# Patient Record
Sex: FEMALE | Race: WHITE | NOT HISPANIC OR LATINO | Employment: OTHER | URBAN - METROPOLITAN AREA
[De-identification: names, ages, dates, MRNs, and addresses within clinical notes are randomized per-mention and may not be internally consistent; named-entity substitution may affect disease eponyms.]

---

## 2017-01-05 ENCOUNTER — TELEPHONE (OUTPATIENT)
Dept: FAMILY MEDICINE | Facility: CLINIC | Age: 57
End: 2017-01-05
Payer: COMMERCIAL

## 2017-01-05 DIAGNOSIS — E11.42 TYPE 2 DIABETES MELLITUS WITH DIABETIC POLYNEUROPATHY (H): ICD-10-CM

## 2017-01-05 DIAGNOSIS — H40.1130 PRIMARY OPEN ANGLE GLAUCOMA OF BOTH EYES, UNSPECIFIED GLAUCOMA STAGE: ICD-10-CM

## 2017-01-05 RX ORDER — LATANOPROST 50 UG/ML
1 SOLUTION/ DROPS OPHTHALMIC AT BEDTIME
COMMUNITY
Start: 2017-01-05 | End: 2018-02-21

## 2017-01-05 NOTE — TELEPHONE ENCOUNTER
Forms received from:  [facility]   Phone number listed: [phone]   Fax listed: [phone]  Date received: 1-5-17  Form description: [facility]  Once forms are completed, please return to [facility] via fax.  Is patient requesting to be contacted when forms are completed: n/a  Form placed: RN folder  Arabella Mayo    Form given to RN to review med list.  Order pended for provider to sign.

## 2017-01-06 PROCEDURE — G0180 MD CERTIFICATION HHA PATIENT: HCPCS | Performed by: FAMILY MEDICINE

## 2017-01-06 NOTE — TELEPHONE ENCOUNTER
Signed forms/ orders.    We should continue home care services, especially with all the hospitalizations in the last few years.    Fredi Fuentes MD

## 2017-01-13 ENCOUNTER — OFFICE VISIT (OUTPATIENT)
Dept: PODIATRY | Facility: CLINIC | Age: 57
End: 2017-01-13
Payer: COMMERCIAL

## 2017-01-13 VITALS — HEART RATE: 123 BPM | DIASTOLIC BLOOD PRESSURE: 75 MMHG | OXYGEN SATURATION: 97 % | SYSTOLIC BLOOD PRESSURE: 153 MMHG

## 2017-01-13 DIAGNOSIS — B35.1 DERMATOPHYTOSIS OF NAIL: Primary | ICD-10-CM

## 2017-01-13 DIAGNOSIS — E11.49 DIABETIC NEUROPATHY WITH NEUROLOGIC COMPLICATION (H): ICD-10-CM

## 2017-01-13 DIAGNOSIS — L60.2 ONYCHAUXIS: ICD-10-CM

## 2017-01-13 DIAGNOSIS — L84 TYLOMA: ICD-10-CM

## 2017-01-13 DIAGNOSIS — E11.40 DIABETIC NEUROPATHY WITH NEUROLOGIC COMPLICATION (H): ICD-10-CM

## 2017-01-13 PROCEDURE — 99213 OFFICE O/P EST LOW 20 MIN: CPT | Mod: 25 | Performed by: PODIATRIST

## 2017-01-13 PROCEDURE — 11055 PARING/CUTG B9 HYPRKER LES 1: CPT | Performed by: PODIATRIST

## 2017-01-13 PROCEDURE — G0127 TRIM NAIL(S): HCPCS | Mod: 51 | Performed by: PODIATRIST

## 2017-01-13 NOTE — NURSING NOTE
"Lexy Rockwell's goals for this visit include: Toenail trim and callus trim  She requests these members of her care team be copied on today's visit information: yes    PCP: Fredi Fuentes    Referring Provider:  No referring provider defined for this encounter.    Chief Complaint   Patient presents with     RECHECK     townail trim and callus check       Initial /75 mmHg  Pulse 123  SpO2 97%  LMP 03/28/2014 Estimated body mass index is 33.80 kg/(m^2) as calculated from the following:    Height as of 11/15/16: 1.626 m (5' 4\").    Weight as of 12/19/16: 89.359 kg (197 lb).  BP completed using cuff size: large    "

## 2017-01-13 NOTE — PROGRESS NOTES
Past Medical History   Diagnosis Date     Cerumen impacted      Obesity      Development delay      Hypothyroid      DM (diabetes mellitus) (H)      Hyperlipaemia      Hypertension      Glaucoma (increased eye pressure)      Nonsenile cataract      Patient Active Problem List   Diagnosis     Development delay     Overactive bladder     GERD (gastroesophageal reflux disease)     Type 2 diabetes, HbA1C goal < 8% (H)     Hypertension goal BP (blood pressure) < 140/90     Hyperlipidemia LDL goal <100     Pain in joint, lower leg     Proteinuria     Vitamin D deficiency     Diabetic gastroparesis (H)     Health Care Home     History of strabismus surgery     Type 2 diabetes mellitus with other specified complication (H)     Advanced directives, counseling/discussion     Type 2 diabetes mellitus with diabetic polyneuropathy (H)     Primary open angle glaucoma of both eyes, moderate stage     Past Surgical History   Procedure Laterality Date     C eye surg ant sgmt proc unlisted  remote     strabismus surgery     Strabismus surgery       Social History     Social History     Marital Status: Single     Spouse Name: N/A     Number of Children: N/A     Years of Education: N/A     Occupational History     Not on file.     Social History Main Topics     Smoking status: Never Smoker      Smokeless tobacco: Never Used      Comment: lives in smoke free household.     Alcohol Use: Yes      Comment: occass social     Drug Use: No     Sexual Activity: No     Other Topics Concern     Parent/Sibling W/ Cabg, Mi Or Angioplasty Before 65f 55m? No     Social History Narrative     Family History   Problem Relation Age of Onset     Hypertension Father      DIABETES Sister      CANCER No family hx of      CEREBROVASCULAR DISEASE No family hx of      Thyroid Disease No family hx of      Macular Degeneration No family hx of      Glaucoma Maternal Grandfather        A1C     11.0   12/19/2016   SUBJECTIVE FINDINGS:  This 56-year-old female  returns to clinic for diabetic foot check, onychomycosis, onychauxis and calluses.  She relates that she needs her calluses cut down.  She relates she had a blister on her left arch a few weeks ago.  Relates no injuries.  She has numbness, tingling, and neuropathy in her feet.  She relates she opted to not get diabetic shoes.        OBJECTIVE FINDINGS:  DP and PT are 1/4 bilaterally. She has decreased hair growth bilaterally.  She has mild peripheral edema bilaterally.  She has incurvated nails with some dystrophy and discoloration 1-5 bilaterally.  There is hyperkeratotic tissue buildup plantar medial hallux and first MPJ left.  There is dry skin bilaterally.  There is decreased ankle joint dorsiflexion bilaterally.  No gross tendon voids bilaterally.  Sharp/dull is decreased with 5.07 Violet-Latha monofilament bilaterally.  Deep tendon reflexes are intact bilaterally.  There are no gross tendon voids bilaterally.  There is no erythema, no drainage, no odor, no calor bilaterally.  She has dorsally contracted digits 2-5 bilaterally.        ASSESSMENT AND PLAN:  Onychauxis and onychomycosis bilaterally.  Tylomas left foot.  She is diabetic with peripheral neuropathy.  Diagnosis and treatment options discussed with her.  She is advised on stretching.  She has decreased ankle joint dorsiflexion bilaterally.  Advised her on a runner's stretch.  Tylomas left foot were sharp debrided with a 15 blade upon consent.  All the nails bilaterally were either debrided or reduced upon consent.  Advised her on moisturizing lotion use.  She relates she has not been doing that but she will start.  She will return to clinic to see me in 2-3 months.  Diagnosis and treatment options discussed with her.

## 2017-01-13 NOTE — PATIENT INSTRUCTIONS
Thanks for coming today.  Ortho[facility] Medicine Clinic  [address] [place] [address]    To schedule future appointments in Ortho Clinic, you may call [phone].    To schedule ordered imaging by your Provider: Call [facility] at [phone]    [facility] available online at:   [email or web address]    Please call if any further questions or concerns [phone] and ask for the Orthopedic Department. Clinic hours 8 am to 5 pm.    Return to clinic if symptoms worsen.

## 2017-01-25 ENCOUNTER — TRANSFERRED RECORDS (OUTPATIENT)
Dept: HEALTH INFORMATION MANAGEMENT | Facility: CLINIC | Age: 57
End: 2017-01-25

## 2017-02-09 DIAGNOSIS — E11.69 TYPE 2 DIABETES MELLITUS WITH OTHER SPECIFIED COMPLICATION (H): Primary | ICD-10-CM

## 2017-02-09 NOTE — TELEPHONE ENCOUNTER
insulin aspart (NOVOLOG FLEXPEN) 100 UNIT/ML injection      Last Written Prescription Date:  1/5/17  Last Fill Quantity: na,   # refills: na  Last Office Visit with G, P or [facility] prescribing provider: 12/19/16  Future Office visit:    Next 5 appointments (look out 90 days)     Mar 06, 2017 11:15 AM   Return Visit with Shira Wilson MD, MG ENDO NURSE   Formerly named [facility])    [address] [place] [address]   [phone]            Mar 17, 2017 12:30 PM   Return Visit with Ravin Back DPM   Formerly named [facility])    [address] [place] [address]   [phone]            May 04, 2017  1:45 PM   Return Visit with Dayron Rios MD   [facility] ([address] [address]   [phone]                   Routing refill request to provider for review/approval because:  Medication is reported/historical

## 2017-02-09 NOTE — TELEPHONE ENCOUNTER
Routing refill request to provider for review/approval because:  Medication is reported/historical  insulin aspart (NOVOLOG FLEXPEN) 100 UNIT/ML injection   1/5/2017  No      Sig: Inject 20 units before each meal. Dose to be titrated     Class: Historical       Route to PCP    Kathie Queen RN

## 2017-02-13 DIAGNOSIS — E11.42 TYPE 2 DIABETES MELLITUS WITH DIABETIC POLYNEUROPATHY, UNSPECIFIED LONG TERM INSULIN USE STATUS: ICD-10-CM

## 2017-03-06 ENCOUNTER — CARE COORDINATION (OUTPATIENT)
Dept: ENDOCRINOLOGY | Facility: CLINIC | Age: 57
End: 2017-03-06

## 2017-03-06 ENCOUNTER — OFFICE VISIT (OUTPATIENT)
Dept: ENDOCRINOLOGY | Facility: CLINIC | Age: 57
End: 2017-03-06
Payer: COMMERCIAL

## 2017-03-06 VITALS
HEART RATE: 108 BPM | BODY MASS INDEX: 34.5 KG/M2 | SYSTOLIC BLOOD PRESSURE: 129 MMHG | DIASTOLIC BLOOD PRESSURE: 84 MMHG | HEIGHT: 63 IN | WEIGHT: 194.7 LBS

## 2017-03-06 DIAGNOSIS — Z79.4 TYPE 2 DIABETES MELLITUS WITH DIABETIC POLYNEUROPATHY, WITH LONG-TERM CURRENT USE OF INSULIN (H): Primary | ICD-10-CM

## 2017-03-06 DIAGNOSIS — E11.42 TYPE 2 DIABETES MELLITUS WITH DIABETIC POLYNEUROPATHY, WITH LONG-TERM CURRENT USE OF INSULIN (H): Primary | ICD-10-CM

## 2017-03-06 DIAGNOSIS — E11.42 TYPE 2 DIABETES MELLITUS WITH DIABETIC POLYNEUROPATHY (H): ICD-10-CM

## 2017-03-06 LAB
ANION GAP SERPL CALCULATED.3IONS-SCNC: 8 MMOL/L (ref 3–14)
BUN SERPL-MCNC: 14 MG/DL (ref 7–30)
CHLORIDE SERPL-SCNC: 105 MMOL/L (ref 94–109)
CO2 SERPL-SCNC: 24 MMOL/L (ref 20–32)
CREAT SERPL-MCNC: 0.56 MG/DL (ref 0.52–1.04)
GFR SERPL CREATININE-BSD FRML MDRD: NORMAL ML/MIN/1.7M2
HBA1C MFR BLD: 10.5 % (ref 0–5.7)
POTASSIUM SERPL-SCNC: 4.7 MMOL/L (ref 3.4–5.3)
SODIUM SERPL-SCNC: 137 MMOL/L (ref 133–144)

## 2017-03-06 PROCEDURE — 82565 ASSAY OF CREATININE: CPT | Performed by: INTERNAL MEDICINE

## 2017-03-06 PROCEDURE — 80051 ELECTROLYTE PANEL: CPT | Performed by: INTERNAL MEDICINE

## 2017-03-06 PROCEDURE — 84520 ASSAY OF UREA NITROGEN: CPT | Performed by: INTERNAL MEDICINE

## 2017-03-06 PROCEDURE — 99215 OFFICE O/P EST HI 40 MIN: CPT | Performed by: INTERNAL MEDICINE

## 2017-03-06 PROCEDURE — 83036 HEMOGLOBIN GLYCOSYLATED A1C: CPT | Performed by: INTERNAL MEDICINE

## 2017-03-06 PROCEDURE — 36415 COLL VENOUS BLD VENIPUNCTURE: CPT | Performed by: INTERNAL MEDICINE

## 2017-03-06 RX ORDER — ONDANSETRON 8 MG/1
TABLET, ORALLY DISINTEGRATING ORAL
Refills: 0 | COMMUNITY
Start: 2017-01-30 | End: 2017-06-23

## 2017-03-06 NOTE — LETTER
3/6/2017       RE: Lexy Rockwell  [address]  [place] [address]     Dear Colleague,    Thank you for referring your patient, Lexy Rockwell, to the [facility]. Please see a copy of my visit note below.    HISTORY OF PRESENT ILLNESS:  Lexy Rockwell is a 56-year-old female who returns today along with her mother for followup of type 2 diabetes.      The patient was diagnosed with type 2 diabetes around 2009.  Since her diagnosis, her blood sugars have been mostly uncontrolled.  She has had repeated hospitalizations due to severe hypoglycemia and diabetic  ketoacidosis.        Currently, she is taking Lantus 56 units at bedtime and NovoLog 20 units 3 times daily with meals and metformin XR 1000 mg b.i.d. and Trulicity 1.5 mg per week.      Since her last visit with me in 11/2016 she has had 2 hospitalizations due to uncontrolled blood sugars and diabetic ketoacidosis.      The patient checks her blood sugar about 2-3 times daily before each meal.  Her blood glucose data was downloaded and reviewed.  She has highly variable blood glucose.  She has had days where her all 3 checked blood sugars are below 150 and on other days of the same week she has had all readings above 400 throughout the day.      The patient reports that she tries to take all insulin doses but cannot confirm how often or when she missed insulin injections.      Her A1c is 10.5% today it has been above 11 over the last couple of years.  She has history of gastroparesis and was recently prescribed Zofran during her hospitalization.  She follows with Podiatry.      At the recent hospitalization metformin was stopped due to increased creatinine however was subsequently restarted as outpatient.      REVIEW OF SYSTEMS:  A 10-point review of systems was done, pertinent positives and negatives noted in HPI.  Review of systems otherwise negative.       PAST MEDICAL HISTORY:   1.  History of learning disability.   2.  Type 2 diabetes.   3.  Dyslipidemia.   4.  Diabetic neuropathy.   5.  Diabetic gastroparesis.      SOCIAL HISTORY:  She recently moved to a senior apartment complex.  She reports that she has assistance from home care 3 times a week, her  from [place] shows Cris Ray, and her cell number is [phone].  She gives her insulin independently.      FAMILY HISTORY:  Father has history of hypertension.        Current Outpatient Prescriptions on File Prior to Visit:  dulaglutide (TRULICITY) 1.5 MG/0.5ML pen Inject 1.5 mg Subcutaneous every 7 days   insulin aspart (NOVOLOG FLEXPEN) 100 UNIT/ML injection Inject 20 units before each meal. Dose to be titrated   latanoprost (XALATAN) 0.005 % ophthalmic solution Place 1 drop into both eyes At Bedtime   insulin glargine (LANTUS SOLOSTAR) 100 UNIT/ML PEN Inject 56 units at bedtime daily   insulin pen needle (B-D U/F) 31G X 8 MM Use 4 daily or as directed.   metoclopramide (REGLAN) 5 MG tablet Take 1 tablet (5 mg) by mouth 3 times daily (before meals)   simvastatin (ZOCOR) 20 MG tablet Take 1 tablet (20 mg) by mouth At Bedtime   oxybutynin (DITROPAN) 5 MG tablet Take 1 tablet (5 mg) by mouth 2 times daily   omeprazole (PRILOSEC) 20 MG capsule Take 1 capsule (20 mg) by mouth 2 times daily   magnesium oxide (MAG-OX) 400 MG tablet Take 1 tablet (400 mg) by mouth daily   metFORMIN (GLUCOPHAGE-XR) 500 MG 24 hr tablet Take 2 tablets (1,000 mg) by mouth 2 times daily   lisinopril (PRINIVIL,ZESTRIL) 20 MG tablet Take 1 tablet (20 mg) by mouth daily   blood glucose monitoring (NO BRAND SPECIFIED) meter device kit Use to test blood sugar 5 times daily or as directed.Patient needs new monitor.  Accu Test Compact or whatever is covered.Patient has very labile sugars so needs to check 5 x per day.Please also include 3 month supply of strips, lancets, and whatever other supplies are needed.  Ongoing  "refills for a year.   blood glucose monitoring (ACCU-CHEK COMPACT DRUM) test strip Patient has very labile sugars so has frequent hypoglycemia and some very sugars.  Thus needs to test frequently, 5 times daily;  Please dispense appropriate amount of supplies strips and lancets.   SOFTCLIX LANCETS MISC 1 Units 3 times daily.   ASPIRIN 81 MG OR TABS 1 TABLET DAILY   MULTIVITAMIN TABS   OR 1 TABLET DAILY     No current facility-administered medications on file prior to visit.      PHYSICAL EXAMINATION:   /84  Pulse 108  Ht 1.607 m (5' 3.25\")  Wt 88.3 kg (194 lb 11.2 oz)  LMP 03/28/2014  BMI 34.22 kg/m2  GENERAL:  She appears older than stated age, no acute distress.     HEENT:  No lid lag, retraction or proptosis.  Extraocular muscles intact.   NECK:  No goiter.  No cervical lymphadenopathy.   LUNGS:  Clear to auscultation bilaterally.   CARDIAC:  Regular rate and rhythm, no murmurs.   ABDOMEN:  Obese, soft, nontender.   EXTREMITIES:  Feet reduced monofilament sensation over toes.   NEUROLOGIC:  She is alert and oriented and answers appropriately to questions.      RESULTS  Lab Results   Component Value Date    A1C 10.5 03/06/2017    A1C 11.0 12/19/2016    A1C 11.1 09/28/2016    A1C 11.3 06/28/2016    A1C 11.4 01/25/2016       TSH   Date Value Ref Range Status   09/28/2016 4.16 (H) 0.40 - 4.00 mU/L Final   01/25/2016 10.93 (H) 0.40 - 4.00 mU/L Final   05/07/2015 3.23 0.40 - 4.00 mU/L Final   12/02/2014 4.35 (H) 0.40 - 4.00 mU/L Final     Comment:     Effective 7/30/2014, the reference range for this assay has changed to reflect   new instrumentation/methodology.     06/03/2014 6.44 (H) 0.4 - 5.0 mU/L Final     T4 Free   Date Value Ref Range Status   09/28/2016 1.01 0.76 - 1.46 ng/dL Final   01/25/2016 1.29 0.76 - 1.46 ng/dL Final   12/02/2014 1.23 0.76 - 1.46 ng/dL Final     Comment:     Effective 7/30/2014, the reference range for this assay has changed to reflect   new instrumentation/methodology.   "   09/04/2012 1.41 0.70 - 1.85 ng/dL Final   04/05/2012 1.17 0.70 - 1.85 ng/dL Final       Creatinine   Date Value Ref Range Status   03/06/2017 0.56 0.52 - 1.04 mg/dL Final   12/19/2016 0.63 0.52 - 1.04 mg/dL Final   ]    No results found for: MICROALBUMIN]    ALT   Date Value Ref Range Status   12/19/2016 19 0 - 50 U/L Final   09/28/2016 20 0 - 50 U/L Final   ]    Recent Labs   Lab Test  09/28/16   0955  01/25/16   1023  05/07/15   1105  06/03/14   1833   CHOL  190  182  175  210*   HDL  56  67  66  60   LDL  116*  99  93  105   TRIG  90  79  82  227*   CHOLHDLRATIO   --    --   2.7  3.5          IMPRESSION AND RECOMMENDATIONS:  Type 2 diabetes which has been chronically uncontrolled.  I am concerned the patient has had poorly controlled diabetes over the last 2 years or so and we have not been able to make much progress.  She has had repeated hospitalizations related to diabetes.  Looking at her blood glucose log it appears that she does not take her insulin consistently.  I am concerned about her ability to independently administer insulin correctly.  She has been counseled repeatedly over the last couple of years during Endocrine Clinic visit as well as she had visit with diabetes educators and again have not had much improvement.      We will have our clinic nurse contact her  to review if there is a possibility that she can receive more care and can receive insulin under supervision.  Given the chronically uncontrolled diabetes along with repeated hospitalizations I think it is important that if she can be supported with more home care.       I will decrease the Lantus dose to 50 units each day at bedtime as she has had some morning readings in 70s.  Otherwise, will continue on other insulin dosing.  She also would like to stop Trulicity due to the high copay.  The patient will return to see diabetic educator in about 1 month and with me in around 4 months.      Total time 40 minutes today, more  than 50%, reviewing records from recent hospitalizations and counseling patient and coordinating care with the patient's mother as well as our clinic nurse.         SHIRA WILSON MD             D: 2017 12:34   T: 2017 14:16   MT: [id number]      Name:     TITO LARA   MRN:      [id number]        Account:      [id number]   :      1960           Visit Date:   2017      Document: [id number]       cc: Fredi Fuentes MD       Again, thank you for allowing me to participate in the care of your patient.      Sincerely,    Shira Wilson MD

## 2017-03-06 NOTE — NURSING NOTE
"Lexy Rockwell's goals for this visit include:   Chief Complaint   Patient presents with     Diabetes       She requests these members of her care team be copied on today's visit information: Fredi Fuentes      PCP: Fredi Fuentes    Referring Provider:  No referring provider defined for this encounter.    Chief Complaint   Patient presents with     Diabetes       Initial /84  Pulse 108  Ht 1.607 m (5' 3.25\")  Wt 88.3 kg (194 lb 11.2 oz)  LMP 03/28/2014  BMI 34.22 kg/m2 Estimated body mass index is 34.22 kg/(m^2) as calculated from the following:    Height as of this encounter: 1.607 m (5' 3.25\").    Weight as of this encounter: 88.3 kg (194 lb 11.2 oz).  Medication Reconciliation: complete    Do you need any medication refills at today's visit? No    Libia Fatima LPN      "

## 2017-03-06 NOTE — PROGRESS NOTES
HISTORY OF PRESENT ILLNESS:  Lexy Rockwell is a 56-year-old female who returns today along with her mother for followup of type 2 diabetes.      The patient was diagnosed with type 2 diabetes around 2009.  Since her diagnosis, her blood sugars have been mostly uncontrolled.  She has had repeated hospitalizations due to severe hypoglycemia and diabetic  ketoacidosis.        Currently, she is taking Lantus 56 units at bedtime and NovoLog 20 units 3 times daily with meals and metformin XR 1000 mg b.i.d. and Trulicity 1.5 mg per week.      Since her last visit with me in 11/2016 she has had 2 hospitalizations due to uncontrolled blood sugars and diabetic ketoacidosis.      The patient checks her blood sugar about 2-3 times daily before each meal.  Her blood glucose data was downloaded and reviewed.  She has highly variable blood glucose.  She has had days where her all 3 checked blood sugars are below 150 and on other days of the same week she has had all readings above 400 throughout the day.      The patient reports that she tries to take all insulin doses but cannot confirm how often or when she missed insulin injections.      Her A1c is 10.5% today it has been above 11 over the last couple of years.  She has history of gastroparesis and was recently prescribed Zofran during her hospitalization.  She follows with Podiatry.      At the recent hospitalization metformin was stopped due to increased creatinine however was subsequently restarted as outpatient.      REVIEW OF SYSTEMS:  A 10-point review of systems was done, pertinent positives and negatives noted in HPI.  Review of systems otherwise negative.      PAST MEDICAL HISTORY:   1.  History of learning disability.   2.  Type 2 diabetes.   3.  Dyslipidemia.   4.  Diabetic neuropathy.   5.  Diabetic gastroparesis.      SOCIAL HISTORY:  She recently moved to a senior apartment complex.  She reports that she has assistance from home care 3 times a week, her case  manager from [place] shows Cris Ray, and her cell number is [phone].  She gives her insulin independently.      FAMILY HISTORY:  Father has history of hypertension.        Current Outpatient Prescriptions on File Prior to Visit:  dulaglutide (TRULICITY) 1.5 MG/0.5ML pen Inject 1.5 mg Subcutaneous every 7 days   insulin aspart (NOVOLOG FLEXPEN) 100 UNIT/ML injection Inject 20 units before each meal. Dose to be titrated   latanoprost (XALATAN) 0.005 % ophthalmic solution Place 1 drop into both eyes At Bedtime   insulin glargine (LANTUS SOLOSTAR) 100 UNIT/ML PEN Inject 56 units at bedtime daily   insulin pen needle (B-D U/F) 31G X 8 MM Use 4 daily or as directed.   metoclopramide (REGLAN) 5 MG tablet Take 1 tablet (5 mg) by mouth 3 times daily (before meals)   simvastatin (ZOCOR) 20 MG tablet Take 1 tablet (20 mg) by mouth At Bedtime   oxybutynin (DITROPAN) 5 MG tablet Take 1 tablet (5 mg) by mouth 2 times daily   omeprazole (PRILOSEC) 20 MG capsule Take 1 capsule (20 mg) by mouth 2 times daily   magnesium oxide (MAG-OX) 400 MG tablet Take 1 tablet (400 mg) by mouth daily   metFORMIN (GLUCOPHAGE-XR) 500 MG 24 hr tablet Take 2 tablets (1,000 mg) by mouth 2 times daily   lisinopril (PRINIVIL,ZESTRIL) 20 MG tablet Take 1 tablet (20 mg) by mouth daily   blood glucose monitoring (NO BRAND SPECIFIED) meter device kit Use to test blood sugar 5 times daily or as directed.Patient needs new monitor.  Accu Test Compact or whatever is covered.Patient has very labile sugars so needs to check 5 x per day.Please also include 3 month supply of strips, lancets, and whatever other supplies are needed.  Ongoing refills for a year.   blood glucose monitoring (ACCU-CHEK COMPACT DRUM) test strip Patient has very labile sugars so has frequent hypoglycemia and some very sugars.  Thus needs to test frequently, 5 times daily;  Please dispense appropriate amount of supplies strips and lancets.   SOFTCLIX LANCETS MISC 1 Units 3 times  "daily.   ASPIRIN 81 MG OR TABS 1 TABLET DAILY   MULTIVITAMIN TABS   OR 1 TABLET DAILY     No current facility-administered medications on file prior to visit.      PHYSICAL EXAMINATION:   /84  Pulse 108  Ht 1.607 m (5' 3.25\")  Wt 88.3 kg (194 lb 11.2 oz)  LMP 03/28/2014  BMI 34.22 kg/m2  GENERAL:  She appears older than stated age, no acute distress.     HEENT:  No lid lag, retraction or proptosis.  Extraocular muscles intact.   NECK:  No goiter.  No cervical lymphadenopathy.   LUNGS:  Clear to auscultation bilaterally.   CARDIAC:  Regular rate and rhythm, no murmurs.   ABDOMEN:  Obese, soft, nontender.   EXTREMITIES:  Feet reduced monofilament sensation over toes.   NEUROLOGIC:  She is alert and oriented and answers appropriately to questions.      RESULTS  Lab Results   Component Value Date    A1C 10.5 03/06/2017    A1C 11.0 12/19/2016    A1C 11.1 09/28/2016    A1C 11.3 06/28/2016    A1C 11.4 01/25/2016       TSH   Date Value Ref Range Status   09/28/2016 4.16 (H) 0.40 - 4.00 mU/L Final   01/25/2016 10.93 (H) 0.40 - 4.00 mU/L Final   05/07/2015 3.23 0.40 - 4.00 mU/L Final   12/02/2014 4.35 (H) 0.40 - 4.00 mU/L Final     Comment:     Effective 7/30/2014, the reference range for this assay has changed to reflect   new instrumentation/methodology.     06/03/2014 6.44 (H) 0.4 - 5.0 mU/L Final     T4 Free   Date Value Ref Range Status   09/28/2016 1.01 0.76 - 1.46 ng/dL Final   01/25/2016 1.29 0.76 - 1.46 ng/dL Final   12/02/2014 1.23 0.76 - 1.46 ng/dL Final     Comment:     Effective 7/30/2014, the reference range for this assay has changed to reflect   new instrumentation/methodology.     09/04/2012 1.41 0.70 - 1.85 ng/dL Final   04/05/2012 1.17 0.70 - 1.85 ng/dL Final       Creatinine   Date Value Ref Range Status   03/06/2017 0.56 0.52 - 1.04 mg/dL Final   12/19/2016 0.63 0.52 - 1.04 mg/dL Final   ]    No results found for: MICROALBUMIN]    ALT   Date Value Ref Range Status   12/19/2016 19 0 - 50 U/L Final "   09/28/2016 20 0 - 50 U/L Final   ]    Recent Labs   Lab Test  09/28/16   0955  01/25/16   1023  05/07/15   1105  06/03/14   1833   CHOL  190  182  175  210*   HDL  56  67  66  60   LDL  116*  99  93  105   TRIG  90  79  82  227*   CHOLHDLRATIO   --    --   2.7  3.5          IMPRESSION AND RECOMMENDATIONS:  Type 2 diabetes which has been chronically uncontrolled.  I am concerned the patient has had poorly controlled diabetes over the last 2 years or so and we have not been able to make much progress.  She has had repeated hospitalizations related to diabetes.  Looking at her blood glucose log it appears that she does not take her insulin consistently.  I am concerned about her ability to independently administer insulin correctly.  She has been counseled repeatedly over the last couple of years during Endocrine Clinic visit as well as she had visit with diabetes educators and again have not had much improvement.      We will have our clinic nurse contact her  to review if there is a possibility that she can receive more care and can receive insulin under supervision.  Given the chronically uncontrolled diabetes along with repeated hospitalizations I think it is important that if she can be supported with more home care.       I will decrease the Lantus dose to 50 units each day at bedtime as she has had some morning readings in 70s.  Otherwise, will continue on other insulin dosing.  She also would like to stop Trulicity due to the high copay.  The patient will return to see diabetic educator in about 1 month and with me in around 4 months.      Total time 40 minutes today, more than 50%, reviewing records from recent hospitalizations and counseling patient and coordinating care with the patient's mother as well as our clinic nurse.         BENJI QUINTERO MD             D: 03/06/2017 12:34   T: 03/06/2017 14:16   MT: [id number]      Name:     TITO LARA   MRN:      [id number]        Account:       [id number]   :      1960           Visit Date:   2017      Document: [id number]       cc: Fredi Fuentes MD

## 2017-03-06 NOTE — PROGRESS NOTES
Per Dr. Wilson's request, attempted to contact patient's , Cris ([phone]), to review Dr. Wilson's concerns. Message was left.       Kirstin Lopez, RN  Endocrine Care Coordinator  [facility]

## 2017-03-06 NOTE — MR AVS SNAPSHOT
After Visit Summary   3/6/2017    Lexy Rockwell    MRN: [id number]           Patient Information     Date Of Birth          1960        Visit Information        Provider Department      3/6/2017 11:15 AM Shira Wilson MD; MG ENDO NURSE [facility]        Today's Diagnoses     Type 2 diabetes mellitus with diabetic polyneuropathy, with long-term current use of insulin (H)    -  1      Care Instructions    Please schedule follow-up with diabetic educator in 1-2 months.         Follow-ups after your visit        Your next 10 appointments already scheduled     Mar 24, 2017  2:00 PM CDT   Return Visit with Ravin Back DPM   [facility] ([facility])    [address] [place] [id number] [phone]            May 04, 2017  1:45 PM CDT   Return Visit with Dayron Rios MD   [facility] ([address] [address] [phone]            Jul 31, 2017  9:45 AM CDT   Return Visit with Shira Wilson MD, MG ENDO NURSE   [facility] ([facility])    [address] [place] [id number] [phone]              Who to contact     If you have questions or need follow up information about today's clinic visit or your schedule please contact [facility] directly at [phone].  Normal or non-critical lab and imaging results will be communicated to you by MyChart, letter or phone within 4 business days after the clinic has received the results. If you do not hear from us within 7 days, please contact the clinic through MyChart or phone. If you have a critical or abnormal lab result, we will notify you by phone as soon as possible.  Submit refill requests through Scancell or call your pharmacy and they will forward the refill request to us. Please allow 3 business days for your refill to be completed.  "         Additional Information About Your Visit        MyChart Information     Zoombu is an electronic gateway that provides easy, online access to your medical records. With Zoombu, you can request a clinic appointment, read your test results, renew a prescription or communicate with your care team.     To sign up for Zoombu visit the website at [email or web address]   You will be asked to enter the access code listed below, as well as some personal information. Please follow the directions to create your username and password.     Your access code is: [email or web address]  Expires: 2017 12:25 PM     Your access code will  in 90 days. If you need help or a new code, please contact your [facility] or call [phone] for assistance.        Care EveryWhere ID     This is your Care EveryWhere ID. This could be used by other organizations to access your [place] medical records  [id number]        Your Vitals Were     Pulse Height Last Period BMI (Body Mass Index)          108 1.607 m (5' 3.25\") 2014 34.22 kg/m2         Blood Pressure from Last 3 Encounters:   17 129/84   17 153/75   16 135/78    Weight from Last 3 Encounters:   17 88.3 kg (194 lb 11.2 oz)   16 89.4 kg (197 lb)   11/15/16 88 kg (194 lb 0.1 oz)              We Performed the Following     Creatinine     Electrolyte panel     Urea nitrogen        Primary Care Provider Office Phone # Fax #    Fredi Fuentes [phone]       [address] [address]        Thank you!     Thank you for choosing [facility]  for your care. Our goal is always to provide you with excellent care. Hearing back from our patients is one way we can continue to improve our services. Please take a few minutes to complete the written survey that you may receive in the mail after your visit with us. Thank you!           "   Your Updated Medication List - Protect others around you: Learn how to safely use, store and throw away your medicines at www.disposemymeds.org.          This list is accurate as of: 3/6/17 12:28 PM.  Always use your most recent med list.                   Brand Name Dispense Instructions for use    aspirin 81 MG tablet      1 TABLET DAILY       blood glucose monitoring lancets     100 each    1 Units 3 times daily.       blood glucose monitoring meter device kit    no brand specified    1 kit    Use to test blood sugar 5 times daily or as directed.  Patient needs new monitor.  Accu Test Compact or whatever is covered.  Patient has very labile sugars so needs to check 5 x per day.  Please also include 3 month supply of strips, lancets, and whatever other supplies are needed.  Ongoing refills for a year.       blood glucose monitoring test strip    ACCU-CHEK COMPACT DRUM    3 Box    Patient has very labile sugars so has frequent hypoglycemia and some very sugars.  Thus needs to test frequently, 5 times daily;  Please dispense appropriate amount of supplies strips and lancets.       dulaglutide 1.5 MG/0.5ML pen    TRULICITY    0.5 mL    Inject 1.5 mg Subcutaneous every 7 days       insulin aspart 100 UNIT/ML injection    NovoLOG FLEXPEN    20 mL    Inject 20 units before each meal. Dose to be titrated       insulin glargine 100 UNIT/ML injection    LANTUS SOLOSTAR    3 Month    Inject 56 units at bedtime daily       insulin pen needle 31G X 8 MM    B-D U/F    120 each    Use 4 daily or as directed.       lisinopril 20 MG tablet    PRINIVIL/ZESTRIL    90 tablet    Take 1 tablet (20 mg) by mouth daily       magnesium oxide 400 MG tablet    MAG-OX    90 tablet    Take 1 tablet (400 mg) by mouth daily       metFORMIN 500 MG 24 hr tablet    GLUCOPHAGE-XR    120 tablet    Take 2 tablets (1,000 mg) by mouth 2 times daily       metoclopramide 5 MG tablet    REGLAN    180 tablet    Take 1 tablet (5 mg) by mouth 3 times daily  (before meals)       MULTIVITAMIN TABS   OR      1 TABLET DAILY       omeprazole 20 MG CR capsule    priLOSEC    180 capsule    Take 1 capsule (20 mg) by mouth 2 times daily       ondansetron 8 MG ODT tab    ZOFRAN-ODT     Reported on 3/6/2017       oxybutynin 5 MG tablet    DITROPAN    180 tablet    Take 1 tablet (5 mg) by mouth 2 times daily       simvastatin 20 MG tablet    ZOCOR    90 tablet    Take 1 tablet (20 mg) by mouth At Bedtime       XALATAN 0.005 % ophthalmic solution   Generic drug:  latanoprost      Place 1 drop into both eyes At Bedtime

## 2017-03-20 PROBLEM — E11.42 TYPE 2 DIABETES MELLITUS WITH DIABETIC POLYNEUROPATHY, WITH LONG-TERM CURRENT USE OF INSULIN (H): Status: ACTIVE | Noted: 2017-03-20

## 2017-03-20 PROBLEM — Z79.4 TYPE 2 DIABETES MELLITUS WITH DIABETIC POLYNEUROPATHY, WITH LONG-TERM CURRENT USE OF INSULIN (H): Status: ACTIVE | Noted: 2017-03-20

## 2017-03-21 ENCOUNTER — TELEPHONE (OUTPATIENT)
Dept: FAMILY MEDICINE | Facility: CLINIC | Age: 57
End: 2017-03-21

## 2017-03-21 NOTE — TELEPHONE ENCOUNTER
Forms received from:[facility] Phone number listed: [phone]   Fax listed: [phone]  Date received: 3-21-17  Form description: Medicare DM testing supplies  Once forms are completed, please return to [facility] via fax.  Is patient requesting to be contacted when forms are completed: n/a  Form placed: provider desk  Arabella Mayo

## 2017-03-24 ENCOUNTER — TELEPHONE (OUTPATIENT)
Dept: ENDOCRINOLOGY | Facility: CLINIC | Age: 57
End: 2017-03-24

## 2017-03-24 ENCOUNTER — OFFICE VISIT (OUTPATIENT)
Dept: PODIATRY | Facility: CLINIC | Age: 57
End: 2017-03-24
Payer: COMMERCIAL

## 2017-03-24 VITALS — DIASTOLIC BLOOD PRESSURE: 82 MMHG | HEART RATE: 112 BPM | SYSTOLIC BLOOD PRESSURE: 134 MMHG | OXYGEN SATURATION: 98 %

## 2017-03-24 DIAGNOSIS — E11.40 DIABETIC NEUROPATHY WITH NEUROLOGIC COMPLICATION (H): ICD-10-CM

## 2017-03-24 DIAGNOSIS — E11.49 DIABETIC NEUROPATHY WITH NEUROLOGIC COMPLICATION (H): ICD-10-CM

## 2017-03-24 DIAGNOSIS — Z79.4 TYPE 2 DIABETES MELLITUS WITH DIABETIC POLYNEUROPATHY, WITH LONG-TERM CURRENT USE OF INSULIN (H): ICD-10-CM

## 2017-03-24 DIAGNOSIS — B35.1 DERMATOPHYTOSIS OF NAIL: ICD-10-CM

## 2017-03-24 DIAGNOSIS — B35.3 TINEA PEDIS OF BOTH FEET: ICD-10-CM

## 2017-03-24 DIAGNOSIS — E11.42 TYPE 2 DIABETES MELLITUS WITH DIABETIC POLYNEUROPATHY, WITH LONG-TERM CURRENT USE OF INSULIN (H): ICD-10-CM

## 2017-03-24 DIAGNOSIS — L84 TYLOMA: ICD-10-CM

## 2017-03-24 DIAGNOSIS — L60.2 ONYCHAUXIS: Primary | ICD-10-CM

## 2017-03-24 PROCEDURE — 99213 OFFICE O/P EST LOW 20 MIN: CPT | Mod: 25 | Performed by: PODIATRIST

## 2017-03-24 PROCEDURE — 11719 TRIM NAIL(S) ANY NUMBER: CPT | Mod: 51 | Performed by: PODIATRIST

## 2017-03-24 PROCEDURE — 11056 PARNG/CUTG B9 HYPRKR LES 2-4: CPT | Performed by: PODIATRIST

## 2017-03-24 RX ORDER — CICLOPIROX OLAMINE 7.7 MG/G
CREAM TOPICAL 2 TIMES DAILY
Qty: 90 G | Refills: 6 | Status: SHIPPED | OUTPATIENT
Start: 2017-03-24 | End: 2021-10-13

## 2017-03-24 RX ORDER — METFORMIN HCL 500 MG
1000 TABLET, EXTENDED RELEASE 24 HR ORAL 2 TIMES DAILY
Qty: 120 TABLET | Refills: 3 | Status: SHIPPED | OUTPATIENT
Start: 2017-03-24 | End: 2018-05-09

## 2017-03-24 NOTE — PATIENT INSTRUCTIONS
Thanks for coming today.  Ortho/Sports Medicine Clinic  [address] [place] [address]    To schedule future appointments in Ortho Clinic, you may call [phone].    To schedule ordered imaging by your Provider: Call [facility] at [phone]    [email or web address] available online at:   [email or web address]    Please call if any further questions or concerns [phone] and ask for the Orthopedic Department. Clinic hours 8 am to 5 pm.    Return to clinic if symptoms worsen.

## 2017-03-24 NOTE — MR AVS SNAPSHOT
After Visit Summary   3/24/2017    Lexy Rockwell    MRN: [id number]           Patient Information     Date Of Birth          1960        Visit Information        Provider Department      3/24/2017 2:00 PM Ravin Back DPM [facility]        Today's Diagnoses     Onychauxis    -  1    Dermatophytosis of nail        Tyloma        Tinea pedis of both feet        Diabetic neuropathy with neurologic complication (H)          Care Instructions    Thanks for coming today.  Ortho[facility] Medicine Clinic  [address] [place] [address]    To schedule future appointments in Ortho Clinic, you may call [phone].    To schedule ordered imaging by your Provider: Call [facility] at [phone]    Doujiao available online at:   [email or web address]    Please call if any further questions or concerns [phone] and ask for the Orthopedic Department. Clinic hours 8 am to 5 pm.    Return to clinic if symptoms worsen.          Follow-ups after your visit        Your next 10 appointments already scheduled     May 04, 2017  1:45 PM CDT   Return Visit with Dayron Rios MD   [facility] ([address] [address] [phone]            May 24, 2017  1:00 PM CDT   Return Visit with Ravin Back DPM   [facility] ([facility])    [address] [place] [address] [phone]            Jul 31, 2017  9:45 AM CDT   Return Visit with Shira Wilson MD, MG ENDO NURSE   [facility] ([facility])    [address] [place] [address] [phone]              Who to contact     If you have questions or need follow up information about today's clinic visit or your schedule please contact [facility] directly at [phone].  Normal or non-critical lab and imaging results will be  communicated to you by Metapshart, letter or phone within 4 business days after the clinic has received the results. If you do not hear from us within 7 days, please contact the clinic through Argyle Social or phone. If you have a critical or abnormal lab result, we will notify you by phone as soon as possible.  Submit refill requests through Argyle Social or call your pharmacy and they will forward the refill request to us. Please allow 3 business days for your refill to be completed.          Additional Information About Your Visit        Argyle Social Information     Argyle Social is an electronic gateway that provides easy, online access to your medical records. With Argyle Social, you can request a clinic appointment, read your test results, renew a prescription or communicate with your care team.     To sign up for Argyle Social visit the website at [email or web address]   You will be asked to enter the access code listed below, as well as some personal information. Please follow the directions to create your username and password.     Your access code is: [email or web address]  Expires: 2017  1:25 PM     Your access code will  in 90 days. If you need help or a new code, please contact your [facility] or call [phone] for assistance.        Care EveryWhere ID     This is your Care EveryWhere ID. This could be used by other organizations to access your [place] medical records  [id number]        Your Vitals Were     Pulse Last Period Pulse Oximetry             112 2014 98%          Blood Pressure from Last 3 Encounters:   17 134/82   17 129/84   17 153/75    Weight from Last 3 Encounters:   17 88.3 kg (194 lb 11.2 oz)   16 89.4 kg (197 lb)   11/15/16 88 kg (194 lb 0.1 oz)              We Performed the Following     TRIM HYPERKERATOTIC SKIN LESION,2-4     TRIM NONDYSTRPHIC NAIL(S)          Today's Medication Changes          These changes are accurate as of: 3/24/17 11:59  PM.  If you have any questions, ask your nurse or doctor.               Start taking these medicines.        Dose/Directions    ciclopirox 0.77 % cream   Commonly known as:  LOPROX   Used for:  Dermatophytosis of nail, Tinea pedis of both feet   Started by:  Ravin Back DPM        Apply topically 2 times daily To feet and toenails.   Quantity:  90 g   Refills:  6            Where to get your medicines      These medications were sent to [id number] - [place], [place] - [address] AT [facility]  [address], [place] [address]     Phone:  [phone]     ciclopirox 0.77 % cream    metFORMIN 500 MG 24 hr tablet                Primary Care Provider Office Phone # Fax #    Fredi Fuentes [phone]       [facility] [address] [facility] [address]        Thank you!     Thank you for choosing [facility]  for your care. Our goal is always to provide you with excellent care. Hearing back from our patients is one way we can continue to improve our services. Please take a few minutes to complete the written survey that you may receive in the mail after your visit with us. Thank you!             Your Updated Medication List - Protect others around you: Learn how to safely use, store and throw away your medicines at www.disposemymeds.org.          This list is accurate as of: 3/24/17 11:59 PM.  Always use your most recent med list.                   Brand Name Dispense Instructions for use    ACCU-CHEK COMPACT PLUS test strip   Generic drug:  blood glucose monitoring     153 each    TEST 5 TIMES DAILY OR AS DIRECTED       aspirin 81 MG tablet      1 TABLET DAILY       blood glucose monitoring lancets     100 each    1 Units 3 times daily.       blood glucose monitoring meter device kit    no brand specified    1 kit    Use to test blood sugar 5 times daily or as directed.  Patient  needs new monitor.  Accu Test Compact or whatever is covered.  Patient has very labile sugars so needs to check 5 x per day.  Please also include 3 month supply of strips, lancets, and whatever other supplies are needed.  Ongoing refills for a year.       ciclopirox 0.77 % cream    LOPROX    90 g    Apply topically 2 times daily To feet and toenails.       dulaglutide 1.5 MG/0.5ML pen    TRULICITY    0.5 mL    Inject 1.5 mg Subcutaneous every 7 days       insulin aspart 100 UNIT/ML injection    NovoLOG FLEXPEN    20 mL    Inject 20 units before each meal. Dose to be titrated       insulin glargine 100 UNIT/ML injection    LANTUS SOLOSTAR    3 Month    Inject 56 units at bedtime daily       insulin pen needle 31G X 8 MM    B-D U/F    120 each    Use 4 daily or as directed.       lisinopril 20 MG tablet    PRINIVIL/ZESTRIL    90 tablet    Take 1 tablet (20 mg) by mouth daily       magnesium oxide 400 MG tablet    MAG-OX    90 tablet    Take 1 tablet (400 mg) by mouth daily       metFORMIN 500 MG 24 hr tablet    GLUCOPHAGE-XR    120 tablet    Take 2 tablets (1,000 mg) by mouth 2 times daily       metoclopramide 5 MG tablet    REGLAN    180 tablet    Take 1 tablet (5 mg) by mouth 3 times daily (before meals)       MULTIVITAMIN TABS   OR      1 TABLET DAILY       omeprazole 20 MG CR capsule    priLOSEC    180 capsule    Take 1 capsule (20 mg) by mouth 2 times daily       ondansetron 8 MG ODT tab    ZOFRAN-ODT     Reported on 3/6/2017       oxybutynin 5 MG tablet    DITROPAN    180 tablet    Take 1 tablet (5 mg) by mouth 2 times daily       simvastatin 20 MG tablet    ZOCOR    90 tablet    Take 1 tablet (20 mg) by mouth At Bedtime       XALATAN 0.005 % ophthalmic solution   Generic drug:  latanoprost      Place 1 drop into both eyes At Bedtime

## 2017-03-24 NOTE — TELEPHONE ENCOUNTER
Per Katie Message:    Labs looked fine except A1C was high. Ok to continue metformin 1000 mg twice daily.     Patient agreeable plan.    Libia Fatima LPN

## 2017-03-24 NOTE — PROGRESS NOTES
Past Medical History:   Diagnosis Date     Cerumen impacted      Development delay      DM (diabetes mellitus) (H)      Glaucoma (increased eye pressure)      Hyperlipaemia      Hypertension      Hypothyroid      Nonsenile cataract      Obesity      Patient Active Problem List   Diagnosis     Development delay     Overactive bladder     GERD (gastroesophageal reflux disease)     Type 2 diabetes, HbA1C goal < 8% (H)     Hypertension goal BP (blood pressure) < 140/90     Hyperlipidemia LDL goal <100     Pain in joint, lower leg     Proteinuria     Vitamin D deficiency     Diabetic gastroparesis (H)     Health Care Home     History of strabismus surgery     Type 2 diabetes mellitus with other specified complication (H)     Advanced directives, counseling/discussion     Type 2 diabetes mellitus with diabetic polyneuropathy (H)     Primary open angle glaucoma of both eyes, moderate stage     Type 2 diabetes mellitus with diabetic polyneuropathy, with long-term current use of insulin (H)     Past Surgical History:   Procedure Laterality Date     C EYE SURG ANT SGMT PROC UNLISTED  remote    strabismus surgery     STRABISMUS SURGERY       Social History     Social History     Marital status: Single     Spouse name: N/A     Number of children: N/A     Years of education: N/A     Occupational History     Not on file.     Social History Main Topics     Smoking status: Never Smoker     Smokeless tobacco: Never Used      Comment: lives in smoke free household.     Alcohol use Yes      Comment: occass social     Drug use: No     Sexual activity: No     Other Topics Concern     Parent/Sibling W/ Cabg, Mi Or Angioplasty Before 65f 55m? No     Social History Narrative     Family History   Problem Relation Age of Onset     Hypertension Father      DIABETES Sister      Glaucoma Maternal Grandfather      CANCER No family hx of      CEREBROVASCULAR DISEASE No family hx of      Thyroid Disease No family hx of      Macular Degeneration No  family hx of      Lab Results   Component Value Date    A1C 10.5 03/06/2017      SUBJECTIVE:  A 56-year-old female presents with her mother for diabetic foot check, calluses and nail care.  She has been using tea tree oil on her feet.  She started to get some callus and buildup she had questions on.  Tuesday, her right hallux nail started digging in.  She does have peripheral neuropathy.  She is diabetic.  She relates no ulcers or sores since we have seen her last.  They are wondering about some insoles for her feet.  She does not have diabetic shoes.      OBJECTIVE:  DP and PT are 1/4 bilaterally.  She has decreased hair growth bilaterally.  She has some peripheral edema bilaterally.  She has incurvated nails bilaterally with some dried hyperkeratotic tissue buildup with ecchymosis of the medial left hallux nail border.  She has dry scaly skin in a moccasin pattern bilaterally.  She has hyperkeratotic tissue buildup, plantar medial hallux and first MPJ on the left.  She has callused, hyperkeratotic, dry, scaly skin on the heels bilaterally.  There is no erythema, no drainage, no odor, no calor bilaterally.      ASSESSMENT AND PLAN:  Onychauxis and onychomycosis bilaterally.  Tinea pedis bilaterally.  Tylomas, left foot.  She is diabetic with peripheral neuropathy.  Diagnosis and treatment options discussed with the patient.  Tylomas were sharp debrided with a #15 blade upon consent.  Prescription for Loprox cream given and use discussed with her.  She opted for no diabetic shoes today.  Spenco over-the-counter insoles advised and use discussed with her.  She does have a flat foot type and hallux valgus present as well on physical exam.  She does have some dried scaly callused skin on the heels that should resolve itself with the Loprox cream.  Prescription given and use discussed with her.  She will return to clinic and see me in 2 months.     All the nails were debrided or reduced bilaterally upon consent.  The  tylomas were sharp debrided with a #15 blade upon consent.

## 2017-03-24 NOTE — NURSING NOTE
"Lexy Rockwell's goals for this visit include: callus check and toenails  She requests these members of her care team be copied on today's visit information: yes    PCP: Fredi Fuentes    Referring Provider:  No referring provider defined for this encounter.    Chief Complaint   Patient presents with     RECHECK     Bilateral toenail and foot check        Initial Pulse 112  LMP 03/28/2014  SpO2 98% Estimated body mass index is 34.22 kg/(m^2) as calculated from the following:    Height as of 3/6/17: 1.607 m (5' 3.25\").    Weight as of 3/6/17: 88.3 kg (194 lb 11.2 oz).  Medication Reconciliation: complete    "

## 2017-03-24 NOTE — TELEPHONE ENCOUNTER
Patient and mother stopped by office today requesting lab results and dose change for metformin. Patient states that she did restart Metformin 500 mg 2 tabs 2 times daily. Writer will route to Dr Wilson for review.  Libia Fatima LPN

## 2017-03-27 NOTE — PROGRESS NOTES
Received call back from Cris, she is going to contact patient first to obtain verbal release to talk with Dr. Wilson's clinic regarding her care. She will call back after discussing with patient. She will also send patient a form for the future.       Kirstin Lopez RN  Endocrine Care Coordinator  [facility]

## 2017-03-27 NOTE — PROGRESS NOTES
Cris contacted clinic to review. Discussed Dr. Wilson's concerns with patient's insulin administration and safety (see 3/6/17 progress note). Cris will review with patient and her mother and contact clinic with update to determine next step for more services for patient.       Kirstin Lopez RN  Endocrine Care Coordinator  [facility]

## 2017-03-27 NOTE — PROGRESS NOTES
Made second attempt to contact patient's , Cris. Left voicemail for Cris to contact clinic.     Also left voicemail for Peace (Cris's supervisor) at [phone].      Kirstin Lopez, RN  Endocrine Care Coordinator  [facility]

## 2017-04-13 ENCOUNTER — TRANSFERRED RECORDS (OUTPATIENT)
Dept: HEALTH INFORMATION MANAGEMENT | Facility: CLINIC | Age: 57
End: 2017-04-13

## 2017-04-17 ENCOUNTER — OFFICE VISIT (OUTPATIENT)
Dept: FAMILY MEDICINE | Facility: CLINIC | Age: 57
End: 2017-04-17
Payer: COMMERCIAL

## 2017-04-17 ENCOUNTER — TELEPHONE (OUTPATIENT)
Dept: ENDOCRINOLOGY | Facility: CLINIC | Age: 57
End: 2017-04-17

## 2017-04-17 VITALS
SYSTOLIC BLOOD PRESSURE: 136 MMHG | HEART RATE: 82 BPM | TEMPERATURE: 98.1 F | BODY MASS INDEX: 35.15 KG/M2 | WEIGHT: 200 LBS | DIASTOLIC BLOOD PRESSURE: 88 MMHG | OXYGEN SATURATION: 100 %

## 2017-04-17 DIAGNOSIS — Z79.4 TYPE 2 DIABETES MELLITUS WITH DIABETIC POLYNEUROPATHY, WITH LONG-TERM CURRENT USE OF INSULIN (H): Primary | ICD-10-CM

## 2017-04-17 DIAGNOSIS — E11.42 TYPE 2 DIABETES MELLITUS WITH DIABETIC POLYNEUROPATHY, WITH LONG-TERM CURRENT USE OF INSULIN (H): Primary | ICD-10-CM

## 2017-04-17 DIAGNOSIS — K21.9 GASTROESOPHAGEAL REFLUX DISEASE, ESOPHAGITIS PRESENCE NOT SPECIFIED: ICD-10-CM

## 2017-04-17 DIAGNOSIS — K21.9 GASTROESOPHAGEAL REFLUX DISEASE WITHOUT ESOPHAGITIS: ICD-10-CM

## 2017-04-17 DIAGNOSIS — E78.5 HYPERLIPIDEMIA LDL GOAL <100: ICD-10-CM

## 2017-04-17 DIAGNOSIS — E66.9 OBESITY, UNSPECIFIED OBESITY SEVERITY, UNSPECIFIED OBESITY TYPE: ICD-10-CM

## 2017-04-17 DIAGNOSIS — R62.50 DEVELOPMENT DELAY: ICD-10-CM

## 2017-04-17 DIAGNOSIS — I10 HYPERTENSION GOAL BP (BLOOD PRESSURE) < 140/90: ICD-10-CM

## 2017-04-17 PROCEDURE — 99214 OFFICE O/P EST MOD 30 MIN: CPT | Performed by: FAMILY MEDICINE

## 2017-04-17 ASSESSMENT — PAIN SCALES - GENERAL: PAINLEVEL: NO PAIN (0)

## 2017-04-17 NOTE — PROGRESS NOTES
SUBJECTIVE:                                                    Lexy Rockwell is a 56 year old female who presents to clinic today for the following health issues:       Hospital follow up for uncontrolled diabetes  Forms for metromobility  Discuss medications  Bladder problems    none    Problem list and histories reviewed & adjusted, as indicated.  Additional history: as documented         Reviewed and updated as needed this visit by clinical staff  Allergies  Meds       Reviewed and updated as needed this visit by Provider          hospitalized for 2 nights    Home on Saturday, 2 days ago    Feeling good now    Saw endocrinology in hospital    [facility]     Reviewed the dc summary in detail    Patient states she has appointment scheduled to follow up with endocrinology     novolog is sliding scale    lantus is 50 units at bedtime    Patient thinks she may have missed a couple insulin doses prior to hospitalization     Mostly 200s since been home     Physical Exam   Constitutional: She is oriented to person, place, and time and well-developed, well-nourished, and in no distress. No distress.   HENT:   Head: Normocephalic and atraumatic.   Neck: Carotid bruit is not present.   Cardiovascular: Normal rate, regular rhythm, normal heart sounds and intact distal pulses.  Exam reveals no gallop and no friction rub.    No murmur heard.  Pulmonary/Chest: Effort normal and breath sounds normal.   Abdominal: Soft. There is no tenderness.   Musculoskeletal: She exhibits no edema.   Neurological: She is alert and oriented to person, place, and time.   Skin: She is not diaphoretic.   Psychiatric: Mood and affect normal.       ASSESSMENT / PLAN:  (E11.42,  Z79.4) Type 2 diabetes mellitus with diabetic polyneuropathy, with long-term current use of insulin (H)  (primary encounter diagnosis)  Comment: patient with chronically high sugars.  Frequent hospitalizations for dka.  Importance of not  missing any doses stressed.   Plan: also patient to follow up with endocrinology.      (I10) Hypertension goal BP (blood pressure) < 140/90  Comment: hi initially, came down on recheck   Plan: monitor     (E78.5) Hyperlipidemia LDL goal <100  Comment: on statin   Plan: continue     (K21.9) Gastroesophageal reflux disease without esophagitis  Comment: on med ppi, stable  Plan: continue    (R62.50) Development delay  Comment: chronic   Plan: of note we did complete the [facility] mobility form for her; she will mail in original     (E66.9) Obesity, unspecified obesity severity, unspecified obesity type  Comment: chronic   Plan: keep working on healthy diet/exercise and wt loss     See us in 2-3 months    I reviewed the patient's medications, allergies, medical history, family history, and social history.    Fredi Fuentes MD

## 2017-04-17 NOTE — PROGRESS NOTES
Please refer to 4/17/17 telephone encounter for update.      Kirstin Lopez, RN  Endocrine Care Coordinator  [facility]

## 2017-04-17 NOTE — PATIENT INSTRUCTIONS
Make sure you keep follow up appointment with endocrinology     Bring in your record of blood sugars to that appointment    Continue same medications for now    Make sure you don't miss any insulin doses    Increase walking as you are able    See us in 2 to 2 1/2 months, sooner if needed

## 2017-04-17 NOTE — TELEPHONE ENCOUNTER
Patient's mother, Francisca, returned call. Reviewed Dr. Wilson's concerns with Francisca regarding patients safety. She verbalizes understanding. She reports that there is no way to get patient to a weekly appointment. She does confirm that she spoke with patient's care manager, Cris, today and they are going to get patient re-evaluated for other services.     Will update Dr. Wilson.    Kirstin Lopez, RN  Endocrine Care Coordinator  [facility]

## 2017-04-17 NOTE — NURSING NOTE
"Chief Complaint   Patient presents with     Forms       Initial BP (!) 156/92 (BP Location: Left arm, Patient Position: Chair, Cuff Size: Adult Large)  Pulse 82  Temp 98.1  F (36.7  C) (Oral)  Wt 200 lb (90.7 kg)  LMP 03/28/2014  SpO2 100%  Breastfeeding? No  BMI 35.15 kg/m2 Estimated body mass index is 35.15 kg/(m^2) as calculated from the following:    Height as of 3/6/17: 5' 3.25\" (1.607 m).    Weight as of this encounter: 200 lb (90.7 kg).  Medication Reconciliation: complete   Salma Meier CMA      "

## 2017-04-17 NOTE — TELEPHONE ENCOUNTER
omeprazole (PRILOSEC) 20 MG capsule      Last Written Prescription Date: 9-28-16  Last Fill Quantity: 180,  # refills: 3   Last Office Visit with [facility], P or [facility] prescribing provider: 4-17-17                                         Next 5 appointments (look out 90 days)     May 04, 2017  1:45 PM CDT   Return Visit with Dayron Rios MD   [facility] ([address] [id number] [phone]            May 24, 2017  1:00 PM CDT   [facility] Visit with Ravin Back DPM   [facility]    [address] [place] [id number] [phone]

## 2017-04-17 NOTE — MR AVS SNAPSHOT
After Visit Summary   4/17/2017    Lexy Rockwell    MRN: [id number]           Patient Information     Date Of Birth          1960        Visit Information        Provider Department      4/17/2017 1:00 PM Fredi Fuentes MD [facility]        Today's Diagnoses     Type 2 diabetes mellitus with diabetic polyneuropathy, with long-term current use of insulin (H)    -  1    Hypertension goal BP (blood pressure) < 140/90        Hyperlipidemia LDL goal <100        Gastroesophageal reflux disease without esophagitis          Care Instructions    Make sure you keep follow up appointment with endocrinology     Bring in your record of blood sugars to that appointment    Continue same medications for now    Make sure you don't miss any insulin doses    Increase walking as you are able    See us in 2 to 2 1/2 months, sooner if needed         Follow-ups after your visit        Your next 10 appointments already scheduled     May 04, 2017  1:45 PM CDT   Return Visit with Dayron Rios MD   [facility] ([address] [address]   [phone]            May 24, 2017  1:00 PM CDT   [facility]    [address] [place] [address] [phone]            Jul 31, 2017  9:45 AM CDT   Return Visit with Shira Wilson MD, MG ENDO NURSE   [facility])    [address] [place] [address] [phone]              Who to contact     If you have questions or need follow up information about today's clinic visit or your schedule please contact [facility] directly at [phone].  Normal or non-critical lab and imaging results will be communicated to you by MyChart, letter or phone within 4 business days after the clinic has received the  "results. If you do not hear from us within 7 days, please contact the clinic through [employer] or phone. If you have a critical or abnormal lab result, we will notify you by phone as soon as possible.  Submit refill requests through [employer] or call your pharmacy and they will forward the refill request to us. Please allow 3 business days for your refill to be completed.          Additional Information About Your Visit        Elast[employer] Information     [employer] lets you send messages to your doctor, view your test results, renew your prescriptions, schedule appointments and more. To sign up, go to [email or web address] . Click on \"Log in\" on the left side of the screen, which will take you to the Welcome page. Then click on \"Sign up Now\" on the right side of the page.     You will be asked to enter the access code listed below, as well as some personal information. Please follow the directions to create your username and password.     Your access code is: [email or web address]  Expires: 2017  1:25 PM     Your access code will  in 90 days. If you need help or a new code, please call your [facility] clinic or [phone].        Care EveryWhere ID     This is your Care EveryWhere ID. This could be used by other organizations to access your [facility] medical records  [id number]        Your Vitals Were     Pulse Temperature Last Period Pulse Oximetry Breastfeeding? BMI (Body Mass Index)    82 98.1  F (36.7  C) (Oral) 2014 100% No 35.15 kg/m2       Blood Pressure from Last 3 Encounters:   17 136/88   17 134/82   17 129/84    Weight from Last 3 Encounters:   17 200 lb (90.7 kg)   17 194 lb 11.2 oz (88.3 kg)   16 197 lb (89.4 kg)              Today, you had the following     No orders found for display       Primary Care Provider Office Phone # Fax #    Fredi Fuentes [phone]       [place] [address] [facility] [address]      "   Thank you!     Thank you for choosing [facility]  for your care. Our goal is always to provide you with excellent care. Hearing back from our patients is one way we can continue to improve our services. Please take a few minutes to complete the written survey that you may receive in the mail after your visit with us. Thank you!             Your Updated Medication List - Protect others around you: Learn how to safely use, store and throw away your medicines at www.disposemymeds.org.          This list is accurate as of: 4/17/17  1:41 PM.  Always use your most recent med list.                   Brand Name Dispense Instructions for use    ACCU-CHEK COMPACT PLUS test strip   Generic drug:  blood glucose monitoring     153 each    TEST 5 TIMES DAILY OR AS DIRECTED       aspirin 81 MG tablet      1 TABLET DAILY       blood glucose monitoring lancets     100 each    1 Units 3 times daily.       blood glucose monitoring meter device kit    no brand specified    1 kit    Use to test blood sugar 5 times daily or as directed.  Patient needs new monitor.  Accu Test Compact or whatever is covered.  Patient has very labile sugars so needs to check 5 x per day.  Please also include 3 month supply of strips, lancets, and whatever other supplies are needed.  Ongoing refills for a year.       ciclopirox 0.77 % cream    LOPROX    90 g    Apply topically 2 times daily To feet and toenails.       insulin aspart 100 UNIT/ML injection    NovoLOG FLEXPEN    20 mL    Inject 20 units before each meal. Dose to be titrated       insulin glargine 100 UNIT/ML injection    LANTUS SOLOSTAR    3 Month    Inject 56 units at bedtime daily       insulin pen needle 31G X 8 MM    B-D U/F    120 each    Use 4 daily or as directed.       lisinopril 20 MG tablet    PRINIVIL/ZESTRIL    90 tablet    Take 1 tablet (20 mg) by mouth daily       magnesium oxide 400 MG tablet    MAG-OX    90 tablet    Take 1 tablet (400 mg) by mouth daily        metFORMIN 500 MG 24 hr tablet    GLUCOPHAGE-XR    120 tablet    Take 2 tablets (1,000 mg) by mouth 2 times daily       metoclopramide 5 MG tablet    REGLAN    180 tablet    Take 1 tablet (5 mg) by mouth 3 times daily (before meals)       MULTIVITAMIN TABS   OR      1 TABLET DAILY       omeprazole 20 MG CR capsule    priLOSEC    180 capsule    Take 1 capsule (20 mg) by mouth 2 times daily       ondansetron 8 MG ODT tab    ZOFRAN-ODT     Reported on 3/6/2017       oxybutynin 5 MG tablet    DITROPAN    180 tablet    Take 1 tablet (5 mg) by mouth 2 times daily       simvastatin 20 MG tablet    ZOCOR    90 tablet    Take 1 tablet (20 mg) by mouth At Bedtime       XALATAN 0.005 % ophthalmic solution   Generic drug:  latanoprost      Place 1 drop into both eyes At Bedtime

## 2017-04-17 NOTE — TELEPHONE ENCOUNTER
Received discharge summary from [facility] that patient was admitted again for DKA on 4/13/17 (discharged 4/15/17).     (please refer back to 3/6/17 Care Coordination encounter for past details)      Reviewed by Dr. Wilson.    Per Dr. Wilson:Please check in again with patient's Care Manager, Cris, to determine status of change in care for patient (see 3/6/17 note for details). Very concerned about patient's well being, if patient is not willing to go to skilled nursing facility, will need to at minimum be coming in to the [facility] for diabetes management.       Again contacted Cris, patient's care manager, to review. Cris reports that when she talked to patient and her mother (prior to this most recent DKA admission), they did not want to move patient to group home that had 24 hour nursing care. Cris is going to reach out again to family to review safety and concern. Also advised Cris that nurse will reach out to patient and family to review.     Cris will check back in with nursing staff.       Attempted to contact both patient and her mother, Francisca. No answer. Left voicemail for patient's mother, Francisca. No voicemail available for patient.     Kirstin Lopez, RN  Endocrine Care Coordinator  [facility]

## 2017-05-01 ENCOUNTER — TELEPHONE (OUTPATIENT)
Dept: FAMILY MEDICINE | Facility: CLINIC | Age: 57
End: 2017-05-01

## 2017-05-01 NOTE — TELEPHONE ENCOUNTER
Forms received from: [employer]   Phone number listed: [phone]   Fax listed: n/a  Date received: 5-1-17  Form description: disability forms  Once forms are completed, please return to Melisa via mail.  Is patient requesting to be contacted when forms are completed: n/a  Form placed: provider desk  Arabella Mayo

## 2017-05-04 ENCOUNTER — OFFICE VISIT (OUTPATIENT)
Dept: OPHTHALMOLOGY | Facility: CLINIC | Age: 57
End: 2017-05-04
Payer: COMMERCIAL

## 2017-05-04 DIAGNOSIS — H40.1132 PRIMARY OPEN ANGLE GLAUCOMA OF BOTH EYES, MODERATE STAGE: Primary | ICD-10-CM

## 2017-05-04 PROCEDURE — 92133 CPTRZD OPH DX IMG PST SGM ON: CPT | Performed by: OPHTHALMOLOGY

## 2017-05-04 PROCEDURE — 92083 EXTENDED VISUAL FIELD XM: CPT | Performed by: OPHTHALMOLOGY

## 2017-05-04 PROCEDURE — 92012 INTRM OPH EXAM EST PATIENT: CPT | Performed by: OPHTHALMOLOGY

## 2017-05-04 ASSESSMENT — VISUAL ACUITY
CORRECTION_TYPE: GLASSES
OS_CC: 20/30+2
OD_CC: 20/30-1
METHOD: SNELLEN - LINEAR

## 2017-05-04 ASSESSMENT — TONOMETRY
OS_IOP_MMHG: 16
OD_IOP_MMHG: 14
IOP_METHOD: APPLANATION

## 2017-05-04 ASSESSMENT — EXTERNAL EXAM - RIGHT EYE: OD_EXAM: PROLAPSED FAT PADS: UPPER, LOWER

## 2017-05-04 ASSESSMENT — EXTERNAL EXAM - LEFT EYE: OS_EXAM: PROLAPSED FAT PADS: UPPER, LOWER

## 2017-05-04 ASSESSMENT — SLIT LAMP EXAM - LIDS
COMMENTS: NORMAL
COMMENTS: NORMAL

## 2017-05-04 NOTE — PROGRESS NOTES
Current Eye Medications:  Latanoprost qhs OU     Subjective:  Patient presents for a 6 month Glaucoma follow up. HVF, OCT and iop check. No vision concerns.      Objective:  See Ophthalmology Exam.       Assessment:  Stable intraocular pressure, glaucoma OCT, and Beard Visual Field both eyes in patient with open angle glaucoma.      Plan: Continue Latanoprost at bedtime both eyes   Return visit 6 months for a complete exam      Dayron Rios M.D.

## 2017-05-04 NOTE — PATIENT INSTRUCTIONS
Continue Latanoprost at bedtime both eyes   Return visit 6 months for a complete exam      Dayron Rios M.D.

## 2017-05-06 ASSESSMENT — PACHYMETRY
OS_CT(UM): 566
OD_CT(UM): 562

## 2017-05-17 NOTE — TELEPHONE ENCOUNTER
Cris returned call and states that family had denied moving to a facility that could provide more care. Cris notes that she did file an adult protection report but they are not further investigating the report due to patient currently not in life threatening danger.     Attempted to contact patient's mother, Francisca, to review. Left voicemail for Francisca.    Kirstin Lopez RN  Endocrine Care Coordinator  [facility]

## 2017-05-17 NOTE — TELEPHONE ENCOUNTER
Patient's mother, Francisca, to review. Francisca notes that things have been going really well and Lexy really likes her new apartment and does not want to move. Francisca notes that they do have her yearly care conference on June 12th. Francisca reports that sometimes patient will have high blood sugars but sometimes she will sporadically have a low blood sugar. She notes that she does not believe there are any patterns. Questioned if patient has made follow-up with diabetes educator as planned. Francisca notes that her last office visit with Jaqui (diabetes educator) was not covered and she had a $200 bill. Reviewed with Francisca the importance of blood sugars reviewed. Francisca verbalizes understanding and will have patient schedule follow-up with diabetes education.     Will update Dr. Wilson to determine if any other recommendations at this time. Patient's next office visit with Dr. Wilson is on 7/31/17.      Kirstin Lopez RN  Endocrine Care Coordinator  [facility]

## 2017-05-17 NOTE — TELEPHONE ENCOUNTER
Again attempted to contact patient's , Cris ([phone]), to review status. Left voicemail for Cris to contact our office.       Kirstin Lopez RN  Endocrine Care Coordinator  [facility]

## 2017-05-24 ENCOUNTER — OFFICE VISIT (OUTPATIENT)
Dept: PODIATRY | Facility: CLINIC | Age: 57
End: 2017-05-24
Payer: COMMERCIAL

## 2017-05-24 VITALS — OXYGEN SATURATION: 98 % | SYSTOLIC BLOOD PRESSURE: 122 MMHG | DIASTOLIC BLOOD PRESSURE: 80 MMHG | HEART RATE: 117 BPM

## 2017-05-24 DIAGNOSIS — B35.3 TINEA PEDIS OF BOTH FEET: ICD-10-CM

## 2017-05-24 DIAGNOSIS — E11.40 DIABETIC NEUROPATHY WITH NEUROLOGIC COMPLICATION (H): ICD-10-CM

## 2017-05-24 DIAGNOSIS — L60.2 ONYCHAUXIS: ICD-10-CM

## 2017-05-24 DIAGNOSIS — L84 TYLOMA: ICD-10-CM

## 2017-05-24 DIAGNOSIS — B35.1 DERMATOPHYTOSIS OF NAIL: Primary | ICD-10-CM

## 2017-05-24 DIAGNOSIS — E11.49 DIABETIC NEUROPATHY WITH NEUROLOGIC COMPLICATION (H): ICD-10-CM

## 2017-05-24 PROCEDURE — 99213 OFFICE O/P EST LOW 20 MIN: CPT | Performed by: PODIATRIST

## 2017-05-24 NOTE — PROGRESS NOTES
Past Medical History:   Diagnosis Date     Cerumen impacted      Development delay      DM (diabetes mellitus) (H)      Glaucoma (increased eye pressure)      Hyperlipaemia      Hypertension      Hypothyroid      Nonsenile cataract      Obesity      Patient Active Problem List   Diagnosis     Development delay     Overactive bladder     GERD (gastroesophageal reflux disease)     Type 2 diabetes, HbA1C goal < 8% (H)     Hypertension goal BP (blood pressure) < 140/90     Hyperlipidemia LDL goal <100     Pain in joint, lower leg     Proteinuria     Vitamin D deficiency     Diabetic gastroparesis (H)     Health Care Home     History of strabismus surgery     Type 2 diabetes mellitus with other specified complication (H)     Advanced directives, counseling/discussion     Type 2 diabetes mellitus with diabetic polyneuropathy (H)     Primary open angle glaucoma of both eyes, moderate stage     Type 2 diabetes mellitus with diabetic polyneuropathy, with long-term current use of insulin (H)     Gastroesophageal reflux disease without esophagitis     Obesity, unspecified obesity severity, unspecified obesity type     Past Surgical History:   Procedure Laterality Date     C EYE SURG ANT SGMT PROC UNLISTED  remote    strabismus surgery     STRABISMUS SURGERY       Social History     Social History     Marital status: Single     Spouse name: N/A     Number of children: N/A     Years of education: N/A     Occupational History     Not on file.     Social History Main Topics     Smoking status: Never Smoker     Smokeless tobacco: Never Used      Comment: lives in smoke free household.     Alcohol use Yes      Comment: occass social     Drug use: No     Sexual activity: No     Other Topics Concern     Parent/Sibling W/ Cabg, Mi Or Angioplasty Before 65f 55m? No     Social History Narrative     Family History   Problem Relation Age of Onset     Hypertension Father      DIABETES Sister      Glaucoma Maternal Grandfather      CANCER No  family hx of      CEREBROVASCULAR DISEASE No family hx of      Thyroid Disease No family hx of      Macular Degeneration No family hx of      SUBJECTIVE FINDINGS:  A 56-year-old female returns to clinic for a diabetic foot check and onychauxis, onychomycosis and tylomas.  She relates she is doing well.  She is using the Loprox cream.  It is working very well.  Her feet feel much better and they are getting some color back in them.  She relates that she needs her calluses trimmed and her toenails cut.  She does not have diabetic shoes and she does not want any.  She relates no ulcers or sores since I had seen her last.  She does have peripheral neuropathy.      OBJECTIVE FINDINGS:  DP and PT are 1/4 bilaterally.  She has decreased hair growth bilaterally.  She has some peripheral edema bilaterally.  There is no erythema, no drainage, no odor, no calor bilaterally.  She has mild dry, scaly skin in a moccasin pattern bilaterally.  She has hyperkeratotic tissue buildup, plantar first MPJ and plantar medial hallux on the left.  She has incurvated nails with thickening, dystrophy, discoloration and brittleness, 1-5 bilaterally to differing degrees.      ASSESSMENT AND PLAN:  Onychauxis bilaterally.  Onychomycosis bilaterally.  Tinea pedis bilaterally.  This has improved.  Tylomas, left foot.  She is diabetic with peripheral neuropathy.  Diagnosis and treatment discussed with her.  The tylomas on the left foot were sharp debrided with a 15 blade upon consent.  She will continue the Loprox cream.  All the nails were debrided or reduced bilaterally upon consent.  She will return to clinic and see me in about 2-3 months.

## 2017-05-24 NOTE — MR AVS SNAPSHOT
After Visit Summary   5/24/2017    Lexy Rockwell    MRN: [id number]           Patient Information     Date Of Birth          1960        Visit Information        Provider Department      5/24/2017 1:00 PM Ravin Back DPM [facility]        Today's Diagnoses     Dermatophytosis of nail    -  1    Onychauxis        Diabetic neuropathy with neurologic complication (H)        Tyloma        Tinea pedis of both feet          Care Instructions    Thanks for coming today.  Ortho[facility] Medicine Clinic  [address] [address] [address]    To schedule future appointments in Ortho Clinic, you may call [phone].    To schedule ordered imaging by your Provider: Call [facility] at [phone]    [employer]de Software available online at:   [email or web address]    Please call if any further questions or concerns [phone] and ask for the Orthopedic Department. Clinic hours 8 am to 5 pm.    Return to clinic if symptoms worsen.            Follow-ups after your visit        Your next 10 appointments already scheduled     Jun 09, 2017 12:45 PM CDT   Diabetic Education with CP DIABETIC ED RESOURCE   [facility] ([facility])    [address] [facility] [id number] [phone]            Jul 31, 2017  9:45 AM CDT   Return Visit with Shira Wilson MD, MG ENDO NURSE   [facility])    [address] [place] [address]   [phone]            Aug 16, 2017  2:30 PM CDT   [facility]   [facility]    [address] [place] [phone]            Nov 10, 2017  1:30 PM CST   New Visit with Dayron Rios MD   [facility] ([facility]    [address] [place] [phone]              Who  to contact     If you have questions or need follow up information about today's clinic visit or your schedule please contact [facility] directly at [phone].  Normal or non-critical lab and imaging results will be communicated to you by MyChart, letter or phone within 4 business days after the clinic has received the results. If you do not hear from us within 7 days, please contact the clinic through MyChart or phone. If you have a critical or abnormal lab result, we will notify you by phone as soon as possible.  Submit refill requests through [employer] or call your pharmacy and they will forward the refill request to us. Please allow 3 business days for your refill to be completed.          Additional Information About Your Visit        [employer] Information     [employer] is an electronic gateway that provides easy, online access to your medical records. With [employer], you can request a clinic appointment, read your test results, renew a prescription or communicate with your care team.     To sign up for [employer] visit the website at [email or web address]   You will be asked to enter the access code listed below, as well as some personal information. Please follow the directions to create your username and password.     Your access code is: [email or web address]  Expires: 2017  1:25 PM     Your access code will  in 90 days. If you need help or a new code, please contact your [facility] or call [phone] for assistance.        Care EveryWhere ID     This is your Care EveryWhere ID. This could be used by other organizations to access your [facility] medical records  [id number]        Your Vitals Were     Pulse Last Period Pulse Oximetry             117 2014 98%          Blood Pressure from Last 3 Encounters:   17 122/80   17 136/88   17 134/82    Weight from Last 3 Encounters:   17 90.7 kg (200 lb)   17 88.3 kg (194 lb 11.2  oz)   12/19/16 89.4 kg (197 lb)              We Performed the Following     DEBRIDEMENT OF NAIL(S), 1-5     TRIM HYPERKERATOTIC SKIN LESION,2-4        Primary Care Provider Office Phone # Fax #    Fredi Fuentes [phone]       [facility] [address] [facility] [place] [facility] [address]        Thank you!     Thank you for choosing [facility]  for your care. Our goal is always to provide you with excellent care. Hearing back from our patients is one way we can continue to improve our services. Please take a few minutes to complete the written survey that you may receive in the mail after your visit with us. Thank you!             Your Updated Medication List - Protect others around you: Learn how to safely use, store and throw away your medicines at www.disposemymeds.org.          This list is accurate as of: 5/24/17 11:59 PM.  Always use your most recent med list.                   Brand Name Dispense Instructions for use    ACCU-CHEK COMPACT PLUS test strip   Generic drug:  blood glucose monitoring     153 each    TEST 5 TIMES DAILY OR AS DIRECTED       aspirin 81 MG tablet      1 TABLET DAILY       blood glucose monitoring lancets     100 each    1 Units 3 times daily.       blood glucose monitoring meter device kit    no brand specified    1 kit    Use to test blood sugar 5 times daily or as directed.  Patient needs new monitor.  Accu Test Compact or whatever is covered.  Patient has very labile sugars so needs to check 5 x per day.  Please also include 3 month supply of strips, lancets, and whatever other supplies are needed.  Ongoing refills for a year.       ciclopirox 0.77 % cream    LOPROX    90 g    Apply topically 2 times daily To feet and toenails.       insulin aspart 100 UNIT/ML injection    NovoLOG FLEXPEN    20 mL    Inject 20 units before each meal. Dose to be titrated       insulin glargine 100 UNIT/ML injection    LANTUS SOLOSTAR    3 Month     Inject 56 units at bedtime daily       insulin pen needle 31G X 8 MM    B-D U/F    120 each    Use 4 daily or as directed.       lisinopril 20 MG tablet    PRINIVIL/ZESTRIL    90 tablet    Take 1 tablet (20 mg) by mouth daily       magnesium oxide 400 MG tablet    MAG-OX    90 tablet    Take 1 tablet (400 mg) by mouth daily       metFORMIN 500 MG 24 hr tablet    GLUCOPHAGE-XR    120 tablet    Take 2 tablets (1,000 mg) by mouth 2 times daily       metoclopramide 5 MG tablet    REGLAN    180 tablet    Take 1 tablet (5 mg) by mouth 3 times daily (before meals)       MULTIVITAMIN TABS   OR      1 TABLET DAILY       omeprazole 20 MG CR capsule    priLOSEC    180 capsule    Take 1 capsule (20 mg) by mouth 2 times daily       ondansetron 8 MG ODT tab    ZOFRAN-ODT     Reported on 3/6/2017       oxybutynin 5 MG tablet    DITROPAN    180 tablet    Take 1 tablet (5 mg) by mouth 2 times daily       simvastatin 20 MG tablet    ZOCOR    90 tablet    Take 1 tablet (20 mg) by mouth At Bedtime       XALATAN 0.005 % ophthalmic solution   Generic drug:  latanoprost      Place 1 drop into both eyes At Bedtime

## 2017-05-24 NOTE — PATIENT INSTRUCTIONS
Thanks for coming today.  Ortho[facility]Sports Medicine Clinic  [address] [place] [address]    To schedule future appointments in Ortho Clinic, you may call [phone].    To schedule ordered imaging by your Provider: Call [facility] at [phone]    [employer] available online at:   [email or web address]    Please call if any further questions or concerns [phone] and ask for the Orthopedic Department. Clinic hours 8 am to 5 pm.    Return to clinic if symptoms worsen.

## 2017-05-24 NOTE — NURSING NOTE
"Lexy Rockwell's goals for this visit include: Toenail and callus check   She requests these members of her care team be copied on today's visit information: yes    PCP: Fredi Fuentes    Referring Provider:  No referring provider defined for this encounter.    Chief Complaint   Patient presents with     RECHECK     toenail and callus check        Initial Pulse 117  LMP 03/28/2014  SpO2 98% Estimated body mass index is 35.15 kg/(m^2) as calculated from the following:    Height as of 3/6/17: 1.607 m (5' 3.25\").    Weight as of 4/17/17: 90.7 kg (200 lb).  Medication Reconciliation: complete    "

## 2017-06-09 ENCOUNTER — ALLIED HEALTH/NURSE VISIT (OUTPATIENT)
Dept: EDUCATION SERVICES | Facility: CLINIC | Age: 57
End: 2017-06-09
Payer: COMMERCIAL

## 2017-06-09 VITALS — WEIGHT: 186 LBS | BODY MASS INDEX: 32.69 KG/M2

## 2017-06-09 DIAGNOSIS — E11.9 TYPE 2 DIABETES, HBA1C GOAL < 8% (H): Primary | ICD-10-CM

## 2017-06-09 PROCEDURE — G0108 DIAB MANAGE TRN  PER INDIV: HCPCS

## 2017-06-09 NOTE — NURSING NOTE
Diabetes Self Management Training: Individual Review Visit    Lexy Rockwell presents today for education and evaluation of glucose control related to Type 2 diabetes.    She is accompanied by self    Patient's diabetes management related comments/concerns: After I eat my meals, I get really tired.  Does this have anything to do with diabetes?    Patient's emotional response to diabetes: expresses readiness to learn    Patient would like this visit to be focused around the following diabetes-related behaviors and goals: none stated    ASSESSMENT:  Patient Problem List and Family Medical History reviewed for relevant medical history, current medical status, and diabetes risk factors.  Patient reports that she has a correction scale chart at home that she uses before meals depending on what her blood glucose levels are.  She is not able to recall what this is.  Patient reports that she is having blood at her injection sites less often.  States that she is trying to get her insulin doses in regularly.  Reports that she misses her Lantus dose about 2 times per week and takes her Novolog each time. States that if it gets too late, like 11 pm, that she thinks it is too late to take her Lantus dose.    Current Diabetes Management per Patient:  Taking diabetes medications?   yes:     Diabetes Medication(s)     Biguanides Sig    metFORMIN (GLUCOPHAGE-XR) 500 MG 24 hr tablet Take 2 tablets (1,000 mg) by mouth 2 times daily    Insulin Sig    insulin aspart (NOVOLOG FLEXPEN) 100 UNIT/ML injection Inject 20 units before each meal. Dose to be titrated    insulin glargine (LANTUS SOLOSTAR) 100 UNIT/ML PEN Inject 56 units at bedtime daily      Patient has been taking Lantus 50 units at HS since her last visit with Dr Wilson in March 2017.    *Abbreviated insulin dose documentation key: Insulin Trade Name (lemcjkahx-kfnrv-ncdmao-bedtime) - i.e. Humalog 5-5-5-0 (Humalog 5 units at breakfast, 5 units at lunch, and 5 units at  "dinner).    Past Diabetes Education: Yes    Patient glucose self monitoring as follows: three times daily.   BG meter: Accu-Chek Compact meter  BG results: fasting glucose- 186-429, pre-lunch glucose- 172-401 and pre-supper glucose- 218-high     BG values are: Not in goal  Patient's most recent   Lab Results   Component Value Date    A1C 10.5 03/06/2017    is not meeting goal of <8.0    Nutrition:  Patient eats 3 meals per day    Breakfast - 9 am- oatmeal 2 packets of low sugar and drinks water with meds  Lunch - 11-12- goes out so timing is close together- breakfast food or pasta salad or sandwich, drinks water or ice tea. If eating at home may eat later like  1 pm.  Dinner - 6 pm-  salad or frozen meal, water   Snacks - not very often, may eat some fruit or pudding sf before she goes to bed.    Beverages:rarely drinks milk.  Water, tea,    Physical Activity:    Type: walks to [employer] for lunch or walks while shopping or goes to the [employer] and walks on the track 1 time per week for 5 laps around.    Diabetes Complications:  No recent low blood glucose values.    Vitals:  LMP 03/28/2014        Estimated body mass index is 35.15 kg/(m^2) as calculated from the following:    Height as of 3/6/17: 1.607 m (5' 3.25\").    Weight as of 4/17/17: 90.7 kg (200 lb).     Weight today was 186 lbs.    Last 3 BP:   BP Readings from Last 3 Encounters:   05/24/17 122/80   04/17/17 136/88   03/24/17 134/82       History   Smoking Status     Never Smoker   Smokeless Tobacco     Never Used     Comment: lives in smoke free household.       Labs:  Lab Results   Component Value Date    A1C 10.5 03/06/2017     Lab Results   Component Value Date     12/19/2016     Lab Results   Component Value Date     09/28/2016     HDL Cholesterol   Date Value Ref Range Status   09/28/2016 56 >49 mg/dL Final   ]  GFR Estimate   Date Value Ref Range Status   03/06/2017 >90  Non  GFR Calc   >60 mL/min/1.7m2 Final     GFR " Estimate If Black   Date Value Ref Range Status   03/06/2017 >90   GFR Calc   >60 mL/min/1.7m2 Final     Lab Results   Component Value Date    CR 0.56 03/06/2017     No results found for: MICROALBUMIN    Socio/Economic Considerations:    Support system: family    Health Beliefs and Attitudes:   Patient Activation Measure Survey Score:  KHUSHI Score (Last Two) 3/7/2012   KHUSHI Raw Score 39   Activation Score 56.4   KHUSHI Level 3       Stage of Change: ACTION (Actively working towards change)      Diabetes knowledge and skills assessment:     Patient is knowledgeable in diabetes management concepts related to: Monitoring and Taking Medication    Patient needs further education on the following diabetes management concepts: Taking Medication and Reducing Risks    Barriers to Learning Assessment: delayed development    Based on learning assessment above, most appropriate setting for further diabetes education would be: Individual setting.    INTERVENTION:   Education provided today on:  AADE Self-Care Behaviors:  Taking Medication: when to take medications and dose adjustment. Importance of taking her insulin regularly to prevent hospitalizations for DKA.  Problem solving:  Side effects of high blood glucose  Nutrition- spreading meals out 4-6 hours.    Opportunities for ongoing education and support in diabetes-self management were discussed.    Pt verbalized understanding of concepts discussed and recommendations provided today.       Education Materials Provided:  No new materials provided today    PLAN:  See Patient Instructions for co-developed, patient-stated behavior change goals.  AVS printed and provided to patient today.  Recommend patient increase her Lantus dose up to 56 units at bedtime.  Next visit evaluate blood glucose control and if she has been taking insulin doses consistently.  Check her meter with control solution if able.    FOLLOW-UP:  Follow-up appointment scheduled on  6/30/17.    Ongoing plan for education and support: Follow-up visit with diabetes educator in 3 weeks.    Donna Tamayo RN  BSN CDE    Time Spent: 60 minutes  Encounter Type: Individual    Any diabetes medication dose changes were made via the CDE Protocol and Collaborative Practice Agreement with the patient's referring provider.

## 2017-06-09 NOTE — MR AVS SNAPSHOT
After Visit Summary   6/9/2017    Lexy Rockwell    MRN: [id number]           Patient Information     Date Of Birth          1960        Visit Information        Provider Department      6/9/2017 12:45 PM CP DIABETIC ED RESOURCE [facility]        Care Instructions    Check your supply of glucose tablets and be sure to put some next to your bed in case you have a low BG during the night.    Also put some into your purse.    Take your Lantus shot every night.  It is OK to take it at 11 pm if you forget it at 9 pm.    If you know that you forgot your Lantus dose the night before, you can take 25 units of Lantus at your breakfast time the next morning.  Then take your full dose as usual that night.    Increase your Lantus dose up to 56 units at bedtime.          Follow-ups after your visit        Your next 10 appointments already scheduled     Jun 30, 2017 10:45 AM CDT   Diabetic Education with CP DIABETIC ED RESOURCE   [facility] ([facility])    [address] [facility] [address]   [phone]            Jul 31, 2017  9:45 AM CDT   Return Visit with Shira Wilson MD, MG ENDO NURSE   [facility]    [address] [place] [address]   [phone]            Aug 16, 2017  2:30 PM CDT   Return Visit with Ravin Back DPM   [facility] ([facility])    [address] [place] [phone]   [phone]            Nov 10, 2017  1:30 PM CST   New Visit with Dayron Rios MD   [facility] ([facility])    [address] [facility]  [place] [address]   [phone]              Who to contact     If you have questions or need follow up information about today's clinic visit or your schedule please contact [facility] directly at  "[phone].  Normal or non-critical lab and imaging results will be communicated to you by MyChart, letter or phone within 4 business days after the clinic has received the results. If you do not hear from us within 7 days, please contact the clinic through [email or web address] or phone. If you have a critical or abnormal lab result, we will notify you by phone as soon as possible.  Submit refill requests through Tink or call your pharmacy and they will forward the refill request to us. Please allow 3 business days for your refill to be completed.          Additional Information About Your Visit        [email or web address] Information     Tink lets you send messages to your doctor, view your test results, renew your prescriptions, schedule appointments and more. To sign up, go to [email or web address] . Click on \"Log in\" on the left side of the screen, which will take you to the Welcome page. Then click on \"Sign up Now\" on the right side of the page.     You will be asked to enter the access code listed below, as well as some personal information. Please follow the directions to create your username and password.     Your access code is: [email or web address]  Expires: 2017  1:47 PM     Your access code will  in 90 days. If you need help or a new code, please call your [place] clinic or [phone].        Care EveryWhere ID     This is your Care EveryWhere ID. This could be used by other organizations to access your [place] medical records  [id number]        Your Vitals Were     Last Period BMI (Body Mass Index)                2014 32.69 kg/m2           Blood Pressure from Last 3 Encounters:   17 122/80   17 136/88   17 134/82    Weight from Last 3 Encounters:   17 84.4 kg (186 lb)   17 90.7 kg (200 lb)   17 88.3 kg (194 lb 11.2 oz)              Today, you had the following     No orders found for display       Primary Care Provider Office Phone # Fax #    Fredi Fuentes MD " [phone]       [facility] [address]  [facility] [address]        Thank you!     Thank you for choosing [facility]  for your care. Our goal is always to provide you with excellent care. Hearing back from our patients is one way we can continue to improve our services. Please take a few minutes to complete the written survey that you may receive in the mail after your visit with us. Thank you!             Your Updated Medication List - Protect others around you: Learn how to safely use, store and throw away your medicines at www.disposemymeds.org.          This list is accurate as of: 6/9/17  1:51 PM.  Always use your most recent med list.                   Brand Name Dispense Instructions for use    ACCU-CHEK COMPACT PLUS test strip   Generic drug:  blood glucose monitoring     153 each    TEST 5 TIMES DAILY OR AS DIRECTED       aspirin 81 MG tablet      1 TABLET DAILY       blood glucose monitoring lancets     100 each    1 Units 3 times daily.       blood glucose monitoring meter device kit    no brand specified    1 kit    Use to test blood sugar 5 times daily or as directed.  Patient needs new monitor.  Accu Test Compact or whatever is covered.  Patient has very labile sugars so needs to check 5 x per day.  Please also include 3 month supply of strips, lancets, and whatever other supplies are needed.  Ongoing refills for a year.       ciclopirox 0.77 % cream    LOPROX    90 g    Apply topically 2 times daily To feet and toenails.       insulin aspart 100 UNIT/ML injection    NovoLOG FLEXPEN    20 mL    Inject 20 units before each meal. Dose to be titrated       insulin glargine 100 UNIT/ML injection    LANTUS SOLOSTAR    3 Month    Inject 56 units at bedtime daily       insulin pen needle 31G X 8 MM    B-D U/F    120 each    Use 4 daily or as directed.       lisinopril 20 MG tablet    PRINIVIL/ZESTRIL    90 tablet    Take 1 tablet (20 mg) by  mouth daily       magnesium oxide 400 MG tablet    MAG-OX    90 tablet    Take 1 tablet (400 mg) by mouth daily       metFORMIN 500 MG 24 hr tablet    GLUCOPHAGE-XR    120 tablet    Take 2 tablets (1,000 mg) by mouth 2 times daily       metoclopramide 5 MG tablet    REGLAN    180 tablet    Take 1 tablet (5 mg) by mouth 3 times daily (before meals)       MULTIVITAMIN TABS   OR      1 TABLET DAILY       omeprazole 20 MG CR capsule    priLOSEC    180 capsule    Take 1 capsule (20 mg) by mouth 2 times daily       ondansetron 8 MG ODT tab    ZOFRAN-ODT     Reported on 3/6/2017       oxybutynin 5 MG tablet    DITROPAN    180 tablet    Take 1 tablet (5 mg) by mouth 2 times daily       simvastatin 20 MG tablet    ZOCOR    90 tablet    Take 1 tablet (20 mg) by mouth At Bedtime       XALATAN 0.005 % ophthalmic solution   Generic drug:  latanoprost      Place 1 drop into both eyes At Bedtime

## 2017-06-09 NOTE — PATIENT INSTRUCTIONS
Check your supply of glucose tablets and be sure to put some next to your bed in case you have a low BG during the night.    Also put some into your purse.    Take your Lantus shot every night.  It is OK to take it at 11 pm if you forget it at 9 pm.    If you know that you forgot your Lantus dose the night before, you can take 25 units of Lantus at your breakfast time the next morning.  Then take your full dose as usual that night.    Increase your Lantus dose up to 56 units at bedtime.

## 2017-06-18 DIAGNOSIS — E11.42 TYPE 2 DIABETES MELLITUS WITH DIABETIC POLYNEUROPATHY (H): ICD-10-CM

## 2017-06-23 ENCOUNTER — OFFICE VISIT (OUTPATIENT)
Dept: FAMILY MEDICINE | Facility: CLINIC | Age: 57
End: 2017-06-23
Payer: COMMERCIAL

## 2017-06-23 VITALS
SYSTOLIC BLOOD PRESSURE: 130 MMHG | WEIGHT: 184 LBS | DIASTOLIC BLOOD PRESSURE: 85 MMHG | BODY MASS INDEX: 32.34 KG/M2 | TEMPERATURE: 97.5 F | HEART RATE: 121 BPM | OXYGEN SATURATION: 99 %

## 2017-06-23 DIAGNOSIS — R11.0 NAUSEA: ICD-10-CM

## 2017-06-23 DIAGNOSIS — N32.81 OVERACTIVE BLADDER: ICD-10-CM

## 2017-06-23 DIAGNOSIS — Z79.4 TYPE 2 DIABETES MELLITUS WITH DIABETIC POLYNEUROPATHY, WITH LONG-TERM CURRENT USE OF INSULIN (H): ICD-10-CM

## 2017-06-23 DIAGNOSIS — E78.5 HYPERLIPIDEMIA LDL GOAL <100: ICD-10-CM

## 2017-06-23 DIAGNOSIS — H61.23 BILATERAL IMPACTED CERUMEN: ICD-10-CM

## 2017-06-23 DIAGNOSIS — Z12.31 VISIT FOR SCREENING MAMMOGRAM: ICD-10-CM

## 2017-06-23 DIAGNOSIS — R53.83 FATIGUE, UNSPECIFIED TYPE: ICD-10-CM

## 2017-06-23 DIAGNOSIS — B02.9 HERPES ZOSTER WITHOUT COMPLICATION: Primary | ICD-10-CM

## 2017-06-23 DIAGNOSIS — E11.42 TYPE 2 DIABETES MELLITUS WITH DIABETIC POLYNEUROPATHY, WITH LONG-TERM CURRENT USE OF INSULIN (H): ICD-10-CM

## 2017-06-23 LAB
CREAT UR-MCNC: 94 MG/DL
MICROALBUMIN UR-MCNC: 9 MG/L
MICROALBUMIN/CREAT UR: 9.8 MG/G CR (ref 0–25)

## 2017-06-23 PROCEDURE — 99214 OFFICE O/P EST MOD 30 MIN: CPT | Mod: 25 | Performed by: FAMILY MEDICINE

## 2017-06-23 PROCEDURE — 69210 REMOVE IMPACTED EAR WAX UNI: CPT | Mod: 50 | Performed by: FAMILY MEDICINE

## 2017-06-23 PROCEDURE — 82043 UR ALBUMIN QUANTITATIVE: CPT | Performed by: FAMILY MEDICINE

## 2017-06-23 PROCEDURE — 99207 C FOOT EXAM  NO CHARGE: CPT | Mod: 25 | Performed by: FAMILY MEDICINE

## 2017-06-23 RX ORDER — SIMVASTATIN 20 MG
20 TABLET ORAL AT BEDTIME
Qty: 90 TABLET | Refills: 1 | Status: SHIPPED | OUTPATIENT
Start: 2017-06-23 | End: 2018-01-08

## 2017-06-23 RX ORDER — ONDANSETRON 8 MG/1
8 TABLET, ORALLY DISINTEGRATING ORAL EVERY 12 HOURS PRN
Qty: 30 TABLET | Refills: 1 | Status: SHIPPED | OUTPATIENT
Start: 2017-06-23 | End: 2018-09-08

## 2017-06-23 RX ORDER — ACYCLOVIR 800 MG/1
800 TABLET ORAL
Qty: 50 TABLET | Refills: 0 | Status: SHIPPED | OUTPATIENT
Start: 2017-06-23 | End: 2017-11-15

## 2017-06-23 RX ORDER — OXYBUTYNIN CHLORIDE 5 MG/1
5 TABLET ORAL 2 TIMES DAILY
Qty: 180 TABLET | Refills: 1 | Status: SHIPPED | OUTPATIENT
Start: 2017-06-23 | End: 2018-01-08

## 2017-06-23 ASSESSMENT — PAIN SCALES - GENERAL: PAINLEVEL: NO PAIN (0)

## 2017-06-23 NOTE — LETTER
[facility]  [address] [place]  [address]  [phone]        June 26, 2017    Lexy Rockwell  [address]   [facility] [address]        Dear Lexy,    The urine protein test is normal.     Results for orders placed or performed in visit on 06/23/17   Albumin Random Urine Quantitative   Result Value Ref Range    Creatinine Urine 94 mg/dL    Albumin Urine mg/L 9 mg/L    Albumin Urine mg/g Cr 9.80 0 - 25 mg/g Cr       If you have any questions please call the clinic at [phone].    Sincerely,    Fredi Fuentes MD  SKL

## 2017-06-23 NOTE — PROGRESS NOTES
SUBJECTIVE:                                                    Lexy Rockwell is a 56 year old female who presents to clinic today for the following health issues:       Follow up on diabetes  Back pain  Vomiting   rash    none    Problem list and histories reviewed & adjusted, as indicated.  Additional history: as documented         Reviewed and updated as needed this visit by clinical staff  Tobacco  Allergies  Meds  Problems  Med Hx  Surg Hx  Fam Hx  Soc Hx        Reviewed and updated as needed this visit by Provider          rash for a week; maybe getting a little worse?      Sugars variable, 75 to 400s    avg 200s    Last hospital visit March of this year    Not missing any insulin doses    Physical Exam   Constitutional: She is oriented to person, place, and time and well-developed, well-nourished, and in no distress. No distress.   HENT:   Head: Normocephalic and atraumatic.   Right Ear: External ear normal.   Left Ear: External ear normal.   Nose: Nose normal.   Mouth/Throat: Oropharynx is clear and moist.   Tympanic membranes not seen due to cerumen.  Patient wanted this out.  With consent used gentle irrigation and curette to remove it.  No complications.  Afterward tympanic membranes and canals okay.  No sinus/ submandib tenderness.    Eyes: Conjunctivae are normal.   Neck: Neck supple. Carotid bruit is not present.   Cardiovascular: Normal rate, regular rhythm, normal heart sounds and intact distal pulses.  Exam reveals no gallop and no friction rub.    No murmur heard.  Pulmonary/Chest: Effort normal and breath sounds normal.   Musculoskeletal: She exhibits no edema.   Lymphadenopathy:     She has no cervical adenopathy.   Neurological: She is alert and oriented to person, place, and time.   Skin: She is not diaphoretic.   Patient has classic shingles rash in dermatomal distribution on left torso     Psychiatric: Mood and affect normal.       ASSESSMENT / PLAN:  (B02.9) Herpes zoster  without complication  (primary encounter diagnosis)  Comment: treat acyclovir  Plan: acyclovir (ZOVIRAX) 800 MG tablet        Follow up if symptoms not resolving; discussed in detail.     (E11.42,  Z79.4) Type 2 diabetes mellitus with diabetic polyneuropathy, with long-term current use of insulin (H)  Comment: high sugars; continue working with diabetes educator  Plan: DIABETES EDUCATOR REFERRAL, FOOT EXAM, Albumin         Random Urine Quantitative, CANCELED: Hemoglobin        A1c             (H61.23) Bilateral impacted cerumen  Comment: removed here   Plan: REMOVE IMPACTED CERUMEN        Follow up prn     (Z12.31) Visit for screening mammogram  Comment: patient to schedule   Plan: *MA Screening Digital Bilateral             (R11.0) Nausea  Comment: needs refill   Plan: ondansetron (ZOFRAN-ODT) 8 MG ODT tab        Uses prn     (E78.5) Hyperlipidemia LDL goal <100  Comment: we were going to check this but apparently labs cancelled  Plan: simvastatin (ZOCOR) 20 MG tablet, CANCELED:         Lipid panel reflex to direct LDL, CANCELED:         Comprehensive metabolic panel        Refill med     (N32.81) Overactive bladder  Comment: refill   Plan: oxybutynin (DITROPAN) 5 MG tablet             (R53.83) Fatigue, unspecified type  Comment: labs?  Plan: CANCELED: CBC with platelets differential,         CANCELED: TSH with free T4 reflex               I reviewed the patient's medications, allergies, medical history, family history, and social history.    Fredi Fuentes MD

## 2017-06-23 NOTE — NURSING NOTE
"Chief Complaint   Patient presents with     Diabetes     Back Pain     Vomiting     Health Maintenance       Initial Pulse 121  Temp 97.5  F (36.4  C) (Oral)  Wt 184 lb (83.5 kg)  LMP 03/28/2014  SpO2 99%  Breastfeeding? No  BMI 32.34 kg/m2 Estimated body mass index is 32.34 kg/(m^2) as calculated from the following:    Height as of 3/6/17: 5' 3.25\" (1.607 m).    Weight as of this encounter: 184 lb (83.5 kg).  Medication Reconciliation: complete   Salma Meier CMA      "

## 2017-06-23 NOTE — PATIENT INSTRUCTIONS
Take the acyclovir for the shingles    Follow up if pain/ rash not resolving    Continue other meds as is    We will send you lab results

## 2017-06-23 NOTE — MR AVS SNAPSHOT
After Visit Summary   6/23/2017    Lexy Rockwell    MRN: [id number]           Patient Information     Date Of Birth          1960        Visit Information        Provider Department      6/23/2017 8:40 AM Fredi Fuentes MD [facility]        Today's Diagnoses     Visit for screening mammogram    -  1    Type 2 diabetes mellitus with diabetic polyneuropathy, with long-term current use of insulin (H)        Bilateral impacted cerumen        Herpes zoster without complication        Nausea        Hyperlipidemia LDL goal <100        Overactive bladder        Fatigue, unspecified type          Care Instructions    Take the acyclovir for the shingles    Follow up if pain/ rash not resolving    Continue other meds as is    We will send you lab results              Follow-ups after your visit        Additional Services     DIABETES EDUCATOR REFERRAL       DIABETES SELF MANAGEMENT TRAINING (DSMT)      Your provider has referred you to Diabetes Education: [facility]: Diabetes Education - All [facility] [phone]   [email or web address]    Type of training and number of hours: Previous Diagnosis: Follow-up DSMT - 2 hours.    Medicare covers: 10 hours of initial DSMT in 12 month period from the time of first visit, plus 2 hours of follow-up DSMT annually, and additional hours as requested for insulin training.    Diabetes Type: Type 2 - On Insulin             Diabetes Co-Morbidities: neuropathy               A1C Goal:  <8.0       A1C is: Lab Results       Component                Value               Date                       A1C                      10.5                03/06/2017              If an urgent visit is needed or A1C is above 12, Care Team to call the Diabetes Education Team at [phone] or send an In Basket message to the Diabetes Education Pool (P DIAB ED-PATIENT CARE).    Diabetes Education Topics:  Comprehensive Knowledge Assessment and Instruction    Special Educational Needs Requiring Individual DSMT: None       MEDICAL NUTRITION THERAPY (MNT) for Diabetes    Medical Nutrition Therapy with a Registered Dietitian can be provided in coordination with Diabetes Self-Management Training to assist in achieving optimal diabetes management.    MNT Type and Hours: Previous diagnosis: Annual follow-up MNT - 2 hours                       Medicare will cover: 3 hours initial MNT in 12 month period after first visit, plus 2 hours of follow-up MNT annually    Please be aware that coverage of these services is subject to the terms and limitations of your health insurance plan.  Call member services at your health plan to determine Diabetes Self-Management Training benefits and ask which blood glucose monitor brands are covered by your plan.      Please bring the following with you to your appointment:    (1)  List of current medications   (2)  List of Blood Glucose Monitor brands that are covered by your insurance plan  (3)  Blood Glucose Monitor and log book  (4)   Food records for the 3 days prior to your visit    The Certified Diabetes Educator may make diabetes medication adjustments per the CDE Protocol and Collaborative Practice Agreement.                  Your next 10 appointments already scheduled     Jun 30, 2017 10:45 AM CDT   Diabetic Education with CP DIABETIC ED RESOURCE   [facility]    [address] [facility] [address]   [phone]            Jul 31, 2017  9:45 AM CDT   Return Visit with Shira Wilson MD, MG ENDO NURSE   [facility])    [address] [place] [address]   [phone]            Aug 16, 2017  2:30 PM CDT   Return Visit with Ravin Back DPM   [facility]    [address] [place] [address]  "[place] [address]   [phone]            Nov 10, 2017  1:30 PM CST   New Visit with Dayron Rios MD   [facility] ([facility])    [address] [facility]  [place] [address] [phone]              Future tests that were ordered for you today     Open Future Orders        Priority Expected Expires Ordered    *MA Screening Digital Bilateral Routine  2018            Who to contact     If you have questions or need follow up information about today's clinic visit or your schedule please contact [facility] directly at [phone].  Normal or non-critical lab and imaging results will be communicated to you by MyChart, letter or phone within 4 business days after the clinic has received the results. If you do not hear from us within 7 days, please contact the clinic through MyChart or phone. If you have a critical or abnormal lab result, we will notify you by phone as soon as possible.  Submit refill requests through [employer] or call your pharmacy and they will forward the refill request to us. Please allow 3 business days for your refill to be completed.          Additional Information About Your Visit        MyChart Information     [employer] lets you send messages to your doctor, view your test results, renew your prescriptions, schedule appointments and more. To sign up, go to [email or web address] . Click on \"Log in\" on the left side of the screen, which will take you to the Welcome page. Then click on \"Sign up Now\" on the right side of the page.     You will be asked to enter the access code listed below, as well as some personal information. Please follow the directions to create your username and password.     Your access code is: [email or web address]  Expires: 2017  1:47 PM     Your access code will  in 90 days. If you need help or a new code, please call your [facility] or [phone].        Care EveryWhere ID     This is your Care " EveryWhere ID. This could be used by other organizations to access your [facility] medical records  [id number]        Your Vitals Were     Pulse Temperature Last Period Pulse Oximetry Breastfeeding? BMI (Body Mass Index)    121 97.5  F (36.4  C) (Oral) 03/28/2014 99% No 32.34 kg/m2       Blood Pressure from Last 3 Encounters:   06/23/17 130/85   05/24/17 122/80   04/17/17 136/88    Weight from Last 3 Encounters:   06/23/17 184 lb (83.5 kg)   06/09/17 186 lb (84.4 kg)   04/17/17 200 lb (90.7 kg)              We Performed the Following     Albumin Random Urine Quantitative     CBC with platelets differential     Comprehensive metabolic panel     DIABETES EDUCATOR REFERRAL     FOOT EXAM     Hemoglobin A1c     Lipid panel reflex to direct LDL     REMOVE IMPACTED CERUMEN     TSH with free T4 reflex          Today's Medication Changes          These changes are accurate as of: 6/23/17  9:37 AM.  If you have any questions, ask your nurse or doctor.               Start taking these medicines.        Dose/Directions    acyclovir 800 MG tablet   Commonly known as:  ZOVIRAX   Used for:  Herpes zoster without complication   Started by:  Fredi Fuentes MD        Dose:  800 mg   Take 1 tablet (800 mg) by mouth 5 times daily for 10 days   Quantity:  50 tablet   Refills:  0         These medicines have changed or have updated prescriptions.        Dose/Directions    ondansetron 8 MG ODT tab   Commonly known as:  ZOFRAN-ODT   This may have changed:    - how much to take  - how to take this  - when to take this  - reasons to take this   Used for:  Nausea   Changed by:  Fredi Funetes MD        Dose:  8 mg   Take 1 tablet (8 mg) by mouth every 12 hours as needed for nausea Reported on 3/6/2017   Quantity:  30 tablet   Refills:  1            Where to get your medicines      These medications were sent to [id number] - [place], [place] - [address] [facility] [address] [facility] [address]  [address] [place] [address]     Phone:  [phone]     acyclovir 800 MG tablet    oxybutynin 5 MG tablet    simvastatin 20 MG tablet         Call your pharmacy to confirm that your medication is ready for pickup. It may take up to 24 hours for them to receive the prescription. If the prescription is not ready within 3 business days, please contact your clinic or your provider.     We will let you know when these medications are ready. If you don't hear back within 3 business days, please contact us.     ondansetron 8 MG ODT tab                Primary Care Provider Office Phone # Fax #    Fredi Fuentes [phone]       [facility] [address]  [facility] [address]        Equal Access to Services     BRITTNI TOWNSEND : Hadii ashok ku hadasho Soaleksandrali, waaxda luqadaha, qaybta kaalmada adeegyada, waxay idiin anastasia campuzano . So [facility] [phone].    ATENCIÓN: Si habla español, tiene a oneill disposición servicios gratuitos de asistencia lingüística. Ll[facility] [phone].    We comply with applicable federal civil rights laws and [place] laws. We do not discriminate on the basis of race, color, national origin, age, disability sex, sexual orientation or gender identity.            Thank you!     Thank you for choosing [facility]  for your care. Our goal is always to provide you with excellent care. Hearing back from our patients is one way we can continue to improve our services. Please take a few minutes to complete the written survey that you may receive in the mail after your visit with us. Thank you!             Your Updated Medication List - Protect others around you: Learn how to safely use, store and throw away your medicines at www.disposemymeds.org.          This list is accurate as of: 6/23/17  9:37 AM.  Always use your most recent med list.                   Brand Name Dispense Instructions for use Diagnosis    ACCU-CHEK  COMPACT PLUS test strip   Generic drug:  blood glucose monitoring     153 each    TEST 5 TIMES DAILY OR AS DIRECTED    Type 2 diabetes mellitus with diabetic polyneuropathy, with long-term current use of insulin (H)       acyclovir 800 MG tablet    ZOVIRAX    50 tablet    Take 1 tablet (800 mg) by mouth 5 times daily for 10 days    Herpes zoster without complication       aspirin 81 MG tablet      1 TABLET DAILY        blood glucose monitoring lancets     100 each    1 Units 3 times daily.    Type 2 diabetes, HbA1C goal < 8% (H)       blood glucose monitoring meter device kit    no brand specified    1 kit    Use to test blood sugar 5 times daily or as directed.  Patient needs new monitor.  Accu Test Compact or whatever is covered.  Patient has very labile sugars so needs to check 5 x per day.  Please also include 3 month supply of strips, lancets, and whatever other supplies are needed.  Ongoing refills for a year.    Type 2 diabetes mellitus with other specified complication (H)       ciclopirox 0.77 % cream    LOPROX    90 g    Apply topically 2 times daily To feet and toenails.    Dermatophytosis of nail, Tinea pedis of both feet       insulin aspart 100 UNIT/ML injection    NovoLOG FLEXPEN    20 mL    Inject 20 units before each meal. Dose to be titrated    Type 2 diabetes mellitus with other specified complication (H)       insulin glargine 100 UNIT/ML injection    LANTUS SOLOSTAR    15 mL    Inject 50 units at bedtime    Type 2 diabetes mellitus with diabetic polyneuropathy (H)       insulin pen needle 31G X 8 MM    B-D U/F    120 each    Use 4 daily or as directed.    Type 2 diabetes, HbA1C goal < 8% (H)       lisinopril 20 MG tablet    PRINIVIL/ZESTRIL    90 tablet    Take 1 tablet (20 mg) by mouth daily    Essential hypertension with goal blood pressure less than 140/90, Type 2 diabetes mellitus with diabetic polyneuropathy (H)       magnesium oxide 400 MG tablet    MAG-OX    90 tablet    Take 1 tablet  (400 mg) by mouth daily    Hypomagnesemia       metFORMIN 500 MG 24 hr tablet    GLUCOPHAGE-XR    120 tablet    Take 2 tablets (1,000 mg) by mouth 2 times daily    Type 2 diabetes mellitus with diabetic polyneuropathy, with long-term current use of insulin (H)       metoclopramide 5 MG tablet    REGLAN    180 tablet    Take 1 tablet (5 mg) by mouth 3 times daily (before meals)    Dyspepsia       MULTIVITAMIN TABS   OR      1 TABLET DAILY        omeprazole 20 MG CR capsule    priLOSEC    180 capsule    Take 1 capsule (20 mg) by mouth 2 times daily    Gastroesophageal reflux disease, esophagitis presence not specified       ondansetron 8 MG ODT tab    ZOFRAN-ODT    30 tablet    Take 1 tablet (8 mg) by mouth every 12 hours as needed for nausea Reported on 3/6/2017    Nausea       oxybutynin 5 MG tablet    DITROPAN    180 tablet    Take 1 tablet (5 mg) by mouth 2 times daily    Overactive bladder       simvastatin 20 MG tablet    ZOCOR    90 tablet    Take 1 tablet (20 mg) by mouth At Bedtime    Hyperlipidemia LDL goal <100       XALATAN 0.005 % ophthalmic solution   Generic drug:  latanoprost      Place 1 drop into both eyes At Bedtime

## 2017-06-28 ENCOUNTER — TELEPHONE (OUTPATIENT)
Dept: FAMILY MEDICINE | Facility: CLINIC | Age: 57
End: 2017-06-28

## 2017-06-28 DIAGNOSIS — E11.42 TYPE 2 DIABETES MELLITUS WITH DIABETIC POLYNEUROPATHY, WITH LONG-TERM CURRENT USE OF INSULIN (H): ICD-10-CM

## 2017-06-28 DIAGNOSIS — R53.83 FATIGUE, UNSPECIFIED TYPE: ICD-10-CM

## 2017-06-28 DIAGNOSIS — Z79.4 TYPE 2 DIABETES MELLITUS WITH DIABETIC POLYNEUROPATHY, WITH LONG-TERM CURRENT USE OF INSULIN (H): ICD-10-CM

## 2017-06-28 DIAGNOSIS — E78.5 HYPERLIPIDEMIA LDL GOAL <100: ICD-10-CM

## 2017-06-28 LAB
ALBUMIN SERPL-MCNC: 3.6 G/DL (ref 3.4–5)
ALP SERPL-CCNC: 105 U/L (ref 40–150)
ALT SERPL W P-5'-P-CCNC: 16 U/L (ref 0–50)
ANION GAP SERPL CALCULATED.3IONS-SCNC: 22 MMOL/L (ref 3–14)
AST SERPL W P-5'-P-CCNC: 7 U/L (ref 0–45)
BASOPHILS # BLD AUTO: 0.1 10E9/L (ref 0–0.2)
BASOPHILS NFR BLD AUTO: 1.1 %
BILIRUB SERPL-MCNC: 0.7 MG/DL (ref 0.2–1.3)
BUN SERPL-MCNC: 14 MG/DL (ref 7–30)
CALCIUM SERPL-MCNC: 8.8 MG/DL (ref 8.5–10.1)
CHLORIDE SERPL-SCNC: 103 MMOL/L (ref 94–109)
CHOLEST SERPL-MCNC: 200 MG/DL
CO2 SERPL-SCNC: 11 MMOL/L (ref 20–32)
CREAT SERPL-MCNC: 0.63 MG/DL (ref 0.52–1.04)
DIFFERENTIAL METHOD BLD: NORMAL
EOSINOPHIL # BLD AUTO: 0 10E9/L (ref 0–0.7)
EOSINOPHIL NFR BLD AUTO: 0.9 %
ERYTHROCYTE [DISTWIDTH] IN BLOOD BY AUTOMATED COUNT: 14.1 % (ref 10–15)
GFR SERPL CREATININE-BSD FRML MDRD: ABNORMAL ML/MIN/1.7M2
GLUCOSE SERPL-MCNC: 401 MG/DL (ref 70–99)
HBA1C MFR BLD: 11.2 % (ref 4.3–6)
HCT VFR BLD AUTO: 41.2 % (ref 35–47)
HDLC SERPL-MCNC: 44 MG/DL
HGB BLD-MCNC: 13.4 G/DL (ref 11.7–15.7)
LDLC SERPL CALC-MCNC: 117 MG/DL
LYMPHOCYTES # BLD AUTO: 1.4 10E9/L (ref 0.8–5.3)
LYMPHOCYTES NFR BLD AUTO: 30.7 %
MCH RBC QN AUTO: 28.6 PG (ref 26.5–33)
MCHC RBC AUTO-ENTMCNC: 32.5 G/DL (ref 31.5–36.5)
MCV RBC AUTO: 88 FL (ref 78–100)
MONOCYTES # BLD AUTO: 0.3 10E9/L (ref 0–1.3)
MONOCYTES NFR BLD AUTO: 6.7 %
NEUTROPHILS # BLD AUTO: 2.7 10E9/L (ref 1.6–8.3)
NEUTROPHILS NFR BLD AUTO: 60.6 %
NONHDLC SERPL-MCNC: 156 MG/DL
PLATELET # BLD AUTO: 199 10E9/L (ref 150–450)
POTASSIUM SERPL-SCNC: 4.5 MMOL/L (ref 3.4–5.3)
PROT SERPL-MCNC: 7.2 G/DL (ref 6.8–8.8)
RBC # BLD AUTO: 4.68 10E12/L (ref 3.8–5.2)
SODIUM SERPL-SCNC: 136 MMOL/L (ref 133–144)
TRIGL SERPL-MCNC: 195 MG/DL
TSH SERPL DL<=0.005 MIU/L-ACNC: 2.82 MU/L (ref 0.4–4)
WBC # BLD AUTO: 4.5 10E9/L (ref 4–11)

## 2017-06-28 PROCEDURE — 80050 GENERAL HEALTH PANEL: CPT | Performed by: FAMILY MEDICINE

## 2017-06-28 PROCEDURE — 36415 COLL VENOUS BLD VENIPUNCTURE: CPT | Performed by: FAMILY MEDICINE

## 2017-06-28 PROCEDURE — 83036 HEMOGLOBIN GLYCOSYLATED A1C: CPT | Performed by: FAMILY MEDICINE

## 2017-06-28 PROCEDURE — 80061 LIPID PANEL: CPT | Performed by: FAMILY MEDICINE

## 2017-06-28 NOTE — TELEPHONE ENCOUNTER
Called to review.  No answer.  Blood sugars.  Shingles diagnosis.  Novolog.  Previous history dka and shingles completed.

## 2017-06-28 NOTE — LETTER
[facility]  [address] [place]  [address]  [phone]        June 29, 2017    Lexy Fannykayla Rockwell  [address]   [place] [address]        Dear Lexy,    The overall diabetes test ( hemoglobin a1c ) is very high as expected.  Continue working with the diabetes educator.     Cholesterol is mildly elevated.     Other labs are okay.       See me in a couple months.     Results for orders placed or performed in visit on 06/28/17   Lipid Profile with reflex to direct LDL   Result Value Ref Range    Cholesterol 200 (H) <200 mg/dL    Triglycerides 195 (H) <150 mg/dL    HDL Cholesterol 44 (L) >49 mg/dL    LDL Cholesterol Calculated 117 (H) <100 mg/dL    Non HDL Cholesterol 156 (H) <130 mg/dL   Hemoglobin A1c   Result Value Ref Range    Hemoglobin A1C 11.2 (H) 4.3 - 6.0 %   Comprehensive metabolic panel (BMP + Alb, Alk Phos, ALT, AST, Total. Bili, TP)   Result Value Ref Range    Sodium 136 133 - 144 mmol/L    Potassium 4.5 3.4 - 5.3 mmol/L    Chloride 103 94 - 109 mmol/L    Carbon Dioxide 11 (L) 20 - 32 mmol/L    Anion Gap 22 (H) 3 - 14 mmol/L    Glucose 401 (H) 70 - 99 mg/dL    Urea Nitrogen 14 7 - 30 mg/dL    Creatinine 0.63 0.52 - 1.04 mg/dL    GFR Estimate >90  Non  GFR Calc   >60 mL/min/1.7m2    GFR Estimate If Black >90   GFR Calc   >60 mL/min/1.7m2    Calcium 8.8 8.5 - 10.1 mg/dL    Bilirubin Total 0.7 0.2 - 1.3 mg/dL    Albumin 3.6 3.4 - 5.0 g/dL    Protein Total 7.2 6.8 - 8.8 g/dL    Alkaline Phosphatase 105 40 - 150 U/L    ALT 16 0 - 50 U/L    AST 7 0 - 45 U/L   CBC with platelets and differential   Result Value Ref Range    WBC 4.5 4.0 - 11.0 10e9/L    RBC Count 4.68 3.8 - 5.2 10e12/L    Hemoglobin 13.4 11.7 - 15.7 g/dL    Hematocrit 41.2 35.0 - 47.0 %    MCV 88 78 - 100 fl    MCH 28.6 26.5 - 33.0 pg    MCHC 32.5 31.5 - 36.5 g/dL    RDW 14.1 10.0 - 15.0 %    Platelet Count 199 150 - 450 10e9/L    Diff Method  Automated Method     % Neutrophils 60.6 %    % Lymphocytes 30.7 %    % Monocytes 6.7 %    % Eosinophils 0.9 %    % Basophils 1.1 %    Absolute Neutrophil 2.7 1.6 - 8.3 10e9/L    Absolute Lymphocytes 1.4 0.8 - 5.3 10e9/L    Absolute Monocytes 0.3 0.0 - 1.3 10e9/L    Absolute Eosinophils 0.0 0.0 - 0.7 10e9/L    Absolute Basophils 0.1 0.0 - 0.2 10e9/L   TSH with free T4 reflex   Result Value Ref Range    TSH 2.82 0.40 - 4.00 mU/L       If you have any questions please call the clinic at [phone].    Sincerely,    MD RODRÍGUEZ BanguraL

## 2017-06-29 NOTE — PROGRESS NOTES
The overall diabetes test ( hemoglobin a1c ) is very high as expected.  Continue working with the diabetes educator.    Cholesterol is mildly elevated.    Other labs are okay.      See me in a couple months.    Fredi Fuentes MD

## 2017-06-30 ENCOUNTER — ALLIED HEALTH/NURSE VISIT (OUTPATIENT)
Dept: EDUCATION SERVICES | Facility: CLINIC | Age: 57
End: 2017-06-30
Payer: COMMERCIAL

## 2017-06-30 ENCOUNTER — TRANSFERRED RECORDS (OUTPATIENT)
Dept: HEALTH INFORMATION MANAGEMENT | Facility: CLINIC | Age: 57
End: 2017-06-30

## 2017-06-30 DIAGNOSIS — Z79.4 TYPE 2 DIABETES MELLITUS WITH DIABETIC POLYNEUROPATHY, WITH LONG-TERM CURRENT USE OF INSULIN (H): Primary | ICD-10-CM

## 2017-06-30 DIAGNOSIS — E11.42 TYPE 2 DIABETES MELLITUS WITH DIABETIC POLYNEUROPATHY, WITH LONG-TERM CURRENT USE OF INSULIN (H): Primary | ICD-10-CM

## 2017-06-30 PROCEDURE — G0108 DIAB MANAGE TRN  PER INDIV: HCPCS

## 2017-06-30 NOTE — MR AVS SNAPSHOT
After Visit Summary   6/30/2017    Lexy Rockwell    MRN: [id number]           Patient Information     Date Of Birth          1960        Visit Information        Provider Department      6/30/2017 10:45 AM CP DIABETIC ED RESOURCE [facility]        Care Instructions    Recommend that you take your Lantus dose between 9 pm and 11 pm.   Go to the emergency room now. Per Dr Leon.          Follow-ups after your visit        Your next 10 appointments already scheduled     Jul 31, 2017  9:45 AM CDT   Return Visit with Shira Wilson MD, MG ENDO NURSE   [facility])    [address] [place] [address]   [phone]            Aug 16, 2017  2:30 PM CDT   Return Visit with Ravin Back DPM   [facility])    [address] [place] [id number]   [phone]            Nov 10, 2017  1:30 PM CST   New Visit with Dayron Rios MD   [facility] ([address] [id number] [phone]              Who to contact     If you have questions or need follow up information about today's clinic visit or your schedule please contact [facility] directly at [phone].  Normal or non-critical lab and imaging results will be communicated to you by MyChart, letter or phone within 4 business days after the clinic has received the results. If you do not hear from us within 7 days, please contact the clinic through MyChart or phone. If you have a critical or abnormal lab result, we will notify you by phone as soon as possible.  Submit refill requests through [employer] or call your pharmacy and they will forward the refill request to us. Please allow 3 business days for your refill to be completed.          Additional Information About Your Visit        MyChart Information      "JoinTV lets you send messages to your doctor, view your test results, renew your prescriptions, schedule appointments and more. To sign up, go to [email or web address] . Click on \"Log in\" on the left side of the screen, which will take you to the Welcome page. Then click on \"Sign up Now\" on the right side of the page.     You will be asked to enter the access code listed below, as well as some personal information. Please follow the directions to create your username and password.     Your access code is: [email or web address]  Expires: 2017  1:47 PM     Your access code will  in 90 days. If you need help or a new code, please call your [facility] clinic or [phone].        Care EveryWhere ID     This is your Care EveryWhere ID. This could be used by other organizations to access your [facility] medical records  [id number]        Your Vitals Were     Last Period                   2014            Blood Pressure from Last 3 Encounters:   17 130/85   17 122/80   17 136/88    Weight from Last 3 Encounters:   17 83.5 kg (184 lb)   17 84.4 kg (186 lb)   17 90.7 kg (200 lb)              Today, you had the following     No orders found for display       Primary Care Provider Office Phone # Fax #    Fredi Fuentes [phone]       [facility] [address] [facility] [address]        Equal Access to Services     [facility] AH: Hadii aad ku hadasho Soomaali, waaxda luqadaha, qaybta kaalmada adeegyada, waxay david chong. So [facility] [phone].    ATENCIÓN: Si habla español, tiene a oneill disposición servicios gratuitos de asistencia lingüística. Llame al [phone].    We comply with applicable federal civil rights laws and [place] laws. We do not discriminate on the basis of race, color, national origin, age, disability sex, sexual orientation or gender identity.            Thank you!     Thank you for " choosing [facility]  for your care. Our goal is always to provide you with excellent care. Hearing back from our patients is one way we can continue to improve our services. Please take a few minutes to complete the written survey that you may receive in the mail after your visit with us. Thank you!             Your Updated Medication List - Protect others around you: Learn how to safely use, store and throw away your medicines at www.disposemymeds.org.          This list is accurate as of: 6/30/17 11:29 AM.  Always use your most recent med list.                   Brand Name Dispense Instructions for use Diagnosis    ACCU-CHEK COMPACT PLUS test strip   Generic drug:  blood glucose monitoring     153 each    TEST 5 TIMES DAILY OR AS DIRECTED    Type 2 diabetes mellitus with diabetic polyneuropathy, with long-term current use of insulin (H)       acyclovir 800 MG tablet    ZOVIRAX    50 tablet    Take 1 tablet (800 mg) by mouth 5 times daily for 10 days    Herpes zoster without complication       aspirin 81 MG tablet      1 TABLET DAILY        blood glucose monitoring lancets     100 each    1 Units 3 times daily.    Type 2 diabetes, HbA1C goal < 8% (H)       blood glucose monitoring meter device kit    no brand specified    1 kit    Use to test blood sugar 5 times daily or as directed.  Patient needs new monitor.  Accu Test Compact or whatever is covered.  Patient has very labile sugars so needs to check 5 x per day.  Please also include 3 month supply of strips, lancets, and whatever other supplies are needed.  Ongoing refills for a year.    Type 2 diabetes mellitus with other specified complication (H)       ciclopirox 0.77 % cream    LOPROX    90 g    Apply topically 2 times daily To feet and toenails.    Dermatophytosis of nail, Tinea pedis of both feet       insulin aspart 100 UNIT/ML injection    NovoLOG FLEXPEN    20 mL    Inject 20 units before each meal. Dose to be titrated    Type 2  diabetes mellitus with other specified complication (H)       insulin glargine 100 UNIT/ML injection    LANTUS SOLOSTAR    15 mL    Inject 50 units at bedtime    Type 2 diabetes mellitus with diabetic polyneuropathy (H)       insulin pen needle 31G X 8 MM    B-D U/F    120 each    Use 4 daily or as directed.    Type 2 diabetes, HbA1C goal < 8% (H)       lisinopril 20 MG tablet    PRINIVIL/ZESTRIL    90 tablet    Take 1 tablet (20 mg) by mouth daily    Essential hypertension with goal blood pressure less than 140/90, Type 2 diabetes mellitus with diabetic polyneuropathy (H)       magnesium oxide 400 MG tablet    MAG-OX    90 tablet    Take 1 tablet (400 mg) by mouth daily    Hypomagnesemia       metFORMIN 500 MG 24 hr tablet    GLUCOPHAGE-XR    120 tablet    Take 2 tablets (1,000 mg) by mouth 2 times daily    Type 2 diabetes mellitus with diabetic polyneuropathy, with long-term current use of insulin (H)       metoclopramide 5 MG tablet    REGLAN    180 tablet    Take 1 tablet (5 mg) by mouth 3 times daily (before meals)    Dyspepsia       MULTIVITAMIN TABS   OR      1 TABLET DAILY        omeprazole 20 MG CR capsule    priLOSEC    180 capsule    Take 1 capsule (20 mg) by mouth 2 times daily    Gastroesophageal reflux disease, esophagitis presence not specified       ondansetron 8 MG ODT tab    ZOFRAN-ODT    30 tablet    Take 1 tablet (8 mg) by mouth every 12 hours as needed for nausea Reported on 3/6/2017    Nausea       oxybutynin 5 MG tablet    DITROPAN    180 tablet    Take 1 tablet (5 mg) by mouth 2 times daily    Overactive bladder       simvastatin 20 MG tablet    ZOCOR    90 tablet    Take 1 tablet (20 mg) by mouth At Bedtime    Hyperlipidemia LDL goal <100       XALATAN 0.005 % ophthalmic solution   Generic drug:  latanoprost      Place 1 drop into both eyes At Bedtime

## 2017-06-30 NOTE — PATIENT INSTRUCTIONS
Recommend that you take your Lantus dose between 9 pm and 11 pm.   Go to the emergency room now Per Dr Leon.

## 2017-06-30 NOTE — NURSING NOTE
Ddiabetes Self Management Training: Follow-up Visit    Lexy Rockwell presents today for education and evaluation of glucose control related to Type 2 diabetes.    She is accompanied by self and mother    Patient's diabetes management related comments/concerns: States that she is sick today.   Dx with shingles last Friday and taking Acyclovir and Zofran but still not able to eat and drink normally.  Has vomited twice today, once just before she came in my office.   Patient would like this visit to be focused around the following diabetes-related behaviors and goals: Problem Solving    ASSESSMENT:  Patient Problem List reviewed for relevant medical history and current medical status.  Patient reports that she has been so tired with the shingles and medication that she has been forgetting to take her Lantus sometimes.  She cannot say exactly when but does know that she did not take it last night. States that she has been taking her Novolog regularly.  Patient tested her blood glucose this am at 484 mg/dl.  I checked her BG while she was here today at 588 mg/dl with my office glucose meter.  Patient appears very pale.    Current Diabetes Management per Patient:  Taking diabetes medications? Injectable Medications: Lantus 0-0-0-56,  Novolog 20 units before each meal. Problems taking diabetes medications regularly? Yes , Side effects? Yes - hyperglycemia    *Abbreviated insulin dose documentation key: Insulin Trade Name (strinoatv-pjqck-gexfhd-bedtime) - i.e. Humalog 5-5-5-0 (Humalog 5 units at breakfast, 5 units at lunch, and 5 units at dinner).    Patient glucose self monitoring as follows: three times daily.   BG results: 300's-500's. +    BG values are: Not in goal  Patient's most recent   Lab Results   Component Value Date    A1C 11.2 06/28/2017    is not meeting goal of <8.0    Nutrition:  Patient skipping meals and not able to drink as much water as she knows she should be due to nausea.    Physical  "Activity:    Not assessed    Diabetes Complications:  Acute Complications: Symptoms of hyperglycemia? increased thirst and vomiting.    Vitals:  LMP 03/28/2014  Estimated body mass index is 32.34 kg/(m^2) as calculated from the following:    Height as of 3/6/17: 1.607 m (5' 3.25\").    Weight as of 6/23/17: 83.5 kg (184 lb).   Last 3 BP:   BP Readings from Last 3 Encounters:   06/23/17 130/85   05/24/17 122/80   04/17/17 136/88       History   Smoking Status     Never Smoker   Smokeless Tobacco     Never Used     Comment: lives in smoke free household.       Labs:  Lab Results   Component Value Date    A1C 11.2 06/28/2017     Lab Results   Component Value Date     06/28/2017     Lab Results   Component Value Date     06/28/2017     HDL Cholesterol   Date Value Ref Range Status   06/28/2017 44 (L) >49 mg/dL Final   ]  GFR Estimate   Date Value Ref Range Status   06/28/2017 >90  Non  GFR Calc   >60 mL/min/1.7m2 Final     GFR Estimate If Black   Date Value Ref Range Status   06/28/2017 >90   GFR Calc   >60 mL/min/1.7m2 Final     Lab Results   Component Value Date    CR 0.63 06/28/2017     No results found for: MICROALBUMIN    Health Beliefs and Attitudes:   Patient Activation Measure Survey Score:  KHUSHI Score (Last Two) 3/7/2012   KHUSHI Raw Score 39   Activation Score 56.4   KHUSHI Level 3       Stage of Change: unable to assess    Diabetes knowledge and skills assessment:     Patient is knowledgeable in diabetes management concepts related to: Monitoring, Taking Medication and Problem Solving    Patient needs further education on the following diabetes management concepts: Taking Medication, problem solving    Barriers to Learning Assessment: developmental delay    Based on learning assessment above, most appropriate setting for further diabetes education would be: Individual setting.    INTERVENTION:    Education provided today on:  AADE Self-Care Behaviors:  Taking Medication: " when to take medications, charting in her log book when she takes her Lantus. Patient to take Lantus between 9 and 11 pm every night. Patient states this time will work for her.  Problem Solving: high blood glucose - causes, signs/symptoms, treatment and prevention and sick day arrangements for today. Importance of being able to drink enough water.    Educator huddled with Dr Leon in Dr Jett absence to discuss patient and organize a plan. His recommendation is for patient to be taken by her mother, to the emergency room.  This recommendation was discussed with the patient and her mother and they plan to go to the ER at [facility].  Educator contacted the [facility] ER nurse to alert her that this patient was coming and give some information on why.    Pt verbalized understanding of concepts discussed and recommendations provided today.       Education Materials Provided:  No new materials provided today    PLAN:  See Patient Instructions for co-developed, patient-stated behavior change goals.  AVS printed and provided to patient today.    FOLLOW-UP:  Chart routed to referring provider.    Donna Tamayo RN  BSN CDE    Time Spent: 30 minutes  Encounter Type: Individual    Any diabetes medication dose changes were made via the CDE Protocol and Collaborative Practice Agreement with the patient's referring provider. A copy of this encounter was shared with the provider.

## 2017-07-25 ENCOUNTER — TELEPHONE (OUTPATIENT)
Dept: EDUCATION SERVICES | Facility: CLINIC | Age: 57
End: 2017-07-25

## 2017-07-25 DIAGNOSIS — E11.42 TYPE 2 DIABETES MELLITUS WITH DIABETIC POLYNEUROPATHY (H): ICD-10-CM

## 2017-07-25 NOTE — TELEPHONE ENCOUNTER
Pt called requesting  To speak to you.  Declined to speak to Triage Nurse.  I did  Let her know that  You would not be in clinic  until Wednesday.

## 2017-07-27 ENCOUNTER — VIRTUAL VISIT (OUTPATIENT)
Dept: EDUCATION SERVICES | Facility: CLINIC | Age: 57
End: 2017-07-27
Payer: COMMERCIAL

## 2017-07-27 DIAGNOSIS — E11.42 TYPE 2 DIABETES MELLITUS WITH DIABETIC POLYNEUROPATHY, WITH LONG-TERM CURRENT USE OF INSULIN (H): Primary | ICD-10-CM

## 2017-07-27 DIAGNOSIS — Z79.4 TYPE 2 DIABETES MELLITUS WITH DIABETIC POLYNEUROPATHY, WITH LONG-TERM CURRENT USE OF INSULIN (H): Primary | ICD-10-CM

## 2017-07-27 PROCEDURE — 99207 ZZC NO BILLABLE SERVICE THIS VISIT: CPT

## 2017-07-27 NOTE — MR AVS SNAPSHOT
After Visit Summary   7/27/2017    Lexy Rockwell    MRN: [id number]           Patient Information     Date Of Birth          1960        Visit Information        Provider Department      7/27/2017 8:30 AM FK DIABETIC ED RESOURCE [facility]        Today's Diagnoses     Type 2 diabetes mellitus with diabetic polyneuropathy, with long-term current use of insulin (H)    -  1       Follow-ups after your visit        Your next 10 appointments already scheduled     Jul 31, 2017  9:45 AM CDT   Return Visit with Shira Wilson MD, MG ENDO NURSE   [facility]    [address] [place] [address]   [phone]            Aug 16, 2017  2:30 PM CDT   Return Visit with Ravin Back DPM   [facility]    [address] [place] [id number]   [phone]            Nov 10, 2017  1:30 PM CST   New Visit with Dayron Rios MD   [facility] ([address] [id number] [phone]              Who to contact     If you have questions or need follow up information about today's clinic visit or your schedule please contact [facility] directly at [phone].  Normal or non-critical lab and imaging results will be communicated to you by [employer], letter or phone within 4 business days after the clinic has received the results. If you do not hear from us within 7 days, please contact the clinic through [employer] or phone. If you have a critical or abnormal lab result, we will notify you by phone as soon as possible.  Submit refill requests through [employer] or call your pharmacy and they will forward the refill request to us. Please allow 3 business days for your refill to be completed.          Additional Information About Your Visit        [employer] Information     [employer] lets you send messages to  "your doctor, view your test results, renew your prescriptions, schedule appointments and more. To sign up, go to [email or web address] . Click on \"Log in\" on the left side of the screen, which will take you to the Welcome page. Then click on \"Sign up Now\" on the right side of the page.     You will be asked to enter the access code listed below, as well as some personal information. Please follow the directions to create your username and password.     Your access code is: [email or web address]  Expires: 2017  1:47 PM     Your access code will  in 90 days. If you need help or a new code, please call your [facility] clinic or [phone].        Care EveryWhere ID     This is your Care EveryWhere ID. This could be used by other organizations to access your [facility] medical records  [id number]        Your Vitals Were     Last Period                   2014            Blood Pressure from Last 3 Encounters:   17 130/85   17 122/80   17 136/88    Weight from Last 3 Encounters:   17 184 lb (83.5 kg)   17 186 lb (84.4 kg)   17 200 lb (90.7 kg)              Today, you had the following     No orders found for display       Primary Care Provider Office Phone # Fax #    Fredi Fuentes [phone]       [facility] [address] [facility] [address]        Equal Access to Services     [facility] AH: Hadii aad ku hadasho Soomaali, waaxda luqadaha, qaybta kaalmada adeegyada, rickey campuzano . So [facility] [phone].    ATENCIÓN: Si habla español, tiene a oneill disposición servicios gratuitos de asistencia lingüística. Llame al [phone].    We comply with applicable federal civil rights laws and [place] laws. We do not discriminate on the basis of race, color, national origin, age, disability sex, sexual orientation or gender identity.            Thank you!     Thank you for choosing [facility]  for " your care. Our goal is always to provide you with excellent care. Hearing back from our patients is one way we can continue to improve our services. Please take a few minutes to complete the written survey that you may receive in the mail after your visit with us. Thank you!             Your Updated Medication List - Protect others around you: Learn how to safely use, store and throw away your medicines at www.disposemymeds.org.          This list is accurate as of: 7/27/17  3:48 PM.  Always use your most recent med list.                   Brand Name Dispense Instructions for use Diagnosis    ACCU-CHEK COMPACT PLUS test strip   Generic drug:  blood glucose monitoring     153 each    TEST 5 TIMES DAILY OR AS DIRECTED    Type 2 diabetes mellitus with diabetic polyneuropathy, with long-term current use of insulin (H)       acyclovir 800 MG tablet    ZOVIRAX    50 tablet    Take 1 tablet (800 mg) by mouth 5 times daily for 10 days    Herpes zoster without complication       aspirin 81 MG tablet      1 TABLET DAILY        blood glucose monitoring lancets     100 each    1 Units 3 times daily.    Type 2 diabetes, HbA1C goal < 8% (H)       blood glucose monitoring meter device kit    no brand specified    1 kit    Use to test blood sugar 5 times daily or as directed.  Patient needs new monitor.  Accu Test Compact or whatever is covered.  Patient has very labile sugars so needs to check 5 x per day.  Please also include 3 month supply of strips, lancets, and whatever other supplies are needed.  Ongoing refills for a year.    Type 2 diabetes mellitus with other specified complication (H)       ciclopirox 0.77 % cream    LOPROX    90 g    Apply topically 2 times daily To feet and toenails.    Dermatophytosis of nail, Tinea pedis of both feet       insulin aspart 100 UNIT/ML injection    NovoLOG FLEXPEN    20 mL    Inject 20 units before each meal. Dose to be titrated    Type 2 diabetes mellitus with other specified  complication (H)       insulin glargine 100 UNIT/ML injection    LANTUS SOLOSTAR    15 mL    Inject 53 units at bedtime    Type 2 diabetes mellitus with diabetic polyneuropathy (H)       insulin pen needle 31G X 8 MM    B-D U/F    120 each    Use 4 daily or as directed.    Type 2 diabetes, HbA1C goal < 8% (H)       lisinopril 20 MG tablet    PRINIVIL/ZESTRIL    90 tablet    Take 1 tablet (20 mg) by mouth daily    Essential hypertension with goal blood pressure less than 140/90, Type 2 diabetes mellitus with diabetic polyneuropathy (H)       magnesium oxide 400 MG tablet    MAG-OX    90 tablet    Take 1 tablet (400 mg) by mouth daily    Hypomagnesemia       metFORMIN 500 MG 24 hr tablet    GLUCOPHAGE-XR    120 tablet    Take 2 tablets (1,000 mg) by mouth 2 times daily    Type 2 diabetes mellitus with diabetic polyneuropathy, with long-term current use of insulin (H)       metoclopramide 5 MG tablet    REGLAN    180 tablet    Take 1 tablet (5 mg) by mouth 3 times daily (before meals)    Dyspepsia       MULTIVITAMIN TABS   OR      1 TABLET DAILY        omeprazole 20 MG CR capsule    priLOSEC    180 capsule    Take 1 capsule (20 mg) by mouth 2 times daily    Gastroesophageal reflux disease, esophagitis presence not specified       ondansetron 8 MG ODT tab    ZOFRAN-ODT    30 tablet    Take 1 tablet (8 mg) by mouth every 12 hours as needed for nausea Reported on 3/6/2017    Nausea       oxybutynin 5 MG tablet    DITROPAN    180 tablet    Take 1 tablet (5 mg) by mouth 2 times daily    Overactive bladder       simvastatin 20 MG tablet    ZOCOR    90 tablet    Take 1 tablet (20 mg) by mouth At Bedtime    Hyperlipidemia LDL goal <100       XALATAN 0.005 % ophthalmic solution   Generic drug:  latanoprost      Place 1 drop into both eyes At Bedtime

## 2017-07-27 NOTE — TELEPHONE ENCOUNTER
Call out to patient.  She states that  She was admitted into the hospital on 6/30 from her diabetes education appointment, had a diagnosis of DKA and was discharged from the hospital on 7/3/17.  She states that she feels her DKA was related to taking the medication for her shingles that made her so tired that she was not taking her insulin doses regularly. States that she is now feeling better.  Today she states that she has been having some fasting low blood glucose levels.  Currently taking 56 units of Lantus and due to the morning lows she occasionally has been lowering her Lantus dose down to 50 units.   She states that she cannot remember if she took her Lantus dose last night.  She has been varying the time during the evening that she is taking her Lantus dose, but making a point to take it before she gets too tired.  States that she has missed a couple doses during the week but does not know when.  We discussed today to aim for a 2 hour time frame of from 9-11 pm that she said would be good for her.  She states again today that this is a good time frame for her to take her Lantus dose.  She is not recording if she takes her doses at this time.  She was encouraged to do so.    Patient is unable to explain her hypoglycemia.    She reports her blood glucose levels: before meals results:  7/22  58, 228, ---  7/23  195, 260, 294  7/24 311, 320, 209,  7/25  268, 258, 111  7/26  359, 61, 383  7/27 424, ---,---    Patient taking her insulin doses inconsistently and does not understand well cause and effect of blood glucose variations.    Patient tries to take care of herself but is not able to handle the complexity of this disease.  It would be good if there was additional assistance for this patient to assist her with her care.  Patient has Endocrinology appointment next week.  In light of fasting low BG values which have occurred more than once, recommend that patient decrease her Lantus dose down to 53 units at HS  daily.  Patient wrote this information down and verbalized understanding.    Donna Tamayo RN  BSN CDE

## 2017-07-31 ENCOUNTER — OFFICE VISIT (OUTPATIENT)
Dept: ENDOCRINOLOGY | Facility: CLINIC | Age: 57
End: 2017-07-31
Payer: COMMERCIAL

## 2017-07-31 ENCOUNTER — CARE COORDINATION (OUTPATIENT)
Dept: ENDOCRINOLOGY | Facility: CLINIC | Age: 57
End: 2017-07-31

## 2017-07-31 VITALS
BODY MASS INDEX: 32.76 KG/M2 | WEIGHT: 184.9 LBS | DIASTOLIC BLOOD PRESSURE: 87 MMHG | SYSTOLIC BLOOD PRESSURE: 132 MMHG | HEIGHT: 63 IN | HEART RATE: 106 BPM

## 2017-07-31 DIAGNOSIS — E11.69 TYPE 2 DIABETES MELLITUS WITH OTHER SPECIFIED COMPLICATION (H): Primary | ICD-10-CM

## 2017-07-31 PROCEDURE — 99214 OFFICE O/P EST MOD 30 MIN: CPT | Performed by: INTERNAL MEDICINE

## 2017-07-31 NOTE — MR AVS SNAPSHOT
After Visit Summary   7/31/2017    Lexy Rockwell    MRN: [id number]           Patient Information     Date Of Birth          1960        Visit Information        Provider Department      7/31/2017 9:45 AM Shira Wilson MD;  ENDO NURSE [facility]        Care Instructions    1. Decrease Lantus to 50 units daily at bedtime.   2. Change Novolog to 15 units three times daily before meals.           Sending blood sugars to your provider at [facility]:  We want to help you with your diabetes management, which often requires frequent adjustments to your therapy. For your convenience, we have several ways to send your blood sugars to your doctor for review.    - Send message directly to your doctor through My Chart.  Please ask the rooming staff if you would like to sign up for My Chart.  This is a fast and confidential way to send your information and communicate directly with your provider.   - Record readings and fax to [phone].  We have a template for you to use for your convenience.  - If you have a Medtronic pump, upload to [employer] and notify your provider of your username and password.   - Stop by the clinic with your meter for download.   - My Chart or call Dai Roman, Diabetes Educator at [phone]  - Call the clinic and speak to one of the endocrine nurses to relay information on the telephone.  Anna Fulton, or Libia at [phone].   -    Please call the on-call Endocrinologist at the [facility] for after       hours/weekend needs at [phone] Option #4.    Please note that you do not need to FAST if you are just having an A1C drawn. Please remember to ALWAYS bring your glucose meter with to your appointment. This data is very important for the management of your care.    Thank you!  Your [facility] Diabetes Care Team                Follow-ups after your visit        Your next 10 appointments already scheduled     Aug 16, 2017  2:30 PM CDT    [facility] Visit with Ravin Back DPM   [facility]    [address] [place] [id number] [phone]            Nov 10, 2017  1:30 PM CST   [facility] Visit with [facility]ron Rios MD   [facility]    [address] [place] [place] [address] [address]   [phone]            Nov 20, 2017 10:45 AM CST   Return Visit with Shira Wilson MD, MG ENDO NURSE   [facility]    [address] [address] [place] [address] [id number] [phone]              Who to contact     If you have questions or need follow up information about today's clinic visit or your schedule please contact [facility] directly at [phone].  Normal or non-critical lab and imaging results will be communicated to you by MyChart, letter or phone within 4 business days after the clinic has received the results. If you do not hear from us within 7 days, please contact the clinic through [employer] or phone. If you have a critical or abnormal lab result, we will notify you by phone as soon as possible.  Submit refill requests through [employer] or call your pharmacy and they will forward the refill request to us. Please allow 3 business days for your refill to be completed.          Additional Information About Your Visit        [employer] Information     [employer] is an electronic gateway that provides easy, online access to your medical records. With [employer], you can request a clinic appointment, read your test results, renew a prescription or communicate with your care team.     To sign up for [employer] visit the website at [email or web address]   You will be asked to enter the access code listed below, as well as some personal information. Please follow the directions to create your username and password.     Your access code is: [email or web address]  Expires: 9/7/2017  1:47 PM     Your access code will  " in 90 days. If you need help or a new code, please contact your [facility] or call [phone] for assistance.        Care EveryWhere ID     This is your Care EveryWhere ID. This could be used by other organizations to access your [facility] medical records  [id number]        Your Vitals Were     Pulse Height Last Period BMI (Body Mass Index)          106 1.607 m (5' 3.25\") 2014 32.5 kg/m2         Blood Pressure from Last 3 Encounters:   17 132/87   17 130/85   17 122/80    Weight from Last 3 Encounters:   17 83.9 kg (184 lb 14.4 oz)   17 83.5 kg (184 lb)   17 84.4 kg (186 lb)              Today, you had the following     No orders found for display       Primary Care Provider Office Phone # Fax #    Fredi Fuentes [phone]       [facility] [address] [facility] [address]        Equal Access to Services     [facility]: Hadii aad ku hadasho Soomaali, waaxda luqadaha, qaybta kaalmada adeegyada, rickey campuzano . So [facility] [phone].    ATENCIÓN: Si habla español, tiene a oneill disposición servicios gratuitos de asistencia lingüística. Llame al [phone].    We comply with applicable federal civil rights laws and [place] laws. We do not discriminate on the basis of race, color, national origin, age, disability sex, sexual orientation or gender identity.            Thank you!     Thank you for choosing [facility]  for your care. Our goal is always to provide you with excellent care. Hearing back from our patients is one way we can continue to improve our services. Please take a few minutes to complete the written survey that you may receive in the mail after your visit with us. Thank you!             Your Updated Medication List - Protect others around you: Learn how to safely use, store and throw away your medicines at " www.disposemymeds.org.          This list is accurate as of: 7/31/17 10:59 AM.  Always use your most recent med list.                   Brand Name Dispense Instructions for use Diagnosis    ACCU-CHEK COMPACT PLUS test strip   Generic drug:  blood glucose monitoring     153 each    TEST 5 TIMES DAILY OR AS DIRECTED    Type 2 diabetes mellitus with diabetic polyneuropathy, with long-term current use of insulin (H)       acyclovir 800 MG tablet    ZOVIRAX    50 tablet    Take 1 tablet (800 mg) by mouth 5 times daily for 10 days    Herpes zoster without complication       aspirin 81 MG tablet      1 TABLET DAILY        blood glucose monitoring lancets     100 each    1 Units 3 times daily.    Type 2 diabetes, HbA1C goal < 8% (H)       blood glucose monitoring meter device kit    no brand specified    1 kit    Use to test blood sugar 5 times daily or as directed.  Patient needs new monitor.  Accu Test Compact or whatever is covered.  Patient has very labile sugars so needs to check 5 x per day.  Please also include 3 month supply of strips, lancets, and whatever other supplies are needed.  Ongoing refills for a year.    Type 2 diabetes mellitus with other specified complication (H)       ciclopirox 0.77 % cream    LOPROX    90 g    Apply topically 2 times daily To feet and toenails.    Dermatophytosis of nail, Tinea pedis of both feet       insulin aspart 100 UNIT/ML injection    NovoLOG FLEXPEN    20 mL    Inject 20 units before each meal. Dose to be titrated    Type 2 diabetes mellitus with other specified complication (H)       insulin glargine 100 UNIT/ML injection    LANTUS SOLOSTAR    15 mL    Inject 53 units at bedtime    Type 2 diabetes mellitus with diabetic polyneuropathy (H)       insulin pen needle 31G X 8 MM    B-D U/F    120 each    Use 4 daily or as directed.    Type 2 diabetes, HbA1C goal < 8% (H)       lisinopril 20 MG tablet    PRINIVIL/ZESTRIL    90 tablet    Take 1 tablet (20 mg) by mouth daily     Essential hypertension with goal blood pressure less than 140/90, Type 2 diabetes mellitus with diabetic polyneuropathy (H)       magnesium oxide 400 MG tablet    MAG-OX    90 tablet    Take 1 tablet (400 mg) by mouth daily    Hypomagnesemia       metFORMIN 500 MG 24 hr tablet    GLUCOPHAGE-XR    120 tablet    Take 2 tablets (1,000 mg) by mouth 2 times daily    Type 2 diabetes mellitus with diabetic polyneuropathy, with long-term current use of insulin (H)       metoclopramide 5 MG tablet    REGLAN    180 tablet    Take 1 tablet (5 mg) by mouth 3 times daily (before meals)    Dyspepsia       MULTIVITAMIN TABS   OR      1 TABLET DAILY        omeprazole 20 MG CR capsule    priLOSEC    180 capsule    Take 1 capsule (20 mg) by mouth 2 times daily    Gastroesophageal reflux disease, esophagitis presence not specified       ondansetron 8 MG ODT tab    ZOFRAN-ODT    30 tablet    Take 1 tablet (8 mg) by mouth every 12 hours as needed for nausea Reported on 3/6/2017    Nausea       oxybutynin 5 MG tablet    DITROPAN    180 tablet    Take 1 tablet (5 mg) by mouth 2 times daily    Overactive bladder       simvastatin 20 MG tablet    ZOCOR    90 tablet    Take 1 tablet (20 mg) by mouth At Bedtime    Hyperlipidemia LDL goal <100       XALATAN 0.005 % ophthalmic solution   Generic drug:  latanoprost      Place 1 drop into both eyes At Bedtime

## 2017-07-31 NOTE — PROGRESS NOTES
Please refer back to 4/17/17 telephone encounter.     Patient was hospitalized again for DKA on 6/20/17 (see Epic scan reports). Please alsop refer to 7/25/17 telephone encounter from diabetes educator, Jaqui Tamayo.      Attempted to contact patient's case manage, Cris ([phone]), to review Dr. Wilson's concerns for patient's safety. Left voicemail for Cris to contact clinic.      Kirstin Lopez RN  Endocrine Care Coordinator  [facility]

## 2017-07-31 NOTE — NURSING NOTE
"Lexy Rockwell's goals for this visit include:   Chief Complaint   Patient presents with     Diabetes       She requests these members of her care team be copied on today's visit information: Fredi Fuentes      PCP: Fredi Fuentes    Referring Provider:  No referring provider defined for this encounter.    Chief Complaint   Patient presents with     Diabetes       Initial /87  Pulse 106  Ht 1.607 m (5' 3.25\")  Wt 83.9 kg (184 lb 14.4 oz)  LMP 03/28/2014  BMI 32.5 kg/m2 Estimated body mass index is 32.5 kg/(m^2) as calculated from the following:    Height as of this encounter: 1.607 m (5' 3.25\").    Weight as of this encounter: 83.9 kg (184 lb 14.4 oz).  Medication Reconciliation: complete    Do you need any medication refills at today's visit? No    Libia Fatima LPN        "

## 2017-07-31 NOTE — LETTER
7/31/2017       RE: Lexy Rockwell  [address]  [place] [address]     Dear Colleague,    Thank you for referring your patient, Lexy Rockwell, to the [facility]. Please see a copy of my visit note below.    HISTORY OF PRESENT ILLNESS:  Lexy Rockwell is a 57-year-old female who returns today along with her mother for followup of type 2 diabetes.      The patient was diagnosed with type 2 diabetes around 2009.  Since her diagnosis, her blood sugars have been mostly uncontrolled.  She has had repeated hospitalizations due to severe hypoglycemia and diabetic  ketoacidosis.       Since the last time I saw her, she has been hospitalized twice. Most recently due to DKA in June 2017, she reports this happened in setting of treatment for shingles.      Currently, she is taking Lantus 53 units at bedtime and NovoLog 20 units 3 times daily with meals and metformin XR 1000 mg b.i.d.    I think since fall 2016 she has had 4 hospitalizations related to uncontrolled blood sugars and diabetic ketoacidosis.      The patient currently checks her blood sugar about 2-4 times daily before each meal.  Her blood glucose data was downloaded and reviewed.  She has highly variable blood glucose.   BG range b/w , over all [address]      She reports she does not miss lantus and doing better with novolog     recent A1c was 11.2%     She has history of gastroparesis        REVIEW OF SYSTEMS:      A 8-point review of systems was done, pertinent positives and negatives noted in HPI.  Review of systems otherwise negative.      PAST MEDICAL HISTORY:   1.  History of learning disability.   2.  Type 2 diabetes.   3.  Dyslipidemia.   4.  Diabetic neuropathy.   5.  Diabetic gastroparesis.    Patient Active Problem List   Diagnosis     Development delay     Overactive bladder     GERD (gastroesophageal reflux disease)     Type 2  diabetes, HbA1C goal < 8% (H)     Hypertension goal BP (blood pressure) < 140/90     Hyperlipidemia LDL goal <100     Pain in joint, lower leg     Proteinuria     Vitamin D deficiency     Diabetic gastroparesis (H)     Health Care Home     History of strabismus surgery     Type 2 diabetes mellitus with other specified complication (H)     Advanced directives, counseling/discussion     Type 2 diabetes mellitus with diabetic polyneuropathy (H)     Primary open angle glaucoma of both eyes, moderate stage     Type 2 diabetes mellitus with diabetic polyneuropathy, with long-term current use of insulin (H)     Gastroesophageal reflux disease without esophagitis     Obesity, unspecified obesity severity, unspecified obesity type         SOCIAL HISTORY:  She lives in [facility].  She reports that she has assistance from home care 3 times a week, her  from [place] shows Cris Cory, and her cell number is [phone].  She gives her insulin independently.      FAMILY HISTORY:  Father has history of hypertension.        Current Outpatient Prescriptions on File Prior to Visit:  insulin glargine (LANTUS SOLOSTAR) 100 UNIT/ML injection Inject 53 units at bedtime   simvastatin (ZOCOR) 20 MG tablet Take 1 tablet (20 mg) by mouth At Bedtime   oxybutynin (DITROPAN) 5 MG tablet Take 1 tablet (5 mg) by mouth 2 times daily   omeprazole (PRILOSEC) 20 MG CR capsule Take 1 capsule (20 mg) by mouth 2 times daily   ciclopirox (LOPROX) 0.77 % cream Apply topically 2 times daily To feet and toenails.   metFORMIN (GLUCOPHAGE-XR) 500 MG 24 hr tablet Take 2 tablets (1,000 mg) by mouth 2 times daily   ACCU-CHEK COMPACT PLUS test strip TEST 5 TIMES DAILY OR AS DIRECTED   insulin aspart (NOVOLOG FLEXPEN) 100 UNIT/ML injection Inject 20 units before each meal. Dose to be titrated   latanoprost (XALATAN) 0.005 % ophthalmic solution Place 1 drop into both eyes At Bedtime   insulin pen needle (B-D U/F) 31G X 8 MM Use 4  "daily or as directed.   metoclopramide (REGLAN) 5 MG tablet Take 1 tablet (5 mg) by mouth 3 times daily (before meals)   magnesium oxide (MAG-OX) 400 MG tablet Take 1 tablet (400 mg) by mouth daily   lisinopril (PRINIVIL,ZESTRIL) 20 MG tablet Take 1 tablet (20 mg) by mouth daily   blood glucose monitoring (NO BRAND SPECIFIED) meter device kit Use to test blood sugar 5 times daily or as directed.Patient needs new monitor.  Accu Test Compact or whatever is covered.Patient has very labile sugars so needs to check 5 x per day.Please also include 3 month supply of strips, lancets, and whatever other supplies are needed.  Ongoing refills for a year.   SOFTCLIX LANCETS MISC 1 Units 3 times daily.   MULTIVITAMIN TABS   OR 1 TABLET DAILY   acyclovir (ZOVIRAX) 800 MG tablet Take 1 tablet (800 mg) by mouth 5 times daily for 10 days   ondansetron (ZOFRAN-ODT) 8 MG ODT tab Take 1 tablet (8 mg) by mouth every 12 hours as needed for nausea Reported on 3/6/2017 (Patient not taking: Reported on 7/31/2017)   ASPIRIN 81 MG OR TABS 1 TABLET DAILY     No current facility-administered medications on file prior to visit.      PHYSICAL EXAMINATION:   /87  Pulse 106  Ht 1.607 m (5' 3.25\")  Wt 83.9 kg (184 lb 14.4 oz)  LMP 03/28/2014  BMI 32.5 kg/m2  GENERAL:  She appears older than stated age, no acute distress.     HEENT:  No lid lag, retraction or proptosis.  Extraocular muscles intact.    ABDOMEN:  Obese, soft, nontender.   NEUROLOGIC:  She is alert and oriented and answers appropriately to questions.      RESULTS  Lab Results   Component Value Date    A1C 10.5 03/06/2017    A1C 11.0 12/19/2016    A1C 11.1 09/28/2016    A1C 11.3 06/28/2016    A1C 11.4 01/25/2016       TSH   Date Value Ref Range Status   06/28/2017 2.82 0.40 - 4.00 mU/L Final   09/28/2016 4.16 (H) 0.40 - 4.00 mU/L Final   01/25/2016 10.93 (H) 0.40 - 4.00 mU/L Final   05/07/2015 3.23 0.40 - 4.00 mU/L Final   12/02/2014 4.35 (H) 0.40 - 4.00 mU/L Final     " Comment:     Effective 7/30/2014, the reference range for this assay has changed to reflect   new instrumentation/methodology.       T4 Free   Date Value Ref Range Status   09/28/2016 1.01 0.76 - 1.46 ng/dL Final   01/25/2016 1.29 0.76 - 1.46 ng/dL Final   12/02/2014 1.23 0.76 - 1.46 ng/dL Final     Comment:     Effective 7/30/2014, the reference range for this assay has changed to reflect   new instrumentation/methodology.     09/04/2012 1.41 0.70 - 1.85 ng/dL Final   04/05/2012 1.17 0.70 - 1.85 ng/dL Final       Creatinine   Date Value Ref Range Status   06/28/2017 0.63 0.52 - 1.04 mg/dL Final   03/06/2017 0.56 0.52 - 1.04 mg/dL Final   ]    No results found for: MICROALBUMIN]    ALT   Date Value Ref Range Status   06/28/2017 16 0 - 50 U/L Final   12/19/2016 19 0 - 50 U/L Final   ]    Recent Labs   Lab Test  09/28/16   0955  01/25/16   1023  05/07/15   1105  06/03/14   1833   CHOL  190  182  175  210*   HDL  56  67  66  60   LDL  116*  99  93  105   TRIG  90  79  82  227*   CHOLHDLRATIO   --    --   2.7  3.5          IMPRESSION AND RECOMMENDATIONS:  Type 2 diabetes which has been chronically uncontrolled.  As in the past, I remain concerned about patient's ability to independently manage insulin      the patient has had poorly controlled diabetes over the last 2 years or so and we have not been able to make much progress.  She has had repeated hospitalizations related to diabetes.    Based on her blood glucose log it appears that she does not take her insulin consistently.  I am concerned about her ability to independently administer insulin correctly.  She has been counseled repeatedly over the last couple of years     We will have our clinic nurse contact her  to review if there is a possibility that she can receive more care and can receive insulin under supervision.  Given the chronically uncontrolled diabetes along with repeated hospitalizations I think it is important that if she can be supported  with more home care.       The patient prepares her own meals or eats out.  She does not do carb counting and is on fixed NovoLog before meals. Due to frequent hypoglycemia would reduce her meal insulin to 15 units t.i.d. and Lantus to 50 units q.h.s.     Mother and patient also inquired about insulin pump.  I discussed how insulin pump works and and worked as required from the patient.  I encouraged her to follow up with diabetes educator to review insulin pumps and get more information.  It is not clear to me if she will be safely able to manage insulin pump.      I spent 25 minutes with this patient face to face and explained the conditions and plans (more than 50% of time was counseling/coordination of care, insulin and diabetes management) . The patient understood and is satisfied with today's visit.    BENJI WILSON MD                Again, thank you for allowing me to participate in the care of your patient.      Sincerely,    Benji Wilson MD

## 2017-07-31 NOTE — PATIENT INSTRUCTIONS
1. Decrease Lantus to 50 units daily at bedtime.   2. Change Novolog to 15 units three times daily before meals.           Sending blood sugars to your provider at [facility]:  We want to help you with your diabetes management, which often requires frequent adjustments to your therapy. For your convenience, we have several ways to send your blood sugars to your doctor for review.    - Send message directly to your doctor through My Chart.  Please ask the rooming staff if you would like to sign up for My Chart.  This is a fast and confidential way to send your information and communicate directly with your provider.   - Record readings and fax to [phone].  We have a template for you to use for your convenience.  - If you have a Medtr[employer] pump, upload to [employer] and notify your provider of your username and password.   - Stop by the clinic with your meter for download.   - My Chart or call Dai Roman, Diabetes Educator at [phone]  - Call the clinic and speak to one of the endocrine nurses to relay information on the telephone.  Anna Fulton, or Libia at [phone].   -    Please call the on-call Endocrinologist at the [facility] for after       hours/weekend needs at [phone] Option #4.    Please note that you do not need to FAST if you are just having an A1C drawn. Please remember to ALWAYS bring your glucose meter with to your appointment. This data is very important for the management of your care.    Thank you!  Your [facility] Diabetes Care Team

## 2017-07-31 NOTE — PROGRESS NOTES
Cris returned call ([phone]). Reviewed patient's recent hospital admission. Cris was not aware. Cris reports that patient's mother and patient are resistant to move and there is not much they can offer patient for care at her current facility. Reviewed with Cris that Dr. Wilson may not be able to see patient in the future for follow-up care for her diabetes. Cris verbalizes understanding. Cris will be in touch with patient and family. She will contact clinic with any updates.     Dr. Wilson updated. Dr. Wilson is seeing patient today (7/31/17) in clinic.      Kirstin Lopez, RN  Endocrine Care Coordinator  [facility]

## 2017-07-31 NOTE — PROGRESS NOTES
HISTORY OF PRESENT ILLNESS:  Lexy Rockwell is a 57-year-old female who returns today along with her mother for followup of type 2 diabetes.      The patient was diagnosed with type 2 diabetes around 2009.  Since her diagnosis, her blood sugars have been mostly uncontrolled.  She has had repeated hospitalizations due to severe hypoglycemia and diabetic  ketoacidosis.       Since the last time I saw her, she has been hospitalized twice. Most recently due to DKA in June 2017, she reports this happened in setting of treatment for shingles.      Currently, she is taking Lantus 53 units at bedtime and NovoLog 20 units 3 times daily with meals and metformin XR 1000 mg b.i.d.    I think since fall 2016 she has had 4 hospitalizations related to uncontrolled blood sugars and diabetic ketoacidosis.      The patient currently checks her blood sugar about 2-4 times daily before each meal.  Her blood glucose data was downloaded and reviewed.  She has highly variable blood glucose.   BG range b/w , over all [address]      She reports she does not miss lantus and doing better with novolog     recent A1c was 11.2%     She has history of gastroparesis        REVIEW OF SYSTEMS:      A 8-point review of systems was done, pertinent positives and negatives noted in HPI.  Review of systems otherwise negative.      PAST MEDICAL HISTORY:   1.  History of learning disability.   2.  Type 2 diabetes.   3.  Dyslipidemia.   4.  Diabetic neuropathy.   5.  Diabetic gastroparesis.    Patient Active Problem List   Diagnosis     Development delay     Overactive bladder     GERD (gastroesophageal reflux disease)     Type 2 diabetes, HbA1C goal < 8% (H)     Hypertension goal BP (blood pressure) < 140/90     Hyperlipidemia LDL goal <100     Pain in joint, lower leg     Proteinuria     Vitamin D deficiency     Diabetic gastroparesis (H)     Health Care Home     History of strabismus surgery     Type 2 diabetes mellitus with other specified  complication (H)     Advanced directives, counseling/discussion     Type 2 diabetes mellitus with diabetic polyneuropathy (H)     Primary open angle glaucoma of both eyes, moderate stage     Type 2 diabetes mellitus with diabetic polyneuropathy, with long-term current use of insulin (H)     Gastroesophageal reflux disease without esophagitis     Obesity, unspecified obesity severity, unspecified obesity type         SOCIAL HISTORY:  [facility]  She reports that she has assistance from home care 3 times a week, her  from [place] shows Cris Ray, and her cell number is [phone].  She gives her insulin independently.      FAMILY HISTORY:  Father has history of hypertension.        Current Outpatient Prescriptions on File Prior to Visit:  insulin glargine (LANTUS SOLOSTAR) 100 UNIT/ML injection Inject 53 units at bedtime   simvastatin (ZOCOR) 20 MG tablet Take 1 tablet (20 mg) by mouth At Bedtime   oxybutynin (DITROPAN) 5 MG tablet Take 1 tablet (5 mg) by mouth 2 times daily   omeprazole (PRILOSEC) 20 MG CR capsule Take 1 capsule (20 mg) by mouth 2 times daily   ciclopirox (LOPROX) 0.77 % cream Apply topically 2 times daily To feet and toenails.   metFORMIN (GLUCOPHAGE-XR) 500 MG 24 hr tablet Take 2 tablets (1,000 mg) by mouth 2 times daily   ACCU-CHEK COMPACT PLUS test strip TEST 5 TIMES DAILY OR AS DIRECTED   insulin aspart (NOVOLOG FLEXPEN) 100 UNIT/ML injection Inject 20 units before each meal. Dose to be titrated   latanoprost (XALATAN) 0.005 % ophthalmic solution Place 1 drop into both eyes At Bedtime   insulin pen needle (B-D U/F) 31G X 8 MM Use 4 daily or as directed.   metoclopramide (REGLAN) 5 MG tablet Take 1 tablet (5 mg) by mouth 3 times daily (before meals)   magnesium oxide (MAG-OX) 400 MG tablet Take 1 tablet (400 mg) by mouth daily   lisinopril (PRINIVIL,ZESTRIL) 20 MG tablet Take 1 tablet (20 mg) by mouth daily   blood glucose monitoring (NO BRAND  "SPECIFIED) meter device kit Use to test blood sugar 5 times daily or as directed.Patient needs new monitor.  Accu Test Compact or whatever is covered.Patient has very labile sugars so needs to check 5 x per day.Please also include 3 month supply of strips, lancets, and whatever other supplies are needed.  Ongoing refills for a year.   SOFTCLIX LANCETS MISC 1 Units 3 times daily.   MULTIVITAMIN TABS   OR 1 TABLET DAILY   acyclovir (ZOVIRAX) 800 MG tablet Take 1 tablet (800 mg) by mouth 5 times daily for 10 days   ondansetron (ZOFRAN-ODT) 8 MG ODT tab Take 1 tablet (8 mg) by mouth every 12 hours as needed for nausea Reported on 3/6/2017 (Patient not taking: Reported on 7/31/2017)   ASPIRIN 81 MG OR TABS 1 TABLET DAILY     No current facility-administered medications on file prior to visit.      PHYSICAL EXAMINATION:   /87  Pulse 106  Ht 1.607 m (5' 3.25\")  Wt 83.9 kg (184 lb 14.4 oz)  LMP 03/28/2014  BMI 32.5 kg/m2  GENERAL:  She appears older than stated age, no acute distress.     HEENT:  No lid lag, retraction or proptosis.  Extraocular muscles intact.    ABDOMEN:  Obese, soft, nontender.   NEUROLOGIC:  She is alert and oriented and answers appropriately to questions.      RESULTS  Lab Results   Component Value Date    A1C 10.5 03/06/2017    A1C 11.0 12/19/2016    A1C 11.1 09/28/2016    A1C 11.3 06/28/2016    A1C 11.4 01/25/2016       TSH   Date Value Ref Range Status   06/28/2017 2.82 0.40 - 4.00 mU/L Final   09/28/2016 4.16 (H) 0.40 - 4.00 mU/L Final   01/25/2016 10.93 (H) 0.40 - 4.00 mU/L Final   05/07/2015 3.23 0.40 - 4.00 mU/L Final   12/02/2014 4.35 (H) 0.40 - 4.00 mU/L Final     Comment:     Effective 7/30/2014, the reference range for this assay has changed to reflect   new instrumentation/methodology.       T4 Free   Date Value Ref Range Status   09/28/2016 1.01 0.76 - 1.46 ng/dL Final   01/25/2016 1.29 0.76 - 1.46 ng/dL Final   12/02/2014 1.23 0.76 - 1.46 ng/dL Final     Comment:     Effective " 7/30/2014, the reference range for this assay has changed to reflect   new instrumentation/methodology.     09/04/2012 1.41 0.70 - 1.85 ng/dL Final   04/05/2012 1.17 0.70 - 1.85 ng/dL Final       Creatinine   Date Value Ref Range Status   06/28/2017 0.63 0.52 - 1.04 mg/dL Final   03/06/2017 0.56 0.52 - 1.04 mg/dL Final   ]    No results found for: MICROALBUMIN]    ALT   Date Value Ref Range Status   06/28/2017 16 0 - 50 U/L Final   12/19/2016 19 0 - 50 U/L Final   ]    Recent Labs   Lab Test  09/28/16   0955  01/25/16   1023  05/07/15   1105  06/03/14   1833   CHOL  190  182  175  210*   HDL  56  67  66  60   LDL  116*  99  93  105   TRIG  90  79  82  227*   CHOLHDLRATIO   --    --   2.7  3.5          IMPRESSION AND RECOMMENDATIONS:  Type 2 diabetes which has been chronically uncontrolled.  As in the past, I remain concerned about patient's ability to independently manage insulin      the patient has had poorly controlled diabetes over the last 2 years or so and we have not been able to make much progress.  She has had repeated hospitalizations related to diabetes.    Based on her blood glucose log it appears that she does not take her insulin consistently.  I am concerned about her ability to independently administer insulin correctly.  She has been counseled repeatedly over the last couple of years     We will have our clinic nurse contact her  to review if there is a possibility that she can receive more care and can receive insulin under supervision.  Given the chronically uncontrolled diabetes along with repeated hospitalizations I think it is important that if she can be supported with more home care.       The patient prepares her own meals or eats out.  She does not do carb counting and is on fixed NovoLog before meals. Due to frequent hypoglycemia would reduce her meal insulin to 15 units t.i.d. and Lantus to 50 units q.h.s.     Mother and patient also inquired about insulin pump.  I discussed  how insulin pump works and and worked as required from the patient.  I encouraged her to follow up with diabetes educator to review insulin pumps and get more information.  It is not clear to me if she will be safely able to manage insulin pump.      I spent 25 minutes with this patient face to face and explained the conditions and plans (more than 50% of time was counseling/coordination of care, insulin and diabetes management) . The patient understood and is satisfied with today's visit.    BENJI QUINTERO MD

## 2017-08-15 DIAGNOSIS — E83.42 HYPOMAGNESEMIA: ICD-10-CM

## 2017-08-15 RX ORDER — MAGNESIUM OXIDE 400 MG/1
400 TABLET ORAL DAILY
Qty: 90 TABLET | Refills: 3 | Status: SHIPPED | OUTPATIENT
Start: 2017-08-15 | End: 2024-04-08

## 2017-08-15 NOTE — TELEPHONE ENCOUNTER
magnesium oxide (MAG-OX) 400 MG tablet      Last Written Prescription Date:  9-28-16  Last Fill Quantity: 90,   # refills: 3  Last Office Visit with [facility] or [facility] prescribing provider: 6-23-17  Future Office visit:    Next 5 appointments (look out 90 days)     Aug 16, 2017  2:30 PM CDT   [facility] D[facility]    [address] [place] [id number] [phone]                   Routing refill request to provider for review/approval because:  Drug not on the [facility], [facility] or  [facility] refill protocol or controlled substance

## 2017-08-16 ENCOUNTER — OFFICE VISIT (OUTPATIENT)
Dept: PODIATRY | Facility: CLINIC | Age: 57
End: 2017-08-16
Payer: COMMERCIAL

## 2017-08-16 VITALS — HEART RATE: 112 BPM | SYSTOLIC BLOOD PRESSURE: 163 MMHG | DIASTOLIC BLOOD PRESSURE: 87 MMHG | OXYGEN SATURATION: 97 %

## 2017-08-16 DIAGNOSIS — B35.1 DERMATOPHYTOSIS OF NAIL: Primary | ICD-10-CM

## 2017-08-16 DIAGNOSIS — L84 TYLOMA: ICD-10-CM

## 2017-08-16 DIAGNOSIS — L60.2 ONYCHAUXIS: ICD-10-CM

## 2017-08-16 DIAGNOSIS — E11.40 DIABETIC NEUROPATHY WITH NEUROLOGIC COMPLICATION (H): ICD-10-CM

## 2017-08-16 DIAGNOSIS — E11.49 DIABETIC NEUROPATHY WITH NEUROLOGIC COMPLICATION (H): ICD-10-CM

## 2017-08-16 PROCEDURE — 99213 OFFICE O/P EST LOW 20 MIN: CPT | Performed by: PODIATRIST

## 2017-08-16 NOTE — MR AVS SNAPSHOT
After Visit Summary   8/16/2017    Lexy Rockwell    MRN: [id number]           Patient Information     Date Of Birth          1960        Visit Information        Provider Department      8/16/2017 2:30 PM Ravin Back DPM [facility]        Today's Diagnoses     Dermatophytosis of nail    -  1    Tyloma        Diabetic neuropathy with neurologic complication (H)        Onychauxis          Care Instructions    Thanks for coming today.  Ortho/Sports Medicine Clinic  [address] [place] [address]    To schedule future appointments in Ortho Clinic, you may call [phone].    To schedule ordered imaging by your Provider: Call [facility] at [phone]    [employer] available online at:   [email or web address]    Please call if any further questions or concerns [phone] and ask for the Orthopedic Department. Clinic hours 8 am to 5 pm.    Return to clinic if symptoms worsen.            Follow-ups after your visit        Your next 10 appointments already scheduled     Nov 10, 2017  1:30 PM CST   New Visit with Dayron Rios MD   [facility] ([address] [address]   [phone]            Nov 20, 2017 10:45 AM CST   Return Visit with Shira Wilson MD, MG ENDO NURSE   [facility])    [id number] [place] [address] [phone]            Feb 21, 2018  2:30 PM CST   Return Visit with Ravin Back DPM   [facility])    [id number] [place] [id number] [phone]              Who to contact     If you have questions or need follow up information about today's clinic visit or your schedule please contact [facility] directly at [phone].  Normal or non-critical lab and imaging results will be communicated to you by [employer],  letter or phone within 4 business days after the clinic has received the results. If you do not hear from us within 7 days, please contact the clinic through Right Relevance or phone. If you have a critical or abnormal lab result, we will notify you by phone as soon as possible.  Submit refill requests through Right Relevance or call your pharmacy and they will forward the refill request to us. Please allow 3 business days for your refill to be completed.          Additional Information About Your Visit        Right Relevance Information     Right Relevance is an electronic gateway that provides easy, online access to your medical records. With Right Relevance, you can request a clinic appointment, read your test results, renew a prescription or communicate with your care team.     To sign up for Right Relevance visit the website at [email or web address]   You will be asked to enter the access code listed below, as well as some personal information. Please follow the directions to create your username and password.     Your access code is: [email or web address]  Expires: 2017  1:47 PM     Your access code will  in 90 days. If you need help or a new code, please contact your [facility] or call [phone] for assistance.        Care EveryWhere ID     This is your Care EveryWhere ID. This could be used by other organizations to access your [place] medical records  [id number]        Your Vitals Were     Pulse Last Period Pulse Oximetry             112 2014 97%          Blood Pressure from Last 3 Encounters:   17 163/87   17 132/87   17 130/85    Weight from Last 3 Encounters:   17 83.9 kg (184 lb 14.4 oz)   17 83.5 kg (184 lb)   17 84.4 kg (186 lb)              We Performed the Following     TRIM HYPERKERATOTIC SKIN LESION, ONE     TRIM NONDYSTRPHIC NAIL(S)        Primary Care Provider Office Phone # Fax #    Fredi Fuentes [phone] [address]  [facility] [address]        Equal Access to Services     [facility] CARY : Hadii ashok haynes mohit [place], waericda luqadaha, qaybta kaalmarickey dumont. So [facility] [place] [phone].    ATENCIÓN: Si habla español, tiene a oneill disposición servicios gratuitos de asistencia lingüística. Ronename al [phone].    We comply with applicable federal civil rights laws and [place] laws. We do not discriminate on the basis of race, color, national origin, age, disability sex, sexual orientation or gender identity.            Thank you!     Thank you for choosing [facility]  for your care. Our goal is always to provide you with excellent care. Hearing back from our patients is one way we can continue to improve our services. Please take a few minutes to complete the written survey that you may receive in the mail after your visit with us. Thank you!             Your Updated Medication List - Protect others around you: Learn how to safely use, store and throw away your medicines at www.disposemymeds.org.          This list is accurate as of: 8/16/17  2:41 PM.  Always use your most recent med list.                   Brand Name Dispense Instructions for use Diagnosis    ACCU-CHEK COMPACT PLUS test strip   Generic drug:  blood glucose monitoring     153 each    TEST 5 TIMES DAILY OR AS DIRECTED    Type 2 diabetes mellitus with diabetic polyneuropathy, with long-term current use of insulin (H)       acyclovir 800 MG tablet    ZOVIRAX    50 tablet    Take 1 tablet (800 mg) by mouth 5 times daily for 10 days    Herpes zoster without complication       aspirin 81 MG tablet      1 TABLET DAILY        blood glucose monitoring lancets     100 each    1 Units 3 times daily.    Type 2 diabetes, HbA1C goal < 8% (H)       blood glucose monitoring meter device kit    no brand specified    1 kit    Use to test blood sugar 5 times daily or as directed.  Patient needs new monitor.  Accu  Test Compact or whatever is covered.  Patient has very labile sugars so needs to check 5 x per day.  Please also include 3 month supply of strips, lancets, and whatever other supplies are needed.  Ongoing refills for a year.    Type 2 diabetes mellitus with other specified complication (H)       ciclopirox 0.77 % cream    LOPROX    90 g    Apply topically 2 times daily To feet and toenails.    Dermatophytosis of nail, Tinea pedis of both feet       insulin aspart 100 UNIT/ML injection    NovoLOG FLEXPEN    20 mL    Inject 20 units before each meal. Dose to be titrated    Type 2 diabetes mellitus with other specified complication (H)       insulin glargine 100 UNIT/ML injection    LANTUS SOLOSTAR    15 mL    Inject 46 units at bedtime    Type 2 diabetes mellitus with diabetic polyneuropathy (H)       insulin pen needle 31G X 8 MM    B-D U/F    120 each    Use 4 daily or as directed.    Type 2 diabetes, HbA1C goal < 8% (H)       lisinopril 20 MG tablet    PRINIVIL/ZESTRIL    90 tablet    Take 1 tablet (20 mg) by mouth daily    Essential hypertension with goal blood pressure less than 140/90, Type 2 diabetes mellitus with diabetic polyneuropathy (H)       magnesium oxide 400 MG tablet    MAG-OX    90 tablet    Take 1 tablet (400 mg) by mouth daily    Hypomagnesemia       metFORMIN 500 MG 24 hr tablet    GLUCOPHAGE-XR    120 tablet    Take 2 tablets (1,000 mg) by mouth 2 times daily    Type 2 diabetes mellitus with diabetic polyneuropathy, with long-term current use of insulin (H)       metoclopramide 5 MG tablet    REGLAN    180 tablet    Take 1 tablet (5 mg) by mouth 3 times daily (before meals)    Dyspepsia       MULTIVITAMIN TABS   OR      1 TABLET DAILY        omeprazole 20 MG CR capsule    priLOSEC    180 capsule    Take 1 capsule (20 mg) by mouth 2 times daily    Gastroesophageal reflux disease, esophagitis presence not specified       ondansetron 8 MG ODT tab    ZOFRAN-ODT    30 tablet    Take 1 tablet (8 mg) by  mouth every 12 hours as needed for nausea Reported on 3/6/2017    Nausea       oxybutynin 5 MG tablet    DITROPAN    180 tablet    Take 1 tablet (5 mg) by mouth 2 times daily    Overactive bladder       simvastatin 20 MG tablet    ZOCOR    90 tablet    Take 1 tablet (20 mg) by mouth At Bedtime    Hyperlipidemia LDL goal <100       XALATAN 0.005 % ophthalmic solution   Generic drug:  latanoprost      Place 1 drop into both eyes At Bedtime

## 2017-08-16 NOTE — NURSING NOTE
"Lexy Rockwell's goals for this visit include: Callus and toenail trim  She requests these members of her care team be copied on today's visit information: yes    PCP: Fredi Fuentes    Referring Provider:  No referring provider defined for this encounter.    Chief Complaint   Patient presents with     RECHECK     callus and toenail check        Initial /87  Pulse 112  LMP 03/28/2014  SpO2 97% Estimated body mass index is 32.5 kg/(m^2) as calculated from the following:    Height as of 7/31/17: 1.607 m (5' 3.25\").    Weight as of 7/31/17: 83.9 kg (184 lb 14.4 oz).  Medication Reconciliation: complete    "

## 2017-08-16 NOTE — PROGRESS NOTES
Past Medical History:   Diagnosis Date     Cerumen impacted      Development delay      DM (diabetes mellitus) (H)      Glaucoma (increased eye pressure)      Hyperlipaemia      Hypertension      Hypothyroid      Nonsenile cataract      Obesity      Patient Active Problem List   Diagnosis     Development delay     Overactive bladder     GERD (gastroesophageal reflux disease)     Type 2 diabetes, HbA1C goal < 8% (H)     Hypertension goal BP (blood pressure) < 140/90     Hyperlipidemia LDL goal <100     Pain in joint, lower leg     Proteinuria     Vitamin D deficiency     Diabetic gastroparesis (H)     Health Care Home     History of strabismus surgery     Type 2 diabetes mellitus with other specified complication (H)     Advanced directives, counseling/discussion     Type 2 diabetes mellitus with diabetic polyneuropathy (H)     Primary open angle glaucoma of both eyes, moderate stage     Type 2 diabetes mellitus with diabetic polyneuropathy, with long-term current use of insulin (H)     Gastroesophageal reflux disease without esophagitis     Obesity, unspecified obesity severity, unspecified obesity type     Past Surgical History:   Procedure Laterality Date     C EYE SURG ANT SGMT PROC UNLISTED  remote    strabismus surgery     STRABISMUS SURGERY       Social History     Social History     Marital status: Single     Spouse name: N/A     Number of children: N/A     Years of education: N/A     Occupational History     Not on file.     Social History Main Topics     Smoking status: Never Smoker     Smokeless tobacco: Never Used      Comment: lives in smoke free household.     Alcohol use Yes      Comment: occass social     Drug use: No     Sexual activity: No     Other Topics Concern     Parent/Sibling W/ Cabg, Mi Or Angioplasty Before 65f 55m? No     Social History Narrative     Family History   Problem Relation Age of Onset     Hypertension Father      DIABETES Sister      Glaucoma Maternal Grandfather      CANCER No  family hx of      CEREBROVASCULAR DISEASE No family hx of      Thyroid Disease No family hx of      Macular Degeneration No family hx of      Lab Results   Component Value Date    A1C 11.2 06/28/2017      SUBJECTIVE FINDINGS:  A 57-year-old female returns to clinic for a diabetic foot check and onychauxis, onychomycosis and tylomas.  She relates she is doing well.  She is using the Loprox cream.  It is working very well.  Her feet feel much better.  She relates that she needs her calluses trimmed and her toenails cut.  She does not have diabetic shoes and she does not want any.  She relates no ulcers or sores since I had seen her last.  She does have peripheral neuropathy.       OBJECTIVE FINDINGS:  DP and PT are 1/4 bilaterally.  She has decreased hair growth bilaterally.  She has some peripheral edema bilaterally.  There is no erythema, no drainage, no odor, no calor bilaterally.  She has mild dry, scaly skin in a moccasin pattern bilaterally.  She has hyperkeratotic tissue buildup, plantar first MPJ left, mildly on plantar medial hallux bilaterally and plantar medial left heel.  She has incurvated nails with thickening, dystrophy, discoloration and brittleness, 1-5 bilaterally to differing degrees.       ASSESSMENT AND PLAN:  Onychauxis bilaterally.  Onychomycosis bilaterally.  Tylomas.  She is diabetic with peripheral neuropathy.  Diagnosis and treatment discussed with her.  The tylomas on the left 1st mpj was sharp debrided with a 15 blade upon consent.  She will continue the Loprox cream.  All the nails were debrided or reduced bilaterally upon consent.  She will return to clinic and see me in about 2-3 months.

## 2017-08-16 NOTE — PATIENT INSTRUCTIONS
Thanks for coming today.  Ortho/Sports Medicine Clinic  [address] [place] [address]    To schedule future appointments in Ortho Clinic, you may call [phone].    To schedule ordered imaging by your Provider: Call [facility] at [phone]    [employer] available online at:   [email or web address]    Please call if any further questions or concerns [phone] and ask for the Orthopedic Department. Clinic hours 8 am to 5 pm.    Return to clinic if symptoms worsen.

## 2017-10-26 ENCOUNTER — TELEPHONE (OUTPATIENT)
Dept: FAMILY MEDICINE | Facility: CLINIC | Age: 57
End: 2017-10-26

## 2017-10-26 NOTE — TELEPHONE ENCOUNTER
Per outreach, patient is aware of overdue screening and will call on own time for scheduling VIP [facility] at [facility].     Thanks

## 2017-11-15 ENCOUNTER — OFFICE VISIT (OUTPATIENT)
Dept: OPHTHALMOLOGY | Facility: CLINIC | Age: 57
End: 2017-11-15
Payer: COMMERCIAL

## 2017-11-15 ENCOUNTER — RADIANT APPOINTMENT (OUTPATIENT)
Dept: MAMMOGRAPHY | Facility: CLINIC | Age: 57
End: 2017-11-15
Attending: FAMILY MEDICINE
Payer: COMMERCIAL

## 2017-11-15 DIAGNOSIS — Z12.31 VISIT FOR SCREENING MAMMOGRAM: ICD-10-CM

## 2017-11-15 DIAGNOSIS — H40.1130 PRIMARY OPEN ANGLE GLAUCOMA OF BOTH EYES, UNSPECIFIED GLAUCOMA STAGE: ICD-10-CM

## 2017-11-15 DIAGNOSIS — Z79.4 TYPE 2 DIABETES MELLITUS WITH OTHER SPECIFIED COMPLICATION, WITH LONG-TERM CURRENT USE OF INSULIN (H): Primary | ICD-10-CM

## 2017-11-15 DIAGNOSIS — E11.69 TYPE 2 DIABETES MELLITUS WITH OTHER SPECIFIED COMPLICATION, WITH LONG-TERM CURRENT USE OF INSULIN (H): Primary | ICD-10-CM

## 2017-11-15 DIAGNOSIS — H52.4 PRESBYOPIA: ICD-10-CM

## 2017-11-15 DIAGNOSIS — H52.203 ASTIGMATISM OF BOTH EYES, UNSPECIFIED TYPE: ICD-10-CM

## 2017-11-15 DIAGNOSIS — H52.13 MYOPIA OF BOTH EYES: ICD-10-CM

## 2017-11-15 PROCEDURE — 92015 DETERMINE REFRACTIVE STATE: CPT | Performed by: STUDENT IN AN ORGANIZED HEALTH CARE EDUCATION/TRAINING PROGRAM

## 2017-11-15 PROCEDURE — 92014 COMPRE OPH EXAM EST PT 1/>: CPT | Performed by: STUDENT IN AN ORGANIZED HEALTH CARE EDUCATION/TRAINING PROGRAM

## 2017-11-15 PROCEDURE — G0202 SCR MAMMO BI INCL CAD: HCPCS | Mod: TC

## 2017-11-15 ASSESSMENT — REFRACTION_MANIFEST
OD_AXIS: 005
OD_SPHERE: -1.75
OD_ADD: +2.75
OS_ADD: +2.75
OD_CYLINDER: +0.50
OS_SPHERE: -1.25
OS_AXIS: 101
OS_CYLINDER: +0.50

## 2017-11-15 ASSESSMENT — CUP TO DISC RATIO
OS_RATIO: 0.8
OD_RATIO: 0.7

## 2017-11-15 ASSESSMENT — VISUAL ACUITY
METHOD: SNELLEN - LINEAR
CORRECTION_TYPE: GLASSES
OD_CC: 2
OS_CC: 20/30
OD_CC: 20/40
OS_CC: 4
OS_CC+: -2

## 2017-11-15 ASSESSMENT — SLIT LAMP EXAM - LIDS
COMMENTS: NORMAL
COMMENTS: NORMAL

## 2017-11-15 ASSESSMENT — REFRACTION_WEARINGRX
OS_CYLINDER: +0.50
OD_CYLINDER: +0.75
SPECS_TYPE: PAL
OD_ADD: +2.75
OD_SPHERE: -1.25
OD_AXIS: 161
OS_AXIS: 152
OS_ADD: +2.75
OS_SPHERE: -1.50

## 2017-11-15 ASSESSMENT — EXTERNAL EXAM - LEFT EYE: OS_EXAM: PROLAPSED FAT PADS: UPPER, LOWER

## 2017-11-15 ASSESSMENT — TONOMETRY
OD_IOP_MMHG: 19
IOP_METHOD: APPLANATION
OS_IOP_MMHG: 15

## 2017-11-15 ASSESSMENT — EXTERNAL EXAM - RIGHT EYE: OD_EXAM: PROLAPSED FAT PADS: UPPER, LOWER

## 2017-11-15 NOTE — PROGRESS NOTES
" Current Eye Medications:  Latanoprost both eyes every evening.  Last drops:  10pm     Subjective:  Comprehensive Eye Exam.  Patient is here for a Diabetic Eye Exam.  Last A1C was:  11.2 in June.  About one month ago, her left eye was watering, especially when she was eating.  The watering has subsided somewhat, but still mena slightly.  She prefers to remove her glasses for computer work.  Her glasses appear to be working well for distance vision.       Objective:  See Ophthalmology Exam.      Assessment:  Lexy Rockwell is a 57 year old female who presents with:     Type 2 diabetes mellitus with other specified complication, with long-term current use of insulin (H) Negative diabetic retinopathy       Primary open angle glaucoma of both eyes, unspecified glaucoma stage        Plan:  Continue Latanoprost at bedtime both eyes   Use artificial tears up to 4 times per day (Refresh Plus, Systane Balance, or generic artificial tears are ok. Avoid \"get the red out\" drops).   Continue same glasses.    Ashley Schrader MD  [phone]                 "

## 2017-11-15 NOTE — PATIENT INSTRUCTIONS
"Continue Latanoprost at bedtime both eyes   Use artificial tears up to 4 times per day (Refresh Plus, Systane Balance, or generic artificial tears are ok. Avoid \"get the red out\" drops).   Continue same glasses.    Ashley Schrader MD  [phone]    Diabetes weakens the blood vessels all over the body, including the eyes. Damage to the blood vessels in the eyes can cause swelling or bleeding into part of the eye (called the retina). This is called diabetic retinopathy ([facility]). If not treated, this disease can cause vision loss or blindness.   Symptoms may include blurred or distorted vision, but many people have no symptoms. It's important to see your eye doctor regularly to check for problems.   Early treatment and good control can help protect your vision. Here are the things you can do to help prevent vision loss:      1. Keep your blood sugar levels under tight control.      2. Bring high blood pressure under control.      3. No smoking.      4. Have yearly dilated eye exams.      "

## 2017-11-15 NOTE — MR AVS SNAPSHOT
"              After Visit Summary   11/15/2017    Lexy Rockwell    MRN: [id number]           Patient Information     Date Of Birth          1960        Visit Information        Provider Department      11/15/2017 2:00 PM Ashley Schrader MD [facility]        Today's Diagnoses     Type 2 diabetes mellitus with other specified complication, with long-term current use of insulin (H)    -  1    Primary open angle glaucoma of both eyes, unspecified glaucoma stage        Astigmatism of both eyes, unspecified type        Myopia of both eyes        Presbyopia          Care Instructions    Continue Latanoprost at bedtime both eyes   Use artificial tears up to 4 times per day (Refresh Plus, Systane Balance, or generic artificial tears are ok. Avoid \"get the red out\" drops).   Continue same glasses.    Ashley Schrader MD  [phone]    Diabetes weakens the blood vessels all over the body, including the eyes. Damage to the blood vessels in the eyes can cause swelling or bleeding into part of the eye (called the retina). This is called diabetic retinopathy ([facility]). If not treated, this disease can cause vision loss or blindness.   Symptoms may include blurred or distorted vision, but many people have no symptoms. It's important to see your eye doctor regularly to check for problems.   Early treatment and good control can help protect your vision. Here are the things you can do to help prevent vision loss:      1. Keep your blood sugar levels under tight control.      2. Bring high blood pressure under control.      3. No smoking.      4. Have yearly dilated eye exams.              Follow-ups after your visit        Follow-up notes from your care team     Return in about 6 months (around 5/15/2018) for IOP check, HVF, OCT optic nerve with Dr. Rios.      Your next 10 appointments already scheduled     Nov 20, 2017 10:45 AM CST   Return Visit with Shira Wilson MD, MG ENDO " "NURSE   [facility]    [address] [address] [place] [id number] [phone]            2018  2:30 PM CST   Return Visit with Ravin Back DPM   [facility]    [address] [address] [place] [id number] [phone]              Who to contact     If you have questions or need follow up information about today's clinic visit or your schedule please contact [facility] directly at [phone].  Normal or non-critical lab and imaging results will be communicated to you by MyChart, letter or phone within 4 business days after the clinic has received the results. If you do not hear from us within 7 days, please contact the clinic through [employer] or phone. If you have a critical or abnormal lab result, we will notify you by phone as soon as possible.  Submit refill requests through [employer] or call your pharmacy and they will forward the refill request to us. Please allow 3 business days for your refill to be completed.          Additional Information About Your Visit        [employer] Information     [employer] lets you send messages to your doctor, view your test results, renew your prescriptions, schedule appointments and more. To sign up, go to [email or web address] . Click on \"Log in\" on the left side of the screen, which will take you to the Welcome page. Then click on \"Sign up Now\" on the right side of the page.     You will be asked to enter the access code listed below, as well as some personal information. Please follow the directions to create your username and password.     Your access code is: [email or web address]  Expires: 2018  2:33 PM     Your access code will  in 90 days. If you need help or a new code, please call your [facility] or [phone].        Care EveryWhere ID     This is your Care EveryWhere ID. This could be used by other organizations to access your " [facility] medical records  [id number]        Your Vitals Were     Last Period                   03/28/2014            Blood Pressure from Last 3 Encounters:   08/16/17 163/87   07/31/17 132/87   06/23/17 130/85    Weight from Last 3 Encounters:   07/31/17 83.9 kg (184 lb 14.4 oz)   06/23/17 83.5 kg (184 lb)   06/09/17 84.4 kg (186 lb)              We Performed the Following     EYE EXAM (SIMPLE-NONBILLABLE)     REFRACTIVE STATUS        Primary Care Provider Office Phone # Fax #    Fredi Fuentes [phone]       [address] [facility] [address]        Equal Access to Services     BRITTNI TOWNSEND : Hadii ashok haynes hadasho Soke[place], waaxda luqadaha, qaybta kaalmada adeegyada, rickey campuzano . So [facility] [phone].    ATENCIÓN: Si habla español, tiene a oneill disposición servicios gratuitos de asistencia lingüística. Llame al [phone].    We comply with applicable federal civil rights laws and [place] laws. We do not discriminate on the basis of race, color, national origin, age, disability, sex, sexual orientation, or gender identity.            Thank you!     Thank you for choosing [facility]  for your care. Our goal is always to provide you with excellent care. Hearing back from our patients is one way we can continue to improve our services. Please take a few minutes to complete the written survey that you may receive in the mail after your visit with us. Thank you!             Your Updated Medication List - Protect others around you: Learn how to safely use, store and throw away your medicines at www.disposemymeds.org.          This list is accurate as of: 11/15/17  2:35 PM.  Always use your most recent med list.                   Brand Name Dispense Instructions for use Diagnosis    ACCU-CHEK COMPACT PLUS test strip   Generic drug:  blood glucose monitoring     153 each    TEST 5 TIMES DAILY OR AS DIRECTED    Type 2 diabetes mellitus with  diabetic polyneuropathy, with long-term current use of insulin (H)       aspirin 81 MG tablet      1 TABLET DAILY        blood glucose monitoring lancets     100 each    1 Units 3 times daily.    Type 2 diabetes, HbA1C goal < 8% (H)       blood glucose monitoring meter device kit    no brand specified    1 kit    Use to test blood sugar 5 times daily or as directed.  Patient needs new monitor.  Accu Test Compact or whatever is covered.  Patient has very labile sugars so needs to check 5 x per day.  Please also include 3 month supply of strips, lancets, and whatever other supplies are needed.  Ongoing refills for a year.    Type 2 diabetes mellitus with other specified complication (H)       ciclopirox 0.77 % cream    LOPROX    90 g    Apply topically 2 times daily To feet and toenails.    Dermatophytosis of nail, Tinea pedis of both feet       insulin aspart 100 UNIT/ML injection    NovoLOG FLEXPEN    20 mL    Inject 20 units before each meal. Dose to be titrated    Type 2 diabetes mellitus with other specified complication (H)       insulin glargine 100 UNIT/ML injection    LANTUS SOLOSTAR    15 mL    Inject 46 units at bedtime    Type 2 diabetes mellitus with diabetic polyneuropathy (H)       insulin pen needle 31G X 8 MM    B-D U/F    120 each    Use 4 daily or as directed.    Type 2 diabetes, HbA1C goal < 8% (H)       lisinopril 20 MG tablet    PRINIVIL/ZESTRIL    90 tablet    Take 1 tablet (20 mg) by mouth daily    Essential hypertension with goal blood pressure less than 140/90, Type 2 diabetes mellitus with diabetic polyneuropathy (H)       magnesium oxide 400 MG tablet    MAG-OX    90 tablet    Take 1 tablet (400 mg) by mouth daily    Hypomagnesemia       metFORMIN 500 MG 24 hr tablet    GLUCOPHAGE-XR    120 tablet    Take 2 tablets (1,000 mg) by mouth 2 times daily    Type 2 diabetes mellitus with diabetic polyneuropathy, with long-term current use of insulin (H)       metoclopramide 5 MG tablet    REGLAN     180 tablet    Take 1 tablet (5 mg) by mouth 3 times daily (before meals)    Dyspepsia       MULTIVITAMIN TABS   OR      1 TABLET DAILY        omeprazole 20 MG CR capsule    priLOSEC    180 capsule    Take 1 capsule (20 mg) by mouth 2 times daily    Gastroesophageal reflux disease, esophagitis presence not specified       ondansetron 8 MG ODT tab    ZOFRAN-ODT    30 tablet    Take 1 tablet (8 mg) by mouth every 12 hours as needed for nausea Reported on 3/6/2017    Nausea       oxybutynin 5 MG tablet    DITROPAN    180 tablet    Take 1 tablet (5 mg) by mouth 2 times daily    Overactive bladder       simvastatin 20 MG tablet    ZOCOR    90 tablet    Take 1 tablet (20 mg) by mouth At Bedtime    Hyperlipidemia LDL goal <100       XALATAN 0.005 % ophthalmic solution   Generic drug:  latanoprost      Place 1 drop into both eyes At Bedtime

## 2017-11-20 ENCOUNTER — OFFICE VISIT (OUTPATIENT)
Dept: ENDOCRINOLOGY | Facility: CLINIC | Age: 57
End: 2017-11-20
Payer: COMMERCIAL

## 2017-11-20 VITALS
HEART RATE: 97 BPM | SYSTOLIC BLOOD PRESSURE: 155 MMHG | WEIGHT: 195.9 LBS | DIASTOLIC BLOOD PRESSURE: 96 MMHG | BODY MASS INDEX: 34.43 KG/M2

## 2017-11-20 DIAGNOSIS — Z79.4 TYPE 2 DIABETES MELLITUS WITH DIABETIC POLYNEUROPATHY, WITH LONG-TERM CURRENT USE OF INSULIN (H): Primary | ICD-10-CM

## 2017-11-20 DIAGNOSIS — E11.42 TYPE 2 DIABETES MELLITUS WITH DIABETIC POLYNEUROPATHY, WITH LONG-TERM CURRENT USE OF INSULIN (H): Primary | ICD-10-CM

## 2017-11-20 LAB — HBA1C MFR BLD: 12.3 % (ref 0–5.7)

## 2017-11-20 PROCEDURE — 83036 HEMOGLOBIN GLYCOSYLATED A1C: CPT | Performed by: INTERNAL MEDICINE

## 2017-11-20 PROCEDURE — 99214 OFFICE O/P EST MOD 30 MIN: CPT | Performed by: INTERNAL MEDICINE

## 2017-11-20 PROCEDURE — 36415 COLL VENOUS BLD VENIPUNCTURE: CPT | Performed by: INTERNAL MEDICINE

## 2017-11-20 NOTE — PROGRESS NOTES
HISTORY OF PRESENT ILLNESS:  Lexy Rockwell is a 57-year-old female who returns today along with her mother for followup of type 2 diabetes.      The patient was diagnosed with type 2 diabetes around 2009.  Since her diagnosis, her blood sugars have been mostly uncontrolled.  She has had repeated hospitalizations due to severe hypoglycemia and diabetic  ketoacidosis.       No DKA or severe hypoglycemia since her last visit.     Currently, she is taking Lantus 46 units at bedtime and NovoLog 20 units 3 times daily with meals and metformin XR 1000 mg b.i.d.  GLP-1 analogue has been tried and stopped in past due to high co-pay     The patient currently checks her blood sugar about 2-3 times daily before each meal.  Her blood glucose data was downloaded and reviewed.  She has highly variable blood glucose.   BG range b/w , over all ave 300  range    She could have one day with blood glucose below 180 throughout the day and the next day all values could be above 300.   She reports she does not miss lantus and NovoLog and is unable to provide any explanation for this wide variation.  recent A1c was 12.3%     She has history of gastroparesis     Has whiteboard in apartment to remind about insulin doses.  Eats three times daily plus bedtime snack      No DR on last eye exam     We had no significant success in improving glycemic control.  On the positive side she has had no hospitalization related to DKA case since the last visit, however blood sugar remains very poorly controlled.    Patient lives independently in an apartment.  Option of moving into a facility which could provide a higher level of care has been discussed with patient and her mother on multiple occasions.  Patient and mother feel that patient is doing well and very happy with her current apartment and do not wish to move to a different facility.     We had discussed about option of evaluating insulin pump on a previous visit however they  have not followed with the diabetic educator.    REVIEW OF SYSTEMS:      A 8-point review of systems was done, pertinent positives and negatives noted in HPI.  Review of systems otherwise negative.      PAST MEDICAL HISTORY:   1.  History of learning disability.   2.  Type 2 diabetes.   3.  Dyslipidemia.   4.  Diabetic neuropathy.   5.  Diabetic gastroparesis.    Patient Active Problem List   Diagnosis     Development delay     Overactive bladder     GERD (gastroesophageal reflux disease)     Type 2 diabetes, HbA1C goal < 8% (H)     Hypertension goal BP (blood pressure) < 140/90     Hyperlipidemia LDL goal <100     Pain in joint, lower leg     Proteinuria     Vitamin D deficiency     Diabetic gastroparesis (H)     Health Care Home     History of strabismus surgery     Type 2 diabetes mellitus with other specified complication (H)     Advanced directives, counseling/discussion     Type 2 diabetes mellitus with diabetic polyneuropathy (H)     Primary open angle glaucoma of both eyes, moderate stage     Type 2 diabetes mellitus with diabetic polyneuropathy, with long-term current use of insulin (H)     Gastroesophageal reflux disease without esophagitis     Obesity, unspecified obesity severity, unspecified obesity type         SOCIAL HISTORY:  [facility]  She reports that she has assistance from home care 2 times a week, her  from [place] shows [employer], and her cell number is [phone].  She gives her insulin independently.      FAMILY HISTORY:  Father has history of hypertension.        Current Outpatient Prescriptions on File Prior to Visit:  magnesium oxide (MAG-OX) 400 MG tablet Take 1 tablet (400 mg) by mouth daily   insulin glargine (LANTUS SOLOSTAR) 100 UNIT/ML injection Inject 46 units at bedtime   ondansetron (ZOFRAN-ODT) 8 MG ODT tab Take 1 tablet (8 mg) by mouth every 12 hours as needed for nausea Reported on 3/6/2017   simvastatin (ZOCOR) 20 MG tablet Take  1 tablet (20 mg) by mouth At Bedtime   oxybutynin (DITROPAN) 5 MG tablet Take 1 tablet (5 mg) by mouth 2 times daily   omeprazole (PRILOSEC) 20 MG CR capsule Take 1 capsule (20 mg) by mouth 2 times daily   ciclopirox (LOPROX) 0.77 % cream Apply topically 2 times daily To feet and toenails.   metFORMIN (GLUCOPHAGE-XR) 500 MG 24 hr tablet Take 2 tablets (1,000 mg) by mouth 2 times daily   ACCU-CHEK COMPACT PLUS test strip TEST 5 TIMES DAILY OR AS DIRECTED   insulin aspart (NOVOLOG FLEXPEN) 100 UNIT/ML injection Inject 20 units before each meal. Dose to be titrated   latanoprost (XALATAN) 0.005 % ophthalmic solution Place 1 drop into both eyes At Bedtime   insulin pen needle (B-D U/F) 31G X 8 MM Use 4 daily or as directed.   metoclopramide (REGLAN) 5 MG tablet Take 1 tablet (5 mg) by mouth 3 times daily (before meals)   lisinopril (PRINIVIL,ZESTRIL) 20 MG tablet Take 1 tablet (20 mg) by mouth daily   blood glucose monitoring (NO BRAND SPECIFIED) meter device kit Use to test blood sugar 5 times daily or as directed.Patient needs new monitor.  Accu Test Compact or whatever is covered.Patient has very labile sugars so needs to check 5 x per day.Please also include 3 month supply of strips, lancets, and whatever other supplies are needed.  Ongoing refills for a year.   SOFTCLIX LANCETS MISC 1 Units 3 times daily.   ASPIRIN 81 MG OR TABS 1 TABLET DAILY   MULTIVITAMIN TABS   OR 1 TABLET DAILY     No current facility-administered medications on file prior to visit.      PHYSICAL EXAMINATION:   /88 (BP Location: Left arm, Patient Position: Chair, Cuff Size: Adult Large)  Pulse 111  Wt 88.9 kg (195 lb 14.4 oz)  LMP 03/28/2014  BMI 34.43 kg/m2  GENERAL:  She appears older than stated age, no acute distress.     HEENT:  No lid lag, retraction or proptosis.  Extraocular muscles intact.    Lungs: Clear to auscultation bilaterally  Heart: Regular rate rhythm  ABDOMEN:  Obese, soft, nontender.   NEUROLOGIC:  She is alert  and oriented and answers appropriately to questions.      RESULTS  Lab Results   Component Value Date    A1C 10.5 03/06/2017    A1C 11.0 12/19/2016    A1C 11.1 09/28/2016    A1C 11.3 06/28/2016    A1C 11.4 01/25/2016       TSH   Date Value Ref Range Status   06/28/2017 2.82 0.40 - 4.00 mU/L Final   09/28/2016 4.16 (H) 0.40 - 4.00 mU/L Final   01/25/2016 10.93 (H) 0.40 - 4.00 mU/L Final   05/07/2015 3.23 0.40 - 4.00 mU/L Final   12/02/2014 4.35 (H) 0.40 - 4.00 mU/L Final     Comment:     Effective 7/30/2014, the reference range for this assay has changed to reflect   new instrumentation/methodology.       T4 Free   Date Value Ref Range Status   09/28/2016 1.01 0.76 - 1.46 ng/dL Final   01/25/2016 1.29 0.76 - 1.46 ng/dL Final   12/02/2014 1.23 0.76 - 1.46 ng/dL Final     Comment:     Effective 7/30/2014, the reference range for this assay has changed to reflect   new instrumentation/methodology.     09/04/2012 1.41 0.70 - 1.85 ng/dL Final   04/05/2012 1.17 0.70 - 1.85 ng/dL Final       Creatinine   Date Value Ref Range Status   06/28/2017 0.63 0.52 - 1.04 mg/dL Final   03/06/2017 0.56 0.52 - 1.04 mg/dL Final   ]    No results found for: MICROALBUMIN]    ALT   Date Value Ref Range Status   06/28/2017 16 0 - 50 U/L Final   12/19/2016 19 0 - 50 U/L Final   ]    Recent Labs   Lab Test  09/28/16   0955  01/25/16   1023  05/07/15   1105  06/03/14   1833   CHOL  190  182  175  210*   HDL  56  67  66  60   LDL  116*  99  93  105   TRIG  90  79  82  227*   CHOLHDLRATIO   --    --   2.7  3.5          IMPRESSION AND RECOMMENDATIONS:  Type 2 diabetes which has been chronically uncontrolled.       the patient has had poorly controlled diabetes over the last 2 years or so and we have not been able to make much progress.  She has had repeated hospitalizations related to diabetes.    Based on her blood glucose log it appears that she does not take her insulin consistently.  Overall has shown limited ability to fully understand the  management of diabetes with basal bolus regimen.    We discussed several possible options.  At this time patient and her mother do not want to consider the possibility of moving to a different facility which could provide a higher level of care, including taking blood glucose and administering insulin.    They're open to meeting with diabetic educator to review insulin pump use or use of V-go device     Mother is also willing to directly observe the patient for a couple of days, when the patient is visiting her home.  During this time the mother will ensure that patient takes the 4 daily insulin doses and also checks blood glucose at least four times daily.    Will also review with diabetic educator to consider doing a virtual visit around every 3-4 weeks.    BP-above target, asymptomatic consider adding another medication if remain high at next visit  No DR on recent eye visit      Return to clinic in about three months      I spent 25 minutes with this patient face to face and explained the conditions and plans (more than 50% of time was counseling/coordination of care, insulin and diabetes management) . The patient understood and is satisfied with today's visit.    BENJI QUINTERO MD        This note was completed in part using Dragon voice recognition, and may contain word and grammatical errors.

## 2017-11-20 NOTE — NURSING NOTE
"Lexy Rockwell's goals for this visit include: diabetes  She requests these members of her care team be copied on today's visit information: yes    PCP: Fredi Fuentes    Referring Provider:  No referring provider defined for this encounter.    Chief Complaint   Patient presents with     Diabetes       Initial /88 (BP Location: Left arm, Patient Position: Chair, Cuff Size: Adult Large)  Pulse 111  Wt 88.9 kg (195 lb 14.4 oz)  LMP 03/28/2014  BMI 34.43 kg/m2 Estimated body mass index is 34.43 kg/(m^2) as calculated from the following:    Height as of 7/31/17: 1.607 m (5' 3.25\").    Weight as of this encounter: 88.9 kg (195 lb 14.4 oz).  Medication Reconciliation: complete    Do you need any medication refills at today's visit? no    "

## 2017-11-20 NOTE — MR AVS SNAPSHOT
After Visit Summary   11/20/2017    Lexy Rockwell    MRN: [id number]           Patient Information     Date Of Birth          1960        Visit Information        Provider Department      11/20/2017 10:45 AM Shira Wilson MD;  ENDO NURSE [facility]        Today's Diagnoses     Type 2 diabetes mellitus with diabetic polyneuropathy (H)          Care Instructions    Jaqui Sampson will contact you regarding phone visits every 4 weeks for follow up.      Sending blood sugars to your provider at [place]:  We want to help you with your diabetes management, which often requires frequent adjustments to your therapy. For your convenience, we have several ways to send your blood sugars to your doctor for review.    - Send message directly to your doctor through My Chart.  Please ask the rooming staff if you would like to sign up for My Chart.  This is a fast and confidential way to send your information and communicate directly with your provider.   - Record readings and fax to [phone].  We have a template for you to use for your convenience.  - Stop by the clinic with your meter for download if advised by provider.   - My Chart or call Dai Roman, Diabetes Educator at [phone]  - Call the clinic and speak to one of the endocrine nurses to relay information on the telephone.  Anna Fulton, or Libia at [phone].   -    Please call the on-call Endocrinologist at the [facility] for after       hours/weekend needs at [phone] Option #4.    Please note that you do not need to FAST if you are just having an A1C drawn. Please remember to ALWAYS bring your glucose meter with to your appointment. This data is very important for the management of your care.    Thank you!  Your [place] [place] [facility] Team                Follow-ups after your visit        Follow-up notes from your care team     Return in about 4 months (around 3/20/2018).      Your next 10  appointments already scheduled     Feb 21, 2018  2:30 PM CST   [facility]    [address] [place] [id number] [phone]            Mar 19, 2018 10:45 AM CDT   Return Visit with Shira Wilson MD, MG ENDO NURSE   [facility]    [address] [place] [id number] [phone]            May 15, 2018  2:15 PM CDT   Return Visit with Dayron Rios MD   [facility] ([address] [address] [phone]              Who to contact     If you have questions or need follow up information about today's clinic visit or your schedule please contact [facility] directly at [phone].  Normal or non-critical lab and imaging results will be communicated to you by [employer], letter or phone within 4 business days after the clinic has received the results. If you do not hear from us within 7 days, please contact the clinic through [employer] or phone. If you have a critical or abnormal lab result, we will notify you by phone as soon as possible.  Submit refill requests through ITDatabase or call your pharmacy and they will forward the refill request to us. Please allow 3 business days for your refill to be completed.          Additional Information About Your Visit        [employer] Information     ITDatabase is an electronic gateway that provides easy, online access to your medical records. With ITDatabase, you can request a clinic appointment, read your test results, renew a prescription or communicate with your care team.     To sign up for ITDatabase visit the website at [email or web address]   You will be asked to enter the access code listed below, as well as some personal information. Please follow the directions to create your username and password.     Your access code is:  [email or web address]  Expires: 2018  2:33 PM     Your access code will  in 90 days. If you need help or a new code, please contact your [facility] or call [phone] for assistance.        Care EveryWhere ID     This is your Care EveryWhere ID. This could be used by other organizations to access your [facility] medical records  [id number]        Your Vitals Were     Pulse Last Period BMI (Body Mass Index)             111 2014 34.43 kg/m2          Blood Pressure from Last 3 Encounters:   17 153/88   17 163/87   17 132/87    Weight from Last 3 Encounters:   17 88.9 kg (195 lb 14.4 oz)   17 83.9 kg (184 lb 14.4 oz)   17 83.5 kg (184 lb)              We Performed the Following     Hemoglobin A1c POCT        Primary Care Provider Office Phone # Fax #    Fredi Fuentes [phone]       [address] [address]        Equal Access to Services     [facility]: Hadii aad ku hadasho Soomaali, waaxda luqadaha, qaybta kaalmada ademiguelinayamirza, rickey campuzano . So [facility] [phone].    ATENCIÓN: Si habla español, tiene a oneill disposición servicios gratuitos de asistencia lingüística. [facility] [phone].    We comply with applicable federal civil rights laws and [place] laws. We do not discriminate on the basis of race, color, national origin, age, disability, sex, sexual orientation, or gender identity.            Thank you!     Thank you for choosing [facility]  for your care. Our goal is always to provide you with excellent care. Hearing back from our patients is one way we can continue to improve our services. Please take a few minutes to complete the written survey that you may receive in the mail after your visit with us. Thank you!             Your Updated Medication List - Protect others around you: Learn how to safely use, store and throw away your medicines at  www.disposemymeds.org.          This list is accurate as of: 11/20/17 11:51 AM.  Always use your most recent med list.                   Brand Name Dispense Instructions for use Diagnosis    ACCU-CHEK COMPACT PLUS test strip   Generic drug:  blood glucose monitoring     153 each    TEST 5 TIMES DAILY OR AS DIRECTED    Type 2 diabetes mellitus with diabetic polyneuropathy, with long-term current use of insulin (H)       aspirin 81 MG tablet      1 TABLET DAILY        blood glucose monitoring lancets     100 each    1 Units 3 times daily.    Type 2 diabetes, HbA1C goal < 8% (H)       blood glucose monitoring meter device kit    no brand specified    1 kit    Use to test blood sugar 5 times daily or as directed.  Patient needs new monitor.  Accu Test Compact or whatever is covered.  Patient has very labile sugars so needs to check 5 x per day.  Please also include 3 month supply of strips, lancets, and whatever other supplies are needed.  Ongoing refills for a year.    Type 2 diabetes mellitus with other specified complication (H)       ciclopirox 0.77 % cream    LOPROX    90 g    Apply topically 2 times daily To feet and toenails.    Dermatophytosis of nail, Tinea pedis of both feet       insulin aspart 100 UNIT/ML injection    NovoLOG FLEXPEN    20 mL    Inject 20 units before each meal. Dose to be titrated    Type 2 diabetes mellitus with other specified complication (H)       insulin glargine 100 UNIT/ML injection    LANTUS SOLOSTAR    15 mL    Inject 46 units at bedtime    Type 2 diabetes mellitus with diabetic polyneuropathy (H)       insulin pen needle 31G X 8 MM    B-D U/F    120 each    Use 4 daily or as directed.    Type 2 diabetes, HbA1C goal < 8% (H)       lisinopril 20 MG tablet    PRINIVIL/ZESTRIL    90 tablet    Take 1 tablet (20 mg) by mouth daily    Essential hypertension with goal blood pressure less than 140/90, Type 2 diabetes mellitus with diabetic polyneuropathy (H)       magnesium oxide 400 MG  tablet    MAG-OX    90 tablet    Take 1 tablet (400 mg) by mouth daily    Hypomagnesemia       metFORMIN 500 MG 24 hr tablet    GLUCOPHAGE-XR    120 tablet    Take 2 tablets (1,000 mg) by mouth 2 times daily    Type 2 diabetes mellitus with diabetic polyneuropathy, with long-term current use of insulin (H)       metoclopramide 5 MG tablet    REGLAN    180 tablet    Take 1 tablet (5 mg) by mouth 3 times daily (before meals)    Dyspepsia       MULTIVITAMIN TABS   OR      1 TABLET DAILY        omeprazole 20 MG CR capsule    priLOSEC    180 capsule    Take 1 capsule (20 mg) by mouth 2 times daily    Gastroesophageal reflux disease, esophagitis presence not specified       ondansetron 8 MG ODT tab    ZOFRAN-ODT    30 tablet    Take 1 tablet (8 mg) by mouth every 12 hours as needed for nausea Reported on 3/6/2017    Nausea       oxybutynin 5 MG tablet    DITROPAN    180 tablet    Take 1 tablet (5 mg) by mouth 2 times daily    Overactive bladder       simvastatin 20 MG tablet    ZOCOR    90 tablet    Take 1 tablet (20 mg) by mouth At Bedtime    Hyperlipidemia LDL goal <100       XALATAN 0.005 % ophthalmic solution   Generic drug:  latanoprost      Place 1 drop into both eyes At Bedtime

## 2017-12-27 ENCOUNTER — TRANSFERRED RECORDS (OUTPATIENT)
Dept: HEALTH INFORMATION MANAGEMENT | Facility: CLINIC | Age: 57
End: 2017-12-27

## 2017-12-31 DIAGNOSIS — R10.13 DYSPEPSIA: ICD-10-CM

## 2018-01-02 RX ORDER — METOCLOPRAMIDE 5 MG/1
TABLET ORAL
Qty: 90 TABLET | Refills: 0 | Status: SHIPPED | OUTPATIENT
Start: 2018-01-02 | End: 2018-01-08

## 2018-01-02 NOTE — TELEPHONE ENCOUNTER
Requested Prescriptions   Pending Prescriptions Disp Refills     metoclopramide (REGLAN) 5 MG tablet [Pharmacy Med Name: METOCLOPRAMIDE 5MG TABLETS] 90 tablet 0    Last Written Prescription Date:  9/28/16  Last Fill Quantity: 180,  # refills: 1   Last Office Visit with J CARLOS, SCAR or [facility] prescribing provider:  6/23/17   Future Office Visit:    Next 5 appointments (look out 90 days)     Jan 08, 2018  3:00 PM CST   [facility] Fredi [facility] MD   [facility]    [address] [facility] [address]   [phone]            Feb 21, 2018  2:30 PM CST   Return Visit with Ravin Back DPM   [facility])    [address] [place] [phone]   [phone]            Mar 19, 2018 10:45 AM CDT   Return Visit with Shira Wilson MD, MG ENDO NURSE   [facility])    [address] [place] [phone]   [phone]                  Sig: TAKE ONE TABLET BY MOUTH THREE TIMES DAILY BEFORE MEALS     Antivertigo/Antiemetic Agents Passed    12/31/2017 11:56 AM       Passed - Recent or future visit with authorizing provider's specialty    Patient had office visit in the last year or has a visit in the next 30 days with authorizing provider.  See chart review.              Passed - Patient is 18 years of age or older

## 2018-01-02 NOTE — TELEPHONE ENCOUNTER
Prescription approved per [facility] Refill Protocol.    Katherine Elena RN  [facility]

## 2018-01-08 ENCOUNTER — OFFICE VISIT (OUTPATIENT)
Dept: FAMILY MEDICINE | Facility: CLINIC | Age: 58
End: 2018-01-08
Payer: COMMERCIAL

## 2018-01-08 VITALS
BODY MASS INDEX: 34.55 KG/M2 | SYSTOLIC BLOOD PRESSURE: 150 MMHG | TEMPERATURE: 98 F | WEIGHT: 195 LBS | HEIGHT: 63 IN | HEART RATE: 119 BPM | OXYGEN SATURATION: 99 % | DIASTOLIC BLOOD PRESSURE: 78 MMHG

## 2018-01-08 DIAGNOSIS — E78.5 HYPERLIPIDEMIA LDL GOAL <100: ICD-10-CM

## 2018-01-08 DIAGNOSIS — Z23 NEED FOR PROPHYLACTIC VACCINATION AND INOCULATION AGAINST INFLUENZA: ICD-10-CM

## 2018-01-08 DIAGNOSIS — N32.81 OVERACTIVE BLADDER: ICD-10-CM

## 2018-01-08 DIAGNOSIS — Z79.4 TYPE 2 DIABETES MELLITUS WITH DIABETIC POLYNEUROPATHY, WITH LONG-TERM CURRENT USE OF INSULIN (H): Primary | ICD-10-CM

## 2018-01-08 DIAGNOSIS — E11.42 TYPE 2 DIABETES MELLITUS WITH DIABETIC POLYNEUROPATHY, WITH LONG-TERM CURRENT USE OF INSULIN (H): Primary | ICD-10-CM

## 2018-01-08 DIAGNOSIS — R10.13 DYSPEPSIA: ICD-10-CM

## 2018-01-08 PROCEDURE — 90471 IMMUNIZATION ADMIN: CPT | Performed by: FAMILY MEDICINE

## 2018-01-08 PROCEDURE — 90686 IIV4 VACC NO PRSV 0.5 ML IM: CPT | Performed by: FAMILY MEDICINE

## 2018-01-08 PROCEDURE — 99214 OFFICE O/P EST MOD 30 MIN: CPT | Mod: 25 | Performed by: FAMILY MEDICINE

## 2018-01-08 RX ORDER — OXYBUTYNIN CHLORIDE 5 MG/1
5 TABLET ORAL 2 TIMES DAILY
Qty: 180 TABLET | Refills: 1 | Status: SHIPPED | OUTPATIENT
Start: 2018-01-08 | End: 2019-11-02

## 2018-01-08 RX ORDER — METOCLOPRAMIDE 5 MG/1
TABLET ORAL
Qty: 90 TABLET | Refills: 5 | Status: SHIPPED | OUTPATIENT
Start: 2018-01-08 | End: 2021-06-28

## 2018-01-08 RX ORDER — SIMVASTATIN 20 MG
20 TABLET ORAL AT BEDTIME
Qty: 90 TABLET | Refills: 1 | Status: SHIPPED | OUTPATIENT
Start: 2018-01-08 | End: 2019-09-27

## 2018-01-08 ASSESSMENT — PAIN SCALES - GENERAL: PAINLEVEL: NO PAIN (0)

## 2018-01-08 NOTE — PATIENT INSTRUCTIONS
See Dr. Fuentes in a couple months, sooner if needed    Do see endocrinology to discuss insulin pump ; call to schedule    See diabetes educator    Keep working on healthy diet/exercise

## 2018-01-08 NOTE — PROGRESS NOTES

## 2018-01-08 NOTE — PROGRESS NOTES
SUBJECTIVE:   Lexy Rockwell is a 57 year old female who presents to clinic today for the following health issues:           Hospital Follow-up Visit:    Hospital/Nursing Home/IP Rehab Facility: [facility]  Date of Admission: 12/27/2017  Date of Discharge: 12/30/2017  Reason(s) for Admission: dizziness from diabetes            Problems taking medications regularly:  None       Medication changes since discharge: None       Problems adhering to non-medication therapy:  None    Summary of hospitalization:  CareEverywhere information obtained and reviewed  Diagnostic Tests/Treatments reviewed.  Follow up needed: none  Other Healthcare Providers Involved in Patient s Care:         None  Update since discharge: improved.      Post Discharge Medication Reconciliation: discharge medications reconciled, continue medications without change.  Plan of care communicated with patient     Coding guidelines for this visit:  Type of Medical   Decision Making Face-to-Face Visit       within 7 Days of discharge Face-to-Face Visit        within 14 days of discharge   Moderate Complexity [id number]   High Complexity 81991 [id number]                   Problem list and histories reviewed & adjusted, as indicated.  Additional history: as documented         Reviewed and updated as needed this visit by clinical staff       Reviewed and updated as needed this visit by Provider          3 nights in hospital    Not in ICU    Milder dka than in past    Quite variable home sugars recently      From 50s to 300s    No symptoms when high glc    Feels weak especially in legs with low sugars    Feels heart rate if low    Endocrinology had proposed insulin pump    46 units lantus at bedtime    Sliding scale novolog  Range 0 to 16 units depending on sugars     Checking 3x per day        Physical Exam   Constitutional: She is oriented to person, place, and time and well-developed, well-nourished, and in no distress. No distress.   HENT:   Head:  Normocephalic and atraumatic.   Neck: Carotid bruit is not present.   Cardiovascular: Normal rate, regular rhythm, normal heart sounds and intact distal pulses.  Exam reveals no gallop and no friction rub.    No murmur heard.  Pulmonary/Chest: Effort normal and breath sounds normal.   Musculoskeletal: She exhibits no edema.   Neurological: She is alert and oriented to person, place, and time.   Skin: She is not diaphoretic.   Psychiatric: Mood and affect normal.       ASSESSMENT / PLAN:  (E11.42,  Z79.4) Type 2 diabetes mellitus with diabetic polyneuropathy, with long-term current use of insulin (H)  (primary encounter diagnosis)  Comment: strongly agree with insulin pump.  Patient to call and schedule follow up with endocrinology   Plan: DIABETES EDUCATOR REFERRAL, insulin glargine         (LANTUS SOLOSTAR) 100 UNIT/ML injection,         insulin aspart (NOVOLOG FLEXPEN) 100 UNIT/ML         injection        Refills given insulin    (R10.13) Dyspepsia  Comment: needs refill   Plan: metoclopramide (REGLAN) 5 MG tablet        Taking tid     (E78.5) Hyperlipidemia LDL goal <100  Comment:  Needs refill   Plan: simvastatin (ZOCOR) 20 MG tablet             (N32.81) Overactive bladder  Comment: needs refill   Plan: oxybutynin (DITROPAN) 5 MG tablet             (Z23) Need for prophylactic vaccination and inoculation against influenza  Comment: needs   Plan: FLU VAC, SPLIT VIRUS IM > 3 YO (QUADRIVALENT)         [22428], Vaccine Administration, Initial         [39434]        Given       I reviewed the patient's medications, allergies, medical history, family history, and social history.    Fredi Fuentes MD

## 2018-01-08 NOTE — NURSING NOTE
"Chief Complaint   Patient presents with     [facility] F/U     Health Maintenance       Initial BP (!) 167/101 (BP Location: Right arm, Patient Position: Chair, Cuff Size: Adult Regular)  Pulse 119  Temp 98  F (36.7  C) (Oral)  Ht 5' 3.25\" (1.607 m)  Wt 195 lb (88.5 kg)  LMP 03/28/2014  SpO2 99%  Breastfeeding? No  BMI 34.27 kg/m2 Estimated body mass index is 34.27 kg/(m^2) as calculated from the following:    Height as of this encounter: 5' 3.25\" (1.607 m).    Weight as of this encounter: 195 lb (88.5 kg).  Medication Reconciliation: complete   Salma Meier CMA      "

## 2018-01-08 NOTE — MR AVS SNAPSHOT
After Visit Summary   1/8/2018    Lexy Rockwell    MRN: [id number]           Patient Information     Date Of Birth          1960        Visit Information        Provider Department      1/8/2018 3:00 PM Fredi Fuentes MD [facility]        Today's Diagnoses     Type 2 diabetes mellitus with diabetic polyneuropathy, with long-term current use of insulin (H)    -  1    Dyspepsia        Hyperlipidemia LDL goal <100        Overactive bladder        Type 2 diabetes mellitus with other specified complication, with long-term current use of insulin (H)          Care Instructions    See Dr. Fuentes in a couple months, sooner if needed    Do see endocrinology to discuss insulin pump ; call to schedule    See diabetes educator    Keep working on healthy diet/exercise           Follow-ups after your visit        Additional Services     DIABETES EDUCATOR REFERRAL       DIABETES SELF MANAGEMENT TRAINING (DSMT)      Your provider has referred you to Diabetes Education: FMG: Diabetes Education - All [facility] [phone]   [email or web address]     If an urgent visit is needed or A1C is above 12, Care Team to call the Diabetes  Education Team at [phone] or send an In Basket message to the Diabetes Education Pool (P DIAB ED-PATIENT CARE).    A  will call you to make your appointment. If it has been more than 3 business days since your referral was placed, please call the above phone number to schedule.    Type of training and number of hours: Previous Diagnosis: Follow-up DSMT - 2 hours.    Medicare covers: 10 hours of initial DSMT in 12 month period from the time of first visit, plus 2 hours of follow-up DSMT annually, and additional hours as requested for insulin training.    Diabetes Type: Type 2 - On Insulin             Diabetes Co-Morbidities: neuropathy           A1C Goal:  <8.0       A1C is: Lab Results        Component                Value               Date                       A1C                      12.3                11/20/2017              Diabetes Education Topics: Comprehensive Knowledge Assessment and Instruction    Special Educational Needs Requiring Individual DSMT: None       MEDICAL NUTRITION THERAPY (MNT) for Diabetes    Medical Nutrition Therapy with a Registered Dietitian can be provided in coordination with Diabetes Self-Management Training to assist in achieving optimal diabetes management.    MNT Type and Hours: Previous diagnosis: Annual follow-up MNT - 2 hours                       Medicare will cover: 3 hours initial MNT in 12 month period after first visit, plus 2 hours of follow-up MNT annually    Please be aware that coverage of these services is subject to the terms and limitations of your health insurance plan.  Call member services at your health plan to determine Diabetes Self-Management Training (Codes  &amp; ) and Medical Nutrition Therapy (Codes 84017 & 21859) benefits and ask which blood glucose monitor brands are covered by your plan.  Please bring the following with you to your appointment:    (1)  List of current medications   (2)  List of Blood Glucose Monitor brands that are covered by your insurance plan  (3)  Blood Glucose Monitor and log book  (4)   Food records for the 3 days prior to your visit    The Certified Diabetes Educator may make diabetes medication adjustments per the CDE Protocol and Collaborative Practice Agreement.                  Your next 10 appointments already scheduled     Feb 21, 2018  2:30 PM CST   Return Visit with Ravin Back DPM   [facility])    [address] [place] [address]   [phone]            Mar 19, 2018 10:45 AM CDT   Return Visit with Shira Wilson MD, MG ENDO NURSE   [facility])    [address] [place] [address]  "[place] [address]   [phone]            May 15, 2018  3:15 PM CDT   [facility] with [facility]ron Rios MD   [facility]    [address] [place] [address]  [place] [address] [phone]              Who to contact     If you have questions or need follow up information about today's clinic visit or your schedule please contact [facility] directly at [phone].  Normal or non-critical lab and imaging results will be communicated to you by V[employer]AN[employer]art, letter or phone within 4 business days after the clinic has received the results. If you do not hear from us within 7 days, please contact the clinic through VISUALPLANThart or phone. If you have a critical or abnormal lab result, we will notify you by phone as soon as possible.  Submit refill requests through [employer] or call your pharmacy and they will forward the refill request to us. Please allow 3 business days for your refill to be completed.          Additional Information About Your Visit        VISUALPLANTharStreem Information     [employer] lets you send messages to your doctor, view your test results, renew your prescriptions, schedule appointments and more. To sign up, go to [email or web address] . Click on \"Log in\" on the left side of the screen, which will take you to the Welcome page. Then click on \"Sign up Now\" on the right side of the page.     You will be asked to enter the access code listed below, as well as some personal information. Please follow the directions to create your username and password.     Your access code is: [email or web address]  Expires: 2018  2:33 PM     Your access code will  in 90 days. If you need help or a new code, please call your [facility] or [phone].        Care EveryWhere ID     This is your Care EveryWhere ID. This could be used by other organizations to access your [facility] medical records  [id number]        Your Vitals Were     Pulse Temperature Height Last " "Period Pulse Oximetry Breastfeeding?    119 98  F (36.7  C) (Oral) 5' 3.25\" (1.607 m) 03/28/2014 99% No    BMI (Body Mass Index)                   34.27 kg/m2            Blood Pressure from Last 3 Encounters:   01/08/18 150/78   11/20/17 (!) 155/96   08/16/17 163/87    Weight from Last 3 Encounters:   01/08/18 195 lb (88.5 kg)   11/20/17 195 lb 14.4 oz (88.9 kg)   07/31/17 184 lb 14.4 oz (83.9 kg)              We Performed the Following     DIABETES EDUCATOR REFERRAL          Today's Medication Changes          These changes are accurate as of: 1/8/18  3:30 PM.  If you have any questions, ask your nurse or doctor.               These medicines have changed or have updated prescriptions.        Dose/Directions    insulin aspart 100 UNIT/ML injection   Commonly known as:  NovoLOG FLEXPEN   This may have changed:  additional instructions   Used for:  Type 2 diabetes mellitus with other specified complication, with long-term current use of insulin (H)   Changed by:  Fredi Fuentes MD        Inject  before each meal. Dose to be titrated/ adjusted per sliding scale at home   Quantity:  20 mL   Refills:  1       metoclopramide 5 MG tablet   Commonly known as:  REGLAN   This may have changed:  See the new instructions.   Used for:  Dyspepsia   Changed by:  Fredi Fuentes MD        TAKE ONE TABLET BY MOUTH THREE TIMES DAILY BEFORE MEALS   Quantity:  90 tablet   Refills:  5            Where to get your medicines      These medications were sent to [facility] - [place], [place] - [address]  AT [facility] [address]  [address] , [place] [address]     Phone:  [phone]     insulin aspart 100 UNIT/ML injection    insulin glargine 100 UNIT/ML injection    metoclopramide 5 MG tablet    oxybutynin 5 MG tablet    simvastatin 20 MG tablet                Primary Care Provider Office Phone # Fax #    Fredi Fuentes [phone]       [address] [place] [address] " [place]  [facility] [address]        Equal Access to Services     BRITTNI TOWNSEND : Hadii ashok haynes jollyraymond Soaleksandrali, waaxda luqadaha, qaybta kaperezmirza luis, rickey bestmartinezjavier chong. So [facility] [phone].    ATENCIÓN: Si habla español, tiene a oneill disposición servicios gratuitos de asistencia lingüística. Llame al [phone].    We comply with applicable federal civil rights laws and [place] laws. We do not discriminate on the basis of race, color, national origin, age, disability, sex, sexual orientation, or gender identity.            Thank you!     Thank you for choosing [facility]  for your care. Our goal is always to provide you with excellent care. Hearing back from our patients is one way we can continue to improve our services. Please take a few minutes to complete the written survey that you may receive in the mail after your visit with us. Thank you!             Your Updated Medication List - Protect others around you: Learn how to safely use, store and throw away your medicines at www.disposemymeds.org.          This list is accurate as of: 1/8/18  3:30 PM.  Always use your most recent med list.                   Brand Name Dispense Instructions for use Diagnosis    ACCU-CHEK COMPACT PLUS test strip   Generic drug:  blood glucose monitoring     153 each    TEST 5 TIMES DAILY OR AS DIRECTED    Type 2 diabetes mellitus with diabetic polyneuropathy, with long-term current use of insulin (H)       aspirin 81 MG tablet      1 TABLET DAILY        blood glucose monitoring lancets     100 each    1 Units 3 times daily.    Type 2 diabetes, HbA1C goal < 8% (H)       blood glucose monitoring meter device kit    no brand specified    1 kit    Use to test blood sugar 5 times daily or as directed.  Patient needs new monitor.  Accu Test Compact or whatever is covered.  Patient has very labile sugars so needs to check 5 x per day.  Please also include 3 month supply of  strips, lancets, and whatever other supplies are needed.  Ongoing refills for a year.    Type 2 diabetes mellitus with other specified complication (H)       ciclopirox 0.77 % cream    LOPROX    90 g    Apply topically 2 times daily To feet and toenails.    Dermatophytosis of nail, Tinea pedis of both feet       insulin aspart 100 UNIT/ML injection    NovoLOG FLEXPEN    20 mL    Inject  before each meal. Dose to be titrated/ adjusted per sliding scale at home    Type 2 diabetes mellitus with other specified complication, with long-term current use of insulin (H)       insulin glargine 100 UNIT/ML injection    LANTUS SOLOSTAR    15 mL    Inject 46 units at bedtime    Type 2 diabetes mellitus with diabetic polyneuropathy, with long-term current use of insulin (H)       insulin pen needle 31G X 8 MM    B-D U/F    120 each    Use 4 daily or as directed.    Type 2 diabetes, HbA1C goal < 8% (H)       lisinopril 20 MG tablet    PRINIVIL/ZESTRIL    90 tablet    Take 1 tablet (20 mg) by mouth daily    Essential hypertension with goal blood pressure less than 140/90, Type 2 diabetes mellitus with diabetic polyneuropathy (H)       magnesium oxide 400 MG tablet    MAG-OX    90 tablet    Take 1 tablet (400 mg) by mouth daily    Hypomagnesemia       metFORMIN 500 MG 24 hr tablet    GLUCOPHAGE-XR    120 tablet    Take 2 tablets (1,000 mg) by mouth 2 times daily    Type 2 diabetes mellitus with diabetic polyneuropathy, with long-term current use of insulin (H)       metoclopramide 5 MG tablet    REGLAN    90 tablet    TAKE ONE TABLET BY MOUTH THREE TIMES DAILY BEFORE MEALS    Dyspepsia       MULTIVITAMIN TABS   OR      1 TABLET DAILY        omeprazole 20 MG CR capsule    priLOSEC    180 capsule    Take 1 capsule (20 mg) by mouth 2 times daily    Gastroesophageal reflux disease, esophagitis presence not specified       ondansetron 8 MG ODT tab    ZOFRAN-ODT    30 tablet    Take 1 tablet (8 mg) by mouth every 12 hours as needed for  nausea Reported on 3/6/2017    Nausea       oxybutynin 5 MG tablet    DITROPAN    180 tablet    Take 1 tablet (5 mg) by mouth 2 times daily    Overactive bladder       simvastatin 20 MG tablet    ZOCOR    90 tablet    Take 1 tablet (20 mg) by mouth At Bedtime    Hyperlipidemia LDL goal <100       XALATAN 0.005 % ophthalmic solution   Generic drug:  latanoprost      Place 1 drop into both eyes At Bedtime

## 2018-01-09 ENCOUNTER — TELEPHONE (OUTPATIENT)
Dept: EDUCATION SERVICES | Facility: CLINIC | Age: 58
End: 2018-01-09

## 2018-01-09 NOTE — TELEPHONE ENCOUNTER
Diabetes Education Scheduling Outreach #1:    Call to patient to schedule. Patient declined to schedule and states she has a lack of transportation.      Elham Tello  [place] [facility]  Diabetes and Nutrition Scheduling

## 2018-01-10 ENCOUNTER — CARE COORDINATION (OUTPATIENT)
Dept: CARE COORDINATION | Facility: CLINIC | Age: 58
End: 2018-01-10

## 2018-01-10 NOTE — LETTER
Health Care Home - Access Care Plan    About Me  Patient Name:  Lexy Rockwell    YOB: 1960  Age:                             57 year old   [place] MRN:            [id number] Telephone Information:     Home Phone [phone]   Mobile [phone]       Address:    [address]  [place] [address] Email address:  [email or web address]      Emergency Contact(s)  Name Relationship Lgl Grd Work Phone Home Phone Mobile Phone   1. JEROME ROCKWELL Mother No NONE [phone] NONE   2. LUX ROCKWELL Father No NONE [phone] NONE   3. MICHELLE BAUTISTA Sister No NONE [phone] NONE             Health Maintenance: Routine Health maintenance Reviewed:  (not assessed )    My Access Plan  Medical Emergency 911   Questions or concerns during clinic hours Primary Clinic Line, I will call the clinic directly: Primary Clinic: [facility]- [phone]   24 Hour Appointment Line [phone] or  [phone]) (toll free)   24 Hour Nurse Line [phone] (toll free)   Questions or concerns outside clinic hours 24 Hour Appointment Line, I will call the after-hours on-call line:   [facility] [phone] or [phone]) (toll-free)   Preferred Urgent Care Preferred Urgent Care:  (unknown )   Preferred Hospital Preferred Hospital: [facility]  [phone]   Preferred Pharmacy [facility] - [place], [place] - [address] AT [facility] [address]     Behavioral Health Crisis Line The National Suicide Prevention Lifeline at 1-259.986.9094 or 912     My Care Team Members  Patient Care Team       Relationship Specialty Notifications Start End    Fredi Fuentes MD PCP - General Family Practice  11/2/11     Phone: [phone] Fax: [phone]         [address] [facility] [address]    Ashly Lopez, RN Nurse Coordinator  Admissions 10/20/16     Comment:   [phone]    Cris ORNELAS worker   12/14/16     Comment:  [place]     Phone: [phone]         Shira Wilson MD MD INTERNAL MEDICINE - ENDOCRINOLOGY, DIABETES & METABOLISM Admissions 6/6/17     Phone: [phone] Fax: [phone]         [address] [facility] [address] [facility] [address]    Rose Marie Olivera [facility] Care Coordinator  - Clinical  1/10/18     Phone: [phone] Fax: [phone]            My Medical and Care Information  Problem List   Patient Active Problem List   Diagnosis     Development delay     Overactive bladder     GERD (gastroesophageal reflux disease)     Type 2 diabetes, HbA1C goal < 8% (H)     Hypertension goal BP (blood pressure) < 140/90     Hyperlipidemia LDL goal <100     Pain in joint, lower leg     Proteinuria     Vitamin D deficiency     Diabetic gastroparesis (H)     Health Care Home     History of strabismus surgery     Type 2 diabetes mellitus with other specified complication (H)     Advanced directives, counseling/discussion     Type 2 diabetes mellitus with diabetic polyneuropathy (H)     Primary open angle glaucoma of both eyes, moderate stage     Type 2 diabetes mellitus with diabetic polyneuropathy, with long-term current use of insulin (H)     Gastroesophageal reflux disease without esophagitis     Obesity, unspecified obesity severity, unspecified obesity type

## 2018-01-10 NOTE — LETTER
[place] CARE COORDINATION    January 10, 2018      Lexy Fanny Moiz  [address]   [place] [address]    Dear Lexy,    I am a clinic care coordinator who works with Fredi Fuentes MD at [facility].  I wanted to introduce myself and provide you with my contact information so that you can call me with questions or concerns about your health care. Below is a description of clinic care coordination and how I can further assist you.     The clinic care coordinator is a registered nurse and/or  who understand the health care system. The goal of clinic care coordination is to help you manage your health and improve access to the [facility] in the most efficient manner. The registered nurse can assist you in meeting your health care goals by providing education, coordinating services, and strengthening the communication among your providers. The  can assist you with financial, behavioral, psychosocial, chemical dependency, counseling, and/or psychiatric resources.    Please feel free to contact me at [phone], with any questions or concerns. We at [facility] are focused on providing you with the highest-quality healthcare experience possible and that all starts with you.     Sincerely,   Rose Marie Olivera, JULIUS, MSW    Clinical Care Coordination  [facility]  [phone]    Enclosed: I have enclosed a copy of a 24 Hour Access Plan. This has helpful phone numbers for you to call when needed. Please keep this in an easy to access place to use as needed.

## 2018-01-10 NOTE — PROGRESS NOTES
Clinic Care Coordination Contact--Social Work Initial Call/Assessment  Care Team Conversations    Psychosocial: Request from diabetic education to assist Patient with transportation resources for CDE appointment.  Call to Patient to introduce self and clinic role.  Patient reports she has not yet scheduled this appointment.  She reports her parents prefer to transport to appointments, she must check with their schedule before coordinating CDE appointment.  SW informed Patient of alternate transportation, such as [employer] and [employer].  Patient states she is aware of both of these and can utilize transportation independently but she prefers to have her parents provide transport.  Patient states her parents worry she will become stranded with alternate transport.  SW and Patient discussed goal for Patient to talk with parents about their availability to allow her to coordinate CDE appointment.  We agreed that she do this within 2 weeks.  SW provided her contact information and Patient is accepting of future call.    Plan: Care Coordinator mailed out care coordination introduction letter today. Care Coordinator will call Patient in 2 weeks for update and needs assessment.  Will update PCP and CDE     Rose Marie Olivera, JULIUS, MSW   [phone]  1/10/2018 5:37 PM

## 2018-01-11 ENCOUNTER — CARE COORDINATION (OUTPATIENT)
Dept: ENDOCRINOLOGY | Facility: CLINIC | Age: 58
End: 2018-01-11

## 2018-01-11 NOTE — PROGRESS NOTES
As per Dr. Wilson's request, connected with patient's diabetes educator, Jaqui Tamayo about Dr. Wilson's concerns (see 11/20/17 progress note from Dr. Wilson). Notified Dr. Wilson that writer talked with diabetes educator, Jaqui Tamayo, today. We talked at length about Lexy.     Jaqui says she has worked with patient for years and tired several different things, such as calling weekly, seeing her in clinic for frequent visits and it has not been helpful. She omits insulin, either by forgetting or she finds something else to do rather than give her insulin. She says she agrees that she needs to be in a facility with nursing care to at least supervise her giving her insulin.     She said she does not feel that she is an insulin pump or V-Go candidate.       Dr. Wilson reviewed.    Per Dr. Wilson:  Thanks for reviewing the care with diabetes educator.   I think we can continue to bring up our concern with patient, her mother and .   Maybe check if there is an endocrinologist closer to where she lives and that may make it easier for her to f/u for clinic visits       Patient sees Jaqui Tamayo at [facility]. There is now an endocrinologist at [facility] 1 day weekly.     Patient's next follow-up with Dr. Wilson on 3/19/18.       Attempted to contact patient to review. Reviewed above information with patient. She verbalizes understanding and stated that she will talk with her parents to decide if she should transfer care to [facility] Endocrinology. Advised patient that writer could contact her mother if she would like and patient states her mother and father no longer have their land line and she did not have their cell phone available. She agreed to have them call Dr. Wilson's clinic to further review. Provided patient with direct endocrine clinic phone number.       Kirstin Lopez, RN  Endocrine Care Coordinator  [facility]

## 2018-01-18 NOTE — PROGRESS NOTES
Patient's mother, Francisca, returned call. Reviewed in detail again Dr. Wilson's concern with patient's living situation. Dr. Wilson feels that patient needs to be in a skilled nursing facility to help manage her insulin. Francisca verbalizes understanding and confirms that she was aware and this was discussed with Dr. Wilson at last office visit in detail. She notes that they tried to get nursing to be able to come out and see patient but they were not able to get that. She also notes that patient does not want to leave her current living situation. Francisca does agree to attempt to schedule an appointment with the new endocrinologist at [facility]. Francisca states that [facility] would be a more convenient clinic and will contact them to schedule an office visit.    Also advised Francisca that writer will copy diabetes educator, Jaqui Tamayo, as an update.      Kirstin Lopez, RN  Endocrine Care Coordinator  [facility]

## 2018-01-19 ENCOUNTER — TRANSFERRED RECORDS (OUTPATIENT)
Dept: HEALTH INFORMATION MANAGEMENT | Facility: CLINIC | Age: 58
End: 2018-01-19

## 2018-01-23 ENCOUNTER — TELEPHONE (OUTPATIENT)
Dept: EDUCATION SERVICES | Facility: CLINIC | Age: 58
End: 2018-01-23

## 2018-01-23 ENCOUNTER — MEDICAL CORRESPONDENCE (OUTPATIENT)
Dept: HEALTH INFORMATION MANAGEMENT | Facility: CLINIC | Age: 58
End: 2018-01-23

## 2018-01-23 NOTE — LETTER
1/23/2018         RE: Lexy Rockwell  [address]  [facility] [address]        Dear Colleague,    Thank you for referring your patient, Lexy Rockwell, to the [facility]. Please see a copy of my visit note below.    Diabetes Follow-up    Subjective/Objective:    Lexy Rockwell sent in blood glucose log for review. Last date of communication was: 8/11/17, Last educator visit was 6/2017 where patient was sent to [facility].       Diabetes Medication(s)     Biguanides Sig    metFORMIN (GLUCOPHAGE-XR) 500 MG 24 hr tablet Take 2 tablets (1,000 mg) by mouth 2 times daily    Insulin Sig    insulin glargine (LANTUS SOLOSTAR) 100 UNIT/ML injection Inject 46 units at bedtime    insulin aspart (NOVOLOG FLEXPEN) 100 UNIT/ML injection Inject  before each meal. Dose to be titrated/ adjusted per sliding scale at home      Previously noted that compliance with daily injections has been inconsistent resulting in DKA and hospital admissions.        Lexy s Mother Francisca calling- (Consent to communicate on file) she states Michael is in [facility] right now and will possibly be discharging tonight. She would like a review of the device she has as Francisca will be taking care of Michael until she is feeling better.    Francisca- [phone] OK to .       Call out to Francisca:  Called Francisca back and she would like to clarify why Lexy wouldn't be able to use a pump.  Discussed with Francisca that Dr. Wilson and educator Jaqui had discussed the possibility of a pump, but that patient would be unable to manage it daily.  Educated Francisca that even if a skilled nurse assisted with infusion set changes that patient would still need to work the pump to enter in carbohydrates eaten with meals, which given her history are likely to be missed.  Discussed that if meal time bolus doses were missed the pump would be the same as giving 1 Lanuts injection.      Francisca reports that to  get patient into skilled nursing facility she would need to change to medical assistance, which requires a spend down. They plan to meets with her  next week to discuss long term options that may involve skilled nursing.    After discharge Francisca plans to take patient home to observe for 2 days.  Asked Francisca if she knew how to use Lexy's meter and pens,  She reported knowing how to use the meter, but Lexy does her own insulin.  She stated they plan to follow up with Dr. Fuentes within 2-3 days of discharge and will schedule an Endocrinology appointment with Dr. Leone in [place].    Encouraged Francisca to observe patient and call CDE triage educators if she has any questions on how to inject insulin or use her meter.  Recommended observing her self management skills as this may help her better understand the level of care that Lexy needs.    CDE Assessment:  Per discussion with Francisca it is unclear if she understands how to use Lexy's insulin.  Given this is her 2nd admission in the last month related to DKA.  PCP follow up verbalized and family plans to continue working with [facility]  for long term care planning.  Per chart review and discussion would agree with previous assessments that skilled nursing is needed to improve glycemic control and to further prevent episodes of DKA.    Nataly Campos MS, RD, LD, CDE

## 2018-01-23 NOTE — TELEPHONE ENCOUNTER
Diabetes Follow-up    Subjective/Objective:    Lexy Rockwell sent in blood glucose log for review. Last date of communication was: 8/11/17, Last educator visit was 6/2017 where patient was sent to [facility].       Diabetes Medication(s)     Biguanides Sig    metFORMIN (GLUCOPHAGE-XR) 500 MG 24 hr tablet Take 2 tablets (1,000 mg) by mouth 2 times daily    Insulin Sig    insulin glargine (LANTUS SOLOSTAR) 100 UNIT/ML injection Inject 46 units at bedtime    insulin aspart (NOVOLOG FLEXPEN) 100 UNIT/ML injection Inject  before each meal. Dose to be titrated/ adjusted per sliding scale at home      Previously noted that compliance with daily injections has been inconsistent resulting in DKA and hospital admissions.        Lexy s Mother Francisca calling- (Consent to communicate on file) she states Michael is in [facility] right now and will possibly be discharging tonight. She would like a review of the device she has as Francisca will be taking care of Michael until she is feeling better.    Francisca- [phone] OK to .       Call out to Francisca:  Called Francisca back and she would like to clarify why Lexy wouldn't be able to use a pump.  Discussed with Francisca that Dr. Wilson and educator Jaqui had discussed the possibility of a pump, but that patient would be unable to manage it daily.  Educated Francisca that even if a skilled nurse assisted with infusion set changes that patient would still need to work the pump to enter in carbohydrates eaten with meals, which given her history are likely to be missed.  Discussed that if meal time bolus doses were missed the pump would be the same as giving 1 Lanuts injection.      Francisca reports that to get patient into skilled nursing facility she would need to change to medical assistance, which requires a spend down. They plan to meets with her  next week to discuss long term options that may involve skilled nursing.    After discharge Francisca plans to take patient home  to observe for 2 days.  Asked Francisca if she knew how to use Lexy's meter and pens,  She reported knowing how to use the meter, but Lexy does her own insulin.  She stated they plan to follow up with Dr. Fuentes within 2-3 days of discharge and will schedule an Endocrinology appointment with Dr. Leone in [place].    Encouraged Francisca to observe patient and call CDE triage educators if she has any questions on how to inject insulin or use her meter.  Recommended observing her self management skills as this may help her better understand the level of care that Lexy needs.    CDE Assessment:  Per discussion with Francisca it is unclear if she understands how to use Lexy's insulin.  Given this is her 2nd admission in the last month related to DKA.  PCP follow up verbalized and family plans to continue working with [facility]  for long term care planning.  Per chart review and discussion would agree with previous assessments that skilled nursing is needed to improve glycemic control and to further prevent episodes of DKA.    Nataly Campos MS, RD, LD, CDE

## 2018-01-25 ENCOUNTER — TELEPHONE (OUTPATIENT)
Dept: FAMILY MEDICINE | Facility: CLINIC | Age: 58
End: 2018-01-25

## 2018-01-25 NOTE — TELEPHONE ENCOUNTER
Reason for Call: Request for an order or referral:    Order or referral being requested: diabetic management and teaching    Date needed: as soon as possible    Has the patient been seen by the PCP for this problem? NO    Additional comments:     Phone number Patient can be reached at:  Other phone number:  [facility] - [phone]    Best Time:  anytime    Can we leave a detailed message on this number?  YES    Call taken on 1/25/2018 at 1:45 PM by Stephanie Herron

## 2018-01-25 NOTE — TELEPHONE ENCOUNTER
Routing to PCP to review and advise.  Request for orders.    Stephanie Nguyen RN  [facility]

## 2018-01-26 ENCOUNTER — OFFICE VISIT (OUTPATIENT)
Dept: FAMILY MEDICINE | Facility: CLINIC | Age: 58
End: 2018-01-26
Payer: COMMERCIAL

## 2018-01-26 VITALS
WEIGHT: 198 LBS | SYSTOLIC BLOOD PRESSURE: 128 MMHG | OXYGEN SATURATION: 96 % | BODY MASS INDEX: 34.8 KG/M2 | HEART RATE: 119 BPM | DIASTOLIC BLOOD PRESSURE: 76 MMHG | TEMPERATURE: 98.9 F

## 2018-01-26 DIAGNOSIS — Z86.39 HISTORY OF DIABETES WITH KETOACIDOSIS: ICD-10-CM

## 2018-01-26 DIAGNOSIS — E11.42 TYPE 2 DIABETES MELLITUS WITH DIABETIC POLYNEUROPATHY, WITH LONG-TERM CURRENT USE OF INSULIN (H): Primary | ICD-10-CM

## 2018-01-26 DIAGNOSIS — Z79.4 TYPE 2 DIABETES MELLITUS WITH DIABETIC POLYNEUROPATHY, WITH LONG-TERM CURRENT USE OF INSULIN (H): Primary | ICD-10-CM

## 2018-01-26 DIAGNOSIS — R62.50 DEVELOPMENT DELAY: ICD-10-CM

## 2018-01-26 DIAGNOSIS — I10 HYPERTENSION GOAL BP (BLOOD PRESSURE) < 140/90: ICD-10-CM

## 2018-01-26 PROCEDURE — 99214 OFFICE O/P EST MOD 30 MIN: CPT | Performed by: FAMILY MEDICINE

## 2018-01-26 ASSESSMENT — PAIN SCALES - GENERAL: PAINLEVEL: NO PAIN (0)

## 2018-01-26 NOTE — MR AVS SNAPSHOT
After Visit Summary   1/26/2018    Lexy Rockwell    MRN: [id number]           Patient Information     Date Of Birth          1960        Visit Information        Provider Department      1/26/2018 11:00 AM Fredi Fuentes MD [facility]        Today's Diagnoses     Type 2 diabetes mellitus with diabetic polyneuropathy, with long-term current use of insulin (H)    -  1      Care Instructions    Call to schedule endocrinology consult    Try to be diligent with diet/ exercise etc    Agree with home nurse    Agree with discussions about assisted living              Follow-ups after your visit        Additional Services     ENDOCRINOLOGY ADULT REFERRAL       Your provider has referred you to: [facility]:  [facility]  [phone]  [email or web address]/[email or web address]  [facility]:  [facility] [phone]  [email or web address]  UM: Endocrinology and Diabetes Clinic [facility] [phone]   [email or web address].[facility]/endocrinology-and-diabetes-clinic/  UMP: [facility] [phone]   [email or web address]://[email or web address].[facility]/elgxq-lbanl-sccqedx-[place]/  N: Endocrinology Clinic [facility] [phone]   [email or web address]  Lyle Ng M.D. [facility] [phone]  [facility] [phone]      Please be aware that coverage of these services is subject to the terms and limitations of your health insurance plan.  Call member services at your health plan with any benefit or coverage questions.      Please bring the following to your appointment:    >>   Any x-rays, CTs or MRIs which have been performed.  Contact the facility where they were done to arrange for  prior to your scheduled appointment.    >>   List of current medications   >>   This referral request   >>   Any  "documents/labs given to you for this referral                  Your next 10 appointments already scheduled     Feb 21, 2018  2:30 PM CST   [facility]    [address] [place] [id number]   [phone]            Mar 19, 2018 10:45 AM CDT   Return Visit with Shira Wilson MD, MG ENDO NURSE   [facility]    [address] [place] [id number] [phone]            May 15, 2018  3:15 PM CDT   Return Visit with Dayron Rios MD   [facility] ([address] [address] [phone]              Who to contact     If you have questions or need follow up information about today's clinic visit or your schedule please contact [facility] directly at [phone].  Normal or non-critical lab and imaging results will be communicated to you by Zirtualhart, letter or phone within 4 business days after the clinic has received the results. If you do not hear from us within 7 days, please contact the clinic through MyChart or phone. If you have a critical or abnormal lab result, we will notify you by phone as soon as possible.  Submit refill requests through Feathr or call your pharmacy and they will forward the refill request to us. Please allow 3 business days for your refill to be completed.          Additional Information About Your Visit        Feathr Information     Feathr lets you send messages to your doctor, view your test results, renew your prescriptions, schedule appointments and more. To sign up, go to [email or web address] . Click on \"Log in\" on the left side of the screen, which will take you to the Welcome page. Then click on \"Sign up Now\" on the right side of the page.     You will be asked to enter the access code listed below, as well as some personal " information. Please follow the directions to create your username and password.     Your access code is: [email or web address]  Expires: 2018  2:33 PM     Your access code will  in 90 days. If you need help or a new code, please call your [place] clinic or [phone].        Care EveryWhere ID     This is your Care EveryWhere ID. This could be used by other organizations to access your [place] medical records  [id number]        Your Vitals Were     Pulse Temperature Last Period Pulse Oximetry Breastfeeding? BMI (Body Mass Index)    119 98.9  F (37.2  C) (Oral) 2014 96% No 34.8 kg/m2       Blood Pressure from Last 3 Encounters:   18 128/76   18 150/78   17 (!) 155/96    Weight from Last 3 Encounters:   18 198 lb (89.8 kg)   18 195 lb (88.5 kg)   17 195 lb 14.4 oz (88.9 kg)              We Performed the Following     ENDOCRINOLOGY ADULT REFERRAL        Primary Care Provider Office Phone # Fax #    Fredi Fuentes [phone]       [address] [facility] [address]        Equal Access to Services     BRITTNI TOWNSEND : Hadii ashok ku hadasho Soomaali, waaxda luqadaha, qaybta kaalmada adeegyada, waxay david campuzano . So [facility] [phone].    ATENCIÓN: Si habla español, tiene a oneill disposición servicios gratuitos de asistencia lingüística. Llame al [phone].    We comply with applicable federal civil rights laws and [place] laws. We do not discriminate on the basis of race, color, national origin, age, disability, sex, sexual orientation, or gender identity.            Thank you!     Thank you for choosing [facility]  for your care. Our goal is always to provide you with excellent care. Hearing back from our patients is one way we can continue to improve our services. Please take a few minutes to complete the written survey that you may receive in the mail after your visit with us. Thank you!              Your Updated Medication List - Protect others around you: Learn how to safely use, store and throw away your medicines at www.disposemymeds.org.          This list is accurate as of 1/26/18 11:32 AM.  Always use your most recent med list.                   Brand Name Dispense Instructions for use Diagnosis    ACCU-CHEK COMPACT PLUS test strip   Generic drug:  blood glucose monitoring     153 each    TEST 5 TIMES DAILY OR AS DIRECTED    Type 2 diabetes mellitus with diabetic polyneuropathy, with long-term current use of insulin (H)       aspirin 81 MG tablet      1 TABLET DAILY        blood glucose monitoring lancets     100 each    1 Units 3 times daily.    Type 2 diabetes, HbA1C goal < 8% (H)       blood glucose monitoring meter device kit    no brand specified    1 kit    Use to test blood sugar 5 times daily or as directed.  Patient needs new monitor.  Accu Test Compact or whatever is covered.  Patient has very labile sugars so needs to check 5 x per day.  Please also include 3 month supply of strips, lancets, and whatever other supplies are needed.  Ongoing refills for a year.    Type 2 diabetes mellitus with other specified complication (H)       ciclopirox 0.77 % cream    LOPROX    90 g    Apply topically 2 times daily To feet and toenails.    Dermatophytosis of nail, Tinea pedis of both feet       insulin aspart 100 UNIT/ML injection    NovoLOG FLEXPEN    20 mL    Inject  before each meal. Dose to be titrated/ adjusted per sliding scale at home    Type 2 diabetes mellitus with diabetic polyneuropathy, with long-term current use of insulin (H)       insulin glargine 100 UNIT/ML injection    LANTUS SOLOSTAR    15 mL    Inject 46 units at bedtime    Type 2 diabetes mellitus with diabetic polyneuropathy, with long-term current use of insulin (H)       insulin pen needle 31G X 8 MM    B-D U/F    120 each    Use 4 daily or as directed.    Type 2 diabetes, HbA1C goal < 8% (H)       lisinopril 20 MG tablet     PRINIVIL/ZESTRIL    90 tablet    Take 1 tablet (20 mg) by mouth daily    Essential hypertension with goal blood pressure less than 140/90, Type 2 diabetes mellitus with diabetic polyneuropathy (H)       magnesium oxide 400 MG tablet    MAG-OX    90 tablet    Take 1 tablet (400 mg) by mouth daily    Hypomagnesemia       metFORMIN 500 MG 24 hr tablet    GLUCOPHAGE-XR    120 tablet    Take 2 tablets (1,000 mg) by mouth 2 times daily    Type 2 diabetes mellitus with diabetic polyneuropathy, with long-term current use of insulin (H)       metoclopramide 5 MG tablet    REGLAN    90 tablet    TAKE ONE TABLET BY MOUTH THREE TIMES DAILY BEFORE MEALS    Dyspepsia       MULTIVITAMIN TABS   OR      1 TABLET DAILY        omeprazole 20 MG CR capsule    priLOSEC    180 capsule    Take 1 capsule (20 mg) by mouth 2 times daily    Gastroesophageal reflux disease, esophagitis presence not specified       ondansetron 8 MG ODT tab    ZOFRAN-ODT    30 tablet    Take 1 tablet (8 mg) by mouth every 12 hours as needed for nausea Reported on 3/6/2017    Nausea       oxybutynin 5 MG tablet    DITROPAN    180 tablet    Take 1 tablet (5 mg) by mouth 2 times daily    Overactive bladder       simvastatin 20 MG tablet    ZOCOR    90 tablet    Take 1 tablet (20 mg) by mouth At Bedtime    Hyperlipidemia LDL goal <100       XALATAN 0.005 % ophthalmic solution   Generic drug:  latanoprost      Place 1 drop into both eyes At Bedtime

## 2018-01-26 NOTE — PATIENT INSTRUCTIONS
Call to schedule endocrinology consult    Try to be diligent with diet/ exercise etc    Agree with home nurse    Agree with discussions about assisted living

## 2018-01-26 NOTE — NURSING NOTE
"Chief Complaint   Patient presents with     [facility] F/U     Health Maintenance       Initial /76 (BP Location: Right arm, Patient Position: Chair, Cuff Size: Adult Regular)  Pulse 119  Temp 98.9  F (37.2  C) (Oral)  Wt 198 lb (89.8 kg)  LMP 03/28/2014  SpO2 96%  Breastfeeding? No  BMI 34.8 kg/m2 Estimated body mass index is 34.8 kg/(m^2) as calculated from the following:    Height as of 1/8/18: 5' 3.25\" (1.607 m).    Weight as of this encounter: 198 lb (89.8 kg).  Medication Reconciliation: complete   Salma Meier CMA      "

## 2018-01-26 NOTE — TELEPHONE ENCOUNTER
Attempt # 1  Called [facility] - [phone].  Was call answered?  No, left detailed message on secure confidential voice mail.    Stephanie Nguyen RN  [facility]

## 2018-01-26 NOTE — PROGRESS NOTES
SUBJECTIVE:   Lexy Rockwell is a 57 year old female who presents to clinic today for the following health issues:           Hospital Follow-up Visit:    Hospital/Nursing Home/IP Rehab Facility: [facility]  Date of Admission: 01/19/2018  Date of Discharge: 01/23/2018  Reason(s) for Admission: Diabetes problems            Problems taking medications regularly:  None       Medication changes since discharge: None       Problems adhering to non-medication therapy:  None     Summary of hospitalization:  CareEverywhere information obtained and reviewed  Diagnostic Tests/Treatments reviewed.  Follow up needed: none  Other Healthcare Providers Involved in Patient s Care:         None  Update since discharge: improved.      Post Discharge Medication Reconciliation: discharge medications reconciled, continue medications without change.  Plan of care communicated with patient and family     Coding guidelines for this visit:  Type of Medical   Decision Making Face-to-Face Visit       within 7 Days of discharge Face-to-Face Visit        within 14 days of discharge   Moderate Complexity 34241 [id number]   High Complexity 18720 73695                   Problem list and histories reviewed & adjusted, as indicated.  Additional history: as documented         Reviewed and updated as needed this visit by clinical staff       Reviewed and updated as needed this visit by Provider       Reviewed dc summary in detail Melita jan 19-23    Taking the ppi bid and then can take an extra one also    Not much nausea since home         the endocrinologist and diabetes educator feel an insulin pump would not be good idea for patient    Since in hospital, has had 2 low sugars in am's  55 and 50.  Other am 119    Later in day, 66 to 299    Sees diabetes educator as needed      Patient now qualifies for homebound nurse services    Had intake done    To get  soon    Will get services mwf for 2 weeks nurse visit, then decrease in  frequency    Patient and family have discussed assisted living    ASSESSMENT / PLAN:  (E11.42,  Z79.4) Type 2 diabetes mellitus with diabetic polyneuropathy, with long-term current use of insulin (H)  (primary encounter diagnosis)  Comment: patient and mom are interested in finding an endocrinologist closer to home.  Did referral.  Plan: ENDOCRINOLOGY ADULT REFERRAL        Discussed overall case in detail     (Z86.39) History of diabetes with ketoacidosis  Comment: as above   Plan: the frequent hospitalizations and overall very poor control cause concern about patient's ability to live independently and mom has been thinking same thing.  Steps being taken with  to perhaps go to assisted living type situation.  Encouraged them to continue these processes.     (I10) Hypertension goal BP (blood pressure) < 140/90  Comment: at goal   Plan: no change     (R62.50) Development delay  Comment: this contributes to the concerns above   Plan: as above       I reviewed the patient's medications, allergies, medical history, family history, and social history.    Fredi Fuentes MD

## 2018-01-31 ENCOUNTER — TELEPHONE (OUTPATIENT)
Dept: FAMILY MEDICINE | Facility: CLINIC | Age: 58
End: 2018-01-31

## 2018-01-31 NOTE — TELEPHONE ENCOUNTER
Forms received from: [employer]   Phone number listed: n/a   Fax listed: [phone]  Date received: 1-31-18  Form description: multivitamin order  Once forms are completed, please return to [employer] via fax.  Is patient requesting to be contacted when forms are completed: n/a  Form placed: provider desk  Arabella Mayo

## 2018-02-08 ENCOUNTER — TELEPHONE (OUTPATIENT)
Dept: FAMILY MEDICINE | Facility: CLINIC | Age: 58
End: 2018-02-08

## 2018-02-08 ENCOUNTER — MEDICAL CORRESPONDENCE (OUTPATIENT)
Dept: HEALTH INFORMATION MANAGEMENT | Facility: CLINIC | Age: 58
End: 2018-02-08

## 2018-02-08 NOTE — TELEPHONE ENCOUNTER
Forms received from: [employer] [facility] (x2)  Fax listed: [phone]  Date received: 02-  Form description: SN, POC  Once forms are completed, [facility] via fax.  Is patient requesting to be contacted when forms are completed: n/a  Form placed: providers folder    Chayo Bueno

## 2018-02-20 DIAGNOSIS — E11.9 TYPE 2 DIABETES, HBA1C GOAL < 8% (H): ICD-10-CM

## 2018-02-21 ENCOUNTER — OFFICE VISIT (OUTPATIENT)
Dept: PODIATRY | Facility: CLINIC | Age: 58
End: 2018-02-21
Payer: COMMERCIAL

## 2018-02-21 VITALS — SYSTOLIC BLOOD PRESSURE: 136 MMHG | HEART RATE: 120 BPM | OXYGEN SATURATION: 96 % | DIASTOLIC BLOOD PRESSURE: 88 MMHG

## 2018-02-21 DIAGNOSIS — L60.2 ONYCHAUXIS: Primary | ICD-10-CM

## 2018-02-21 DIAGNOSIS — L84 TYLOMA: ICD-10-CM

## 2018-02-21 DIAGNOSIS — E11.49 TYPE II OR UNSPECIFIED TYPE DIABETES MELLITUS WITH NEUROLOGICAL MANIFESTATIONS, NOT STATED AS UNCONTROLLED(250.60) (H): ICD-10-CM

## 2018-02-21 PROCEDURE — 99213 OFFICE O/P EST LOW 20 MIN: CPT | Performed by: PODIATRIST

## 2018-02-21 RX ORDER — PEN NEEDLE, DIABETIC 31 GX5/16"
NEEDLE, DISPOSABLE MISCELLANEOUS
Qty: 100 EACH | Refills: 1 | Status: SHIPPED | OUTPATIENT
Start: 2018-02-21 | End: 2021-06-28

## 2018-02-21 NOTE — TELEPHONE ENCOUNTER
Prescription approved per [facility] Refill Protocol.  Fifi Richter, RN C[facility] Triage.

## 2018-02-21 NOTE — TELEPHONE ENCOUNTER
"Requested Prescriptions   Pending Prescriptions Disp Refills     B-D U/F 31G X 8 MM insulin pen needle [Pharmacy Med Name: B-D PEN NDL SHRT [id number](5/16) RYLIE] 100 each 0    Last Written Prescription Date:  11-18-16  Last Fill Quantity: 120,  # refills: 11   Last office visit: 1/26/2018 with prescribing provider:     Future Office Visit:   Next 5 appointments (look out 90 days)     Feb 21, 2018  2:30 PM CST   Return Visit with Ravin Back DPM   [email or web address] ([email or web address]    [address] [place] [id number]   [phone]            May 15, 2018  3:15 PM CDT   [facility]ron [facility] MD   [facility]    [address] [place] [id number]   [phone]                  Sig: USE WITH INSULIN PENS    Diabetic Supplies Protocol Passed    2/20/2018  8:11 PM       Passed - Patient is 18 years of age or older       Passed - Patient has had appt within past 6 mos    Patient had office visit in the last 6 months or has a visit in the next 30 days with authorizing provider.  See \"Patient Info\" tab in inbasket, or \"Choose Columns\" in Meds & Orders section of the refill encounter.              "

## 2018-02-21 NOTE — MR AVS SNAPSHOT
After Visit Summary   2/21/2018    Lexy Rockwell    MRN: [id number]           Patient Information     Date Of Birth          1960        Visit Information        Provider Department      2/21/2018 2:30 PM Ravin Back DPM [facility]        Today's Diagnoses     Onychauxis    -  1    Tyloma        Type II or unspecified type diabetes mellitus with neurological manifestations, not stated as uncontrolled(250.60) (H)          Care Instructions    Thanks for coming today.  Ortho/Sports Medicine Clinic  [address] [place] [id number]    To schedule future appointments in Ortho Clinic, you may call [phone].    To schedule ordered imaging by your Provider: Call [facility] at [phone]    TrueDemand Software available online at:   [email or web address]    Please call if any further questions or concerns [phone] and ask for the Orthopedic Department. Clinic hours 8 am to 5 pm.    Return to clinic if symptoms worsen.            Follow-ups after your visit        Your next 10 appointments already scheduled     Mar 14, 2018  1:30 PM CDT   [facility] Visit with Gilmar Kelly MD   [facility]    [address] [place] [id number] [phone]            Apr 27, 2018 11:00 AM CDT   Return Visit with Ravin Back DPM   [facility] ([facility])    [address] [place] [address] [id number] [phone]            May 15, 2018  3:15 PM CDT   Return Visit with Dayron Rios MD   [facility]    [address] [place] [address] [id number] [phone]              Who to contact     If you have questions or need follow up information about today's clinic visit or your schedule please contact [facility] directly at [phone].  Normal or non-critical lab and imaging results will be communicated to you by  MyChart, letter or phone within 4 business days after the clinic has received the results. If you do not hear from us within 7 days, please contact the clinic through [place] or phone. If you have a critical or abnormal lab result, we will notify you by phone as soon as possible.  Submit refill requests through Appknox or call your pharmacy and they will forward the refill request to us. Please allow 3 business days for your refill to be completed.          Additional Information About Your Visit        Appknox Information     Appknox is an electronic gateway that provides easy, online access to your medical records. With Appknox, you can request a clinic appointment, read your test results, renew a prescription or communicate with your care team.     To sign up for Appknox visit the website at [email or web address]   You will be asked to enter the access code listed below, as well as some personal information. Please follow the directions to create your username and password.     Your access code is: [email or web address]  Expires: 2018  3:00 PM     Your access code will  in 90 days. If you need help or a new code, please contact your [facility] or call [phone] for assistance.        Care EveryWhere ID     This is your Care EveryWhere ID. This could be used by other organizations to access your [facility] medical records  [id number]        Your Vitals Were     Pulse Last Period Pulse Oximetry             120 2014 96%          Blood Pressure from Last 3 Encounters:   18 136/88   18 128/76   18 150/78    Weight from Last 3 Encounters:   18 89.8 kg (198 lb)   18 88.5 kg (195 lb)   17 88.9 kg (195 lb 14.4 oz)              We Performed the Following     TRIM HYPERKERATOTIC SKIN LESION,2-4     TRIM NONDYSTRPHIC NAIL(S)        Primary Care Provider Office Phone # Fax #    Fredi Fuentes [phone] [address]  [facility] [address]        Equal Access to Services     BRITTNI TOWNSEND : Hadii ashok haynes jollyraymond Soaleksandrali, waaxda luqadaha, qaybta kaalmamirza luis, rickey bestmartinezjavier chong. So [facility] [phone].    ATENCIÓN: Si habla español, tiene a oneill disposición servicios gratuitos de asistencia lingüística. Llame al [phone].    We comply with applicable federal civil rights laws and [place] laws. We do not discriminate on the basis of race, color, national origin, age, disability, sex, sexual orientation, or gender identity.            Thank you!     Thank you for choosing [facility]  for your care. Our goal is always to provide you with excellent care. Hearing back from our patients is one way we can continue to improve our services. Please take a few minutes to complete the written survey that you may receive in the mail after your visit with us. Thank you!             Your Updated Medication List - Protect others around you: Learn how to safely use, store and throw away your medicines at www.disposemymeds.org.          This list is accurate as of 2/21/18  3:00 PM.  Always use your most recent med list.                   Brand Name Dispense Instructions for use Diagnosis    ACCU-CHEK COMPACT PLUS test strip   Generic drug:  blood glucose monitoring     153 each    TEST 5 TIMES DAILY OR AS DIRECTED    Type 2 diabetes mellitus with diabetic polyneuropathy, with long-term current use of insulin (H)       aspirin 81 MG tablet      1 TABLET DAILY        B-D U/F 31G X 8 MM   Generic drug:  insulin pen needle     100 each    USE WITH INSULIN PENS    Type 2 diabetes, HbA1C goal < 8% (H)       blood glucose monitoring lancets     100 each    1 Units 3 times daily.    Type 2 diabetes, HbA1C goal < 8% (H)       blood glucose monitoring meter device kit    no brand specified    1 kit    Use to test blood sugar 5 times daily or as directed.  Patient needs new monitor.  Accu Test  Compact or whatever is covered.  Patient has very labile sugars so needs to check 5 x per day.  Please also include 3 month supply of strips, lancets, and whatever other supplies are needed.  Ongoing refills for a year.    Type 2 diabetes mellitus with other specified complication (H)       ciclopirox 0.77 % cream    LOPROX    90 g    Apply topically 2 times daily To feet and toenails.    Dermatophytosis of nail, Tinea pedis of both feet       insulin aspart 100 UNIT/ML injection    NovoLOG FLEXPEN    20 mL    Inject  before each meal. Dose to be titrated/ adjusted per sliding scale at home    Type 2 diabetes mellitus with diabetic polyneuropathy, with long-term current use of insulin (H)       insulin glargine 100 UNIT/ML injection    LANTUS SOLOSTAR    15 mL    Inject 46 units at bedtime    Type 2 diabetes mellitus with diabetic polyneuropathy, with long-term current use of insulin (H)       lisinopril 20 MG tablet    PRINIVIL/ZESTRIL    90 tablet    Take 1 tablet (20 mg) by mouth daily    Essential hypertension with goal blood pressure less than 140/90, Type 2 diabetes mellitus with diabetic polyneuropathy (H)       magnesium oxide 400 MG tablet    MAG-OX    90 tablet    Take 1 tablet (400 mg) by mouth daily    Hypomagnesemia       metFORMIN 500 MG 24 hr tablet    GLUCOPHAGE-XR    120 tablet    Take 2 tablets (1,000 mg) by mouth 2 times daily    Type 2 diabetes mellitus with diabetic polyneuropathy, with long-term current use of insulin (H)       metoclopramide 5 MG tablet    REGLAN    90 tablet    TAKE ONE TABLET BY MOUTH THREE TIMES DAILY BEFORE MEALS    Dyspepsia       MULTIVITAMIN TABS   OR      1 TABLET DAILY        omeprazole 20 MG CR capsule    priLOSEC    180 capsule    Take 1 capsule (20 mg) by mouth 2 times daily    Gastroesophageal reflux disease, esophagitis presence not specified       ondansetron 8 MG ODT tab    ZOFRAN-ODT    30 tablet    Take 1 tablet (8 mg) by mouth every 12 hours as needed for  nausea Reported on 3/6/2017    Nausea       oxybutynin 5 MG tablet    DITROPAN    180 tablet    Take 1 tablet (5 mg) by mouth 2 times daily    Overactive bladder       simvastatin 20 MG tablet    ZOCOR    90 tablet    Take 1 tablet (20 mg) by mouth At Bedtime    Hyperlipidemia LDL goal <100       XALATAN 0.005 % ophthalmic solution   Generic drug:  latanoprost      Place 1 drop into both eyes At Bedtime

## 2018-02-21 NOTE — PATIENT INSTRUCTIONS
Thanks for coming today.  Ortho/Sports Medicine Clinic  [address] [place] [address]    To schedule future appointments in Ortho Clinic, you may call [phone].    To schedule ordered imaging by your Provider: Call [facility] at [phone]    Beatpacking available online at:   [email or web address]    Please call if any further questions or concerns [phone] and ask for the Orthopedic Department. Clinic hours 8 am to 5 pm.    Return to clinic if symptoms worsen.

## 2018-02-21 NOTE — LETTER
2/21/2018         RE: Lexy Rockwell  [address]  [facility] [address]        Dear Colleague,    Thank you for referring your patient, Lexy Rockwell, to the [facility]. Please see a copy of my visit note below.    Past Medical History:   Diagnosis Date     Cerumen impacted      Development delay      DM (diabetes mellitus) (H)      Glaucoma (increased eye pressure)      Hyperlipaemia      Hypertension      Hypothyroid      Nonsenile cataract      Obesity      Patient Active Problem List   Diagnosis     Development delay     Overactive bladder     GERD (gastroesophageal reflux disease)     Type 2 diabetes, HbA1C goal < 8% (H)     Hypertension goal BP (blood pressure) < 140/90     Hyperlipidemia LDL goal <100     Pain in joint, lower leg     Proteinuria     Vitamin D deficiency     Diabetic gastroparesis (H)     Health Care Home     History of strabismus surgery     Type 2 diabetes mellitus with other specified complication (H)     Advanced directives, counseling/discussion     Type 2 diabetes mellitus with diabetic polyneuropathy (H)     Primary open angle glaucoma of both eyes, moderate stage     Type 2 diabetes mellitus with diabetic polyneuropathy, with long-term current use of insulin (H)     Gastroesophageal reflux disease without esophagitis     Obesity, unspecified obesity severity, unspecified obesity type     Past Surgical History:   Procedure Laterality Date     C EYE SURG ANT SGMT PROC UNLISTED  remote    strabismus surgery     STRABISMUS SURGERY       Social History     Social History     Marital status: Single     Spouse name: N/A     Number of children: N/A     Years of education: N/A     Occupational History     Not on file.     Social History Main Topics     Smoking status: Never Smoker     Smokeless tobacco: Never Used      Comment: lives in smoke free household.     Alcohol use Yes      Comment: occass social     Drug use: No     Sexual  activity: No     Other Topics Concern     Parent/Sibling W/ Cabg, Mi Or Angioplasty Before 65f 55m? No     Social History Narrative     Family History   Problem Relation Age of Onset     Hypertension Father      DIABETES Sister      Glaucoma Maternal Grandfather      CANCER No family hx of      CEREBROVASCULAR DISEASE No family hx of      Thyroid Disease No family hx of      Macular Degeneration No family hx of      Lab Results   Component Value Date    A1C 12.3 11/20/2017        SUBJECTIVE FINDINGS:  A 57-year-old female returns to clinic for a diabetic foot check and onychauxis, onychomycosis and tylomas.  She relates she is doing well.  She is using the Loprox cream about twice a week.  It is working very well.  Her feet feel much better.  She relates that she needs her calluses trimmed and her toenails cut.  She does not have diabetic shoes and she does not want any.  She relates no ulcers or sores since I had seen her last but does have some dried blood on right big toenail border.  Relates to no injuries.  She does have peripheral neuropathy.       OBJECTIVE FINDINGS:  DP and PT are 1/4 bilaterally.  She has decreased hair growth bilaterally.  She has some peripheral edema bilaterally.  There is no erythema, no drainage, no odor, no calor bilaterally.  She has mild dry, scaly skin on calluses and left heel.  She has hyperkeratotic tissue buildup, plantar first MPJ left, mildly on plantar medial hallux bilaterally and plantar medial left heel.  She has incurvated nails with thickening, dystrophy, discoloration and brittleness, 1-5 bilaterally to differing degrees.  She has right medial Hallux toenail border dried blood blister with underlying skin intact.  She has dorsally contracted toes bilaterally, decreased ankle joint dorsiflexion and range of motion bilaterally, sharp/dull is decreased with 5.07 Lissie Latha monofilament bilaterally.      ASSESSMENT AND PLAN:  Onychauxis bilaterally.  Onychomycosis  bilaterally.  Tylomas.  She is diabetic with peripheral neuropathy.  Diagnosis and treatment discussed with her.  The 3 tylomas were sharp debrided bilaterally with a 15 blade upon consent.  She will continue the Loprox cream.  All the nails were debrided or reduced bilaterally upon consent.  She will return to clinic and see me in about 2-3 months.     Again, thank you for allowing me to participate in the care of your patient.        Sincerely,        Ravin Back DPM

## 2018-02-21 NOTE — NURSING NOTE
"Lexy Escobedo Moiz's goals for this visit include: Toenail and callus trim  She requests these members of her care team be copied on today's visit information: yes    PCP: Fredi Fuentes    Referring Provider:  No referring provider defined for this encounter.    No chief complaint on file.      Initial /88  Pulse 120  LMP 03/28/2014  SpO2 96% Estimated body mass index is 34.8 kg/(m^2) as calculated from the following:    Height as of 1/8/18: 1.607 m (5' 3.25\").    Weight as of 1/26/18: 89.8 kg (198 lb).  Medication Reconciliation: complete    "

## 2018-03-01 ENCOUNTER — CARE COORDINATION (OUTPATIENT)
Dept: CARE COORDINATION | Facility: CLINIC | Age: 58
End: 2018-03-01

## 2018-03-01 NOTE — PROGRESS NOTES
Clinic Care Coordination Contact--Social Work Follow Up Call/Assessment  Care Team Conversations    Psychosocial: Care team reported that transportation was a concern for this Patient for CDE appointment.  SW spoke with Patient who informed her and parents preference for them to transport her to appointments. Per chart review, Patient has not attended a CDE appointment and is scheduled for endocrinology on 3/14/2018.  SW and Patient discussed goal for Patient to talk with parents about their availability to allow her to coordinate CDE appointment.      Call to Patient today for update.  Patient reports that she did not schedule a CDE appointment as CDE and her previous endocrinologist had both decided that an insulin pump was not appropriate for her needs.  Therefore, she reports she will only see endocrinology at this time.  Patient states nothing further at this time.      Plan: 1) SW will update PCP and close to Care Coordination     JULIUS Dial, MSW   [facility]   [phone]  3/1/2018 1:15 PM

## 2018-03-07 ENCOUNTER — TELEPHONE (OUTPATIENT)
Dept: FAMILY MEDICINE | Facility: CLINIC | Age: 58
End: 2018-03-07

## 2018-03-07 NOTE — TELEPHONE ENCOUNTER
Forms received from: [place] [employer]   Phone number listed: [phone] ext [phone]   Fax listed: NA  Date received: 03/07/2018  Form description: Continuing Disability Report  Once forms are completed, please return to [place] [employer] via mail.  Is patient requesting to be contacted when forms are completed: NA    Form placed: In provider's basket  Mandy Wahl

## 2018-03-14 ENCOUNTER — OFFICE VISIT (OUTPATIENT)
Dept: ENDOCRINOLOGY | Facility: CLINIC | Age: 58
End: 2018-03-14
Attending: FAMILY MEDICINE
Payer: COMMERCIAL

## 2018-03-14 ENCOUNTER — TELEPHONE (OUTPATIENT)
Dept: FAMILY MEDICINE | Facility: CLINIC | Age: 58
End: 2018-03-14

## 2018-03-14 DIAGNOSIS — Z79.4 TYPE 2 DIABETES MELLITUS WITH DIABETIC POLYNEUROPATHY, WITH LONG-TERM CURRENT USE OF INSULIN (H): Primary | ICD-10-CM

## 2018-03-14 DIAGNOSIS — K30 INDIGESTION: ICD-10-CM

## 2018-03-14 DIAGNOSIS — F70 MILD MENTAL RETARDATION (I.Q. 50-70): ICD-10-CM

## 2018-03-14 DIAGNOSIS — E11.42 TYPE 2 DIABETES MELLITUS WITH DIABETIC POLYNEUROPATHY, WITH LONG-TERM CURRENT USE OF INSULIN (H): Primary | ICD-10-CM

## 2018-03-14 PROBLEM — E11.10 DIABETIC KETOACIDOSIS WITHOUT COMA ASSOCIATED WITH TYPE 2 DIABETES MELLITUS (H): Status: ACTIVE | Noted: 2017-01-25

## 2018-03-14 PROBLEM — E11.10 DIABETIC KETOACIDOSIS ASSOCIATED WITH TYPE 2 DIABETES MELLITUS (H): Status: ACTIVE | Noted: 2018-01-19

## 2018-03-14 PROBLEM — E11.10 DKA, TYPE 2 (H): Status: ACTIVE | Noted: 2017-04-13

## 2018-03-14 PROBLEM — N17.9 AKI (ACUTE KIDNEY INJURY) (H): Status: ACTIVE | Noted: 2017-04-13

## 2018-03-14 LAB
ALT SERPL W P-5'-P-CCNC: 18 U/L (ref 0–50)
ANION GAP SERPL CALCULATED.3IONS-SCNC: 17 MMOL/L (ref 3–14)
BUN SERPL-MCNC: 13 MG/DL (ref 7–30)
CALCIUM SERPL-MCNC: 9.6 MG/DL (ref 8.5–10.1)
CHLORIDE SERPL-SCNC: 101 MMOL/L (ref 94–109)
CO2 SERPL-SCNC: 17 MMOL/L (ref 20–32)
CREAT SERPL-MCNC: 0.88 MG/DL (ref 0.52–1.04)
GFR SERPL CREATININE-BSD FRML MDRD: 66 ML/MIN/1.7M2
GLUCOSE SERPL-MCNC: 574 MG/DL (ref 70–99)
HBA1C MFR BLD: 10.6 % (ref 4.3–6)
POTASSIUM SERPL-SCNC: 4.7 MMOL/L (ref 3.4–5.3)
SODIUM SERPL-SCNC: 135 MMOL/L (ref 133–144)
TSH SERPL DL<=0.005 MIU/L-ACNC: 2.65 MU/L (ref 0.4–4)

## 2018-03-14 PROCEDURE — 99204 OFFICE O/P NEW MOD 45 MIN: CPT | Performed by: INTERNAL MEDICINE

## 2018-03-14 PROCEDURE — 84681 ASSAY OF C-PEPTIDE: CPT | Performed by: INTERNAL MEDICINE

## 2018-03-14 PROCEDURE — 80048 BASIC METABOLIC PNL TOTAL CA: CPT | Performed by: INTERNAL MEDICINE

## 2018-03-14 PROCEDURE — 36415 COLL VENOUS BLD VENIPUNCTURE: CPT | Performed by: INTERNAL MEDICINE

## 2018-03-14 PROCEDURE — 83516 IMMUNOASSAY NONANTIBODY: CPT | Mod: 90 | Performed by: INTERNAL MEDICINE

## 2018-03-14 PROCEDURE — 84443 ASSAY THYROID STIM HORMONE: CPT | Performed by: INTERNAL MEDICINE

## 2018-03-14 PROCEDURE — 84460 ALANINE AMINO (ALT) (SGPT): CPT | Performed by: INTERNAL MEDICINE

## 2018-03-14 PROCEDURE — 99000 SPECIMEN HANDLING OFFICE-LAB: CPT | Performed by: INTERNAL MEDICINE

## 2018-03-14 PROCEDURE — 83036 HEMOGLOBIN GLYCOSYLATED A1C: CPT | Performed by: INTERNAL MEDICINE

## 2018-03-14 NOTE — PATIENT INSTRUCTIONS
Raise dose Lantus to 50 units each evening  Continue mealtime Novolog as 22 units each meal   Breakfast meal 22 units   Lunch meal 22 units   Supper meal 22 units  Take extra Novolog if measured blood glucose over 150 mg/dl.  For B-200 +2 units   201-250 +4 units   251-300 +6 units   301-350 +8 units   351-400 +10 units  ` 401-450 +12 units   451-500 + 14 units  Continue metforminXR 500 mg 2-tablets morning and evening  Check BG levels before each meal and also at bedtime  Don't take bedtime snack unless BG <150 mg/dl  Bring BG meter to clinic appointments  Raise the lisinopril to 40 mg daily (or take 2 of the 20 mg tablets daily)    Need evaluation with a gastroenterologist to evaluate and assist with management of the morning nausea, use of Reglan (not sure if effective).  History/symptoms and absence of significant peripheral neuropathy- gastroparesis unlikely.  Advised patient call for GI consult appt at [facility] [phone] (prefer [facility]?)    See Dr.. BERMUDEZ in approx 3 weeks for a Wednesday appt in [place].

## 2018-03-14 NOTE — LETTER
3/14/2018         RE: Lexy Rockwell  [address]  [place] [address]        Dear Colleague,    Thank you for referring your patient, Lexy Rockwell, to the [facility]. Please see a copy of my visit note below.    Name: Lexy Rockwell is a 57 year old woman, seen at the request of Fredi Fuentes for diabetes evaluation.    Chief Complaint   Patient presents with     Diabetes     HPI:  Recent issues:  Here for diabetes evaluation with her mother Francisca today  Had recent [facility] hospitalization for DKA illness 1/2018   Had skilled nursing home visits for few weeks after the hospitalization        Diagnosis of diabetes mellitus in her 40's, living [facility] or [facility]  Initial treatment with metformin, then addition of insulin.(multiple injections)  She does recall taking another DM pill in the past  Has seen Dr. Shira Wilson/[facility] Endocrin[facility] previously  Reports having many diabetes-related hospitalizations including DKA   Current DM meds:   Lantus Solostar 46U QHS   Novolog Flexpen 20U premeal     Novolog sscale 2U per 50>150 mg/dl   MetforminER 500 mg 2-tabs BID  Uses AccSpunkmobile Compact Plus meter, typically tests 2-3x/day  Meals TID  Recent [facility] labs include:  Lab Results   Component Value Date    A1C 10.6 (H) 03/14/2018     03/14/2018    POTASSIUM 4.7 03/14/2018    CHLORIDE 101 03/14/2018    CO2 17 (L) 03/14/2018    ANIONGAP 17 (H) 03/14/2018     (HH) 03/14/2018    BUN 13 03/14/2018    CR 0.88 03/14/2018    GFRESTIMATED 66 03/14/2018    GFRESTBLACK 80 03/14/2018    EFRAIN 9.6 03/14/2018    CPEPT 0.6 (L) 03/14/2018    CHOL 200 (H) 06/28/2017    TRIG 195 (H) 06/28/2017    HDL 44 (L) 06/28/2017     (H) 06/28/2017    NHDL 156 (H) 06/28/2017    UCRR 94 06/23/2017    MICROL 9 06/23/2017    UMALCR 9.80 06/23/2017     Has seen Jaqui NGUYEN for Diabetes Education  DM Complications:   Neuropathy    Decreased sensation at  feet    Possible gastroparesis?     Takes Reglan 5 mg TID premeal, also Prilosec    Last eye exam few months ago?, no DR  Also takes simvastatin 20 mg po QHS    Single, on Medicare Disability  Lives by self in [place], gets assistance with transportation from parents  Sees Dr. rFedi Fuentes for general medicine evaluations.  Has also seen Dr. Katalina Stanley and Cece Edwards/TONY    PMH/PSH:  Past Medical History:   Diagnosis Date     Cerumen impacted      Development delay      DM (diabetes mellitus) (H)      Glaucoma (increased eye pressure)      Hyperlipaemia      Hypertension      Hypothyroid      Mental retardation      Nonsenile cataract      Obesity      Past Surgical History:   Procedure Laterality Date     C EYE SURG ANT SGMT PROC UNLISTED  remote    strabismus surgery     STRABISMUS SURGERY         Family Hx:  Family History   Problem Relation Age of Onset     Hypertension Father      DIABETES Sister      Glaucoma Maternal Grandfather      CANCER No family hx of      CEREBROVASCULAR DISEASE No family hx of      Thyroid Disease No family hx of      Macular Degeneration No family hx of          Social Hx:  Social History     Social History     Marital status: Single     Spouse name: N/A     Number of children: N/A     Years of education: N/A     Occupational History     Not on file.     Social History Main Topics     Smoking status: Never Smoker     Smokeless tobacco: Never Used      Comment: lives in smoke free household.     Alcohol use Yes      Comment: occass social     Drug use: No     Sexual activity: No     Other Topics Concern     Parent/Sibling W/ Cabg, Mi Or Angioplasty Before 65f 55m? No     Social History Narrative          MEDICATIONS:  has a current medication list which includes the following prescription(s): latanoprost, b-d u/f, metoclopramide, insulin glargine, simvastatin, oxybutynin, insulin aspart, magnesium oxide, ondansetron, omeprazole, ciclopirox, metformin, accu-chek compact plus,  "lisinopril, blood glucose monitoring, blood glucose monitoring, aspirin, and multiple vitamin.    ROS:     ROS: 10 point ROS neg other than the symptoms noted above in the HPI.    GENERAL: no weight changes, fatigue, fevers, chills, night sweats.   HEENT: no dysphagia, odonophagia, diplopia, neck pain  THYROID:  no apparent hyper or hypothyroid symptoms  CV: no chest pain, pressure, palpitations  LUNGS: no SOB, VENEGAS, cough, wheezing   ABDOMEN: frequent morning pre-breakfasttime nausea, occasional reflux; no diarrhea, constipation, abdominal pain  EXTREMITIES: no rashes, ulcers, edema  NEUROLOGY: some feet numbness; no headaches, denies changes in vision, tingling   MSK: no muscle aches or pains, weakness  SKIN: no rashes or lesions  : no menses; denies hot flashes  PSYCH:  stable mood, no significant anxiety or depression  ENDOCRINE: no heat or cold intolerance      Physical Exam   VS: /89 (BP Location: Right arm, Cuff Size: Adult Large)  Pulse 119  Ht 5' 3.25\" (1.607 m)  Wt 191 lb (86.6 kg)  LMP 03/28/2014  BMI 33.57 kg/m2  GENERAL: AXOX3, NAD, slowed speech, well dressed, answering questions appropriately, appears stated age.  THYROID:  normal gland, no apparent nodules or goiter  HEENT: irregular teeth dentition; neck non-tender, no exopthalmous, no proptosis, EOMI  CV: RRR, no rubs, gallops, no murmurs  LUNGS: CTAB, no wheezes, rales, or ronchi  ABDOMEN: obese, soft, nontender, nondistended, no organomegaly  EXTREMITIES: mild pedal edema; +pedal pulses, no lesions  NEUROLOGY: somewhat slowed mentation, CN grossly intact, + DTR lower extremity, reduced monofilament sensation at feet; no tremors  MSK: grossly intact  SKIN: no rashes, no lesions    LABS:    All pertinent notes, labs, and images personally reviewed by me.     A/P:  Encounter Diagnoses   Name Primary?     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  Recent BG trends usually high in the 300-400 range, though unclear if Nv correction doses used.    Plan:  Discussed general issues with the diabetes diagnosis and management  Reviewed the general pathophysiology of T2DM  We discussed the hgbA1c test which reflects previous overall glucose levels or control  Discussed the importance of blood glucose (BG) testing to assess glucose trends  Provided general overview of the multiple daily injection (MDI) treatment plan using mealtime rapid-acting and daily long-acting insulin, also correction scale.    Recommend:  Reviewed the MDI insulin plan, issues with compliance using the mealtime Nv dosing.  Keep focus on diet, exercise, weight management.  Advise having fasting lipid panel testing and dilated eye examination, at least annually    Patient Instructions   Raise dose Lantus to 50 units each evening  Continue mealtime Novolog as 22 units each meal   Breakfast meal 22 units   Lunch meal 22 units   Supper meal 22 units  Take extra Novolog if measured blood glucose over 150 mg/dl.  For B-200 +2 units   201-250 +4 units   251-300 +6 units   301-350 +8 units   351-400 +10 units  ` 401-450 +12 units   451-500 + 14 units  Continue metforminXR 500 mg 2-tablets morning and evening  Check BG levels before each meal and also at bedtime  Don't take bedtime snack unless BG <150 mg/dl  Bring BG meter to clinic appointments  Raise the lisinopril to 40 mg daily (or take 2 of the 20 mg tablets daily)    Need evaluation with a gastroenterologist to evaluate and assist with management of the morning nausea, use of Reglan (not sure if effective).  History/symptoms and absence of significant peripheral neuropathy- gastroparesis unlikely.  Advised patient call for GI consult appt at [facility]  [phone] (prefer [facility]?)    See Dr.. BERMUDEZ in approx 3 weeks for a Wednesday appt in [place].    Addressed patient and her mother's questions today    Labs ordered today:   Orders Placed This Encounter   Procedures     ALT     Basic metabolic panel     Hemoglobin A1c     C-peptide     Glutamic acid decarboxylase antibody     TSH     GASTROENTEROLOGY ADULT REF CONSULT ONLY     Radiology/Consults ordered today: GASTROENTEROLOGY ADULT REF CONSULT ONLY    More than 50% of the time spent with Ms. Rockwell on counseling / coordinating her care.  Total appointment time was 50 minutes.    Follow-up:  3 weeks    Gilmar Kelly MD  Endocrinology  [facility]/[place]  CC: IADAN Fuentes, and DENISA Wilson        Again, thank you for allowing me to participate in the care of your patient.        Sincerely,        Gilmar Kelly MD

## 2018-03-14 NOTE — MR AVS SNAPSHOT
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  For B-200 +2 units   201-250 +4 units   251-300 +6 units   301-350 +8 units   351-400 +10 units  ` 401-450 +12 units   451-500 + 14 units  Continue metforminXR 500 mg 2-tablets morning and evening  Check BG levels before each meal and also at bedtime  Don't take bedtime snack unless BG <150 mg/dl  Bring BG meter to clinic appointments  Raise the lisinopril to 40 mg daily (or take 2 of the 20 mg tablets daily)    Need evaluation with a gastroenterologist to evaluate and assist with management of the morning nausea, use of Reglan (not sure if effective), and possible diagnosis of gastroparesis.  Call [facility] [phone] (prefer [place] area?)    See Dr.. BERMUDEZ in approx 3 weeks for a Wednesday appt in [place].            Follow-ups after your visit        Additional Services     GASTROENTEROLOGY ADULT REF CONSULT ONLY       Preferred Location: [facility] [phone] (prefers [place] area)      Please be aware that coverage of these services is subject to the terms and limitations of your health insurance plan.  Call member services at your health plan with any benefit or coverage questions.  Any procedures must be performed at a [place] facility OR coordinated by your clinic's referral  "office.    Please bring the following with you to your appointment:    (1) Any X-Rays, CTs or MRIs which have been performed.  Contact the facility where they were done to arrange for  prior to your scheduled appointment.    (2) List of current medications   (3) This referral request   (4) Any documents/labs given to you for this referral                  Follow-up notes from your care team     Return in about 3 weeks (around 4/4/2018).      Your next 10 appointments already scheduled     Apr 27, 2018 11:00 AM CDT   [facility] Ravin Back DPM   [facility]    [id number] [place] [id number] [phone]            May 15, 2018  3:15 PM CDT   Return Visit with Dayron Rios MD   [facility]    [address] [place] [id number] [phone]              Who to contact     If you have questions or need follow up information about today's clinic visit or your schedule please contact [facility] directly at [phone].  Normal or non-critical lab and imaging results will be communicated to you by MyChart, letter or phone within 4 business days after the clinic has received the results. If you do not hear from us within 7 days, please contact the clinic through MyChart or phone. If you have a critical or abnormal lab result, we will notify you by phone as soon as possible.  Submit refill requests through [facility] or call your pharmacy and they will forward the refill request to us. Please allow 3 business days for your refill to be completed.          Additional Information About Your Visit        [employer] Information     [facility] lets you send messages to your doctor, view your test results, renew your prescriptions, schedule appointments and more. To sign up, go to [email or web address] [employer] . Click on \"Log in\" on the left side of the screen, which will take you to the " "Welcome page. Then click on \"Sign up Now\" on the right side of the page.     You will be asked to enter the access code listed below, as well as some personal information. Please follow the directions to create your username and password.     Your access code is: [email or web address]  Expires: 2018  4:00 PM     Your access code will  in 90 days. If you need help or a new code, please call your [place] clinic or [phone].        Care EveryWhere ID     This is your Care EveryWhere ID. This could be used by other organizations to access your [place] medical records  [id number]        Your Vitals Were     Pulse Height Last Period BMI (Body Mass Index)          119 5' 3.25\" (1.607 m) 2014 33.57 kg/m2         Blood Pressure from Last 3 Encounters:   18 168/89   18 136/88   18 128/76    Weight from Last 3 Encounters:   18 191 lb (86.6 kg)   18 198 lb (89.8 kg)   18 195 lb (88.5 kg)              We Performed the Following     ALT     Basic metabolic panel     C-peptide     GASTROENTEROLOGY ADULT REF CONSULT ONLY     Glutamic acid decarboxylase antibody     Hemoglobin A1c     TSH        Primary Care Provider Office Phone # Fax #    Fredi Fuentes [phone]       [address] [facility] [address]        Equal Access to Services     [facility]: Hadii aad ku hadasho Soomaali, waaxda luqadaha, qaybta kaalmada adeegyada, rickey hernandez haysilver campuzano . So [facility] [phone].    ATENCIÓN: Si habla español, tiene a oneill disposición servicios gratuitos de asistencia lingüística. Jaret al [phone].    We comply with applicable federal civil rights laws and [place] laws. We do not discriminate on the basis of race, color, national origin, age, disability, sex, sexual orientation, or gender identity.            Thank you!     Thank you for choosing [facility]  for your care. Our goal is always to provide you with " excellent care. Hearing back from our patients is one way we can continue to improve our services. Please take a few minutes to complete the written survey that you may receive in the mail after your visit with us. Thank you!             Your Updated Medication List - Protect others around you: Learn how to safely use, store and throw away your medicines at www.disposemymeds.org.          This list is accurate as of 3/14/18  2:29 PM.  Always use your most recent med list.                   Brand Name Dispense Instructions for use Diagnosis    ACCU-CHEK COMPACT PLUS test strip   Generic drug:  blood glucose monitoring     153 each    TEST 5 TIMES DAILY OR AS DIRECTED    Type 2 diabetes mellitus with diabetic polyneuropathy, with long-term current use of insulin (H)       aspirin 81 MG tablet      1 TABLET DAILY        B-D U/F 31G X 8 MM   Generic drug:  insulin pen needle     100 each    USE WITH INSULIN PENS    Type 2 diabetes, HbA1C goal < 8% (H)       blood glucose monitoring lancets     100 each    1 Units 3 times daily.    Type 2 diabetes, HbA1C goal < 8% (H)       blood glucose monitoring meter device kit    no brand specified    1 kit    Use to test blood sugar 5 times daily or as directed.  Patient needs new monitor.  Accu Test Compact or whatever is covered.  Patient has very labile sugars so needs to check 5 x per day.  Please also include 3 month supply of strips, lancets, and whatever other supplies are needed.  Ongoing refills for a year.    Type 2 diabetes mellitus with other specified complication (H)       ciclopirox 0.77 % cream    LOPROX    90 g    Apply topically 2 times daily To feet and toenails.    Dermatophytosis of nail, Tinea pedis of both feet       insulin aspart 100 UNIT/ML injection    NovoLOG FLEXPEN    20 mL    Inject  before each meal. Dose to be titrated/ adjusted per sliding scale at home    Type 2 diabetes mellitus with diabetic polyneuropathy, with long-term current use of insulin  (H)       insulin glargine 100 UNIT/ML injection    LANTUS SOLOSTAR    15 mL    Inject 46 units at bedtime    Type 2 diabetes mellitus with diabetic polyneuropathy, with long-term current use of insulin (H)       latanoprost 0.005 % ophthalmic solution    XALATAN    7.5 mL    INSTILL 1 DROP IN BOTH EYES AT BEDTIME    Glaucoma, unspecified glaucoma type, unspecified laterality       lisinopril 20 MG tablet    PRINIVIL/ZESTRIL    90 tablet    Take 1 tablet (20 mg) by mouth daily    Essential hypertension with goal blood pressure less than 140/90, Type 2 diabetes mellitus with diabetic polyneuropathy (H)       magnesium oxide 400 MG tablet    MAG-OX    90 tablet    Take 1 tablet (400 mg) by mouth daily    Hypomagnesemia       metFORMIN 500 MG 24 hr tablet    GLUCOPHAGE-XR    120 tablet    Take 2 tablets (1,000 mg) by mouth 2 times daily    Type 2 diabetes mellitus with diabetic polyneuropathy, with long-term current use of insulin (H)       metoclopramide 5 MG tablet    REGLAN    90 tablet    TAKE ONE TABLET BY MOUTH THREE TIMES DAILY BEFORE MEALS    Dyspepsia       MULTIVITAMIN TABS   OR      1 TABLET DAILY        omeprazole 20 MG CR capsule    priLOSEC    180 capsule    Take 1 capsule (20 mg) by mouth 2 times daily    Gastroesophageal reflux disease, esophagitis presence not specified       ondansetron 8 MG ODT tab    ZOFRAN-ODT    30 tablet    Take 1 tablet (8 mg) by mouth every 12 hours as needed for nausea Reported on 3/6/2017    Nausea       oxybutynin 5 MG tablet    DITROPAN    180 tablet    Take 1 tablet (5 mg) by mouth 2 times daily    Overactive bladder       simvastatin 20 MG tablet    ZOCOR    90 tablet    Take 1 tablet (20 mg) by mouth At Bedtime    Hyperlipidemia LDL goal <100

## 2018-03-14 NOTE — NURSING NOTE
"Chief Complaint   Patient presents with     Diabetes       Initial /89 (BP Location: Right arm, Cuff Size: Adult Large)  Pulse 119  Ht 5' 3.25\" (1.607 m)  Wt 191 lb (86.6 kg)  LMP 03/28/2014  BMI 33.57 kg/m2 Estimated body mass index is 33.57 kg/(m^2) as calculated from the following:    Height as of this encounter: 5' 3.25\" (1.607 m).    Weight as of this encounter: 191 lb (86.6 kg).  Medication Reconciliation: complete       Robert Khan      "

## 2018-03-14 NOTE — PROGRESS NOTES
Name: Lexy Rockwell is a 57 year old woman, seen at the request of Fredi Fuentes for diabetes evaluation.    Chief Complaint   Patient presents with     Diabetes     HPI:  Recent issues:  Here for diabetes evaluation with her mother Francisca today  Had recent [facility] hospitalization for DKA illness 1/2018   Had skilled nursing home visits for few weeks after the hospitalization        Diagnosis of diabetes mellitus in her 40's, living [facility] or [facility]  Initial treatment with metformin, then addition of insulin.(multiple injections)  She does recall taking another DM pill in the past  Has seen Dr. Shira Wilson/[facility] previously  Reports having many diabetes-related hospitalizations including DKA   Current DM meds:   Lantus Solostar 46U QHS   Novolog Flexpen 20U premeal     Novolog sscale 2U per 50>150 mg/dl   MetforminER 500 mg 2-tabs BID  Uses [employer] Compact Plus meter, typically tests 2-3x/day  Meals TID  Recent [facility] labs include:  Lab Results   Component Value Date    A1C 10.6 (H) 03/14/2018     03/14/2018    POTASSIUM 4.7 03/14/2018    CHLORIDE 101 03/14/2018    CO2 17 (L) 03/14/2018    ANIONGAP 17 (H) 03/14/2018     (HH) 03/14/2018    BUN 13 03/14/2018    CR 0.88 03/14/2018    GFRESTIMATED 66 03/14/2018    GFRESTBLACK 80 03/14/2018    EFRAIN 9.6 03/14/2018    CPEPT 0.6 (L) 03/14/2018    CHOL 200 (H) 06/28/2017    TRIG 195 (H) 06/28/2017    HDL 44 (L) 06/28/2017     (H) 06/28/2017    NHDL 156 (H) 06/28/2017    UCRR 94 06/23/2017    MICROL 9 06/23/2017    UMALCR 9.80 06/23/2017     Has seen Jaqui NGUYEN for Diabetes Education  DM Complications:   Neuropathy    Decreased sensation at feet    Possible gastroparesis?     Takes Reglan 5 mg TID premeal, also Prilosec    Last eye exam few months ago?, no DR  Also takes simvastatin 20 mg po QHS    Single, on Medicare Disability  Lives by self in [place], gets assistance with transportation from parents  Sees   Fredi Fuentes for general medicine evaluations.  Has also seen Dr. Katalina Stanley and Cece Edwards/TON[facility]    PMH/PSH:  Past Medical History:   Diagnosis Date     Cerumen impacted      Development delay      DM (diabetes mellitus) (H)      Glaucoma (increased eye pressure)      Hyperlipaemia      Hypertension      Hypothyroid      Mental retardation      Nonsenile cataract      Obesity      Past Surgical History:   Procedure Laterality Date     C EYE SURG ANT SGMT PROC UNLISTED  remote    strabismus surgery     STRABISMUS SURGERY         Family Hx:  Family History   Problem Relation Age of Onset     Hypertension Father      DIABETES Sister      Glaucoma Maternal Grandfather      CANCER No family hx of      CEREBROVASCULAR DISEASE No family hx of      Thyroid Disease No family hx of      Macular Degeneration No family hx of          Social Hx:  Social History     Social History     Marital status: Single     Spouse name: N/A     Number of children: N/A     Years of education: N/A     Occupational History     Not on file.     Social History Main Topics     Smoking status: Never Smoker     Smokeless tobacco: Never Used      Comment: lives in smoke free household.     Alcohol use Yes      Comment: occass social     Drug use: No     Sexual activity: No     Other Topics Concern     Parent/Sibling W/ Cabg, Mi Or Angioplasty Before 65f 55m? No     Social History Narrative          MEDICATIONS:  has a current medication list which includes the following prescription(s): latanoprost, b-d u/f, metoclopramide, insulin glargine, simvastatin, oxybutynin, insulin aspart, magnesium oxide, ondansetron, omeprazole, ciclopirox, metformin, accu-chek compact plus, lisinopril, blood glucose monitoring, blood glucose monitoring, aspirin, and multiple vitamin.    ROS:     ROS: 10 point ROS neg other than the symptoms noted above in the HPI.    GENERAL: no weight changes, fatigue, fevers, chills, night sweats.   HEENT: no dysphagia, odonophagia,  "diplopia, neck pain  THYROID:  no apparent hyper or hypothyroid symptoms  CV: no chest pain, pressure, palpitations  LUNGS: no SOB, VENEGAS, cough, wheezing   ABDOMEN: frequent morning pre-breakfasttime nausea, occasional reflux; no diarrhea, constipation, abdominal pain  EXTREMITIES: no rashes, ulcers, edema  NEUROLOGY: some feet numbness; no headaches, denies changes in vision, tingling   MSK: no muscle aches or pains, weakness  SKIN: no rashes or lesions  : no menses; denies hot flashes  PSYCH:  stable mood, no significant anxiety or depression  ENDOCRINE: no heat or cold intolerance      Physical Exam   VS: /89 (BP Location: Right arm, Cuff Size: Adult Large)  Pulse 119  Ht 5' 3.25\" (1.607 m)  Wt 191 lb (86.6 kg)  LMP 03/28/2014  BMI 33.57 kg/m2  GENERAL: AXOX3, NAD, slowed speech, well dressed, answering questions appropriately, appears stated age.  THYROID:  normal gland, no apparent nodules or goiter  HEENT: irregular teeth dentition; neck non-tender, no exopthalmous, no proptosis, EOMI  CV: RRR, no rubs, gallops, no murmurs  LUNGS: CTAB, no wheezes, rales, or ronchi  ABDOMEN: obese, soft, nontender, nondistended, no organomegaly  EXTREMITIES: mild pedal edema; +pedal pulses, no lesions  NEUROLOGY: somewhat slowed mentation, CN grossly intact, + DTR lower extremity, reduced monofilament sensation at feet; no tremors  MSK: grossly intact  SKIN: no rashes, no lesions    LABS:    All pertinent notes, labs, and images personally reviewed by me.     A/P:  Encounter Diagnoses   Name Primary?     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  Recent BG trends usually high in the 300-400 range, though unclear if Nv correction doses " used.    Plan:  Discussed general issues with the diabetes diagnosis and management  Reviewed the general pathophysiology of T2DM  We discussed the hgbA1c test which reflects previous overall glucose levels or control  Discussed the importance of blood glucose (BG) testing to assess glucose trends  Provided general overview of the multiple daily injection (MDI) treatment plan using mealtime rapid-acting and daily long-acting insulin, also correction scale.    Recommend:  Reviewed the MDI insulin plan, issues with compliance using the mealtime Nv dosing.  Keep focus on diet, exercise, weight management.  Advise having fasting lipid panel testing and dilated eye examination, at least annually    Patient Instructions   Raise dose Lantus to 50 units each evening  Continue mealtime Novolog as 22 units each meal   Breakfast meal 22 units   Lunch meal 22 units   Supper meal 22 units  Take extra Novolog if measured blood glucose over 150 mg/dl.  For B-200 +2 units   201-250 +4 units   251-300 +6 units   301-350 +8 units   351-400 +10 units  ` 401-450 +12 units   451-500 + 14 units  Continue metforminXR 500 mg 2-tablets morning and evening  Check BG levels before each meal and also at bedtime  Don't take bedtime snack unless BG <150 mg/dl  Bring BG meter to clinic appointments  Raise the lisinopril to 40 mg daily (or take 2 of the 20 mg tablets daily)    Need evaluation with a gastroenterologist to evaluate and assist with management of the morning nausea, use of Reglan (not sure if effective).  History/symptoms and absence of significant peripheral neuropathy- gastroparesis unlikely.  Advised patient call for GI consult appt at [facility] [phone] (prefer [facility]?)    See Dr.. BERMUDEZ in approx 3 weeks for a Wednesday appt in [place].    Addressed patient and her mother's questions today    Labs ordered today:   Orders Placed This Encounter   Procedures     ALT     Basic metabolic panel     Hemoglobin A1c      C-peptide     Glutamic acid decarboxylase antibody     TSH     GASTROENTEROLOGY ADULT REF CONSULT ONLY     Radiology/Consults ordered today: GASTROENTEROLOGY ADULT REF CONSULT ONLY    More than 50% of the time spent with Ms. Rockwell on counseling / coordinating her care.  Total appointment time was 50 minutes.    Follow-up:  3 weeks    Gilmar Kelly MD  Endocrinology  [facility]  CC: AIDAN Fuentes, and DENISA Wilson

## 2018-03-15 VITALS
BODY MASS INDEX: 33.84 KG/M2 | DIASTOLIC BLOOD PRESSURE: 89 MMHG | HEART RATE: 119 BPM | WEIGHT: 191 LBS | SYSTOLIC BLOOD PRESSURE: 144 MMHG | HEIGHT: 63 IN

## 2018-03-15 LAB — C PEPTIDE SERPL-MCNC: 0.6 NG/ML (ref 0.9–6.9)

## 2018-03-15 NOTE — TELEPHONE ENCOUNTER
Critical lab result about glucose, her BS was 574.   Called up pt. Pt denies N/V, HA, blurry vision, dizziness etc.   Insulin was changed this afternoon by Endocrinologist. novolog is increased this afternoon. Her BS was in 400s when she last checked, she took the increased dose of novolog.   She scheduled for f/u in 3 weeks. Meanwhile advised to w/f the above symptoms, enough fluids by mouth.     Jaylene Ritchie MD.   Family Physician.  [facility].

## 2018-03-16 LAB — GAD65 AB SER IA-ACNC: >250 IU/ML (ref 0–5)

## 2018-03-18 PROBLEM — Z79.4 TYPE 2 DIABETES MELLITUS WITH DIABETIC POLYNEUROPATHY, WITH LONG-TERM CURRENT USE OF INSULIN (H): Status: RESOLVED | Noted: 2017-03-20 | Resolved: 2018-03-18

## 2018-03-18 PROBLEM — E11.42 TYPE 2 DIABETES MELLITUS WITH DIABETIC POLYNEUROPATHY, WITH LONG-TERM CURRENT USE OF INSULIN (H): Status: RESOLVED | Noted: 2017-03-20 | Resolved: 2018-03-18

## 2018-03-18 PROBLEM — E11.10 DIABETIC KETOACIDOSIS WITHOUT COMA ASSOCIATED WITH TYPE 2 DIABETES MELLITUS (H): Status: RESOLVED | Noted: 2017-01-25 | Resolved: 2018-03-18

## 2018-03-23 ENCOUNTER — TELEPHONE (OUTPATIENT)
Dept: FAMILY MEDICINE | Facility: CLINIC | Age: 58
End: 2018-03-23

## 2018-03-23 NOTE — TELEPHONE ENCOUNTER
Forms received from: [facility]   Phone number listed: NA   Fax listed: [phone]  Date received: 03/23/2018  Form description: Home Health Discharge  Once forms are completed, please return to [facility] via fax.  Is patient requesting to be contacted when forms are completed: NA    Form placed: IN provider's basket  Mandy Wahl

## 2018-03-30 DIAGNOSIS — Z79.4 TYPE 2 DIABETES MELLITUS WITH DIABETIC POLYNEUROPATHY, WITH LONG-TERM CURRENT USE OF INSULIN (H): ICD-10-CM

## 2018-03-30 DIAGNOSIS — E11.42 TYPE 2 DIABETES MELLITUS WITH DIABETIC POLYNEUROPATHY, WITH LONG-TERM CURRENT USE OF INSULIN (H): ICD-10-CM

## 2018-03-30 NOTE — TELEPHONE ENCOUNTER
"Requested Prescriptions   Pending Prescriptions Disp Refills     ACCU-CHEK COMPACT PLUS test strip [Pharmacy Med Name: ACCU-CHEK COMPACT PLUS(DRUM) #51] 408 strip 0    Last Written Prescription Date:  3/10/17  Last Fill Quantity: 153,  # refills: 10   Last office visit: 1/26/2018 with prescribing provider:     Future Office Visit:   Next 5 appointments (look out 90 days)     Apr 27, 2018 11:00 AM CDT   Return Visit with Ravin Back DPM   [facility]    [address] [place] [id number]   [phone]            May 15, 2018  3:15 PM CDT   Return Visit with Dayron Rios MD   [facility] ([address] [address]   [phone]                  Sig: TEST FIVE TIMES DAILY    Diabetic Supplies Protocol Passed    3/30/2018 11:55 AM       Passed - Patient is 18 years of age or older       Passed - Recent (6 mo) or future (30 days) visit within the authorizing provider's specialty    Patient had office visit in the last 6 months or has a visit in the next 30 days with authorizing provider.  See \"Patient Info\" tab in inbasket, or \"Choose Columns\" in Meds & Orders section of the refill encounter.              "

## 2018-03-30 NOTE — TELEPHONE ENCOUNTER
Prescription approved per [facility] Refill Protocol.    Derek Bartlett RN

## 2018-04-04 ENCOUNTER — OFFICE VISIT (OUTPATIENT)
Dept: ENDOCRINOLOGY | Facility: CLINIC | Age: 58
End: 2018-04-04
Payer: COMMERCIAL

## 2018-04-04 VITALS
BODY MASS INDEX: 33.74 KG/M2 | SYSTOLIC BLOOD PRESSURE: 128 MMHG | DIASTOLIC BLOOD PRESSURE: 86 MMHG | OXYGEN SATURATION: 95 % | HEART RATE: 125 BPM | WEIGHT: 192 LBS

## 2018-04-04 DIAGNOSIS — E10.69 TYPE 1 DIABETES MELLITUS WITH OTHER SPECIFIED COMPLICATION (H): Primary | ICD-10-CM

## 2018-04-04 DIAGNOSIS — E10.40 TYPE 1 DIABETES MELLITUS WITH DIABETIC NEUROPATHY (H): ICD-10-CM

## 2018-04-04 DIAGNOSIS — E10.40 TYPE 1 DIABETES MELLITUS WITH DIABETIC NEUROPATHY (H): Primary | ICD-10-CM

## 2018-04-04 PROCEDURE — 99214 OFFICE O/P EST MOD 30 MIN: CPT | Performed by: INTERNAL MEDICINE

## 2018-04-04 NOTE — LETTER
4/4/2018         RE: Lexy Rockwell  [address]  [place] [address]        Dear Colleague,    Thank you for referring your patient, Leyx Rockwell, to the [facility]. Please see a copy of my visit note below.    Name: Lexy Rockwell    HPI:  Recent issues:  Here for diabetes evaluation with her mother Francisca again today  No scheduled appt... Seen as workin appt        Diagnosis of diabetes mellitus in her 40's, living [facility] or [facility]  Initial treatment with metformin, then addition of insulin.(multiple injections)  She does recall taking another DM pill in the past  Has seen Dr. Shira Wilson/[facility] previously  Reports having many diabetes-related hospitalizations including DKA   Current DM meds:   Lantus Solostar 50U QHS   Novolog Flexpen 22U premeal     Novolog sscale 2U per 50>150 mg/dl   MetforminER 500 mg 2-tabs BID  Uses [employer] Compact Plus meter, typically tests 2-3x/day  Meals TID  Recent [facility] labs include:  Lab Results   Component Value Date    A1C 10.6 (H) 03/14/2018     03/14/2018    POTASSIUM 4.7 03/14/2018    CHLORIDE 101 03/14/2018    CO2 17 (L) 03/14/2018    ANIONGAP 17 (H) 03/14/2018     (HH) 03/14/2018    BUN 13 03/14/2018    CR 0.88 03/14/2018    GFRESTIMATED 66 03/14/2018    GFRESTBLACK 80 03/14/2018    EFRAIN 9.6 03/14/2018    CPEPT 0.6 (L) 03/14/2018    CHOL 200 (H) 06/28/2017    TRIG 195 (H) 06/28/2017    HDL 44 (L) 06/28/2017     (H) 06/28/2017    NHDL 156 (H) 06/28/2017    UCRR 94 06/23/2017    MICROL 9 06/23/2017    UMALCR 9.80 06/23/2017     Has seen Jaqui NGUYEN for Diabetes Education  1/2018 Hospitalized at [facility] with DKA  DM Complications:   Neuropathy    Decreased sensation at feet    Possible gastroparesis?     Takes Reglan 5 mg TID premeal, also Prilosec    Last eye exam few months ago?, no DR  Also takes simvastatin 20 mg po QHS, lisinopril 20 mg 2-tab QD (higher  dose)    Single, on Medicare Disability  Lives by self in [place], gets assistance with transportation from parents  Sees Dr. Fredi Fuentes for general medicine evaluations.  Has also seen Dr. Katalina Stanley and Cece Edwards/[facility]    PMH/PSH:  Past Medical History:   Diagnosis Date     Cerumen impacted      Development delay      Glaucoma (increased eye pressure)      Hyperlipaemia      Hypertension      Hypothyroid      Mental retardation      Nonsenile cataract      Obesity      Type 1 diabetes mellitus not at goal (H)      Past Surgical History:   Procedure Laterality Date     C EYE SURG ANT SGMT PROC UNLISTED  remote    strabismus surgery     STRABISMUS SURGERY         Family Hx:  Family History   Problem Relation Age of Onset     Hypertension Father      DIABETES Sister      Glaucoma Maternal Grandfather      CANCER No family hx of      CEREBROVASCULAR DISEASE No family hx of      Thyroid Disease No family hx of      Macular Degeneration No family hx of          Social Hx:  Social History     Social History     Marital status: Single     Spouse name: N/A     Number of children: N/A     Years of education: N/A     Occupational History     Not on file.     Social History Main Topics     Smoking status: Never Smoker     Smokeless tobacco: Never Used      Comment: lives in smoke free household.     Alcohol use Yes      Comment: occass social     Drug use: No     Sexual activity: No     Other Topics Concern     Parent/Sibling W/ Cabg, Mi Or Angioplasty Before 65f 55m? No     Social History Narrative          MEDICATIONS:  has a current medication list which includes the following prescription(s): accu-chek compact plus, latanoprost, b-d u/f, metoclopramide, insulin glargine, simvastatin, oxybutynin, insulin aspart, magnesium oxide, ondansetron, omeprazole, metformin, lisinopril, blood glucose monitoring, blood glucose monitoring, aspirin, ciclopirox, and multiple vitamin.    ROS:     ROS: 10 point ROS neg other than  the symptoms noted above in the HPI.    GENERAL: no weight changes, fatigue, fevers, chills, night sweats.   HEENT: no dysphagia, odonophagia, diplopia, neck pain  THYROID:  no apparent hyper or hypothyroid symptoms  CV: no chest pain, pressure, palpitations  LUNGS: no SOB, VENEGAS, cough, wheezing   ABDOMEN: frequent morning pre-breakfasttime nausea, occasional reflux; no diarrhea, constipation, abdominal pain  EXTREMITIES: no rashes, ulcers, edema  NEUROLOGY: some feet numbness; no headaches, denies changes in vision, tingling   MSK: no muscle aches or pains, weakness  SKIN: no rashes or lesions  : no menses; denies hot flashes  PSYCH:  stable mood, no significant anxiety or depression  ENDOCRINE: no heat or cold intolerance      Physical Exam   VS: /86  Pulse 125  Wt 192 lb (87.1 kg)  LMP 03/28/2014  SpO2 95%  BMI 33.74 kg/m2  GENERAL: AXOX3, NAD, slowed speech, well dressed, answering questions appropriately, appears stated age.  THYROID:  normal gland, no apparent nodules or goiter  HEENT: irregular teeth dentition; neck non-tender, no exopthalmous, no proptosis, EOMI  ABDOMEN: obese, soft, nontender, nondistended, no organomegaly  NEUROLOGY: somewhat slowed mentation, CN grossly intact, + DTR lower extremity, reduced monofilament sensation at feet; no tremors    LABS:    All pertinent notes, labs, and images personally reviewed by me.     A/P:  Encounter Diagnoses   Name Primary?     Type 1 diabetes mellitus with other specified complication (H) Yes     Type 1 diabetes mellitus with diabetic neuropathy (H)      Comments:  Reviewed overall diabetes management, high hgbA1c levels, recurrent diabetes-related hospitalizations, and insulin management options  Recent BG levels usually elevated, but improved, BG avg 275 mg/dl    Plan:  Discussed general issues with the diabetes diagnosis and management  Reviewed the general pathophysiology of T2DM  We discussed the hgbA1c test which reflects previous overall  glucose levels or control  Discussed the importance of blood glucose (BG) testing to assess glucose trends  Provided general overview of the multiple daily injection (MDI) treatment plan using mealtime rapid-acting and daily long-acting insulin, also correction scale.    Recommend:  Reviewed the MDI insulin plan, issues with compliance using the mealtime Nv dosing   Plan dose increases with Nv and Lantus, as noted.   When current supply of Lantus low, switch to Tresiba U200 (same dosing)  Goal target -150 mg/dl  No lab test ordered for today.  Keep focus on diet, exercise, weight management.  Advise having fasting lipid panel testing and dilated eye examination, at least annually  Keep planned f/u GI evaluation   Unclear whether she has gastroparesis... Nausea/bloating not postmeal    Addressed patient and her mother's questions today    Patient Instructions   Continue the same insulin plan with higher doses:   Lantus Solostar 54U QHS    (when Lantus supply low, change to Tresiba U200 at same dose, new Rx sent to [facility])   Novolog Flexpen 24U premeal     Use same Novolog sscale 2U per 50>150 mg/dl   MetforminER 500 mg 2-tabs BID  Test blood glucose level before meals and bedtime  Avoid bedtime snack unless BG <150 mg/dl    Labs ordered today:   No orders of the defined types were placed in this encounter.    Radiology/Consults ordered today: None    More than 50% of the time spent with Ms. Rockwell on counseling / coordinating her care.  Total appointment time was 25 minutes.    Follow-up:  3 weeks    Gilmar Kelly MD  Endocrinology  [facility], thank you for allowing me to participate in the care of your patient.        Sincerely,        Gilmar Kelly MD

## 2018-04-04 NOTE — PATIENT INSTRUCTIONS
Continue the same insulin plan with higher doses:   Lantus Solostar 54U QHS    (when Lantus supply low, change to Tresiba U200 at same dose, new Rx sent to [facility])   Novolog Flexpen 24U premeal     Use same Novolog sscale 2U per 50>150 mg/dl   MetforminER 500 mg 2-tabs BID  Test blood glucose level before meals and bedtime  Avoid bedtime snack unless BG <150 mg/dl

## 2018-04-04 NOTE — PROGRESS NOTES
Name: Lexy Rockwell    HPI:  Recent issues:  Here for diabetes evaluation with her mother Francisca again today  No scheduled appt... Seen as workin appt        Diagnosis of diabetes mellitus in her 40's, living [facility]  Initial treatment with metformin, then addition of insulin.(multiple injections)  She does recall taking another DM pill in the past  Has seen Dr. Shira Wilson/[facility] previously  Reports having many diabetes-related hospitalizations including DKA   Current DM meds:   Lantus Solostar 50U QHS   Novolog Flexpen 22U premeal     Novolog sscale 2U per 50>150 mg/dl   MetforminER 500 mg 2-tabs BID  Uses Trilliant Compact Plus meter, typically tests 2-3x/day  Meals TID  Recent [facility] labs include:  Lab Results   Component Value Date    A1C 10.6 (H) 03/14/2018     03/14/2018    POTASSIUM 4.7 03/14/2018    CHLORIDE 101 03/14/2018    CO2 17 (L) 03/14/2018    ANIONGAP 17 (H) 03/14/2018     (HH) 03/14/2018    BUN 13 03/14/2018    CR 0.88 03/14/2018    GFRESTIMATED 66 03/14/2018    GFRESTBLACK 80 03/14/2018    EFRAIN 9.6 03/14/2018    CPEPT 0.6 (L) 03/14/2018    CHOL 200 (H) 06/28/2017    TRIG 195 (H) 06/28/2017    HDL 44 (L) 06/28/2017     (H) 06/28/2017    NHDL 156 (H) 06/28/2017    UCRR 94 06/23/2017    MICROL 9 06/23/2017    UMALCR 9.80 06/23/2017     Has seen Jaqui NGUYEN for Diabetes Education  1/2018 Hospitalized at [facility] with DKA  DM Complications:   Neuropathy    Decreased sensation at feet    Possible gastroparesis?     Takes Reglan 5 mg TID premeal, also Prilosec    Last eye exam few months ago?, no DR  Also takes simvastatin 20 mg po QHS, lisinopril 20 mg 2-tab QD (higher dose)    Single, on Medicare Disability  Lives by self in [place], gets assistance with transportation from parents  Sees Dr. Fredi Fuentes for general medicine evaluations.  Has also seen Dr. Katalina Stanley and April Grudell/GI    PMH/PSH:  Past Medical History:   Diagnosis Date      Cerumen impacted      Development delay      Glaucoma (increased eye pressure)      Hyperlipaemia      Hypertension      Hypothyroid      Mental retardation      Nonsenile cataract      Obesity      Type 1 diabetes mellitus not at goal (H)      Past Surgical History:   Procedure Laterality Date     C EYE SURG ANT SGMT PROC UNLISTED  remote    strabismus surgery     STRABISMUS SURGERY         Family Hx:  Family History   Problem Relation Age of Onset     Hypertension Father      DIABETES Sister      Glaucoma Maternal Grandfather      CANCER No family hx of      CEREBROVASCULAR DISEASE No family hx of      Thyroid Disease No family hx of      Macular Degeneration No family hx of          Social Hx:  Social History     Social History     Marital status: Single     Spouse name: N/A     Number of children: N/A     Years of education: N/A     Occupational History     Not on file.     Social History Main Topics     Smoking status: Never Smoker     Smokeless tobacco: Never Used      Comment: lives in smoke free household.     Alcohol use Yes      Comment: occass social     Drug use: No     Sexual activity: No     Other Topics Concern     Parent/Sibling W/ Cabg, Mi Or Angioplasty Before 65f 55m? No     Social History Narrative          MEDICATIONS:  has a current medication list which includes the following prescription(s): accu-chek compact plus, latanoprost, b-d u/f, metoclopramide, insulin glargine, simvastatin, oxybutynin, insulin aspart, magnesium oxide, ondansetron, omeprazole, metformin, lisinopril, blood glucose monitoring, blood glucose monitoring, aspirin, ciclopirox, and multiple vitamin.    ROS:     ROS: 10 point ROS neg other than the symptoms noted above in the HPI.    GENERAL: no weight changes, fatigue, fevers, chills, night sweats.   HEENT: no dysphagia, odonophagia, diplopia, neck pain  THYROID:  no apparent hyper or hypothyroid symptoms  CV: no chest pain, pressure, palpitations  LUNGS: no SOB, VENEGAS,  cough, wheezing   ABDOMEN: frequent morning pre-breakfasttime nausea, occasional reflux; no diarrhea, constipation, abdominal pain  EXTREMITIES: no rashes, ulcers, edema  NEUROLOGY: some feet numbness; no headaches, denies changes in vision, tingling   MSK: no muscle aches or pains, weakness  SKIN: no rashes or lesions  : no menses; denies hot flashes  PSYCH:  stable mood, no significant anxiety or depression  ENDOCRINE: no heat or cold intolerance      Physical Exam   VS: /86  Pulse 125  Wt 192 lb (87.1 kg)  LMP 03/28/2014  SpO2 95%  BMI 33.74 kg/m2  GENERAL: AXOX3, NAD, slowed speech, well dressed, answering questions appropriately, appears stated age.  THYROID:  normal gland, no apparent nodules or goiter  HEENT: irregular teeth dentition; neck non-tender, no exopthalmous, no proptosis, EOMI  ABDOMEN: obese, soft, nontender, nondistended, no organomegaly  NEUROLOGY: somewhat slowed mentation, CN grossly intact, + DTR lower extremity, reduced monofilament sensation at feet; no tremors    LABS:    All pertinent notes, labs, and images personally reviewed by me.     A/P:  Encounter Diagnoses   Name Primary?     Type 1 diabetes mellitus with other specified complication (H) Yes     Type 1 diabetes mellitus with diabetic neuropathy (H)      Comments:  Reviewed overall diabetes management, high hgbA1c levels, recurrent diabetes-related hospitalizations, and insulin management options  Recent BG levels usually elevated, but improved, BG avg 275 mg/dl    Plan:  Discussed general issues with the diabetes diagnosis and management  Reviewed the general pathophysiology of T2DM  We discussed the hgbA1c test which reflects previous overall glucose levels or control  Discussed the importance of blood glucose (BG) testing to assess glucose trends  Provided general overview of the multiple daily injection (MDI) treatment plan using mealtime rapid-acting and daily long-acting insulin, also correction  scale.    Recommend:  Reviewed the MDI insulin plan, issues with compliance using the mealtime Nv dosing   Plan dose increases with Nv and Lantus, as noted.   When current supply of Lantus low, switch to Tresiba U200 (same dosing)  Goal target -150 mg/dl  No lab test ordered for today.  Keep focus on diet, exercise, weight management.  Advise having fasting lipid panel testing and dilated eye examination, at least annually  Keep planned f/u GI evaluation   Unclear whether she has gastroparesis... Nausea/bloating not postmeal    Addressed patient and her mother's questions today    Patient Instructions   Continue the same insulin plan with higher doses:   Lantus Solostar 54U QHS    (when Lantus supply low, change to Tresiba U200 at same dose, new Rx sent to - [facility])   Novolog Flexpen 24U premeal     Use same Novolog sscale 2U per 50>150 mg/dl   MetforminER 500 mg 2-tabs BID  Test blood glucose level before meals and bedtime  Avoid bedtime snack unless BG <150 mg/dl    Labs ordered today:   No orders of the defined types were placed in this encounter.    Radiology/Consults ordered today: None    More than 50% of the time spent with Ms. Rockwell on counseling / coordinating her care.  Total appointment time was 25 minutes.    Follow-up:  3 weeks    Gilmar Kelly MD  Endocrinology  [facility]

## 2018-04-04 NOTE — MR AVS SNAPSHOT
After Visit Summary   4/4/2018    Lexy Rockwell    MRN: [id number]           Patient Information     Date Of Birth          1960        Visit Information        Provider Department      4/4/2018 1:00 PM Gilmar Kelly MD [facility]        Today's Diagnoses     Type 1 diabetes mellitus with other specified complication (H)    -  1      Care Instructions    Continue the same insulin plan with higher doses:   Lantus Solostar 54U QHS    (when Lantus supply low, change to Tresiba U200 at same dose, new Rx sent to [facility])   Novolog Flexpen 24U premeal     Use same Novolog sscale 2U per 50>150 mg/dl   MetforminER 500 mg 2-tabs BID  Test blood glucose level before meals and bedtime  Avoid bedtime snack unless BG <150 mg/dl          Follow-ups after your visit        Follow-up notes from your care team     Return in about 8 weeks (around 5/30/2018).      Your next 10 appointments already scheduled     Apr 27, 2018 11:00 AM CDT   Return Visit with Ravin Back DPM   [facility] ([facility])    [address] [place] [id number] [phone]            May 15, 2018  3:15 PM CDT   Return Visit with Dayron Rios MD   [facility] ([facility]    [address] [place] [id number] [phone]            Jun 06, 2018  3:30 PM CDT   Return Visit with Gilmar Kelly MD   [facility] ([address] [address]   [phone]              Who to contact     If you have questions or need follow up information about today's clinic visit or your schedule please contact [facility] directly at [phone].  Normal or non-critical lab and imaging results will be communicated to you by MyChart, letter or phone within 4 business days after the clinic has received the results. If you do not hear from us within 7 days,  "please contact the clinic through [employer] or phone. If you have a critical or abnormal lab result, we will notify you by phone as soon as possible.  Submit refill requests through [employer] or call your pharmacy and they will forward the refill request to us. Please allow 3 business days for your refill to be completed.          Additional Information About Your Visit        [email or web address] Information     Divergence lets you send messages to your doctor, view your test results, renew your prescriptions, schedule appointments and more. To sign up, go to [email or web address] . Click on \"Log in\" on the left side of the screen, which will take you to the Welcome page. Then click on \"Sign up Now\" on the right side of the page.     You will be asked to enter the access code listed below, as well as some personal information. Please follow the directions to create your username and password.     Your access code is: [email or web address]  Expires: 2018  4:00 PM     Your access code will  in 90 days. If you need help or a new code, please call your [place] clinic or [phone].        Care EveryWhere ID     This is your Care EveryWhere ID. This could be used by other organizations to access your [place] medical records  [id number]        Your Vitals Were     Pulse Last Period Pulse Oximetry BMI (Body Mass Index)          125 2014 95% 33.74 kg/m2         Blood Pressure from Last 3 Encounters:   18 128/86   18 144/89   18 136/88    Weight from Last 3 Encounters:   18 192 lb (87.1 kg)   18 191 lb (86.6 kg)   18 198 lb (89.8 kg)              Today, you had the following     No orders found for display       Primary Care Provider Office Phone # Fax #    Fredi Fuentes [phone]       [address] [facility] [address]        Equal Access to Services     BRITTNI TOWNSEND AH: Hadii ashok Moody, waericda heath, qaybta kaalrickey mcclelland " anastasia dudleymiguelina estephaniaalivia lawindyprice castillo. So [facility] [phone].    ATENCIÓN: Si josela sol, tiene a oneill disposición servicios gratuitos de asistencia lingüística. Jaret laws [phone].    We comply with applicable federal civil rights laws and [place] laws. We do not discriminate on the basis of race, color, national origin, age, disability, sex, sexual orientation, or gender identity.            Thank you!     Thank you for choosing [facility]  for your care. Our goal is always to provide you with excellent care. Hearing back from our patients is one way we can continue to improve our services. Please take a few minutes to complete the written survey that you may receive in the mail after your visit with us. Thank you!             Your Updated Medication List - Protect others around you: Learn how to safely use, store and throw away your medicines at www.disposemymeds.org.          This list is accurate as of 4/4/18  1:51 PM.  Always use your most recent med list.                   Brand Name Dispense Instructions for use Diagnosis    ACCU-CHEK COMPACT PLUS test strip   Generic drug:  blood glucose monitoring     408 strip    TEST FIVE TIMES DAILY    Type 2 diabetes mellitus with diabetic polyneuropathy, with long-term current use of insulin (H)       aspirin 81 MG tablet      1 TABLET DAILY        B-D U/F 31G X 8 MM   Generic drug:  insulin pen needle     100 each    USE WITH INSULIN PENS    Type 2 diabetes, HbA1C goal < 8% (H)       blood glucose monitoring lancets     100 each    1 Units 3 times daily.    Type 2 diabetes, HbA1C goal < 8% (H)       blood glucose monitoring meter device kit    no brand specified    1 kit    Use to test blood sugar 5 times daily or as directed.  Patient needs new monitor.  Accu Test Compact or whatever is covered.  Patient has very labile sugars so needs to check 5 x per day.  Please also include 3 month supply of strips, lancets, and whatever other supplies are needed.   Ongoing refills for a year.    Type 2 diabetes mellitus with other specified complication (H)       ciclopirox 0.77 % cream    LOPROX    90 g    Apply topically 2 times daily To feet and toenails.    Dermatophytosis of nail, Tinea pedis of both feet       insulin aspart 100 UNIT/ML injection    NovoLOG FLEXPEN    20 mL    Inject  before each meal. Dose to be titrated/ adjusted per sliding scale at home    Type 2 diabetes mellitus with diabetic polyneuropathy, with long-term current use of insulin (H)       insulin glargine 100 UNIT/ML injection    LANTUS SOLOSTAR    15 mL    Inject 46 units at bedtime    Type 2 diabetes mellitus with diabetic polyneuropathy, with long-term current use of insulin (H)       latanoprost 0.005 % ophthalmic solution    XALATAN    7.5 mL    INSTILL 1 DROP IN BOTH EYES AT BEDTIME    Glaucoma, unspecified glaucoma type, unspecified laterality       lisinopril 20 MG tablet    PRINIVIL/ZESTRIL    90 tablet    Take 1 tablet (20 mg) by mouth daily    Essential hypertension with goal blood pressure less than 140/90, Type 2 diabetes mellitus with diabetic polyneuropathy (H)       magnesium oxide 400 MG tablet    MAG-OX    90 tablet    Take 1 tablet (400 mg) by mouth daily    Hypomagnesemia       metFORMIN 500 MG 24 hr tablet    GLUCOPHAGE-XR    120 tablet    Take 2 tablets (1,000 mg) by mouth 2 times daily    Type 2 diabetes mellitus with diabetic polyneuropathy, with long-term current use of insulin (H)       metoclopramide 5 MG tablet    REGLAN    90 tablet    TAKE ONE TABLET BY MOUTH THREE TIMES DAILY BEFORE MEALS    Dyspepsia       MULTIVITAMIN TABS   OR      1 TABLET DAILY        omeprazole 20 MG CR capsule    priLOSEC    180 capsule    Take 1 capsule (20 mg) by mouth 2 times daily    Gastroesophageal reflux disease, esophagitis presence not specified       ondansetron 8 MG ODT tab    ZOFRAN-ODT    30 tablet    Take 1 tablet (8 mg) by mouth every 12 hours as needed for nausea Reported on  3/6/2017    Nausea       oxybutynin 5 MG tablet    DITROPAN    180 tablet    Take 1 tablet (5 mg) by mouth 2 times daily    Overactive bladder       simvastatin 20 MG tablet    ZOCOR    90 tablet    Take 1 tablet (20 mg) by mouth At Bedtime    Hyperlipidemia LDL goal <100

## 2018-04-24 ENCOUNTER — TRANSFERRED RECORDS (OUTPATIENT)
Dept: HEALTH INFORMATION MANAGEMENT | Facility: CLINIC | Age: 58
End: 2018-04-24

## 2018-04-27 ENCOUNTER — OFFICE VISIT (OUTPATIENT)
Dept: PODIATRY | Facility: CLINIC | Age: 58
End: 2018-04-27
Payer: COMMERCIAL

## 2018-04-27 VITALS — DIASTOLIC BLOOD PRESSURE: 84 MMHG | HEART RATE: 101 BPM | SYSTOLIC BLOOD PRESSURE: 116 MMHG | OXYGEN SATURATION: 95 %

## 2018-04-27 DIAGNOSIS — E11.49 TYPE II OR UNSPECIFIED TYPE DIABETES MELLITUS WITH NEUROLOGICAL MANIFESTATIONS, NOT STATED AS UNCONTROLLED(250.60) (H): ICD-10-CM

## 2018-04-27 DIAGNOSIS — L84 TYLOMA: ICD-10-CM

## 2018-04-27 DIAGNOSIS — B35.1 DERMATOPHYTOSIS OF NAIL: Primary | ICD-10-CM

## 2018-04-27 PROCEDURE — 99213 OFFICE O/P EST LOW 20 MIN: CPT | Performed by: PODIATRIST

## 2018-04-27 RX ORDER — CICLOPIROX OLAMINE 7.7 MG/G
CREAM TOPICAL 2 TIMES DAILY
Qty: 90 G | Refills: 6 | Status: SHIPPED | OUTPATIENT
Start: 2018-04-27 | End: 2018-10-26

## 2018-04-27 NOTE — PROGRESS NOTES
Past Medical History:   Diagnosis Date     Cerumen impacted      Development delay      Glaucoma (increased eye pressure)      Hyperlipaemia      Hypertension      Hypothyroid      Mental retardation      Nonsenile cataract      Obesity      Type 1 diabetes mellitus not at goal (H)      Patient Active Problem List   Diagnosis     Development delay     Overactive bladder     GERD (gastroesophageal reflux disease)     Hypertension goal BP (blood pressure) < 140/90     Hyperlipidemia LDL goal <100     Pain in joint, lower leg     Proteinuria     Vitamin D deficiency     Diabetic gastroparesis (H)     Health Care Home     History of strabismus surgery     Type 1 diabetes mellitus with other specified complication (H)     Advanced directives, counseling/discussion     Primary open angle glaucoma of both eyes, moderate stage     Gastroesophageal reflux disease without esophagitis     Obesity, unspecified obesity severity, unspecified obesity type     Acute renal failure (H)     Acute retention of urine     JEAN (acute kidney injury) (H)     Dehydration     Diabetic ketoacidosis associated with type 2 diabetes mellitus (H)     Diabetic ketoacidosis without coma associated with type 1 diabetes mellitus (H)     Diabetic polyneuropathy (H)     DKA (diabetic ketoacidoses) (H)     DKA, type 2 (H)     Sinus tachycardia     SIRS (systemic inflammatory response syndrome) (H)     Past Surgical History:   Procedure Laterality Date     C EYE SURG ANT SGMT PROC UNLISTED  remote    strabismus surgery     STRABISMUS SURGERY       Social History     Social History     Marital status: Single     Spouse name: N/A     Number of children: N/A     Years of education: N/A     Occupational History     Not on file.     Social History Main Topics     Smoking status: Never Smoker     Smokeless tobacco: Never Used      Comment: lives in smoke free household.     Alcohol use Yes      Comment: occass social     Drug use: No     Sexual activity: No      Other Topics Concern     Parent/Sibling W/ Cabg, Mi Or Angioplasty Before 65f 55m? No     Social History Narrative     Family History   Problem Relation Age of Onset     Hypertension Father      DIABETES Sister      Glaucoma Maternal Grandfather      CANCER No family hx of      CEREBROVASCULAR DISEASE No family hx of      Thyroid Disease No family hx of      Macular Degeneration No family hx of      Lab Results   Component Value Date    A1C 10.6 03/14/2018        SUBJECTIVE FINDINGS:  A 57-year-old female returns to clinic for a diabetic foot check and onychauxis, onychomycosis and tylomas.  She relates she is doing well.  She is using the Loprox cream and needs a refill and feels it is working well.  She relates no ulcers or sores since I had seen her last.  Relates to no injuries.  She does have peripheral neuropathy.       OBJECTIVE FINDINGS:  DP and PT are 1/4 bilaterally.  She has decreased hair growth bilaterally.  She has some peripheral edema bilaterally.  There is no erythema, no drainage, no odor, no calor bilaterally.  She has mild dry, scaly skin on calluses and left heel.  She has hyperkeratotic tissue buildup, plantar first MPJ left, mildly on plantar medial hallux bilaterally and plantar medial left heel.  She has incurvated nails with thickening, dystrophy, discoloration and brittleness, 1-5 bilaterally to differing degrees.        ASSESSMENT AND PLAN:  Onychauxis bilaterally.  Onychomycosis bilaterally.  Tylomas.  She is diabetic with peripheral neuropathy.  Diagnosis and treatment discussed with her.  The 3 tylomas were sharp debrided bilaterally with a 15 blade upon consent.  She will continue the Loprox cream, refill given.  All the nails were debrided or reduced bilaterally upon consent.  She will return to clinic and see me in about 2-3 months.

## 2018-04-27 NOTE — LETTER
4/27/2018         RE: Lexy Rockwell  [address]  [facility] [address]        Dear Colleague,    Thank you for referring your patient, Lexy Rockwell, to the [facility]. Please see a copy of my visit note below.    Past Medical History:   Diagnosis Date     Cerumen impacted      Development delay      Glaucoma (increased eye pressure)      Hyperlipaemia      Hypertension      Hypothyroid      Mental retardation      Nonsenile cataract      Obesity      Type 1 diabetes mellitus not at goal (H)      Patient Active Problem List   Diagnosis     Development delay     Overactive bladder     GERD (gastroesophageal reflux disease)     Hypertension goal BP (blood pressure) < 140/90     Hyperlipidemia LDL goal <100     Pain in joint, lower leg     Proteinuria     Vitamin D deficiency     Diabetic gastroparesis (H)     Health Care Home     History of strabismus surgery     Type 1 diabetes mellitus with other specified complication (H)     Advanced directives, counseling/discussion     Primary open angle glaucoma of both eyes, moderate stage     Gastroesophageal reflux disease without esophagitis     Obesity, unspecified obesity severity, unspecified obesity type     Acute renal failure (H)     Acute retention of urine     EJAN (acute kidney injury) (H)     Dehydration     Diabetic ketoacidosis associated with type 2 diabetes mellitus (H)     Diabetic ketoacidosis without coma associated with type 1 diabetes mellitus (H)     Diabetic polyneuropathy (H)     DKA (diabetic ketoacidoses) (H)     DKA, type 2 (H)     Sinus tachycardia     SIRS (systemic inflammatory response syndrome) (H)     Past Surgical History:   Procedure Laterality Date     C EYE SURG ANT SGMT PROC UNLISTED  remote    strabismus surgery     STRABISMUS SURGERY       Social History     Social History     Marital status: Single     Spouse name: N/A     Number of children: N/A     Years of education: N/A      Occupational History     Not on file.     Social History Main Topics     Smoking status: Never Smoker     Smokeless tobacco: Never Used      Comment: lives in smoke free household.     Alcohol use Yes      Comment: occass social     Drug use: No     Sexual activity: No     Other Topics Concern     Parent/Sibling W/ Cabg, Mi Or Angioplasty Before 65f 55m? No     Social History Narrative     Family History   Problem Relation Age of Onset     Hypertension Father      DIABETES Sister      Glaucoma Maternal Grandfather      CANCER No family hx of      CEREBROVASCULAR DISEASE No family hx of      Thyroid Disease No family hx of      Macular Degeneration No family hx of      Lab Results   Component Value Date    A1C 10.6 03/14/2018        SUBJECTIVE FINDINGS:  A 57-year-old female returns to clinic for a diabetic foot check and onychauxis, onychomycosis and tylomas.  She relates she is doing well.  She is using the Loprox cream  and needs a refill and feels it is working well.  She relates no ulcers or sores since I had seen her last.  Relates to no injuries.  She does have peripheral neuropathy.       OBJECTIVE FINDINGS:  DP and PT are 1/4 bilaterally.  She has decreased hair growth bilaterally.  She has some peripheral edema bilaterally.  There is no erythema, no drainage, no odor, no calor bilaterally.  She has mild dry, scaly skin on calluses and left heel.  She has hyperkeratotic tissue buildup, plantar first MPJ left, mildly on plantar medial hallux bilaterally and plantar medial left heel.  She has incurvated nails with thickening, dystrophy, discoloration and brittleness, 1-5 bilaterally to differing degrees.        ASSESSMENT AND PLAN:  Onychauxis bilaterally.  Onychomycosis bilaterally.  Tylomas.  She is diabetic with peripheral neuropathy.  Diagnosis and treatment discussed with her.  The 3 tylomas were sharp debrided bilaterally with a 15 blade upon consent.  She will continue the Loprox cream, refill  given.  All the nails were debrided or reduced bilaterally upon consent.  She will return to clinic and see me in about 2-3 months.     Again, thank you for allowing me to participate in the care of your patient.        Sincerely,        Ravin Back DPM

## 2018-04-27 NOTE — NURSING NOTE
"Lexy Rockwell's goals for this visit include: Toenail trim  She requests these members of her care team be copied on today's visit information: yes    PCP: Fredi Fuentes    Referring Provider:  No referring provider defined for this encounter.    Chief Complaint   Patient presents with     RECHECK     toenail trim       Initial /84  Pulse 101  LMP 03/28/2014  SpO2 95% Estimated body mass index is 33.74 kg/(m^2) as calculated from the following:    Height as of 3/14/18: 1.607 m (5' 3.25\").    Weight as of 4/4/18: 87.1 kg (192 lb).  Medication Reconciliation: complete    "

## 2018-04-27 NOTE — MR AVS SNAPSHOT
After Visit Summary   4/27/2018    Lexy Rockwell    MRN: [id number]           Patient Information     Date Of Birth          1960        Visit Information        Provider Department      4/27/2018 11:00 AM Ravin Back DPM [facility]        Today's Diagnoses     Dermatophytosis of nail    -  1    Tyloma        Type II or unspecified type diabetes mellitus with neurological manifestations, not stated as uncontrolled(250.60) (H)          Care Instructions    Thanks for coming today.  Ortho/Sports Medicine Clinic  [address] [place] [address]    To schedule future appointments in Ortho Clinic, you may call [phone].    To schedule ordered imaging by your Provider: Call [facility] at [phone]    [employer] available online at:   [email or web address]    Please call if any further questions or concerns [phone] and ask for the Orthopedic Department. Clinic hours 8 am to 5 pm.    Return to clinic if symptoms worsen.            Follow-ups after your visit        Your next 10 appointments already scheduled     May 15, 2018  3:15 PM CDT   [facility] Visit with Dayron Rios MD   [facility]    [address] [place] [address]   [phone]            Jun 06, 2018  3:30 PM CDT   Return Visit with Gilmar Kelly MD   [facility] ([facility]    [address] [place] [address]   [phone]            Jul 27, 2018 11:00 AM CDT   Return Visit with Ravin Back DPM   [facility] ([facility]    [address] [place] [id number] [phone]              Who to contact     If you have questions or need follow up information about today's clinic visit or your schedule please contact [facility] [[email or web address] directly at [phone].  Normal or non-critical lab and imaging results will be  communicated to you by Sarbarihart, letter or phone within 4 business days after the clinic has received the results. If you do not hear from us within 7 days, please contact the clinic through [employer] or phone. If you have a critical or abnormal lab result, we will notify you by phone as soon as possible.  Submit refill requests through [employer] or call your pharmacy and they will forward the refill request to us. Please allow 3 business days for your refill to be completed.          Additional Information About Your Visit        [employer] Information     [employer] is an electronic gateway that provides easy, online access to your medical records. With [employer], you can request a clinic appointment, read your test results, renew a prescription or communicate with your care team.     To sign up for [employer] visit the website at [email or web address]   You will be asked to enter the access code listed below, as well as some personal information. Please follow the directions to create your username and password.     Your access code is: [email or web address]  Expires: 2018  4:00 PM     Your access code will  in 90 days. If you need help or a new code, please contact your [facility] or call [phone] for assistance.        Care EveryWhere ID     This is your Care EveryWhere ID. This could be used by other organizations to access your [facility] medical records  [id number]        Your Vitals Were     Pulse Last Period Pulse Oximetry             101 2014 95%          Blood Pressure from Last 3 Encounters:   18 116/84   18 128/86   18 144/89    Weight from Last 3 Encounters:   18 87.1 kg (192 lb)   18 86.6 kg (191 lb)   18 89.8 kg (198 lb)              We Performed the Following     DEBRIDEMENT OF NAIL(S), 1-5     TRIM HYPERKERATOTIC SKIN LESION,2-4          Today's Medication Changes          These changes are accurate as of 18 11:59 PM.  If  you have any questions, ask your nurse or doctor.               These medicines have changed or have updated prescriptions.        Dose/Directions    * ciclopirox 0.77 % cream   Commonly known as:  LOPROX   This may have changed:  Another medication with the same name was added. Make sure you understand how and when to take each.   Used for:  Dermatophytosis of nail, Tinea pedis of both feet        Apply topically 2 times daily To feet and toenails.   Quantity:  90 g   Refills:  6       * ciclopirox 0.77 % cream   Commonly known as:  LOPROX   This may have changed:  You were already taking a medication with the same name, and this prescription was added. Make sure you understand how and when to take each.   Used for:  Dermatophytosis of nail        Apply topically 2 times daily To feet and toenails.   Quantity:  90 g   Refills:  6       * Notice:  This list has 2 medication(s) that are the same as other medications prescribed for you. Read the directions carefully, and ask your doctor or other care provider to review them with you.         Where to get your medicines      These medications were sent to [id number] - [employer] [place], [place] - [address] [place] [address] AT [facility] [place]  [address], [place] [address]     Phone:  [phone]     ciclopirox 0.77 % cream                Primary Care Provider Office Phone # Fax #    Fredi Fuentes [phone]       [address] [facility] [address]        Equal Access to Services     BRITTNI TOWNSEND AH: Hadii aad ku hadasho Soomaali, waaxda luqadaha, qaybta kaalmada adeegyada, waxay david chong. So [facility] [phone].    ATENCIÓN: Si habla sol, tiene a oneill disposición servicios gratuitos de asistencia lingüística. Llame al [phone].    We comply with applicable federal civil rights laws and [place] laws. We do not discriminate on the basis of race, color, national  origin, age, disability, sex, sexual orientation, or gender identity.            Thank you!     Thank you for choosing [facility]  for your care. Our goal is always to provide you with excellent care. Hearing back from our patients is one way we can continue to improve our services. Please take a few minutes to complete the written survey that you may receive in the mail after your visit with us. Thank you!             Your Updated Medication List - Protect others around you: Learn how to safely use, store and throw away your medicines at www.disposemymeds.org.          This list is accurate as of 4/27/18 11:59 PM.  Always use your most recent med list.                   Brand Name Dispense Instructions for use Diagnosis    ACCU-CHEK COMPACT PLUS test strip   Generic drug:  blood glucose monitoring     408 strip    TEST FIVE TIMES DAILY    Type 2 diabetes mellitus with diabetic polyneuropathy, with long-term current use of insulin (H)       aspirin 81 MG tablet      1 TABLET DAILY        B-D U/F 31G X 8 MM   Generic drug:  insulin pen needle     100 each    USE WITH INSULIN PENS    Type 2 diabetes, HbA1C goal < 8% (H)       blood glucose monitoring lancets     100 each    1 Units 3 times daily.    Type 2 diabetes, HbA1C goal < 8% (H)       blood glucose monitoring meter device kit    no brand specified    1 kit    Use to test blood sugar 5 times daily or as directed.  Patient needs new monitor.  Accu Test Compact or whatever is covered.  Patient has very labile sugars so needs to check 5 x per day.  Please also include 3 month supply of strips, lancets, and whatever other supplies are needed.  Ongoing refills for a year.    Type 2 diabetes mellitus with other specified complication (H)       * ciclopirox 0.77 % cream    LOPROX    90 g    Apply topically 2 times daily To feet and toenails.    Dermatophytosis of nail, Tinea pedis of both feet       * ciclopirox 0.77 % cream    LOPROX    90 g    Apply  topically 2 times daily To feet and toenails.    Dermatophytosis of nail       insulin aspart 100 UNIT/ML injection    NovoLOG FLEXPEN    20 mL    Inject  before each meal. Dose to be titrated/ adjusted per sliding scale at home    Type 2 diabetes mellitus with diabetic polyneuropathy, with long-term current use of insulin (H)       insulin degludec 200 UNIT/ML pen    TRESIBA    9 mL    Inject 54 units sq daily, as directed    Type 1 diabetes mellitus with diabetic neuropathy (H)       insulin glargine 100 UNIT/ML injection    LANTUS SOLOSTAR    15 mL    Inject 46 units at bedtime    Type 2 diabetes mellitus with diabetic polyneuropathy, with long-term current use of insulin (H)       latanoprost 0.005 % ophthalmic solution    XALATAN    7.5 mL    INSTILL 1 DROP IN BOTH EYES AT BEDTIME    Glaucoma, unspecified glaucoma type, unspecified laterality       lisinopril 20 MG tablet    PRINIVIL/ZESTRIL    90 tablet    Take 1 tablet (20 mg) by mouth daily    Essential hypertension with goal blood pressure less than 140/90, Type 2 diabetes mellitus with diabetic polyneuropathy (H)       magnesium oxide 400 MG tablet    MAG-OX    90 tablet    Take 1 tablet (400 mg) by mouth daily    Hypomagnesemia       metFORMIN 500 MG 24 hr tablet    GLUCOPHAGE-XR    120 tablet    Take 2 tablets (1,000 mg) by mouth 2 times daily    Type 2 diabetes mellitus with diabetic polyneuropathy, with long-term current use of insulin (H)       metoclopramide 5 MG tablet    REGLAN    90 tablet    TAKE ONE TABLET BY MOUTH THREE TIMES DAILY BEFORE MEALS    Dyspepsia       MULTIVITAMIN TABS   OR      1 TABLET DAILY        omeprazole 20 MG CR capsule    priLOSEC    180 capsule    Take 1 capsule (20 mg) by mouth 2 times daily    Gastroesophageal reflux disease, esophagitis presence not specified       ondansetron 8 MG ODT tab    ZOFRAN-ODT    30 tablet    Take 1 tablet (8 mg) by mouth every 12 hours as needed for nausea Reported on 3/6/2017    Nausea        oxybutynin 5 MG tablet    DITROPAN    180 tablet    Take 1 tablet (5 mg) by mouth 2 times daily    Overactive bladder       simvastatin 20 MG tablet    ZOCOR    90 tablet    Take 1 tablet (20 mg) by mouth At Bedtime    Hyperlipidemia LDL goal <100       * Notice:  This list has 2 medication(s) that are the same as other medications prescribed for you. Read the directions carefully, and ask your doctor or other care provider to review them with you.

## 2018-04-27 NOTE — PATIENT INSTRUCTIONS
Thanks for coming today.  Ortho/Sports Medicine Clinic  [address] [place] [address]    To schedule future appointments in Ortho Clinic, you may call [phone].    To schedule ordered imaging by your Provider: Call [facility] at [phone]    [employer] available online at:   [email or web address]    Please call if any further questions or concerns [phone] and ask for the Orthopedic Department. Clinic hours 8 am to 5 pm.    Return to clinic if symptoms worsen.

## 2018-05-04 ENCOUNTER — TRANSFERRED RECORDS (OUTPATIENT)
Dept: HEALTH INFORMATION MANAGEMENT | Facility: CLINIC | Age: 58
End: 2018-05-04

## 2018-05-09 DIAGNOSIS — Z79.4 TYPE 2 DIABETES MELLITUS WITH DIABETIC POLYNEUROPATHY, WITH LONG-TERM CURRENT USE OF INSULIN (H): ICD-10-CM

## 2018-05-09 DIAGNOSIS — E11.42 TYPE 2 DIABETES MELLITUS WITH DIABETIC POLYNEUROPATHY, WITH LONG-TERM CURRENT USE OF INSULIN (H): ICD-10-CM

## 2018-05-09 RX ORDER — METFORMIN HCL 500 MG
1000 TABLET, EXTENDED RELEASE 24 HR ORAL 2 TIMES DAILY
Qty: 120 TABLET | Refills: 3 | Status: SHIPPED | OUTPATIENT
Start: 2018-05-09 | End: 2018-05-09

## 2018-05-09 NOTE — TELEPHONE ENCOUNTER
metFORMIN (GLUCOPHAGE-XR) 500 MG 24 hr tablet  Last Written Prescription Date:  3/24/17  Last Fill Quantity: 120,  # refills: 3   Last office visit: 3/14/2018 with prescribing provider:  Leticia   Future Office Visit:   Next 5 appointments (look out 90 days)     May 15, 2018  3:15 PM CDT   [facility]ron [facility] MD   [facility]    [address] [facility] [address]   [phone]            Jun 06, 2018  3:30 PM CDT   Return Visit with Gilmar Kelly MD   [facility] ([facility]    [address] [facility] [address]   [phone]            Jul 27, 2018 11:00 AM CDT   Return Visit with Ravin Back DPM   [facility] ([facility])    [address] [place] [address]   [phone]                 Requested Prescriptions   Pending Prescriptions Disp Refills     metFORMIN (GLUCOPHAGE-XR) 500 MG 24 hr tablet 120 tablet 3     Sig: Take 2 tablets (1,000 mg) by mouth 2 times daily    Biguanide Agents Passed    5/9/2018  9:34 AM       Passed - Blood pressure less than 140/90 in past 6 months    BP Readings from Last 3 Encounters:   04/27/18 116/84   04/04/18 128/86   03/14/18 144/89                Passed - Patient has documented LDL within the past 12 mos.    Recent Labs   Lab Test  06/28/17   0837   LDL  117*            Passed - Patient has had a Microalbumin in the past 12 mos.    Recent Labs   Lab Test  06/23/17   1008   MICROL  9   UMALCR  9.80            Passed - Patient is age 10 or older       Passed - Patient has documented A1c within the specified period of time.    Recent Labs   Lab Test  03/14/18   1448   A1C  10.6*            Passed - Patient's CR is NOT>1.4 OR Patient's EGFR is NOT<45 within past 12 mos.    Recent Labs   Lab Test  03/14/18   1448   GFRESTIMATED  66   GFRESTBLACK  80       Recent Labs   Lab Test  03/14/18   1448   CR  0.88            Passed  "- Patient does NOT have a diagnosis of CHF.       Passed - Patient is not pregnant       Passed - Patient has not had a positive pregnancy test within the past 12 mos.        Passed - Recent (6 mo) or future (30 days) visit within the authorizing provider's specialty    Patient had office visit in the last 6 months or has a visit in the next 30 days with authorizing provider or within the authorizing provider's specialty.  See \"Patient Info\" tab in inbasket, or \"Choose Columns\" in Meds & Orders section of the refill encounter.              "

## 2018-05-09 NOTE — TELEPHONE ENCOUNTER
Prescription approved per [facility] Refill Protocol.  Susanne Long RN

## 2018-05-15 ENCOUNTER — OFFICE VISIT (OUTPATIENT)
Dept: OPHTHALMOLOGY | Facility: CLINIC | Age: 58
End: 2018-05-15
Payer: COMMERCIAL

## 2018-05-15 DIAGNOSIS — H40.1132 PRIMARY OPEN ANGLE GLAUCOMA OF BOTH EYES, MODERATE STAGE: Primary | ICD-10-CM

## 2018-05-15 PROCEDURE — 92133 CPTRZD OPH DX IMG PST SGM ON: CPT | Performed by: OPHTHALMOLOGY

## 2018-05-15 PROCEDURE — 92012 INTRM OPH EXAM EST PATIENT: CPT | Performed by: OPHTHALMOLOGY

## 2018-05-15 PROCEDURE — 92083 EXTENDED VISUAL FIELD XM: CPT | Performed by: OPHTHALMOLOGY

## 2018-05-15 ASSESSMENT — EXTERNAL EXAM - RIGHT EYE: OD_EXAM: PROLAPSED FAT PADS: UPPER, LOWER

## 2018-05-15 ASSESSMENT — VISUAL ACUITY
OS_CC: 20/50
CORRECTION_TYPE: GLASSES
METHOD: SNELLEN - LINEAR
OD_PH_CC: 20/30
OD_CC: 20/50

## 2018-05-15 ASSESSMENT — TONOMETRY
OS_IOP_MMHG: 18
IOP_METHOD: APPLANATION
OD_IOP_MMHG: 22

## 2018-05-15 ASSESSMENT — SLIT LAMP EXAM - LIDS: COMMENTS: NORMAL

## 2018-05-15 ASSESSMENT — PACHYMETRY
OS_CT(UM): 566
OD_CT(UM): 562

## 2018-05-15 NOTE — MR AVS SNAPSHOT
"              After Visit Summary   5/15/2018    Lexy Rockwell    MRN: [id number]           Patient Information     Date Of Birth          1960        Visit Information        Provider Department      5/15/2018 3:15 PM Dayron Rios MD [facility]        Care Instructions    Continue Latanoprost drop both eyes at bedtime.  Return visit 6 months for complete exam .  Dayron Rios M.D.  [phone]            Follow-ups after your visit        Your next 10 appointments already scheduled     Jun 06, 2018  3:30 PM CDT   Return Visit with Gilmar Kelly MD   [facility]    [address] [place] [address] [phone]            Jul 27, 2018 11:00 AM CDT   Return Visit with Ravin Back DPM   [facility] ([facility])    [address] [place] [address] [phone]              Who to contact     If you have questions or need follow up information about today's clinic visit or your schedule please contact [facility] directly at [phone].  Normal or non-critical lab and imaging results will be communicated to you by MyChart, letter or phone within 4 business days after the clinic has received the results. If you do not hear from us within 7 days, please contact the clinic through MyChart or phone. If you have a critical or abnormal lab result, we will notify you by phone as soon as possible.  Submit refill requests through [employer] or call your pharmacy and they will forward the refill request to us. Please allow 3 business days for your refill to be completed.          Additional Information About Your Visit        [employer] Information     [employer] lets you send messages to your doctor, view your test results, renew your prescriptions, schedule appointments and more. To sign up, go to [email or web address] . Click on \"Log in\" on the left side of the screen, " "which will take you to the Welcome page. Then click on \"Sign up Now\" on the right side of the page.     You will be asked to enter the access code listed below, as well as some personal information. Please follow the directions to create your username and password.     Your access code is: [email or web address]  Expires: 2018  4:00 PM     Your access code will  in 90 days. If you need help or a new code, please call your [place] clinic or [phone].        Care EveryWhere ID     This is your Care EveryWhere ID. This could be used by other organizations to access your [place] medical records  [id number]        Your Vitals Were     Last Period                   2014            Blood Pressure from Last 3 Encounters:   18 116/84   18 128/86   18 144/89    Weight from Last 3 Encounters:   18 87.1 kg (192 lb)   18 86.6 kg (191 lb)   18 89.8 kg (198 lb)              Today, you had the following     No orders found for display       Primary Care Provider Office Phone # Fax #    Fredi Fuentes [phone]       [address] [address]        Equal Access to Services     [facility] : Hadii aad ku hadasho Soomaali, waaxda luqadaha, qaybta kaalmada adeegyada, waxay idiin hayaan murphy frost ladeedee . So [facility] [phone].    ATENCIÓN: Si habla español, tiene a oneill disposición servicios gratuitos de asistencia lingüística. Llame al [phone].    We comply with applicable federal civil rights laws and [place] laws. We do not discriminate on the basis of race, color, national origin, age, disability, sex, sexual orientation, or gender identity.            Thank you!     Thank you for choosing [facility]  for your care. Our goal is always to provide you with excellent care. Hearing back from our patients is one way we can continue to improve our services. Please take a few minutes to complete the written survey that you " may receive in the mail after your visit with us. Thank you!             Your Updated Medication List - Protect others around you: Learn how to safely use, store and throw away your medicines at www.disposemymeds.org.          This list is accurate as of 5/15/18  4:42 PM.  Always use your most recent med list.                   Brand Name Dispense Instructions for use Diagnosis    ACCU-CHEK COMPACT PLUS test strip   Generic drug:  blood glucose monitoring     408 strip    TEST FIVE TIMES DAILY    Type 2 diabetes mellitus with diabetic polyneuropathy, with long-term current use of insulin (H)       aspirin 81 MG tablet      1 TABLET DAILY        B-D U/F 31G X 8 MM   Generic drug:  insulin pen needle     100 each    USE WITH INSULIN PENS    Type 2 diabetes, HbA1C goal < 8% (H)       blood glucose monitoring lancets     100 each    1 Units 3 times daily.    Type 2 diabetes, HbA1C goal < 8% (H)       blood glucose monitoring meter device kit    no brand specified    1 kit    Use to test blood sugar 5 times daily or as directed.  Patient needs new monitor.  Accu Test Compact or whatever is covered.  Patient has very labile sugars so needs to check 5 x per day.  Please also include 3 month supply of strips, lancets, and whatever other supplies are needed.  Ongoing refills for a year.    Type 2 diabetes mellitus with other specified complication (H)       * ciclopirox 0.77 % cream    LOPROX    90 g    Apply topically 2 times daily To feet and toenails.    Dermatophytosis of nail, Tinea pedis of both feet       * ciclopirox 0.77 % cream    LOPROX    90 g    Apply topically 2 times daily To feet and toenails.    Dermatophytosis of nail       insulin aspart 100 UNIT/ML injection    NovoLOG FLEXPEN    20 mL    Inject  before each meal. Dose to be titrated/ adjusted per sliding scale at home    Type 2 diabetes mellitus with diabetic polyneuropathy, with long-term current use of insulin (H)       insulin degludec 200 UNIT/ML pen     TRESIBA    9 mL    Inject 54 units sq daily, as directed    Type 1 diabetes mellitus with diabetic neuropathy (H)       insulin glargine 100 UNIT/ML injection    LANTUS SOLOSTAR    15 mL    Inject 46 units at bedtime    Type 2 diabetes mellitus with diabetic polyneuropathy, with long-term current use of insulin (H)       latanoprost 0.005 % ophthalmic solution    XALATAN    7.5 mL    INSTILL 1 DROP IN BOTH EYES AT BEDTIME    Glaucoma, unspecified glaucoma type, unspecified laterality       lisinopril 20 MG tablet    PRINIVIL/ZESTRIL    90 tablet    Take 1 tablet (20 mg) by mouth daily    Essential hypertension with goal blood pressure less than 140/90, Type 2 diabetes mellitus with diabetic polyneuropathy (H)       magnesium oxide 400 MG tablet    MAG-OX    90 tablet    Take 1 tablet (400 mg) by mouth daily    Hypomagnesemia       metFORMIN 500 MG 24 hr tablet    GLUCOPHAGE-XR    360 tablet    TAKE 2 TABLETS(1000 MG) BY MOUTH TWICE DAILY    Type 2 diabetes mellitus with diabetic polyneuropathy, with long-term current use of insulin (H)       metoclopramide 5 MG tablet    REGLAN    90 tablet    TAKE ONE TABLET BY MOUTH THREE TIMES DAILY BEFORE MEALS    Dyspepsia       MULTIVITAMIN TABS   OR      1 TABLET DAILY        omeprazole 20 MG CR capsule    priLOSEC    180 capsule    Take 1 capsule (20 mg) by mouth 2 times daily    Gastroesophageal reflux disease, esophagitis presence not specified       ondansetron 8 MG ODT tab    ZOFRAN-ODT    30 tablet    Take 1 tablet (8 mg) by mouth every 12 hours as needed for nausea Reported on 3/6/2017    Nausea       oxybutynin 5 MG tablet    DITROPAN    180 tablet    Take 1 tablet (5 mg) by mouth 2 times daily    Overactive bladder       simvastatin 20 MG tablet    ZOCOR    90 tablet    Take 1 tablet (20 mg) by mouth At Bedtime    Hyperlipidemia LDL goal <100       * Notice:  This list has 2 medication(s) that are the same as other medications prescribed for you. Read the  directions carefully, and ask your doctor or other care provider to review them with you.

## 2018-05-15 NOTE — PROGRESS NOTES
Current Eye Medications:  Latanoprost both eyes every evening.  Last drops:  10pm     Subjective:  Follow up Open Angle Glaucoma.  Patient is here for a pressure check, OCT, and visual field.  Her left eye has been weeping, but lately it has been weeping less. Vision is good with her new glasses.       Objective:  See Ophthalmology Exam.       Assessment:  Stable intraocular pressure, glaucoma OCT, and Beard Visual Field both eyes in patient with open angle glaucoma.      Plan:  Continue Latanoprost drop both eyes at bedtime.  Return visit 6 months for complete exam .  Dayron Rios M.D.  [phone]

## 2018-05-15 NOTE — PATIENT INSTRUCTIONS
Continue Latanoprost drop both eyes at bedtime.  Return visit 6 months for complete exam .  Dayron Rios M.D.  [phone]

## 2018-05-15 NOTE — LETTER
5/15/2018         RE: Lexy Rockwell  [address]  [place] [address]        Dear Colleague,    Thank you for referring your patient, Lexy Rockwell, to the [facility]. Please see a copy of my visit note below.     Current Eye Medications:  Latanoprost both eyes every evening.  Last drops:  10pm     Subjective:  Follow up Open Angle Glaucoma.  Patient is here for a pressure check, OCT, and visual field.  Her left eye has been weeping, but lately it has been weeping less. Vision is good with her new glasses.       Objective:  See Ophthalmology Exam.       Assessment:  Stable intraocular pressure, glaucoma OCT, and Beard Visual Field both eyes in patient with open angle glaucoma.      Plan:  Continue Latanoprost drop both eyes at bedtime.  Return visit 6 months for complete exam .  Dayron Rios M.D.  [phone]         Again, thank you for allowing me to participate in the care of your patient.        Sincerely,        Dayron Rios MD

## 2018-06-06 ENCOUNTER — OFFICE VISIT (OUTPATIENT)
Dept: ENDOCRINOLOGY | Facility: CLINIC | Age: 58
End: 2018-06-06
Payer: COMMERCIAL

## 2018-06-06 VITALS
DIASTOLIC BLOOD PRESSURE: 89 MMHG | SYSTOLIC BLOOD PRESSURE: 155 MMHG | HEIGHT: 63 IN | WEIGHT: 190 LBS | HEART RATE: 118 BPM | BODY MASS INDEX: 33.66 KG/M2

## 2018-06-06 DIAGNOSIS — E10.69 TYPE 1 DIABETES MELLITUS WITH OTHER SPECIFIED COMPLICATION (H): Primary | ICD-10-CM

## 2018-06-06 PROCEDURE — 99214 OFFICE O/P EST MOD 30 MIN: CPT | Performed by: INTERNAL MEDICINE

## 2018-06-06 NOTE — MR AVS SNAPSHOT
After Visit Summary   6/6/2018    Lexy Rockwell    MRN: [id number]           Patient Information     Date Of Birth          1960        Visit Information        Provider Department      6/6/2018 3:30 PM Gilmar Kelly MD [facility]        Today's Diagnoses     Type 1 diabetes mellitus with other specified complication (H)    -  1      Care Instructions    Continue the same insulin plan with higher doses:   Tresiba U200 pen as 58 units each evening              Novolog Flexpen 26U before each meal   Use a rapid-acting insulin correction scale for blood glucose levels >150 mg/dl:       mg/dl  No correction    151-200 mg/dl  +2 units    201-250 mg/dl  +4 units    251-300 mg/dl  +6 units    301-350 mg/dl  +8 units    350-400 mg/dl  +10 units    401-450 mg/dl  +12 units and call physician                MetforminER 500 mg 2-tabs twice per day    Plan to see the Diabetes Educator to review insulin dose adjustment and urine ketone (sick day) testing    Arrange a followup appointment with Dr. Kelly for late August or September 2018.          Follow-ups after your visit        Additional Services     DIABETES EDUCATOR REFERRAL       DIABETES SELF MANAGEMENT TRAINING (DSMT)      Your provider has referred you to Diabetes Education: [facility]: Diabetes Education - All [facility] [phone]   [email or web address]   Note:  Patient prefers [facility] or [place]    If an urgent visit is needed or A1C is above 12, Care Team to call the Diabetes  Education Team at [phone] or send an In Basket message to the Diabetes Education Pool (P DIAB ED-PATIENT CARE).    A  will call you to make your appointment. If it has been more than 3 business days since your referral was placed, please call the above phone number to schedule.    Type of training and number of hours: Previous Diagnosis: Follow-up DSMT - 2  hours.    Diabetes Type: Type 1   Medicare covers: 10 hours of initial DSMT in 12 month period from the time of first visit, plus 2 hours of follow-up DSMT annually, and additional hours as requested for insulin training.         Diabetes Co-Morbidities: none               A1C Goal:  <8.0       A1C is: Lab Results       Component                Value               Date                       A1C                      10.6                03/14/2018              MEDICAL NUTRITION THERAPY (MNT) for Diabetes    Medical Nutrition Therapy with a Registered Dietitian can be provided in coordination with Diabetes Self-Management Training to assist in achieving optimal diabetes management.    MNT Type and Hours: Previous diagnosis: Annual follow-up MNT - 2 hours                       Medicare will cover: 3 hours initial MNT in 12 month period after first visit, plus 2 hours of follow-up MNT annually        Diabetes Education Topics: Other: sick day management, urine ketone testing, and DKA prevention    Special Educational Needs Requiring Individual DSMT: None      Please be aware that coverage of these services is subject to the terms and limitations of your health insurance plan.  Call member services at your health plan to determine Diabetes Self-Management Training (Codes  and ) and Medical Nutrition Therapy (Codes 61989 and 96694) benefits and ask which blood glucose monitor brands are covered by your plan.  Please bring the following with you to your appointment:    (1)  List of current medications   (2)  List of Blood Glucose Monitor brands that are covered by your insurance plan  (3)  Blood Glucose Monitor and log book  (4)   Food records for the 3 days prior to your visit    The Certified Diabetes Educator may make diabetes medication adjustments per the CDE Protocol and Collaborative Practice Agreement.                  Your next 10 appointments already scheduled     Jul 27, 2018 11:00 AM CDT   Return Visit  "with Ravin Back DPM   [facility]    [address] [address] [place] [id number] [phone]            Nov 29, 2018 10:00 AM CST   New Visit with Dayron Rios MD   [facility]    [address] [facility] [address] [phone]              Who to contact     If you have questions or need follow up information about today's clinic visit or your schedule please contact [facility] directly at [phone].  Normal or non-critical lab and imaging results will be communicated to you by MyChart, letter or phone within 4 business days after the clinic has received the results. If you do not hear from us within 7 days, please contact the clinic through MyChart or phone. If you have a critical or abnormal lab result, we will notify you by phone as soon as possible.  Submit refill requests through Urgent Group or call your pharmacy and they will forward the refill request to us. Please allow 3 business days for your refill to be completed.          Additional Information About Your Visit        Care EveryWhere ID     This is your Care EveryWhere ID. This could be used by other organizations to access your [facility] medical records  [id number]        Your Vitals Were     Pulse Height Last Period BMI (Body Mass Index)          118 5' 3.25\" (1.607 m) 03/28/2014 33.39 kg/m2         Blood Pressure from Last 3 Encounters:   06/06/18 155/89   04/27/18 116/84   04/04/18 128/86    Weight from Last 3 Encounters:   06/06/18 190 lb (86.2 kg)   04/04/18 192 lb (87.1 kg)   03/14/18 191 lb (86.6 kg)              We Performed the Following     DIABETES EDUCATOR REFERRAL        Primary Care Provider Office Phone # Fax #    Fredi Fuentes [phone] [address] [facility] [address]        Equal Access to Services     BRITTNI TOWNSEND AH: Hadii kulwant Walter, " car alicia dudleyrickey baxter ah. So [facility] [phone].    ATENCIÓN: Si puja horton, tiene a oneill disposición servicios gratuitos de asistencia lingüística. Jaret al [phone].    We comply with applicable federal civil rights laws and [place] laws. We do not discriminate on the basis of race, color, national origin, age, disability, sex, sexual orientation, or gender identity.            Thank you!     Thank you for choosing [facility]  for your care. Our goal is always to provide you with excellent care. Hearing back from our patients is one way we can continue to improve our services. Please take a few minutes to complete the written survey that you may receive in the mail after your visit with us. Thank you!             Your Updated Medication List - Protect others around you: Learn how to safely use, store and throw away your medicines at www.disposemymeds.org.          This list is accurate as of 6/6/18  4:02 PM.  Always use your most recent med list.                   Brand Name Dispense Instructions for use Diagnosis    ACCU-CHEK COMPACT PLUS test strip   Generic drug:  blood glucose monitoring     408 strip    TEST FIVE TIMES DAILY    Type 2 diabetes mellitus with diabetic polyneuropathy, with long-term current use of insulin (H)       aspirin 81 MG tablet      1 TABLET DAILY        B-D U/F 31G X 8 MM   Generic drug:  insulin pen needle     100 each    USE WITH INSULIN PENS    Type 2 diabetes, HbA1C goal < 8% (H)       blood glucose monitoring lancets     100 each    1 Units 3 times daily.    Type 2 diabetes, HbA1C goal < 8% (H)       blood glucose monitoring meter device kit    no brand specified    1 kit    Use to test blood sugar 5 times daily or as directed.  Patient needs new monitor.  Accu Test Compact or whatever is covered.  Patient has very labile sugars so needs to check 5 x per day.  Please also include 3 month supply of strips, lancets, and  whatever other supplies are needed.  Ongoing refills for a year.    Type 2 diabetes mellitus with other specified complication (H)       * ciclopirox 0.77 % cream    LOPROX    90 g    Apply topically 2 times daily To feet and toenails.    Dermatophytosis of nail, Tinea pedis of both feet       * ciclopirox 0.77 % cream    LOPROX    90 g    Apply topically 2 times daily To feet and toenails.    Dermatophytosis of nail       insulin aspart 100 UNIT/ML injection    NovoLOG FLEXPEN    20 mL    Inject  before each meal. Dose to be titrated/ adjusted per sliding scale at home    Type 2 diabetes mellitus with diabetic polyneuropathy, with long-term current use of insulin (H)       insulin degludec 200 UNIT/ML pen    TRESIBA    9 mL    Inject 54 units sq daily, as directed    Type 1 diabetes mellitus with diabetic neuropathy (H)       insulin glargine 100 UNIT/ML injection    LANTUS SOLOSTAR    15 mL    Inject 46 units at bedtime    Type 2 diabetes mellitus with diabetic polyneuropathy, with long-term current use of insulin (H)       latanoprost 0.005 % ophthalmic solution    XALATAN    7.5 mL    INSTILL 1 DROP IN BOTH EYES AT BEDTIME    Glaucoma, unspecified glaucoma type, unspecified laterality       lisinopril 20 MG tablet    PRINIVIL/ZESTRIL    90 tablet    Take 1 tablet (20 mg) by mouth daily    Essential hypertension with goal blood pressure less than 140/90, Type 2 diabetes mellitus with diabetic polyneuropathy (H)       magnesium oxide 400 MG tablet    MAG-OX    90 tablet    Take 1 tablet (400 mg) by mouth daily    Hypomagnesemia       metFORMIN 500 MG 24 hr tablet    GLUCOPHAGE-XR    360 tablet    TAKE 2 TABLETS(1000 MG) BY MOUTH TWICE DAILY    Type 2 diabetes mellitus with diabetic polyneuropathy, with long-term current use of insulin (H)       metoclopramide 5 MG tablet    REGLAN    90 tablet    TAKE ONE TABLET BY MOUTH THREE TIMES DAILY BEFORE MEALS    Dyspepsia       MULTIVITAMIN TABS   OR      1 TABLET DAILY         omeprazole 20 MG CR capsule    priLOSEC    180 capsule    Take 1 capsule (20 mg) by mouth 2 times daily    Gastroesophageal reflux disease, esophagitis presence not specified       ondansetron 8 MG ODT tab    ZOFRAN-ODT    30 tablet    Take 1 tablet (8 mg) by mouth every 12 hours as needed for nausea Reported on 3/6/2017    Nausea       oxybutynin 5 MG tablet    DITROPAN    180 tablet    Take 1 tablet (5 mg) by mouth 2 times daily    Overactive bladder       simvastatin 20 MG tablet    ZOCOR    90 tablet    Take 1 tablet (20 mg) by mouth At Bedtime    Hyperlipidemia LDL goal <100       * Notice:  This list has 2 medication(s) that are the same as other medications prescribed for you. Read the directions carefully, and ask your doctor or other care provider to review them with you.

## 2018-06-06 NOTE — PATIENT INSTRUCTIONS
Continue the same insulin plan with higher doses:   Tresiba U200 pen as 58 units each evening              Novolog Flexpen 26U before each meal   Use a rapid-acting insulin correction scale for blood glucose levels >150 mg/dl:       mg/dl  No correction    151-200 mg/dl  +2 units    201-250 mg/dl  +4 units    251-300 mg/dl  +6 units    301-350 mg/dl  +8 units    350-400 mg/dl  +10 units    401-450 mg/dl  +12 units and call physician                MetforminER 500 mg 2-tabs twice per day    Plan to see the Diabetes Educator to review insulin dose adjustment and urine ketone (sick day) testing    Arrange a followup appointment with Dr. Kelly for late August or September 2018.

## 2018-06-06 NOTE — PROGRESS NOTES
Name: Lexy Rockwell    HPI:  Recent issues:  Here for diabetes evaluation with her mother Francisca again today  Hospitalized 5/4/18 with DKA            Diagnosis of diabetes mellitus in her 40's, living [place] [facility] or [facility]  Initial treatment with metformin, then addition of insulin.(multiple injections)  She does recall taking another DM pill in the past  Has seen Dr. Shira Wilson/[facility] Endocrinology previously  Reports having many diabetes-related hospitalizations including DKA   Had taken Lantus daily and Novolog premeal  Current DM meds:   Tresiba U200 54U QHS   Novolog Flexpen 24U premeal     Novolog sscale 2U per 50>150 mg/dl   MetforminER 500 mg 2-tabs BID  Uses PernixData Compact Plus meter, typically tests 2-3x/day  Meals TID  Recent [facility] labs include:  Lab Results   Component Value Date    A1C 10.6 (H) 03/14/2018     03/14/2018    POTASSIUM 4.7 03/14/2018    CHLORIDE 101 03/14/2018    CO2 17 (L) 03/14/2018    ANIONGAP 17 (H) 03/14/2018     (HH) 03/14/2018    BUN 13 03/14/2018    CR 0.88 03/14/2018    GFRESTIMATED 66 03/14/2018    GFRESTBLACK 80 03/14/2018    EFRAIN 9.6 03/14/2018    CPEPT 0.6 (L) 03/14/2018    CHOL 200 (H) 06/28/2017    TRIG 195 (H) 06/28/2017    HDL 44 (L) 06/28/2017     (H) 06/28/2017    NHDL 156 (H) 06/28/2017    UCRR 94 06/23/2017    MICROL 9 06/23/2017    UMALCR 9.80 06/23/2017     Has seen Jaqui NGUYEN for Diabetes Education  1/2018 Hospitalized at [facility] with DKA  DM Complications:   Neuropathy    Decreased sensation at feet      Previous concern for gastroparesis   Has taken Reglan 5 mg TID premeal, also Prilosec   Saw Dr. Bill Rolon 5/2018    Gastric emptying study normal    Told to call his office if questions    Last eye exam few months ago?, no DR  Also takes simvastatin 20 mg po QHS, lisinopril 20 mg 2-tab QD (higher dose)    Single, on Medicare Disability  Lives by self in [employer], gets assistance with transportation from parents  Sees   Fredi Fuentes for general medicine evaluations.  Has also seen Dr. Katalina Stanley, Cece Edwards, and Bill Rolon/[facility]    PMH/PSH:  Past Medical History:   Diagnosis Date     Cerumen impacted      Development delay      Glaucoma (increased eye pressure)      Hyperlipaemia      Hypertension      Hypothyroid      Mental retardation      Nonsenile cataract      Obesity      Type 1 diabetes mellitus not at goal (H)      Past Surgical History:   Procedure Laterality Date     C EYE SURG ANT SGMT PROC UNLISTED  remote    strabismus surgery     STRABISMUS SURGERY         Family Hx:  Family History   Problem Relation Age of Onset     Hypertension Father      DIABETES Sister      Glaucoma Maternal Grandfather      CANCER No family hx of      CEREBROVASCULAR DISEASE No family hx of      Thyroid Disease No family hx of      Macular Degeneration No family hx of          Social Hx:  Social History     Social History     Marital status: Single     Spouse name: N/A     Number of children: N/A     Years of education: N/A     Occupational History     Not on file.     Social History Main Topics     Smoking status: Never Smoker     Smokeless tobacco: Never Used      Comment: lives in smoke free household.     Alcohol use Yes      Comment: occass social     Drug use: No     Sexual activity: No     Other Topics Concern     Parent/Sibling W/ Cabg, Mi Or Angioplasty Before 65f 55m? No     Social History Narrative          MEDICATIONS:  has a current medication list which includes the following prescription(s): accu-chek compact plus, aspirin, b-d u/f, blood glucose monitoring, ciclopirox, ciclopirox, insulin aspart, insulin degludec, insulin glargine, latanoprost, lisinopril, magnesium oxide, metformin, metoclopramide, multiple vitamin, omeprazole, ondansetron, oxybutynin, simvastatin, and blood glucose monitoring.    ROS:     ROS: 10 point ROS neg other than the symptoms noted above in the HPI.    GENERAL: no weight changes, fatigue,  "fevers, chills, night sweats.   HEENT: no dysphagia, odonophagia, diplopia, neck pain  THYROID:  no apparent hyper or hypothyroid symptoms  CV: no chest pain, pressure, palpitations  LUNGS: no SOB, VENEGAS, cough, wheezing   ABDOMEN: frequent morning pre-breakfasttime nausea, occasional reflux; no diarrhea, constipation, abdominal pain  EXTREMITIES: no rashes, ulcers, edema  NEUROLOGY: some feet numbness; no headaches, denies changes in vision, tingling   MSK: no muscle aches or pains, weakness  SKIN: no rashes or lesions  : no menses; denies hot flashes  PSYCH:  stable mood, no significant anxiety or depression  ENDOCRINE: no heat or cold intolerance      Physical Exam   VS: /89 (BP Location: Right arm, Cuff Size: Adult Large)  Pulse 118  Ht 5' 3.25\" (1.607 m)  Wt 190 lb (86.2 kg)  LMP 03/28/2014  BMI 33.39 kg/m2  GENERAL: AXOX3, NAD, slowed speech, well dressed, answering questions appropriately, appears stated age.  THYROID:  normal gland, no apparent nodules or goiter  HEENT: irregular teeth dentition; neck non-tender, no exopthalmous, no proptosis, EOMI  ABDOMEN: obese, soft, nontender, nondistended, no organomegaly  NEUROLOGY: somewhat slowed mentation, CN grossly intact, no tremors    LABS:    All pertinent notes, labs, and images personally reviewed by me.     A/P:  No diagnosis found.  Comments:  Reviewed overall diabetes management, high hgbA1c levels, recurrent diabetes-related hospitalizations, and insulin management options  Recent BG levels usually elevated, but improved, BG avg 275 mg/dl    Plan:  Discussed general issues with the diabetes diagnosis and management  We discussed the hgbA1c test which reflects previous overall glucose levels or control  Discussed the importance of blood glucose (BG) testing to assess glucose trends  Reviewed the multiple daily injection (MDI) treatment using mealtime rapid-acting and daily long-acting insulin, also correction scale.    Recommend:  Continue the " MetforminER 500 mg 2-tabs twice per day  We reviewed the MDI insulin plan, issues with compliance using the mealtime Nv dosing  Continue the same insulin plan with higher doses:   Tresiba U200 pen as 58 units each evening              Novolog Flexpen 26U before each meal   Use a rapid-acting insulin correction scale for blood glucose levels >150 mg/dl:       mg/dl  No correction    151-200 mg/dl  +2 units    201-250 mg/dl  +4 units    251-300 mg/dl  +6 units    301-350 mg/dl  +8 units    350-400 mg/dl  +10 units    401-450 mg/dl  +12 units and call physician    Test blood glucose level before meals and bedtime  Avoid bedtime snack unless BG <150 mg/dl  Goal target -150 mg/dl  No lab test ordered for today.  Plan to see the  Diabetes Educator:   Review insulin dose adjustment with this dose plan   Discuss circumstances that lead to her previous (many) DKA hospitalizations   Review technique of urine ketone (sick day) testing, use of positive urine ketone correction scale also  Keep focus on diet, exercise, weight management.  Advise having fasting lipid panel testing and dilated eye examination, at least annually  Plan to see Dr. Bill Rolon/GI and colleagues if further food digestion/GI issues  Patient to follow up with Primary Care provider regarding elevated blood pressure.    Patient Instructions   Continue the same insulin plan with higher doses:   Tresiba U200 pen as 58 units each evening              Novolog Flexpen 26U before each meal   Use a rapid-acting insulin correction scale for blood glucose levels >150 mg/dl:       mg/dl  No correction    151-200 mg/dl  +2 units    201-250 mg/dl  +4 units    251-300 mg/dl  +6 units    301-350 mg/dl  +8 units    350-400 mg/dl  +10 units    401-450 mg/dl  +12 units and call physician                MetforminER 500 mg 2-tabs twice per day    Plan to see the Diabetes Educator to review insulin dose adjustment and urine ketone (sick day)  testing    Arrange a followup appointment with Dr. Kelly for late August or September 2018.      Labs ordered today:   No orders of the defined types were placed in this encounter.    Radiology/Consults ordered today: None    More than 50% of the time spent with Ms. Rockwell on counseling / coordinating her care.  Total appointment time was 25 minutes.    Follow-up:  2 mo.    Gilmar Kelly MD  Endocrinology  [facility]  Cc:  AIDAN Fuentes

## 2018-06-19 ENCOUNTER — TELEPHONE (OUTPATIENT)
Dept: ENDOCRINOLOGY | Facility: CLINIC | Age: 58
End: 2018-06-19

## 2018-06-19 DIAGNOSIS — I10 ESSENTIAL HYPERTENSION WITH GOAL BLOOD PRESSURE LESS THAN 140/90: ICD-10-CM

## 2018-06-19 RX ORDER — LISINOPRIL 20 MG/1
40 TABLET ORAL DAILY
Qty: 90 TABLET | Refills: 3 | Status: SHIPPED | OUTPATIENT
Start: 2018-06-19 | End: 2019-09-27

## 2018-06-19 NOTE — TELEPHONE ENCOUNTER
Reason for Call:  Medication or medication refill:    Do you use a [facility]?  Name of the pharmacy and phone number for the current request:  [id number] - [place], [place] - [address] [facility] [place]    Name of the medication requested: lisinopril (PRINIVIL,ZESTRIL) 20 MG tablet       Other request: she is taking two pills a day and she has run out    Can we leave a detailed message on this number? YES    Phone number patient can be reached at: na I did not speak with the pt.  Contact pharmacy     Best Time: any    Call taken on 6/19/2018 at 1:39 PM by Vicki Fuentes

## 2018-06-19 NOTE — TELEPHONE ENCOUNTER
Message noted.  Yes, I am now assisting with this med Rx.  I have now sent the revised lisinopril Rx to her local pharmacy.    GIORGIO Kelly MD  Endocrinology  [facility]

## 2018-06-20 RX ORDER — LISINOPRIL 20 MG/1
TABLET ORAL
Qty: 180 TABLET | Refills: 3 | OUTPATIENT
Start: 2018-06-20

## 2018-06-20 NOTE — TELEPHONE ENCOUNTER
lisinopril (PRINIVIL/ZESTRIL) 20 MG tablet 90 tablet 3 6/19/2018     Last Written Prescription Date:  6/19/18  Last Fill Quantity: 90,  # refills: 3   Last office visit: 6/6/2018 with prescribing provider:  Leticia   Future Office Visit:   Next 5 appointments (look out 90 days)     Jul 27, 2018 11:00 AM CDT   [facility]    [address] [place] [id number]   [phone]            Sep 05, 2018 11:30 AM CDT   [facility] with [facility]   [facility]    [address] [place] [id number] [phone]                 No flowsheet data found.

## 2018-07-18 ENCOUNTER — ALLIED HEALTH/NURSE VISIT (OUTPATIENT)
Dept: EDUCATION SERVICES | Facility: CLINIC | Age: 58
End: 2018-07-18
Payer: COMMERCIAL

## 2018-07-18 DIAGNOSIS — E10.40 TYPE 1 DIABETES MELLITUS WITH DIABETIC NEUROPATHY (H): ICD-10-CM

## 2018-07-18 DIAGNOSIS — E10.69 TYPE 1 DIABETES MELLITUS WITH OTHER SPECIFIED COMPLICATION (H): Primary | ICD-10-CM

## 2018-07-18 PROCEDURE — G0108 DIAB MANAGE TRN  PER INDIV: HCPCS

## 2018-07-18 NOTE — PATIENT INSTRUCTIONS
Recommend to eat 3 meals per day and spread them out 4-6 hours.   Set up you medication boxes with a pen needled in each compartment, so that you take the insulin before each meal and before bed.    Recommend to test your urine for ketones if your blood glucose level is above 250 mg/dl.  Follow steps in ketone testing brochure

## 2018-07-18 NOTE — MR AVS SNAPSHOT
After Visit Summary   7/18/2018    Lexy Rockwell    MRN: [id number]           Patient Information     Date Of Birth          1960        Visit Information        Provider Department      7/18/2018 11:30 AM  DIABETIC ED RESOURCE [facility]        Today's Diagnoses     Type 1 diabetes mellitus with other specified complication (H)    -  1    Type 1 diabetes mellitus with diabetic neuropathy (H)          Care Instructions    Recommend to eat 3 meals per day and spread them out 4-6 hours.   Set up you medication boxes with a pen needled in each compartment, so that you take the insulin before each meal and before bed.    Recommend to test your urine for ketones if your blood glucose level is above 250 mg/dl.  Follow steps in ketone testing brochure                Follow-ups after your visit        Your next 10 appointments already scheduled     Jul 27, 2018 11:00 AM CDT   [facility] with Ravin Back DPM   [facility]    [address] [place] [id number]   [phone]            Aug 15, 2018 11:30 AM CDT   Diabetic Education with  DIABETIC ED RESOURCE   [facility] ([facility])    [address] [facility] [address]   [phone]            Sep 05, 2018 11:30 AM CDT   Return Visit with Gilmar Kelly MD   [facility] ([facility])    [address] [place] [address]   [phone]            Nov 29, 2018 10:00 AM CST   New Visit with Dayron Rios MD   [facility] ([facility])    [address] [place] [address]   [phone]              Who to contact     If you have questions or need follow up information about today's clinic visit or your schedule please contact [facility] directly at [phone].  Normal or non-critical lab and imaging  results will be communicated to you by MyChart, letter or phone within 4 business days after the clinic has received the results. If you do not hear from us within 7 days, please contact the clinic through MyChart or phone. If you have a critical or abnormal lab result, we will notify you by phone as soon as possible.  Submit refill requests through [employer] or call your pharmacy and they will forward the refill request to us. Please allow 3 business days for your refill to be completed.          Additional Information About Your Visit        Care EveryWhere ID     This is your Care EveryWhere ID. This could be used by other organizations to access your [place] medical records  [id number]        Your Vitals Were     Last Period                   03/28/2014            Blood Pressure from Last 3 Encounters:   06/06/18 155/89   04/27/18 116/84   04/04/18 128/86    Weight from Last 3 Encounters:   06/06/18 86.2 kg (190 lb)   04/04/18 87.1 kg (192 lb)   03/14/18 86.6 kg (191 lb)              We Performed the Following     DIABETES EDUCATION - Individual  []          Today's Medication Changes          These changes are accurate as of 7/18/18 11:59 PM.  If you have any questions, ask your nurse or doctor.               Start taking these medicines.        Dose/Directions    acetone (Urine) test Strp   Used for:  Type 1 diabetes mellitus with other specified complication (H)        Test urine when ill or blood glucose above 250 mg/dl.   Quantity:  50 each   Refills:  6         These medicines have changed or have updated prescriptions.        Dose/Directions    insulin degludec 200 UNIT/ML pen   Commonly known as:  TRESIBA   This may have changed:  additional instructions   Used for:  Type 1 diabetes mellitus with diabetic neuropathy (H), Type 1 diabetes mellitus with other specified complication (H)        Inject 58 units sq daily, as directed   Quantity:  9 mL   Refills:  11         Stop taking these medicines if  you haven't already. Please contact your care team if you have questions.     insulin glargine 100 UNIT/ML injection   Commonly known as:  LANTUS SOLOSTAR                Where to get your medicines      These medications were sent to [id number] - [place], [place] - [address] [place] [address] AT [facility] [place] [address] [place]  [address], [place] [address]     Phone:  [phone]     acetone (Urine) test Strp                Primary Care Provider Office Phone # Fax #    Fredi Fuentes [phone]       [address] [facility] [address]        Equal Access to Services     BRITTNI TOWNSEND : Hadii aad dallas hadasho Soaleksandrali, waaxda luqadaha, qaybta kaalmada adeegyada, rickey campuzano . [facility] [phone].    ATENCIÓN: Si habla español, tiene a oneill disposición servicios gratuitos de asistencia lingüística. [facility] [phone].    We comply with applicable federal civil rights laws and [place] laws. We do not discriminate on the basis of race, color, national origin, age, disability, sex, sexual orientation, or gender identity.            Thank you!     Thank you for choosing [facility]  for your care. Our goal is always to provide you with excellent care. Hearing back from our patients is one way we can continue to improve our services. Please take a few minutes to complete the written survey that you may receive in the mail after your visit with us. Thank you!             Your Updated Medication List - Protect others around you: Learn how to safely use, store and throw away your medicines at www.disposemymeds.org.          This list is accurate as of 7/18/18 11:59 PM.  Always use your most recent med list.                   Brand Name Dispense Instructions for use Diagnosis    ACCU-CHEK COMPACT PLUS test strip   Generic drug:  blood glucose monitoring     408 strip    TEST FIVE TIMES DAILY    Type 2  diabetes mellitus with diabetic polyneuropathy, with long-term current use of insulin (H)       acetone (Urine) test Strp     50 each    Test urine when ill or blood glucose above 250 mg/dl.    Type 1 diabetes mellitus with other specified complication (H)       aspirin 81 MG tablet      1 TABLET DAILY        B-D U/F 31G X 8 MM   Generic drug:  insulin pen needle     100 each    USE WITH INSULIN PENS    Type 2 diabetes, HbA1C goal < 8% (H)       blood glucose monitoring lancets     100 each    1 Units 3 times daily.    Type 2 diabetes, HbA1C goal < 8% (H)       blood glucose monitoring meter device kit    no brand specified    1 kit    Use to test blood sugar 5 times daily or as directed.  Patient needs new monitor.  Accu Test Compact or whatever is covered.  Patient has very labile sugars so needs to check 5 x per day.  Please also include 3 month supply of strips, lancets, and whatever other supplies are needed.  Ongoing refills for a year.    Type 2 diabetes mellitus with other specified complication (H)       * ciclopirox 0.77 % cream    LOPROX    90 g    Apply topically 2 times daily To feet and toenails.    Dermatophytosis of nail, Tinea pedis of both feet       * ciclopirox 0.77 % cream    LOPROX    90 g    Apply topically 2 times daily To feet and toenails.    Dermatophytosis of nail       insulin aspart 100 UNIT/ML injection    NovoLOG FLEXPEN    20 mL    Inject  before each meal. Dose to be titrated/ adjusted per sliding scale at home    Type 2 diabetes mellitus with diabetic polyneuropathy, with long-term current use of insulin (H)       insulin degludec 200 UNIT/ML pen    TRESIBA    9 mL    Inject 58 units sq daily, as directed    Type 1 diabetes mellitus with diabetic neuropathy (H), Type 1 diabetes mellitus with other specified complication (H)       latanoprost 0.005 % ophthalmic solution    XALATAN    7.5 mL    INSTILL 1 DROP IN BOTH EYES AT BEDTIME    Glaucoma, unspecified glaucoma type, unspecified  laterality       lisinopril 20 MG tablet    PRINIVIL/ZESTRIL    90 tablet    Take 2 tablets (40 mg) by mouth daily    Essential hypertension with goal blood pressure less than 140/90       magnesium oxide 400 MG tablet    MAG-OX    90 tablet    Take 1 tablet (400 mg) by mouth daily    Hypomagnesemia       metFORMIN 500 MG 24 hr tablet    GLUCOPHAGE-XR    360 tablet    TAKE 2 TABLETS(1000 MG) BY MOUTH TWICE DAILY    Type 2 diabetes mellitus with diabetic polyneuropathy, with long-term current use of insulin (H)       metoclopramide 5 MG tablet    REGLAN    90 tablet    TAKE ONE TABLET BY MOUTH THREE TIMES DAILY BEFORE MEALS    Dyspepsia       MULTIVITAMIN TABS   OR      1 TABLET DAILY        omeprazole 20 MG CR capsule    priLOSEC    180 capsule    Take 1 capsule (20 mg) by mouth 2 times daily    Gastroesophageal reflux disease, esophagitis presence not specified       ondansetron 8 MG ODT tab    ZOFRAN-ODT    30 tablet    Take 1 tablet (8 mg) by mouth every 12 hours as needed for nausea Reported on 3/6/2017    Nausea       oxybutynin 5 MG tablet    DITROPAN    180 tablet    Take 1 tablet (5 mg) by mouth 2 times daily    Overactive bladder       simvastatin 20 MG tablet    ZOCOR    90 tablet    Take 1 tablet (20 mg) by mouth At Bedtime    Hyperlipidemia LDL goal <100       * Notice:  This list has 2 medication(s) that are the same as other medications prescribed for you. Read the directions carefully, and ask your doctor or other care provider to review them with you.

## 2018-07-18 NOTE — PROGRESS NOTES
Diabetes Self Management Training: Follow-up Visit    Lexy Rockwell presents today for education Type 1 diabetes.    She is accompanied by mother    Patient's diabetes management related comments/concerns: I need to learn how to check my ketones.    Patient would like this visit to be focused around the following diabetes-related behaviors and goals: as above    ASSESSMENT:  Patient Problem List reviewed for relevant medical history and current medical status.  Patient here to learn about how to prevent DKA and visits to the ER.    Current Diabetes Management per Patient:  Taking diabetes medications?   yes:     Diabetes Medication(s)     Biguanides Sig    metFORMIN (GLUCOPHAGE-XR) 500 MG 24 hr tablet TAKE 2 TABLETS(1000 MG) BY MOUTH TWICE DAILY    Insulin Sig    insulin aspart (NOVOLOG FLEXPEN) 100 UNIT/ML injection Inject  before each meal. Dose to be titrated/ adjusted per sliding scale at home    insulin degludec (TRESIBA) 200 UNIT/ML pen Inject 54 units sq daily, as directed    insulin glargine (LANTUS SOLOSTAR) 100 UNIT/ML injection Inject 46 units at bedtime      Patient states that she is taking Tresiba for a basal insulin and taking 58 units at HS.    Do you have any difficulty affording your medications or glucose monitoring supplies?  Not assessed     *Abbreviated insulin dose documentation key: Insulin trade name (fokvxyqaa-eouwy-lxmzum-bedtime) - i.e. Humalog 5-5-5-0 (Humalog 5 units at breakfast, 5 units at lunch, and 5 units at dinner).    Patient glucose self monitoring as follows: not assessed today    BG values are: unable to assess  Patient's most recent   Lab Results   Component Value Date    A1C 10.6 03/14/2018    is not meeting goal of <8.0    Nutrition:  Patient currently not assessed.  Mother states that she knows what she can have for food.    Biggest Challenge to Healthy Eating: not assessed    Physical Activity:    Not assessed    Diabetes Complications:  DKA prevention  "discussed    Recent health service and resource utilization related to diabetes (hyperglycemia, hypoglycemia, etc.):  Emergency Department visit - date(s): last admission 5/4/18 for DKA    Vitals:  LMP 03/28/2014  Estimated body mass index is 33.39 kg/(m^2) as calculated from the following:    Height as of 6/6/18: 1.607 m (5' 3.25\").    Weight as of 6/6/18: 86.2 kg (190 lb).   Last 3 BP:   BP Readings from Last 3 Encounters:   06/06/18 155/89   04/27/18 116/84   04/04/18 128/86       History   Smoking Status     Never Smoker   Smokeless Tobacco     Never Used     Comment: lives in smoke free household.       Labs:  Lab Results   Component Value Date    A1C 10.6 03/14/2018     Lab Results   Component Value Date     03/14/2018     Lab Results   Component Value Date     06/28/2017     HDL Cholesterol   Date Value Ref Range Status   06/28/2017 44 (L) >49 mg/dL Final   ]  GFR Estimate   Date Value Ref Range Status   03/14/2018 66 >60 mL/min/1.7m2 Final     Comment:     Non  GFR Calc     GFR Estimate If Black   Date Value Ref Range Status   03/14/2018 80 >60 mL/min/1.7m2 Final     Comment:      GFR Calc     Lab Results   Component Value Date    CR 0.88 03/14/2018     No results found for: MICROALBUMIN    Health Beliefs and Attitudes:   Patient Activation Measure Survey Score:  KHUSHI Score (Last Two) 3/7/2012   KHUSHI Raw Score 39   Activation Score 56.4   KHUSHI Level 3       Stage of Change: PREPARATION (Decided to change - considering how)      Diabetes knowledge and skills assessment:     Patient is knowledgeable in diabetes management concepts related to: not assessed    Patient needs further education on the following diabetes management concepts: Problem Solving and Reducing Risks    Barriers to Learning Assessment: Cognitive impairment    Based on learning assessment above, most appropriate setting for further diabetes education would be: Individual " setting.    INTERVENTION:    Education provided today on:  AADE Self-Care Behaviors:  Problem Solving: high blood glucose - causes, signs/symptoms, treatment and prevention and when to call health care provider  Reducing Risks: DKA prevention, how and when to test ketone levels, drinking water   Importance of taking insulin regularly.    Opportunities for ongoing education and support in diabetes-self management were discussed.    Pt verbalized understanding of concepts discussed and recommendations provided today.       Education Materials Provided:  [facility] [employer] Ketone testing brochure    PLAN:  See Patient Instructions for co-developed, patient-stated behavior change goals.  AVS printed and provided to patient today.    FOLLOW-UP:  Follow-up appointment scheduled on 8/15/18  Education topics to cover at the next diabetes education visit(s): Use of insulin correction scale to address blood glucose elevations.    Chart routed to referring provider.    Ongoing plan for education and support: Follow-up visit with diabetes educator in 3 weeks    Donna Tamayo RN  BSN CDE    Time Spent: 60 minutes  Encounter Type: Individual    Any diabetes medication dose changes were made via the CDE Protocol and Collaborative Practice Agreement with the patient's referring provider. A copy of this encounter was shared with the provider.

## 2018-07-27 ENCOUNTER — OFFICE VISIT (OUTPATIENT)
Dept: PODIATRY | Facility: CLINIC | Age: 58
End: 2018-07-27
Payer: COMMERCIAL

## 2018-07-27 VITALS
WEIGHT: 202.1 LBS | BODY MASS INDEX: 35.52 KG/M2 | OXYGEN SATURATION: 98 % | HEART RATE: 100 BPM | SYSTOLIC BLOOD PRESSURE: 152 MMHG | DIASTOLIC BLOOD PRESSURE: 88 MMHG

## 2018-07-27 DIAGNOSIS — E11.49 TYPE II OR UNSPECIFIED TYPE DIABETES MELLITUS WITH NEUROLOGICAL MANIFESTATIONS, NOT STATED AS UNCONTROLLED(250.60) (H): ICD-10-CM

## 2018-07-27 DIAGNOSIS — L84 TYLOMA: ICD-10-CM

## 2018-07-27 DIAGNOSIS — L60.2 ONYCHAUXIS: Primary | ICD-10-CM

## 2018-07-27 PROCEDURE — 99213 OFFICE O/P EST LOW 20 MIN: CPT | Performed by: PODIATRIST

## 2018-07-27 ASSESSMENT — PAIN SCALES - GENERAL: PAINLEVEL: NO PAIN (0)

## 2018-07-27 NOTE — PROGRESS NOTES
Past Medical History:   Diagnosis Date     Cerumen impacted      Development delay      Glaucoma (increased eye pressure)      Hyperlipaemia      Hypertension      Hypothyroid      Mental retardation      Nonsenile cataract      Obesity      Type 1 diabetes mellitus not at goal (H)      Patient Active Problem List   Diagnosis     Development delay     Overactive bladder     GERD (gastroesophageal reflux disease)     Hypertension goal BP (blood pressure) < 140/90     Hyperlipidemia LDL goal <100     Pain in joint, lower leg     Proteinuria     Vitamin D deficiency     Diabetic gastroparesis (H)     Health Care Home     History of strabismus surgery     Type 1 diabetes mellitus with other specified complication (H)     Advanced directives, counseling/discussion     Primary open angle glaucoma of both eyes, moderate stage     Gastroesophageal reflux disease without esophagitis     Obesity, unspecified obesity severity, unspecified obesity type     Acute renal failure (H)     Acute retention of urine     JEAN (acute kidney injury) (H)     Dehydration     Diabetic ketoacidosis associated with type 2 diabetes mellitus (H)     Diabetic ketoacidosis without coma associated with type 1 diabetes mellitus (H)     Diabetic polyneuropathy (H)     DKA (diabetic ketoacidoses) (H)     DKA, type 2 (H)     Sinus tachycardia     SIRS (systemic inflammatory response syndrome) (H)     Past Surgical History:   Procedure Laterality Date     C EYE SURG ANT SGMT PROC UNLISTED  remote    strabismus surgery     STRABISMUS SURGERY       Social History     Social History     Marital status: Single     Spouse name: N/A     Number of children: N/A     Years of education: N/A     Occupational History     Not on file.     Social History Main Topics     Smoking status: Never Smoker     Smokeless tobacco: Never Used      Comment: lives in smoke free household.     Alcohol use Yes      Comment: occass social     Drug use: No     Sexual activity: No      Other Topics Concern     Parent/Sibling W/ Cabg, Mi Or Angioplasty Before 65f 55m? No     Social History Narrative     Family History   Problem Relation Age of Onset     Hypertension Father      Diabetes Sister      Glaucoma Maternal Grandfather      Cancer No family hx of      Cerebrovascular Disease No family hx of      Thyroid Disease No family hx of      Macular Degeneration No family hx of      Lab Results   Component Value Date    A1C 10.6 03/14/2018      SUBJECTIVE FINDINGS:  A 58-year-old female returns to clinic for a diabetic foot check and onychauxis, onychomycosis and tylomas.  She relates she is doing well.  She is using the Loprox cream and feels it is working well.  She relates no ulcers or sores since I had seen her last.  Relates to no injuries and she does have peripheral neuropathy.       OBJECTIVE FINDINGS:  DP and PT are 1/4 bilaterally.  She has decreased hair growth bilaterally.  She has some peripheral edema bilaterally.  There is no erythema, no drainage, no odor, no calor bilaterally.  She has hyperkeratotic tissue buildup, plantar first MPJ left, mildly on plantar medial hallux left.  She has incurvated nails with minimal thickening, dystrophy, discoloration and brittleness, 1-5 bilaterally to differing degrees.        ASSESSMENT AND PLAN:  Onychauxis bilaterally.  Onychomycosis bilaterally.  Onychomycosis is nearly resolved.  Tylomas.  She is diabetic with peripheral neuropathy.  Diagnosis and treatment discussed with her.  Tylomas were sharp debrided bilaterally with a 15 blade upon consent.  She will continue the Loprox cream.  All the nails were debrided or reduced bilaterally upon consent.  She will return to clinic and see me in about 2-3 months.

## 2018-07-27 NOTE — MR AVS SNAPSHOT
After Visit Summary   7/27/2018    Lexy Rockwell    MRN: [id number]           Patient Information     Date Of Birth          1960        Visit Information        Provider Department      7/27/2018 11:00 AM Ravin Back DPM [facility]        Today's Diagnoses     Onychauxis    -  1    Tyloma        Type II or unspecified type diabetes mellitus with neurological manifestations, not stated as uncontrolled(250.60) (H)           Follow-ups after your visit        Follow-up notes from your care team     Return in about 3 months (around 10/27/2018).      Your next 10 appointments already scheduled     Aug 15, 2018 11:30 AM CDT   Diabetic Education with  DIABETIC ED RESOURCE   [facility] ([facility])    [address] [address]   [phone]            Sep 05, 2018 11:30 AM CDT   Return Visit with Gilmar Kelly MD   [facility] ([facility])    [address] [address]   [phone]            Oct 15, 2018 10:30 AM CDT   Return Visit with Ravin Back DPM   [facility] ([facility])    [address] [place] [address]   [phone]            Nov 29, 2018 10:00 AM CST   New Visit with Dayron Rios MD   [facility] ([facility])    [address] [address]   [phone]              Who to contact     If you have questions or need follow up information about today's clinic visit or your schedule please contact [facility] directly at [phone].  Normal or non-critical lab and imaging results will be communicated to you by MyChart, letter or phone within 4 business days after the clinic has received the results. If you do not hear from us within 7 days, please contact the clinic through MyChart or phone. If you have a critical or abnormal lab  result, we will notify you by phone as soon as possible.  Submit refill requests through [employer] or call your pharmacy and they will forward the refill request to us. Please allow 3 business days for your refill to be completed.          Additional Information About Your Visit        Care EveryWhere ID     This is your Care EveryWhere ID. This could be used by other organizations to access your [facility] medical records  [id number]        Your Vitals Were     Pulse Last Period Pulse Oximetry BMI (Body Mass Index)          100 03/28/2014 98% 35.52 kg/m2         Blood Pressure from Last 3 Encounters:   07/27/18 152/88   06/06/18 155/89   04/27/18 116/84    Weight from Last 3 Encounters:   07/27/18 91.7 kg (202 lb 1.6 oz)   06/06/18 86.2 kg (190 lb)   04/04/18 87.1 kg (192 lb)              We Performed the Following     TRIM HYPERKERATOTIC SKIN LESION, ONE     TRIM NONDYSTRPHIC NAIL(S)        Primary Care Provider Office Phone # Fax #    Fredi Fuentes [phone]       [address] [address]        Equal Access to Services     [facility]: Hadii aad ku hadasho Soomaali, waaxda luqadaha, qaybta kaalmada adeegyada, rickey campuzano . So [facility] [phone].    ATENCIÓN: Si habla español, tiene a oneill disposición servicios gratuitos de asistencia lingüística. Llame al [phone].    We comply with applicable federal civil rights laws and [place] laws. We do not discriminate on the basis of race, color, national origin, age, disability, sex, sexual orientation, or gender identity.            Thank you!     Thank you for choosing [facility]  for your care. Our goal is always to provide you with excellent care. Hearing back from our patients is one way we can continue to improve our services. Please take a few minutes to complete the written survey that you may receive in the mail after your visit with us. Thank you!             Your  Updated Medication List - Protect others around you: Learn how to safely use, store and throw away your medicines at www.disposemymeds.org.          This list is accurate as of 7/27/18 11:11 AM.  Always use your most recent med list.                   Brand Name Dispense Instructions for use Diagnosis    ACCU-CHEK COMPACT PLUS test strip   Generic drug:  blood glucose monitoring     408 strip    TEST FIVE TIMES DAILY    Type 2 diabetes mellitus with diabetic polyneuropathy, with long-term current use of insulin (H)       acetone (Urine) test Strp     50 each    Test urine when ill or blood glucose above 250 mg/dl.    Type 1 diabetes mellitus with other specified complication (H)       aspirin 81 MG tablet      1 TABLET DAILY        B-D U/F 31G X 8 MM   Generic drug:  insulin pen needle     100 each    USE WITH INSULIN PENS    Type 2 diabetes, HbA1C goal < 8% (H)       blood glucose monitoring lancets     100 each    1 Units 3 times daily.    Type 2 diabetes, HbA1C goal < 8% (H)       blood glucose monitoring meter device kit    no brand specified    1 kit    Use to test blood sugar 5 times daily or as directed.  Patient needs new monitor.  Accu Test Compact or whatever is covered.  Patient has very labile sugars so needs to check 5 x per day.  Please also include 3 month supply of strips, lancets, and whatever other supplies are needed.  Ongoing refills for a year.    Type 2 diabetes mellitus with other specified complication (H)       * ciclopirox 0.77 % cream    LOPROX    90 g    Apply topically 2 times daily To feet and toenails.    Dermatophytosis of nail, Tinea pedis of both feet       * ciclopirox 0.77 % cream    LOPROX    90 g    Apply topically 2 times daily To feet and toenails.    Dermatophytosis of nail       insulin aspart 100 UNIT/ML injection    NovoLOG FLEXPEN    20 mL    Inject  before each meal. Dose to be titrated/ adjusted per sliding scale at home    Type 2 diabetes mellitus with diabetic  polyneuropathy, with long-term current use of insulin (H)       insulin degludec 200 UNIT/ML pen    TRESIBA    9 mL    Inject 58 units sq daily, as directed    Type 1 diabetes mellitus with diabetic neuropathy (H), Type 1 diabetes mellitus with other specified complication (H)       latanoprost 0.005 % ophthalmic solution    XALATAN    7.5 mL    INSTILL 1 DROP IN BOTH EYES AT BEDTIME    Glaucoma, unspecified glaucoma type, unspecified laterality       lisinopril 20 MG tablet    PRINIVIL/ZESTRIL    90 tablet    Take 2 tablets (40 mg) by mouth daily    Essential hypertension with goal blood pressure less than 140/90       magnesium oxide 400 MG tablet    MAG-OX    90 tablet    Take 1 tablet (400 mg) by mouth daily    Hypomagnesemia       metFORMIN 500 MG 24 hr tablet    GLUCOPHAGE-XR    360 tablet    TAKE 2 TABLETS(1000 MG) BY MOUTH TWICE DAILY    Type 2 diabetes mellitus with diabetic polyneuropathy, with long-term current use of insulin (H)       metoclopramide 5 MG tablet    REGLAN    90 tablet    TAKE ONE TABLET BY MOUTH THREE TIMES DAILY BEFORE MEALS    Dyspepsia       MULTIVITAMIN TABS   OR      1 TABLET DAILY        omeprazole 20 MG CR capsule    priLOSEC    180 capsule    Take 1 capsule (20 mg) by mouth 2 times daily    Gastroesophageal reflux disease, esophagitis presence not specified       ondansetron 8 MG ODT tab    ZOFRAN-ODT    30 tablet    Take 1 tablet (8 mg) by mouth every 12 hours as needed for nausea Reported on 3/6/2017    Nausea       oxybutynin 5 MG tablet    DITROPAN    180 tablet    Take 1 tablet (5 mg) by mouth 2 times daily    Overactive bladder       simvastatin 20 MG tablet    ZOCOR    90 tablet    Take 1 tablet (20 mg) by mouth At Bedtime    Hyperlipidemia LDL goal <100       * Notice:  This list has 2 medication(s) that are the same as other medications prescribed for you. Read the directions carefully, and ask your doctor or other care provider to review them with you.

## 2018-07-27 NOTE — LETTER
7/27/2018         RE: Lexy Rockwell  [address]  [place] [place] [place] [address]        Dear Colleague,    Thank you for referring your patient, Lexy Rockwell, to the [facility]. Please see a copy of my visit note below.    Past Medical History:   Diagnosis Date     Cerumen impacted      Development delay      Glaucoma (increased eye pressure)      Hyperlipaemia      Hypertension      Hypothyroid      Mental retardation      Nonsenile cataract      Obesity      Type 1 diabetes mellitus not at goal (H)      Patient Active Problem List   Diagnosis     Development delay     Overactive bladder     GERD (gastroesophageal reflux disease)     Hypertension goal BP (blood pressure) < 140/90     Hyperlipidemia LDL goal <100     Pain in joint, lower leg     Proteinuria     Vitamin D deficiency     Diabetic gastroparesis (H)     Health Care Home     History of strabismus surgery     Type 1 diabetes mellitus with other specified complication (H)     Advanced directives, counseling/discussion     Primary open angle glaucoma of both eyes, moderate stage     Gastroesophageal reflux disease without esophagitis     Obesity, unspecified obesity severity, unspecified obesity type     Acute renal failure (H)     Acute retention of urine     JEAN (acute kidney injury) (H)     Dehydration     Diabetic ketoacidosis associated with type 2 diabetes mellitus (H)     Diabetic ketoacidosis without coma associated with type 1 diabetes mellitus (H)     Diabetic polyneuropathy (H)     DKA (diabetic ketoacidoses) (H)     DKA, type 2 (H)     Sinus tachycardia     SIRS (systemic inflammatory response syndrome) (H)     Past Surgical History:   Procedure Laterality Date     C EYE SURG ANT SGMT PROC UNLISTED  remote    strabismus surgery     STRABISMUS SURGERY       Social History     Social History     Marital status: Single     Spouse name: N/A     Number of children: N/A     Years of education: N/A      Occupational History     Not on file.     Social History Main Topics     Smoking status: Never Smoker     Smokeless tobacco: Never Used      Comment: lives in smoke free household.     Alcohol use Yes      Comment: occass social     Drug use: No     Sexual activity: No     Other Topics Concern     Parent/Sibling W/ Cabg, Mi Or Angioplasty Before 65f 55m? No     Social History Narrative     Family History   Problem Relation Age of Onset     Hypertension Father      Diabetes Sister      Glaucoma Maternal Grandfather      Cancer No family hx of      Cerebrovascular Disease No family hx of      Thyroid Disease No family hx of      Macular Degeneration No family hx of      Lab Results   Component Value Date    A1C 10.6 03/14/2018      SUBJECTIVE FINDINGS:  A 58-year-old female returns to clinic for a diabetic foot check and onychauxis, onychomycosis and tylomas.  She relates she is doing well.  She is using the Loprox cream and feels it is working well.  She relates no ulcers or sores since I had seen her last.  Relates to no injuries and she does have peripheral neuropathy.       OBJECTIVE FINDINGS:  DP and PT are 1/4 bilaterally.  She has decreased hair growth bilaterally.  She has some peripheral edema bilaterally.  There is no erythema, no drainage, no odor, no calor bilaterally.  She has hyperkeratotic tissue buildup, plantar first MPJ left, mildly on plantar medial hallux left.  She has incurvated nails with minimal thickening, dystrophy, discoloration and brittleness, 1-5 bilaterally to differing degrees.        ASSESSMENT AND PLAN:  Onychauxis bilaterally.  Onychomycosis bilaterally.  Onychomycosis is nearly resolved.  Tylomas.  She is diabetic with peripheral neuropathy.  Diagnosis and treatment discussed with her.  Tylomas were sharp debrided bilaterally with a 15 blade upon consent.  She will continue the Loprox cream.  All the nails were debrided or reduced bilaterally upon consent.  She will return to  clinic and see me in about 2-3 months.     Again, thank you for allowing me to participate in the care of your patient.        Sincerely,        Ravin Back DPM

## 2018-07-27 NOTE — NURSING NOTE
Lexy Rockwell's goals for this visit include:   Chief Complaint   Patient presents with     RECHECK     2 to 3 follow up for toe nails        She requests these members of her care team be copied on today's visit information: Yes    PCP: Fredi Fuentes    Referring Provider:  No referring provider defined for this encounter.    /88 (BP Location: Left arm, Patient Position: Sitting, Cuff Size: Adult Large)  Pulse 100  Wt 91.7 kg (202 lb 1.6 oz)  LMP 03/28/2014  SpO2 98%  BMI 35.52 kg/m2    Do you need any medication refills at today's visit? No

## 2018-09-04 ENCOUNTER — NURSE TRIAGE (OUTPATIENT)
Dept: NURSING | Facility: CLINIC | Age: 58
End: 2018-09-04

## 2018-09-04 NOTE — TELEPHONE ENCOUNTER
"Has Endocrinologist  appt tomorrow . Do I need to be fasting  For a Lab A1c ?  FNA answer \" No.\"   .Ashley Canas RN [facility] nurse advisors.    "

## 2018-09-05 ENCOUNTER — NURSE TRIAGE (OUTPATIENT)
Dept: NURSING | Facility: CLINIC | Age: 58
End: 2018-09-05

## 2018-09-05 ENCOUNTER — OFFICE VISIT (OUTPATIENT)
Dept: ENDOCRINOLOGY | Facility: CLINIC | Age: 58
End: 2018-09-05
Payer: COMMERCIAL

## 2018-09-05 VITALS
HEART RATE: 101 BPM | HEIGHT: 63 IN | BODY MASS INDEX: 35.08 KG/M2 | SYSTOLIC BLOOD PRESSURE: 165 MMHG | WEIGHT: 198 LBS | DIASTOLIC BLOOD PRESSURE: 95 MMHG

## 2018-09-05 DIAGNOSIS — E10.69 TYPE 1 DIABETES MELLITUS WITH OTHER SPECIFIED COMPLICATION (H): ICD-10-CM

## 2018-09-05 DIAGNOSIS — Z23 NEED FOR PROPHYLACTIC VACCINATION AND INOCULATION AGAINST INFLUENZA: Primary | ICD-10-CM

## 2018-09-05 LAB
ANION GAP SERPL CALCULATED.3IONS-SCNC: 8 MMOL/L (ref 3–14)
BUN SERPL-MCNC: 12 MG/DL (ref 7–30)
CALCIUM SERPL-MCNC: 9.5 MG/DL (ref 8.5–10.1)
CHLORIDE SERPL-SCNC: 99 MMOL/L (ref 94–109)
CHOLEST SERPL-MCNC: 189 MG/DL
CO2 SERPL-SCNC: 24 MMOL/L (ref 20–32)
CREAT SERPL-MCNC: 0.59 MG/DL (ref 0.52–1.04)
GFR SERPL CREATININE-BSD FRML MDRD: >90 ML/MIN/1.7M2
GLUCOSE SERPL-MCNC: 619 MG/DL (ref 70–99)
HBA1C MFR BLD: 12.4 % (ref 0–5.6)
HDLC SERPL-MCNC: 65 MG/DL
LDLC SERPL CALC-MCNC: 94 MG/DL
NONHDLC SERPL-MCNC: 124 MG/DL
POTASSIUM SERPL-SCNC: 4.5 MMOL/L (ref 3.4–5.3)
SODIUM SERPL-SCNC: 131 MMOL/L (ref 133–144)
TRIGL SERPL-MCNC: 152 MG/DL
TSH SERPL DL<=0.005 MIU/L-ACNC: 3.56 MU/L (ref 0.4–4)

## 2018-09-05 PROCEDURE — 80048 BASIC METABOLIC PNL TOTAL CA: CPT | Performed by: INTERNAL MEDICINE

## 2018-09-05 PROCEDURE — 36415 COLL VENOUS BLD VENIPUNCTURE: CPT | Performed by: INTERNAL MEDICINE

## 2018-09-05 PROCEDURE — 80061 LIPID PANEL: CPT | Performed by: INTERNAL MEDICINE

## 2018-09-05 PROCEDURE — 83036 HEMOGLOBIN GLYCOSYLATED A1C: CPT | Performed by: INTERNAL MEDICINE

## 2018-09-05 PROCEDURE — 99214 OFFICE O/P EST MOD 30 MIN: CPT | Performed by: INTERNAL MEDICINE

## 2018-09-05 PROCEDURE — 84443 ASSAY THYROID STIM HORMONE: CPT | Performed by: INTERNAL MEDICINE

## 2018-09-05 NOTE — MR AVS SNAPSHOT
After Visit Summary   9/5/2018    Lexy Rockwell    MRN: [id number]           Patient Information     Date Of Birth          1960        Visit Information        Provider Department      9/5/2018 11:30 AM Gilmar Kelly MD [facility]        Today's Diagnoses     Need for prophylactic vaccination and inoculation against influenza    -  1    Type 1 diabetes mellitus with other specified complication (H)          Care Instructions    Continue the MetforminER 500 mg 2-tabs twice per day  Continue the same insulin plan with higher doses:   Tresiba U200 pen as 64 units each evening              Novolog Flexpen 26U before each meal   Use a rapid-acting insulin correction scale for blood glucose levels >150 mg/dl:       mg/dl  No correction    151-200 mg/dl  +2 units    201-250 mg/dl  +4 units    251-300 mg/dl  +6 units    301-350 mg/dl  +8 units    350-400 mg/dl  +10 units    401-450 mg/dl  +12 units and call physician    Test blood glucose level before meals and bedtime, avoid bedtime snack unless BG <150 mg/dl  Goal target -150 mg/dl  Use urine ketone test strips:   if higher BG >350 mg/dl   significant nausea or vomiting    Next appt with Dr. Kelly 12/12/18 at 10am, [facility]          Follow-ups after your visit        Follow-up notes from your care team     Return in about 3 months (around 12/12/2018).      Your next 10 appointments already scheduled     Oct 15, 2018 10:30 AM CDT   Return Visit with Ravin Back DPM   [facility] ([facility])    [address] [place] [address] [phone]            Nov 29, 2018 10:00 AM CST   New Visit with Dayron Rios MD   [facility] ([facility])    [address] [place] [id number] [phone]              Who to contact     If you have questions or need follow up information about today's clinic visit or your  "schedule please contact [facility] directly at [phone].  Normal or non-critical lab and imaging results will be communicated to you by MyChart, letter or phone within 4 business days after the clinic has received the results. If you do not hear from us within 7 days, please contact the clinic through MyChart or phone. If you have a critical or abnormal lab result, we will notify you by phone as soon as possible.  Submit refill requests through [employer] or call your pharmacy and they will forward the refill request to us. Please allow 3 business days for your refill to be completed.          Additional Information About Your Visit        Care EveryWhere ID     This is your Care EveryWhere ID. This could be used by other organizations to access your [facility] medical records  [id number]        Your Vitals Were     Pulse Height Last Period BMI (Body Mass Index)          101 1.607 m (5' 3.25\") 03/28/2014 34.8 kg/m2         Blood Pressure from Last 3 Encounters:   09/05/18 (!) 165/95   07/27/18 152/88   06/06/18 155/89    Weight from Last 3 Encounters:   09/05/18 89.8 kg (198 lb)   07/27/18 91.7 kg (202 lb 1.6 oz)   06/06/18 86.2 kg (190 lb)              We Performed the Following     Albumin Random Urine Quantitative with Creat Ratio     Basic metabolic panel     FLU VACCINE, (RIV4) RECOMBINANT HA  , IM (FluBlok, egg free) [57608]- >18 YRS ([facility] recommended  50-64 YRS)     Hemoglobin A1c     Lipid panel reflex to direct LDL Fasting     TSH        Primary Care Provider Office Phone # Fax #    Fredi Fuentes [phone]       [address] [facility] [address]        Equal Access to Services     [facility] : Hadii ashok Moody, waericda luqadaha, qaybta kaalmada toby, rickey chong. So [facility] [phone].    ATENCIÓN: Si habla español, tiene a oneill disposición servicios gratuitos de asistencia lingüística. Llame al " [phone].    We comply with applicable federal civil rights laws and [place] laws. We do not discriminate on the basis of race, color, national origin, age, disability, sex, sexual orientation, or gender identity.            Thank you!     Thank you for choosing [facility]  for your care. Our goal is always to provide you with excellent care. Hearing back from our patients is one way we can continue to improve our services. Please take a few minutes to complete the written survey that you may receive in the mail after your visit with us. Thank you!             Your Updated Medication List - Protect others around you: Learn how to safely use, store and throw away your medicines at www.disposemymeds.org.          This list is accurate as of 9/5/18 12:01 PM.  Always use your most recent med list.                   Brand Name Dispense Instructions for use Diagnosis    ACCU-CHEK COMPACT PLUS test strip   Generic drug:  blood glucose monitoring     450 strip    TEST FIVE TIMES DAILY    Type 2 diabetes mellitus with diabetic polyneuropathy, with long-term current use of insulin (H)       acetone (Urine) test Strp     50 each    Test urine when ill or blood glucose above 250 mg/dl.    Type 1 diabetes mellitus with other specified complication (H)       aspirin 81 MG tablet      1 TABLET DAILY        B-D U/F 31G X 8 MM   Generic drug:  insulin pen needle     100 each    USE WITH INSULIN PENS    Type 2 diabetes, HbA1C goal < 8% (H)       blood glucose monitoring lancets     100 each    1 Units 3 times daily.    Type 2 diabetes, HbA1C goal < 8% (H)       blood glucose monitoring meter device kit    no brand specified    1 kit    Use to test blood sugar 5 times daily or as directed.  Patient needs new monitor.  Accu Test Compact or whatever is covered.  Patient has very labile sugars so needs to check 5 x per day.  Please also include 3 month supply of strips, lancets, and whatever other supplies are needed.   Ongoing refills for a year.    Type 2 diabetes mellitus with other specified complication (H)       * ciclopirox 0.77 % cream    LOPROX    90 g    Apply topically 2 times daily To feet and toenails.    Dermatophytosis of nail, Tinea pedis of both feet       * ciclopirox 0.77 % cream    LOPROX    90 g    Apply topically 2 times daily To feet and toenails.    Dermatophytosis of nail       insulin aspart 100 UNIT/ML injection    NovoLOG FLEXPEN    20 mL    Inject  before each meal. Dose to be titrated/ adjusted per sliding scale at home    Type 2 diabetes mellitus with diabetic polyneuropathy, with long-term current use of insulin (H)       insulin degludec 200 UNIT/ML pen    TRESIBA    9 mL    Inject 58 units sq daily, as directed    Type 1 diabetes mellitus with diabetic neuropathy (H), Type 1 diabetes mellitus with other specified complication (H)       latanoprost 0.005 % ophthalmic solution    XALATAN    7.5 mL    INSTILL 1 DROP IN BOTH EYES AT BEDTIME    Glaucoma, unspecified glaucoma type, unspecified laterality       lisinopril 20 MG tablet    PRINIVIL/ZESTRIL    90 tablet    Take 2 tablets (40 mg) by mouth daily    Essential hypertension with goal blood pressure less than 140/90       magnesium oxide 400 MG tablet    MAG-OX    90 tablet    Take 1 tablet (400 mg) by mouth daily    Hypomagnesemia       metFORMIN 500 MG 24 hr tablet    GLUCOPHAGE-XR    360 tablet    TAKE 2 TABLETS(1000 MG) BY MOUTH TWICE DAILY    Type 2 diabetes mellitus with diabetic polyneuropathy, with long-term current use of insulin (H)       metoclopramide 5 MG tablet    REGLAN    90 tablet    TAKE ONE TABLET BY MOUTH THREE TIMES DAILY BEFORE MEALS    Dyspepsia       MULTIVITAMIN TABS   OR      1 TABLET DAILY        omeprazole 20 MG CR capsule    priLOSEC    180 capsule    Take 1 capsule (20 mg) by mouth 2 times daily    Gastroesophageal reflux disease, esophagitis presence not specified       ondansetron 8 MG ODT tab    ZOFRAN-ODT    30  tablet    Take 1 tablet (8 mg) by mouth every 12 hours as needed for nausea Reported on 3/6/2017    Nausea       oxybutynin 5 MG tablet    DITROPAN    180 tablet    Take 1 tablet (5 mg) by mouth 2 times daily    Overactive bladder       simvastatin 20 MG tablet    ZOCOR    90 tablet    Take 1 tablet (20 mg) by mouth At Bedtime    Hyperlipidemia LDL goal <100       * Notice:  This list has 2 medication(s) that are the same as other medications prescribed for you. Read the directions carefully, and ask your doctor or other care provider to review them with you.

## 2018-09-05 NOTE — PROGRESS NOTES
"  Injectable Influenza Immunization Documentation    1.  Is the person to be vaccinated sick today?  {YES/NO DEFAULT NO:86789::\" No\"}    2. Does the person to be vaccinated have an allergy to a component   of the vaccine?  {YES/NO DEFAULT NO:28990::\" No\"}  Egg Allergy Algorithm Link    3. Has the person to be vaccinated ever had a serious reaction   to influenza vaccine in the past?  {YES/NO DEFAULT NO:34184::\" No\"}    4. Has the person to be vaccinated ever had Guillain-Barré syndrome?  {YES/NO DEFAULT NO:19962::\" No\"}    Form completed by ***         "

## 2018-09-05 NOTE — PROGRESS NOTES
Name: Lexy Rockwell    HPI:  Recent issues:  Here for diabetes evaluation  Previously hospitalized 5/4/18 with DKA  Feeling well recently, states she had good visit with MARY Moore recently            Diagnosis of diabetes mellitus in her 40's, living [facility] or [facility]  Initial treatment with metformin, then addition of insulin.(multiple injections)  She does recall taking another DM pill in the past  Has [facility] Shira [facility]  Reports having many diabetes-related hospitalizations including DKA   Had taken Lantus daily and Novolog premeal  Current DM meds:   Tresiba U200 58U QHS   Novolog Flexpen 26U premeal     Novolog sscale >150 mg/dl   MetforminER 500 mg 2-tabs BID    Meals TID  Uses [employer] Compact Plus meter, typically tests 2-3x/day   Recent BG  and avg 270 mg/dl  Recent [facility] labs include:  Lab Results   Component Value Date    A1C 12.4 (H) 09/05/2018     03/14/2018    POTASSIUM 4.7 03/14/2018    CHLORIDE 101 03/14/2018    CO2 17 (L) 03/14/2018    ANIONGAP 17 (H) 03/14/2018     (HH) 03/14/2018    BUN 13 03/14/2018    CR 0.88 03/14/2018    GFRESTIMATED 66 03/14/2018    GFRESTBLACK 80 03/14/2018    EFRAIN 9.6 03/14/2018    CPEPT 0.6 (L) 03/14/2018    CHOL 200 (H) 06/28/2017    TRIG 195 (H) 06/28/2017    HDL 44 (L) 06/28/2017     (H) 06/28/2017    NHDL 156 (H) 06/28/2017    UCRR 94 06/23/2017    MICROL 9 06/23/2017    UMALCR 9.80 06/23/2017     Has seen Jaqui NGUYEN for Diabetes Education  1/2018 Hospitalized at [facility] with DKA  DM Complications:   Neuropathy    Decreased sensation at feet      Previous concern for gastroparesis   Has taken Reglan 5 mg TID premeal, also Prilosec   Saw Dr. Bill Rolon 5/2018    Gastric emptying study normal    Told to call his office if questions    Last eye exam few months ago?, no DR  Also takes simvastatin 20 mg po QHS, lisinopril 20 mg 2-tab QD (higher dose)    Single, on Medicare  Disability  Lives by self in [place], gets assistance with transportation from parents  Sees Dr. Fredi Fuentes for general medicine evaluations.  Has also seen Dr. Katalina Stanley, Cece Edwards, and Bill Rolon/TONY    PMH/PSH:  Past Medical History:   Diagnosis Date     Cerumen impacted      Development delay      Glaucoma (increased eye pressure)      Hyperlipaemia      Hypertension      Hypothyroid      Mental retardation      Nonsenile cataract      Obesity      Type 1 diabetes mellitus not at goal (H)      Past Surgical History:   Procedure Laterality Date     C EYE SURG ANT SGMT PROC UNLISTED  remote    strabismus surgery     STRABISMUS SURGERY         Family Hx:  Family History   Problem Relation Age of Onset     Hypertension Father      Diabetes Sister      Glaucoma Maternal Grandfather      Cancer No family hx of      Cerebrovascular Disease No family hx of      Thyroid Disease No family hx of      Macular Degeneration No family hx of          Social Hx:  Social History     Social History     Marital status: Single     Spouse name: N/A     Number of children: N/A     Years of education: N/A     Occupational History     Not on file.     Social History Main Topics     Smoking status: Never Smoker     Smokeless tobacco: Never Used      Comment: lives in smoke free household.     Alcohol use Yes      Comment: occass social     Drug use: No     Sexual activity: No     Other Topics Concern     Parent/Sibling W/ Cabg, Mi Or Angioplasty Before 65f 55m? No     Social History Narrative          MEDICATIONS:  has a current medication list which includes the following prescription(s): accu-chek compact plus, acetone (urine) test, aspirin, b-d u/f, blood glucose monitoring, ciclopirox, ciclopirox, insulin aspart, insulin degludec, latanoprost, lisinopril, magnesium oxide, metformin, metoclopramide, multiple vitamin, omeprazole, ondansetron, oxybutynin, simvastatin, and blood glucose monitoring.    ROS:     ROS: 10  "point ROS neg other than the symptoms noted above in the HPI.    GENERAL: no weight changes, fatigue, fevers, chills, night sweats.   HEENT: no dysphagia, odonophagia, diplopia, neck pain  THYROID:  no apparent hyper or hypothyroid symptoms  CV: no chest pain, pressure, palpitations  LUNGS: no SOB, VENEGAS, cough, wheezing   ABDOMEN: frequent morning pre-breakfasttime nausea, occasional reflux; no diarrhea, constipation, abdominal pain  EXTREMITIES: no rashes, ulcers, edema  NEUROLOGY: some feet numbness; no headaches, denies changes in vision, tingling   MSK: no muscle aches or pains, weakness  SKIN: no rashes or lesions  : no menses; denies hot flashes  PSYCH:  stable mood, no significant anxiety or depression  ENDOCRINE: no heat or cold intolerance      Physical Exam   VS: BP (!) 165/95  Pulse 101  Ht 1.607 m (5' 3.25\")  Wt 89.8 kg (198 lb)  LMP 03/28/2014  BMI 34.8 kg/m2  GENERAL: AXOX3, NAD, slowed speech, well dressed, answering questions appropriately, appears stated age.  THYROID:  normal gland, no apparent nodules or goiter  HEENT: irregular teeth dentition; neck non-tender, no exopthalmous, no proptosis, EOMI  ABDOMEN: obese, soft, nontender, nondistended, no organomegaly  NEUROLOGY: somewhat slowed mentation, CN grossly intact, no tremors    LABS:    All pertinent notes, labs, and images personally reviewed by me.     A/P:  Encounter Diagnoses   Name Primary?     Need for prophylactic vaccination and inoculation against influenza Yes     Type 1 diabetes mellitus with other specified complication (H)      Comments:  Reviewed health history and diabetes issues  Recent BG levels usually elevated, but improved, BG avg 270 mg/dl    Plan:  Discussed general issues with the diabetes diagnosis and management  We discussed the hgbA1c test which reflects previous overall glucose levels or control  Discussed the importance of blood glucose (BG) testing to assess glucose trends  Reviewed the multiple daily " injection (MDI) treatment using mealtime rapid-acting and daily long-acting insulin, also correction scale.    Recommend:  Continue the MetforminER 500 mg 2-tabs twice per day  We reviewed the MDI insulin plan, issues with compliance using the mealtime Nv dosing  Continue the same insulin plan with higher doses:   Tresiba U200 pen as 64 units each evening              Novolog Flexpen 26U before each meal   Use a rapid-acting insulin correction scale for blood glucose levels >150 mg/dl:       mg/dl  No correction    151-200 mg/dl  +2 units    201-250 mg/dl  +4 units    251-300 mg/dl  +6 units    301-350 mg/dl  +8 units    350-400 mg/dl  +10 units    401-450 mg/dl  +12 units and call physician    Test blood glucose level before meals and bedtime  Avoid bedtime snack unless BG <150 mg/dl  Goal target -150 mg/dl  Agree with urine ketone testing for severe (not moderate) hyperglycemia.  Use urine ketone test strips:   if higher BG >350 mg/dl   significant nausea or vomiting  Check lab tests today  Plan to see the  Diabetes Educator:   Review insulin dose adjustment with this dose plan   Discuss circumstances that lead to her previous (many) DKA hospitalizations   Review technique of urine ketone (sick day) testing, use of positive urine ketone correction scale also  Keep focus on diet, exercise, weight management.  Advise having fasting lipid panel testing and dilated eye examination, at least annually    Plan to see Dr. Bill Rolon/GI and colleagues if further food digestion/GI issues  Patient to follow up with Primary Care provider regarding elevated blood pressure.      Patient Instructions   Continue the MetforminER 500 mg 2-tabs twice per day  Continue the same insulin plan with higher doses:   Tresiba U200 pen as 64 units each evening              Novolog Flexpen 26U before each meal   Use a rapid-acting insulin correction scale for blood glucose levels >150 mg/dl:       mg/dl  No  correction    151-200 mg/dl  +2 units    201-250 mg/dl  +4 units    251-300 mg/dl  +6 units    301-350 mg/dl  +8 units    350-400 mg/dl  +10 units    401-450 mg/dl  +12 units and call physician    Test blood glucose level before meals and bedtime, avoid bedtime snack unless BG <150 mg/dl  Goal target -150 mg/dl  Use urine ketone test strips:   if higher BG >350 mg/dl   significant nausea or vomiting    Next appt with Dr. Kelly 12/12/18 at 10am, [facility]      Labs ordered today:   Orders Placed This Encounter   Procedures     Hemoglobin A1c     Basic metabolic panel     TSH     Lipid panel reflex to direct LDL Fasting     Albumin Random Urine Quantitative with Creat Ratio     Radiology/Consults ordered today: None    More than 50% of the time spent with Ms. Rockwell on counseling / coordinating her care.  Total appointment time was 25 minutes.    Follow-up:  3 mo.    Gilmar Kelly MD  Endocrinology  [facility]

## 2018-09-05 NOTE — PATIENT INSTRUCTIONS
Continue the MetforminER 500 mg 2-tabs twice per day  Continue the same insulin plan with higher doses:   Tresiba U200 pen as 64 units each evening              Novolog Flexpen 26U before each meal   Use a rapid-acting insulin correction scale for blood glucose levels >150 mg/dl:       mg/dl  No correction    151-200 mg/dl  +2 units    201-250 mg/dl  +4 units    251-300 mg/dl  +6 units    301-350 mg/dl  +8 units    350-400 mg/dl  +10 units    401-450 mg/dl  +12 units and call physician    Test blood glucose level before meals and bedtime, avoid bedtime snack unless BG <150 mg/dl  Goal target -150 mg/dl  Use urine ketone test strips:   if higher BG >350 mg/dl   significant nausea or vomiting    Next appt with Dr. Kelly 12/12/18 at 10am, [facility]

## 2018-09-06 NOTE — TELEPHONE ENCOUNTER
"Clinic Action Needed:No  Reason for Call: Tricia with [facility] lab calling:  \"I'm trying to report a critical lab value\".  Caller reports that Dr. Grady on call for former [facility] group.  She advised to pass onto Endocrinology.  Paged on call for [facility] to speak to caller at [phone].  Dr. Kelly is on call, page sent @ 8:28 pm. Caller advised to call back if they have not heard back from on call provider within 10minutes.  Caller appears to understand directives and agrees with plan.    Routed to: Not routed.    Dai Vincent RN  [facility]        "

## 2018-09-07 ENCOUNTER — PATIENT OUTREACH (OUTPATIENT)
Dept: EDUCATION SERVICES | Facility: OTHER | Age: 58
End: 2018-09-07

## 2018-09-07 ENCOUNTER — TELEPHONE (OUTPATIENT)
Dept: EDUCATION SERVICES | Facility: OTHER | Age: 58
End: 2018-09-07

## 2018-09-07 NOTE — TELEPHONE ENCOUNTER
Call out to pt to follow up with her to reschedule her diabetes education appointment for continued education re prevention of DKA.  Pt had cancelled her last follow up appointment for this.    Pt contacted.  She states that  She is not feeling well today but better than she was yesterday.  States that she is nauseated.  States that she has not tested her bg or ketone level today as she does not feel well.  I asked pt if I could wait on the phone while she tested her bg and ketones and she said that she was waiting for her groceries to come very soon and did not want to do that now.  I asked her to do it right after her groceries come and pt was agreeable.  Pt understands that she needs to check ketones if her bg is above 350 mg/dl.  Discussed that pt needs to call MD with positive ketone level or go to Emergency room.  Pt verbalized understanding.    Donna Tamayo RN  BSN CDE

## 2018-09-07 NOTE — TELEPHONE ENCOUNTER
Call out to patient. Patient called the clinic instead of her MD after checking her blood glucose and ketones. She states that she checked her blood glucose at 417 mg/dl and her ketones at dark purple -large.  States that she has not vomited but has been too nauseated to eat meals.  After she checked her ketones she has started to sip on water as that is what the pamphlet I gave her said to do.  She states that she feels she can drink some water now. Has not taken Novolog insulin today.  I told patient I would check with Dr. Kelly and call her back.    Educator contacted Dr Kelly.    His instructions:  Patient to check blood glucose and ketone level q 2 hr and use her Novolog correction scale for blood glucose elevation as well as a correction scale for her elevated ketones as follows:  Small add 1 unit, moderate add 2 units, large add 3 units.  Patient should sip on water and try to get down at least 2 large glasses.  If patient is getting worse or starts to vomit she should call the [facility] to talk with the Endo MD on call for further instructions. [phone].  Patient can stop the every 2 hr blood glucose and ketone treatment once her blood glucose level gets under 300 mg/dl.  If patient wants to eat she should use 6 units of Novolog for each carb choice she plans to eat.  She should continue to sip water thru the evening and be sure to take her Tresiba at HS.    Call out to patient.   Patient rechecked her ketones and blood glucose level at 377 mg/dl and large ketones.  Reports that she has been able to drink one large mug of water since we last talked.  I gave patient the above ketone correction scale and we discussed the above instructions and plan for her to improve her glucose control this evening.  Patient calculated her insulin dose for now of Novolog 13 units and she took Novolog 13 units at 4:40 pm. She has her blood glucose correction scale on her AVS from her Endo appointment last  Wed.    Patient will continue to drink water and she will contact her Mom to let her know that she is sick but has a treatment plan.  Patient did not want educator to contact her Mom re this illness.  Patient will recheck her blood glucose and ketone level again at 6:30 pm and use her correction scales as above.  Patient will take Novolog 6 units for a carb choice if she is hungry.  Patient will call On call MD if she gets worse.    Appointment set with patient for diabetes education.    Donna Tamayo RN  BSN CDE

## 2018-09-08 DIAGNOSIS — R11.0 NAUSEA: ICD-10-CM

## 2018-09-10 NOTE — TELEPHONE ENCOUNTER
"Requested Prescriptions   Pending Prescriptions Disp Refills     ondansetron (ZOFRAN-ODT) 8 MG ODT tab [Pharmacy Med Name: ONDANSETRON ODT 8MG TABLETS] 30 tablet 0    Last Written Prescription Date:  6/23/17  Last Fill Quantity: 30,  # refills: 1   Last office visit: 1/26/2018 with prescribing provider:     Future Office Visit:   Next 5 appointments (look out 90 days)     Oct 15, 2018 10:30 AM CDT   [facility] Ravin Back DPM   [facility]    [address] [place] [id number] [phone]                  Sig: TAKE 1 TABLET BY MOUTH EVERY 12 HOURS AS NEEDED FOR NAUSEA     Antivertigo/Antiemetic Agents Passed    9/8/2018  2:50 PM       Passed - Recent (12 mo) or future (30 days) visit within the authorizing provider's specialty    Patient had office visit in the last 12 months or has a visit in the next 30 days with authorizing provider or within the authorizing provider's specialty.  See \"Patient Info\" tab in inbasket, or \"Choose Columns\" in Meds & Orders section of the refill encounter.           Passed - Patient is 18 years of age or older          "

## 2018-09-11 DIAGNOSIS — E11.42 TYPE 2 DIABETES MELLITUS WITH DIABETIC POLYNEUROPATHY, WITH LONG-TERM CURRENT USE OF INSULIN (H): ICD-10-CM

## 2018-09-11 DIAGNOSIS — Z79.4 TYPE 2 DIABETES MELLITUS WITH DIABETIC POLYNEUROPATHY, WITH LONG-TERM CURRENT USE OF INSULIN (H): ICD-10-CM

## 2018-09-11 RX ORDER — ONDANSETRON 8 MG/1
TABLET, ORALLY DISINTEGRATING ORAL
Qty: 30 TABLET | Refills: 0 | Status: SHIPPED | OUTPATIENT
Start: 2018-09-11 | End: 2019-05-20

## 2018-09-11 NOTE — TELEPHONE ENCOUNTER
"Insulin aspart pen 100 unit/mL    Last Written Prescription Date:  01/08/18  Last Fill Quantity: 20 mL,  # refills: 1   Last office visit: 9/5/2018 with prescribing provider:  Leticia   Future Office Visit:   Next 5 appointments (look out 90 days)     Oct 15, 2018 10:30 AM CDT   [facility] with Ravin Back DPM   [facility]    [address] [address] [place] [id number] [phone]                 Requested Prescriptions   Pending Prescriptions Disp Refills     insulin aspart (NOVOLOG FLEXPEN) 100 UNIT/ML injection 20 mL 1     Sig: Inject  before each meal. Dose to be titrated/ adjusted per sliding scale at home    Short Acting Insulin Protocol Failed    9/11/2018 11:08 AM       Failed - Blood pressure less than 140/90 in past 6 months    BP Readings from Last 3 Encounters:   09/05/18 (!) 165/95   07/27/18 152/88   06/06/18 155/89                Passed - LDL on file in past 12 months    Recent Labs   Lab Test  09/05/18   1208   LDL  94            Passed - Microalbumin on file in past 12 months    Recent Labs   Lab Test  06/23/17   1008   MICROL  9   UMALCR  9.80            Passed - Serum creatinine on file in past 12 months    Recent Labs   Lab Test  09/05/18   1208   CR  0.59            Passed - HgbA1C in past 3 or 6 months    If HgbA1C is 8 or greater, it needs to be on file within the past 3 months.  If less than 8, must be on file within the past 6 months.     Recent Labs   Lab Test  09/05/18   1208   A1C  12.4*            Passed - Patient is age 18 or older       Passed - Recent (6 mo) or future (30 days) visit within the authorizing provider's specialty    Patient had office visit in the last 6 months or has a visit in the next 30 days with authorizing provider or within the authorizing provider's specialty.  See \"Patient Info\" tab in inbasket, or \"Choose Columns\" in Meds & Orders section of the refill encounter.              "

## 2018-09-26 ENCOUNTER — ALLIED HEALTH/NURSE VISIT (OUTPATIENT)
Dept: EDUCATION SERVICES | Facility: CLINIC | Age: 58
End: 2018-09-26
Payer: COMMERCIAL

## 2018-09-26 VITALS — WEIGHT: 201.5 LBS | BODY MASS INDEX: 35.41 KG/M2

## 2018-09-26 DIAGNOSIS — E10.69 TYPE 1 DIABETES MELLITUS WITH OTHER SPECIFIED COMPLICATION (H): Primary | ICD-10-CM

## 2018-09-26 DIAGNOSIS — E10.40 TYPE 1 DIABETES MELLITUS WITH DIABETIC NEUROPATHY (H): ICD-10-CM

## 2018-09-26 PROCEDURE — G0108 DIAB MANAGE TRN  PER INDIV: HCPCS

## 2018-09-26 NOTE — PATIENT INSTRUCTIONS
Recommend to test for ketones when your blood glucose is above 350 mg/dl and use the correction scales to help lower your blood glucose levels as discussed.    Call if concerns.      Scheduling phone number is [phone]  ( to make or change an appointment)    Diabetes education triage phone number is [phone].  (for questions or concerns)

## 2018-09-26 NOTE — MR AVS SNAPSHOT
After Visit Summary   9/26/2018    Lexy Rockwell    MRN: [id number]           Patient Information     Date Of Birth          1960        Visit Information        Provider Department      9/26/2018 2:30 PM  DIABETIC ED RESOURCE [facility]        Today's Diagnoses     Type 1 diabetes mellitus with other specified complication (H)    -  1      Care Instructions    Recommend to test for ketones when your blood glucose is above 350 mg/dl and use the correction scales to help lower your blood glucose levels as discussed.    Call if concerns.      Scheduling phone number is [phone]  ( to make or change an appointment)    Diabetes education triage phone number is [phone].  (for questions or concerns)          Follow-ups after your visit        Your next 10 appointments already scheduled     Oct 15, 2018 10:30 AM CDT   Return Visit with Ravin Back DPM   [facility] ([facility])    [address] [place] [id number] [phone]            Oct 24, 2018  1:30 PM CDT   Diabetes Education with  DIABETIC ED RESOURCE   [facility]    [address] [facility] [address]   [phone]            Nov 29, 2018 10:00 AM CST   New Visit with Dayron Rios MD   [facility] [place] ([facility])    [address] [place] [id number]   [phone]            Dec 12, 2018 10:00 AM CST   Return Visit with Gilmar Kelly MD   [facility] [place] ([facility])    [address] [place] [id number]   [phone]              Who to contact     If you have questions or need follow up information about today's clinic visit or your schedule please contact [facility] directly at [phone].  Normal or non-critical lab and imaging results will be communicated to you by Jarett  letter or phone within 4 business days after the clinic has received the results. If you do not hear from us within 7 days, please contact the clinic through [email or web address]hart or phone. If you have a critical or abnormal lab result, we will notify you by phone as soon as possible.  Submit refill requests through [email or web address] or call your pharmacy and they will forward the refill request to us. Please allow 3 business days for your refill to be completed.          Additional Information About Your Visit        Care EveryWhere ID     This is your Care EveryWhere ID. This could be used by other organizations to access your [facility] medical records  [id number]        Your Vitals Were     Last Period BMI (Body Mass Index)                03/28/2014 35.41 kg/m2           Blood Pressure from Last 3 Encounters:   09/05/18 (!) 165/95   07/27/18 152/88   06/06/18 155/89    Weight from Last 3 Encounters:   09/26/18 91.4 kg (201 lb 8 oz)   09/05/18 89.8 kg (198 lb)   07/27/18 91.7 kg (202 lb 1.6 oz)              Today, you had the following     No orders found for display       Primary Care Provider Office Phone # Fax #    Fredi Fuentes [phone]       [address] [facility] [address]        Goals        General    Problem Solving (pt-stated)     Notes - Note created  9/26/2018  3:43 PM by Donna Tamayo, RN    Goal Statement: I will check my urine for ketones if my blood sugar is over 350 mg/dl.    Measure of Success: I will record my results in my record book.    Strengths: I want to stay out of the hospital.  Ideas to overcome barriers: If I can't test my ketones right away, I will test for ketones as soon as I can, like within an hour or 2.    Date to Achieve By:  today          Equal Access to Services     BRITTNI TOWNSEND : Rufino Moody, wafermín joe, qaybta kaalrickey mcclelland. So [facility] [phone].    ATENCIÓN: Si kriss lombardo  disposición servicios gratuitos de asistencia lingüística. Jaret laws [phone].    We comply with applicable federal civil rights laws and [place] laws. We do not discriminate on the basis of race, color, national origin, age, disability, sex, sexual orientation, or gender identity.            Thank you!     Thank you for choosing [facility]  for your care. Our goal is always to provide you with excellent care. Hearing back from our patients is one way we can continue to improve our services. Please take a few minutes to complete the written survey that you may receive in the mail after your visit with us. Thank you!             Your Updated Medication List - Protect others around you: Learn how to safely use, store and throw away your medicines at www.disposemymeds.org.          This list is accurate as of 9/26/18  3:44 PM.  Always use your most recent med list.                   Brand Name Dispense Instructions for use Diagnosis    ACCU-CHEK COMPACT PLUS test strip   Generic drug:  blood glucose monitoring     450 strip    TEST FIVE TIMES DAILY    Type 2 diabetes mellitus with diabetic polyneuropathy, with long-term current use of insulin (H)       acetone (urine) test strip    KETOSTIX    50 each    Test urine when ill or blood glucose above 250 mg/dl.    Type 1 diabetes mellitus with other specified complication (H)       aspirin 81 MG tablet      1 TABLET DAILY        B-D U/F 31G X 8 MM   Generic drug:  insulin pen needle     100 each    USE WITH INSULIN PENS    Type 2 diabetes, HbA1C goal < 8% (H)       blood glucose monitoring lancets     100 each    1 Units 3 times daily.    Type 2 diabetes, HbA1C goal < 8% (H)       blood glucose monitoring meter device kit    no brand specified    1 kit    Use to test blood sugar 5 times daily or as directed.  Patient needs new monitor.  Accu Test Compact or whatever is covered.  Patient has very labile sugars so needs to check 5 x per day.   Please also include 3 month supply of strips, lancets, and whatever other supplies are needed.  Ongoing refills for a year.    Type 2 diabetes mellitus with other specified complication (H)       * ciclopirox 0.77 % cream    LOPROX    90 g    Apply topically 2 times daily To feet and toenails.    Dermatophytosis of nail, Tinea pedis of both feet       * ciclopirox 0.77 % cream    LOPROX    90 g    Apply topically 2 times daily To feet and toenails.    Dermatophytosis of nail       insulin aspart 100 UNIT/ML injection    NovoLOG FLEXPEN    30 mL    Inject 26 units and correction scale dose premeal, as directed, total daily dose approx 100 units.    Type 2 diabetes mellitus with diabetic polyneuropathy, with long-term current use of insulin (H)       insulin degludec 200 UNIT/ML pen    TRESIBA    9 mL    Inject 58 units sq daily, as directed    Type 1 diabetes mellitus with diabetic neuropathy (H), Type 1 diabetes mellitus with other specified complication (H)       latanoprost 0.005 % ophthalmic solution    XALATAN    7.5 mL    INSTILL 1 DROP IN BOTH EYES AT BEDTIME    Glaucoma, unspecified glaucoma type, unspecified laterality       lisinopril 20 MG tablet    PRINIVIL/ZESTRIL    90 tablet    Take 2 tablets (40 mg) by mouth daily    Essential hypertension with goal blood pressure less than 140/90       magnesium oxide 400 MG tablet    MAG-OX    90 tablet    Take 1 tablet (400 mg) by mouth daily    Hypomagnesemia       metFORMIN 500 MG 24 hr tablet    GLUCOPHAGE-XR    360 tablet    TAKE 2 TABLETS(1000 MG) BY MOUTH TWICE DAILY    Type 2 diabetes mellitus with diabetic polyneuropathy, with long-term current use of insulin (H)       metoclopramide 5 MG tablet    REGLAN    90 tablet    TAKE ONE TABLET BY MOUTH THREE TIMES DAILY BEFORE MEALS    Dyspepsia       MULTIVITAMIN TABS   OR      1 TABLET DAILY        omeprazole 20 MG CR capsule    priLOSEC    180 capsule    Take 1 capsule (20 mg) by mouth 2 times daily     Gastroesophageal reflux disease, esophagitis presence not specified       ondansetron 8 MG ODT tab    ZOFRAN-ODT    30 tablet    TAKE 1 TABLET BY MOUTH EVERY 12 HOURS AS NEEDED FOR NAUSEA    Nausea       oxybutynin 5 MG tablet    DITROPAN    180 tablet    Take 1 tablet (5 mg) by mouth 2 times daily    Overactive bladder       simvastatin 20 MG tablet    ZOCOR    90 tablet    Take 1 tablet (20 mg) by mouth At Bedtime    Hyperlipidemia LDL goal <100       * Notice:  This list has 2 medication(s) that are the same as other medications prescribed for you. Read the directions carefully, and ask your doctor or other care provider to review them with you.

## 2018-09-26 NOTE — PROGRESS NOTES
"Diabetes Self-Management Education & Support    Diabetes Education Self Management & Training    SUBJECTIVE/OBJECTIVE:  Presents for: Follow-up  Accompanied by: Self  Diabetes education in the past 24mo: Yes  Focus of Visit: Monitoring, Taking Medication  Diabetes type: Type 1  Disease course: Stable  How confident are you filling out medical forms by yourself::  (not assessed)  Other concerns:: Cognitive impairment  Cultural Influences/Ethnic Background:  American      Diabetes Symptoms & Complications     Patient Problem List and Family Medical History reviewed for relevant medical history, current medical status, and diabetes risk factors.    Vitals:  Wt 91.4 kg (201 lb 8 oz)  LMP 03/28/2014  BMI 35.41 kg/m2  Estimated body mass index is 35.41 kg/(m^2) as calculated from the following:    Height as of 9/5/18: 1.607 m (5' 3.25\").    Weight as of this encounter: 91.4 kg (201 lb 8 oz).   Last 3 BP:   BP Readings from Last 3 Encounters:   09/05/18 (!) 165/95   07/27/18 152/88   06/06/18 155/89       History   Smoking Status     Never Smoker   Smokeless Tobacco     Never Used     Comment: lives in smoke free household.       Labs:  Lab Results   Component Value Date    A1C 12.4 09/05/2018     Lab Results   Component Value Date     09/05/2018     Lab Results   Component Value Date    LDL 94 09/05/2018     HDL Cholesterol   Date Value Ref Range Status   09/05/2018 65 >49 mg/dL Final   ]  GFR Estimate   Date Value Ref Range Status   09/05/2018 >90 >60 mL/min/1.7m2 Final     Comment:     Non  GFR Calc     GFR Estimate If Black   Date Value Ref Range Status   09/05/2018 >90 >60 mL/min/1.7m2 Final     Comment:      GFR Calc     Lab Results   Component Value Date    CR 0.59 09/05/2018     No results found for: MICROALBUMIN    Healthy Eating  Healthy Eating Assessed Today: No    Being Active   not assessed    Monitoring  Monitoring Assessed Today: Yes  Did patient bring glucose meter " to appointment? : No  Blood Glucose Meter: Accu-check (Compact)  Home Glucose (Sugar) Monitorin-2 times per day  Blood glucose trend: No change  Low Glucose Range (mg/dL):   High Glucose Range (mg/dL): >200  Overall Range (mg/dL): >200      Taking Medications  Diabetes Medication(s)     Biguanides Sig    metFORMIN (GLUCOPHAGE-XR) 500 MG 24 hr tablet TAKE 2 TABLETS(1000 MG) BY MOUTH TWICE DAILY    Insulin Sig    insulin aspart (NOVOLOG FLEXPEN) 100 UNIT/ML injection Inject 26 units and correction scale dose premeal, as directed, total daily dose approx 100 units.    insulin degludec (TRESIBA) 200 UNIT/ML pen Inject 58 units sq daily, as directed      Patient has been taking Tresiba 64 units at HS since her last Endocrine visit.    Taking Medication Assessed Today: Yes  Current Treatments: Diet, Insulin Injections, Non-insulin Injectables, Oral Agent (monotherapy)  Dose schedule: pre-breakfast, pre-lunch, pre-dinner, at bedtime  Given by: Patient  Problems taking diabetes medications regularly?: No (Pt often omits correction doses and may not take her premeal dose.)  Diabetes medication side effects?: No  Treatment Compliance: Some of the time    Problem Solving  Problem Solving Assessed Today: Yes  Hypoglycemia Frequency: Rarely  Hypoglycemia Treatment: Glucose (tablets or gel)  Patient carries a carbohydrate source: Yes  Severe weather/disaster plan for diabetes management?: No  DKA prevention plan?: Yes      Reducing Risks   not assessed today    Healthy Coping  Healthy Coping Assessed Today: Yes  Emotional response to diabetes: Ready to learn  Informal Support system:: Parent  Stage of change: PREPARATION (Decided to change - considering how)  Patient Activation Measure Survey Score:  KHUSHI Score (Last Two) 3/7/2012   KHUSHI Raw Score 39   Activation Score 56.4   KHUSHI Level 3       ASSESSMENT:  Patient is able to verbalize correctly how to address elevated blood glucose levels with taking insulin and testing  her ketone level if her blood glucose is above 350 mg/dl.  The problem seems to be that patient does not actually do what she knows to do.  She brought in her record book for review today and in 3 weeks of blood glucose values, multiple of which were above 350 mg/dl, patient tested her ketone level once and recorded in her book as negative.    Yesterday patient's FBS was 378 and she did not test ketones as her friend was coming over and she had things she needed to do to get ready.  That was the only  Blood glucose test she did yesterday.    Today her fasting level was 438 and she took Novolog 26 units, no correction and did not check her ketones.  Before lunch her blood glucose was 360 mg/dl and she did not take any insulin pre meal or check for ketones.  She reports that she does take her Tresiba.    We discussed the importance of avoiding the need to go to the Emergency Room/DKA by using her correction factor before each meal if her blood glucose is above 150 mg/dl and checking ketones and taking correction for that if ketone level is small, medium or large.  Patient has her correction factor and ketone correction scale written on the AVS from her last Endocrine appointment.    We discussed that if patient has a high blood glucose it is important to test ketones right away so that she can treat correctly.     If she cannot test ketones right away she should test her blood glucose and ketones as soon as she can, like within 1-2 hours.    Patient verbalized today that she should test her blood glucose level before supper today and test ketones if blood glucose is above 350 mg/dl.    Educator went thru several scenarios of how/when to test blood glucose, test ketones and take insulin to prevent DKA. Also discussed drinking water when she has high blood glucose level and or positive ketones.    Patient is able to add correction scales correctly.  Patient reports that she really likes where she lives now.    Goals         General    Problem Solving (pt-stated)     Notes - Note created  9/26/2018  3:43 PM by Donna Tamayo RN    Goal Statement: I will check my urine for ketones if my blood sugar is over 350 mg/dl.    Measure of Success: I will record my results in my record book.    Strengths: I want to stay out of the hospital.  Ideas to overcome barriers: If I can't test my ketones right away, I will test for ketones as soon as I can, like within an hour or 2.    Date to Achieve By:  today              Patient's most recent   Lab Results   Component Value Date    A1C 12.4 09/05/2018    is not meeting goal of <8.0    INTERVENTION:   Diabetes knowledge and skills assessment:     Patient is knowledgeable in diabetes management concepts related to: Monitoring, Taking Medication and Problem Solving    Patient needs further education on the following diabetes management concepts: Monitoring, Taking Medication, Problem Solving and Healthy Coping    Based on learning assessment above, most appropriate setting for further diabetes education would be: Individual setting.    Education provided today on:  AADE Self-Care Behaviors:  Monitoring: log and interpret results, individual blood glucose targets and frequency of monitoring, when to test for ketones  Taking Medication: when to take medications, using correction factors  Problem Solving: high blood glucose - causes, signs/symptoms, treatment and prevention and low blood glucose - causes, signs/symptoms, treatment and prevention, sick day management    Opportunities for ongoing education and support in diabetes-self management were discussed.    Pt verbalized understanding of concepts discussed and recommendations provided today.       Education Materials Provided:  BG Log Sheets    PLAN:  See Patient Instructions for co-developed, patient-stated behavior change goals.  AVS printed and provided to patient today. See Follow-Up section for recommended follow-up.    Donna Tamayo RN  BSN  CDE    Time Spent: 60 minutes  Encounter Type: Individual    Any diabetes medication dose changes were made via the CDE Protocol and Collaborative Practice Agreement with the patient's referring provider. A copy of this encounter was shared with the provider.

## 2018-10-15 ENCOUNTER — TRANSFERRED RECORDS (OUTPATIENT)
Dept: HEALTH INFORMATION MANAGEMENT | Facility: CLINIC | Age: 58
End: 2018-10-15

## 2018-10-24 ENCOUNTER — TELEPHONE (OUTPATIENT)
Dept: ENDOCRINOLOGY | Facility: CLINIC | Age: 58
End: 2018-10-24

## 2018-10-24 ENCOUNTER — PATIENT OUTREACH (OUTPATIENT)
Dept: EDUCATION SERVICES | Facility: CLINIC | Age: 58
End: 2018-10-24

## 2018-10-24 NOTE — PROGRESS NOTES
Pt was in the hospital 10-15 to 10-23-18. Recommendations on discharge papers said to reduce meds of Novolog and Tresiba.     Novolog from 26 units to 12 units; 3x's before meals    Tresiba from 64 units to 20 units; 1x daily    Pt is confused by amount to now take and when to take.        Per educator:  Call out to patient.  She was just getting out of the shower and I talked to her mother who was staying with her ( mother let patient know that we were talking).  Patient acknowledged that she understood this.  Patient had been admitted for DKA and when she was discharged, she was given much lower insulin doses.  Patient and Mother did not really understand what she should do with those amounts.  They were asking what she should take for her doses.  Patient last admit for DKA was in May and then last week 10/15-10/23/18.   I recommended that patient contact Dr Kelly to discuss dosing and gave patient's mother the contact phone numbers for Antonino SCHULTZ.  She appreciated the information and planned to call him.    Donna Tamayo RN  BSN CDE

## 2018-10-24 NOTE — TELEPHONE ENCOUNTER
Mom calling. [facility]y was [facility]. They changed the dosing of the insulins. Please call her to verify the dosing.

## 2018-10-24 NOTE — TELEPHONE ENCOUNTER
Contacted pt. Pt reports that she was in [facility] from 10/15/18-10/23/18. Pt reports that they changed her Novolog and Tresiba dosing.    Pt states that before hospitalization she was taking Novolog 26 units before meals and Tresiba 64 units at bedtime    [facility] discharged her with these instructions:    Novolog 12 units before meals and 12 units at bedtime    Tresiba 20 units in the afternoon or the evening    1) Pt needs clarification as to whether to take the Tresiba before lunch or before dinner?    2) Pt needs reassurance that  agrees with this plan    Glucose today: 305 this morning, 308 at lunch time and 56 at 3:00 pm  Pt reports that she feels better now after the low     Please review and advise. Thank you

## 2018-10-25 NOTE — TELEPHONE ENCOUNTER
"Message noted.  I called patient today but no answer... Then called her mother Francisca.  She said Lexy was hospitalized at [facility], then immediately transferred to [facility], DKA illness. She had high BG level, high urine ketones, and N/V symptoms at admission.  Her pre-admit insulin dose schedule was Novolog 26U before each meal, Nv sscale 2U per 50>150, and Tresiba U200 64U QPM.      Francisca says the [facility] discharge medication plan was not very clear the them.  It indicated Lexy should be taking Novolog 12U before each meal and Tresiba 20 units daily. This is a strange dosing plan for her, since she was admitted with high BG levels and recent hgbA1c's very high (12.4% 9/5/18).  Although I don't have details of the hospitalization, I instructed Francisca to have Lexy return to the previous insulin schedule if eating meals well, also use the Nv sscale for BG >150 mg/dl.  She indicated they were seeing Debbie Berry PAC tomorrow at [facility] for a post hospital followup visit.  I told Francisca I would try to reach Ms Berry by phone today to relay this information and plan, consider printing the \"patient instructions\" from my 9/5/18 office visit as insulin instructions for patient tomorrow.  I would also like to see Lexy for a followup appt at our [facility] clinic (on a Wednesday)... She should message me to request a workin appt.    She thanked me for the call and plan.  I then called the [facility] today... Spoke to Dr. Leon since Ms Berry not at work today.    GIORGIO Kelly MD  Endocrinology  [facility]          "

## 2018-10-26 ENCOUNTER — TELEPHONE (OUTPATIENT)
Dept: ENDOCRINOLOGY | Facility: CLINIC | Age: 58
End: 2018-10-26

## 2018-10-26 ENCOUNTER — OFFICE VISIT (OUTPATIENT)
Dept: FAMILY MEDICINE | Facility: CLINIC | Age: 58
End: 2018-10-26
Payer: COMMERCIAL

## 2018-10-26 VITALS
WEIGHT: 208 LBS | TEMPERATURE: 98.3 F | BODY MASS INDEX: 36.55 KG/M2 | DIASTOLIC BLOOD PRESSURE: 83 MMHG | OXYGEN SATURATION: 98 % | HEART RATE: 98 BPM | SYSTOLIC BLOOD PRESSURE: 139 MMHG

## 2018-10-26 DIAGNOSIS — K59.00 CONSTIPATION, UNSPECIFIED CONSTIPATION TYPE: ICD-10-CM

## 2018-10-26 DIAGNOSIS — E10.69 TYPE 1 DIABETES MELLITUS WITH OTHER SPECIFIED COMPLICATION (H): ICD-10-CM

## 2018-10-26 DIAGNOSIS — N39.0 URINARY TRACT INFECTION WITHOUT HEMATURIA, SITE UNSPECIFIED: Primary | ICD-10-CM

## 2018-10-26 DIAGNOSIS — E10.10 DIABETIC KETOACIDOSIS WITHOUT COMA ASSOCIATED WITH TYPE 1 DIABETES MELLITUS (H): ICD-10-CM

## 2018-10-26 DIAGNOSIS — R11.0 NAUSEA: ICD-10-CM

## 2018-10-26 LAB
ALBUMIN UR-MCNC: NEGATIVE MG/DL
APPEARANCE UR: CLEAR
BACTERIA #/AREA URNS HPF: ABNORMAL /HPF
BILIRUB UR QL STRIP: NEGATIVE
COLOR UR AUTO: YELLOW
CREAT UR-MCNC: 16 MG/DL
GLUCOSE UR STRIP-MCNC: 250 MG/DL
HGB UR QL STRIP: NEGATIVE
KETONES UR STRIP-MCNC: NEGATIVE MG/DL
LEUKOCYTE ESTERASE UR QL STRIP: ABNORMAL
MICROALBUMIN UR-MCNC: 5 MG/L
MICROALBUMIN/CREAT UR: 31.46 MG/G CR (ref 0–25)
NITRATE UR QL: NEGATIVE
NON-SQ EPI CELLS #/AREA URNS LPF: ABNORMAL /LPF
PH UR STRIP: 7 PH (ref 5–7)
RBC #/AREA URNS AUTO: ABNORMAL /HPF
RENAL EPI CELLS #/AREA URNS HPF: ABNORMAL /HPF
SOURCE: ABNORMAL
SP GR UR STRIP: 1.01 (ref 1–1.03)
UROBILINOGEN UR STRIP-ACNC: 0.2 EU/DL (ref 0.2–1)
WBC #/AREA URNS AUTO: ABNORMAL /HPF

## 2018-10-26 PROCEDURE — 81001 URINALYSIS AUTO W/SCOPE: CPT | Performed by: PHYSICIAN ASSISTANT

## 2018-10-26 PROCEDURE — 99214 OFFICE O/P EST MOD 30 MIN: CPT | Performed by: PHYSICIAN ASSISTANT

## 2018-10-26 PROCEDURE — 82043 UR ALBUMIN QUANTITATIVE: CPT | Performed by: PHYSICIAN ASSISTANT

## 2018-10-26 PROCEDURE — 87086 URINE CULTURE/COLONY COUNT: CPT | Performed by: PHYSICIAN ASSISTANT

## 2018-10-26 NOTE — PROGRESS NOTES
SUBJECTIVE:   Lexy Rockwell is a 58 year old female who presents to clinic today for the following health issues:            Hospital Follow-up Visit:    Hospital/Nursing Home/IP Rehab Facility: [facility]  Date of Admission: 10-   Date of Discharge: 10-   Reason(s) for Admission: diabetes   Insulin was drastically reduced due to very controlled diet in the hospital.    She does not cook this way at home.    Has glucose tabs if she feels low but doesn't go low often.    Resuming old insulin schedule today.    Checked sugars this am mid 200s.    Her sugars vary often.    Gets nausea frequently in the am so she doesn't take her meds because she can't eat and as a result has had DKA multiple times.   Now knows how to correct for ketones.    No gastroparesis.    Omeprazole and zofran helps the nausea and has been taking omeprazole correctly.     This is #15 hospitalization in 6 years.  diabetic about 7 -8 years.        Had uti in hospital.  No symptoms of UTI.  She was treated while there.      Last endoscopy and colonoscopy 10 years ago.    Gained weight during hospitalization-not new.  No shortness of breath or cp.    Has a bm normally 2 times a week.  Doesn't go daily.            Problems taking medications regularly:  None       Medication changes since discharge: they decreased her insulin significantly        Problems adhering to non-medication therapy:  None    Summary of hospitalization:  CareEverywhere information obtained and reviewed  Diagnostic Tests/Treatments reviewed.  Follow up needed: see Antonino as a work in in the next few weeks.    Other Healthcare Providers Involved in Patient s Care:         [facility]   Update since discharge: improved.     Post Discharge Medication Reconciliation: discharge medications reconciled and changed, per note/orders (see AVS).  Plan of care communicated with patient and family     Coding guidelines for this visit:  Type of Medical   Decision Making  Face-to-Face Visit       within 7 Days of discharge Face-to-Face Visit        within 14 days of discharge   Moderate Complexity [id number]   High Complexity [id number]                  Problem list and histories reviewed & adjusted, as indicated.  Additional history: as documented    Patient Active Problem List   Diagnosis     Development delay     Overactive bladder     GERD (gastroesophageal reflux disease)     Hypertension goal BP (blood pressure) < 140/90     Hyperlipidemia LDL goal <100     Pain in joint, lower leg     Proteinuria     Vitamin D deficiency     Health Care Home     History of strabismus surgery     Type 1 diabetes mellitus with other specified complication (H)     Advanced directives, counseling/discussion     Primary open angle glaucoma of both eyes, moderate stage     Gastroesophageal reflux disease without esophagitis     Obesity, unspecified obesity severity, unspecified obesity type     Acute renal failure (H)     Acute retention of urine     JEAN (acute kidney injury) (H)     Dehydration     Diabetic ketoacidosis associated with type 2 diabetes mellitus (H)     Diabetic ketoacidosis without coma associated with type 1 diabetes mellitus (H)     Diabetic polyneuropathy (H)     DKA (diabetic ketoacidoses) (H)     DKA, type 2 (H)     Sinus tachycardia     SIRS (systemic inflammatory response syndrome) (H)     Past Surgical History:   Procedure Laterality Date     C EYE SURG ANT SGMT PROC UNLISTED  remote    strabismus surgery     STRABISMUS SURGERY         Social History   Substance Use Topics     Smoking status: Never Smoker     Smokeless tobacco: Never Used      Comment: lives in smoke free household.     Alcohol use Yes      Comment: occass social     Family History   Problem Relation Age of Onset     Hypertension Father      Diabetes Sister      Glaucoma Maternal Grandfather      Cancer No family hx of      Cerebrovascular Disease No family hx of      Thyroid Disease No family hx of       Macular Degeneration No family hx of            Reviewed and updated as needed this visit by clinical staff  Tobacco  Allergies  Meds       Reviewed and updated as needed this visit by Provider  Allergies         ROS:  As above    OBJECTIVE:     /83  Pulse 98  Temp 98.3  F (36.8  C) (Oral)  Wt 208 lb (94.3 kg)  LMP 03/28/2014  SpO2 98%  BMI 36.55 kg/m2  Body mass index is 36.55 kg/(m^2).  GENERAL: alert, no distress and obese  RESP: lungs clear to auscultation - no rales, rhonchi or wheezes  CV: regular rates and rhythm, normal S1 S2, no S3 or S4, no murmur, click or rub and no peripheral edema  ABDOMEN: soft, nontender, no hepatosplenomegaly, no masses and bowel sounds normal    Diagnostic Test Results:  Results for orders placed or performed in visit on 10/26/18 (from the past 24 hour(s))   UA reflex to Microscopic and Culture   Result Value Ref Range    Color Urine Yellow     Appearance Urine Clear     Glucose Urine 250 (A) NEG^Negative mg/dL    Bilirubin Urine Negative NEG^Negative    Ketones Urine Negative NEG^Negative mg/dL    Specific Gravity Urine 1.010 1.003 - 1.035    Blood Urine Negative NEG^Negative    pH Urine 7.0 5.0 - 7.0 pH    Protein Albumin Urine Negative NEG^Negative mg/dL    Urobilinogen Urine 0.2 0.2 - 1.0 EU/dL    Nitrite Urine Negative NEG^Negative    Leukocyte Esterase Urine Small (A) NEG^Negative    Source Midstream Urine    Urine Microscopic   Result Value Ref Range    WBC Urine 5-10 (A) OTO5^0 - 5 /HPF    RBC Urine O - 2 OTO2^O - 2 /HPF    Squamous Epithelial /LPF Urine Few FEW^Few /LPF    Renal Tub Epi Few (A) NEG^Negative /HPF    Bacteria Urine Few (A) NEG^Negative /HPF       ASSESSMENT/PLAN:       1. Urinary tract infection without hematuria, site unspecified  Follow up when UC is back.    - UA reflex to Microscopic and Culture  - Urine Microscopic  - Urine Culture Aerobic Bacterial    2. Type 1 diabetes mellitus with other specified complication (H)  See endo as  directed in near future.    - Albumin Random Urine Quantitative with Creat Ratio    3. Diabetic ketoacidosis without coma associated with type 1 diabetes mellitus (H)  As above.  Resume regular insulin schedule.    - Albumin Random Urine Quantitative with Creat Ratio    4. Constipation, unspecified constipation type  May be contributing to nausea.  Follow up with PCP as needed  - psyllium (METAMUCIL) 28.3 % POWD; Take 1 capful by mouth daily  Dispense: 575 g; Refill: 1    5. Nausea  As above  - psyllium (METAMUCIL) 28.3 % POWD; Take 1 capful by mouth daily  Dispense: 575 g; Refill: 1        Debbie Berry PA-C  [facility]

## 2018-10-26 NOTE — MR AVS SNAPSHOT
After Visit Summary   10/26/2018    Lexy Rockwell    MRN: [id number]           Patient Information     Date Of Birth          1960        Visit Information        Provider Department      10/26/2018 10:40 AM Debbie Berry PA-C [facility]        Today's Diagnoses     Urinary tract infection without hematuria, site unspecified    -  1    Type 1 diabetes mellitus with other specified complication (H)        Diabetic ketoacidosis without coma associated with type 1 diabetes mellitus (H)        Constipation, unspecified constipation type        Nausea          Care Instructions    Patient Instructions   Continue the MetforminER 500 mg 2-tabs twice per day  Continue the same insulin plan with higher doses:                        Tresiba U200 pen as 64 units each evening              Novolog Flexpen 26U before each meal                          Use a rapid-acting insulin correction scale for blood glucose levels >150 mg/dl:                                                  mg/dl                                  No correction                                              151-200 mg/dl                                 +2 units                                              201-250 mg/dl                                 +4 units                                              251-300 mg/dl                                 +6 units                                              301-350 mg/dl                                 +8 units                                              350-400 mg/dl                                 +10 units                                              401-450 mg/dl                                 +12 units and call physician     Test blood glucose level before meals and bedtime, avoid bedtime snack unless BG <150 mg/dl  Goal target -150 mg/dl  Use urine ketone test strips:                        if higher BG >350 mg/dl                         significant nausea or vomiting            Follow-ups after your visit        Your next 10 appointments already scheduled     Nov 29, 2018 10:00 AM CST   [facility]    [address]  [place] [address] [phone]            Dec 12, 2018 10:00 AM CST   Return Visit with Gilmar Kelly MD   [facility] ([facility]    [address]  [place] [address] [phone]              Who to contact     If you have questions or need follow up information about today's clinic visit or your schedule please contact [facility] directly at [phone].  Normal or non-critical lab and imaging results will be communicated to you by MyChart, letter or phone within 4 business days after the clinic has received the results. If you do not hear from us within 7 days, please contact the clinic through MyChart or phone. If you have a critical or abnormal lab result, we will notify you by phone as soon as possible.  Submit refill requests through [employer] or call your pharmacy and they will forward the refill request to us. Please allow 3 business days for your refill to be completed.          Additional Information About Your Visit        Care EveryWhere ID     This is your Care EveryWhere ID. This could be used by other organizations to access your [place] medical records  [id number]        Your Vitals Were     Pulse Temperature Last Period Pulse Oximetry BMI (Body Mass Index)       98 98.3  F (36.8  C) (Oral) 03/28/2014 98% 36.55 kg/m2        Blood Pressure from Last 3 Encounters:   10/26/18 139/83   09/05/18 (!) 165/95   07/27/18 152/88    Weight from Last 3 Encounters:   10/26/18 208 lb (94.3 kg)   09/26/18 201 lb 8 oz (91.4 kg)   09/05/18 198 lb (89.8 kg)              We Performed the Following     Albumin Random Urine Quantitative with Creat Ratio     UA reflex to  Microscopic and Culture          Today's Medication Changes          These changes are accurate as of 10/26/18 11:26 AM.  If you have any questions, ask your nurse or doctor.               Start taking these medicines.        Dose/Directions    psyllium 28.3 % Powd   Commonly known as:  METAMUCIL   Used for:  Constipation, unspecified constipation type, Nausea   Started by:  Debbie Berry PA-C        Dose:  1 capful   Take 1 capful by mouth daily   Quantity:  575 g   Refills:  1            Where to get your medicines      These medications were sent to [id number] - [place], [place] - [address] [place] [address] AT [facility] [address] [place]  [address] [place], [place] [address]     Phone:  [phone]     psyllium 28.3 % Powd                Primary Care Provider Office Phone # Fax #    Fredi Fuentes [phone]       [address] [address]        Goals        General    Problem Solving (pt-stated)     Notes - Note created  9/26/2018  3:43 PM by Donna Tamayo RN    Goal Statement: I will check my urine for ketones if my blood sugar is over 350 mg/dl.    Measure of Success: I will record my results in my record book.    Strengths: I want to stay out of the hospital.  Ideas to overcome barriers: If I can't test my ketones right away, I will test for ketones as soon as I can, like within an hour or 2.    Date to Achieve By:  today          Equal Access to Services     PERLA TOWNSEND AH: Hadii ashok ku hadasho Soomaali, waaxda luqadaha, qaybta kaalmada adeegyada, waxay david chong. So [facility] [phone].    ATENCIÓN: Si habla español, tiene a oneill disposición servicios gratuitos de asistencia lingüística. Llame al [phone].    We comply with applicable federal civil rights laws and [place] laws. We do not discriminate on the basis of race, color, national origin, age, disability, sex, sexual orientation, or  gender identity.            Thank you!     Thank you for choosing [facility]  for your care. Our goal is always to provide you with excellent care. Hearing back from our patients is one way we can continue to improve our services. Please take a few minutes to complete the written survey that you may receive in the mail after your visit with us. Thank you!             Your Updated Medication List - Protect others around you: Learn how to safely use, store and throw away your medicines at www.disposemymeds.org.          This list is accurate as of 10/26/18 11:26 AM.  Always use your most recent med list.                   Brand Name Dispense Instructions for use Diagnosis    ACCU-CHEK COMPACT PLUS test strip   Generic drug:  blood glucose monitoring     450 strip    TEST FIVE TIMES DAILY    Type 2 diabetes mellitus with diabetic polyneuropathy, with long-term current use of insulin (H)       acetone (urine) test strip    KETOSTIX    50 each    Test urine when ill or blood glucose above 250 mg/dl.    Type 1 diabetes mellitus with other specified complication (H)       aspirin 81 MG tablet      1 TABLET DAILY        B-D U/F 31G X 8 MM   Generic drug:  insulin pen needle     100 each    USE WITH INSULIN PENS    Type 2 diabetes, HbA1C goal < 8% (H)       blood glucose monitoring lancets     100 each    1 Units 3 times daily.    Type 2 diabetes, HbA1C goal < 8% (H)       blood glucose monitoring meter device kit    no brand specified    1 kit    Use to test blood sugar 5 times daily or as directed.  Patient needs new monitor.  Accu Test Compact or whatever is covered.  Patient has very labile sugars so needs to check 5 x per day.  Please also include 3 month supply of strips, lancets, and whatever other supplies are needed.  Ongoing refills for a year.    Type 2 diabetes mellitus with other specified complication (H)       ciclopirox 0.77 % cream    LOPROX    90 g    Apply topically 2 times daily To  feet and toenails.    Dermatophytosis of nail, Tinea pedis of both feet       insulin aspart 100 UNIT/ML injection    NovoLOG FLEXPEN    30 mL    Inject 26 units and correction scale dose premeal, as directed, total daily dose approx 100 units.    Type 2 diabetes mellitus with diabetic polyneuropathy, with long-term current use of insulin (H)       insulin degludec 200 UNIT/ML pen    TRESIBA    9 mL    Inject 64 units sq daily, as directed    Type 1 diabetes mellitus with diabetic neuropathy (H), Type 1 diabetes mellitus with other specified complication (H)       latanoprost 0.005 % ophthalmic solution    XALATAN    7.5 mL    INSTILL 1 DROP IN BOTH EYES AT BEDTIME    Glaucoma, unspecified glaucoma type, unspecified laterality       lisinopril 20 MG tablet    PRINIVIL/ZESTRIL    90 tablet    Take 2 tablets (40 mg) by mouth daily    Essential hypertension with goal blood pressure less than 140/90       magnesium oxide 400 MG tablet    MAG-OX    90 tablet    Take 1 tablet (400 mg) by mouth daily    Hypomagnesemia       metFORMIN 500 MG 24 hr tablet    GLUCOPHAGE-XR    360 tablet    TAKE 2 TABLETS(1000 MG) BY MOUTH TWICE DAILY    Type 2 diabetes mellitus with diabetic polyneuropathy, with long-term current use of insulin (H)       metoclopramide 5 MG tablet    REGLAN    90 tablet    TAKE ONE TABLET BY MOUTH THREE TIMES DAILY BEFORE MEALS    Dyspepsia       MULTIVITAMIN TABS   OR      1 TABLET DAILY        omeprazole 20 MG CR capsule    priLOSEC    180 capsule    Take 1 capsule (20 mg) by mouth 2 times daily    Gastroesophageal reflux disease, esophagitis presence not specified       ondansetron 8 MG ODT tab    ZOFRAN-ODT    30 tablet    TAKE 1 TABLET BY MOUTH EVERY 12 HOURS AS NEEDED FOR NAUSEA    Nausea       oxybutynin 5 MG tablet    DITROPAN    180 tablet    Take 1 tablet (5 mg) by mouth 2 times daily    Overactive bladder       psyllium 28.3 % Powd    METAMUCIL    575 g    Take 1 capful by mouth daily     Constipation, unspecified constipation type, Nausea       simvastatin 20 MG tablet    ZOCOR    90 tablet    Take 1 tablet (20 mg) by mouth At Bedtime    Hyperlipidemia LDL goal <100

## 2018-10-26 NOTE — TELEPHONE ENCOUNTER
Reason for Call:  Other appointment    Detailed comments: Patient calling for follow up appointment and first opening is not until 01/2019. States Dr. Monroy wanted to see her sooner. Please call. Thank    Phone Number Patient can be reached at: Home number on file [phone] (home)    Best Time: Any    Can we leave a detailed message on this number? YES    Call taken on 10/26/2018 at 11:47 AM by Sahra Marrufo

## 2018-10-26 NOTE — PATIENT INSTRUCTIONS
Patient Instructions   Continue the MetforminER 500 mg 2-tabs twice per day  Continue the same insulin plan with higher doses:                        Tresiba U200 pen as 64 units each evening              Novolog Flexpen 26U before each meal                          Use a rapid-acting insulin correction scale for blood glucose levels >150 mg/dl:                                                  mg/dl                                  No correction                                              151-200 mg/dl                                 +2 units                                              201-250 mg/dl                                 +4 units                                              251-300 mg/dl                                 +6 units                                              301-350 mg/dl                                 +8 units                                              350-400 mg/dl                                 +10 units                                              401-450 mg/dl                                 +12 units and call physician     Test blood glucose level before meals and bedtime, avoid bedtime snack unless BG <150 mg/dl  Goal target -150 mg/dl  Use urine ketone test strips:                        if higher BG >350 mg/dl                        significant nausea or vomiting

## 2018-10-27 LAB
BACTERIA SPEC CULT: NO GROWTH
SPECIMEN SOURCE: NORMAL

## 2018-10-29 ENCOUNTER — TELEPHONE (OUTPATIENT)
Dept: FAMILY MEDICINE | Facility: CLINIC | Age: 58
End: 2018-10-29

## 2018-10-29 NOTE — TELEPHONE ENCOUNTER
Please call pt.  There is still some bacteria in urine but could be from contamination so I have ordered a urine culture.  Won't treat again unless the culture grows out bacteria.

## 2018-10-29 NOTE — TELEPHONE ENCOUNTER
Please call pt.  She does not have any bacteria in her urine.    Please all see if she was able to get in to see her endocrinologist before Dec.  If not please help her do this.  He wanted to see her as an add in in the next few weeks on a Wed.

## 2018-10-30 NOTE — TELEPHONE ENCOUNTER
Message noted.  This patient has some mental retardation, has appointments made by her mother (Francisca).  I can see patient at our [facility] clinic this Wednesday 10/31/18 at 12:30pm if they are available for this appointment.  Please arrange, thanks.    GIORGIO Kelly MD  Endocrinology   Clinics [place]

## 2018-10-31 ENCOUNTER — OFFICE VISIT (OUTPATIENT)
Dept: ENDOCRINOLOGY | Facility: CLINIC | Age: 58
End: 2018-10-31
Payer: COMMERCIAL

## 2018-10-31 VITALS
DIASTOLIC BLOOD PRESSURE: 89 MMHG | HEIGHT: 63 IN | WEIGHT: 209.2 LBS | HEART RATE: 119 BPM | SYSTOLIC BLOOD PRESSURE: 158 MMHG | BODY MASS INDEX: 37.07 KG/M2

## 2018-10-31 DIAGNOSIS — E66.9 OBESITY, UNSPECIFIED OBESITY SEVERITY, UNSPECIFIED OBESITY TYPE: ICD-10-CM

## 2018-10-31 DIAGNOSIS — E10.40 TYPE 1 DIABETES MELLITUS WITH DIABETIC NEUROPATHY (H): Primary | ICD-10-CM

## 2018-10-31 PROBLEM — E11.10 DIABETIC KETOACIDOSIS ASSOCIATED WITH TYPE 2 DIABETES MELLITUS (H): Status: RESOLVED | Noted: 2018-01-19 | Resolved: 2018-10-31

## 2018-10-31 PROBLEM — E11.42 DIABETIC POLYNEUROPATHY (H): Status: RESOLVED | Noted: 2018-03-14 | Resolved: 2018-10-31

## 2018-10-31 PROBLEM — E11.10 DKA, TYPE 2 (H): Status: RESOLVED | Noted: 2017-04-13 | Resolved: 2018-10-31

## 2018-10-31 PROCEDURE — 99215 OFFICE O/P EST HI 40 MIN: CPT | Performed by: INTERNAL MEDICINE

## 2018-10-31 NOTE — PATIENT INSTRUCTIONS
Continue use of the metformin, Tresiba, and Novolog diabetes medications     MetforminER 500 mg 2-tabs twice per day   Tresiba U200 pen as 64 units each evening              Novolog Flexpen 24U before each meal                          Use a rapid-acting insulin correction scale for blood glucose levels >150 mg/dl:      60-80 mg/dl    -4 units      mg/dl    -2 units               100-150 mg/dl                                   No correction                151-200 mg/dl                                  +2 units     201-250 mg/dl                                  +4 units     251-300 mg/dl                                  +6 units     301-350 mg/dl                                  +8 units     350-400 mg/dl                                  +10 units     401-450 mg/dl                                  +12 units and call physician     Check urine ketones if BG >350 or if elevated and significant nausea or vomiting   Use Ketostix strips   If urine ketones positive, take extra insulin supplement in addition to the high BG correction dose   If ketone measurements:     Trace      No insulin supplement     Small     +1 unit rapid acting insulin     Moderate    +2 units      Large     +3 units   Drink extra water to avoid dehydration   If unable to drink fluids or if BG stays high, call physician

## 2018-10-31 NOTE — MR AVS SNAPSHOT
After Visit Summary   10/31/2018    Lexy Rockwell    MRN: [id number]           Patient Information     Date Of Birth          1960        Visit Information        Provider Department      10/31/2018 12:30 PM Gilmar Kelly MD [facility]        Today's Diagnoses     Type 1 diabetes mellitus with diabetic neuropathy (H)    -  1      Care Instructions    Continue use of the metformin, Tresiba, and Novolog diabetes medications     MetforminER 500 mg 2-tabs twice per day   Tresiba U200 pen as 64 units each evening              Novolog Flexpen 24U before each meal                          Use a rapid-acting insulin correction scale for blood glucose levels >150 mg/dl:      60-80 mg/dl    -4 units      mg/dl    -2 units               100-150 mg/dl                                   No correction                151-200 mg/dl                                  +2 units     201-250 mg/dl                                  +4 units     251-300 mg/dl                                  +6 units     301-350 mg/dl                                  +8 units     350-400 mg/dl                                  +10 units     401-450 mg/dl                                  +12 units and call physician     Check urine ketones if BG >350 or if elevated and significant nausea or vomiting   Use Ketostix strips   If urine ketones positive, take extra insulin supplement in addition to the high BG correction dose   If ketone measurements:     Trace      No insulin supplement     Small     +1 unit rapid acting insulin     Moderate    +2 units      Large     +3 units   Drink extra water to avoid dehydration   If unable to drink fluids or if BG stays high, call physician            Follow-ups after your visit        Your next 10 appointments already scheduled     Nov 29, 2018 10:00 AM CST   [facility]    [address] [place] [address]  "[place] [address]   [phone]            Dec 12, 2018 10:00 AM CST   Return Visit with Gilmar Kelly MD   [facility] ([facility])    [address] [place] [address]   [phone]              Who to contact     If you have questions or need follow up information about today's clinic visit or your schedule please contact [facility] directly at [phone].  Normal or non-critical lab and imaging results will be communicated to you by MyChart, letter or phone within 4 business days after the clinic has received the results. If you do not hear from us within 7 days, please contact the clinic through MyChart or phone. If you have a critical or abnormal lab result, we will notify you by phone as soon as possible.  Submit refill requests through [employer] or call your pharmacy and they will forward the refill request to us. Please allow 3 business days for your refill to be completed.          Additional Information About Your Visit        Care EveryWhere ID     This is your Care EveryWhere ID. This could be used by other organizations to access your [place] medical records  [id number]        Your Vitals Were     Pulse Height Last Period Breastfeeding? BMI (Body Mass Index)       119 1.607 m (5' 3.25\") 03/28/2014 No 36.77 kg/m2        Blood Pressure from Last 3 Encounters:   10/31/18 158/89   10/26/18 139/83   09/05/18 (!) 165/95    Weight from Last 3 Encounters:   10/31/18 94.9 kg (209 lb 3.2 oz)   10/26/18 94.3 kg (208 lb)   09/26/18 91.4 kg (201 lb 8 oz)              Today, you had the following     No orders found for display       Primary Care Provider Office Phone # Fax #    Fredi Fuentes [phone]       [address] [facility] [address]        Goals        General    Problem Solving (pt-stated)     Notes - Note created  9/26/2018  3:43 PM by Donna Tamayo RN    Goal Statement: I will check my urine for " ketones if my blood sugar is over 350 mg/dl.    Measure of Success: I will record my results in my record book.    Strengths: I want to stay out of the hospital.  Ideas to overcome barriers: If I can't test my ketones right away, I will test for ketones as soon as I can, like within an hour or 2.    Date to Achieve By:  today          Equal Access to Services     BRITTNI TOWNSEND : Hadii ashok haynes hadasho Soaleksandrali, waaxda luqadaha, qaybta kaalmada ademiguelinayada, waxay idiin hayandreaprice bestmartinezjavier campuzano . So [facility] [phone].    ATENCIÓN: Si habla español, tiene a oneill disposición servicios gratuitos de asistencia lingüística. Llame al [phone].    We comply with applicable federal civil rights laws and [place] laws. We do not discriminate on the basis of race, color, national origin, age, disability, sex, sexual orientation, or gender identity.            Thank you!     Thank you for choosing [facility]  for your care. Our goal is always to provide you with excellent care. Hearing back from our patients is one way we can continue to improve our services. Please take a few minutes to complete the written survey that you may receive in the mail after your visit with us. Thank you!             Your Updated Medication List - Protect others around you: Learn how to safely use, store and throw away your medicines at www.disposemymeds.org.          This list is accurate as of 10/31/18  1:03 PM.  Always use your most recent med list.                   Brand Name Dispense Instructions for use Diagnosis    ACCU-CHEK COMPACT PLUS test strip   Generic drug:  blood glucose monitoring     450 strip    TEST FIVE TIMES DAILY    Type 2 diabetes mellitus with diabetic polyneuropathy, with long-term current use of insulin (H)       acetone (urine) test strip    KETOSTIX    50 each    Test urine when ill or blood glucose above 250 mg/dl.    Type 1 diabetes mellitus with other specified complication (H)       aspirin 81 MG  tablet      1 TABLET DAILY        B-D U/F 31G X 8 MM   Generic drug:  insulin pen needle     100 each    USE WITH INSULIN PENS    Type 2 diabetes, HbA1C goal < 8% (H)       blood glucose monitoring lancets     100 each    1 Units 3 times daily.    Type 2 diabetes, HbA1C goal < 8% (H)       blood glucose monitoring meter device kit    no brand specified    1 kit    Use to test blood sugar 5 times daily or as directed.  Patient needs new monitor.  Accu Test Compact or whatever is covered.  Patient has very labile sugars so needs to check 5 x per day.  Please also include 3 month supply of strips, lancets, and whatever other supplies are needed.  Ongoing refills for a year.    Type 2 diabetes mellitus with other specified complication (H)       ciclopirox 0.77 % cream    LOPROX    90 g    Apply topically 2 times daily To feet and toenails.    Dermatophytosis of nail, Tinea pedis of both feet       insulin aspart 100 UNIT/ML injection    NovoLOG FLEXPEN    30 mL    Inject 26 units and correction scale dose premeal, as directed, total daily dose approx 100 units.    Type 2 diabetes mellitus with diabetic polyneuropathy, with long-term current use of insulin (H)       insulin degludec 200 UNIT/ML pen    TRESIBA    9 mL    Inject 64 units sq daily, as directed    Type 1 diabetes mellitus with diabetic neuropathy (H), Type 1 diabetes mellitus with other specified complication (H)       latanoprost 0.005 % ophthalmic solution    XALATAN    7.5 mL    INSTILL 1 DROP IN BOTH EYES AT BEDTIME    Glaucoma, unspecified glaucoma type, unspecified laterality       lisinopril 20 MG tablet    PRINIVIL/ZESTRIL    90 tablet    Take 2 tablets (40 mg) by mouth daily    Essential hypertension with goal blood pressure less than 140/90       magnesium oxide 400 MG tablet    MAG-OX    90 tablet    Take 1 tablet (400 mg) by mouth daily    Hypomagnesemia       metFORMIN 500 MG 24 hr tablet    GLUCOPHAGE-XR    360 tablet    TAKE 2 TABLETS(1000  MG) BY MOUTH TWICE DAILY    Type 2 diabetes mellitus with diabetic polyneuropathy, with long-term current use of insulin (H)       metoclopramide 5 MG tablet    REGLAN    90 tablet    TAKE ONE TABLET BY MOUTH THREE TIMES DAILY BEFORE MEALS    Dyspepsia       MULTIVITAMIN TABS   OR      1 TABLET DAILY        omeprazole 20 MG CR capsule    priLOSEC    180 capsule    Take 1 capsule (20 mg) by mouth 2 times daily    Gastroesophageal reflux disease, esophagitis presence not specified       ondansetron 8 MG ODT tab    ZOFRAN-ODT    30 tablet    TAKE 1 TABLET BY MOUTH EVERY 12 HOURS AS NEEDED FOR NAUSEA    Nausea       oxybutynin 5 MG tablet    DITROPAN    180 tablet    Take 1 tablet (5 mg) by mouth 2 times daily    Overactive bladder       psyllium 28.3 % Powd    METAMUCIL    575 g    Take 1 capful by mouth daily    Constipation, unspecified constipation type, Nausea       simvastatin 20 MG tablet    ZOCOR    90 tablet    Take 1 tablet (20 mg) by mouth At Bedtime    Hyperlipidemia LDL goal <100

## 2018-10-31 NOTE — PROGRESS NOTES
Name: Lexy Rockwell    HPI:  Recent issues:  Here for f/u diabetes evaluation  Had another DKA illness recently, hospitalized at [facility]         Diagnosis of diabetes mellitus in her 40's, living [facility] or [facility]  Initial treatment with metformin, then addition of insulin.(multiple injections)  She does recall taking another DM pill in the past  Has seen Dr. Shira Wilson/[facility] Endocrin[facility] previously  Reports having many diabetes-related hospitalizations including DKA   Had taken Lantus daily and Novolog premeal  Current DM meds:   Tresiba U200 58U QHS   Novolog Flexpen 26U premeal     Novolog sscale >150 mg/dl   MetforminER 500 mg 2-tabs BID    Meals TID  Uses [employer] Compact Plus meter, typically tests 2-3x/day   Recent BG  and avg 270 mg/dl  Recent [facility] labs include:  Lab Results   Component Value Date    A1C 12.4 (H) 09/05/2018     (L) 09/05/2018    POTASSIUM 4.5 09/05/2018    CHLORIDE 99 09/05/2018    CO2 24 09/05/2018    ANIONGAP 8 09/05/2018     (HH) 09/05/2018    BUN 12 09/05/2018    CR 0.59 09/05/2018    GFRESTIMATED >90 09/05/2018    GFRESTBLACK >90 09/05/2018    EFRAIN 9.5 09/05/2018    CPEPT 0.6 (L) 03/14/2018    CHOL 189 09/05/2018    TRIG 152 (H) 09/05/2018    HDL 65 09/05/2018    LDL 94 09/05/2018    NHDL 124 09/05/2018    UCRR 16 10/26/2018    MICROL 5 10/26/2018    UMALCR 31.46 (H) 10/26/2018     Has seen Jaqui NGUYEN for Diabetes Education  1/2018 Hospitalized at [facility] with DKA  DM Complications:   Neuropathy    Decreased sensation at feet      Previous concern for gastroparesis   Has taken Reglan 5 mg TID premeal, also Prilosec   Saw Dr. Bill Rolon 5/2018    Gastric emptying study normal    Told to call his office if questions    Last eye exam few months ago?, no DR  Also takes simvastatin 20 mg po QHS, lisinopril 20 mg 2-tab QD (higher dose)    Single, on Medicare Disability  Lives by self in [place], gets assistance with transportation from  parents  Sees Dr. Fredi Fuentes for general medicine evaluations.  Has also seen Dr. Katalina Stanley, Cece Edwards, and Bill Rolon/[facility]    PMH/PSH:  Past Medical History:   Diagnosis Date     Cerumen impacted      Development delay      Diabetic gastroparesis (H) 8/16/2013     Glaucoma (increased eye pressure)      Hyperlipaemia      Hypertension      Hypothyroid      Mental retardation      Nonsenile cataract      Obesity      Type 1 diabetes mellitus not at goal (H)      Past Surgical History:   Procedure Laterality Date     C EYE SURG ANT SGMT PROC UNLISTED  remote    strabismus surgery     STRABISMUS SURGERY         Family Hx:  Family History   Problem Relation Age of Onset     Hypertension Father      Diabetes Sister      Glaucoma Maternal Grandfather      Cancer No family hx of      Cerebrovascular Disease No family hx of      Thyroid Disease No family hx of      Macular Degeneration No family hx of          Social Hx:  Social History     Social History     Marital status: Single     Spouse name: N/A     Number of children: N/A     Years of education: N/A     Occupational History     Not on file.     Social History Main Topics     Smoking status: Never Smoker     Smokeless tobacco: Never Used      Comment: lives in smoke free household.     Alcohol use Yes      Comment: occass social     Drug use: No     Sexual activity: No     Other Topics Concern     Parent/Sibling W/ Cabg, Mi Or Angioplasty Before 65f 55m? No     Social History Narrative          MEDICATIONS:  has a current medication list which includes the following prescription(s): aspirin, b-d u/f, blood glucose monitoring, ciclopirox, insulin aspart, insulin degludec, latanoprost, lisinopril, magnesium oxide, metformin, metoclopramide, multiple vitamin, omeprazole, ondansetron, oxybutynin, psyllium, simvastatin, accu-chek compact plus, acetone (urine) test, and blood glucose monitoring.    ROS:     ROS: 10 point ROS neg other than the symptoms noted  "above in the HPI.    GENERAL: no weight changes, fatigue, fevers, chills, night sweats.   HEENT: no dysphagia, odonophagia, diplopia, neck pain  THYROID:  no apparent hyper or hypothyroid symptoms  CV: no chest pain, pressure, palpitations  LUNGS: no SOB, VENEGAS, cough, wheezing   ABDOMEN: frequent morning pre-breakfasttime nausea, occasional reflux; no diarrhea, constipation, abdominal pain  EXTREMITIES: no rashes, ulcers, edema  NEUROLOGY: some feet numbness; no headaches, denies changes in vision, tingling   MSK: no muscle aches or pains, weakness  SKIN: no rashes or lesions  : no menses; denies hot flashes  PSYCH:  stable mood, no significant anxiety or depression  ENDOCRINE: no heat or cold intolerance      Physical Exam   VS: /89  Pulse 119  Ht 1.607 m (5' 3.25\")  Wt 94.9 kg (209 lb 3.2 oz)  LMP 03/28/2014  Breastfeeding? No  BMI 36.77 kg/m2  GENERAL: AXOX3, NAD, slowed speech, well dressed, answering questions appropriately, appears stated age.  THYROID:  normal gland, no apparent nodules or goiter  HEENT: irregular teeth dentition; neck non-tender, no exopthalmous, no proptosis, EOMI  ABDOMEN: obese, soft, nontender, nondistended, no organomegaly  NEUROLOGY: somewhat slowed mentation, CN grossly intact, no tremors    LABS:    All pertinent notes, labs, and images personally reviewed by me.     A/P:  Encounter Diagnosis   Name Primary?     Type 1 diabetes mellitus with diabetic neuropathy (H) Yes     Comments:  Reviewed health history and diabetes issues  Recent BG levels usually elevated, but improved, BG avg 270 mg/dl    Plan:  Discussed general issues with the diabetes diagnosis and management  We discussed the hgbA1c test which reflects previous overall glucose levels or control  Discussed the importance of blood glucose (BG) testing to assess glucose trends  Reviewed the multiple daily injection (MDI) treatment using mealtime rapid-acting and daily long-acting insulin, also correction " scale.    Recommend:  Continue the MetforminER 500 mg 2-tabs twice per day  We reviewed the MDI insulin plan, concept of rapid-acting insulin meal and correction doses.   Reviewed several examples of meal and non-meal BG levels for insulin dosing   Discussed the urine ketone testing with sick-day management also   Reviewed DKA and prevention strategy  Test blood glucose level before meals and bedtime  Goal target -150 mg/dl    Agree with urine ketone testing for severe (not moderate) hyperglycemia.  Use urine ketone test strips:   If higher BG >350 mg/dl, or if significant nausea or vomiting   See Pt Instructions chart noted    Keep focus on diet, exercise, weight management.  Advise having fasting lipid panel testing and dilated eye examination, at least annually    Plan to see Dr. Bill Rolon/GI and colleagues if further food digestion/GI issues  Patient to follow up with Primary Care provider regarding elevated blood pressure.    Patient Instructions   Continue use of the metformin, Tresiba, and Novolog diabetes medications     MetforminER 500 mg 2-tabs twice per day   Tresiba U200 pen as 64 units each evening              Novolog Flexpen 24U before each meal                          Use a rapid-acting insulin correction scale for blood glucose levels >150 mg/dl:      60-80 mg/dl    -4 units      mg/dl    -2 units               100-150 mg/dl                                   No correction                151-200 mg/dl                                  +2 units     201-250 mg/dl                                  +4 units     251-300 mg/dl                                  +6 units     301-350 mg/dl                                  +8 units     350-400 mg/dl                                  +10 units     401-450 mg/dl                                  +12 units and call physician     Check urine ketones if BG >350 or if elevated and significant nausea or vomiting   Use Ketostix strips   If urine ketones  positive, take extra insulin supplement in addition to the high BG correction dose   If ketone measurements:     Trace      No insulin supplement     Small     +1 unit rapid acting insulin     Moderate    +2 units      Large     +3 units   Drink extra water to avoid dehydration   If unable to drink fluids or if BG stays high, call physician        Labs ordered today:   No orders of the defined types were placed in this encounter.    Radiology/Consults ordered today: None    More than 50% of the time spent with Ms. Rockwell on counseling / coordinating her care.  Total appointment time was 45 minutes.    Follow-up:  3 mo.    Gilmar Kelly MD  Endocrinology  [facility]

## 2018-11-29 ENCOUNTER — OFFICE VISIT (OUTPATIENT)
Dept: OPHTHALMOLOGY | Facility: CLINIC | Age: 58
End: 2018-11-29
Payer: COMMERCIAL

## 2018-11-29 DIAGNOSIS — E10.69 TYPE 1 DIABETES MELLITUS WITH OTHER SPECIFIED COMPLICATION (H): Primary | ICD-10-CM

## 2018-11-29 DIAGNOSIS — H52.4 PRESBYOPIA: ICD-10-CM

## 2018-11-29 DIAGNOSIS — H40.1132 PRIMARY OPEN ANGLE GLAUCOMA OF BOTH EYES, MODERATE STAGE: ICD-10-CM

## 2018-11-29 PROCEDURE — 92015 DETERMINE REFRACTIVE STATE: CPT | Performed by: OPHTHALMOLOGY

## 2018-11-29 PROCEDURE — 92014 COMPRE OPH EXAM EST PT 1/>: CPT | Performed by: OPHTHALMOLOGY

## 2018-11-29 RX ORDER — LATANOPROST 50 UG/ML
SOLUTION/ DROPS OPHTHALMIC
Qty: 7.5 ML | Refills: 1 | Status: SHIPPED | OUTPATIENT
Start: 2018-11-29 | End: 2018-11-29

## 2018-11-29 RX ORDER — LATANOPROST 50 UG/ML
SOLUTION/ DROPS OPHTHALMIC
Qty: 10 ML | Refills: 3 | Status: SHIPPED | OUTPATIENT
Start: 2018-11-29 | End: 2019-12-06

## 2018-11-29 ASSESSMENT — CUP TO DISC RATIO
OD_RATIO: 0.7
OS_RATIO: 0.8

## 2018-11-29 ASSESSMENT — REFRACTION_WEARINGRX
OD_AXIS: 161
OD_ADD: +2.75
OS_ADD: +2.75
SPECS_TYPE: PAL
OD_CYLINDER: +0.75
OS_CYLINDER: +0.50
OS_AXIS: 152
OD_SPHERE: -1.25
OS_SPHERE: -1.50

## 2018-11-29 ASSESSMENT — REFRACTION_MANIFEST
OD_AXIS: 180
OD_SPHERE: -2.00
OS_SPHERE: -1.50
OD_CYLINDER: +0.50
OS_AXIS: 105
OD_ADD: +2.75
OS_ADD: +2.75
OS_CYLINDER: +0.50

## 2018-11-29 ASSESSMENT — TONOMETRY
OD_IOP_MMHG: 19
IOP_METHOD: APPLANATION
OS_IOP_MMHG: 18

## 2018-11-29 ASSESSMENT — SLIT LAMP EXAM - LIDS
COMMENTS: NORMAL
COMMENTS: NORMAL

## 2018-11-29 ASSESSMENT — VISUAL ACUITY
OS_CC: 20/40
OD_CC: 20/60
METHOD: SNELLEN - LINEAR
CORRECTION_TYPE: GLASSES
OD_PH_CC: 20/50

## 2018-11-29 ASSESSMENT — CONF VISUAL FIELD
OD_NORMAL: 1
METHOD: COUNTING FINGERS
OS_NORMAL: 1

## 2018-11-29 ASSESSMENT — EXTERNAL EXAM - LEFT EYE: OS_EXAM: PROLAPSED FAT PADS: UPPER, LOWER

## 2018-11-29 ASSESSMENT — EXTERNAL EXAM - RIGHT EYE: OD_EXAM: PROLAPSED FAT PADS: UPPER, LOWER

## 2018-11-29 NOTE — PROGRESS NOTES
Current Eye Medications:  Latanoprost at bedtime both eyes, forgot to take last night. Put drops in at 9 am today.     Subjective:  Complete eye exam. Vision is doing well both eyes. Little watering on and off in left eye, has improved. No eye pain in either eye.      Objective:  See Ophthalmology Exam.       Assessment:  Stable intraocular pressure and discs in patient with moderate open angle glaucoma.  No diabetic retinopathy.      ICD-10-CM    1. Primary open angle glaucoma of both eyes, moderate stage H40.1132 latanoprost (XALATAN) 0.005 % ophthalmic solution   2. Type 1 diabetes mellitus with other specified complication (H) E10.69 EYE EXAM (SIMPLE-NONBILLABLE)   3. Presbyopia H52.4 REFRACTIVE STATUS        Plan:  Continue Latanoprost drop both eyes at bedtime.  Glasses Rx given - optional   Return visit 6 months for an intraocular pressure check, glaucoma OCT, and [facility] Visual Field.  Dayron Rios M.D.  [phone]

## 2018-11-29 NOTE — MR AVS SNAPSHOT
After Visit Summary   11/29/2018    Lexy Rockwell    MRN: [id number]           Patient Information     Date Of Birth          1960        Visit Information        Provider Department      11/29/2018 10:00 AM Dayron Rios MD [facility]        Today's Diagnoses     Presbyopia    -  1    Glaucoma, unspecified glaucoma type, unspecified laterality          Care Instructions    Continue Latanoprost drop both eyes at bedtime.  Glasses Rx given - optional   Return visit 6 months for an intraocular pressure check, glaucoma OCT, and Beard Visual Field.  Dayron Rios M.D.  [phone]            Follow-ups after your visit        Your next 10 appointments already scheduled     Dec 12, 2018 10:00 AM CST   [facility]    [address] [address] [phone]              Who to contact     If you have questions or need follow up information about today's clinic visit or your schedule please contact [facility] directly at [phone].  Normal or non-critical lab and imaging results will be communicated to you by MyChart, letter or phone within 4 business days after the clinic has received the results. If you do not hear from us within 7 days, please contact the clinic through MyChart or phone. If you have a critical or abnormal lab result, we will notify you by phone as soon as possible.  Submit refill requests through [employer] or call your pharmacy and they will forward the refill request to us. Please allow 3 business days for your refill to be completed.          Additional Information About Your Visit        Care EveryWhere ID     This is your Care EveryWhere ID. This could be used by other organizations to access your [facility] medical records  [id number]        Your Vitals Were     Last Period                   03/28/2014            Blood Pressure from  Last 3 Encounters:   10/31/18 158/89   10/26/18 139/83   09/05/18 (!) 165/95    Weight from Last 3 Encounters:   10/31/18 94.9 kg (209 lb 3.2 oz)   10/26/18 94.3 kg (208 lb)   09/26/18 91.4 kg (201 lb 8 oz)              Today, you had the following     No orders found for display       Primary Care Provider Office Phone # Fax #    Fredi Fuentes [phone]       [address] [facility] [address]        Goals        General    Problem Solving (pt-stated)     Notes - Note created  9/26/2018  3:43 PM by Donna Tamayo RN    Goal Statement: I will check my urine for ketones if my blood sugar is over 350 mg/dl.    Measure of Success: I will record my results in my record book.    Strengths: I want to stay out of the hospital.  Ideas to overcome barriers: If I can't test my ketones right away, I will test for ketones as soon as I can, like within an hour or 2.    Date to Achieve By:  today          Equal Access to Services     BRITTNI TOWNSEND : Hadii ashok Moody, wafermín joe, qaybnyla luis, rickey chong. So [facility] [phone].    ATENCIÓN: Si habla español, tiene a oneill disposición servicios gratuitos de asistencia lingüística. Llame al [phone].    We comply with applicable federal civil rights laws and [place] laws. We do not discriminate on the basis of race, color, national origin, age, disability, sex, sexual orientation, or gender identity.            Thank you!     Thank you for choosing [facility]  for your care. Our goal is always to provide you with excellent care. Hearing back from our patients is one way we can continue to improve our services. Please take a few minutes to complete the written survey that you may receive in the mail after your visit with us. Thank you!             Your Updated Medication List - Protect others around you: Learn how to safely use, store and throw away your medicines at  www.disposemymeds.org.          This list is accurate as of 11/29/18 10:56 AM.  Always use your most recent med list.                   Brand Name Dispense Instructions for use Diagnosis    ACCU-CHEK COMPACT PLUS test strip   Generic drug:  blood glucose monitoring     450 strip    TEST FIVE TIMES DAILY    Type 2 diabetes mellitus with diabetic polyneuropathy, with long-term current use of insulin (H)       acetone urine test strip    KETOSTIX    50 each    Test urine when ill or blood glucose above 250 mg/dl.    Type 1 diabetes mellitus with other specified complication (H)       aspirin 81 MG tablet    ASA     1 TABLET DAILY        B-D U/F 31G X 8 MM miscellaneous   Generic drug:  insulin pen needle     100 each    USE WITH INSULIN PENS    Type 2 diabetes, HbA1C goal < 8% (H)       blood glucose monitoring lancets     100 each    1 Units 3 times daily.    Type 2 diabetes, HbA1C goal < 8% (H)       blood glucose monitoring meter device kit    NO BRAND SPECIFIED    1 kit    Use to test blood sugar 5 times daily or as directed.  Patient needs new monitor.  Accu Test Compact or whatever is covered.  Patient has very labile sugars so needs to check 5 x per day.  Please also include 3 month supply of strips, lancets, and whatever other supplies are needed.  Ongoing refills for a year.    Type 2 diabetes mellitus with other specified complication (H)       ciclopirox 0.77 % cream    LOPROX    90 g    Apply topically 2 times daily To feet and toenails.    Dermatophytosis of nail, Tinea pedis of both feet       insulin aspart 100 UNIT/ML pen    NovoLOG FLEXPEN    30 mL    Inject 26 units and correction scale dose premeal, as directed, total daily dose approx 100 units.    Type 2 diabetes mellitus with diabetic polyneuropathy, with long-term current use of insulin (H)       insulin degludec 200 UNIT/ML pen    TRESIBA    9 mL    Inject 64 units sq daily, as directed    Type 1 diabetes mellitus with diabetic neuropathy (H),  Type 1 diabetes mellitus with other specified complication (H)       latanoprost 0.005 % ophthalmic solution    XALATAN    7.5 mL    INSTILL 1 DROP IN BOTH EYES AT BEDTIME    Glaucoma, unspecified glaucoma type, unspecified laterality       lisinopril 20 MG tablet    PRINIVIL/ZESTRIL    90 tablet    Take 2 tablets (40 mg) by mouth daily    Essential hypertension with goal blood pressure less than 140/90       magnesium oxide 400 MG tablet    MAG-OX    90 tablet    Take 1 tablet (400 mg) by mouth daily    Hypomagnesemia       metFORMIN 500 MG 24 hr tablet    GLUCOPHAGE-XR    360 tablet    TAKE 2 TABLETS(1000 MG) BY MOUTH TWICE DAILY    Type 2 diabetes mellitus with diabetic polyneuropathy, with long-term current use of insulin (H)       metoclopramide 5 MG tablet    REGLAN    90 tablet    TAKE ONE TABLET BY MOUTH THREE TIMES DAILY BEFORE MEALS    Dyspepsia       MULTIVITAMIN TABS   OR      1 TABLET DAILY        omeprazole 20 MG DR capsule    priLOSEC    180 capsule    Take 1 capsule (20 mg) by mouth 2 times daily    Gastroesophageal reflux disease, esophagitis presence not specified       ondansetron 8 MG ODT tab    ZOFRAN-ODT    30 tablet    TAKE 1 TABLET BY MOUTH EVERY 12 HOURS AS NEEDED FOR NAUSEA    Nausea       oxybutynin 5 MG tablet    DITROPAN    180 tablet    Take 1 tablet (5 mg) by mouth 2 times daily    Overactive bladder       psyllium 28.3 % Powd    METAMUCIL    575 g    Take 1 capful by mouth daily    Constipation, unspecified constipation type, Nausea       simvastatin 20 MG tablet    ZOCOR    90 tablet    Take 1 tablet (20 mg) by mouth At Bedtime    Hyperlipidemia LDL goal <100

## 2018-11-29 NOTE — LETTER
11/29/2018         RE: Lexy Rockwell  [address]  [place] [address]        Dear Colleague,    Thank you for referring your patient, Lexy Rockwell, to the [facility]. Please see a copy of my visit note below.     Current Eye Medications:  Latanoprost at bedtime both eyes, forgot to take last night. Put drops in at 9 am today.     Subjective:  Complete eye exam. Vision is doing well both eyes. Little watering on and off in left eye, has improved. No eye pain in either eye.      Objective:  See Ophthalmology Exam.       Assessment:  Stable intraocular pressure and discs in patient with moderate open angle glaucoma.  No diabetic retinopathy.      ICD-10-CM    1. Primary open angle glaucoma of both eyes, moderate stage H40.1132 latanoprost (XALATAN) 0.005 % ophthalmic solution   2. Type 1 diabetes mellitus with other specified complication (H) E10.69 EYE EXAM (SIMPLE-NONBILLABLE)   3. Presbyopia H52.4 REFRACTIVE STATUS        Plan:  Continue Latanoprost drop both eyes at bedtime.  Glasses Rx given - optional   Return visit 6 months for an intraocular pressure check, glaucoma OCT, and Beard Visual Field.  Dayron Rios M.D.  [phone]             Again, thank you for allowing me to participate in the care of your patient.        Sincerely,        Dayron Rios MD

## 2018-12-12 ENCOUNTER — OFFICE VISIT (OUTPATIENT)
Dept: ENDOCRINOLOGY | Facility: CLINIC | Age: 58
End: 2018-12-12
Payer: COMMERCIAL

## 2018-12-12 VITALS
WEIGHT: 200.4 LBS | HEART RATE: 125 BPM | BODY MASS INDEX: 35.51 KG/M2 | HEIGHT: 63 IN | DIASTOLIC BLOOD PRESSURE: 110 MMHG | SYSTOLIC BLOOD PRESSURE: 181 MMHG

## 2018-12-12 DIAGNOSIS — E66.9 OBESITY, UNSPECIFIED OBESITY SEVERITY, UNSPECIFIED OBESITY TYPE: ICD-10-CM

## 2018-12-12 DIAGNOSIS — E10.40 TYPE 1 DIABETES MELLITUS WITH DIABETIC NEUROPATHY (H): Primary | ICD-10-CM

## 2018-12-12 LAB
ANION GAP SERPL CALCULATED.3IONS-SCNC: 11 MMOL/L (ref 3–14)
BUN SERPL-MCNC: 10 MG/DL (ref 7–30)
CALCIUM SERPL-MCNC: 10 MG/DL (ref 8.5–10.1)
CHLORIDE SERPL-SCNC: 100 MMOL/L (ref 94–109)
CO2 SERPL-SCNC: 25 MMOL/L (ref 20–32)
CREAT SERPL-MCNC: 0.61 MG/DL (ref 0.52–1.04)
GFR SERPL CREATININE-BSD FRML MDRD: >90 ML/MIN/1.7M2
GLUCOSE SERPL-MCNC: 288 MG/DL (ref 70–99)
HBA1C MFR BLD: 11.2 % (ref 0–5.6)
POTASSIUM SERPL-SCNC: 4.1 MMOL/L (ref 3.4–5.3)
SODIUM SERPL-SCNC: 136 MMOL/L (ref 133–144)
TSH SERPL DL<=0.005 MIU/L-ACNC: 6.71 MU/L (ref 0.4–4)

## 2018-12-12 PROCEDURE — 36415 COLL VENOUS BLD VENIPUNCTURE: CPT | Performed by: INTERNAL MEDICINE

## 2018-12-12 PROCEDURE — 80048 BASIC METABOLIC PNL TOTAL CA: CPT | Performed by: INTERNAL MEDICINE

## 2018-12-12 PROCEDURE — 84443 ASSAY THYROID STIM HORMONE: CPT | Performed by: INTERNAL MEDICINE

## 2018-12-12 PROCEDURE — 83036 HEMOGLOBIN GLYCOSYLATED A1C: CPT | Performed by: INTERNAL MEDICINE

## 2018-12-12 PROCEDURE — 99215 OFFICE O/P EST HI 40 MIN: CPT | Performed by: INTERNAL MEDICINE

## 2018-12-12 ASSESSMENT — MIFFLIN-ST. JEOR: SCORE: 1467.1

## 2018-12-12 NOTE — PROGRESS NOTES
Name: Lexy Rockwell    HPI:  Recent issues:  Here for f/u diabetes evaluation  Feeling pretty well, no hospitalizations since Fall 2018  Infrequent nausea, uses Zofran prn          Diagnosis of diabetes mellitus in her 40's, [facility]  Initial treatment with metformin, then addition of insulin.(multiple injections)  She does recall taking another DM pill in the past  Has seen Dr. Shira Wilson/[facility] previously  Reports having many diabetes-related hospitalizations including DKA   Had taken Lantus daily and Novolog premeal  Current DM meds:   Tresiba U200 64U QHS   Novolog Flexpen 24U premeal     Novolog sscale >150 mg/dl                           mg/dl              No correction                          151-200 mg/dl             +2 units                          201-250 mg/dl             +4 units                          251-300 mg/dl             +6 units                          301-350 mg/dl             +8 units                          350-400 mg/dl             +10 units                          401-450 mg/dl             +12 units and call physician   MetforminER 500 mg 2-tabs BID    Meals TID  Uses [employer] Compact Plus meter, typically tests 2-3x/day   Recent BG trends reviewed  Recent [facility] labs include:  Lab Results   Component Value Date    A1C 12.4 (H) 09/05/2018     (L) 09/05/2018    POTASSIUM 4.5 09/05/2018    CHLORIDE 99 09/05/2018    CO2 24 09/05/2018    ANIONGAP 8 09/05/2018     (HH) 09/05/2018    BUN 12 09/05/2018    CR 0.59 09/05/2018    GFRESTIMATED >90 09/05/2018    GFRESTBLACK >90 09/05/2018    ERFAIN 9.5 09/05/2018    CPEPT 0.6 (L) 03/14/2018    CHOL 189 09/05/2018    TRIG 152 (H) 09/05/2018    HDL 65 09/05/2018    LDL 94 09/05/2018    NHDL 124 09/05/2018    UCRR 16 10/26/2018    MICROL 5 10/26/2018    UMALCR 31.46 (H) 10/26/2018     Lab Results   Component Value Date    TSH 3.56 09/05/2018    T4 1.01 09/28/2016     Has seen Jaqui NGUYEN for  Diabetes Education  1/2018 Hospitalized at [facility] with DKA  Hyperlipidemia:  Takes simvastatin 20 mg daily  Last eye exam 2018?, no DR  DM Complications:   Neuropathy    Decreased sensation at feet    Previous concern for possible gastroparesis     Has taken Reglan 5 mg TID premeal, also Prilosec     Saw Dr. Bill Rolon 5/2018     Gastric emptying study normal      Single, on Medicare Disability  Lives by self in [place], gets assistance with transportation from parents  Sees Dr. Fredi Fuentes for general medicine evaluations.  Has also seen Dr. Katalina Stanley, April Grace, and Bill Rolon/[facility]    PMH/PSH:  Past Medical History:   Diagnosis Date     Cerumen impacted      Development delay      Diabetic gastroparesis (H) 8/16/2013     Glaucoma (increased eye pressure)      Hyperlipaemia      Hypertension      Hypothyroid      Mental retardation      Nonsenile cataract      Obesity      Type 1 diabetes mellitus not at goal (H)      Past Surgical History:   Procedure Laterality Date     C EYE SURG ANT SGMT PROC UNLISTED  remote    strabismus surgery     STRABISMUS SURGERY         Family Hx:  Family History   Problem Relation Age of Onset     Hypertension Father      Diabetes Sister      Glaucoma Maternal Grandfather      Cancer No family hx of      Cerebrovascular Disease No family hx of      Thyroid Disease No family hx of      Macular Degeneration No family hx of          Social Hx:  Social History     Socioeconomic History     Marital status: Single     Spouse name: Not on file     Number of children: Not on file     Years of education: Not on file     Highest education level: Not on file   Social Needs     Financial resource strain: Not on file     Food insecurity - worry: Not on file     Food insecurity - inability: Not on file     Transportation needs - medical: Not on file     Transportation needs - non-medical: Not on file   Occupational History     Not on file   Tobacco Use     Smoking status:  "Never Smoker     Smokeless tobacco: Never Used     Tobacco comment: lives in smoke free household.   Substance and Sexual Activity     Alcohol use: Yes     Comment: occass social     Drug use: No     Sexual activity: No   Other Topics Concern     Parent/sibling w/ CABG, MI or angioplasty before 65F 55M? No   Social History Narrative     Not on file          MEDICATIONS:  has a current medication list which includes the following prescription(s): accu-chek compact plus, aspirin, insulin aspart, insulin degludec, latanoprost, lisinopril, magnesium oxide, metformin, metoclopramide, multiple vitamin, omeprazole, ondansetron, oxybutynin, psyllium, simvastatin, acetone urine, b-d u/f, blood glucose monitoring, ciclopirox, and blood glucose monitoring.    ROS:     ROS: 10 point ROS neg other than the symptoms noted above in the HPI.    GENERAL: some fatigue; wt stable, denies fevers, chills, night sweats.   HEENT: no dysphagia, odonophagia, diplopia, neck pain  THYROID:  no apparent hyper or hypothyroid symptoms  CV: no chest pain, pressure, palpitations  LUNGS: no SOB, VENEGAS, cough, wheezing   ABDOMEN: occasional morning pre-breakfasttime nausea, occasional reflux; no diarrhea, constipation, abdominal pain  EXTREMITIES: no rashes, ulcers, edema  NEUROLOGY: some feet numbness; no headaches, denies changes in vision, tingling   MSK: no muscle aches or pains, weakness  SKIN: no rashes or lesions  : no menses; denies hot flashes  PSYCH:  stable mood, no significant anxiety or depression  ENDOCRINE: no heat or cold intolerance      Physical Exam   VS: BP (!) 181/110   Pulse 125   Ht 1.607 m (5' 3.25\")   Wt 90.9 kg (200 lb 6.4 oz)   LMP 03/28/2014   BMI 35.22 kg/m    GENERAL: AXOX3, NAD, slowed speech, well dressed, answering questions appropriately, appears stated age.  THYROID:  normal gland, no apparent nodules or goiter  HEENT: irregular teeth dentition; neck non-tender, no exopthalmous, no proptosis, EOMI  ABDOMEN: " obese, soft, nontender, nondistended, no organomegaly  NEUROLOGY: somewhat slowed mentation, CN grossly intact, no tremors    LABS:    All pertinent notes, labs, and images personally reviewed by me.     A/P:  Encounter Diagnoses   Name Primary?     Type 1 diabetes mellitus with diabetic neuropathy (H) Yes     Obesity, unspecified obesity severity, unspecified obesity type      Comments:  Reviewed complicated health history and diabetes issues  Recent BG levels usually elevated, variable    Plan:  Discussed general issues with the diabetes diagnosis and management  We discussed the hgbA1c test which reflects previous overall glucose levels or control  Discussed the importance of blood glucose (BG) testing to assess glucose trends  Reviewed the multiple daily injection (MDI) treatment using mealtime rapid-acting and daily long-acting insulin, also correction scale.    Recommend:  Continue the MetforminER 500 mg 2-tabs twice per day  We reviewed the MDI insulin plan, concept of rapid-acting insulin meal and correction doses.   Insulin dose schedule, as noted below   Test urine ketones if high BG levels, follow sickday urine ketones sscale also  Test blood glucose level before meals and bedtime  Goal target -150 mg/dl    Needs better compliance with her BP medications.   None taken today and BP high... Take when she gets home today   Discussed regular use of the home pill-box to help with oral med compliance  Keep focus on diet, exercise, weight management.  Advise having fasting lipid panel testing and dilated eye examination, at least annually    Patient to follow up with Primary Care provider regarding elevated blood pressure.  See Dr. Bill Rolon/[facility] and colleagues if further food digestion/GI issues  Plan reviewed with patient and her mother today.    Patient Instructions   Continue current diabetes medications:   Tresiba U200 68U dose each evening   Novolog Flexpen 24U premeal                               mg/dl              No correction                          151-200 mg/dl             +2 units                          201-250 mg/dl             +4 units                          251-300 mg/dl             +6 units                          301-350 mg/dl             +8 units                          350-400 mg/dl             +10 units                          401-450 mg/dl             +12 units and call physician   MetforminER 500 mg 2-tabs BID    Check urine ketone level if blood glucose over 350 mg/dl   Use urine ketone Novolog correction scale plan    Take the blood pressure and other pill medications every day  Next appt with Dr. Kelly 3/20/19 at 2pm, [facility]        Labs ordered today:   Orders Placed This Encounter   Procedures     Hemoglobin A1c     Basic metabolic panel     TSH     Radiology/Consults ordered today: None    More than 50% of the time spent with Ms. Rockwell on counseling / coordinating her care.  Total appointment time was 40 minutes.    Follow-up:  3 mo.    Gilmar Kelly MD  Endocrinology  [facility]

## 2018-12-12 NOTE — PATIENT INSTRUCTIONS
Continue current diabetes medications:   Tresiba U200 68U dose each evening   Novolog Flexpen 24U premeal                              mg/dl              No correction                          151-200 mg/dl             +2 units                          201-250 mg/dl             +4 units                          251-300 mg/dl             +6 units                          301-350 mg/dl             +8 units                          350-400 mg/dl             +10 units                          401-450 mg/dl             +12 units and call physician   MetforminER 500 mg 2-tabs BID    Check urine ketone level if blood glucose over 350 mg/dl   Use urine ketone Novolog correction scale plan    Take the blood pressure and other pill medications every day  Next appt with Dr. Kelly 3/20/19 at 2pm, [facility]

## 2018-12-12 NOTE — LETTER
12/12/2018         RE: Lexy Rockwell  [address]  [place] [address]        Dear Colleague,    Thank you for referring your patient, Lexy Rockwell, to the [facility]. Please see a copy of my visit note below.    Name: Lexy Rockwell    HPI:  Recent issues:  Here for f/u diabetes evaluation  Feeling pretty well, no hospitalizations since Fall 2018  Infrequent nausea, uses Zofran prn          Diagnosis of diabetes mellitus in her 40's, living [facility] or [facility]  Initial treatment with metformin, then addition of insulin.(multiple injections)  She does recall taking another DM pill in the past  [facility]  Reports having many diabetes-related hospitalizations including DKA   Had taken Lantus daily and Novolog premeal  Current DM meds:   Tresiba U200 64U QHS   Novolog Flexpen 24U premeal     Novolog sscale >150 mg/dl                           mg/dl              No correction                          151-200 mg/dl             +2 units                          201-250 mg/dl             +4 units                          251-300 mg/dl             +6 units                          301-350 mg/dl             +8 units                          350-400 mg/dl             +10 units                          401-450 mg/dl             +12 units and call physician   MetforminER 500 mg 2-tabs BID    Meals TID  Uses Accucheck Compact Plus meter, typically tests 2-3x/day   Recent BG trends reviewed  Recent [facility] labs include:  Lab Results   Component Value Date    A1C 12.4 (H) 09/05/2018     (L) 09/05/2018    POTASSIUM 4.5 09/05/2018    CHLORIDE 99 09/05/2018    CO2 24 09/05/2018    ANIONGAP 8 09/05/2018     (HH) 09/05/2018    BUN 12 09/05/2018    CR 0.59 09/05/2018    GFRESTIMATED >90 09/05/2018    GFRESTBLACK >90 09/05/2018    EFRAIN 9.5 09/05/2018    CPEPT 0.6 (L) 03/14/2018    CHOL 189 09/05/2018    TRIG  152 (H) 09/05/2018    HDL 65 09/05/2018    LDL 94 09/05/2018    NHDL 124 09/05/2018    UCRR 16 10/26/2018    MICROL 5 10/26/2018    UMALCR 31.46 (H) 10/26/2018     Lab Results   Component Value Date    TSH 3.56 09/05/2018    T4 1.01 09/28/2016     Has seen Jaqui NGUYEN for Diabetes Education  1/2018 Hospitalized at [facility] with DKA  Hyperlipidemia:  Takes simvastatin 20 mg daily  Last eye exam 2018?, no DR  DM Complications:   Neuropathy    Decreased sensation at feet    Previous concern for possible gastroparesis     Has taken Reglan 5 mg TID premeal, also Prilosec     Saw Dr. Bill Rolon 5/2018     Gastric emptying study normal      Single, on Medicare Disability  Lives by self in [email or web address], gets assistance with transportation from parents  Sees Dr. Fredi Fuentes for general medicine evaluations.  Has also seen Dr. Katalina Stanley, Cece Edwards, and Bill Rolon/[facility]    PMH/PSH:  Past Medical History:   Diagnosis Date     Cerumen impacted      Development delay      Diabetic gastroparesis (H) 8/16/2013     Glaucoma (increased eye pressure)      Hyperlipaemia      Hypertension      Hypothyroid      Mental retardation      Nonsenile cataract      Obesity      Type 1 diabetes mellitus not at goal (H)      Past Surgical History:   Procedure Laterality Date     C EYE SURG ANT SGMT PROC UNLISTED  remote    strabismus surgery     STRABISMUS SURGERY         Family Hx:  Family History   Problem Relation Age of Onset     Hypertension Father      Diabetes Sister      Glaucoma Maternal Grandfather      Cancer No family hx of      Cerebrovascular Disease No family hx of      Thyroid Disease No family hx of      Macular Degeneration No family hx of          Social Hx:  Social History     Socioeconomic History     Marital status: Single     Spouse name: Not on file     Number of children: Not on file     Years of education: Not on file     Highest education level: Not on file   Social Needs     Financial resource strain:  "Not on file     Food insecurity - worry: Not on file     Food insecurity - inability: Not on file     Transportation needs - medical: Not on file     Transportation needs - non-medical: Not on file   Occupational History     Not on file   Tobacco Use     Smoking status: Never Smoker     Smokeless tobacco: Never Used     Tobacco comment: lives in smoke free household.   Substance and Sexual Activity     Alcohol use: Yes     Comment: occass social     Drug use: No     Sexual activity: No   Other Topics Concern     Parent/sibling w/ CABG, MI or angioplasty before 65F 55M? No   Social History Narrative     Not on file          MEDICATIONS:  has a current medication list which includes the following prescription(s): accu-chek compact plus, aspirin, insulin aspart, insulin degludec, latanoprost, lisinopril, magnesium oxide, metformin, metoclopramide, multiple vitamin, omeprazole, ondansetron, oxybutynin, psyllium, simvastatin, acetone urine, b-d u/f, blood glucose monitoring, ciclopirox, and blood glucose monitoring.    ROS:     ROS: 10 point ROS neg other than the symptoms noted above in the HPI.    GENERAL: some fatigue; wt stable, denies fevers, chills, night sweats.   HEENT: no dysphagia, odonophagia, diplopia, neck pain  THYROID:  no apparent hyper or hypothyroid symptoms  CV: no chest pain, pressure, palpitations  LUNGS: no SOB, VENEGAS, cough, wheezing   ABDOMEN: occasional morning pre-breakfasttime nausea, occasional reflux; no diarrhea, constipation, abdominal pain  EXTREMITIES: no rashes, ulcers, edema  NEUROLOGY: some feet numbness; no headaches, denies changes in vision, tingling   MSK: no muscle aches or pains, weakness  SKIN: no rashes or lesions  : no menses; denies hot flashes  PSYCH:  stable mood, no significant anxiety or depression  ENDOCRINE: no heat or cold intolerance      Physical Exam   VS: BP (!) 181/110   Pulse 125   Ht 1.607 m (5' 3.25\")   Wt 90.9 kg (200 lb 6.4 oz)   LMP 03/28/2014   BMI " 35.22 kg/m     GENERAL: AXOX3, NAD, slowed speech, well dressed, answering questions appropriately, appears stated age.  THYROID:  normal gland, no apparent nodules or goiter  HEENT: irregular teeth dentition; neck non-tender, no exopthalmous, no proptosis, EOMI  ABDOMEN: obese, soft, nontender, nondistended, no organomegaly  NEUROLOGY: somewhat slowed mentation, CN grossly intact, no tremors    LABS:    All pertinent notes, labs, and images personally reviewed by me.     A/P:  Encounter Diagnoses   Name Primary?     Type 1 diabetes mellitus with diabetic neuropathy (H) Yes     Obesity, unspecified obesity severity, unspecified obesity type      Comments:  Reviewed complicated health history and diabetes issues  Recent BG levels usually elevated, variable    Plan:  Discussed general issues with the diabetes diagnosis and management  We discussed the hgbA1c test which reflects previous overall glucose levels or control  Discussed the importance of blood glucose (BG) testing to assess glucose trends  Reviewed the multiple daily injection (MDI) treatment using mealtime rapid-acting and daily long-acting insulin, also correction scale.    Recommend:  Continue the MetforminER 500 mg 2-tabs twice per day  We reviewed the MDI insulin plan, concept of rapid-acting insulin meal and correction doses.   Insulin dose schedule, as noted below   Test urine ketones if high BG levels, follow sickday urine ketones sscale also  Test blood glucose level before meals and bedtime  Goal target -150 mg/dl    Needs better compliance with her BP medications.   None taken today and BP high... Take when she gets home today   Discussed regular use of the home pill-box to help with oral med compliance  Keep focus on diet, exercise, weight management.  Advise having fasting lipid panel testing and dilated eye examination, at least annually    Patient to follow up with Primary Care provider regarding elevated blood pressure.  See   Bill Rolon/GI and colleagues if further food digestion/GI issues  Plan reviewed with patient and her mother today.    Patient Instructions   Continue current diabetes medications:   Tresiba U200 68U dose each evening   Novolog Flexpen 24U premeal                              mg/dl              No correction                          151-200 mg/dl             +2 units                          201-250 mg/dl             +4 units                          251-300 mg/dl             +6 units                          301-350 mg/dl             +8 units                          350-400 mg/dl             +10 units                          401-450 mg/dl             +12 units and call physician   MetforminER 500 mg 2-tabs BID    Check urine ketone level if blood glucose over 350 mg/dl   Use urine ketone Novolog correction scale plan    Take the blood pressure and other pill medications every day  Next appt with Dr. Kelly 3/20/19 at 2pm, [facility]        Labs ordered today:   Orders Placed This Encounter   Procedures     Hemoglobin A1c     Basic metabolic panel     TSH     Radiology/Consults ordered today: None    More than 50% of the time spent with Ms. Rockwell on counseling / coordinating her care.  Total appointment time was 40 minutes.    Follow-up:  3 mo.    Gilmar Kelly MD  Endocrinology  [facility]        Again, thank you for allowing me to participate in the care of your patient.        Sincerely,        Gilmar Kelly MD

## 2018-12-12 NOTE — LETTER
December 16, 2018      Lexydonna Escobedo Moiz  [address]   [place] [address]        Dear Lexy,     I enjoyed seeing you at your recent clinic visit.  Your test results are enclosed.    Results for orders placed or performed in visit on 12/12/18   Hemoglobin A1c   Result Value Ref Range    Hemoglobin A1C 11.2 (H) 0 - 5.6 %   Basic metabolic panel   Result Value Ref Range    Sodium 136 133 - 144 mmol/L    Potassium 4.1 3.4 - 5.3 mmol/L    Chloride 100 94 - 109 mmol/L    Carbon Dioxide 25 20 - 32 mmol/L    Anion Gap 11 3 - 14 mmol/L    Glucose 288 (H) 70 - 99 mg/dL    Urea Nitrogen 10 7 - 30 mg/dL    Creatinine 0.61 0.52 - 1.04 mg/dL    GFR Estimate >90 >60 mL/min/1.7m2    GFR Estimate If Black >90 >60 mL/min/1.7m2    Calcium 10.0 8.5 - 10.1 mg/dL   TSH   Result Value Ref Range    TSH 6.71 (H) 0.40 - 4.00 mU/L     Your recent hgbA1c level was still high at 11.2% (goal <7%) with the diabetes management.  Other tests showed normal electrolytes, creatinine kidney test, mildly elevated TSH thyroid level.    I recommend continuing the current insulin treatment plan. Contact me if questions.    Sincerely,        Gilmar Kelly MD

## 2018-12-17 ENCOUNTER — OFFICE VISIT (OUTPATIENT)
Dept: PODIATRY | Facility: CLINIC | Age: 58
End: 2018-12-17
Payer: COMMERCIAL

## 2018-12-17 VITALS — OXYGEN SATURATION: 97 % | SYSTOLIC BLOOD PRESSURE: 150 MMHG | HEART RATE: 115 BPM | DIASTOLIC BLOOD PRESSURE: 92 MMHG

## 2018-12-17 DIAGNOSIS — E11.49 TYPE II OR UNSPECIFIED TYPE DIABETES MELLITUS WITH NEUROLOGICAL MANIFESTATIONS, NOT STATED AS UNCONTROLLED(250.60) (H): ICD-10-CM

## 2018-12-17 DIAGNOSIS — L60.2 ONYCHAUXIS: Primary | ICD-10-CM

## 2018-12-17 DIAGNOSIS — L84 TYLOMA: ICD-10-CM

## 2018-12-17 PROCEDURE — 99213 OFFICE O/P EST LOW 20 MIN: CPT | Performed by: PODIATRIST

## 2018-12-17 NOTE — PATIENT INSTRUCTIONS
Thanks for coming today.  [facility]  [address] [place] [address]    To schedule future appointments in Ortho Clinic, you may call [phone].    To schedule ordered imaging by your provider:   Call Central Imaging [phone]    To schedule an injection ordered by your provider:  Call [facility] Injection scheduling line: [phone]  [address] available online at:  [email or web address]    Please call if any further questions or concerns ([phone]).  Clinic hours 8 am to 5 pm.    Return to clinic (call) if symptoms worsen or fail to improve.

## 2018-12-17 NOTE — NURSING NOTE
Lexy Rockwell's chief complaint for this visit includes:  Chief Complaint   Patient presents with     Left Foot - RECHECK     Right Foot - RECHECK   Bilateral nail trim.    PCP: Fredi Fuentes    Referring Provider:  No referring provider defined for this encounter.    BP (!) 150/92   Pulse 115   LMP 03/28/2014   SpO2 97%   Data Unavailable     Do you need any medication refills at today's visit? No.

## 2018-12-17 NOTE — LETTER
12/17/2018         RE: Lexy Rockwell  [address]  [facility] [facility] [place] [address]        Dear Colleague,    Thank you for referring your patient, Lexy Rockwell, to the [facility]. Please see a copy of my visit note below.    Past Medical History:   Diagnosis Date     Cerumen impacted      Development delay      Diabetic gastroparesis (H) 8/16/2013     Glaucoma (increased eye pressure)      Hyperlipaemia      Hypertension      Hypothyroid      Mental retardation      Nonsenile cataract      Obesity      Type 1 diabetes mellitus not at goal (H)      Patient Active Problem List   Diagnosis     Development delay     Overactive bladder     GERD (gastroesophageal reflux disease)     Hypertension goal BP (blood pressure) < 140/90     Hyperlipidemia LDL goal <100     Pain in joint, lower leg     Proteinuria     Vitamin D deficiency     Health Care Home     History of strabismus surgery     Type 1 diabetes mellitus with other specified complication (H)     Advanced directives, counseling/discussion     Primary open angle glaucoma of both eyes, moderate stage     Gastroesophageal reflux disease without esophagitis     Obesity, unspecified obesity severity, unspecified obesity type     Acute renal failure (H)     Acute retention of urine     JEAN (acute kidney injury) (H)     Dehydration     Diabetic ketoacidosis without coma associated with type 1 diabetes mellitus (H)     DKA (diabetic ketoacidoses) (H)     Sinus tachycardia     SIRS (systemic inflammatory response syndrome) (H)     Past Surgical History:   Procedure Laterality Date     C EYE SURG ANT SGMT PROC UNLISTED  remote    strabismus surgery     STRABISMUS SURGERY       Social History     Socioeconomic History     Marital status: Single     Spouse name: Not on file     Number of children: Not on file     Years of education: Not on file     Highest education level: Not on file   Social Needs     Financial resource  strain: Not on file     Food insecurity - worry: Not on file     Food insecurity - inability: Not on file     Transportation needs - medical: Not on file     Transportation needs - non-medical: Not on file   Occupational History     Not on file   Tobacco Use     Smoking status: Never Smoker     Smokeless tobacco: Never Used     Tobacco comment: lives in smoke free household.   Substance and Sexual Activity     Alcohol use: Yes     Comment: occass social     Drug use: No     Sexual activity: No   Other Topics Concern     Parent/sibling w/ CABG, MI or angioplasty before 65F 55M? No   Social History Narrative     Not on file     Family History   Problem Relation Age of Onset     Hypertension Father      Diabetes Sister      Glaucoma Maternal Grandfather      Cancer No family hx of      Cerebrovascular Disease No family hx of      Thyroid Disease No family hx of      Macular Degeneration No family hx of      Lab Results   Component Value Date    A1C 11.2 12/12/2018    A1C 12.4 09/05/2018    A1C 10.6 03/14/2018    A1C 12.3 11/20/2017    A1C 11.2 06/28/2017     SUBJECTIVE FINDINGS:  A 58-year-old female returns to clinic for a diabetic foot check and onychauxis, onychomycosis and tylomas.  She relates she is doing well.  She is using the Loprox cream and feels it is working well.  She relates no ulcers or sores since I had seen her last.  Relates to no injuries and she does have peripheral neuropathy.       OBJECTIVE FINDINGS:  DP and PT are 1/4 bilaterally.  She has decreased hair growth bilaterally.  She has some peripheral edema bilaterally.  There is no erythema, no drainage, no odor, no calor bilaterally.  She has hyperkeratotic tissue buildup, plantar first MPJ left, mildly on plantar medial hallux left.  She has incurvated nails with minimal thickening, dystrophy, discoloration and brittleness, 1-5 bilaterally to differing degrees.        ASSESSMENT AND PLAN:  Onychauxis bilaterally.  Onychomycosis bilaterally.   Onychomycosis is nearly resolved.  Tylomas.  She is diabetic with peripheral neuropathy.  Diagnosis and treatment discussed with her.  Tylomas were sharp debrided bilaterally with a 15 blade upon consent.  She will continue the Loprox cream.  All the nails were debrided or reduced bilaterally upon consent.  She will return to clinic and see me in about 2-3 months.     Again, thank you for allowing me to participate in the care of your patient.        Sincerely,        Ravin Back DPM

## 2018-12-17 NOTE — PROGRESS NOTES
Past Medical History:   Diagnosis Date     Cerumen impacted      Development delay      Diabetic gastroparesis (H) 8/16/2013     Glaucoma (increased eye pressure)      Hyperlipaemia      Hypertension      Hypothyroid      Mental retardation      Nonsenile cataract      Obesity      Type 1 diabetes mellitus not at goal (H)      Patient Active Problem List   Diagnosis     Development delay     Overactive bladder     GERD (gastroesophageal reflux disease)     Hypertension goal BP (blood pressure) < 140/90     Hyperlipidemia LDL goal <100     Pain in joint, lower leg     Proteinuria     Vitamin D deficiency     Health Care Home     History of strabismus surgery     Type 1 diabetes mellitus with other specified complication (H)     Advanced directives, counseling/discussion     Primary open angle glaucoma of both eyes, moderate stage     Gastroesophageal reflux disease without esophagitis     Obesity, unspecified obesity severity, unspecified obesity type     Acute renal failure (H)     Acute retention of urine     JEAN (acute kidney injury) (H)     Dehydration     Diabetic ketoacidosis without coma associated with type 1 diabetes mellitus (H)     DKA (diabetic ketoacidoses) (H)     Sinus tachycardia     SIRS (systemic inflammatory response syndrome) (H)     Past Surgical History:   Procedure Laterality Date     C EYE SURG ANT SGMT PROC UNLISTED  remote    strabismus surgery     STRABISMUS SURGERY       Social History     Socioeconomic History     Marital status: Single     Spouse name: Not on file     Number of children: Not on file     Years of education: Not on file     Highest education level: Not on file   Social Needs     Financial resource strain: Not on file     Food insecurity - worry: Not on file     Food insecurity - inability: Not on file     Transportation needs - medical: Not on file     Transportation needs - non-medical: Not on file   Occupational History     Not on file   Tobacco Use     Smoking status:  Never Smoker     Smokeless tobacco: Never Used     Tobacco comment: lives in smoke free household.   Substance and Sexual Activity     Alcohol use: Yes     Comment: occass social     Drug use: No     Sexual activity: No   Other Topics Concern     Parent/sibling w/ CABG, MI or angioplasty before 65F 55M? No   Social History Narrative     Not on file     Family History   Problem Relation Age of Onset     Hypertension Father      Diabetes Sister      Glaucoma Maternal Grandfather      Cancer No family hx of      Cerebrovascular Disease No family hx of      Thyroid Disease No family hx of      Macular Degeneration No family hx of      Lab Results   Component Value Date    A1C 11.2 12/12/2018    A1C 12.4 09/05/2018    A1C 10.6 03/14/2018    A1C 12.3 11/20/2017    A1C 11.2 06/28/2017     SUBJECTIVE FINDINGS:  A 58-year-old female returns to clinic for a diabetic foot check and onychauxis, onychomycosis and tylomas.  She relates she is doing well.  She is using the Loprox cream and feels it is working well.  She relates no ulcers or sores since I had seen her last.  Relates to no injuries and she does have peripheral neuropathy.       OBJECTIVE FINDINGS:  DP and PT are 1/4 bilaterally.  She has decreased hair growth bilaterally.  She has some peripheral edema bilaterally.  There is no erythema, no drainage, no odor, no calor bilaterally.  She has hyperkeratotic tissue buildup, plantar first MPJ left, mildly on plantar medial hallux left.  She has incurvated nails with minimal thickening, dystrophy, discoloration and brittleness, 1-5 bilaterally to differing degrees.        ASSESSMENT AND PLAN:  Onychauxis bilaterally.  Onychomycosis bilaterally.  Onychomycosis is nearly resolved.  Tylomas.  She is diabetic with peripheral neuropathy.  Diagnosis and treatment discussed with her.  Tylomas were sharp debrided bilaterally with a 15 blade upon consent.  She will continue the Loprox cream.  All the nails were debrided or reduced  bilaterally upon consent.  She will return to clinic and see me in about 2-3 months.

## 2019-01-20 DIAGNOSIS — K21.9 GASTROESOPHAGEAL REFLUX DISEASE, ESOPHAGITIS PRESENCE NOT SPECIFIED: ICD-10-CM

## 2019-01-21 NOTE — TELEPHONE ENCOUNTER
"Requested Prescriptions   Pending Prescriptions Disp Refills     omeprazole (PRILOSEC) 20 MG DR capsule [Pharmacy Med Name: OMEPRAZOLE 20MG CAPSULES] 180 capsule 0    Last Written Prescription Date:  4/20/17  Last Fill Quantity: 180,  # refills: 3   Last office visit: 10/26/2018 with prescribing provider:     Future Office Visit:   Next 5 appointments (look out 90 days)    Feb 27, 2019  2:30 PM CST  Return Visit with Ravin Back DPM  [facility] ([facility]) [address] [place] [id number] [phone]   Mar 20, 2019  2:00 PM CDT  Return Visit with Gilmar Kelly MD  [facility] ([address] [address] [phone]          Sig: TAKE 1 CAPSULE(20 MG) BY MOUTH TWICE DAILY    PPI Protocol Passed - 1/20/2019  2:51 PM       Passed - Not on Clopidogrel (unless Pantoprazole ordered)       Passed - No diagnosis of osteoporosis on record       Passed - Recent (12 mo) or future (30 days) visit within the authorizing provider's specialty    Patient had office visit in the last 12 months or has a visit in the next 30 days with authorizing provider or within the authorizing provider's specialty.  See \"Patient Info\" tab in inbasket, or \"Choose Columns\" in Meds & Orders section of the refill encounter.             Passed - Medication is active on med list       Passed - Patient is age 18 or older       Passed - No active pregnacy on record       Passed - No positive pregnancy test in past 12 months      ]  "

## 2019-02-01 ENCOUNTER — TRANSFERRED RECORDS (OUTPATIENT)
Dept: HEALTH INFORMATION MANAGEMENT | Facility: CLINIC | Age: 59
End: 2019-02-01

## 2019-02-27 ENCOUNTER — OFFICE VISIT (OUTPATIENT)
Dept: PODIATRY | Facility: CLINIC | Age: 59
End: 2019-02-27
Payer: MEDICARE

## 2019-02-27 ENCOUNTER — TELEPHONE (OUTPATIENT)
Dept: FAMILY MEDICINE | Facility: CLINIC | Age: 59
End: 2019-02-27

## 2019-02-27 VITALS — SYSTOLIC BLOOD PRESSURE: 138 MMHG | OXYGEN SATURATION: 98 % | HEART RATE: 116 BPM | DIASTOLIC BLOOD PRESSURE: 86 MMHG

## 2019-02-27 DIAGNOSIS — E10.40 CONTROLLED TYPE 1 DIABETES WITH NEUROPATHY (H): ICD-10-CM

## 2019-02-27 DIAGNOSIS — B35.1 DERMATOPHYTOSIS OF NAIL: ICD-10-CM

## 2019-02-27 DIAGNOSIS — L84 TYLOMA: ICD-10-CM

## 2019-02-27 DIAGNOSIS — L60.2 ONYCHAUXIS: Primary | ICD-10-CM

## 2019-02-27 DIAGNOSIS — L60.0 INGROWN TOENAIL: ICD-10-CM

## 2019-02-27 PROCEDURE — 99213 OFFICE O/P EST LOW 20 MIN: CPT | Performed by: PODIATRIST

## 2019-02-27 NOTE — NURSING NOTE
Lexy Rockwell's chief complaint for this visit includes:  Chief Complaint   Patient presents with     RECHECK     toenail check      PCP: Fredi Fuentes    Referring Provider:  No referring provider defined for this encounter.    /86   Pulse 116   LMP 03/28/2014   SpO2 98%   Data Unavailable     Do you need any medication refills at today's visit? no

## 2019-02-27 NOTE — LETTER
2/27/2019         RE: Lexy Rockwell  [address]  [facility] [address]        Dear Colleague,    Thank you for referring your patient, Lexy Rockwell, to the [facility]. Please see a copy of my visit note below.    Past Medical History:   Diagnosis Date     Cerumen impacted      Development delay      Diabetic gastroparesis (H) 8/16/2013     Glaucoma (increased eye pressure)      Hyperlipaemia      Hypertension      Hypothyroid      Mental retardation      Nonsenile cataract      Obesity      Type 1 diabetes mellitus not at goal (H)      Patient Active Problem List   Diagnosis     Development delay     Overactive bladder     GERD (gastroesophageal reflux disease)     Hypertension goal BP (blood pressure) < 140/90     Hyperlipidemia LDL goal <100     Pain in joint, lower leg     Proteinuria     Vitamin D deficiency     Health Care Home     History of strabismus surgery     Type 1 diabetes mellitus with other specified complication (H)     Advanced directives, counseling/discussion     Primary open angle glaucoma of both eyes, moderate stage     Gastroesophageal reflux disease without esophagitis     Obesity, unspecified obesity severity, unspecified obesity type     Acute renal failure (H)     Acute retention of urine     JEAN (acute kidney injury) (H)     Dehydration     Diabetic ketoacidosis without coma associated with type 1 diabetes mellitus (H)     DKA (diabetic ketoacidoses) (H)     Sinus tachycardia     SIRS (systemic inflammatory response syndrome) (H)     Past Surgical History:   Procedure Laterality Date     C EYE SURG ANT SGMT PROC UNLISTED  remote    strabismus surgery     STRABISMUS SURGERY       Social History     Socioeconomic History     Marital status: Single     Spouse name: Not on file     Number of children: Not on file     Years of education: Not on file     Highest education level: Not on file   Occupational History     Not on file    Social Needs     Financial resource strain: Not on file     Food insecurity:     Worry: Not on file     Inability: Not on file     Transportation needs:     Medical: Not on file     Non-medical: Not on file   Tobacco Use     Smoking status: Never Smoker     Smokeless tobacco: Never Used     Tobacco comment: lives in smoke free household.   Substance and Sexual Activity     Alcohol use: Yes     Comment: occass social     Drug use: No     Sexual activity: No   Lifestyle     Physical activity:     Days per week: Not on file     Minutes per session: Not on file     Stress: Not on file   Relationships     Social connections:     Talks on phone: Not on file     Gets together: Not on file     Attends Quaker service: Not on file     Active member of club or organization: Not on file     Attends meetings of clubs or organizations: Not on file     Relationship status: Not on file     Intimate partner violence:     Fear of current or ex partner: Not on file     Emotionally abused: Not on file     Physically abused: Not on file     Forced sexual activity: Not on file   Other Topics Concern     Parent/sibling w/ CABG, MI or angioplasty before 65F 55M? No   Social History Narrative     Not on file     Family History   Problem Relation Age of Onset     Hypertension Father      Diabetes Sister      Glaucoma Maternal Grandfather      Cancer No family hx of      Cerebrovascular Disease No family hx of      Thyroid Disease No family hx of      Macular Degeneration No family hx of      Lab Results   Component Value Date    A1C 11.2 12/12/2018    A1C 12.4 09/05/2018    A1C 10.6 03/14/2018    A1C 12.3 11/20/2017    A1C 11.2 06/28/2017     A1c                    11.6                   2/04/2019  SUBJECTIVE FINDINGS:  A 58-year-old female returns to clinic for a diabetic foot check and onychauxis, onychomycosis and tylomas.  She relates she is doing well.  She relates no ulcers or sores since I had seen her last.  Relates to possible  left Hallux nail border ingrown.  Relates to no injuries and she does have peripheral neuropathy.       OBJECTIVE FINDINGS:  DP and PT are 1/4 bilaterally.  She has decreased hair growth bilaterally.  She has some peripheral edema bilaterally.  She has hyperkeratotic tissue buildup, plantar first MPJ left, and distal left 2nd toe.  She has incurvated nails with minimal thickening, dystrophy, discoloration and brittleness, 1-5 bilaterally to differing degrees.  Deep tendon reflexes are intact bilaterally, she has dorsally contracted digits 1-5 bilaterally, decreased range of motion bilaterally and sharp/dull is decreased with 5.07 [place] Weinstien monofilament bilaterally.  She has left distal medial Hallux toenail border with some edema and pustular drainage upon debridement, no odor, no calor.  Otherwise there is no erythema, no drainage, no odor and no calor bilaterally.      ASSESSMENT AND PLAN:  Onychauxis bilaterally.  Distal Paronychia left medial Hallux toenail border.  Onychomycosis bilaterally.  Onychomycosis is nearly resolved.  Tylomas.  She is diabetic with peripheral neuropathy.  Diagnosis and treatment discussed with her.  Tylomas left 1st mpj and 2nd toe were sharp debrided bilaterally with a 15 blade upon consent.  She will continue the Loprox cream.  All the nails were debrided or reduced bilaterally upon consent.  The right 2nd toenail bled upon trimming.  Local wound care and bandaid applied to left Hallux and right 2nd toenail and use discussed with patient.  She will return to clinic and see me in about 2-3 months.     Again, thank you for allowing me to participate in the care of your patient.        Sincerely,        Ravin Back DPM

## 2019-02-27 NOTE — TELEPHONE ENCOUNTER
Requested information faxed to number provided.  [facility]  Team 3 Coordinator

## 2019-02-27 NOTE — TELEPHONE ENCOUNTER
Forms received from: [employer]   Phone number listed: [phone]   Fax listed: [phone]  Date received: 02/27/19  Form description: Diabetic Order  Once forms are completed, please return to [employer] via Fax.  Is patient requesting to be contacted when forms are completed: NA    Form placed: in providers ambrocio Ragland

## 2019-02-27 NOTE — PROGRESS NOTES
Past Medical History:   Diagnosis Date     Cerumen impacted      Development delay      Diabetic gastroparesis (H) 8/16/2013     Glaucoma (increased eye pressure)      Hyperlipaemia      Hypertension      Hypothyroid      Mental retardation      Nonsenile cataract      Obesity      Type 1 diabetes mellitus not at goal (H)      Patient Active Problem List   Diagnosis     Development delay     Overactive bladder     GERD (gastroesophageal reflux disease)     Hypertension goal BP (blood pressure) < 140/90     Hyperlipidemia LDL goal <100     Pain in joint, lower leg     Proteinuria     Vitamin D deficiency     Health Care Home     History of strabismus surgery     Type 1 diabetes mellitus with other specified complication (H)     Advanced directives, counseling/discussion     Primary open angle glaucoma of both eyes, moderate stage     Gastroesophageal reflux disease without esophagitis     Obesity, unspecified obesity severity, unspecified obesity type     Acute renal failure (H)     Acute retention of urine     JEAN (acute kidney injury) (H)     Dehydration     Diabetic ketoacidosis without coma associated with type 1 diabetes mellitus (H)     DKA (diabetic ketoacidoses) (H)     Sinus tachycardia     SIRS (systemic inflammatory response syndrome) (H)     Past Surgical History:   Procedure Laterality Date     C EYE SURG ANT SGMT PROC UNLISTED  remote    strabismus surgery     STRABISMUS SURGERY       Social History     Socioeconomic History     Marital status: Single     Spouse name: Not on file     Number of children: Not on file     Years of education: Not on file     Highest education level: Not on file   Occupational History     Not on file   Social Needs     Financial resource strain: Not on file     Food insecurity:     Worry: Not on file     Inability: Not on file     Transportation needs:     Medical: Not on file     Non-medical: Not on file   Tobacco Use     Smoking status: Never Smoker     Smokeless tobacco:  Never Used     Tobacco comment: lives in smoke free household.   Substance and Sexual Activity     Alcohol use: Yes     Comment: occass social     Drug use: No     Sexual activity: No   Lifestyle     Physical activity:     Days per week: Not on file     Minutes per session: Not on file     Stress: Not on file   Relationships     Social connections:     Talks on phone: Not on file     Gets together: Not on file     Attends Cheondoism service: Not on file     Active member of club or organization: Not on file     Attends meetings of clubs or organizations: Not on file     Relationship status: Not on file     Intimate partner violence:     Fear of current or ex partner: Not on file     Emotionally abused: Not on file     Physically abused: Not on file     Forced sexual activity: Not on file   Other Topics Concern     Parent/sibling w/ CABG, MI or angioplasty before 65F 55M? No   Social History Narrative     Not on file     Family History   Problem Relation Age of Onset     Hypertension Father      Diabetes Sister      Glaucoma Maternal Grandfather      Cancer No family hx of      Cerebrovascular Disease No family hx of      Thyroid Disease No family hx of      Macular Degeneration No family hx of      Lab Results   Component Value Date    A1C 11.2 12/12/2018    A1C 12.4 09/05/2018    A1C 10.6 03/14/2018    A1C 12.3 11/20/2017    A1C 11.2 06/28/2017     A1c                    11.6                   2/04/2019  SUBJECTIVE FINDINGS:  A 58-year-old female returns to clinic for a diabetic foot check and onychauxis, onychomycosis and tylomas.  She relates she is doing well.  She relates no ulcers or sores since I had seen her last.  Relates to possible left Hallux nail border ingrown.  Relates to no injuries and she does have peripheral neuropathy.       OBJECTIVE FINDINGS:  DP and PT are 1/4 bilaterally.  She has decreased hair growth bilaterally.  She has some peripheral edema bilaterally.  She has hyperkeratotic tissue  buildup, plantar first MPJ left, and distal left 2nd toe.  She has incurvated nails with minimal thickening, dystrophy, discoloration and brittleness, 1-5 bilaterally to differing degrees.  Deep tendon reflexes are intact bilaterally, she has dorsally contracted digits 1-5 bilaterally, decreased range of motion bilaterally and sharp/dull is decreased with 5.07 [place] Weinstien monofilament bilaterally.  She has left distal medial Hallux toenail border with some edema and pustular drainage upon debridement, no odor, no calor.  Otherwise there is no erythema, no drainage, no odor and no calor bilaterally.      ASSESSMENT AND PLAN:  Onychauxis bilaterally.  Distal Paronychia left medial Hallux toenail border.  Onychomycosis bilaterally.  Onychomycosis is nearly resolved.  Tylomas.  She is diabetic with peripheral neuropathy.  Diagnosis and treatment discussed with her.  Tylomas left 1st mpj and 2nd toe were sharp debrided bilaterally with a 15 blade upon consent.  She will continue the Loprox cream.  All the nails were debrided or reduced bilaterally upon consent.  The right 2nd toenail bled upon trimming.  Local wound care and bandaid applied to left Hallux and right 2nd toenail and use discussed with patient.  She will return to clinic and see me in about 2-3 months.

## 2019-02-27 NOTE — PATIENT INSTRUCTIONS
Thanks for coming today.  Ortho[facility] Medicine Clinic  [address] [place] [address]    To schedule future appointments in Ortho Clinic, you may call [phone].    To schedule ordered imaging by your Provider: Call [facility] at [phone]    Natanael Ulien available online at:   [email or web address]    Please call if any further questions or concerns [phone] and ask for the Orthopedic Department. Clinic hours 8 am to 5 pm.    Return to clinic if symptoms worsen.

## 2019-03-01 ENCOUNTER — TELEPHONE (OUTPATIENT)
Dept: FAMILY MEDICINE | Facility: CLINIC | Age: 59
End: 2019-03-01

## 2019-03-01 NOTE — TELEPHONE ENCOUNTER
Forms received from: [employer]   Phone number listed: [phone]   Fax listed: [phone]  Date received: 03/01/19  Form description: Diabetic Order  Once forms are completed, please return to [employer] via Fax.  Is patient requesting to be contacted when forms are completed: NA    Form placed: in providers ambrocio Ragland

## 2019-03-05 ENCOUNTER — TELEPHONE (OUTPATIENT)
Dept: FAMILY MEDICINE | Facility: CLINIC | Age: 59
End: 2019-03-05

## 2019-03-05 NOTE — TELEPHONE ENCOUNTER
Forms received from: [employer]   Phone number listed: [phone]   Fax listed: [phone]  Date received: 03/05/19  Form description: Pauliiliborio Hayden  Once forms are completed, please return to [employer] via Fax.  Is patient requesting to be contacted when forms are completed: NA    Form placed: in providers ambrocio Ragland

## 2019-03-06 NOTE — TELEPHONE ENCOUNTER
Requested information faxed to number provided.  [facility]  Team 3 Coordinator

## 2019-03-07 ENCOUNTER — TELEPHONE (OUTPATIENT)
Dept: FAMILY MEDICINE | Facility: CLINIC | Age: 59
End: 2019-03-07

## 2019-03-07 NOTE — TELEPHONE ENCOUNTER
Forms received from: [employer]   Phone number listed: [phone]   Fax listed: [phone]  Date received: 03/07/19  Form description: Written Order  Once forms are completed, please return to [employer] via Fax.  Is patient requesting to be contacted when forms are completed: NA    Form placed: in heather Ragland

## 2019-03-20 ENCOUNTER — OFFICE VISIT (OUTPATIENT)
Dept: ENDOCRINOLOGY | Facility: CLINIC | Age: 59
End: 2019-03-20
Payer: MEDICARE

## 2019-03-20 VITALS
HEIGHT: 63 IN | SYSTOLIC BLOOD PRESSURE: 143 MMHG | OXYGEN SATURATION: 95 % | BODY MASS INDEX: 34.38 KG/M2 | DIASTOLIC BLOOD PRESSURE: 80 MMHG | HEART RATE: 142 BPM | WEIGHT: 194 LBS

## 2019-03-20 DIAGNOSIS — E66.9 OBESITY, UNSPECIFIED OBESITY SEVERITY, UNSPECIFIED OBESITY TYPE: ICD-10-CM

## 2019-03-20 DIAGNOSIS — F70 MILD MENTAL RETARDATION (I.Q. 50-70): ICD-10-CM

## 2019-03-20 DIAGNOSIS — E10.40 TYPE 1 DIABETES MELLITUS WITH DIABETIC NEUROPATHY (H): Primary | ICD-10-CM

## 2019-03-20 PROCEDURE — 99214 OFFICE O/P EST MOD 30 MIN: CPT | Performed by: INTERNAL MEDICINE

## 2019-03-20 ASSESSMENT — MIFFLIN-ST. JEOR: SCORE: 1438.07

## 2019-03-20 NOTE — PROGRESS NOTES
Name: Lexy Rockwell    HPI:  Recent issues:  Here for f/u diabetes evaluation, with her mother Francisca today  Had hospitalization in 2/2019 for DKA.. Precipitating cause unclear today  Feeling better now, but occasional nausea few days/week        Diagnosis of diabetes mellitus in her 40's, living [facility] or [facility]  Initial treatment with metformin, then addition of insulin.(multiple injections)  She does recall taking another DM pill in the past  [facility]  Reports having many diabetes-related hospitalizations including DKA   Had taken Lantus daily and Novolog premeal  Current DM meds:  Continue current diabetes medications:              Tresiba U200 68U dose each evening              Novolog Flexpen 24U premeal                               mg/dl              No correction                          151-200 mg/dl             +2 units                          201-250 mg/dl             +4 units                          251-300 mg/dl             +6 units                          301-350 mg/dl             +8 units                          350-400 mg/dl             +10 units                          401-450 mg/dl             +12 units and call physician              MetforminER 500 mg 2-tabs BID     Check urine ketone level if blood glucose over 350 mg/dl              Use urine ketone Novolog correction scale plan  Meals TID  Uses Accucheck Compact Plus meter, typically tests 2-3x/day   Recent BG trends reviewed, trends  Recent [facility] labs include:  Lab Results   Component Value Date    A1C 11.2 (H) 12/12/2018     12/12/2018    POTASSIUM 4.1 12/12/2018    CHLORIDE 100 12/12/2018    CO2 25 12/12/2018    ANIONGAP 11 12/12/2018     (H) 12/12/2018    BUN 10 12/12/2018    CR 0.61 12/12/2018    GFRESTIMATED >90 12/12/2018    GFRESTBLACK >90 12/12/2018    EFRAIN 10.0 12/12/2018    CPEPT 0.6 (L) 03/14/2018    CHOL 189 09/05/2018    TRIG 152 (H) 09/05/2018     HDL 65 09/05/2018    LDL 94 09/05/2018    NHDL 124 09/05/2018    UCRR 16 10/26/2018    MICROL 5 10/26/2018    UMALCR 31.46 (H) 10/26/2018     Lab Results   Component Value Date    TSH 6.71 (H) 12/12/2018    T4 1.01 09/28/2016     Has seen Jaqui NGUYEN for Diabetes Education  1/2018 Hospitalized at [facility] with DKA  Hyperlipidemia:  Takes simvastatin 20 mg daily  Last eye exam 2018?, no DR  DM Complications:   Neuropathy    Decreased sensation at feet    Previous concern for possible gastroparesis     Has taken Reglan 5 mg TID premeal, also Prilosec     Saw Dr. Bill Rolon 5/2018     Gastric emptying study normal      Single, on Medicare Disability  Lives by self in [place], gets assistance with transportation from parents  Sees Dr. Fredi Fuentes for general medicine evaluations.  Has also seen Dr. Katalina Stanley, Cece Edwards, and Bill Rolon/[facility]    PMH/PSH:  Past Medical History:   Diagnosis Date     Cerumen impacted      Development delay      Diabetic gastroparesis (H) 8/16/2013     Glaucoma (increased eye pressure)      Hyperlipaemia      Hypertension      Hypothyroid      Mental retardation      Nonsenile cataract      Obesity      Type 1 diabetes mellitus not at goal (H)      Past Surgical History:   Procedure Laterality Date     C EYE SURG ANT SGMT PROC UNLISTED  remote    strabismus surgery     STRABISMUS SURGERY         Family Hx:  Family History   Problem Relation Age of Onset     Hypertension Father      Diabetes Sister      Glaucoma Maternal Grandfather      Cancer No family hx of      Cerebrovascular Disease No family hx of      Thyroid Disease No family hx of      Macular Degeneration No family hx of          Social Hx:  Social History     Socioeconomic History     Marital status: Single     Spouse name: Not on file     Number of children: Not on file     Years of education: Not on file     Highest education level: Not on file   Occupational History     Not on file   Social Needs      Financial resource strain: Not on file     Food insecurity:     Worry: Not on file     Inability: Not on file     Transportation needs:     Medical: Not on file     Non-medical: Not on file   Tobacco Use     Smoking status: Never Smoker     Smokeless tobacco: Never Used     Tobacco comment: lives in smoke free household.   Substance and Sexual Activity     Alcohol use: Yes     Comment: occass social     Drug use: No     Sexual activity: No   Lifestyle     Physical activity:     Days per week: Not on file     Minutes per session: Not on file     Stress: Not on file   Relationships     Social connections:     Talks on phone: Not on file     Gets together: Not on file     Attends Tenriism service: Not on file     Active member of club or organization: Not on file     Attends meetings of clubs or organizations: Not on file     Relationship status: Not on file     Intimate partner violence:     Fear of current or ex partner: Not on file     Emotionally abused: Not on file     Physically abused: Not on file     Forced sexual activity: Not on file   Other Topics Concern     Parent/sibling w/ CABG, MI or angioplasty before 65F 55M? No   Social History Narrative     Not on file          MEDICATIONS:  has a current medication list which includes the following prescription(s): accu-chek compact plus, acetone urine, aspirin, b-d u/f, blood glucose monitoring, ciclopirox, insulin aspart, insulin degludec, latanoprost, lisinopril, magnesium oxide, metformin, metoclopramide, multiple vitamin, omeprazole, ondansetron, oxybutynin, psyllium, simvastatin, and blood glucose monitoring.    ROS:     ROS: 10 point ROS neg other than the symptoms noted above in the HPI.    GENERAL: some fatigue; wt stable, denies fevers, chills, night sweats.   HEENT: no dysphagia, odonophagia, diplopia, neck pain  THYROID:  no apparent hyper or hypothyroid symptoms  CV: no chest pain, pressure, palpitations  LUNGS: no SOB, VENEGAS, cough, wheezing   ABDOMEN:  "occasional morning pre-breakfasttime nausea, occasional reflux; no diarrhea, constipation, abdominal pain  EXTREMITIES: no rashes, ulcers, edema  NEUROLOGY: some feet numbness; no headaches, denies changes in vision, tingling   MSK: no muscle aches or pains, weakness  SKIN: no rashes or lesions  : no menses; denies hot flashes  PSYCH:  stable mood, no significant anxiety or depression  ENDOCRINE: no heat or cold intolerance      Physical Exam   VS: /80   Pulse 142   Ht 1.607 m (5' 3.25\")   Wt 88 kg (194 lb)   LMP 03/28/2014   SpO2 95%   BMI 34.09 kg/m    GENERAL: AXOX3, NAD, slowed speech, well dressed, answering questions appropriately, appears stated age.  THYROID:  normal gland, no apparent nodules or goiter  HEENT: irregular teeth dentition; neck non-tender, no exopthalmous, no proptosis, EOMI  ABDOMEN: obese, soft, nontender, nondistended, no organomegaly  NEUROLOGY: somewhat slowed mentation, CN grossly intact, no tremors    LABS:    All pertinent notes, labs, and images personally reviewed by me.     A/P:  Encounter Diagnoses   Name Primary?     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 50-70)      Obesity, unspecified obesity severity, unspecified obesity type      Comments:  Reviewed complicated health history and diabetes issues  Recent BG levels usually elevated, variable  Still having some nausea... Wonder if metformin- related.    Plan:  Discussed general issues with the diabetes diagnosis and management  We discussed the hgbA1c test which reflects previous overall glucose levels or control  Discussed the importance of blood glucose (BG) testing to assess glucose trends  Reviewed the multiple daily injection (MDI) treatment using mealtime rapid-acting and daily long-acting insulin, also correction scale.    Recommend:  Lower dose as MetforminER 500 mg 1-tab twice per day, see if less nausea symptom  We reviewed the MDI insulin plan, concept " of rapid-acting insulin meal and correction doses.   Insulin dose schedule, as noted below   Change to stronger correction scale   Test urine ketones if high BG levels, follow sickday urine ketones sscale also  Test blood glucose level before meals and bedtime  Goal target -150 mg/dl  Plan f/u CDE appt:   Discuss, place LibrePro CGMS then f/u evaluation/interpretation 1-week later   Review insulin dosing schedule... Likely needs higher basal and bolus dosing   Diabetes Education Referral placed  Keep focus on diet, exercise, weight management.  Advise having fasting lipid panel testing and dilated eye examination, at least annually    See Dr. Bill Rolon/GI and colleagues if further food digestion/GI issues  Plan reviewed with patient and her mother today.    Patient Instructions   Continue current diabetes medications:              Tresiba U200 74U dose each evening              Novolog Flexpen 24U premeal                               mg/dl              No correction                          151-200 mg/dl             +3 units                          201-250 mg/dl             +5 units                          251-300 mg/dl             +7 units                          301-350 mg/dl             +9 units                          350-400 mg/dl             +12 units                          401-450 mg/dl             +15 units and call physician                                  MetforminER 500 mg 1-tabs BID     Check urine ketone level if blood glucose over 350 mg/dl              Use urine ketone Novolog correction scale plan  Plan to see Diabetes Educator at [facility] clinic                call their scheduling office at [phone] to make an appointment    Next appointment with Dr. Kelly at [facility] 6/26/19 at 2pm      Labs ordered today:   Orders Placed This Encounter   Procedures     DIABETES EDUCATOR REFERRAL     Radiology/Consults ordered today: DIABETES EDUCATOR REFERRAL    More than 50% of  the time spent with Ms. Rockwell on counseling / coordinating her care.  Total appointment time was 30 minutes.    Follow-up:  6/26/19    Gilmar Kelly MD  Endocrinology  [facility]

## 2019-03-20 NOTE — PATIENT INSTRUCTIONS
Continue current diabetes medications:              Tresiba U200 74U dose each evening              Novolog Flexpen 24U premeal                               mg/dl              No correction                          151-200 mg/dl             +3 units                          201-250 mg/dl             +5 units                          251-300 mg/dl             +7 units                          301-350 mg/dl             +9 units                          350-400 mg/dl             +12 units                          401-450 mg/dl             +15 units and call physician                                  MetforminER 500 mg 1-tabs BID     Check urine ketone level if blood glucose over 350 mg/dl              Use urine ketone Novolog correction scale plan  Plan to see Diabetes Educator at [facility] clinic                call their scheduling office at [phone] to make an appointment    Next appointment with Dr. Kelly at [facility] 6/26/19 at 2pm

## 2019-04-03 ENCOUNTER — ALLIED HEALTH/NURSE VISIT (OUTPATIENT)
Dept: EDUCATION SERVICES | Facility: CLINIC | Age: 59
End: 2019-04-03
Payer: MEDICARE

## 2019-04-03 ENCOUNTER — TELEPHONE (OUTPATIENT)
Dept: FAMILY MEDICINE | Facility: CLINIC | Age: 59
End: 2019-04-03

## 2019-04-03 VITALS — WEIGHT: 203 LBS | BODY MASS INDEX: 35.68 KG/M2

## 2019-04-03 DIAGNOSIS — E10.69 TYPE 1 DIABETES MELLITUS WITH OTHER SPECIFIED COMPLICATION (H): Primary | ICD-10-CM

## 2019-04-03 PROCEDURE — 95250 CONT GLUC MNTR PHYS/QHP EQP: CPT

## 2019-04-03 NOTE — PROGRESS NOTES
Diabetes Self-Management Education & Support      Diabetes Self-Management Education & Support - Professional CGM Insertion    SUBJECTIVE/OBJECTIVE     Cultural Influences/Ethnic Background:  American    Patient seen today for Professional CGM Insertion:    Professional CGM Insertion  Sensor Type: Sherley  Lot #: [id number]  Serial #: [id number]  Expiration Date: 08/31/19  Indication(s) for CGM Study: Difficult to manage hypoglycemia and/or hyperglycemia         Taking Medications  Diabetes Medication(s)     Biguanides       metFORMIN (GLUCOPHAGE-XR) 500 MG 24 hr tablet    TAKE 2 TABLETS(1000 MG) BY MOUTH TWICE DAILY    Insulin       insulin aspart (NOVOLOG FLEXPEN) 100 UNIT/ML injection    Inject 26 units and correction scale dose premeal, as directed, total daily dose approx 100 units.     insulin degludec (TRESIBA) 200 UNIT/ML pen    Inject 64 units sq daily, as directed        Pt reports that she was instructed by Dr Kelly to only take the tablets of metformin in the morning at her last appointment.           Pt not taking any metformin in the evening.    Healthy Coping     Patient Activation Measure Survey Score:  KHUSHI Score (Last Two) 3/7/2012   KHUSHI Raw Score 39   Activation Score 56.4   KHUSHI Level 3         ASSESSMENT  Pt here with her mother for a CGM insertion.  Sensor placed in the back of her left arm without difficulty or bleeding.  Skin Tac applied underneath sensor and Tegaderm applied over it.  Pt has follow up appointments for the next 2 weeks.      INTERVENTION:   Diabetes knowledge and skills assessment:     Patient is knowledgeable in diabetes management concepts related to: Healthy Eating, Monitoring, Taking Medication, Problem Solving and Reducing Risks    Patient needs further education on the following diabetes management concepts: Monitoring    Based on learning assessment above, most appropriate setting for further diabetes education would be: Individual setting.    Education provided today  on:  AADE Self-Care Behaviors:  Monitoring: proper technique and Care of the Xiang sensor.    WRITTEN AND VERBAL INFORMATION GIVEN TO SUPPORT UNDERSTANDING OF:   LibrePro CGM: Sensor insertion, intention of monitoring for 14 days. Keep records of BG, food intake, exercise, and medication dosing during wear.     Opportunities for ongoing education and support in diabetes-self management were discussed.    Pt verbalized understanding of concepts discussed and recommendations provided today.       Education Materials Provided:  BG Log Sheet and copy of the consent.      PLAN  See Patient Instructions for co-developed, patient-stated behavior change goals.  AVS printed and provided to patient today. See Follow-Up section for recommended follow-up.    Donna Tamayo RN  BSN CDE    Time Spent: 35 minutes  Encounter Type: Individual    Any diabetes medication dose changes were made via the CDE Protocol and Collaborative Practice Agreement with the patient's referring provider. A copy of this encounter was shared with the provider.

## 2019-04-03 NOTE — TELEPHONE ENCOUNTER
Please call pt.  Her blood pressure has been running high.  She should follow up in clinic in the next 1-2 weeks with me or PCP for follow up on her blood pressure.    Debbie Berry PA-C

## 2019-04-03 NOTE — LETTER
May 14, 2019    Lexy Rockwell  [address]  [place] [address]    Dear Lexy    We care about your health and have reviewed your health plan. We have reviewed your medical conditions, medication list, and lab results and are making recommendations based on this review, to better manage your health.    You are in particular need of attention regarding:  - Your High Blood Pressure, Please call to schedule an appointment with your provider or nurse      Here is a list of Health Maintenance topics that are due now or due soon:  Health Maintenance Due   Topic Date Due     HIV SCREEN (SYSTEM ASSIGNED)  07/12/1979     ZOSTER IMMUNIZATION (1 of 2) 07/12/2011     DIABETIC EDUCATION Q1 YEAR  12/27/2014     FOOT EXAM Q1 YEAR  06/23/2018     PHQ-2  01/01/2019     A1C Q3 MO  03/12/2019     We will be calling you in the next couple of weeks to help you schedule any appointments that are needed.  Please call us at [phone] (or use [employer]) to address the above recommendations.     Thank you for trusting [facility] and we appreciate the opportunity to serve you.  We look forward to supporting your healthcare needs in the future.    Healthy Regards,    Jeanie Berry

## 2019-04-12 ENCOUNTER — ALLIED HEALTH/NURSE VISIT (OUTPATIENT)
Dept: EDUCATION SERVICES | Facility: CLINIC | Age: 59
End: 2019-04-12
Payer: MEDICARE

## 2019-04-12 VITALS — WEIGHT: 210 LBS | BODY MASS INDEX: 36.91 KG/M2

## 2019-04-12 DIAGNOSIS — E11.42 TYPE 2 DIABETES MELLITUS WITH DIABETIC POLYNEUROPATHY, WITH LONG-TERM CURRENT USE OF INSULIN (H): ICD-10-CM

## 2019-04-12 DIAGNOSIS — Z79.4 TYPE 2 DIABETES MELLITUS WITH DIABETIC POLYNEUROPATHY, WITH LONG-TERM CURRENT USE OF INSULIN (H): ICD-10-CM

## 2019-04-12 DIAGNOSIS — E10.69 TYPE 1 DIABETES MELLITUS WITH OTHER SPECIFIED COMPLICATION (H): ICD-10-CM

## 2019-04-12 DIAGNOSIS — E10.40 TYPE 1 DIABETES MELLITUS WITH DIABETIC NEUROPATHY (H): ICD-10-CM

## 2019-04-12 PROCEDURE — G0108 DIAB MANAGE TRN  PER INDIV: HCPCS

## 2019-04-12 NOTE — Clinical Note
Dr. Kelly,See note with CGM reports. Her sensor fell off after 9 days.  I decreased her Tresiba down to 64 units.  She has missed many novolog doses in the last week because she doesn't think she should take the dose after she has a low. She is also overcorrecting her lows.  I went through the recommendations for hypoglycemia again today.  I will follow-up with her in 2.5 weeks. Let me know if you have any other recommendations.  I am leaving right now for the weekend, and will be at [place] [employer] Monday and Tuesday but will see you Wednesday. Have a good weekend!Tiffanie

## 2019-04-12 NOTE — PATIENT INSTRUCTIONS
Decrease your Tresiba to 65 units    Take your insulin at your meals, even if your blood sugar is low    Check urine ketone level if blood glucose over 350 mg/dl              Use urine ketone Novolog correction scale plan    Treat your low blood sugar with the guidelines that I gave you    I will call you if we have any other recommendations    Tiffanie Mcfarland, KARLOS, LD, CDE

## 2019-04-12 NOTE — PROGRESS NOTES
Diabetes Self-Management Education & Support      Diabetes Education Self-Management & Training: Follow-up and Continuous Glucose Monitor Download    Lexydonna Escobedo Moiz presents today for download and report review of Professional continuous glucose monitor device      Current Diabetes Management per Patient:  Diabetes Medication(s)     Biguanides       metFORMIN (GLUCOPHAGE-XR) 500 MG 24 hr tablet    TAKE 2 TABLETS(1000 MG) BY MOUTH TWICE DAILY    Insulin       insulin aspart (NOVOLOG FLEXPEN) 100 UNIT/ML injection    Inject 26 units and correction scale dose premeal, as directed, total daily dose approx 100 units.     insulin degludec (TRESIBA) 200 UNIT/ML pen    Inject 64 units sq daily, as directed      Patient taking 24 units of Novolog with meals    Taking Medication Assessed Today: Yes  Current Treatments: Insulin Injections  Problems taking diabetes medications regularly?: Yes  Diabetes medication side effects?: Yes  Treatment Compliance: Most of the time    Most Recent A1c Result:    Lab Results   Component Value Date    A1C 11.2 12/12/2018       Continuous Glucose Monitor Interpretation     Reports:             Healthy Eating  Healthy Eating Assessed Today: Yes  Patient on a regular basis: Eats 3 meals a day  Meals include: Breakfast, Lunch, Dinner  Beverages: Water, Tea, Coffee(sugar free creamer in coffee)  Has patient met with a dietitian in the past?: No            Being Active  Being Active Assessed Today: Yes  Exercise:: Yes(Walking to bring garbage to [facility], walk to [employer], walking with friends)    Monitoring  Monitoring Assessed Today: Yes  Did patient bring glucose meter to appointment? : Yes  Home Glucose (Sugar) Monitoring: 3-4 times per day  Low Glucose Range (mg/dL): <70  High Glucose Range (mg/dL): >200    Taking Medications  Diabetes Medication(s)     Biguanides       metFORMIN (GLUCOPHAGE-XR) 500 MG 24 hr tablet    TAKE 2 TABLETS(1000 MG) BY MOUTH TWICE DAILY    Insulin        insulin aspart (NOVOLOG FLEXPEN) 100 UNIT/ML injection    Inject 26 units and correction scale dose premeal, as directed, total daily dose approx 100 units.     insulin degludec (TRESIBA) 200 UNIT/ML pen    Inject 64 units sq daily, as directed          Taking Medication Assessed Today: Yes  Current Treatments: Insulin Injections  Problems taking diabetes medications regularly?: Yes  Diabetes medication side effects?: Yes  Treatment Compliance: Most of the time    Problem Solving  Problem Solving Assessed Today: Yes  Hypoglycemia Frequency: Daily  Hypoglycemia Treatment: Glucose (tablets or gel), Juice, Other food(pudding)  Patient carries a carbohydrate source: Yes  Medical alert: Yes  DKA prevention plan?: Yes    Reducing Risks  Reducing Risks Assessed Today: No    Healthy Coping  Healthy Coping Assessed Today: No  Patient Activation Measure Survey Score:  KHUSHI Score (Last Two) 3/7/2012   KHUSHI Raw Score 39   Activation Score 56.4   KHUSHI Level 3         Assessment:  Medication and/or insulin dosing is: inaccurate    Lexy's sensor fell off on day 10.    As seen on the CGM reports, Lexy is having a lot of low blood sugars. When Lexy has a low blood sugar before a meal, she tends to treat her low blood sugar with pudding, glucose tablets, poptarts or something else and then she will skip her insulin dose and eat her meal, which results in hyperglycemia.  She missed 5 doses of Novolog in the last 10 days.     We discussed decreasing Tresiba dose back down to 64 units due to the frequency of low blood sugars    Lexy has not been testing her ketones.  We discussed the importance of ketone testing and the danger of high blood sugars.     Goals        General    Problem Solving (pt-stated)     Notes - Note created  9/26/2018  3:43 PM by Donna Tamayo RN    Goal Statement: I will check my urine for ketones if my blood sugar is over 350 mg/dl.    Measure of Success: I will record my results in my record  book.    Strengths: I want to stay out of the hospital.  Ideas to overcome barriers: If I can't test my ketones right away, I will test for ketones as soon as I can, like within an hour or 2.    Date to Achieve By:  today              INTERVENTION:   Diabetes knowledge and skills assessment:     Patient is knowledgeable in diabetes management concepts related to: Monitoring    Patient needs further education on the following diabetes management concepts: Healthy Eating, Being Active, Monitoring, Taking Medication, Problem Solving, Reducing Risks and Healthy Coping    Based on learning assessment above, most appropriate setting for further diabetes education would be: Individual setting.    Education provided today on:  AADE Self-Care Behaviors:  Monitoring: log and interpret results, frequency of monitoring and ketone testing  Taking Medication: action of prescribed medication, drawing up, administering and storing injectable diabetes medications, proper site selection and rotation for injections and when to take medications  Problem Solving: low blood glucose - causes, signs/symptoms, treatment and prevention, carrying a carbohydrate source at all times, when to call health care provider and medical identification    CGM-specific education:   None    Pt verbalized understanding of concepts discussed and recommendations provided today.       Education Materials Provided:  Hypoglycemia Signs and Symptoms    PLAN:  Decrease Tresiba back to 64 units  Patient to work on correcting low blood sugar before a meal, then taking Novolog insulin as she normally would before meals  Patient to work on testing ketones when BG is above 350 mg/dl  Patient to work on correcting low BG as recommended  Continue with 2 tablets of Metformin/day (1,000 mg)    See Patient Instructions for co-developed, patient-stated behavior change goals.  AVS printed and provided to patient today. See Follow-Up section for recommended  follow-up.    Tiffanie Mcfarland RD, LD, CDE  Time Spent: 70 minutes  Encounter Type: Individual    Any diabetes medication dose changes were made via the CDE Protocol and Collaborative Practice Agreement with the patient's referring provider and endocrinology provider. A copy of this encounter was shared with the provider.

## 2019-04-17 NOTE — PROGRESS NOTES
Addendum:  Reviewed Xiang reports with Dr. Kelly.  Recommend patient also decrease breakfast dose of Novolog to 12 units.  New Novolog dose 12-24-24-0.    Spoke with patient via phone who verbalized understanding and was able to read back correct insulin doses.    Tiffanie Mcfarland, KARLOS, LD, CDE

## 2019-04-24 DIAGNOSIS — E10.40 TYPE 1 DIABETES MELLITUS WITH DIABETIC NEUROPATHY (H): ICD-10-CM

## 2019-04-24 DIAGNOSIS — E10.69 TYPE 1 DIABETES MELLITUS WITH OTHER SPECIFIED COMPLICATION (H): ICD-10-CM

## 2019-04-29 ENCOUNTER — ALLIED HEALTH/NURSE VISIT (OUTPATIENT)
Dept: EDUCATION SERVICES | Facility: CLINIC | Age: 59
End: 2019-04-29
Payer: MEDICARE

## 2019-04-29 VITALS — BODY MASS INDEX: 35.85 KG/M2 | WEIGHT: 204 LBS

## 2019-04-29 DIAGNOSIS — E10.69 TYPE 1 DIABETES MELLITUS WITH OTHER SPECIFIED COMPLICATION (H): ICD-10-CM

## 2019-04-29 PROCEDURE — G0108 DIAB MANAGE TRN  PER INDIV: HCPCS

## 2019-04-29 PROCEDURE — 99207 ZZC DROP WITH A PROCEDURE: CPT

## 2019-04-29 NOTE — PROGRESS NOTES
"Diabetes Self-Management Education & Support    Diabetes Education Self Management & Training    SUBJECTIVE/OBJECTIVE:  Presents for: Individual review  Accompanied by: Self, Mother  Diabetes education in the past 24mo: (P) Yes  Diabetes type: Type 1  Disease course: (P) Getting harder to manage  Diabetes management related comments/concerns: Lexy is happy that she hasn't had any low blood sugars  Transportation concerns: No  Other concerns:: None  Cultural Influences/Ethnic Background:  American    Diabetes Symptoms & Complications  Blurred vision: (P) No  Fatigue: (P) No  Foot ulcerations: (P) No  Polydipsia: (P) No  Polyphagia: (P) No  Polyuria: (P) No  Visual change: (P) No  Weakness: (P) No  Weight loss: (P) No  Slow healing wounds: (P) No  Weight trend: (P) Stable  Autonomic neuropathy: (P) No  CVA: (P) No  Heart disease: (P) No  Nephropathy: (P) No  Peripheral neuropathy: (P) Yes  Peripheral Vascular Disease: (P) No  Retinopathy: (P) No  Sexual dysfunction: (P) No    Patient Problem List and Family Medical History reviewed for relevant medical history, current medical status, and diabetes risk factors.    Vitals:  Wt 92.5 kg (204 lb)   LMP 03/28/2014   BMI 35.85 kg/m    Estimated body mass index is 35.85 kg/m  as calculated from the following:    Height as of 3/20/19: 1.607 m (5' 3.25\").    Weight as of this encounter: 92.5 kg (204 lb).   Last 3 BP:   BP Readings from Last 3 Encounters:   03/20/19 143/80   02/27/19 138/86   12/17/18 (!) 150/92       History   Smoking Status     Never Smoker   Smokeless Tobacco     Never Used     Comment: lives in smoke free household.       Labs:  Lab Results   Component Value Date    A1C 11.2 12/12/2018     Lab Results   Component Value Date     12/12/2018     Lab Results   Component Value Date    LDL 94 09/05/2018     HDL Cholesterol   Date Value Ref Range Status   09/05/2018 65 >49 mg/dL Final   ]  GFR Estimate   Date Value Ref Range Status   12/12/2018 >90 " >60 mL/min/1.7m2 Final     Comment:     Non  GFR Calc     GFR Estimate If Black   Date Value Ref Range Status   12/12/2018 >90 >60 mL/min/1.7m2 Final     Comment:      GFR Calc     Lab Results   Component Value Date    CR 0.61 12/12/2018     No results found for: MICROALBUMIN    Healthy Eating  Healthy Eating Assessed Today: Yes  Cultural/Amish diet restrictions?: (P) No  Meal planning: (P) Avoiding sweets, Plate planning method  Meals include: (P) Breakfast, Lunch, Dinner, Snacks  Beverages: (P) Water, Coffee, Milk, Juice, Diet soda  Has patient met with a dietitian in the past?: (P) No    Being Active  Being Active Assessed Today: Yes  Barrier to exercise: (P) Safety    Monitoring  Blood Glucose Meter: (P) Accu-check    Date Breakfast  Lunch    Dinner  Bedtime    Before After Before After Before After    4/29 148  60       4/28 148 137  57      4/26 46         4/25 76     270    4/24  265        4/23 257         4/22   261 208  356    4/21 348    456     4/20   418       4/19  363 370       4/18  366        4/17    339          Taking Medications  Diabetes Medication(s)     Biguanides       metFORMIN (GLUCOPHAGE-XR) 500 MG 24 hr tablet    TAKE 2 TABLETS(1000 MG) BY MOUTH TWICE DAILY    Insulin       insulin aspart (NOVOLOG FLEXPEN) 100 UNIT/ML pen    Inject 12 units with breakfast, 24 units with lunch and dinner and correction scale dose premeal as directed, TDD: approx 100 units     insulin degludec (TRESIBA) 200 UNIT/ML pen    Inject 64 units sq daily, as directed          Current Treatments: (P) Insulin Injections  Dose schedule: (P) pre-breakfast, pre-lunch, pre-dinner, at bedtime  Given by: (P) Patient  Injection/Infusion sites: (P) Abdomen    Problem Solving  Hypoglycemia Frequency: (P) Rarely  Hypoglycemia Treatment: (P) Glucose (tablets or gel), Other food  Patient carries a carbohydrate source: (P) Yes  Medical alert: (P) Yes  Severe weather/disaster plan for diabetes  management?: (P) No  DKA prevention plan?: (P) Yes  Sick day plan for diabetes management?: (P) Yes    Hypoglycemia symptoms  Confusion: (P) No  Dizziness or Light-Headedness: (P) No  Headaches: (P) No  Hunger: (P) No  Mood changes: (P) No  Nervousness/Anxiety: (P) No  Sleepiness: (P) No  Speech difficulty: (P) Yes  Sweats: (P) Yes  Tremors: (P) Yes    Hypoglycemia Complications  Blackouts: (P) No  Hospitalization: (P) No  Nocturnal hypoglycemia: (P) Yes  Required assistance: (P) No  Seizures: (P) No    Reducing Risks  CAD Risks: (P) Diabetes Mellitus  Has dilated eye exam at least once a year?: (P) Yes  Sees dentist every 6 months?: (P) Yes  Sees podiatrist (foot doctor)?: (P) Yes    Healthy Coping  Informal Support system:: (P) Family, Parent  Difficulty affording diabetes management supplies?: (P) No  Patient Activation Measure Survey Score:  KHUSHI Score (Last Two) 3/7/2012   KHUSHI Raw Score 39   Activation Score 56.4   KHUSHI Level 3       ASSESSMENT:  Lexy is having less low blood sugars, she is feeling good about her blood sugars lately.  She has not been testing ketones when her blood sugar is above 350 mg/dl as instructed.  She tells me that she has been correcting her lows and then taking her Novolog before meals instead of skipping the Novolog.     Goals Addressed as of 5/1/2019 at 7:26 AM        Patient Stated      Being Active (pt-stated)     Added 4/29/19 by Tiffanie Mcfarland, KARLOS     My Goal: I will go for a walk 3 times/week - increase your distance every week    What I need to meet my goal: find the celso on your phone that will track your steps/miles and bring your walker with you    I plan to meet my goal by this date: next diabetes education visit            Patient's most recent   Lab Results   Component Value Date    A1C 11.2 12/12/2018    is not meeting goal of <8.0    INTERVENTION:   Discussion:  Today we discussed testing ketones when blood sugar is above 350 mg/dl, we discussed correcting low  "blood sugars.  Lexy has been using the \"treating hypoglycemia guide\" provided at our last appointment and this has been very helpful for her. She has been having less high blood sugars after correcting for lows. We discuss lifestyle changes to improve health/diabetes.  Lexy thinks that she could use more exercise, she plans to start walking outside more now that the weather is nice.  We discussed a goal of walking 3 times/week for short distances to start and increasing every week. She plans to bring her walker with her, so she has a place to rest if needed.  Reminded patient to bring BG meter and glucose tablets with her.     Diabetes knowledge and skills assessment:     Patient is knowledgeable in diabetes management concepts related to: Monitoring and Taking Medication    Patient needs further education on the following diabetes management concepts: Healthy Eating, Being Active, Monitoring, Taking Medication, Problem Solving and Reducing Risks    Based on learning assessment above, most appropriate setting for further diabetes education would be: Individual setting.    Education provided today on:  AADE Self-Care Behaviors:  Being Active: relationship to blood glucose, describe appropriate activity program and precautions to take  Monitoring: log and interpret results, individual blood glucose targets and frequency of monitoring  Taking Medication: when to take medications  Problem Solving: high blood glucose - causes, signs/symptoms, treatment and prevention and low blood glucose - causes, signs/symptoms, treatment and prevention    Opportunities for ongoing education and support in diabetes-self management were discussed.    Pt verbalized understanding of concepts discussed and recommendations provided today.       Education Materials Provided:  [facility] Flyer    PLAN:  Continue current treatment plan  Lexy to start walking 3 times/week   Follow-up with diabetes education in 1 " month    See Patient Instructions for co-developed, patient-stated behavior change goals.  AVS printed and provided to patient today. See Follow-Up section for recommended follow-up.    Tiffanie Mcfarland RD, LD, CDE  Time Spent: 60 minutes  Encounter Type: Individual    Any diabetes medication dose changes were made via the CDE Protocol and Collaborative Practice Agreement with the patient's referring provider. A copy of this encounter was shared with the provider.

## 2019-04-29 NOTE — Clinical Note
Lexy Chaidez's blood sugars are looking better, much less low blood sugars.  Recommended to continue current insulin doses.  Tiffanie Mcfarland, RD, LD, CDE

## 2019-04-29 NOTE — PATIENT INSTRUCTIONS
Diabetes Support Resources:  Websites: American Association of Diabetes Educators Participant Resources: www.diabeteseducator.org/living-with-diabetes   American Diabetes Association: www.diabetes.org  Diabetes Together 2 Goal: http://zprycdhk5jdgn.org     Bring blood glucose meter and logbook with you to all doctor and follow-up appointments.    Diabetes Education Telephone Visit Follow-up:    We realize your time is valuable and your health is important! We offer a convenient Telephone Visit follow up! It s a quick way to check in for a medication dose adjustment without having to come back to clinic as soon.    Telephone Visits are often covered by insurance. Please check with your insurance plan to see if this type of visit is covered. If not, the cost is less expensive than an office visit:      Up to 10 minutes (Code 60396): $30    11-20 minutes (Code 29561): $59    More than 20 minutes (Code 03378): $85    Talk with your Diabetes Educator if you want to learn more.      [facility]:  For Your Diabetes Education and Nutrition Appointments Call:  [phone]   For Diabetes Education or Nutrition Related Questions:   Phone: [phone]  E-mail: [email or web address]  Fax: [phone]   If you need a medication refill please contact your pharmacy. Please allow 3 business days for your refills to be completed.  Bring your blood sugar meter with you to bed so you can test if you wake up with a low blood sugar    Diabetes Support Resources:  Websites: American Association of Diabetes Educators Participant Resources: www.diabeteseducator.org/living-with-diabetes   American Diabetes Association: www.diabetes.org  Diabetes Together 2 Goal: http://ogjonoje9rkhw.org     Bring blood glucose meter and logbook with you to all doctor and follow-up appointments.    Diabetes Education Telephone Visit Follow-up:    We realize your time is valuable and your health is important! We  offer a convenient Telephone Visit follow up! It s a quick way to check in for a medication dose adjustment without having to come back to clinic as soon.    Telephone Visits are often covered by insurance. Please check with your insurance plan to see if this type of visit is covered. If not, the cost is less expensive than an office visit:      Up to 10 minutes (Code 06658): $30    11-20 minutes (Code 75672): $59    More than 20 minutes (Code 94658): $85    Talk with your Diabetes Educator if you want to learn more.      [facility]:  For Your Diabetes Education and Nutrition Appointments Call:  [phone]   For Diabetes Education or Nutrition Related Questions:   Phone: [phone]  E-mail: [email or web address]  Fax: [phone]   If you need a medication refill please contact your pharmacy. Please allow 3 business days for your refills to be completed.

## 2019-05-02 DIAGNOSIS — E11.42 TYPE 2 DIABETES MELLITUS WITH DIABETIC POLYNEUROPATHY, WITH LONG-TERM CURRENT USE OF INSULIN (H): ICD-10-CM

## 2019-05-02 DIAGNOSIS — Z79.4 TYPE 2 DIABETES MELLITUS WITH DIABETIC POLYNEUROPATHY, WITH LONG-TERM CURRENT USE OF INSULIN (H): ICD-10-CM

## 2019-05-02 RX ORDER — METFORMIN HCL 500 MG
TABLET, EXTENDED RELEASE 24 HR ORAL
Qty: 360 TABLET | Refills: 3 | Status: SHIPPED | OUTPATIENT
Start: 2019-05-02 | End: 2019-07-12

## 2019-05-15 ENCOUNTER — OFFICE VISIT (OUTPATIENT)
Dept: PODIATRY | Facility: CLINIC | Age: 59
End: 2019-05-15
Payer: MEDICARE

## 2019-05-15 VITALS — SYSTOLIC BLOOD PRESSURE: 127 MMHG | DIASTOLIC BLOOD PRESSURE: 85 MMHG | OXYGEN SATURATION: 100 % | HEART RATE: 130 BPM

## 2019-05-15 DIAGNOSIS — L60.2 ONYCHAUXIS: Primary | ICD-10-CM

## 2019-05-15 DIAGNOSIS — L84 TYLOMA: ICD-10-CM

## 2019-05-15 DIAGNOSIS — E10.40 CONTROLLED TYPE 1 DIABETES WITH NEUROPATHY (H): ICD-10-CM

## 2019-05-15 PROCEDURE — 99213 OFFICE O/P EST LOW 20 MIN: CPT | Performed by: PODIATRIST

## 2019-05-15 NOTE — NURSING NOTE
Lexy Rockwell's chief complaint for this visit includes:  Chief Complaint   Patient presents with     RECHECK     callus and toenails     PCP: Fredi Fuentes    Referring Provider:  No referring provider defined for this encounter.    /85   Pulse 130   LMP 03/28/2014   SpO2 100%   Data Unavailable     Do you need any medication refills at today's visit? no

## 2019-05-15 NOTE — PROGRESS NOTES
Past Medical History:   Diagnosis Date     Cerumen impacted      Development delay      Diabetic gastroparesis (H) 8/16/2013     Glaucoma (increased eye pressure)      Hyperlipaemia      Hypertension      Hypothyroid      Mental retardation      Nonsenile cataract      Obesity      Type 1 diabetes mellitus not at goal (H)      Patient Active Problem List   Diagnosis     Development delay     Overactive bladder     GERD (gastroesophageal reflux disease)     Hypertension goal BP (blood pressure) < 140/90     Hyperlipidemia LDL goal <100     Pain in joint, lower leg     Proteinuria     Vitamin D deficiency     Health Care Home     History of strabismus surgery     Type 1 diabetes mellitus with other specified complication (H)     Advanced directives, counseling/discussion     Primary open angle glaucoma of both eyes, moderate stage     Gastroesophageal reflux disease without esophagitis     Obesity, unspecified obesity severity, unspecified obesity type     Acute renal failure (H)     Acute retention of urine     JEAN (acute kidney injury) (H)     Dehydration     Diabetic ketoacidosis without coma associated with type 1 diabetes mellitus (H)     DKA (diabetic ketoacidoses) (H)     Sinus tachycardia     SIRS (systemic inflammatory response syndrome) (H)     Past Surgical History:   Procedure Laterality Date     C EYE SURG ANT SGMT PROC UNLISTED  remote    strabismus surgery     STRABISMUS SURGERY       Social History     Socioeconomic History     Marital status: Single     Spouse name: Not on file     Number of children: Not on file     Years of education: Not on file     Highest education level: Not on file   Occupational History     Not on file   Social Needs     Financial resource strain: Not on file     Food insecurity:     Worry: Not on file     Inability: Not on file     Transportation needs:     Medical: Not on file     Non-medical: Not on file   Tobacco Use     Smoking status: Never Smoker     Smokeless tobacco:  Never Used     Tobacco comment: lives in smoke free household.   Substance and Sexual Activity     Alcohol use: Yes     Comment: occass social     Drug use: No     Sexual activity: Never   Lifestyle     Physical activity:     Days per week: Not on file     Minutes per session: Not on file     Stress: Not on file   Relationships     Social connections:     Talks on phone: Not on file     Gets together: Not on file     Attends Bahai service: Not on file     Active member of club or organization: Not on file     Attends meetings of clubs or organizations: Not on file     Relationship status: Not on file     Intimate partner violence:     Fear of current or ex partner: Not on file     Emotionally abused: Not on file     Physically abused: Not on file     Forced sexual activity: Not on file   Other Topics Concern     Parent/sibling w/ CABG, MI or angioplasty before 65F 55M? No   Social History Narrative     Not on file     Family History   Problem Relation Age of Onset     Hypertension Father      Diabetes Sister      Glaucoma Maternal Grandfather      Cancer No family hx of      Cerebrovascular Disease No family hx of      Thyroid Disease No family hx of      Macular Degeneration No family hx of      Lab Results   Component Value Date    A1C 11.2 12/12/2018    A1C 12.4 09/05/2018    A1C 10.6 03/14/2018    A1C 12.3 11/20/2017    A1C 11.2 06/28/2017       SUBJECTIVE FINDINGS:  58-year-old female returns to clinic for onychauxis bilaterally.  She relates she is getting some ingrown toenails on her right hallux.  She relates she has calluses bilaterally on her big toes and first MPJ left.  She relates no ulcers or sores since we have seen her last.  She relates she does have neuropathy in her feet which is unchanged.      ASSESSMENT/PLAN:  Onychauxis bilaterally.  She has some incurvated nails bilaterally.  She is diabetic with peripheral neuropathy.  She has tylomas on the plantar left first MPJ, minimally on the  plantar medial hallux bilaterally.  Diagnosis and treatment options discussed with patient.  Tyloma on left plantar first MPJ was sharp debrided with a #15 blade upon consent.  All the nails bilaterally were reduced upon consent.  She did have some left second toe hyperkeratotic tissue buildup with ecchymosis distally underneath the toenail.  That is also reduced upon consent.  She will return to clinic and see me in 2-3 months.

## 2019-05-15 NOTE — PATIENT INSTRUCTIONS
Thanks for coming today.  Ortho[facility] Medicine Clinic  [address] [place] [address]    To schedule future appointments in Ortho Clinic, you may call [phone].    To schedule ordered imaging by your Provider: Call [facility] at [phone]    AYLIEN available online at:   [email or web address]    Please call if any further questions or concerns [phone] and ask for the Orthopedic Department. Clinic hours 8 am to 5 pm.    Return to clinic if symptoms worsen.

## 2019-05-15 NOTE — LETTER
5/15/2019         RE: Lexy Rockwell  [address]  [place] [place] [address]        Dear Colleague,    Thank you for referring your patient, Lexy Rockwell, to the [facility]. Please see a copy of my visit note below.    Past Medical History:   Diagnosis Date     Cerumen impacted      Development delay      Diabetic gastroparesis (H) 8/16/2013     Glaucoma (increased eye pressure)      Hyperlipaemia      Hypertension      Hypothyroid      Mental retardation      Nonsenile cataract      Obesity      Type 1 diabetes mellitus not at goal (H)      Patient Active Problem List   Diagnosis     Development delay     Overactive bladder     GERD (gastroesophageal reflux disease)     Hypertension goal BP (blood pressure) < 140/90     Hyperlipidemia LDL goal <100     Pain in joint, lower leg     Proteinuria     Vitamin D deficiency     Health Care Home     History of strabismus surgery     Type 1 diabetes mellitus with other specified complication (H)     Advanced directives, counseling/discussion     Primary open angle glaucoma of both eyes, moderate stage     Gastroesophageal reflux disease without esophagitis     Obesity, unspecified obesity severity, unspecified obesity type     Acute renal failure (H)     Acute retention of urine     JEAN (acute kidney injury) (H)     Dehydration     Diabetic ketoacidosis without coma associated with type 1 diabetes mellitus (H)     DKA (diabetic ketoacidoses) (H)     Sinus tachycardia     SIRS (systemic inflammatory response syndrome) (H)     Past Surgical History:   Procedure Laterality Date     C EYE SURG ANT SGMT PROC UNLISTED  remote    strabismus surgery     STRABISMUS SURGERY       Social History     Socioeconomic History     Marital status: Single     Spouse name: Not on file     Number of children: Not on file     Years of education: Not on file     Highest education level: Not on file   Occupational History     Not on file    Social Needs     Financial resource strain: Not on file     Food insecurity:     Worry: Not on file     Inability: Not on file     Transportation needs:     Medical: Not on file     Non-medical: Not on file   Tobacco Use     Smoking status: Never Smoker     Smokeless tobacco: Never Used     Tobacco comment: lives in smoke free household.   Substance and Sexual Activity     Alcohol use: Yes     Comment: occass social     Drug use: No     Sexual activity: Never   Lifestyle     Physical activity:     Days per week: Not on file     Minutes per session: Not on file     Stress: Not on file   Relationships     Social connections:     Talks on phone: Not on file     Gets together: Not on file     Attends Oriental orthodox service: Not on file     Active member of club or organization: Not on file     Attends meetings of clubs or organizations: Not on file     Relationship status: Not on file     Intimate partner violence:     Fear of current or ex partner: Not on file     Emotionally abused: Not on file     Physically abused: Not on file     Forced sexual activity: Not on file   Other Topics Concern     Parent/sibling w/ CABG, MI or angioplasty before 65F 55M? No   Social History Narrative     Not on file     Family History   Problem Relation Age of Onset     Hypertension Father      Diabetes Sister      Glaucoma Maternal Grandfather      Cancer No family hx of      Cerebrovascular Disease No family hx of      Thyroid Disease No family hx of      Macular Degeneration No family hx of      Lab Results   Component Value Date    A1C 11.2 12/12/2018    A1C 12.4 09/05/2018    A1C 10.6 03/14/2018    A1C 12.3 11/20/2017    A1C 11.2 06/28/2017       SUBJECTIVE FINDINGS:  58-year-old female returns to clinic for onychauxis bilaterally.  She relates she is getting some ingrown toenails on her right hallux.  She relates she has calluses bilaterally on her big toes and first MPJ left.  She relates no ulcers or sores since we have seen her  last.  She relates she does have neuropathy in her feet which is unchanged.      ASSESSMENT/PLAN:  Onychauxis bilaterally.  She has some incurvated nails bilaterally.  She is diabetic with peripheral neuropathy.  She has tylomas on the plantar left first MPJ, minimally on the plantar medial hallux bilaterally.  Diagnosis and treatment options discussed with patient.  Tyloma on left plantar first MPJ was sharp debrided with a #15 blade upon consent.  All the nails bilaterally were reduced upon consent.  She did have some left second toe hyperkeratotic tissue buildup with ecchymosis distally underneath the toenail.  That is also reduced upon consent.  She will return to clinic and see me in 2-3 months.           Again, thank you for allowing me to participate in the care of your patient.        Sincerely,        Ravin Back DPM

## 2019-05-17 ENCOUNTER — PATIENT OUTREACH (OUTPATIENT)
Dept: EDUCATION SERVICES | Facility: CLINIC | Age: 59
End: 2019-05-17

## 2019-05-17 DIAGNOSIS — E10.69 TYPE 1 DIABETES MELLITUS WITH OTHER SPECIFIED COMPLICATION (H): Primary | ICD-10-CM

## 2019-05-17 NOTE — PROGRESS NOTES
"MomFrancisca called:  Lexy has been seen by Jaqui LEWIS and Tiffanie GIFFORD for diabetes ed     Mom saw Lexy, she was having nausea and had not taken med.   When mom left last night BG was in 200's, down from 300's and she had taken her insulin.   Was alert.     Now nauseous again and not able to take food. Coughs and then vomiting, reluctant to eat due to \"knows it will come back up\".  \"She for some reason doesn't take her medication\".   BG is 123 this AM.     She has taken Prilosec and Zofran this AM.   \"This is an ongoing problem with nausea\". Is there anything else I can do?     Mom denies gastroparesis. They seem not to know why she has these episodes.   Reminded if she does not take insulin and BG goes high, she will feel poorly. Must get insulin in as directed consistently.   Mom voice understanding.     Advised:  There are sick day guidelines specific to managing days like this, when see Tiffanie in June, might be smart to review and have a set plan in place.  ~ she should attempt to follow \"normal schedule\" for insulin and carb intake as closely as possible while ill  ~ if unable to eat solids, replace carb with regular (not diet) clear pop, apple juice, popsicle, jello and try soft foods like mashed banana or mashed potato, pudding, perhaps saltines if able  ~ check BG at least every 3-4 hours and use correction as prescribed  ~ take insulin as usual: long acting and meal time (if unable to drink normal carb intake, may decrease meal time by 50% for now, but apply correction, she does not have an insulin: carb ratio)  ~ she should check ketones, if positive she will need even more insulin than normal dose  ~ encouraged hydration, carb intake, insulin use, and close BG monitoring    If the situation excalates, if it seems to be an emergency, call 911, not the diabetes line.     Francisca voices understanding.     Fawn Arias RD, LD, CDE      "

## 2019-05-19 ENCOUNTER — TRANSFERRED RECORDS (OUTPATIENT)
Dept: HEALTH INFORMATION MANAGEMENT | Facility: CLINIC | Age: 59
End: 2019-05-19

## 2019-05-19 LAB
ALT SERPL-CCNC: 7 IU/L (ref 8–45)
AST SERPL-CCNC: 13 IU/L (ref 2–40)
GLUCOSE SERPL-MCNC: 98 MG/DL (ref 65–100)
HBA1C MFR BLD: 11.8 % (ref 0–5.7)

## 2019-05-20 DIAGNOSIS — R11.0 NAUSEA: ICD-10-CM

## 2019-05-20 LAB
CREAT SERPL-MCNC: 1.18 MG/DL (ref 0.57–1.11)
GFR SERPL CREATININE-BSD FRML MDRD: 47 ML/MIN/1.73M2
POTASSIUM SERPL-SCNC: 3.9 MMOL/L (ref 3.5–5)

## 2019-05-20 NOTE — TELEPHONE ENCOUNTER
"Requested Prescriptions   Pending Prescriptions Disp Refills     ondansetron (ZOFRAN-ODT) 8 MG ODT tab [Pharmacy Med Name: ONDANSETRON ODT 8MG TABLETS] 30 tablet 0     Sig: DISSOLVE 1 TABLET ON THE TONGUE EVERY 12 HOURS AS NEEDED FOR NAUSEA   Last Written Prescription Date:  9-11-18  Last Fill Quantity: 30,  # refills: 0   Last office visit: 10/26/2018 with prescribing provider:  1-26-18   Future Office Visit:   Next 5 appointments (look out 90 days)    May 23, 2019 11:00 AM CDT  [facility] [address] [facility] [address]  [phone]   Jun 07, 2019  1:15 PM CDT  [facility] [address] [facility] [address] [facility] [address]  [phone]   Jun 26, 2019  2:00 PM CDT  [facility] [address] [place] [address]  [phone]   Aug 15, 2019  2:30 PM CDT  Return Visit with Ravin Back DPM  [facility] [address] [place] [address]  [phone]              Antivertigo/Antiemetic Agents Passed - 5/20/2019  4:14 PM        Passed - Recent (12 mo) or future (30 days) visit within the authorizing provider's specialty     Patient had office visit in the last 12 months or has a visit in the next 30 days with authorizing provider or within the authorizing provider's specialty.  See \"Patient Info\" tab in inbasket, or \"Choose Columns\" in Meds & Orders section of the refill encounter.              Passed - Medication is active on med list        Passed - Patient is 18 years of age or older          "

## 2019-05-22 RX ORDER — ONDANSETRON 8 MG/1
TABLET, ORALLY DISINTEGRATING ORAL
Qty: 30 TABLET | Refills: 0 | Status: SHIPPED | OUTPATIENT
Start: 2019-05-22 | End: 2021-06-28

## 2019-05-22 NOTE — TELEPHONE ENCOUNTER
Prescription approved per [facility] Refill Protocol.  Susanne Long RN

## 2019-05-24 ENCOUNTER — OFFICE VISIT (OUTPATIENT)
Dept: FAMILY MEDICINE | Facility: CLINIC | Age: 59
End: 2019-05-24
Payer: MEDICARE

## 2019-05-24 VITALS
BODY MASS INDEX: 36.03 KG/M2 | HEART RATE: 110 BPM | WEIGHT: 205 LBS | OXYGEN SATURATION: 98 % | DIASTOLIC BLOOD PRESSURE: 82 MMHG | TEMPERATURE: 98.1 F | SYSTOLIC BLOOD PRESSURE: 148 MMHG

## 2019-05-24 DIAGNOSIS — H61.23 BILATERAL IMPACTED CERUMEN: ICD-10-CM

## 2019-05-24 DIAGNOSIS — N17.9 AKI (ACUTE KIDNEY INJURY) (H): ICD-10-CM

## 2019-05-24 DIAGNOSIS — I10 HYPERTENSION GOAL BP (BLOOD PRESSURE) < 140/90: ICD-10-CM

## 2019-05-24 DIAGNOSIS — E10.69 TYPE 1 DIABETES MELLITUS WITH OTHER SPECIFIED COMPLICATION (H): Primary | ICD-10-CM

## 2019-05-24 LAB
ANION GAP SERPL CALCULATED.3IONS-SCNC: 10 MMOL/L (ref 3–14)
BUN SERPL-MCNC: 11 MG/DL (ref 7–30)
CALCIUM SERPL-MCNC: 9.8 MG/DL (ref 8.5–10.1)
CHLORIDE SERPL-SCNC: 106 MMOL/L (ref 94–109)
CO2 SERPL-SCNC: 23 MMOL/L (ref 20–32)
CREAT SERPL-MCNC: 0.58 MG/DL (ref 0.52–1.04)
GFR SERPL CREATININE-BSD FRML MDRD: >90 ML/MIN/{1.73_M2}
GLUCOSE SERPL-MCNC: 223 MG/DL (ref 70–99)
POTASSIUM SERPL-SCNC: 4.4 MMOL/L (ref 3.4–5.3)
SODIUM SERPL-SCNC: 139 MMOL/L (ref 133–144)

## 2019-05-24 PROCEDURE — 80048 BASIC METABOLIC PNL TOTAL CA: CPT | Performed by: PHYSICIAN ASSISTANT

## 2019-05-24 PROCEDURE — 99214 OFFICE O/P EST MOD 30 MIN: CPT | Performed by: PHYSICIAN ASSISTANT

## 2019-05-24 PROCEDURE — 36416 COLLJ CAPILLARY BLOOD SPEC: CPT | Performed by: PHYSICIAN ASSISTANT

## 2019-05-24 RX ORDER — CEFUROXIME AXETIL 500 MG/1
500 TABLET ORAL 2 TIMES DAILY
COMMUNITY
End: 2019-07-12

## 2019-05-24 ASSESSMENT — ENCOUNTER SYMPTOMS
LIGHT-HEADEDNESS: 0
ABDOMINAL PAIN: 0
ACTIVITY CHANGE: 0
CHEST TIGHTNESS: 0
HEARTBURN: 0
VOMITING: 0
DYSURIA: 0
NAUSEA: 0

## 2019-05-24 NOTE — PROGRESS NOTES
Subjective     Lexy Rockwell is a 58 year old female who presents to clinic today for the following health issues:    HPI       Hospital Follow-up Visit:    Hospital/Nursing Home/IP Rehab Facility: [facility]   Date of Admission: 5-   Date of Discharge: 5-   Reason(s) for Admission: vomiting             Problems taking medications regularly:  None       Medication changes since discharge: yes  cefuoxamin done tomorrow          Problems adhering to non-medication therapy:  None    Summary of hospitalization:  CareEverywhere information obtained and reviewed  Diagnostic Tests/Treatments reviewed.  Follow up needed: has follow up with endo in a month   Other Healthcare Providers Involved in Patient s Care:         None  Update since discharge: improved.     Post Discharge Medication Reconciliation: discharge medications reconciled and changed, per note/orders (see AVS).  Plan of care communicated with patient and family     Coding guidelines for this visit:  Type of Medical   Decision Making Face-to-Face Visit       within 7 Days of discharge Face-to-Face Visit        within 14 days of discharge   Moderate Complexity [id number]   High Complexity 44136 [id number]                Patient Active Problem List   Diagnosis     Development delay     Overactive bladder     GERD (gastroesophageal reflux disease)     Hypertension goal BP (blood pressure) < 140/90     Hyperlipidemia LDL goal <100     Pain in joint, lower leg     Proteinuria     Vitamin D deficiency     Health Care Home     History of strabismus surgery     Type 1 diabetes mellitus with other specified complication (H)     Advanced directives, counseling/discussion     Primary open angle glaucoma of both eyes, moderate stage     Gastroesophageal reflux disease without esophagitis     Obesity, unspecified obesity severity, unspecified obesity type     Acute renal failure (H)     Acute retention of urine     JEAN (acute kidney injury) (H)     Dehydration      Diabetic ketoacidosis without coma associated with type 1 diabetes mellitus (H)     DKA (diabetic ketoacidoses) (H)     Sinus tachycardia     SIRS (systemic inflammatory response syndrome) (H)     Past Surgical History:   Procedure Laterality Date     C EYE SURG ANT SGMT PROC UNLISTED  remote    strabismus surgery     STRABISMUS SURGERY         Social History     Tobacco Use     Smoking status: Never Smoker     Smokeless tobacco: Never Used     Tobacco comment: lives in smoke free household.   Substance Use Topics     Alcohol use: Yes     Comment: occass social     Family History   Problem Relation Age of Onset     Hypertension Father      Diabetes Sister      Glaucoma Maternal Grandfather      Cancer No family hx of      Cerebrovascular Disease No family hx of      Thyroid Disease No family hx of      Macular Degeneration No family hx of              Reviewed and updated as needed this visit by Provider  Allergies  Meds         Review of Systems   Constitutional: Negative for activity change.   HENT: Positive for ear pain (fullness ).    Respiratory: Negative for chest tightness.    Cardiovascular: Negative for chest pain.   Gastrointestinal: Negative for abdominal pain, heartburn, nausea and vomiting.   Genitourinary: Negative for dysuria.   Neurological: Negative for light-headedness.            Objective    /82   Pulse 110   Temp 98.1  F (36.7  C) (Oral)   Wt 93 kg (205 lb)   LMP 03/28/2014   SpO2 98%   BMI 36.03 kg/m    Body mass index is 36.03 kg/m .  Physical Exam   Constitutional: She appears well-developed and well-nourished.   HENT:   Ears:    Cardiovascular: Normal rate, regular rhythm and normal heart sounds.   Pulmonary/Chest: Effort normal and breath sounds normal.   Abdominal: Soft. Bowel sounds are normal. There is no tenderness.   Psychiatric: She has a normal mood and affect.          Diagnostic Test Results:  Labs reviewed in Epic        Assessment & Plan     1. Type 1 diabetes  "mellitus with other specified complication (H)  Vomiting likely related to gastroparesis.  Keep follow up with Endo  - Basic metabolic panel    2. Hypertension goal BP (blood pressure) < 140/90  Was at goal but due to dehydrated had jean so lisinopril stopped.  Wait to restart until labs are back  - Basic metabolic panel    3. Bilateral impacted cerumen      4. JEAN (acute kidney injury) (H)  As above       BMI:   Estimated body mass index is 36.03 kg/m  as calculated from the following:    Height as of 3/20/19: 1.607 m (5' 3.25\").    Weight as of this encounter: 93 kg (205 lb).               No follow-ups on file.    Debbie Berry PA-C  [facility]      "

## 2019-05-28 ENCOUNTER — TELEPHONE (OUTPATIENT)
Dept: FAMILY MEDICINE | Facility: CLINIC | Age: 59
End: 2019-05-28

## 2019-05-28 DIAGNOSIS — N17.9 AKI (ACUTE KIDNEY INJURY) (H): Primary | ICD-10-CM

## 2019-05-28 NOTE — TELEPHONE ENCOUNTER
Please call pt and mom-her kidney function is back to normal.  I want her to restart her lisinopril.  Does she need a refill?  I want her to recheck her blood work in one month.  Can be a lab only visit and doesn't need to be fasting.    Debbie Berry PA-C

## 2019-05-28 NOTE — TELEPHONE ENCOUNTER
Attempt # 1  Called patient at home [phone]  Was call answered?  Yes, relayed message from provider, Patient verbalized understanding and agreement with plan and asked that nurse call mother and inform also.   Called mother Francisca [phone] relayed message from provider, verbalized understanding and agreement with plan.      Stephanie Nguyen RN  [facility]

## 2019-06-07 ENCOUNTER — OFFICE VISIT (OUTPATIENT)
Dept: OPHTHALMOLOGY | Facility: CLINIC | Age: 59
End: 2019-06-07
Payer: MEDICARE

## 2019-06-07 DIAGNOSIS — H40.1132 PRIMARY OPEN ANGLE GLAUCOMA OF BOTH EYES, MODERATE STAGE: Primary | ICD-10-CM

## 2019-06-07 PROCEDURE — 92012 INTRM OPH EXAM EST PATIENT: CPT | Performed by: OPHTHALMOLOGY

## 2019-06-07 PROCEDURE — 92083 EXTENDED VISUAL FIELD XM: CPT | Performed by: OPHTHALMOLOGY

## 2019-06-07 PROCEDURE — 92133 CPTRZD OPH DX IMG PST SGM ON: CPT | Performed by: OPHTHALMOLOGY

## 2019-06-07 ASSESSMENT — VISUAL ACUITY
OD_CC: 20/50
OD_PH_CC+: -1
METHOD: SNELLEN - LINEAR
CORRECTION_TYPE: GLASSES
OS_CC: 20/50
OS_PH_CC: 20/40
OD_PH_CC: 20/40
OD_CC+: -2

## 2019-06-07 ASSESSMENT — EXTERNAL EXAM - LEFT EYE: OS_EXAM: PROLAPSED FAT PADS: UPPER, LOWER

## 2019-06-07 ASSESSMENT — SLIT LAMP EXAM - LIDS
COMMENTS: NORMAL
COMMENTS: NORMAL

## 2019-06-07 ASSESSMENT — TONOMETRY
OD_IOP_MMHG: 18
OS_IOP_MMHG: 15
IOP_METHOD: APPLANATION

## 2019-06-07 ASSESSMENT — EXTERNAL EXAM - RIGHT EYE: OD_EXAM: PROLAPSED FAT PADS: UPPER, LOWER

## 2019-06-07 NOTE — LETTER
6/7/2019         RE: Lexy Rockwell  [address]  [place] [facility] [address]        Dear Colleague,    Thank you for referring your patient, Lexy Rockwell, to the [facility]. Please see a copy of my visit note below.     Current Eye Medications:  Latanoprost both eyes every evening.  Last drops:  11:30pm     Subjective:  Follow up Open Angle Glaucoma.  Patient is here for a pressure check, OCT, and visual field.  Intermittently her eyes itch - primarily her left eye, but occasionally her right eye.  No vision changes since her last visit.     No hx of brain injury, surgery, or stroke.     Objective:  See Ophthalmology Exam.       Assessment:  Stable intraocular pressure, glaucoma OCT, and Beard Visual Field both eyes in patient with moderate open angle glaucoma.  Some verticality to scotomata on Beard Visual Field.      Plan:  Continue using Latanoprost (green top) both eyes at bedtime.  Will review record to see if hx of brain imaging; could consider in future.   Return visit in 6 months for complete exam.     Dayron Rios M.D.  [phone]         Again, thank you for allowing me to participate in the care of your patient.        Sincerely,        Dayron Rios MD

## 2019-06-07 NOTE — PATIENT INSTRUCTIONS
Continue using Latanoprost (green top) both eyes at bedtime.   Return visit in 6 months for complete exam.     Dayron Rios M.D.  [phone]

## 2019-06-07 NOTE — PROGRESS NOTES
Current Eye Medications:  Latanoprost both eyes every evening.  Last drops:  11:30pm     Subjective:  Follow up Open Angle Glaucoma.  Patient is here for a pressure check, OCT, and visual field.  Intermittently her eyes itch - primarily her left eye, but occasionally her right eye.  No vision changes since her last visit.     No hx of brain injury, surgery, or stroke.     Objective:  See Ophthalmology Exam.       Assessment:  Stable intraocular pressure, glaucoma OCT, and Beard Visual Field both eyes in patient with moderate open angle glaucoma.  Some verticality to scotomata on Beard Visual Field.      Plan:  Continue using Latanoprost (green top) both eyes at bedtime.  Will review record to see if hx of brain imaging; could consider in future. (NB: no scan in [place] [place] records)   Return visit in 6 months for complete exam.     Dayron Rios M.D.  [phone]

## 2019-06-09 ASSESSMENT — PACHYMETRY
OS_CT(UM): 566
OD_CT(UM): 562

## 2019-06-17 ENCOUNTER — TRANSFERRED RECORDS (OUTPATIENT)
Dept: HEALTH INFORMATION MANAGEMENT | Facility: CLINIC | Age: 59
End: 2019-06-17

## 2019-06-24 ENCOUNTER — TELEPHONE (OUTPATIENT)
Dept: ENDOCRINOLOGY | Facility: CLINIC | Age: 59
End: 2019-06-24

## 2019-06-24 NOTE — TELEPHONE ENCOUNTER
Message noted.  The followup clinic appointment for this Wednesday is optional.  If she is able/interested to attend the [facility] appointment, she can keep the appointment.  Otherwise, we can certainly cancel it.  Please relay message (to her mother?).    GIORGIO Kelly MD  Endocrinology   [facility] [place]

## 2019-06-24 NOTE — TELEPHONE ENCOUNTER
Dr. Kelly,    Pt was at [facility] from 6/17- 6/22 for elevated blood glucose. She is now at [facility] and wondering if they should keep or cancel OV on Wednesday     Thank you,  Hira JACKSON RN

## 2019-06-24 NOTE — TELEPHONE ENCOUNTER
Mother is calling to speak with provider about patient who has had high blood sugar levels was in the hospital @ [facility]  from 06/17/-06/22/2019 now she is in [place] rehab wondering if they need to keep this appointment with provider on Wednesday    Please call to advice  Thank you

## 2019-06-25 NOTE — TELEPHONE ENCOUNTER
Left detailed message.    Ok to cancel appt for 6/26 or keep.    See message below.    Rubina Keller MA

## 2019-07-08 ENCOUNTER — MEDICAL CORRESPONDENCE (OUTPATIENT)
Dept: HEALTH INFORMATION MANAGEMENT | Facility: CLINIC | Age: 59
End: 2019-07-08

## 2019-07-11 ENCOUNTER — TELEPHONE (OUTPATIENT)
Dept: FAMILY MEDICINE | Facility: CLINIC | Age: 59
End: 2019-07-11

## 2019-07-11 NOTE — TELEPHONE ENCOUNTER
Routing to PCP to review and advise.    Will Dr. Fuentes follow patient?           Stephanie Nguyen RN  [facility]

## 2019-07-11 NOTE — TELEPHONE ENCOUNTER
Augustina from [facility] would like to know if Dr Fuentes or his team will  follow Home care? She just needs a yes or no answer. Ok to leave detailed message @ [phone].

## 2019-07-11 NOTE — TELEPHONE ENCOUNTER
Called ruben at [phone] No answer, left detailed  Message on confidential voice mail with nurse line at [phone] if questions.      Stephanie Nguyen RN  [facility]

## 2019-07-12 ENCOUNTER — OFFICE VISIT (OUTPATIENT)
Dept: FAMILY MEDICINE | Facility: CLINIC | Age: 59
End: 2019-07-12
Payer: MEDICARE

## 2019-07-12 VITALS
HEART RATE: 114 BPM | BODY MASS INDEX: 36.2 KG/M2 | OXYGEN SATURATION: 97 % | SYSTOLIC BLOOD PRESSURE: 132 MMHG | DIASTOLIC BLOOD PRESSURE: 68 MMHG | TEMPERATURE: 97.3 F | WEIGHT: 206 LBS

## 2019-07-12 DIAGNOSIS — E10.69 TYPE 1 DIABETES MELLITUS WITH OTHER SPECIFIED COMPLICATION (H): ICD-10-CM

## 2019-07-12 DIAGNOSIS — E11.42 TYPE 2 DIABETES MELLITUS WITH DIABETIC POLYNEUROPATHY, WITH LONG-TERM CURRENT USE OF INSULIN (H): ICD-10-CM

## 2019-07-12 DIAGNOSIS — E10.40 TYPE 1 DIABETES MELLITUS WITH DIABETIC NEUROPATHY (H): ICD-10-CM

## 2019-07-12 DIAGNOSIS — E66.01 MORBID OBESITY (H): ICD-10-CM

## 2019-07-12 DIAGNOSIS — E10.10 DIABETIC KETOACIDOSIS WITHOUT COMA ASSOCIATED WITH TYPE 1 DIABETES MELLITUS (H): Primary | ICD-10-CM

## 2019-07-12 DIAGNOSIS — Z79.4 TYPE 2 DIABETES MELLITUS WITH DIABETIC POLYNEUROPATHY, WITH LONG-TERM CURRENT USE OF INSULIN (H): ICD-10-CM

## 2019-07-12 LAB
ANION GAP SERPL CALCULATED.3IONS-SCNC: 11 MMOL/L (ref 3–14)
BUN SERPL-MCNC: 21 MG/DL (ref 7–30)
CALCIUM SERPL-MCNC: 9.4 MG/DL (ref 8.5–10.1)
CHLORIDE SERPL-SCNC: 110 MMOL/L (ref 94–109)
CO2 SERPL-SCNC: 20 MMOL/L (ref 20–32)
CREAT SERPL-MCNC: 0.68 MG/DL (ref 0.52–1.04)
GFR SERPL CREATININE-BSD FRML MDRD: >90 ML/MIN/{1.73_M2}
GLUCOSE SERPL-MCNC: 84 MG/DL (ref 70–99)
POTASSIUM SERPL-SCNC: 4.2 MMOL/L (ref 3.4–5.3)
SODIUM SERPL-SCNC: 141 MMOL/L (ref 133–144)

## 2019-07-12 PROCEDURE — 36415 COLL VENOUS BLD VENIPUNCTURE: CPT | Performed by: NURSE PRACTITIONER

## 2019-07-12 PROCEDURE — 99215 OFFICE O/P EST HI 40 MIN: CPT | Performed by: NURSE PRACTITIONER

## 2019-07-12 PROCEDURE — 80048 BASIC METABOLIC PNL TOTAL CA: CPT | Performed by: NURSE PRACTITIONER

## 2019-07-12 RX ORDER — METOPROLOL SUCCINATE 25 MG/1
12.5 TABLET, EXTENDED RELEASE ORAL
COMMUNITY
Start: 2019-07-09 | End: 2019-09-24

## 2019-07-12 RX ORDER — METFORMIN HCL 500 MG
TABLET, EXTENDED RELEASE 24 HR ORAL
Qty: 360 TABLET | Refills: 3 | Status: SHIPPED | OUTPATIENT
Start: 2019-07-12 | End: 2021-06-28

## 2019-07-12 ASSESSMENT — PAIN SCALES - GENERAL: PAINLEVEL: NO PAIN (0)

## 2019-07-12 NOTE — LETTER
[facility]  [address] [place]  [address]  [phone]        July 16, 2019    Lexy Rockwell  [address]   [place] [address]        Dear Lexy,    The results of your recent labs are enclosed.   Your basic metabolic panel (looks at kidney function and electrolytes) is within normal ranges.   I am glad to see you have scheduled with our diabetes educator. I was hoping you would get in sooner. I do recommend receiving additional help with home care. I think they have had difficulty getting ahold of you. Please schedule follow up with your primary care provider, Dr. Fuentes, like we discussed.     Please call the clinic if you have any concerns.     Results for orders placed or performed in visit on 07/12/19   Basic metabolic panel   Result Value Ref Range    Sodium 141 133 - 144 mmol/L    Potassium 4.2 3.4 - 5.3 mmol/L    Chloride 110 (H) 94 - 109 mmol/L    Carbon Dioxide 20 20 - 32 mmol/L    Anion Gap 11 3 - 14 mmol/L    Glucose 84 70 - 99 mg/dL    Urea Nitrogen 21 7 - 30 mg/dL    Creatinine 0.68 0.52 - 1.04 mg/dL    GFR Estimate >90 >60 mL/min/[1.73_m2]    GFR Estimate If Black >90 >60 mL/min/[1.73_m2]    Calcium 9.4 8.5 - 10.1 mg/dL       If you have any questions please call the clinic at [phone].    Sincerely,    Delores BOURNE CNP  LMD

## 2019-07-12 NOTE — PROGRESS NOTES
Subjective     Lexy Rockwell is a 59 year old female who presents to clinic today for the following health issues:    HPI       Hospital Follow-up Visit:    Hospital/Nursing Home/IP Rehab Facility: [facility]  Date of Admission: 6/17/19  Date of Discharge: 6/22/19  Reason(s) for Admission: DM Acidosis    She was admitted with N/V, dehydration, glucose > 800. She has a history of insulin dependent diabetes. She had JEAN during hospitalization which resolved with IV fluids. She was discharged on Tresiba 45 units daily along with Novolog 12 units TID + corrective scale. She was discharged to TCU. She normally follows with Dr. Kelly. She had appointment scheduled but her mom cancelled it. She had a care conference at TCU. Concern about her ability to manage diabetes as she lives independently. Home care ordered at discharge.               Problems taking medications regularly:  None       Medication changes since discharge: see med list       Problems adhering to non-medication therapy:  She has resumed insulin dosing from Dr. Kelly which was given prior to hospitalization.    Summary of hospitalization:  [facility] information obtained and reviewed  Diagnostic Tests/Treatments reviewed.  Follow up needed: endocrine  Other Healthcare Providers Involved in Patient s Care:         Homecare, physical therapy, occupation therapy  Update since discharge: improved.     Post Discharge Medication Reconciliation: discharge medications reconciled, continue medications without change.  Plan of care communicated with patient and family     Coding guidelines for this visit:  Type of Medical   Decision Making Face-to-Face Visit       within 7 Days of discharge Face-to-Face Visit        within 14 days of discharge   Moderate Complexity [id number]   High Complexity [id number]            Patient and mom are not interested in having home care. They don't like that it makes her home bound. They ask if they really need it.  "  During out visit she checked her blood glucose: 51. She had glucose tablets with her from home and took 1 and 15 minutes later glucose was 61. She continued to feel \"shaky\" and unwell. I looked at her glucose tablets and they had 4 g each. I gave her a 15 g tube of glucose gel from the clinic. Rechecked 15 minutes later and glucose 70. She was gradually feeling better. Administered 1 additional tube of 15 g glucose and after 15 minutes glucose was 85.     This is her third hospitalization for 2019, all for diabetes related complications    Patient Active Problem List   Diagnosis     Development delay     Overactive bladder     GERD (gastroesophageal reflux disease)     Hypertension goal BP (blood pressure) < 140/90     Hyperlipidemia LDL goal <100     Pain in joint, lower leg     Proteinuria     Vitamin D deficiency     Health Care Home     History of strabismus surgery     Type 1 diabetes mellitus with other specified complication (H)     Advanced directives, counseling/discussion     Primary open angle glaucoma of both eyes, moderate stage     Gastroesophageal reflux disease without esophagitis     Obesity, unspecified obesity severity, unspecified obesity type     Acute renal failure (H)     Acute retention of urine     JEAN (acute kidney injury) (H)     Dehydration     Diabetic ketoacidosis without coma associated with type 1 diabetes mellitus (H)     DKA (diabetic ketoacidoses) (H)     Sinus tachycardia     SIRS (systemic inflammatory response syndrome) (H)     Obesity (BMI 35.0-39.9) with comorbidity (H)     Past Surgical History:   Procedure Laterality Date     C EYE SURG ANT SGMT PROC UNLISTED  remote    strabismus surgery     STRABISMUS SURGERY         Social History     Tobacco Use     Smoking status: Never Smoker     Smokeless tobacco: Never Used     Tobacco comment: lives in smoke free household.   Substance Use Topics     Alcohol use: Yes     Comment: occass social     Family History   Problem Relation " Age of Onset     Hypertension Father      Diabetes Sister      Glaucoma Maternal Grandfather      Cancer No family hx of      Cerebrovascular Disease No family hx of      Thyroid Disease No family hx of      Macular Degeneration No family hx of          Current Outpatient Medications   Medication Sig Dispense Refill     ACCU-CHEK COMPACT PLUS test strip TEST FIVE TIMES DAILY 450 strip 11     acetone urine (KETOSTIX) test strip Test urine when ill or blood glucose above 350 mg/dl. 50 each 6     ASPIRIN 81 MG OR TABS 1 TABLET DAILY       B-D U/F 31G X 8 MM insulin pen needle USE WITH INSULIN PENS 100 each 1     blood glucose monitoring (NO BRAND SPECIFIED) meter device kit Use to test blood sugar 5 times daily or as directed.    Patient needs new monitor.  Accu Test Compact or whatever is covered.    Patient has very labile sugars so needs to check 5 x per day.    Please also include 3 month supply of strips, lancets, and whatever other supplies are needed.  Ongoing refills for a year. 1 kit 11     ciclopirox (LOPROX) 0.77 % cream Apply topically 2 times daily To feet and toenails. 90 g 6     insulin aspart (NOVOLOG FLEXPEN) 100 UNIT/ML pen Inject 12 units with breakfast, 24 units with lunch and dinner and correction scale dose premeal as directed, TDD: approx 100 units 30 mL 11     insulin degludec (TRESIBA) 200 UNIT/ML pen Inject 60 units sq daily, as directed 9 mL 0     latanoprost (XALATAN) 0.005 % ophthalmic solution INSTILL 1 DROP IN BOTH EYES AT BEDTIME 10 mL 3     lisinopril (PRINIVIL/ZESTRIL) 20 MG tablet Take 2 tablets (40 mg) by mouth daily 90 tablet 3     magnesium oxide (MAG-OX) 400 MG tablet Take 1 tablet (400 mg) by mouth daily 90 tablet 3     metFORMIN (GLUCOPHAGE-XR) 500 MG 24 hr tablet Take 4-tablets by mouth daily as directed 360 tablet 3     metoclopramide (REGLAN) 5 MG tablet TAKE ONE TABLET BY MOUTH THREE TIMES DAILY BEFORE MEALS (Patient taking differently: 2 times daily as needed TAKE ONE  TABLET BY MOUTH THREE TIMES DAILY BEFORE MEALS) 90 tablet 5     metoprolol succinate ER (TOPROL-XL) 25 MG 24 hr tablet Take 12.5 mg by mouth       MULTIVITAMIN TABS   OR 1 TABLET DAILY       omeprazole (PRILOSEC) 20 MG DR capsule TAKE 1 CAPSULE(20 MG) BY MOUTH TWICE DAILY 180 capsule 0     oxybutynin (DITROPAN) 5 MG tablet Take 1 tablet (5 mg) by mouth 2 times daily 180 tablet 1     simvastatin (ZOCOR) 20 MG tablet Take 1 tablet (20 mg) by mouth At Bedtime 90 tablet 1     SOFTCLIX LANCETS MISC 1 Units 3 times daily. 100 each 3     ondansetron (ZOFRAN-ODT) 8 MG ODT tab DISSOLVE 1 TABLET ON THE TONGUE EVERY 12 HOURS AS NEEDED FOR NAUSEA (Patient not taking: Reported on 7/12/2019) 30 tablet 0     psyllium (METAMUCIL) 28.3 % POWD Take 1 capful by mouth daily (Patient not taking: Reported on 7/12/2019) 575 g 1         Reviewed and updated as needed this visit by Provider         Review of Systems   ROS COMP: Constitutional, HEENT, cardiovascular, pulmonary, gi and gu systems are negative, except as otherwise noted.      Objective    /68   Pulse 114   Temp 97.3  F (36.3  C) (Oral)   Wt 93.4 kg (206 lb)   LMP 03/28/2014   SpO2 97%   Breastfeeding? No   BMI 36.20 kg/m    Body mass index is 36.2 kg/m .  Physical Exam   GENERAL: healthy, alert and no distress  RESP: lungs clear to auscultation - no rales, rhonchi or wheezes  CV: regular rate and rhythm, normal S1 S2, no S3 or S4, no murmur, click or rub, no peripheral edema and peripheral pulses strong  ABDOMEN: soft, nontender, no hepatosplenomegaly, no masses and bowel sounds normal  PSYCH: affect normal, insight impaired    Diagnostic Test Results:  Results for orders placed or performed in visit on 07/12/19   Basic metabolic panel   Result Value Ref Range    Sodium 141 133 - 144 mmol/L    Potassium 4.2 3.4 - 5.3 mmol/L    Chloride 110 (H) 94 - 109 mmol/L    Carbon Dioxide 20 20 - 32 mmol/L    Anion Gap 11 3 - 14 mmol/L    Glucose 84 70 - 99 mg/dL    Urea  "Nitrogen 21 7 - 30 mg/dL    Creatinine 0.68 0.52 - 1.04 mg/dL    GFR Estimate >90 >60 mL/min/[1.73_m2]    GFR Estimate If Black >90 >60 mL/min/[1.73_m2]    Calcium 9.4 8.5 - 10.1 mg/dL           Assessment & Plan       ICD-10-CM    1. Diabetic ketoacidosis without coma associated with type 1 diabetes mellitus (H) E10.10 Basic metabolic panel   2. Type 1 diabetes mellitus with other specified complication (H) E10.69 Basic metabolic panel     insulin degludec (TRESIBA) 200 UNIT/ML pen   3. Morbid obesity (H) E66.01    4. Type 1 diabetes mellitus with diabetic neuropathy (H) E10.40 insulin degludec (TRESIBA) 200 UNIT/ML pen   5. Type 2 diabetes mellitus with diabetic polyneuropathy, with long-term current use of insulin (H) E11.42 metFORMIN (GLUCOPHAGE-XR) 500 MG 24 hr tablet    Z79.4         BMI:   Estimated body mass index is 36.2 kg/m  as calculated from the following:    Height as of 3/20/19: 1.607 m (5' 3.25\").    Weight as of this encounter: 93.4 kg (206 lb).   Weight management plan: Discussed healthy diet and exercise guidelines    She is a new patient to me. She was discharged home from rehab yesterday and she resumed previous insulin dosing from her endocrinologist, Dr. Kelly: Tresibu 64 units and Novolog 12, 24, 24. She experienced a hypoglycemic episode during our visit and glucose was brought up to > 80 with resolution of her symptoms prior to her going home. I recommend reducing Tresiba dose as she was discharged on 46 units though took 64 units per endocrine's previous recommendation. Mom states that sugars are usually controlled in hospital and rehab d/t controlled diet but she requires higher insulin dosing at home. We did agree on her reducing Tresiba to 60 units daily. She will continue with Novolog dosing per endocrine. I recommend allowing home care as previously advised. This is her third hospitalization this year. There is concern about her ability to manage her diabetes independently and I " share this concern. I recommend she schedule visit with new endocrinologist to establish care (should be starting in [place] later this month) and call to schedule f/u with our diabetes educator and with her primary care provider within 1 month    Patient Instructions   I recommend reducing your Tresiba to 60 units daily  Schedule a visit with our diabetes educator for early next week  Schedule a visit with your primary care provider, Dr. Fuentes in 2 weeks     More than 45 minutes spent with patient, more than 50% of which was spent on counseling and coordination of care.       TAYLA Jackson [facility]

## 2019-07-12 NOTE — Clinical Note
Dr. Kelly,I saw Ms. Rockwell for hospital follow up. She was discharged from rehab the day prior. She was advised Tresiba 46 units and Novolog 12, 12, 12 + sliding scale. Though she resumed prior dosing from you: Tresiba 64 and Novolog 12, 24, 24, + sliding scale. She had a hypoglycemic episode during our visit. I recommend decreasing Tresiba though mom was very hesitant. She will decrease to 60 units. She will be seeing our diabetes educator in 2 weeks and receiving home care. I appreciate any input you may have on insulin dosing. She will be following up with her primary.

## 2019-07-12 NOTE — PATIENT INSTRUCTIONS
I recommend reducing your Tresiba to 60 units daily  Schedule a visit with our diabetes educator for early next week  Schedule a visit with your primary care provider, Dr. Fuentes in 2 weeks

## 2019-07-14 ENCOUNTER — DOCUMENTATION ONLY (OUTPATIENT)
Dept: ENDOCRINOLOGY | Facility: CLINIC | Age: 59
End: 2019-07-14

## 2019-07-14 NOTE — PROGRESS NOTES
I reviewed a fax received last week from the [facility] and [facility] dated 7/8/19.  It indicated patient was staying at that facility and provided her med list and recent BG trends.      The BG pattern showed frequent low morning levels at approx 8am, also higher trends during the day and daytime generally in the 160-340 mg/dl range.  The med list indicated Lexy had been on Tresiba 46U at bedtime, Novolog sliding scale ( 151-200 +2, 201-250 +4, etc), metforminER 500 mg 2-tabs every day... But did not list mealtime Novolog base doses.     At the conclusion of the patient's last clinic appointment with me ([facility]) on 3/20/19, dose plan as Tresiba 74U each evening, mealtime Novolog 24U and Novolog correction scale 150-200 +3, 201-250 +5, 251-300 +7, etc, metforminER 500 mg 2-tabs every day.  Her 6/26/19 followup diabetes evaluation had been cancelled.  No other subsequent patient or hospital records had been sent to me.    I assume she is taking reduced doses of mealtime Novolog, Novolog correction scale, and daily Tresiba insulin.  She had recent followup appointment with Delores Christine CNP and apparently has f/u appt planned with Dr. KIARRA Fuentes soon.  I am able to offer assistance with the diabetes management, though will no longer be seeing patients at the [facility] after July 2019 and my schedule completely booked this month.  She should plan to see the new [place] endocrinologist Dr. DIMITRIOS Goss at the [facility] in August or early September 2019... He will be there 3.5 days per week as his main office.    Gilmar Kelly MD  [facility]    Copy:  BERTHA Christine and KIARRA Fuentes

## 2019-07-16 ENCOUNTER — TELEPHONE (OUTPATIENT)
Dept: FAMILY MEDICINE | Facility: CLINIC | Age: 59
End: 2019-07-16

## 2019-07-16 NOTE — RESULT ENCOUNTER NOTE
[address] [address]  Phone: [phone]  Fax: [phone]      07/16/19    Lexy Rockwell  [address]   [place] [address]      Dear Lexy,    The results of your recent labs are enclosed.  Your basic metabolic panel (looks at kidney function and electrolytes) is within normal ranges.   I am glad to see you have scheduled with our diabetes educator. I was hoping you would get in sooner. I do recommend receiving additional help with home care. I think they have had difficulty getting ahold of you. Please schedule follow up with your primary care provider, Dr. Fuentes, like we discussed.     Please call the clinic if you have any concerns.    Sincerely,    TAYLA Jackson CNP    Your [facility] Care Team

## 2019-07-16 NOTE — TELEPHONE ENCOUNTER
Forms received from: [employer]   Phone number listed: [phone]   Fax listed: [phone]  Date received: 07/16/2019  Form description: Face to Face  Once forms are completed, please return to [employer] via fax.  Is patient requesting to be contacted when forms are completed: NA    Form placed: In provider's basket  Mandy Wahl

## 2019-07-17 NOTE — TELEPHONE ENCOUNTER
Requested information faxed to number provided.  [facility]  Team 3 Coordinator

## 2019-07-18 ENCOUNTER — TELEPHONE (OUTPATIENT)
Dept: FAMILY MEDICINE | Facility: CLINIC | Age: 59
End: 2019-07-18

## 2019-07-18 NOTE — TELEPHONE ENCOUNTER
Forms received from: [facility]   Phone number listed: [phone]   Fax listed: [phone]  Date received: 07/18/2019  Form description: Ok to admit fo HHA, OT, PT, and RN to eval and treat  Once forms are completed, please return to [facility] via fax.  Is patient requesting to be contacted when forms are completed: NA    Form placed: In provider's basket  Mandy Wahl

## 2019-07-19 ENCOUNTER — TELEPHONE (OUTPATIENT)
Dept: FAMILY MEDICINE | Facility: CLINIC | Age: 59
End: 2019-07-19

## 2019-07-19 NOTE — TELEPHONE ENCOUNTER
Forms received from: [employer]   Phone number listed: [phone]   Fax listed: [phone]  Date received: 07/19/19  Form description: Orders  Once forms are completed, please return to [employer] [facility] via Fax.  Is patient requesting to be contacted when forms are completed: NA    Form placed: in providers ambrocio Ragland

## 2019-07-24 ENCOUNTER — OFFICE VISIT (OUTPATIENT)
Dept: ENDOCRINOLOGY | Facility: CLINIC | Age: 59
End: 2019-07-24
Payer: MEDICARE

## 2019-07-24 VITALS
HEART RATE: 107 BPM | WEIGHT: 202 LBS | DIASTOLIC BLOOD PRESSURE: 76 MMHG | BODY MASS INDEX: 35.5 KG/M2 | OXYGEN SATURATION: 98 % | SYSTOLIC BLOOD PRESSURE: 128 MMHG

## 2019-07-24 DIAGNOSIS — E10.69 TYPE 1 DIABETES MELLITUS WITH OTHER SPECIFIED COMPLICATION (H): Primary | ICD-10-CM

## 2019-07-24 DIAGNOSIS — I10 HYPERTENSION GOAL BP (BLOOD PRESSURE) < 140/90: ICD-10-CM

## 2019-07-24 LAB
T4 FREE SERPL-MCNC: 1.1 NG/DL (ref 0.76–1.46)
TSH SERPL DL<=0.005 MIU/L-ACNC: 5.08 MU/L (ref 0.4–4)

## 2019-07-24 PROCEDURE — 99215 OFFICE O/P EST HI 40 MIN: CPT | Performed by: INTERNAL MEDICINE

## 2019-07-24 PROCEDURE — 84439 ASSAY OF FREE THYROXINE: CPT | Performed by: INTERNAL MEDICINE

## 2019-07-24 PROCEDURE — 84443 ASSAY THYROID STIM HORMONE: CPT | Performed by: INTERNAL MEDICINE

## 2019-07-24 PROCEDURE — 36415 COLL VENOUS BLD VENIPUNCTURE: CPT | Performed by: INTERNAL MEDICINE

## 2019-07-24 RX ORDER — PROCHLORPERAZINE 25 MG/1
1 SUPPOSITORY RECTAL
Qty: 2 EACH | Refills: 1 | Status: SHIPPED | OUTPATIENT
Start: 2019-07-24 | End: 2019-12-03

## 2019-07-24 RX ORDER — PROCHLORPERAZINE 25 MG/1
1 SUPPOSITORY RECTAL
Qty: 3 EACH | Refills: 11 | Status: SHIPPED | OUTPATIENT
Start: 2019-07-24 | End: 2020-02-21

## 2019-07-24 RX ORDER — PROCHLORPERAZINE 25 MG/1
1 SUPPOSITORY RECTAL DAILY
Qty: 1 DEVICE | Refills: 0 | Status: SHIPPED | OUTPATIENT
Start: 2019-07-24 | End: 2020-02-21

## 2019-07-24 NOTE — PROGRESS NOTES
"S: Pt being seen in f/u for DM.  Since her last visit, she was hospitalized for DKA on 6/17/19.   \"Presented to the emergency room on the day of admission with nausea and vomiting and concern for dehydration. Upon arrival to the ER, she was found to be in severe acidosis with bicarbonate of less than 10, associated with hyperkalemia. Serum ketones were elevated. Blood sugar was quite high, over 800.\"    Current diabetes medications:              Tresiba U200 60U dose each evening              Novolog Flexpen 12,24,24 U premeal                               mg/dl              No correction                          151-200 mg/dl             +3 units                          201-250 mg/dl             +5 units                          251-300 mg/dl             +7 units                          301-350 mg/dl             +9 units                          350-400 mg/dl             +12 units                          401-450 mg/dl             +15 units and call physician        Metformin  mg 2 tabs BID ---> does not feel does increase has changed her nausea.     She does not recall what precipitated her DKA.   She will hold her insulin if she is nauseous and not eating.   Home health care coming to the house now for the next 3 weeks.     Checking glucose 4-5 times a day.     She is giving her insulin prior to eating.   Breakfast is oatmeal with milk and toast OR cereal OR eggs and sausage breakfast bowl.     Meter:  Mid day and early afternoon lows.   Uses glucose tablets to correct.     She usually has a snack before bedtime. Sugar free pudding.   Eats in part to avoid hypoglycemia.     ROS: 10 point ROS neg other than the symptoms noted above in the HPI.    O:  /76 (BP Location: Right arm, Patient Position: Sitting, Cuff Size: Adult Large)   Pulse 107   Wt 91.6 kg (202 lb)   LMP 03/28/2014   SpO2 98%   BMI 35.50 kg/m    Gen: In NAD.   HEENT: no proptosis or lid lag, EOMI, thyroid not palpable. "   Card: S1 S2 RRR no m/r/g.  Pulm: CTA b/l.   Ext: +1 LE edema.     A/P:   Type 1 DM - Low c-peptide and positive SHARI documented in 3/2018.   Her case is complicated by chronic N/V. She follows with Raphael JIMENEZ, Dr Melendez.   She will hold her insulin if she is nauseous and not eating.   Her DM is uncontrolled with frequent DKA. We discussed possible CGM use.     -Take you tresiba every day whether you are eating or not.   - If you are EATING, Novolog Flexpen   12 U before breakfast for cereal, toast, and oatmeal. REDUCE to 8 units if having breakfast bowls.  24 U with lunch and dinner.                               mg/dl              No correction                          151-200 mg/dl             +3 units                          201-250 mg/dl             +5 units                          251-300 mg/dl             +7 units                          301-350 mg/dl             +9 units                          350-400 mg/dl             +12 units                          401-450 mg/dl             +15 units and call physician    -If you are NOT eating, check glucose every four hours and give Novolo-150 mg/dl              No correction                          151-200 mg/dl             +3 units                          201-250 mg/dl             +5 units                          251-300 mg/dl             +7 units                          301-350 mg/dl             +9 units                          350-400 mg/dl             +12 units                          401-450 mg/dl             +15 units and call physician  -Labs today.   -Rx for DEXCOM G6 sensor. It will be mailed to you if covered. Bring to meeting with education to set up.   -Meet with Diabetes education in 2-3 weeks.  -See me in 6 weeks.   -ASA taking.   -BP: controlled.   -Lipids: high triglycerides in 2018. Statin taking simvastatin.   -Microalbumin prior elevations to the 40's. Due for repeat. ACEi taking lisinopril.   -TSH 6.71 in 2018.  Repeat today.   -Eyes: treatment for glaucoma. Last exam 6/2019.   -Smoking: none.         Alfa KAUFMAN I spent 40 minutes with the patient. Greater than 50% of the time spent in . Risks/benefits/alternatives reviewed.     Alfa Goss MD on 7/24/2019 at 11:38 AM

## 2019-07-24 NOTE — PATIENT INSTRUCTIONS
-Take you tresiba every day whether you are eating or not.     - If you are EATING, Novolog Flexpen   12 U before breakfast for cereal, toast, and oatmeal. REDUCE to 8 units if having breakfast bowls.  24 U with lunch and dinner.                               60-80   -3 units.          mg/dl              No correction                          151-200 mg/dl             +3 units                          201-250 mg/dl             +5 units                          251-300 mg/dl             +7 units                          301-350 mg/dl             +9 units                          350-400 mg/dl             +12 units                          401-450 mg/dl             +15 units and call physician    -If you are NOT eating, check glucose every four hours and give Novolo-150 mg/dl              No correction                          151-200 mg/dl             +3 units                          201-250 mg/dl             +5 units                          251-300 mg/dl             +7 units                          301-350 mg/dl             +9 units                          350-400 mg/dl             +12 units                          401-450 mg/dl             +15 units and call physician    -Labs today.     -Rx for DEXCOM G6 sensor. It will be mailed to you if covered. Bring to meeting with education to set up.   -Meet with Diabetes education as planned.   -See me in 6 weeks.

## 2019-07-24 NOTE — LETTER
"    7/24/2019         RE: Lexy Rockwell  [address]  [facility] [address]        Dear Colleague,    Thank you for referring your patient, Lexy Rockwell, to the [facility]. Please see a copy of my visit note below.    S: Pt being seen in f/u for DM.  Since her last visit, she was hospitalized for DKA on 6/17/19.   \"Presented to the emergency room on the day of admission with nausea and vomiting and concern for dehydration. Upon arrival to the ER, she was found to be in severe acidosis with bicarbonate of less than 10, associated with hyperkalemia. Serum ketones were elevated. Blood sugar was quite high, over 800.\"    Current diabetes medications:              Tresiba U200 60U dose each evening              Novolog Flexpen 12,24,24 U premeal                               mg/dl              No correction                          151-200 mg/dl             +3 units                          201-250 mg/dl             +5 units                          251-300 mg/dl             +7 units                          301-350 mg/dl             +9 units                          350-400 mg/dl             +12 units                          401-450 mg/dl             +15 units and call physician        Metformin  mg 2 tabs BID ---> does not feel does increase has changed her nausea.     She does not recall what precipitated her DKA.   She will hold her insulin if she is nauseous and not eating.   Home health care coming to the house now for the next 3 weeks.     Checking glucose 4-5 times a day.     She is giving her insulin prior to eating.   Breakfast is oatmeal with milk and toast OR cereal OR eggs and sausage breakfast bowl.     Meter:  Mid day and early afternoon lows.   Uses glucose tablets to correct.     She usually has a snack before bedtime. Sugar free pudding.   Eats in part to avoid hypoglycemia.     ROS: 10 point ROS neg other than the symptoms noted above in " the HPI.    O:  /76 (BP Location: Right arm, Patient Position: Sitting, Cuff Size: Adult Large)   Pulse 107   Wt 91.6 kg (202 lb)   LMP 2014   SpO2 98%   BMI 35.50 kg/m     Gen: In NAD.   HEENT: no proptosis or lid lag, EOMI, thyroid not palpable.   Card: S1 S2 RRR no m/r/g.  Pulm: CTA b/l.   Ext: +1 LE edema.     A/P:   Type 1 DM - Low c-peptide and positive SHARI documented in 3/2018.   Her case is complicated by chronic N/V. She follows with [facility], Dr Melendez.   She will hold her insulin if she is nauseous and not eating.   Her DM is uncontrolled with frequent DKA. We discussed possible CGM use.     -Take you tresiba every day whether you are eating or not.   - If you are EATING, Novolog Flexpen   12 U before breakfast for cereal, toast, and oatmeal. REDUCE to 8 units if having breakfast bowls.  24 U with lunch and dinner.                               mg/dl              No correction                          151-200 mg/dl             +3 units                          201-250 mg/dl             +5 units                          251-300 mg/dl             +7 units                          301-350 mg/dl             +9 units                          350-400 mg/dl             +12 units                          401-450 mg/dl             +15 units and call physician    -If you are NOT eating, check glucose every four hours and give Novolo-150 mg/dl              No correction                          151-200 mg/dl             +3 units                          201-250 mg/dl             +5 units                          251-300 mg/dl             +7 units                          301-350 mg/dl             +9 units                          350-400 mg/dl             +12 units                          401-450 mg/dl             +15 units and call physician  -Labs today.   -Rx for DEXCOM G6 sensor. It will be mailed to you if covered. Bring to meeting with education to set up.   -Meet with  Diabetes education in 2-3 weeks.  -See me in 6 weeks.   -ASA taking.   -BP: controlled.   -Lipids: high triglycerides in 9/2018. Statin taking simvastatin.   -Microalbumin prior elevations to the 40's. Due for repeat. ACEi taking lisinopril.   -TSH 6.71 in 12/2018. Repeat today.   -Eyes: treatment for glaucoma. Last exam 6/2019.   -Smoking: none.         Alfa KAUFMAN I spent 40 minutes with the patient. Greater than 50% of the time spent in . Risks/benefits/alternatives reviewed.     Alfa Goss MD on 7/24/2019 at 11:38 AM      Again, thank you for allowing me to participate in the care of your patient.        Sincerely,        Alfa Goss MD

## 2019-07-29 ENCOUNTER — TELEPHONE (OUTPATIENT)
Dept: FAMILY MEDICINE | Facility: CLINIC | Age: 59
End: 2019-07-29

## 2019-07-29 NOTE — TELEPHONE ENCOUNTER
Forms received from: [facility]   Phone number listed: [phone]   Fax listed: [phone]  Date received: 07/29/19  Form description: SN Discharge  Once forms are completed, please return to [facility] via Fax.  Is patient requesting to be contacted when forms are completed: NA    Form placed: in providers ambrocio Ragland

## 2019-07-30 NOTE — TELEPHONE ENCOUNTER
Requested information faxed to number provided.  [facility]  Team 3 Coordinator

## 2019-07-31 ENCOUNTER — TELEPHONE (OUTPATIENT)
Dept: FAMILY MEDICINE | Facility: CLINIC | Age: 59
End: 2019-07-31

## 2019-07-31 ENCOUNTER — MEDICAL CORRESPONDENCE (OUTPATIENT)
Dept: HEALTH INFORMATION MANAGEMENT | Facility: CLINIC | Age: 59
End: 2019-07-31

## 2019-07-31 NOTE — TELEPHONE ENCOUNTER
Forms received from: [facility]   Phone number listed: [phone]   Fax listed: [phone]  Date received: 07/31/2019  Form description: in providers folder  Once forms are completed, please return to [facility] via fax.  Form placed: in providers chanda Hearn

## 2019-07-31 NOTE — TELEPHONE ENCOUNTER
Forms received from: [facility]   Phone number listed: [phone]   Fax listed: [phone]  Date received: 07/31/2019  Form description: Plan of Care  Once forms are completed, please return to [facility] via fax.  Is patient requesting to be contacted when forms are completed: NA    Form placed: In provider's basket  Mandy Wahl

## 2019-07-31 NOTE — TELEPHONE ENCOUNTER
Forms received from: [facility]   Phone number listed: [phone]   Fax listed: [phone]  Date received: 07/31/2019  Form description: Physical therapy discharge summary  Once forms are completed, please return to [facility] via fax.  Form placed: in providers folder  Rosy Hearn

## 2019-08-01 ENCOUNTER — TELEPHONE (OUTPATIENT)
Dept: FAMILY MEDICINE | Facility: CLINIC | Age: 59
End: 2019-08-01

## 2019-08-01 NOTE — TELEPHONE ENCOUNTER
Forms received from: [facility]   Phone number listed: [phone]   Fax listed: [phone]  Date received: 08/01/19  Form description: physician order  Once forms are completed, please return to [facility] via fax.  Form placed: in providers folder  Rosy Hearn

## 2019-08-02 DIAGNOSIS — Z79.4 TYPE 2 DIABETES MELLITUS WITH DIABETIC POLYNEUROPATHY, WITH LONG-TERM CURRENT USE OF INSULIN (H): ICD-10-CM

## 2019-08-02 DIAGNOSIS — E11.42 TYPE 2 DIABETES MELLITUS WITH DIABETIC POLYNEUROPATHY, WITH LONG-TERM CURRENT USE OF INSULIN (H): ICD-10-CM

## 2019-08-02 NOTE — TELEPHONE ENCOUNTER
Prescription approved per [facility] Refill Protocol.  Fifi Richter, RN CPC Triage.

## 2019-08-02 NOTE — TELEPHONE ENCOUNTER
"Requested Prescriptions   Pending Prescriptions Disp Refills     ACCU-CHEK COMPACT PLUS test strip [Pharmacy Med Name: ACCU-CHEK COMPACT PLUS(DRUM) #51]  Last Written Prescription Date:  7/31/18  Last Fill Quantity: 450,  # refills: 11   Last office visit: 7/12/2019 with prescribing provider:  Emmett Nickerson Office Visit:   Next 5 appointments (look out 90 days)    Aug 14, 2019  1:30 PM CDT  Return Visit with Ravin Back DPM  [facility] [address] [place] [id number]  [phone]          459 strip 0     Sig: USE TO TEST BLOOD SUGAR FIVE TIMES DAILY       Diabetic Supplies Protocol Passed - 8/2/2019 10:03 AM        Passed - Medication is active on med list        Passed - Patient is 18 years of age or older        Passed - Recent (6 mo) or future (30 days) visit within the authorizing provider's specialty     Patient had office visit in the last 6 months or has a visit in the next 30 days with authorizing provider.  See \"Patient Info\" tab in inbasket, or \"Choose Columns\" in Meds & Orders section of the refill encounter.              "

## 2019-08-05 NOTE — TELEPHONE ENCOUNTER
Requested information faxed to number provided.  [facility]  Team 3 Coordinator

## 2019-08-14 ENCOUNTER — OFFICE VISIT (OUTPATIENT)
Dept: PODIATRY | Facility: CLINIC | Age: 59
End: 2019-08-14
Payer: MEDICARE

## 2019-08-14 VITALS — OXYGEN SATURATION: 97 % | SYSTOLIC BLOOD PRESSURE: 138 MMHG | DIASTOLIC BLOOD PRESSURE: 90 MMHG | HEART RATE: 126 BPM

## 2019-08-14 DIAGNOSIS — L60.2 ONYCHAUXIS: Primary | ICD-10-CM

## 2019-08-14 DIAGNOSIS — E10.40 CONTROLLED TYPE 1 DIABETES WITH NEUROPATHY (H): ICD-10-CM

## 2019-08-14 DIAGNOSIS — S80.821A BLISTER OF RIGHT LOWER EXTREMITY, INITIAL ENCOUNTER: ICD-10-CM

## 2019-08-14 DIAGNOSIS — L84 TYLOMA: ICD-10-CM

## 2019-08-14 PROCEDURE — 99213 OFFICE O/P EST LOW 20 MIN: CPT | Performed by: PODIATRIST

## 2019-08-14 NOTE — PATIENT INSTRUCTIONS
Thanks for coming today.  [facility]  [address] [place] [address]    To schedule future appointments in Ortho Clinic, you may call [phone].    To schedule ordered imaging by your Provider: Call [facility] at [phone]    Datalot available online at:   [email or web address]    Please call if any further questions or concerns [phone] and ask for the Orthopedic Department. Clinic hours 8 am to 5 pm.    Return to clinic if symptoms worsen.

## 2019-08-14 NOTE — LETTER
8/14/2019         RE: Lexy Rockwell  [address]  [facility] [address]        Dear Colleague,    Thank you for referring your patient, Lexy Rockwell, to the [facility]. Please see a copy of my visit note below.    Past Medical History:   Diagnosis Date     Cerumen impacted      Development delay      Diabetic gastroparesis (H) 8/16/2013     Glaucoma (increased eye pressure)      Hyperlipaemia      Hypertension      Hypothyroid      Mental retardation      Nonsenile cataract      Obesity      Type 1 diabetes mellitus not at goal (H)      Patient Active Problem List   Diagnosis     Development delay     Overactive bladder     GERD (gastroesophageal reflux disease)     Hypertension goal BP (blood pressure) < 140/90     Hyperlipidemia LDL goal <100     Pain in joint, lower leg     Proteinuria     Vitamin D deficiency     Health Care Home     History of strabismus surgery     Type 1 diabetes mellitus with other specified complication (H)     Advanced directives, counseling/discussion     Primary open angle glaucoma of both eyes, moderate stage     Gastroesophageal reflux disease without esophagitis     Obesity, unspecified obesity severity, unspecified obesity type     Acute renal failure (H)     Acute retention of urine     JEAN (acute kidney injury) (H)     Dehydration     Diabetic ketoacidosis without coma associated with type 1 diabetes mellitus (H)     DKA (diabetic ketoacidoses) (H)     Sinus tachycardia     SIRS (systemic inflammatory response syndrome) (H)     Obesity (BMI 35.0-39.9) with comorbidity (H)     Past Surgical History:   Procedure Laterality Date     C EYE SURG ANT SGMT PROC UNLISTED  remote    strabismus surgery     STRABISMUS SURGERY       Social History     Socioeconomic History     Marital status: Single     Spouse name: Not on file     Number of children: Not on file     Years of education: Not on file     Highest education level: Not on  file   Occupational History     Not on file   Social Needs     Financial resource strain: Not on file     Food insecurity:     Worry: Not on file     Inability: Not on file     Transportation needs:     Medical: Not on file     Non-medical: Not on file   Tobacco Use     Smoking status: Never Smoker     Smokeless tobacco: Never Used     Tobacco comment: lives in smoke free household.   Substance and Sexual Activity     Alcohol use: Yes     Comment: occass social     Drug use: No     Sexual activity: Never   Lifestyle     Physical activity:     Days per week: Not on file     Minutes per session: Not on file     Stress: Not on file   Relationships     Social connections:     Talks on phone: Not on file     Gets together: Not on file     Attends Hinduism service: Not on file     Active member of club or organization: Not on file     Attends meetings of clubs or organizations: Not on file     Relationship status: Not on file     Intimate partner violence:     Fear of current or ex partner: Not on file     Emotionally abused: Not on file     Physically abused: Not on file     Forced sexual activity: Not on file   Other Topics Concern     Parent/sibling w/ CABG, MI or angioplasty before 65F 55M? No   Social History Narrative     Not on file     Family History   Problem Relation Age of Onset     Hypertension Father      Diabetes Sister      Glaucoma Maternal Grandfather      Cancer No family hx of      Cerebrovascular Disease No family hx of      Thyroid Disease No family hx of      Macular Degeneration No family hx of      Lab Results   Component Value Date    A1C 11.8 05/19/2019    A1C 11.2 12/12/2018    A1C 12.4 09/05/2018    A1C 10.6 03/14/2018    A1C 12.3 11/20/2017     Last Comprehensive Metabolic Panel:  Sodium   Date Value Ref Range Status   07/12/2019 141 133 - 144 mmol/L Final     Potassium   Date Value Ref Range Status   07/12/2019 4.2 3.4 - 5.3 mmol/L Final     Chloride   Date Value Ref Range Status    07/12/2019 110 (H) 94 - 109 mmol/L Final     Carbon Dioxide   Date Value Ref Range Status   07/12/2019 20 20 - 32 mmol/L Final     Anion Gap   Date Value Ref Range Status   07/12/2019 11 3 - 14 mmol/L Final     Glucose   Date Value Ref Range Status   07/12/2019 84 70 - 99 mg/dL Final     Comment:     Non Fasting     Urea Nitrogen   Date Value Ref Range Status   07/12/2019 21 7 - 30 mg/dL Final     Creatinine   Date Value Ref Range Status   07/12/2019 0.68 0.52 - 1.04 mg/dL Final     GFR Estimate   Date Value Ref Range Status   07/12/2019 >90 >60 mL/min/[1.73_m2] Final     Comment:     Non  GFR Calc  Starting 12/18/2018, serum creatinine based estimated GFR (eGFR) will be   calculated using the Chronic Kidney Disease Epidemiology Collaboration   (CKD-EPI) equation.       Calcium   Date Value Ref Range Status   07/12/2019 9.4 8.5 - 10.1 mg/dL Final       SUBJECTIVE FINDINGS:  This 59-year-old female returns to clinic for toenail care and callus care. She relates that she has numbness, tingling and neuropathy in her feet. She relates no ulcers or sores since we have seen her last although she has a lesion on her right leg that they noticed yesterday. She feels that she may have bumped it but she is not sure. She does wear diabetic shoes. No specific relieving or aggravating factors. No specific treatment.       OBJECTIVE FINDINGS:  DP and PT are 2/4 bilaterally. She has some peripheral edema bilaterally, decreased hair growth bilaterally, incurvated nails 1-5 bilaterally with some dystrophy and discoloration to differing degrees. She has hyperkeratotic tissue buildup plantar left first MPJ, plantar medial left hallux, distal left second toe with ecchymosis. She has a right anterior leg blister abrasion type lesion with minimal erythema and edema, some clear blister fluid. It is intact. No odor, no calor.       ASSESSMENT AND PLAN: Onychauxis bilaterally. Minimal onychomycosis changes, tylomas left  foot. Blister abrasion type lesion on the right anterior leg. She is diabetic with peripheral neuropathy and vascular disease. Diagnosis and treatment options discussed with her. All the nails were reduced bilaterally upon consent. Tylomas on the left foot were sharp debrided with a 15 blade upon consent. Local wound care done upon consent to the right leg blister. I am going to have her paint this wit Betadine daily. This was dispensed. She will return to clinic to see me in 1 week.         Again, thank you for allowing me to participate in the care of your patient.        Sincerely,        Ravin Back DPM

## 2019-08-14 NOTE — PROGRESS NOTES
Past Medical History:   Diagnosis Date     Cerumen impacted      Development delay      Diabetic gastroparesis (H) 8/16/2013     Glaucoma (increased eye pressure)      Hyperlipaemia      Hypertension      Hypothyroid      Mental retardation      Nonsenile cataract      Obesity      Type 1 diabetes mellitus not at goal (H)      Patient Active Problem List   Diagnosis     Development delay     Overactive bladder     GERD (gastroesophageal reflux disease)     Hypertension goal BP (blood pressure) < 140/90     Hyperlipidemia LDL goal <100     Pain in joint, lower leg     Proteinuria     Vitamin D deficiency     Health Care Home     History of strabismus surgery     Type 1 diabetes mellitus with other specified complication (H)     Advanced directives, counseling/discussion     Primary open angle glaucoma of both eyes, moderate stage     Gastroesophageal reflux disease without esophagitis     Obesity, unspecified obesity severity, unspecified obesity type     Acute renal failure (H)     Acute retention of urine     JEAN (acute kidney injury) (H)     Dehydration     Diabetic ketoacidosis without coma associated with type 1 diabetes mellitus (H)     DKA (diabetic ketoacidoses) (H)     Sinus tachycardia     SIRS (systemic inflammatory response syndrome) (H)     Obesity (BMI 35.0-39.9) with comorbidity (H)     Past Surgical History:   Procedure Laterality Date     C EYE SURG ANT SGMT PROC UNLISTED  remote    strabismus surgery     STRABISMUS SURGERY       Social History     Socioeconomic History     Marital status: Single     Spouse name: Not on file     Number of children: Not on file     Years of education: Not on file     Highest education level: Not on file   Occupational History     Not on file   Social Needs     Financial resource strain: Not on file     Food insecurity:     Worry: Not on file     Inability: Not on file     Transportation needs:     Medical: Not on file     Non-medical: Not on file   Tobacco Use      Smoking status: Never Smoker     Smokeless tobacco: Never Used     Tobacco comment: lives in smoke free household.   Substance and Sexual Activity     Alcohol use: Yes     Comment: occass social     Drug use: No     Sexual activity: Never   Lifestyle     Physical activity:     Days per week: Not on file     Minutes per session: Not on file     Stress: Not on file   Relationships     Social connections:     Talks on phone: Not on file     Gets together: Not on file     Attends Hoahaoism service: Not on file     Active member of club or organization: Not on file     Attends meetings of clubs or organizations: Not on file     Relationship status: Not on file     Intimate partner violence:     Fear of current or ex partner: Not on file     Emotionally abused: Not on file     Physically abused: Not on file     Forced sexual activity: Not on file   Other Topics Concern     Parent/sibling w/ CABG, MI or angioplasty before 65F 55M? No   Social History Narrative     Not on file     Family History   Problem Relation Age of Onset     Hypertension Father      Diabetes Sister      Glaucoma Maternal Grandfather      Cancer No family hx of      Cerebrovascular Disease No family hx of      Thyroid Disease No family hx of      Macular Degeneration No family hx of      Lab Results   Component Value Date    A1C 11.8 05/19/2019    A1C 11.2 12/12/2018    A1C 12.4 09/05/2018    A1C 10.6 03/14/2018    A1C 12.3 11/20/2017     Last Comprehensive Metabolic Panel:  Sodium   Date Value Ref Range Status   07/12/2019 141 133 - 144 mmol/L Final     Potassium   Date Value Ref Range Status   07/12/2019 4.2 3.4 - 5.3 mmol/L Final     Chloride   Date Value Ref Range Status   07/12/2019 110 (H) 94 - 109 mmol/L Final     Carbon Dioxide   Date Value Ref Range Status   07/12/2019 20 20 - 32 mmol/L Final     Anion Gap   Date Value Ref Range Status   07/12/2019 11 3 - 14 mmol/L Final     Glucose   Date Value Ref Range Status   07/12/2019 84 70 - 99 mg/dL  Final     Comment:     Non Fasting     Urea Nitrogen   Date Value Ref Range Status   07/12/2019 21 7 - 30 mg/dL Final     Creatinine   Date Value Ref Range Status   07/12/2019 0.68 0.52 - 1.04 mg/dL Final     GFR Estimate   Date Value Ref Range Status   07/12/2019 >90 >60 mL/min/[1.73_m2] Final     Comment:     Non  GFR Calc  Starting 12/18/2018, serum creatinine based estimated GFR (eGFR) will be   calculated using the Chronic Kidney Disease Epidemiology Collaboration   (CKD-EPI) equation.       Calcium   Date Value Ref Range Status   07/12/2019 9.4 8.5 - 10.1 mg/dL Final       SUBJECTIVE FINDINGS:  This 59-year-old female returns to clinic for toenail care and callus care. She relates that she has numbness, tingling and neuropathy in her feet. She relates no ulcers or sores since we have seen her last although she has a lesion on her right leg that they noticed yesterday. She feels that she may have bumped it but she is not sure. She does wear diabetic shoes. No specific relieving or aggravating factors. No specific treatment.       OBJECTIVE FINDINGS:  DP and PT are 2/4 bilaterally. She has some peripheral edema bilaterally, decreased hair growth bilaterally, incurvated nails 1-5 bilaterally with some dystrophy and discoloration to differing degrees. She has hyperkeratotic tissue buildup plantar left first MPJ, plantar medial left hallux, distal left second toe with ecchymosis. She has a right anterior leg blister abrasion type lesion with minimal erythema and edema, some clear blister fluid. It is intact. No odor, no calor.       ASSESSMENT AND PLAN: Onychauxis bilaterally. Minimal onychomycosis changes, tylomas left foot. Blister abrasion type lesion on the right anterior leg. She is diabetic with peripheral neuropathy and vascular disease. Diagnosis and treatment options discussed with her. All the nails were reduced bilaterally upon consent. Tylomas on the left foot were sharp debrided with a  15 blade upon consent. Local wound care done upon consent to the right leg blister. I am going to have her paint this wit Betadine daily. This was dispensed. She will return to clinic to see me in 1 week.

## 2019-08-14 NOTE — NURSING NOTE
Lexy Rockwell's chief complaint for this visit includes:  Chief Complaint   Patient presents with     RECHECK     toenail trim     PCP: Fredi Fuentes    Referring Provider:  No referring provider defined for this encounter.    BP (!) 138/90   Pulse 126   LMP 03/28/2014   SpO2 97%   Data Unavailable     Do you need any medication refills at today's visit? no

## 2019-08-16 ENCOUNTER — TELEPHONE (OUTPATIENT)
Dept: FAMILY MEDICINE | Facility: CLINIC | Age: 59
End: 2019-08-16

## 2019-08-16 ENCOUNTER — TELEPHONE (OUTPATIENT)
Dept: FAMILY MEDICINE | Facility: CLINIC | Age: 59
End: 2019-08-16
Payer: MEDICARE

## 2019-08-16 DIAGNOSIS — Z79.4 TYPE 2 DIABETES MELLITUS WITH KETOACIDOSIS WITHOUT COMA, WITH LONG-TERM CURRENT USE OF INSULIN (H): Primary | ICD-10-CM

## 2019-08-16 DIAGNOSIS — E11.10 TYPE 2 DIABETES MELLITUS WITH KETOACIDOSIS WITHOUT COMA, WITH LONG-TERM CURRENT USE OF INSULIN (H): Primary | ICD-10-CM

## 2019-08-16 NOTE — TELEPHONE ENCOUNTER
Forms received from: [employer]   Phone number listed: [phone]   Fax listed: [phone]  Date received: 08/16/19  Form description: SOC Order  Once forms are completed, please return to [employer] [facility] via Fax.  Is patient requesting to be contacted when forms are completed: NA    Form placed: in providers ambrocio Ragland

## 2019-08-16 NOTE — TELEPHONE ENCOUNTER
Forms received from: [facility]   Phone number listed: [phone]   Fax listed: [phone]  Date received: 08/16/19  Form description: Plan of Care(07/13/19-09/10/19)  Once forms are completed, please return to [facility] via Fax.  Is patient requesting to be contacted when forms are completed: NA    Form placed: in providers ambrocio Ragland

## 2019-08-18 ENCOUNTER — MEDICAL CORRESPONDENCE (OUTPATIENT)
Dept: HEALTH INFORMATION MANAGEMENT | Facility: CLINIC | Age: 59
End: 2019-08-18

## 2019-08-21 ENCOUNTER — TELEPHONE (OUTPATIENT)
Dept: FAMILY MEDICINE | Facility: CLINIC | Age: 59
End: 2019-08-21

## 2019-08-21 ENCOUNTER — OFFICE VISIT (OUTPATIENT)
Dept: PODIATRY | Facility: CLINIC | Age: 59
End: 2019-08-21
Payer: MEDICARE

## 2019-08-21 VITALS
HEART RATE: 120 BPM | DIASTOLIC BLOOD PRESSURE: 89 MMHG | SYSTOLIC BLOOD PRESSURE: 137 MMHG | WEIGHT: 199.1 LBS | OXYGEN SATURATION: 97 % | BODY MASS INDEX: 34.99 KG/M2

## 2019-08-21 DIAGNOSIS — E10.40 CONTROLLED TYPE 1 DIABETES WITH NEUROPATHY (H): Primary | ICD-10-CM

## 2019-08-21 DIAGNOSIS — S80.821D BLISTER OF RIGHT LOWER EXTREMITY, SUBSEQUENT ENCOUNTER: ICD-10-CM

## 2019-08-21 PROCEDURE — 99212 OFFICE O/P EST SF 10 MIN: CPT | Performed by: PODIATRIST

## 2019-08-21 NOTE — TELEPHONE ENCOUNTER
Forms received from: [facility]  Phone number listed: [phone]   Fax listed: [phone]  Date received: 08/21/2019  Form description: admission orders  Once forms are completed, please return to [facility] via fax.  Form placed: in providers folder  Rosy Hearn

## 2019-08-21 NOTE — PROGRESS NOTES
Past Medical History:   Diagnosis Date     Cerumen impacted      Development delay      Diabetic gastroparesis (H) 8/16/2013     Glaucoma (increased eye pressure)      Hyperlipaemia      Hypertension      Hypothyroid      Mental retardation      Nonsenile cataract      Obesity      Type 1 diabetes mellitus not at goal (H)      Patient Active Problem List   Diagnosis     Development delay     Overactive bladder     GERD (gastroesophageal reflux disease)     Hypertension goal BP (blood pressure) < 140/90     Hyperlipidemia LDL goal <100     Pain in joint, lower leg     Proteinuria     Vitamin D deficiency     Health Care Home     History of strabismus surgery     Type 1 diabetes mellitus with other specified complication (H)     Advanced directives, counseling/discussion     Primary open angle glaucoma of both eyes, moderate stage     Gastroesophageal reflux disease without esophagitis     Obesity, unspecified obesity severity, unspecified obesity type     Acute renal failure (H)     Acute retention of urine     JEAN (acute kidney injury) (H)     Dehydration     Diabetic ketoacidosis without coma associated with type 1 diabetes mellitus (H)     DKA (diabetic ketoacidoses) (H)     Sinus tachycardia     SIRS (systemic inflammatory response syndrome) (H)     Obesity (BMI 35.0-39.9) with comorbidity (H)     Past Surgical History:   Procedure Laterality Date     C EYE SURG ANT SGMT PROC UNLISTED  remote    strabismus surgery     STRABISMUS SURGERY       Social History     Socioeconomic History     Marital status: Single     Spouse name: Not on file     Number of children: Not on file     Years of education: Not on file     Highest education level: Not on file   Occupational History     Not on file   Social Needs     Financial resource strain: Not on file     Food insecurity:     Worry: Not on file     Inability: Not on file     Transportation needs:     Medical: Not on file     Non-medical: Not on file   Tobacco Use      Smoking status: Never Smoker     Smokeless tobacco: Never Used     Tobacco comment: lives in smoke free household.   Substance and Sexual Activity     Alcohol use: Yes     Comment: occass social     Drug use: No     Sexual activity: Never   Lifestyle     Physical activity:     Days per week: Not on file     Minutes per session: Not on file     Stress: Not on file   Relationships     Social connections:     Talks on phone: Not on file     Gets together: Not on file     Attends Tenriism service: Not on file     Active member of club or organization: Not on file     Attends meetings of clubs or organizations: Not on file     Relationship status: Not on file     Intimate partner violence:     Fear of current or ex partner: Not on file     Emotionally abused: Not on file     Physically abused: Not on file     Forced sexual activity: Not on file   Other Topics Concern     Parent/sibling w/ CABG, MI or angioplasty before 65F 55M? No   Social History Narrative     Not on file     Family History   Problem Relation Age of Onset     Hypertension Father      Diabetes Sister      Glaucoma Maternal Grandfather      Cancer No family hx of      Cerebrovascular Disease No family hx of      Thyroid Disease No family hx of      Macular Degeneration No family hx of      Lab Results   Component Value Date    A1C 11.8 05/19/2019    A1C 11.2 12/12/2018    A1C 12.4 09/05/2018    A1C 10.6 03/14/2018    A1C 12.3 11/20/2017   SUBJECTIVE FINDINGS:  A 59-year-old female returns to clinic for blister abrasion, right anterior leg.  She is diabetic with peripheral neuropathy and vascular disease.  She relates it is doing well.  She is using the Betadine with no problems.      OBJECTIVE FINDINGS:  Right anterior leg lesion, there is no erythema, no drainage, no odor, no calor.  She had some slight thickened skin with ecchymosis.  There is no underlying fluid.      ASSESSMENT AND PLAN:  Blister/abrasion lesion, right anterior leg.  She is diabetic  with peripheral neuropathy and vascular disease.  This is healing well.  She can d/c the local wound care.  Return to clinic and see me for regular diabetic foot care in 2-3 months.

## 2019-08-21 NOTE — PATIENT INSTRUCTIONS
Thanks for coming today.  [facility]  [address] [place] [address]    To schedule future appointments in Ortho Clinic, you may call [phone].    To schedule ordered imaging by your provider:   Call [facility] [phone]    To schedule an injection ordered by your provider:  Call [facility] Injection scheduling line: [phone]  [facility] available online at:  [email or web address]    Please call if any further questions or concerns ([phone]).  Clinic hours 8 am to 5 pm.    Return to clinic (call) if symptoms worsen or fail to improve.

## 2019-08-21 NOTE — NURSING NOTE
Lexy Rockwell's chief complaint for this visit includes:  Chief Complaint   Patient presents with     RECHECK     follow up lesion on right lower leg     PCP: Fredi Fuentes    Referring Provider:  No referring provider defined for this encounter.    /89 (BP Location: Left arm, Patient Position: Sitting, Cuff Size: Adult Regular)   Pulse 120   Wt 90.3 kg (199 lb 1.6 oz)   LMP 03/28/2014   SpO2 97%   BMI 34.99 kg/m    Data Unavailable     Do you need any medication refills at today's visit? Darlene Nieto CMA

## 2019-08-21 NOTE — LETTER
8/21/2019         RE: Lexy Rockwell  [address]  [facility] [address]        Dear Colleague,    Thank you for referring your patient, Lexy Rockwell, to the [facility]. Please see a copy of my visit note below.    Past Medical History:   Diagnosis Date     Cerumen impacted      Development delay      Diabetic gastroparesis (H) 8/16/2013     Glaucoma (increased eye pressure)      Hyperlipaemia      Hypertension      Hypothyroid      Mental retardation      Nonsenile cataract      Obesity      Type 1 diabetes mellitus not at goal (H)      Patient Active Problem List   Diagnosis     Development delay     Overactive bladder     GERD (gastroesophageal reflux disease)     Hypertension goal BP (blood pressure) < 140/90     Hyperlipidemia LDL goal <100     Pain in joint, lower leg     Proteinuria     Vitamin D deficiency     Health Care Home     History of strabismus surgery     Type 1 diabetes mellitus with other specified complication (H)     Advanced directives, counseling/discussion     Primary open angle glaucoma of both eyes, moderate stage     Gastroesophageal reflux disease without esophagitis     Obesity, unspecified obesity severity, unspecified obesity type     Acute renal failure (H)     Acute retention of urine     JEAN (acute kidney injury) (H)     Dehydration     Diabetic ketoacidosis without coma associated with type 1 diabetes mellitus (H)     DKA (diabetic ketoacidoses) (H)     Sinus tachycardia     SIRS (systemic inflammatory response syndrome) (H)     Obesity (BMI 35.0-39.9) with comorbidity (H)     Past Surgical History:   Procedure Laterality Date     C EYE SURG ANT SGMT PROC UNLISTED  remote    strabismus surgery     STRABISMUS SURGERY       Social History     Socioeconomic History     Marital status: Single     Spouse name: Not on file     Number of children: Not on file     Years of education: Not on file     Highest education level: Not on  file   Occupational History     Not on file   Social Needs     Financial resource strain: Not on file     Food insecurity:     Worry: Not on file     Inability: Not on file     Transportation needs:     Medical: Not on file     Non-medical: Not on file   Tobacco Use     Smoking status: Never Smoker     Smokeless tobacco: Never Used     Tobacco comment: lives in smoke free household.   Substance and Sexual Activity     Alcohol use: Yes     Comment: occass social     Drug use: No     Sexual activity: Never   Lifestyle     Physical activity:     Days per week: Not on file     Minutes per session: Not on file     Stress: Not on file   Relationships     Social connections:     Talks on phone: Not on file     Gets together: Not on file     Attends Jainism service: Not on file     Active member of club or organization: Not on file     Attends meetings of clubs or organizations: Not on file     Relationship status: Not on file     Intimate partner violence:     Fear of current or ex partner: Not on file     Emotionally abused: Not on file     Physically abused: Not on file     Forced sexual activity: Not on file   Other Topics Concern     Parent/sibling w/ CABG, MI or angioplasty before 65F 55M? No   Social History Narrative     Not on file     Family History   Problem Relation Age of Onset     Hypertension Father      Diabetes Sister      Glaucoma Maternal Grandfather      Cancer No family hx of      Cerebrovascular Disease No family hx of      Thyroid Disease No family hx of      Macular Degeneration No family hx of      Lab Results   Component Value Date    A1C 11.8 05/19/2019    A1C 11.2 12/12/2018    A1C 12.4 09/05/2018    A1C 10.6 03/14/2018    A1C 12.3 11/20/2017   SUBJECTIVE FINDINGS:  A 59-year-old female returns to clinic for blister abrasion, right anterior leg.  She is diabetic with peripheral neuropathy and vascular disease.  She relates it is doing well.  She is using the Betadine with no problems.       OBJECTIVE FINDINGS:  Right anterior leg lesion, there is no erythema, no drainage, no odor, no calor.  She had some slight thickened skin with ecchymosis.  There is no underlying fluid.      ASSESSMENT AND PLAN:  Blister/abrasion lesion, right anterior leg.  She is diabetic with peripheral neuropathy and vascular disease.  This is healing well.  She can d/c the local wound care.  Return to clinic and see me for regular diabetic foot care in 2-3 months.           Again, thank you for allowing me to participate in the care of your patient.        Sincerely,        Ravin Back DPM

## 2019-08-26 NOTE — TELEPHONE ENCOUNTER
Requested information faxed to number provided.  [facility]  Team 3 Coordinator

## 2019-09-13 ENCOUNTER — TRANSFERRED RECORDS (OUTPATIENT)
Dept: HEALTH INFORMATION MANAGEMENT | Facility: CLINIC | Age: 59
End: 2019-09-13

## 2019-09-19 ENCOUNTER — TELEPHONE (OUTPATIENT)
Dept: FAMILY MEDICINE | Facility: CLINIC | Age: 59
End: 2019-09-19

## 2019-09-19 NOTE — TELEPHONE ENCOUNTER
Forms received from: [facility]   Phone number listed: [phone]   Fax listed: [phone]  Date received: 09/19/19  Form description: discontinue Summary  Once forms are completed, please return to [facility] via Fax.  Is patient requesting to be contacted when forms are completed: NA    Form placed: in providers ambrocio Ragland

## 2019-09-20 NOTE — TELEPHONE ENCOUNTER
Requested information faxed to number provided.  [facility]  Team 3 Coordinator

## 2019-09-24 DIAGNOSIS — I10 HYPERTENSION GOAL BP (BLOOD PRESSURE) < 140/90: Primary | ICD-10-CM

## 2019-09-24 NOTE — TELEPHONE ENCOUNTER
"Requested Prescriptions   Pending Prescriptions Disp Refills     metoprolol succinate ER (TOPROL-XL) 25 MG 24 hr tablet [Pharmacy Med Name: METOPROLOL ER SUCCINATE 25MG TABS] 15 tablet 0     Sig: TAKE 1/2 TABLET BY MOUTH EVERY DAY         Last Written Prescription Date:  na  Last Fill Quantity: na,   # refills: na  Last Office Visit: 5/24/19  Future Office visit:    Next 5 appointments (look out 90 days)    Oct 21, 2019  1:30 PM CDT  Return Visit with Ravin Back DPM  [facility] ([facility]) [address] [place] [id number] [phone]           Routing refill request to provider for review/approval because:  Medication is reported/historical      Beta-Blockers Protocol Passed - 9/24/2019  2:18 PM        Passed - Blood pressure under 140/90 in past 12 months     BP Readings from Last 3 Encounters:   08/21/19 137/89   08/14/19 (!) 138/90   07/24/19 128/76                 Passed - Patient is age 6 or older        Passed - Recent (12 mo) or future (30 days) visit within the authorizing provider's specialty     Patient had office visit in the last 12 months or has a visit in the next 30 days with authorizing provider or within the authorizing provider's specialty.  See \"Patient Info\" tab in inbasket, or \"Choose Columns\" in Meds & Orders section of the refill encounter.              Passed - Medication is active on med list          "

## 2019-09-26 RX ORDER — METOPROLOL SUCCINATE 25 MG/1
TABLET, EXTENDED RELEASE ORAL
Qty: 15 TABLET | Refills: 0 | Status: SHIPPED | OUTPATIENT
Start: 2019-09-26 | End: 2019-09-27

## 2019-09-26 NOTE — TELEPHONE ENCOUNTER
Routing refill request to provider for review/approval because:  Medication reported historical    Sidra Bronson RN on 9/26/2019 at 2:08 PM

## 2019-09-27 ENCOUNTER — TELEPHONE (OUTPATIENT)
Dept: FAMILY MEDICINE | Facility: CLINIC | Age: 59
End: 2019-09-27

## 2019-09-27 ENCOUNTER — OFFICE VISIT (OUTPATIENT)
Dept: FAMILY MEDICINE | Facility: CLINIC | Age: 59
End: 2019-09-27
Payer: MEDICARE

## 2019-09-27 VITALS
SYSTOLIC BLOOD PRESSURE: 139 MMHG | DIASTOLIC BLOOD PRESSURE: 79 MMHG | BODY MASS INDEX: 35.15 KG/M2 | HEART RATE: 97 BPM | OXYGEN SATURATION: 99 % | TEMPERATURE: 97.7 F | WEIGHT: 200 LBS

## 2019-09-27 DIAGNOSIS — I10 HYPERTENSION GOAL BP (BLOOD PRESSURE) < 140/90: ICD-10-CM

## 2019-09-27 DIAGNOSIS — Z79.4 TYPE 2 DIABETES MELLITUS WITH DIABETIC POLYNEUROPATHY, WITH LONG-TERM CURRENT USE OF INSULIN (H): ICD-10-CM

## 2019-09-27 DIAGNOSIS — R00.0 SINUS TACHYCARDIA: ICD-10-CM

## 2019-09-27 DIAGNOSIS — Z09 HOSPITAL DISCHARGE FOLLOW-UP: Primary | ICD-10-CM

## 2019-09-27 DIAGNOSIS — I10 ESSENTIAL HYPERTENSION WITH GOAL BLOOD PRESSURE LESS THAN 140/90: ICD-10-CM

## 2019-09-27 DIAGNOSIS — N32.81 OVERACTIVE BLADDER: ICD-10-CM

## 2019-09-27 DIAGNOSIS — R80.9 PROTEINURIA, UNSPECIFIED TYPE: ICD-10-CM

## 2019-09-27 DIAGNOSIS — K21.9 GASTROESOPHAGEAL REFLUX DISEASE WITHOUT ESOPHAGITIS: ICD-10-CM

## 2019-09-27 DIAGNOSIS — E78.5 HYPERLIPIDEMIA LDL GOAL <100: ICD-10-CM

## 2019-09-27 DIAGNOSIS — E10.10 DIABETIC KETOACIDOSIS WITHOUT COMA ASSOCIATED WITH TYPE 1 DIABETES MELLITUS (H): ICD-10-CM

## 2019-09-27 DIAGNOSIS — E10.40 TYPE 1 DIABETES MELLITUS WITH DIABETIC NEUROPATHY (H): ICD-10-CM

## 2019-09-27 DIAGNOSIS — E66.01 MORBID OBESITY (H): ICD-10-CM

## 2019-09-27 DIAGNOSIS — E10.65 TYPE 1 DIABETES MELLITUS WITH HYPERGLYCEMIA (H): ICD-10-CM

## 2019-09-27 DIAGNOSIS — E11.42 TYPE 2 DIABETES MELLITUS WITH DIABETIC POLYNEUROPATHY, WITH LONG-TERM CURRENT USE OF INSULIN (H): ICD-10-CM

## 2019-09-27 DIAGNOSIS — K21.9 GASTROESOPHAGEAL REFLUX DISEASE, ESOPHAGITIS PRESENCE NOT SPECIFIED: ICD-10-CM

## 2019-09-27 DIAGNOSIS — R62.50 DEVELOPMENT DELAY: ICD-10-CM

## 2019-09-27 DIAGNOSIS — E10.69 TYPE 1 DIABETES MELLITUS WITH OTHER SPECIFIED COMPLICATION (H): ICD-10-CM

## 2019-09-27 DIAGNOSIS — H40.1132 PRIMARY OPEN ANGLE GLAUCOMA OF BOTH EYES, MODERATE STAGE: ICD-10-CM

## 2019-09-27 PROCEDURE — 99215 OFFICE O/P EST HI 40 MIN: CPT | Performed by: NURSE PRACTITIONER

## 2019-09-27 RX ORDER — METOPROLOL SUCCINATE 25 MG/1
12.5 TABLET, EXTENDED RELEASE ORAL DAILY
Qty: 45 TABLET | Refills: 3 | Status: SHIPPED | OUTPATIENT
Start: 2019-09-27 | End: 2021-06-28

## 2019-09-27 RX ORDER — LANCETS
EACH MISCELLANEOUS
Qty: 150 EACH | Refills: 6 | Status: SHIPPED | OUTPATIENT
Start: 2019-09-27 | End: 2021-10-13

## 2019-09-27 RX ORDER — SIMVASTATIN 20 MG
20 TABLET ORAL AT BEDTIME
Qty: 90 TABLET | Refills: 3 | Status: SHIPPED | OUTPATIENT
Start: 2019-09-27 | End: 2020-10-09

## 2019-09-27 RX ORDER — GLUCOSAMINE HCL/CHONDROITIN SU 500-400 MG
CAPSULE ORAL
Qty: 100 EACH | Refills: 3 | Status: SHIPPED | OUTPATIENT
Start: 2019-09-27 | End: 2021-10-13

## 2019-09-27 RX ORDER — LISINOPRIL 20 MG/1
40 TABLET ORAL DAILY
Qty: 180 TABLET | Refills: 3 | Status: SHIPPED | OUTPATIENT
Start: 2019-09-27 | End: 2021-04-19

## 2019-09-27 ASSESSMENT — PAIN SCALES - GENERAL: PAINLEVEL: NO PAIN (0)

## 2019-09-27 NOTE — PROGRESS NOTES
"Subjective     Lexy Rockwell is a 59 year old female who presents to clinic today for the following health issues:    HPI       Hospital Follow-up Visit:    Hospital/Nursing Home/IP Rehab Facility: [facility]  Date of Admission: 9/13/19  Date of Discharge: 9/23/19  Reason(s) for Admission: Diabetic Ketoacidosis    She was admitted for DKA 9/13/19 2/2 chronic nausea d/t gastroparesis  This is her fourth admission for DKA in 2019  I saw her for hospital follow up 7/12/19 for  DKA in June. I ordered HHA which she received for 3 weeks  She established with new endocrinologist, Dr. Goss, as previous endocrinologist is no longer practicing at [facility]. She saw Dr. Goss 7/24/19 and was given separate insulin dosing if normal PO intake, or not eating (d/t chronic GI issues). CGM ordered, advised to f/u with diabetes educator and f/u with Dr. Goss in 6 weeks. She did not make these follow up appointments    SW met with patient in hospital. Per ED note: \"SW was involved and spoke with the [facility]. It was felt she would benefit from a more controlled environment where her sugars could be more closely monitored and her insulin regimen adjusted. Management will be difficult due to her home diet and also because of her gastroparesis. No AL placement could be made so she was discharged on HHC with nursing. She will follow up with her provider and also with her [facility] \"    She has a  through the [facility], Tricia Meyer ([place]). They have not spoken with SW since discharge. Per mom, they have discussed looking into AL but have not scheduled any visits yet  Patient states she is ready to move to assisted living    Tresiba decreased to 36 units at discharge. Per mom, she was having hypoglycemia during the night while in the hospital. Glucose this morning 200. She has her glucometer with her but it is not working to review other glucose readings. She does not remember her other readings " besides this morning           Problems taking medications regularly:  None       Medication changes since discharge: changed her DM insulin meds       Problems adhering to non-medication therapy:  None    Summary of hospitalization:  CareEverywhere information obtained and reviewed  Diagnostic Tests/Treatments reviewed.  Follow up needed: endocrinology, diabetes educator, SW  Other Healthcare Providers Involved in Patient s Care:         Homecare, Physical Therapy and social work  Update since discharge: improved.     Post Discharge Medication Reconciliation: discharge medications reconciled, continue medications without change.Per mom, Tresiba decreased to 36 units at night because she was having frequent low sugars during the night  Plan of care communicated with patient and family     Coding guidelines for this visit:  Type of Medical   Decision Making Face-to-Face Visit       within 7 Days of discharge Face-to-Face Visit        within 14 days of discharge   Moderate Complexity [id number]   High Complexity [id number]                Patient Active Problem List   Diagnosis     Development delay     Overactive bladder     GERD (gastroesophageal reflux disease)     Hypertension goal BP (blood pressure) < 140/90     Hyperlipidemia LDL goal <100     Pain in joint, lower leg     Proteinuria     Vitamin D deficiency     Health Care Home     History of strabismus surgery     Type 1 diabetes mellitus with other specified complication (H)     Advanced directives, counseling/discussion     Primary open angle glaucoma of both eyes, moderate stage     Gastroesophageal reflux disease without esophagitis     Obesity, unspecified obesity severity, unspecified obesity type     Acute renal failure (H)     Acute retention of urine     JEAN (acute kidney injury) (H)     Dehydration     Diabetic ketoacidosis without coma associated with type 1 diabetes mellitus (H)     DKA (diabetic ketoacidoses) (H)     Sinus tachycardia     SIRS  (systemic inflammatory response syndrome) (H)     Obesity (BMI 35.0-39.9) with comorbidity (H)     Past Surgical History:   Procedure Laterality Date     C EYE SURG ANT SGMT PROC UNLISTED  remote    strabismus surgery     STRABISMUS SURGERY         Social History     Tobacco Use     Smoking status: Never Smoker     Smokeless tobacco: Never Used     Tobacco comment: lives in smoke free household.   Substance Use Topics     Alcohol use: Yes     Comment: occass social     Family History   Problem Relation Age of Onset     Hypertension Father      Diabetes Sister      Glaucoma Maternal Grandfather      Cancer No family hx of      Cerebrovascular Disease No family hx of      Thyroid Disease No family hx of      Macular Degeneration No family hx of          Current Outpatient Medications   Medication Sig Dispense Refill     ACCU-CHEK COMPACT PLUS test strip USE TO TEST BLOOD SUGAR FIVE TIMES DAILY 459 strip 0     acetone urine (KETOSTIX) test strip Test urine when ill or blood glucose above 350 mg/dl. 50 each 6     alcohol swab prep pads Use to swab area of injection/nuha as directed. 100 each 3     ASPIRIN 81 MG OR TABS 1 TABLET DAILY       B-D U/F 31G X 8 MM insulin pen needle USE WITH INSULIN PENS 100 each 1     blood glucose (NO BRAND SPECIFIED) test strip Use to test blood sugar 5   times daily or as directed. To accompany: Blood Glucose Monitor Brands: per insurance. 150 strip 6     blood glucose calibration (NO BRAND SPECIFIED) solution To accompany: Blood Glucose Monitor Brands: per insurance. 1 Bottle 3     blood glucose monitoring (NO BRAND SPECIFIED) meter device kit Use to test blood sugar 5 times daily or as directed. Preferred blood glucose meter OR supplies to accompany: Blood Glucose Monitor Brands: ACCU-CHEK COMPACT, or per insurance 1 kit 0     blood glucose monitoring (NO BRAND SPECIFIED) meter device kit Use to test blood sugar 5 times daily or as directed.    Patient needs new monitor.  Accu Test  Compact or whatever is covered.    Patient has very labile sugars so needs to check 5 x per day.    Please also include 3 month supply of strips, lancets, and whatever other supplies are needed.  Ongoing refills for a year. 1 kit 11     ciclopirox (LOPROX) 0.77 % cream Apply topically 2 times daily To feet and toenails. 90 g 6     insulin aspart (NOVOLOG FLEXPEN) 100 UNIT/ML pen Inject 10 units with breakfast, 10 units with lunch and dinner and correction scale dose premeal as directed, TDD: approx 100 units 30 mL 11     insulin degludec (TRESIBA) 200 UNIT/ML pen Inject 36 units sq daily 9 mL 0     latanoprost (XALATAN) 0.005 % ophthalmic solution INSTILL 1 DROP IN BOTH EYES AT BEDTIME 10 mL 3     lisinopril (PRINIVIL/ZESTRIL) 20 MG tablet Take 2 tablets (40 mg) by mouth daily 180 tablet 3     magnesium oxide (MAG-OX) 400 MG tablet Take 1 tablet (400 mg) by mouth daily 90 tablet 3     metFORMIN (GLUCOPHAGE-XR) 500 MG 24 hr tablet Take 4-tablets by mouth daily as directed 360 tablet 3     metoclopramide (REGLAN) 5 MG tablet TAKE ONE TABLET BY MOUTH THREE TIMES DAILY BEFORE MEALS (Patient taking differently: 2 times daily as needed TAKE ONE TABLET BY MOUTH THREE TIMES DAILY BEFORE MEALS) 90 tablet 5     metoprolol succinate ER (TOPROL-XL) 25 MG 24 hr tablet Take 0.5 tablets (12.5 mg) by mouth daily 45 tablet 3     MULTIVITAMIN TABS   OR 1 TABLET DAILY       omeprazole (PRILOSEC) 20 MG DR capsule TAKE 1 CAPSULE(20 MG) BY MOUTH TWICE DAILY 180 capsule 1     ondansetron (ZOFRAN-ODT) 8 MG ODT tab DISSOLVE 1 TABLET ON THE TONGUE EVERY 12 HOURS AS NEEDED FOR NAUSEA 30 tablet 0     oxybutynin (DITROPAN) 5 MG tablet Take 1 tablet (5 mg) by mouth 2 times daily 180 tablet 1     psyllium (METAMUCIL) 28.3 % POWD Take 1 capful by mouth daily 575 g 1     simvastatin (ZOCOR) 20 MG tablet Take 1 tablet (20 mg) by mouth At Bedtime 90 tablet 3     SOFTCLIX LANCETS MISC 1 Units 3 times daily. 100 each 3     thin (NO BRAND SPECIFIED)  lancets Use with lanceting device. To accompany: Blood Glucose Monitor Brands: per insurance. 150 each 6     Continuous Blood Gluc  (DEXCOM G6 ) KELLY 1 each daily (Patient not taking: Reported on 9/27/2019) 1 Device 0     Continuous Blood Gluc Sensor (DEXCOM G6 SENSOR) MISC 1 each every 10 days (Patient not taking: Reported on 9/27/2019) 3 each 11     Continuous Blood Gluc Transmit (DEXCOM G6 TRANSMITTER) MISC 1 each every 3 months (Patient not taking: Reported on 9/27/2019) 2 each 1     Recent Labs   Lab Test 07/24/19  1112 07/12/19  1205 05/24/19  1415  05/19/19 12/12/18  1037 09/05/18  1208 03/14/18  1448  06/28/17  0837  09/28/16  0955   A1C  --   --   --   --  11.8* 11.2* 12.4* 10.6*   < > 11.2*   < > 11.1*   LDL  --   --   --   --   --   --  94  --   --  117*  --  116*   HDL  --   --   --   --   --   --  65  --   --  44*  --  56   TRIG  --   --   --   --   --   --  152*  --   --  195*  --  90   ALT  --   --   --   --  7*  --   --  18  --  16   < > 20   CR  --  0.68 0.58   < >  --  0.61 0.59 0.88  --  0.63   < > 0.60   GFRESTIMATED  --  >90 >90   < >  --  >90 >90 66  --  >90  Non  GFR Calc     < > >90  Non  GFR Calc     GFRESTBLACK  --  >90 >90   < >  --  >90 >90 80  --  >90   GFR Calc     < > >90   GFR Calc     POTASSIUM  --  4.2 4.4   < >  --  4.1 4.5 4.7  --  4.5   < > 4.3   TSH 5.08*  --   --   --   --  6.71* 3.56 2.65  --  2.82  --  4.16*    < > = values in this interval not displayed.          Reviewed and updated as needed this visit by Provider         Review of Systems   ROS COMP: Constitutional, HEENT, cardiovascular, pulmonary, gi and gu systems are negative, except as otherwise noted.      Objective    /79 (BP Location: Right arm, Patient Position: Chair, Cuff Size: Adult Large)   Pulse 97   Temp 97.7  F (36.5  C) (Oral)   Wt 90.7 kg (200 lb)   LMP 03/28/2014   SpO2 99%   Breastfeeding? No   BMI 35.15 kg/m     Body mass index is 35.15 kg/m .  Physical Exam   GENERAL: healthy, alert and no distress  RESP: lungs clear to auscultation - no rales, rhonchi or wheezes  CV: regular rate and rhythm, normal S1 S2, no S3 or S4, no murmur, click or rub, no peripheral edema and peripheral pulses strong  ABDOMEN: soft, nontender, no hepatosplenomegaly, no masses and bowel sounds normal  PSYCH: affect normal, pleasant, insight impaired    Diagnostic Test Results:  Labs reviewed in Epic        Assessment & Plan       ICD-10-CM    1. Hospital discharge follow-up Z09    2. Diabetic ketoacidosis without coma associated with type 1 diabetes mellitus (H) E10.10    3. Type 1 diabetes mellitus with hyperglycemia (H) E10.65 blood glucose monitoring (NO BRAND SPECIFIED) meter device kit     blood glucose (NO BRAND SPECIFIED) test strip     blood glucose calibration (NO BRAND SPECIFIED) solution     thin (NO BRAND SPECIFIED) lancets     alcohol swab prep pads   4. Overactive bladder N32.81    5. Primary open angle glaucoma of both eyes, moderate stage H40.1132    6. Proteinuria, unspecified type R80.9    7. Sinus tachycardia R00.0    8. Obesity (BMI 35.0-39.9) with comorbidity (H) E66.01    9. Hypertension goal BP (blood pressure) < 140/90 I10 metoprolol succinate ER (TOPROL-XL) 25 MG 24 hr tablet   10. Hyperlipidemia LDL goal <100 E78.5 simvastatin (ZOCOR) 20 MG tablet   11. Gastroesophageal reflux disease without esophagitis K21.9    12. Development delay R62.50    13. Type 1 diabetes mellitus with diabetic neuropathy (H) E10.40 insulin degludec (TRESIBA) 200 UNIT/ML pen   14. Type 1 diabetes mellitus with other specified complication (H) E10.69 insulin degludec (TRESIBA) 200 UNIT/ML pen   15. Type 2 diabetes mellitus with diabetic polyneuropathy, with long-term current use of insulin (H) E11.42 insulin aspart (NOVOLOG FLEXPEN) 100 UNIT/ML pen    Z79.4    16. Gastroesophageal reflux disease, esophagitis presence not specified K21.9 omeprazole  (PRILOSEC) 20 MG DR capsule   17. Essential hypertension with goal blood pressure less than 140/90 I10 lisinopril (PRINIVIL/ZESTRIL) 20 MG tablet        I am very concerned about her frequent hospitalizations for DKA. She would benefit greatly from AL which I, and other providers, have recommended in the past. I discussed the importance of viewing several sites to find one that she is comfortable with. I offered to have our SW speak with patient and mom today and they declined. Although they expressed a willingness to move to assisted living, they did not seem interested as they seemed to decline all help I was offering in this manner    I am concerned that insulin was decreased too significantly at hospital discharge. She has been in this pattern before where insulin gets decreased at discharge but when patient goes home she resumes a different diet and has higher insulin needs. Patient and mom decline further increase in insulin dosing today. Needs close follow up with endocrinology so I had our  schedule this. Also recommend seeing diabetes educator and they declined to have this scheduled    Also recommend f/u with primary care provider, Dr. Fuentes for ongoing management      Patient Instructions   Call your  to discuss looking into assisted livings. It is good to get on wait lists    You should be drinking Metamucil every day to prevent constipation. If you can not finish the full glass of water, you could try Citrucel. If you do citrucel instead, you still need to drink it with a full glass of water     More than 45 minutes spent with patient, more than 50% of which was spent on counseling and coordination of care.       TAYLA Jackson [facility]

## 2019-09-27 NOTE — TELEPHONE ENCOUNTER
Forms received from: [facility]   Phone number listed: [phone]   Fax listed: [phone]  Date received: 09/27/2019  Form description: admission orders  Once forms are completed, please return to [facility] via fax.  Form placed: in providers folder  Rosy Hearn

## 2019-09-27 NOTE — PATIENT INSTRUCTIONS
Call your  to discuss looking into assisted livings. It is good to get on wait lists    You should be drinking Metamucil every day to prevent constipation. If you can not finish the full glass of water, you could try Citrucel. If you do citrucel instead, you still need to drink it with a full glass of water

## 2019-09-27 NOTE — TELEPHONE ENCOUNTER
Routed to Delores Christine as an FYI, patient has appointment with Delores today at 9am. Can address at appointment.     Sidra Bronson RN on 9/27/2019 at 8:22 AM

## 2019-09-27 NOTE — TELEPHONE ENCOUNTER
Patient is requesting a 90 day supply    metoprolol succinate ER (TOPROL-XL) 25 MG 24 hr tablet 15 tablet 0 9/26/2019

## 2019-10-01 ENCOUNTER — TELEPHONE (OUTPATIENT)
Dept: FAMILY MEDICINE | Facility: CLINIC | Age: 59
End: 2019-10-01

## 2019-10-01 NOTE — TELEPHONE ENCOUNTER
Forms received from: [employer]   Phone number listed: [phone]   Fax listed: [phone]  Date received: 10/01/2019  Form description: Diabetic Detailed Written Order  Once forms are completed, please return to [employer] via fax.  Is patient requesting to be contacted when forms are completed: NA    Form placed: In provider's basket  Mandy Wahl

## 2019-10-02 ENCOUNTER — OFFICE VISIT (OUTPATIENT)
Dept: ENDOCRINOLOGY | Facility: CLINIC | Age: 59
End: 2019-10-02
Payer: MEDICARE

## 2019-10-02 VITALS — OXYGEN SATURATION: 94 % | HEART RATE: 114 BPM | BODY MASS INDEX: 34.8 KG/M2 | WEIGHT: 198 LBS

## 2019-10-02 DIAGNOSIS — E78.5 DYSLIPIDEMIA: ICD-10-CM

## 2019-10-02 DIAGNOSIS — R80.9 MICROALBUMINURIA: ICD-10-CM

## 2019-10-02 DIAGNOSIS — I10 ESSENTIAL HYPERTENSION WITH GOAL BLOOD PRESSURE LESS THAN 140/90: ICD-10-CM

## 2019-10-02 DIAGNOSIS — E10.69 TYPE 1 DIABETES MELLITUS WITH OTHER SPECIFIED COMPLICATION (H): Primary | ICD-10-CM

## 2019-10-02 LAB
CHOLEST SERPL-MCNC: 192 MG/DL
CREAT UR-MCNC: 156 MG/DL
HDLC SERPL-MCNC: 60 MG/DL
LDLC SERPL CALC-MCNC: 113 MG/DL
MICROALBUMIN UR-MCNC: 117 MG/L
MICROALBUMIN/CREAT UR: 75 MG/G CR (ref 0–25)
NONHDLC SERPL-MCNC: 132 MG/DL
T4 FREE SERPL-MCNC: 1 NG/DL (ref 0.76–1.46)
TRIGL SERPL-MCNC: 96 MG/DL
TSH SERPL DL<=0.005 MIU/L-ACNC: 4.07 MU/L (ref 0.4–4)

## 2019-10-02 PROCEDURE — 99214 OFFICE O/P EST MOD 30 MIN: CPT | Performed by: INTERNAL MEDICINE

## 2019-10-02 PROCEDURE — 82043 UR ALBUMIN QUANTITATIVE: CPT | Performed by: INTERNAL MEDICINE

## 2019-10-02 PROCEDURE — 84443 ASSAY THYROID STIM HORMONE: CPT | Performed by: INTERNAL MEDICINE

## 2019-10-02 PROCEDURE — 84439 ASSAY OF FREE THYROXINE: CPT | Performed by: INTERNAL MEDICINE

## 2019-10-02 PROCEDURE — 80061 LIPID PANEL: CPT | Performed by: INTERNAL MEDICINE

## 2019-10-02 PROCEDURE — 36415 COLL VENOUS BLD VENIPUNCTURE: CPT | Performed by: INTERNAL MEDICINE

## 2019-10-02 NOTE — LETTER
"    10/2/2019         RE: Lexy Rockwell  [address]  [place] [address]        Dear Colleague,    Thank you for referring your patient, Lexy Rockwell, to the [facility]. Please see a copy of my visit note below.    S: Pt being seen in f/u for DM.  She was hospitalized for DKA on 19.  She had GI upset and missed a \"couple days\" of medications.     She did not get the DEXCOM. Mother states [facility] never [facility] patient but did contact the mother.   I checked and the phone number in the computer is correct.   Mother got a call they needed more information. Then asked to have the patients case put on hold as she does not know if she needs it.     tresiba U 200 36 Units daily ---> reduced after hospitalized.   novolog 10 Units with each meal ---> reduced after hospitalized.                               mg/dl              No correction                          151-200 mg/dl             +3 units                          201-250 mg/dl             +5 units                          251-300 mg/dl             +7 units                          301-350 mg/dl             +9 units                          350-400 mg/dl             +12 units                          401-450 mg/dl             +15 units and call physician     -If you are NOT eating, check glucose every four hours and give Novolo-150 mg/dl              No correction                          151-200 mg/dl             +3 units                          201-250 mg/dl             +5 units                          251-300 mg/dl             +7 units                          301-350 mg/dl             +9 units                          350-400 mg/dl             +12 units                          401-450 mg/dl             +15 units and call physician    Meter:  Avg 202,   AM lows. Also having readings up to 312 in the morning.   No insulin for after dinner snack. " "    ROS: 10 point ROS neg other than the symptoms noted above in the HPI.    O:  Vital signs:        Pulse: 114     SpO2: 94 %       Weight: 89.8 kg (198 lb)  Estimated body mass index is 34.8 kg/m  as calculated from the following:    Height as of 3/20/19: 1.607 m (5' 3.25\").    Weight as of this encounter: 89.8 kg (198 lb).  /110, 145/105  Gen: In NAD.   HEENT: no proptosis or lid lag, EOMI, MMM.     A/P:   Type 1 DM - Low c-peptide and positive SHARI documented in 3/2018.   Her case is complicated by chronic N/V. She follows with [facility], Dr Melendez.   She will hold her insulin if she is nauseous and not eating.   Her DM is uncontrolled with frequent DKA. We discussed possible CGM use.   In 10/2019, admitted for DKA again since last visit. HbA1C 11.5%.   Assisted living has been discussed with the patient by hospital and her primary care.   Mother placed DEXCOM fill on hold. Mother told me she does not want to be on cloud sharing if patient gets DEXCOM.   Frankly, I think the mother is the one who is overwhelmed and not the patient. Told her point blank we need to change how we are doing things or she will continue to have DKA episodes and possibly death.   -I will call [facility] to ask them to move with your case.   -Once the DEXCOM is sent to you, contact me and we will get you an appointment with the educator.   -Place a sheet on the kitchen table to write down your glucose readings and record that you took insulin.   -Lower tresiba to 34 units daily.   -Labs today.   -ASA taking.   -BP:  elevated today. Follow up with home health aide.   -Lipids: high triglycerides in 9/2018. Statin taking simvastatin.  Repeat due.   -Microalbumin prior elevations to the 40's. Due for repeat. ACEi taking lisinopril.   -TSH  5.08 in 7/2019. Repeat today.  -Eyes: treatment for glaucoma. Last exam 6/2019.   -Smoking: none.         Alfa KAUFMAN I spent 25 minutes with the patient. Greater than 50% of the time " spent in . Risks/benefits/alternatives reviewed.     Alfa Goss MD on 10/2/2019 at 2:53 PM      Again, thank you for allowing me to participate in the care of your patient.        Sincerely,        Alfa Goss MD

## 2019-10-02 NOTE — PROGRESS NOTES
"S: Pt being seen in f/u for DM.  She was hospitalized for DKA on 19.  She had GI upset and missed a \"couple days\" of medications.     She did not get the DEXCOM. Mother states [facility] patient but did contact the mother.   I checked and the phone number in the computer is correct.   Mother got a call they needed more information. Then asked to have the patients case put on hold as she does not know if she needs it.     tresiba U 200 36 Units daily ---> reduced after hospitalized.   novolog 10 Units with each meal ---> reduced after hospitalized.                               mg/dl              No correction                          151-200 mg/dl             +3 units                          201-250 mg/dl             +5 units                          251-300 mg/dl             +7 units                          301-350 mg/dl             +9 units                          350-400 mg/dl             +12 units                          401-450 mg/dl             +15 units and call physician     -If you are NOT eating, check glucose every four hours and give Novolo-150 mg/dl              No correction                          151-200 mg/dl             +3 units                          201-250 mg/dl             +5 units                          251-300 mg/dl             +7 units                          301-350 mg/dl             +9 units                          350-400 mg/dl             +12 units                          401-450 mg/dl             +15 units and call physician    Meter:  Avg 202,   AM lows. Also having readings up to 312 in the morning.   No insulin for after dinner snack.     ROS: 10 point ROS neg other than the symptoms noted above in the HPI.    O:  Vital signs:        Pulse: 114     SpO2: 94 %       Weight: 89.8 kg (198 lb)  Estimated body mass index is 34.8 kg/m  as calculated from the following:    Height as of 3/20/19: 1.607 m (5' " "3.25\").    Weight as of this encounter: 89.8 kg (198 lb).  /110, 145/105  Gen: In NAD.   HEENT: no proptosis or lid lag, EOMI, MMM.     A/P:   Type 1 DM - Low c-peptide and positive SHARI documented in 3/2018.   Her case is complicated by chronic N/V. She follows with [facility] J[facility], Dr Melendez.   She will hold her insulin if she is nauseous and not eating.   Her DM is uncontrolled with frequent DKA. We discussed possible CGM use.   In 10/2019, admitted for DKA again since last visit. HbA1C 11.5%.   Assisted living has been discussed with the patient by hospital and her primary care.   Mother placed DEXCOM fill on hold. Mother told me she does not want to be on cloud sharing if patient gets DEXCOM.   Frankly, I think the mother is the one who is overwhelmed and not the patient. Told her point blank we need to change how we are doing things or she will continue to have DKA episodes and possibly death.   -I will call [facility] to ask them to move with your case.   -Once the DEXCOM is sent to you, contact me and we will get you an appointment with the educator.   -Place a sheet on the kitchen table to write down your glucose readings and record that you took insulin.   -Lower tresiba to 34 units daily.   -Labs today.   -ASA taking.   -BP: elevated today. Follow up with home health aide.   -Lipids: high triglycerides in 9/2018. Statin taking simvastatin. Repeat due.   -Microalbumin prior elevations to the 40's. Due for repeat. ACEi taking lisinopril.   -TSH 5.08 in 7/2019. Repeat today.  -Eyes: treatment for glaucoma. Last exam 6/2019.   -Smoking: none.         Alfa KAUFMAN I spent 25 minutes with the patient. Greater than 50% of the time spent in . Risks/benefits/alternatives reviewed.     Alfa Goss MD on 10/2/2019 at 2:53 PM    "

## 2019-10-02 NOTE — PATIENT INSTRUCTIONS
-I will call [facility] to ask them to move with your case.   -Once the DEXCOM is sent to you, contact me and we will get you an appointment with the educator.     -Place a sheet on the kitchen table to write down your glucose readings and record that you took insulin.     -Lower tresiba to 34 units daily.     -Labs today.

## 2019-10-06 DIAGNOSIS — E10.65 TYPE 1 DIABETES MELLITUS WITH HYPERGLYCEMIA (H): ICD-10-CM

## 2019-10-06 DIAGNOSIS — E10.10 DIABETIC KETOACIDOSIS WITHOUT COMA ASSOCIATED WITH TYPE 1 DIABETES MELLITUS (H): Primary | ICD-10-CM

## 2019-10-07 DIAGNOSIS — E10.69 TYPE 1 DIABETES MELLITUS WITH OTHER SPECIFIED COMPLICATION (H): Primary | ICD-10-CM

## 2019-10-07 NOTE — TELEPHONE ENCOUNTER
"Requested Prescriptions   Pending Prescriptions Disp Refills     Blood Glucose Monitoring Suppl (ACCU-CHEK GUIDE) w/Device KIT [Pharmacy Med Name: ACCU-CHEK GUIDE CARE KIT] 1 kit 0     Sig: PATIENT NEEDS NEW METER   Last Written Prescription Date:  9/27/19  Last Fill Quantity: 1,  # refills: 0   Last office visit: 9/27/2019 with prescribing provider:     Future Office Visit:   Next 5 appointments (look out 90 days)    Oct 21, 2019  1:30 PM CDT  Return Visit with Ravin Back DPM  [facility] ([facility]) [id number] [place] [address] [phone]             Diabetic Supplies Protocol Passed - 10/6/2019  3:40 PM        Passed - Medication is active on med list        Passed - Patient is 18 years of age or older        Passed - Recent (6 mo) or future (30 days) visit within the authorizing provider's specialty     Patient had office visit in the last 6 months or has a visit in the next 30 days with authorizing provider.  See \"Patient Info\" tab in inbasket, or \"Choose Columns\" in Meds & Orders section of the refill encounter.            ACCU-CHEK GUIDE test strip [Pharmacy Med Name: ACCU-CHEK GUIDE TEST STRIPS 100S] 400 strip 0     Sig: TEST BLOOD SUGAR FIVE TIMES DAILY   Last Written Prescription Date:  9/27/19  Last Fill Quantity: 150,  # refills: 6   Last office visit: 9/27/2019 with prescribing provider:     Future Office Visit:   Next 5 appointments (look out 90 days)    Oct 21, 2019  1:30 PM CDT  Return Visit with Ravin Back DPM  [facility] ([facility]) [address] [place] [address] [phone]             Diabetic Supplies Protocol Passed - 10/6/2019  3:40 PM        Passed - Medication is active on med list        Passed - Patient is 18 years of age or older        Passed - Recent (6 mo) or future (30 days) visit within the authorizing provider's specialty     Patient had office visit in the " "last 6 months or has a visit in the next 30 days with authorizing provider.  See \"Patient Info\" tab in inbasket, or \"Choose Columns\" in Meds & Orders section of the refill encounter.              "

## 2019-10-08 ENCOUNTER — TELEPHONE (OUTPATIENT)
Dept: ENDOCRINOLOGY | Facility: CLINIC | Age: 59
End: 2019-10-08

## 2019-10-08 DIAGNOSIS — E10.69 TYPE 1 DIABETES MELLITUS WITH OTHER SPECIFIED COMPLICATION (H): Primary | ICD-10-CM

## 2019-10-08 RX ORDER — BLOOD SUGAR DIAGNOSTIC
STRIP MISCELLANEOUS
Qty: 400 STRIP | Refills: 0 | Status: SHIPPED | OUTPATIENT
Start: 2019-10-08 | End: 2020-12-03

## 2019-10-08 RX ORDER — BLOOD-GLUCOSE METER
EACH MISCELLANEOUS
Qty: 1 KIT | Refills: 0 | Status: SHIPPED | OUTPATIENT
Start: 2019-10-08 | End: 2021-10-13

## 2019-10-09 NOTE — TELEPHONE ENCOUNTER
Attempted to call patient. No answer and no voicemail set up. Will try to contact later.    Ana Rosa Fajardo CMA, AAMA

## 2019-10-14 ENCOUNTER — TELEPHONE (OUTPATIENT)
Dept: FAMILY MEDICINE | Facility: CLINIC | Age: 59
End: 2019-10-14

## 2019-10-14 NOTE — TELEPHONE ENCOUNTER
Forms received from: [employer]   Phone number listed: NA   Fax listed: [phone]  Date received: 10.14.19  Form description: [facility] Order  Once forms are completed, please return to [employer] via Fax.  Is patient requesting to be contacted when forms are completed: NA   Form placed: in providers diamond Ragland

## 2019-10-15 ENCOUNTER — TELEPHONE (OUTPATIENT)
Dept: FAMILY MEDICINE | Facility: CLINIC | Age: 59
End: 2019-10-15

## 2019-10-15 RX ORDER — PROCHLORPERAZINE 25 MG/1
1 SUPPOSITORY RECTAL DAILY
Qty: 1 DEVICE | Refills: 0 | Status: SHIPPED | OUTPATIENT
Start: 2019-10-15 | End: 2019-11-08

## 2019-10-15 RX ORDER — PROCHLORPERAZINE 25 MG/1
1 SUPPOSITORY RECTAL
Qty: 2 EACH | Refills: 1 | Status: SHIPPED | OUTPATIENT
Start: 2019-10-15 | End: 2019-11-08

## 2019-10-15 RX ORDER — PROCHLORPERAZINE 25 MG/1
1 SUPPOSITORY RECTAL
Qty: 3 EACH | Refills: 11 | Status: SHIPPED | OUTPATIENT
Start: 2019-10-15 | End: 2019-11-08

## 2019-10-15 NOTE — TELEPHONE ENCOUNTER
Returned patients call. She stated [facility] told her to have the prescription for the dexcom sent to [facility] in [place].    Ana Rosa Fajardo CMA, MIYA

## 2019-10-15 NOTE — TELEPHONE ENCOUNTER
Forms received from: [facility]   Phone number listed: [phone]   Fax listed: [phone]  Date received: 10/15/2019  Form description: physical therapy discharge summary  Once forms are completed, please return to [facility] via fax.  Form placed: in providers folder  Rosy Hearn

## 2019-10-15 NOTE — TELEPHONE ENCOUNTER
Requested information faxed to number provided.    [facility]  Team 3 Coordinator

## 2019-10-17 ENCOUNTER — TELEPHONE (OUTPATIENT)
Dept: FAMILY MEDICINE | Facility: CLINIC | Age: 59
End: 2019-10-17

## 2019-10-17 NOTE — TELEPHONE ENCOUNTER
Forms received from: [facility]   Phone number listed: [phone]   Fax listed: [phone]  Date received: 10.17.19  Form description: Plan of Care(09/24/19-11/22/19)  Once forms are completed, please return to [facility] via Fax.  Is patient requesting to be contacted when forms are completed: NA    Form placed: in providers ambrocio Ragland

## 2019-10-20 ENCOUNTER — MEDICAL CORRESPONDENCE (OUTPATIENT)
Dept: HEALTH INFORMATION MANAGEMENT | Facility: CLINIC | Age: 59
End: 2019-10-20

## 2019-10-21 ENCOUNTER — OFFICE VISIT (OUTPATIENT)
Dept: PODIATRY | Facility: CLINIC | Age: 59
End: 2019-10-21
Payer: MEDICARE

## 2019-10-21 VITALS
OXYGEN SATURATION: 96 % | HEART RATE: 106 BPM | DIASTOLIC BLOOD PRESSURE: 100 MMHG | SYSTOLIC BLOOD PRESSURE: 169 MMHG | RESPIRATION RATE: 14 BRPM

## 2019-10-21 DIAGNOSIS — L84 TYLOMA: ICD-10-CM

## 2019-10-21 DIAGNOSIS — L84 TYPE 1 DIABETES MELLITUS WITH PRESSURE CALLUS (H): ICD-10-CM

## 2019-10-21 DIAGNOSIS — E10.40 CONTROLLED TYPE 1 DIABETES WITH NEUROPATHY (H): Primary | ICD-10-CM

## 2019-10-21 DIAGNOSIS — E10.628 TYPE 1 DIABETES MELLITUS WITH PRESSURE CALLUS (H): ICD-10-CM

## 2019-10-21 DIAGNOSIS — L60.2 ONYCHAUXIS: ICD-10-CM

## 2019-10-21 PROCEDURE — 99213 OFFICE O/P EST LOW 20 MIN: CPT | Performed by: PODIATRIST

## 2019-10-21 ASSESSMENT — PAIN SCALES - GENERAL: PAINLEVEL: NO PAIN (0)

## 2019-10-21 NOTE — TELEPHONE ENCOUNTER
Requested information faxed to number provided.  [facility]  Team 3 Coordinator

## 2019-10-21 NOTE — PROGRESS NOTES
Past Medical History:   Diagnosis Date     Cerumen impacted      Development delay      Diabetic gastroparesis (H) 8/16/2013     Glaucoma (increased eye pressure)      Hyperlipaemia      Hypertension      Hypothyroid      Mental retardation      Nonsenile cataract      Obesity      Type 1 diabetes mellitus not at goal (H)      Patient Active Problem List   Diagnosis     Development delay     Overactive bladder     GERD (gastroesophageal reflux disease)     Hypertension goal BP (blood pressure) < 140/90     Hyperlipidemia LDL goal <100     Pain in joint, lower leg     Proteinuria     Vitamin D deficiency     Health Care Home     History of strabismus surgery     Type 1 diabetes mellitus with other specified complication (H)     Advanced directives, counseling/discussion     Primary open angle glaucoma of both eyes, moderate stage     Gastroesophageal reflux disease without esophagitis     Obesity, unspecified obesity severity, unspecified obesity type     Acute renal failure (H)     Acute retention of urine     JEAN (acute kidney injury) (H)     Dehydration     Diabetic ketoacidosis without coma associated with type 1 diabetes mellitus (H)     DKA (diabetic ketoacidoses) (H)     Sinus tachycardia     SIRS (systemic inflammatory response syndrome) (H)     Obesity (BMI 35.0-39.9) with comorbidity (H)     Past Surgical History:   Procedure Laterality Date     C EYE SURG ANT SGMT PROC UNLISTED  remote    strabismus surgery     STRABISMUS SURGERY       Social History     Socioeconomic History     Marital status: Single     Spouse name: Not on file     Number of children: Not on file     Years of education: Not on file     Highest education level: Not on file   Occupational History     Not on file   Social Needs     Financial resource strain: Not on file     Food insecurity:     Worry: Not on file     Inability: Not on file     Transportation needs:     Medical: Not on file     Non-medical: Not on file   Tobacco Use      Smoking status: Never Smoker     Smokeless tobacco: Never Used     Tobacco comment: lives in smoke free household.   Substance and Sexual Activity     Alcohol use: Yes     Comment: occass social     Drug use: No     Sexual activity: Never   Lifestyle     Physical activity:     Days per week: Not on file     Minutes per session: Not on file     Stress: Not on file   Relationships     Social connections:     Talks on phone: Not on file     Gets together: Not on file     Attends Gnosticism service: Not on file     Active member of club or organization: Not on file     Attends meetings of clubs or organizations: Not on file     Relationship status: Not on file     Intimate partner violence:     Fear of current or ex partner: Not on file     Emotionally abused: Not on file     Physically abused: Not on file     Forced sexual activity: Not on file   Other Topics Concern     Parent/sibling w/ CABG, MI or angioplasty before 65F 55M? No   Social History Narrative     Not on file     Family History   Problem Relation Age of Onset     Hypertension Father      Diabetes Sister      Glaucoma Maternal Grandfather      Cancer No family hx of      Cerebrovascular Disease No family hx of      Thyroid Disease No family hx of      Macular Degeneration No family hx of      Lab Results   Component Value Date    A1C 11.8 05/19/2019    A1C 11.2 12/12/2018    A1C 12.4 09/05/2018    A1C 10.6 03/14/2018    A1C 12.3 11/20/2017     SUBJECTIVE FINDINGS:  A 59-year-old female returns to clinic for onychauxis and calluses.  She relates her mom tried to trim her left second toenail the other day and relates she was concerned about how it looked.  She relates she is wearing tennis shoes.  She relates she does have neuropathy.  She relates no ulcers or sores since we have seen her last.  The leg sores have healed up.      OBJECTIVE FINDINGS:  DP and PT are 2/4 bilaterally.  She has a left distal second toe hyperkeratotic tissue buildup with  ecchymosis.  Some subhyperkeratotic dried blood.  There is no erythema, no drainage, no odor, no calor bilaterally.  No gross open lesions bilaterally.  She has hyperkeratotic tissue buildup, plantar left hallux and first MPJ.  She has dorsally contracted digits bilaterally.  She has incurvated nails 1-5 bilaterally.  She has some peripheral edema bilaterally.  Decreased hair growth bilaterally.      ASSESSMENT AND PLAN:  Onychauxis bilaterally.  Tylomas, left hallux, first MPJ and distal second toe with some ecchymosis on the second toe lesion.  She is diabetic with peripheral neuropathy and vascular disease.  All the nails were reduced bilaterally upon consent.  The tylomas, the left foot was sharp debrided with a #15 blade upon consent.  She opted for no diabetic shoes today.   Diagnosis and treatment options discussed with the patient.  She will return to clinic and see me in 2 months.

## 2019-10-21 NOTE — NURSING NOTE
Lexydonna Escobedo Moiz's goals for this visit include: Return  She requests these members of her care team be copied on today's visit information: PCP    PCP: Fredi Fuentes    Referring Provider:  No referring provider defined for this encounter.    BP (!) 169/100 (BP Location: Right arm, Patient Position: Left side, Cuff Size: Adult Regular)   Pulse 106   Resp 14   LMP 03/28/2014   SpO2 96%     Do you need any medication refills at today's visit? N

## 2019-10-21 NOTE — LETTER
10/21/2019         RE: Lexy Rockwell  [address]  [place] [address]        Dear Colleague,    Thank you for referring your patient, Lexy Rockwell, to the [facility]. Please see a copy of my visit note below.    Past Medical History:   Diagnosis Date     Cerumen impacted      Development delay      Diabetic gastroparesis (H) 8/16/2013     Glaucoma (increased eye pressure)      Hyperlipaemia      Hypertension      Hypothyroid      Mental retardation      Nonsenile cataract      Obesity      Type 1 diabetes mellitus not at goal (H)      Patient Active Problem List   Diagnosis     Development delay     Overactive bladder     GERD (gastroesophageal reflux disease)     Hypertension goal BP (blood pressure) < 140/90     Hyperlipidemia LDL goal <100     Pain in joint, lower leg     Proteinuria     Vitamin D deficiency     Health Care Home     History of strabismus surgery     Type 1 diabetes mellitus with other specified complication (H)     Advanced directives, counseling/discussion     Primary open angle glaucoma of both eyes, moderate stage     Gastroesophageal reflux disease without esophagitis     Obesity, unspecified obesity severity, unspecified obesity type     Acute renal failure (H)     Acute retention of urine     JEAN (acute kidney injury) (H)     Dehydration     Diabetic ketoacidosis without coma associated with type 1 diabetes mellitus (H)     DKA (diabetic ketoacidoses) (H)     Sinus tachycardia     SIRS (systemic inflammatory response syndrome) (H)     Obesity (BMI 35.0-39.9) with comorbidity (H)     Past Surgical History:   Procedure Laterality Date     C EYE SURG ANT SGMT PROC UNLISTED  remote    strabismus surgery     STRABISMUS SURGERY       Social History     Socioeconomic History     Marital status: Single     Spouse name: Not on file     Number of children: Not on file     Years of education: Not on file     Highest education level: Not  on file   Occupational History     Not on file   Social Needs     Financial resource strain: Not on file     Food insecurity:     Worry: Not on file     Inability: Not on file     Transportation needs:     Medical: Not on file     Non-medical: Not on file   Tobacco Use     Smoking status: Never Smoker     Smokeless tobacco: Never Used     Tobacco comment: lives in smoke free household.   Substance and Sexual Activity     Alcohol use: Yes     Comment: occass social     Drug use: No     Sexual activity: Never   Lifestyle     Physical activity:     Days per week: Not on file     Minutes per session: Not on file     Stress: Not on file   Relationships     Social connections:     Talks on phone: Not on file     Gets together: Not on file     Attends Scientology service: Not on file     Active member of club or organization: Not on file     Attends meetings of clubs or organizations: Not on file     Relationship status: Not on file     Intimate partner violence:     Fear of current or ex partner: Not on file     Emotionally abused: Not on file     Physically abused: Not on file     Forced sexual activity: Not on file   Other Topics Concern     Parent/sibling w/ CABG, MI or angioplasty before 65F 55M? No   Social History Narrative     Not on file     Family History   Problem Relation Age of Onset     Hypertension Father      Diabetes Sister      Glaucoma Maternal Grandfather      Cancer No family hx of      Cerebrovascular Disease No family hx of      Thyroid Disease No family hx of      Macular Degeneration No family hx of      Lab Results   Component Value Date    A1C 11.8 05/19/2019    A1C 11.2 12/12/2018    A1C 12.4 09/05/2018    A1C 10.6 03/14/2018    A1C 12.3 11/20/2017     SUBJECTIVE FINDINGS:  A 59-year-old female returns to clinic for onychauxis and calluses.  She relates her mom tried to trim her left second toenail the other day and relates she was concerned about how it looked.  She relates she is wearing tennis  shoes.  She relates she does have neuropathy.  She relates no ulcers or sores since we have seen her last.  The leg sores have healed up.      OBJECTIVE FINDINGS:  DP and PT are 2/4 bilaterally.  She has a left distal second toe hyperkeratotic tissue buildup with ecchymosis.  Some subhyperkeratotic dried blood.  There is no erythema, no drainage, no odor, no calor bilaterally.  No gross open lesions bilaterally.  She has hyperkeratotic tissue buildup, plantar left hallux and first MPJ.  She has dorsally contracted digits bilaterally.  She has incurvated nails 1-5 bilaterally.  She has some peripheral edema bilaterally.  Decreased hair growth bilaterally.      ASSESSMENT AND PLAN:  Onychauxis bilaterally.  Tylomas, left hallux, first MPJ and distal second toe with some ecchymosis on the second toe lesion.  She is diabetic with peripheral neuropathy and vascular disease.  All the nails were reduced bilaterally upon consent.  The tylomas, the left foot was sharp debrided with a #15 blade upon consent.  She opted for no diabetic shoes today.   Diagnosis and treatment options discussed with the patient.  She will return to clinic and see me in 2 months.         Again, thank you for allowing me to participate in the care of your patient.        Sincerely,        Ravin Back DPM

## 2019-10-22 ENCOUNTER — TELEPHONE (OUTPATIENT)
Dept: FAMILY MEDICINE | Facility: CLINIC | Age: 59
End: 2019-10-22

## 2019-10-22 NOTE — TELEPHONE ENCOUNTER
Forms received from: [facility]   Phone number listed: [phone]   Fax listed: [phone]  Date received: 10.22.19  Form description: DIscharge  Once forms are completed, please return to [facility] via Fax.  Is patient requesting to be contacted when forms are completed: NA    Form placed: in providers ambrocio Ragland

## 2019-10-23 NOTE — TELEPHONE ENCOUNTER
Requested information faxed to number provided.  [facility]  Team 3 Coordinator

## 2019-10-24 ENCOUNTER — TELEPHONE (OUTPATIENT)
Dept: FAMILY MEDICINE | Facility: CLINIC | Age: 59
End: 2019-10-24

## 2019-10-24 NOTE — TELEPHONE ENCOUNTER
Forms received from: [employer] [facility]   Phone number listed: [phone]   Fax listed: [phone]  Date received: 10.24.19  Form description: Orders  Once forms are completed, please return to [facility] via Fax.  Is patient requesting to be contacted when forms are completed: NA    Form placed: in providers ambrocio Ragland

## 2019-10-25 NOTE — TELEPHONE ENCOUNTER
Requested information faxed to number provided.  [facility]  Team 3 Coordinator

## 2019-11-08 ENCOUNTER — TELEPHONE (OUTPATIENT)
Dept: ENDOCRINOLOGY | Facility: CLINIC | Age: 59
End: 2019-11-08

## 2019-11-08 DIAGNOSIS — E10.69 TYPE 1 DIABETES MELLITUS WITH OTHER SPECIFIED COMPLICATION (H): ICD-10-CM

## 2019-11-08 RX ORDER — PROCHLORPERAZINE 25 MG/1
1 SUPPOSITORY RECTAL DAILY
Qty: 1 DEVICE | Refills: 0 | Status: SHIPPED | OUTPATIENT
Start: 2019-11-08 | End: 2019-12-03

## 2019-11-08 RX ORDER — PROCHLORPERAZINE 25 MG/1
1 SUPPOSITORY RECTAL
Qty: 2 EACH | Refills: 1 | Status: SHIPPED | OUTPATIENT
Start: 2019-11-08 | End: 2020-02-21

## 2019-11-08 RX ORDER — PROCHLORPERAZINE 25 MG/1
1 SUPPOSITORY RECTAL
Qty: 3 EACH | Refills: 11 | Status: SHIPPED | OUTPATIENT
Start: 2019-11-08 | End: 2019-12-03

## 2019-11-08 NOTE — TELEPHONE ENCOUNTER
Sent prescription to patient's preferred pharmacy, [facility] in [place], per patient request.    Edil Uribe RN....11/8/2019 11:52 AM

## 2019-11-08 NOTE — TELEPHONE ENCOUNTER
Reason for Call:  Medication or medication refill:    Do you use a [facility]?  Name of the pharmacy and phone number for the current request:  [facility] -[phone]     Name of the medication requested: Dexcom     Other request: n/a     Can we leave a detailed message on this number? YES    Phone number patient can be reached at: Home number on file [phone] (home)    Best Time: any     Call taken on 11/8/2019 at 9:42 AM by Inge Duke

## 2019-11-18 ENCOUNTER — ANCILLARY PROCEDURE (OUTPATIENT)
Dept: MAMMOGRAPHY | Facility: CLINIC | Age: 59
End: 2019-11-18
Payer: MEDICARE

## 2019-11-18 DIAGNOSIS — Z12.31 VISIT FOR SCREENING MAMMOGRAM: ICD-10-CM

## 2019-11-18 PROCEDURE — 77067 SCR MAMMO BI INCL CAD: CPT | Mod: TC

## 2019-11-26 DIAGNOSIS — E10.69 TYPE 1 DIABETES MELLITUS WITH OTHER SPECIFIED COMPLICATION (H): ICD-10-CM

## 2019-11-27 RX ORDER — PROCHLORPERAZINE 25 MG/1
SUPPOSITORY RECTAL
Qty: 1 DEVICE | Refills: 0 | Status: SHIPPED | OUTPATIENT
Start: 2019-11-27 | End: 2020-02-21

## 2019-12-03 DIAGNOSIS — E10.69 TYPE 1 DIABETES MELLITUS WITH OTHER SPECIFIED COMPLICATION (H): ICD-10-CM

## 2019-12-03 RX ORDER — PROCHLORPERAZINE 25 MG/1
1 SUPPOSITORY RECTAL DAILY
Qty: 1 DEVICE | Refills: 0 | Status: SHIPPED | OUTPATIENT
Start: 2019-12-03 | End: 2020-02-21

## 2019-12-03 RX ORDER — PROCHLORPERAZINE 25 MG/1
1 SUPPOSITORY RECTAL
Qty: 3 EACH | Refills: 11 | Status: SHIPPED | OUTPATIENT
Start: 2019-12-03 | End: 2020-11-24

## 2019-12-03 RX ORDER — PROCHLORPERAZINE 25 MG/1
1 SUPPOSITORY RECTAL
Qty: 2 EACH | Refills: 1 | Status: SHIPPED | OUTPATIENT
Start: 2019-12-03 | End: 2020-12-29

## 2019-12-06 ENCOUNTER — OFFICE VISIT (OUTPATIENT)
Dept: OPHTHALMOLOGY | Facility: CLINIC | Age: 59
End: 2019-12-06
Payer: MEDICARE

## 2019-12-06 DIAGNOSIS — H40.1132 PRIMARY OPEN ANGLE GLAUCOMA OF BOTH EYES, MODERATE STAGE: ICD-10-CM

## 2019-12-06 DIAGNOSIS — Z01.00 EXAMINATION OF EYES AND VISION: ICD-10-CM

## 2019-12-06 DIAGNOSIS — H53.40 VISUAL FIELD CUT: ICD-10-CM

## 2019-12-06 DIAGNOSIS — H52.4 PRESBYOPIA: ICD-10-CM

## 2019-12-06 DIAGNOSIS — E10.69 TYPE 1 DIABETES MELLITUS WITH OTHER SPECIFIED COMPLICATION (H): Primary | ICD-10-CM

## 2019-12-06 PROCEDURE — 92014 COMPRE OPH EXAM EST PT 1/>: CPT | Performed by: OPHTHALMOLOGY

## 2019-12-06 PROCEDURE — 92015 DETERMINE REFRACTIVE STATE: CPT | Mod: GY | Performed by: OPHTHALMOLOGY

## 2019-12-06 RX ORDER — LATANOPROST 50 UG/ML
SOLUTION/ DROPS OPHTHALMIC
Qty: 10 ML | Refills: 3 | Status: SHIPPED | OUTPATIENT
Start: 2019-12-06 | End: 2021-09-03

## 2019-12-06 ASSESSMENT — REFRACTION_WEARINGRX
SPECS_TYPE: PAL
OD_CYLINDER: +1.00
OS_ADD: +2.75
OD_SPHERE: -1.25
OS_CYLINDER: +0.75
OS_SPHERE: -1.50
OD_ADD: +2.75
OD_AXIS: 161
OS_AXIS: 145

## 2019-12-06 ASSESSMENT — VISUAL ACUITY
METHOD: SNELLEN - LINEAR
OD_CC: 20/40
OS_CC: 20/40
CORRECTION_TYPE: GLASSES
OD_CC: 2-
OS_CC+: -1
OS_CC: 2-

## 2019-12-06 ASSESSMENT — CONF VISUAL FIELD
OD_INFERIOR_TEMPORAL_RESTRICTION: 3
OS_INFERIOR_NASAL_RESTRICTION: 3
OS_INFERIOR_TEMPORAL_RESTRICTION: 3
OD_INFERIOR_NASAL_RESTRICTION: 3

## 2019-12-06 ASSESSMENT — TONOMETRY
OS_IOP_MMHG: 17
OD_IOP_MMHG: 17
IOP_METHOD: APPLANATION

## 2019-12-06 ASSESSMENT — REFRACTION_MANIFEST
OS_ADD: +2.75
OS_CYLINDER: SPHERE
OD_AXIS: 012
OD_SPHERE: -1.50
OS_SPHERE: -0.25
OD_ADD: +2.75
OD_CYLINDER: +1.25

## 2019-12-06 ASSESSMENT — SLIT LAMP EXAM - LIDS
COMMENTS: NORMAL
COMMENTS: NORMAL

## 2019-12-06 ASSESSMENT — CUP TO DISC RATIO
OD_RATIO: 0.7
OS_RATIO: 0.8

## 2019-12-06 ASSESSMENT — EXTERNAL EXAM - LEFT EYE: OS_EXAM: PROLAPSED FAT PADS: UPPER, LOWER

## 2019-12-06 ASSESSMENT — EXTERNAL EXAM - RIGHT EYE: OD_EXAM: PROLAPSED FAT PADS: UPPER, LOWER

## 2019-12-06 NOTE — PATIENT INSTRUCTIONS
Glasses Rx given - optional  Continue using Latanoprost (green top) both eyes at bedtime.   Use artificial tears up to 4 times daily both eyes.  (Refresh Tears, Systane Ultra/Balance, or Theratears)  Schedule MRI at your convenience - will call with results.  Return visit in 6 months for intraocular pressure check, glaucoma OCT and Beard Visual Field.     Dayron Rios M.D.  [phone]    Patient Education   Diabetes weakens the blood vessels all over the body, including the eyes. Damage to the blood vessels in the eyes can cause swelling or bleeding into part of the eye (called the retina). This is called diabetic retinopathy (DAQUAN-tin-AH-puh-thee). If not treated, this disease can cause vision loss or blindness.   Symptoms may include blurred or distorted vision, but many people have no symptoms. It's important to see your eye doctor regularly to check for problems.   Early treatment and good control can help protect your vision. Here are the things you can do to help prevent vision loss:      1. Keep your blood sugar levels under tight control.      2. Bring high blood pressure under control.      3. No smoking.      4. Have yearly dilated eye exams.

## 2019-12-06 NOTE — LETTER
12/6/2019         RE: Lexy Rockwell  [address]  [place] [address]        Dear Colleague,    Thank you for referring your patient, Lexy Rockwell, to the [facility]. Please see a copy of my visit note below.     Current Eye Medications:  Latanoprost both eyes every evening.  (she forgot to use them last night, so she used it this morning at 11:30am)     Subjective:  Patient is here for a Diabetic Eye Exam.  Both eyes have been itchy.  Recently, she has been able to keep her glasses on when looking at the computer screen (in the past, she had to remove her glasses).    Lab Results   Component Value Date    A1C 11.8 05/19/2019    A1C 11.2 12/12/2018    A1C 12.4 09/05/2018    A1C 10.6 03/14/2018    A1C 12.3 11/20/2017        Objective:  See Ophthalmology Exam.       Assessment:  Stable mild background diabetic retinopathy both eyes.  Possible visual field cut with optic nerve pallor both eyes.  Stable intraocular pressures.      ICD-10-CM    1. Type 1 diabetes mellitus with other specified complication (H) E10.69 EYE EXAM (SIMPLE-NONBILLABLE)   2. Primary open angle glaucoma of both eyes, moderate stage H40.1132 latanoprost (XALATAN) 0.005 % ophthalmic solution     EYE EXAM (SIMPLE-NONBILLABLE)   3. Visual field cut H53.40 MR Brain w/o & w Contrast   4. Examination of eyes and vision Z01.00 EYE EXAM (SIMPLE-NONBILLABLE)   5. Presbyopia H52.4 REFRACTIVE STATUS        Plan:  Glasses Rx given - optional  Continue using Latanoprost (green top) both eyes at bedtime.   Use artificial tears up to 4 times daily both eyes.  (Refresh Tears, Systane Ultra/Balance, or Theratears)  Schedule MRI at your convenience - will call with results.  Return visit in 6 months for intraocular pressure check, glaucoma OCT and Beard Visual Field.     Dayron Rios M.D.  [phone]         Again, thank you for allowing me to participate in the care of your patient.         Sincerely,        Dayron Rios MD

## 2019-12-06 NOTE — PROGRESS NOTES
Current Eye Medications:  Latanoprost both eyes every evening.  (she forgot to use them last night, so she used it this morning at 11:30am)     Subjective:  Patient is here for a Diabetic Eye Exam.  Both eyes have been itchy.  Recently, she has been able to keep her glasses on when looking at the computer screen (in the past, she had to remove her glasses).    Lab Results   Component Value Date    A1C 11.8 05/19/2019    A1C 11.2 12/12/2018    A1C 12.4 09/05/2018    A1C 10.6 03/14/2018    A1C 12.3 11/20/2017        Objective:  See Ophthalmology Exam.       Assessment:  Stable mild background diabetic retinopathy both eyes.  Possible visual field cut with optic nerve pallor both eyes.  Stable intraocular pressures.      ICD-10-CM    1. Type 1 diabetes mellitus with other specified complication (H) E10.69 EYE EXAM (SIMPLE-NONBILLABLE)   2. Primary open angle glaucoma of both eyes, moderate stage H40.1132 latanoprost (XALATAN) 0.005 % ophthalmic solution     EYE EXAM (SIMPLE-NONBILLABLE)   3. Visual field cut H53.40 MR Brain w/o & w Contrast   4. Examination of eyes and vision Z01.00 EYE EXAM (SIMPLE-NONBILLABLE)   5. Presbyopia H52.4 REFRACTIVE STATUS        Plan:  Glasses Rx given - optional  Continue using Latanoprost (green top) both eyes at bedtime.   Use artificial tears up to 4 times daily both eyes.  (Refresh Tears, Systane Ultra/Balance, or Theratears)  Schedule MRI at your convenience - will call with results.  Return visit in 6 months for intraocular pressure check, glaucoma OCT and Beard Visual Field.     Dayron Rios M.D.  [phone]

## 2019-12-10 ENCOUNTER — TELEPHONE (OUTPATIENT)
Dept: FAMILY MEDICINE | Facility: CLINIC | Age: 59
End: 2019-12-10

## 2019-12-10 DIAGNOSIS — E10.69 TYPE 1 DIABETES MELLITUS WITH OTHER SPECIFIED COMPLICATION (H): ICD-10-CM

## 2019-12-10 NOTE — TELEPHONE ENCOUNTER
Reason for Call:  Other prescription    Detailed comments: Paroxetine 10 mg. Alternative requested 2 day dosing needs prior authorization    Phone Number Patient can be reached at: Other phone number: [facility] pharmacy     Best Time:     Can we leave a detailed message on this number? Not Applicable    Call taken on 12/10/2019 at 12:36 PM by Rosy Hearn

## 2019-12-11 RX ORDER — PROCHLORPERAZINE 25 MG/1
SUPPOSITORY RECTAL
Qty: 1 DEVICE | Refills: 1 | Status: SHIPPED | OUTPATIENT
Start: 2019-12-11 | End: 2020-12-29

## 2019-12-15 DIAGNOSIS — E10.69 TYPE 1 DIABETES MELLITUS WITH OTHER SPECIFIED COMPLICATION (H): ICD-10-CM

## 2019-12-15 DIAGNOSIS — E10.40 TYPE 1 DIABETES MELLITUS WITH DIABETIC NEUROPATHY (H): ICD-10-CM

## 2019-12-18 ENCOUNTER — OFFICE VISIT (OUTPATIENT)
Dept: PODIATRY | Facility: CLINIC | Age: 59
End: 2019-12-18
Payer: MEDICARE

## 2019-12-18 VITALS — HEART RATE: 112 BPM | DIASTOLIC BLOOD PRESSURE: 95 MMHG | SYSTOLIC BLOOD PRESSURE: 181 MMHG | OXYGEN SATURATION: 96 %

## 2019-12-18 DIAGNOSIS — L60.2 ONYCHAUXIS: ICD-10-CM

## 2019-12-18 DIAGNOSIS — L84 TYPE 1 DIABETES MELLITUS WITH PRESSURE CALLUS (H): ICD-10-CM

## 2019-12-18 DIAGNOSIS — E10.628 TYPE 1 DIABETES MELLITUS WITH PRESSURE CALLUS (H): ICD-10-CM

## 2019-12-18 DIAGNOSIS — L84 TYLOMA: ICD-10-CM

## 2019-12-18 DIAGNOSIS — E10.40 CONTROLLED TYPE 1 DIABETES WITH NEUROPATHY (H): Primary | ICD-10-CM

## 2019-12-18 PROCEDURE — 99213 OFFICE O/P EST LOW 20 MIN: CPT | Performed by: PODIATRIST

## 2019-12-18 RX ORDER — INSULIN DEGLUDEC 200 U/ML
INJECTION, SOLUTION SUBCUTANEOUS
Qty: 15 ML | Refills: 11 | Status: SHIPPED | OUTPATIENT
Start: 2019-12-18 | End: 2020-05-05

## 2019-12-18 NOTE — PROGRESS NOTES
Past Medical History:   Diagnosis Date     Cerumen impacted      Development delay      Diabetic gastroparesis (H) 8/16/2013     Glaucoma (increased eye pressure)      Hyperlipaemia      Hypertension      Hypothyroid      Mental retardation      Nonsenile cataract      Obesity      Type 1 diabetes mellitus not at goal (H)      Patient Active Problem List   Diagnosis     Development delay     Overactive bladder     GERD (gastroesophageal reflux disease)     Hypertension goal BP (blood pressure) < 140/90     Hyperlipidemia LDL goal <100     Pain in joint, lower leg     Proteinuria     Vitamin D deficiency     Health Care Home     History of strabismus surgery     Type 1 diabetes mellitus with other specified complication (H)     Advanced directives, counseling/discussion     Primary open angle glaucoma of both eyes, moderate stage     Gastroesophageal reflux disease without esophagitis     Obesity, unspecified obesity severity, unspecified obesity type     Acute renal failure (H)     Acute retention of urine     JEAN (acute kidney injury) (H)     Dehydration     Diabetic ketoacidosis without coma associated with type 1 diabetes mellitus (H)     DKA (diabetic ketoacidoses) (H)     Sinus tachycardia     SIRS (systemic inflammatory response syndrome) (H)     Obesity (BMI 35.0-39.9) with comorbidity (H)     Past Surgical History:   Procedure Laterality Date     C EYE SURG ANT SGMT PROC UNLISTED  remote    strabismus surgery     STRABISMUS SURGERY       Social History     Socioeconomic History     Marital status: Single     Spouse name: Not on file     Number of children: Not on file     Years of education: Not on file     Highest education level: Not on file   Occupational History     Not on file   Social Needs     Financial resource strain: Not on file     Food insecurity:     Worry: Not on file     Inability: Not on file     Transportation needs:     Medical: Not on file     Non-medical: Not on file   Tobacco Use      Smoking status: Never Smoker     Smokeless tobacco: Never Used     Tobacco comment: lives in smoke free household.   Substance and Sexual Activity     Alcohol use: Yes     Comment: occass social     Drug use: No     Sexual activity: Never   Lifestyle     Physical activity:     Days per week: Not on file     Minutes per session: Not on file     Stress: Not on file   Relationships     Social connections:     Talks on phone: Not on file     Gets together: Not on file     Attends Jain service: Not on file     Active member of club or organization: Not on file     Attends meetings of clubs or organizations: Not on file     Relationship status: Not on file     Intimate partner violence:     Fear of current or ex partner: Not on file     Emotionally abused: Not on file     Physically abused: Not on file     Forced sexual activity: Not on file   Other Topics Concern     Parent/sibling w/ CABG, MI or angioplasty before 65F 55M? No   Social History Narrative     Not on file     Family History   Problem Relation Age of Onset     Hypertension Father      Diabetes Sister      Glaucoma Maternal Grandfather      Cancer No family hx of      Cerebrovascular Disease No family hx of      Thyroid Disease No family hx of      Macular Degeneration No family hx of      Lab Results   Component Value Date    A1C 11.8 05/19/2019    A1C 11.2 12/12/2018    A1C 12.4 09/05/2018    A1C 10.6 03/14/2018    A1C 12.3 11/20/2017     SUBJECTIVE FINDINGS:  A 59-year-old female returns to clinic for onychauxis and calluses.  She relates her caregiver noticed something wrong with her right toenail yesterday.  She relates she is wearing tennis shoes and does not want Diabetic shoes.  She relates she does have neuropathy.  She relates no ulcers or sores since we have seen her last.       OBJECTIVE FINDINGS:  DP and PT are 2/4 bilaterally.  She has a left distal second toe hyperkeratotic tissue buildup with ecchymosis.  There is no erythema, no  drainage, no odor, no calor bilaterally.  No gross open lesions bilaterally.  She has hyperkeratotic tissue buildup, plantar left heel, Hallux and first MPJ.  She has dorsally contracted digits bilaterally.  She has incurvated nails 1-5 bilaterally.  She has right distal lateral Halux nail ingrown with some mild pressure edema.  She has some peripheral edema bilaterally.  Decreased hair growth bilaterally.      ASSESSMENT AND PLAN:  Onychauxis bilaterally.  Early ingrown right Hallux lateral nail border.  Tylomas.  She is diabetic with peripheral neuropathy and vascular disease.  All the nails were reduced bilaterally upon consent.  The tylomas, the left foot was sharp debrided with a #15 blade upon consent.  Bacitracin and bandaid to right Hallux toenail border applied upoon consent and use discussed with her.   Diagnosis and treatment options discussed with the patient.  She will return to clinic and see me in 2-3 months.

## 2019-12-18 NOTE — PATIENT INSTRUCTIONS
Thanks for coming today.  [facility]  [address] [place] [address]    To schedule future appointments in Ortho Clinic, you may call [phone].    To schedule ordered imaging by your provider:   Call [facility] [phone]    To schedule an injection ordered by your provider:  Call [facility] Injection scheduling line: [phone]  [email or web address] available online at:  [email or web address]    Please call if any further questions or concerns ([phone]).  Clinic hours 8 am to 5 pm.    Return to clinic (call) if symptoms worsen or fail to improve.

## 2019-12-18 NOTE — LETTER
12/18/2019         RE: Lexy Rockwell  [address]  [facility] [address]        Dear Colleague,    Thank you for referring your patient, Lexy Rockwell, to the [facility]. Please see a copy of my visit note below.    Past Medical History:   Diagnosis Date     Cerumen impacted      Development delay      Diabetic gastroparesis (H) 8/16/2013     Glaucoma (increased eye pressure)      Hyperlipaemia      Hypertension      Hypothyroid      Mental retardation      Nonsenile cataract      Obesity      Type 1 diabetes mellitus not at goal (H)      Patient Active Problem List   Diagnosis     Development delay     Overactive bladder     GERD (gastroesophageal reflux disease)     Hypertension goal BP (blood pressure) < 140/90     Hyperlipidemia LDL goal <100     Pain in joint, lower leg     Proteinuria     Vitamin D deficiency     Health Care Home     History of strabismus surgery     Type 1 diabetes mellitus with other specified complication (H)     Advanced directives, counseling/discussion     Primary open angle glaucoma of both eyes, moderate stage     Gastroesophageal reflux disease without esophagitis     Obesity, unspecified obesity severity, unspecified obesity type     Acute renal failure (H)     Acute retention of urine     JEAN (acute kidney injury) (H)     Dehydration     Diabetic ketoacidosis without coma associated with type 1 diabetes mellitus (H)     DKA (diabetic ketoacidoses) (H)     Sinus tachycardia     SIRS (systemic inflammatory response syndrome) (H)     Obesity (BMI 35.0-39.9) with comorbidity (H)     Past Surgical History:   Procedure Laterality Date     C EYE SURG ANT SGMT PROC UNLISTED  remote    strabismus surgery     STRABISMUS SURGERY       Social History     Socioeconomic History     Marital status: Single     Spouse name: Not on file     Number of children: Not on file     Years of education: Not on file     Highest education level: Not  on file   Occupational History     Not on file   Social Needs     Financial resource strain: Not on file     Food insecurity:     Worry: Not on file     Inability: Not on file     Transportation needs:     Medical: Not on file     Non-medical: Not on file   Tobacco Use     Smoking status: Never Smoker     Smokeless tobacco: Never Used     Tobacco comment: lives in smoke free household.   Substance and Sexual Activity     Alcohol use: Yes     Comment: occass social     Drug use: No     Sexual activity: Never   Lifestyle     Physical activity:     Days per week: Not on file     Minutes per session: Not on file     Stress: Not on file   Relationships     Social connections:     Talks on phone: Not on file     Gets together: Not on file     Attends Sikhism service: Not on file     Active member of club or organization: Not on file     Attends meetings of clubs or organizations: Not on file     Relationship status: Not on file     Intimate partner violence:     Fear of current or ex partner: Not on file     Emotionally abused: Not on file     Physically abused: Not on file     Forced sexual activity: Not on file   Other Topics Concern     Parent/sibling w/ CABG, MI or angioplasty before 65F 55M? No   Social History Narrative     Not on file     Family History   Problem Relation Age of Onset     Hypertension Father      Diabetes Sister      Glaucoma Maternal Grandfather      Cancer No family hx of      Cerebrovascular Disease No family hx of      Thyroid Disease No family hx of      Macular Degeneration No family hx of      Lab Results   Component Value Date    A1C 11.8 05/19/2019    A1C 11.2 12/12/2018    A1C 12.4 09/05/2018    A1C 10.6 03/14/2018    A1C 12.3 11/20/2017     SUBJECTIVE FINDINGS:  A 59-year-old female returns to clinic for onychauxis and calluses.  She relates her caregiver noticed something wrong with her right toenail yesterday.  She relates she is wearing tennis shoes and does not want Diabetic shoes.   She relates she does have neuropathy.  She relates no ulcers or sores since we have seen her last.       OBJECTIVE FINDINGS:  DP and PT are 2/4 bilaterally.  She has a left distal second toe hyperkeratotic tissue buildup with ecchymosis.  There is no erythema, no drainage, no odor, no calor bilaterally.  No gross open lesions bilaterally.  She has hyperkeratotic tissue buildup, plantar left heel, Hallux and first MPJ.  She has dorsally contracted digits bilaterally.  She has incurvated nails 1-5 bilaterally.   She has right distal lateral Halux nail ingrown with some mild pressure edema.  She has some peripheral edema bilaterally.  Decreased hair growth bilaterally.      ASSESSMENT AND PLAN:  Onychauxis bilaterally.   Early ingrown right Hallux lateral nail border.  Tylomas.  She is diabetic with peripheral neuropathy and vascular disease.  All the nails were reduced bilaterally upon consent.  The tylomas, the left foot was sharp debrided with a #15 blade upon consent.   Bacitracin and bandaid to right Hallux toenail border applied upoon consent and use discussed with her.   Diagnosis and treatment options discussed with the patient.  She will return to clinic and see me in 2-3 months.        Again, thank you for allowing me to participate in the care of your patient.        Sincerely,        Ravin Back DPM

## 2019-12-18 NOTE — NURSING NOTE
Lexy Rockwell's chief complaint for this visit includes:  Chief Complaint   Patient presents with     RECHECK     toenails     PCP: Fredi Fuentes    Referring Provider:  No referring provider defined for this encounter.    BP (!) 181/95   Pulse 112   LMP 03/28/2014   SpO2 96%   Data Unavailable     Do you need any medication refills at today's visit? no

## 2019-12-18 NOTE — TELEPHONE ENCOUNTER
"Routing refill request to provider for review/approval because:  Labs out of range:  BP  Labs not current:  A1C      Requested Prescriptions   Pending Prescriptions Disp Refills     TRESIBA FLEXTOUCH 200 UNIT/ML pen [Pharmacy Med Name: TRESIBA FLEXTOUCH PEN(U-200)INJ 3ML]  0     Sig: INJECT 36 UNITS UNDER THE SKIN DAILY       Long Acting Insulin Protocol Failed - 12/16/2019  8:49 AM        Failed - Blood pressure less than 140/90 in past 6 months     BP Readings from Last 3 Encounters:   10/21/19 (!) 169/100   09/27/19 139/79   08/21/19 137/89                 Failed - HgbA1C in past 3 or 6 months     If HgbA1C is 8 or greater, it needs to be on file within the past 3 months.  If less than 8, must be on file within the past 6 months.     Recent Labs   Lab Test 05/19/19   A1C 11.8*             Passed - LDL on file in past 12 months     Recent Labs   Lab Test 10/02/19  1439   *             Passed - Microalbumin on file in past 12 months     Recent Labs   Lab Test 10/02/19  1448   MICROL 117   UMALCR 75.00*             Passed - Serum creatinine on file in past 12 months     Recent Labs   Lab Test 07/12/19  1205   CR 0.68             Passed - Medication is active on med list        Passed - Patient is age 18 or older        Passed - Recent (6 mo) or future (30 days) visit within the authorizing provider's specialty     Patient had office visit in the last 6 months or has a visit in the next 30 days with authorizing provider or within the authorizing provider's specialty.  See \"Patient Info\" tab in inbasket, or \"Choose Columns\" in Meds & Orders section of the refill encounter.            Barbara Zeng RN  [facility]    "

## 2020-01-08 ENCOUNTER — OFFICE VISIT (OUTPATIENT)
Dept: ENDOCRINOLOGY | Facility: CLINIC | Age: 60
End: 2020-01-08
Payer: MEDICARE

## 2020-01-08 VITALS
HEART RATE: 98 BPM | DIASTOLIC BLOOD PRESSURE: 74 MMHG | TEMPERATURE: 98.9 F | SYSTOLIC BLOOD PRESSURE: 132 MMHG | RESPIRATION RATE: 16 BRPM | WEIGHT: 194.6 LBS | BODY MASS INDEX: 34.2 KG/M2

## 2020-01-08 DIAGNOSIS — R80.9 MICROALBUMINURIA: ICD-10-CM

## 2020-01-08 DIAGNOSIS — E10.69 TYPE 1 DIABETES MELLITUS WITH OTHER SPECIFIED COMPLICATION (H): Primary | ICD-10-CM

## 2020-01-08 DIAGNOSIS — I10 ESSENTIAL HYPERTENSION WITH GOAL BLOOD PRESSURE LESS THAN 140/90: ICD-10-CM

## 2020-01-08 DIAGNOSIS — E78.5 DYSLIPIDEMIA: ICD-10-CM

## 2020-01-08 LAB
HBA1C MFR BLD: 11.8 % (ref 0–5.6)
T4 FREE SERPL-MCNC: 1.08 NG/DL (ref 0.76–1.46)
TSH SERPL DL<=0.005 MIU/L-ACNC: 4.48 MU/L (ref 0.4–4)

## 2020-01-08 PROCEDURE — 83036 HEMOGLOBIN GLYCOSYLATED A1C: CPT | Performed by: INTERNAL MEDICINE

## 2020-01-08 PROCEDURE — 36415 COLL VENOUS BLD VENIPUNCTURE: CPT | Performed by: INTERNAL MEDICINE

## 2020-01-08 PROCEDURE — 99214 OFFICE O/P EST MOD 30 MIN: CPT | Performed by: INTERNAL MEDICINE

## 2020-01-08 PROCEDURE — 84443 ASSAY THYROID STIM HORMONE: CPT | Performed by: INTERNAL MEDICINE

## 2020-01-08 PROCEDURE — 84439 ASSAY OF FREE THYROXINE: CPT | Performed by: INTERNAL MEDICINE

## 2020-01-08 NOTE — LETTER
"    2020         RE: Lexy Rockwell  [address]  [place] [address]        Dear Colleague,    Thank you for referring your patient, Lexy Rockwell, to the [facility]. Please see a copy of my visit note below.    S: Pt being seen in f/u for DM.  She was hospitalized for DKA on 19.  She had GI upset and missed a \"couple days\" of medications.     She did not get the DEXCOM. [facility] states [facility] patient but did contact the mother.   I checked and the phone number in the computer is correct.   Mother got a call they needed more information. Then asked to have the patients case put on hold as she does not know if she needs it.     tresiba U 200 34 Units daily   novolog 10 Units with each meal                               mg/dl              No correction                          151-200 mg/dl             +3 units                          201-250 mg/dl             +5 units                          251-300 mg/dl             +7 units                          301-350 mg/dl             +9 units                          350-400 mg/dl             +12 units                          401-450 mg/dl             +15 units and call physician     -If you are NOT eating, check glucose every four hours and give Novolo-150 mg/dl              No correction                          151-200 mg/dl             +3 units                          201-250 mg/dl             +5 units                          251-300 mg/dl             +7 units                          301-350 mg/dl             +9 units                          350-400 mg/dl             +12 units                          401-450 mg/dl             +15 units and call physician    She is concerned today about morning lows.   She is not waking during the night with hypoglycemia.   Eating three meals a day at fairly regular times.   Notes she has had an easier time " "with her new meter. Easier to carry and the old one was giving lots of errors.     Gets regular check ups with podiatry for care of calluses.     She orders her food on line now so she can  her food/carb content beforehand.     Meter:  Avg 225  1-3 checks a day.   AM and pre-lunch.   AM readings in the 50's to over 400.     Snacks before bed most nights.   Cereal bar or juice. Does this out of habit to avoid lows.     She has a DEXCOM now and plans to set up with C[facility] soon.   Home nurse has been setting up her pills once a week.     ROS: 10 point ROS neg other than the symptoms noted above in the HPI.    O:  Vital signs:  Temp: 98.9  F (37.2  C)   BP: 132/74 Pulse: 98   Resp: 16         Weight: 88.3 kg (194 lb 9.6 oz)  Estimated body mass index is 34.2 kg/m  as calculated from the following:    Height as of 3/20/19: 1.607 m (5' 3.25\").    Weight as of this encounter: 88.3 kg (194 lb 9.6 oz).  Gen: In NAD.   HEENT: no proptosis or lid lag, EOMI, MMM.     A/P:   Type 1 DM - Low c-peptide and positive SHARI documented in 3/2018.   Her case is complicated by chronic N/V. She follows with [facility], Dr Melendez.   She will hold her insulin if she is nauseous and not eating.   Her DM is uncontrolled with frequent DKA. We discussed possible CGM use.   In 10/2019, admitted for DKA again since last visit. HbA1C 11.5%.   Assisted living has been discussed with the patient by hospital and her primary care.   Mother placed DEXCOM fill on hold. Mother told me she does not want to be on cloud sharing if patient gets DEXCOM.   Frankly, I think the mother is the one who is overwhelmed and not the patient. Told her point blank we need to change how we are doing things or she will continue to have DKA episodes and possibly death.   In 1/2020, needs to check more. She has good support at home and is working to make smarter food choices. Snacks before bed most nights. Does this out of habit to avoid lows.   -Set up DEXCOM with " Tiffanie as planned.  -In the meantime, work to check glucose before every meal and at bedtime for a total of 4 checks a day.   -Continue night time snack until we have the DEXCOM on which will alert you to overnight lows. After DEXCOM in place, we can talk about trying to reduce and/or stop the night time snack. We will reduce your tresiba dose if needed.   -Labs today.   -ASA taking.   -BP:  controlled.   -Lipids:  Controlled other than  in 10/2019. Statin taking simvastatin.   -Microalbumin  75 in 10/2019. ACEi taking lisinopril.   -TSH 5.08 in 7/2019. 4.07 in 10/2019. Repeat today.   -Eyes: treatment for glaucoma. Last exam 6/2019.   -Smoking: none.         Alfa KAUFMAN I spent 25 minutes with the patient. Greater than 50% of the time spent in . Risks/benefits/alternatives reviewed.     Alfa Goss MD on 1/8/2020 at 10:32 AM        Again, thank you for allowing me to participate in the care of your patient.        Sincerely,        Alfa Goss MD

## 2020-01-08 NOTE — PATIENT INSTRUCTIONS
-Set up DEXCOM with Tiffanie as planned.  -In the meantime, work to check glucose before every meal and at bedtime for a total of 4 checks a day.   -Continue night time snack until we have the DEXCOM on which will alert you to overnight lows. After DEXCOM in place, we can talk about trying to reduce and/or stop the night time snack. We will reduce your tresiba dose if needed.     -Labs today.

## 2020-01-08 NOTE — PROGRESS NOTES
"S: Pt being seen in f/u for DM.  She was hospitalized for DKA on 19.  She had GI upset and missed a \"couple days\" of medications.     She did not get the DEXCOM. [facility] states [facility] never [facility] patient but did contact the mother.   I checked and the phone number in the computer is correct.   Mother got a call they needed more information. Then asked to have the patients case put on hold as she does not know if she needs it.     tresiba U 200 34 Units daily   novolog 10 Units with each meal                               mg/dl              No correction                          151-200 mg/dl             +3 units                          201-250 mg/dl             +5 units                          251-300 mg/dl             +7 units                          301-350 mg/dl             +9 units                          350-400 mg/dl             +12 units                          401-450 mg/dl             +15 units and call physician     -If you are NOT eating, check glucose every four hours and give Novolo-150 mg/dl              No correction                          151-200 mg/dl             +3 units                          201-250 mg/dl             +5 units                          251-300 mg/dl             +7 units                          301-350 mg/dl             +9 units                          350-400 mg/dl             +12 units                          401-450 mg/dl             +15 units and call physician    She is concerned today about morning lows.   She is not waking during the night with hypoglycemia.   Eating three meals a day at fairly regular times.   Notes she has had an easier time with her new meter. Easier to carry and the old one was giving lots of errors.     Gets regular check ups with podiatry for care of calluses.     She orders her food on line now so she can  her food/carb content beforehand.     Meter:  Avg 225  1-3 checks a day. " "  AM and pre-lunch.   AM readings in the 50's to over 400.     Snacks before bed most nights.   Cereal bar or juice. Does this out of habit to avoid lows.     She has a DEXCOM now and plans to set up with C[facility] soon.   Home nurse has been setting up her pills once a week.     ROS: 10 point ROS neg other than the symptoms noted above in the HPI.    O:  Vital signs:  Temp: 98.9  F (37.2  C)   BP: 132/74 Pulse: 98   Resp: 16         Weight: 88.3 kg (194 lb 9.6 oz)  Estimated body mass index is 34.2 kg/m  as calculated from the following:    Height as of 3/20/19: 1.607 m (5' 3.25\").    Weight as of this encounter: 88.3 kg (194 lb 9.6 oz).  Gen: In NAD.   HEENT: no proptosis or lid lag, EOMI, MMM.     A/P:   Type 1 DM - Low c-peptide and positive SHARI documented in 3/2018.   Her case is complicated by chronic N/V. She follows with [facility], Dr Melendez.   She will hold her insulin if she is nauseous and not eating.   Her DM is uncontrolled with frequent DKA. We discussed possible CGM use.   In 10/2019, admitted for DKA again since last visit. HbA1C 11.5%.   Assisted living has been discussed with the patient by hospital and her primary care.   Mother placed DEXCOM fill on hold. Mother told me she does not want to be on cloud sharing if patient gets DEXCOM.   Frankly, I think the mother is the one who is overwhelmed and not the patient. Told her point blank we need to change how we are doing things or she will continue to have DKA episodes and possibly death.   In 1/2020, needs to check more. She has good support at home and is working to make smarter food choices. Snacks before bed most nights. Does this out of habit to avoid lows.   -Set up DEXCOM with Tiffanie as planned.  -In the meantime, work to check glucose before every meal and at bedtime for a total of 4 checks a day.   -Continue night time snack until we have the DEXCOM on which will alert you to overnight lows. After DEXCOM in place, we can talk about trying to " reduce and/or stop the night time snack. We will reduce your tresiba dose if needed.   -Labs today.   -ASA taking.   -BP: controlled.   -Lipids: Controlled other than  in 10/2019. Statin taking simvastatin.   -Microalbumin 75 in 10/2019. ACEi taking lisinopril.   -TSH 5.08 in 7/2019. 4.07 in 10/2019. Repeat today.   -Eyes: treatment for glaucoma. Last exam 6/2019.   -Smoking: none.         Alfa KAUFMAN I spent 25 minutes with the patient. Greater than 50% of the time spent in . Risks/benefits/alternatives reviewed.     Alfa Goss MD on 1/8/2020 at 10:32 AM

## 2020-01-10 ENCOUNTER — ANCILLARY PROCEDURE (OUTPATIENT)
Dept: MRI IMAGING | Facility: CLINIC | Age: 60
End: 2020-01-10
Attending: OPHTHALMOLOGY
Payer: MEDICARE

## 2020-01-10 DIAGNOSIS — H53.40 VISUAL FIELD CUT: ICD-10-CM

## 2020-01-10 LAB
CREAT BLD-MCNC: 0.6 MG/DL (ref 0.5–1.2)
GFR SERPL CREATININE-BSD FRML MDRD: 100 ML/MIN/{1.73_M2}
GFRB: 103

## 2020-01-10 PROCEDURE — 70553 MRI BRAIN STEM W/O & W/DYE: CPT | Mod: TC

## 2020-01-10 PROCEDURE — A9585 GADOBUTROL INJECTION: HCPCS

## 2020-01-10 PROCEDURE — A9585 GADOBUTROL INJECTION: HCPCS | Mod: JW

## 2020-01-10 RX ORDER — GADOBUTROL 604.72 MG/ML
10 INJECTION INTRAVENOUS ONCE
Status: COMPLETED | OUTPATIENT
Start: 2020-01-10 | End: 2020-01-10

## 2020-01-10 RX ADMIN — GADOBUTROL 8.5 ML: 604.72 INJECTION INTRAVENOUS at 12:12

## 2020-01-11 ENCOUNTER — TELEPHONE (OUTPATIENT)
Dept: OPHTHALMOLOGY | Facility: CLINIC | Age: 60
End: 2020-01-11

## 2020-01-11 NOTE — TELEPHONE ENCOUNTER
Discussed MRI of brain results with patient.  Cerebellar finding of unknown significance, but doesn't appear worrisome.  No other findings of concern and nothing causing visual field deficits.  Patient will continue using Latanoprost drops both eyes daily and keep follow up appointment for June 2020.  Dayron Rios M.D.

## 2020-01-22 ENCOUNTER — ALLIED HEALTH/NURSE VISIT (OUTPATIENT)
Dept: EDUCATION SERVICES | Facility: CLINIC | Age: 60
End: 2020-01-22
Payer: MEDICARE

## 2020-01-22 VITALS — BODY MASS INDEX: 34.27 KG/M2 | WEIGHT: 195 LBS

## 2020-01-22 DIAGNOSIS — E10.69 TYPE 1 DIABETES MELLITUS WITH OTHER SPECIFIED COMPLICATION (H): Primary | ICD-10-CM

## 2020-01-22 PROCEDURE — G0108 DIAB MANAGE TRN  PER INDIV: HCPCS

## 2020-01-22 PROCEDURE — 95249 CONT GLUC MNTR PT PROV EQP: CPT

## 2020-01-22 NOTE — Clinical Note
Dr. Goss,Temo Pugh started the Dexcom G6 in clinic today.  This will be so great for her. We spent the whole appointment on the G6 start. She will follow-up with me in 2 weeks for BG review. Tiffanie Mcfarland, RD, LD, CDE

## 2020-01-22 NOTE — PATIENT INSTRUCTIONS
Diabetes Support Resources:  Websites: American Association of Diabetes Educators Participant Resources: www.diabeteseducator.org/living-with-diabetes   American Diabetes Association: www.diabetes.org  Diabetes Together 2 Goal: http://phktmcfg7hupc.org     Bring blood glucose meter and logbook with you to all doctor and follow-up appointments.    Diabetes Education Telephone Visit Follow-up:    We realize your time is valuable and your health is important! We offer a convenient Telephone Visit follow up! It s a quick way to check in for a medication dose adjustment without having to come back to clinic as soon.    Telephone Visits are often covered by insurance. Please check with your insurance plan to see if this type of visit is covered. If not, the cost is less expensive than an office visit:      Up to 10 minutes (Code 45059): $30    11-20 minutes (Code 28025): $59    More than 20 minutes (Code 72510): $85    Talk with your Diabetes Educator if you want to learn more.      [facility]:  For Your Diabetes Education and Nutrition Appointments Call:  [phone]   For Diabetes Education or Nutrition Related Questions:   Phone: [phone]  E-mail: [email or web address]  Fax: [phone]   If you need a medication refill please contact your pharmacy. Please allow 3 business days for your refills to be completed.

## 2020-01-23 NOTE — PROGRESS NOTES
Diabetes Self-Management Education & Support      Diabetes Self-Management Education & Support - Personal CGM Start    SUBJECTIVE/OBJECTIVE  Presents for: Individual review  Accompanied by: Self, Mother  Diabetes education in the past 24mo: Yes  Focus of Visit: CGM  Diabetes type: Type 1  Diabetes management related comments/concerns: Here for Dexcom G6 start  Cultural Influences/Ethnic Background:  American      Patient seen today for Personal CGM Start:    Patient completed homework (online training and/or Getting started with CGM guide)? : Yes  Sensor started:: Started today in clinic  CGM Initial Settings: Dexcom  Dexcom Initial Settings: Low Glucose Alarm: On (fixed at 55 mg/dL and can't be changed), High Glucose Alert, Low Glucose Alert, Rise Alert, Fall Alert, Out of Range Alert  CGM Start Plan: Change sensor, as directed.    Healthy Eating   not assessed    Being Active   not assessed    Monitoring   not assessed    Taking Medications  Diabetes Medication(s)     Biguanides       metFORMIN (GLUCOPHAGE-XR) 500 MG 24 hr tablet    Take 4-tablets by mouth daily as directed    Insulin       insulin aspart (NOVOLOG FLEXPEN) 100 UNIT/ML pen    Inject 10 units with breakfast, 10 units with lunch and dinner and correction scale dose premeal as directed, TDD: approx 100 units     TRESIBA FLEXTOUCH 200 UNIT/ML pen    INJECT 36 UNITS UNDER THE SKIN DAILY          Problem Solving   not assessed    Reducing Risks   not assessed    Healthy Coping     Patient Activation Measure Survey Score:  KHUSHI Score (Last Two) 3/7/2012   KHUSHI Raw Score 39   Activation Score 56.4   KHUSHI Level 3         ASSESSMENT  Lexy presents to clinic with her mother to learn how to use the Dexcom G6. She comes with all materials and has read through the manuals and getting started guide. She is eager to start the sensor. Lexy was able to insert the sensor on her own and did all of the button pushing on the . Her mom plans to come to  Lexy's house for her next sensor change to help if needed. Reminded patient to not throw away transmitter with the sensor, use the transmitter for 3 months.     Education provided today on:  AADE Self-Care Behaviors:  Dexcom G6    Opportunities for ongoing education and support in diabetes-self management were discussed.    Pt verbalized understanding of concepts discussed and recommendations provided today.       Education Materials Provided:  No new materials provided today    CGM-specific education:   Dexcom sensor: insertion technique, sensor site location and rotation, insulin administration in relation to sensor placement, Dexcom : setting alerts/alarms, Calibration, Use of trends and graphs for pattern management and problem solving and Dosing insulin based on sensor glucose results      PLAN  Start using the Dexcom G6  Follow-up in 2 weeks    See Patient Instructions for co-developed, patient-stated behavior change goals.  AVS printed and provided to patient today. See Follow-Up section for recommended follow-up.    Tiffanie Mcfarland RD, LD, CDE  Time Spent: 70 minutes  Encounter Type: Individual    Any diabetes medication dose changes were made via the CDE Protocol and Collaborative Practice Agreement with the patient's referring provider. A copy of this encounter was shared with the provider.

## 2020-02-21 ENCOUNTER — ALLIED HEALTH/NURSE VISIT (OUTPATIENT)
Dept: EDUCATION SERVICES | Facility: CLINIC | Age: 60
End: 2020-02-21
Payer: MEDICARE

## 2020-02-21 VITALS — WEIGHT: 199 LBS | BODY MASS INDEX: 34.97 KG/M2

## 2020-02-21 DIAGNOSIS — Z79.4 TYPE 2 DIABETES MELLITUS WITH DIABETIC POLYNEUROPATHY, WITH LONG-TERM CURRENT USE OF INSULIN (H): ICD-10-CM

## 2020-02-21 DIAGNOSIS — E11.42 TYPE 2 DIABETES MELLITUS WITH DIABETIC POLYNEUROPATHY, WITH LONG-TERM CURRENT USE OF INSULIN (H): ICD-10-CM

## 2020-02-21 PROCEDURE — G0108 DIAB MANAGE TRN  PER INDIV: HCPCS

## 2020-02-21 NOTE — Clinical Note
Just FYI- See note for Dexcom report. We increased her Novolog does to 11 units today. Tiffanie Mcfarland, KARLOS, LD, CDE

## 2020-02-21 NOTE — PROGRESS NOTES
"Diabetes Self-Management Education & Support    Presents for: CGM Review    SUBJECTIVE/OBJECTIVE:  Presents for: CGM Review  Accompanied by: Self  Diabetes education in the past 24mo: Yes  Focus of Visit: CGM, Problem Solving  Diabetes type: Type 1  Disease course: Worsening  Diabetes management related comments/concerns: Here for BG follow-up after starting on the Dexcom   Transportation concerns: Yes(gets rides or takes public tranportation)  Difficulty affording diabetes medication?: No  Difficulty affording diabetes testing supplies?: No  Other concerns:: Cognitive impairment  Cultural Influences/Ethnic Background:  American    Diabetes Symptoms & Complications:  Fatigue: No  Neuropathy: Yes(in feet)  Polydipsia: No  Polyphagia: No  Polyuria: No  Visual change: No  Slow healing wounds: Yes(a little cut near the toenails)  Symptom course: Stable  Complications assessed today?: No    Patient Problem List and Family Medical History reviewed for relevant medical history, current medical status, and diabetes risk factors.    Vitals:  Wt 90.3 kg (199 lb)   LMP 03/28/2014   BMI 34.97 kg/m    Estimated body mass index is 34.97 kg/m  as calculated from the following:    Height as of 3/20/19: 1.607 m (5' 3.25\").    Weight as of this encounter: 90.3 kg (199 lb).   Last 3 BP:   BP Readings from Last 3 Encounters:   01/08/20 132/74   12/18/19 (!) 181/95   10/21/19 (!) 169/100       History   Smoking Status     Never Smoker   Smokeless Tobacco     Never Used     Comment: lives in smoke free household.       Labs:  Lab Results   Component Value Date    A1C 11.8 01/08/2020     Lab Results   Component Value Date    GLC 84 07/12/2019     Lab Results   Component Value Date     10/02/2019     HDL Cholesterol   Date Value Ref Range Status   10/02/2019 60 >49 mg/dL Final   ]  GFR Estimate   Date Value Ref Range Status   07/12/2019 >90 >60 mL/min/[1.73_m2] Final     Comment:     Non  GFR Calc  Starting " Reasons for follow up:   1. IgG kappa multiple myeloma, currently on Darzalex, started on May 26, 2016. Patient was switched to Darzalex from Pomalyst, as she continued to be neutropenic with recurrent urinary tract infection while on Pomalyst.    2. Zometa is on hold due to renal insufficiency.    3. After 16 doses of Darzalex, laboratory study showed stable disease in November 2016. Insurance company denied adding Velcade and dexamethasone. Patient is to be continued on monthly Darzalex from 12/06/16.   4. Obstructive right pyelonephritis with positive urine culture for E. coli, required hospitalization from 1/19/2017 through 01/24/2017 with iv antibiotics and stent exchange on 01/20/2017.   5.  Paraprotein studies on March 27, 2017 showed further slight improvement of multiple myeloma.   6.  Febrile illness, with urinary tract infection and influenza B infection in early May 2017, required hospitalization for 6 days.   7.   Paraprotein studies for both serum and 24-hour urine sample on 7/21/2017 showed further improvement of paraproteins.   Darzalex was continued.   8.  Serum protein study reported stable disease on 10/20/2017.  Darzalex will be continued.   9.  Laboratory study on 1/18/2018 showed a further improvement of paraproteins.  Monthly Darzalex will be continued.           History of Present Illness:     She comes in today for triage visit due to feeling poorly.  She called in on 2/21/2018 reporting a temperature of 101.2-101.7.  She had a nonproductive cough at that time.  She denies into the urinary tract infection though had recently been treated for urinary tract infection with Ceftin near.  At that time Dr. Araujo placed her on Levaquin ×1 week.  She is over-the-counter cough syrups such as Robitussin.    After a couple of days of antibiotic she began to feel better though states at this time she still feels quite fatigued.  She denies shortness of breath.  She states that her cough is improving.   12/18/2018, serum creatinine based estimated GFR (eGFR) will be   calculated using the Chronic Kidney Disease Epidemiology Collaboration   (CKD-EPI) equation.       GFR Estimate If Black   Date Value Ref Range Status   07/12/2019 >90 >60 mL/min/[1.73_m2] Final     Comment:      GFR Calc  Starting 12/18/2018, serum creatinine based estimated GFR (eGFR) will be   calculated using the Chronic Kidney Disease Epidemiology Collaboration   (CKD-EPI) equation.       Lab Results   Component Value Date    CR 0.68 07/12/2019     No results found for: MICROALBUMIN    Healthy Eating:  Healthy Eating Assessed Today: Yes  Cultural/Rastafari diet restrictions?: No  Meal planning/habits: Avoiding sweets, Plate planning method  Meals include: Breakfast, Lunch, Dinner, Afternoon Snack, Evening Snack  Breakfast: 8:00 am - 9:00 am Low sugar Oatmeal with some coffee OR breakfast bowl   Lunch: 12-12:30, (1 on Tuesday) pasta salad OR egg salad sandwich   Dinner: 5:00 -6:00 pm: frozen meal or home cooked meal- hotdish   Snacks: Reeces peanut butter cups, unsweetened apple sauce  Beverages: Water, Coffee(Keep some juice on hand for lows)  Has patient met with a dietitian in the past?: Yes    Being Active:  Being Active Assessed Today: Yes(went on the bike at her apartment complex)  Exercise:: Currently not exercising  Barrier to exercise: Safety    Monitoring:  Monitoring Assessed Today: Yes  Blood Glucose Meter: CGM                Taking Medications:  Diabetes Medication(s)     Biguanides       metFORMIN (GLUCOPHAGE-XR) 500 MG 24 hr tablet    Take 4-tablets by mouth daily as directed    Insulin       insulin aspart (NOVOLOG FLEXPEN) 100 UNIT/ML SC pen    Inject 11 units with breakfast, 11 units with lunch and dinner and correction scale dose premeal as directed, TDD: approx 100 units     TRESIBA FLEXTOUCH 200 UNIT/ML pen    INJECT 36 UNITS UNDER THE SKIN DAILY        tresiba U 200 34 Units daily   novolog 10 Units with each  She denies recurrent fevers.  She denies burning with urination, blood in her urine, or trouble with her bowels.  She denies being knowingly exposed to the flu.    Past Medical History, Past Surgical History, Social History, Family History have been reviewed and are without significant changes except as mentioned.      Hematology/oncology history: See separate document.       On December 6, 2016, serum free lambda chain 1090 MG/L, free kappa chain 8.5 to MG/L.  Ferritin 1015, iron 99, TIBC 137 iron saturation 72%.     On January 4, 2017, vitamin B12 level 1289, folate 7.7 ng/mL. SPEP reporting gamma globulin 3.3, out of total globulin 5.0, with immunofixation reporting small quantity of monoclonal free lambda chain, plus monoclonal IgG lambda at 3.2 g/DL.  Serum IgG 4075, IgA 9 and IgM 15.  free lambda chain 1339 MG/L and free kappa chain 10.3 MG/L.      Laboratory study March 27, 2017 showed slight improvement of paraprotein, SPEP reporting M spike 2.8 g/DL, out of total globulin 4.3, and the serum IgG 3420, IgA 9, IgM 11, free lambda chain 1218 MG/L and free kappa chain 8.0 MG/L.  Total 24 hour urine sample reported at free lambda chain 142 mg, at a concentration 74.8 MG/L, free kappa chain 75 mg at a concentration 39.5 MG/L., Urine protein immunofixation reporting biclonal IgG lambda and monoclonal free lambda light chain, M spike #1 at 41.5% and monoclonal IgG lambda spike #2 at 10.5%. Serum beta-2 microglobulin is 7.3 MG/L.     Her laboratory study on 7/21/2017 reported further improvement of paraprotein is both serum and a 24-hour urine sample.  SPEP reporting monoclonal IgG lambda 2.9 g/dL and free lambda chain 0.1 g/dL.  Serum IgG was 3261 mg/dL and IgA 5, IgM 13, free kappa chain 6.7, free lambda chain 701.3 with kappa/lambda ratio 0.01.  24-hour urine sample reported positive for Bence May protein lambda type, immunofixation positive for IgG lambda.  Total urine protein 241 mg, gamma globulin 13.1%.   Hemoglobin was 11.4 .4, platelets 122,000, WBC 6680 including neutrophils 3600, lymphocytes 2100 and monocytes 650.  Her creatinine was 1.68, at baseline level.  Liver function panel unremarkable.  Darzalex was continued.    Laboratory study on 8/25/2017 showed improved the platelets at 144,000, normal WBC 6660 and slightly improved hemoglobin at 11.7.     Patient had a colonoscopy examination on 8/30/2017 by Dr. Rivera.  It reported diverticulosis in multiple areas more severe in the sigmoid colon.  There was 2 polyps 4 mm pneumonia from transverse colon and the sigmoid colon.  Pathology evaluation reported tubular adenoma from the sigmoid colon polyp, and benign hyperplastic polyp from transverse colon.    Laboratory study on 10/20/2017 reported slightly improved monoclonal spike 2.7 g/dL by SPEP, immunofixation reported positive monoclonal IgG lambda, serum IgG 3536 mg/dL, IgA less than 5 and IgM 11, free kappa chain 5.3 mg/L, and free lambda chain 720 mg/L with kappa/lambda ratio 0.01.  Serum beta-2 microglobulin was 6.5 mg/L.   Her CBC showed a stable mild anemia with hemoglobin 11.3, macrocytosis .3, and the platelets 114,000, normal WBC 7070 including neutrophils 4100 lymphocytes 2000 monocytes 650, normal liver function panel, total protein 7.9 and albumin 3.2,  and normal electrolytes including calcium 8.1, and improved creatinine 1.59.       Review of Systems    Constitutional: Negative for chills and fever currently. See history of present illness  HENT: Negative for mouth sores and sore throat.    Eyes: Negative for visual disturbance.    Respiratory: No hemoptysis no short of breath.  Resolving cough.  Gastrointestinal: Negative for abdominal pain, diarrhea, nausea and vomiting.    Genitourinary: No dysuria or hematuria     Musculoskeletal: Negative for back pain and joint swelling.    Neurological: Negative for dizziness.   Hematological: Does not bruise/bleed easily.  meal                               mg/dl              No correction                          151-200 mg/dl             +3 units                          201-250 mg/dl             +5 units                          251-300 mg/dl             +7 units                          301-350 mg/dl             +9 units                          350-400 mg/dl             +12 units                          401-450 mg/dl             +15 units and call physician     -If you are NOT eating, check glucose every four hours and give Novolo-150 mg/dl              No correction                          151-200 mg/dl             +3 units                          201-250 mg/dl             +5 units                          251-300 mg/dl             +7 units                          301-350 mg/dl             +9 units                          350-400 mg/dl             +12 units                          401-450 mg/dl             +15 units and call physician    Current Treatments: Insulin Injections  Given by: Patient  Injection/Infusion sites: Abdomen    Problem Solving:  Problem Solving Assessed Today: Yes  Is the patient at risk for hypoglycemia?: Yes  Hypoglycemia Frequency: Rarely  Hypoglycemia Treatment: Glucose (tablets or gel), Other food  Patient carries a carbohydrate source: Yes  Medical ID: Yes  Is the patient at risk for DKA?: Yes  Does patient have ketone test strips?: Yes  Does patient have DKA prevention plan?: Yes  Does patient have severe weather/disaster plan for diabetes management?: No  Does patient have sick day plan for diabetes management?: Yes    Hypoglycemia symptoms  Confusion: No  Dizziness or Light-Headedness: No  Headaches: No  Hunger: No  Mood changes: No  Nervousness/Anxiety: No  Sleepiness: No  Speech difficulty: Yes  Sweats: Yes  Feeling shaky: Yes    Hypoglycemia Complications  Blackouts: No  Hospitalization: No  Nocturnal hypoglycemia: Yes  Required assistance: No  Seizures:  "   Psychiatric/Behavioral: The patient is not nervous/anxious.        Medications: The current medication list was reviewed in the EMR.       ALLERGIES: Zosyn       Vitals:    02/27/18 1554   BP: 112/82   Pulse: 76   Resp: 12   Temp: 97.6 °F (36.4 °C)   TempSrc: Oral   SpO2: 99%   Weight: 87.7 kg (193 lb 6.4 oz)   Height: 157.5 cm (62.01\")   PainSc: 0-No pain   PS: ECOG 1      Physical Exam   Constitutional: well-developed and well-nourished female. No distress.    HENT:    Head: Normocephalic.    Eyes: EOMs are normal. No jaundice.    Neck: Normal range of motion.    Cardiovascular: Normal rate, regular rhythm, normal heart sounds and normal pulses.    Pulmonary/Chest: Lungs clear to auscultation bilaterally, no wheezes, no rales.  Mediport benign.  Abdominal: Soft. Bowel sounds are normal. no distension and no mass. There is no hepatosplenomegaly. There is no tenderness.   Musculoskeletal: Normal range of motion. no edema or deformity.   Lymphadenopathy: She has no cervical, supraclavicular adenopathy.   Neurological: alert and oriented to person, place, and time. No cranial nerve deficit.    Skin: Skin is warm and dry. No rash noted. No cyanosis. No pallor.   Psychiatric: She has normal mood and affect.         Recent lab results:   Lab Results   Component Value Date    WBC 4.52 02/27/2018    HGB 11.7 02/27/2018    HCT 37.2 02/27/2018    .2 (H) 02/27/2018     (L) 02/27/2018     Lab Results   Component Value Date    NEUTROABS 1.97 02/27/2018     Lab Results   Component Value Date    GLUCOSE 98 02/27/2018    BUN 21 (H) 02/27/2018    CREATININE 1.59 (H) 02/27/2018    EGFRIFNONA  08/30/2016      Comment:      <15 Indicative of kidney failure.    EGFRIFAFRI 39 (L) 02/27/2018    BCR 13.2 02/27/2018    K 4.6 02/27/2018    CO2 21.9 (L) 02/27/2018    CALCIUM 7.9 (L) 02/27/2018    PROTENTOTREF 8.0 01/12/2018    ALBUMIN 3.20 (L) 02/09/2018    LABIL2 0.7 02/09/2018    AST 15 02/09/2018    ALT 13 02/09/2018 " No    Reducing Risks:  Reducing Risks Assessed Today: No  CAD Risks: Diabetes Mellitus  Has dilated eye exam at least once a year?: Yes  Sees dentist every 6 months?: Yes  Feet checked by healthcare provider in the last year?: Yes    Healthy Coping:  Informal Support system:: Family, Parent  Patient Activation Measure Survey Score:  KHUSHI Score (Last Two) 3/7/2012   KHUSHI Raw Score 39   Activation Score 56.4   KHUSHI Level 3       Diabetes knowledge and skills assessment:   Patient is knowledgeable in diabetes management concepts related to: Monitoring    Patient needs further education on the following diabetes management concepts: Healthy Eating, Being Active, Monitoring, Taking Medication, Problem Solving, Reducing Risks and Healthy Coping    Based on learning assessment above, most appropriate setting for further diabetes education would be: Individual setting.      INTERVENTIONS:  Education provided today on:  AADE Self-Care Behaviors:  Diabetes Pathophysiology  Monitoring: purpose, log and interpret results and individual blood glucose targets  Taking Medication: action of prescribed medication and when to take medications  Problem Solving: high blood glucose - causes, signs/symptoms, treatment and prevention, low blood glucose - causes, signs/symptoms, treatment and prevention and carrying a carbohydrate source at all times    Pt verbalized understanding of concepts discussed and recommendations provided today.       Education Materials Provided:  Hypoglycemia Signs and Symptoms    ASSESSMENT:  Lexy had a pattern of lows in the late morning/midday with a subsequent BG spike. When her BG is low before breakfast or lunch, she does not take her Novolog.  We discussed a plan today of treating the low and then once her BG is above 70 mg/dl, take Novolog and eat meal. We discuss that she needs more meal time insulin.  She does state that she is taking 10 units of novolog plus using the sliding scale for correction.      Sodium   Date Value Ref Range Status   02/27/2018 140 134 - 145 mmol/L Final     Potassium   Date Value Ref Range Status   02/27/2018 4.6 3.5 - 4.7 mmol/L Final     Total Bilirubin   Date Value Ref Range Status   02/09/2018 0.3 0.1 - 1.2 mg/dL Final     Alkaline Phosphatase   Date Value Ref Range Status   02/09/2018 55 39 - 117 U/L Final   ]    Assessment/Plan   1. Multiple myeloma, IgG lambda, stage III. Failed multiple lines of therapy. Her treatment was switched to Darzalex on 5/26/2016. She tolerated therapy very well. Her laboratory serum study on 1/12/2018 and 24-hour urine study on 1/18/2018 showed further improvement of serum IgG and the free lambda chain, together with 24-hour urine free light chain quantitation as I carefully reviewed her multiple laboratory studies in the past 18 months. We reommended continuing Darzalex treatment monthly.  Plan to repeat both 24 hour urine and also serum paraprotein studies in 2.5 months for reassessment.      2. Zometa treatment. She has stage III chronic kidney disease, and her creatinine has slight improvement.  She was off Zometa for about 18 months and then resumed at a decreased dose of 3 mg in August 2017.  We'll continue every 3 months Zometa treatment.         3.  Suspected urinary tract infection/pyelonephritis.  The patient was given Rocephin 2 g in the office followed by 10 days of Cefdinir 300 mg twice daily at her visit on 2/9/20 8T by Dr. Araujo.  We will recheck a urinalysis today she is feeling fatigued, to make sure that section has cleared.      4.  Macrocytic anemia secondary to chemotherapy for multiple myeloma and chronic renal insufficiency.  She has relatively stable hemoglobin, however with worsening macrocytosis.  Laboratory study on 1/12/2018 showed in no evidence of deficiency of folic acid or vitamin B12 level.  She also had iron panel, fits with inflammatory condition.        5. Mild to moderate thrombocytopenia secondary to her multiple  Discussed increasing Novolog dose to 11 units plus sliding scale before meals.     She has really liked the Dexcom, but has had a hard time with the sensors falling off. Recommended patient contact Dexcom for the over tape, gave patient a sample today.     .   Glucose Patterns & Trends:  Weekday Daytime hyperglycemia and Weekend Daytime hyperglycemia      PLAN  Increase Novolog dose to 11 units before meals plus sliding scale    Work on correcting lows before meals, and then taking Novolog and eating the meal.    See Patient Instructions for co-developed, patient-stated behavior change goals.  AVS printed and provided to patient today. See Follow-Up section for recommended follow-up.    Tiffanie Mcfarland, RD, LD, CDE  Time Spent: 45 minutes  Encounter Type: Individual    Any diabetes medication dose changes were made via the CDE Protocol and Collaborative Practice Agreement with the patient's referring provider. A copy of this encounter was shared with the provider.     myeloma and chemotherapy.  No easy bleeding or bruising.      6. History of stage II left breast cancer, post mastectomy in 2013, negative for ER/IL and positive HER-2. No neoadjuvant chemotherapy because of concurrent discovery of multiple myeloma and ongoing chemotherapy. She had a normal mammogram study in July 2017.       7.  Right middle lobe pulmonary nodule, documented on CT scan of chest on 01/06/2017. Repeated CT scan of chest on 8/21/2017 reported a small 5 mm and stable primary nodule.      8.  Chronic renal insufficiency stage III.  Her creatinine is actually improved as compared to previous.    9.  Increased fatigue and feeling sluggish.  The patient states she just feels overall tired since her febrile illness a couple of weeks ago.  At that time she had completed antibiotics for her urinary tract infection as discussed above.  She was therefore given Levaquin ×1 week which she has completed.  Her symptoms of cough and fever have improved however she now just feels tired and sluggish.  This is discussed with Dr. Araujo today.  We will test the patient for the flu as well as do a repeat urinalysis to make sure her urinary tract infection is clear.  We did run a BMP that was unremarkable.  I will asked the patient return to the office tomorrow for 500 mL normal saline.  We will await further results of testing.    Plan:  1.  Urinalysis and influenza testing today.   2.  500 mL normal saline in the office tomorrow.  4.  Review preliminary testing tomorrow and notify the patient accordingly.  4.  Patient will return in 2 WEEKS for M.D. Reevaluation, labs per protocol and Darzalex treatment.         Addendum: Influenza testing was negative and the patient was notified.  Urine culture showed greater than 100,000 mixed silas, this was forwarded to Dr. Everett, urology.  Her last ureteral stent was changed 1/29/2018.  The patient was notified and is currently asymptomatic.  She states she does feel somewhat  better than when seen on Monday.  She is having no urinary symptoms aside from some intermittent low back discomfort.

## 2020-02-21 NOTE — PATIENT INSTRUCTIONS
When your blood sugar is LOW BEFORE A MEAL-   1) Treat your low blood sugar with a little bit of carbohydrates - 1/2 cup of juice or regular pop, 1/2 poptart, 3-4 glucose tablets   2) Watch your Dexcom  until your blood sugar goes above 70  3) Once your blood sugar is above 70, take your Novolog insulin and eat your meal like normal    Increase your Novolog dose to 11 units before your meals and continue using the sliding scale:   mg/dl              No correction                          151-200 mg/dl             +3 units                          201-250 mg/dl             +5 units                          251-300 mg/dl             +7 units                          301-350 mg/dl             +9 units                          350-400 mg/dl             +12 units                          401-450 mg/dl             +15 units and call physician     -If you are NOT eating, check glucose every four hours and give Novolo-150 mg/dl              No correction                          151-200 mg/dl             +3 units                          201-250 mg/dl             +5 units                          251-300 mg/dl             +7 units                          301-350 mg/dl             +9 units                          350-400 mg/dl             +12 units                          401-450 mg/dl             +15 units and call physician    Keep taking 36 units of Tresiba    Tiffanie Mcfarland, RD, LD, CDE

## 2020-03-04 ENCOUNTER — OFFICE VISIT (OUTPATIENT)
Dept: PODIATRY | Facility: CLINIC | Age: 60
End: 2020-03-04
Payer: MEDICARE

## 2020-03-04 VITALS
HEART RATE: 118 BPM | DIASTOLIC BLOOD PRESSURE: 85 MMHG | SYSTOLIC BLOOD PRESSURE: 165 MMHG | HEIGHT: 65 IN | OXYGEN SATURATION: 98 % | WEIGHT: 202.7 LBS | BODY MASS INDEX: 33.77 KG/M2

## 2020-03-04 DIAGNOSIS — B35.1 DERMATOPHYTOSIS OF NAIL: ICD-10-CM

## 2020-03-04 DIAGNOSIS — L60.2 ONYCHAUXIS: Primary | ICD-10-CM

## 2020-03-04 DIAGNOSIS — L84 TYPE 1 DIABETES MELLITUS WITH PRESSURE CALLUS (H): ICD-10-CM

## 2020-03-04 DIAGNOSIS — E10.42 DM TYPE 1 WITH DIABETIC PERIPHERAL NEUROPATHY (H): ICD-10-CM

## 2020-03-04 DIAGNOSIS — E10.628 TYPE 1 DIABETES MELLITUS WITH PRESSURE CALLUS (H): ICD-10-CM

## 2020-03-04 DIAGNOSIS — L84 TYLOMA: ICD-10-CM

## 2020-03-04 PROCEDURE — 99213 OFFICE O/P EST LOW 20 MIN: CPT | Performed by: PODIATRIST

## 2020-03-04 ASSESSMENT — MIFFLIN-ST. JEOR: SCORE: 1492.38

## 2020-03-04 NOTE — PROGRESS NOTES
Past Medical History:   Diagnosis Date     Cerumen impacted      Development delay      Diabetic gastroparesis (H) 8/16/2013     Glaucoma (increased eye pressure)      Hyperlipaemia      Hypertension      Hypothyroid      Mental retardation      Nonsenile cataract      Obesity      Type 1 diabetes mellitus not at goal (H)      Patient Active Problem List   Diagnosis     Development delay     Overactive bladder     GERD (gastroesophageal reflux disease)     Hypertension goal BP (blood pressure) < 140/90     Hyperlipidemia LDL goal <100     Pain in joint, lower leg     Proteinuria     Vitamin D deficiency     Health Care Home     History of strabismus surgery     Type 1 diabetes mellitus with other specified complication (H)     Advanced directives, counseling/discussion     Primary open angle glaucoma of both eyes, moderate stage     Gastroesophageal reflux disease without esophagitis     Obesity, unspecified obesity severity, unspecified obesity type     Acute renal failure (H)     Acute retention of urine     JEAN (acute kidney injury) (H)     Dehydration     Diabetic ketoacidosis without coma associated with type 1 diabetes mellitus (H)     DKA (diabetic ketoacidoses) (H)     Sinus tachycardia     SIRS (systemic inflammatory response syndrome) (H)     Obesity (BMI 35.0-39.9) with comorbidity (H)     Past Surgical History:   Procedure Laterality Date     C EYE SURG ANT SGMT PROC UNLISTED  remote    strabismus surgery     STRABISMUS SURGERY       Social History     Socioeconomic History     Marital status: Single     Spouse name: Not on file     Number of children: Not on file     Years of education: Not on file     Highest education level: Not on file   Occupational History     Not on file   Social Needs     Financial resource strain: Not on file     Food insecurity:     Worry: Not on file     Inability: Not on file     Transportation needs:     Medical: Not on file     Non-medical: Not on file   Tobacco Use      Smoking status: Never Smoker     Smokeless tobacco: Never Used     Tobacco comment: lives in smoke free household.   Substance and Sexual Activity     Alcohol use: Yes     Comment: occass social     Drug use: No     Sexual activity: Never   Lifestyle     Physical activity:     Days per week: Not on file     Minutes per session: Not on file     Stress: Not on file   Relationships     Social connections:     Talks on phone: Not on file     Gets together: Not on file     Attends Yarsanism service: Not on file     Active member of club or organization: Not on file     Attends meetings of clubs or organizations: Not on file     Relationship status: Not on file     Intimate partner violence:     Fear of current or ex partner: Not on file     Emotionally abused: Not on file     Physically abused: Not on file     Forced sexual activity: Not on file   Other Topics Concern     Parent/sibling w/ CABG, MI or angioplasty before 65F 55M? No   Social History Narrative     Not on file     Family History   Problem Relation Age of Onset     Hypertension Father      Diabetes Sister      Glaucoma Maternal Grandfather      Cancer No family hx of      Cerebrovascular Disease No family hx of      Thyroid Disease No family hx of      Macular Degeneration No family hx of      Lab Results   Component Value Date    A1C 11.8 01/08/2020    A1C 11.8 05/19/2019    A1C 11.2 12/12/2018    A1C 12.4 09/05/2018    A1C 10.6 03/14/2018     Last Comprehensive Metabolic Panel:  Sodium   Date Value Ref Range Status   07/12/2019 141 133 - 144 mmol/L Final     Potassium   Date Value Ref Range Status   07/12/2019 4.2 3.4 - 5.3 mmol/L Final     Chloride   Date Value Ref Range Status   07/12/2019 110 (H) 94 - 109 mmol/L Final     Carbon Dioxide   Date Value Ref Range Status   07/12/2019 20 20 - 32 mmol/L Final     Anion Gap   Date Value Ref Range Status   07/12/2019 11 3 - 14 mmol/L Final     Glucose   Date Value Ref Range Status   07/12/2019 84 70 - 99 mg/dL  Final     Comment:     Non Fasting     Urea Nitrogen   Date Value Ref Range Status   07/12/2019 21 7 - 30 mg/dL Final     Creatinine   Date Value Ref Range Status   07/12/2019 0.68 0.52 - 1.04 mg/dL Final     GFR Estimate   Date Value Ref Range Status   07/12/2019 >90 >60 mL/min/[1.73_m2] Final     Comment:     Non  GFR Calc  Starting 12/18/2018, serum creatinine based estimated GFR (eGFR) will be   calculated using the Chronic Kidney Disease Epidemiology Collaboration   (CKD-EPI) equation.       Calcium   Date Value Ref Range Status   07/12/2019 9.4 8.5 - 10.1 mg/dL Final     SUBJECTIVE FINDINGS:  A 59-year-old female returns to clinic for callus and toenails.  She relates the callus on the left foot has been bothering her.  It is painful.  She relates relief with having it trimmed down.  She relates she is diabetic.  She relates to numbness, tingling and neuropathy in her feet.  She relates no ulcers or sores since we have seen her last.  She relates she has decided not to get diabetic shoes.  She relates no other specific relieving or aggravating factors.  She relates her nails bother her at times as well but better.      OBJECTIVE FINDINGS:  DP and PT are 2/4 bilaterally.  She has decreased ankle joint dorsiflexion bilaterally.  She has dorsally contracted digits bilaterally.  She has nucleated hyperkeratotic tissue buildup, plantar first MPJ on the left.  She has hyperkeratotic tissue buildup to the left second toe, mildly on the distal right second toe.  There is no erythema, no drainage, no odor, no calor bilaterally.  She has some mild peripheral edema bilaterally.  She has decreased hair growth bilaterally.  Sharp/dull is decreased with 5.07 [place]-Latha monofilament bilaterally.  Deep tendon reflexes are intact bilaterally.  She has incurvated nails with some thick dystrophy, discoloration and subungual debris to differing degrees bilaterally.      ASSESSMENT AND PLAN:  Onychomycosis  bilaterally.  Tylomas, left foot.  She is diabetic with peripheral neuropathy and vascular disease.  Diagnosis and options discussed with her.  All the nails were reduced or debrided bilaterally upon consent.  The tylomas on the left foot were sharp debrided with a tissue cutter and a #15 blade upon consent.  Two of them were debrided.  She will return to clinic and see me in 2-3 months.

## 2020-03-04 NOTE — NURSING NOTE
"Lexy Fannykayla Rockwell's chief complaint for this visit includes:  Chief Complaint   Patient presents with     RECHECK     foot/toenail care     PCP: Fredi Fuentes    Referring Provider:  No referring provider defined for this encounter.    BP (!) 178/87 (BP Location: Left arm, Patient Position: Sitting, Cuff Size: Adult Regular)   Pulse 118   Ht 1.638 m (5' 4.5\")   Wt 91.9 kg (202 lb 11.2 oz)   LMP 03/28/2014   SpO2 98%   BMI 34.26 kg/m    Data Unavailable     Do you need any medication refills at today's visit? No    Xiomara Nieto CMA        "

## 2020-03-04 NOTE — LETTER
3/4/2020         RE: Lexy Rockwell  [address]  [facility] [address]        Dear Colleague,    Thank you for referring your patient, Lexy Rockwell, to the [facility]. Please see a copy of my visit note below.    Past Medical History:   Diagnosis Date     Cerumen impacted      Development delay      Diabetic gastroparesis (H) 8/16/2013     Glaucoma (increased eye pressure)      Hyperlipaemia      Hypertension      Hypothyroid      Mental retardation      Nonsenile cataract      Obesity      Type 1 diabetes mellitus not at goal (H)      Patient Active Problem List   Diagnosis     Development delay     Overactive bladder     GERD (gastroesophageal reflux disease)     Hypertension goal BP (blood pressure) < 140/90     Hyperlipidemia LDL goal <100     Pain in joint, lower leg     Proteinuria     Vitamin D deficiency     Health Care Home     History of strabismus surgery     Type 1 diabetes mellitus with other specified complication (H)     Advanced directives, counseling/discussion     Primary open angle glaucoma of both eyes, moderate stage     Gastroesophageal reflux disease without esophagitis     Obesity, unspecified obesity severity, unspecified obesity type     Acute renal failure (H)     Acute retention of urine     JEAN (acute kidney injury) (H)     Dehydration     Diabetic ketoacidosis without coma associated with type 1 diabetes mellitus (H)     DKA (diabetic ketoacidoses) (H)     Sinus tachycardia     SIRS (systemic inflammatory response syndrome) (H)     Obesity (BMI 35.0-39.9) with comorbidity (H)     Past Surgical History:   Procedure Laterality Date     C EYE SURG ANT SGMT PROC UNLISTED  remote    strabismus surgery     STRABISMUS SURGERY       Social History     Socioeconomic History     Marital status: Single     Spouse name: Not on file     Number of children: Not on file     Years of education: Not on file     Highest education level: Not on  file   Occupational History     Not on file   Social Needs     Financial resource strain: Not on file     Food insecurity:     Worry: Not on file     Inability: Not on file     Transportation needs:     Medical: Not on file     Non-medical: Not on file   Tobacco Use     Smoking status: Never Smoker     Smokeless tobacco: Never Used     Tobacco comment: lives in smoke free household.   Substance and Sexual Activity     Alcohol use: Yes     Comment: occass social     Drug use: No     Sexual activity: Never   Lifestyle     Physical activity:     Days per week: Not on file     Minutes per session: Not on file     Stress: Not on file   Relationships     Social connections:     Talks on phone: Not on file     Gets together: Not on file     Attends Islam service: Not on file     Active member of club or organization: Not on file     Attends meetings of clubs or organizations: Not on file     Relationship status: Not on file     Intimate partner violence:     Fear of current or ex partner: Not on file     Emotionally abused: Not on file     Physically abused: Not on file     Forced sexual activity: Not on file   Other Topics Concern     Parent/sibling w/ CABG, MI or angioplasty before 65F 55M? No   Social History Narrative     Not on file     Family History   Problem Relation Age of Onset     Hypertension Father      Diabetes Sister      Glaucoma Maternal Grandfather      Cancer No family hx of      Cerebrovascular Disease No family hx of      Thyroid Disease No family hx of      Macular Degeneration No family hx of      Lab Results   Component Value Date    A1C 11.8 01/08/2020    A1C 11.8 05/19/2019    A1C 11.2 12/12/2018    A1C 12.4 09/05/2018    A1C 10.6 03/14/2018     Last Comprehensive Metabolic Panel:  Sodium   Date Value Ref Range Status   07/12/2019 141 133 - 144 mmol/L Final     Potassium   Date Value Ref Range Status   07/12/2019 4.2 3.4 - 5.3 mmol/L Final     Chloride   Date Value Ref Range Status    07/12/2019 110 (H) 94 - 109 mmol/L Final     Carbon Dioxide   Date Value Ref Range Status   07/12/2019 20 20 - 32 mmol/L Final     Anion Gap   Date Value Ref Range Status   07/12/2019 11 3 - 14 mmol/L Final     Glucose   Date Value Ref Range Status   07/12/2019 84 70 - 99 mg/dL Final     Comment:     Non Fasting     Urea Nitrogen   Date Value Ref Range Status   07/12/2019 21 7 - 30 mg/dL Final     Creatinine   Date Value Ref Range Status   07/12/2019 0.68 0.52 - 1.04 mg/dL Final     GFR Estimate   Date Value Ref Range Status   07/12/2019 >90 >60 mL/min/[1.73_m2] Final     Comment:     Non  GFR Calc  Starting 12/18/2018, serum creatinine based estimated GFR (eGFR) will be   calculated using the Chronic Kidney Disease Epidemiology Collaboration   (CKD-EPI) equation.       Calcium   Date Value Ref Range Status   07/12/2019 9.4 8.5 - 10.1 mg/dL Final     SUBJECTIVE FINDINGS:  A 59-year-old female returns to clinic for callus and toenails.  She relates the callus on the left foot has been bothering her.  It is painful.  She relates relief with having it trimmed down.  She relates she is diabetic.  She relates to numbness, tingling and neuropathy in her feet.  She relates no ulcers or sores since we have seen her last.  She relates she has decided not to get diabetic shoes.  She relates no other specific relieving or aggravating factors.  She relates her nails bother her at times as well but better.      OBJECTIVE FINDINGS:  DP and PT are 2/4 bilaterally.  She has decreased ankle joint dorsiflexion bilaterally.  She has dorsally contracted digits bilaterally.  She has nucleated hyperkeratotic tissue buildup, plantar first MPJ on the left.  She has hyperkeratotic tissue buildup to the left second toe, mildly on the distal right second toe.  There is no erythema, no drainage, no odor, no calor bilaterally.  She has some mild peripheral edema bilaterally.  She has decreased hair growth bilaterally.   Sharp/dull is decreased with 5.07 [place]-Latha monofilament bilaterally.  Deep tendon reflexes are intact bilaterally.  She has incurvated nails with some thick dystrophy, discoloration and subungual debris to differing degrees bilaterally.      ASSESSMENT AND PLAN:  Onychomycosis bilaterally.  Tylomas, left foot.  She is diabetic with peripheral neuropathy and vascular disease.  Diagnosis and options discussed with her.  All the nails were reduced or debrided bilaterally upon consent.  The tylomas on the left foot were sharp debrided with a tissue cutter and a #15 blade upon consent.  Two of them were debrided.  She will return to clinic and see me in 2-3 months.           Again, thank you for allowing me to participate in the care of your patient.        Sincerely,        Ravin Back DPM

## 2020-03-04 NOTE — PATIENT INSTRUCTIONS
Thanks for coming today.  [facility]  [address] [place] [address]    To schedule future appointments in Ortho Clinic, you may call [phone].    To schedule ordered imaging by your provider:   Call [facility] [phone]    To schedule an injection ordered by your provider:  Call [facility] Injection scheduling line: [phone]  [facility] [employer] available online at:  [email or web address]    Please call if any further questions or concerns ([phone]).  Clinic hours 8 am to 5 pm.    Return to clinic (call) if symptoms worsen or fail to improve.

## 2020-03-18 ENCOUNTER — TELEPHONE (OUTPATIENT)
Dept: EDUCATION SERVICES | Facility: CLINIC | Age: 60
End: 2020-03-18

## 2020-03-18 NOTE — TELEPHONE ENCOUNTER
Called patient to let her know we will have to cancel our upcoming diabetes appointment, but discussed working out a plan to have her send in her Dexcom reports.  Lexy thinks that her PCA, Renée would be able to help her download her Dexcom  and send writer the reports.  Lexy gave writer permission to call Renée to provide information for downloading the Dexcom  and sending it in.     Tiffanie Mcfarland, KARLOS, LD, CDE

## 2020-04-03 ENCOUNTER — VIRTUAL VISIT (OUTPATIENT)
Dept: ENDOCRINOLOGY | Facility: CLINIC | Age: 60
End: 2020-04-03
Payer: MEDICARE

## 2020-04-03 DIAGNOSIS — E10.69 TYPE 1 DIABETES MELLITUS WITH OTHER SPECIFIED COMPLICATION (H): Primary | ICD-10-CM

## 2020-04-03 DIAGNOSIS — I10 ESSENTIAL HYPERTENSION WITH GOAL BLOOD PRESSURE LESS THAN 140/90: ICD-10-CM

## 2020-04-03 DIAGNOSIS — E78.5 DYSLIPIDEMIA: ICD-10-CM

## 2020-04-03 DIAGNOSIS — R80.9 MICROALBUMINURIA: ICD-10-CM

## 2020-04-03 PROCEDURE — 99443 ZZC PHYSICIAN TELEPHONE EVALUATION 21-30 MIN: CPT | Performed by: INTERNAL MEDICINE

## 2020-04-03 NOTE — Clinical Note
Very frustrating case. She has no idea how to upload the DEXCOM and she has told her PCA to stop coming in. Sounds like issues are similar to when you saw her last. Can you contact her PCA or mother to help them get her set up with Clarity? We are completely in the dark otherwise. PCA's # is in my note.     Thank you,   Alfa Goss MD on 4/3/2020 at 9:59 AM

## 2020-04-03 NOTE — PROGRESS NOTES
"Subjective     Lexy Rockwell is a 59 year old female who is being evaluated via a billable telephone visit.      The patient has been notified of following:     \"This telephone visit will be conducted via a call between you and your physician/provider. We have found that certain health care needs can be provided without the need for a physical exam.  This service lets us provide the care you need with a short phone conversation.  If a prescription is necessary we can send it directly to your pharmacy.  If lab work is needed we can place an order for that and you can then stop by our lab to have the test done at a later time.    If during the course of the call the physician/provider feels a telephone visit is not appropriate, you will not be charged for this service.\"     Patient has given verbal consent for Telephone visit?  Yes    Lexy Rockwell complains of   Chief Complaint   Patient presents with     Follow Up     3 mo        ALLERGIES  Amoxicillin and Sulfa drugs    Reviewed and updated as needed this visit by Provider       Patient presents for follow up DM.   She was hospitalized for DKA on 9/13/19.  She had GI upset and missed a \"couple days\" of medications.      She did not get the DEXCOM. [facility] states [facility] never [facility] patient but did contact the mother.   I checked and the phone number in the computer is correct.   Mother got a call they needed more information. Then asked to have the patients case put on hold as she does not know if she needs it.      tresiba U 200 34 Units daily   novolog 10 Units with each meal                               mg/dl              No correction                          151-200 mg/dl             +3 units                          201-250 mg/dl             +5 units                          251-300 mg/dl             +7 units                          301-350 mg/dl             +9 units                          350-400 mg/dl             +12 " "units                          401-450 mg/dl             +15 units and call physician     -If you are NOT eating, check glucose every four hours and give Novolo-150 mg/dl              No correction                          151-200 mg/dl             +3 units                          201-250 mg/dl             +5 units                          251-300 mg/dl             +7 units                          301-350 mg/dl             +9 units                          350-400 mg/dl             +12 units                          401-450 mg/dl             +15 units and call physician    Unfortunately, neither patient nor her mother know how to use the Clarity application.   CDE instructed patient to increase novolog to 11 Units with meals in 2020. Patient forgot and continues to take 10 units as her base dose.   At last CDE visit, pattern of AM lows and then not giving any insulin for breakfast leading to highs. We reviewed this data again today and asked about changes to her pattern.   Today, states she will treat the low and then \"later\" take insulin. She does though confirm she is giving insulin with breakfast daily.     Notes continued pattern of HS highs.   Having AM lows, <70, once waking her from sleep.   However, she cannot quantify for me how much this occurring.   Thinks slightly less often than at CDE visit on 2020.     If she misses a dose of insulin, it is usually her lunch dose.     Dinner is a frozen meal, pasta, hot dish.     She eats a snack before bed almost every night.     She has asked her PCA to stop visiting  COVID.     Review of Systems   10 point ROS negative aside from above.        Objective   Reported vitals:  LMP 2014    healthy, alert and no distress  Psych: Alert and oriented times 3; coherent speech, normal   rate and volume, able to articulate logical thoughts, able   to abstract reason, no tangential thoughts, no hallucinations   or delusions  Her " affect is normal.     Diagnostic Test Results:  Labs reviewed in Epic        Assessment/Plan:  Type 1 DM - Low c-peptide and positive SHARI documented in 3/2018.   Her case is complicated by chronic N/V. She follows with [facility], Dr Melendez.   She will hold her insulin if she is nauseous and not eating.   Her DM is uncontrolled with frequent DKA. We discussed possible CGM use.   In 10/2019, admitted for DKA again since last visit. HbA1C 11.5%.   Assisted living has been discussed with the patient by hospital and her primary care.   Mother placed DEXCOM fill on hold. Mother told me she does not want to be on cloud sharing if patient gets DEXCOM.   Frankly, I think the mother is the one who is overwhelmed and not the patient. Told her point blank we need to change how we are doing things or she will continue to have DKA episodes and possibly death.   In 1/2020, needs to check more. She has good support at home and is working to make smarter food choices. Snacks before bed most nights. Does this out of habit to avoid lows.   In 4/2020, no DEXCOM data to review. Discussed CDE note from 2/21/2020. Very difficult to get a clear understanding. Specifically, I am concerned about her AM hypoglycemia. Sounds like she needs more insulin with dinner and less basal insulin. Unfortunately, she has asked her PCA to stop visiting 2/2 COVID.   -Lower tresiba from 34 to 32 Units.   -Complete HbA1C lab.   -I will ask Tiffanie to reach out to you to help set up Clarity. Patient's PCA, Renée, [phone].   -If we cannot help her to upload from home, we might have to have her come in for a manual download.   -ASA taking.   -BP: controlled.   -Lipids: Controlled other than  in 10/2019. Statin taking simvastatin.   -Microalbumin 75 in 10/2019. ACEi taking lisinopril.   -TSH: Subclinical hypothyroidism in 1/2020. Will follow.   -Eyes: treatment for glaucoma. Last exam 6/2019.   -Smoking: none.        Phone call duration:  27  minutes    Alfa Goss MD on 4/3/2020 at 9:59 AM    Due to the COVID 19 pandemic this visit was converted to a telephone visit in order to help prevent spread of infection in this high risk patient and the general population. The patient gave verbal consent for the telephone visit today.    Start time 0930  Stop time 0957  Total time 27  This visit would have been billed as 81659 as an E & M code

## 2020-04-07 ENCOUNTER — ALLIED HEALTH/NURSE VISIT (OUTPATIENT)
Dept: EDUCATION SERVICES | Facility: CLINIC | Age: 60
End: 2020-04-07
Payer: MEDICARE

## 2020-04-07 DIAGNOSIS — E10.69 TYPE 1 DIABETES MELLITUS WITH OTHER SPECIFIED COMPLICATION (H): Primary | ICD-10-CM

## 2020-04-07 PROCEDURE — 99207 ZZC NO CHARGE LOS: CPT

## 2020-04-07 NOTE — Clinical Note
Dr. Goss,  MARTA- long story short, I spent 75 minutes on the phone with Michael and her PCA and we learned that her computer and phone are too old to support the Dexcom applications and we weren't able to download her dexcom  or set up her phone to be used as a .  We had the idea of taking pictures of the graph on her Dexcom  and sending them in via email, but Lexy's phone isn't able to send pictures.  Her PCA is going to try to take pictures  of the  graph and send when she goes to see Lexy (about once/week). Ufffffda. Let me know if you have other ideas.   Tiffanie Mcfarland, RD, LD, CDE

## 2020-04-07 NOTE — PROGRESS NOTES
Patient Follow-up:    Spent 75 minutes on the phone with Lexy and her PCA, Sandra, to try get her Dexcom  downloaded to her computer to send her Clarity reports.  Writer walked patient and PCA through all of the steps to create Dexcom Clarity account and download the Clarity uploader but it seems that her computer is too old and they weren't able to upload her .  We discussed the option of using her phone as a  when she changes her next sensor and we could get the data through a sharing code on the clarity celso, however Lexy's iPhone is too old and not compatible with the Dexcom apps.  Sandra and Lexy called Angela parents to discuss possibility of upgrading her phone, it didn't seem like this was an option right now, but Lexy's sister may have an extra laptop that Michael could use, however her children are using it right now for home schooling so Michael wouldn't be able to use it until the kids are done with school, which will probably be when it is safe to return to clinic. We discuss a last resort option of Lexy taking pictures of the graph on her Dexcom  with her Iphone and emailing them to diabetes ed. Michael states that her phone is not able to send pictures.  Michael's PCA agrees to stopping by Michael' house once/week to take pictures on her phone to send to diabetes ed until Michael can get an updated phone, computer, or can come to clinic.   Tiffanie Mcfarland, RD, LD, CDE

## 2020-04-20 ENCOUNTER — ALLIED HEALTH/NURSE VISIT (OUTPATIENT)
Dept: EDUCATION SERVICES | Facility: CLINIC | Age: 60
End: 2020-04-20
Payer: MEDICARE

## 2020-04-20 DIAGNOSIS — E10.69 TYPE 1 DIABETES MELLITUS WITH OTHER SPECIFIED COMPLICATION (H): Primary | ICD-10-CM

## 2020-04-20 PROCEDURE — 98967 PH1 ASSMT&MGMT NQHP 11-20: CPT | Mod: 95

## 2020-04-20 NOTE — Clinical Note
Dr. Goss,  Temo Pugh is having high blood sugars after lunch. She wasn't sure if she had missed Novolog doses before lunch or not. I increased her Novolog dose with lunch slightly. I will follow-up with her in 2 weeks.   Tiffanie Mcfarland, RD, LD, CDE

## 2020-04-20 NOTE — PROGRESS NOTES
"Diabetes Self-Management Education & Support    Telephone Visit for: Follow-up    Patient verbally consented to the telephone visit service today: yes    SUBJECTIVE/OBJECTIVE:  Presents for: Follow-up  Accompanied by: Self  Diabetes education in the past 24mo: Yes  Focus of Visit: Problem Solving, Patient Unsure  Diabetes type: Type 1  Disease course: Worsening  Transportation concerns: Yes(gets rides or takes public tranportation)  Difficulty affording diabetes medication?: No  Difficulty affording diabetes testing supplies?: No  Other concerns:: Cognitive impairment  Cultural Influences/Ethnic Background:  American    Diabetes Symptoms & Complications:  Fatigue: No  Neuropathy: Yes(in feet)  Polydipsia: No  Polyphagia: No  Polyuria: No  Visual change: No  Slow healing wounds: Yes(a little cut near the toenails)  Symptom course: Stable  Complications assessed today?: No    Patient Problem List and Family Medical History reviewed for relevant medical history, current medical status, and diabetes risk factors.    Vitals:  [facility] 03/28/2014   Estimated body mass index is 34.26 kg/m  as calculated from the following:    Height as of 3/4/20: 1.638 m (5' 4.5\").    Weight as of 3/4/20: 91.9 kg (202 lb 11.2 oz).   Last 3 BP:   BP Readings from Last 3 Encounters:   03/04/20 (!) 165/85   01/08/20 132/74   12/18/19 (!) 181/95       History   Smoking Status     Never Smoker   Smokeless Tobacco     Never Used     Comment: lives in smoke free household.       Labs:  Lab Results   Component Value Date    A1C 11.8 01/08/2020     Lab Results   Component Value Date    GLC 84 07/12/2019     Lab Results   Component Value Date     10/02/2019     HDL Cholesterol   Date Value Ref Range Status   10/02/2019 60 >49 mg/dL Final   ]  GFR Estimate   Date Value Ref Range Status   07/12/2019 >90 >60 mL/min/[1.73_m2] Final     Comment:     Non  GFR Calc  Starting 12/18/2018, serum creatinine based estimated GFR (eGFR) will be "   calculated using the Chronic Kidney Disease Epidemiology Collaboration   (CKD-EPI) equation.       GFR Estimate If Black   Date Value Ref Range Status   07/12/2019 >90 >60 mL/min/[1.73_m2] Final     Comment:      GFR Calc  Starting 12/18/2018, serum creatinine based estimated GFR (eGFR) will be   calculated using the Chronic Kidney Disease Epidemiology Collaboration   (CKD-EPI) equation.       Lab Results   Component Value Date    CR 0.68 07/12/2019     No results found for: MICROALBUMIN    Healthy Eating:  Healthy Eating Assessed Today: Yes  Cultural/Tenriism diet restrictions?: No  Meal planning/habits: Avoiding sweets, Plate planning method  Meals include: Breakfast, Lunch, Dinner, Afternoon Snack, Evening Snack  Breakfast: 8:00 am - 9:00 am Low sugar Oatmeal with some coffee OR breakfast bowl   Lunch: 12-12:30, (1 on Tuesday) pasta salad OR egg salad sandwich   Dinner: 5:00 -6:00 pm: frozen meal or home cooked meal- hotdish   Snacks: Reeces peanut butter cups, unsweetened apple sauce  Beverages: Water, Coffee(Keep some juice on hand for lows)  Has patient met with a dietitian in the past?: Yes    Breakfast:Oatmeal   Lunch: Breakfast Bowl OR salad   Dinner: Pasta     Being Active:  Being Active Assessed Today: Yes(went on the bike at her apartment complex)  Exercise:: Currently not exercising  Barrier to exercise: Safety    Monitoring:  Monitoring Assessed Today: Yes  Blood Glucose Meter: CGM    Date Breakfast  Lunch  Dinner  Bedtime    Before After Before After Before After    4/16 163  127 339 88     4/17 130  90 400 HI     4/18 266  170 309 396     4/19 114   273 233 68, 74, 68, 91    4/20 141  72           Taking Medications:  Diabetes Medication(s)     Biguanides       metFORMIN (GLUCOPHAGE-XR) 500 MG 24 hr tablet    Take 4-tablets by mouth daily as directed    Insulin       insulin aspart (NOVOLOG FLEXPEN) 100 UNIT/ML SC pen    Inject 11 units with breakfast, 11 units with lunch and  dinner and correction scale dose premeal as directed, TDD: approx 100 units     TRESIBA FLEXTOUCH 200 UNIT/ML pen    INJECT 36 UNITS UNDER THE SKIN DAILY          Current Treatments: Insulin Injections  Given by: Patient  Injection/Infusion sites: Abdomen    Problem Solving:  Problem Solving Assessed Today: Yes  Is the patient at risk for hypoglycemia?: Yes  Hypoglycemia Frequency: Rarely  Hypoglycemia Treatment: Glucose (tablets or gel), Other food  Patient carries a carbohydrate source: Yes  Medical ID: Yes  Is the patient at risk for DKA?: Yes  Does patient have ketone test strips?: Yes  Does patient have DKA prevention plan?: Yes  Does patient have severe weather/disaster plan for diabetes management?: No  Does patient have sick day plan for diabetes management?: Yes    Hypoglycemia symptoms  Confusion: No  Dizziness or Light-Headedness: No  Headaches: No  Hunger: No  Mood changes: No  Nervousness/Anxiety: No  Sleepiness: No  Speech difficulty: Yes  Sweats: Yes  Feeling shaky: Yes    Hypoglycemia Complications  Blackouts: No  Hospitalization: No  Nocturnal hypoglycemia: Yes  Required assistance: No  Seizures: No    Reducing Risks:  Reducing Risks Assessed Today: No  CAD Risks: Diabetes Mellitus  Has dilated eye exam at least once a year?: Yes  Sees dentist every 6 months?: Yes  Feet checked by healthcare provider in the last year?: Yes    Healthy Coping:  Healthy Coping Assessed Today: No  Informal Support system:: Family, Parent  Patient Activation Measure Survey Score:  KHUSHI Score (Last Two) 3/7/2012   KHUSHI Raw Score 39   Activation Score 56.4   KHUSHI Level 3       Diabetes knowledge and skills assessment:   Patient is knowledgeable in diabetes management concepts related to: none    Patient needs further education on the following diabetes management concepts: Healthy Eating, Being Active, Monitoring, Taking Medication, Problem Solving, Reducing Risks and Healthy Coping    Based on learning assessment above, most  appropriate setting for further diabetes education would be: Individual setting.      INTERVENTIONS:    Education provided today on:  AADE Self-Care Behaviors:  Healthy Eating: consistency in amount, composition, and timing of food intake  Monitoring: purpose, log and interpret results and frequency of monitoring  Problem Solving: high blood glucose - causes, signs/symptoms, treatment and prevention and low blood glucose - causes, signs/symptoms, treatment and prevention    Opportunities for ongoing education and support in diabetes-self management were discussed.    Pt verbalized understanding of concepts discussed and recommendations provided today.       Education Materials Provided:  No new materials provided today      ASSESSMENT:      Patient's most recent   Lab Results   Component Value Date    A1C 11.8 01/08/2020    is not meeting goal of <8.0    PLAN  Increase Novolog Dose: 11-11-11-0 --> 11-12-11-0  Continue to write down BG until follow-up in 2 weeks    See Patient Instructions for co-developed, patient-stated behavior change goals.  AVS printed and provided to patient today. See Follow-Up section for recommended follow-up.    Tiffanie Mcfarland, KARLOS, LD, CDE  Time Spent: 19 minutes  Encounter Type: Individual    Any diabetes medication dose changes were made via the CDE Protocol and Collaborative Practice Agreement with the patient's referring provider. A copy of this encounter was shared with the provider.

## 2020-04-24 ENCOUNTER — TELEPHONE (OUTPATIENT)
Dept: FAMILY MEDICINE | Facility: CLINIC | Age: 60
End: 2020-04-24

## 2020-04-24 NOTE — TELEPHONE ENCOUNTER
I received form from insurance company regarding meds etc.  This would be good to address with patient at a phone visit.  Please schedule this with me for the week I am virtual but in clinic May 11-15. I left form on my desk.    Thanks    Fredi Fuentes MD

## 2020-05-04 ENCOUNTER — ALLIED HEALTH/NURSE VISIT (OUTPATIENT)
Dept: EDUCATION SERVICES | Facility: CLINIC | Age: 60
End: 2020-05-04
Payer: MEDICARE

## 2020-05-04 DIAGNOSIS — E10.69 TYPE 1 DIABETES MELLITUS WITH OTHER SPECIFIED COMPLICATION (H): ICD-10-CM

## 2020-05-04 DIAGNOSIS — E11.42 TYPE 2 DIABETES MELLITUS WITH DIABETIC POLYNEUROPATHY, WITH LONG-TERM CURRENT USE OF INSULIN (H): ICD-10-CM

## 2020-05-04 DIAGNOSIS — Z79.4 TYPE 2 DIABETES MELLITUS WITH DIABETIC POLYNEUROPATHY, WITH LONG-TERM CURRENT USE OF INSULIN (H): ICD-10-CM

## 2020-05-04 DIAGNOSIS — E10.40 TYPE 1 DIABETES MELLITUS WITH DIABETIC NEUROPATHY (H): ICD-10-CM

## 2020-05-04 PROCEDURE — 99207 ZZC NO CHARGE LOS: CPT

## 2020-05-04 NOTE — Clinical Note
Dr. Goss, Just FROYI- I decreased Lexy's Tresiba from 32 to 30 units. She must have misunderstood me at our last appointment because she increased her Novolog to 12 units before each meal instead of just with lunch.  Her blood sugars are fluctuating so much still (no surprise), most likely due to late/missed insulin doses and overcorrecting lows. I will follow-up with her in 2 weeks.   Tiffanie Mcfarland, RD, LD, CDE

## 2020-05-04 NOTE — PROGRESS NOTES
Diabetes Follow-up    Subjective/Objective:    Telephone Visit with Lexy Rockwell for blood glucose log review. Last date of communication was: .    Diabetes is being managed with   Diabetes Medications   Diabetes Medication(s)     Biguanides       metFORMIN (GLUCOPHAGE-XR) 500 MG 24 hr tablet    Take 4-tablets by mouth daily as directed    Insulin       insulin aspart (NOVOLOG FLEXPEN) 100 UNIT/ML SC pen    Inject 11 units with breakfast, 11 units with lunch and dinner and correction scale dose premeal as directed, TDD: approx 100 units     TRESIBA FLEXTOUCH 200 UNIT/ML pen    INJECT 36 UNITS UNDER THE SKIN DAILY        Lexy is taking Novolog  plus sliding scale  and 32 units of Tresiba    BG/Food Log:   Date Breakfast  Lunch  Dinner  Bedtime    Before After Before After Before After     113  75 274      5/3 313  235 187 163      211  222 79 321      181  81 74 79      69  266 233 60      155   307 60      217  147 171 93      63  315 107, 77 313      94  194 236 219      110  67 260 273      180  74 234 70      69  181 92 116      96  117 321 79      67  195 257 71         Assessment:    Fasting blood glucose: 28% in target.  Before lunch glucose: 31% in target.  After lunch glucose: 36% in target.  Before dinner glucose: 46% in target.    Lexy's BG are fluctuating greatly.  Lexy isn't able to recall how many Novolog doses were missed in the last week but she does know that she misses sometimes.  She also mentions that if her BG is low before her meal, she will take her full dose of insulin after her meal when her blood sugar is starting to go up.      Plan/Response:  Recommend decrease to Tresiba: 0-0-0-32 --> 0-0-0-30  Continue with Novolo12-0  Recommend to treat lows before meals, then take insulin as prescribed and eat meal- rather than take insulin after meal.    Tiffanie Mcfarland, RD, LD, CDE  17 minutes spent with  patient on the phone    Any diabetes medication dose changes were made via the CDE Protocol and Collaborative Practice Agreement with the patient's referring provider. A copy of this encounter was shared with the provider.

## 2020-05-05 RX ORDER — INSULIN ASPART 100 [IU]/ML
INJECTION, SOLUTION INTRAVENOUS; SUBCUTANEOUS
Qty: 30 ML | Refills: 11 | Status: SHIPPED | OUTPATIENT
Start: 2020-05-05 | End: 2020-05-29

## 2020-05-27 ENCOUNTER — ALLIED HEALTH/NURSE VISIT (OUTPATIENT)
Dept: EDUCATION SERVICES | Facility: CLINIC | Age: 60
End: 2020-05-27
Payer: MEDICARE

## 2020-05-27 DIAGNOSIS — E11.42 TYPE 2 DIABETES MELLITUS WITH DIABETIC POLYNEUROPATHY, WITH LONG-TERM CURRENT USE OF INSULIN (H): ICD-10-CM

## 2020-05-27 DIAGNOSIS — Z79.4 TYPE 2 DIABETES MELLITUS WITH DIABETIC POLYNEUROPATHY, WITH LONG-TERM CURRENT USE OF INSULIN (H): ICD-10-CM

## 2020-05-27 PROCEDURE — 99207 ZZC NO CHARGE LOS: CPT

## 2020-05-27 NOTE — Clinical Note
Dr. Goss,  Just FYI- I increased Michael' Novolog to 13 units prior to meals.  Tiffanie Mcfarland, RD, LD, CDE

## 2020-05-27 NOTE — PROGRESS NOTES
Diabetes Follow-up    Subjective/Objective:    Telephone Visit for BG follow-up    Patient verbally consented to the telephone visit service today: yes      Diabetes is being managed with   Diabetes Medications   Diabetes Medication(s)     Biguanides       metFORMIN (GLUCOPHAGE-XR) 500 MG 24 hr tablet    Take 4-tablets by mouth daily as directed    Insulin       insulin aspart (NOVOLOG FLEXPEN) 100 UNIT/ML pen    Inject 12 units with breakfast, lunch and dinner and correction scale dose premeal as directed, TDD: approx 100 units     insulin degludec (TRESIBA FLEXTOUCH) 200 UNIT/ML pen    Inject 30 Units Subcutaneous daily          BG/Food Log:   Date Breakfast  Lunch  Dinner  Bedtime    Before After Before After Before After     151  74        86  98 192 117      214  170 166       267  186 287 82      154  81 269 76      209  127 239 230      223  113 97 122      140  75 320 134      177  85 216 182      136  86 261 250      200's (?)  186 255 250      243  169 235 318     5/15 153  134 186 160      259  102 274 212      54  239 246 160         Assessment:    Fasting blood glucose: <1% in target.  Before lunch glucose: 64% in target.  After lunch glucose: 15% in target.  Before dinner glucose: 33% in target.    Plan/Response:  Recommend increase to insulin - Novolo12-0 -->   Follow-up     Tiffanie Mcfarland, KARLOS, LD, CDE    Any diabetes medication dose changes were made via the CDE Protocol and Collaborative Practice Agreement with the patient's referring provider. A copy of this encounter was shared with the provider.

## 2020-05-29 RX ORDER — INSULIN ASPART 100 [IU]/ML
INJECTION, SOLUTION INTRAVENOUS; SUBCUTANEOUS
Qty: 30 ML | Refills: 11 | Status: SHIPPED | OUTPATIENT
Start: 2020-05-29 | End: 2020-06-30

## 2020-06-03 ENCOUNTER — OFFICE VISIT (OUTPATIENT)
Dept: PODIATRY | Facility: CLINIC | Age: 60
End: 2020-06-03
Payer: MEDICARE

## 2020-06-03 DIAGNOSIS — E10.42 DM TYPE 1 WITH DIABETIC PERIPHERAL NEUROPATHY (H): ICD-10-CM

## 2020-06-03 DIAGNOSIS — B35.1 DERMATOPHYTOSIS OF NAIL: ICD-10-CM

## 2020-06-03 DIAGNOSIS — L84 TYLOMA: ICD-10-CM

## 2020-06-03 DIAGNOSIS — L60.0 INGROWN TOENAIL: ICD-10-CM

## 2020-06-03 DIAGNOSIS — E10.628 TYPE 1 DIABETES MELLITUS WITH PRESSURE CALLUS (H): ICD-10-CM

## 2020-06-03 DIAGNOSIS — L60.2 ONYCHAUXIS: Primary | ICD-10-CM

## 2020-06-03 DIAGNOSIS — L84 TYPE 1 DIABETES MELLITUS WITH PRESSURE CALLUS (H): ICD-10-CM

## 2020-06-03 PROCEDURE — 99213 OFFICE O/P EST LOW 20 MIN: CPT | Performed by: PODIATRIST

## 2020-06-03 RX ORDER — CICLOPIROX OLAMINE 7.7 MG/G
CREAM TOPICAL 2 TIMES DAILY
Qty: 90 G | Refills: 5 | Status: SHIPPED | OUTPATIENT
Start: 2020-06-03 | End: 2021-12-22

## 2020-06-03 NOTE — PATIENT INSTRUCTIONS
Thanks for coming today.  [facility]  [address] [place] [address]    To schedule future appointments in Ortho Clinic, you may call [phone].    To schedule ordered imaging by your provider:   Call [facility] [phone]    To schedule an injection ordered by your provider:  Call [facility] Injection scheduling line: [phone]  [facility] Inchart available online at:  [email or web address]    Please call if any further questions or concerns ([phone]).  Clinic hours 8 am to 5 pm.    Return to clinic (call) if symptoms worsen or fail to improve.

## 2020-06-03 NOTE — PROGRESS NOTES
Past Medical History:   Diagnosis Date     Cerumen impacted      Development delay      Diabetic gastroparesis (H) 8/16/2013     Glaucoma (increased eye pressure)      Hyperlipaemia      Hypertension      Hypothyroid      Mental retardation      Nonsenile cataract      Obesity      Type 1 diabetes mellitus not at goal (H)      Patient Active Problem List   Diagnosis     Development delay     Overactive bladder     GERD (gastroesophageal reflux disease)     Hypertension goal BP (blood pressure) < 140/90     Hyperlipidemia LDL goal <100     Pain in joint, lower leg     Proteinuria     Vitamin D deficiency     Health Care Home     History of strabismus surgery     Type 1 diabetes mellitus with other specified complication (H)     Advanced directives, counseling/discussion     Primary open angle glaucoma of both eyes, moderate stage     Gastroesophageal reflux disease without esophagitis     Obesity, unspecified obesity severity, unspecified obesity type     Acute renal failure (H)     Acute retention of urine     JEAN (acute kidney injury) (H)     Dehydration     Diabetic ketoacidosis without coma associated with type 1 diabetes mellitus (H)     DKA (diabetic ketoacidoses) (H)     Sinus tachycardia     SIRS (systemic inflammatory response syndrome) (H)     Obesity (BMI 35.0-39.9) with comorbidity (H)     Past Surgical History:   Procedure Laterality Date     C EYE SURG ANT SGMT PROC UNLISTED  remote    strabismus surgery     STRABISMUS SURGERY       Social History     Socioeconomic History     Marital status: Single     Spouse name: Not on file     Number of children: Not on file     Years of education: Not on file     Highest education level: Not on file   Occupational History     Not on file   Social Needs     Financial resource strain: Not on file     Food insecurity     Worry: Not on file     Inability: Not on file     Transportation needs     Medical: Not on file     Non-medical: Not on file   Tobacco Use      Smoking status: Never Smoker     Smokeless tobacco: Never Used     Tobacco comment: lives in smoke free household.   Substance and Sexual Activity     Alcohol use: Yes     Comment: occass social     Drug use: No     Sexual activity: Never   Lifestyle     Physical activity     Days per week: Not on file     Minutes per session: Not on file     Stress: Not on file   Relationships     Social connections     Talks on phone: Not on file     Gets together: Not on file     Attends Tenriism service: Not on file     Active member of club or organization: Not on file     Attends meetings of clubs or organizations: Not on file     Relationship status: Not on file     Intimate partner violence     Fear of current or ex partner: Not on file     Emotionally abused: Not on file     Physically abused: Not on file     Forced sexual activity: Not on file   Other Topics Concern     Parent/sibling w/ CABG, MI or angioplasty before 65F 55M? No   Social History Narrative     Not on file     Family History   Problem Relation Age of Onset     Hypertension Father      Diabetes Sister      Glaucoma Maternal Grandfather      Cancer No family hx of      Cerebrovascular Disease No family hx of      Thyroid Disease No family hx of      Macular Degeneration No family hx of      Lab Results   Component Value Date    A1C 11.8 01/08/2020    A1C 11.8 05/19/2019    A1C 11.2 12/12/2018    A1C 12.4 09/05/2018    A1C 10.6 03/14/2018     SUBJECTIVE FINDINGS:  59-year-old female returns to clinic for onychomycosis and tylomas bilaterally.  She relates she has been getting ingrown toenails on her big toes.  Relates no injuries.  No ulcers or sores since I have seen her last.  Relates she has numbness, tingling and neuropathy in her feet.  She decided not to get the diabetic shoes.      OBJECTIVE FINDINGS:  DP and PT are 2/4 bilaterally.  She has some peripheral edema bilaterally.  She has decreased ankle joint dorsiflexion bilaterally.  She has incurvated  nails with some discoloration bilaterally to differing degrees.  There is also some mild subungual debris to differing degrees bilaterally.  She has pressure edema in the right lateral hallux nail border with some pain on palpation and some bleeding upon debridement there.  No erythema, no drainage, no odor, no calor bilaterally.  She has hyperkeratotic tissue buildup with some cracking, plantar first MPJ on the left, plantar medial hallux and plantar medial heel on left foot.        ASSESSMENT/PLAN:  Onychocryptosis bilaterally.  There are minimal onychomycosis changes right now.  She has tylomas left foot with some mild tinea pedis changes.  Diagnosis and treatment options discussed with her.  All the nails were reduced bilaterally upon consent.  Tylomas on left foot were sharp debrided with a #15 blade upon consent.  Two of them were debrided.  She requested a refill of her Loprox cream.  We refilled this and use discussed with her.  She will return to clinic and see me in 2-3 months.

## 2020-06-03 NOTE — LETTER
6/3/2020         RE: Lexy Rockwell  [address]  [facility] [address]        Dear Colleague,    Thank you for referring your patient, Lexy Rockwell, to the [facility]. Please see a copy of my visit note below.    Past Medical History:   Diagnosis Date     Cerumen impacted      Development delay      Diabetic gastroparesis (H) 8/16/2013     Glaucoma (increased eye pressure)      Hyperlipaemia      Hypertension      Hypothyroid      Mental retardation      Nonsenile cataract      Obesity      Type 1 diabetes mellitus not at goal (H)      Patient Active Problem List   Diagnosis     Development delay     Overactive bladder     GERD (gastroesophageal reflux disease)     Hypertension goal BP (blood pressure) < 140/90     Hyperlipidemia LDL goal <100     Pain in joint, lower leg     Proteinuria     Vitamin D deficiency     Health Care Home     History of strabismus surgery     Type 1 diabetes mellitus with other specified complication (H)     Advanced directives, counseling/discussion     Primary open angle glaucoma of both eyes, moderate stage     Gastroesophageal reflux disease without esophagitis     Obesity, unspecified obesity severity, unspecified obesity type     Acute renal failure (H)     Acute retention of urine     JEAN (acute kidney injury) (H)     Dehydration     Diabetic ketoacidosis without coma associated with type 1 diabetes mellitus (H)     DKA (diabetic ketoacidoses) (H)     Sinus tachycardia     SIRS (systemic inflammatory response syndrome) (H)     Obesity (BMI 35.0-39.9) with comorbidity (H)     Past Surgical History:   Procedure Laterality Date     C EYE SURG ANT SGMT PROC UNLISTED  remote    strabismus surgery     STRABISMUS SURGERY       Social History     Socioeconomic History     Marital status: Single     Spouse name: Not on file     Number of children: Not on file     Years of education: Not on file     Highest education level: Not on  file   Occupational History     Not on file   Social Needs     Financial resource strain: Not on file     Food insecurity     Worry: Not on file     Inability: Not on file     Transportation needs     Medical: Not on file     Non-medical: Not on file   Tobacco Use     Smoking status: Never Smoker     Smokeless tobacco: Never Used     Tobacco comment: lives in smoke free household.   Substance and Sexual Activity     Alcohol use: Yes     Comment: occass social     Drug use: No     Sexual activity: Never   Lifestyle     Physical activity     Days per week: Not on file     Minutes per session: Not on file     Stress: Not on file   Relationships     Social connections     Talks on phone: Not on file     Gets together: Not on file     Attends Bahai service: Not on file     Active member of club or organization: Not on file     Attends meetings of clubs or organizations: Not on file     Relationship status: Not on file     Intimate partner violence     Fear of current or ex partner: Not on file     Emotionally abused: Not on file     Physically abused: Not on file     Forced sexual activity: Not on file   Other Topics Concern     Parent/sibling w/ CABG, MI or angioplasty before 65F 55M? No   Social History Narrative     Not on file     Family History   Problem Relation Age of Onset     Hypertension Father      Diabetes Sister      Glaucoma Maternal Grandfather      Cancer No family hx of      Cerebrovascular Disease No family hx of      Thyroid Disease No family hx of      Macular Degeneration No family hx of      Lab Results   Component Value Date    A1C 11.8 01/08/2020    A1C 11.8 05/19/2019    A1C 11.2 12/12/2018    A1C 12.4 09/05/2018    A1C 10.6 03/14/2018     SUBJECTIVE FINDINGS:  59-year-old female returns to clinic for onychomycosis and tylomas bilaterally.  She relates she has been getting ingrown toenails on her big toes.  Relates no injuries.  No ulcers or sores since I have seen her last.  Relates she has  numbness, tingling and neuropathy in her feet.  She decided not to get the diabetic shoes.      OBJECTIVE FINDINGS:  DP and PT are 2/4 bilaterally.  She has some peripheral edema bilaterally.  She has decreased ankle joint dorsiflexion bilaterally.  She has incurvated nails with some discoloration bilaterally to differing degrees.  There is also some mild subungual debris to differing degrees bilaterally.  She has pressure edema in the right lateral hallux nail border with some pain on palpation and some bleeding upon debridement there.  No erythema, no drainage, no odor, no calor bilaterally.  She has hyperkeratotic tissue buildup with some cracking, plantar first MPJ on the left, plantar medial hallux and plantar medial heel on left foot.        ASSESSMENT/PLAN:  Onychocryptosis bilaterally.  There are minimal onychomycosis changes right now.  She has tylomas left foot with some mild tinea pedis changes.  Diagnosis and treatment options discussed with her.  All the nails were reduced bilaterally upon consent.  Tylomas on left foot were sharp debrided with a #15 blade upon consent.  Two of them were debrided.  She requested a refill of her Loprox cream.  We refilled this and use discussed with her.  She will return to clinic and see me in 2-3 months.         Again, thank you for allowing me to participate in the care of your patient.        Sincerely,        Ravin Back DPM

## 2020-06-03 NOTE — NURSING NOTE
Lexy Rockwell's chief complaint for this visit includes:  Chief Complaint   Patient presents with     RECHECK     Toenail care     PCP: Fredi Fuentes    Referring Provider:  No referring provider defined for this encounter.    LMP 03/28/2014   Data Unavailable     Do you need any medication refills at today's visit? no

## 2020-06-04 ENCOUNTER — OFFICE VISIT (OUTPATIENT)
Dept: OPHTHALMOLOGY | Facility: CLINIC | Age: 60
End: 2020-06-04
Payer: MEDICARE

## 2020-06-04 DIAGNOSIS — H40.1132 PRIMARY OPEN ANGLE GLAUCOMA OF BOTH EYES, MODERATE STAGE: Primary | ICD-10-CM

## 2020-06-04 PROCEDURE — 92083 EXTENDED VISUAL FIELD XM: CPT | Performed by: OPHTHALMOLOGY

## 2020-06-04 PROCEDURE — 92133 CPTRZD OPH DX IMG PST SGM ON: CPT | Performed by: OPHTHALMOLOGY

## 2020-06-04 PROCEDURE — 92012 INTRM OPH EXAM EST PATIENT: CPT | Performed by: OPHTHALMOLOGY

## 2020-06-04 ASSESSMENT — VISUAL ACUITY
METHOD: SNELLEN - LINEAR
CORRECTION_TYPE: GLASSES
OD_CC: 20/40
OS_CC: 20/60
OS_PH_CC: 20/40
OS_PH_CC+: -1

## 2020-06-04 ASSESSMENT — EXTERNAL EXAM - LEFT EYE: OS_EXAM: PROLAPSED FAT PADS: UPPER, LOWER

## 2020-06-04 ASSESSMENT — PACHYMETRY
OS_CT(UM): 566
OD_CT(UM): 562

## 2020-06-04 ASSESSMENT — TONOMETRY
IOP_METHOD: APPLANATION
OD_IOP_MMHG: 17
OS_IOP_MMHG: 15

## 2020-06-04 ASSESSMENT — SLIT LAMP EXAM - LIDS
COMMENTS: NORMAL
COMMENTS: NORMAL

## 2020-06-04 ASSESSMENT — EXTERNAL EXAM - RIGHT EYE: OD_EXAM: PROLAPSED FAT PADS: UPPER, LOWER

## 2020-06-04 NOTE — PATIENT INSTRUCTIONS
Continue Latanoprost drop both eyes at bedtime.  Return visit 6 months for a complete exam.  Dayron Rios M.D.  [phone]

## 2020-06-04 NOTE — LETTER
6/4/2020         RE: Lexy Rockwell  [address]  [facility] [address]        Dear Colleague,    Thank you for referring your patient, Lexy Rockwell, to the [facility]. Please see a copy of my visit note below.     Current Eye Medications:  Latanoprost both eyes every evening.  Last drops:  12am     Subjective:  Follow up Open Angle Glaucoma.  Patient is here for a pressure check, OCT, and visual field.  No vision changes or concerns.     Objective:  See Ophthalmology Exam.       Assessment:  Stable intraocular pressure, glaucoma OCT, and Beard Visual Field both eyes in patient with moderate open angle glaucoma.      Plan:   Continue Latanoprost drop both eyes at bedtime.  Return visit 6 months for a complete exam.  Dayron Rios M.D.  [phone]        Again, thank you for allowing me to participate in the care of your patient.        Sincerely,        Dayron Rios MD

## 2020-06-04 NOTE — PROGRESS NOTES
Current Eye Medications:  Latanoprost both eyes every evening.  Last drops:  12am     Subjective:  Follow up Open Angle Glaucoma.  Patient is here for a pressure check, OCT, and visual field.  No vision changes or concerns.     Objective:  See Ophthalmology Exam.       Assessment:  Stable intraocular pressure, glaucoma OCT, and Beard Visual Field both eyes in patient with moderate open angle glaucoma.      Plan:   Continue Latanoprost drop both eyes at bedtime.  Return visit 6 months for a complete exam.  Dayron Rios M.D.  [phone]

## 2020-06-15 ENCOUNTER — ALLIED HEALTH/NURSE VISIT (OUTPATIENT)
Dept: EDUCATION SERVICES | Facility: CLINIC | Age: 60
End: 2020-06-15
Payer: MEDICARE

## 2020-06-15 DIAGNOSIS — E10.69 TYPE 1 DIABETES MELLITUS WITH OTHER SPECIFIED COMPLICATION (H): Primary | ICD-10-CM

## 2020-06-15 DIAGNOSIS — E10.69 TYPE 1 DIABETES MELLITUS WITH OTHER SPECIFIED COMPLICATION (H): ICD-10-CM

## 2020-06-15 DIAGNOSIS — E10.40 TYPE 1 DIABETES MELLITUS WITH DIABETIC NEUROPATHY (H): ICD-10-CM

## 2020-06-15 PROCEDURE — 98966 PH1 ASSMT&MGMT NQHP 5-10: CPT | Mod: 95

## 2020-06-15 NOTE — PROGRESS NOTES
Diabetes Follow-up    Subjective/Objective:    Telephone Visit with Lexy for BG review. Last date of communication was: 5/27.    Patient verbally consented to the telephone visit service today: yesyes    Audio only visit done, as patient does not have access to audio-visual technology.    Individual visit provided, given no group classes are available for 2 months.       Diabetes is being managed with   Diabetes Medications   Diabetes Medication(s)     Biguanides       metFORMIN (GLUCOPHAGE-XR) 500 MG 24 hr tablet    Take 4-tablets by mouth daily as directed    Insulin       insulin aspart (NOVOLOG FLEXPEN) 100 UNIT/ML pen    Inject 13 units with breakfast, lunch and dinner and correction scale dose premeal as directed, TDD: approx 100 units     insulin degludec (TRESIBA FLEXTOUCH) 200 UNIT/ML pen    Inject 30 Units Subcutaneous daily          BG/Food Log:   Date Breakfast  Lunch  Dinner  Bedtime    Before After Before After Before After    6/15 195  135       6/14 235  148 154 85     6/13 162  123 278 136     6/12 284  233 262 154     6/11 174  178 91 116     6/10 277  132 192 189     6/9 187  80 195 293     6/8 136  87 133 133     6/7 160  148 191 246     6/6 291   79 146     6/5 72  75 235 251     6/4 179  79 235 285     6/3 197  109 276 133     6/2 260  237 131 86         Assessment:    Fasting blood glucose: 7% in target.  Before lunch glucose: 43% in target.  After lunch glucose: 38% in target.  Before dinner glucose: 23% in target.    Plan/Response:  Recommend increase to Tresiba; 0-0-0-30 --> 0-0-0-32  Follow-up in 2 weeks    Tiffanie Mcfarland RD, LD, CDE  Time spent with patient: 10 minutes    Any diabetes medication dose changes were made via the CDE Protocol and Collaborative Practice Agreement with the patient's referring provider. A copy of this encounter was shared with the provider.

## 2020-06-15 NOTE — Clinical Note
Dr. Goss,  I increased Lexy's Tresiba to 32 units. Could you please submit a new diabetes ed referral? Hers is .   Thanks!  Tiffanie Mcfarland, RD, LD, CDE

## 2020-06-29 ENCOUNTER — ALLIED HEALTH/NURSE VISIT (OUTPATIENT)
Dept: EDUCATION SERVICES | Facility: CLINIC | Age: 60
End: 2020-06-29
Payer: MEDICARE

## 2020-06-29 DIAGNOSIS — Z79.4 TYPE 2 DIABETES MELLITUS WITH DIABETIC POLYNEUROPATHY, WITH LONG-TERM CURRENT USE OF INSULIN (H): ICD-10-CM

## 2020-06-29 DIAGNOSIS — E11.42 TYPE 2 DIABETES MELLITUS WITH DIABETIC POLYNEUROPATHY, WITH LONG-TERM CURRENT USE OF INSULIN (H): ICD-10-CM

## 2020-06-29 PROCEDURE — 98967 PH1 ASSMT&MGMT NQHP 11-20: CPT | Mod: 95

## 2020-06-29 NOTE — Clinical Note
Dr. Goss,  Just FYI- increasing Lexy's Novolog to 14 units before meals.   Tiffanie Mcfarland, RD, LD, CDE

## 2020-06-29 NOTE — PROGRESS NOTES
Diabetes Follow-up    Subjective/Objective:    Telephone visit for BG follow-up    Patient verbally consented to the telephone visit service today: yes      Diabetes is being managed with   Diabetes Medications   Diabetes Medication(s)     Biguanides       metFORMIN (GLUCOPHAGE-XR) 500 MG 24 hr tablet    Take 4-tablets by mouth daily as directed    Insulin       insulin aspart (NOVOLOG FLEXPEN) 100 UNIT/ML pen    Inject 13 units with breakfast, lunch and dinner and correction scale dose premeal as directed, TDD: approx 100 units     insulin degludec (TRESIBA FLEXTOUCH) 200 UNIT/ML pen    Inject 32 Units Subcutaneous daily          BG/Food Log:   Date Breakfast  Lunch  Dinner  Bedtime    Before After Before After Before After     170  121 123       200  93 196 75      117  74 184 151      183  300 157 98      156  81 108 94      101  106 300 300      227  185 150 94      92   244 218      167  72 234 107      207  125 192 165      253  293 190 150      281  219 132 83      114  93 262 185      99  91 218 79       Low BG yesterday- all morning until 1:30 pm    Assessment:    Fasting blood glucose: 36% in target.  Before lunch glucose: 70% in target.  After lunch glucose: 36% in target.  Before dinner glucose: 54% in target.    Adonis BG continues to fluctuate greatly. Consistently having elevated BG after lunch. Recommend increase in Novolog    Plan/Response:  Novolo-13-13-0 --> 14-14-14-0  Continue 32 units of Tresiba  Telephone follow-up scheduled for 2 weeks    Tiffanie Mcfarland RD, LD, CDE  Time spent: 12 minutes    Any diabetes medication dose changes were made via the CDE Protocol and Collaborative Practice Agreement with the patient's referring provider. A copy of this encounter was shared with the provider.

## 2020-06-30 RX ORDER — INSULIN ASPART 100 [IU]/ML
INJECTION, SOLUTION INTRAVENOUS; SUBCUTANEOUS
Qty: 30 ML | Refills: 11 | Status: SHIPPED | OUTPATIENT
Start: 2020-06-30 | End: 2020-09-08

## 2020-07-14 ENCOUNTER — ALLIED HEALTH/NURSE VISIT (OUTPATIENT)
Dept: EDUCATION SERVICES | Facility: CLINIC | Age: 60
End: 2020-07-14
Payer: MEDICARE

## 2020-07-14 DIAGNOSIS — E10.40 TYPE 1 DIABETES MELLITUS WITH DIABETIC NEUROPATHY (H): ICD-10-CM

## 2020-07-14 DIAGNOSIS — E10.69 TYPE 1 DIABETES MELLITUS WITH OTHER SPECIFIED COMPLICATION (H): ICD-10-CM

## 2020-07-14 PROCEDURE — 99207 ZZC NON-BILLABLE SERV PER CHARTING: CPT

## 2020-07-14 NOTE — PROGRESS NOTES
Diabetes Follow-up    Subjective/Objective:    Telephone Visit for BG follow-up    Patient verbally consented to the telephone visit service today: yes    Diabetes is being managed with   Diabetes Medications   Diabetes Medication(s)     Biguanides       metFORMIN (GLUCOPHAGE-XR) 500 MG 24 hr tablet    Take 4-tablets by mouth daily as directed    Insulin       insulin aspart (NOVOLOG FLEXPEN) 100 UNIT/ML pen    Inject 14 units with breakfast, lunch and dinner and correction scale dose premeal as directed, TDD: approx 100 units     insulin degludec (TRESIBA FLEXTOUCH) 200 UNIT/ML pen    Inject 32 Units Subcutaneous daily          BG/Food Log:   Date Breakfast Lunch  Dinner  Bedtime    Before Before After Before After    7/14 229 154 236      7/13 212 163 241 294     7/12 141 77 77 143     7/11 132 76  217     7/10 187 71 165 232     7/9 72 192 139 100     7/8 146 177 182 149     7/7 83 114 274      7/6 70 191 275 161     7/5 85 241 241 198     7/4 135 216 173 234     7/3 174 157 206      7/2 130 98 139 84     7/1 160 82 108  88    6/30 71 124 265 201       Eating a snack before bed, causing highs overnight. Some Juice or apple sauce    Assessment:    Fasting blood glucose: 40% in target.  Before lunch glucose: 47% in target.  After lunch glucose: 43% in target.  Before dinner glucose: 18% in target.  After dinner glucose: 100% in target (1 reading)    Plan/Response:  Lexy's BG continue to fluctuate greatly.  She mentions that she often wakes up in the night to high blood sugars. She does have a snack before she goes to bed to prevent lows overnight.  This snack usually consists of juice or apple sauce.  We discussed a plan of cutting back on the Tresiba dose and skipping the bedtime snack to see if she continues to have high BG in the night.     Decrease Tresiba dose: 0-0-0-32 --> 0-0-0-30    Tiffanie Mcfarland, KARLOS, LD, CDE    Any diabetes medication dose changes were made via the CDE Protocol and Collaborative  Practice Agreement with the patient's referring provider. A copy of this encounter was shared with the provider.

## 2020-07-28 ENCOUNTER — TELEPHONE (OUTPATIENT)
Dept: EDUCATION SERVICES | Facility: CLINIC | Age: 60
End: 2020-07-28

## 2020-07-28 NOTE — TELEPHONE ENCOUNTER
Pt is asking for Tiffanie Mcfarland- please let pt know educator is on [place].  Pt is having an x-ray tomorrow on her knee, asking if the Dexcom sensor needs to be removed.      Called patient back to let her know she does need to remove her Dexcom for xrays.  She understood.    Georgia Rivas RN/MARY  [facility]

## 2020-07-29 ENCOUNTER — OFFICE VISIT (OUTPATIENT)
Dept: FAMILY MEDICINE | Facility: CLINIC | Age: 60
End: 2020-07-29
Payer: MEDICARE

## 2020-07-29 ENCOUNTER — ANCILLARY PROCEDURE (OUTPATIENT)
Dept: GENERAL RADIOLOGY | Facility: CLINIC | Age: 60
End: 2020-07-29
Attending: FAMILY MEDICINE
Payer: MEDICARE

## 2020-07-29 VITALS
BODY MASS INDEX: 33.53 KG/M2 | WEIGHT: 198.38 LBS | TEMPERATURE: 98.8 F | SYSTOLIC BLOOD PRESSURE: 180 MMHG | DIASTOLIC BLOOD PRESSURE: 95 MMHG | HEART RATE: 123 BPM

## 2020-07-29 DIAGNOSIS — I10 HYPERTENSION GOAL BP (BLOOD PRESSURE) < 140/90: ICD-10-CM

## 2020-07-29 DIAGNOSIS — M25.561 RIGHT KNEE PAIN, UNSPECIFIED CHRONICITY: ICD-10-CM

## 2020-07-29 DIAGNOSIS — Z12.11 SCREEN FOR COLON CANCER: ICD-10-CM

## 2020-07-29 DIAGNOSIS — E10.69 TYPE 1 DIABETES MELLITUS WITH OTHER SPECIFIED COMPLICATION (H): ICD-10-CM

## 2020-07-29 DIAGNOSIS — M25.561 RIGHT KNEE PAIN, UNSPECIFIED CHRONICITY: Primary | ICD-10-CM

## 2020-07-29 DIAGNOSIS — K21.9 GASTROESOPHAGEAL REFLUX DISEASE, ESOPHAGITIS PRESENCE NOT SPECIFIED: ICD-10-CM

## 2020-07-29 PROCEDURE — 99214 OFFICE O/P EST MOD 30 MIN: CPT | Performed by: FAMILY MEDICINE

## 2020-07-29 PROCEDURE — 73560 X-RAY EXAM OF KNEE 1 OR 2: CPT | Mod: RT

## 2020-07-29 NOTE — PROGRESS NOTES
Subjective     Lexy Rockwell is a 60 year old female who presents to clinic today for the following health issues:    HPI       Right knee pain for the past few months            Reviewed and updated as needed this visit by Provider         Review of Systems    no trauma     Several months really bothering    Limiting activities some    Has a reacher to help get dressed    Can't cross leg over     Seeing endocrinology for diab    No hospitalization since Sept    Has the dexcom system, working well      Left  Knee fine    Icy hot and tylenol as needed        Objective    BP (!) 176/117 (BP Location: Right arm, Patient Position: Chair, Cuff Size: Adult Large)   Pulse 123   Temp 98.8  F (37.1  C) (Oral)   Wt 90 kg (198 lb 6 oz)   LMP 03/28/2014   Breastfeeding No   BMI 33.53 kg/m    Body mass index is 33.53 kg/m .  Physical Exam   Full physical not done     Mentation and affect are fine    No tremor of speech or extremity    Patient can almost fully extend right knee, can;t quite  Fully flex knee    No point tenderness on palpation    She is able to squat down some but not far; when standing back up fully she reports some pain in the knee    Knee is  Stable    Xray right knee done today:    Diagnostic Test Results:  Labs reviewed in Epic         hip range of motion fair bilat, no hip pain on manipulation    Knee range of motion fair on right, but not as good as left    After I helped her up and down from exam table she had some knee pain    Xray shows only minimal oa    ASSESSMENT / PLAN:  (M25.561) Right knee pain, unspecified chronicity  (primary encounter diagnosis)  Comment: patient likely has soft tissue problem causing pain.  Should see orthopedics.  I did referral. Patient to let us know if she does not hear from them soon   Plan: XR Knee Standing Right 2 Views, Orthopedic &         Spine  Referral              (K21.9) Gastroesophageal reflux disease, esophagitis presence not  specified  Comment: needs scope exams upper  And lower.  Patient  To call and schedule [facility]    Plan: GASTROENTEROLOGY ADULT REF PROCEDURE ONLY             (Z12.11) Screen for colon cancer  Comment: as above   Plan: GASTROENTEROLOGY ADULT REF PROCEDURE ONLY             (E10.69) Type 1 diabetes mellitus with other specified complication (H)  Comment: glad to hear she has not bee in hospital since sept  Plan: per endocrinology     Htn: high on recheck but not as bad.  Given her  Walking, don't want to make her too unsteady by dropping blood pressure too much.  Continue to monitor.      I reviewed the patient's medications, allergies, medical history, family history, and social history.    Fredi Fuentes MD

## 2020-07-29 NOTE — PATIENT INSTRUCTIONS
You should get a call from the schedulers about seeing orthopedics for the knee. Let us know in a few days if you have not heard from them    Call and schedule the scope exams ( colonoscopy and upper endoscopy )    Continue tylenol and icy hot

## 2020-07-30 ENCOUNTER — TELEPHONE (OUTPATIENT)
Dept: FAMILY MEDICINE | Facility: CLINIC | Age: 60
End: 2020-07-30

## 2020-07-30 NOTE — TELEPHONE ENCOUNTER
Nurse had advised patient  to go ahead and schedule colonoscopy and would send to PCP for advice on endoscopy.    Will need to contact patient and relay endoscopy cancelled.      Stephanie Nguyen RN  Triage Nurse  [facility]  Appointment line: [phone]  [facility], 24 hour nurse line, available by calling clinic at [phone] and following prompts.

## 2020-07-30 NOTE — TELEPHONE ENCOUNTER
We can cancel the upper endoscopy EGD.   She is still due for the colonoscopy so just do that.    Please inform caller.    Thanks    Fredi Fuentes MD

## 2020-07-30 NOTE — TELEPHONE ENCOUNTER
Attempt # 1  Called patient at home number.[phone] (home)  Was call answered?  Yes, would like a message to be sent to Dr. Fuentes .  Ordered endoscopy and colonoscopy.    hospitalized in September 13 to 23 2019 had endoscopy? So has not been a year yet and so wondering if really needs an endoscopy?    Routing to PCP     Stephanie Nguyen RN  Triage Nurse  [facility]  Appointment line: [phone]  [facility], 24 hour nurse line, available by calling clinic at [phone] and following prompts.                            Stephanie Nguyen RN  [facility]

## 2020-07-30 NOTE — TELEPHONE ENCOUNTER
Reason for Call:  Other     Detailed comments: the patient would like to speak to a nurse. She didn't leave a reason for the call.     Phone Number Patient can be reached at: Home number on file [phone] (home)    Best Time: any    Can we leave a detailed message on this number? YES    Call taken on 7/30/2020 at 2:32 PM by Patricia Latif

## 2020-07-31 NOTE — TELEPHONE ENCOUNTER
Attempt # 1  Called patient at home number.  Was call answered?  Yes, relayed orders from PCP to patient, Patient verbalized understanding and agreement with plan and had no questions and states will schedule the colonoscopy.           Stephanie Nguyen RN  [facility]

## 2020-08-03 NOTE — PROGRESS NOTES
SUBJECTIVE:   Lexy Rockwell is a 60 year old female who is seen in consultation at the request of Michael Fuentes MD for evaluation of right knee pain.    Duration:      Several months.  Limiting activities some  Has a reacher to help get dressed  Can't cross leg over    No history of trauma.    Present symptoms: painful to sit.  No pain with walking or getting up.  Says that pain is definitely the knee, and not the hip.   Pain diffuse, anterior.  No catching, locking or giving-way.    Treatments tried to this point: tylenol, icy hot.    Previous knee issues: left side in the past.      Past Medical History:   Past Medical History:   Diagnosis Date     Cerumen impacted      Development delay      Diabetic gastroparesis (H) 8/16/2013     Glaucoma (increased eye pressure)      Hyperlipaemia      Hypertension      Hypothyroid      Mental retardation      Nonsenile cataract      Obesity      Type 1 diabetes mellitus not at goal (H)      Past Surgical History:   Past Surgical History:   Procedure Laterality Date     C EYE SURG ANT SGMT PROC UNLISTED  remote    strabismus surgery     STRABISMUS SURGERY       Family History:   Family History   Problem Relation Age of Onset     Hypertension Father      Diabetes Sister      Glaucoma Maternal Grandfather      Cancer No family hx of      Cerebrovascular Disease No family hx of      Thyroid Disease No family hx of      Macular Degeneration No family hx of      Social History:   Social History     Tobacco Use     Smoking status: Never Smoker     Smokeless tobacco: Never Used     Tobacco comment: lives in smoke free household.   Substance Use Topics     Alcohol use: Yes     Comment: occass social       Review of Systems:  Constitutional:  NEGATIVE for fever, chills, change in weight  Integumentary/Skin:  NEGATIVE for worrisome rashes, moles or lesions  Eyes:  NEGATIVE for vision changes or irritation  ENT/Mouth:  NEGATIVE for ear, mouth and throat problems  Resp:  NEGATIVE for  significant cough or SOB  Breast:  NEGATIVE for masses, tenderness or discharge  CV:  NEGATIVE for chest pain, palpitations or peripheral edema  GI:  NEGATIVE for nausea, abdominal pain, heartburn, or change in bowel habits  :  Negative   Musculoskeletal:  See HPI above  Neuro:  NEGATIVE for weakness, dizziness or paresthesias  Endocrine:  NEGATIVE for temperature intolerance, skin/hair changes  Heme/allergy/immune:  NEGATIVE for bleeding problems  Psychiatric:  NEGATIVE for changes in mood or affect      OBJECTIVE:  Physical Exam:  BP (!) 154/95 (BP Location: Left arm, Patient Position: Sitting, Cuff Size: Adult Large)   Pulse 100   LMP 03/28/2014   SpO2 95%   General Appearance: healthy, alert and no distress   Skin: no suspicious lesions or rashes  Neuro: Normal strength and tone, mentation intact and speech is slow.  Vascular: good pulses, and cappillary refill   Lymph: no lymphadenopathy   Psych:  mentation appears slow based on speech, but she answers questions appropriately and demonstrated good memory. Affect normal/bright  Resp: no increased work of breathing     Right Knee Exam:  Gait: walks with antalgic gait favoring right side  Alignment: normal , has a lot of soft tissue in the pes areas bilateral knees.  Squat: not tested .    Patellofemoral joint: no crepitations in the patellofemoral joint.  Effusion: difficult to tell   ROM: very good-- tested seated per patient request  Tender: non-tender   Masses: none  Ligaments:   Lachman's: stable   Anterior/Posterior drawer: stable,   Varus/Valgus stress: stable to varus and valgus stress  McMurrays: negative    Hip range of motion: limited, can't lift right knee up.  Causes knee pain?: no    X-rays:  Obtained 7/29/20, reviewed in the office with the patient today:  Essentially normal.  Xray, AP pelvis,  obtained today, reviewed over the phone after the visit demonstrate:  On the right there is early subchondral sclerosis and subchondral cyst formation  most  likely in the acetabulum more so than the femoral head. No definite  fracture. Doubt AVN.   Significant coxa valgum bilaterally  Multiple calcified fibroids    MRI:    None.     ASSESSMENT:   Encounter Diagnoses   Name Primary?     Chronic pain of right knee Yes     mild Osteoarthritis of right hip, unspecified osteoarthritis type      Right knee pain, unspecified chronicity      Pain of right hip     mild osteoarthritis of the right hip, maybe some AVN possibly.   I'm wondering if the pain is referred from the hip based on exam.    PLAN:   physical therapy ordered for both the hip and knee  If no improvement, consider differential, staggered corticosteroid injections for diagnosis and treatment.  If she comes back in the future, a frog leg lateral of the right hip could add some information regarding AVN.    Return to clinic: as needed     LORIE Heard MD  Dept. Orthopedic Surgery  [facility]

## 2020-08-04 ENCOUNTER — PATIENT OUTREACH (OUTPATIENT)
Dept: EDUCATION SERVICES | Facility: CLINIC | Age: 60
End: 2020-08-04
Payer: MEDICARE

## 2020-08-04 DIAGNOSIS — E10.69 TYPE 1 DIABETES MELLITUS WITH OTHER SPECIFIED COMPLICATION (H): ICD-10-CM

## 2020-08-04 DIAGNOSIS — E10.40 TYPE 1 DIABETES MELLITUS WITH DIABETIC NEUROPATHY (H): ICD-10-CM

## 2020-08-04 PROCEDURE — 98967 PH1 ASSMT&MGMT NQHP 11-20: CPT | Mod: 95

## 2020-08-04 NOTE — PROGRESS NOTES
Diabetes Follow-up    Subjective/Objective:    Telephone visit with Lexy for BG review. Unable to see patient in clinic at this time to download Dexcom due to COVID-19, therefor she has been recording her Dexcom readings 4 times/day.    Patient verbally consented to the telephone visit service today: yes    Diabetes is being managed with   Diabetes Medications   Diabetes Medication(s)     Biguanides       metFORMIN (GLUCOPHAGE-XR) 500 MG 24 hr tablet    Take 4-tablets by mouth daily as directed    Insulin       insulin aspart (NOVOLOG FLEXPEN) 100 UNIT/ML pen    Inject 14 units with breakfast, lunch and dinner and correction scale dose premeal as directed, TDD: approx 100 units     insulin degludec (TRESIBA FLEXTOUCH) 200 UNIT/ML pen    Inject 30 Units Subcutaneous daily          BG/Food Log:   Date Breakfast  Lunch  Dinner  Bedtime    Before After Before After Before After    8/4 252  263 154      8/3 296  254 261 219     8/2 196  111 237 71     8/1 265  182 277 95     7/31 200  224 187 188     7/30 142  105 233 238     7/29   294  239     7/28 181  91 245 116     7/27 236  178 224 139     7/26 170  126 116 128     7/25 294  80 145 133     7/24 174  283 88 85     7/23 284  223 204 182     7/22 281  249 284 249     7/21 235  165  182     7/20 151  83 106 146     7/19 257  126 80 178     7/18 157  74 203 280     7/17 279  150 88 93     7/16 89  225 224 199     7/15 162  71 223 198         Assessment:    Fasting blood glucose: <1% in target.  Before lunch glucose: 43% in target.  After lunch glucose: 37% in target.  Before dinner glucose: 30% in target.    Lexy continues to have elevated BG. She mentions that she was sick off an on the last couple weeks, which she thinks is why her blood sugar was more elevated recently.  She states that she had one or two low blood sugars, but doesn't remember for sure when or how low.  Will recommend to start tracking low blood sugars as well.     Plan/Response:  See Patient  Instructions for co-developed, patient-stated behavior change goals.  Recommend increase in basal insulin:  Tresiba: 0-0-0-30 --> 0-0-0-32    Will recommend Lexy also record low blood sugars    Tiffanie Mcfarland RD, LD, CDE  Time spent: 14 minutes    Any diabetes medication dose changes were made via the CDE Protocol and Collaborative Practice Agreement with the patient's referring provider. A copy of this encounter was shared with the provider.

## 2020-08-05 ENCOUNTER — ANCILLARY PROCEDURE (OUTPATIENT)
Dept: GENERAL RADIOLOGY | Facility: CLINIC | Age: 60
End: 2020-08-05
Attending: ORTHOPAEDIC SURGERY
Payer: MEDICARE

## 2020-08-05 ENCOUNTER — OFFICE VISIT (OUTPATIENT)
Dept: ORTHOPEDICS | Facility: CLINIC | Age: 60
End: 2020-08-05
Attending: FAMILY MEDICINE
Payer: MEDICARE

## 2020-08-05 VITALS — SYSTOLIC BLOOD PRESSURE: 154 MMHG | DIASTOLIC BLOOD PRESSURE: 95 MMHG | HEART RATE: 100 BPM | OXYGEN SATURATION: 95 %

## 2020-08-05 DIAGNOSIS — M25.561 CHRONIC PAIN OF RIGHT KNEE: Primary | ICD-10-CM

## 2020-08-05 DIAGNOSIS — M25.551 PAIN OF RIGHT HIP: ICD-10-CM

## 2020-08-05 DIAGNOSIS — M25.561 RIGHT KNEE PAIN, UNSPECIFIED CHRONICITY: ICD-10-CM

## 2020-08-05 DIAGNOSIS — G89.29 CHRONIC PAIN OF RIGHT KNEE: Primary | ICD-10-CM

## 2020-08-05 DIAGNOSIS — M16.11 OSTEOARTHRITIS OF RIGHT HIP, UNSPECIFIED OSTEOARTHRITIS TYPE: ICD-10-CM

## 2020-08-05 PROCEDURE — 99203 OFFICE O/P NEW LOW 30 MIN: CPT | Performed by: ORTHOPAEDIC SURGERY

## 2020-08-05 PROCEDURE — 72170 X-RAY EXAM OF PELVIS: CPT

## 2020-08-05 ASSESSMENT — PAIN SCALES - GENERAL: PAINLEVEL: MODERATE PAIN (4)

## 2020-08-05 NOTE — LETTER
8/5/2020         RE: Lexy Rockwell  [address]  [facility] [address]        Dear Colleague,    Thank you for referring your patient, Lexy Rockwell, to the [facility]. Please see a copy of my visit note below.    SUBJECTIVE:   Lexy Rockwell is a 60 year old female who is seen in consultation at the request of Michael Fuentes MD for evaluation of right knee pain.    Duration:      Several months.  Limiting activities some  Has a reacher to help get dressed  Can't cross leg over    No history of trauma.    Present symptoms: painful to sit.  No pain with walking or getting up.  Says that pain is definitely the knee, and not the hip.   Pain diffuse, anterior.  No catching, locking or giving-way.    Treatments tried to this point: tylenol, icy hot.    Previous knee issues: left side in the past.      Past Medical History:   Past Medical History:   Diagnosis Date     Cerumen impacted      Development delay      Diabetic gastroparesis (H) 8/16/2013     Glaucoma (increased eye pressure)      Hyperlipaemia      Hypertension      Hypothyroid      Mental retardation      Nonsenile cataract      Obesity      Type 1 diabetes mellitus not at goal (H)      Past Surgical History:   Past Surgical History:   Procedure Laterality Date     C EYE SURG ANT SGMT PROC UNLISTED  remote    strabismus surgery     STRABISMUS SURGERY       Family History:   Family History   Problem Relation Age of Onset     Hypertension Father      Diabetes Sister      Glaucoma Maternal Grandfather      Cancer No family hx of      Cerebrovascular Disease No family hx of      Thyroid Disease No family hx of      Macular Degeneration No family hx of      Social History:   Social History     Tobacco Use     Smoking status: Never Smoker     Smokeless tobacco: Never Used     Tobacco comment: lives in smoke free household.   Substance Use Topics     Alcohol use: Yes     Comment: occass social       Review of  Systems:  Constitutional:  NEGATIVE for fever, chills, change in weight  Integumentary/Skin:  NEGATIVE for worrisome rashes, moles or lesions  Eyes:  NEGATIVE for vision changes or irritation  ENT/Mouth:  NEGATIVE for ear, mouth and throat problems  Resp:  NEGATIVE for significant cough or SOB  Breast:  NEGATIVE for masses, tenderness or discharge  CV:  NEGATIVE for chest pain, palpitations or peripheral edema  GI:  NEGATIVE for nausea, abdominal pain, heartburn, or change in bowel habits  :  Negative   Musculoskeletal:  See HPI above  Neuro:  NEGATIVE for weakness, dizziness or paresthesias  Endocrine:  NEGATIVE for temperature intolerance, skin/hair changes  Heme/allergy/immune:  NEGATIVE for bleeding problems  Psychiatric:  NEGATIVE for changes in mood or affect      OBJECTIVE:  Physical Exam:  BP (!) 154/95 (BP Location: Left arm, Patient Position: Sitting, Cuff Size: Adult Large)   Pulse 100   LMP 03/28/2014   SpO2 95%   General Appearance: healthy, alert and no distress   Skin: no suspicious lesions or rashes  Neuro: Normal strength and tone, mentation intact and speech normal  Vascular: good pulses, and cappillary refill   Lymph: no lymphadenopathy   Psych:  mentation appears normal and affect normal/bright  Resp: no increased work of breathing     Right Knee Exam:  Gait: walks with antalgic gait favoring right side  Alignment: normal , has a lot of soft tissue in the pes areas bilateral knees.  Squat: not tested .    Patellofemoral joint: no crepitations in the patellofemoral joint.  Effusion: difficult to tell   ROM: very good-- tested seated per patient request  Tender: non-tender   Masses: none  Ligaments:   Lachman's: stable   Anterior/Posterior drawer: stable,   Varus/Valgus stress: stable to varus and valgus stress  McMurrays: negative    Hip range of motion: limited, can't lift right knee up.  Causes knee pain?: no    X-rays:  Obtained 7/29/20, reviewed in the office with the patient today:   Essentially normal.  Xray, AP pelvis,  obtained today, reviewed over the phone after the visit demonstrate:  On the right there is early subchondral sclerosis and subchondral cyst formation most  likely in the acetabulum more so than the femoral head. No definite  fracture. Doubt AVN.   Significant coxa valgum bilaterally  Multiple calcified fibroids    MRI:    None.     ASSESSMENT:   Encounter Diagnosis   Name Primary?     Chronic pain of right knee Yes    mild osteoarthritis of the right hip, maybe some AVN possibly. I'm wondering if the pain is referred from the hip based on exam.    PLAN:   physical therapy ordered for both the hip and knee  If no improvement, consider differential, staggered corticosteroid injections for diagnosis and treatment.  If she comes back in the future, a frog leg lateral of the right hip could add some information regarding AVN.    Return to clinic: as needed     LORIE Heard MD  Dept. Orthopedic Surgery  [facility]     Lexy to follow up with Primary Care provider regarding elevated blood pressure.    Vahid Rai OPA      Again, thank you for allowing me to participate in the care of your patient.        Sincerely,        Gt Heard MD

## 2020-08-10 ENCOUNTER — TELEPHONE (OUTPATIENT)
Dept: EDUCATION SERVICES | Facility: CLINIC | Age: 60
End: 2020-08-10

## 2020-08-10 NOTE — TELEPHONE ENCOUNTER
Diabetes Follow-up    Subjective/Objective:    Lexy Rockwell downloaded or sent in blood glucose report for review.   Comments/concerns:   From time to time my  tells me to calibrate, do I need to do that?   Also I found out through scheduling an appointment, will you be calling me on August 19 to get my numbers if so what time?  My phone number is [phone]      Assessment/Plan:  Cristi Rodriguez,    Thank you for reaching out with your questions.      Typically with the factory-calibrated Dexcom G6, users do not need to calibrate the CGM as long as you are entering the sensor code (the code listed on the paper tabs you remove before placing the sensor on your body) that is labeled on each sensor.  If you are not entering the sensor code each time you replace the sensor it will ask you to calibrate the sensor to ensure accuracy.      You will receive a call for your scheduled appointment  on August 19th at 2pm.     Please let us know if you have any further question.      Eva Espinal RN, [facility]       Any diabetes medication dose changes were made via the CDE Protocol and Collaborative Practice Agreement with the patient's referring provider. A copy of this encounter was shared with the provider.

## 2020-08-19 ENCOUNTER — THERAPY VISIT (OUTPATIENT)
Dept: PHYSICAL THERAPY | Facility: CLINIC | Age: 60
End: 2020-08-19
Attending: ORTHOPAEDIC SURGERY
Payer: MEDICARE

## 2020-08-19 ENCOUNTER — VIRTUAL VISIT (OUTPATIENT)
Dept: EDUCATION SERVICES | Facility: CLINIC | Age: 60
End: 2020-08-19
Payer: MEDICARE

## 2020-08-19 DIAGNOSIS — E10.40 TYPE 1 DIABETES MELLITUS WITH DIABETIC NEUROPATHY (H): Primary | ICD-10-CM

## 2020-08-19 DIAGNOSIS — G89.29 CHRONIC PAIN OF RIGHT KNEE: ICD-10-CM

## 2020-08-19 DIAGNOSIS — M25.551 PAIN OF RIGHT HIP: ICD-10-CM

## 2020-08-19 DIAGNOSIS — M25.561 CHRONIC PAIN OF RIGHT KNEE: ICD-10-CM

## 2020-08-19 PROCEDURE — 97110 THERAPEUTIC EXERCISES: CPT | Mod: GP | Performed by: PHYSICAL THERAPIST

## 2020-08-19 PROCEDURE — 98967 PH1 ASSMT&MGMT NQHP 11-20: CPT | Mod: 95

## 2020-08-19 PROCEDURE — 97161 PT EVAL LOW COMPLEX 20 MIN: CPT | Mod: GP | Performed by: PHYSICAL THERAPIST

## 2020-08-19 NOTE — PROGRESS NOTES
[place] [facility] Initial Evaluation  Subjective:  Lexy Rockwell is a 60 year old female with a right hip and knee condition.    The condition occurred due to degenerative joint disease.  The condition occurred with insidious onset.  This is a chronic condition.    Mechanism/History of injury/symptoms:  8/5/20  The pain is located anterior, medial, lateral and the quality of pain is reported as aching.  The degree of pain is reported as 7/10. The timing of pain/symptoms is intermittent, worse at night. Associated symptoms include: weakness, stiffness, swelling    Symptoms are exacerbated by: walking, bending, squatting, stairs.  Symptoms are relieved by: rest.  Since onset symptoms are gradually worsening.    Special tests for this condition include: x-ray.  Previous treatments for this condition include: none.    General health as reported by patient is good.  Pertinent medical history includes: diabetes, katya blood pressure, pain at rest/night.  Medical allergies include: none.  Other surgeries include: none.  Current medications:  High blood pressure    Current occupation: retired.  Patient's home/work tasks include: general housework.     Potential barriers to physical therapy: none.  Red flags: none.    Previous level of function: able to dress, walk, manage stairs without knee pain  Current functional restrictions:  Right knee pain bending knee to dress, bathe, walk, manage stairs.    HPI                    Objective:  KNEE:    PROM:   L  R   Hyperextension 0 0   Extension 0 0   Flexion 130 130     Strength:   L R   HIP     Flex 4+/5 4/5   Ext 4+/5 4/5   Abd 4+/5 4/5   KNEE     Flex 5/5 4+/5   Ext 5/5 4+/5     Hip PROM:     L R   IR 50 50   ER 45 45   Dionna's - -   Gilmar - -       Special tests:   L R   Anterior Drawer - -   Posterior Drawer - -   Lachman's - -   Valgus 0 degrees - -   Valgus 30 degrees - -   Varus 0 degrees - -   Varus 30 degrees - -   Lilly's - -   Appley's - -    Lateral Compression - -   Patellar Compression - -       Palpation: mild tenderness in right knee at medial joint line    Patellar tracking: grossly WNL    Functional: demonstrates dynamic valgus bilaterally    Gait: poor quad control demonstrated in right knee during gait        System    Physical Exam    General     ROS    Assessment/Plan:    Patient is a 60 year old female with right side hip and right side knee complaints.    Patient has the following significant findings with corresponding treatment plan.                Diagnosis 1:  Right knee pain    Pain -  hot/cold therapy, self management, education and home program  Decreased strength - therapeutic exercise, therapeutic activities and home program  Decreased proprioception - neuro re-education, therapeutic activities and home program  Impaired gait - gait training and home program  Decreased function - therapeutic activities and home program    Therapy Evaluation Codes:   1) History comprised of:   Personal factors that impact the plan of care:      special needs.    Comorbidity factors that impact the plan of care are:      Diabetes, High blood pressure and Pain at night/rest.     Medications impacting care: High blood pressure.  2) Examination of Body Systems comprised of:   Body structures and functions that impact the plan of care:      Hip and Knee.   Activity limitations that impact the plan of care are:      Bathing, Dressing, Squatting/kneeling, Stairs, Walking and Sleeping.  3) Clinical presentation characteristics are:   Stable/Uncomplicated.  4) Decision-Making    Low complexity using standardized patient assessment instrument and/or measureable assessment of functional outcome.  Cumulative Therapy Evaluation is: Low complexity.    Previous and current functional limitations:  (See Goal Flow Sheet for this information)    Short term and Long term goals: (See Goal Flow Sheet for this information)     Communication ability:  Patient appears to  be able to clearly communicate and understand verbal and written communication and follow directions correctly.  Treatment Explanation - The following has been discussed with the patient:   RX ordered/plan of care  Anticipated outcomes  Possible risks and side effects  This patient would benefit from PT intervention to resume normal activities.   Rehab potential is good.    Frequency:  1 X week, once daily  Duration:  for 6 weeks  Discharge Plan:  Achieve all LTG.  Independent in home treatment program.  Reach maximal therapeutic benefit.    Please refer to the daily flowsheet for treatment today, total treatment time and time spent performing 1:1 timed codes.

## 2020-08-19 NOTE — PROGRESS NOTES
Diabetes Follow-up    Subjective/Objective:    Telephone visit for BG Follow-up. Last date of communication was: 8/4/2020.    Patient verbally consented to the telephone visit service today: yes      Diabetes is being managed with   Diabetes Medications   Diabetes Medication(s)     Biguanides       metFORMIN (GLUCOPHAGE-XR) 500 MG 24 hr tablet    Take 4-tablets by mouth daily as directed    Insulin       insulin aspart (NOVOLOG FLEXPEN) 100 UNIT/ML pen    Inject 14 units with breakfast, lunch and dinner and correction scale dose premeal as directed, TDD: approx 100 units     insulin degludec (TRESIBA FLEXTOUCH) 200 UNIT/ML pen    Inject 32 Units Subcutaneous daily          BG/Food Log:   Date Breakfast  Lunch  Dinner  Bedtime    Before After Before After Before After    8/19 153  245 85      8/18 151  138 126 91     8/17 235  186 205 166     8/16 60->76  223 289 268     8/15 132  72 283 230     8/14 138  77 248 231     8/13 95  152 105 143     8/12 161  115 192 137     8/11 158  99 227 69 -> 109     8/10 161  96 240 249     8/9 63 -> 74  53->104 117 138     8/8 79  201 234 102     8/7 *Too high  148 256 168     8/6 173  158 202 151     8/5 170  135  189     8/4 252  263 154          Assessment:    Fasting blood glucose: 13% in target (although lower than last follow-up)  Before lunch glucose: 31% in target.  After lunch glucose: 33% in target.  Before dinner glucose: 14% in target.    Lexy's BG did improve from last follow-up, but still fluctuating greatly and had some more low BG the last 2 weeks.    Plan/Response:  No changes to insulin dosing today, follow-up in 2 weeks    Tiffanie Mcfarland RD, LD, CDE  Time spent: 13 minutes    Any diabetes medication dose changes were made via the CDE Protocol and Collaborative Practice Agreement with the patient's referring provider. A copy of this encounter was shared with the provider.

## 2020-08-19 NOTE — LETTER
DEPARTMENT OF HEALTH AND HUMAN SERVICES  [facility]    PLAN/UPDATED PLAN OF PROGRESS FOR OUTPATIENT REHABILITATION@       PATIENTS NAME:  Lexy Rockwell     : 1960    PROVIDER NUMBER:    [id number]    [id number]      PROVIDER NAME: JOANN CURTIS [facility]    MEDICAL RECORD NUMBER: [id number]     START OF CARE DATE:  SOC Date: 20   TYPE:  PT    PRIMARY/TREATMENT DIAGNOSIS: (Pertinent Medical Diagnosis)     Chronic pain of right knee  Pain of right hip    VISITS FROM START OF CARE:  Rxs Used: 1     [place] [facility] Initial Evaluation    Subjective:  Lexy Rockwell is a 60 year old female with a right hip and knee condition.    The condition occurred due to degenerative joint disease.  The condition occurred with insidious onset.  This is a chronic condition.  Mechanism/History of injury/symptoms:  20  The pain is located anterior, medial, lateral and the quality of pain is reported as aching.  The degree of pain is reported as 7/10. The timing of pain/symptoms is intermittent, worse at night. Associated symptoms include: weakness, stiffness, swelling  Symptoms are exacerbated by: walking, bending, squatting, stairs.  Symptoms are relieved by: rest.  Since onset symptoms are gradually worsening.  Special tests for this condition include: x-ray.  Previous treatments for this condition include: none.  General health as reported by patient is good.  Pertinent medical history includes: diabetes, katya blood pressure, pain at rest/night.  Medical allergies include: none.  Other surgeries include: none.  Current medications:  High blood pressure  Current occupation: retired.  Patient's home/work tasks include: general housework.   Potential barriers to physical therapy: none.  Red flags: none.  Previous level of function: able to dress, walk, manage stairs without knee pain  Current functional restrictions:  Right knee pain bending knee to dress, bathe,  walk, manage stairs.    Objective:  KNEE:    PROM:   L  R   Hyperextension 0 0   Extension 0 0   Flexion 130 130     Strength:   L R   HIP     Flex 4+/5 4/5   Ext 4+/5 4/5   Abd 4+/5 4/5   KNEE     Flex 5/5 4+/5   Ext 5/5 4+/5     Hip PROM:     L R   IR 50 50   ER 45 45   Dionna's - -   Gilmar - -     Special tests:   L R   Anterior Drawer - -   Posterior Drawer - -   Lachman's - -   Valgus 0 degrees - -   Valgus 30 degrees - -   Varus 0 degrees - -   Varus 30 degrees - -   Lilly's - -   Appley's - -   Lateral Compression - -   Patellar Compression - -     Palpation: mild tenderness in right knee at medial joint line    Patellar tracking: grossly WNL    Functional: demonstrates dynamic valgus bilaterally    Gait: poor quad control demonstrated in right knee during gait    Assessment/Plan:    Patient is a 60 year old female with right side hip and right side knee complaints.    Patient has the following significant findings with corresponding treatment plan.                Diagnosis 1:  Right knee pain    Pain -  hot/cold therapy, self management, education and home program  Decreased strength - therapeutic exercise, therapeutic activities and home program  Decreased proprioception - neuro re-education, therapeutic activities and home program  Impaired gait - gait training and home program  Decreased function - therapeutic activities and home program    Therapy Evaluation Codes:   1) History comprised of:   Personal factors that impact the plan of care:      special needs.    Comorbidity factors that impact the plan of care are:      Diabetes, High blood pressure and Pain at night/rest.     Medications impacting care: High blood pressure.  2) Examination of Body Systems comprised of:   Body structures and functions that impact the plan of care:      Hip and Knee.   Activity limitations that impact the plan of care are:      Bathing, Dressing, Squatting/kneeling, Stairs, Walking and Sleeping.  3) Clinical  "presentation characteristics are:   Stable/Uncomplicated.  4) Decision-Making    Low complexity using standardized patient assessment instrument and/or measureable assessment of functional outcome.    Cumulative Therapy Evaluation is: Low complexity.  Previous and current functional limitations:  (See Goal Flow Sheet for this information)    Short term and Long term goals: (See Goal Flow Sheet for this information)   Communication ability:  Patient appears to be able to clearly communicate and understand verbal and written communication and follow directions correctly.  Treatment Explanation - The following has been discussed with the patient:   RX ordered/plan of care  Anticipated outcomes  Possible risks and side effects  This patient would benefit from PT intervention to resume normal activities.   Rehab potential is good.    Frequency:  1 X week, once daily  Duration:  for 6 weeks  Discharge Plan:  Achieve all LTG.  Independent in home treatment program.  Reach maximal therapeutic benefit.    Caregiver Signature/Credentials _____________________________ Date ________       Treating Provider: Jonnie Rodriguez, DPT, SCS     I have reviewed and certified the need for these services and plan of treatment while under my care.        PHYSICIAN'S SIGNATURE:   _________________________________________  Date___________   Gt Heard MD     Certification period:  Beginning of Cert date period: 08/19/20 to  End of Cert period date: 11/16/20     Functional Level Progress Report: Please see attached \"Goal Flow sheet for Functional level.\"    ____X____ Continue Services or       ________ DC Services                Service dates: From  SOC Date: 08/19/20 date to present                         "

## 2020-08-20 ENCOUNTER — TELEPHONE (OUTPATIENT)
Dept: EDUCATION SERVICES | Facility: CLINIC | Age: 60
End: 2020-08-20

## 2020-08-20 NOTE — TELEPHONE ENCOUNTER
Email from patient:  Tiffanie, I forgot to ask you when we had our phone appointment, should I continue to write down when I get low blood sugars?    Email response:  Cristi Rodriguez,    I am Tiffanie's co-worker, Roseanne. I think it would be great to continue to write down when you get low blood sugars. I also wanted to let you know we are trying to stop using our email and switch to using emoquo. I see you have an active emoquo account, so if you can send furture messages through there that would be great.    Thanks so much!    Roseanne Stubbs RN, [facility]

## 2020-08-27 ENCOUNTER — MYC MEDICAL ADVICE (OUTPATIENT)
Dept: FAMILY MEDICINE | Facility: CLINIC | Age: 60
End: 2020-08-27

## 2020-08-27 DIAGNOSIS — N32.81 OVERACTIVE BLADDER: Primary | ICD-10-CM

## 2020-08-27 RX ORDER — OXYBUTYNIN CHLORIDE 10 MG/1
10 TABLET, EXTENDED RELEASE ORAL DAILY
Qty: 90 TABLET | Refills: 1 | Status: SHIPPED | OUTPATIENT
Start: 2020-08-27 | End: 2021-06-21

## 2020-08-27 NOTE — TELEPHONE ENCOUNTER
Routed to Dr. Fuentes, see Bahu messages.  Patient has trouble remembering twice a day metformin and oxybutynin.   Is there a way she can take these just once daily and take all her pills just once a day?    Shaina Singh RN  [facility]

## 2020-08-27 NOTE — TELEPHONE ENCOUNTER
Patient was last seen 7/29/20 by Dr. Fuentes.  Routed mychart response to patient to clarify which meds she is forgetting to take.    Shaina Singh RN  [facility]

## 2020-08-28 NOTE — TELEPHONE ENCOUNTER
I sent in prescription for the extended release form of oxybutynin, 10 mg once daily. This replaces the 5 mg twice daily oxybutynin that she was on.      For the metformin, she is already on the extended release form of that so okay to take all 4 metformin pills once daily.    Please inform patient.    Fredi Fuentes MD

## 2020-08-31 ENCOUNTER — THERAPY VISIT (OUTPATIENT)
Dept: PHYSICAL THERAPY | Facility: CLINIC | Age: 60
End: 2020-08-31
Payer: MEDICARE

## 2020-08-31 DIAGNOSIS — G89.29 CHRONIC PAIN OF RIGHT KNEE: ICD-10-CM

## 2020-08-31 DIAGNOSIS — M25.561 CHRONIC PAIN OF RIGHT KNEE: ICD-10-CM

## 2020-08-31 PROCEDURE — 97110 THERAPEUTIC EXERCISES: CPT | Mod: GP | Performed by: PHYSICAL THERAPIST

## 2020-09-02 ENCOUNTER — OFFICE VISIT (OUTPATIENT)
Dept: PODIATRY | Facility: CLINIC | Age: 60
End: 2020-09-02
Payer: MEDICARE

## 2020-09-02 VITALS — OXYGEN SATURATION: 98 % | HEART RATE: 111 BPM | DIASTOLIC BLOOD PRESSURE: 95 MMHG | SYSTOLIC BLOOD PRESSURE: 177 MMHG

## 2020-09-02 DIAGNOSIS — E10.42 DM TYPE 1 WITH DIABETIC PERIPHERAL NEUROPATHY (H): ICD-10-CM

## 2020-09-02 DIAGNOSIS — L84 TYLOMA: ICD-10-CM

## 2020-09-02 DIAGNOSIS — L60.2 ONYCHAUXIS: Primary | ICD-10-CM

## 2020-09-02 PROCEDURE — 99213 OFFICE O/P EST LOW 20 MIN: CPT | Performed by: PODIATRIST

## 2020-09-02 NOTE — NURSING NOTE
Lexy Rockwell's chief complaint for this visit includes:  Chief Complaint   Patient presents with     RECHECK     foot/toenail care     PCP: Fredi Fuentes    Referring Provider:  No referring provider defined for this encounter.    BP (!) 153/106 (BP Location: Left arm, Patient Position: Sitting, Cuff Size: Adult Small)   Pulse 111   LMP 03/28/2014   SpO2 98%   Data Unavailable     Do you need any medication refills at today's visit? No

## 2020-09-02 NOTE — PATIENT INSTRUCTIONS
Thanks for coming today.  [facility]  [address] [place] [address]    To schedule future appointments in Ortho Clinic, you may call [phone].    To schedule ordered imaging by your provider:   Call Central Imaging [phone]    To schedule an injection ordered by your provider:  Call Central Imaging Injection scheduling line: [phone]  [employer] available online at:  [email or web address]    Please call if any further questions or concerns ([phone]).  Clinic hours 8 am to 5 pm.    Return to clinic (call) if symptoms worsen or fail to improve.

## 2020-09-02 NOTE — PROGRESS NOTES
Past Medical History:   Diagnosis Date     Cerumen impacted      Development delay      Diabetic gastroparesis (H) 8/16/2013     Glaucoma (increased eye pressure)      Hyperlipaemia      Hypertension      Hypothyroid      Mental retardation      Nonsenile cataract      Obesity      Type 1 diabetes mellitus not at goal (H)      Patient Active Problem List   Diagnosis     Development delay     Overactive bladder     GERD (gastroesophageal reflux disease)     Hypertension goal BP (blood pressure) < 140/90     Hyperlipidemia LDL goal <100     Pain in joint, lower leg     Proteinuria     Vitamin D deficiency     Health Care Home     History of strabismus surgery     Type 1 diabetes mellitus with other specified complication (H)     Advanced directives, counseling/discussion     Primary open angle glaucoma of both eyes, moderate stage     Gastroesophageal reflux disease without esophagitis     Obesity, unspecified obesity severity, unspecified obesity type     Acute renal failure (H)     Acute retention of urine     JEAN (acute kidney injury) (H)     Dehydration     Diabetic ketoacidosis without coma associated with type 1 diabetes mellitus (H)     DKA (diabetic ketoacidoses) (H)     Sinus tachycardia     SIRS (systemic inflammatory response syndrome) (H)     Obesity (BMI 35.0-39.9) with comorbidity (H)     Chronic pain of right knee     Past Surgical History:   Procedure Laterality Date     C EYE SURG ANT SGMT PROC UNLISTED  remote    strabismus surgery     STRABISMUS SURGERY       Social History     Socioeconomic History     Marital status: Single     Spouse name: Not on file     Number of children: Not on file     Years of education: Not on file     Highest education level: Not on file   Occupational History     Not on file   Social Needs     Financial resource strain: Not on file     Food insecurity     Worry: Not on file     Inability: Not on file     Transportation needs     Medical: Not on file     Non-medical: Not  on file   Tobacco Use     Smoking status: Never Smoker     Smokeless tobacco: Never Used     Tobacco comment: lives in smoke free household.   Substance and Sexual Activity     Alcohol use: Yes     Comment: occass social     Drug use: No     Sexual activity: Never   Lifestyle     Physical activity     Days per week: Not on file     Minutes per session: Not on file     Stress: Not on file   Relationships     Social connections     Talks on phone: Not on file     Gets together: Not on file     Attends [employer] service: Not on file     Active member of club or organization: Not on file     Attends meetings of clubs or organizations: Not on file     Relationship status: Not on file     Intimate partner violence     Fear of current or ex partner: Not on file     Emotionally abused: Not on file     Physically abused: Not on file     Forced sexual activity: Not on file   Other Topics Concern     Parent/sibling w/ CABG, MI or angioplasty before 65F 55M? No   Social History Narrative     Not on file     Family History   Problem Relation Age of Onset     Hypertension Father      Diabetes Sister      Glaucoma Maternal Grandfather      Cancer No family hx of      Cerebrovascular Disease No family hx of      Thyroid Disease No family hx of      Macular Degeneration No family hx of      Lab Results   Component Value Date    A1C 11.8 01/08/2020    A1C 11.8 05/19/2019    A1C 11.2 12/12/2018    A1C 12.4 09/05/2018    A1C 10.6 03/14/2018     SUBJECTIVE FINDINGS:  A 59-year-old female returns to clinic for callus and toenails.  She relates she is doing well.  She relates she is diabetic.  She relates to numbness, tingling and neuropathy in her feet.  She relates no ulcers or sores since we have seen her last.  She relates she has decided not to get diabetic shoes.  She relates no other specific relieving or aggravating factors.  She relates her nails bother her at times as well but better.      OBJECTIVE FINDINGS:  DP and PT are 2/4  bilaterally.  She has decreased ankle joint dorsiflexion bilaterally.  She has dorsally contracted digits bilaterally.  She has hyperkeratotic tissue buildup, plantar first MPJ on the left.  There is no erythema, no drainage, no odor, no calor bilaterally.  She has some mild peripheral edema bilaterally.  She has decreased hair growth bilaterally.  Sharp/dull is decreased with 5.07 [place]-Latha monofilament bilaterally.  She has incurvated nails with some thick dystrophy, discoloration and subungual debris to differing degrees bilaterally.      ASSESSMENT AND PLAN:  Onychomycosis bilaterally.  Tylomas, left foot.  She is diabetic with peripheral neuropathy and vascular disease.  Diagnosis and options discussed with her.  All the nails were reduced or debrided bilaterally upon consent.  The tyloma on the left foot was sharp debrided with a #15 blade upon consent.  She will return to clinic and see me in 2-3 months.

## 2020-09-02 NOTE — LETTER
9/2/2020         RE: Lexy Rockwell  [address]  [facility] [address]        Dear Colleague,    Thank you for referring your patient, Lexy Rockwell, to the [facility]. Please see a copy of my visit note below.    Past Medical History:   Diagnosis Date     Cerumen impacted      Development delay      Diabetic gastroparesis (H) 8/16/2013     Glaucoma (increased eye pressure)      Hyperlipaemia      Hypertension      Hypothyroid      Mental retardation      Nonsenile cataract      Obesity      Type 1 diabetes mellitus not at goal (H)      Patient Active Problem List   Diagnosis     Development delay     Overactive bladder     GERD (gastroesophageal reflux disease)     Hypertension goal BP (blood pressure) < 140/90     Hyperlipidemia LDL goal <100     Pain in joint, lower leg     Proteinuria     Vitamin D deficiency     Health Care Home     History of strabismus surgery     Type 1 diabetes mellitus with other specified complication (H)     Advanced directives, counseling/discussion     Primary open angle glaucoma of both eyes, moderate stage     Gastroesophageal reflux disease without esophagitis     Obesity, unspecified obesity severity, unspecified obesity type     Acute renal failure (H)     Acute retention of urine     JEAN (acute kidney injury) (H)     Dehydration     Diabetic ketoacidosis without coma associated with type 1 diabetes mellitus (H)     DKA (diabetic ketoacidoses) (H)     Sinus tachycardia     SIRS (systemic inflammatory response syndrome) (H)     Obesity (BMI 35.0-39.9) with comorbidity (H)     Chronic pain of right knee     Past Surgical History:   Procedure Laterality Date     C EYE SURG ANT SGMT PROC UNLISTED  remote    strabismus surgery     STRABISMUS SURGERY       Social History     Socioeconomic History     Marital status: Single     Spouse name: Not on file     Number of children: Not on file     Years of education: Not on file      Highest education level: Not on file   Occupational History     Not on file   Social Needs     Financial resource strain: Not on file     Food insecurity     Worry: Not on file     Inability: Not on file     Transportation needs     Medical: Not on file     Non-medical: Not on file   Tobacco Use     Smoking status: Never Smoker     Smokeless tobacco: Never Used     Tobacco comment: lives in smoke free household.   Substance and Sexual Activity     Alcohol use: Yes     Comment: occass social     Drug use: No     Sexual activity: Never   Lifestyle     Physical activity     Days per week: Not on file     Minutes per session: Not on file     Stress: Not on file   Relationships     Social connections     Talks on phone: Not on file     Gets together: Not on file     Attends [employer] service: Not on file     Active member of club or organization: Not on file     Attends meetings of clubs or organizations: Not on file     Relationship status: Not on file     Intimate partner violence     Fear of current or ex partner: Not on file     Emotionally abused: Not on file     Physically abused: Not on file     Forced sexual activity: Not on file   Other Topics Concern     Parent/sibling w/ CABG, MI or angioplasty before 65F 55M? No   Social History Narrative     Not on file     Family History   Problem Relation Age of Onset     Hypertension Father      Diabetes Sister      Glaucoma Maternal Grandfather      Cancer No family hx of      Cerebrovascular Disease No family hx of      Thyroid Disease No family hx of      Macular Degeneration No family hx of      Lab Results   Component Value Date    A1C 11.8 01/08/2020    A1C 11.8 05/19/2019    A1C 11.2 12/12/2018    A1C 12.4 09/05/2018    A1C 10.6 03/14/2018     SUBJECTIVE FINDINGS:  A 59-year-old female returns to clinic for callus and toenails.  She relates she is doing well.  She relates she is diabetic.  She relates to numbness, tingling and neuropathy in her feet.  She relates  no ulcers or sores since we have seen her last.  She relates she has decided not to get diabetic shoes.  She relates no other specific relieving or aggravating factors.  She relates her nails bother her at times as well but better.      OBJECTIVE FINDINGS:  DP and PT are 2/4 bilaterally.  She has decreased ankle joint dorsiflexion bilaterally.  She has dorsally contracted digits bilaterally.  She has hyperkeratotic tissue buildup, plantar first MPJ on the left.  There is no erythema, no drainage, no odor, no calor bilaterally.  She has some mild peripheral edema bilaterally.  She has decreased hair growth bilaterally.  Sharp/dull is decreased with 5.07 [place]-Latha monofilament bilaterally.  She has incurvated nails with some thick dystrophy, discoloration and subungual debris to differing degrees bilaterally.      ASSESSMENT AND PLAN:  Onychomycosis bilaterally.  Tylomas, left foot.  She is diabetic with peripheral neuropathy and vascular disease.  Diagnosis and options discussed with her.  All the nails were reduced or debrided bilaterally upon consent.  The tyloma on the left foot was sharp debrided with a #15 blade upon consent.  She will return to clinic and see me in 2-3 months.        Again, thank you for allowing me to participate in the care of your patient.        Sincerely,        Ravin Back DPM

## 2020-09-04 ENCOUNTER — VIRTUAL VISIT (OUTPATIENT)
Dept: EDUCATION SERVICES | Facility: CLINIC | Age: 60
End: 2020-09-04
Payer: MEDICARE

## 2020-09-04 DIAGNOSIS — E11.42 TYPE 2 DIABETES MELLITUS WITH DIABETIC POLYNEUROPATHY, WITH LONG-TERM CURRENT USE OF INSULIN (H): ICD-10-CM

## 2020-09-04 DIAGNOSIS — Z79.4 TYPE 2 DIABETES MELLITUS WITH DIABETIC POLYNEUROPATHY, WITH LONG-TERM CURRENT USE OF INSULIN (H): ICD-10-CM

## 2020-09-04 PROCEDURE — 98967 PH1 ASSMT&MGMT NQHP 11-20: CPT | Mod: 95

## 2020-09-04 NOTE — PROGRESS NOTES
Diabetes Follow-up    Subjective/Objective:    Telephone visit for BG follow-up    Patient verbally consented to the telephone visit service today: yes      Diabetes is being managed with   Diabetes Medications   Diabetes Medication(s)     Biguanides       metFORMIN (GLUCOPHAGE-XR) 500 MG 24 hr tablet    Take 4-tablets by mouth daily as directed    Insulin       insulin aspart (NOVOLOG FLEXPEN) 100 UNIT/ML pen    Inject 14 units with breakfast, lunch and dinner and correction scale dose premeal as directed, TDD: approx 100 units     insulin degludec (TRESIBA FLEXTOUCH) 200 UNIT/ML pen    Inject 32 Units Subcutaneous daily          BG/Food Log:   Date Breakfast  Lunch  Dinner  Bedtime    Before After Before After Before After     112  88 208      9/3 68-->91  HIGH 162 220      192  155 100's (?) 278      246  213 248 180      173  267 HIGH  197     174  96 104 104      96  256 105 80      139  138 169 130      181          292   HIGH 59-->88      138  108 221 249      104  122 257  164     82  169 295 298      168  159 232 221         Assessment:    Fasting blood glucose: 29% in target.  Before lunch glucose: 33% in target.  After lunch glucose: 38% in target.  Before dinner glucose: 36% in target.  After dinner glucose: 50% in target.    Plan/Response:  See Patient Instructions for co-developed, patient-stated behavior change goals.  Recommend increase to insulin - Novolo-14-14-0 --> 15-15-15-0    Tiffanie Mcfarland, KARLOS, LD, CDE  Time spent: 12 minutes    Any diabetes medication dose changes were made via the CDE Protocol and Collaborative Practice Agreement with the patient's referring provider. A copy of this encounter was shared with the provider.

## 2020-09-08 RX ORDER — INSULIN ASPART 100 [IU]/ML
INJECTION, SOLUTION INTRAVENOUS; SUBCUTANEOUS
Qty: 30 ML | Refills: 11 | Status: SHIPPED | OUTPATIENT
Start: 2020-09-08 | End: 2020-11-11

## 2020-09-09 ENCOUNTER — MYC MEDICAL ADVICE (OUTPATIENT)
Dept: FAMILY MEDICINE | Facility: CLINIC | Age: 60
End: 2020-09-09

## 2020-09-09 DIAGNOSIS — Z11.9 SCREENING EXAMINATION FOR INFECTIOUS DISEASE: Primary | ICD-10-CM

## 2020-09-10 NOTE — TELEPHONE ENCOUNTER
Sent my chart reply.      Stephanie Nguyen RN  Triage Nurse  [facility]  Appointment line: [phone]  [facility], 24 hour nurse line, available by calling clinic at [phone] and following prompts.

## 2020-09-10 NOTE — TELEPHONE ENCOUNTER
Scheduled for pre-op - sent my chart reply.    Routing to PCP to enter Covid 19 testing orders for procedure on 10/5/2020.      Stephanie Nguyen RN  Triage Nurse  [facility]  Appointment line: [phone]  [facility], 24 hour nurse line, available by calling clinic at [phone] and following prompts.

## 2020-09-10 NOTE — TELEPHONE ENCOUNTER
Called and spoke with pt. Pt stated that she does not need a phone visit anymore and that she found out more information that she needs to come in for a Pre-op. I offered to schedule a pre-op appointment for the pt but she said that she will call back at a later time to schedule that appointment.    DIMITRIOS Hastings MA

## 2020-09-18 ENCOUNTER — MYC MEDICAL ADVICE (OUTPATIENT)
Dept: FAMILY MEDICINE | Facility: CLINIC | Age: 60
End: 2020-09-18

## 2020-09-18 NOTE — TELEPHONE ENCOUNTER
RN replied to patient via Mychart. See message for details.     Sidra Bronson RN, BSN, PHN  [facility] [place]

## 2020-09-22 ENCOUNTER — MYC MEDICAL ADVICE (OUTPATIENT)
Dept: FAMILY MEDICINE | Facility: CLINIC | Age: 60
End: 2020-09-22

## 2020-09-23 NOTE — TELEPHONE ENCOUNTER
Nurse sees Covid test order with 10/5 colonoscopy in hospital in order.    Sent my chart reply.      Stephanie Nguyen RN  Triage Nurse  [facility]  Appointment line: [phone]  [facility], 24 hour nurse line, available by calling clinic at [phone] and following prompts.

## 2020-09-24 NOTE — TELEPHONE ENCOUNTER
Attempt # 1  Called patient at home [phone]  Was call answered?  Yes, informed of my chart message sent, relayed the message to patient as has not read it yet. Patient does understand will have to quarantine after the test until the procedure.    Patient verbalized understanding and agreement with plan and had no questions.                 Stephanie Nguyen RN  [facility]

## 2020-09-25 ENCOUNTER — MYC MEDICAL ADVICE (OUTPATIENT)
Dept: FAMILY MEDICINE | Facility: CLINIC | Age: 60
End: 2020-09-25

## 2020-09-25 NOTE — TELEPHONE ENCOUNTER
Routed to Dr. Fuentes, do you want patient to fast for her pre-op next week?    Shaina Singh RN  [facility]

## 2020-09-25 NOTE — TELEPHONE ENCOUNTER
Provider response routed to patient via [employer].  Shaina Singh RN  [facility]

## 2020-09-30 ENCOUNTER — OFFICE VISIT (OUTPATIENT)
Dept: FAMILY MEDICINE | Facility: CLINIC | Age: 60
End: 2020-09-30
Payer: MEDICARE

## 2020-09-30 VITALS
SYSTOLIC BLOOD PRESSURE: 138 MMHG | HEART RATE: 99 BPM | DIASTOLIC BLOOD PRESSURE: 87 MMHG | WEIGHT: 197.5 LBS | TEMPERATURE: 98 F | BODY MASS INDEX: 33.38 KG/M2

## 2020-09-30 DIAGNOSIS — Z12.11 SCREEN FOR COLON CANCER: ICD-10-CM

## 2020-09-30 DIAGNOSIS — E10.69 TYPE 1 DIABETES MELLITUS WITH OTHER SPECIFIED COMPLICATION (H): ICD-10-CM

## 2020-09-30 DIAGNOSIS — Z01.818 PREOP GENERAL PHYSICAL EXAM: Primary | ICD-10-CM

## 2020-09-30 LAB
ALBUMIN SERPL-MCNC: 3.4 G/DL (ref 3.4–5)
ALP SERPL-CCNC: 125 U/L (ref 40–150)
ALT SERPL W P-5'-P-CCNC: 18 U/L (ref 0–50)
ANION GAP SERPL CALCULATED.3IONS-SCNC: 7 MMOL/L (ref 3–14)
AST SERPL W P-5'-P-CCNC: 19 U/L (ref 0–45)
BASOPHILS # BLD AUTO: 0 10E9/L (ref 0–0.2)
BASOPHILS NFR BLD AUTO: 1 %
BILIRUB SERPL-MCNC: 0.5 MG/DL (ref 0.2–1.3)
BUN SERPL-MCNC: 13 MG/DL (ref 7–30)
CALCIUM SERPL-MCNC: 9 MG/DL (ref 8.5–10.1)
CHLORIDE SERPL-SCNC: 107 MMOL/L (ref 94–109)
CO2 SERPL-SCNC: 23 MMOL/L (ref 20–32)
CREAT SERPL-MCNC: 0.61 MG/DL (ref 0.52–1.04)
DIFFERENTIAL METHOD BLD: ABNORMAL
EOSINOPHIL # BLD AUTO: 0.1 10E9/L (ref 0–0.7)
EOSINOPHIL NFR BLD AUTO: 3 %
ERYTHROCYTE [DISTWIDTH] IN BLOOD BY AUTOMATED COUNT: 14.7 % (ref 10–15)
GFR SERPL CREATININE-BSD FRML MDRD: >90 ML/MIN/{1.73_M2}
GLUCOSE SERPL-MCNC: 300 MG/DL (ref 70–99)
HBA1C MFR BLD: 9.2 % (ref 0–5.6)
HCT VFR BLD AUTO: 35.9 % (ref 35–47)
HGB BLD-MCNC: 11.9 G/DL (ref 11.7–15.7)
LYMPHOCYTES # BLD AUTO: 1 10E9/L (ref 0.8–5.3)
LYMPHOCYTES NFR BLD AUTO: 23 %
MCH RBC QN AUTO: 26.4 PG (ref 26.5–33)
MCHC RBC AUTO-ENTMCNC: 33.1 G/DL (ref 31.5–36.5)
MCV RBC AUTO: 80 FL (ref 78–100)
MONOCYTES # BLD AUTO: 0.4 10E9/L (ref 0–1.3)
MONOCYTES NFR BLD AUTO: 9 %
NEUTROPHILS # BLD AUTO: 2.8 10E9/L (ref 1.6–8.3)
NEUTROPHILS NFR BLD AUTO: 64 %
PLATELET # BLD AUTO: 194 10E9/L (ref 150–450)
PLATELET # BLD EST: ABNORMAL 10*3/UL
POTASSIUM SERPL-SCNC: 4.5 MMOL/L (ref 3.4–5.3)
PROT SERPL-MCNC: 7.1 G/DL (ref 6.8–8.8)
RBC # BLD AUTO: 4.5 10E12/L (ref 3.8–5.2)
RBC MORPH BLD: NORMAL
SODIUM SERPL-SCNC: 137 MMOL/L (ref 133–144)
WBC # BLD AUTO: 4.3 10E9/L (ref 4–11)

## 2020-09-30 PROCEDURE — 36415 COLL VENOUS BLD VENIPUNCTURE: CPT | Performed by: FAMILY MEDICINE

## 2020-09-30 PROCEDURE — 99214 OFFICE O/P EST MOD 30 MIN: CPT | Performed by: FAMILY MEDICINE

## 2020-09-30 PROCEDURE — 85025 COMPLETE CBC W/AUTO DIFF WBC: CPT | Performed by: FAMILY MEDICINE

## 2020-09-30 PROCEDURE — 80053 COMPREHEN METABOLIC PANEL: CPT | Performed by: FAMILY MEDICINE

## 2020-09-30 PROCEDURE — 83036 HEMOGLOBIN GLYCOSYLATED A1C: CPT | Performed by: FAMILY MEDICINE

## 2020-09-30 ASSESSMENT — PAIN SCALES - GENERAL: PAINLEVEL: NO PAIN (0)

## 2020-09-30 NOTE — PROGRESS NOTES
[address] [address]  [phone]  Dept: [phone]    PRE-OP EVALUATION:  Today's date: 2020    Lexy Rockwell (: 1960) presents for pre-operative evaluation assessment as requested by Dr. Telles.  She requires evaluation and anesthesia risk assessment prior to undergoing surgery/procedure for treatment of colonscopy .    Proposed Surgery/ Procedure: Colonoscopy  Date of Surgery/ Procedure: 10/05/2020  Time of Surgery/ Procedure: 1:00pm  Hospital/Surgical Facility: [facility] Fax Number: [phone]  Primary Physician: Fredi Fuentes  Type of Anesthesia Anticipated: General    Preoperative Questionnaire:   No - Have you ever had a heart attack or stroke?  No - Have you ever had surgery on your heart or blood vessels, such as a stent, coronary (heart) bypass, or surgery on an artery in the head, neck, heart, or legs?  No - Do you have chest pain when you are physically active?  No - Do you have a history of heart failure?  No - Do you currently have a cold, bronchitis, or symptoms of other respiratory (head and chest) infections?  No - Do you have a cough, shortness of breath, or wheezing?  No - Do you or anyone in your family have a history of blood clots?  No - Do you or anyone in your family have a serious bleeding problem, such as long-lasting bleeding after surgeries or cuts?  No - Have you ever had anemia or been told to take iron pills?  No - Have you had any abnormal blood loss such as black, tarry or bloody stools, or abnormal vaginal bleeding?  No - Have you ever had a blood transfusion?  Yes - Are you willing to have a blood transfusion if it is medically needed before, during, or after your surgery?  No - Have you or anyone in your family ever had problems with anesthesia (sedation for surgery)?  No - Do you have sleep apnea, excessive snoring, or daytime drowsiness?   No - Do you have any  artifical heart valves or other implanted medical devices, such as a pacemaker, defibrillator, or continuous glucose monitor?  No - Do you have any artifical joints?  No - Are you allergic to latex?  No - Is there any chance that you may be pregnant?    Patient has a Health Care Directive or Living Will:  YES     HPI:     HPI related to upcoming procedure: 60 year old female  To have colonoscopy at [facility]            MEDICAL HISTORY:     Patient Active Problem List    Diagnosis Date Noted     Chronic pain of right knee 08/19/2020     Priority: Medium     Obesity (BMI 35.0-39.9) with comorbidity (H) 07/12/2019     Priority: Medium     DKA (diabetic ketoacidoses) (H) 12/27/2017     Priority: Medium     Gastroesophageal reflux disease without esophagitis 04/17/2017     Priority: Medium     Obesity, unspecified obesity severity, unspecified obesity type 04/17/2017     Priority: Medium     JEAN (acute kidney injury) (H) 04/13/2017     Priority: Medium     Sinus tachycardia 12/13/2016     Priority: Medium     Primary open angle glaucoma of both eyes, moderate stage 11/03/2016     Priority: Medium     Advanced directives, counseling/discussion 09/28/2016     Priority: Medium     Acute retention of urine 04/28/2016     Priority: Medium     Type 1 diabetes mellitus with other specified complication (H) 10/21/2015     Priority: Medium     SIRS (systemic inflammatory response syndrome) (H) 10/18/2014     Priority: Medium     History of strabismus surgery 08/30/2014     Priority: Medium     Health Care Home 12/23/2013     Priority: Medium     No Active Care Coordination          Vitamin D deficiency 12/04/2012     Priority: Medium     Acute renal failure (H) 10/09/2012     Priority: Medium     Dehydration 10/09/2012     Priority: Medium     Proteinuria 06/27/2012     Priority: Medium     Diabetic ketoacidosis without coma associated with type 1 diabetes mellitus (H) 02/29/2012     Priority: Medium     Pain in joint, lower  leg 11/23/2011     Priority: Medium     Hypertension goal BP (blood pressure) < 140/90 06/02/2011     Priority: Medium     Hyperlipidemia LDL goal <100 06/02/2011     Priority: Medium     Overactive bladder 06/02/2010     Priority: Medium     GERD (gastroesophageal reflux disease) 06/02/2010     Priority: Medium     Development delay      Priority: Medium      Past Medical History:   Diagnosis Date     Cerumen impacted      Development delay      Diabetic gastroparesis (H) 8/16/2013     Glaucoma (increased eye pressure)      Hyperlipaemia      Hypertension      Hypothyroid      Mental retardation      Nonsenile cataract      Obesity      Type 1 diabetes mellitus not at goal (H)      Past Surgical History:   Procedure Laterality Date     C EYE SURG ANT SGMT PROC UNLISTED  remote    strabismus surgery     STRABISMUS SURGERY       Current Outpatient Medications   Medication Sig Dispense Refill     ACCU-CHEK COMPACT PLUS test strip USE TO TEST BLOOD SUGAR FIVE TIMES DAILY 459 strip 0     ACCU-CHEK GUIDE test strip TEST BLOOD SUGAR FIVE TIMES DAILY 400 strip 0     acetone urine (KETOSTIX) test strip Test urine when ill or blood glucose above 350 mg/dl. 50 each 6     alcohol swab prep pads Use to swab area of injection/nuha as directed. 100 each 3     ASPIRIN 81 MG OR TABS 1 TABLET DAILY       B-D U/F 31G X 8 MM insulin pen needle USE WITH INSULIN PENS 100 each 1     blood glucose (NO BRAND SPECIFIED) test strip Use to test blood sugar 5   times daily or as directed. To accompany: Blood Glucose Monitor Brands: per insurance. 150 strip 6     blood glucose calibration (NO BRAND SPECIFIED) solution To accompany: Blood Glucose Monitor Brands: per insurance. 1 Bottle 3     blood glucose monitoring (NO BRAND SPECIFIED) meter device kit Use to test blood sugar 5 times daily or as directed. Preferred blood glucose meter OR supplies to accompany: Blood Glucose Monitor Brands: ACCU-CHEK COMPACT, or per insurance 1 kit 0     Blood  Glucose Monitoring Suppl (ACCU-CHEK GUIDE) w/Device KIT PATIENT NEEDS NEW METER 1 kit 0     ciclopirox (LOPROX) 0.77 % cream Apply topically 2 times daily To feet and toenails. 90 g 5     ciclopirox (LOPROX) 0.77 % cream Apply topically 2 times daily To feet and toenails. 90 g 6     Continuous Blood Gluc  (DEXCOM G6 ) KELLY USE AS DIRECTED FOR CONTINUOUS GLUCOSE MONITORING 1 Device 1     Continuous Blood Gluc Sensor (DEXCOM G6 SENSOR) MISC 1 each every 10 days 3 each 11     Continuous Blood Gluc Transmit (DEXCOM G6 TRANSMITTER) MISC 1 each every 3 months 2 each 1     insulin aspart (NOVOLOG FLEXPEN) 100 UNIT/ML pen Inject 15 units with breakfast, lunch and dinner and correction scale dose premeal as directed, TDD: approx 100 units 30 mL 11     insulin degludec (TRESIBA FLEXTOUCH) 200 UNIT/ML pen Inject 32 Units Subcutaneous daily 15 mL 11     latanoprost (XALATAN) 0.005 % ophthalmic solution INSTILL 1 DROP IN BOTH EYES AT BEDTIME 10 mL 3     lisinopril (PRINIVIL/ZESTRIL) 20 MG tablet Take 2 tablets (40 mg) by mouth daily 180 tablet 3     magnesium oxide (MAG-OX) 400 MG tablet Take 1 tablet (400 mg) by mouth daily 90 tablet 3     metFORMIN (GLUCOPHAGE-XR) 500 MG 24 hr tablet Take 4-tablets by mouth daily as directed 360 tablet 3     metoclopramide (REGLAN) 5 MG tablet TAKE ONE TABLET BY MOUTH THREE TIMES DAILY BEFORE MEALS (Patient taking differently: 2 times daily as needed TAKE ONE TABLET BY MOUTH THREE TIMES DAILY BEFORE MEALS) 90 tablet 5     metoprolol succinate ER (TOPROL-XL) 25 MG 24 hr tablet Take 0.5 tablets (12.5 mg) by mouth daily 45 tablet 3     MULTIVITAMIN TABS   OR 1 TABLET DAILY       omeprazole (PRILOSEC) 20 MG DR capsule TAKE 1 CAPSULE(20 MG) BY MOUTH TWICE DAILY 180 capsule 1     ondansetron (ZOFRAN-ODT) 8 MG ODT tab DISSOLVE 1 TABLET ON THE TONGUE EVERY 12 HOURS AS NEEDED FOR NAUSEA 30 tablet 0     oxybutynin ER (DITROPAN-XL) 10 MG 24 hr tablet Take 1 tablet (10 mg) by mouth daily  90 tablet 1     psyllium (METAMUCIL) 28.3 % POWD Take 1 capful by mouth daily 575 g 1     simvastatin (ZOCOR) 20 MG tablet Take 1 tablet (20 mg) by mouth At Bedtime 90 tablet 3     SOFTCLIX LANCETS MISC 1 Units 3 times daily. 100 each 3     thin (NO BRAND SPECIFIED) lancets Use with lanceting device. To accompany: Blood Glucose Monitor Brands: per insurance. 150 each 6     OTC products: occasional pepto bismol    Allergies   Allergen Reactions     Amoxicillin      Sulfa Drugs       Latex Allergy: NO    Social History     Tobacco Use     Smoking status: Never Smoker     Smokeless tobacco: Never Used     Tobacco comment: lives in smoke free household.   Substance Use Topics     Alcohol use: Yes     Comment: occass social     History   Drug Use No       REVIEW OF SYSTEMS:   Constitutional, neuro, ENT, endocrine, pulmonary, cardiac, gastrointestinal, genitourinary, musculoskeletal, integument and psychiatric systems are negative, except as otherwise noted.    No recent illnesses    Not working/retired    Not going  Out much due to pandemic    Goes to store  Etc         EXAM:   BP (!) 153/89 (BP Location: Right arm, Patient Position: Chair, Cuff Size: Adult Large)   Pulse 99   Temp 98  F (36.7  C) (Oral)   Wt 89.6 kg (197 lb 8 oz)   LMP 03/28/2014   BMI 33.38 kg/m      GENERAL APPEARANCE: healthy, alert and no distress     EYES: EOMI, PERRL     HENT: ear canals and TM's normal and nose and mouth without ulcers or lesions     NECK: no adenopathy, no asymmetry, masses, or scars and thyroid normal to palpation     RESP: lungs clear to auscultation - no rales, rhonchi or wheezes     CV: regular rates and rhythm, normal S1 S2, no S3 or S4 and no murmur, click or rub     ABDOMEN:  soft, nontender, no HSM or masses and bowel sounds normal     MS: extremities normal- no gross deformities noted, no evidence of inflammation in joints, FROM in all extremities.     SKIN: no suspicious lesions or rashes     NEURO: Normal  strength and tone, sensory exam grossly normal, mentation intact and speech normal     PSYCH: mentation appears normal. and affect normal/bright     LYMPHATICS: No cervical adenopathy    DIAGNOSTICS:    ekg  Done  Last year at [facility]    Labs drawn, pending      Recent Labs   Lab Test 01/08/20  1030 07/12/19  1205 05/24/19  1415  05/19/19 06/28/17  0837  09/28/16  0955   HGB  --   --   --   --   --   --  13.4  --  14.4   PLT  --   --   --   --   --   --  199  --  215   NA  --  141 139  --   --    < > 136   < > 139   POTASSIUM  --  4.2 4.4   < >  --    < > 4.5   < > 4.3   CR  --  0.68 0.58   < >  --    < > 0.63   < > 0.60   A1C 11.8*  --   --   --  11.8*   < > 11.2*   < > 11.1*    < > = values in this interval not displayed.        IMPRESSION:   Reason for surgery/procedure: 60 year old female to have colonoscopy at hospital    Diagnosis/reason for consult: pre-procedure clearance      The proposed surgical procedure is considered LOW risk.    REVISED CARDIAC RISK INDEX  The patient has the following serious cardiovascular risks for perioperative complications such as (MI, PE, VFib and 3  AV Block):  No serious cardiac risks  INTERPRETATION: 0 risks: Class I (very low risk - 0.4% complication rate)    The patient has the following additional risks for perioperative complications:  No identified additional risks      ICD-10-CM    1. Preop general physical exam  Z01.818        RECOMMENDATIONS:          --Patient is to take all scheduled medications on the day of surgery EXCEPT for modifications listed below.    Hold  Aspirin one  Week prior    Take 1/2 dose of long  Acting insulin  Night  Before procedure    No insulin am of procedure    No metformin night before or morning of procedure    Only meds to take am of procedure are metoprolol and omeprazole          APPROVAL GIVEN to proceed with proposed procedure, without further diagnostic evaluation, providing labs are okay    Of note patient's diabetes is typically not  well controlled.  Last hemoglobin a1c about 11. Recheck that today.  '  No cardiac history or symptoms.         Signed Electronically by: Fredi Fuentes MD    Copy of this evaluation report is provided to requesting physician.    [facility] Pre[facility] Guidelines    Revised Cardiac Risk Index

## 2020-09-30 NOTE — PATIENT INSTRUCTIONS

## 2020-10-01 NOTE — RESULT ENCOUNTER NOTE
The overall diabetes test ( hemoglobin a1c ) is still high, but quite a bit better than it was 8 months ago.    Other labs are okay.    Okay to to go through with procedure.    Fredi Fuentes MD    [facility]- please fax to [facility]   Thanks  Fredi Fuentes MD

## 2020-10-08 ENCOUNTER — MYC MEDICAL ADVICE (OUTPATIENT)
Dept: FAMILY MEDICINE | Facility: CLINIC | Age: 60
End: 2020-10-08

## 2020-10-09 NOTE — TELEPHONE ENCOUNTER
[employer] message sent for pharmacy information.  Fifi Richter,RN,BSN  [facility]

## 2020-10-14 ENCOUNTER — VIRTUAL VISIT (OUTPATIENT)
Dept: EDUCATION SERVICES | Facility: CLINIC | Age: 60
End: 2020-10-14
Payer: MEDICARE

## 2020-10-14 DIAGNOSIS — E10.69 TYPE 1 DIABETES MELLITUS WITH OTHER SPECIFIED COMPLICATION (H): Primary | ICD-10-CM

## 2020-10-14 PROCEDURE — G0108 DIAB MANAGE TRN  PER INDIV: HCPCS | Mod: 95

## 2020-10-14 NOTE — PROGRESS NOTES
"Diabetes Self-Management Education & Support  Patient verbally consented to the telephone visit service today: yes    Audio only visit done, as patient does not have access to audio-visual technology.    Individual visit provided, given no group classes are available for 2 months.     Presents for: Follow-up    SUBJECTIVE/OBJECTIVE:  Presents for: Follow-up  Accompanied by: Self  Diabetes education in the past 24mo: Yes  Focus of Visit: Problem Solving, Monitoring, Taking Medication  Diabetes type: Type 1  Disease course: Improving  Transportation concerns: Yes(gets rides or takes public tranportation)  Difficulty affording diabetes medication?: No  Difficulty affording diabetes testing supplies?: No  Other concerns:: Cognitive impairment  Cultural Influences/Ethnic Background:  American      Diabetes Symptoms & Complications:  Fatigue: No  Neuropathy: Yes(in feet)  Polydipsia: No  Polyphagia: No  Polyuria: No  Visual change: No  Slow healing wounds: Yes(a little cut near the toenails)  Symptom course: Stable  Complications assessed today?: No    Patient Problem List and Family Medical History reviewed for relevant medical history, current medical status, and diabetes risk factors.    Vitals:  LMP 03/28/2014   Estimated body mass index is 33.38 kg/m  as calculated from the following:    Height as of 3/4/20: 1.638 m (5' 4.5\").    Weight as of 9/30/20: 89.6 kg (197 lb 8 oz).   Last 3 BP:   BP Readings from Last 3 Encounters:   09/30/20 138/87   09/02/20 (!) 177/95   08/05/20 (!) 154/95       History   Smoking Status     Never Smoker   Smokeless Tobacco     Never Used     Comment: lives in smoke free household.       Labs:  Lab Results   Component Value Date    A1C 9.2 09/30/2020     Lab Results   Component Value Date     09/30/2020     Lab Results   Component Value Date     10/02/2019     HDL Cholesterol   Date Value Ref Range Status   10/02/2019 60 >49 mg/dL Final   ]  GFR Estimate   Date Value Ref " Range Status   09/30/2020 >90 >60 mL/min/[1.73_m2] Final     Comment:     Non  GFR Calc  Starting 12/18/2018, serum creatinine based estimated GFR (eGFR) will be   calculated using the Chronic Kidney Disease Epidemiology Collaboration   (CKD-EPI) equation.       GFR Estimate If Black   Date Value Ref Range Status   09/30/2020 >90 >60 mL/min/[1.73_m2] Final     Comment:      GFR Calc  Starting 12/18/2018, serum creatinine based estimated GFR (eGFR) will be   calculated using the Chronic Kidney Disease Epidemiology Collaboration   (CKD-EPI) equation.       Lab Results   Component Value Date    CR 0.61 09/30/2020     No results found for: MICROALBUMIN    Healthy Eating:  Healthy Eating Assessed Today: Yes  Cultural/Voodoo diet restrictions?: No  Meals include: Breakfast, Lunch, Dinner, Afternoon Snack, Evening Snack  Breakfast: 8:00 am - 9:00 am Low sugar Oatmeal with some coffee OR breakfast bowl  or just toast  Lunch: 12-12:30, (1 on Tuesday) pasta salad OR egg salad sandwich   Dinner: 5:00 -6:00 pm: frozen meal or home cooked meal- hotdish ; LAST NIGHT HAD [employer] CH[employer]EN SALAD  Snacks: Reeces peanut butter cups, unsweetened apple sauce, pop tart  Other: drinks coffee with sugar free creamer  Beverages: Water, Coffee(Keep some juice on hand for lows)  Has patient met with a dietitian in the past?: Yes    Being Active:  Being Active Assessed Today: No(went on the bike at her apartment complex)  Exercise:: Currently not exercising  Barrier to exercise: Safety    Monitoring:  Monitoring Assessed Today: Yes  Blood Glucose Meter: CGM  Blood glucose trend: Fluctuating    Date Breakfast Lunch  Dinner    Before Before After Before   10/1 128 94 100 245   10/2 279 125 141 91   10/3 167 136 204 227   10/4 277 229 182 161   10/5 77   274   10/6 300+ 300+ 274 296   10/7 281 232 287 94   10/8 227 164 hi 162   10/9 257 165 113 179   10/10 226 124 Hi 140   10/11 178 83 207 266   10/12 266 165  153 98   10/13 287 206 295 130   10/14 228 104 116        Taking Medications:  Diabetes Medication(s)     Biguanides       metFORMIN (GLUCOPHAGE-XR) 500 MG 24 hr tablet    Take 4-tablets by mouth daily as directed    Insulin       insulin aspart (NOVOLOG FLEXPEN) 100 UNIT/ML pen    Inject 15 units with breakfast, lunch and dinner and correction scale dose premeal as directed, TDD: approx 100 units     insulin degludec (TRESIBA FLEXTOUCH) 200 UNIT/ML pen    Inject 32 Units Subcutaneous daily          Taking Medication Assessed Today: Yes  Current Treatments: Insulin Injections  Dose schedule: Pre-breakfast, Pre-lunch, Pre-dinner  Given by: Patient  Injection/Infusion sites: Abdomen    Problem Solving:  Problem Solving Assessed Today: Yes  Is the patient at risk for hypoglycemia?: Yes  Hypoglycemia Frequency: Monthly  Hypoglycemia Treatment: Glucose (tablets or gel), Other food  Patient carries a carbohydrate source: Yes  Medical ID: Yes  Is the patient at risk for DKA?: Yes  Does patient have ketone test strips?: Yes  Does patient have DKA prevention plan?: Yes  Does patient have severe weather/disaster plan for diabetes management?: No  Does patient have sick day plan for diabetes management?: Yes    Hypoglycemia symptoms  Confusion: No  Dizziness or Light-Headedness: No  Headaches: No  Hunger: No  Mood changes: No  Nervousness/Anxiety: No  Sleepiness: No  Speech difficulty: Yes  Sweats: Yes  Feeling shaky: Yes    Hypoglycemia Complications  Blackouts: No  Hospitalization: No  Nocturnal hypoglycemia: Yes  Required assistance: No  Seizures: No    Reducing Risks:  Reducing Risks Assessed Today: No  CAD Risks: Diabetes Mellitus  Has dilated eye exam at least once a year?: Yes  Sees dentist every 6 months?: Yes  Feet checked by healthcare provider in the last year?: Yes    Healthy Coping:  Healthy Coping Assessed Today: Yes  Emotional response to diabetes: Acceptance  Informal Support system:: Family, Parent  Stage of  change: ACTION (Actively working towards change)  Support resources: Offerings in Clinic Communities  Patient Activation Measure Survey Score:  KHUSHI Score (Last Two) 3/7/2012   KHUSHI Raw Score 39   Activation Score 56.4   KHUSHI Level 3       Diabetes knowledge and skills assessment:   Patient is knowledgeable in diabetes management concepts related to: Monitoring, Taking Medication and Problem Solving    Patient needs further education on the following diabetes management concepts: evening snack effect on am BG    Based on learning assessment above, most appropriate setting for further diabetes education would be: Individual setting.      INTERVENTIONS:    Education provided today on:  AADE Self-Care Behaviors:  Healthy Eating: reducing HS snack  Monitoring: when to check BG    Opportunities for ongoing education and support in diabetes-self management were discussed.    Pt verbalized understanding of concepts discussed and recommendations provided today.       Education Materials Provided:  No new materials provided today      ASSESSMENT:  Patient did increase Novolog from 14 to 15 units at meals after last phone visit with Tiffanie. Today is my first visit with patient. Her carbohydrate is variable but she is taking set meal time insulin doses so will need to have a wide target range.   With target range of :   21% of Fasting BG are in target with 1 below 80.   69% of before lunch numbers are in target  38% of 2 hour after lunch numbers are in target, with 2 in Hi range  62% of before dinner numbers are in target    Recommend we check before bed instead of after lunch to see if evening snacks are keeping BG too high all night. We did talk briefly about having smaller portions of evening snack. Could also be related to dinner carbs.   Patient lives alone and prepares her own meals. She is using Dexcom and has not had any low alarms past 2 weeks.         Patient's most recent   Lab Results   Component Value Date     A1C 9.2 09/30/2020    is not meeting goal of <8.0    PLAN  See Patient Instructions for co-developed, patient-stated behavior change goals.  Will check before each meal and before bed and will see if pattern from HS to fasting BG.   Follow up in 2 weeks.     Jane Cazares RDN, CARRI, [facility]    Time Spent: 30 minutes  Encounter Type: Individual    Any diabetes medication dose changes were made via the CDE Protocol and Collaborative Practice Agreement with the patient's primary care provider. A copy of this encounter was shared with the provider.

## 2020-10-23 ENCOUNTER — MYC MEDICAL ADVICE (OUTPATIENT)
Dept: FAMILY MEDICINE | Facility: CLINIC | Age: 60
End: 2020-10-23

## 2020-10-28 ENCOUNTER — VIRTUAL VISIT (OUTPATIENT)
Dept: EDUCATION SERVICES | Facility: CLINIC | Age: 60
End: 2020-10-28
Payer: MEDICARE

## 2020-10-28 DIAGNOSIS — E10.69 TYPE 1 DIABETES MELLITUS WITH OTHER SPECIFIED COMPLICATION (H): Primary | ICD-10-CM

## 2020-10-28 PROCEDURE — G0108 DIAB MANAGE TRN  PER INDIV: HCPCS | Mod: 95

## 2020-10-28 NOTE — PROGRESS NOTES
Diabetes Follow-up  Patient verbally consented to the telephone visit service today: yes    Audio only visit done, as patient does not have access to audio-visual technology.    Individual visit provided, given no group classes are available for 2 months.     Subjective/Objective:    Patient called to review BG and adjust insulin. Last date of communication was: 10/14.    Diabetes is being managed with   Diabetes Medications   Diabetes Medication(s)     Biguanides       metFORMIN (GLUCOPHAGE-XR) 500 MG 24 hr tablet    Take 4-tablets by mouth daily as directed    Insulin       insulin aspart (NOVOLOG FLEXPEN) 100 UNIT/ML pen    Inject 15 units with breakfast, lunch and dinner and correction scale dose premeal as directed, TDD: approx 100 units     insulin degludec (TRESIBA FLEXTOUCH) 200 UNIT/ML pen    Inject 32 Units Subcutaneous daily          Numbers from Dexcom CGM  Date Breakfast Lunch Dinner Bedtime    Before Before Before    10/15 235 104 278 HI   10/16 290 127 157 HI   10/17 90 111 155 HI   10/18 148 171 129 325   10/19 90 97 244 141   10/20 >300 292 232 HI   10/21 214 70/111 162 HI   10/22 163 78 291 256   10/23 110 99 149 280   10/24 269 97 281 HI   10/25 214 80 168 HI   10/26 224 127 173 HI   10/27 139 72 267 HI   10/28 201 64           Assessment:    Fasting blood glucose: 43% in target.  Before lunch glucose: 86% in target.  Before dinner glucose: 54% in target.  Bedtime glucose: ONLY 1 <200    Bedtime numbers confirmed to be high and likely continuing to cause high fasting glucose. Problem is patient is fearful of getting too low during night. Lastnight had cake.   There are some lunch numbers droppoing too low and we may need to lower breakfast insulin dose a bit.     Plan/Response:  Patient agrees to try different snack most nights such as cheese and crackers, peanut butter sandwich or fruit and cheese.   Follow up again in 2 weeks.     Jane Cazares RDN, CARRI, [facility]  Time spent 30 minutes    Any  diabetes medication dose changes were made via the CDE Protocol and Collaborative Practice Agreement with the patient's primary care provider. A copy of this encounter was shared with the provider.

## 2020-10-31 ENCOUNTER — MYC MEDICAL ADVICE (OUTPATIENT)
Dept: FAMILY MEDICINE | Facility: CLINIC | Age: 60
End: 2020-10-31

## 2020-11-02 ENCOUNTER — MYC MEDICAL ADVICE (OUTPATIENT)
Dept: FAMILY MEDICINE | Facility: CLINIC | Age: 60
End: 2020-11-02

## 2020-11-02 NOTE — TELEPHONE ENCOUNTER
Routing to MA to update records and also notify patient regarding shingles shot.      Stephanie Nguyen RN  Triage Nurse  [facility]  Appointment line: [phone]  [facility], 24 hour nurse line, available by calling clinic at [phone] and following prompts.

## 2020-11-04 ENCOUNTER — OFFICE VISIT (OUTPATIENT)
Dept: PODIATRY | Facility: CLINIC | Age: 60
End: 2020-11-04
Payer: MEDICARE

## 2020-11-04 VITALS — SYSTOLIC BLOOD PRESSURE: 188 MMHG | HEART RATE: 113 BPM | OXYGEN SATURATION: 98 % | DIASTOLIC BLOOD PRESSURE: 112 MMHG

## 2020-11-04 DIAGNOSIS — L84 TYLOMA: ICD-10-CM

## 2020-11-04 DIAGNOSIS — L84 TYPE 1 DIABETES MELLITUS WITH PRESSURE CALLUS (H): ICD-10-CM

## 2020-11-04 DIAGNOSIS — E10.42 DM TYPE 1 WITH DIABETIC PERIPHERAL NEUROPATHY (H): ICD-10-CM

## 2020-11-04 DIAGNOSIS — L60.2 ONYCHAUXIS: Primary | ICD-10-CM

## 2020-11-04 DIAGNOSIS — E10.628 TYPE 1 DIABETES MELLITUS WITH PRESSURE CALLUS (H): ICD-10-CM

## 2020-11-04 PROCEDURE — 99213 OFFICE O/P EST LOW 20 MIN: CPT | Mod: 25 | Performed by: PODIATRIST

## 2020-11-04 PROCEDURE — 11055 PARING/CUTG B9 HYPRKER LES 1: CPT | Performed by: PODIATRIST

## 2020-11-04 PROCEDURE — G0127 TRIM NAIL(S): HCPCS | Mod: 51 | Performed by: PODIATRIST

## 2020-11-04 NOTE — PATIENT INSTRUCTIONS
Thanks for coming today.  [facility]  [address] [place] [address]    To schedule future appointments in Orth[facility] Clinic, you may call [phone].    To schedule ordered imaging by your provider:   Call [facility] [phone]    To schedule an injection ordered by your provider:  Call [facility] Injection scheduling line: [phone]  [email or web address] available online at:  [email or web address]    Please call if any further questions or concerns ([phone]).  Clinic hours 8 am to 5 pm.    Return to clinic (call) if symptoms worsen or fail to improve.

## 2020-11-04 NOTE — NURSING NOTE
Lexy Rockwell's chief complaint for this visit includes:  Chief Complaint   Patient presents with     RECHECK     toenail trim     PCP: Fredi Fuentes    Referring Provider:  No referring provider defined for this encounter.    BP (!) 189/109 (BP Location: Left arm, Patient Position: Sitting, Cuff Size: Adult Regular)   Pulse 110   LMP 03/28/2014   SpO2 98%   Data Unavailable     Do you need any medication refills at today's visit? No    Xiomara Nieto CMA

## 2020-11-04 NOTE — LETTER
11/4/2020         RE: Lexy Rockwell  [address]  [facility] [address]        Dear Colleague,    Thank you for referring your patient, Lexy Rockwell, to the [facility]. Please see a copy of my visit note below.    Past Medical History:   Diagnosis Date     Cerumen impacted      Development delay      Diabetic gastroparesis (H) 8/16/2013     Glaucoma (increased eye pressure)      Hyperlipaemia      Hypertension      Hypothyroid      Mental retardation      Nonsenile cataract      Obesity      Type 1 diabetes mellitus not at goal (H)      Patient Active Problem List   Diagnosis     Development delay     Overactive bladder     GERD (gastroesophageal reflux disease)     Hypertension goal BP (blood pressure) < 140/90     Hyperlipidemia LDL goal <100     Pain in joint, lower leg     Proteinuria     Vitamin D deficiency     Health Care Home     History of strabismus surgery     Type 1 diabetes mellitus with other specified complication (H)     Advanced directives, counseling/discussion     Primary open angle glaucoma of both eyes, moderate stage     Gastroesophageal reflux disease without esophagitis     Obesity, unspecified obesity severity, unspecified obesity type     Acute renal failure (H)     Acute retention of urine     JEAN (acute kidney injury) (H)     Dehydration     Diabetic ketoacidosis without coma associated with type 1 diabetes mellitus (H)     DKA (diabetic ketoacidoses) (H)     Sinus tachycardia     SIRS (systemic inflammatory response syndrome) (H)     Obesity (BMI 35.0-39.9) with comorbidity (H)     Chronic pain of right knee     Past Surgical History:   Procedure Laterality Date     C EYE SURG ANT SGMT PROC UNLISTED  remote    strabismus surgery     STRABISMUS SURGERY       Social History     Socioeconomic History     Marital status: Single     Spouse name: Not on file     Number of children: Not on file     Years of education: Not on  file     Highest education level: Not on file   Occupational History     Not on file   Social Needs     Financial resource strain: Not on file     Food insecurity     Worry: Not on file     Inability: Not on file     Transportation needs     Medical: Not on file     Non-medical: Not on file   Tobacco Use     Smoking status: Never Smoker     Smokeless tobacco: Never Used     Tobacco comment: lives in smoke free household.   Substance and Sexual Activity     Alcohol use: Yes     Comment: occass social     Drug use: No     Sexual activity: Never   Lifestyle     Physical activity     Days per week: Not on file     Minutes per session: Not on file     Stress: Not on file   Relationships     Social connections     Talks on phone: Not on file     Gets together: Not on file     Attends Zoroastrianism service: Not on file     Active member of club or organization: Not on file     Attends meetings of clubs or organizations: Not on file     Relationship status: Not on file     Intimate partner violence     Fear of current or ex partner: Not on file     Emotionally abused: Not on file     Physically abused: Not on file     Forced sexual activity: Not on file   Other Topics Concern     Parent/sibling w/ CABG, MI or angioplasty before 65F 55M? No   Social History Narrative     Not on file     Family History   Problem Relation Age of Onset     Hypertension Father      Diabetes Sister      Glaucoma Maternal Grandfather      Cancer No family hx of      Cerebrovascular Disease No family hx of      Thyroid Disease No family hx of      Macular Degeneration No family hx of      Lab Results   Component Value Date    A1C 9.2 09/30/2020    A1C 11.8 01/08/2020    A1C 11.8 05/19/2019    A1C 11.2 12/12/2018    A1C 12.4 09/05/2018     SUBJECTIVE FINDINGS:  A 60-year-old female returns to clinic for onychomycosis, onychocryptosis, tylomas, left foot.  She is diabetic with peripheral neuropathy and vascular disease.  She relates she has numbness and  tingling in her feet.  She relates to no sores or ulcers since we have seen her last, but she is getting ingrown toenails on both feet.      OBJECTIVE FINDINGS:  DP and PT are 2/4 bilaterally.  She has decreased hair growth bilaterally.  She has some peripheral edema bilaterally.  She has incurvated hallux nails with some pressure, edema in the nail borders.  There is no erythema, no odor, no calor, no drainage bilaterally.  She has nucleated hyperkeratotic tissue buildup, plantar first MPJ on the left.  Decreased range of motion bilaterally.  She has incurvated nails with some thickening, dystrophy and subungual debris to differing degrees bilaterally 1-5.      ASSESSMENT AND PLAN:  Onychomycosis and onychauxis bilaterally.  Tyloma, left first MPJ and distal left second toe underlying the nail.  Diagnosis and treatment options discussed with the patient.  She is diabetic with peripheral neuropathy and vascular disease.  All the nails were reduced or debrided upon consent.  The tyloma on the left first MPJ was sharp debrided with a #15 blade upon consent.  Local wound care done to the left hallux nail borders and use discussed with her.  She will return to clinic and see me in about 2 months.           Again, thank you for allowing me to participate in the care of your patient.        Sincerely,        Ravin Back DPM

## 2020-11-11 ENCOUNTER — VIRTUAL VISIT (OUTPATIENT)
Dept: EDUCATION SERVICES | Facility: CLINIC | Age: 60
End: 2020-11-11
Payer: MEDICARE

## 2020-11-11 DIAGNOSIS — E11.42 TYPE 2 DIABETES MELLITUS WITH DIABETIC POLYNEUROPATHY, WITH LONG-TERM CURRENT USE OF INSULIN (H): ICD-10-CM

## 2020-11-11 DIAGNOSIS — E10.69 TYPE 1 DIABETES MELLITUS WITH OTHER SPECIFIED COMPLICATION (H): Primary | ICD-10-CM

## 2020-11-11 DIAGNOSIS — E10.40 TYPE 1 DIABETES MELLITUS WITH DIABETIC NEUROPATHY (H): ICD-10-CM

## 2020-11-11 DIAGNOSIS — Z79.4 TYPE 2 DIABETES MELLITUS WITH DIABETIC POLYNEUROPATHY, WITH LONG-TERM CURRENT USE OF INSULIN (H): ICD-10-CM

## 2020-11-11 PROCEDURE — G0108 DIAB MANAGE TRN  PER INDIV: HCPCS | Mod: 95

## 2020-11-11 RX ORDER — INSULIN ASPART 100 [IU]/ML
INJECTION, SOLUTION INTRAVENOUS; SUBCUTANEOUS
Qty: 30 ML | Refills: 11 | Status: SHIPPED | OUTPATIENT
Start: 2020-11-11 | End: 2020-12-23

## 2020-11-11 NOTE — PROGRESS NOTES
Diabetes Follow-up - Insulin Adjust  Patient verbally consented to the telephone visit service today: yes    Audio only visit done, as patient does not have access to audio-visual technology.    Individual visit provided, given no group classes are available for 2 months.       Subjective/Objective:      Diabetes is being managed with   Diabetes Medications   Diabetes Medication(s)     Biguanides       metFORMIN (GLUCOPHAGE-XR) 500 MG 24 hr tablet    Take 4-tablets by mouth daily as directed    Insulin       insulin aspart (NOVOLOG FLEXPEN) 100 UNIT/ML pen    Inject 15 units with breakfast, lunch and dinner and correction scale dose premeal as directed, TDD: approx 100 units     insulin degludec (TRESIBA FLEXTOUCH) 200 UNIT/ML pen    Inject 32 Units Subcutaneous daily          Numbers from Dexcom  Date Breakfast Lunch Dinner Bedtime    Before Before Before    10/29 87 64/138 110 153   10/30 247 216 204 299   10/31 226 102 222 197   11/1 102 70/91 274 227   11/2 228 113 255 Hi   11/3 182 67/90 186 225   11/4 82 135 186 242   11/5 75 133 243 Hi   11/6 124 85 129 Hi   11/7 137 240 246 Hi   11/8 105 65/72 283 Hi/299   11/9 48/152 /268 105    11/10 191 151 204 Hi   11/11 57/73 205         Assessment:    Fasting blood glucose: 43% in target.   Before lunch glucose: 43% in target.  Before dinner glucose: 23% in target.  Bedtime glucose: 18% in target.      A couple times got low and had trouble getting it back up and got nauseous. She does not remember what days but was at lunch so I would guess those were when she was lower at breakfast.   Last night had hotdish for supper with ham, broccoli, cauliflower, breading on top, had coleslaw also. Does not remember what she had for evening snack. HS check was late. This morning did not take long to get back up to 73.     Will decrease basal insulin to reduce lows and increase evening meal Novolog to reduce highs at HS.     Plan/Response:  Recommend increase to insulin -  Novolog to 15-15-17 at meals, + correction scale.   Recommend decrease to insulin - Tresiba u200 to 28 units    Follow up in 2 weeks.     Jane Cazares RDN, LD, [facility]    Time spent: 33 minutes    Any diabetes medication dose changes were made via the CDE Protocol and Collaborative Practice Agreement with the patient's primary care provider. A copy of this encounter was shared with the provider.

## 2020-11-14 ENCOUNTER — HEALTH MAINTENANCE LETTER (OUTPATIENT)
Age: 60
End: 2020-11-14

## 2020-11-24 DIAGNOSIS — E10.69 TYPE 1 DIABETES MELLITUS WITH OTHER SPECIFIED COMPLICATION (H): ICD-10-CM

## 2020-11-24 RX ORDER — PROCHLORPERAZINE 25 MG/1
SUPPOSITORY RECTAL
Qty: 3 EACH | Refills: 4 | Status: SHIPPED | OUTPATIENT
Start: 2020-11-24 | End: 2020-12-09

## 2020-11-24 NOTE — TELEPHONE ENCOUNTER
Routing refill request to provider for review/approval because:  Drug not on the [facility] refill protocol   Laura Cohen RN

## 2020-11-25 ENCOUNTER — VIRTUAL VISIT (OUTPATIENT)
Dept: EDUCATION SERVICES | Facility: CLINIC | Age: 60
End: 2020-11-25
Payer: MEDICARE

## 2020-11-25 DIAGNOSIS — E10.69 TYPE 1 DIABETES MELLITUS WITH OTHER SPECIFIED COMPLICATION (H): Primary | ICD-10-CM

## 2020-11-25 PROCEDURE — G0108 DIAB MANAGE TRN  PER INDIV: HCPCS | Mod: 95

## 2020-11-25 NOTE — PROGRESS NOTES
Diabetes Follow-up  Patient verbally consented to the telephone visit service today: yes    Audio only visit done, as patient does not have access to audio-visual technology.    Individual visit provided, given no group classes are available for 2 months.     Subjective/Objective:    BG review/insulin adjustment.   Diabetes is being managed with   Diabetes Medications   Diabetes Medication(s)     Biguanides       metFORMIN (GLUCOPHAGE-XR) 500 MG 24 hr tablet    Take 4-tablets by mouth daily as directed    Insulin       insulin aspart (NOVOLOG FLEXPEN) 100 UNIT/ML pen    Inject 15 units with breakfast, 15 units with lunch and 17 units with dinner, plus correction scale. dose premeal as directed, TDD: approx 100 units     insulin degludec (TRESIBA FLEXTOUCH) 200 UNIT/ML pen    Inject 28 Units Subcutaneous daily          BG/Food Log:   Date Breakfast Lunch Dinner Bedtime    Before Before Before    11/12 170  115 272   11/13 185 114 93 284   11/14 226 97 177 264   11/15 219 125 199 Hi   11/16 237 150 113 Hi   11/17 270 221 231 Hi   11/18 286 181 200 392   11/19 207  362    11/20 201 188 204 245   11/21 315 sick 231 257 Hi   11/22 267 121 277    11/23 285 155 180 259   11/24 100 79 113 174   11/25 48-98 318           Assessment:  Yesterday was much lower than usual. Was drinking more water. Was not eating much.   Feeling back to normal today.     Fasting blood glucose: 7% in target.  Before lunch glucose: 36% in target.  Before dinner glucose: 29% in target.  Bedtime glucose: 7% in target.    Plan/Response:  No changes in the patient's current treatment plan  Encouraged to portion control.     Jane Cazares RDN, CARRI, [facility]    Time spent 30 minutes    Any diabetes medication dose changes were made via the CDE Protocol and Collaborative Practice Agreement with the patient's primary care provider. A copy of this encounter was shared with the provider.

## 2020-11-29 ENCOUNTER — MYC MEDICAL ADVICE (OUTPATIENT)
Dept: FAMILY MEDICINE | Facility: CLINIC | Age: 60
End: 2020-11-29

## 2020-11-30 NOTE — TELEPHONE ENCOUNTER
Sent my chart reply with Covid screening and then asked patient to submit E-visit.      Stephanie Nguyen RN  Triage Nurse  [facility]  Appointment line: [phone]  [facility], 24 hour nurse line, available by calling clinic at [phone] and following prompts.

## 2020-12-02 DIAGNOSIS — E10.10 DIABETIC KETOACIDOSIS WITHOUT COMA ASSOCIATED WITH TYPE 1 DIABETES MELLITUS (H): ICD-10-CM

## 2020-12-03 RX ORDER — BLOOD SUGAR DIAGNOSTIC
STRIP MISCELLANEOUS
Qty: 400 STRIP | Refills: 0 | Status: SHIPPED | OUTPATIENT
Start: 2020-12-03 | End: 2021-10-13

## 2020-12-03 NOTE — TELEPHONE ENCOUNTER
Patient symptoms imprived. Advised Evisit if she still would like to get tested or symptoms do not continue to improve.     Cris Gibbs RN

## 2020-12-03 NOTE — TELEPHONE ENCOUNTER
Medication filled per [facility] protocol.     Edil Uribe RN....12/3/2020 3:08 PM

## 2020-12-08 ENCOUNTER — MYC MEDICAL ADVICE (OUTPATIENT)
Dept: ENDOCRINOLOGY | Facility: CLINIC | Age: 60
End: 2020-12-08

## 2020-12-08 NOTE — PROGRESS NOTES
"Lexy Rockwell is a 60 year old female who is being evaluated via a billable telephone visit.      The patient has been notified of following:     \"This telephone visit will be conducted via a call between you and your physician/provider. We have found that certain health care needs can be provided without the need for a physical exam.  This service lets us provide the care you need with a short phone conversation.  If a prescription is necessary we can send it directly to your pharmacy.  If lab work is needed we can place an order for that and you can then stop by our lab to have the test done at a later time.    Telephone visits are billed at different rates depending on your insurance coverage. During this emergency period, for some insurers they may be billed the same as an in-person visit.  Please reach out to your insurance provider with any questions.    If during the course of the call the physician/provider feels a telephone visit is not appropriate, you will not be charged for this service.\"    Patient has given verbal consent for Telephone visit?  Yes    What phone number would you like to be contacted at? [phone]    How would you like to obtain your AVS? Jarett    S:  Patient presents for follow up DM.   She was hospitalized for DKA on 9/13/19.  She had GI upset and missed a \"couple days\" of medications.      She did not get the DEXCOM. [facility] states [facility] never [facility] patient but did contact the mother.   I checked and the phone number in the computer is correct.   Mother got a call they needed more information. Then asked to have the patients case put on hold as she does not know if she needs it.      tresiba U 200 28 Units daily   Metformin XR 2000 mg every day   novolog 15 Units with each meal                               mg/dl              No correction                          151-200 mg/dl             +3 units                          201-250 " "mg/dl             +5 units                          251-300 mg/dl             +7 units                          301-350 mg/dl             +9 units                          350-400 mg/dl             +12 units                          401-450 mg/dl             +15 units and call physician     -If you are NOT eating, check glucose every four hours and give Novolo-150 mg/dl              No correction                          151-200 mg/dl             +3 units                          201-250 mg/dl             +5 units                          251-300 mg/dl             +7 units                          301-350 mg/dl             +9 units                          350-400 mg/dl             +12 units                          401-450 mg/dl             +15 units and call physician    She is no longer using her CGM.   She ran out as she had not been seen in clinic in >6 months.     Over the last 3 weeks, she has had runny nose, sore throat.   No fevers. She has not been tested for COVID yet.     Logs:  \" before breakfast 48 then it went up to 98. before lunch 318. before supper 109. before bed 397   before breakfast 119. before lunch 81. before supper 341. before bed 227.   before breakfast 241. before lunch 173. before supper 331. before bed 279.   before breakfast 266. before lunch 135.  before supper 323.  before bed 395.   before breakfast 230. before lunch 134.   before supper 299.  before bed it was high.   before breakfast 161.  before lunch 143.   before supper 300.  before bed it was high.     before breakfast 55 then went up to 112. before lunch 275. before supper 275. before bed 388    before breakfast 76. before lunch 52 then it went up to 200. before supper 304. before bed 104.  3 before breakfast 229. before lunch 245. before supper 120. before bed 300.   before breakfast 287. before lunch 191. before supper 274.  before bed 382.   " "before breakfast 183. before lunch 97. before supper 245. before bed 96.  12/6 before breakfast 169. before lunch 145. before supper 385. before bed 272.  12/7 before breakfast 339.  before lunch 64 then it went up to 147. before supper 206. before bed 363\"    Pattern of highs pre-supper and HS.   States she usually does not snack between lunch and dinner.   She will sometimes have coffee with cream in this period.   She snacks at night in part out of fear of hypoglycemia.   Clear that she is snacking AFTER her HS check.     Review of Systems:  10 point ROS negative aside from above.     Assessment/Plan:  Type 1 DM - Low c-peptide and positive SHARI documented in 3/2018.   Her case is complicated by chronic N/V. She follows with [facility], Dr Melendez.   She will hold her insulin if she is nauseous and not eating.   Her DM is uncontrolled with frequent DKA. We discussed possible CGM use.   In 10/2019, admitted for DKA again since last visit. HbA1C 11.5%.   Assisted living has been discussed with the patient by hospital and her primary care.   Mother placed DEXCOM fill on hold. Mother told me she does not want to be on cloud sharing if patient gets DEXCOM.   Frankly, I think the mother is the one who is overwhelmed and not the patient. Told her point blank we need to change how we are doing things or she will continue to have DKA episodes and possibly death.   In 1/2020, needs to check more. She has good support at home and is working to make smarter food choices. Snacks before bed most nights. Does this out of habit to avoid lows.   In 4/2020, no DEXCOM data to review. Discussed CDE note from 2/21/2020. Very difficult to get a clear understanding. Specifically, I am concerned about her AM hypoglycemia. Sounds like she needs more insulin with dinner and less basal insulin. Unfortunately, she has asked her PCA to stop visiting 2/2 COVID.   In 12/2020, pattern of highs pre-supper and HS. However, she has several nights " where these readings are normal. Discussed using a pump with pre-programmed meal doses. She is concerned about being able to keep up with caring for a pump.   -Sensors refilled.   -Schedule labs.   -Increase novolog with dinner from 15 to 17 Units.   -I will speak with CDE about pump options.   -ASA taking.   -BP: controlled.   -Lipids: Controlled other than  in 10/2019. Taking simvastatin.    Repeat due.   -Microalbumin 75 in 10/2019. ACEi taking lisinopril.    Repeat due.   -TSH: Subclinical hypothyroidism in 1/2020.    Repeat due.   -Eyes: treatment for glaucoma. Last exam 6/2019.    Scheduled for later this month.   -Smoking: none.      Due to the COVID 19 pandemic this visit was a telephone/video visit in order to help prevent spread of infection in this high risk patient and the general population. The patient gave verbal consent for the visit today.    Start time 1530  Stop time 1555  Total time 25  This visit would have been billed as [id number] as an E & M code     Alfa Goss MD on 12/9/2020 at 3:57 PM

## 2020-12-08 NOTE — NURSING NOTE
BG Log (copied from [employer] message)    11/25 before breakfast 48 then it went up to 98. before lunch 318. before supper 109. before bed 397   11/26 before breakfast 119. before lunch 81. before supper 341. before bed 227.   11/27 before breakfast 241. before lunch 173. before supper 331. before bed 279.   11/28 before breakfast 266. before lunch 135.  before supper 323.  before bed 395.   11/29 before breakfast 230. before lunch 134.   before supper 299.  before bed it was high.   11/30 before breakfast 161.  before lunch 143.   before supper 300.  before bed it was high.   12/1   before breakfast 55 then went up to 112. before lunch 275. before supper 275. before bed 388   12/2  before breakfast 76. before lunch 52 then it went up to 200. before supper 304. before bed 104.   12/3 before breakfast 229. before lunch 245. before supper 120. before bed 300.   12/4 before breakfast 287. before lunch 191. before supper 274.  before bed 382.   12/5 before breakfast 183. before lunch 97. before supper 245. before bed 96.   12/6 before breakfast 169. before lunch 145. before supper 385. before bed 272.   12/7 before breakfast 339.  before lunch 64 then it went up to 147. before supper 206. before bed 363

## 2020-12-09 ENCOUNTER — VIRTUAL VISIT (OUTPATIENT)
Dept: ENDOCRINOLOGY | Facility: CLINIC | Age: 60
End: 2020-12-09
Payer: MEDICARE

## 2020-12-09 DIAGNOSIS — R80.9 MICROALBUMINURIA: Primary | ICD-10-CM

## 2020-12-09 DIAGNOSIS — I10 ESSENTIAL HYPERTENSION WITH GOAL BLOOD PRESSURE LESS THAN 140/90: ICD-10-CM

## 2020-12-09 DIAGNOSIS — E78.5 DYSLIPIDEMIA: ICD-10-CM

## 2020-12-09 DIAGNOSIS — E10.69 TYPE 1 DIABETES MELLITUS WITH OTHER SPECIFIED COMPLICATION (H): ICD-10-CM

## 2020-12-09 PROCEDURE — 99443 PR PHYSICIAN TELEPHONE EVALUATION 21-30 MIN: CPT | Performed by: INTERNAL MEDICINE

## 2020-12-09 RX ORDER — PROCHLORPERAZINE 25 MG/1
SUPPOSITORY RECTAL
Qty: 3 EACH | Refills: 4 | Status: SHIPPED | OUTPATIENT
Start: 2020-12-09 | End: 2021-06-02

## 2020-12-09 NOTE — LETTER
"    12/9/2020         RE: Lexy Rockwell  [address]  [facility] [address]        Dear Colleague,    Thank you for referring your patient, Lexy Rockwell, to the [facility]. Please see a copy of my visit note below.    Lexy Rockwell is a 60 year old female who is being evaluated via a billable telephone visit.      The patient has been notified of following:     \"This telephone visit will be conducted via a call between you and your physician/provider. We have found that certain health care needs can be provided without the need for a physical exam.  This service lets us provide the care you need with a short phone conversation.  If a prescription is necessary we can send it directly to your pharmacy.  If lab work is needed we can place an order for that and you can then stop by our lab to have the test done at a later time.    Telephone visits are billed at different rates depending on your insurance coverage. During this emergency period, for some insurers they may be billed the same as an in-person visit.  Please reach out to your insurance provider with any questions.    If during the course of the call the physician/provider feels a telephone visit is not appropriate, you will not be charged for this service.\"    Patient has given verbal consent for Telephone visit?  Yes    What phone number would you like to be contacted at? [phone]    How would you like to obtain your AVS? Jarett STEEL:  Patient presents for follow up DM.   She was hospitalized for DKA on 9/13/19.  She had GI upset and missed a \"couple days\" of medications.      She did not get the DEXCOM. Mother states [facility] never contacted patient but did contact the mother.   I checked and the phone number in the computer is correct.   Mother got a call they needed more information. Then asked to have the patients case put on hold as she does not know if she needs it. " "     tresiba U 200 28 Units daily   Metformin XR 2000 mg every day   novolog 15 Units with each meal                               mg/dl              No correction                          151-200 mg/dl             +3 units                          201-250 mg/dl             +5 units                          251-300 mg/dl             +7 units                          301-350 mg/dl             +9 units                          350-400 mg/dl             +12 units                          401-450 mg/dl             +15 units and call physician     -If you are NOT eating, check glucose every four hours and give Novolo-150 mg/dl              No correction                          151-200 mg/dl             +3 units                          201-250 mg/dl             +5 units                          251-300 mg/dl             +7 units                          301-350 mg/dl             +9 units                          350-400 mg/dl             +12 units                          401-450 mg/dl             +15 units and call physician    She is no longer using her CGM.   She ran out as she had not been seen in clinic in >6 months.     Over the last 3 weeks, she has had runny nose, sore throat.   No fevers. She has not been tested for COVID yet.     Logs:  \" before breakfast 48 then it went up to 98. before lunch 318. before supper 109. before bed 397   before breakfast 119. before lunch 81. before supper 341. before bed 227.   before breakfast 241. before lunch 173. before supper 331. before bed 279.   before breakfast 266. before lunch 135.  before supper 323.  before bed 395.   before breakfast 230. before lunch 134.   before supper 299.  before bed it was high.   before breakfast 161.  before lunch 143.   before supper 300.  before bed it was high.     before breakfast 55 then went up to 112. before lunch 275. before supper 275. before bed 388    before " "breakfast 76. before lunch 52 then it went up to 200. before supper 304. before bed 104.  12/3 before breakfast 229. before lunch 245. before supper 120. before bed 300.  12/4 before breakfast 287. before lunch 191. before supper 274.  before bed 382.  12/5 before breakfast 183. before lunch 97. before supper 245. before bed 96.  12/6 before breakfast 169. before lunch 145. before supper 385. before bed 272.  12/7 before breakfast 339.  before lunch 64 then it went up to 147. before supper 206. before bed 363\"    Pattern of highs pre-supper and HS.   States she usually does not snack between lunch and dinner.   She will sometimes have coffee with cream in this period.   She snacks at night in part out of fear of hypoglycemia.   Clear that she is snacking AFTER her HS check.     Review of Systems:  10 point ROS negative aside from above.     Assessment/Plan:  Type 1 DM - Low c-peptide and positive SHARI documented in 3/2018.   Her case is complicated by chronic N/V. She follows with [facility], Dr Melendez.   She will hold her insulin if she is nauseous and not eating.   Her DM is uncontrolled with frequent DKA. We discussed possible CGM use.   In 10/2019, admitted for DKA again since last visit. HbA1C 11.5%.   Assisted living has been discussed with the patient by hospital and her primary care.   Mother placed DEXCOM fill on hold. Mother told me she does not want to be on cloud sharing if patient gets DEXCOM.   Frankly, I think the mother is the one who is overwhelmed and not the patient. Told her point blank we need to change how we are doing things or she will continue to have DKA episodes and possibly death.   In 1/2020, needs to check more. She has good support at home and is working to make smarter food choices. Snacks before bed most nights. Does this out of habit to avoid lows.   In 4/2020, no DEXCOM data to review. Discussed CDE note from 2/21/2020. Very difficult to get a clear understanding. Specifically, I " am concerned about her AM hypoglycemia. Sounds like she needs more insulin with dinner and less basal insulin. Unfortunately, she has asked her PCA to stop visiting 2/2 COVID.   In 12/2020, pattern of highs pre-supper and HS. However, she has several nights where these readings are normal. Discussed using a pump with pre-programmed meal doses. She is concerned about being able to keep up with caring for a pump.   -Sensors refilled.   -Schedule labs.   -Increase novolog with dinner from 15 to 17 Units.   -I will speak with CDE about pump options.   -ASA taking.   -BP: controlled.   -Lipids: Controlled other than  in 10/2019. Taking simvastatin.    Repeat due.   -Microalbumin 75 in 10/2019. ACEi taking lisinopril.    Repeat due.   -TSH: Subclinical hypothyroidism in 1/2020.    Repeat due.   -Eyes: treatment for glaucoma. Last exam 6/2019.    Scheduled for later this month.   -Smoking: none.      Due to the COVID 19 pandemic this visit was a telephone/video visit in order to help prevent spread of infection in this high risk patient and the general population. The patient gave verbal consent for the visit today.    Start time 1530  Stop time 1555  Total time 25  This visit would have been billed as 52163 as an E & M code     Alfa Goss MD on 12/9/2020 at 3:57 PM                Again, thank you for allowing me to participate in the care of your patient.        Sincerely,        Alfa Goss MD

## 2020-12-09 NOTE — Clinical Note
What do you think about a pump, maybe OmniPod, with pre-programmed meal doses?  Do you think she is capable with training?    I increased her novolog with dinner today but do not want to go much higher as she does have some good night.     Best,   Alfa Goss MD on 12/9/2020 at 3:51 PM

## 2020-12-15 ENCOUNTER — MYC MEDICAL ADVICE (OUTPATIENT)
Dept: ENDOCRINOLOGY | Facility: CLINIC | Age: 60
End: 2020-12-15

## 2020-12-18 ENCOUNTER — MYC MEDICAL ADVICE (OUTPATIENT)
Dept: ENDOCRINOLOGY | Facility: CLINIC | Age: 60
End: 2020-12-18

## 2020-12-21 NOTE — TELEPHONE ENCOUNTER
Ma please assist pt with her needs to send information to medicare.    Sherlyn Martin RN Specialty Triage 12/21/2020 8:58 AM

## 2020-12-22 NOTE — TELEPHONE ENCOUNTER
Copy of chart notes faxed to Medicare as requested. Patient has been notified via MyChart as well.    Elham FISHMAN MA

## 2020-12-23 ENCOUNTER — VIRTUAL VISIT (OUTPATIENT)
Dept: EDUCATION SERVICES | Facility: CLINIC | Age: 60
End: 2020-12-23
Payer: MEDICARE

## 2020-12-23 DIAGNOSIS — E10.69 TYPE 1 DIABETES MELLITUS WITH OTHER SPECIFIED COMPLICATION (H): Primary | ICD-10-CM

## 2020-12-23 DIAGNOSIS — E10.40 TYPE 1 DIABETES MELLITUS WITH DIABETIC NEUROPATHY (H): ICD-10-CM

## 2020-12-23 DIAGNOSIS — E11.42 TYPE 2 DIABETES MELLITUS WITH DIABETIC POLYNEUROPATHY, WITH LONG-TERM CURRENT USE OF INSULIN (H): ICD-10-CM

## 2020-12-23 DIAGNOSIS — Z79.4 TYPE 2 DIABETES MELLITUS WITH DIABETIC POLYNEUROPATHY, WITH LONG-TERM CURRENT USE OF INSULIN (H): ICD-10-CM

## 2020-12-23 PROCEDURE — G0108 DIAB MANAGE TRN  PER INDIV: HCPCS | Mod: 95

## 2020-12-23 RX ORDER — INSULIN ASPART 100 [IU]/ML
INJECTION, SOLUTION INTRAVENOUS; SUBCUTANEOUS
Qty: 30 ML | Refills: 11
Start: 2020-12-23 | End: 2021-01-13

## 2020-12-23 NOTE — PROGRESS NOTES
Diabetes Follow-up  Patient verbally consented to the telephone visit service today: yes    Audio only visit done, as patient does not have access to audio-visual technology.    Individual visit provided, given no group classes are available for 2 months.     Presents for:  Review of blood sugars and insulin adjust    Subjective/Objective:    Diabetes is being managed with   Diabetes Medications   Diabetes Medication(s)     Biguanides       metFORMIN (GLUCOPHAGE-XR) 500 MG 24 hr tablet    Take 4-tablets by mouth daily as directed    Insulin       insulin aspart (NOVOLOG FLEXPEN) 100 UNIT/ML pen    Inject 15 units with breakfast, 15 units with lunch and 17 units with dinner, plus correction scale. dose premeal as directed, TDD: approx 100 units     insulin degludec (TRESIBA FLEXTOUCH) 200 UNIT/ML pen    Inject 28 Units Subcutaneous daily          BG/Food Log:   Date Breakfast Lunch Dinner Bedtime    Before Before Before    12/9 156 271 323 116   12/10 134 106 281    12/11 343 282 233    12/12 247 289 187 261   12/13 104 57/96 179 295   12/14 338 265 381 147   12/15 173 170 287 hi   12/16 218 191 294 hi   12/17 58/122 235 372    12/18 84 86 250 178   12/19 122 76 301 107   12/20 138 81 240 262   12/21 283 208 327    12/22 59/74 147 257    12/23 195 105           Assessment:  Has not had sensors lately. Dr. Goss has completed paperwork required so she is waiting to get them again.   B: today had cereal and coffee  L: today had pop tart and coffee  D: yesterday had pasta salad, water  HS snack last night sugar free cookies    Fasting blood glucose: 36% in target <150  Before lunch glucose: 43% in target. <180  Before dinner glucose: only 1 in target.  Bedtime glucose: 45% in target.    Problem is high blood sugars due to high carb foods such as cereal and pop tarts. Also gets low blood sugars, possibly from too much Tresiba. Dr. Goss would like to consider pump therapy and will consult with Tiffanie next month on  that.     Plan/Response:  Will try decreasing Tresiba from 28 to 25 units to reduce severe low risk overnight.   Will increase Novolog at lunch to lower dinner numbers. New Novolog dose 15-17-17  Follow up with Tiffanie next month.     Jane Cazares RDN, LD, [facility]    Time spent 34 minutes    Any diabetes medication dose changes were made via the CDE Protocol and Collaborative Practice Agreement with the patient's endocrinology provider. A copy of this encounter was shared with the provider.

## 2020-12-27 ENCOUNTER — MYC MEDICAL ADVICE (OUTPATIENT)
Dept: ENDOCRINOLOGY | Facility: CLINIC | Age: 60
End: 2020-12-27

## 2020-12-27 DIAGNOSIS — E10.69 TYPE 1 DIABETES MELLITUS WITH OTHER SPECIFIED COMPLICATION (H): ICD-10-CM

## 2020-12-28 NOTE — TELEPHONE ENCOUNTER
Called [facility] pharmacy and spoke to pharmacist Domenico who said that DEXCOM sensor should be processed by Wednesday. There is no way to expedite process. She stated patient will need to use BG test strips in the meantime. A prescription for test strips was sent on 12/3/20. Domenico said Medicare forms will need to be completed in order for test strips to be covered. She said forms were sent to us in early December. RN was unable to find forms or documentation that forms were received. Domenico will resend forms directly to RN at [phone].    Called and update patient's mom Francisca on situation.     (c2c with mom is on file)    Edil Uribe RN....12/28/2020 10:59 AM

## 2020-12-28 NOTE — TELEPHONE ENCOUNTER
Patient's mother, Francisca (ph: [phone]) called and wants to speak with someone from Dr. Goss's team regarding Lexy. States this is urgent.      Lexy needs the Dexcom Sensor and test strips.  Medicare won't pay for both unless there is special paperwork for the test strips that Dr. Goss needs to fill out.  She said office notes were faxed but not sure they were sent to the right place?    Lexy has had the Dexcom for about a year and it is working wonderful, and she has been staying out of the hospital.  This system works great but they need the Sensor part.    She has been without the system for three weeks.    Phone number to [facility] ([phone]).  Mom said the head pharmacist (does not know her name, but has a strong Polish accent) seems to know what needs to be done.    Mom would like this taken care of ASAP, or at least a call back from Dr. Goss's team to discuss further. Armida Kiran,

## 2020-12-28 NOTE — TELEPHONE ENCOUNTER
"Fax was received from [facility] stating:    \"Per [facility] Medicare Dept[facility] patient that is using DEXCOM will never get any test strips covered by Medicare Part B. There is no use in filling a form for the strips and patient has purchased some strips OTC.\"    Closing this encounter.    Edil Uribe RN....12/28/2020 3:04 PM         "

## 2020-12-29 RX ORDER — PROCHLORPERAZINE 25 MG/1
1 SUPPOSITORY RECTAL
Qty: 2 EACH | Refills: 1 | Status: SHIPPED | OUTPATIENT
Start: 2020-12-29 | End: 2021-08-13

## 2020-12-29 RX ORDER — PROCHLORPERAZINE 25 MG/1
SUPPOSITORY RECTAL
Qty: 1 DEVICE | Refills: 1 | Status: SHIPPED | OUTPATIENT
Start: 2020-12-29 | End: 2023-09-22

## 2021-01-06 ENCOUNTER — OFFICE VISIT (OUTPATIENT)
Dept: PODIATRY | Facility: CLINIC | Age: 61
End: 2021-01-06
Payer: MEDICARE

## 2021-01-06 VITALS — OXYGEN SATURATION: 100 % | SYSTOLIC BLOOD PRESSURE: 146 MMHG | DIASTOLIC BLOOD PRESSURE: 80 MMHG | HEART RATE: 103 BPM

## 2021-01-06 DIAGNOSIS — E10.42 DM TYPE 1 WITH DIABETIC PERIPHERAL NEUROPATHY (H): ICD-10-CM

## 2021-01-06 DIAGNOSIS — L84 TYLOMA: ICD-10-CM

## 2021-01-06 DIAGNOSIS — L84 TYPE 1 DIABETES MELLITUS WITH PRESSURE CALLUS (H): ICD-10-CM

## 2021-01-06 DIAGNOSIS — E10.628 TYPE 1 DIABETES MELLITUS WITH PRESSURE CALLUS (H): ICD-10-CM

## 2021-01-06 DIAGNOSIS — L60.2 ONYCHAUXIS: Primary | ICD-10-CM

## 2021-01-06 PROCEDURE — 11056 PARNG/CUTG B9 HYPRKR LES 2-4: CPT | Mod: Q8 | Performed by: PODIATRIST

## 2021-01-06 PROCEDURE — 99213 OFFICE O/P EST LOW 20 MIN: CPT | Mod: 25 | Performed by: PODIATRIST

## 2021-01-06 PROCEDURE — 11719 TRIM NAIL(S) ANY NUMBER: CPT | Mod: XS | Performed by: PODIATRIST

## 2021-01-06 NOTE — NURSING NOTE
Lexy Rockwell's chief complaint for this visit includes:  Chief Complaint   Patient presents with     RECHECK     Bilateral foot & toenail care     PCP: Fredi Fuentes    Referring Provider:  No referring provider defined for this encounter.    BP (!) 146/80 (BP Location: Left arm, Patient Position: Sitting, Cuff Size: Adult Regular)   Pulse 103   LMP 03/28/2014   SpO2 100%   Data Unavailable     Do you need any medication refills at today's visit? No    Xiomara Nieto CMA

## 2021-01-06 NOTE — PATIENT INSTRUCTIONS
Thanks for coming today.  [facility]  [address] [place] [address]    To schedule future appointments in Ortho Clinic, you may call [phone].    To schedule ordered imaging by your provider:   Call [facility] [phone]    To schedule an injection ordered by your provider:  Call [facility] Injection scheduling line: [phone]  [employer] available online at:  [email or web address]    Please call if any further questions or concerns ([phone]).  Clinic hours 8 am to 5 pm.    Return to clinic (call) if symptoms worsen or fail to improve.

## 2021-01-06 NOTE — PROGRESS NOTES
Past Medical History:   Diagnosis Date     Cerumen impacted      Development delay      Diabetic gastroparesis (H) 8/16/2013     Glaucoma (increased eye pressure)      Hyperlipaemia      Hypertension      Hypothyroid      Mental retardation      Nonsenile cataract      Obesity      Type 1 diabetes mellitus not at goal (H)      Patient Active Problem List   Diagnosis     Development delay     Overactive bladder     GERD (gastroesophageal reflux disease)     Hypertension goal BP (blood pressure) < 140/90     Hyperlipidemia LDL goal <100     Pain in joint, lower leg     Proteinuria     Vitamin D deficiency     Health Care Home     History of strabismus surgery     Type 1 diabetes mellitus with other specified complication (H)     Advanced directives, counseling/discussion     Primary open angle glaucoma of both eyes, moderate stage     Gastroesophageal reflux disease without esophagitis     Obesity, unspecified obesity severity, unspecified obesity type     Acute renal failure (H)     Acute retention of urine     JEAN (acute kidney injury) (H)     Dehydration     Diabetic ketoacidosis without coma associated with type 1 diabetes mellitus (H)     DKA (diabetic ketoacidoses) (H)     Sinus tachycardia     SIRS (systemic inflammatory response syndrome) (H)     Obesity (BMI 35.0-39.9) with comorbidity (H)     Chronic pain of right knee     Past Surgical History:   Procedure Laterality Date     C EYE SURG ANT SGMT PROC UNLISTED  remote    strabismus surgery     STRABISMUS SURGERY       Social History     Socioeconomic History     Marital status: Single     Spouse name: Not on file     Number of children: Not on file     Years of education: Not on file     Highest education level: Not on file   Occupational History     Not on file   Social Needs     Financial resource strain: Not on file     Food insecurity     Worry: Not on file     Inability: Not on file     Transportation needs     Medical: Not on file     Non-medical: Not  on file   Tobacco Use     Smoking status: Never Smoker     Smokeless tobacco: Never Used     Tobacco comment: lives in smoke free household.   Substance and Sexual Activity     Alcohol use: Yes     Comment: occass social     Drug use: No     Sexual activity: Never   Lifestyle     Physical activity     Days per week: Not on file     Minutes per session: Not on file     Stress: Not on file   Relationships     Social connections     Talks on phone: Not on file     Gets together: Not on file     Attends Caodaism service: Not on file     Active member of club or organization: Not on file     Attends meetings of clubs or organizations: Not on file     Relationship status: Not on file     Intimate partner violence     Fear of current or ex partner: Not on file     Emotionally abused: Not on file     Physically abused: Not on file     Forced sexual activity: Not on file   Other Topics Concern     Parent/sibling w/ CABG, MI or angioplasty before 65F 55M? No   Social History Narrative     Not on file     Family History   Problem Relation Age of Onset     Hypertension Father      Diabetes Sister      Glaucoma Maternal Grandfather      Cancer No family hx of      Cerebrovascular Disease No family hx of      Thyroid Disease No family hx of      Macular Degeneration No family hx of      Lab Results   Component Value Date    A1C 9.2 09/30/2020    A1C 11.8 01/08/2020    A1C 11.8 05/19/2019    A1C 11.2 12/12/2018    A1C 12.4 09/05/2018     SUBJECTIVE FINDINGS:  A 60-year-old female returns to clinic for onychomycosis, onychauxis and tyloma.  She is diabetic with peripheral neuropathy and vascular disease.  She relates she has numbness, tingling and neuropathy in her feet.  She relates to no ulcers or sores since we have seen her last.  She relates the callus on her left first MPJ is painful.  It helps to have it trimmed down but then it builds up and becomes sore again.  She is not wearing diabetic shoes and relates she does not  want to get any diabetic shoes.  She relates she needs her toenails cut as well.  Relates no ulcers or sores since we have seen her last.        OBJECTIVE FINDINGS:  DP and PT 2/4 bilaterally.  She has dorsally contracted digits 1-5 bilaterally with decreased ankle joint dorsiflexion bilaterally and generalized decreased range of motion bilaterally.  She has some peripheral edema bilaterally and decreased hair growth bilaterally.  She has decreased deep tendon reflexes bilaterally.  Sharp/dull is decreased with 5.07 Mckinney-Latha monofilament bilaterally.  There is no erythema, no drainage, no odor, no calor bilaterally.  No gross tendon voids bilaterally.  She has nucleated hyperkeratotic tissue buildup with some cracking on the left plantar first MPJ, plantar medial left hallux and distal left second toe.  There is incurvated nails bilaterally to differing degrees.        ASSESSMENT/PLAN:  Onychauxis bilaterally, tylomas on the left foot causing pain.  She is diabetic with peripheral neuropathy and peripheral vascular disease.  Diagnosis and treatment options discussed with her.  All the nails were reduced bilaterally upon consent.  The tyloma on the left foot was sharp debrided with a #15 blade upon consent.  She relates she does have an aide come out once a week and help her with putting lotion on.  She will continue that.  Otherwise, she cannot put the lotion on herself.  She will return to clinic and see me in about 2-3 months.  Previous notes reviewed.

## 2021-01-06 NOTE — LETTER
1/6/2021         RE: Lexy Rockwell  [address]  [facility] [address]        Dear Colleague,    Thank you for referring your patient, Lexy Rockwell, to the [facility]. Please see a copy of my visit note below.    Past Medical History:   Diagnosis Date     Cerumen impacted      Development delay      Diabetic gastroparesis (H) 8/16/2013     Glaucoma (increased eye pressure)      Hyperlipaemia      Hypertension      Hypothyroid      Mental retardation      Nonsenile cataract      Obesity      Type 1 diabetes mellitus not at goal (H)      Patient Active Problem List   Diagnosis     Development delay     Overactive bladder     GERD (gastroesophageal reflux disease)     Hypertension goal BP (blood pressure) < 140/90     Hyperlipidemia LDL goal <100     Pain in joint, lower leg     Proteinuria     Vitamin D deficiency     Health Care Home     History of strabismus surgery     Type 1 diabetes mellitus with other specified complication (H)     Advanced directives, counseling/discussion     Primary open angle glaucoma of both eyes, moderate stage     Gastroesophageal reflux disease without esophagitis     Obesity, unspecified obesity severity, unspecified obesity type     Acute renal failure (H)     Acute retention of urine     JEAN (acute kidney injury) (H)     Dehydration     Diabetic ketoacidosis without coma associated with type 1 diabetes mellitus (H)     DKA (diabetic ketoacidoses) (H)     Sinus tachycardia     SIRS (systemic inflammatory response syndrome) (H)     Obesity (BMI 35.0-39.9) with comorbidity (H)     Chronic pain of right knee     Past Surgical History:   Procedure Laterality Date     C EYE SURG ANT SGMT PROC UNLISTED  remote    strabismus surgery     STRABISMUS SURGERY       Social History     Socioeconomic History     Marital status: Single     Spouse name: Not on file     Number of children: Not on file     Years of education: Not on  file     Highest education level: Not on file   Occupational History     Not on file   Social Needs     Financial resource strain: Not on file     Food insecurity     Worry: Not on file     Inability: Not on file     Transportation needs     Medical: Not on file     Non-medical: Not on file   Tobacco Use     Smoking status: Never Smoker     Smokeless tobacco: Never Used     Tobacco comment: lives in smoke free household.   Substance and Sexual Activity     Alcohol use: Yes     Comment: occass social     Drug use: No     Sexual activity: Never   Lifestyle     Physical activity     Days per week: Not on file     Minutes per session: Not on file     Stress: Not on file   Relationships     Social connections     Talks on phone: Not on file     Gets together: Not on file     Attends [employer] service: Not on file     Active member of club or organization: Not on file     Attends meetings of clubs or organizations: Not on file     Relationship status: Not on file     Intimate partner violence     Fear of current or ex partner: Not on file     Emotionally abused: Not on file     Physically abused: Not on file     Forced sexual activity: Not on file   Other Topics Concern     Parent/sibling w/ CABG, MI or angioplasty before 65F 55M? No   Social History Narrative     Not on file     Family History   Problem Relation Age of Onset     Hypertension Father      Diabetes Sister      Glaucoma Maternal Grandfather      Cancer No family hx of      Cerebrovascular Disease No family hx of      Thyroid Disease No family hx of      Macular Degeneration No family hx of      Lab Results   Component Value Date    A1C 9.2 09/30/2020    A1C 11.8 01/08/2020    A1C 11.8 05/19/2019    A1C 11.2 12/12/2018    A1C 12.4 09/05/2018     SUBJECTIVE FINDINGS:  A 60-year-old female returns to clinic for onychomycosis, onychauxis and tyloma.  She is diabetic with peripheral neuropathy and vascular disease.  She relates she has numbness, tingling and  neuropathy in her feet.  She relates to no ulcers or sores since we have seen her last.  She relates the callus on her left first MPJ is painful.  It helps to have it trimmed down but then it builds up and becomes sore again.  She is not wearing diabetic shoes and relates she does not want to get any diabetic shoes.  She relates she needs her toenails cut as well.  Relates no ulcers or sores since we have seen her last.        OBJECTIVE FINDINGS:  DP and PT 2/4 bilaterally.  She has dorsally contracted digits 1-5 bilaterally with decreased ankle joint dorsiflexion bilaterally and generalized decreased range of motion bilaterally.  She has some peripheral edema bilaterally and decreased hair growth bilaterally.  She has decreased deep tendon reflexes bilaterally.  Sharp/dull is decreased with 5.07 Cortez-Latha monofilament bilaterally.  There is no erythema, no drainage, no odor, no calor bilaterally.  No gross tendon voids bilaterally.  She has nucleated hyperkeratotic tissue buildup with some cracking on the left plantar first MPJ, plantar medial left hallux and distal left second toe.  There is incurvated nails bilaterally to differing degrees.        ASSESSMENT/PLAN:  Onychauxis bilaterally, tylomas on the left foot causing pain.  She is diabetic with peripheral neuropathy and peripheral vascular disease.  Diagnosis and treatment options discussed with her.  All the nails were reduced bilaterally upon consent.  The tyloma on the left foot was sharp debrided with a #15 blade upon consent.  She relates she does have an aide come out once a week and help her with putting lotion on.  She will continue that.  Otherwise, she cannot put the lotion on herself.  She will return to clinic and see me in about 2-3 months.  Previous notes reviewed.           Again, thank you for allowing me to participate in the care of your patient.        Sincerely,        Ravin Back DPM

## 2021-01-13 ENCOUNTER — VIRTUAL VISIT (OUTPATIENT)
Dept: EDUCATION SERVICES | Facility: CLINIC | Age: 61
End: 2021-01-13
Payer: MEDICARE

## 2021-01-13 DIAGNOSIS — Z79.4 TYPE 2 DIABETES MELLITUS WITH DIABETIC POLYNEUROPATHY, WITH LONG-TERM CURRENT USE OF INSULIN (H): ICD-10-CM

## 2021-01-13 DIAGNOSIS — E11.42 TYPE 2 DIABETES MELLITUS WITH DIABETIC POLYNEUROPATHY, WITH LONG-TERM CURRENT USE OF INSULIN (H): ICD-10-CM

## 2021-01-13 PROCEDURE — G0108 DIAB MANAGE TRN  PER INDIV: HCPCS | Mod: 95

## 2021-01-13 RX ORDER — INSULIN ASPART 100 [IU]/ML
INJECTION, SOLUTION INTRAVENOUS; SUBCUTANEOUS
Qty: 30 ML | Refills: 11 | Status: SHIPPED | OUTPATIENT
Start: 2021-01-13 | End: 2021-02-10

## 2021-01-13 NOTE — Clinical Note
Dr. Goss,  I discussed the Omnipod with Lexy, she said that she isn't sure if she would be able to manage a a pump. I explained that it isn't a classic pump and is a little bit easier to apply and start. She did agree to at least seeing what it looks like at our next in clinic visit. So we will discuss it at that point! I increased her Novolog with lunch and dinner from 17 to 18 units.   Tiffanie Mcfarland, RD, LD, CDE

## 2021-01-13 NOTE — PROGRESS NOTES
"Diabetes Self-Management Education & Support    Telephone VIsit for: Follow-up    Type of Service: Telephone Visit    How would patient like to obtain AVS? Jarett      SUBJECTIVE/OBJECTIVE:  Presents for: Follow-up  Accompanied by: Self  Diabetes education in the past 24mo: Yes  Focus of Visit: Problem Solving, Monitoring, Taking Medication  Diabetes type: Type 1  Disease course: Improving  Transportation concerns: Yes(gets rides or takes public tranportation)  Difficulty affording diabetes medication?: No  Difficulty affording diabetes testing supplies?: No  Other concerns:: Cognitive impairment  Cultural Influences/Ethnic Background:  American    Diabetes Symptoms & Complications:  Fatigue: No  Neuropathy: Yes(in feet)  Polydipsia: No  Polyphagia: No  Polyuria: Sometimes(often getting up in the night)  Visual change: No  Slow healing wounds: No(a little cut near the toenails)  Symptom course: Stable  Weight trend: Decreasing  Complications assessed today?: No    Patient Problem List and Family Medical History reviewed for relevant medical history, current medical status, and diabetes risk factors.    Vitals:  LMP 03/28/2014   Estimated body mass index is 33.38 kg/m  as calculated from the following:    Height as of 3/4/20: 1.638 m (5' 4.5\").    Weight as of 9/30/20: 89.6 kg (197 lb 8 oz).   Last 3 BP:   BP Readings from Last 3 Encounters:   01/06/21 (!) 146/80   11/04/20 (!) 188/112   09/30/20 138/87       History   Smoking Status     Never Smoker   Smokeless Tobacco     Never Used     Comment: lives in smoke free household.       Labs:  Lab Results   Component Value Date    A1C 9.2 09/30/2020     Lab Results   Component Value Date     09/30/2020     Lab Results   Component Value Date     10/02/2019     HDL Cholesterol   Date Value Ref Range Status   10/02/2019 60 >49 mg/dL Final   ]  GFR Estimate   Date Value Ref Range Status   09/30/2020 >90 >60 mL/min/[1.73_m2] Final     Comment:     Non  " American GFR Calc  Starting 12/18/2018, serum creatinine based estimated GFR (eGFR) will be   calculated using the Chronic Kidney Disease Epidemiology Collaboration   (CKD-EPI) equation.       GFR Estimate If Black   Date Value Ref Range Status   09/30/2020 >90 >60 mL/min/[1.73_m2] Final     Comment:      GFR Calc  Starting 12/18/2018, serum creatinine based estimated GFR (eGFR) will be   calculated using the Chronic Kidney Disease Epidemiology Collaboration   (CKD-EPI) equation.       Lab Results   Component Value Date    CR 0.61 09/30/2020     No results found for: MICROALBUMIN    Healthy Eating:  Healthy Eating Assessed Today: Yes  Cultural/Cheondoism diet restrictions?: No  Meals include: Breakfast, Lunch, Dinner, Afternoon Snack, Evening Snack  Breakfast: 8:00 am - 9:00 am Low sugar Oatmeal with some coffee OR breakfast bowl  or just toast  Lunch: 12-12:30, Oatmeal OR breakfast bowl, OR pasta salad from grocery store  Dinner: 5:00 -6:00 pm: Salad and sandwich OR home cooked meal- hot dish  Snacks: Reeces peanut butter cups, unsweetened apple sauce, pop tart  Other: drinks coffee with sugar free creamer  Beverages: Water, Coffee(Keep some juice on hand for lows)  Has patient met with a dietitian in the past?: Yes    Being Active:  Being Active Assessed Today: Yes(went on the bike at her apartment complex)  Exercise:: Yes  Days per week of moderate to strenuous exercise (like a brisk walk): 2  On average, minutes per day of exercise at this level: (Walking with friends)  Barrier to exercise: Safety    Monitoring:  Monitoring Assessed Today: Yes  Blood Glucose Meter: CGM  Blood glucose trend: Fluctuating    Date Breakfast  Lunch  Dinner  Bedtime    Before After Before After Before After    1/13 246  178       1/12 171  120  103  Hi   1/11 154  121  Hi      1/10 157    218  279   1/9 298  271  157  HI   1/8 234  199  159  300's   1/7 221  144   277 200   1/6 110  89  HI (300's)  HI   1/5 106  75  243   267   1/4 287  227  256  300's   1/3 148  82  294  243   1/2 207  136  224  254   1/1 237  169  104  HI (sick)   12/31 214  138   220   12/30 263  143  119  194       Taking Medications:  Diabetes Medication(s)     Biguanides       metFORMIN (GLUCOPHAGE-XR) 500 MG 24 hr tablet    Take 4-tablets by mouth daily as directed    Insulin       insulin aspart (NOVOLOG FLEXPEN) 100 UNIT/ML pen    Inject 15 units with breakfast, 17 units with lunch and 17 units with dinner, plus correction scale. dose premeal as directed, TDD: approx 100 units     insulin degludec (TRESIBA FLEXTOUCH) 200 UNIT/ML pen    Inject 25 Units Subcutaneous daily          Taking Medication Assessed Today: Yes  Current Treatments: Insulin Injections  Dose schedule: Pre-breakfast, Pre-lunch, Pre-dinner  Given by: Patient  Injection/Infusion sites: Abdomen    Problem Solving:  Problem Solving Assessed Today: Yes  Is the patient at risk for hypoglycemia?: Yes  Hypoglycemia Frequency: Monthly  Hypoglycemia Treatment: Glucose (tablets or gel), Other food  Patient carries a carbohydrate source: Yes  Medical ID: Yes  Is the patient at risk for DKA?: Yes  Does patient have ketone test strips?: Yes  Does patient have DKA prevention plan?: Yes  Does patient have severe weather/disaster plan for diabetes management?: No  Does patient have sick day plan for diabetes management?: Yes    Hypoglycemia symptoms  Confusion: No  Dizziness or Light-Headedness: No  Headaches: No  Hunger: No  Mood changes: No  Nervousness/Anxiety: No  Sleepiness: No  Speech difficulty: Yes  Sweats: Yes  Feeling shaky: Yes    Hypoglycemia Complications  Blackouts: No  Hospitalization: No  Nocturnal hypoglycemia: Yes  Required assistance: No  Seizures: No    Reducing Risks:  Reducing Risks Assessed Today: No  CAD Risks: Diabetes Mellitus  Has dilated eye exam at least once a year?: Yes  Sees dentist every 6 months?: Yes  Feet checked by healthcare provider in the last year?:  Yes    Healthy Coping:  Healthy Coping Assessed Today: Yes  Emotional response to diabetes: Acceptance  Informal Support system:: Family, Parent  Stage of change: ACTION (Actively working towards change)  Support resources: Offerings in Clinic Communities  Patient Activation Measure Survey Score:  KHUSHI Score (Last Two) 3/7/2012   KHUSHI Raw Score 39   Activation Score 56.4   KHUSHI Level 3       Diabetes knowledge and skills assessment:   Patient is knowledgeable in diabetes management concepts related to: Monitoring    Patient needs further education on the following diabetes management concepts: Healthy Eating, Being Active, Monitoring, Taking Medication, Problem Solving, Reducing Risks and Healthy Coping    Based on learning assessment above, most appropriate setting for further diabetes education would be: Individual setting.      INTERVENTIONS:    Education provided today on:  AADE Self-Care Behaviors:  Monitoring: purpose, individual blood glucose targets and frequency of monitoring  Taking Medication: action of prescribed medication and when to take medications    Opportunities for ongoing education and support in diabetes-self management were discussed.    Pt verbalized understanding of concepts discussed and recommendations provided today.       Education Materials Provided:  No new materials provided today      ASSESSMENT:  Lexy's BG have been elevated. She states that nothing has been different with her diet that she can think of. The only change was that her Tresiba was decreased because of lows.  We discuss a plan to increase Novolog with lunch and dinner to 18 units.      We discuss the possibility of an Omnipod. Explained that we could set meals so all she would have to do is select the meal that she is having and it would infuse a set amount of insulin.  Lexy isn't sure if she would be able to manage it, it seems too difficult, but she is willing to see what it looks like at next visit in clinic and  will think about it. Writer explained that there is not tubing and is relatively simple to apply. She will think about it.  Will revisit at our next follow-up and potentially schedule an in clinic visit at that point.     Patient's most recent   Lab Results   Component Value Date    A1C 9.2 09/30/2020    is not meeting goal of <8.0    PLAN  Increase Novolog: 15-17-17-0 --> 15-18-18-0  Continue with 25 units of Tresiba  Consider Omnipod- will revisit at next follow-up    Tiffanie Mcfarland, RD, LD, CDE    Time Spent: 30 minutes  Encounter Type: Individual    Any diabetes medication dose changes were made via the CDE Protocol and Collaborative Practice Agreement with the patient's referring provider. A copy of this encounter was shared with the provider.        Discussed Omnipod- revisit at next follow-up

## 2021-01-15 ENCOUNTER — HEALTH MAINTENANCE LETTER (OUTPATIENT)
Age: 61
End: 2021-01-15

## 2021-01-28 PROBLEM — G89.29 CHRONIC PAIN OF RIGHT KNEE: Status: RESOLVED | Noted: 2020-08-19 | Resolved: 2021-01-28

## 2021-01-28 PROBLEM — M25.561 CHRONIC PAIN OF RIGHT KNEE: Status: RESOLVED | Noted: 2020-08-19 | Resolved: 2021-01-28

## 2021-01-28 NOTE — PROGRESS NOTES
Patient is discharged from PT.  Please refer to SOAP note dated 8/31/20 for last known functional status as well as final objective/subjective values.

## 2021-01-29 ENCOUNTER — ANCILLARY PROCEDURE (OUTPATIENT)
Dept: MAMMOGRAPHY | Facility: CLINIC | Age: 61
End: 2021-01-29
Attending: FAMILY MEDICINE
Payer: MEDICARE

## 2021-01-29 DIAGNOSIS — Z12.31 VISIT FOR SCREENING MAMMOGRAM: ICD-10-CM

## 2021-01-29 PROCEDURE — 77067 SCR MAMMO BI INCL CAD: CPT | Mod: TC | Performed by: RADIOLOGY

## 2021-02-01 DIAGNOSIS — E10.69 TYPE 1 DIABETES MELLITUS WITH OTHER SPECIFIED COMPLICATION (H): ICD-10-CM

## 2021-02-01 LAB
ANION GAP SERPL CALCULATED.3IONS-SCNC: 2 MMOL/L (ref 3–14)
BUN SERPL-MCNC: 17 MG/DL (ref 7–30)
CALCIUM SERPL-MCNC: 10.3 MG/DL (ref 8.5–10.1)
CHLORIDE SERPL-SCNC: 107 MMOL/L (ref 94–109)
CHOLEST SERPL-MCNC: 200 MG/DL
CO2 SERPL-SCNC: 32 MMOL/L (ref 20–32)
CREAT SERPL-MCNC: 0.73 MG/DL (ref 0.52–1.04)
CREAT UR-MCNC: 97 MG/DL
GFR SERPL CREATININE-BSD FRML MDRD: 90 ML/MIN/{1.73_M2}
GLUCOSE SERPL-MCNC: 139 MG/DL (ref 70–99)
HBA1C MFR BLD: 9.7 % (ref 0–5.6)
HDLC SERPL-MCNC: 62 MG/DL
LDLC SERPL CALC-MCNC: 120 MG/DL
MICROALBUMIN UR-MCNC: 306 MG/L
MICROALBUMIN/CREAT UR: 315.14 MG/G CR (ref 0–25)
NONHDLC SERPL-MCNC: 138 MG/DL
POTASSIUM SERPL-SCNC: 4.1 MMOL/L (ref 3.4–5.3)
SODIUM SERPL-SCNC: 141 MMOL/L (ref 133–144)
T4 FREE SERPL-MCNC: 1.05 NG/DL (ref 0.76–1.46)
TRIGL SERPL-MCNC: 89 MG/DL
TSH SERPL DL<=0.005 MIU/L-ACNC: 7.07 MU/L (ref 0.4–4)

## 2021-02-01 PROCEDURE — 80048 BASIC METABOLIC PNL TOTAL CA: CPT | Performed by: INTERNAL MEDICINE

## 2021-02-01 PROCEDURE — 36415 COLL VENOUS BLD VENIPUNCTURE: CPT | Performed by: INTERNAL MEDICINE

## 2021-02-01 PROCEDURE — 84443 ASSAY THYROID STIM HORMONE: CPT | Performed by: INTERNAL MEDICINE

## 2021-02-01 PROCEDURE — 80061 LIPID PANEL: CPT | Performed by: INTERNAL MEDICINE

## 2021-02-01 PROCEDURE — 82043 UR ALBUMIN QUANTITATIVE: CPT | Performed by: INTERNAL MEDICINE

## 2021-02-01 PROCEDURE — 84439 ASSAY OF FREE THYROXINE: CPT | Performed by: INTERNAL MEDICINE

## 2021-02-01 PROCEDURE — 83036 HEMOGLOBIN GLYCOSYLATED A1C: CPT | Performed by: INTERNAL MEDICINE

## 2021-02-10 ENCOUNTER — PATIENT OUTREACH (OUTPATIENT)
Dept: EDUCATION SERVICES | Facility: CLINIC | Age: 61
End: 2021-02-10
Payer: MEDICARE

## 2021-02-10 DIAGNOSIS — E11.42 TYPE 2 DIABETES MELLITUS WITH DIABETIC POLYNEUROPATHY, WITH LONG-TERM CURRENT USE OF INSULIN (H): Primary | ICD-10-CM

## 2021-02-10 DIAGNOSIS — Z79.4 TYPE 2 DIABETES MELLITUS WITH DIABETIC POLYNEUROPATHY, WITH LONG-TERM CURRENT USE OF INSULIN (H): Primary | ICD-10-CM

## 2021-02-10 PROCEDURE — 98967 PH1 ASSMT&MGMT NQHP 11-20: CPT | Mod: 95

## 2021-02-10 RX ORDER — INSULIN ASPART 100 [IU]/ML
INJECTION, SOLUTION INTRAVENOUS; SUBCUTANEOUS
Qty: 30 ML | Refills: 11 | Status: SHIPPED | OUTPATIENT
Start: 2021-02-10 | End: 2021-03-12

## 2021-02-10 NOTE — PATIENT INSTRUCTIONS
Increase your Novolog with your lunch and dinner, your new doses are as follows:  15 units with breakfast  20 units with lunch  20 units with dinner    Diabetes Support Resources:  Websites: American Association of Diabetes Educators Participant Resources: www.diabeteseducator.org/living-with-diabetes   American Diabetes Association: www.diabetes.org  Diabetes Together 2 Goal: http://kltdfkpv2fcdc.org     Bring blood glucose meter and logbook with you to all doctor and follow-up appointments.    Diabetes Education Telephone Visit Follow-up:    We realize your time is valuable and your health is important! We offer a convenient Telephone Visit follow up! It s a quick way to check in for a medication dose adjustment without having to come back to clinic as soon.    Telephone Visits are often covered by insurance. Please check with your insurance plan to see if this type of visit is covered. If not, the cost is less expensive than an office visit:      Up to 10 minutes (Code 21385): $30    11-20 minutes (Code 52652): $59    More than 20 minutes (Code 89887): $85    Talk with your Diabetes Educator if you want to learn more.      [facility]:  For Your Diabetes Education and Nutrition Appointments Call:  [phone]   For Diabetes Education or Nutrition Related Questions:   Phone: [phone]  Send [employer] [facility] Message   If you need a medication refill please contact your pharmacy. Please allow 3 business days for your refills to be completed.

## 2021-02-10 NOTE — PROGRESS NOTES
"Diabetes Self-Management Education & Support    Presents via telephone for: Follow-up    Type of Service: Telephone Visit    How would patient like to obtain AVS? [email or web address]      SUBJECTIVE/OBJECTIVE:  Presents for: Follow-up  Accompanied by: Self  Diabetes education in the past 24mo: Yes  Focus of Visit: Problem Solving, Monitoring, Taking Medication  Diabetes type: Type 1  Disease course: Improving  Diabetes management related comments/concerns: Lexy states that everything is pretty much the same for now.  Transportation concerns: Yes(gets rides or takes public tranportation)  Difficulty affording diabetes medication?: No  Difficulty affording diabetes testing supplies?: No  Other concerns:: Cognitive impairment  Cultural Influences/Ethnic Background:  American    Diabetes Symptoms & Complications:  Fatigue: No  Neuropathy: Yes(in feet)  Polydipsia: No  Polyphagia: No  Polyuria: Sometimes(often getting up in the night)  Visual change: No  Slow healing wounds: No(a little cut near the toenails)  Symptom course: Stable  Weight trend: Decreasing  Complications assessed today?: No    Patient Problem List and Family Medical History reviewed for relevant medical history, current medical status, and diabetes risk factors.    Vitals:  LMP 03/28/2014   Estimated body mass index is 33.38 kg/m  as calculated from the following:    Height as of 3/4/20: 1.638 m (5' 4.5\").    Weight as of 9/30/20: 89.6 kg (197 lb 8 oz).   Last 3 BP:   BP Readings from Last 3 Encounters:   01/06/21 (!) 146/80   11/04/20 (!) 188/112   09/30/20 138/87       History   Smoking Status     Never Smoker   Smokeless Tobacco     Never Used     Comment: lives in smoke free household.       Labs:  Lab Results   Component Value Date    A1C 9.7 02/01/2021     Lab Results   Component Value Date     02/01/2021     Lab Results   Component Value Date     02/01/2021     HDL Cholesterol   Date Value Ref Range Status   02/01/2021 62 >49 mg/dL Final "   ]  GFR Estimate   Date Value Ref Range Status   02/01/2021 90 >60 mL/min/[1.73_m2] Final     Comment:     Non  GFR Calc  Starting 12/18/2018, serum creatinine based estimated GFR (eGFR) will be   calculated using the Chronic Kidney Disease Epidemiology Collaboration   (CKD-EPI) equation.       GFR Estimate If Black   Date Value Ref Range Status   02/01/2021 >90 >60 mL/min/[1.73_m2] Final     Comment:      GFR Calc  Starting 12/18/2018, serum creatinine based estimated GFR (eGFR) will be   calculated using the Chronic Kidney Disease Epidemiology Collaboration   (CKD-EPI) equation.       Lab Results   Component Value Date    CR 0.73 02/01/2021     No results found for: MICROALBUMIN    Healthy Eating:  Healthy Eating Assessed Today: Yes  Cultural/Hindu diet restrictions?: No  Meals include: Breakfast, Lunch, Dinner, Afternoon Snack, Evening Snack  Breakfast: 8:00 am - 9:00 am Low sugar Oatmeal with some coffee OR breakfast bowl  or just toast  Lunch: 12-12:30, Oatmeal OR breakfast bowl, OR pasta salad from grocery store  Dinner: 5:00 -6:00 pm: Salad and sandwich OR home cooked meal- hot dish  Snacks: Reeces peanut butter cups, unsweetened apple sauce, pop tart  Other: drinks coffee with sugar free creamer  Beverages: Water, Coffee(Keep some juice on hand for lows)  Has patient met with a dietitian in the past?: Yes    Being Active:  Being Active Assessed Today: Yes(went on the bike at her apartment complex)  Exercise:: Yes  Days per week of moderate to strenuous exercise (like a brisk walk): 2  On average, minutes per day of exercise at this level: (Walking with friends)  Barrier to exercise: Safety    Monitoring:  Monitoring Assessed Today: Yes  Blood Glucose Meter: CGM  Blood glucose trend: Fluctuating    Date Breakfast  Lunch  Dinner  Bedtime    Before After Before After Before After    2/10 270  285       2/9 199  135  230     2/8 134  44,116  245  302   2/7 239  249  262  168    2/6 184  144  217  HIGH   2/5 156  107  257     2/4 233  159  160     2/3 131  101  260  254   2/2 205  144  188  HIGH   2/1 203  219  220  HIGH   1/31 266  173  210  HIGH   1/30 299  250  127  280   1/29   282  216  HIGH   1/28 119  71  247  371   1/27 85  88  315  243         Taking Medications:  Diabetes Medication(s)     Biguanides       metFORMIN (GLUCOPHAGE-XR) 500 MG 24 hr tablet    Take 4-tablets by mouth daily as directed    Insulin       insulin aspart (NOVOLOG FLEXPEN) 100 UNIT/ML pen    Inject 15 units with breakfast, 18 units with lunch and 18 units with dinner, plus correction scale. dose premeal as directed, TDD: approx 100 units     insulin degludec (TRESIBA FLEXTOUCH) 200 UNIT/ML pen    Inject 25 Units Subcutaneous daily      Novolog: 15-20-20-0    Taking Medication Assessed Today: Yes  Current Treatments: Insulin Injections  Dose schedule: Pre-breakfast, Pre-lunch, Pre-dinner  Given by: Patient  Injection/Infusion sites: Abdomen    Problem Solving:  Problem Solving Assessed Today: Yes  Is the patient at risk for hypoglycemia?: Yes  Hypoglycemia Frequency: Monthly  Hypoglycemia Treatment: Glucose (tablets or gel), Other food  Patient carries a carbohydrate source: Yes  Medical ID: Yes  Is the patient at risk for DKA?: Yes  Does patient have ketone test strips?: Yes  Does patient have DKA prevention plan?: Yes  Does patient have severe weather/disaster plan for diabetes management?: No  Does patient have sick day plan for diabetes management?: Yes    Hypoglycemia symptoms  Confusion: No  Dizziness or Light-Headedness: No  Headaches: No  Hunger: No  Mood changes: No  Nervousness/Anxiety: No  Sleepiness: No  Speech difficulty: Yes  Sweats: Yes  Feeling shaky: Yes    Hypoglycemia Complications  Blackouts: No  Hospitalization: No  Nocturnal hypoglycemia: Yes  Required assistance: No  Seizures: No    Reducing Risks:  Reducing Risks Assessed Today: No  CAD Risks: Diabetes Mellitus  Has dilated eye exam  at least once a year?: Yes  Sees dentist every 6 months?: Yes  Feet checked by healthcare provider in the last year?: Yes    Healthy Coping:  Healthy Coping Assessed Today: Yes  Emotional response to diabetes: Acceptance  Informal Support system:: Family, Parent  Stage of change: ACTION (Actively working towards change)  Support resources: Offerings in Clinic Communities  Patient Activation Measure Survey Score:  KHUSHI Score (Last Two) 3/7/2012   KHUSHI Raw Score 39   Activation Score 56.4   KHUSHI Level 3       Diabetes knowledge and skills assessment:   Patient is knowledgeable in diabetes management concepts related to: Taking Medication    Patient needs further education on the following diabetes management concepts: Healthy Eating, Being Active, Monitoring, Taking Medication, Problem Solving, Reducing Risks and Healthy Coping    Based on learning assessment above, most appropriate setting for further diabetes education would be: Individual setting.      INTERVENTIONS:    Education provided today on:  AADE Self-Care Behaviors:  Monitoring: purpose, log and interpret results and individual blood glucose targets  Taking Medication: action of prescribed medication and when to take medications    Opportunities for ongoing education and support in diabetes-self management were discussed.    Pt verbalized understanding of concepts discussed and recommendations provided today.       Education Materials Provided:  No new materials provided today      ASSESSMENT:  Lexy states that there are no big changes with her lifestyle or diabetes. She has been having highs at night time, she is not sure why. Recommend to increase Novolog with lunch and dinner. Lexy is wondering about her Kidney labs and what she should eat or drink to help. Writer explained that it will help greatly to get her blood sugars in a healthy range, we will continue to adjust insulin doses to bring BG down.     Patient's most recent   Lab Results    Component Value Date    A1C 9.7 02/01/2021    is not meeting goal of <7.0    PLAN  See Patient Instructions for co-developed, patient-stated behavior change goals.  AVS printed and provided to patient today. See Follow-Up section for recommended follow-up.    Increase Novolog: 15-18-18-0 --> 15-20-20-0    Tiffanie Mcfarland RD, CARRI, CDE  Time Spent: 15 minutes  Encounter Type: Individual    Any diabetes medication dose changes were made via the CDE Protocol and Collaborative Practice Agreement with the patient's referring provider. A copy of this encounter was shared with the provider.

## 2021-02-18 ENCOUNTER — TELEPHONE (OUTPATIENT)
Dept: FAMILY MEDICINE | Facility: CLINIC | Age: 61
End: 2021-02-18

## 2021-02-18 DIAGNOSIS — E10.69 TYPE 1 DIABETES MELLITUS WITH OTHER SPECIFIED COMPLICATION (H): ICD-10-CM

## 2021-02-18 DIAGNOSIS — R62.50 DEVELOPMENT DELAY: Primary | ICD-10-CM

## 2021-02-18 NOTE — TELEPHONE ENCOUNTER
Reason for Call: Request for an order or referral:    Order or referral being requested: Patient would like to know where she can get a Life Alert.    Date needed: as soon as possible    Phone number Patient can be reached at:  Home number on file [phone] (home)    Best Time:  anytime    Can we leave a detailed message on this number?  YES    Call taken on 2/18/2021 at 10:33 AM by Armida Kiran

## 2021-02-19 NOTE — TELEPHONE ENCOUNTER
Patient notified.  Phone number to  [facility] given    Benji Rivera RN  [facility]

## 2021-02-19 NOTE — TELEPHONE ENCOUNTER
[facility] referral carlo'd up   Please review and advise    For more information about [facility], call [phone] or [phone]  ([facility]-  [facility] [[id number]])    Benji Rivera RN  [facility]

## 2021-03-10 ENCOUNTER — VIRTUAL VISIT (OUTPATIENT)
Dept: EDUCATION SERVICES | Facility: CLINIC | Age: 61
End: 2021-03-10
Payer: MEDICARE

## 2021-03-10 DIAGNOSIS — E11.42 TYPE 2 DIABETES MELLITUS WITH DIABETIC POLYNEUROPATHY, WITH LONG-TERM CURRENT USE OF INSULIN (H): Primary | ICD-10-CM

## 2021-03-10 DIAGNOSIS — Z79.4 TYPE 2 DIABETES MELLITUS WITH DIABETIC POLYNEUROPATHY, WITH LONG-TERM CURRENT USE OF INSULIN (H): Primary | ICD-10-CM

## 2021-03-10 DIAGNOSIS — E10.40 TYPE 1 DIABETES MELLITUS WITH DIABETIC NEUROPATHY (H): ICD-10-CM

## 2021-03-10 DIAGNOSIS — E10.69 TYPE 1 DIABETES MELLITUS WITH OTHER SPECIFIED COMPLICATION (H): ICD-10-CM

## 2021-03-10 NOTE — PROGRESS NOTES
Diabetes Follow-up    Subjective/Objective:    Lexy Rockwell sent in blood glucose log for review. Last date of communication was: 2/10.    Diabetes is being managed with   Diabetes Medications   Diabetes Medication(s)     Biguanides       metFORMIN (GLUCOPHAGE-XR) 500 MG 24 hr tablet    Take 4-tablets by mouth daily as directed    Insulin       insulin aspart (NOVOLOG FLEXPEN) 100 UNIT/ML pen    Inject 15 units with breakfast, 20 units with lunch and 20 units with dinner, plus correction scale. dose premeal as directed, TDD: approx 100 units     insulin degludec (TRESIBA FLEXTOUCH) 200 UNIT/ML pen    Inject 25 Units Subcutaneous daily      Tresiba --> 27 units  Novolog: 15-20-22-0    BG/Food Log:   Date Breakfast  Lunch  Dinner  Bedtime    Before After Before After Before After    3/10 High  251       3/9 High  142  239  High   3/8 263  207  156  High   3/7 232  154  228  High   3/6 268  210  285  High   3/5 300's  269  131  High   3/4 191  191  161  High   3/3 244  213  245  87       Assessment:  Overall, BG are elevated. Recommend increasing Novolog dose with dinner and Tresiba dose    Plan/Response:  Recommend increase to insulin -   Novolog: 15-20-20-0 --> 15-20-22-0  Tresiba: 0-0-0-25 --> 0-0-0-27    Tiffanie Mcfarland, KARLOS, LD, CDE  Time spent: 9 minutes    Any diabetes medication dose changes were made via the CDE Protocol and Collaborative Practice Agreement with the patient's referring provider. A copy of this encounter was shared with the provider.

## 2021-03-12 RX ORDER — INSULIN ASPART 100 [IU]/ML
INJECTION, SOLUTION INTRAVENOUS; SUBCUTANEOUS
Qty: 30 ML | Refills: 11 | Status: SHIPPED | OUTPATIENT
Start: 2021-03-12 | End: 2021-03-31

## 2021-03-23 ENCOUNTER — MYC MEDICAL ADVICE (OUTPATIENT)
Dept: FAMILY MEDICINE | Facility: CLINIC | Age: 61
End: 2021-03-23

## 2021-03-26 ENCOUNTER — OFFICE VISIT (OUTPATIENT)
Dept: PODIATRY | Facility: CLINIC | Age: 61
End: 2021-03-26
Payer: MEDICARE

## 2021-03-26 VITALS — HEART RATE: 107 BPM | OXYGEN SATURATION: 96 % | DIASTOLIC BLOOD PRESSURE: 102 MMHG | SYSTOLIC BLOOD PRESSURE: 176 MMHG

## 2021-03-26 DIAGNOSIS — L60.0 INGROWN TOENAIL: ICD-10-CM

## 2021-03-26 DIAGNOSIS — E10.628 TYPE 1 DIABETES MELLITUS WITH PRESSURE CALLUS (H): ICD-10-CM

## 2021-03-26 DIAGNOSIS — L84 TYPE 1 DIABETES MELLITUS WITH PRESSURE CALLUS (H): ICD-10-CM

## 2021-03-26 DIAGNOSIS — L84 TYLOMA: ICD-10-CM

## 2021-03-26 DIAGNOSIS — L60.2 ONYCHAUXIS: Primary | ICD-10-CM

## 2021-03-26 DIAGNOSIS — E10.42 DM TYPE 1 WITH DIABETIC PERIPHERAL NEUROPATHY (H): ICD-10-CM

## 2021-03-26 PROCEDURE — 99213 OFFICE O/P EST LOW 20 MIN: CPT | Performed by: PODIATRIST

## 2021-03-26 NOTE — LETTER
3/26/2021         RE: Lexy Rockwell  [address]  [place] [address]        Dear Colleague,    Thank you for referring your patient, Lexy Rockwell, to the [facility]. Please see a copy of my visit note below.    Past Medical History:   Diagnosis Date     Cerumen impacted      Development delay      Diabetic gastroparesis (H) 8/16/2013     Glaucoma (increased eye pressure)      Hyperlipaemia      Hypertension      Hypothyroid      Mental retardation      Nonsenile cataract      Obesity      Type 1 diabetes mellitus not at goal (H)      Patient Active Problem List   Diagnosis     Development delay     Overactive bladder     GERD (gastroesophageal reflux disease)     Hypertension goal BP (blood pressure) < 140/90     Hyperlipidemia LDL goal <100     Pain in joint, lower leg     Proteinuria     Vitamin D deficiency     Health Care Home     History of strabismus surgery     Type 1 diabetes mellitus with other specified complication (H)     Advanced directives, counseling/discussion     Primary open angle glaucoma of both eyes, moderate stage     Gastroesophageal reflux disease without esophagitis     Obesity, unspecified obesity severity, unspecified obesity type     Acute renal failure (H)     Acute retention of urine     JEAN (acute kidney injury) (H)     Dehydration     Diabetic ketoacidosis without coma associated with type 1 diabetes mellitus (H)     DKA (diabetic ketoacidoses) (H)     Sinus tachycardia     SIRS (systemic inflammatory response syndrome) (H)     Obesity (BMI 35.0-39.9) with comorbidity (H)     Past Surgical History:   Procedure Laterality Date     C EYE SURG ANT SGMT PROC UNLISTED  remote    strabismus surgery     STRABISMUS SURGERY       Social History     Socioeconomic History     Marital status: Single     Spouse name: Not on file     Number of children: Not on file     Years of education: Not on file     Highest education  level: Not on file   Occupational History     Not on file   Social Needs     Financial resource strain: Not on file     Food insecurity     Worry: Not on file     Inability: Not on file     Transportation needs     Medical: Not on file     Non-medical: Not on file   Tobacco Use     Smoking status: Never Smoker     Smokeless tobacco: Never Used     Tobacco comment: lives in smoke free household.   Substance and Sexual Activity     Alcohol use: Yes     Comment: occass social     Drug use: No     Sexual activity: Never   Lifestyle     Physical activity     Days per week: Not on file     Minutes per session: Not on file     Stress: Not on file   Relationships     Social connections     Talks on phone: Not on file     Gets together: Not on file     Attends Zoroastrian service: Not on file     Active member of club or organization: Not on file     Attends meetings of clubs or organizations: Not on file     Relationship status: Not on file     Intimate partner violence     Fear of current or ex partner: Not on file     Emotionally abused: Not on file     Physically abused: Not on file     Forced sexual activity: Not on file   Other Topics Concern     Parent/sibling w/ CABG, MI or angioplasty before 65F 55M? No   Social History Narrative     Not on file     Family History   Problem Relation Age of Onset     Hypertension Father      Diabetes Sister      Glaucoma Maternal Grandfather      Cancer No family hx of      Cerebrovascular Disease No family hx of      Thyroid Disease No family hx of      Macular Degeneration No family hx of      Lab Results   Component Value Date    A1C 9.7 02/01/2021    A1C 9.2 09/30/2020    A1C 11.8 01/08/2020    A1C 11.8 05/19/2019    A1C 11.2 12/12/2018     SUBJECTIVE FINDINGS:  A 60-year-old female returns to clinic for onychomycosis, onychauxis and tyloma.  She is diabetic with peripheral neuropathy and vascular disease.  She relates she has numbness, tingling and neuropathy in her feet.  She  relates to no ulcers or sores since we have seen her last.  She relates the callus on her left first MPJ is painful.  It helps to have it trimmed down but then it builds up and becomes sore again.       OBJECTIVE FINDINGS:  DP and PT 2/4 bilaterally.  She has some peripheral edema bilaterally and decreased hair growth bilaterally.  There is no erythema, no drainage, no odor, no calor bilaterally.  She has nucleated hyperkeratotic tissue buildup with some cracking on the left plantar first MPJ, plantar medial left hallux and distal left second toe.  There is incurvated nails bilaterally to differing degrees with right lateral Hallux nail border ingrown.        ASSESSMENT/PLAN:  Onychauxis bilaterally, tylomas on the left foot causing pain.  She is diabetic with peripheral neuropathy and peripheral vascular disease.  Diagnosis and treatment options discussed with her.  All the nails were reduced bilaterally upon consent.  Bacitracin and bandaid applied to right Hallux nail border and use discussed with her.  The tylomas on the left foot were sharp debrided with a tissue cutter upon consent.  She will return to clinic and see me in about 2-3 months.  Previous notes reviewed.      Again, thank you for allowing me to participate in the care of your patient.        Sincerely,        Ravin Back DPM

## 2021-03-26 NOTE — PROGRESS NOTES
Past Medical History:   Diagnosis Date     Cerumen impacted      Development delay      Diabetic gastroparesis (H) 8/16/2013     Glaucoma (increased eye pressure)      Hyperlipaemia      Hypertension      Hypothyroid      Mental retardation      Nonsenile cataract      Obesity      Type 1 diabetes mellitus not at goal (H)      Patient Active Problem List   Diagnosis     Development delay     Overactive bladder     GERD (gastroesophageal reflux disease)     Hypertension goal BP (blood pressure) < 140/90     Hyperlipidemia LDL goal <100     Pain in joint, lower leg     Proteinuria     Vitamin D deficiency     Health Care Home     History of strabismus surgery     Type 1 diabetes mellitus with other specified complication (H)     Advanced directives, counseling/discussion     Primary open angle glaucoma of both eyes, moderate stage     Gastroesophageal reflux disease without esophagitis     Obesity, unspecified obesity severity, unspecified obesity type     Acute renal failure (H)     Acute retention of urine     JEAN (acute kidney injury) (H)     Dehydration     Diabetic ketoacidosis without coma associated with type 1 diabetes mellitus (H)     DKA (diabetic ketoacidoses) (H)     Sinus tachycardia     SIRS (systemic inflammatory response syndrome) (H)     Obesity (BMI 35.0-39.9) with comorbidity (H)     Past Surgical History:   Procedure Laterality Date     C EYE SURG ANT SGMT PROC UNLISTED  remote    strabismus surgery     STRABISMUS SURGERY       Social History     Socioeconomic History     Marital status: Single     Spouse name: Not on file     Number of children: Not on file     Years of education: Not on file     Highest education level: Not on file   Occupational History     Not on file   Social Needs     Financial resource strain: Not on file     Food insecurity     Worry: Not on file     Inability: Not on file     Transportation needs     Medical: Not on file     Non-medical: Not on file   Tobacco Use      Smoking status: Never Smoker     Smokeless tobacco: Never Used     Tobacco comment: lives in smoke free household.   Substance and Sexual Activity     Alcohol use: Yes     Comment: occass social     Drug use: No     Sexual activity: Never   Lifestyle     Physical activity     Days per week: Not on file     Minutes per session: Not on file     Stress: Not on file   Relationships     Social connections     Talks on phone: Not on file     Gets together: Not on file     Attends Faith service: Not on file     Active member of club or organization: Not on file     Attends meetings of clubs or organizations: Not on file     Relationship status: Not on file     Intimate partner violence     Fear of current or ex partner: Not on file     Emotionally abused: Not on file     Physically abused: Not on file     Forced sexual activity: Not on file   Other Topics Concern     Parent/sibling w/ CABG, MI or angioplasty before 65F 55M? No   Social History Narrative     Not on file     Family History   Problem Relation Age of Onset     Hypertension Father      Diabetes Sister      Glaucoma Maternal Grandfather      Cancer No family hx of      Cerebrovascular Disease No family hx of      Thyroid Disease No family hx of      Macular Degeneration No family hx of      Lab Results   Component Value Date    A1C 9.7 02/01/2021    A1C 9.2 09/30/2020    A1C 11.8 01/08/2020    A1C 11.8 05/19/2019    A1C 11.2 12/12/2018     SUBJECTIVE FINDINGS:  A 60-year-old female returns to clinic for onychomycosis, onychauxis and tyloma.  She is diabetic with peripheral neuropathy and vascular disease.  She relates she has numbness, tingling and neuropathy in her feet.  She relates to no ulcers or sores since we have seen her last.  She relates the callus on her left first MPJ is painful.  It helps to have it trimmed down but then it builds up and becomes sore again.       OBJECTIVE FINDINGS:  DP and PT 2/4 bilaterally.  She has some peripheral edema  bilaterally and decreased hair growth bilaterally.  There is no erythema, no drainage, no odor, no calor bilaterally.  She has nucleated hyperkeratotic tissue buildup with some cracking on the left plantar first MPJ, plantar medial left hallux and distal left second toe.  There is incurvated nails bilaterally to differing degrees with right lateral Hallux nail border ingrown.        ASSESSMENT/PLAN:  Onychauxis bilaterally, tylomas on the left foot causing pain.  She is diabetic with peripheral neuropathy and peripheral vascular disease.  Diagnosis and treatment options discussed with her.  All the nails were reduced bilaterally upon consent.  Bacitracin and bandaid applied to right Hallux nail border and use discussed with her.  The tylomas on the left foot were sharp debrided with a tissue cutter upon consent.  She will return to clinic and see me in about 2-3 months.  Previous notes reviewed.

## 2021-03-26 NOTE — PATIENT INSTRUCTIONS
Thanks for coming today.  [facility]  [address] [place] [address]    To schedule future appointments in Ortho Clinic, you may call [phone].    To schedule ordered imaging by your provider:   Call [facility] [phone]    To schedule an injection ordered by your provider:  Call [facility] Injection scheduling line: [phone]  [email or web address] available online at:  [email or web address]    Please call if any further questions or concerns ([phone]).  Clinic hours 8 am to 5 pm.    Return to clinic (call) if symptoms worsen or fail to improve.

## 2021-03-26 NOTE — NURSING NOTE
Lexy Rockwell's chief complaint for this visit includes:  Chief Complaint   Patient presents with     Left Foot - RECHECK     Right Foot - RECHECK     PCP: Fredi Fuentes    Referring Provider:  No referring provider defined for this encounter.    BP (!) 176/102   Pulse 107   LMP 03/28/2014   SpO2 96%   Data Unavailable     Do you need any medication refills at today's visit? No

## 2021-03-30 ENCOUNTER — MYC MEDICAL ADVICE (OUTPATIENT)
Dept: OPHTHALMOLOGY | Facility: CLINIC | Age: 61
End: 2021-03-30

## 2021-03-31 ENCOUNTER — OFFICE VISIT (OUTPATIENT)
Dept: OPHTHALMOLOGY | Facility: CLINIC | Age: 61
End: 2021-03-31
Payer: MEDICARE

## 2021-03-31 ENCOUNTER — VIRTUAL VISIT (OUTPATIENT)
Dept: EDUCATION SERVICES | Facility: CLINIC | Age: 61
End: 2021-03-31
Payer: MEDICARE

## 2021-03-31 DIAGNOSIS — H40.1132 PRIMARY OPEN ANGLE GLAUCOMA OF BOTH EYES, MODERATE STAGE: ICD-10-CM

## 2021-03-31 DIAGNOSIS — Z01.01 ENCOUNTER FOR EXAMINATION OF EYES AND VISION WITH ABNORMAL FINDINGS: ICD-10-CM

## 2021-03-31 DIAGNOSIS — E10.40 TYPE 1 DIABETES MELLITUS WITH DIABETIC NEUROPATHY (H): ICD-10-CM

## 2021-03-31 DIAGNOSIS — E10.69 TYPE 1 DIABETES MELLITUS WITH OTHER SPECIFIED COMPLICATION (H): ICD-10-CM

## 2021-03-31 DIAGNOSIS — Z98.890 HISTORY OF STRABISMUS SURGERY: ICD-10-CM

## 2021-03-31 DIAGNOSIS — Z79.4 TYPE 2 DIABETES MELLITUS WITH DIABETIC POLYNEUROPATHY, WITH LONG-TERM CURRENT USE OF INSULIN (H): Primary | ICD-10-CM

## 2021-03-31 DIAGNOSIS — H52.4 PRESBYOPIA: ICD-10-CM

## 2021-03-31 DIAGNOSIS — E11.42 TYPE 2 DIABETES MELLITUS WITH DIABETIC POLYNEUROPATHY, WITH LONG-TERM CURRENT USE OF INSULIN (H): ICD-10-CM

## 2021-03-31 DIAGNOSIS — H25.813 COMBINED FORMS OF AGE-RELATED CATARACT OF BOTH EYES: ICD-10-CM

## 2021-03-31 DIAGNOSIS — E11.3299 BACKGROUND DIABETIC RETINOPATHY (H): ICD-10-CM

## 2021-03-31 DIAGNOSIS — E11.42 TYPE 2 DIABETES MELLITUS WITH DIABETIC POLYNEUROPATHY, WITH LONG-TERM CURRENT USE OF INSULIN (H): Primary | ICD-10-CM

## 2021-03-31 DIAGNOSIS — Z79.4 TYPE 2 DIABETES MELLITUS WITH DIABETIC POLYNEUROPATHY, WITH LONG-TERM CURRENT USE OF INSULIN (H): ICD-10-CM

## 2021-03-31 PROCEDURE — 92015 DETERMINE REFRACTIVE STATE: CPT | Mod: GY | Performed by: OPHTHALMOLOGY

## 2021-03-31 PROCEDURE — 98966 PH1 ASSMT&MGMT NQHP 5-10: CPT | Mod: 95

## 2021-03-31 PROCEDURE — 92014 COMPRE OPH EXAM EST PT 1/>: CPT | Performed by: OPHTHALMOLOGY

## 2021-03-31 RX ORDER — INSULIN ASPART 100 [IU]/ML
INJECTION, SOLUTION INTRAVENOUS; SUBCUTANEOUS
Qty: 30 ML | Refills: 11 | Status: SHIPPED | OUTPATIENT
Start: 2021-03-31 | End: 2021-05-24

## 2021-03-31 ASSESSMENT — REFRACTION_MANIFEST
OD_CYLINDER: +0.75
OD_AXIS: 180
OS_CYLINDER: +1.00
OS_AXIS: 180
OD_ADD: +2.75
OS_SPHERE: -1.00
OD_SPHERE: -1.25
OS_ADD: +2.75

## 2021-03-31 ASSESSMENT — REFRACTION_WEARINGRX
OS_AXIS: 145
OS_ADD: +2.75
OD_CYLINDER: +1.00
OD_AXIS: 161
OS_SPHERE: -1.50
OS_CYLINDER: +0.75
OD_ADD: +2.75
OD_SPHERE: -1.25
SPECS_TYPE: PAL

## 2021-03-31 ASSESSMENT — VISUAL ACUITY
OD_CC: 20/40
OS_CC: 20/60
METHOD: SNELLEN - LINEAR
OS_PH_CC: 20/50
CORRECTION_TYPE: GLASSES

## 2021-03-31 ASSESSMENT — SLIT LAMP EXAM - LIDS
COMMENTS: NORMAL
COMMENTS: NORMAL

## 2021-03-31 ASSESSMENT — TONOMETRY
OS_IOP_MMHG: 17
OD_IOP_MMHG: 19
IOP_METHOD: APPLANATION

## 2021-03-31 ASSESSMENT — CONF VISUAL FIELD
OD_INFERIOR_TEMPORAL_RESTRICTION: 3
OS_NORMAL: 1
METHOD: COUNTING FINGERS

## 2021-03-31 ASSESSMENT — CUP TO DISC RATIO
OS_RATIO: 0.8
OD_RATIO: 0.7

## 2021-03-31 ASSESSMENT — EXTERNAL EXAM - LEFT EYE: OS_EXAM: PROLAPSED FAT PADS: UPPER, LOWER

## 2021-03-31 ASSESSMENT — EXTERNAL EXAM - RIGHT EYE: OD_EXAM: PROLAPSED FAT PADS: UPPER, LOWER

## 2021-03-31 NOTE — LETTER
3/31/2021         RE: Lexy Rockwell  [address]  [place] [address]        Dear Colleague,    Thank you for referring your patient, Lexy Rockwell, to the [facility]. Please see a copy of my visit note below.     Current Eye Medications:  Latanoprost at bedtime both eyes, last took at 8 am this morning. Forgot to take last night. Thera tears preservative free as needed, last took 2-3 days ago.     Subjective:  Diabetic eye exam. Vision is doing well both eyes distance and near. Patient is finally getting used to progressive lens, doesn't have to take glasses of to read anymore. Left eye has been dry, itchy, red, and ruff feeling last few weeks (would like to know if she should continue using the thera tears preservative free for irritation). Not having any eye pain either eye.  Diabetic for a few years. Blood sugar has been doing well today.     Lab Results   Component Value Date    A1C 9.7 02/01/2021    A1C 9.2 09/30/2020    A1C 11.8 01/08/2020    A1C 11.8 05/19/2019    A1C 11.2 12/12/2018        Objective:  See Ophthalmology Exam.       Assessment:  Stable intraocular pressure and discs both eyes in patient with moderate open angle glaucoma.  Stable mild to moderate background diabetic retinopathy both eyes.      ICD-10-CM    1. Type 2 diabetes mellitus with diabetic polyneuropathy, with long-term current use of insulin (H)  E11.42     Z79.4    2. Background diabetic retinopathy, mild-mod, ou  E11.3299    3. Primary open angle glaucoma of both eyes, moderate stage  H40.1132    4. Combined forms of age-related cataract, mild, of both eyes  H25.813    5. History of strabismus surgery  Z98.890    6. Encounter for examination of eyes and vision with abnormal findings  Z01.01    7. Presbyopia  H52.4 REFRACTION     EYE EXAM (SIMPLE-NONBILLABLE)        Plan:  Continue Latanoprost at bedtime both eyes   Continue Thera tears preservative free drops as  needed   Glasses Rx given - optional   Return visit 6 months for intraocular pressure check, glaucoma OCT, retinal OCT and Beard Visual Field.  Dayron Rios M.D.  [phone]               Again, thank you for allowing me to participate in the care of your patient.        Sincerely,        Dayron Rios MD

## 2021-03-31 NOTE — PROGRESS NOTES
Current Eye Medications:  Latanoprost at bedtime both eyes, last took at 8 am this morning. Forgot to take last night. Thera tears preservative free as needed, last took 2-3 days ago.     Subjective:  Diabetic eye exam. Vision is doing well both eyes distance and near. Patient is finally getting used to progressive lens, doesn't have to take glasses of to read anymore. Left eye has been dry, itchy, red, and ruff feeling last few weeks (would like to know if she should continue using the thera tears preservative free for irritation). Not having any eye pain either eye.  Diabetic for a few years. Blood sugar has been doing well today.     Lab Results   Component Value Date    A1C 9.7 02/01/2021    A1C 9.2 09/30/2020    A1C 11.8 01/08/2020    A1C 11.8 05/19/2019    A1C 11.2 12/12/2018        Objective:  See Ophthalmology Exam.       Assessment:  Stable intraocular pressure and discs both eyes in patient with moderate open angle glaucoma.  Stable mild to moderate background diabetic retinopathy both eyes.      ICD-10-CM    1. Type 2 diabetes mellitus with diabetic polyneuropathy, with long-term current use of insulin (H)  E11.42     Z79.4    2. Background diabetic retinopathy, mild-mod, ou  E11.3299    3. Primary open angle glaucoma of both eyes, moderate stage  H40.1132    4. Combined forms of age-related cataract, mild, of both eyes  H25.813    5. History of strabismus surgery  Z98.890    6. Encounter for examination of eyes and vision with abnormal findings  Z01.01    7. Presbyopia  H52.4 REFRACTION     EYE EXAM (SIMPLE-NONBILLABLE)        Plan:  Continue Latanoprost at bedtime both eyes   Continue Thera tears preservative free drops as needed   Glasses Rx given - optional   Return visit 6 months for intraocular pressure check, glaucoma OCT, retinal OCT and Beard Visual Field.  Dayron Rios M.D.  [phone]

## 2021-03-31 NOTE — PATIENT INSTRUCTIONS
Continue Latanoprost at bedtime both eyes   Continue Thera tears preservative free drops as needed   Glasses Rx given - optional   Return visit 6 months for intraocular pressure check, glaucoma OCT, retinal OCT and Beard Visual Field.  Dayron Rios M.D.  [phone]    Patient Education   Diabetes weakens the blood vessels all over the body, including the eyes. Damage to the blood vessels in the eyes can cause swelling or bleeding into part of the eye (called the retina). This is called diabetic retinopathy (DAQUAN-tin--[facility]-thee). If not treated, this disease can cause vision loss or blindness.   Symptoms may include blurred or distorted vision, but many people have no symptoms. It's important to see your eye doctor regularly to check for problems.   Early treatment and good control can help protect your vision. Here are the things you can do to help prevent vision loss:      1. Keep your blood sugar levels under tight control.      2. Bring high blood pressure under control.      3. No smoking.      4. Have yearly dilated eye exams.

## 2021-03-31 NOTE — PROGRESS NOTES
Diabetes Follow-up    Subjective/Objective:    Lexy Rockwell sent in blood glucose log for review. Last date of communication was: 3/10.    Diabetes is being managed with   Diabetes Medications   Diabetes Medication(s)     Biguanides       metFORMIN (GLUCOPHAGE-XR) 500 MG 24 hr tablet    Take 4-tablets by mouth daily as directed    Insulin       insulin aspart (NOVOLOG FLEXPEN) 100 UNIT/ML pen    Inject 15 units with breakfast, 20 units with lunch and 22 units with dinner, plus correction scale. dose premeal as directed, TDD: approx 100 units     insulin degludec (TRESIBA FLEXTOUCH) 200 UNIT/ML pen    Inject 27 Units Subcutaneous daily          BG/Food Log:   Date Breakfast  Lunch  Dinner  Bedtime    Before After Before After Before After    3/31 180  133       3/30 239  144  52 -> 101  High   3/29 207  124  253  High   3/28 252  164  180  HIgh   3/27 206  187  185  High   3/26 High  High  115  257   3/25 134  133  189  High   3/24 High  277  271  208   3/23 High  152  273  High     High: 300 mg/dl     Assessment:    Fasting blood glucose: 0% in target.  Before lunch glucose: 11% in target.   Before dinner glucose: 12% in target. (1 low)  Bedtime glucose: 0% in target.    Plan/Response:  Tresiba: 0-0-0-27 --> 0-0-0-29  Novolog: 15-20-22-0 --> 15-20-24-0    Tiffanie Mcfarland RD, LD, CDE  Time spent: 10 minutes    Any diabetes medication dose changes were made via the CDE Protocol and Collaborative Practice Agreement with the patient's referring provider. A copy of this encounter was shared with the provider.

## 2021-04-01 ENCOUNTER — IMMUNIZATION (OUTPATIENT)
Dept: NURSING | Facility: CLINIC | Age: 61
End: 2021-04-01
Payer: MEDICARE

## 2021-04-01 PROCEDURE — 0001A PR COVID VAC PFIZER DIL RECON 30 MCG/0.3 ML IM: CPT

## 2021-04-01 PROCEDURE — 91300 PR COVID VAC PFIZER DIL RECON 30 MCG/0.3 ML IM: CPT

## 2021-04-03 PROBLEM — H25.813 COMBINED FORMS OF AGE-RELATED CATARACT OF BOTH EYES: Status: ACTIVE | Noted: 2021-04-03

## 2021-04-03 PROBLEM — E11.3299 BACKGROUND DIABETIC RETINOPATHY (H): Status: ACTIVE | Noted: 2021-04-03

## 2021-04-22 ENCOUNTER — IMMUNIZATION (OUTPATIENT)
Dept: NURSING | Facility: CLINIC | Age: 61
End: 2021-04-22
Attending: FAMILY MEDICINE
Payer: MEDICARE

## 2021-04-22 PROCEDURE — 0002A PR COVID VAC PFIZER DIL RECON 30 MCG/0.3 ML IM: CPT

## 2021-04-22 PROCEDURE — 91300 PR COVID VAC PFIZER DIL RECON 30 MCG/0.3 ML IM: CPT

## 2021-05-17 ENCOUNTER — MYC MEDICAL ADVICE (OUTPATIENT)
Dept: FAMILY MEDICINE | Facility: CLINIC | Age: 61
End: 2021-05-17

## 2021-05-21 ENCOUNTER — VIRTUAL VISIT (OUTPATIENT)
Dept: EDUCATION SERVICES | Facility: CLINIC | Age: 61
End: 2021-05-21
Payer: MEDICARE

## 2021-05-21 DIAGNOSIS — E10.40 TYPE 1 DIABETES MELLITUS WITH DIABETIC NEUROPATHY (H): ICD-10-CM

## 2021-05-21 DIAGNOSIS — E10.69 TYPE 1 DIABETES MELLITUS WITH OTHER SPECIFIED COMPLICATION (H): ICD-10-CM

## 2021-05-21 DIAGNOSIS — Z79.4 TYPE 2 DIABETES MELLITUS WITH DIABETIC POLYNEUROPATHY, WITH LONG-TERM CURRENT USE OF INSULIN (H): ICD-10-CM

## 2021-05-21 DIAGNOSIS — E11.42 TYPE 2 DIABETES MELLITUS WITH DIABETIC POLYNEUROPATHY, WITH LONG-TERM CURRENT USE OF INSULIN (H): ICD-10-CM

## 2021-05-21 PROCEDURE — 98967 PH1 ASSMT&MGMT NQHP 11-20: CPT | Mod: 95

## 2021-05-21 NOTE — Clinical Note
Just FYI! Lexy is coming to the clinic to see me for our next appointment.   Tiffanie Mcfarland, RD, LD, CDE

## 2021-05-21 NOTE — PROGRESS NOTES
Diabetes Follow-up    Subjective/Objective:    Telephone visit for BG review. Last date of communication was: 3/31.    Diabetes is being managed with   Diabetes Medications   Diabetes Medication(s)     Biguanides       metFORMIN (GLUCOPHAGE-XR) 500 MG 24 hr tablet    Take 4-tablets by mouth daily as directed    Insulin       insulin aspart (NOVOLOG FLEXPEN) 100 UNIT/ML pen    Inject 15 units with breakfast, 20 units with lunch and 24 units with dinner, plus correction scale. dose premeal as directed, TDD: approx 100 units     insulin degludec (TRESIBA FLEXTOUCH) 200 UNIT/ML pen    Inject 29 Units Subcutaneous daily          BG/Food Log:   Date Breakfast  Lunch  Dinner  Bedtime    Before After Before After Before After    5/21 258  220       5/20 320  307  394  345   5/19 242  209       5/18 200  249  174     5/17 179  143  277  300   5/16 247  249  220  332   5/15 386  300's  338      300  299  HIGH  HIGH   5/13 338  300  HIGH  HIGH    5/12 288  155  304     5/11 321  145  238  334         Assessment:  Overall BG are elevated. Lexy states that nothing has changed in her diet or lifestyle. Recommended insulin increase    Plan/Response:  See Patient Instructions for co-developed, patient-stated behavior change goals.    Tresiba: 0-0-0-29 --> 0-0-0-32  Novolog: 15-20-24-0 --> 16-22-26-0     Tiffanie Mcfarland, KAROLS, LD, CDE  Time spent: 11 minutes    Any diabetes medication dose changes were made via the CDE Protocol and Collaborative Practice Agreement with the patient's referring provider. A copy of this encounter was shared with the provider.

## 2021-05-23 ENCOUNTER — HEALTH MAINTENANCE LETTER (OUTPATIENT)
Age: 61
End: 2021-05-23

## 2021-05-24 RX ORDER — INSULIN ASPART 100 [IU]/ML
INJECTION, SOLUTION INTRAVENOUS; SUBCUTANEOUS
Qty: 30 ML | Refills: 11 | Status: SHIPPED | OUTPATIENT
Start: 2021-05-24 | End: 2021-12-17

## 2021-05-24 RX ORDER — INSULIN DEGLUDEC 200 U/ML
32 INJECTION, SOLUTION SUBCUTANEOUS DAILY
Qty: 15 ML | Refills: 11 | Status: SHIPPED | OUTPATIENT
Start: 2021-05-24 | End: 2021-12-17

## 2021-05-24 NOTE — PATIENT INSTRUCTIONS
Increase insulin to:  Tresiba: 32 units  Novolo units with breakfast  22 units with lunch  26 units with dinner    Tiffanie Mcfarland RD, LD, CDE

## 2021-06-01 DIAGNOSIS — E10.69 TYPE 1 DIABETES MELLITUS WITH OTHER SPECIFIED COMPLICATION (H): ICD-10-CM

## 2021-06-02 RX ORDER — PROCHLORPERAZINE 25 MG/1
SUPPOSITORY RECTAL
Qty: 3 EACH | Refills: 4 | Status: SHIPPED | OUTPATIENT
Start: 2021-06-02 | End: 2021-11-16

## 2021-06-04 ENCOUNTER — TELEPHONE (OUTPATIENT)
Dept: ENDOCRINOLOGY | Facility: CLINIC | Age: 61
End: 2021-06-04

## 2021-06-04 NOTE — TELEPHONE ENCOUNTER
Patient called stating she needs a prescription for the DEXCOM sensors. She spoke with the pharmacy and they stated that authorization was needed from our office. A new prescription for the sensors was sent on 6/2/21. I contacted [facility] and was informed that they are waiting on chart notes to be faxed to Medicare.     Endocrine MA, please fax chart notes to Medicare.     RN also scheduled patient a follow up visit on 6/30/21 as it has been almost 6 months since last visit on 12/9/20 and Medicare requires visits every 6 months.    Eidl GIFFORD RN....6/4/2021 11:13 AM

## 2021-06-21 ENCOUNTER — OFFICE VISIT (OUTPATIENT)
Dept: FAMILY MEDICINE | Facility: CLINIC | Age: 61
End: 2021-06-21
Payer: MEDICARE

## 2021-06-21 VITALS
BODY MASS INDEX: 32.18 KG/M2 | HEIGHT: 63 IN | WEIGHT: 181.63 LBS | DIASTOLIC BLOOD PRESSURE: 76 MMHG | SYSTOLIC BLOOD PRESSURE: 120 MMHG | TEMPERATURE: 97.9 F | HEART RATE: 120 BPM | OXYGEN SATURATION: 96 %

## 2021-06-21 DIAGNOSIS — Z23 ENCOUNTER FOR IMMUNIZATION: ICD-10-CM

## 2021-06-21 DIAGNOSIS — K21.9 GASTROESOPHAGEAL REFLUX DISEASE, UNSPECIFIED WHETHER ESOPHAGITIS PRESENT: ICD-10-CM

## 2021-06-21 DIAGNOSIS — N32.81 OVERACTIVE BLADDER: ICD-10-CM

## 2021-06-21 DIAGNOSIS — I10 ESSENTIAL HYPERTENSION WITH GOAL BLOOD PRESSURE LESS THAN 140/90: ICD-10-CM

## 2021-06-21 DIAGNOSIS — R62.50 DEVELOPMENT DELAY: ICD-10-CM

## 2021-06-21 DIAGNOSIS — E10.69 TYPE 1 DIABETES MELLITUS WITH OTHER SPECIFIED COMPLICATION (H): Primary | ICD-10-CM

## 2021-06-21 PROCEDURE — 90714 TD VACC NO PRESV 7 YRS+ IM: CPT | Performed by: FAMILY MEDICINE

## 2021-06-21 PROCEDURE — 90471 IMMUNIZATION ADMIN: CPT | Performed by: FAMILY MEDICINE

## 2021-06-21 PROCEDURE — G0010 ADMIN HEPATITIS B VACCINE: HCPCS | Mod: 59 | Performed by: FAMILY MEDICINE

## 2021-06-21 PROCEDURE — 90746 HEPB VACCINE 3 DOSE ADULT IM: CPT | Performed by: FAMILY MEDICINE

## 2021-06-21 PROCEDURE — 99207 PR FOOT EXAM NO CHARGE: CPT | Performed by: FAMILY MEDICINE

## 2021-06-21 PROCEDURE — 99214 OFFICE O/P EST MOD 30 MIN: CPT | Mod: 25 | Performed by: FAMILY MEDICINE

## 2021-06-21 RX ORDER — LISINOPRIL 20 MG/1
TABLET ORAL
Qty: 180 TABLET | Refills: 1 | Status: SHIPPED | OUTPATIENT
Start: 2021-06-21 | End: 2022-10-07

## 2021-06-21 RX ORDER — OXYBUTYNIN CHLORIDE 10 MG/1
10 TABLET, EXTENDED RELEASE ORAL DAILY
Qty: 90 TABLET | Refills: 1 | Status: SHIPPED | OUTPATIENT
Start: 2021-06-21 | End: 2022-08-17

## 2021-06-21 ASSESSMENT — MIFFLIN-ST. JEOR: SCORE: 1371

## 2021-06-21 NOTE — PROGRESS NOTES
Subjective   Lexy is a 60 year old who presents for the following health issues     HPI     Medication Followup of omeprazole    Taking Medication as prescribed: yes    Side Effects:  None  Medication Helping Symptoms:  Yes, Wanting to know if she can take the Tums with the omeprazole?     Has forms to be completed for [employer].          Review of Systems   Needs to reup [employer],  About to     Taking omeprazole    And tums     Takes 1-2 tums at night as needed    Seeing endocrinology for the diabetes    Sugars up and down    Overall feeling okay    No hospitalization for a year    Using dexcom    No recent dka    Walking at least two times per week    Wt down on purpose    Eating good              Objective    LMP 2014   There is no height or weight on file to calculate BMI.  Physical Exam  Constitutional:       Appearance: She is well-developed.   HENT:      Head: Normocephalic and atraumatic.   Eyes:      Conjunctiva/sclera: Conjunctivae normal.   Neck:      Vascular: No carotid bruit.   Cardiovascular:      Rate and Rhythm: Normal rate and regular rhythm.      Heart sounds: Normal heart sounds.   Pulmonary:      Effort: Pulmonary effort is normal. No respiratory distress.      Breath sounds: Normal breath sounds.   Neurological:      Mental Status: She is alert and oriented to person, place, and time.      abd soft  Foot exam okay bilat        ASSESSMENT / PLAN:  (E10.69) Type 1 diabetes mellitus with other specified complication (H)  (primary encounter diagnosis)  Comment: last hemoglobin a1c high but at least no dka in last year.    Plan: FOOT EXAM        meds per endocrinology and diab ed.     (K21.9) Gastroesophageal reflux disease, unspecified whether esophagitis present  Comment: refill med.  tums okay.   Also we agree probiotic is good idea, and she does like yogurt so eat this on regular basis.   Plan: omeprazole (PRILOSEC) 20 MG DR capsule             (Z23) Encounter  for immunization  Comment: needs; given   Plan: HEPATITIS B VACCINE, ADULT, IM, SCREENING         QUESTIONS FOR ADULT IMMUNIZATIONS, TD PRESERV         FREE, IM (7+ YRS)             (I10) Essential hypertension with goal blood pressure less than 140/90  Comment: at goal on recheck   Plan: lisinopril (ZESTRIL) 20 MG tablet             (N32.81) Overactive bladder  Comment: needs refill soon   Plan: oxybutynin ER (DITROPAN-XL) 10 MG 24 hr tablet             (R62.50) Development delay  Comment: lifelong.    Plan: completed [employer] form today       I reviewed the patient's medications, allergies, medical history, family history, and social history.    Fredi Fuentes MD

## 2021-06-21 NOTE — NURSING NOTE
Vaccine information supplied.  Due to injection administration, patient instructed to remain in clinic for 15 minutes  afterwards, and to report any adverse reaction to me immediately.  Marlin Tiraod CMA

## 2021-06-21 NOTE — PATIENT INSTRUCTIONS
Keep working on healthy diet/exercise and wt loss    Start yogurt as probiotic    Continue other meds

## 2021-06-23 ENCOUNTER — TRANSFERRED RECORDS (OUTPATIENT)
Dept: HEALTH INFORMATION MANAGEMENT | Facility: CLINIC | Age: 61
End: 2021-06-23

## 2021-06-23 LAB — HBA1C MFR BLD: 12.6 %

## 2021-06-27 DIAGNOSIS — I10 HYPERTENSION GOAL BP (BLOOD PRESSURE) < 140/90: ICD-10-CM

## 2021-06-28 DIAGNOSIS — E11.9 TYPE 2 DIABETES, HBA1C GOAL < 8% (H): ICD-10-CM

## 2021-06-28 DIAGNOSIS — Z79.4 TYPE 2 DIABETES MELLITUS WITH DIABETIC POLYNEUROPATHY, WITH LONG-TERM CURRENT USE OF INSULIN (H): ICD-10-CM

## 2021-06-28 DIAGNOSIS — E11.42 TYPE 2 DIABETES MELLITUS WITH DIABETIC POLYNEUROPATHY, WITH LONG-TERM CURRENT USE OF INSULIN (H): ICD-10-CM

## 2021-06-28 RX ORDER — PEN NEEDLE, DIABETIC 31 GX5/16"
NEEDLE, DISPOSABLE MISCELLANEOUS
Qty: 200 EACH | Refills: 3 | Status: SHIPPED | OUTPATIENT
Start: 2021-06-28 | End: 2023-06-13

## 2021-06-28 RX ORDER — METOPROLOL SUCCINATE 25 MG/1
TABLET, EXTENDED RELEASE ORAL
Qty: 45 TABLET | Refills: 1 | Status: SHIPPED | OUTPATIENT
Start: 2021-06-28 | End: 2021-10-13

## 2021-06-28 RX ORDER — METFORMIN HCL 500 MG
TABLET, EXTENDED RELEASE 24 HR ORAL
Qty: 360 TABLET | Refills: 3 | Status: SHIPPED | OUTPATIENT
Start: 2021-06-28 | End: 2022-10-03

## 2021-06-28 NOTE — TELEPHONE ENCOUNTER
Prescription approved per [facility] Refill Protocol.    Edil GIFFORD RN....6/28/2021 4:51 PM

## 2021-06-29 ENCOUNTER — TELEPHONE (OUTPATIENT)
Dept: FAMILY MEDICINE | Facility: CLINIC | Age: 61
End: 2021-06-29

## 2021-06-29 DIAGNOSIS — R11.0 NAUSEA: Primary | ICD-10-CM

## 2021-06-29 RX ORDER — METOPROLOL SUCCINATE 25 MG/1
TABLET, EXTENDED RELEASE ORAL
Qty: 45 TABLET | Refills: 3 | OUTPATIENT
Start: 2021-06-29

## 2021-06-29 NOTE — TELEPHONE ENCOUNTER
Central Prior Authorization Team   Phone: [phone]      PA Initiation    Medication: ondansetron 8 mg-Initiated  Insurance Company: Medicare Blue - Phone [phone] Fax [phone]  Pharmacy Filling the Rx: [id number] - [place], [place] - [address] AT [facility] [place]  Filling Pharmacy Phone: [phone]  Filling Pharmacy Fax:    Start Date: 6/29/2021

## 2021-06-29 NOTE — TELEPHONE ENCOUNTER
Prior Authorization Retail Medication Request    Medication/Dose: ondansetroon 8 mg  ICD code (if different than what is on RX):  R11.0      Insurance Name:  MEDICARE  Insurance ID:  [id number]       Pharmacy Information (if different than what is on RX)  Name:  [facility]  Phone:  [phone]

## 2021-06-30 ENCOUNTER — VIRTUAL VISIT (OUTPATIENT)
Dept: FAMILY MEDICINE | Facility: CLINIC | Age: 61
End: 2021-06-30
Payer: MEDICARE

## 2021-06-30 ENCOUNTER — VIRTUAL VISIT (OUTPATIENT)
Dept: ENDOCRINOLOGY | Facility: CLINIC | Age: 61
End: 2021-06-30
Payer: MEDICARE

## 2021-06-30 DIAGNOSIS — E10.69 TYPE 1 DIABETES MELLITUS WITH OTHER SPECIFIED COMPLICATION (H): Primary | ICD-10-CM

## 2021-06-30 DIAGNOSIS — R11.0 NAUSEA: ICD-10-CM

## 2021-06-30 DIAGNOSIS — E11.3299 BACKGROUND DIABETIC RETINOPATHY (H): ICD-10-CM

## 2021-06-30 PROCEDURE — 99442 PR PHYSICIAN TELEPHONE EVALUATION 11-20 MIN: CPT | Mod: 95 | Performed by: INTERNAL MEDICINE

## 2021-06-30 PROCEDURE — 99442 PR PHYSICIAN TELEPHONE EVALUATION 11-20 MIN: CPT | Mod: 95 | Performed by: FAMILY MEDICINE

## 2021-06-30 NOTE — Clinical Note
Please schedule her for an in person visit.   Next available.   Alfa Goss MD on 6/30/2021 at 10:43 AM

## 2021-06-30 NOTE — PROGRESS NOTES
Lexy is a 60 year old who is being evaluated via a billable telephone visit.      What phone number would you like to be contacted at? [phone]  How would you like to obtain your AVS? Chanelstephen Pugh is a 60 year old who presents for the following health issues      HPI       Hospital Follow-up Visit:    Hospital/Nursing Home/IP Rehab Facility: [facility]  Date of Admission: 06/23/2021  Date of Discharge: 06/26/2021  Reason(s) for Admission: elevated blood sugars, dizzy      Was your hospitalization related to COVID-19? No   Problems taking medications regularly:  None  Medication changes since discharge: None  Problems adhering to non-medication therapy:  None    Summary of hospitalization:  CareEverywhere information obtained and reviewed  Diagnostic Tests/Treatments reviewed.  Follow up needed: none  Other Healthcare Providers Involved in Patient s Care:         None  Update since discharge: improved.       Post Discharge Medication Reconciliation: discharge medications reconciled, continue medications without change.  Plan of care communicated with patient                   Review of Systems    to [facility] initially then transferred to [facility]    Reviewed disch summary in detail  June 23 to 26    Home 4 days now    Feeling well    No new medications    Not much nausea    Eating and drinking     Staying well hydrated    Has air conditioning    32 units long acting daily    novolog 16, 22, and 26 with correction scale    No low sugars since home    Highest 300s    Often 200s    Hemoglobin a1c in hospital 12.6    Walking some 2x per week            Objective           Vitals:  No vitals were obtained today due to virtual visit.    Physical Exam   healthy, alert and no distress  PSYCH: Alert and oriented times 3; coherent speech, normal   rate and volume, able to articulate logical thoughts, able   to abstract reason, no tangential thoughts, no hallucinations   or delusions  Her affect is  normal  RESP: No cough, no audible wheezing, able to talk in full sentences  Remainder of exam unable to be completed due to telephone visits       ASSESSMENT / PLAN:  (E10.69) Type 1 diabetes mellitus with other specified complication (H)  (primary encounter diagnosis)  Comment: patient just recently with dka episode.  Reviewed hospital labs in detail.  Hemoglobin a1c in 12s concerning.  Discussed in detail. Patient did meet with endocrinology virtually this week also.  Encouraged her to meet with diab educator soon also ( missed last week's appointment due to hospitalization )  Plan: as above     (R11.0) Nausea  Comment: has prn meds at home   Plan: as above    Encouraged increased exercise    She is okay on meds       I reviewed the patient's medications, allergies, medical history, family history, and social history.    Fredi Fuentes MD              Phone call duration: 14 minutes ( 12:04 to 12: 18 pm )    Fredi Fuentes MD

## 2021-06-30 NOTE — TELEPHONE ENCOUNTER
PRIOR AUTHORIZATION DENIED    Medication: ondansetron 8 mg-DENIED    Denial Date: 6/30/2021    Denial Rational: Patient is not using for a medically accepted indication.            Appeal Information:

## 2021-06-30 NOTE — PROGRESS NOTES
"Lexy is a 60 year old who is being evaluated via a billable telephone visit.      What phone number would you like to be contacted at? [phone]  How would you like to obtain your AVS? Jarett STEEL:  Patient presents for follow up DM.   She was hospitalized for DKA on 19.  She had GI upset and missed a \"couple days\" of medications.      She did not get the DEXCOM. Mother states [facility] never contacted patient but did contact the mother.   I checked and the phone number in the computer is correct.   Mother got a call they needed more information. Then asked to have the patients case put on hold as she does not know if she needs it.      She was admitted on 21 for DKA.   Remarks she was not feeling well and not eating so she stopped taking her insulin. She does have home health aides who come to her home twice a week to check her pill box.     Generic drug: insulin aspart (U-100)  Inject subcutaneous. Inject 16 units with breakfast, 22 units with lunch, and 26 units with dinner, plus correction scale.   Total daily dose of 100 units    Tresiba FlexTouch U-200 200 unit/mL (3 mL) pen  Generic drug: insulin degludec (U-200)  Inject 32 units subcutaneous once daily.                                mg/dl              No correction                          151-200 mg/dl             +3 units                          201-250 mg/dl             +5 units                          251-300 mg/dl             +7 units                          301-350 mg/dl             +9 units                          350-400 mg/dl             +12 units                          401-450 mg/dl             +15 units and call physician     -If you are NOT eating, check glucose every four hours and give Novolo-150 mg/dl              No correction                          151-200 mg/dl             +3 units                          201-250 mg/dl             +5 " units                          251-300 mg/dl             +7 units                          301-350 mg/dl             +9 units                          350-400 mg/dl             +12 units                          401-450 mg/dl             +15 units and call physician    She got back on her DEXCOM a few days ago.   States it was not working for a brief while. Does state it was working PTA.  She is not connected to [facility] and does not have any readings written down today to review. Current glucose in 226. States she has not had anything to eat yet. She has not had any hypoglycemia since discharge. She is eating on a regular schedule again.     Review of Systems:  10 point ROS negative aside from above.     Exam:  Gen: In NAD.     Assessment/Plan:  Type 1 DM - Low c-peptide and positive SHARI documented in 3/2018.   Her case is complicated by chronic N/V. She follows with Raphael JIMENEZ, Dr Melendez.   She will hold her insulin if she is nauseous and not eating.   Her DM is uncontrolled with frequent DKA. We discussed possible CGM use.   In 10/2019, admitted for DKA again since last visit. HbA1C 11.5%.   Assisted living has been discussed with the patient by hospital and her primary care.   Mother placed DEXCOM fill on hold. Mother told me she does not want to be on cloud sharing if patient gets DEXCOM.   Frankly, I think the mother is the one who is overwhelmed and not the patient. Told her point blank we need to change how we are doing things or she will continue to have DKA episodes and possibly death.   In 1/2020, needs to check more. She has good support at home and is working to make smarter food choices. Snacks before bed most nights. Does this out of habit to avoid lows.   In 4/2020, no DEXCOM data to review. Discussed CDE note from 2/21/2020. Very difficult to get a clear understanding. Specifically, I am concerned about her AM hypoglycemia. Sounds like she needs more insulin with dinner and less basal insulin.  Unfortunately, she has asked her PCA to stop visiting 2/2 COVID.   In 12/2020, pattern of highs pre-supper and HS. However, she has several nights where these readings are normal. Discussed using a pump with pre-programmed meal doses. She is concerned about being able to keep up with caring for a pump.   In 6/2021, hospitalized for DKA and HbA1C 12.6%. Stopped taking her insulin since she was not eating. Educated on need to take tresiba even when not eating to avoid DKA. She is not connected to [facility] and does not have any readings written down today to review. I do wonder if she would do better in a supervised care center.   -No change to insulin doses today. Discussed how to avoid DKA (do not skip tresiba).   -I will talk to her primary about care arrangements.   -Schedule an in person appointment.   -ASA taking.   -BP: controlled.   -Lipids: Controlled other than  in 10/2019. Taking simvastatin.    , HDL 69, Trg 82 in 2/2021. Admits to missing doses of statin.   -Microalbumin 75 in 10/2019. ACEi taking lisinopril.    315 in 2/2021.   -TSH: Subclinical hypothyroidism in 1/2020.    Stable in 2/2021.  -Eyes: treatment for glaucoma. Last exam 6/2019.    Mild background DR in 3/2021   -Smoking: none.      Diabetic retinopathy - DM control as above.     Due to the COVID 19 pandemic this visit was a telephone/video visit in order to help prevent spread of infection in this high risk patient and the general population. The patient gave verbal consent for the visit today.    I have independently reviewed and interpreted labs, imaging as indicated.     Chart review/prep time 1  0945  Chart review/prep time 2 0950  Visit Start time 1029  Visit Stop time 1046  22 minutes spent on the date of the encounter doing chart review, history and exam, documentation and further activities as noted above.   If this were a face to face visit, it would be billed as [id number].    Alfa Goss MD on 6/30/2021 at  10:46 AM

## 2021-06-30 NOTE — LETTER
"    6/30/2021         RE: Lexy Rockwell  [address]  [facility] [address]        Dear Colleague,    Thank you for referring your patient, Lexy Rockwell, to the [facility]. Please see a copy of my visit note below.    Lexy is a 60 year old who is being evaluated via a billable telephone visit.      What phone number would you like to be contacted at? [phone]  How would you like to obtain your AVS? Jarett TSEEL:  Patient presents for follow up DM.   She was hospitalized for DKA on 9/13/19.  She had GI upset and missed a \"couple days\" of medications.      She did not get the DEXCOM. Mother states Specialty Pharmacy never contacted patient but did contact the mother.   I checked and the phone number in the computer is correct.   Mother got a call they needed more information. Then asked to have the patients case put on hold as she does not know if she needs it.      She was admitted on 6/23/21 for DKA.   Remarks she was not feeling well and not eating so she stopped taking her insulin. She does have home health aides who come to her home twice a week to check her pill box.     Generic drug: insulin aspart (U-100)  Inject subcutaneous. Inject 16 units with breakfast, 22 units with lunch, and 26 units with dinner, plus correction scale.   Total daily dose of 100 units    Tresiba FlexTouch U-200 200 unit/mL (3 mL) pen  Generic drug: insulin degludec (U-200)  Inject 32 units subcutaneous once daily.                                mg/dl              No correction                          151-200 mg/dl             +3 units                          201-250 mg/dl             +5 units                          251-300 mg/dl             +7 units                          301-350 mg/dl             +9 units                          350-400 mg/dl             +12 units                          401-450 mg/dl             +15 units and call physician     -If you " are NOT eating, check glucose every four hours and give Novolo-150 mg/dl              No correction                          151-200 mg/dl             +3 units                          201-250 mg/dl             +5 units                          251-300 mg/dl             +7 units                          301-350 mg/dl             +9 units                          350-400 mg/dl             +12 units                          401-450 mg/dl             +15 units and call physician    She got back on her DEXCOM a few days ago.   States it was not working for a brief while. Does state it was working PTA.  She is not connected to [facility] and does not have any readings written down today to review. Current glucose in 226. States she has not had anything to eat yet. She has not had any hypoglycemia since discharge. She is eating on a regular schedule again.     Review of Systems:  10 point ROS negative aside from above.     Exam:  Gen: In NAD.     Assessment/Plan:  Type 1 DM - Low c-peptide and positive SHARI documented in 3/2018.   Her case is complicated by chronic N/V. She follows with Raphael JIMENEZ, Dr Melendez.   She will hold her insulin if she is nauseous and not eating.   Her DM is uncontrolled with frequent DKA. We discussed possible CGM use.   In 10/2019, admitted for DKA again since last visit. HbA1C 11.5%.   Assisted living has been discussed with the patient by hospital and her primary care.   Mother placed DEXCOM fill on hold. Mother told me she does not want to be on cloud sharing if patient gets DEXCOM.   Frankly, I think the mother is the one who is overwhelmed and not the patient. Told her point blank we need to change how we are doing things or she will continue to have DKA episodes and possibly death.   In 2020, needs to check more. She has good support at home and is working to make smarter food choices. Snacks before bed most nights. Does this out of habit to avoid lows.   In  4/2020, no DEXCOM data to review. Discussed CDE note from 2/21/2020. Very difficult to get a clear understanding. Specifically, I am concerned about her AM hypoglycemia. Sounds like she needs more insulin with dinner and less basal insulin. Unfortunately, she has asked her PCA to stop visiting 2/2 COVID.   In 12/2020, pattern of highs pre-supper and HS. However, she has several nights where these readings are normal. Discussed using a pump with pre-programmed meal doses. She is concerned about being able to keep up with caring for a pump.   In 6/2021, hospitalized for DKA and HbA1C 12.6%. Stopped taking her insulin since she was not eating. Educated on need to take tresiba even when not eating to avoid DKA. She is not connected to [facility] and does not have any readings written down today to review. I do wonder if she would do better in a supervised care center.   -No change to insulin doses today. Discussed how to avoid DKA (do not skip tresiba).   -I will talk to her primary about care arrangements.   -Schedule an in person appointment.   -ASA taking.   -BP: controlled.   -Lipids: Controlled other than  in 10/2019. Taking simvastatin.    , HDL 69, Trg 82 in 2/2021. Admits to missing doses of statin.   -Microalbumin 75 in 10/2019. ACEi taking lisinopril.    315 in 2/2021.   -TSH: Subclinical hypothyroidism in 1/2020.    Stable in 2/2021.  -Eyes: treatment for glaucoma. Last exam 6/2019.    Mild background DR in 3/2021   -Smoking: none.      Diabetic retinopathy - DM control as above.     Due to the COVID 19 pandemic this visit was a telephone/video visit in order to help prevent spread of infection in this high risk patient and the general population. The patient gave verbal consent for the visit today.    I have independently reviewed and interpreted labs, imaging as indicated.     Chart review/prep time 1  0945  Chart review/prep time 2 0950  Visit Start time 1029  Visit Stop time 1046  22 minutes  spent on the date of the encounter doing chart review, history and exam, documentation and further activities as noted above.   If this were a face to face visit, it would be billed as [id number].    Alfa Goss MD on 6/30/2021 at 10:46 AM          Again, thank you for allowing me to participate in the care of your patient.        Sincerely,        Alfa Goss MD

## 2021-06-30 NOTE — TELEPHONE ENCOUNTER
Please inform patient that the ondansetron was not covered by insurance but she does have another med to use as needed for nausea ( reglan, also called metoclopramide )    Thanks    Fredi Fuentes MD

## 2021-07-01 NOTE — TELEPHONE ENCOUNTER
Called and spoke to pt and informed zofran was not covered by prior auth. Pt said she already picked up reglan and does not need a refill at this time.    No other questions or concerns.    Elham SUMMERS RN, BSN  [facility]

## 2021-08-08 ENCOUNTER — MYC MEDICAL ADVICE (OUTPATIENT)
Dept: FAMILY MEDICINE | Facility: CLINIC | Age: 61
End: 2021-08-08

## 2021-08-10 NOTE — TELEPHONE ENCOUNTER
I called and discussed in detail  Patient will talk with her [facility] worker about this  Fredi Fuentes MD

## 2021-08-12 ENCOUNTER — TELEPHONE (OUTPATIENT)
Dept: ENDOCRINOLOGY | Facility: CLINIC | Age: 61
End: 2021-08-12

## 2021-08-12 DIAGNOSIS — E10.69 TYPE 1 DIABETES MELLITUS WITH OTHER SPECIFIED COMPLICATION (H): ICD-10-CM

## 2021-08-13 RX ORDER — PROCHLORPERAZINE 25 MG/1
SUPPOSITORY RECTAL
Qty: 1 EACH | Refills: 1 | Status: SHIPPED | OUTPATIENT
Start: 2021-08-13 | End: 2022-03-08

## 2021-08-22 ENCOUNTER — TRANSFERRED RECORDS (OUTPATIENT)
Dept: HEALTH INFORMATION MANAGEMENT | Facility: CLINIC | Age: 61
End: 2021-08-22

## 2021-08-24 LAB
ALT SERPL-CCNC: 9 IU/L (ref 8–45)
AST SERPL-CCNC: 12 IU/L (ref 2–40)

## 2021-08-26 LAB
CREATININE (EXTERNAL): 0.7 MG/DL (ref 0.57–1.11)
GFR ESTIMATED (EXTERNAL): >60 ML/MIN/1.73M2
GFR ESTIMATED (IF AFRICAN AMERICAN) (EXTERNAL): >60 ML/MIN/1.73M2
GLUCOSE (EXTERNAL): 88 MG/DL (ref 65–100)

## 2021-08-27 LAB — POTASSIUM (EXTERNAL): 4.1 MMOL/L (ref 3.5–5)

## 2021-08-30 NOTE — TELEPHONE ENCOUNTER
Pt called today to check status of this request, states she has been in contact with the Pharmacy, and as of 8/29 they did not receive the transmitter. Pt mentions there may be an insurance issue, but she has not been given very much info.  Please look into this and contact the pt to discuss and advise.    Jennifer Olivas on 8/30/2021 at 2:48 PM

## 2021-08-30 NOTE — TELEPHONE ENCOUNTER
Called pharmacy per jessica Medicare needs records. Before approval of her transmitter. Antonino WARD can you please help with records and getting records where they need to go please    Sherlyn SANTIAGO RN Specialty Triage 8/30/2021 4:05 PM

## 2021-08-31 NOTE — TELEPHONE ENCOUNTER
Called and spoke to Yaquelin at [facility], inquired if she knew what Medicare was needing or if she had any paperwork she could send us. Yaquelin stated that in the medication rejection note they received, stated that Medicare will fax forms or requests to the providers office. Stated that it could take up to two weeks before the providers office receives anything.     Noted that we will await contact from Medicare.     Elham FISHMAN MA

## 2021-09-02 ENCOUNTER — TELEPHONE (OUTPATIENT)
Dept: ENDOCRINOLOGY | Facility: CLINIC | Age: 61
End: 2021-09-02

## 2021-09-02 NOTE — LETTER
Left detailed message advised pt MD would like her to try warm compresses 3-4 times daily.  This should bring it down.  If no improvement in one week call the clinic.  Any questions call us back at [phone].   September 9, 2021      Lexy Rockwell  [address]   [place] [address]        Dear Lexy,     We have been unable to reach you by phone in regards to your blood glucose transmitter not working. Please call the manufacture to get assistance with your transmitter. Please call our office with any questions or concern.        Sincerely,    Your Endocrinology Team

## 2021-09-02 NOTE — TELEPHONE ENCOUNTER
Patient should call  if transmitter is not working.    Tried calling patient, no answer or VM to leave a message. Will try again later today.    Elham FISHMAN MA

## 2021-09-02 NOTE — TELEPHONE ENCOUNTER
Reason for Call:  Other call back    Detailed comments: pt wants call back from Dr. Goss's nurse in regards to blood monitor(transmitter) not working right and having to poke finger to get reading    Phone Number Patient can be reached at: Home number on file [phone] (home)    Best Time: anytime    Can we leave a detailed message on this number? NO    Call taken on 9/2/2021 at 11:02 AM by Tiffanie Grossman

## 2021-09-03 ENCOUNTER — MYC MEDICAL ADVICE (OUTPATIENT)
Dept: OPHTHALMOLOGY | Facility: CLINIC | Age: 61
End: 2021-09-03

## 2021-09-03 ENCOUNTER — TELEPHONE (OUTPATIENT)
Dept: ENDOCRINOLOGY | Facility: CLINIC | Age: 61
End: 2021-09-03

## 2021-09-03 DIAGNOSIS — H40.1132 PRIMARY OPEN ANGLE GLAUCOMA OF BOTH EYES, MODERATE STAGE: ICD-10-CM

## 2021-09-03 RX ORDER — LATANOPROST 50 UG/ML
SOLUTION/ DROPS OPHTHALMIC
Qty: 10 ML | Refills: 3 | Status: SHIPPED | OUTPATIENT
Start: 2021-09-03 | End: 2021-10-06

## 2021-09-03 NOTE — TELEPHONE ENCOUNTER
Called pharmacy and discussed pts transmitter problem below. Pharmacy inform writer that a CNM is needed, pharmacy to fax over now.    Sherlyn SANTIAGO RN Specialty Triage 9/3/2021 12:56 PM

## 2021-09-03 NOTE — TELEPHONE ENCOUNTER
Contacted pt mom and discussed the below. Waiting for cnm forms to be filled out.    Sherlyn SANTIAGO RN Specialty Triage 9/3/2021 1:29 PM

## 2021-09-03 NOTE — TELEPHONE ENCOUNTER
pharmacy called back and they spoke with there jacky team who handles these CGM systems. The error has been fixed pt can  current ERX. jacky dexcom number [phone]. Pt mom updated    Sherlyn SANTIAGO RN Specialty Triage 9/3/2021 2:05 PM

## 2021-09-03 NOTE — TELEPHONE ENCOUNTER
Recommend refilling drops per Dr. Rios's last visit note 03/31/21:   Continue Latanoprost at bedtime both eyes   Continue Thera tears preservative free drops as needed

## 2021-09-03 NOTE — TELEPHONE ENCOUNTER
[facility] Call Center    Phone Message    May a detailed message be left on voicemail: yes, please call back Pt at her home phone number (cell phone unavailable), but Pt's home phone has no VM capability. If unable to get an answer when calling Pt's phone OK to call Pt's mother Francisca on her cell: [phone]    Reason for Call: Medication Question or concern regarding medication   Prescription Clarification  Name of Medication: Dexcom Sensors (transmitter and )  Prescribing Provider: Wolfgang   [id number] - [place], [place] - [address] [facility] [address] [facility] [address]   What on the order needs clarification? Per Pt's mother there has been constant issues when needing to get refills for the Pt's dexcom supplies. Per Pt's mother Pt has been waiting for 3 weeks to get a refill on her transmitter. Today she was told [facility] the [facility] that the reason why it is so difficult getting refills is because the prescriptions are being ordered incorrectly by the prescriber. Per pharmacists they are being submitted each time as if the Pt is new. The pharmacists recommended that they be OK'd for a whole year at a time to make the refills go smoother. They stated that this would also be the preferred way that medicare would want them done. Per Pt's mother there cannot be any further issues with getting these refilled, and that the Pt was recently in the ED partially due to the issue of not having what she needed for her dexcom supplies. Please review, and call Pt back to discuss the next steps for resolving this issue.    Pt needing refill for sensors sent ASAP, as Pt is on her last one.          Action Taken: Message routed to:  Other: endo    Travel Screening: Not Applicable

## 2021-09-03 NOTE — TELEPHONE ENCOUNTER
Late entry.    Called patient yesterday afternoon and no answer or VM to leave a message.   Elham FISHMAN MA

## 2021-09-09 NOTE — TELEPHONE ENCOUNTER
Letter sent to patient. Informed that we have attempted to reach her by phone, but have been unsuccessful. Advised to reach out to the manufacture for assistance on her transmitter.   Encounter will now be closed.     Elham FISHMAN MA

## 2021-09-12 ENCOUNTER — HEALTH MAINTENANCE LETTER (OUTPATIENT)
Age: 61
End: 2021-09-12

## 2021-09-13 ENCOUNTER — MYC MEDICAL ADVICE (OUTPATIENT)
Dept: FAMILY MEDICINE | Facility: CLINIC | Age: 61
End: 2021-09-13

## 2021-09-14 ENCOUNTER — OFFICE VISIT (OUTPATIENT)
Dept: FAMILY MEDICINE | Facility: CLINIC | Age: 61
End: 2021-09-14
Payer: MEDICARE

## 2021-09-14 VITALS
BODY MASS INDEX: 32.26 KG/M2 | TEMPERATURE: 98.9 F | HEART RATE: 121 BPM | SYSTOLIC BLOOD PRESSURE: 164 MMHG | WEIGHT: 183.2 LBS | DIASTOLIC BLOOD PRESSURE: 93 MMHG

## 2021-09-14 DIAGNOSIS — H61.23 BILATERAL IMPACTED CERUMEN: Primary | ICD-10-CM

## 2021-09-14 PROCEDURE — 69209 REMOVE IMPACTED EAR WAX UNI: CPT | Performed by: PHYSICIAN ASSISTANT

## 2021-09-14 NOTE — PROGRESS NOTES
"    Assessment & Plan     Bilateral impacted cerumen  Resolved. Prn follow up.   - MO REMOVAL IMPACTED CERUMEN IRRIGATION/LVG UNILAT             BMI:   Estimated body mass index is 32.26 kg/m  as calculated from the following:    Height as of 6/21/21: 1.605 m (5' 3.19\").    Weight as of this encounter: 83.1 kg (183 lb 3.2 oz).           No follow-ups on file.    VILMA [facility] SANDY Pugh is a 61 year old who presents for the following health issues     HPI     Acute Illness  Acute illness concerns: Clogged Ears  Onset/Duration: x3 days  Symptoms:  Fever: no  Chills/Sweats: no  Headache (location?): no  Sinus Pressure: no  Conjunctivitis:  no  Ear Pain: No pain but ears are clogged  Rhinorrhea: no  Congestion: no  Sore Throat: no  Cough: no  Wheeze: no  Decreased Appetite: no  Nausea: no  Vomiting: no  Diarrhea: no  Dysuria/Freq.: no  Dysuria or Hematuria: no  Fatigue/Achiness: no  Sick/Strep Exposure: no  Therapies tried and outcome: Ear drops have not worked            Review of Systems   Constitutional, HEENT, cardiovascular, pulmonary, gi and gu systems are negative, except as otherwise noted.      Objective    BP (!) 164/93 (BP Location: Right arm, Patient Position: Chair, Cuff Size: Adult Large)   Pulse (!) 121   Temp 98.9  F (37.2  C) (Oral)   Wt 83.1 kg (183 lb 3.2 oz)   LMP 03/28/2014   Breastfeeding No   BMI 32.26 kg/m    Body mass index is 32.26 kg/m .  Physical Exam   GENERAL: healthy, alert and no distress  HENT: Bilateral TM occluded by wax. Provider cleaned with ear lavage ear canals and TM's normal                "

## 2021-09-14 NOTE — TELEPHONE ENCOUNTER
I scheduled patient and called her but she could not hear me so I sent information to her through my chart regarding todays appointment.  Salma eMier CMA

## 2021-09-14 NOTE — PATIENT INSTRUCTIONS
-- DO NOT REPLY / DO NOT REPLY ALL --  -- Message is from the [facility]--    General Patient Message      Reason for Call: Patient called in to reschedule her appointment because she is Ill. Please call patient asap her appointment is today 9/14/2021 at 10:00am     Caller Information       Type Contact Phone    09/14/2021 08:50 AM CDT Phone (Incoming) Erika Allen (Self) [phone] (H)          Alternative phone number: none    Turnaround time given to caller:   \"This message will be sent to [state Provider's name]. The clinical team will fulfill your request as soon as they review your message.\"     Continue Latanoprost drop both eyes at bedtime.  Glasses Rx given - optional   Return visit 6 months for an intraocular pressure check, glaucoma OCT, and Beard Visual Field.  Dayron Rios M.D.  [phone]    Diabetes weakens the blood vessels all over the body, including the eyes. Damage to the blood vessels in the eyes can cause swelling or bleeding into part of the eye (called the retina). This is called diabetic retinopathy (DAQUAN-tin--[facility]-thee). If not treated, this disease can cause vision loss or blindness.   Symptoms may include blurred or distorted vision, but many people have no symptoms. It's important to see your eye doctor regularly to check for problems.   Early treatment and good control can help protect your vision. Here are the things you can do to help prevent vision loss:      1. Keep your blood sugar levels under tight control.      2. Bring high blood pressure under control.      3. No smoking.      4. Have yearly dilated eye exams.

## 2021-09-15 ENCOUNTER — MYC MEDICAL ADVICE (OUTPATIENT)
Dept: FAMILY MEDICINE | Facility: CLINIC | Age: 61
End: 2021-09-15

## 2021-09-16 NOTE — TELEPHONE ENCOUNTER
Patient can see me in clinic  Bring in the form with her at that time    Please inform patient    Fredi Fuentes MD

## 2021-09-20 ENCOUNTER — OFFICE VISIT (OUTPATIENT)
Dept: PODIATRY | Facility: CLINIC | Age: 61
End: 2021-09-20
Payer: MEDICARE

## 2021-09-20 VITALS — OXYGEN SATURATION: 98 % | SYSTOLIC BLOOD PRESSURE: 166 MMHG | DIASTOLIC BLOOD PRESSURE: 101 MMHG | HEART RATE: 114 BPM

## 2021-09-20 DIAGNOSIS — E10.42 DM TYPE 1 WITH DIABETIC PERIPHERAL NEUROPATHY (H): Primary | ICD-10-CM

## 2021-09-20 DIAGNOSIS — L60.2 ONYCHAUXIS: ICD-10-CM

## 2021-09-20 DIAGNOSIS — L84 TYPE 1 DIABETES MELLITUS WITH PRESSURE CALLUS (H): ICD-10-CM

## 2021-09-20 DIAGNOSIS — L60.0 INGROWN TOENAIL: ICD-10-CM

## 2021-09-20 DIAGNOSIS — L84 TYLOMA: ICD-10-CM

## 2021-09-20 DIAGNOSIS — E10.628 TYPE 1 DIABETES MELLITUS WITH PRESSURE CALLUS (H): ICD-10-CM

## 2021-09-20 PROCEDURE — 11056 PARNG/CUTG B9 HYPRKR LES 2-4: CPT | Performed by: PODIATRIST

## 2021-09-20 PROCEDURE — 99214 OFFICE O/P EST MOD 30 MIN: CPT | Mod: 25 | Performed by: PODIATRIST

## 2021-09-20 PROCEDURE — 11719 TRIM NAIL(S) ANY NUMBER: CPT | Mod: 51 | Performed by: PODIATRIST

## 2021-09-20 NOTE — PATIENT INSTRUCTIONS
Thanks for coming today.  Ortho/Sports Medicine Clinic  [address] [place] [address]    To schedule future appointments in Ortho Clinic, you may call [phone].    To schedule ordered imaging by your provider:   Call Central Imaging [phone]    To schedule an injection ordered by your provider:  Call Central Imaging Injection scheduling line: [phone]  Vigohart available online at:  [email or web address]    Please call if any further questions or concerns ([phone]).  Clinic hours 8 am to 5 pm.    Return to clinic (call) if symptoms worsen or fail to improve.

## 2021-09-20 NOTE — LETTER
9/20/2021         RE: Lexy Rockwell  [address]  [place] [address]        Dear Colleague,    Thank you for referring your patient, Lexy Rockwell, to the [facility]. Please see a copy of my visit note below.    Past Medical History:   Diagnosis Date     Cerumen impacted      Development delay      Diabetic gastroparesis (H) 8/16/2013     Glaucoma (increased eye pressure)      Hyperlipaemia      Hypertension      Hypothyroid      Mental retardation      Nonsenile cataract      Obesity      Type 1 diabetes mellitus not at goal (H)      Patient Active Problem List   Diagnosis     Development delay     Overactive bladder     GERD (gastroesophageal reflux disease)     Hypertension goal BP (blood pressure) < 140/90     Hyperlipidemia LDL goal <100     Pain in joint, lower leg     Proteinuria     Vitamin D deficiency     Health Care Home     History of strabismus surgery     Type 1 diabetes mellitus with other specified complication (H)     Advanced directives, counseling/discussion     Primary open angle glaucoma of both eyes, moderate stage     Gastroesophageal reflux disease without esophagitis     Obesity, unspecified obesity severity, unspecified obesity type     Acute renal failure (H)     Acute retention of urine     JEAN (acute kidney injury) (H)     Dehydration     Diabetic ketoacidosis without coma associated with type 1 diabetes mellitus (H)     DKA (diabetic ketoacidoses) (H)     Sinus tachycardia     SIRS (systemic inflammatory response syndrome) (H)     Obesity (BMI 35.0-39.9) with comorbidity (H)     Background diabetic retinopathy, mild-mod, ou     Combined forms of age-related cataract, mild, of both eyes     Past Surgical History:   Procedure Laterality Date     C EYE SURG ANT SGMT PROC UNLISTED  remote    strabismus surgery     STRABISMUS SURGERY       Social History     Socioeconomic History     Marital status: Single     Spouse  name: Not on file     Number of children: Not on file     Years of education: Not on file     Highest education level: Not on file   Occupational History     Not on file   Tobacco Use     Smoking status: Never Smoker     Smokeless tobacco: Never Used     Tobacco comment: lives in smoke free household.   Substance and Sexual Activity     Alcohol use: Yes     Comment: occass social     Drug use: No     Sexual activity: Never   Other Topics Concern     Parent/sibling w/ CABG, MI or angioplasty before 65F 55M? No   Social History Narrative     Not on file     Social Determinants of Health     Financial Resource Strain:      Difficulty of Paying Living Expenses:    Food Insecurity:      Worried About Running Out of Food in the Last Year:      Ran Out of Food in the Last Year:    Transportation Needs:      Lack of Transportation (Medical):      Lack of Transportation (Non-Medical):    Physical Activity:      Days of Exercise per Week:      Minutes of Exercise per Session:    Stress:      Feeling of Stress :    Social Connections:      Frequency of Communication with Friends and Family:      Frequency of Social Gatherings with Friends and Family:      Attends Restorationist Services:      Active Member of Clubs or Organizations:      Attends Club or Organization Meetings:      Marital Status:    Intimate Partner Violence:      Fear of Current or Ex-Partner:      Emotionally Abused:      Physically Abused:      Sexually Abused:      Family History   Problem Relation Age of Onset     Hypertension Father      Diabetes Sister      Glaucoma Maternal Grandfather      Cancer No family hx of      Cerebrovascular Disease No family hx of      Thyroid Disease No family hx of      Macular Degeneration No family hx of      Lab Results   Component Value Date    A1C 9.7 02/01/2021    A1C 9.2 09/30/2020    A1C 11.8 01/08/2020    A1C 11.8 05/19/2019    A1C 11.2 12/12/2018 8/26/21  Bmp reviewed as in emr.  8/22/21  Cbc reviewed as in  emr.    SUBJECTIVE FINDINGS:  A 61-year-old female returns to clinic for onychomycosis, onychauxis and tyloma and Diabetic foot cares.  She is diabetic with peripheral neuropathy and vascular disease.  She relates she has numbness, tingling and neuropathy in her feet.  She relates to no ulcers or sores since we have seen her last.  She relates the callus on her left first MPJ is painful.  It helps to have it trimmed down but then it builds up and becomes sore again.  She is not wearing diabetic shoes and relates she does not want to get any diabetic shoes.  She relates she needs her toenails cut as well.  Relates she had to miss her last appointment and her mom had to cut her toenails and was concerned about infection in left big toenail.        OBJECTIVE FINDINGS:  DP and PT 2/4 bilaterally.  She has dorsally contracted digits 1-5 bilaterally with decreased ankle joint dorsiflexion bilaterally and generalized decreased range of motion bilaterally.  She has some peripheral edema bilaterally and decreased hair growth bilaterally.  She has decreased deep tendon reflexes bilaterally.  Sharp/dull is decreased with 5.07 [place] monofilament bilaterally.  There is no erythema, no drainage, no odor, no calor bilaterally.  No gross tendon voids bilaterally.  She has nucleated hyperkeratotic tissue buildup on the left plantar first MPJ.  She has hyperkeratotic tissue build up with ecchymosis distal left second toe.  There is incurvated nails bilaterally to differing degrees with left medial Hallux and right lateral Hallux nail borders with pressure erythema and edema, patient relates there was more redness previously.        ASSESSMENT/PLAN:  Onychauxis bilaterally with ingrown Hallux nail borders.  Tylomas on the left foot causing pain with preulcerative changes on second toe.  She is diabetic with peripheral neuropathy and peripheral vascular disease.  Diagnosis and treatment options discussed with her.  All the  nails were debrided or reduced bilaterally upon consent.  The tylomas on the left foot were sharp debrided with a #15 blade and tissue cutter upon consent.  The left medial and right lateral Hallxu nail borders bled upon debridement and local wound care done with Bacitracin and bandaid and these are dispensed and use discussed with her.  Return to clinic and see me in about 2-3 months.  Previous notes reviewed.       Again, thank you for allowing me to participate in the care of your patient.        Sincerely,        Ravin Back DPM

## 2021-09-20 NOTE — PROGRESS NOTES
Past Medical History:   Diagnosis Date     Cerumen impacted      Development delay      Diabetic gastroparesis (H) 8/16/2013     Glaucoma (increased eye pressure)      Hyperlipaemia      Hypertension      Hypothyroid      Mental retardation      Nonsenile cataract      Obesity      Type 1 diabetes mellitus not at goal (H)      Patient Active Problem List   Diagnosis     Development delay     Overactive bladder     GERD (gastroesophageal reflux disease)     Hypertension goal BP (blood pressure) < 140/90     Hyperlipidemia LDL goal <100     Pain in joint, lower leg     Proteinuria     Vitamin D deficiency     Health Care Home     History of strabismus surgery     Type 1 diabetes mellitus with other specified complication (H)     Advanced directives, counseling/discussion     Primary open angle glaucoma of both eyes, moderate stage     Gastroesophageal reflux disease without esophagitis     Obesity, unspecified obesity severity, unspecified obesity type     Acute renal failure (H)     Acute retention of urine     JEAN (acute kidney injury) (H)     Dehydration     Diabetic ketoacidosis without coma associated with type 1 diabetes mellitus (H)     DKA (diabetic ketoacidoses) (H)     Sinus tachycardia     SIRS (systemic inflammatory response syndrome) (H)     Obesity (BMI 35.0-39.9) with comorbidity (H)     Background diabetic retinopathy, mild-mod, ou     Combined forms of age-related cataract, mild, of both eyes     Past Surgical History:   Procedure Laterality Date     C EYE SURG ANT SGMT PROC UNLISTED  remote    strabismus surgery     STRABISMUS SURGERY       Social History     Socioeconomic History     Marital status: Single     Spouse name: Not on file     Number of children: Not on file     Years of education: Not on file     Highest education level: Not on file   Occupational History     Not on file   Tobacco Use     Smoking status: Never Smoker     Smokeless tobacco: Never Used     Tobacco comment: lives in smoke  free household.   Substance and Sexual Activity     Alcohol use: Yes     Comment: occass social     Drug use: No     Sexual activity: Never   Other Topics Concern     Parent/sibling w/ CABG, MI or angioplasty before 65F 55M? No   Social History Narrative     Not on file     Social Determinants of Health     Financial Resource Strain:      Difficulty of Paying Living Expenses:    Food Insecurity:      Worried About Running Out of Food in the Last Year:      Ran Out of Food in the Last Year:    Transportation Needs:      Lack of Transportation (Medical):      Lack of Transportation (Non-Medical):    Physical Activity:      Days of Exercise per Week:      Minutes of Exercise per Session:    Stress:      Feeling of Stress :    Social Connections:      Frequency of Communication with Friends and Family:      Frequency of Social Gatherings with Friends and Family:      Attends Religion Services:      Active Member of Clubs or Organizations:      Attends Club or Organization Meetings:      Marital Status:    Intimate Partner Violence:      Fear of Current or Ex-Partner:      Emotionally Abused:      Physically Abused:      Sexually Abused:      Family History   Problem Relation Age of Onset     Hypertension Father      Diabetes Sister      Glaucoma Maternal Grandfather      Cancer No family hx of      Cerebrovascular Disease No family hx of      Thyroid Disease No family hx of      Macular Degeneration No family hx of      Lab Results   Component Value Date    A1C 9.7 02/01/2021    A1C 9.2 09/30/2020    A1C 11.8 01/08/2020    A1C 11.8 05/19/2019    A1C 11.2 12/12/2018 8/26/21  Bmp reviewed as in emr.  8/22/21  Cbc reviewed as in emr.    SUBJECTIVE FINDINGS:  A 61-year-old female returns to clinic for onychomycosis, onychauxis and tyloma and Diabetic foot cares.  She is diabetic with peripheral neuropathy and vascular disease.  She relates she has numbness, tingling and neuropathy in her feet.  She relates to no ulcers  or sores since we have seen her last.  She relates the callus on her left first MPJ is painful.  It helps to have it trimmed down but then it builds up and becomes sore again.  She is not wearing diabetic shoes and relates she does not want to get any diabetic shoes.  She relates she needs her toenails cut as well.  Relates she had to miss her last appointment and her mom had to cut her toenails and was concerned about infection in left big toenail.        OBJECTIVE FINDINGS:  DP and PT 2/4 bilaterally.  She has dorsally contracted digits 1-5 bilaterally with decreased ankle joint dorsiflexion bilaterally and generalized decreased range of motion bilaterally.  She has some peripheral edema bilaterally and decreased hair growth bilaterally.  She has decreased deep tendon reflexes bilaterally.  Sharp/dull is decreased with 5.07 [place] monofilament bilaterally.  There is no erythema, no drainage, no odor, no calor bilaterally.  No gross tendon voids bilaterally.  She has nucleated hyperkeratotic tissue buildup on the left plantar first MPJ.  She has hyperkeratotic tissue build up with ecchymosis distal left second toe.  There is incurvated nails bilaterally to differing degrees with left medial Hallux and right lateral Hallux nail borders with pressure erythema and edema, patient relates there was more redness previously.        ASSESSMENT/PLAN:  Onychauxis bilaterally with ingrown Hallux nail borders.  Tylomas on the left foot causing pain with preulcerative changes on second toe.  She is diabetic with peripheral neuropathy and peripheral vascular disease.  Diagnosis and treatment options discussed with her.  All the nails were debrided or reduced bilaterally upon consent.  The tylomas on the left foot were sharp debrided with a #15 blade and tissue cutter upon consent.  The left medial and right lateral Hallxu nail borders bled upon debridement and local wound care done with Bacitracin and bandaid and these  are dispensed and use discussed with her.  Return to clinic and see me in about 2-3 months.  Previous notes reviewed.

## 2021-09-20 NOTE — NURSING NOTE
Lexy Rockwell's chief complaint for this visit includes:  Chief Complaint   Patient presents with     RECHECK     trim tylomas and toenails     PCP: Fredi Fuentes    Referring Provider:  No referring provider defined for this encounter.    BP (!) 181/109 (BP Location: Left arm, Patient Position: Sitting, Cuff Size: Adult Regular)   Pulse 114   LMP 03/28/2014   SpO2 98%   Data Unavailable     Do you need any medication refills at today's visit? No    Xiomara Nieto CMA

## 2021-10-06 ENCOUNTER — OFFICE VISIT (OUTPATIENT)
Dept: OPHTHALMOLOGY | Facility: CLINIC | Age: 61
End: 2021-10-06
Payer: MEDICARE

## 2021-10-06 DIAGNOSIS — H40.1132 PRIMARY OPEN ANGLE GLAUCOMA OF BOTH EYES, MODERATE STAGE: ICD-10-CM

## 2021-10-06 DIAGNOSIS — E11.3299 BACKGROUND DIABETIC RETINOPATHY (H): Primary | ICD-10-CM

## 2021-10-06 PROCEDURE — 92083 EXTENDED VISUAL FIELD XM: CPT | Performed by: OPHTHALMOLOGY

## 2021-10-06 PROCEDURE — 92133 CPTRZD OPH DX IMG PST SGM ON: CPT | Performed by: OPHTHALMOLOGY

## 2021-10-06 PROCEDURE — 92012 INTRM OPH EXAM EST PATIENT: CPT | Performed by: OPHTHALMOLOGY

## 2021-10-06 RX ORDER — LATANOPROST 50 UG/ML
SOLUTION/ DROPS OPHTHALMIC
Qty: 10 ML | Refills: 3 | Status: SHIPPED | OUTPATIENT
Start: 2021-10-06 | End: 2024-04-03

## 2021-10-06 RX ORDER — LATANOPROST 50 UG/ML
SOLUTION/ DROPS OPHTHALMIC
Qty: 10 ML | Refills: 3 | Status: CANCELLED | OUTPATIENT
Start: 2021-10-06

## 2021-10-06 ASSESSMENT — VISUAL ACUITY
METHOD: SNELLEN - LINEAR
OS_CC: 20/40
OD_CC+: -2
OD_CC: 20/30
OS_CC+: -1
CORRECTION_TYPE: GLASSES

## 2021-10-06 ASSESSMENT — TONOMETRY
OD_IOP_MMHG: 20
OS_IOP_MMHG: 25
IOP_METHOD: APPLANATION
IOP_METHOD: APPLANATION
OS_IOP_MMHG: 24

## 2021-10-06 ASSESSMENT — PACHYMETRY
OD_CT(UM): 562
OS_CT(UM): 566

## 2021-10-06 ASSESSMENT — SLIT LAMP EXAM - LIDS
COMMENTS: NORMAL
COMMENTS: NORMAL

## 2021-10-06 ASSESSMENT — EXTERNAL EXAM - LEFT EYE: OS_EXAM: PROLAPSED FAT PADS: UPPER, LOWER

## 2021-10-06 ASSESSMENT — EXTERNAL EXAM - RIGHT EYE: OD_EXAM: PROLAPSED FAT PADS: UPPER, LOWER

## 2021-10-06 NOTE — LETTER
10/6/2021         RE: Lexy Rockwell  [address]  [place] [address]        Dear Colleague,    Thank you for referring your patient, Lexy Rockwell, to the [facility]. Please see a copy of my visit note below.     Current Eye Medications:   Latanoprost at bedtime both eyes. Thera tears preservative free as needed     Subjective:  Here for glaucoma OCT and mac OCT with HVF and IOP. Doing pretty well with getting drops in. Some nystagmus, but good fixation with VF. Left eye a bit harder to get a picture with OCT     Objective:  See Ophthalmology Exam.       Assessment:  Intraocular pressure up left eye today and possible change in Beard Visual Field.       Plan:  Continue same medication  Return visit 2 months for intraocular pressure check.  May need additional treatment if continues elevated left eye.    Dayron Rios M.D.  [phone]             Again, thank you for allowing me to participate in the care of your patient.        Sincerely,        Dayron Rios MD

## 2021-10-06 NOTE — PROGRESS NOTES
Current Eye Medications:   Latanoprost at bedtime both eyes. Thera tears preservative free as needed     Subjective:  Here for glaucoma OCT and mac OCT with HVF and IOP. Doing pretty well with getting drops in. Some nystagmus, but good fixation with VF. Left eye a bit harder to get a picture with OCT     Objective:  See Ophthalmology Exam.       Assessment:  Intraocular pressure up left eye today and possible change in Beard Visual Field.       Plan:  Continue same medication  Return visit 2 months for intraocular pressure check.  May need additional treatment if continues elevated left eye.    Dayron Rios M.D.  [phone]

## 2021-10-06 NOTE — PATIENT INSTRUCTIONS
Continue same medication  Return visit 2 months for intraocular pressure check.    Dayron Rios M.D.  [phone]

## 2021-10-13 ENCOUNTER — OFFICE VISIT (OUTPATIENT)
Dept: FAMILY MEDICINE | Facility: CLINIC | Age: 61
End: 2021-10-13
Payer: MEDICARE

## 2021-10-13 VITALS
DIASTOLIC BLOOD PRESSURE: 90 MMHG | TEMPERATURE: 97.8 F | WEIGHT: 181 LBS | SYSTOLIC BLOOD PRESSURE: 147 MMHG | BODY MASS INDEX: 32.07 KG/M2 | HEIGHT: 63 IN | HEART RATE: 101 BPM

## 2021-10-13 DIAGNOSIS — I10 HYPERTENSION GOAL BP (BLOOD PRESSURE) < 140/90: ICD-10-CM

## 2021-10-13 DIAGNOSIS — E10.69 TYPE 1 DIABETES MELLITUS WITH OTHER SPECIFIED COMPLICATION (H): ICD-10-CM

## 2021-10-13 DIAGNOSIS — R53.83 FATIGUE, UNSPECIFIED TYPE: ICD-10-CM

## 2021-10-13 DIAGNOSIS — Z00.00 ROUTINE GENERAL MEDICAL EXAMINATION AT A HEALTH CARE FACILITY: Primary | ICD-10-CM

## 2021-10-13 DIAGNOSIS — E78.5 HYPERLIPIDEMIA LDL GOAL <100: ICD-10-CM

## 2021-10-13 LAB
ALBUMIN SERPL-MCNC: 3.3 G/DL (ref 3.4–5)
ALP SERPL-CCNC: 96 U/L (ref 40–150)
ALT SERPL W P-5'-P-CCNC: 18 U/L (ref 0–50)
ANION GAP SERPL CALCULATED.3IONS-SCNC: 8 MMOL/L (ref 3–14)
AST SERPL W P-5'-P-CCNC: 13 U/L (ref 0–45)
BASOPHILS # BLD AUTO: 0 10E3/UL (ref 0–0.2)
BASOPHILS NFR BLD AUTO: 1 %
BILIRUB SERPL-MCNC: 0.3 MG/DL (ref 0.2–1.3)
BUN SERPL-MCNC: 13 MG/DL (ref 7–30)
CALCIUM SERPL-MCNC: 10 MG/DL (ref 8.5–10.1)
CHLORIDE BLD-SCNC: 107 MMOL/L (ref 94–109)
CO2 SERPL-SCNC: 26 MMOL/L (ref 20–32)
CREAT SERPL-MCNC: 0.7 MG/DL (ref 0.52–1.04)
EOSINOPHIL # BLD AUTO: 0.1 10E3/UL (ref 0–0.7)
EOSINOPHIL NFR BLD AUTO: 3 %
ERYTHROCYTE [DISTWIDTH] IN BLOOD BY AUTOMATED COUNT: 14.3 % (ref 10–15)
GFR SERPL CREATININE-BSD FRML MDRD: >90 ML/MIN/1.73M2
GLUCOSE BLD-MCNC: 172 MG/DL (ref 70–99)
HBA1C MFR BLD: 10.7 % (ref 0–5.6)
HCT VFR BLD AUTO: 37.1 % (ref 35–47)
HGB BLD-MCNC: 12 G/DL (ref 11.7–15.7)
LYMPHOCYTES # BLD AUTO: 1.2 10E3/UL (ref 0.8–5.3)
LYMPHOCYTES NFR BLD AUTO: 29 %
MCH RBC QN AUTO: 27.5 PG (ref 26.5–33)
MCHC RBC AUTO-ENTMCNC: 32.3 G/DL (ref 31.5–36.5)
MCV RBC AUTO: 85 FL (ref 78–100)
MONOCYTES # BLD AUTO: 0.3 10E3/UL (ref 0–1.3)
MONOCYTES NFR BLD AUTO: 7 %
NEUTROPHILS # BLD AUTO: 2.6 10E3/UL (ref 1.6–8.3)
NEUTROPHILS NFR BLD AUTO: 60 %
PLATELET # BLD AUTO: 239 10E3/UL (ref 150–450)
POTASSIUM BLD-SCNC: 4.2 MMOL/L (ref 3.4–5.3)
PROT SERPL-MCNC: 6.9 G/DL (ref 6.8–8.8)
RBC # BLD AUTO: 4.36 10E6/UL (ref 3.8–5.2)
SODIUM SERPL-SCNC: 141 MMOL/L (ref 133–144)
TSH SERPL DL<=0.005 MIU/L-ACNC: 3.41 MU/L (ref 0.4–4)
WBC # BLD AUTO: 4.3 10E3/UL (ref 4–11)

## 2021-10-13 PROCEDURE — 85025 COMPLETE CBC W/AUTO DIFF WBC: CPT | Performed by: FAMILY MEDICINE

## 2021-10-13 PROCEDURE — G0438 PPPS, INITIAL VISIT: HCPCS | Performed by: FAMILY MEDICINE

## 2021-10-13 PROCEDURE — 83036 HEMOGLOBIN GLYCOSYLATED A1C: CPT | Performed by: FAMILY MEDICINE

## 2021-10-13 PROCEDURE — 36415 COLL VENOUS BLD VENIPUNCTURE: CPT | Performed by: FAMILY MEDICINE

## 2021-10-13 PROCEDURE — 84443 ASSAY THYROID STIM HORMONE: CPT | Performed by: FAMILY MEDICINE

## 2021-10-13 PROCEDURE — 80053 COMPREHEN METABOLIC PANEL: CPT | Performed by: FAMILY MEDICINE

## 2021-10-13 RX ORDER — SIMVASTATIN 20 MG
20 TABLET ORAL AT BEDTIME
Qty: 90 TABLET | Refills: 3 | Status: SHIPPED | OUTPATIENT
Start: 2021-10-13

## 2021-10-13 RX ORDER — METOPROLOL SUCCINATE 25 MG/1
TABLET, EXTENDED RELEASE ORAL
Qty: 45 TABLET | Refills: 3 | Status: SHIPPED | OUTPATIENT
Start: 2021-10-13 | End: 2022-06-27

## 2021-10-13 ASSESSMENT — ACTIVITIES OF DAILY LIVING (ADL): CURRENT_FUNCTION: TRANSPORTATION REQUIRES ASSISTANCE

## 2021-10-13 ASSESSMENT — ENCOUNTER SYMPTOMS
HEMATURIA: 0
DIARRHEA: 0
BREAST MASS: 0
HEMATOCHEZIA: 0
WEAKNESS: 0
CONSTIPATION: 1
HEADACHES: 0
CHILLS: 0
HEARTBURN: 0
EYE PAIN: 0
PALPITATIONS: 0
NAUSEA: 1
FREQUENCY: 1
NERVOUS/ANXIOUS: 0
DYSURIA: 0
COUGH: 0
ARTHRALGIAS: 0
SHORTNESS OF BREATH: 0
JOINT SWELLING: 0
MYALGIAS: 0
FEVER: 0
DIZZINESS: 0
ABDOMINAL PAIN: 0
PARESTHESIAS: 0
SORE THROAT: 0

## 2021-10-13 ASSESSMENT — MIFFLIN-ST. JEOR: SCORE: 1363.14

## 2021-10-13 ASSESSMENT — PAIN SCALES - GENERAL: PAINLEVEL: NO PAIN (0)

## 2021-10-13 NOTE — PROGRESS NOTES
"SUBJECTIVE:   Lexy Rockwell is a 61 year old female who presents for Preventive Visit.       Patient has been advised of split billing requirements and indicates understanding: Yes   Are you in the first 12 months of your Medicare coverage?  No    Healthy Habits:     In general, how would you rate your overall health?  Good    Frequency of exercise:  2-3 days/week    Duration of exercise:  15-30 minutes    Do you usually eat at least 4 servings of fruit and vegetables a day, include whole grains    & fiber and avoid regularly eating high fat or \"junk\" foods?  Yes    Taking medications regularly:  No    Barriers to taking medications:  Problems remembering to take them    Medication side effects:  None    Ability to successfully perform activities of daily living:  Transportation requires assistance    Home Safety:  No safety concerns identified    Hearing Impairment:  No hearing concerns    In the past 6 months, have you been bothered by leaking of urine? Yes    In general, how would you rate your overall mental or emotional health?  Good      PHQ-2 Total Score: 0    Additional concerns today:  Yes    Do you feel safe in your environment? Yes    Have you ever done Advance Care Planning? (For example, a Health Directive, POLST, or a discussion with a medical provider or your loved ones about your wishes): No, advance care planning information given to patient to review.  Patient declined advance care planning discussion at this time.       Fall risk  Fallen 2 or more times in the past year?: No  Any fall with injury in the past year?: No    Cognitive Screening Not appropriate due to mental handicap    Do you have sleep apnea, excessive snoring or daytime drowsiness?: no    Reviewed and updated as needed this visit by clinical staff  Tobacco  Allergies  Meds   Med Hx  Surg Hx  Fam Hx  Soc Hx        Reviewed and updated as needed this visit by Provider                Social History     Tobacco Use     " Smoking status: Never Smoker     Smokeless tobacco: Never Used     Tobacco comment: lives in smoke free household.   Substance Use Topics     Alcohol use: Yes     Comment: occass social     If you drink alcohol do you typically have >3 drinks per day or >7 drinks per week? Yes      Alcohol Use 10/13/2021   Prescreen: >3 drinks/day or >7 drinks/week? No   Prescreen: >3 drinks/day or >7 drinks/week? -   No flowsheet data found.             Current providers sharing in care for this patient include:    Patient Care Team:  Fredi Fuentes MD as PCP - General (Family Practice)  Ashly Lopez, RN as Nurse Coordinator  Cris as CADI worker  [facility] as MD (INTERNAL MEDICINE - ENDOCRINOLOGY, DIABETES & METABOLISM)  Tiffanie Mcfarland RD as Diabetes Educator (Dietitian, Registered)  Ravin Back DPM as Assigned Musculoskeletal Provider  Dayron Rios MD as Assigned Surgical Provider  Fredi Fuentes MD as Assigned PCP  Alfa Goss MD as Assigned Endocrinology Provider    The following health maintenance items are reviewed in Epic and correct as of today:  Health Maintenance Due   Topic Date Due     HIV SCREENING  Never done     ZOSTER IMMUNIZATION (1 of 2) Never done     HPV TEST  05/07/2020     PAP  05/07/2020     INFLUENZA VACCINE (1) 09/01/2021     A1C  09/23/2021           Any new diagnosis of family breast, ovarian, or bowel cancer? No    FHS-7: No flowsheet data found.  click delete button to remove this line now      Pertinent mammograms are reviewed under the imaging tab.    Review of Systems   Constitutional: Negative for chills and fever.   HENT: Negative for congestion, ear pain, hearing loss and sore throat.    Eyes: Negative for pain and visual disturbance.   Respiratory: Negative for cough and shortness of breath.    Cardiovascular: Negative for chest pain, palpitations and peripheral edema.   Gastrointestinal: Positive for constipation and nausea. Negative for  "abdominal pain, diarrhea, heartburn and hematochezia.   Breasts:  Negative for tenderness, breast mass and discharge.   Genitourinary: Positive for frequency. Negative for dysuria, genital sores, hematuria, pelvic pain, urgency, vaginal bleeding and vaginal discharge.   Musculoskeletal: Negative for arthralgias, joint swelling and myalgias.   Skin: Negative for rash.   Neurological: Negative for dizziness, weakness, headaches and paresthesias.   Psychiatric/Behavioral: Negative for mood changes. The patient is not nervous/anxious.       not much constipation    Has metamucil, uses as needed    Using dexcom system  2 hospital stays for diab since on thhis    Frequent urination with high sugars     Not working     Walks twice a week for exercise, 20 minutes    OBJECTIVE:   BP (!) 167/97 (BP Location: Left arm, Patient Position: Chair, Cuff Size: Adult Regular)   Pulse 101   Temp 97.8  F (36.6  C)   Ht 1.605 m (5' 3.19\")   Wt 82.1 kg (181 lb)   LMP 03/28/2014   Breastfeeding No   BMI 31.87 kg/m   Estimated body mass index is 31.87 kg/m  as calculated from the following:    Height as of this encounter: 1.605 m (5' 3.19\").    Weight as of this encounter: 82.1 kg (181 lb).  Physical Exam  GENERAL: healthy, alert and no distress  EYES: Eyes grossly normal to inspection, PERRL and conjunctivae and sclerae normal  HENT: ear canals and TM's normal, nose and mouth without ulcers or lesions  NECK: no adenopathy, no asymmetry, masses, or scars and thyroid normal to palpation  RESP: lungs clear to auscultation - no rales, rhonchi or wheezes  CV: regular rate and rhythm, normal S1 S2, no S3 or S4, no murmur, click or rub, no peripheral edema and peripheral pulses strong  ABDOMEN: soft, nontender, no hepatosplenomegaly, no masses and bowel sounds normal  MS: no gross musculoskeletal defects noted, no edema  SKIN: no suspicious lesions or rashes  NEURO: Normal strength and tone, mentation intact and speech normal  PSYCH: " "mentation appears normal, affect normal/bright    Diagnostic Test Results:  Labs reviewed in Epic    ASSESSMENT / PLAN:   Lexy was seen today for wellness visit and health maintenance.    Diagnoses and all orders for this visit:    Routine general medical examination at a health care facility    Type 1 diabetes mellitus with other specified complication (H)  -     Hemoglobin A1c; Future  -     Hemoglobin A1c    Hyperlipidemia LDL goal <100  -     simvastatin (ZOCOR) 20 MG tablet; Take 1 tablet (20 mg) by mouth At Bedtime    Hypertension goal BP (blood pressure) < 140/90  -     metoprolol succinate ER (TOPROL-XL) 25 MG 24 hr tablet; TAKE 1/2 TABLET(12.5 MG) BY MOUTH DAILY    Fatigue, unspecified type  -     Comprehensive metabolic panel; Future  -     CBC with Platelets & Differential; Future  -     TSH with free T4 reflex; Future  -     TSH with free T4 reflex  -     CBC with Platelets & Differential  -     Comprehensive metabolic panel    Discussed several issues with patient  Check labs  Refill needed meds  Updated med list  Up to date on colonoscopy and mammogram  To see endocrinology next week  Completed some forms for patient - unlikely that she will be able to work in future, has not worked in several years  Not fasting today so skipped lipid lab but already on statin  Keep working on healthy diet/exercise and wt loss    Patient has been advised of split billing requirements and indicates understanding: Yes  COUNSELING:  Reviewed preventive health counseling, as reflected in patient instructions       Regular exercise       Healthy diet/nutrition       Vision screening       Dental care       Colon cancer screening    Estimated body mass index is 31.87 kg/m  as calculated from the following:    Height as of this encounter: 1.605 m (5' 3.19\").    Weight as of this encounter: 82.1 kg (181 lb).    Weight management plan: Discussed healthy diet and exercise guidelines    She reports that she has never smoked. " She has never used smokeless tobacco.      Appropriate preventive services were discussed with this patient, including applicable screening as appropriate for cardiovascular disease, diabetes, osteopenia/osteoporosis, and glaucoma.  As appropriate for age/gender, discussed screening for colorectal cancer, prostate cancer, breast cancer, and cervical cancer. Checklist reviewing preventive services available has been given to the patient.    Reviewed patients plan of care and provided an AVS. The Basic Care Plan (routine screening as documented in Health Maintenance) for Lexy meets the Care Plan requirement. This Care Plan has been established and reviewed with the Patient.    Counseling Resources:  ATP IV Guidelines  Pooled Cohorts Equation Calculator  Breast Cancer Risk Calculator  Breast Cancer: Medication to Reduce Risk  FRAX Risk Assessment  ICSI Preventive Guidelines  Dietary Guidelines for Americans, 2010  USDA's MyPlate  ASA Prophylaxis  Lung CA Screening    Fredi Fuentes MD  [facility]    Identified Health Risks:

## 2021-10-13 NOTE — PATIENT INSTRUCTIONS
We will send you lab results    Increase exercise as able    Stay on same medications    See endocrinology as planned

## 2021-10-14 NOTE — RESULT ENCOUNTER NOTE
Unfortunately the diabetes test ( hemoglobin a1c ) is still very high.  See endocrinology as planned.    Other labs here are all okay.    Fredi Fuentes MD

## 2021-10-20 ENCOUNTER — VIRTUAL VISIT (OUTPATIENT)
Dept: EDUCATION SERVICES | Facility: CLINIC | Age: 61
End: 2021-10-20
Payer: MEDICARE

## 2021-10-20 DIAGNOSIS — E10.40 TYPE 1 DIABETES MELLITUS WITH DIABETIC NEUROPATHY (H): Primary | ICD-10-CM

## 2021-10-20 PROCEDURE — G0108 DIAB MANAGE TRN  PER INDIV: HCPCS

## 2021-10-20 NOTE — PROGRESS NOTES
Diabetes Follow-up    Subjective/Objective:    Telephone visit with Lexy for BG review.     Lexy was hospitalized in August with DKA. When she was in the hospital they decreased her insulin as she was having many lows. She is now having a lot of high BG.     Diabetes is being managed with   Diabetes Medications   Diabetes Medication(s)     Biguanides       metFORMIN (GLUCOPHAGE-XR) 500 MG 24 hr tablet    Take 4-tablets by mouth daily as directed    Insulin       insulin aspart (NOVOLOG FLEXPEN) 100 UNIT/ML pen    Inject 16 units with breakfast, 22 units with lunch and 26 units with dinner, plus correction scale. dose premeal as directed, TDD: approx 100 units     insulin degludec (TRESIBA FLEXTOUCH) 200 UNIT/ML pen    Inject 32 Units Subcutaneous daily        NEW DOSES (from hospital)  Tresiba: 28 units   Novolo units + sliding scale   <70: hypoglycemia   : no extra  150-199: +1 unit  200-249: +2 units  250-299: +3 units  300-349: +4 units  350-399: +5 units  400+: +6 units and call doctor     BG/Food Log:   Date Breakfast  Lunch  Dinner  Bedtime    Before After Before After Before After    10/20 HIGH  365       10/19 252  201       10/18 200's  236  168     10/17 292  393  90     10/16 142  81  334     10/15 94    396     10/14 130  119  HIGH (sick)     10/13 166  146   247    10/12 136  164  198     10/11 310  356  342     10/10 HIGH  83  394     10/9 85  326  216     10/8 348  HIGH  HIGH     10/7 157  78  HIGH       This morning took 12 units     Assessment:    Fasting blood glucose: 21% in target.  Before lunch glucose: 31% in target.  Before dinner glucose: 9% in target.  After dinner glucose: 0% in target.      Plan/Response:  See Patient Instructions for co-developed, patient-stated behavior change goals.  Recommended to increase Tresiba from 28 units to 30 units. Lexy see's Dr. Goss on Friday and will have him recommend adjustments to Novolog sliding scale insulin.     Tiffanie  KARLOS Mcfarland, CARRI, CDE  Time spent: 30 minutes    Any diabetes medication dose changes were made via the CDE Protocol and Collaborative Practice Agreement with the patient's referring provider. A copy of this encounter was shared with the provider.

## 2021-10-22 ENCOUNTER — OFFICE VISIT (OUTPATIENT)
Dept: ENDOCRINOLOGY | Facility: CLINIC | Age: 61
End: 2021-10-22
Payer: MEDICARE

## 2021-10-22 VITALS
HEART RATE: 105 BPM | BODY MASS INDEX: 31.79 KG/M2 | WEIGHT: 179.4 LBS | SYSTOLIC BLOOD PRESSURE: 175 MMHG | HEIGHT: 63 IN | RESPIRATION RATE: 16 BRPM | OXYGEN SATURATION: 94 % | DIASTOLIC BLOOD PRESSURE: 102 MMHG | TEMPERATURE: 97.6 F

## 2021-10-22 DIAGNOSIS — I10 ESSENTIAL HYPERTENSION WITH GOAL BLOOD PRESSURE LESS THAN 140/90: ICD-10-CM

## 2021-10-22 DIAGNOSIS — E11.3299 BACKGROUND DIABETIC RETINOPATHY (H): ICD-10-CM

## 2021-10-22 DIAGNOSIS — E78.5 DYSLIPIDEMIA: ICD-10-CM

## 2021-10-22 DIAGNOSIS — E10.69 TYPE 1 DIABETES MELLITUS WITH OTHER SPECIFIED COMPLICATION (H): Primary | ICD-10-CM

## 2021-10-22 DIAGNOSIS — R80.9 MICROALBUMINURIA: ICD-10-CM

## 2021-10-22 PROCEDURE — 99214 OFFICE O/P EST MOD 30 MIN: CPT | Performed by: INTERNAL MEDICINE

## 2021-10-22 ASSESSMENT — MIFFLIN-ST. JEOR: SCORE: 1355.89

## 2021-10-22 NOTE — PROGRESS NOTES
BG/Food Log:   Date Breakfast   Lunch   Dinner   Bedtime     Before After Before After Before After     10/20 HIGH   365           10/19 252   201           10/18 200's   236   168       10/17 292   393   90       10/16 142   81   334       10/15 94       396       10/14 130   119   HIGH (sick)       10/13 166   146     247     10/12 136   164   198       10/11 310   356   342       10/10 HIGH   83   394       10/9 85   326   216       10/8 348   HIGH   HIGH       10/7 157   78   HIGH

## 2021-10-22 NOTE — PROGRESS NOTES
"S:  Patient presents for follow up DM.   She was hospitalized for DKA on 9/13/19.  She had GI upset and missed a \"couple days\" of medications.      She did not get the DEXCOM. [facility] states [facility] never [facility] patient but did contact the mother.   I checked and the phone number in the computer is correct.   Mother got a call they needed more information. Then asked to have the patients case put on hold as she does not know if she needs it.      She was admitted on 6/23/21 for DKA.   Remarks she was not feeling well and not eating so she stopped taking her insulin. She does have home health aides who come to her home twice a week to check her pill box.     Admitted for DKA on 8/22/21. States this was after DEXCOM failed.    Generic drug: insulin aspart (U-100)  Inject subcutaneous. 4 U with meals plus...  150-199: +1 unit  200-249: +2 units  250-299: +3 units  300-349: +4 units  350-399: +5 units  400+: +6 units and call doctor     Ricarda FlexTouch U-200 200 unit/mL (3 mL) pen  Generic drug: insulin degludec (U-200)  Inject 30 units subcutaneous once daily. (increased from 28 U every day 2 days ago)    BG/Food Log from CDE:   Date Breakfast   Lunch   Dinner   Bedtime     Before After Before After Before After     10/20 HIGH   365           10/19 252   201           10/18 200's   236   168       10/17 292   393   90       10/16 142   81   334       10/15 94       396       10/14 130   119   HIGH (sick)       10/13 166   146     247     10/12 136   164   198       10/11 310   356   342       10/10 HIGH   83   394       10/9 85   326   216       10/8 348   HIGH   HIGH       10/7 157   78   HIGH           10/20 290 at dinner, 7 Units. Snacked before bed out of fear of lows, 2 sugar free cookies.   10/21 286, 7 units. 84, 4 units for lunch. Did not check at supper. However, she did eat. When she sees \"HIGH\" on her meter, she is not sure what to give.   10/22 285, 7 units.     She will snack between lunch " "and dinner but not between breakfast and lunch.   Usual snack is chips. No insulin given.     Review of Systems:  10 point ROS negative aside from above.     Exam:  Vital signs:  Temp: 97.6  F (36.4  C) Temp src: Oral BP: (!) 175/102 Pulse: 105   Resp: 16 SpO2: 94 %     Height: 160.5 cm (5' 3.19\") Weight: 81.4 kg (179 lb 6.4 oz)  Estimated body mass index is 31.59 kg/m  as calculated from the following:    Height as of this encounter: 1.605 m (5' 3.19\").    Weight as of this encounter: 81.4 kg (179 lb 6.4 oz).  Gen: In NAD.   HEENT: no proptosis or lid lag, EOMI, MMM  Ext: +2LE edema. Dry callus on tip of left 2nd toe.   Neuro: no tremor, +2 DTR's.  Unable to sense monofilament up to level of metatarsals b/l.     Assessment/Plan:  Type 1 DM - Low c-peptide and positive SHARI documented in 3/2018.   Her case is complicated by chronic N/V. She follows with [facility], Dr Melendez.   She will hold her insulin if she is nauseous and not eating.   Her DM is uncontrolled with frequent DKA. We discussed possible CGM use.   In 10/2019, admitted for DKA again since last visit. HbA1C 11.5%.   Assisted living has been discussed with the patient by hospital and her primary care.   Mother placed DEXCOM fill on hold. Mother told me she does not want to be on cloud sharing if patient gets DEXCOM.   Frankly, I think the mother is the one who is overwhelmed and not the patient. Told her point blank we need to change how we are doing things or she will continue to have DKA episodes and possibly death.   In 1/2020, needs to check more. She has good support at home and is working to make smarter food choices. Snacks before bed most nights. Does this out of habit to avoid lows.   In 4/2020, no DEXCOM data to review. Discussed CDE note from 2/21/2020. Very difficult to get a clear understanding. Specifically, I am concerned about her AM hypoglycemia. Sounds like she needs more insulin with dinner and less basal insulin. Unfortunately, she " "has asked her PCA to stop visiting 2/2 COVID.   In 12/2020, pattern of highs pre-supper and HS. However, she has several nights where these readings are normal. Discussed using a pump with pre-programmed meal doses. She is concerned about being able to keep up with caring for a pump.   In 6/2021, hospitalized for DKA and HbA1C 12.6%. Stopped taking her insulin since she was not eating. Educated on need to take tresiba even when not eating to avoid DKA. She is not connected to [facility] and does not have any readings written down today to review. I do wonder if she would do better in a supervised care center.   In 10/2021, uncontrolled at 10.7%. Issues with uncovered snacks.   -When meter reads \"HIGH\", give 10 units to cover that meal.   -I recommend giving 1 unit of Novolog when you have your chip snack in the afternoon.   -Also, if you have a snack before bed, give 1 unit of novolog.   -See Tiffanie on 11/17/21 as planned.   -Labs in 3 months. See me after.   -ASA taking.   -BP: uncontrolled. States she took her lisinopril at 0200 today as she read somewhere that it takes \"7 hours to take effect\". Took it yesterday morning as well. Prior readings highly variable. Continue to monitor.   -Lipids: Controlled other than  in 10/2019. Taking simvastatin.    , HDL 69, Trg 82 in 2/2021. Admits to missing doses of statin.   -Microalbumin 75 in 10/2019. ACEi taking lisinopril.    315 in 2/2021.   -TSH: Subclinical hypothyroidism in 1/2020.    Stable in 2/2021 and 10/2021.   -Eyes: treatment for glaucoma. Last exam 6/2019.    Mild background DR in 3/2021   -Smoking: none.      Diabetic retinopathy - DM control as above.     Alfa Goss MD on 10/22/2021 at 11:05 AM      "

## 2021-10-22 NOTE — LETTER
"    10/22/2021         RE: Lexy Rockwell  [address]  [facility] [address]        Dear Colleague,    Thank you for referring your patient, Lexy Rockwell, to the [facility]. Please see a copy of my visit note below.    S:  Patient presents for follow up DM.   She was hospitalized for DKA on 9/13/19.  She had GI upset and missed a \"couple days\" of medications.      She did not get the DEXCOM. [facility] states [facility] never [facility] patient but did contact the mother.   I checked and the phone number in the computer is correct.   Mother got a call they needed more information. Then asked to have the patients case put on hold as she does not know if she needs it.      She was admitted on 6/23/21 for DKA.   Remarks she was not feeling well and not eating so she stopped taking her insulin. She does have home health aides who come to her home twice a week to check her pill box.     Admitted for DKA on 8/22/21. States this was after DEXCOM failed.    Generic drug: insulin aspart (U-100)  Inject subcutaneous. 4 U with meals plus...  150-199: +1 unit  200-249: +2 units  250-299: +3 units  300-349: +4 units  350-399: +5 units  400+: +6 units and call doctor     Ricarda FlexTouch U-200 200 unit/mL (3 mL) pen  Generic drug: insulin degludec (U-200)  Inject 30 units subcutaneous once daily. (increased from 28 U every day 2 days ago)    BG/Food Log from CDE:   Date Breakfast   Lunch   Dinner   Bedtime     Before After Before After Before After     10/20 HIGH   365           10/19 252   201           10/18 200's   236   168       10/17 292   393   90       10/16 142   81   334       10/15 94       396       10/14 130   119   HIGH (sick)       10/13 166   146     247     10/12 136   164   198       10/11 310   356   342       10/10 HIGH   83   394       10/9 85   326   216       10/8 348   HIGH   HIGH       10/7 157   78   HIGH           10/20 290 at dinner, 7 " "Units. Snacked before bed out of fear of lows, 2 sugar free cookies.   10/21 286, 7 units. 84, 4 units for lunch. Did not check at supper. However, she did eat. When she sees \"HIGH\" on her meter, she is not sure what to give.   10/22 285, 7 units.     She will snack between lunch and dinner but not between breakfast and lunch.   Usual snack is chips. No insulin given.     Review of Systems:  10 point ROS negative aside from above.     Exam:  Vital signs:  Temp: 97.6  F (36.4  C) Temp src: Oral BP: (!) 175/102 Pulse: 105   Resp: 16 SpO2: 94 %     Height: 160.5 cm (5' 3.19\") Weight: 81.4 kg (179 lb 6.4 oz)  Estimated body mass index is 31.59 kg/m  as calculated from the following:    Height as of this encounter: 1.605 m (5' 3.19\").    Weight as of this encounter: 81.4 kg (179 lb 6.4 oz).  Gen: In NAD.   HEENT: no proptosis or lid lag, EOMI, MMM  Ext: +2LE edema. Dry callus on tip of left 2nd toe.   Neuro: no tremor, +2 DTR's.  Unable to sense monofilament up to level of metatarsals b/l.     Assessment/Plan:  Type 1 DM - Low c-peptide and positive SHARI documented in 3/2018.   Her case is complicated by chronic N/V. [facility], Dr Melendez.   She will hold her insulin if she is nauseous and not eating.   Her DM is uncontrolled with frequent DKA. We discussed possible CGM use.   In 10/2019, admitted for DKA again since last visit. HbA1C 11.5%.   Assisted living has been discussed with the patient by hospital and her primary care.   Mother placed DEXCOM fill on hold. Mother told me she does not want to be on cloud sharing if patient gets DEXCOM.   Frankly, I think the mother is the one who is overwhelmed and not the patient. Told her point blank we need to change how we are doing things or she will continue to have DKA episodes and possibly death.   In 1/2020, needs to check more. She has good support at home and is working to make smarter food choices. Snacks before bed most nights. Does this out of " "habit to avoid lows.   In 4/2020, no DEXCOM data to review. Discussed CDE note from 2/21/2020. Very difficult to get a clear understanding. Specifically, I am concerned about her AM hypoglycemia. Sounds like she needs more insulin with dinner and less basal insulin. Unfortunately, she has asked her PCA to stop visiting 2/2 COVID.   In 12/2020, pattern of highs pre-supper and HS. However, she has several nights where these readings are normal. Discussed using a pump with pre-programmed meal doses. She is concerned about being able to keep up with caring for a pump.   In 6/2021, hospitalized for DKA and HbA1C 12.6%. Stopped taking her insulin since she was not eating. Educated on need to take tresiba even when not eating to avoid DKA. She is not connected to [facility] and does not have any readings written down today to review. I do wonder if she would do better in a supervised care center.   In 10/2021, uncontrolled at 10.7%. Issues with uncovered snacks.   -When meter reads \"HIGH\", give 10 units to cover that meal.   -I recommend giving 1 unit of Novolog when you have your chip snack in the afternoon.   -Also, if you have a snack before bed, give 1 unit of novolog.   -See Tiffanie on 11/17/21 as planned.   -Labs in 3 months. See me after.   -ASA taking.   -BP: uncontrolled. States she took her lisinopril at 0200 today as she read somewhere that it takes \"7 hours to take effect\". Took it yesterday morning as well. Prior readings highly variable. Continue to monitor.   -Lipids: Controlled other than  in 10/2019. Taking simvastatin.    , HDL 69, Trg 82 in 2/2021. Admits to missing doses of statin.   -Microalbumin 75 in 10/2019. ACEi taking lisinopril.    315 in 2/2021.   -TSH: Subclinical hypothyroidism in 1/2020.    Stable in 2/2021 and 10/2021.   -Eyes: treatment for glaucoma. Last exam 6/2019.    Mild background DR in 3/2021   -Smoking: none.      Diabetic retinopathy - DM control as above.     Alfa " Brennan Goss MD on 10/22/2021 at 11:05 AM        BG/Food Log:   Date Breakfast   Lunch   Dinner   Bedtime     Before After Before After Before After     10/20 HIGH   365           10/19 252   201           10/18 200's   236   168       10/17 292   393   90       10/16 142   81   334       10/15 94       396       10/14 130   119   HIGH (sick)       10/13 166   146     247     10/12 136   164   198       10/11 310   356   342       10/10 HIGH   83   394       10/9 85   326   216       10/8 348   HIGH   HIGH       10/7 157   78   HIGH              Again, thank you for allowing me to participate in the care of your patient.        Sincerely,        Alfa Goss MD

## 2021-10-22 NOTE — PATIENT INSTRUCTIONS
"-When meter reads \"HIGH\", give 10 units to cover that meal.   -I recommend giving 1 unit of Novolog when you have your chip snack in the afternoon.   -Also, if you have a snack before bed, give 1 unit of novolog.   -See Tiffanie on 11/17/21 as planned.   -Labs in 3 months. See me after.   "

## 2021-11-03 ENCOUNTER — MYC MEDICAL ADVICE (OUTPATIENT)
Dept: FAMILY MEDICINE | Facility: CLINIC | Age: 61
End: 2021-11-03

## 2021-11-04 ENCOUNTER — MYC MEDICAL ADVICE (OUTPATIENT)
Dept: FAMILY MEDICINE | Facility: CLINIC | Age: 61
End: 2021-11-04

## 2021-11-04 NOTE — TELEPHONE ENCOUNTER
Sent pt [employer] message and gave pt number to schedule flu shot. Please assist pt with scheduling her flu shot.    Tricia CRAWLEY MA

## 2021-11-10 ENCOUNTER — MYC MEDICAL ADVICE (OUTPATIENT)
Dept: FAMILY MEDICINE | Facility: CLINIC | Age: 61
End: 2021-11-10
Payer: MEDICARE

## 2021-11-16 DIAGNOSIS — E10.69 TYPE 1 DIABETES MELLITUS WITH OTHER SPECIFIED COMPLICATION (H): ICD-10-CM

## 2021-11-16 RX ORDER — PROCHLORPERAZINE 25 MG/1
SUPPOSITORY RECTAL
Qty: 3 EACH | Refills: 11 | Status: SHIPPED | OUTPATIENT
Start: 2021-11-16 | End: 2021-11-24

## 2021-11-16 NOTE — TELEPHONE ENCOUNTER
Pending Prescriptions:                       Disp   Refills    Continuous Blood Gluc Sensor (DEXCOM G6 S*3 each              Sig: USE AS DIRECTED 1 EVERY 10 DAYS    Routing refill request to provider for review/approval because:  Drug not on the [facility] refill protocol   Last office visit 10/22/21  Last labs 10/13/21    Renata TA RN, [facility] 11/16/21 3:28 PM

## 2021-11-17 ENCOUNTER — ALLIED HEALTH/NURSE VISIT (OUTPATIENT)
Dept: EDUCATION SERVICES | Facility: CLINIC | Age: 61
End: 2021-11-17
Payer: MEDICARE

## 2021-11-17 DIAGNOSIS — E10.40 TYPE 1 DIABETES MELLITUS WITH DIABETIC NEUROPATHY (H): Primary | ICD-10-CM

## 2021-11-17 PROCEDURE — G0108 DIAB MANAGE TRN  PER INDIV: HCPCS | Performed by: DIETITIAN, REGISTERED

## 2021-11-17 NOTE — PROGRESS NOTES
Diabetes Self-Management Education & Support    Presents for: Follow-up    Type of Visit: In Person    How would patient like to obtain AVS? Jarett    ASSESSMENT:  Due to technical difficulties we were not able to download Lexy's Dexcom today. Lexy states that her blood sugars have still been high. She is taking 30 units of Tresiba and 4 units of Novolog with meals +sliding scale. At her last visit with Dr. Goss he recommended that she take +10 units if her Dexcom reading is HIGH. Lexy has only been taking 10 units, instead of 14 with HIGH readings. We discussed increasing Tresiba to 32 units and she will start taking 14 units with HIGH readings.       Patient's most recent   Lab Results   Component Value Date    A1C 10.7 10/13/2021    A1C 9.7 02/01/2021    is not meeting goal of <8.0    Diabetes knowledge and skills assessment:   Patient is knowledgeable in diabetes management concepts related to: none at this time    Continue education with the following diabetes management concepts: Healthy Eating, Being Active, Monitoring, Taking Medication, Problem Solving, Reducing Risks and Healthy Coping    Based on learning assessment above, most appropriate setting for further diabetes education would be: Individual setting.    INTERVENTIONS:    Education provided today on:  AADE Self-Care Behaviors:  Monitoring: purpose, log and interpret results, individual blood glucose targets and frequency of monitoring  Taking Medication: drawing up, administering and storing injectable diabetes medications and when to take medications  Problem Solving: high blood glucose - causes, signs/symptoms, treatment and prevention and low blood glucose - causes, signs/symptoms, treatment and prevention    Opportunities for ongoing education and support in diabetes-self management were discussed. Pt verbalized understanding of concepts discussed and recommendations provided today.       Education Materials Provided:  No new  "materials provided today          PLAN    Increase Tresiba: 0-0-0-30 --> 0-0-0-32  Topics to cover at upcoming visits: Monitoring, Taking Medication and Problem Solving  Follow-up: scheduled for 12/17    See Goals Section for co-developed, patient-stated behavior change goals.  AVS provided to patient today.          SUBJECTIVE / OBJECTIVE:  Presents for: Follow-up  Accompanied by: Self  Diabetes education in the past 24mo: Yes  Focus of Visit: Problem Solving,Monitoring,Taking Medication  Diabetes type: Type 1  Disease course: Improving  Transportation concerns: Yes (gets rides or takes public tranportation)  Difficulty affording diabetes medication?: No  Difficulty affording diabetes testing supplies?: No  Other concerns:: Cognitive impairment  Cultural Influences/Ethnic Background:  American    Diabetes Symptoms & Complications:  Fatigue: No  Neuropathy: Yes (in feet)  Polydipsia: No  Polyphagia: No  Polyuria: Sometimes (often getting up in the night)  Visual change: Sometimes  Slow healing wounds: No (a little cut near the toenails)  Symptom course: Stable  Weight trend: Decreasing  Complications assessed today?: No    Patient Problem List and Family Medical History reviewed for relevant medical history, current medical status, and diabetes risk factors.    Vitals:  LMP 03/28/2014   Estimated body mass index is 31.59 kg/m  as calculated from the following:    Height as of 10/22/21: 1.605 m (5' 3.19\").    Weight as of 10/22/21: 81.4 kg (179 lb 6.4 oz).   Last 3 BP:   BP Readings from Last 3 Encounters:   10/22/21 (!) 175/102   10/13/21 (!) 147/90   09/20/21 (!) 166/101       History   Smoking Status     Never Smoker   Smokeless Tobacco     Never Used     Comment: lives in smoke free household.       Labs:  Lab Results   Component Value Date    A1C 10.7 10/13/2021    A1C 9.7 02/01/2021     Lab Results   Component Value Date     10/13/2021     02/01/2021     Lab Results   Component Value Date    LDL " 120 02/01/2021     HDL Cholesterol   Date Value Ref Range Status   02/01/2021 62 >49 mg/dL Final   ]  GFR Estimate   Date Value Ref Range Status   10/13/2021 >90 >60 mL/min/1.73m2 Final     Comment:     As of July 11, 2021, eGFR is calculated by the CKD-EPI creatinine equation, without race adjustment. eGFR can be influenced by muscle mass, exercise, and diet. The reported eGFR is an estimation only and is only applicable if the renal function is stable.   02/01/2021 90 >60 mL/min/[1.73_m2] Final     Comment:     Non  GFR Calc  Starting 12/18/2018, serum creatinine based estimated GFR (eGFR) will be   calculated using the Chronic Kidney Disease Epidemiology Collaboration   (CKD-EPI) equation.       GFR Estimate If Black   Date Value Ref Range Status   02/01/2021 >90 >60 mL/min/[1.73_m2] Final     Comment:      GFR Calc  Starting 12/18/2018, serum creatinine based estimated GFR (eGFR) will be   calculated using the Chronic Kidney Disease Epidemiology Collaboration   (CKD-EPI) equation.       Lab Results   Component Value Date    CR 0.70 10/13/2021    CR 0.73 02/01/2021     No results found for: MICROALBUMIN    Healthy Eating:  Healthy Eating Assessed Today: Yes  Cultural/Worship diet restrictions?: No  Meals include: Breakfast,Lunch,Dinner,Afternoon Snack,Evening Snack  Breakfast: 8:00 am - 9:00 am Low sugar Oatmeal with some coffee OR cold cereal (low sugar)  Lunch: 12-12:30, pasta salad from grocery store OR sandwich  Dinner: 5:00 -6:00 pm: frozen meal OR leftovers  Snacks: Reeces peanut butter cups, unsweetened apple sauce, pop tart, chips  Other: drinks coffee with sugar free creamer  Beverages: Water,Coffee (Keep some juice on hand for lows)  Has patient met with a dietitian in the past?: Yes    Being Active:  Being Active Assessed Today: Yes (went on the bike at her apartment complex)  Exercise:: Yes  Days per week of moderate to strenuous exercise (like a brisk walk): 2  On  average, minutes per day of exercise at this level:  (Walking with friends)  Barrier to exercise: Safety    Monitoring:  Monitoring Assessed Today: Yes  Blood Glucose Meter: CGM  Times checking blood sugar at home (number): 5+  Times checking blood sugar at home (per): Day  Blood glucose trend: Fluctuating    Glucose data:  No BG available today, was not able to download Dexcom.      Taking Medications:  Diabetes Medication(s)     Biguanides       metFORMIN (GLUCOPHAGE-XR) 500 MG 24 hr tablet    Take 4-tablets by mouth daily as directed    Insulin       insulin aspart (NOVOLOG FLEXPEN) 100 UNIT/ML pen    Inject 16 units with breakfast, 22 units with lunch and 26 units with dinner, plus correction scale. dose premeal as directed, TDD: approx 100 units     insulin degludec (TRESIBA FLEXTOUCH) 200 UNIT/ML pen    Inject 32 Units Subcutaneous daily          Taking Medication Assessed Today: Yes  Current Treatments: Insulin Injections  Dose schedule: Pre-breakfast,Pre-lunch,Pre-dinner  Given by: Patient  Injection/Infusion sites: Abdomen    Problem Solving:  Problem Solving Assessed Today: Yes  Is the patient at risk for hypoglycemia?: Yes  Hypoglycemia Frequency: Monthly  Hypoglycemia Treatment: Glucose (tablets or gel),Other food  Patient carries a carbohydrate source: Yes  Medical ID: Yes  Is the patient at risk for DKA?: Yes  Does patient have ketone test strips?: Yes  Does patient have DKA prevention plan?: Yes  Does patient have severe weather/disaster plan for diabetes management?: No  Does patient have sick day plan for diabetes management?: Yes    Hypoglycemia symptoms  Confusion: No  Dizziness or Light-Headedness: No  Headaches: No  Hunger: No  Mood changes: No  Nervousness/Anxiety: No  Sleepiness: No  Speech difficulty: Yes  Sweats: Yes  Feeling shaky: Yes    Hypoglycemia Complications  Blackouts: No  Hospitalization: No  Nocturnal hypoglycemia: Yes  Required assistance: No  Seizures: No    Reducing  Risks:  Reducing Risks Assessed Today: No  CAD Risks: Diabetes Mellitus  Has dilated eye exam at least once a year?: Yes  Sees dentist every 6 months?: Yes  Feet checked by healthcare provider in the last year?: Yes    Healthy Coping:  Healthy Coping Assessed Today: Yes  Emotional response to diabetes: Acceptance  Informal Support system:: Family,Parent  Stage of change: ACTION (Actively working towards change)  Support resources: Offerings in Clinic Communities  Patient Activation Measure Survey Score:  KHUSHI Score (Last Two) 3/7/2012   KHUSHI Raw Score 39   Activation Score 56.4   KHUSHI Level 3     Tiffanie Mcfarland, KARLOS, LD, CDE  Time Spent: 30 minutes  Encounter Type: Individual        Any diabetes medication dose changes were made via the Certified Diabetes Care & Education Protocol in collaboration with the patient's referring provider. A copy of this encounter was shared with the provider.

## 2021-11-17 NOTE — PATIENT INSTRUCTIONS
Insulin Plan:  Novolog  Inject subcutaneous. 4 U with meals plus...  150-199: +1 unit  200-249: +2 units  250-299: +3 units  300-349: +4 units  350-399: +5 units  400+: +6 units and call doctor    HIGH (no number): +10 units    Tresiba: Take 32 units    Tiffanie Mcfarland RD, LD, CDE

## 2021-11-17 NOTE — LETTER
11/17/2021         RE: Lexy Rockwell  [address]  [facility] [address]        Dear Colleague,    Thank you for referring your patient, Lexy Rockwell, to the [facility]. Please see a copy of my visit note below.    Diabetes Self-Management Education & Support    Presents for: Follow-up    Type of Visit: In Person    How would patient like to obtain AVS? MyChart    ASSESSMENT:  Due to technical difficulties we were not able to download Lexy's Dexcom today. Lexy states that her blood sugars have still been high. She is taking 30 units of Tresiba and 4 units of Novolog with meals +sliding scale. At her last visit with Dr. Goss he recommended that she take +10 units if her Dexcom reading is HIGH. Lexy has only been taking 10 units, instead of 14 with HIGH readings. We discussed increasing Tresiba to 32 units and she will start taking 14 units with HIGH readings.       Patient's most recent   Lab Results   Component Value Date    A1C 10.7 10/13/2021    A1C 9.7 02/01/2021    is not meeting goal of <8.0    Diabetes knowledge and skills assessment:   Patient is knowledgeable in diabetes management concepts related to: none at this time    Continue education with the following diabetes management concepts: Healthy Eating, Being Active, Monitoring, Taking Medication, Problem Solving, Reducing Risks and Healthy Coping    Based on learning assessment above, most appropriate setting for further diabetes education would be: Individual setting.    INTERVENTIONS:    Education provided today on:  AADE Self-Care Behaviors:  Monitoring: purpose, log and interpret results, individual blood glucose targets and frequency of monitoring  Taking Medication: drawing up, administering and storing injectable diabetes medications and when to take medications  Problem Solving: high blood glucose - causes, signs/symptoms, treatment and prevention and low blood  "glucose - causes, signs/symptoms, treatment and prevention    Opportunities for ongoing education and support in diabetes-self management were discussed. Pt verbalized understanding of concepts discussed and recommendations provided today.       Education Materials Provided:  No new materials provided today          PLAN    Increase Tresiba: 0-0-0-30 --> 0-0-0-32  Topics to cover at upcoming visits: Monitoring, Taking Medication and Problem Solving  Follow-up: scheduled for 12/17    See Goals Section for co-developed, patient-stated behavior change goals.  AVS provided to patient today.          SUBJECTIVE / OBJECTIVE:  Presents for: Follow-up  Accompanied by: Self  Diabetes education in the past 24mo: Yes  Focus of Visit: Problem Solving,Monitoring,Taking Medication  Diabetes type: Type 1  Disease course: Improving  Transportation concerns: Yes (gets rides or takes public tranportation)  Difficulty affording diabetes medication?: No  Difficulty affording diabetes testing supplies?: No  Other concerns:: Cognitive impairment  Cultural Influences/Ethnic Background:  American    Diabetes Symptoms & Complications:  Fatigue: No  Neuropathy: Yes (in feet)  Polydipsia: No  Polyphagia: No  Polyuria: Sometimes (often getting up in the night)  Visual change: Sometimes  Slow healing wounds: No (a little cut near the toenails)  Symptom course: Stable  Weight trend: Decreasing  Complications assessed today?: No    Patient Problem List and Family Medical History reviewed for relevant medical history, current medical status, and diabetes risk factors.    Vitals:  [facility] 03/28/2014   Estimated body mass index is 31.59 kg/m  as calculated from the following:    Height as of 10/22/21: 1.605 m (5' 3.19\").    Weight as of 10/22/21: 81.4 kg (179 lb 6.4 oz).   Last 3 BP:   BP Readings from Last 3 Encounters:   10/22/21 (!) 175/102   10/13/21 (!) 147/90   09/20/21 (!) 166/101       History   Smoking Status     Never Smoker   Smokeless " Tobacco     Never Used     Comment: lives in smoke free household.       Labs:  Lab Results   Component Value Date    A1C 10.7 10/13/2021    A1C 9.7 02/01/2021     Lab Results   Component Value Date     10/13/2021     02/01/2021     Lab Results   Component Value Date     02/01/2021     HDL Cholesterol   Date Value Ref Range Status   02/01/2021 62 >49 mg/dL Final   ]  GFR Estimate   Date Value Ref Range Status   10/13/2021 >90 >60 mL/min/1.73m2 Final     Comment:     As of July 11, 2021, eGFR is calculated by the CKD-EPI creatinine equation, without race adjustment. eGFR can be influenced by muscle mass, exercise, and diet. The reported eGFR is an estimation only and is only applicable if the renal function is stable.   02/01/2021 90 >60 mL/min/[1.73_m2] Final     Comment:     Non  GFR Calc  Starting 12/18/2018, serum creatinine based estimated GFR (eGFR) will be   calculated using the Chronic Kidney Disease Epidemiology Collaboration   (CKD-EPI) equation.       GFR Estimate If Black   Date Value Ref Range Status   02/01/2021 >90 >60 mL/min/[1.73_m2] Final     Comment:      GFR Calc  Starting 12/18/2018, serum creatinine based estimated GFR (eGFR) will be   calculated using the Chronic Kidney Disease Epidemiology Collaboration   (CKD-EPI) equation.       Lab Results   Component Value Date    CR 0.70 10/13/2021    CR 0.73 02/01/2021     No results found for: MICROALBUMIN    Healthy Eating:  Healthy Eating Assessed Today: Yes  Cultural/Latter day diet restrictions?: No  Meals include: Breakfast,Lunch,Dinner,Afternoon Snack,Evening Snack  Breakfast: 8:00 am - 9:00 am Low sugar Oatmeal with some coffee OR cold cereal (low sugar)  Lunch: 12-12:30, pasta salad from grocery store OR sandwich  Dinner: 5:00 -6:00 pm: frozen meal OR leftovers  Snacks: Reeces peanut butter cups, unsweetened apple sauce, pop tart, chips  Other: drinks coffee with sugar free creamer  Beverages:  Water,Coffee (Keep some juice on hand for lows)  Has patient met with a dietitian in the past?: Yes    Being Active:  Being Active Assessed Today: Yes (went on the bike at her apartment complex)  Exercise:: Yes  Days per week of moderate to strenuous exercise (like a brisk walk): 2  On average, minutes per day of exercise at this level:  (Walking with friends)  Barrier to exercise: Safety    Monitoring:  Monitoring Assessed Today: Yes  Blood Glucose Meter: CGM  Times checking blood sugar at home (number): 5+  Times checking blood sugar at home (per): Day  Blood glucose trend: Fluctuating    Glucose data:  No BG available today, was not able to download Dexcom.      Taking Medications:  Diabetes Medication(s)     Biguanides       metFORMIN (GLUCOPHAGE-XR) 500 MG 24 hr tablet    Take 4-tablets by mouth daily as directed    Insulin       insulin aspart (NOVOLOG FLEXPEN) 100 UNIT/ML pen    Inject 16 units with breakfast, 22 units with lunch and 26 units with dinner, plus correction scale. dose premeal as directed, TDD: approx 100 units     insulin degludec (TRESIBA FLEXTOUCH) 200 UNIT/ML pen    Inject 32 Units Subcutaneous daily          Taking Medication Assessed Today: Yes  Current Treatments: Insulin Injections  Dose schedule: Pre-breakfast,Pre-lunch,Pre-dinner  Given by: Patient  Injection/Infusion sites: Abdomen    Problem Solving:  Problem Solving Assessed Today: Yes  Is the patient at risk for hypoglycemia?: Yes  Hypoglycemia Frequency: Monthly  Hypoglycemia Treatment: Glucose (tablets or gel),Other food  Patient carries a carbohydrate source: Yes  Medical ID: Yes  Is the patient at risk for DKA?: Yes  Does patient have ketone test strips?: Yes  Does patient have DKA prevention plan?: Yes  Does patient have severe weather/disaster plan for diabetes management?: No  Does patient have sick day plan for diabetes management?: Yes    Hypoglycemia symptoms  Confusion: No  Dizziness or Light-Headedness: No  Headaches:  No  Hunger: No  Mood changes: No  Nervousness/Anxiety: No  Sleepiness: No  Speech difficulty: Yes  Sweats: Yes  Feeling shaky: Yes    Hypoglycemia Complications  Blackouts: No  Hospitalization: No  Nocturnal hypoglycemia: Yes  Required assistance: No  Seizures: No    Reducing Risks:  Reducing Risks Assessed Today: No  CAD Risks: Diabetes Mellitus  Has dilated eye exam at least once a year?: Yes  Sees dentist every 6 months?: Yes  Feet checked by healthcare provider in the last year?: Yes    Healthy Coping:  Healthy Coping Assessed Today: Yes  Emotional response to diabetes: Acceptance  Informal Support system:: Family,Parent  Stage of change: ACTION (Actively working towards change)  Support resources: Offerings in Clinic Communities  Patient Activation Measure Survey Score:  KHUSHI Score (Last Two) 3/7/2012   KHUSHI Raw Score 39   Activation Score 56.4   KHUSHI Level 3     Tiffanie Mcfarland RD, LD, CDE  Time Spent: 30 minutes  Encounter Type: Individual        Any diabetes medication dose changes were made via the Certified Diabetes Care & Education Protocol in collaboration with the patient's referring provider. A copy of this encounter was shared with the provider.

## 2021-11-24 DIAGNOSIS — E10.69 TYPE 1 DIABETES MELLITUS WITH OTHER SPECIFIED COMPLICATION (H): ICD-10-CM

## 2021-11-24 RX ORDER — PROCHLORPERAZINE 25 MG/1
SUPPOSITORY RECTAL
Qty: 3 EACH | Refills: 11 | Status: SHIPPED | OUTPATIENT
Start: 2021-11-24 | End: 2022-06-09

## 2021-11-24 NOTE — TELEPHONE ENCOUNTER
Routing refill request to provider for review/approval because:  Drug not on the [facility] refill protocol       Requested Prescriptions   Pending Prescriptions Disp Refills     Continuous Blood Gluc Sensor (DEXCOM G6 SENSOR) MISC 3 each 11     Sig: USE AS DIRECTED 1 EVERY 10 DAYS       There is no refill protocol information for this order

## 2021-12-17 ENCOUNTER — VIRTUAL VISIT (OUTPATIENT)
Dept: EDUCATION SERVICES | Facility: CLINIC | Age: 61
End: 2021-12-17
Payer: MEDICARE

## 2021-12-17 DIAGNOSIS — E10.40 TYPE 1 DIABETES MELLITUS WITH DIABETIC NEUROPATHY (H): ICD-10-CM

## 2021-12-17 DIAGNOSIS — E11.42 TYPE 2 DIABETES MELLITUS WITH DIABETIC POLYNEUROPATHY, WITH LONG-TERM CURRENT USE OF INSULIN (H): ICD-10-CM

## 2021-12-17 DIAGNOSIS — Z79.4 TYPE 2 DIABETES MELLITUS WITH DIABETIC POLYNEUROPATHY, WITH LONG-TERM CURRENT USE OF INSULIN (H): ICD-10-CM

## 2021-12-17 DIAGNOSIS — E10.69 TYPE 1 DIABETES MELLITUS WITH OTHER SPECIFIED COMPLICATION (H): ICD-10-CM

## 2021-12-17 PROCEDURE — 98966 PH1 ASSMT&MGMT NQHP 5-10: CPT | Mod: 95 | Performed by: DIETITIAN, REGISTERED

## 2021-12-17 RX ORDER — INSULIN DEGLUDEC 200 U/ML
35 INJECTION, SOLUTION SUBCUTANEOUS DAILY
Qty: 15 ML | Refills: 11 | Status: SHIPPED | OUTPATIENT
Start: 2021-12-17 | End: 2022-01-10

## 2021-12-17 RX ORDER — INSULIN ASPART 100 [IU]/ML
INJECTION, SOLUTION INTRAVENOUS; SUBCUTANEOUS
Qty: 30 ML | Refills: 11 | Status: SHIPPED | OUTPATIENT
Start: 2021-12-17 | End: 2022-01-10

## 2021-12-17 NOTE — LETTER
12/17/2021         RE: Lexy Rockwell  [address]  [place] [address]        Dear Colleague,    Thank you for referring your patient, Lexy Rockwell, to the [facility]. Please see a copy of my visit note below.    Diabetes Follow-up    Subjective/Objective:    Telephone visit for BG review    Diabetes is being managed with   Diabetes Medications   Diabetes Medication(s)     Biguanides       metFORMIN (GLUCOPHAGE-XR) 500 MG 24 hr tablet    Take 4-tablets by mouth daily as directed    Insulin       insulin aspart (NOVOLOG FLEXPEN) 100 UNIT/ML pen    Inject 16 units with breakfast, 22 units with lunch and 26 units with dinner, plus correction scale. dose premeal as directed, TDD: approx 100 units     insulin degludec (TRESIBA FLEXTOUCH) 200 UNIT/ML pen    Inject 32 Units Subcutaneous daily      Lexy actually taking 4 units of Novolog with Sliding scale    BG/Food Log:   Date Breakfast  Lunch  Dinner  Bedtime    Before After Before After Before After    12/17 268  162       12/16 HIGH  281  HIGH     12/15 HIGH  203       12/14 77  102       12/13 269  162  120     12/12 123  91  395     12/11 162  148       12/10 HIGH  HIGH  HIGH     12/9 HIGH  283       12/8 240  105       12/7 374  325  HIGH     12/6 314  253       12/5   139  169     12/4 238  198       12/3 395  227  309         Assessment:  Fasting blood glucose: 0% in target.  Before lunch glucose: 20% in target.  Before dinner glucose: 14% in target.    Plan/Response:  Increase insulin:   Tresiba: 32 --> 35 units  Novolog:   Inject subcutaneous. 4 U with meals plus... --> 5 units with meals plus sliding scale  150-199: +1 unit  200-249: +2 units  250-299: +3 units  300-349: +4 units  350-399: +5 units  400+: +6 units and call doctor    HIGH (no number): +10 units    Tiffanie Mcfarland RD, LD, CDE  Time spent: 10 minutes    Any diabetes medication dose changes were made via the CDE Protocol and  Collaborative Practice Agreement with the patient's referring provider. A copy of this encounter was shared with the provider.

## 2021-12-17 NOTE — PROGRESS NOTES
Diabetes Follow-up    Subjective/Objective:    Telephone visit for BG review    Diabetes is being managed with   Diabetes Medications   Diabetes Medication(s)     Biguanides       metFORMIN (GLUCOPHAGE-XR) 500 MG 24 hr tablet    Take 4-tablets by mouth daily as directed    Insulin       insulin aspart (NOVOLOG FLEXPEN) 100 UNIT/ML pen    Inject 16 units with breakfast, 22 units with lunch and 26 units with dinner, plus correction scale. dose premeal as directed, TDD: approx 100 units     insulin degludec (TRESIBA FLEXTOUCH) 200 UNIT/ML pen    Inject 32 Units Subcutaneous daily      Lexy actually taking 4 units of Novolog with Sliding scale    BG/Food Log:   Date Breakfast  Lunch  Dinner  Bedtime    Before After Before After Before After    12/17 268  162       12/16 HIGH  281  HIGH     12/15 HIGH  203       12/14 77  102       12/13 269  162  120     12/12 123  91  395     12/11 162  148       12/10 HIGH  HIGH  HIGH     12/9 HIGH  283       12/8 240  105       12/7 374  325  HIGH     12/6 314  253       12/5   139  169     12/4 238  198       12/3 395  227  309         Assessment:  Fasting blood glucose: 0% in target.  Before lunch glucose: 20% in target.  Before dinner glucose: 14% in target.    Plan/Response:  Increase insulin:   Tresiba: 32 --> 35 units  Novolog:   Inject subcutaneous. 4 U with meals plus... --> 5 units with meals plus sliding scale  150-199: +1 unit  200-249: +2 units  250-299: +3 units  300-349: +4 units  350-399: +5 units  400+: +6 units and call doctor    HIGH (no number): +10 units    Tiffanie Mcfarland RD, LD, CDE  Time spent: 10 minutes    Any diabetes medication dose changes were made via the CDE Protocol and Collaborative Practice Agreement with the patient's referring provider. A copy of this encounter was shared with the provider.

## 2021-12-22 ENCOUNTER — OFFICE VISIT (OUTPATIENT)
Dept: PODIATRY | Facility: CLINIC | Age: 61
End: 2021-12-22
Payer: MEDICARE

## 2021-12-22 VITALS — SYSTOLIC BLOOD PRESSURE: 188 MMHG | OXYGEN SATURATION: 100 % | HEART RATE: 108 BPM | DIASTOLIC BLOOD PRESSURE: 108 MMHG

## 2021-12-22 DIAGNOSIS — E10.42 DM TYPE 1 WITH DIABETIC PERIPHERAL NEUROPATHY (H): ICD-10-CM

## 2021-12-22 DIAGNOSIS — L60.0 INGROWN TOENAIL: ICD-10-CM

## 2021-12-22 DIAGNOSIS — L84 TYLOMA: ICD-10-CM

## 2021-12-22 DIAGNOSIS — B35.1 DERMATOPHYTOSIS OF NAIL: ICD-10-CM

## 2021-12-22 DIAGNOSIS — E10.628 TYPE 1 DIABETES MELLITUS WITH PRESSURE CALLUS (H): ICD-10-CM

## 2021-12-22 DIAGNOSIS — L84 TYPE 1 DIABETES MELLITUS WITH PRESSURE CALLUS (H): ICD-10-CM

## 2021-12-22 PROCEDURE — 99213 OFFICE O/P EST LOW 20 MIN: CPT | Mod: 25 | Performed by: PODIATRIST

## 2021-12-22 PROCEDURE — 11056 PARNG/CUTG B9 HYPRKR LES 2-4: CPT | Performed by: PODIATRIST

## 2021-12-22 RX ORDER — CICLOPIROX OLAMINE 7.7 MG/G
CREAM TOPICAL 2 TIMES DAILY
Qty: 90 G | Refills: 5 | Status: SHIPPED | OUTPATIENT
Start: 2021-12-22 | End: 2023-06-13

## 2021-12-22 ASSESSMENT — PAIN SCALES - GENERAL: PAINLEVEL: NO PAIN (0)

## 2021-12-22 NOTE — NURSING NOTE
Lexy Rockwell's chief complaint for this visit includes:  Chief Complaint   Patient presents with     RECHECK     tyloma and toenails     PCP: Fredi Fuentes    Referring Provider:  No referring provider defined for this encounter.    BP (!) 188/108 (BP Location: Left arm, Patient Position: Sitting, Cuff Size: Adult Regular)   Pulse 108   LMP 03/28/2014   SpO2 100%   No Pain (0)     Do you need any medication refills at today's visit? YES cream     Allergies   Allergen Reactions     Amoxicillin      Sulfa Drugs        Ruy Schaffer MA

## 2021-12-22 NOTE — PROGRESS NOTES
Past Medical History:   Diagnosis Date     Cerumen impacted      Development delay      Diabetic gastroparesis (H) 8/16/2013     Glaucoma (increased eye pressure)      Hyperlipaemia      Hypertension      Hypothyroid      Mental retardation      Nonsenile cataract      Obesity      Strabismus      Type 1 diabetes mellitus not at goal (H)      Patient Active Problem List   Diagnosis     Development delay     Overactive bladder     GERD (gastroesophageal reflux disease)     Hypertension goal BP (blood pressure) < 140/90     Hyperlipidemia LDL goal <100     Pain in joint, lower leg     Proteinuria     Vitamin D deficiency     Health Care Home     History of strabismus surgery     Type 1 diabetes mellitus with other specified complication (H)     Advanced directives, counseling/discussion     Primary open angle glaucoma of both eyes, moderate stage     Gastroesophageal reflux disease without esophagitis     Obesity, unspecified obesity severity, unspecified obesity type     Acute renal failure (H)     Acute retention of urine     JEAN (acute kidney injury) (H)     Dehydration     Diabetic ketoacidosis without coma associated with type 1 diabetes mellitus (H)     DKA (diabetic ketoacidoses)     Sinus tachycardia     SIRS (systemic inflammatory response syndrome) (H)     Obesity (BMI 35.0-39.9) with comorbidity (H)     Background diabetic retinopathy, mild-mod, ou     Combined forms of age-related cataract, mild, of both eyes     Past Surgical History:   Procedure Laterality Date     C EYE SURG ANT SGMT PROC UNLISTED  remote    strabismus surgery     STRABISMUS SURGERY       Social History     Socioeconomic History     Marital status: Single     Spouse name: Not on file     Number of children: Not on file     Years of education: Not on file     Highest education level: Not on file   Occupational History     Not on file   Tobacco Use     Smoking status: Never Smoker     Smokeless tobacco: Never Used     Tobacco comment:  lives in smoke free household.   Substance and Sexual Activity     Alcohol use: Yes     Comment: occass social     Drug use: No     Sexual activity: Never   Other Topics Concern     Parent/sibling w/ CABG, MI or angioplasty before 65F 55M? No   Social History Narrative     Not on file     Social Determinants of Health     Financial Resource Strain: Not on file   Food Insecurity: Not on file   Transportation Needs: Not on file   Physical Activity: Not on file   Stress: Not on file   Social Connections: Not on file   Intimate Partner Violence: Not on file   Housing Stability: Not on file     Family History   Problem Relation Age of Onset     Hypertension Father      Diabetes Sister      Glaucoma Maternal Grandfather      Cancer No family hx of      Cerebrovascular Disease No family hx of      Thyroid Disease No family hx of      Macular Degeneration No family hx of      Lab Results   Component Value Date    A1C 10.7 10/13/2021    A1C 9.7 02/01/2021    A1C 9.2 09/30/2020    A1C 11.8 01/08/2020    A1C 11.8 05/19/2019    A1C 11.2 12/12/2018     Lab Results   Component Value Date    WBC 4.3 10/13/2021    WBC 4.3 09/30/2020     Lab Results   Component Value Date    RBC 4.36 10/13/2021    RBC 4.50 09/30/2020     Lab Results   Component Value Date    HGB 12.0 10/13/2021    HGB 11.9 09/30/2020     Lab Results   Component Value Date    HCT 37.1 10/13/2021    HCT 35.9 09/30/2020     No components found for: MCT  Lab Results   Component Value Date    MCV 85 10/13/2021    MCV 80 09/30/2020     Lab Results   Component Value Date    MCH 27.5 10/13/2021    MCH 26.4 09/30/2020     Lab Results   Component Value Date    MCHC 32.3 10/13/2021    MCHC 33.1 09/30/2020     Lab Results   Component Value Date    RDW 14.3 10/13/2021    RDW 14.7 09/30/2020     Lab Results   Component Value Date     10/13/2021     09/30/2020     Last Comprehensive Metabolic Panel:  Sodium   Date Value Ref Range Status   10/13/2021 141 133 - 144  mmol/L Final   02/01/2021 141 133 - 144 mmol/L Final     Potassium   Date Value Ref Range Status   10/13/2021 4.2 3.4 - 5.3 mmol/L Final   02/01/2021 4.1 3.4 - 5.3 mmol/L Final     Chloride   Date Value Ref Range Status   10/13/2021 107 94 - 109 mmol/L Final   02/01/2021 107 94 - 109 mmol/L Final     Carbon Dioxide   Date Value Ref Range Status   02/01/2021 32 20 - 32 mmol/L Final     Carbon Dioxide (CO2)   Date Value Ref Range Status   10/13/2021 26 20 - 32 mmol/L Final     Anion Gap   Date Value Ref Range Status   10/13/2021 8 3 - 14 mmol/L Final   02/01/2021 2 (L) 3 - 14 mmol/L Final     Glucose   Date Value Ref Range Status   10/13/2021 172 (H) 70 - 99 mg/dL Final   02/01/2021 139 (H) 70 - 99 mg/dL Final     Comment:     Fasting specimen     Urea Nitrogen   Date Value Ref Range Status   10/13/2021 13 7 - 30 mg/dL Final   02/01/2021 17 7 - 30 mg/dL Final     Creatinine   Date Value Ref Range Status   10/13/2021 0.70 0.52 - 1.04 mg/dL Final   02/01/2021 0.73 0.52 - 1.04 mg/dL Final     GFR Estimate   Date Value Ref Range Status   10/13/2021 >90 >60 mL/min/1.73m2 Final     Comment:     As of July 11, 2021, eGFR is calculated by the CKD-EPI creatinine equation, without race adjustment. eGFR can be influenced by muscle mass, exercise, and diet. The reported eGFR is an estimation only and is only applicable if the renal function is stable.   02/01/2021 90 >60 mL/min/[1.73_m2] Final     Comment:     Non  GFR Calc  Starting 12/18/2018, serum creatinine based estimated GFR (eGFR) will be   calculated using the Chronic Kidney Disease Epidemiology Collaboration   (CKD-EPI) equation.       Calcium   Date Value Ref Range Status   10/13/2021 10.0 8.5 - 10.1 mg/dL Final   02/01/2021 10.3 (H) 8.5 - 10.1 mg/dL Final     Lab Results   Component Value Date    AST 13 10/13/2021    AST 19 09/30/2020     Lab Results   Component Value Date    ALT 18 10/13/2021    ALT 18 09/30/2020     No results found for: BILICONJ    Lab Results   Component Value Date    BILITOTAL 0.3 10/13/2021    BILITOTAL 0.5 09/30/2020     Lab Results   Component Value Date    ALBUMIN 3.3 10/13/2021    ALBUMIN 3.4 09/30/2020     Lab Results   Component Value Date    PROTTOTAL 6.9 10/13/2021    PROTTOTAL 7.1 09/30/2020      Lab Results   Component Value Date    ALKPHOS 96 10/13/2021    ALKPHOS 125 09/30/2020     SUBJECTIVE FINDINGS:  A 61-year-old female returns to clinic for onychomycosis, onychauxis and tyloma and Diabetic foot cares.  She is diabetic with peripheral neuropathy and vascular disease.  She relates she has numbness, tingling and neuropathy in her feet.  She relates to no ulcers or sores since we have seen her last.  She is not wearing diabetic shoes and relates she does not want to get any diabetic shoes.  She relates she needs her toenails cut as well.        OBJECTIVE FINDINGS:  DP and PT 2/4 bilaterally.  She has dorsally contracted digits 1-5 bilaterally with decreased ankle joint dorsiflexion bilaterally and generalized decreased range of motion bilaterally.  She has some peripheral edema bilaterally and decreased hair growth bilaterally.  There is no erythema, no drainage, no odor, no calor bilaterally.  No gross tendon voids bilaterally.  She has nucleated hyperkeratotic tissue buildup on the left plantar first MPJ.  She has hyperkeratotic tissue build up with ecchymosis distal left second toe.  There is incurvated nails bilaterally to differing degrees.        ASSESSMENT/PLAN:  Onychauxis bilaterally.  Tylomas on the left foot with preulcerative changes on second toe.  She is diabetic with peripheral neuropathy and peripheral vascular disease.  Diagnosis and treatment options discussed with her.  All the nails were debrided or reduced bilaterally upon consent.  The tylomas on the left foot were sharp debrided with a #15 blade and tissue cutter upon consent.  The left medial Hallux nail borders bled upon debridement and local wound  care done with Bacitracin and bandaid and use discussed with her.  Return to clinic and see me in about 2-3 months.  Previous notes reviewed.      Moderate level of medical decision making.

## 2021-12-22 NOTE — LETTER
12/22/2021         RE: Lexy Rockwell  [address]  [facility] [address]        Dear Colleague,    Thank you for referring your patient, Lexy Rockwell, to the [facility]. Please see a copy of my visit note below.    Past Medical History:   Diagnosis Date     Cerumen impacted      Development delay      Diabetic gastroparesis (H) 8/16/2013     Glaucoma (increased eye pressure)      Hyperlipaemia      Hypertension      Hypothyroid      Mental retardation      Nonsenile cataract      Obesity      Strabismus      Type 1 diabetes mellitus not at goal (H)      Patient Active Problem List   Diagnosis     Development delay     Overactive bladder     GERD (gastroesophageal reflux disease)     Hypertension goal BP (blood pressure) < 140/90     Hyperlipidemia LDL goal <100     Pain in joint, lower leg     Proteinuria     Vitamin D deficiency     Health Care Home     History of strabismus surgery     Type 1 diabetes mellitus with other specified complication (H)     Advanced directives, counseling/discussion     Primary open angle glaucoma of both eyes, moderate stage     Gastroesophageal reflux disease without esophagitis     Obesity, unspecified obesity severity, unspecified obesity type     Acute renal failure (H)     Acute retention of urine     JEAN (acute kidney injury) (H)     Dehydration     Diabetic ketoacidosis without coma associated with type 1 diabetes mellitus (H)     DKA (diabetic ketoacidoses)     Sinus tachycardia     SIRS (systemic inflammatory response syndrome) (H)     Obesity (BMI 35.0-39.9) with comorbidity (H)     Background diabetic retinopathy, mild-mod, ou     Combined forms of age-related cataract, mild, of both eyes     Past Surgical History:   Procedure Laterality Date     C EYE SURG ANT SGMT PROC UNLISTED  remote    strabismus surgery     STRABISMUS SURGERY       Social History     Socioeconomic History     Marital status: Single      Spouse name: Not on file     Number of children: Not on file     Years of education: Not on file     Highest education level: Not on file   Occupational History     Not on file   Tobacco Use     Smoking status: Never Smoker     Smokeless tobacco: Never Used     Tobacco comment: lives in smoke free household.   Substance and Sexual Activity     Alcohol use: Yes     Comment: occass social     Drug use: No     Sexual activity: Never   Other Topics Concern     Parent/sibling w/ CABG, MI or angioplasty before 65F 55M? No   Social History Narrative     Not on file     Social Determinants of Health     Financial Resource Strain: Not on file   Food Insecurity: Not on file   Transportation Needs: Not on file   Physical Activity: Not on file   Stress: Not on file   Social Connections: Not on file   Intimate Partner Violence: Not on file   Housing Stability: Not on file     Family History   Problem Relation Age of Onset     Hypertension Father      Diabetes Sister      Glaucoma Maternal Grandfather      Cancer No family hx of      Cerebrovascular Disease No family hx of      Thyroid Disease No family hx of      Macular Degeneration No family hx of      Lab Results   Component Value Date    A1C 10.7 10/13/2021    A1C 9.7 02/01/2021    A1C 9.2 09/30/2020    A1C 11.8 01/08/2020    A1C 11.8 05/19/2019    A1C 11.2 12/12/2018     Lab Results   Component Value Date    WBC 4.3 10/13/2021    WBC 4.3 09/30/2020     Lab Results   Component Value Date    RBC 4.36 10/13/2021    RBC 4.50 09/30/2020     Lab Results   Component Value Date    HGB 12.0 10/13/2021    HGB 11.9 09/30/2020     Lab Results   Component Value Date    HCT 37.1 10/13/2021    HCT 35.9 09/30/2020     No components found for: MCT  Lab Results   Component Value Date    MCV 85 10/13/2021    MCV 80 09/30/2020     Lab Results   Component Value Date    MCH 27.5 10/13/2021    MCH 26.4 09/30/2020     Lab Results   Component Value Date    MCHC 32.3 10/13/2021    MCHC 33.1  09/30/2020     Lab Results   Component Value Date    RDW 14.3 10/13/2021    RDW 14.7 09/30/2020     Lab Results   Component Value Date     10/13/2021     09/30/2020     Last Comprehensive Metabolic Panel:  Sodium   Date Value Ref Range Status   10/13/2021 141 133 - 144 mmol/L Final   02/01/2021 141 133 - 144 mmol/L Final     Potassium   Date Value Ref Range Status   10/13/2021 4.2 3.4 - 5.3 mmol/L Final   02/01/2021 4.1 3.4 - 5.3 mmol/L Final     Chloride   Date Value Ref Range Status   10/13/2021 107 94 - 109 mmol/L Final   02/01/2021 107 94 - 109 mmol/L Final     Carbon Dioxide   Date Value Ref Range Status   02/01/2021 32 20 - 32 mmol/L Final     Carbon Dioxide (CO2)   Date Value Ref Range Status   10/13/2021 26 20 - 32 mmol/L Final     Anion Gap   Date Value Ref Range Status   10/13/2021 8 3 - 14 mmol/L Final   02/01/2021 2 (L) 3 - 14 mmol/L Final     Glucose   Date Value Ref Range Status   10/13/2021 172 (H) 70 - 99 mg/dL Final   02/01/2021 139 (H) 70 - 99 mg/dL Final     Comment:     Fasting specimen     Urea Nitrogen   Date Value Ref Range Status   10/13/2021 13 7 - 30 mg/dL Final   02/01/2021 17 7 - 30 mg/dL Final     Creatinine   Date Value Ref Range Status   10/13/2021 0.70 0.52 - 1.04 mg/dL Final   02/01/2021 0.73 0.52 - 1.04 mg/dL Final     GFR Estimate   Date Value Ref Range Status   10/13/2021 >90 >60 mL/min/1.73m2 Final     Comment:     As of July 11, 2021, eGFR is calculated by the CKD-EPI creatinine equation, without race adjustment. eGFR can be influenced by muscle mass, exercise, and diet. The reported eGFR is an estimation only and is only applicable if the renal function is stable.   02/01/2021 90 >60 mL/min/[1.73_m2] Final     Comment:     Non  GFR Calc  Starting 12/18/2018, serum creatinine based estimated GFR (eGFR) will be   calculated using the Chronic Kidney Disease Epidemiology Collaboration   (CKD-EPI) equation.       Calcium   Date Value Ref Range Status    10/13/2021 10.0 8.5 - 10.1 mg/dL Final   02/01/2021 10.3 (H) 8.5 - 10.1 mg/dL Final     Lab Results   Component Value Date    AST 13 10/13/2021    AST 19 09/30/2020     Lab Results   Component Value Date    ALT 18 10/13/2021    ALT 18 09/30/2020     No results found for: BILICONJ   Lab Results   Component Value Date    BILITOTAL 0.3 10/13/2021    BILITOTAL 0.5 09/30/2020     Lab Results   Component Value Date    ALBUMIN 3.3 10/13/2021    ALBUMIN 3.4 09/30/2020     Lab Results   Component Value Date    PROTTOTAL 6.9 10/13/2021    PROTTOTAL 7.1 09/30/2020      Lab Results   Component Value Date    ALKPHOS 96 10/13/2021    ALKPHOS 125 09/30/2020     SUBJECTIVE FINDINGS:  A 61-year-old female returns to clinic for onychomycosis, onychauxis and tyloma and Diabetic foot cares.  She is diabetic with peripheral neuropathy and vascular disease.  She relates she has numbness, tingling and neuropathy in her feet.  She relates to no ulcers or sores since we have seen her last.  She is not wearing diabetic shoes and relates she does not want to get any diabetic shoes.  She relates she needs her toenails cut as well.        OBJECTIVE FINDINGS:  DP and PT 2/4 bilaterally.  She has dorsally contracted digits 1-5 bilaterally with decreased ankle joint dorsiflexion bilaterally and generalized decreased range of motion bilaterally.  She has some peripheral edema bilaterally and decreased hair growth bilaterally.  There is no erythema, no drainage, no odor, no calor bilaterally.  No gross tendon voids bilaterally.  She has nucleated hyperkeratotic tissue buildup on the left plantar first MPJ.  She has hyperkeratotic tissue build up with ecchymosis distal left second toe.  There is incurvated nails bilaterally to differing degrees.        ASSESSMENT/PLAN:  Onychauxis bilaterally.  Tylomas on the left foot with preulcerative changes on second toe.  She is diabetic with peripheral neuropathy and peripheral vascular disease.  Diagnosis  and treatment options discussed with her.  All the nails were debrided or reduced bilaterally upon consent.  The tylomas on the left foot were sharp debrided with a #15 blade and tissue cutter upon consent.  The left medial Hallux nail borders bled upon debridement and local wound care done with Bacitracin and bandaid and use discussed with her.  Return to clinic and see me in about 2-3 months.  Previous notes reviewed.      Moderate level of medical decision making.      Again, thank you for allowing me to participate in the care of your patient.        Sincerely,        Ravin Back DPM

## 2021-12-30 ENCOUNTER — TELEPHONE (OUTPATIENT)
Dept: PODIATRY | Facility: CLINIC | Age: 61
End: 2021-12-30

## 2021-12-30 NOTE — TELEPHONE ENCOUNTER
Central Prior Authorization Team   Phone: [phone]    PA Initiation    Medication: ciclopirox (LOPROX) 0.77 % cream  Insurance Company: Medicare Blue RX - Phone [phone] Fax [phone]  Pharmacy Filling the Rx: [id number] - [place], [place] - [address] AT [facility] [place]  Filling Pharmacy Phone: [phone]  Filling Pharmacy Fax: [phone]  Start Date: 12/30/2021

## 2021-12-30 NOTE — TELEPHONE ENCOUNTER
PRIOR AUTHORIZATION DENIED    Medication: ciclopirox (LOPROX) 0.77 % cream-PA DENIED     Denial Date: 12/30/2021    Denial Rational:         Appeal Information:

## 2021-12-30 NOTE — TELEPHONE ENCOUNTER
Prior Authorization Retail Medication Request    Medication/Dose: ciclopirox (LOPROX) 0.77 % cream  ICD code (if different than what is on RX):  Tyloma [L84] - Dermatophytosis of nail [B35.1] - Ingrown toenail [L60.0] - Type 1 diabetes mellitus with pressure callus (H) [E10.628, L84] - DM type 1 with diabetic peripheral neuropathy (H) [E10.42]   Previously Tried and Failed:    Rationale:      Insurance Name:  ITN  Insurance ID:  [id number]    Pharmacy Information (if different than what is on RX)  Name:  [id number] - [place], [place] - [address] [place] [address] AT [facility] [address]  Phone: [phone]

## 2021-12-31 ENCOUNTER — TELEPHONE (OUTPATIENT)
Dept: ENDOCRINOLOGY | Facility: CLINIC | Age: 61
End: 2021-12-31

## 2021-12-31 ENCOUNTER — OFFICE VISIT (OUTPATIENT)
Dept: OPHTHALMOLOGY | Facility: CLINIC | Age: 61
End: 2021-12-31
Payer: MEDICARE

## 2021-12-31 DIAGNOSIS — H40.1132 PRIMARY OPEN ANGLE GLAUCOMA OF BOTH EYES, MODERATE STAGE: Primary | ICD-10-CM

## 2021-12-31 PROCEDURE — 92012 INTRM OPH EXAM EST PATIENT: CPT | Performed by: OPHTHALMOLOGY

## 2021-12-31 ASSESSMENT — VISUAL ACUITY
OS_CC: 20/40
OD_CC+: -2
OD_CC: 20/30
METHOD: SNELLEN - LINEAR
CORRECTION_TYPE: GLASSES

## 2021-12-31 ASSESSMENT — TONOMETRY
IOP_METHOD: APPLANATION
OD_IOP_MMHG: 22
OS_IOP_MMHG: 25

## 2021-12-31 ASSESSMENT — EXTERNAL EXAM - RIGHT EYE: OD_EXAM: PROLAPSED FAT PADS: UPPER, LOWER

## 2021-12-31 ASSESSMENT — SLIT LAMP EXAM - LIDS
COMMENTS: NORMAL
COMMENTS: NORMAL

## 2021-12-31 ASSESSMENT — EXTERNAL EXAM - LEFT EYE: OS_EXAM: PROLAPSED FAT PADS: UPPER, LOWER

## 2021-12-31 NOTE — TELEPHONE ENCOUNTER
Received fax from pharmacy stating patient requires Prior Authorization for Dexcom G6 sensor 3 pack.     Insurance information:   Name:   Phone number: [phone]  ID number: [id number]    Initiate prior authorization or change medication?    If a prior authorization is to be initiated, please list the following:    -any medications the patient has tried and failed or any contraindications.  -is the patient currently on this medication, or has tried before?  -What is the diagnosis?  -Justification or other information that may be helpful.

## 2021-12-31 NOTE — TELEPHONE ENCOUNTER
PA Initiation    Medication: Dexcom G6 sensor 3 pack- INITIATED  Insurance Company: Silver Script Part D - Phone [phone] Fax [phone]  Pharmacy Filling the Rx: [facility][id number] - [place], [place] - [address] AT [facility] [place]  Filling Pharmacy Phone: [phone]  Filling Pharmacy Fax: [phone]  Start Date: 12/31/2021

## 2021-12-31 NOTE — TELEPHONE ENCOUNTER
Called patient's mother Francisca (c2c on file) who wanted to update that patient will be receiving the DEXCOM. She said that patient received a short supply of test strips (qty of 25.) She is wondering how to get BG test strips if they are not covered by Medicare Part B. RN advised that per fax from the pharmacy, test strips will not be covered if patient is using a DEXCOM. Discussed with patient's mom that she will need to purchase test strips OTC. She verbalized understanding.    RN called the pharmacy and spoke to a pharmacist Gil who confirmed that Medicare will not cover test strips if patient is using the DEXCOM. There is no way to override this.    Edil Uribe RN....12/29/2020 3:28 PM      31-Dec-2021 20:05

## 2021-12-31 NOTE — PROGRESS NOTES
Current Eye Medications:  Latanoprost at bedtime both eyes, last took 9:00 am ( pt forgot to take last night).  Artificial tears as needed.      Subjective:  Here for follow up IOP check. Pt notes occasional blurry vision. Denies eye pain/discomfort.      Objective:  See Ophthalmology Exam.       Assessment:  Intraocular pressure too high both eyes in patient with moderate open angle glaucoma.      Plan:  Start Dorzolamide/Timolol drop both eyes twice daily about 12 hours apart.  Continue Latanoprost drop both eyes at bedtime.  Return visit early April for a complete exam.  Dayron Rios M.D.  [phone]

## 2021-12-31 NOTE — LETTER
12/31/2021         RE: Lexy Rockwell  [address]  [facility] [address]        Dear Colleague,    Thank you for referring your patient, Lexy Rockwell, to the [facility]. Please see a copy of my visit note below.     Current Eye Medications:  Latanoprost at bedtime both eyes, last took 9:00 am ( pt forgot to take last night).  Artificial tears as needed.      Subjective:  Here for follow up IOP check. Pt notes occasional blurry vision. Denies eye pain/discomfort.      Objective:  See Ophthalmology Exam.       Assessment:  Intraocular pressure too high both eyes in patient with moderate open angle glaucoma.      Plan:  Start Dorzolamide/Timolol drop both eyes twice daily about 12 hours apart.  Continue Latanoprost drop both eyes at bedtime.  Return visit early April for a complete exam.  Dayron Rios M.D.  [phone]               Again, thank you for allowing me to participate in the care of your patient.        Sincerely,        Dayron Rios MD

## 2021-12-31 NOTE — PATIENT INSTRUCTIONS
Start Dorzolamide/Timolol drop both eyes twice daily about 12 hours apart.  Continue Latanoprost drop both eyes at bedtime.  Return visit early April for a complete exam.  Dayron Rios M.D.  [phone]

## 2022-01-02 RX ORDER — DORZOLAMIDE HYDROCHLORIDE AND TIMOLOL MALEATE 20; 5 MG/ML; MG/ML
1 SOLUTION/ DROPS OPHTHALMIC 2 TIMES DAILY
Qty: 5 ML | Refills: 11 | Status: SHIPPED | OUTPATIENT
Start: 2022-01-02 | End: 2024-03-13

## 2022-01-03 ENCOUNTER — TELEPHONE (OUTPATIENT)
Dept: ENDOCRINOLOGY | Facility: CLINIC | Age: 62
End: 2022-01-03
Payer: MEDICARE

## 2022-01-03 NOTE — TELEPHONE ENCOUNTER
Health Call Center    Phone Message    May a detailed message be left on voicemail: yes     Reason for Call: Medication Question or concern regarding medication   Prescription Clarification  Name of Medication: Continuous Blood Gluc Sensor (DEXCOM G6 SENSOR) [facility]  Prescribing Provider: Wolfgang   Pharmacy: [facility] #[id number] - [place], [place] - [address] [place] [address] AT [facility] [address]   What on the order needs clarification? Per pharmacist for medicare every 6 months the pt needs to have her appt information submitted to medicare. Pt has come to the pharmacy 3 times to get refill, but per pharmacist medicare still hasn t received her appt notes since June 2021. Pharmacist requesting that the clinic call their medicare department ASAP to resolve this issue, phone: [phone] phone. Also they requested that after the clinic calls that department, and resubmits, that the clinic then call the pharmacy so they can be updated that it was submitted and put an urgent request for their medicare department to approve it, phone for pharmacy: [phone]. Pt is currently out of all sensors.          Action Taken: Message routed to:  Other: endocrine    Travel Screening: Not Applicable

## 2022-01-03 NOTE — TELEPHONE ENCOUNTER
Endocrine MA, please assist with getting clinic notes sent to Medicare. Contact pharmacy if needed to obtain correct fax #.    Thanks!    Edil GIFFORD RN....1/3/2022 1:16 PM

## 2022-01-04 NOTE — TELEPHONE ENCOUNTER
Copy of last OV note faxed as requested to [facility] fax [phone].    Elham FISHMAN MA

## 2022-01-05 NOTE — TELEPHONE ENCOUNTER
Barbara Rosario Prior Authorization 1 hour ago (11:03 AM)     ALTHEA Chery for patient's (enter CGM) has been denied.   We have routed the request to submit through medical insurance.   They will be in touch with the outcome of this submission.   Please close encounter when finished.   Thank you!

## 2022-01-05 NOTE — TELEPHONE ENCOUNTER
PRIOR AUTHORIZATION DENIED    Medication: Dexcom G6 sensor 3 pack- DENIED PART D    Denial Date: 1/5/2022    Denial Rational: NOT COVERED UNDER PART D          Appeal Information:           Central Prior Authorization Team  Phone: [phone]

## 2022-01-10 ENCOUNTER — VIRTUAL VISIT (OUTPATIENT)
Dept: EDUCATION SERVICES | Facility: CLINIC | Age: 62
End: 2022-01-10
Payer: MEDICARE

## 2022-01-10 DIAGNOSIS — E10.40 TYPE 1 DIABETES MELLITUS WITH DIABETIC NEUROPATHY (H): ICD-10-CM

## 2022-01-10 DIAGNOSIS — E11.42 TYPE 2 DIABETES MELLITUS WITH DIABETIC POLYNEUROPATHY, WITH LONG-TERM CURRENT USE OF INSULIN (H): Primary | ICD-10-CM

## 2022-01-10 DIAGNOSIS — Z79.4 TYPE 2 DIABETES MELLITUS WITH DIABETIC POLYNEUROPATHY, WITH LONG-TERM CURRENT USE OF INSULIN (H): Primary | ICD-10-CM

## 2022-01-10 DIAGNOSIS — E10.69 TYPE 1 DIABETES MELLITUS WITH OTHER SPECIFIED COMPLICATION (H): ICD-10-CM

## 2022-01-10 PROCEDURE — 98966 PH1 ASSMT&MGMT NQHP 5-10: CPT | Mod: 95 | Performed by: DIETITIAN, REGISTERED

## 2022-01-10 RX ORDER — INSULIN DEGLUDEC 200 U/ML
37 INJECTION, SOLUTION SUBCUTANEOUS DAILY
Qty: 15 ML | Refills: 11 | Status: SHIPPED | OUTPATIENT
Start: 2022-01-10 | End: 2022-02-07

## 2022-01-10 RX ORDER — INSULIN ASPART 100 [IU]/ML
INJECTION, SOLUTION INTRAVENOUS; SUBCUTANEOUS
Qty: 30 ML | Refills: 11 | Status: SHIPPED | OUTPATIENT
Start: 2022-01-10 | End: 2022-02-07

## 2022-01-10 NOTE — LETTER
1/10/2022         RE: Lexy Rockwell  [address]  [facility] [address]        Dear Colleague,    Thank you for referring your patient, Lexy Rockwell, to the [facility]. Please see a copy of my visit note below.    Diabetes Follow-up    Subjective/Objective:    Follow-up for BG review. Lexy states that she doesn't have her Dexcom right now so she has been doing finger stick checks. It looks like the Medicare form was sent to the pharmacy. Lexy plans to check with the pharmacy this afternoon to see if she can  her refill.     Diabetes is being managed with   Diabetes Medications   Diabetes Medication(s)     Biguanides       metFORMIN (GLUCOPHAGE-XR) 500 MG 24 hr tablet    Take 4-tablets by mouth daily as directed     Patient not taking: Reported on 2021    Insulin       insulin aspart (NOVOLOG FLEXPEN) 100 UNIT/ML pen    Inject 5 units with each meal, plus correction scale. dose premeal as directed, TDD: approx 100 units     insulin degludec (TRESIBA FLEXTOUCH) 200 UNIT/ML pen    Inject 35 Units Subcutaneous daily          BG/Food Log:   Date Breakfast  Lunch  Dinner  Bedtime    Before After Before After Before After    1/10   344        125    244      279  213        229    471     1/3 284    312        279        213    271      108  332          115  357      270          77               Assessment:    Fasting blood glucose: 38% in target.  Before lunch glucose: 20% in target.  Before dinner glucose: 0% in target.      Plan/Response:  Tresiba: 0-0-0-35 --> 0-0-0-37  Novolo-5-5-0 --> 6-6-6-0      Tiffanie Mcfarland, KARLOS, LD, CDE  Time spent: 8 minutes    Any diabetes medication dose changes were made via the CDE Protocol and Collaborative Practice Agreement with the patient's referring provider. A copy of this encounter was shared with the  provider.

## 2022-01-10 NOTE — PROGRESS NOTES
Diabetes Follow-up    Subjective/Objective:    Follow-up for BG review. Lexy states that she doesn't have her Dexcom right now so she has been doing finger stick checks. It looks like the Medicare form was sent to the pharmacy. Lexy plans to check with the pharmacy this afternoon to see if she can  her refill.     Diabetes is being managed with   Diabetes Medications   Diabetes Medication(s)     Biguanides       metFORMIN (GLUCOPHAGE-XR) 500 MG 24 hr tablet    Take 4-tablets by mouth daily as directed     Patient not taking: Reported on 2021    Insulin       insulin aspart (NOVOLOG FLEXPEN) 100 UNIT/ML pen    Inject 5 units with each meal, plus correction scale. dose premeal as directed, TDD: approx 100 units     insulin degludec (TRESIBA FLEXTOUCH) 200 UNIT/ML pen    Inject 35 Units Subcutaneous daily          BG/Food Log:   Date Breakfast  Lunch  Dinner  Bedtime    Before After Before After Before After    1/10   344        125    244      279  213        229    471     1/3 284    312        279        213    271      108  332          115  357      270          77               Assessment:    Fasting blood glucose: 38% in target.  Before lunch glucose: 20% in target.  Before dinner glucose: 0% in target.      Plan/Response:  Tresiba: 0-0-0-35 --> 0-0-0-37  Novolo-5-5-0 --> 6-6-6-0      Tiffanie Mcfarland, KARLOS, LD, CDE  Time spent: 8 minutes    Any diabetes medication dose changes were made via the CDE Protocol and Collaborative Practice Agreement with the patient's referring provider. A copy of this encounter was shared with the provider.

## 2022-01-18 RX ORDER — NYSTATIN 100000 U/G
CREAM TOPICAL 2 TIMES DAILY
Qty: 90 G | Refills: 5 | Status: SHIPPED | OUTPATIENT
Start: 2022-01-18 | End: 2023-06-13

## 2022-02-07 ENCOUNTER — VIRTUAL VISIT (OUTPATIENT)
Dept: EDUCATION SERVICES | Facility: CLINIC | Age: 62
End: 2022-02-07
Payer: MEDICARE

## 2022-02-07 DIAGNOSIS — E10.69 TYPE 1 DIABETES MELLITUS WITH OTHER SPECIFIED COMPLICATION (H): ICD-10-CM

## 2022-02-07 DIAGNOSIS — E10.40 TYPE 1 DIABETES MELLITUS WITH DIABETIC NEUROPATHY (H): Primary | ICD-10-CM

## 2022-02-07 DIAGNOSIS — Z79.4 TYPE 2 DIABETES MELLITUS WITH DIABETIC POLYNEUROPATHY, WITH LONG-TERM CURRENT USE OF INSULIN (H): ICD-10-CM

## 2022-02-07 DIAGNOSIS — E11.42 TYPE 2 DIABETES MELLITUS WITH DIABETIC POLYNEUROPATHY, WITH LONG-TERM CURRENT USE OF INSULIN (H): ICD-10-CM

## 2022-02-07 PROCEDURE — 98966 PH1 ASSMT&MGMT NQHP 5-10: CPT | Mod: 95 | Performed by: DIETITIAN, REGISTERED

## 2022-02-07 RX ORDER — INSULIN DEGLUDEC 200 U/ML
40 INJECTION, SOLUTION SUBCUTANEOUS DAILY
Qty: 15 ML | Refills: 11 | Status: SHIPPED | OUTPATIENT
Start: 2022-02-07 | End: 2022-03-18

## 2022-02-07 RX ORDER — INSULIN ASPART 100 [IU]/ML
INJECTION, SOLUTION INTRAVENOUS; SUBCUTANEOUS
Qty: 30 ML | Refills: 11 | Status: SHIPPED | OUTPATIENT
Start: 2022-02-07 | End: 2022-03-18

## 2022-02-07 NOTE — LETTER
2022         RE: Lexy Rockwell  [address]  [facility] [address]        Dear Colleague,    Thank you for referring your patient, Lexy Rockwell, to the [facility]. Please see a copy of my visit note below.    Diabetes Follow-up    Subjective/Objective:    Telephone visit with Lexy for BG review.     Diabetes is being managed with   Diabetes Medications   Diabetes Medication(s)     Biguanides       metFORMIN (GLUCOPHAGE-XR) 500 MG 24 hr tablet    Take 4-tablets by mouth daily as directed     Patient not taking: Reported on 2021    Insulin       insulin aspart (NOVOLOG FLEXPEN) 100 UNIT/ML pen    Inject 6 units with each meal, plus correction scale. dose premeal as directed, TDD: approx 100 units     insulin degludec (TRESIBA FLEXTOUCH) 200 UNIT/ML pen    Inject 37 Units Subcutaneous daily          BG/Food Log:   Date Breakfast  Lunch  Dinner  Bedtime    Before After Before After Before After     HIGH  HIGH       2/6 HIGH  204  320     / HIGH  HIGH  HIGH     / 281  202       2/3   239  183      No sensor  161  181      143  122        271  139  282      HIGH  242        209  96  271      290          240  190        295  271        HIGH  261             Assessment:    Fasting blood glucose: 0% in target.  Before lunch glucose: 15% in target.  Before dinner glucose: 0% in target.    Plan/Response:  Insulin increase:  Tresiba: 37 units --> 40 units  Novolo-6-6-0 --> 7-7-7-0    Tiffanie Mcfarland, KARLOS, LD, CDE  Time spent: 10 minutes    Any diabetes medication dose changes were made via the CDE Protocol and Collaborative Practice Agreement with the patient's referring provider. A copy of this encounter was shared with the provider.

## 2022-02-07 NOTE — PROGRESS NOTES
Diabetes Follow-up    Subjective/Objective:    Telephone visit with Lexy for BG review.     Diabetes is being managed with   Diabetes Medications   Diabetes Medication(s)     Biguanides       metFORMIN (GLUCOPHAGE-XR) 500 MG 24 hr tablet    Take 4-tablets by mouth daily as directed     Patient not taking: Reported on 2021    Insulin       insulin aspart (NOVOLOG FLEXPEN) 100 UNIT/ML pen    Inject 6 units with each meal, plus correction scale. dose premeal as directed, TDD: approx 100 units     insulin degludec (TRESIBA FLEXTOUCH) 200 UNIT/ML pen    Inject 37 Units Subcutaneous daily          BG/Food Log:   Date Breakfast  Lunch  Dinner  Bedtime    Before After Before After Before After     HIGH  HIGH       2/ HIGH  204  320      HIGH  HIGH  HIGH      281  202       2/3   239  183      No sensor  161  181      143  122        271  139  282      HIGH  242        209  96  271      290          240  190        295  271        HIGH  261             Assessment:    Fasting blood glucose: 0% in target.  Before lunch glucose: 15% in target.  Before dinner glucose: 0% in target.    Plan/Response:  Insulin increase:  Tresiba: 37 units --> 40 units  Novolo-6-6-0 --> 7-7-7-0    Tiffanie Mcfarland RD, LD, CDE  Time spent: 10 minutes    Any diabetes medication dose changes were made via the CDE Protocol and Collaborative Practice Agreement with the patient's referring provider. A copy of this encounter was shared with the provider.

## 2022-02-17 PROBLEM — E11.10 DKA (DIABETIC KETOACIDOSIS) (H): Status: ACTIVE | Noted: 2017-12-27

## 2022-02-27 ENCOUNTER — HEALTH MAINTENANCE LETTER (OUTPATIENT)
Age: 62
End: 2022-02-27

## 2022-03-08 DIAGNOSIS — E10.69 TYPE 1 DIABETES MELLITUS WITH OTHER SPECIFIED COMPLICATION (H): ICD-10-CM

## 2022-03-08 RX ORDER — PROCHLORPERAZINE 25 MG/1
SUPPOSITORY RECTAL
Qty: 1 EACH | Refills: 1 | Status: SHIPPED | OUTPATIENT
Start: 2022-03-08 | End: 2022-10-28

## 2022-03-08 NOTE — TELEPHONE ENCOUNTER
"RN refilled medication per [facility] Refill Protocol.     Anna GUARDADO RN      Requested Prescriptions   Signed Prescriptions Disp Refills    Continuous Blood Gluc Transmit (DEXCOM G6 TRANSMITTER) MISC 1 each 1     Sig: USE ONE EVERY 3 MONTHS       Diabetic Supplies Protocol Passed - 3/8/2022 11:27 AM        Passed - Medication is active on med list        Passed - Patient is 18 years of age or older        Passed - Recent (6 mo) or future (30 days) visit within the authorizing provider's specialty     Patient had office visit in the last 6 months or has a visit in the next 30 days with authorizing provider.  See \"Patient Info\" tab in inbasket, or \"Choose Columns\" in Meds & Orders section of the refill encounter.                 "

## 2022-03-14 ENCOUNTER — MYC MEDICAL ADVICE (OUTPATIENT)
Dept: EDUCATION SERVICES | Facility: CLINIC | Age: 62
End: 2022-03-14
Payer: MEDICARE

## 2022-03-18 ENCOUNTER — ALLIED HEALTH/NURSE VISIT (OUTPATIENT)
Dept: EDUCATION SERVICES | Facility: CLINIC | Age: 62
End: 2022-03-18
Payer: MEDICARE

## 2022-03-18 DIAGNOSIS — E10.40 TYPE 1 DIABETES MELLITUS WITH DIABETIC NEUROPATHY (H): ICD-10-CM

## 2022-03-18 DIAGNOSIS — E10.69 TYPE 1 DIABETES MELLITUS WITH OTHER SPECIFIED COMPLICATION (H): ICD-10-CM

## 2022-03-18 DIAGNOSIS — Z79.4 TYPE 2 DIABETES MELLITUS WITH DIABETIC POLYNEUROPATHY, WITH LONG-TERM CURRENT USE OF INSULIN (H): ICD-10-CM

## 2022-03-18 DIAGNOSIS — E10.40 TYPE 1 DIABETES MELLITUS WITH DIABETIC NEUROPATHY (H): Primary | ICD-10-CM

## 2022-03-18 DIAGNOSIS — E11.42 TYPE 2 DIABETES MELLITUS WITH DIABETIC POLYNEUROPATHY, WITH LONG-TERM CURRENT USE OF INSULIN (H): ICD-10-CM

## 2022-03-18 PROCEDURE — G0108 DIAB MANAGE TRN  PER INDIV: HCPCS | Performed by: DIETITIAN, REGISTERED

## 2022-03-18 RX ORDER — INSULIN DEGLUDEC 200 U/ML
50 INJECTION, SOLUTION SUBCUTANEOUS DAILY
Qty: 15 ML | Refills: 3 | Status: SHIPPED | OUTPATIENT
Start: 2022-03-18 | End: 2022-05-11

## 2022-03-18 RX ORDER — INSULIN ASPART 100 [IU]/ML
INJECTION, SOLUTION INTRAVENOUS; SUBCUTANEOUS
Qty: 30 ML | Refills: 3 | Status: SHIPPED | OUTPATIENT
Start: 2022-03-18 | End: 2022-03-25

## 2022-03-18 NOTE — TELEPHONE ENCOUNTER
Dr. Fuentes,    Lexy's BG are quite elevated.       Recommend the following insulin adjustments:    Tresiba: 0-0-0-40 --> 0-0-0-50  Novolo-7-7-0 --> 15-15-15-0 (plus sliding scale insulin)    Please sign pended orders and let me know if you agree with plan.    Tiffanie Mcfarland, RD, LD, CDE

## 2022-03-18 NOTE — PATIENT INSTRUCTIONS
Increase your insulin:  Tresiba: 50 units    Novolog:   Breakfast: 15 units  Lunch: 15 units  Dinner: 15 units    Plus sliding scale:  150-199: +1 unit  200-249: +2 units  250-299: +3 units  300-349: +4 units  350-399: +5 units  400+: +6 units and call doctor    HIGH (no number): +10 units    Test your ketones if your blood sugar is more than 240 mg/dl for more than 4 hours and you have any nausea or other symptoms of DKA.      Snack Ideas for your Meal Plan     Protein (1 serving = 6-8 grams of protein each)    Beef Jerky ( 2 pieces)    Beef Sticks, plain (1 stick)    Cheese/Cheese Stick (1 oz)    Chicken breast (1 oz)    Cottage cheese, low fat (1/4 cup)    Crab, Imitation (2 oz)    Deli Meat (2 oz)    Edamame, boiled (1/2 cup)    Egg, hardboiled (1)    Shrimp, small (10 pieces)     Turkey Breast (1 oz)    Tuna, canned in water (1 oz)    Fairlife Milk (1/2 cup)    Yogurt, Greek : Siggis, Oikos Triple Zero, Dannon Light and Fit, etc (6 oz)    Carb (1 carb choice/serving = 15 grams)     Apple (medium)    Applesauce, unsweetened (1/2 cup)    Apricots, dried (4 whole)    Banana, medium (1/2)    Bread, 1 slice     Cantaloupe/Melon (1 cup)    Crackers 4-6 (varies by brand)    Cherries (15)    Cranberries, Dried (2 tbsp)    Dark Chocolate (1 oz)    Dates, dried (2-3 pieces)    Fruit cocktail, in own juice (1/2 cup)    Granola Bar (vary by brand)    Grapes (1/2 cup)    Ice cream, slow churned/low fat (1/2 cup)    Kiwi (~2)    Mixed Berries (1 cup)    Orange (1 medium)    Peach (1 medium)    Pear (1/2 medium)    Plums (2)    Pomegranate (1 small)    Popcorn, stove popped (2 cups)    Pretzels (3/4 oz)    Pudding, sugar free (1/2 cup)    Raisins (2 Tbsp)    Rice Cake, (2)    Skim or 1% milk (1 cup)    Tortilla Chips (1 oz)    Yogurt (greek or plain)   cup    Fat (1 serving = 100 calories)    Almonds (2 Tbsp)    Avocado (1/3 fruit OR 3 Tbsp)    Cashews (11 whole)    Cheese (1 oz)    Chocolate, dark (3/4 oz)    Coconut,  unsweetened (1/3 c shredded)    Hummus (3 Tbsp)    Peanuts (20 pieces)    Peanut/Nut Butter (1 Tbsp)    Pecans (10 halves)    Pumpkin seeds, unshelled (2 Tbsp)    Salad dressing  (1-2 Tbsp)    Sunflower seeds (2 Tbsp)    Vegetable Dip (1-2 Tbsp)    Walnuts (8 halves)    Free Foods    Bell Peppers    Broccoli    Carrots     Cauliflower     Celery    Coffee, black (regular or decaf)    Cucumber    Gelatin, Sugar Free    Herbal Tea, unsweetened     Pea Pods    Pickles (high in sodium)     Popsicle, Sugar Free    Salsa (2 Tbsp)    Tomatoes    Combination Snacks    Apple + 1 Tbsp peanut butter (carbohydrate + fat)    Handful crackers + 1 oz cheese (carbohydrate + fat or protein)    Carrot sticks + Hummus (free food + fat)    1 piece of peanut butter toast (carbohydrate + fat)    Greek yogurt + 2 Tbsp sunflower seeds (carbohydrate + fat)    Hard-boiled egg +   cup grapes (protein + carbohydrate)    1 piece of avocado toast (carbohydrate + fat)    Cucumbers + dip (free food + fat)        Tiffanie Mcfarland, RD, LD, CDE

## 2022-03-18 NOTE — LETTER
3/18/2022         RE: Lexy Rockwell  [address]  [place] [address]        Dear Colleague,    Thank you for referring your patient, Lexy Rockwell, to the [facility]. Please see a copy of my visit note below.    Diabetes Self-Management Education & Support    Presents for: CGM Review    Type of Visit: In Person    How would patient like to obtain AVS? Printed in clinic    ASSESSMENT:    Lexy's BG are quite elevated. She mentioned that she almost called 911 this week as her BG was reading HIGH (>400) for 24 hours and she had N/V. Writer explained that she likely should have gone to the ED under those conditions. Writer reviewed DKA prevention, sent a new rx for ketone strips to her pharmacy. Explained the importance of preventing DKA, especially considering that she lives alone. We discussed insulin adjustments today. Lexy was asking about healthy bedtime snack options, printed snack list.     Patient's BG was reading HIGH (>400) at time of visit. She states that she is feeling fine, no DKA symptoms, writer recommended that she take insulin correction, test ketones and drink some water when she gets home from appointment.       Patient's most recent   Lab Results   Component Value Date    A1C 10.7 10/13/2021    A1C 9.7 02/01/2021    is not meeting goal of <7.0    Diabetes knowledge and skills assessment:   Patient is knowledgeable in diabetes management concepts related to: Monitoring    Continue education with the following diabetes management concepts: Healthy Eating, Being Active, Monitoring, Taking Medication, Problem Solving, Reducing Risks and Healthy Coping    Based on learning assessment above, most appropriate setting for further diabetes education would be: Individual setting.    INTERVENTIONS:    Education provided today on:  AADE Self-Care Behaviors:  Healthy Eating: consistency in amount, composition, and timing of food  intake  Monitoring: purpose, log and interpret results, individual blood glucose targets and frequency of monitoring  Taking Medication: action of prescribed medication and when to take medications  Problem Solving: high blood glucose - causes, signs/symptoms, treatment and prevention, low blood glucose - causes, signs/symptoms, treatment and prevention, when to call health care provider, medical identification and DKA prevention    Opportunities for ongoing education and support in diabetes-self management were discussed. Pt verbalized understanding of concepts discussed and recommendations provided today.       Education Materials Provided:  Ketone testing information and DKA          PLAN    Recommended insulin adjustments (out of CDE protocol, routing to provider for approval)  Tresiba: 0-0-0-40 --> 0-0-0-50  Novolo-7-7-0 --> 15-15-15-0 (plus sliding scale insulin)  Plus sliding scale:  150-199: +1 unit  200-249: +2 units  250-299: +3 units  300-349: +4 units  350-399: +5 units  400+: +6 units and call doctor    HIGH (no number): +10 units    Topics to cover at upcoming visits: Healthy Eating, Being Active, Monitoring, Taking Medication, Problem Solving, Reducing Risks and Healthy Coping  Follow-up: Monday 3/21 via phone    See Goals Section for co-developed, patient-stated behavior change goals.  AVS provided to patient today.          SUBJECTIVE / OBJECTIVE:  Presents for: CGM Review  Accompanied by: Self  Diabetes education in the past 24mo: Yes  Focus of Visit: Problem Solving, Monitoring, Taking Medication  Diabetes type: Type 1  Disease course: Improving  Transportation concerns: Yes (gets rides or takes public tranportation)  Difficulty affording diabetes medication?: No  Difficulty affording diabetes testing supplies?: No  Other concerns:: Cognitive impairment  Cultural Influences/Ethnic Background:  American    Diabetes Symptoms & Complications:  Fatigue: Yes  Neuropathy: Yes (in feet)  Polydipsia:  "No  Polyphagia: No  Polyuria: Sometimes (often getting up in the night)  Visual change: Sometimes  Slow healing wounds: No (a little cut near the toenails)  Symptom course: Stable  Weight trend: Decreasing  Complications assessed today?: No    Patient Problem List and Family Medical History reviewed for relevant medical history, current medical status, and diabetes risk factors.    Vitals:  [facility] 03/28/2014   Estimated body mass index is 31.59 kg/m  as calculated from the following:    Height as of 10/22/21: 1.605 m (5' 3.19\").    Weight as of 10/22/21: 81.4 kg (179 lb 6.4 oz).   Last 3 BP:   BP Readings from Last 3 Encounters:   12/22/21 (!) 188/108   10/22/21 (!) 175/102   10/13/21 (!) 147/90       History   Smoking Status     Never Smoker   Smokeless Tobacco     Never Used     Comment: lives in smoke free household.       Labs:  Lab Results   Component Value Date    A1C 10.7 10/13/2021    A1C 9.7 02/01/2021     Lab Results   Component Value Date     10/13/2021     02/01/2021     Lab Results   Component Value Date     02/01/2021     HDL Cholesterol   Date Value Ref Range Status   02/01/2021 62 >49 mg/dL Final   ]  GFR Estimate   Date Value Ref Range Status   10/13/2021 >90 >60 mL/min/1.73m2 Final     Comment:     As of July 11, 2021, eGFR is calculated by the CKD-EPI creatinine equation, without race adjustment. eGFR can be influenced by muscle mass, exercise, and diet. The reported eGFR is an estimation only and is only applicable if the renal function is stable.   02/01/2021 90 >60 mL/min/[1.73_m2] Final     Comment:     Non  GFR Calc  Starting 12/18/2018, serum creatinine based estimated GFR (eGFR) will be   calculated using the Chronic Kidney Disease Epidemiology Collaboration   (CKD-EPI) equation.       GFR Estimate If Black   Date Value Ref Range Status   02/01/2021 >90 >60 mL/min/[1.73_m2] Final     Comment:      GFR Calc  Starting 12/18/2018, serum " creatinine based estimated GFR (eGFR) will be   calculated using the Chronic Kidney Disease Epidemiology Collaboration   (CKD-EPI) equation.       Lab Results   Component Value Date    CR 0.70 10/13/2021    CR 0.73 02/01/2021     No results found for: MICROALBUMIN    Healthy Eating:  Healthy Eating Assessed Today: Yes  Cultural/Faith diet restrictions?: No  Meals include: Breakfast, Lunch, Dinner, Afternoon Snack, Evening Snack  Breakfast: 8:00 am - 9:00 am Low sugar Oatmeal with some coffee OR cold cereal (low sugar)  Lunch: 12-12:30, pasta salad from grocery store OR sandwich OR breakfast bowl  Dinner: 5:00 -6:00 pm: frozen meal OR leftovers  Snacks: Reeces peanut butter cups, unsweetened apple sauce, pop tart, chips, yogurt covered pretzels  Other: drinks coffee with sugar free creamer  Beverages: Water, Coffee (Keep some juice on hand for lows)  Has patient met with a dietitian in the past?: Yes    Being Active:  Being Active Assessed Today: Yes (went on the bike at her apartment complex)  Exercise:: Yes  Days per week of moderate to strenuous exercise (like a brisk walk): 2  On average, minutes per day of exercise at this level:  (Walking with friends)  How intense was your typical exercise? : Light (like stretching or slow walking)  Barrier to exercise: Safety, Physical limitation    Monitoring:  Monitoring Assessed Today: Yes  Blood Glucose Meter: CGM  Times checking blood sugar at home (number): 5+  Times checking blood sugar at home (per): Day  Blood glucose trend: Fluctuating    Glucose data:              Taking Medications:  Diabetes Medication(s)     Biguanides       metFORMIN (GLUCOPHAGE-XR) 500 MG 24 hr tablet    Take 4-tablets by mouth daily as directed     Patient not taking: Reported on 12/22/2021    Insulin       insulin aspart (NOVOLOG FLEXPEN) 100 UNIT/ML pen    Inject 7 units with each meal, plus correction scale. dose premeal as directed, TDD: approx 100 units     insulin degludec (TRESIBA  FLEXTOUCH) 200 UNIT/ML pen    Inject 40 Units Subcutaneous daily          Taking Medication Assessed Today: Yes  Current Treatments: Insulin Injections  Dose schedule: Pre-breakfast, Pre-lunch, Pre-dinner  Given by: Patient  Injection/Infusion sites: Abdomen    Problem Solving:  Problem Solving Assessed Today: Yes  Is the patient at risk for hypoglycemia?: Yes  Hypoglycemia Frequency: Monthly  Hypoglycemia Treatment: Glucose (tablets or gel), Other food  Patient carries a carbohydrate source: Yes  Medical ID: Yes  Is the patient at risk for DKA?: Yes  Does patient have ketone test strips?: Yes  Does patient have DKA prevention plan?: Yes  Does patient have severe weather/disaster plan for diabetes management?: No  Does patient have sick day plan for diabetes management?: Yes    Hypoglycemia symptoms  Confusion: No  Dizziness or Light-Headedness: No  Headaches: No  Hunger: No  Mood changes: No  Nervousness/Anxiety: No  Sleepiness: No  Speech difficulty: Yes  Sweats: Yes  Feeling shaky: Yes    Hypoglycemia Complications  Blackouts: No  Hospitalization: No  Nocturnal hypoglycemia: Yes  Required assistance: No  Seizures: No    Reducing Risks:  Reducing Risks Assessed Today: No  CAD Risks: Diabetes Mellitus  Has dilated eye exam at least once a year?: Yes  Sees dentist every 6 months?: Yes  Feet checked by healthcare provider in the last year?: Yes    Healthy Coping:  Healthy Coping Assessed Today: Yes  Emotional response to diabetes: Acceptance  Informal Support system:: Family, Parent  Stage of change: ACTION (Actively working towards change)  Support resources: Offerings in Clinic Communities  Patient Activation Measure Survey Score:  KHUSHI Score (Last Two) 3/7/2012   KHUSHI Raw Score 39   Activation Score 56.4   KHUSHI Level 3             Tiffanie Mcfarland RD, LD, CDE  Time Spent: 30 minutes  Encounter Type: Individual        Any diabetes medication dose changes were made via the Certified Diabetes Care & Education Protocol  in collaboration with the patient's referring provider. A copy of this encounter was shared with the provider.

## 2022-03-18 NOTE — PROGRESS NOTES
Diabetes Self-Management Education & Support    Presents for: CGM Review    Type of Visit: In Person    How would patient like to obtain AVS? Printed in clinic    ASSESSMENT:    Lexy's BG are quite elevated. She mentioned that she almost called 911 this week as her BG was reading HIGH (>400) for 24 hours and she had N/V. Writer explained that she likely should have gone to the ED under those conditions. Writer reviewed DKA prevention, sent a new rx for ketone strips to her pharmacy. Explained the importance of preventing DKA, especially considering that she lives alone. We discussed insulin adjustments today. Lexy was asking about healthy bedtime snack options, printed snack list.     Patient's BG was reading HIGH (>400) at time of visit. She states that she is feeling fine, no DKA symptoms, writer recommended that she take insulin correction, test ketones and drink some water when she gets home from appointment.       Patient's most recent   Lab Results   Component Value Date    A1C 10.7 10/13/2021    A1C 9.7 02/01/2021    is not meeting goal of <7.0    Diabetes knowledge and skills assessment:   Patient is knowledgeable in diabetes management concepts related to: Monitoring    Continue education with the following diabetes management concepts: Healthy Eating, Being Active, Monitoring, Taking Medication, Problem Solving, Reducing Risks and Healthy Coping    Based on learning assessment above, most appropriate setting for further diabetes education would be: Individual setting.    INTERVENTIONS:    Education provided today on:  AADE Self-Care Behaviors:  Healthy Eating: consistency in amount, composition, and timing of food intake  Monitoring: purpose, log and interpret results, individual blood glucose targets and frequency of monitoring  Taking Medication: action of prescribed medication and when to take medications  Problem Solving: high blood glucose - causes, signs/symptoms, treatment and prevention,  low blood glucose - causes, signs/symptoms, treatment and prevention, when to call health care provider, medical identification and DKA prevention    Opportunities for ongoing education and support in diabetes-self management were discussed. Pt verbalized understanding of concepts discussed and recommendations provided today.       Education Materials Provided:  Ketone testing information and DKA          PLAN    Recommended insulin adjustments (out of CDE protocol, routing to provider for approval)  Tresiba: 0-0-0-40 --> 0-0-0-50  Novolo-7-7-0 --> 15-15-15-0 (plus sliding scale insulin)  Plus sliding scale:  150-199: +1 unit  200-249: +2 units  250-299: +3 units  300-349: +4 units  350-399: +5 units  400+: +6 units and call doctor    HIGH (no number): +10 units    Topics to cover at upcoming visits: Healthy Eating, Being Active, Monitoring, Taking Medication, Problem Solving, Reducing Risks and Healthy Coping  Follow-up: Monday 3/21 via phone    See Goals Section for co-developed, patient-stated behavior change goals.  AVS provided to patient today.          SUBJECTIVE / OBJECTIVE:  Presents for: CGM Review  Accompanied by: Self  Diabetes education in the past 24mo: Yes  Focus of Visit: Problem Solving, Monitoring, Taking Medication  Diabetes type: Type 1  Disease course: Improving  Transportation concerns: Yes (gets rides or takes public tranportation)  Difficulty affording diabetes medication?: No  Difficulty affording diabetes testing supplies?: No  Other concerns:: Cognitive impairment  Cultural Influences/Ethnic Background:  American    Diabetes Symptoms & Complications:  Fatigue: Yes  Neuropathy: Yes (in feet)  Polydipsia: No  Polyphagia: No  Polyuria: Sometimes (often getting up in the night)  Visual change: Sometimes  Slow healing wounds: No (a little cut near the toenails)  Symptom course: Stable  Weight trend: Decreasing  Complications assessed today?: No    Patient Problem List and Family Medical  "History reviewed for relevant medical history, current medical status, and diabetes risk factors.    Vitals:  [facility] 03/28/2014   Estimated body mass index is 31.59 kg/m  as calculated from the following:    Height as of 10/22/21: 1.605 m (5' 3.19\").    Weight as of 10/22/21: 81.4 kg (179 lb 6.4 oz).   Last 3 BP:   BP Readings from Last 3 Encounters:   12/22/21 (!) 188/108   10/22/21 (!) 175/102   10/13/21 (!) 147/90       History   Smoking Status     Never Smoker   Smokeless Tobacco     Never Used     Comment: lives in smoke free household.       Labs:  Lab Results   Component Value Date    A1C 10.7 10/13/2021    A1C 9.7 02/01/2021     Lab Results   Component Value Date     10/13/2021     02/01/2021     Lab Results   Component Value Date     02/01/2021     HDL Cholesterol   Date Value Ref Range Status   02/01/2021 62 >49 mg/dL Final   ]  GFR Estimate   Date Value Ref Range Status   10/13/2021 >90 >60 mL/min/1.73m2 Final     Comment:     As of July 11, 2021, eGFR is calculated by the CKD-EPI creatinine equation, without race adjustment. eGFR can be influenced by muscle mass, exercise, and diet. The reported eGFR is an estimation only and is only applicable if the renal function is stable.   02/01/2021 90 >60 mL/min/[1.73_m2] Final     Comment:     Non  GFR Calc  Starting 12/18/2018, serum creatinine based estimated GFR (eGFR) will be   calculated using the Chronic Kidney Disease Epidemiology Collaboration   (CKD-EPI) equation.       GFR Estimate If Black   Date Value Ref Range Status   02/01/2021 >90 >60 mL/min/[1.73_m2] Final     Comment:      GFR Calc  Starting 12/18/2018, serum creatinine based estimated GFR (eGFR) will be   calculated using the Chronic Kidney Disease Epidemiology Collaboration   (CKD-EPI) equation.       Lab Results   Component Value Date    CR 0.70 10/13/2021    CR 0.73 02/01/2021     No results found for: MICROALBUMIN    Healthy " Eating:  Healthy Eating Assessed Today: Yes  Cultural/[place] diet restrictions?: No  Meals include: Breakfast, Lunch, Dinner, Afternoon Snack, Evening Snack  Breakfast: 8:00 am - 9:00 am Low sugar Oatmeal with some coffee OR cold cereal (low sugar)  Lunch: 12-12:30, pasta salad from grocery store OR sandwich OR breakfast bowl  Dinner: 5:00 -6:00 pm: frozen meal OR leftovers  Snacks: Reeces peanut butter cups, unsweetened apple sauce, pop tart, chips, yogurt covered pretzels  Other: drinks coffee with sugar free creamer  Beverages: Water, Coffee (Keep some juice on hand for lows)  Has patient met with a dietitian in the past?: Yes    Being Active:  Being Active Assessed Today: Yes (went on the bike at her apartment complex)  Exercise:: Yes  Days per week of moderate to strenuous exercise (like a brisk walk): 2  On average, minutes per day of exercise at this level:  (Walking with friends)  How intense was your typical exercise? : Light (like stretching or slow walking)  Barrier to exercise: Safety, Physical limitation    Monitoring:  Monitoring Assessed Today: Yes  Blood Glucose Meter: CGM  Times checking blood sugar at home (number): 5+  Times checking blood sugar at home (per): Day  Blood glucose trend: Fluctuating    Glucose data:              Taking Medications:  Diabetes Medication(s)     Biguanides       metFORMIN (GLUCOPHAGE-XR) 500 MG 24 hr tablet    Take 4-tablets by mouth daily as directed     Patient not taking: Reported on 12/22/2021    Insulin       insulin aspart (NOVOLOG FLEXPEN) 100 UNIT/ML pen    Inject 7 units with each meal, plus correction scale. dose premeal as directed, TDD: approx 100 units     insulin degludec (TRESIBA FLEXTOUCH) 200 UNIT/ML pen    Inject 40 Units Subcutaneous daily          Taking Medication Assessed Today: Yes  Current Treatments: Insulin Injections  Dose schedule: Pre-breakfast, Pre-lunch, Pre-dinner  Given by: Patient  Injection/Infusion sites: Abdomen    Problem  Solving:  Problem Solving Assessed Today: Yes  Is the patient at risk for hypoglycemia?: Yes  Hypoglycemia Frequency: Monthly  Hypoglycemia Treatment: Glucose (tablets or gel), Other food  Patient carries a carbohydrate source: Yes  Medical ID: Yes  Is the patient at risk for DKA?: Yes  Does patient have ketone test strips?: Yes  Does patient have DKA prevention plan?: Yes  Does patient have severe weather/disaster plan for diabetes management?: No  Does patient have sick day plan for diabetes management?: Yes    Hypoglycemia symptoms  Confusion: No  Dizziness or Light-Headedness: No  Headaches: No  Hunger: No  Mood changes: No  Nervousness/Anxiety: No  Sleepiness: No  Speech difficulty: Yes  Sweats: Yes  Feeling shaky: Yes    Hypoglycemia Complications  Blackouts: No  Hospitalization: No  Nocturnal hypoglycemia: Yes  Required assistance: No  Seizures: No    Reducing Risks:  Reducing Risks Assessed Today: No  CAD Risks: Diabetes Mellitus  Has dilated eye exam at least once a year?: Yes  Sees dentist every 6 months?: Yes  Feet checked by healthcare provider in the last year?: Yes    Healthy Coping:  Healthy Coping Assessed Today: Yes  Emotional response to diabetes: Acceptance  Informal Support system:: Family, Parent  Stage of change: ACTION (Actively working towards change)  Support resources: Offerings in Clinic Communities  Patient Activation Measure Survey Score:  KHUSHI Score (Last Two) 3/7/2012   KHUSHI Raw Score 39   Activation Score 56.4   KHUSHI Level 3             Tiffanie Mcfarland RD, LD, CDE  Time Spent: 30 minutes  Encounter Type: Individual        Any diabetes medication dose changes were made via the Certified Diabetes Care & Education Protocol in collaboration with the patient's referring provider. A copy of this encounter was shared with the provider.

## 2022-03-21 ENCOUNTER — VIRTUAL VISIT (OUTPATIENT)
Dept: EDUCATION SERVICES | Facility: CLINIC | Age: 62
End: 2022-03-21
Payer: MEDICARE

## 2022-03-21 DIAGNOSIS — Z79.4 TYPE 2 DIABETES MELLITUS WITH DIABETIC POLYNEUROPATHY, WITH LONG-TERM CURRENT USE OF INSULIN (H): Primary | ICD-10-CM

## 2022-03-21 DIAGNOSIS — E11.42 TYPE 2 DIABETES MELLITUS WITH DIABETIC POLYNEUROPATHY, WITH LONG-TERM CURRENT USE OF INSULIN (H): Primary | ICD-10-CM

## 2022-03-21 NOTE — PROGRESS NOTES
Diabetes Follow-up    Subjective/Objective:    Telephone visit with Lexy for BG review.     Diabetes is being managed with   Diabetes Medications   Diabetes Medication(s)     Biguanides       metFORMIN (GLUCOPHAGE-XR) 500 MG 24 hr tablet    Take 4-tablets by mouth daily as directed     Patient not taking: Reported on 12/22/2021    Insulin       insulin aspart (NOVOLOG FLEXPEN) 100 UNIT/ML pen    Inject 15 units with each meal, plus correction scale. dose premeal as directed, TDD: approx 100 units     insulin degludec (TRESIBA FLEXTOUCH) 200 UNIT/ML pen    Inject 50 Units Subcutaneous daily          BG/Food Log:   Date Breakfast  Lunch  Dinner  Bedtime    Before After Before After Before After    3/21 105  99       3/20 No sensor  HIGH  forgot     3/19 No sensor         3/18                Assessment:  BG in target today. She didn't have a sensor Saturday or Sunday and forgot to write down her BG result. Discussed sticking with current dosing for now, follow up planned for Friday    Plan/Response:  Continue with current dosing, follow-up on Friday    Tiffanie Mcfarland RD, LD, CDE  Time spent: 5 minutes    Any diabetes medication dose changes were made via the CDE Protocol and Collaborative Practice Agreement with the patient's referring provider. A copy of this encounter was shared with the provider.

## 2022-03-21 NOTE — LETTER
3/21/2022         RE: Lexy Rockwell  [address]  [facility] [address]        Dear Colleague,    Thank you for referring your patient, Lexy Rockwell, to the [facility]. Please see a copy of my visit note below.    Diabetes Follow-up    Subjective/Objective:    Telephone visit with Lexy for BG review.     Diabetes is being managed with   Diabetes Medications   Diabetes Medication(s)     Biguanides       metFORMIN (GLUCOPHAGE-XR) 500 MG 24 hr tablet    Take 4-tablets by mouth daily as directed     Patient not taking: Reported on 12/22/2021    Insulin       insulin aspart (NOVOLOG FLEXPEN) 100 UNIT/ML pen    Inject 15 units with each meal, plus correction scale. dose premeal as directed, TDD: approx 100 units     insulin degludec (TRESIBA FLEXTOUCH) 200 UNIT/ML pen    Inject 50 Units Subcutaneous daily          BG/Food Log:   Date Breakfast  Lunch  Dinner  Bedtime    Before After Before After Before After    3/21 105  99       3/20 No sensor  HIGH  forgot     3/19 No sensor         3/18                Assessment:  BG in target today. She didn't have a sensor Saturday or Sunday and forgot to write down her BG result. Discussed sticking with current dosing for now, follow up planned for Friday    Plan/Response:  Continue with current dosing, follow-up on Friday    Tiffanie Mcfarland RD, LD, CDE  Time spent: 5 minutes    Any diabetes medication dose changes were made via the CDE Protocol and Collaborative Practice Agreement with the patient's referring provider. A copy of this encounter was shared with the provider.

## 2022-03-25 ENCOUNTER — VIRTUAL VISIT (OUTPATIENT)
Dept: EDUCATION SERVICES | Facility: CLINIC | Age: 62
End: 2022-03-25
Payer: MEDICARE

## 2022-03-25 DIAGNOSIS — Z79.4 TYPE 2 DIABETES MELLITUS WITH DIABETIC POLYNEUROPATHY, WITH LONG-TERM CURRENT USE OF INSULIN (H): ICD-10-CM

## 2022-03-25 DIAGNOSIS — E11.42 TYPE 2 DIABETES MELLITUS WITH DIABETIC POLYNEUROPATHY, WITH LONG-TERM CURRENT USE OF INSULIN (H): ICD-10-CM

## 2022-03-25 RX ORDER — INSULIN ASPART 100 [IU]/ML
INJECTION, SOLUTION INTRAVENOUS; SUBCUTANEOUS
Qty: 30 ML | Refills: 3 | Status: SHIPPED | OUTPATIENT
Start: 2022-03-25 | End: 2022-05-11

## 2022-03-25 NOTE — Clinical Note
Lexy Ortega's BG are looking much better. I increased her Novolog with lunch due to elevated pre-dinner readings. Can you please submit a new diabetes educator referral if you would like us to continue working with her?  Thank you!  Tiffanie GIFFORD

## 2022-03-25 NOTE — PROGRESS NOTES
Diabetes Follow-up    Subjective/Objective:    Telephone visit with rabia for BG review.    Diabetes is being managed with   Diabetes Medications   Diabetes Medication(s)     Biguanides       metFORMIN (GLUCOPHAGE-XR) 500 MG 24 hr tablet    Take 4-tablets by mouth daily as directed     Patient not taking: Reported on 12/22/2021    Insulin       insulin aspart (NOVOLOG FLEXPEN) 100 UNIT/ML pen    Inject 15 units with each meal, plus correction scale. dose premeal as directed, TDD: approx 100 units     insulin degludec (TRESIBA FLEXTOUCH) 200 UNIT/ML pen    Inject 50 Units Subcutaneous daily          BG/Food Log:   Date Breakfast  Lunch  Dinner  Bedtime    Before After Before After Before After    3/25 297         3/24 299  371       3/23 70  76  247     3/22 175  190  381     3/21 105  99  271         Assessment:  Overall, better BG control     Plan/Response:  Continue with 50 units Tresiba  Novolog: 15-15-15-0 --> 15-17-15-0    Tiffanie Mcfarland RD LD CDCES  Time spent: 5 minutes    Any diabetes medication dose changes were made via the CDE Protocol and Collaborative Practice Agreement with the patient's referring provider. A copy of this encounter was shared with the provider.

## 2022-04-18 ENCOUNTER — OFFICE VISIT (OUTPATIENT)
Dept: OPHTHALMOLOGY | Facility: CLINIC | Age: 62
End: 2022-04-18
Payer: MEDICARE

## 2022-04-18 DIAGNOSIS — E11.3299 BACKGROUND DIABETIC RETINOPATHY (H): ICD-10-CM

## 2022-04-18 DIAGNOSIS — H25.813 COMBINED FORMS OF AGE-RELATED CATARACT OF BOTH EYES: ICD-10-CM

## 2022-04-18 DIAGNOSIS — H52.4 PRESBYOPIA: ICD-10-CM

## 2022-04-18 DIAGNOSIS — E10.69 TYPE 1 DIABETES MELLITUS WITH OTHER SPECIFIED COMPLICATION (H): Primary | ICD-10-CM

## 2022-04-18 DIAGNOSIS — H40.1132 PRIMARY OPEN ANGLE GLAUCOMA OF BOTH EYES, MODERATE STAGE: ICD-10-CM

## 2022-04-18 PROCEDURE — 92015 DETERMINE REFRACTIVE STATE: CPT | Mod: GY | Performed by: OPHTHALMOLOGY

## 2022-04-18 PROCEDURE — 92014 COMPRE OPH EXAM EST PT 1/>: CPT | Performed by: OPHTHALMOLOGY

## 2022-04-18 ASSESSMENT — CONF VISUAL FIELD
OS_NORMAL: 1
OD_NORMAL: 1
METHOD: COUNTING FINGERS

## 2022-04-18 ASSESSMENT — TONOMETRY
IOP_METHOD: APPLANATION
OD_IOP_MMHG: 16
OS_IOP_MMHG: 14

## 2022-04-18 ASSESSMENT — REFRACTION_WEARINGRX
OD_SPHERE: -1.25
OS_SPHERE: -1.50
OS_CYLINDER: +0.75
OD_CYLINDER: +1.00
OS_ADD: +2.75
OD_ADD: +2.75
SPECS_TYPE: PAL
OD_AXIS: 161
OS_AXIS: 145

## 2022-04-18 ASSESSMENT — REFRACTION_MANIFEST
OS_CYLINDER: SPHERE
OD_CYLINDER: +0.50
OS_SPHERE: -1.00
OD_AXIS: 180
OD_ADD: +2.75
OD_SPHERE: -1.50
OS_ADD: +2.75

## 2022-04-18 ASSESSMENT — EXTERNAL EXAM - RIGHT EYE: OD_EXAM: PROLAPSED FAT PADS: UPPER, LOWER

## 2022-04-18 ASSESSMENT — CUP TO DISC RATIO
OS_RATIO: 0.8
OD_RATIO: 0.7

## 2022-04-18 ASSESSMENT — VISUAL ACUITY
OD_CC: 20/30
METHOD: SNELLEN - LINEAR
OS_CC: 20/40
OD_CC+: -1
CORRECTION_TYPE: GLASSES
OS_CC+: -2

## 2022-04-18 ASSESSMENT — EXTERNAL EXAM - LEFT EYE: OS_EXAM: PROLAPSED FAT PADS: UPPER, LOWER

## 2022-04-18 ASSESSMENT — SLIT LAMP EXAM - LIDS
COMMENTS: NORMAL
COMMENTS: NORMAL

## 2022-04-18 NOTE — LETTER
"    4/18/2022         RE: Lexy Rockwell  [address]  [facility] [address]        Dear Colleague,    Thank you for referring your patient, Lexy Rockwell, to the [facility]. Please see a copy of my visit note below.     Current Eye Medications:  Dorz/timolol twice a day both eyes, last took at 9 am. Latanoprost at bedtime both eyes, last took at 9:45 pm. PFAT's as needed both eyes     Subjective:  Dilated glaucoma and diabetes evaluation. Vision is doing well both eyes in the distance and up close reading. No eye pain or discomfort in either eye.   Diabetic, Blood sugar has been up and down.     Lab Results   Component Value Date    A1C 10.7 10/13/2021    A1C 9.7 02/01/2021    A1C 9.2 09/30/2020    A1C 11.8 01/08/2020    A1C 11.8 05/19/2019    A1C 11.2 12/12/2018        Objective:  See Ophthalmology Exam.       Assessment:  Stable intraocular pressure and discs in patient with moderate open angle glaucoma.  Stable mild-moderate background diabetic retinopathy both eyes.      ICD-10-CM    1. Type 1 diabetes mellitus with other specified complication (H)  E10.69    2. Background diabetic retinopathy, mild-mod, ou  E11.3299    3. Primary open angle glaucoma of both eyes, moderate stage  H40.1132    4. Combined forms of age-related cataract, mild, of both eyes  H25.813    5. Presbyopia  H52.4         Plan:  Continue same medications.  Glasses prescription given - optional  Use artificial tears up to four times a day (like Refresh Optive, Systane Balance, TheraTears, or generic artificial tears are ok. Avoid \"get the red out\" drops).   Possible posterior vitreous detachment (sudden onset large floater and/or flashing lights) both eyes discussed.  Return visit 6 months for an intraocular pressure check, glaucoma OCT, retinal OCT, and Beard Visual Field.   Dayron Rios M.D.  [phone]            Again, thank you for allowing me to participate in the " care of your patient.        Sincerely,        Dayron Rios MD

## 2022-04-18 NOTE — PATIENT INSTRUCTIONS
"Continue same medications.  Glasses prescription given - optional  Use artificial tears up to four times a day (like Refresh Optive, Systane Balance, TheraTears, or generic artificial tears are ok. Avoid \"get the red out\" drops).   Possible posterior vitreous detachment (sudden onset large floater and/or flashing lights) both eyes discussed.  Return visit 6 months for an intraocular pressure check, glaucoma OCT, retinal OCT, and Beard Visual Field.   Dayron Rios M.D.  [phone]     Patient Education   Diabetes weakens the blood vessels all over the body, including the eyes. Damage to the blood vessels in the eyes can cause swelling or bleeding into part of the eye (called the retina). This is called diabetic retinopathy (DAQUAN-tin--pu-thee). If not treated, this disease can cause vision loss or blindness.   Symptoms may include blurred or distorted vision, but many people have no symptoms. It's important to see your eye doctor regularly to check for problems.   Early treatment and good control can help protect your vision. Here are the things you can do to help prevent vision loss:      1. Keep your blood sugar levels under tight control.      2. Bring high blood pressure under control.      3. No smoking.      4. Have yearly dilated eye exams.       "

## 2022-04-18 NOTE — PROGRESS NOTES
" Current Eye Medications:  Dorz/timolol twice a day both eyes, last took at 9 am. Latanoprost at bedtime both eyes, last took at 9:45 pm. PFAT's as needed both eyes     Subjective:  Dilated glaucoma and diabetes evaluation. Vision is doing well both eyes in the distance and up close reading. No eye pain or discomfort in either eye.   Diabetic, Blood sugar has been up and down.     Lab Results   Component Value Date    A1C 10.7 10/13/2021    A1C 9.7 02/01/2021    A1C 9.2 09/30/2020    A1C 11.8 01/08/2020    A1C 11.8 05/19/2019    A1C 11.2 12/12/2018        Objective:  See Ophthalmology Exam.       Assessment:  Stable intraocular pressure and discs in patient with moderate open angle glaucoma.  Stable mild-moderate background diabetic retinopathy both eyes.      ICD-10-CM    1. Type 1 diabetes mellitus with other specified complication (H)  E10.69    2. Background diabetic retinopathy, mild-mod, ou  E11.3299    3. Primary open angle glaucoma of both eyes, moderate stage  H40.1132    4. Combined forms of age-related cataract, mild, of both eyes  H25.813    5. Presbyopia  H52.4         Plan:  Continue same medications.  Glasses prescription given - optional  Use artificial tears up to four times a day (like Refresh Optive, Systane Balance, TheraTears, or generic artificial tears are ok. Avoid \"get the red out\" drops).   Possible posterior vitreous detachment (sudden onset large floater and/or flashing lights) both eyes discussed.  Return visit 6 months for an intraocular pressure check, glaucoma OCT, retinal OCT, and Beard Visual Field.   Dayron Rios M.D.  [phone]        "

## 2022-05-09 ENCOUNTER — VIRTUAL VISIT (OUTPATIENT)
Dept: EDUCATION SERVICES | Facility: CLINIC | Age: 62
End: 2022-05-09
Payer: MEDICARE

## 2022-05-09 DIAGNOSIS — E10.40 TYPE 1 DIABETES MELLITUS WITH DIABETIC NEUROPATHY (H): ICD-10-CM

## 2022-05-09 DIAGNOSIS — E10.69 TYPE 1 DIABETES MELLITUS WITH OTHER SPECIFIED COMPLICATION (H): ICD-10-CM

## 2022-05-09 DIAGNOSIS — Z79.4 TYPE 2 DIABETES MELLITUS WITH DIABETIC POLYNEUROPATHY, WITH LONG-TERM CURRENT USE OF INSULIN (H): ICD-10-CM

## 2022-05-09 DIAGNOSIS — E11.42 TYPE 2 DIABETES MELLITUS WITH DIABETIC POLYNEUROPATHY, WITH LONG-TERM CURRENT USE OF INSULIN (H): ICD-10-CM

## 2022-05-09 PROCEDURE — 98966 PH1 ASSMT&MGMT NQHP 5-10: CPT | Mod: 95 | Performed by: DIETITIAN, REGISTERED

## 2022-05-09 NOTE — PROGRESS NOTES
Diabetes Education Follow-up    Subjective/Objective:    Telephone visit with Lexy for BG Follow-up.    Type of Service: Telephone Visit    Originating Location (Patient Location): Home  Distant Location (Provider Location): Home  Mode of Communication:  Telephone    Telephone Visit Start Time: 1:30 am  Telephone Visit End Time (telephone visit stop time): 1:40    How would patient like to obtain AVS? MyChart      Diabetes is being managed with   Diabetes Medications   Diabetes Medication(s)     Biguanides       metFORMIN (GLUCOPHAGE-XR) 500 MG 24 hr tablet    Take 4-tablets by mouth daily as directed     Patient not taking: Reported on 12/22/2021    Insulin       insulin aspart (NOVOLOG FLEXPEN) 100 UNIT/ML pen    Inject 15 units with breakfast, 17 units with lunch and 15 units with dinner, plus correction scale. dose premeal as directed, TDD: approx 100 units     insulin degludec (TRESIBA FLEXTOUCH) 200 UNIT/ML pen    Inject 50 Units Subcutaneous daily        Plus sliding scale:  150-199: +1 unit  200-249: +2 units  250-299: +3 units  300-349: +4 units  350-399: +5 units  400+: +6 units and call doctor    HIGH (no number): +10 units      BG/Food Log:   Date Breakfast  Lunch  Dinner  Bedtime    Before After Before After Before After    5/9 168  136       5/8 HIGH  452       5/7 HIGH  253  HIG     5/6 HIGH  HIGH  HIGH     5/5 HIGH  275  HIGH     5/4 HIGH  HIGH  HIGH     5/3 HIGH  HIGH  HIGH     5/2 5/1 4/30 4/29 4/28   242  229         Assessment:  Lexy's BG have been quite elevated.  We discussed DKA prevention and Lexy states that she forgot about ketones checks when her BG is elevated.  She confirms that she has ketones strips at home and will start testing when BG reads HIGH. We discussed increasing insulin doses and following up next week.     Plan/Response:  Increase insulin:  Tresiba: 0-0-0-50 --> 0-0-0-55  Novolog: 15-17-15-0 --> 17-19-17-0  Plus sliding  scale:  150-199: +1 unit  200-249: +2 units  250-299: +3 units  300-349: +4 units  350-399: +5 units  400+: +6 units and call doctor    HIGH (no number): +10 units    Follow-up in 1 week     KARLOS Haas CDCES    Any diabetes medication dose changes were made via the CDE Protocol and Collaborative Practice Agreement with the patient's referring provider. A copy of this encounter was shared with the provider.

## 2022-05-09 NOTE — Clinical Note
Dr. Fuentes, Just MARTAThalia Lexy's BG have been so high. I increased her insulin doses and reminded her to check her ketones. We have a follow-up call next week.  Tiffanie Mcfarland RD [facility]

## 2022-05-09 NOTE — LETTER
5/9/2022         RE: Lexy Rockwell  [address]  [facility] [address]        Dear Colleague,    Thank you for referring your patient, Lexy Rockwell, to the [facility]. Please see a copy of my visit note below.    Date Breakfast  Lunch  Dinner  Bedtime    Before After Before After Before After    5/9 168  136       5/8 HIGH  452       5/7 HIGH  253  HIG     5/6 HIGH  HIGH  HIGH     5/5 HIGH  275  HIGH     5/4 HIGH  HIGH  HIGH     5/3 HIGH  HIGH  HIGH     5/2 5/1 4/30 4/29 4/28   242  229       Tresiba: 0-0-0-50 --> 0-0-0-55  Novolog: 15-17-15-0 --> 17-19-17-0

## 2022-05-11 RX ORDER — INSULIN ASPART 100 [IU]/ML
INJECTION, SOLUTION INTRAVENOUS; SUBCUTANEOUS
Qty: 30 ML | Refills: 3 | Status: SHIPPED | OUTPATIENT
Start: 2022-05-11 | End: 2022-07-08

## 2022-05-11 RX ORDER — INSULIN DEGLUDEC 200 U/ML
55 INJECTION, SOLUTION SUBCUTANEOUS DAILY
Qty: 15 ML | Refills: 3 | Status: SHIPPED | OUTPATIENT
Start: 2022-05-11 | End: 2022-05-20

## 2022-05-18 ENCOUNTER — OFFICE VISIT (OUTPATIENT)
Dept: PODIATRY | Facility: CLINIC | Age: 62
End: 2022-05-18
Payer: MEDICARE

## 2022-05-18 DIAGNOSIS — B35.1 DERMATOPHYTOSIS OF NAIL: Primary | ICD-10-CM

## 2022-05-18 DIAGNOSIS — E10.628 TYPE 1 DIABETES MELLITUS WITH PRESSURE CALLUS (H): ICD-10-CM

## 2022-05-18 DIAGNOSIS — E10.42 DM TYPE 1 WITH DIABETIC PERIPHERAL NEUROPATHY (H): ICD-10-CM

## 2022-05-18 DIAGNOSIS — L84 TYPE 1 DIABETES MELLITUS WITH PRESSURE CALLUS (H): ICD-10-CM

## 2022-05-18 DIAGNOSIS — L60.2 ONYCHAUXIS: ICD-10-CM

## 2022-05-18 PROCEDURE — 11719 TRIM NAIL(S) ANY NUMBER: CPT | Performed by: PODIATRIST

## 2022-05-18 PROCEDURE — 99214 OFFICE O/P EST MOD 30 MIN: CPT | Mod: 25 | Performed by: PODIATRIST

## 2022-05-18 PROCEDURE — 11055 PARING/CUTG B9 HYPRKER LES 1: CPT | Performed by: PODIATRIST

## 2022-05-18 NOTE — NURSING NOTE
Lexy Rockwell's chief complaint for this visit includes:  Chief Complaint   Patient presents with     Follow Up     Diabetic foot care     PCP: Fredi Fuentes    Referring Provider:  Referred Self  No address on file    LMP 03/28/2014   Data Unavailable        Allergies   Allergen Reactions     Amoxicillin      Sulfa Drugs          Do you need any medication refills at today's visit?

## 2022-05-18 NOTE — LETTER
5/18/2022         RE: Lexy Rockwell  [address]  [facility] [address]        Dear Colleague,    Thank you for referring your patient, Lexy Rockwell, to the [facility]. Please see a copy of my visit note below.    Past Medical History:   Diagnosis Date     Cerumen impacted      Development delay      Diabetic gastroparesis (H) 8/16/2013     Glaucoma (increased eye pressure)      Hyperlipaemia      Hypertension      Hypothyroid      Mental retardation      Nonsenile cataract      Obesity      Strabismus      Type 1 diabetes mellitus not at goal (H)      Patient Active Problem List   Diagnosis     Development delay     Overactive bladder     GERD (gastroesophageal reflux disease)     Hypertension goal BP (blood pressure) < 140/90     Hyperlipidemia LDL goal <100     Pain in joint, lower leg     Proteinuria     Vitamin D deficiency     Health Care Home     History of strabismus surgery     Type 1 diabetes mellitus with other specified complication (H)     Advanced directives, counseling/discussion     Primary open angle glaucoma of both eyes, moderate stage     Gastroesophageal reflux disease without esophagitis     Obesity, unspecified obesity severity, unspecified obesity type     Acute renal failure (H)     Acute retention of urine     JEAN (acute kidney injury) (H)     Dehydration     Diabetic ketoacidosis without coma associated with type 1 diabetes mellitus (H)     DKA (diabetic ketoacidoses)     Sinus tachycardia     SIRS (systemic inflammatory response syndrome) (H)     Obesity (BMI 35.0-39.9) with comorbidity (H)     Background diabetic retinopathy, mild-mod, ou     Combined forms of age-related cataract, mild, of both eyes     Past Surgical History:   Procedure Laterality Date     STRABISMUS SURGERY       [facility] EYE SURG ANT SGMT PROC UNLISTED  remote    strabismus surgery     Social History     Socioeconomic History     Marital status: Single      Spouse name: Not on file     Number of children: Not on file     Years of education: Not on file     Highest education level: Not on file   Occupational History     Not on file   Tobacco Use     Smoking status: Never Smoker     Smokeless tobacco: Never Used     Tobacco comment: lives in smoke free household.   Substance and Sexual Activity     Alcohol use: Yes     Comment: occass social     Drug use: No     Sexual activity: Never   Other Topics Concern     Parent/sibling w/ CABG, MI or angioplasty before 65F 55M? No   Social History Narrative     Not on file     Social Determinants of Health     Financial Resource Strain: Not on file   Food Insecurity: Not on file   Transportation Needs: Not on file   Physical Activity: Not on file   Stress: Not on file   Social Connections: Not on file   Intimate Partner Violence: Not on file   Housing Stability: Not on file     Family History   Problem Relation Age of Onset     Hypertension Father      Diabetes Sister      Glaucoma Maternal Grandfather      Cancer No family hx of      Cerebrovascular Disease No family hx of      Thyroid Disease No family hx of      Macular Degeneration No family hx of      Lab Results   Component Value Date    A1C 10.7 10/13/2021    A1C 9.7 02/01/2021    A1C 9.2 09/30/2020    A1C 11.8 01/08/2020    A1C 11.8 05/19/2019    A1C 11.2 12/12/2018     SUBJECTIVE FINDINGS:  A 61-year-old female returns to clinic for onychomycosis, onychauxis and tyloma and Diabetic foot cares.  She is diabetic with peripheral neuropathy and vascular disease.  She relates she has numbness, tingling and neuropathy in her feet.  She relates to no ulcers or sores since we have seen her last.  She relates the callus on her left first MPJ is painful.  It helps to have it trimmed down but then it builds up and becomes sore again.  She is not wearing diabetic shoes and relates she does not want to get any diabetic shoes.  She relates she needs her toenails cut as well.  She  relates she missed her last appointment.      OBJECTIVE FINDINGS:  DP and PT 2/4 bilaterally.  She has dorsally contracted digits 1-5 bilaterally with decreased ankle joint dorsiflexion bilaterally and generalized decreased range of motion bilaterally.  She has some peripheral edema bilaterally and decreased hair growth bilaterally.  She has decreased deep tendon reflexes bilaterally.  Sharp/dull is decreased with 5.07 [place]-Latha monofilament bilaterally.  There is no erythema, no drainage, no odor, no calor bilaterally.  No gross tendon voids bilaterally.  She has nucleated hyperkeratotic tissue buildup on the left plantar first MPJ.  She has minimal hyperkeratotic tissue build up with ecchymosis distal left second toe.  There is incurvated dystrophic thickened nails bilaterally to differing degrees with left medial Hallux and right lateral Hallux nail borders with pressure edema.       ASSESSMENT/PLAN:  Onychauxis and Onychomycosis bilaterally.  Tylomas on the left foot causing pain with preulcerative changes on second toe.  She is diabetic with peripheral neuropathy and peripheral vascular disease.  Diagnosis and treatment options discussed with her.  All the nails were debrided or reduced bilaterally upon consent.  The tyloma on the left 1st mpj was sharp debrided with a #15 blade upon consent.  Return to clinic and see me in about 2-3 months.  Previous notes reviewed.         Moderate level of medical decision making.        Again, thank you for allowing me to participate in the care of your patient.        Sincerely,        Ravin Back DPM

## 2022-05-19 NOTE — PROGRESS NOTES
Past Medical History:   Diagnosis Date     Cerumen impacted      Development delay      Diabetic gastroparesis (H) 8/16/2013     Glaucoma (increased eye pressure)      Hyperlipaemia      Hypertension      Hypothyroid      Mental retardation      Nonsenile cataract      Obesity      Strabismus      Type 1 diabetes mellitus not at goal (H)      Patient Active Problem List   Diagnosis     Development delay     Overactive bladder     GERD (gastroesophageal reflux disease)     Hypertension goal BP (blood pressure) < 140/90     Hyperlipidemia LDL goal <100     Pain in joint, lower leg     Proteinuria     Vitamin D deficiency     Health Care Home     History of strabismus surgery     Type 1 diabetes mellitus with other specified complication (H)     Advanced directives, counseling/discussion     Primary open angle glaucoma of both eyes, moderate stage     Gastroesophageal reflux disease without esophagitis     Obesity, unspecified obesity severity, unspecified obesity type     Acute renal failure (H)     Acute retention of urine     JEAN (acute kidney injury) (H)     Dehydration     Diabetic ketoacidosis without coma associated with type 1 diabetes mellitus (H)     DKA (diabetic ketoacidoses)     Sinus tachycardia     SIRS (systemic inflammatory response syndrome) (H)     Obesity (BMI 35.0-39.9) with comorbidity (H)     Background diabetic retinopathy, mild-mod, ou     Combined forms of age-related cataract, mild, of both eyes     Past Surgical History:   Procedure Laterality Date     STRABISMUS SURGERY       [facility] EYE SURG ANT SGMT PROC UNLISTED  remote    strabismus surgery     Social History     Socioeconomic History     Marital status: Single     Spouse name: Not on file     Number of children: Not on file     Years of education: Not on file     Highest education level: Not on file   Occupational History     Not on file   Tobacco Use     Smoking status: Never Smoker     Smokeless tobacco: Never Used     Tobacco comment:  lives in smoke free household.   Substance and Sexual Activity     Alcohol use: Yes     Comment: occass social     Drug use: No     Sexual activity: Never   Other Topics Concern     Parent/sibling w/ CABG, MI or angioplasty before 65F 55M? No   Social History Narrative     Not on file     Social Determinants of Health     Financial Resource Strain: Not on file   Food Insecurity: Not on file   Transportation Needs: Not on file   Physical Activity: Not on file   Stress: Not on file   Social Connections: Not on file   Intimate Partner Violence: Not on file   Housing Stability: Not on file     Family History   Problem Relation Age of Onset     Hypertension Father      Diabetes Sister      Glaucoma Maternal Grandfather      Cancer No family hx of      Cerebrovascular Disease No family hx of      Thyroid Disease No family hx of      Macular Degeneration No family hx of      Lab Results   Component Value Date    A1C 10.7 10/13/2021    A1C 9.7 02/01/2021    A1C 9.2 09/30/2020    A1C 11.8 01/08/2020    A1C 11.8 05/19/2019    A1C 11.2 12/12/2018     SUBJECTIVE FINDINGS:  A 61-year-old female returns to clinic for onychomycosis, onychauxis and tyloma and Diabetic foot cares.  She is diabetic with peripheral neuropathy and vascular disease.  She relates she has numbness, tingling and neuropathy in her feet.  She relates to no ulcers or sores since we have seen her last.  She relates the callus on her left first MPJ is painful.  It helps to have it trimmed down but then it builds up and becomes sore again.  She is not wearing diabetic shoes and relates she does not want to get any diabetic shoes.  She relates she needs her toenails cut as well.  She relates she missed her last appointment.      OBJECTIVE FINDINGS:  DP and PT 2/4 bilaterally.  She has dorsally contracted digits 1-5 bilaterally with decreased ankle joint dorsiflexion bilaterally and generalized decreased range of motion bilaterally.  She has some peripheral edema  bilaterally and decreased hair growth bilaterally.  She has decreased deep tendon reflexes bilaterally.  Sharp/dull is decreased with 5.07 [place]-Latha monofilament bilaterally.  There is no erythema, no drainage, no odor, no calor bilaterally.  No gross tendon voids bilaterally.  She has nucleated hyperkeratotic tissue buildup on the left plantar first MPJ.  She has minimal hyperkeratotic tissue build up with ecchymosis distal left second toe.  There is incurvated dystrophic thickened nails bilaterally to differing degrees with left medial Hallux and right lateral Hallux nail borders with pressure edema.       ASSESSMENT/PLAN:  Onychauxis and Onychomycosis bilaterally.  Tylomas on the left foot causing pain with preulcerative changes on second toe.  She is diabetic with peripheral neuropathy and peripheral vascular disease.  Diagnosis and treatment options discussed with her.  All the nails were debrided or reduced bilaterally upon consent.  The tyloma on the left 1st mpj was sharp debrided with a #15 blade upon consent.  Return to clinic and see me in about 2-3 months.  Previous notes reviewed.         Moderate level of medical decision making.

## 2022-05-20 ENCOUNTER — VIRTUAL VISIT (OUTPATIENT)
Dept: EDUCATION SERVICES | Facility: CLINIC | Age: 62
End: 2022-05-20
Payer: MEDICARE

## 2022-05-20 DIAGNOSIS — E10.69 TYPE 1 DIABETES MELLITUS WITH OTHER SPECIFIED COMPLICATION (H): ICD-10-CM

## 2022-05-20 DIAGNOSIS — E10.40 TYPE 1 DIABETES MELLITUS WITH DIABETIC NEUROPATHY (H): ICD-10-CM

## 2022-05-20 PROCEDURE — 99207 PR NO BILLABLE SERVICE THIS VISIT: CPT | Performed by: DIETITIAN, REGISTERED

## 2022-05-20 RX ORDER — INSULIN DEGLUDEC 200 U/ML
58 INJECTION, SOLUTION SUBCUTANEOUS DAILY
Qty: 15 ML | Refills: 3 | Status: SHIPPED | OUTPATIENT
Start: 2022-05-20 | End: 2022-07-08

## 2022-05-20 NOTE — PROGRESS NOTES
Diabetes Education Follow-up    Subjective/Objective:    Telephone visit with Lexy for BG follow-up. Last date of communication was: 5/9.    Diabetes is being managed with   Diabetes Medications   Diabetes Medication(s)     Biguanides       metFORMIN (GLUCOPHAGE-XR) 500 MG 24 hr tablet    Take 4-tablets by mouth daily as directed     Patient not taking: Reported on 12/22/2021    Insulin       insulin aspart (NOVOLOG FLEXPEN) 100 UNIT/ML pen    Inject 17 units with breakfast, 19 units with lunch and 17 units with dinner, plus correction scale. dose premeal as directed, TDD: up to 120 units     insulin degludec (TRESIBA FLEXTOUCH) 200 UNIT/ML pen    Inject 55 Units Subcutaneous daily          BG/Food Log:   Date Breakfast  Lunch  Dinner  Bedtime    Before After Before After Before After    5/20   224       5/19 5/18 5/17 297  264       5/16 211    284     5/15 235         5/14     160     5/13 123  103  201     5/12 5/11 296  186  279     5/10 300's  272       5/9 168  136  287           Assessment:    Fasting blood glucose: 14% in target.  Before lunch glucose: 17% in target.  Before dinner glucose: 0% in target.    Plan/Response:  Increase Tresiba: 0-0-0-55 --> 0-0-0-58    Tiffanie Mcfarland RD LD CDCES    Any diabetes medication dose changes were made via the CDE Protocol and Collaborative Practice Agreement with the patient's referring provider. A copy of this encounter was shared with the provider.

## 2022-05-20 NOTE — LETTER
"    5/20/2022         RE: Lexy Rockwell  [address]  [place] [address]        Dear Colleague,    Thank you for referring your patient, Lexy Rockwell, to the [facility]. Please see a copy of my visit note below.    Diabetes Education Follow-up    Subjective/Objective:    Telephone visit with Lexy for BG follow-up. Last date of communication was: 5/9.    Diabetes is being managed with   Diabetes Medications   Diabetes Medication(s)     Biguanides       metFORMIN (GLUCOPHAGE-XR) 500 MG 24 hr tablet    Take 4-tablets by mouth daily as directed     Patient not taking: Reported on 12/22/2021    Insulin       insulin aspart (NOVOLOG FLEXPEN) 100 UNIT/ML pen    Inject 17 units with breakfast, 19 units with lunch and 17 units with dinner, plus correction scale. dose premeal as directed, TDD: up to 120 units     insulin degludec (TRESIBA FLEXTOUCH) 200 UNIT/ML pen    Inject 55 Units Subcutaneous daily          BG/Food Log:   Date Breakfast  Lunch  Dinner  Bedtime    Before After Before After Before After    5/20 *** *** *** *** *** *** ***   5/19 *** *** *** *** *** *** ***   5/18 *** *** *** *** *** *** ***   5/17 *** *** *** *** *** *** ***   5/16 *** *** *** *** *** *** ***   5/15 *** *** *** *** *** *** ***   5/14 *** *** *** *** *** *** ***   5/13          5/12          5/11          5/10          5/9                Assessment:    Fasting blood glucose: ***% in target.  After breakfast glucose: ***% in target.  Before lunch glucose: ***% in target.  After lunch glucose: ***% in target.  Before dinner glucose: ***% in target.  After dinner glucose: ***% in target.  Bedtime glucose: ***% in target.    Plan/Response:  {Plan:118383::\"See Patient Instructions for co-developed, patient-stated behavior change goals.\"}    ***    Any diabetes medication dose changes were made via the CDE Protocol and Collaborative Practice Agreement with the patient's {diabetes " education provider list:850625}. A copy of this encounter was shared with the provider.        Diabetes Education Follow-up    Subjective/Objective:    Telephone visit with Lexy for BG follow-up. Last date of communication was: 5/9.    Diabetes is being managed with   Diabetes Medications   Diabetes Medication(s)     Biguanides       metFORMIN (GLUCOPHAGE-XR) 500 MG 24 hr tablet    Take 4-tablets by mouth daily as directed     Patient not taking: Reported on 12/22/2021    Insulin       insulin aspart (NOVOLOG FLEXPEN) 100 UNIT/ML pen    Inject 17 units with breakfast, 19 units with lunch and 17 units with dinner, plus correction scale. dose premeal as directed, TDD: up to 120 units     insulin degludec (TRESIBA FLEXTOUCH) 200 UNIT/ML pen    Inject 55 Units Subcutaneous daily          BG/Food Log:   Date Breakfast  Lunch  Dinner  Bedtime    Before After Before After Before After    5/20   224       5/19 5/18 5/17 297  264       5/16 211    284     5/15 235         5/14     160     5/13 123  103  201     5/12 5/11 296  186  279     5/10 300's  272       5/9 168  136  287           Assessment:    Fasting blood glucose: 14% in target.  Before lunch glucose: 17% in target.  Before dinner glucose: 0% in target.    Plan/Response:  Increase Tresiba: 0-0-0-55 --> 0-0-0-58    Tiffanie Mcfarland RD LD [facility]    Any diabetes medication dose changes were made via the CDE Protocol and Collaborative Practice Agreement with the patient's referring provider. A copy of this encounter was shared with the provider.

## 2022-05-20 NOTE — Clinical Note
Just FYI, BG improved, still a little elevated.  Tiffanie Mcfarland RD LD [facility]

## 2022-06-08 ENCOUNTER — TELEPHONE (OUTPATIENT)
Dept: FAMILY MEDICINE | Facility: CLINIC | Age: 62
End: 2022-06-08
Payer: MEDICARE

## 2022-06-08 NOTE — TELEPHONE ENCOUNTER
This writer attempted to contact Patient on 06/08/22      Reason for call appointment and unable to leave message.      If patient calls back:   Deal received a prescription form asking for additional documents. In order to complete deal is requesting that patient comes in for a in person appointment. If patient calls back please schedule her with PCP as soon as possible.         Ewa Alan

## 2022-06-09 ENCOUNTER — TELEPHONE (OUTPATIENT)
Dept: FAMILY MEDICINE | Facility: CLINIC | Age: 62
End: 2022-06-09
Payer: MEDICARE

## 2022-06-09 DIAGNOSIS — E10.69 TYPE 1 DIABETES MELLITUS WITH OTHER SPECIFIED COMPLICATION (H): ICD-10-CM

## 2022-06-09 RX ORDER — PROCHLORPERAZINE 25 MG/1
SUPPOSITORY RECTAL
Qty: 3 EACH | Refills: 11 | Status: SHIPPED | OUTPATIENT
Start: 2022-06-09 | End: 2022-10-28

## 2022-06-09 NOTE — TELEPHONE ENCOUNTER
Form was asking for last 6 months of clinic notes. Notes have been printed and faxed to [phone]. Closing encounter     Nkechi-

## 2022-06-09 NOTE — TELEPHONE ENCOUNTER
Received call from patient's mother, Francisca. Consent to communicate on file.    She states that the patient is currently in the hospital d/t a lack of supply on Continuous Blood Gluc Sensor (DEXCOM G6 SENSOR) [facility].     Dr. Goss is patient's previous endocrinologist who provided prescriptions for Dexcom. These requests are to default back to PCP.     Writer sent supply for when she gets out of the hospital.     She was also advised that patient should schedule hospital f/u after she discharges from hospital. She will also ask for a referral to a new endocrinology upon discharge.     LIA LongN RN  [facility], [facility]

## 2022-06-14 ENCOUNTER — TELEPHONE (OUTPATIENT)
Dept: FAMILY MEDICINE | Facility: CLINIC | Age: 62
End: 2022-06-14
Payer: MEDICARE

## 2022-06-14 NOTE — TELEPHONE ENCOUNTER
RAYSA Dodson from [facility] calling to request orders (see below) and has questions regarding medications from pt's discharge on 6/13/22.    Pt has these medications and wanted to make sure they were on her Epic med list:  Centrum silver once a day  Magnesium oxide 400mg   Aspirin 81 mg daily  Confirmed medications are on med list.     Update regarding Trisiba. Dose changed from 58 units to 30 units. Per med list also takes Novolog 17 units before breakfast, 19 units before lunch and 17 units with dinner and per sliding scale. This medication was not on pt's discharge instructions. Should pt continue taking?    Did also recommend pt schedule a hospital follow up appt with PCP.    The Home Care/Assisted Living/Nursing Facility is calling regarding an established patient.  Has the patient seen Home Care in the past or is currently residing in Assisted Living or Nursing Facility? Yes.     RAYSA Dodson calling from [facility] requesting the following orders that are within the Home Care, Assisted Living or Nursing Home Eval and Treatment standing order and can be signed as standing order signature required by RN.    Preferred Call Back Number: [phone]  Home Care Visits Continuation     Skilled nursing 2 times a week x 3 weeks and 1 time a week x 1 week    Any additional Orders:  Are there any orders requested, not stated above, that are outside of the standing order and must be routed to a licensed practitioner for approval?    No    Writer has verified Requestor will send fax to have orders signed.    Barbara Zeng RN  [facility]

## 2022-06-15 ENCOUNTER — MYC MEDICAL ADVICE (OUTPATIENT)
Dept: EDUCATION SERVICES | Facility: CLINIC | Age: 62
End: 2022-06-15
Payer: MEDICARE

## 2022-06-15 DIAGNOSIS — E10.69 TYPE 1 DIABETES MELLITUS WITH OTHER SPECIFIED COMPLICATION (H): Primary | ICD-10-CM

## 2022-06-15 NOTE — TELEPHONE ENCOUNTER
Okay to nursing orders    I reviewed discharge summary and no mention of the short acting insulin humalog scheduled.  Keep the Humalog as just sliding scale.    Keep appointment as scheduled for June 27    Please call    Fredi Fuentes MD

## 2022-06-15 NOTE — TELEPHONE ENCOUNTER
Called Song with provider's message as written. States pt is taking Novolog not Humalog. Did inform Song that medications are interchangeable. Pt has an appt scheduled for hospital follow up on 6/27 and diabetic ed on 6/27. Routing as MARTA.    Barbara Zeng RN  [facility]

## 2022-06-17 ENCOUNTER — TELEPHONE (OUTPATIENT)
Dept: FAMILY MEDICINE | Facility: CLINIC | Age: 62
End: 2022-06-17
Payer: MEDICARE

## 2022-06-17 NOTE — TELEPHONE ENCOUNTER
Reason for Call: Request for an order or referral:    Order or referral being requested: verbal orders for PT.  Starting next week 2 times a week for 2 weeks and then 1 time a week for 3 weeks.  Working on gait and endurance, lower extremity strength, balance and home exercise program.    Verbal orders for  consult and occupational therapy evaluation and treat.  OT-ADL's, cognitive assessment, upper extremity program, home safety, and incontinence training.    SW - help to get set up with long term nursing, med dispensing machine and possible additional county services.    Date needed: as soon as possible    Best Time:  anytime    Can we leave a detailed message on this number?  YES    Call taken on 6/17/2022 at 9:55 AM by Armida Kiran

## 2022-06-17 NOTE — TELEPHONE ENCOUNTER
Date Breakfast  Lunch  Dinner  Bedtime    Before After Before After Before After    6/17 255  261  Gave- 3 units - around 1pm    Eating sandwich 254        CDE called patient as she has not read her [employer] messages.   She reports since  6/13 she has been doing the following sliding scale:  150-199- +1  200-249= +2  250-299: +3  300-449= +4  350-399= +5  400+= +6 units     RN coming tomorrow to house- next week- doesn't know schedule.     Discussed with patient that this is not enough insulin to provide her coverage for carbohydrates eaten at meals and is typically just for correcting blood sugars.  Not enough insulin at meals would be risky for her landing in the hospital again.  Informed patient she should return to her pre-hospitalization Novolog dosing of 17-19-17-0.  Patient verbalized understanding.     Nataly Campos MS, RD, LD, CDE

## 2022-06-17 NOTE — TELEPHONE ENCOUNTER
Considering Lexy's history of recurring DKA, recommend resuming Novolog as she was taking prior to hospitalization. Routing to Dr. Fuentes for approval.     Tiffanie Mcfarland RD LD [facility]

## 2022-06-19 ENCOUNTER — HEALTH MAINTENANCE LETTER (OUTPATIENT)
Age: 62
End: 2022-06-19

## 2022-06-27 ENCOUNTER — MEDICAL CORRESPONDENCE (OUTPATIENT)
Dept: FAMILY MEDICINE | Facility: CLINIC | Age: 62
End: 2022-06-27

## 2022-06-27 ENCOUNTER — OFFICE VISIT (OUTPATIENT)
Dept: FAMILY MEDICINE | Facility: CLINIC | Age: 62
End: 2022-06-27
Payer: MEDICARE

## 2022-06-27 VITALS
HEART RATE: 108 BPM | DIASTOLIC BLOOD PRESSURE: 104 MMHG | BODY MASS INDEX: 30.68 KG/M2 | HEIGHT: 63 IN | SYSTOLIC BLOOD PRESSURE: 176 MMHG | TEMPERATURE: 97.4 F | OXYGEN SATURATION: 98 % | WEIGHT: 173.13 LBS

## 2022-06-27 DIAGNOSIS — I10 HYPERTENSION GOAL BP (BLOOD PRESSURE) < 140/90: ICD-10-CM

## 2022-06-27 DIAGNOSIS — R62.50 DEVELOPMENT DELAY: ICD-10-CM

## 2022-06-27 DIAGNOSIS — H61.23 BILATERAL IMPACTED CERUMEN: ICD-10-CM

## 2022-06-27 DIAGNOSIS — E10.69 TYPE 1 DIABETES MELLITUS WITH OTHER SPECIFIED COMPLICATION (H): Primary | ICD-10-CM

## 2022-06-27 PROCEDURE — 99214 OFFICE O/P EST MOD 30 MIN: CPT | Mod: 25 | Performed by: FAMILY MEDICINE

## 2022-06-27 PROCEDURE — 69210 REMOVE IMPACTED EAR WAX UNI: CPT | Performed by: FAMILY MEDICINE

## 2022-06-27 RX ORDER — METOPROLOL SUCCINATE 25 MG/1
TABLET, EXTENDED RELEASE ORAL
Qty: 90 TABLET | Refills: 3 | Status: SHIPPED | OUTPATIENT
Start: 2022-06-27 | End: 2023-01-20

## 2022-06-27 ASSESSMENT — PAIN SCALES - GENERAL: PAINLEVEL: NO PAIN (0)

## 2022-06-27 NOTE — PROGRESS NOTES
"       Horacio Pugh is a 61 year old{ , presenting for the following health issues:  Hospital F/U      History of Present Illness       Reason for visit:  Post hospital visit    She eats 2-3 servings of fruits and vegetables daily.She consumes 1 sweetened beverage(s) daily.She exercises with enough effort to increase her heart rate 10 to 19 minutes per day.  She exercises with enough effort to increase her heart rate 3 or less days per week. She is missing 2 dose(s) of medications per week.         Hospital Follow-up Visit:    Hospital/Nursing Home/IP Rehab Facility: [facility]  Date of Admission: 06/08/2022  Date of Discharge: 06/13/2022  Reason(s) for Admission: DKA    Was your hospitalization related to COVID-19? No   Problems taking medications regularly:  None  Medication changes since discharge: None  Problems adhering to non-medication therapy:  None    Summary of hospitalization:  CareEverywhere information obtained and reviewed  Diagnostic Tests/Treatments reviewed.  Follow up needed: none  Other Healthcare Providers Involved in Patient s Care:         None  Update since discharge: improved.  Post Discharge Medication Reconciliation: discharge medications reconciled, continue medications without change.  Plan of care communicated with patient           Review of Systems    feeling good now    Sugar up and down    2 or 3 low ones 80s since home    30 units of tresiba nightly     Getting home care    Sliding scale short acting      Objective    BP (!) 176/104 (BP Location: Right arm, Patient Position: Chair, Cuff Size: Adult Regular)   Pulse 108   Temp 97.4  F (36.3  C) (Temporal)   Ht 1.605 m (5' 3.19\")   Wt 78.5 kg (173 lb 2 oz)   LMP 03/28/2014   SpO2 98%   Breastfeeding No   BMI 30.48 kg/m    Body mass index is 30.48 kg/m .  Physical Exam  Constitutional:       Appearance: She is well-developed.   HENT:      Head: Normocephalic and atraumatic.   Eyes:      Conjunctiva/sclera: Conjunctivae " normal.   Neck:      Vascular: No carotid bruit.   Cardiovascular:      Rate and Rhythm: Normal rate and regular rhythm.      Heart sounds: Normal heart sounds.   Pulmonary:      Effort: Pulmonary effort is normal. No respiratory distress.      Breath sounds: Normal breath sounds.   Neurological:      Mental Status: She is alert and oriented to person, place, and time.      patient has large amount of cerumen bilaterally. She wanted this out.  With consent used gentle irrigation and curette to remove all the wax.   Tympanic membranes and canals fine afterward    No complications             ASSESSMENT / PLAN:  (E10.69) Type 1 diabetes mellitus with other specified complication (H)  (primary encounter diagnosis)  Comment: patient has had very labile sugars for years.  Frequent hospitalizations for DKA including this recent one.  Right now she is getting home care short term to help with meds/ diabetes etc.  However, given her history, overall health, and developmental delay/intellectual limitations I feel ongoing nursing visits at home would be very appropriate long term for this patient.  Keeping close track of sugars/ meds may prevent some future hospitalizations.   Plan: as above     (I10) Hypertension goal BP (blood pressure) < 140/90  Comment: patient has better heart rate / palpitation control on one pill daily instead of 1/2 so sent in ongoing prescription for this.   Plan: metoprolol succinate ER (TOPROL XL) 25 MG 24 hr        tablet             (H61.23) Bilateral impacted cerumen  Comment: removed here.    Plan: REMOVE IMPACTED CERUMEN        Follow up prn     (R62.50) Development delay  Comment: chronic   Plan: as above       I reviewed the patient's medications, allergies, medical history, family history, and social history.    Fredi Fuentes MD                    .  ..

## 2022-06-27 NOTE — PATIENT INSTRUCTIONS
Continue current meds    Metoprolol is now one pill daily, not 1/2 pill    See us in 2 1/2 to 3 months, recheck hemoglobin a1c then

## 2022-06-28 ENCOUNTER — MEDICAL CORRESPONDENCE (OUTPATIENT)
Dept: FAMILY MEDICINE | Facility: CLINIC | Age: 62
End: 2022-06-28

## 2022-06-29 ENCOUNTER — TELEPHONE (OUTPATIENT)
Dept: FAMILY MEDICINE | Facility: CLINIC | Age: 62
End: 2022-06-29

## 2022-06-29 NOTE — TELEPHONE ENCOUNTER
Pt Home Care nurse calling, concerned about patient's BS levels.   Recently they have been 200-300 since she's been home from the hospital.      [facility] is concerned.     Call  ok to glenis Velez-  Andre Clancy

## 2022-06-30 ENCOUNTER — MEDICAL CORRESPONDENCE (OUTPATIENT)
Dept: HEALTH INFORMATION MANAGEMENT | Facility: CLINIC | Age: 62
End: 2022-06-30

## 2022-06-30 NOTE — TELEPHONE ENCOUNTER
I left very detailed voice mail for nurse  Make sure patient taking insulin  Check on symptoms  We had consulted endocrinology also  Call back with questions/ problems.  Fredi Fuentes MD

## 2022-07-06 ENCOUNTER — MYC MEDICAL ADVICE (OUTPATIENT)
Dept: FAMILY MEDICINE | Facility: CLINIC | Age: 62
End: 2022-07-06

## 2022-07-07 ENCOUNTER — TELEPHONE (OUTPATIENT)
Dept: ENDOCRINOLOGY | Facility: CLINIC | Age: 62
End: 2022-07-07

## 2022-07-07 NOTE — TELEPHONE ENCOUNTER
Visit notes (Q6 months per Medicare) Appt with Shaina Michelle 07/20/2022.   Gabi Shafer RN on 7/7/2022 at 1:59 PM

## 2022-07-07 NOTE — TELEPHONE ENCOUNTER
Pt is asking for a letter from pcp to have a nurse come on a weekly basis. Please advise.    Tricia CRAWLEY MA

## 2022-07-08 ENCOUNTER — VIRTUAL VISIT (OUTPATIENT)
Dept: EDUCATION SERVICES | Facility: CLINIC | Age: 62
End: 2022-07-08
Payer: MEDICARE

## 2022-07-08 DIAGNOSIS — Z79.4 TYPE 2 DIABETES MELLITUS WITH DIABETIC POLYNEUROPATHY, WITH LONG-TERM CURRENT USE OF INSULIN (H): ICD-10-CM

## 2022-07-08 DIAGNOSIS — E10.69 TYPE 1 DIABETES MELLITUS WITH OTHER SPECIFIED COMPLICATION (H): ICD-10-CM

## 2022-07-08 DIAGNOSIS — E10.40 TYPE 1 DIABETES MELLITUS WITH DIABETIC NEUROPATHY (H): ICD-10-CM

## 2022-07-08 DIAGNOSIS — E11.42 TYPE 2 DIABETES MELLITUS WITH DIABETIC POLYNEUROPATHY, WITH LONG-TERM CURRENT USE OF INSULIN (H): ICD-10-CM

## 2022-07-08 DIAGNOSIS — E10.69 TYPE 1 DIABETES MELLITUS WITH OTHER SPECIFIED COMPLICATION (H): Primary | ICD-10-CM

## 2022-07-08 PROCEDURE — 98967 PH1 ASSMT&MGMT NQHP 11-20: CPT | Mod: 95 | Performed by: DIETITIAN, REGISTERED

## 2022-07-08 PROCEDURE — 99207 PR DROP WITH A PROCEDURE: CPT | Performed by: DIETITIAN, REGISTERED

## 2022-07-08 RX ORDER — INSULIN ASPART 100 [IU]/ML
5 INJECTION, SOLUTION INTRAVENOUS; SUBCUTANEOUS
Qty: 30 ML | Refills: 3 | Status: SHIPPED | OUTPATIENT
Start: 2022-07-08 | End: 2022-08-05

## 2022-07-08 RX ORDER — INSULIN DEGLUDEC 200 U/ML
30 INJECTION, SOLUTION SUBCUTANEOUS DAILY
Qty: 15 ML | Refills: 3 | Status: SHIPPED | OUTPATIENT
Start: 2022-07-08 | End: 2022-08-05

## 2022-07-08 NOTE — LETTER
7/8/2022         RE: Lexy Rockwell  [address]  [place] [address]        Dear Colleague,    Thank you for referring your patient, Lexy Rockwell, to the [facility]. Please see a copy of my visit note below.    Diabetes Education Follow-up    Subjective/Objective:    Telephone visit with Lexy for BG follow-up. Last date of communication was: 6/15.    Diabetes is being managed with   Diabetes Medications   Diabetes Medication(s)     Biguanides       metFORMIN (GLUCOPHAGE-XR) 500 MG 24 hr tablet    Take 4-tablets by mouth daily as directed    Insulin       insulin aspart (NOVOLOG FLEXPEN) 100 UNIT/ML pen    Inject 17 units with breakfast, 19 units with lunch and 17 units with dinner, plus correction scale. dose premeal as directed, TDD: up to 120 units     insulin degludec (TRESIBA FLEXTOUCH) 200 UNIT/ML pen    Inject 58 Units Subcutaneous daily      Plus sliding scale:  150-199: +1 unit  200-249: +2 units  250-299: +3 units  300-349: +4 units  350-399: +5 units  400+: +6 units and call doctor    HIGH (no number): +10 units    BG/Food Log:   Date Breakfast  Lunch  Dinner  Bedtime    Before After Before After Before After    7/8 7/7 7/6 165  168       7/5          7/4          7/3 HIGH  HIGH  HIGH     7/2 HIGH  274       7/1 HIGH  HIGH  HIGH     6/30   298  296     6/29 296  150  271     6/28 223  213  319     6/27 HIGH  HIGH  HIGH     6/26 234  80  200's (?)     6/25   109  276     6/24   295  297         Assessment:  Lexy has been taking 30 units of Tresiba (decreased in hospital) and only using a sliding scale for Novolog. We reviewed Lexy's recent BG which have all been quite elevated.  Recommended that she increase Tresiba and add base Novolog dose- routing to provider for approval.  We also reviewed DKA prevention and verified that Lexy does have ketone strips at home.     Plan/Response:  See Patient Instructions  for co-developed, patient-stated behavior change goals.  Recommend increase to insulin -   Tresiba: 0-0-0-30 --> 0-0-0-40  Novolo-0-0-0 + Sliding Scale --> 5-5-5-0 + Sliding Scale  Follow-up at Endo visit in 1.5 weeks, with CDE in 4 weeks    KARLOS Haas [facility]    Any diabetes medication dose changes were made via the CDE Protocol and Collaborative Practice Agreement with the patient's referring provider. A copy of this encounter was shared with the provider.

## 2022-07-08 NOTE — TELEPHONE ENCOUNTER
Dr. Fuentes,  Please let me know if you agree with plan and sign pended orders.     Lexy's BG remains quite elevated. Recommend the following changes to insulin:    Tresiba: 0-0-0-30 --> 0-0-0-40  Novolo-0-0-0 + Sliding Scale --> 5-5-5-0 + Sliding Scale    Tiffanie Mcfarland RD [facility]

## 2022-07-20 ENCOUNTER — TELEPHONE (OUTPATIENT)
Dept: FAMILY MEDICINE | Facility: CLINIC | Age: 62
End: 2022-07-20

## 2022-07-20 NOTE — TELEPHONE ENCOUNTER
Mami KHAN with [facility] called the clinic with patient present with update on blood pressure readings during the occupational therapy session at this time.    She reports that patient's blood pressure was 170/100 with no symptoms at this time.  Mami KHAN rechecked the blood pressure after 10 minutes and BP continues to be 170/100 (using a manual arm cuff).    Patient reports that she had lunch with coffee before the OT session and may be the cause of the higher blood pressure readings.    Patient is curently taking:  metoprolol succinate ER (TOPROL XL) 25 MG 24 hr tablet  lisinopril (ZESTRIL) 20 MG tablet: Sig: TAKE 2 TABLETS(40 MG) BY MOUTH DAILY    Patient was advised to call the clinic [phone] or seek medical assistance at the nearest ER or , if the symptoms persist or worsen.    Patient declined offer to schedule a follow-up appointment at this time due to transportation. She states that she needs to call [facility] a few days before the appointment.    Home care Physical therapy are visiting patient on Friday 7/22/22.  Patient has chosen to wait until the appointment.  Patient and Physical therapy will call the clinic with updates and to schedule an appointment as needed on Friday.    Patient verbalized understanding and has no further questions or concerns at this time.

## 2022-07-22 ENCOUNTER — TELEPHONE (OUTPATIENT)
Dept: FAMILY MEDICINE | Facility: CLINIC | Age: 62
End: 2022-07-22

## 2022-07-22 NOTE — TELEPHONE ENCOUNTER
Sarath calling to report vitals:  121/72 on left arm seated at rest. All other vitals were within normal limits.  Ok to leave voicemail.

## 2022-08-05 ENCOUNTER — VIRTUAL VISIT (OUTPATIENT)
Dept: EDUCATION SERVICES | Facility: CLINIC | Age: 62
End: 2022-08-05
Payer: MEDICARE

## 2022-08-05 DIAGNOSIS — Z79.4 TYPE 2 DIABETES MELLITUS WITH DIABETIC POLYNEUROPATHY, WITH LONG-TERM CURRENT USE OF INSULIN (H): ICD-10-CM

## 2022-08-05 DIAGNOSIS — E10.69 TYPE 1 DIABETES MELLITUS WITH OTHER SPECIFIED COMPLICATION (H): ICD-10-CM

## 2022-08-05 DIAGNOSIS — E11.42 TYPE 2 DIABETES MELLITUS WITH DIABETIC POLYNEUROPATHY, WITH LONG-TERM CURRENT USE OF INSULIN (H): ICD-10-CM

## 2022-08-05 DIAGNOSIS — E10.40 TYPE 1 DIABETES MELLITUS WITH DIABETIC NEUROPATHY (H): ICD-10-CM

## 2022-08-05 PROCEDURE — 99207 PR NON-BILLABLE SERV PER CHARTING: CPT | Performed by: DIETITIAN, REGISTERED

## 2022-08-05 RX ORDER — INSULIN ASPART 100 [IU]/ML
6 INJECTION, SOLUTION INTRAVENOUS; SUBCUTANEOUS
Qty: 30 ML | Refills: 3 | Status: SHIPPED | OUTPATIENT
Start: 2022-08-05 | End: 2022-09-02

## 2022-08-05 RX ORDER — INSULIN DEGLUDEC 200 U/ML
44 INJECTION, SOLUTION SUBCUTANEOUS DAILY
Qty: 15 ML | Refills: 3 | Status: SHIPPED | OUTPATIENT
Start: 2022-08-05 | End: 2022-09-02

## 2022-08-05 NOTE — LETTER
2022         RE: Lexy Rockwell  [address]  [place] [address]        Dear Colleague,    Thank you for referring your patient, Lexy Rockwell, to the [facility]. Please see a copy of my visit note below.    Diabetes Education Follow-up    Subjective/Objective:    Telephone visit for BG review. Last date of communication was: .    Diabetes is being managed with   Diabetes Medications   Diabetes Medication(s)     Biguanides       metFORMIN (GLUCOPHAGE-XR) 500 MG 24 hr tablet    Take 4-tablets by mouth daily as directed    Insulin       insulin aspart (NOVOLOG FLEXPEN) 100 UNIT/ML pen    Inject 5 Units Subcutaneous 3 times daily (with meals) And sliding scale: 1 unit:50 > 150 mg/dl. Up to 50 units daily     insulin degludec (TRESIBA FLEXTOUCH) 200 UNIT/ML pen    Inject 30 Units Subcutaneous daily          BG/Food Log:   Date Breakfast  Lunch  Dinner  Bedtime    Before After Before After Before After     284          200  280  269     8/3 264  127  195      237  156  145      117  295  191      205  292  145      245  184  153      284  280  242      173  88  251      219  226  211      227  184  297      191  167  280      297  255  234      221  184  185         Assessment:    Fasting blood glucose: 7% in target.  Before lunch glucose: 7% in target.  Before dinner glucose: 0% in target.    Plan/Response:  Increase insulin:  Tresiba; 0-0-0-40--> 0-0-0-44  Novolo-5-5-0 --> 6-6-6-0    KARLOS Haas [facility]ES  Time spent: 12 minutes    Any diabetes medication dose changes were made via the CDE Protocol and Collaborative Practice Agreement with the patient's referring provider. A copy of this encounter was shared with the provider.

## 2022-08-05 NOTE — PROGRESS NOTES
Diabetes Education Follow-up    Subjective/Objective:    Telephone visit for BG review. Last date of communication was: .    Diabetes is being managed with   Diabetes Medications   Diabetes Medication(s)     Biguanides       metFORMIN (GLUCOPHAGE-XR) 500 MG 24 hr tablet    Take 4-tablets by mouth daily as directed    Insulin       insulin aspart (NOVOLOG FLEXPEN) 100 UNIT/ML pen    Inject 5 Units Subcutaneous 3 times daily (with meals) And sliding scale: 1 unit:50 > 150 mg/dl. Up to 50 units daily     insulin degludec (TRESIBA FLEXTOUCH) 200 UNIT/ML pen    Inject 30 Units Subcutaneous daily          BG/Food Log:   Date Breakfast  Lunch  Dinner  Bedtime    Before After Before After Before After     284          200  280  269     8/3 264  127  195      237  156  145      117  295  191      205  292  145      245  184  153      284  280  242      173  88  251      219  226  211      227  184  297      191  167  280      297  255  234      221  184  185         Assessment:    Fasting blood glucose: 7% in target.  Before lunch glucose: 7% in target.  Before dinner glucose: 0% in target.    Plan/Response:  Increase insulin:  Tresiba; 0-0-0-40--> 0-0-0-44  Novolo-5-5-0 --> 6-6-6-0    KARLOS Haas [facility]  Time spent: 12 minutes    Any diabetes medication dose changes were made via the CDE Protocol and Collaborative Practice Agreement with the patient's referring provider. A copy of this encounter was shared with the provider.

## 2022-08-17 ENCOUNTER — OFFICE VISIT (OUTPATIENT)
Dept: PODIATRY | Facility: CLINIC | Age: 62
End: 2022-08-17
Payer: MEDICARE

## 2022-08-17 DIAGNOSIS — E10.42 DM TYPE 1 WITH DIABETIC PERIPHERAL NEUROPATHY (H): Primary | ICD-10-CM

## 2022-08-17 DIAGNOSIS — L84 TYPE 1 DIABETES MELLITUS WITH PRESSURE CALLUS (H): ICD-10-CM

## 2022-08-17 DIAGNOSIS — E10.628 TYPE 1 DIABETES MELLITUS WITH PRESSURE CALLUS (H): ICD-10-CM

## 2022-08-17 DIAGNOSIS — L03.032 PARONYCHIA, TOE, LEFT: ICD-10-CM

## 2022-08-17 DIAGNOSIS — L03.031 PARONYCHIA, TOE, RIGHT: ICD-10-CM

## 2022-08-17 DIAGNOSIS — B35.1 DERMATOPHYTOSIS OF NAIL: ICD-10-CM

## 2022-08-17 PROCEDURE — 99214 OFFICE O/P EST MOD 30 MIN: CPT | Mod: 25 | Performed by: PODIATRIST

## 2022-08-17 PROCEDURE — 11056 PARNG/CUTG B9 HYPRKR LES 2-4: CPT | Performed by: PODIATRIST

## 2022-08-17 RX ORDER — AZITHROMYCIN 250 MG/1
TABLET, FILM COATED ORAL
Qty: 6 TABLET | Refills: 0 | Status: SHIPPED | OUTPATIENT
Start: 2022-08-17 | End: 2022-10-03

## 2022-08-17 NOTE — NURSING NOTE
Lexy Rockwell's chief complaint for this visit includes:  Chief Complaint   Patient presents with     Follow Up     Toe nails and callus trims     PCP: Fredi Fuentes    Referring Provider:  Error in [id number] index!    LMP 03/28/2014   Data Unavailable        Allergies   Allergen Reactions     Amoxicillin      Sulfa Drugs          Do you need any medication refills at today's visit?

## 2022-08-17 NOTE — LETTER
8/17/2022         RE: Lexy Rockwell  [address]  [facility] [address]        Dear Colleague,    Thank you for referring your patient, Lexy Rockwell, to the [facility]. Please see a copy of my visit note below.    Past Medical History:   Diagnosis Date     Cerumen impacted      Development delay      Diabetic gastroparesis (H) 8/16/2013     Glaucoma (increased eye pressure)      Hyperlipaemia      Hypertension      Hypothyroid      Mental retardation      Nonsenile cataract      Obesity      Strabismus      Type 1 diabetes mellitus not at goal (H)      Patient Active Problem List   Diagnosis     Development delay     Overactive bladder     GERD (gastroesophageal reflux disease)     Hypertension goal BP (blood pressure) < 140/90     Hyperlipidemia LDL goal <100     Pain in joint, lower leg     Proteinuria     Vitamin D deficiency     Health Care Home     History of strabismus surgery     Type 1 diabetes mellitus with other specified complication (H)     Advanced directives, counseling/discussion     Primary open angle glaucoma of both eyes, moderate stage     Gastroesophageal reflux disease without esophagitis     Obesity, unspecified obesity severity, unspecified obesity type     Acute renal failure (H)     Acute retention of urine     JEAN (acute kidney injury) (H)     Dehydration     Diabetic ketoacidosis without coma associated with type 1 diabetes mellitus (H)     DKA (diabetic ketoacidoses)     Sinus tachycardia     SIRS (systemic inflammatory response syndrome) (H)     Obesity (BMI 35.0-39.9) with comorbidity (H)     Background diabetic retinopathy, mild-mod, ou     Combined forms of age-related cataract, mild, of both eyes     Past Surgical History:   Procedure Laterality Date     STRABISMUS SURGERY       [facility] EYE SURG ANT SGMT PROC UNLISTED  remote    strabismus surgery     Social History     Socioeconomic History     Marital status: Single      Spouse name: Not on file     Number of children: Not on file     Years of education: Not on file     Highest education level: Not on file   Occupational History     Not on file   Tobacco Use     Smoking status: Never Smoker     Smokeless tobacco: Never Used     Tobacco comment: lives in smoke free household.   Substance and Sexual Activity     Alcohol use: Yes     Comment: occass social     Drug use: No     Sexual activity: Never   Other Topics Concern     Parent/sibling w/ CABG, MI or angioplasty before 65F 55M? No   Social History Narrative     Not on file     Social Determinants of Health     Financial Resource Strain: Not on file   Food Insecurity: Not on file   Transportation Needs: Not on file   Physical Activity: Not on file   Stress: Not on file   Social Connections: Not on file   Intimate Partner Violence: Not on file   Housing Stability: Not on file     Family History   Problem Relation Age of Onset     Hypertension Father      Diabetes Sister      Glaucoma Maternal Grandfather      Cancer No family hx of      Cerebrovascular Disease No family hx of      Thyroid Disease No family hx of      Macular Degeneration No family hx of      Lab Results   Component Value Date    A1C 10.7 10/13/2021    A1C 9.7 02/01/2021    A1C 9.2 09/30/2020    A1C 11.8 01/08/2020    A1C 11.8 05/19/2019    A1C 11.2 12/12/2018     SUBJECTIVE FINDINGS:  A 62-year-old presents for diabetic foot cares.  She relates that she has got some redness on her big toes, left greater than right, for about 2-3 weeks' duration.  No injuries.  She is not sure how it started.  She did get new shoes about 2 months ago, but she was not having any problems with that.  She relates she does not have diabetic shoes.  She does not want to get diabetic shoes.  She is diabetic with peripheral neuropathy.  She relates that is unchanged.  Relates no injuries, no ulcers or sores since we have seen her last.  She relates she needs her nails and calluses  trimmed down as well.    OBJECTIVE FINDINGS:  DP and PT are 2/4 bilaterally.  She has mild peripheral edema bilaterally.  She has incurvated hallux nails with erythema and edema of the nail borders, left greater than right.  There is some dried serosanguineous drainage in the left medial nail border and right lateral hallux nail border.  There is no active drainage, no odor, no calor.  She has decreased hair growth bilaterally.  She has incurvated nails 1 through 5 bilaterally with some thickening and discoloration to differing degrees.  She has hyperkeratotic tissue buildup plantar medial hallux and first MPJ, left foot.    ASSESSMENT AND PLAN:  Paronychia, right and left hallux nail borders.  She has onychomycosis and onychauxis bilaterally.  Diabetes with peripheral neuropathy.  Diabetes with peripheral vascular disease.  Tylomas left foot.  She has hammertoes present.  Diagnosis and treatment options discussed with her.  All the nails were debrided or reduced upon consent today.  Local wound care with MicroKlenz, bacitracin and Band-Aids to the left hallux nails done upon consent today.  I am going to have her clean these twice daily with MicroKlenz, apply bacitracin and a Band-Aid.  Prescription for Zithromax given for the signs of infection and use discussed with her.  She opted for no diabetic shoes.  Return to clinic and see me in 1 week.  The tylomas on the left plantar medial hallux and first MPJ were debrided with a 10 blade upon consent.  Previous notes reviewed.        Moderate level of medical decision making.      Again, thank you for allowing me to participate in the care of your patient.        Sincerely,        Ravin Back DPM

## 2022-08-17 NOTE — PROGRESS NOTES
Past Medical History:   Diagnosis Date     Cerumen impacted      Development delay      Diabetic gastroparesis (H) 8/16/2013     Glaucoma (increased eye pressure)      Hyperlipaemia      Hypertension      Hypothyroid      Mental retardation      Nonsenile cataract      Obesity      Strabismus      Type 1 diabetes mellitus not at goal (H)      Patient Active Problem List   Diagnosis     Development delay     Overactive bladder     GERD (gastroesophageal reflux disease)     Hypertension goal BP (blood pressure) < 140/90     Hyperlipidemia LDL goal <100     Pain in joint, lower leg     Proteinuria     Vitamin D deficiency     Health Care Home     History of strabismus surgery     Type 1 diabetes mellitus with other specified complication (H)     Advanced directives, counseling/discussion     Primary open angle glaucoma of both eyes, moderate stage     Gastroesophageal reflux disease without esophagitis     Obesity, unspecified obesity severity, unspecified obesity type     Acute renal failure (H)     Acute retention of urine     JEAN (acute kidney injury) (H)     Dehydration     Diabetic ketoacidosis without coma associated with type 1 diabetes mellitus (H)     DKA (diabetic ketoacidoses)     Sinus tachycardia     SIRS (systemic inflammatory response syndrome) (H)     Obesity (BMI 35.0-39.9) with comorbidity (H)     Background diabetic retinopathy, mild-mod, ou     Combined forms of age-related cataract, mild, of both eyes     Past Surgical History:   Procedure Laterality Date     STRABISMUS SURGERY       [facility] EYE SURG ANT SGMT PROC UNLISTED  remote    strabismus surgery     Social History     Socioeconomic History     Marital status: Single     Spouse name: Not on file     Number of children: Not on file     Years of education: Not on file     Highest education level: Not on file   Occupational History     Not on file   Tobacco Use     Smoking status: Never Smoker     Smokeless tobacco: Never Used     Tobacco comment:  lives in smoke free household.   Substance and Sexual Activity     Alcohol use: Yes     Comment: occass social     Drug use: No     Sexual activity: Never   Other Topics Concern     Parent/sibling w/ CABG, MI or angioplasty before 65F 55M? No   Social History Narrative     Not on file     Social Determinants of Health     Financial Resource Strain: Not on file   Food Insecurity: Not on file   Transportation Needs: Not on file   Physical Activity: Not on file   Stress: Not on file   Social Connections: Not on file   Intimate Partner Violence: Not on file   Housing Stability: Not on file     Family History   Problem Relation Age of Onset     Hypertension Father      Diabetes Sister      Glaucoma Maternal Grandfather      Cancer No family hx of      Cerebrovascular Disease No family hx of      Thyroid Disease No family hx of      Macular Degeneration No family hx of      Lab Results   Component Value Date    A1C 10.7 10/13/2021    A1C 9.7 02/01/2021    A1C 9.2 09/30/2020    A1C 11.8 01/08/2020    A1C 11.8 05/19/2019    A1C 11.2 12/12/2018     SUBJECTIVE FINDINGS:  A 62-year-old presents for diabetic foot cares.  She relates that she has got some redness on her big toes, left greater than right, for about 2-3 weeks' duration.  No injuries.  She is not sure how it started.  She did get new shoes about 2 months ago, but she was not having any problems with that.  She relates she does not have diabetic shoes.  She does not want to get diabetic shoes.  She is diabetic with peripheral neuropathy.  She relates that is unchanged.  Relates no injuries, no ulcers or sores since we have seen her last.  She relates she needs her nails and calluses trimmed down as well.    OBJECTIVE FINDINGS:  DP and PT are 2/4 bilaterally.  She has mild peripheral edema bilaterally.  She has incurvated hallux nails with erythema and edema of the nail borders, left greater than right.  There is some dried serosanguineous drainage in the left medial  nail border and right lateral hallux nail border.  There is no active drainage, no odor, no calor.  She has decreased hair growth bilaterally.  She has incurvated nails 1 through 5 bilaterally with some thickening and discoloration to differing degrees.  She has hyperkeratotic tissue buildup plantar medial hallux and first MPJ, left foot.    ASSESSMENT AND PLAN:  Paronychia, right and left hallux nail borders.  She has onychomycosis and onychauxis bilaterally.  Diabetes with peripheral neuropathy.  Diabetes with peripheral vascular disease.  Tylomas left foot.  She has hammertoes present.  Diagnosis and treatment options discussed with her.  All the nails were debrided or reduced upon consent today.  Local wound care with MicroKlenz, bacitracin and Band-Aids to the left hallux nails done upon consent today.  I am going to have her clean these twice daily with MicroKlenz, apply bacitracin and a Band-Aid.  Prescription for Zithromax given for the signs of infection and use discussed with her.  She opted for no diabetic shoes.  Return to clinic and see me in 1 week.  The tylomas on the left plantar medial hallux and first MPJ were debrided with a 10 blade upon consent.  Previous notes reviewed.        Moderate level of medical decision making.

## 2022-08-26 ENCOUNTER — OFFICE VISIT (OUTPATIENT)
Dept: PODIATRY | Facility: CLINIC | Age: 62
End: 2022-08-26
Payer: MEDICARE

## 2022-08-26 DIAGNOSIS — L03.032 PARONYCHIA, TOE, LEFT: ICD-10-CM

## 2022-08-26 DIAGNOSIS — L03.031 PARONYCHIA, TOE, RIGHT: ICD-10-CM

## 2022-08-26 DIAGNOSIS — E10.42 DM TYPE 1 WITH DIABETIC PERIPHERAL NEUROPATHY (H): Primary | ICD-10-CM

## 2022-08-26 PROCEDURE — 99212 OFFICE O/P EST SF 10 MIN: CPT | Performed by: PODIATRIST

## 2022-08-26 NOTE — LETTER
8/26/2022         RE: Lexy Rockwell  [address]  [facility] [address]        Dear Colleague,    Thank you for referring your patient, Lexy Rockwell, to the [facility]. Please see a copy of my visit note below.    Past Medical History:   Diagnosis Date     Cerumen impacted      Development delay      Diabetic gastroparesis (H) 8/16/2013     Glaucoma (increased eye pressure)      Hyperlipaemia      Hypertension      Hypothyroid      Mental retardation      Nonsenile cataract      Obesity      Strabismus      Type 1 diabetes mellitus not at goal (H)      Patient Active Problem List   Diagnosis     Development delay     Overactive bladder     GERD (gastroesophageal reflux disease)     Hypertension goal BP (blood pressure) < 140/90     Hyperlipidemia LDL goal <100     Pain in joint, lower leg     Proteinuria     Vitamin D deficiency     Health Care Home     History of strabismus surgery     Type 1 diabetes mellitus with other specified complication (H)     Advanced directives, counseling/discussion     Primary open angle glaucoma of both eyes, moderate stage     Gastroesophageal reflux disease without esophagitis     Obesity, unspecified obesity severity, unspecified obesity type     Acute renal failure (H)     Acute retention of urine     JEAN (acute kidney injury) (H)     Dehydration     Diabetic ketoacidosis without coma associated with type 1 diabetes mellitus (H)     DKA (diabetic ketoacidoses)     Sinus tachycardia     SIRS (systemic inflammatory response syndrome) (H)     Obesity (BMI 35.0-39.9) with comorbidity (H)     Background diabetic retinopathy, mild-mod, ou     Combined forms of age-related cataract, mild, of both eyes     Past Surgical History:   Procedure Laterality Date     STRABISMUS SURGERY       [facility] EYE SURG ANT SGMT PROC UNLISTED  remote    strabismus surgery     Social History     Socioeconomic History     Marital status: Single      Spouse name: Not on file     Number of children: Not on file     Years of education: Not on file     Highest education level: Not on file   Occupational History     Not on file   Tobacco Use     Smoking status: Never Smoker     Smokeless tobacco: Never Used     Tobacco comment: lives in smoke free household.   Substance and Sexual Activity     Alcohol use: Yes     Comment: occass social     Drug use: No     Sexual activity: Never   Other Topics Concern     Parent/sibling w/ CABG, MI or angioplasty before 65F 55M? No   Social History Narrative     Not on file     Social Determinants of Health     Financial Resource Strain: Not on file   Food Insecurity: Not on file   Transportation Needs: Not on file   Physical Activity: Not on file   Stress: Not on file   Social Connections: Not on file   Intimate Partner Violence: Not on file   Housing Stability: Not on file     Family History   Problem Relation Age of Onset     Hypertension Father      Diabetes Sister      Glaucoma Maternal Grandfather      Cancer No family hx of      Cerebrovascular Disease No family hx of      Thyroid Disease No family hx of      Macular Degeneration No family hx of      Lab Results   Component Value Date    A1C 10.7 10/13/2021    A1C 9.7 02/01/2021    A1C 9.2 09/30/2020    A1C 11.8 01/08/2020    A1C 11.8 05/19/2019    A1C 11.2 12/12/2018     SUBJECTIVE FINDINGS:  A 62-year-old returns to clinic for paronychia, right and left hallux nail borders.  She relates they are doing well.  It feels much better.  She has had no problems.  She relates she took the antibiotics with no problems.    OBJECTIVE FINDINGS:  Right and left medial and lateral hallux nail borders, there is no erythema, no drainage, no odor, no calor.    ASSESSMENT AND PLAN:  Paronychia, right and left medial hallux nail borders.  She is diabetic with peripheral neuropathy and vascular disease.  She has onychomycosis and onychauxis present.  Diagnosis and treatment options  discussed with her.  She can discontinue local wound care as tolerated and return to clinic and see me in 2 months.  This is doing well.  Previous notes reviewed.          Again, thank you for allowing me to participate in the care of your patient.        Sincerely,        Ravin Back DPM

## 2022-08-26 NOTE — PROGRESS NOTES
Past Medical History:   Diagnosis Date     Cerumen impacted      Development delay      Diabetic gastroparesis (H) 8/16/2013     Glaucoma (increased eye pressure)      Hyperlipaemia      Hypertension      Hypothyroid      Mental retardation      Nonsenile cataract      Obesity      Strabismus      Type 1 diabetes mellitus not at goal (H)      Patient Active Problem List   Diagnosis     Development delay     Overactive bladder     GERD (gastroesophageal reflux disease)     Hypertension goal BP (blood pressure) < 140/90     Hyperlipidemia LDL goal <100     Pain in joint, lower leg     Proteinuria     Vitamin D deficiency     Health Care Home     History of strabismus surgery     Type 1 diabetes mellitus with other specified complication (H)     Advanced directives, counseling/discussion     Primary open angle glaucoma of both eyes, moderate stage     Gastroesophageal reflux disease without esophagitis     Obesity, unspecified obesity severity, unspecified obesity type     Acute renal failure (H)     Acute retention of urine     JEAN (acute kidney injury) (H)     Dehydration     Diabetic ketoacidosis without coma associated with type 1 diabetes mellitus (H)     DKA (diabetic ketoacidoses)     Sinus tachycardia     SIRS (systemic inflammatory response syndrome) (H)     Obesity (BMI 35.0-39.9) with comorbidity (H)     Background diabetic retinopathy, mild-mod, ou     Combined forms of age-related cataract, mild, of both eyes     Past Surgical History:   Procedure Laterality Date     STRABISMUS SURGERY       [facility] EYE SURG ANT SGMT PROC UNLISTED  remote    strabismus surgery     Social History     Socioeconomic History     Marital status: Single     Spouse name: Not on file     Number of children: Not on file     Years of education: Not on file     Highest education level: Not on file   Occupational History     Not on file   Tobacco Use     Smoking status: Never Smoker     Smokeless tobacco: Never Used     Tobacco comment:  lives in smoke free household.   Substance and Sexual Activity     Alcohol use: Yes     Comment: occass social     Drug use: No     Sexual activity: Never   Other Topics Concern     Parent/sibling w/ CABG, MI or angioplasty before 65F 55M? No   Social History Narrative     Not on file     Social Determinants of Health     Financial Resource Strain: Not on file   Food Insecurity: Not on file   Transportation Needs: Not on file   Physical Activity: Not on file   Stress: Not on file   Social Connections: Not on file   Intimate Partner Violence: Not on file   Housing Stability: Not on file     Family History   Problem Relation Age of Onset     Hypertension Father      Diabetes Sister      Glaucoma Maternal Grandfather      Cancer No family hx of      Cerebrovascular Disease No family hx of      Thyroid Disease No family hx of      Macular Degeneration No family hx of      Lab Results   Component Value Date    A1C 10.7 10/13/2021    A1C 9.7 02/01/2021    A1C 9.2 09/30/2020    A1C 11.8 01/08/2020    A1C 11.8 05/19/2019    A1C 11.2 12/12/2018     SUBJECTIVE FINDINGS:  A 62-year-old returns to clinic for paronychia, right and left hallux nail borders.  She relates they are doing well.  It feels much better.  She has had no problems.  She relates she took the antibiotics with no problems.    OBJECTIVE FINDINGS:  Right and left medial and lateral hallux nail borders, there is no erythema, no drainage, no odor, no calor.    ASSESSMENT AND PLAN:  Paronychia, right and left medial hallux nail borders.  She is diabetic with peripheral neuropathy and vascular disease.  She has onychomycosis and onychauxis present.  Diagnosis and treatment options discussed with her.  She can discontinue local wound care as tolerated and return to clinic and see me in 2 months.  This is doing well.  Previous notes reviewed.

## 2022-08-26 NOTE — NURSING NOTE
Lexy Rockwell's chief complaint for this visit includes:  Chief Complaint   Patient presents with     Follow Up     Left big toe infection     PCP: Fredi Fuentes    Referring Provider:  Error in [id number] index!    LMP 03/28/2014   Data Unavailable        Allergies   Allergen Reactions     Amoxicillin      Sulfa Drugs          Do you need any medication refills at today's visit?

## 2022-09-02 ENCOUNTER — VIRTUAL VISIT (OUTPATIENT)
Dept: EDUCATION SERVICES | Facility: CLINIC | Age: 62
End: 2022-09-02
Payer: MEDICARE

## 2022-09-02 DIAGNOSIS — Z79.4 TYPE 2 DIABETES MELLITUS WITH DIABETIC POLYNEUROPATHY, WITH LONG-TERM CURRENT USE OF INSULIN (H): ICD-10-CM

## 2022-09-02 DIAGNOSIS — E11.42 TYPE 2 DIABETES MELLITUS WITH DIABETIC POLYNEUROPATHY, WITH LONG-TERM CURRENT USE OF INSULIN (H): ICD-10-CM

## 2022-09-02 DIAGNOSIS — E10.69 TYPE 1 DIABETES MELLITUS WITH OTHER SPECIFIED COMPLICATION (H): ICD-10-CM

## 2022-09-02 DIAGNOSIS — E10.40 TYPE 1 DIABETES MELLITUS WITH DIABETIC NEUROPATHY (H): ICD-10-CM

## 2022-09-02 PROCEDURE — 98966 PH1 ASSMT&MGMT NQHP 5-10: CPT | Mod: 95 | Performed by: DIETITIAN, REGISTERED

## 2022-09-02 RX ORDER — INSULIN DEGLUDEC 200 U/ML
48 INJECTION, SOLUTION SUBCUTANEOUS DAILY
Qty: 15 ML | Refills: 3 | Status: SHIPPED | OUTPATIENT
Start: 2022-09-02 | End: 2022-10-05

## 2022-09-02 RX ORDER — INSULIN ASPART 100 [IU]/ML
7 INJECTION, SOLUTION INTRAVENOUS; SUBCUTANEOUS
Qty: 30 ML | Refills: 3 | Status: SHIPPED | OUTPATIENT
Start: 2022-09-02 | End: 2022-10-05

## 2022-09-02 NOTE — LETTER
2022         RE: Lexy Rockwell  [address]  [facility] [address]        Dear Colleague,    Thank you for referring your patient, Leyx Rockwell, to the [facility]. Please see a copy of my visit note below.    Diabetes Education Follow-up    Subjective/Objective:    Telephone visit for BG review. Last date of communication was: .    Type of Service: Telephone Visit    Originating Location (Patient Location): Home  Distant Location (Provider Location): Home  Mode of Communication:  Telephone    Telephone Visit Start Time: 1:00  Telephone Visit End Time (telephone visit stop time): 1:07    How would patient like to obtain AVS? MyChart        Diabetes is being managed with   Diabetes Medications   Diabetes Medication(s)     Biguanides       metFORMIN (GLUCOPHAGE-XR) 500 MG 24 hr tablet    Take 4-tablets by mouth daily as directed    Insulin       insulin aspart (NOVOLOG FLEXPEN) 100 UNIT/ML pen    Inject 6 Units Subcutaneous 3 times daily (with meals) And sliding scale: 1 unit:50 > 150 mg/dl. Up to 50 units daily     insulin degludec (TRESIBA FLEXTOUCH) 200 UNIT/ML pen    Inject 44 Units Subcutaneous daily          BG/Food Log:   Date Breakfast  Lunch  Dinner  Bedtime    Before After Before After Before After     246  262          183  164        286        364          226          249  184          171  261      342          165  132  344      239  188         Assessment:  Lexy states that she has been getting some HIGH readings on her Dexcom at bedtime. She states BG have been elevated overall.     Plan/Response:  Recommend increase to insulin -   Tresiba: 0-0-0-44 --> 0-0-0-48  Novolo-6-6-0 --> 7-7-7-0    KARLOS Haas CDCES    Any diabetes medication dose changes were made via the CDE Protocol and Collaborative Practice Agreement with  the patient's referring provider. A copy of this encounter was shared with the provider.

## 2022-09-02 NOTE — PROGRESS NOTES
Diabetes Education Follow-up    Subjective/Objective:    Telephone visit for BG review. Last date of communication was: .    Type of Service: Telephone Visit    Originating Location (Patient Location): Home  Distant Location (Provider Location): Home  Mode of Communication:  Telephone    Telephone Visit Start Time: 1:00  Telephone Visit End Time (telephone visit stop time): 1:07    How would patient like to obtain AVS? MyChart        Diabetes is being managed with   Diabetes Medications   Diabetes Medication(s)     Biguanides       metFORMIN (GLUCOPHAGE-XR) 500 MG 24 hr tablet    Take 4-tablets by mouth daily as directed    Insulin       insulin aspart (NOVOLOG FLEXPEN) 100 UNIT/ML pen    Inject 6 Units Subcutaneous 3 times daily (with meals) And sliding scale: 1 unit:50 > 150 mg/dl. Up to 50 units daily     insulin degludec (TRESIBA FLEXTOUCH) 200 UNIT/ML pen    Inject 44 Units Subcutaneous daily          BG/Food Log:   Date Breakfast  Lunch  Dinner  Bedtime    Before After Before After Before After     246  262          183  164        286        364          226          249  184          171  261      342          165  132  344      239  188         Assessment:  Lexy states that she has been getting some HIGH readings on her Dexcom at bedtime. She states BG have been elevated overall.     Plan/Response:  Recommend increase to insulin -   Tresiba: 0-0-0-44 --> 0-0-0-48  Novolo-6-6-0 --> 7-7-7-0    Tiffanie Mcfarland RD LD CDCES    Any diabetes medication dose changes were made via the CDE Protocol and Collaborative Practice Agreement with the patient's referring provider. A copy of this encounter was shared with the provider.

## 2022-09-14 ENCOUNTER — TELEPHONE (OUTPATIENT)
Dept: EDUCATION SERVICES | Facility: CLINIC | Age: 62
End: 2022-09-14

## 2022-09-14 NOTE — TELEPHONE ENCOUNTER
Rebeca Moreno left another message you for you. She wanted to let you know that Lexy is going home today, so will need a call tomorrow.    Roseanne Stubbs RN, [facility]

## 2022-09-14 NOTE — TELEPHONE ENCOUNTER
Rebeca Moreno left a message for you about patient. She reports she knows patient well and pt is currently admitted [facility]. She would like to discuss follow-up with you for patient and see if she can get her in sooner.    Please call Rebeca to discuss further. Thanks!    Roseanne Stubbs RN, [facility]

## 2022-09-15 ENCOUNTER — TELEPHONE (OUTPATIENT)
Dept: FAMILY MEDICINE | Facility: CLINIC | Age: 62
End: 2022-09-15

## 2022-09-15 NOTE — TELEPHONE ENCOUNTER
Received call from Irena TABARES with [facility].    She is calling to request verbal orders for   SN: SOC. Skilled Nursing visits twice this week, then twice a week for two weeks, then once a week for 3 weeks.   PT/OT to eval and treat.     Verbal orders given as requested per protocol.    Mariluz Nettles RN   [facility]

## 2022-09-15 NOTE — TELEPHONE ENCOUNTER
Called Rebeca 9/14 to discuss our mutual patient. Rebeca is wondering about a plan for Lexy when she returns home from the hospital. We discussed regular follow-ups, DKA prevention, consider VGo or Cequr. Plan to connect with patient when she is discharged from the hospital.     Tiffanie Mcfarland RD [facility] [place] [facility] [place]

## 2022-09-16 ENCOUNTER — TELEPHONE (OUTPATIENT)
Dept: FAMILY MEDICINE | Facility: CLINIC | Age: 62
End: 2022-09-16

## 2022-09-16 ENCOUNTER — TELEPHONE (OUTPATIENT)
Dept: EDUCATION SERVICES | Facility: CLINIC | Age: 62
End: 2022-09-16

## 2022-09-16 NOTE — TELEPHONE ENCOUNTER
Reason for Call:  Home Health Care    Mirza with [facility] called regarding (reason for call): verbal orders    Orders are needed for this patient.     PT:  2x week for 1 week, 1x week for 2 weeks.    OT:     Skilled Nursing:     Pt Provider:  Dr Fuentes    Phone Number Homecare Nurse can be reached at: [phone]    Can we leave a detailed message on this number? YES    Phone number patient can be reached at: Home number on file [phone] (home)    Best Time: any    Call taken on 9/16/2022 at 10:42 AM by Miranda Gomez

## 2022-09-16 NOTE — CONFIDENTIAL NOTE
Diabetes Education Follow-up    Subjective/Objective:    Called Lexy to check in after hospital admission for DKA.     Diabetes is being managed with   Diabetes Medications   Diabetes Medication(s)     Biguanides       metFORMIN (GLUCOPHAGE-XR) 500 MG 24 hr tablet    Take 4-tablets by mouth daily as directed    Insulin       insulin aspart (NOVOLOG FLEXPEN) 100 UNIT/ML pen    Inject 7 Units Subcutaneous 3 times daily (with meals) And sliding scale: 1 unit:50 > 150 mg/dl. Up to 50 units daily     insulin degludec (TRESIBA FLEXTOUCH) 200 UNIT/ML pen    Inject 48 Units Subcutaneous daily       Hospital decreased Tresiba dose to 30 units     BG/Food Log:   Date Breakfast  Lunch  Dinner  Bedtime    Before After Before After Before After    9/16 104  219       9/15 189             Assessment:    Continue with current plan. Reminded patient of DKA prevention plan. She does have ketone strips at home.    Plan/Response:  Continue with current plan    KARLOS Haas CDCES    Any diabetes medication dose changes were made via the CDE Protocol and Collaborative Practice Agreement with the patient's referring provider. A copy of this encounter was shared with the provider.

## 2022-09-20 ENCOUNTER — MEDICAL CORRESPONDENCE (OUTPATIENT)
Dept: HEALTH INFORMATION MANAGEMENT | Facility: CLINIC | Age: 62
End: 2022-09-20

## 2022-09-21 ENCOUNTER — NURSE TRIAGE (OUTPATIENT)
Dept: FAMILY MEDICINE | Facility: CLINIC | Age: 62
End: 2022-09-21

## 2022-09-21 DIAGNOSIS — K59.00 CONSTIPATION, UNSPECIFIED CONSTIPATION TYPE: ICD-10-CM

## 2022-09-21 DIAGNOSIS — R11.0 NAUSEA: ICD-10-CM

## 2022-09-21 RX ORDER — POLYETHYLENE GLYCOL 3350 17 G/17G
1 POWDER, FOR SOLUTION ORAL 2 TIMES DAILY PRN
Qty: 225 G | Refills: 1 | Status: SHIPPED | OUTPATIENT
Start: 2022-09-21 | End: 2023-01-20

## 2022-09-21 NOTE — TELEPHONE ENCOUNTER
Routing to PCP to review/advise    Please route back to [facility] triage    Patient has elevated B/P and per protocol, patient should be seen today.    Patient unsure if she can make it tomorrow or Friday.  Appointments would need to be in the afternoon.    RN did schedule patient for hospital follow up 10/3    Also patient having constipation.  RN pended Metamucil and Miralax if agreeable.    Per patient has not had BM since coming home from the hospital.    Patient took 1 dulcolax tablet yesterday.  Denies abdominal distention, abdominal pain.  Is passing gas.    Patient has not bee taking metamucil as it is .    LUCINDA CALL PATIENT BACK WITH RECOMMENDATIONS  [phone]        CARE ADVICE BE SEEN TODAY      Patient should be seen today, no avaliable appointments  Patient has difficulties with transportation and cannot get a ride to Urgent care.      RN received call from patient and home care RN    Patient blood pressure elevated during OT visit.    188/98 and 184/98.  Used manual cuff.    During PT visit earlier today B/P  154/98.    Patient denies any symptoms.  States she feels fine.  Denies headache, chest pain, dizziness, weakness.    Patient took 1 dulcolax tab yesterday.    RN advised patient to take another and would send message to PCP.    RN reviewed in detail when patient should call 911 and seek immediate medical attention or call clinic back.    Patient verbalized understanding.      Additional Information    Negative: Sounds like a life-threatening emergency to the triager    Negative: Pregnant > 20 weeks or postpartum (< 6 weeks after delivery) and new hand or face swelling    Negative: Pregnant > 20 weeks and BP > 140/90    Negative: Systolic BP >= 160 OR Diastolic >= 100, and any cardiac or neurologic symptoms (e.g., chest pain, difficulty breathing, unsteady gait, blurred vision)    Negative: Patient sounds very sick or weak to the triager    Negative: BP Systolic BP >= 140 OR Diastolic >= 90  and postpartum (from 0 to 6 weeks after delivery)    Negative: Systolic BP >= 180 OR Diastolic >= 110, and missed most recent dose of blood pressure medication    Answer Assessment - Initial Assessment Questions  See Documentation    Protocols used: HIGH BLOOD PRESSURE-A-OH        Home care OT [place] requesting home OT orders.    2x week for 2 weeks and 1x week for 2 weeks for bladder exercises/control.    IADL training for Skills and safety.    Medication management.    RN gave verbal orders per standing orders.    Michael Griffith, RN, BSN, PHN  [facility]

## 2022-09-21 NOTE — TELEPHONE ENCOUNTER
Notified patient of the orders/message below per PCP.    Patient stated understanding and agreeable with the plan of care.     Fifi RN,BSN  Triage Nurse  [facility]: [facility]

## 2022-09-21 NOTE — TELEPHONE ENCOUNTER
Since patient feels fine, okay to go with appointment on 10-3    If symptoms worsen call/ be seen sooner    Agree with constipation meds    Do metamucil daily    miralax is as needed    Please inform patient    Fredi Fuentes MD

## 2022-09-23 ENCOUNTER — TELEPHONE (OUTPATIENT)
Dept: FAMILY MEDICINE | Facility: CLINIC | Age: 62
End: 2022-09-23

## 2022-09-23 NOTE — TELEPHONE ENCOUNTER
Detailed message left on home care nurse's dentified voicemail. Requested call back I more questions/updates.

## 2022-09-23 NOTE — TELEPHONE ENCOUNTER
Noted.      Since patient has no symptoms, keep meds as is    Follow up as planned Oct 3    Fredi Fuentes MD     coffee

## 2022-09-26 ENCOUNTER — MEDICAL CORRESPONDENCE (OUTPATIENT)
Dept: HEALTH INFORMATION MANAGEMENT | Facility: CLINIC | Age: 62
End: 2022-09-26

## 2022-09-28 ENCOUNTER — TELEPHONE (OUTPATIENT)
Dept: FAMILY MEDICINE | Facility: CLINIC | Age: 62
End: 2022-09-28

## 2022-09-28 NOTE — TELEPHONE ENCOUNTER
Sujatha, PT from [facility] calling to report patient's high blood pressure readings. Lowest blood pressure taken was 180/96, taken 2x in both arms. Pt is asymptomatic. To add, patient has an appointment with PCP on upcoming Monday. Please advise.    Nurses - Please call pt directly for follow up.     LEIDY Galindo RN  [facility]

## 2022-09-30 ENCOUNTER — MEDICAL CORRESPONDENCE (OUTPATIENT)
Dept: HEALTH INFORMATION MANAGEMENT | Facility: CLINIC | Age: 62
End: 2022-09-30

## 2022-10-03 ENCOUNTER — OFFICE VISIT (OUTPATIENT)
Dept: FAMILY MEDICINE | Facility: CLINIC | Age: 62
End: 2022-10-03
Payer: MEDICARE

## 2022-10-03 VITALS
SYSTOLIC BLOOD PRESSURE: 135 MMHG | WEIGHT: 165.13 LBS | BODY MASS INDEX: 29.07 KG/M2 | TEMPERATURE: 97.3 F | HEART RATE: 116 BPM | OXYGEN SATURATION: 100 % | DIASTOLIC BLOOD PRESSURE: 80 MMHG

## 2022-10-03 DIAGNOSIS — G63 POLYNEUROPATHY ASSOCIATED WITH UNDERLYING DISEASE (H): ICD-10-CM

## 2022-10-03 DIAGNOSIS — Z23 ENCOUNTER FOR IMMUNIZATION: ICD-10-CM

## 2022-10-03 DIAGNOSIS — I10 HYPERTENSION GOAL BP (BLOOD PRESSURE) < 140/90: ICD-10-CM

## 2022-10-03 DIAGNOSIS — E10.3393 TYPE 1 DIABETES MELLITUS WITH MODERATE NONPROLIFERATIVE RETINOPATHY OF BOTH EYES, MACULAR EDEMA PRESENCE UNSPECIFIED (H): Primary | ICD-10-CM

## 2022-10-03 DIAGNOSIS — E10.69 TYPE 1 DIABETES MELLITUS WITH OTHER SPECIFIED COMPLICATION (H): ICD-10-CM

## 2022-10-03 DIAGNOSIS — K21.9 GASTROESOPHAGEAL REFLUX DISEASE, UNSPECIFIED WHETHER ESOPHAGITIS PRESENT: ICD-10-CM

## 2022-10-03 DIAGNOSIS — E11.3299 BACKGROUND DIABETIC RETINOPATHY (H): ICD-10-CM

## 2022-10-03 PROCEDURE — G0008 ADMIN INFLUENZA VIRUS VAC: HCPCS | Performed by: FAMILY MEDICINE

## 2022-10-03 PROCEDURE — 99214 OFFICE O/P EST MOD 30 MIN: CPT | Mod: 25 | Performed by: FAMILY MEDICINE

## 2022-10-03 PROCEDURE — 90682 RIV4 VACC RECOMBINANT DNA IM: CPT | Performed by: FAMILY MEDICINE

## 2022-10-03 PROCEDURE — 99207 PR FOOT EXAM NO CHARGE: CPT | Performed by: FAMILY MEDICINE

## 2022-10-03 RX ORDER — PANTOPRAZOLE SODIUM 40 MG/1
40 TABLET, DELAYED RELEASE ORAL DAILY
COMMUNITY
Start: 2023-01-26

## 2022-10-03 ASSESSMENT — PAIN SCALES - GENERAL: PAINLEVEL: NO PAIN (0)

## 2022-10-03 NOTE — PATIENT INSTRUCTIONS
Increase fluid intake    Continue meds    See dibetes educator as planned    Keep working on healthy diet/exercise and weight loss

## 2022-10-03 NOTE — PROGRESS NOTES
Subjective   Lexy is a 62 year old , presenting for the following health issues:  Hospital F/U      HPI       Hospital Follow-up Visit:    Hospital/Nursing Home/IP Rehab Facility: [facility]  Date of Admission: 09/09/2022  Date of Discharge: 09/14/2022  Reason(s) for Admission: Diabetic Ketoacidosis    Was your hospitalization related to COVID-19? No   Problems taking medications regularly:  None  Medication changes since discharge: None  Problems adhering to non-medication therapy:  None    Summary of hospitalization:  CareEverywhere information obtained and reviewed  Diagnostic Tests/Treatments reviewed.  Follow up needed: none  Other Healthcare Providers Involved in Patient s Care:         None  Update since discharge: improved.     Post Medication Reconciliation Status:        Plan of care communicated with patient              Review of Systems   Getting home  Care for 3 weeks    Blood pressure readings at home  High    Blood sugar up and down    Home a couple weeks ago approx    In the last week some 200s/ 300s    Had one low one 70s    Had 5 night in  Hospital    Reviewed disch summary in detail    Home Sept 14     tresiba now 30 units at bedtime     Has sliding scale for novolog    Hemoglobin a1c 12.9 in hospital    Some walking         Objective    BP (!) 157/95 (BP Location: Left arm, Patient Position: Chair, Cuff Size: Adult Regular)   Pulse 116   Temp 97.3  F (36.3  C) (Temporal)   Wt 74.9 kg (165 lb 2 oz)   LMP 03/28/2014   SpO2 100%   Breastfeeding No   BMI 29.07 kg/m    Body mass index is 29.07 kg/m .  Physical Exam  Constitutional:       Appearance: She is well-developed.   HENT:      Head: Normocephalic and atraumatic.   Eyes:      Conjunctiva/sclera: Conjunctivae normal.   Neck:      Vascular: No carotid bruit.   Cardiovascular:      Rate and Rhythm: Normal rate and regular rhythm.      Heart sounds: Normal heart sounds.   Pulmonary:      Effort: Pulmonary effort is normal.  No respiratory distress.      Breath sounds: Normal breath sounds.   Abdominal:      Palpations: Abdomen is soft.      Tenderness: There is no abdominal tenderness.   Neurological:      Mental Status: She is alert and oriented to person, place, and time.          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   Appreciate diab educator assistance  Plan: continue meds as is.  Stressed importance of hydration.   Patient had some JEAN in hospital this last time but likely due to dehydration.  By discharge creat was normal.     (I10) Hypertension goal BP (blood pressure) < 140/90  Comment: at goal on recheck   Plan: monitor    (E10.69) Type 1 diabetes mellitus with other specified complication (H)  Comment:    Plan: FOOT EXAM        Neuropathy present      (E11.3299) Background diabetic retinopathy (H)  Comment: patient hs appointment coming up with Gabriel  Plan: as above    (Z23) Encounter for immunization  Comment: flu shot given   Plan: INFLUENZA QUAD, RECOMBINANT, P-FREE (RIV4)         (FLUBLOK) AGE 50-64 [[id number]], SCREENING         QUESTIONS FOR ADULT IMMUNIZATIONS             (K21.9) Gastroesophageal reflux disease, unspecified whether esophagitis present  Comment: stable; patient is on pantoprazole  Plan: as above     (G63) Polyneuropathy associated with underlying disease (H)  Comment: neuropathy present but otherwise foot exam okay   Plan: monitor    Keep working on healthy diet/exercise       I reviewed the patient's medications, allergies, medical history, family history, and social history.    Fredi Fuentes MD

## 2022-10-05 ENCOUNTER — ALLIED HEALTH/NURSE VISIT (OUTPATIENT)
Dept: EDUCATION SERVICES | Facility: CLINIC | Age: 62
End: 2022-10-05
Payer: MEDICARE

## 2022-10-05 DIAGNOSIS — E10.40 TYPE 1 DIABETES MELLITUS WITH DIABETIC NEUROPATHY (H): ICD-10-CM

## 2022-10-05 DIAGNOSIS — Z79.4 TYPE 2 DIABETES MELLITUS WITH DIABETIC POLYNEUROPATHY, WITH LONG-TERM CURRENT USE OF INSULIN (H): ICD-10-CM

## 2022-10-05 DIAGNOSIS — E11.42 TYPE 2 DIABETES MELLITUS WITH DIABETIC POLYNEUROPATHY, WITH LONG-TERM CURRENT USE OF INSULIN (H): ICD-10-CM

## 2022-10-05 DIAGNOSIS — E10.69 TYPE 1 DIABETES MELLITUS WITH OTHER SPECIFIED COMPLICATION (H): ICD-10-CM

## 2022-10-05 PROCEDURE — G0108 DIAB MANAGE TRN  PER INDIV: HCPCS | Performed by: DIETITIAN, REGISTERED

## 2022-10-05 RX ORDER — INSULIN DEGLUDEC 200 U/ML
33 INJECTION, SOLUTION SUBCUTANEOUS DAILY
Qty: 15 ML | Refills: 3 | Status: SHIPPED | OUTPATIENT
Start: 2022-10-05 | End: 2022-10-31

## 2022-10-05 RX ORDER — INSULIN ASPART 100 [IU]/ML
8 INJECTION, SOLUTION INTRAVENOUS; SUBCUTANEOUS
Qty: 30 ML | Refills: 3 | Status: SHIPPED | OUTPATIENT
Start: 2022-10-05 | End: 2023-01-20

## 2022-10-05 NOTE — PATIENT INSTRUCTIONS
Tresiba: Increase to 33 units    Novolo units with breakfast, 8 units with lunch, 8 units with dinner PLUS sliding scale:    For blood sugar less than 150, just take the base dose of insulin   150-199: +1 unit  200-249: +2 units  250-299: +3 units + check ketones if BG has not come down after 4 hours  300-349: +4 units + check ketones if BG has not come down after 4 hours  350-399: +5 units + check ketones if BG has not come down after 4 hours  400+: +6 units and call doctor  + check ketones if BG has not come down after 4 hours  HIGH (no number): +10 units and call doctor + check ketones if BG has not come down after 4 hours    Tiffanie Mcfarland RD [facility]

## 2022-10-05 NOTE — PROGRESS NOTES
Diabetes Self-Management Education & Support    Presents for: CGM Review    Type of Service: In Person Visit    Assessment Type:   REPORTS:  Was not able to upload Dexcom today    Pt verbalized understanding of concepts discussed and recommendations provided today.       Continue education with the following diabetes management concepts: Healthy Eating, Being Active, Monitoring, Taking Medication, Problem Solving, Reducing Risks and Healthy Coping    ASSESSMENT:  Lexy's BG remain elevated, she has had homecare helping with managing her medications, but will be graduating from homecare on Friday. She has not been testing her ketones, despite having consistantly elevated BG. Reminded patient of when to test for ketones and provided DKA prevention handout. She does have ketone strips at home. Also wrote into sliding scale to indicate when to test ketones.     Glucose Patterns & Trends:  Hyperglycemia, weekend- premeal, postmeal and nocturnal and weekday- premeal, postmeal and nocturnal      PLAN    Tresiba: Increase to 33 units    Novolo units with breakfast, 8 units with lunch, 8 units with dinner PLUS sliding scale:    For blood sugar less than 150, just take the base dose of insulin   150-199: +1 unit  200-249: +2 units  250-299: +3 units + check ketones if BG has not come down after 4 hours  300-349: +4 units + check ketones if BG has not come down after 4 hours  350-399: +5 units + check ketones if BG has not come down after 4 hours  400+: +6 units and call doctor  + check ketones if BG has not come down after 4 hours  HIGH (no number): +10 units and call doctor + check ketones if BG has not come down after 4 hours    Topics to cover at upcoming visits: Healthy Eating, Being Active, Monitoring, Taking Medication, Problem Solving, Reducing Risks and Healthy Coping    Follow-up: 10/31    See Care Plan for co-developed, patient-state behavior change goals.  AVS provided for patient today.    Education  "Materials Provided:  DKA      SUBJECTIVE/OBJECTIVE:  Presents for: CGM Review  Accompanied by: Self  Diabetes education in the past 24mo: Yes  Focus of Visit: Problem Solving, Monitoring, Taking Medication  Diabetes type: Type 1  Disease course: Worsening  Transportation concerns: Yes (gets rides or takes public tranportation)  Difficulty affording diabetes medication?: No  Difficulty affording diabetes testing supplies?: No  Other concerns:: Cognitive impairment  Cultural Influences/Ethnic Background:  Not  or     Diabetes Symptoms & Complications:  Fatigue: Yes  Neuropathy: Yes (in feet)  Polydipsia: No  Polyphagia: No  Polyuria: Sometimes (often getting up in the night)  Visual change: Sometimes  Slow healing wounds: No (a little cut near the toenails)  Symptom course: Stable  Weight trend: Decreasing  Complications assessed today?: No    Patient Problem List and Family Medical History reviewed for relevant medical history, current medical status, and diabetes risk factors.    Vitals:  LMP 03/28/2014   Estimated body mass index is 29.07 kg/m  as calculated from the following:    Height as of 6/27/22: 1.605 m (5' 3.19\").    Weight as of 10/3/22: 74.9 kg (165 lb 2 oz).   Last 3 BP:   BP Readings from Last 3 Encounters:   10/03/22 135/80   06/27/22 (!) 176/104   12/22/21 (!) 188/108       History   Smoking Status     Never Smoker   Smokeless Tobacco     Never Used     Comment: lives in smoke free household.       Labs:  Lab Results   Component Value Date    A1C 10.7 10/13/2021    A1C 9.7 02/01/2021     Lab Results   Component Value Date     10/13/2021     02/01/2021     Lab Results   Component Value Date     02/01/2021     HDL Cholesterol   Date Value Ref Range Status   02/01/2021 62 >49 mg/dL Final   ]  GFR Estimate   Date Value Ref Range Status   10/13/2021 >90 >60 mL/min/1.73m2 Final     Comment:     As of July 11, 2021, eGFR is calculated by the CKD-EPI creatinine equation, " without race adjustment. eGFR can be influenced by muscle mass, exercise, and diet. The reported eGFR is an estimation only and is only applicable if the renal function is stable.   02/01/2021 90 >60 mL/min/[1.73_m2] Final     Comment:     Non  GFR Calc  Starting 12/18/2018, serum creatinine based estimated GFR (eGFR) will be   calculated using the Chronic Kidney Disease Epidemiology Collaboration   (CKD-EPI) equation.       GFR Estimate If Black   Date Value Ref Range Status   02/01/2021 >90 >60 mL/min/[1.73_m2] Final     Comment:      GFR Calc  Starting 12/18/2018, serum creatinine based estimated GFR (eGFR) will be   calculated using the Chronic Kidney Disease Epidemiology Collaboration   (CKD-EPI) equation.       Lab Results   Component Value Date    CR 0.70 10/13/2021    CR 0.73 02/01/2021     No results found for: MICROALBUMIN    Healthy Eating:  Healthy Eating Assessed Today: Yes  Cultural/Confucianist diet restrictions?: No  Meals include: Breakfast, Lunch, Dinner, Afternoon Snack, Evening Snack  Breakfast: 8:00 am - 9:00 am Low sugar Oatmeal with some coffee OR cold cereal (low sugar)  Lunch: 12-12:30, pasta salad from grocery store OR sandwich OR breakfast bowl  Dinner: 5:00 -6:00 pm: frozen meal OR leftovers  Snacks: Poptarts, chips  Other: drinks coffee with sugar free creamer  Beverages: Water, Coffee (Keep some juice on hand for lows)  Has patient met with a dietitian in the past?: Yes    Being Active:  Being Active Assessed Today: Yes (went on the bike at her apartment complex)  Exercise:: Yes (doing some walking outside)  Days per week of moderate to strenuous exercise (like a brisk walk): 2  On average, minutes per day of exercise at this level:  (Walking with friends)  How intense was your typical exercise? : Light (like stretching or slow walking)  Barrier to exercise: Safety, Physical limitation    Monitoring:  Monitoring Assessed Today: Yes  Did patient bring glucose  meter to appointment? : Yes  Blood Glucose Meter: CGM  Times checking blood sugar at home (number): 5+  Times checking blood sugar at home (per): Day  Blood glucose trend: Fluctuating    Date Breakfast  Lunch  Dinner  Bedtime    Before Inuslin dose Before Inuslin dose Before Insulin dose    9/30 HIGH 13u 368 12 387 12    10/1 336 12 134 7 174 8    10/2 298 10 349 11 HIGH 13    10/3 340 11 389 12 HIGH 13    10/4 294 10 239 9 167 8    10/5 190 8 188 8                    Taking Medications:  Diabetes Medication(s)     Insulin       insulin aspart (NOVOLOG FLEXPEN) 100 UNIT/ML pen    Inject 7 Units Subcutaneous 3 times daily (with meals) And sliding scale: 1 unit:50 > 150 mg/dl. Up to 50 units daily     insulin degludec (TRESIBA FLEXTOUCH) 200 UNIT/ML pen    Inject 48 Units Subcutaneous daily          Taking Medication Assessed Today: Yes  Current Treatments: Insulin Injections  Dose schedule: Pre-breakfast, Pre-lunch, Pre-dinner, At bedtime  Given by: Patient  Injection/Infusion sites: Abdomen  Problems taking diabetes medications regularly?: No  Diabetes medication side effects?: No    Problem Solving:  Problem Solving Assessed Today: Yes  Is the patient at risk for hypoglycemia?: Yes  Hypoglycemia Frequency: Monthly  Hypoglycemia Treatment: Glucose (tablets or gel), Other food  Patient carries a carbohydrate source: Yes  Medical ID: Yes  Is the patient at risk for DKA?: Yes  Does patient have ketone test strips?: Yes  Does patient have DKA prevention plan?: Yes  Does patient have severe weather/disaster plan for diabetes management?: No  Does patient have sick day plan for diabetes management?: Yes    Hypoglycemia symptoms  Confusion: No  Dizziness or Light-Headedness: No  Headaches: No  Hunger: No  Mood changes: No  Nervousness/Anxiety: No  Sleepiness: No  Speech difficulty: Yes  Sweats: Yes  Feeling shaky: Yes    Hypoglycemia Complications  Blackouts: No  Hospitalization: No  Nocturnal hypoglycemia: Yes  Required  assistance: No  Seizures: No    Reducing Risks:  Reducing Risks Assessed Today: No  CAD Risks: Diabetes Mellitus  Has dilated eye exam at least once a year?: Yes  Sees dentist every 6 months?: Yes  Feet checked by healthcare provider in the last year?: Yes    Healthy Coping:  Healthy Coping Assessed Today: Yes  Emotional response to diabetes: Acceptance  Informal Support system:: Family, Parent  Stage of change: ACTION (Actively working towards change)  Support resources: Offerings in Clinic Communities  Patient Activation Measure Survey Score:  KHUSHI Score (Last Two) 3/7/2012   KHUSHI Raw Score 39   Activation Score 56.4   KHUSHI Level 3         Care Plan and Education Provided:  Care Plan: Diabetes   Updates made by Tiffanie Mcfarland RD since 10/5/2022 12:00 AM      Problem: HbA1C Not In Goal       Goal: Establish Regular Follow-Ups with PCP       Task: Discuss with PCP the recommended timing for patient's next follow up visit(s)    Responsible User: Tiffanie Mcfarland RD      Task: Discuss schedule for PCP visits with patient    Responsible User: Tiffanie Mcfarland RD      Goal: Get HbA1C Level in Goal       Task: Educate patient on diabetes education self-management topics    Responsible User: Tiffanie Mcfarland RD      Task: Educate patient on benefits of regular glucose monitoring    Responsible User: Tiffanie Mcfarland RD      Task: Refer patient to appropriate extended care team member, as needed (Medication Therapy Management, Behavioral Health, Physical Therapy, etc.)    Responsible User: Tiffanie Mcfarland RD      Task: Discuss diabetes treatment plan with patient    Responsible User: Tiffanie Mcfarland RD      Problem: Diabetes Self-Management Education Needed to Optimize Self-Care Behaviors       Goal: Understand diabetes pathophysiology and disease progression       Task: Provide education on diabetes pathophysiology and disease progression specfic to patient's diabetes type    Responsible User:  Tiffanie Mcfarland RD      Goal: Healthy Eating - follow a healthy eating pattern for diabetes       Task: Provide education on portion control and consistency in amount, composition and timing of food intake    Responsible User: Tiffanie Mcfarland RD      Task: Provide education on managing carbohydrate intake (carbohydrate counting, plate planning method, etc.)    Responsible User: Tiffanie Mcfarland RD      Task: Provide education on weight management    Responsible User: Tiffanie Mcfarland RD      Task: Provide education on heart healthy eating    Responsible User: Tiffanie Mcfarland RD      Task: Provide education on eating out    Responsible User: Tiffanie Mcfarland RD      Task: Develop individualized healthy eating plan with patient    Responsible User: Tiffanie Mcfarland RD      Goal: Being Active - get regular physical activity, working up to at least 150 minutes per week       Task: Provide education on relationship of activity to glucose and precautions to take if at risk for low glucose    Responsible User: Tiffanie Mcfarland RD      Task: Discuss barriers to physical activity with patient    Responsible User: Tiffanie Mcfarland RD      Task: Develop physical activity plan with patient    Responsible User: Tiffanie Mcfarland RD      Task: Explore community resources including walking groups, assistance programs, and home videos    Responsible User: Tiffanie Mcfarland RD      Goal: Monitoring - monitor glucose and ketones as directed       Task: Provide education on blood glucose monitoring (purpose, proper technique, frequency, glucose targets, interpreting results, when to use glucose control solution, sharps disposal)    Responsible User: Tiffanie Mcfarland RD      Task: Provide education on continuous glucose monitoring (sensor placement, use of celso or /reader, understanding glucose trends, alerts and alarms, differences between sensor glucose and blood glucose)    Responsible  User: Tiffanie Mcfarland RD      Task: Provide education on ketone monitoring (when to monitor, frequency, etc.)    Responsible User: Tiffanie Mcfarland RD      Goal: Taking Medication - patient is consistently taking medications as directed       Task: Provide education on action of prescribed medication, including when to take and possible side effects    Responsible User: Tiffanie Mcfarland RD      Task: Provide education on insulin and injectable diabetes medications, including administration, storage, site selection and rotation for injection sites    Responsible User: Tiffanie Mcfarland RD      Task: Discuss barriers to medication adherence with patient and provide management technique ideas as appropriate    Responsible User: Tiffanie Mcfarland RD      Task: Provide education on frequency and refill details of medications    Responsible User: Tiffanie Mcfarland RD      Goal: Problem Solving - know how to prevent and manage short-term diabetes complications       Task: Provide education on high blood glucose - causes, signs/symptoms, prevention and treatment Completed 10/5/2022   Responsible User: Tiffanie Mcfarland RD      Task: Provide education on low blood glucose - causes, signs/symptoms, prevention, treatment, carrying a carbohydrate source at all times, and medical identification    Responsible User: Tiffanie Mcfarland RD      Task: Provide education on safe travel with diabetes    Responsible User: Tiffanie Mcfarland RD      Task: Provide education on how to care for diabetes on sick days    Responsible User: Tiffanie Mcfarland RD      Task: Provide education on when to call a health care provider    Responsible User: Tiffanie Mcfarland RD      Goal: Reducing Risks - know how to prevent and treat long-term diabetes complications       Task: Provide education on major complications of diabetes, prevention, early diagnostic measures and treatment of complications    Responsible User:  Tiffanie Mcfarland RD      Task: Provide education on recommended care for dental, eye and foot health    Responsible User: Tiffanie Mcfarland RD      Task: Provide education on Hemoglobin A1c - goals and relationship to blood glucose levels    Responsible User: Tiffanie Mcfarland RD      Task: Provide education on recommendations for heart health - lipid levels and goals, blood pressure and goals, and aspirin therapy, if indicated    Responsible User: Tiffanie Mcfarland RD      Task: Provide education on tobacco cessation    Responsible User: Tiffanie Mcfarland RD      Goal: Healthy Coping - use available resources to cope with the challenges of managing diabetes       Task: Discuss recognizing feelings about having diabetes    Responsible User: Tiffanie Mcfarland RD      Task: Provide education on the benefits of making appropriate lifestyle changes    Responsible User: Tiffanie Mcfarland RD      Task: Provide education on benefits of utilizing support systems    Responsible User: Tiffanie Mcfarland RD      Task: Discuss methods for coping with stress    Responsible User: Tiffanie Mcfarland RD      Task: Provide education on when to seek professional counseling    Responsible User: Tiffanie Mcfarland RD Katie Halverson, RD [facility]    Time Spent: 30 minutes  Encounter Type: Individual    Any diabetes medication dose changes were made via the CDE Protocol per the patient's referring provider. A copy of this encounter was shared with the provider.

## 2022-10-05 NOTE — LETTER
10/5/2022         RE: Lexy Rockwell  [address]  [facility] [address]        Dear Colleague,    Thank you for referring your patient, Lexy Rockwell, to the [facility]. Please see a copy of my visit note below.    Diabetes Self-Management Education & Support    Presents for: CGM Review    Type of Service: In Person Visit    Assessment Type:   REPORTS:  Was not able to upload Dexcom today    Pt verbalized understanding of concepts discussed and recommendations provided today.       Continue education with the following diabetes management concepts: Healthy Eating, Being Active, Monitoring, Taking Medication, Problem Solving, Reducing Risks and Healthy Coping    ASSESSMENT:  Lexy's BG remain elevated, she has had homecare helping with managing her medications, but will be graduating from homecare on Friday. She has not been testing her ketones, despite having consistantly elevated BG. Reminded patient of when to test for ketones and provided DKA prevention handout. She does have ketone strips at home. Also wrote into sliding scale to indicate when to test ketones.     Glucose Patterns & Trends:  Hyperglycemia, weekend- premeal, postmeal and nocturnal and weekday- premeal, postmeal and nocturnal      PLAN    Tresiba: Increase to 33 units    Novolo units with breakfast, 8 units with lunch, 8 units with dinner PLUS sliding scale:    For blood sugar less than 150, just take the base dose of insulin   150-199: +1 unit  200-249: +2 units  250-299: +3 units + check ketones if BG has not come down after 4 hours  300-349: +4 units + check ketones if BG has not come down after 4 hours  350-399: +5 units + check ketones if BG has not come down after 4 hours  400+: +6 units and call doctor  + check ketones if BG has not come down after 4 hours  HIGH (no number): +10 units and call doctor + check ketones if BG has not come down after 4 hours    Topics to  "cover at upcoming visits: Healthy Eating, Being Active, Monitoring, Taking Medication, Problem Solving, Reducing Risks and Healthy Coping    Follow-up: 10/31    See Care Plan for co-developed, patient-state behavior change goals.  AVS provided for patient today.    Education Materials Provided:  DKA      SUBJECTIVE/OBJECTIVE:  Presents for: CGM Review  Accompanied by: Self  Diabetes education in the past 24mo: Yes  Focus of Visit: Problem Solving, Monitoring, Taking Medication  Diabetes type: Type 1  Disease course: Worsening  Transportation concerns: Yes (gets rides or takes public tranportation)  Difficulty affording diabetes medication?: No  Difficulty affording diabetes testing supplies?: No  Other concerns:: Cognitive impairment  Cultural Influences/Ethnic Background:  Not  or     Diabetes Symptoms & Complications:  Fatigue: Yes  Neuropathy: Yes (in feet)  Polydipsia: No  Polyphagia: No  Polyuria: Sometimes (often getting up in the night)  Visual change: Sometimes  Slow healing wounds: No (a little cut near the toenails)  Symptom course: Stable  Weight trend: Decreasing  Complications assessed today?: No    Patient Problem List and Family Medical History reviewed for relevant medical history, current medical status, and diabetes risk factors.    Vitals:  LMP 03/28/2014   Estimated body mass index is 29.07 kg/m  as calculated from the following:    Height as of 6/27/22: 1.605 m (5' 3.19\").    Weight as of 10/3/22: 74.9 kg (165 lb 2 oz).   Last 3 BP:   BP Readings from Last 3 Encounters:   10/03/22 135/80   06/27/22 (!) 176/104   12/22/21 (!) 188/108       History   Smoking Status     Never Smoker   Smokeless Tobacco     Never Used     Comment: lives in smoke free household.       Labs:  Lab Results   Component Value Date    A1C 10.7 10/13/2021    A1C 9.7 02/01/2021     Lab Results   Component Value Date     10/13/2021     02/01/2021     Lab Results   Component Value Date     " 02/01/2021     HDL Cholesterol   Date Value Ref Range Status   02/01/2021 62 >49 mg/dL Final   ]  GFR Estimate   Date Value Ref Range Status   10/13/2021 >90 >60 mL/min/1.73m2 Final     Comment:     As of July 11, 2021, eGFR is calculated by the CKD-EPI creatinine equation, without race adjustment. eGFR can be influenced by muscle mass, exercise, and diet. The reported eGFR is an estimation only and is only applicable if the renal function is stable.   02/01/2021 90 >60 mL/min/[1.73_m2] Final     Comment:     Non  GFR Calc  Starting 12/18/2018, serum creatinine based estimated GFR (eGFR) will be   calculated using the Chronic Kidney Disease Epidemiology Collaboration   (CKD-EPI) equation.       GFR Estimate If Black   Date Value Ref Range Status   02/01/2021 >90 >60 mL/min/[1.73_m2] Final     Comment:      GFR Calc  Starting 12/18/2018, serum creatinine based estimated GFR (eGFR) will be   calculated using the Chronic Kidney Disease Epidemiology Collaboration   (CKD-EPI) equation.       Lab Results   Component Value Date    CR 0.70 10/13/2021    CR 0.73 02/01/2021     No results found for: MICROALBUMIN    Healthy Eating:  Healthy Eating Assessed Today: Yes  Cultural/Yarsani diet restrictions?: No  Meals include: Breakfast, Lunch, Dinner, Afternoon Snack, Evening Snack  Breakfast: 8:00 am - 9:00 am Low sugar Oatmeal with some coffee OR cold cereal (low sugar)  Lunch: 12-12:30, pasta salad from grocery store OR sandwich OR breakfast bowl  Dinner: 5:00 -6:00 pm: frozen meal OR leftovers  Snacks: Poptarts, chips  Other: drinks coffee with sugar free creamer  Beverages: Water, Coffee (Keep some juice on hand for lows)  Has patient met with a dietitian in the past?: Yes    Being Active:  Being Active Assessed Today: Yes (went on the bike at her apartment complex)  Exercise:: Yes (doing some walking outside)  Days per week of moderate to strenuous exercise (like a brisk walk): 2  On  average, minutes per day of exercise at this level:  (Walking with friends)  How intense was your typical exercise? : Light (like stretching or slow walking)  Barrier to exercise: Safety, Physical limitation    Monitoring:  Monitoring Assessed Today: Yes  Did patient bring glucose meter to appointment? : Yes  Blood Glucose Meter: CGM  Times checking blood sugar at home (number): 5+  Times checking blood sugar at home (per): Day  Blood glucose trend: Fluctuating    Date Breakfast  Lunch  Dinner  Bedtime    Before Inuslin dose Before Inuslin dose Before Insulin dose    9/30 HIGH 13u 368 12 387 12    10/1 336 12 134 7 174 8    10/2 298 10 349 11 HIGH 13    10/3 340 11 389 12 HIGH 13    10/4 294 10 239 9 167 8    10/5 190 8 188 8                    Taking Medications:  Diabetes Medication(s)     Insulin       insulin aspart (NOVOLOG FLEXPEN) 100 UNIT/ML pen    Inject 7 Units Subcutaneous 3 times daily (with meals) And sliding scale: 1 unit:50 > 150 mg/dl. Up to 50 units daily     insulin degludec (TRESIBA FLEXTOUCH) 200 UNIT/ML pen    Inject 48 Units Subcutaneous daily          Taking Medication Assessed Today: Yes  Current Treatments: Insulin Injections  Dose schedule: Pre-breakfast, Pre-lunch, Pre-dinner, At bedtime  Given by: Patient  Injection/Infusion sites: Abdomen  Problems taking diabetes medications regularly?: No  Diabetes medication side effects?: No    Problem Solving:  Problem Solving Assessed Today: Yes  Is the patient at risk for hypoglycemia?: Yes  Hypoglycemia Frequency: Monthly  Hypoglycemia Treatment: Glucose (tablets or gel), Other food  Patient carries a carbohydrate source: Yes  Medical ID: Yes  Is the patient at risk for DKA?: Yes  Does patient have ketone test strips?: Yes  Does patient have DKA prevention plan?: Yes  Does patient have severe weather/disaster plan for diabetes management?: No  Does patient have sick day plan for diabetes management?: Yes    Hypoglycemia symptoms  Confusion:  No  Dizziness or Light-Headedness: No  Headaches: No  Hunger: No  Mood changes: No  Nervousness/Anxiety: No  Sleepiness: No  Speech difficulty: Yes  Sweats: Yes  Feeling shaky: Yes    Hypoglycemia Complications  Blackouts: No  Hospitalization: No  Nocturnal hypoglycemia: Yes  Required assistance: No  Seizures: No    Reducing Risks:  Reducing Risks Assessed Today: No  CAD Risks: Diabetes Mellitus  Has dilated eye exam at least once a year?: Yes  Sees dentist every 6 months?: Yes  Feet checked by healthcare provider in the last year?: Yes    Healthy Coping:  Healthy Coping Assessed Today: Yes  Emotional response to diabetes: Acceptance  Informal Support system:: Family, Parent  Stage of change: ACTION (Actively working towards change)  Support resources: Offerings in Clinic Communities  Patient Activation Measure Survey Score:  KHUSHI Score (Last Two) 3/7/2012   KHUSHI Raw Score 39   Activation Score 56.4   KHUSHI Level 3         Care Plan and Education Provided:  {Care Plan and Eduction Provided:650394}    ***    Time Spent: {cde time spent:436374} minutes  Encounter Type: Individual    Any diabetes medication dose changes were made via the CDE Protocol per the patient's {diabetes education provider list:872500}. A copy of this encounter was shared with the provider.

## 2022-10-07 DIAGNOSIS — I10 ESSENTIAL HYPERTENSION WITH GOAL BLOOD PRESSURE LESS THAN 140/90: ICD-10-CM

## 2022-10-07 RX ORDER — LISINOPRIL 20 MG/1
TABLET ORAL
Qty: 180 TABLET | Refills: 0 | Status: SHIPPED | OUTPATIENT
Start: 2022-10-07 | End: 2023-01-20

## 2022-10-17 ENCOUNTER — MYC MEDICAL ADVICE (OUTPATIENT)
Dept: OPHTHALMOLOGY | Facility: CLINIC | Age: 62
End: 2022-10-17

## 2022-10-21 ENCOUNTER — OFFICE VISIT (OUTPATIENT)
Dept: OPHTHALMOLOGY | Facility: CLINIC | Age: 62
End: 2022-10-21
Payer: MEDICARE

## 2022-10-21 DIAGNOSIS — H40.1132 PRIMARY OPEN ANGLE GLAUCOMA OF BOTH EYES, MODERATE STAGE: Primary | ICD-10-CM

## 2022-10-21 DIAGNOSIS — E11.3299 BACKGROUND DIABETIC RETINOPATHY (H): ICD-10-CM

## 2022-10-21 PROCEDURE — 92083 EXTENDED VISUAL FIELD XM: CPT | Performed by: OPHTHALMOLOGY

## 2022-10-21 PROCEDURE — 92133 CPTRZD OPH DX IMG PST SGM ON: CPT | Performed by: OPHTHALMOLOGY

## 2022-10-21 PROCEDURE — 92012 INTRM OPH EXAM EST PATIENT: CPT | Performed by: OPHTHALMOLOGY

## 2022-10-21 ASSESSMENT — EXTERNAL EXAM - LEFT EYE: OS_EXAM: PROLAPSED FAT PADS: UPPER, LOWER

## 2022-10-21 ASSESSMENT — VISUAL ACUITY
OS_CC+: -2
CORRECTION_TYPE: GLASSES
OD_CC: 20/30
OS_CC: 20/30
METHOD: SNELLEN - LINEAR
OD_CC+: -2

## 2022-10-21 ASSESSMENT — SLIT LAMP EXAM - LIDS
COMMENTS: NORMAL
COMMENTS: NORMAL

## 2022-10-21 ASSESSMENT — TONOMETRY
OS_IOP_MMHG: 12
OD_IOP_MMHG: 14
IOP_METHOD: APPLANATION

## 2022-10-21 ASSESSMENT — EXTERNAL EXAM - RIGHT EYE: OD_EXAM: PROLAPSED FAT PADS: UPPER, LOWER

## 2022-10-21 NOTE — LETTER
10/21/2022         RE: Lexy Rockwell  [address]  [facility] [address]        Dear Colleague,    Thank you for referring your patient, Lexy Rockwell, to the [facility]. Please see a copy of my visit note below.     Current Eye Medications:  Dorzolamide-timolol - both eyes BID - last dose: 9am today  Latanoprost - both eyes at bedtime - last dose: 11pm last night     Subjective:  Here for HVF, OCT and IOP - compliant with daily use of drops. Updated her gls and vision is much better sylvia with computer use.      Objective:  See Ophthalmology Exam.       Assessment:  Stable intraocular pressures, glaucoma OCT, and Beard Visual Field both eyes in patient with moderate open angle glaucoma.  Intraocular pressure continues well controlled with addition of Dorzolamide/Timolol.      Plan:  Continue:  Cosopt (dorzolamide-timolol -- dark blue top) twice daily both eyes. About 12 hours apart.   Latanoprost (green top) at bedtime both eyes.  Wait at least 5 minutes between drops if using more than one at a time.   Return visit in 6 months for a complete exam.   Dayron Rios M.D.  [phone]              Again, thank you for allowing me to participate in the care of your patient.        Sincerely,        Dayron Rios MD

## 2022-10-21 NOTE — PATIENT INSTRUCTIONS
Continue:  Cosopt (dorzolamide-timolol -- dark blue top) twice daily both eyes. About 12 hours apart.   Latanoprost (green top) at bedtime both eyes.  Wait at least 5 minutes between drops if using more than one at a time.   Return visit in 6 months for a complete exam.   Dayron Rios M.D.  [phone]

## 2022-10-21 NOTE — PROGRESS NOTES
Current Eye Medications:  Dorzolamide-timolol - both eyes BID - last dose: 9am today  Latanoprost - both eyes at bedtime - last dose: 11pm last night     Subjective:  Here for HVF, OCT and IOP - compliant with daily use of drops. Updated her gls and vision is much better sylvia with computer use.      Objective:  See Ophthalmology Exam.       Assessment:  Stable intraocular pressures, glaucoma OCT, and Beard Visual Field both eyes in patient with moderate open angle glaucoma.  Intraocular pressure continues well controlled with addition of Dorzolamide/Timolol.      Plan:  Continue:  Cosopt (dorzolamide-timolol -- dark blue top) twice daily both eyes. About 12 hours apart.   Latanoprost (green top) at bedtime both eyes.  Wait at least 5 minutes between drops if using more than one at a time.   Return visit in 6 months for a complete exam.   Dayron Rios M.D.  [phone]

## 2022-10-22 ENCOUNTER — MYC MEDICAL ADVICE (OUTPATIENT)
Dept: PODIATRY | Facility: CLINIC | Age: 62
End: 2022-10-22

## 2022-10-22 ASSESSMENT — PACHYMETRY
OS_CT(UM): 566
OD_CT(UM): 562

## 2022-10-28 DIAGNOSIS — E10.69 TYPE 1 DIABETES MELLITUS WITH OTHER SPECIFIED COMPLICATION (H): ICD-10-CM

## 2022-10-28 RX ORDER — PROCHLORPERAZINE 25 MG/1
SUPPOSITORY RECTAL
Qty: 3 EACH | Refills: 5 | Status: SHIPPED | OUTPATIENT
Start: 2022-10-28 | End: 2022-11-28

## 2022-10-28 RX ORDER — PROCHLORPERAZINE 25 MG/1
SUPPOSITORY RECTAL
Qty: 1 EACH | Refills: 1 | Status: SHIPPED | OUTPATIENT
Start: 2022-10-28 | End: 2023-04-27

## 2022-10-28 NOTE — TELEPHONE ENCOUNTER
Pt's Mother, Francisca calling. Consent to communicate is on file. States pt's Dexcom sensor has failed her and has been using her old meter which won't register her BG because it is too high. Pt has been sick. Pt is in need of a new prescription for dexcom sensors and reader per pharmacy. Scripts sent to pharmacy. Francisca will contact pharmacy and will call back if any further issues with filling the prescriptions.    Barbara Zeng RN  [facility]

## 2022-10-31 ENCOUNTER — VIRTUAL VISIT (OUTPATIENT)
Dept: EDUCATION SERVICES | Facility: CLINIC | Age: 62
End: 2022-10-31
Payer: MEDICARE

## 2022-10-31 DIAGNOSIS — E10.69 TYPE 1 DIABETES MELLITUS WITH OTHER SPECIFIED COMPLICATION (H): ICD-10-CM

## 2022-10-31 DIAGNOSIS — E10.40 TYPE 1 DIABETES MELLITUS WITH DIABETIC NEUROPATHY (H): ICD-10-CM

## 2022-10-31 PROCEDURE — 99207 PR NO BILLABLE SERVICE THIS VISIT: CPT | Performed by: DIETITIAN, REGISTERED

## 2022-10-31 RX ORDER — INSULIN DEGLUDEC 200 U/ML
35 INJECTION, SOLUTION SUBCUTANEOUS DAILY
Qty: 15 ML | Refills: 3 | Status: SHIPPED | OUTPATIENT
Start: 2022-10-31 | End: 2023-01-20

## 2022-10-31 NOTE — LETTER
10/31/2022         RE: Lexy Rockwell  [address]  [facility] [address]        Dear Colleague,    Thank you for referring your patient, Lexy Rockwell, to the [facility]. Please see a copy of my visit note below.    Diabetes Education Follow-up    Subjective/Objective:    Lexy Rockwell sent in blood glucose log for review. Last date of communication was: 10/5.    Diabetes is being managed with   Diabetes Medications   Diabetes Medication(s)     Insulin       insulin aspart (NOVOLOG FLEXPEN) 100 UNIT/ML pen    Inject 8 Units Subcutaneous 3 times daily (with meals) And sliding scale: 1 unit:50 > 150 mg/dl. Up to 50 units daily     insulin degludec (TRESIBA FLEXTOUCH) 200 UNIT/ML pen    Inject 33 Units Subcutaneous daily          BG/Food Log:   Date Breakfast  Lunch  Dinner  Bedtime    Before After Before After Before After    10/31          10/30          10/29          10/28          10/27          10/26          10/25 240  243  129     10/24 164  130       10/23          10/22   266       10/21          10/20   288  288     10/19 249  210  167     10/18   238  270     10/17   297             Assessment:    Fasting blood glucose: 0% in target.  Before lunch glucose: 14% in target.  Before dinner glucose: 25% in target.    BG coming down, 0 numbers in the 300's over the last week. She didn't have time to write down her BG for the last few days.     Plan/Response:  Tresiba: 0-0-0-33 --> 0-0-0-35    KARLOS Haas CDCES    Any diabetes medication dose changes were made via the CDE Protocol and Collaborative Practice Agreement with the patient's referring provider. A copy of this encounter was shared with the provider.

## 2022-10-31 NOTE — PROGRESS NOTES
Diabetes Education Follow-up    Subjective/Objective:    Lexy Rockwell sent in blood glucose log for review. Last date of communication was: 10/5.    Diabetes is being managed with   Diabetes Medications   Diabetes Medication(s)     Insulin       insulin aspart (NOVOLOG FLEXPEN) 100 UNIT/ML pen    Inject 8 Units Subcutaneous 3 times daily (with meals) And sliding scale: 1 unit:50 > 150 mg/dl. Up to 50 units daily     insulin degludec (TRESIBA FLEXTOUCH) 200 UNIT/ML pen    Inject 33 Units Subcutaneous daily          BG/Food Log:   Date Breakfast  Lunch  Dinner  Bedtime    Before After Before After Before After    10/31          10/30          10/29          10/28          10/27          10/26          10/25 240  243  129     10/24 164  130       10/23          10/22   266       10/21          10/20   288  288     10/19 249  210  167     10/18   238  270     10/17   297             Assessment:    Fasting blood glucose: 0% in target.  Before lunch glucose: 14% in target.  Before dinner glucose: 25% in target.    BG coming down, 0 numbers in the 300's over the last week. She didn't have time to write down her BG for the last few days.     Plan/Response:  Tresiba: 0-0-0-33 --> 0-0-0-35    KARLOS Haas CDCES    Any diabetes medication dose changes were made via the CDE Protocol and Collaborative Practice Agreement with the patient's referring provider. A copy of this encounter was shared with the provider.

## 2022-11-26 ENCOUNTER — MEDICAL CORRESPONDENCE (OUTPATIENT)
Dept: HEALTH INFORMATION MANAGEMENT | Facility: CLINIC | Age: 62
End: 2022-11-26

## 2022-12-05 ENCOUNTER — MEDICAL CORRESPONDENCE (OUTPATIENT)
Dept: HEALTH INFORMATION MANAGEMENT | Facility: CLINIC | Age: 62
End: 2022-12-05

## 2022-12-18 ENCOUNTER — HEALTH MAINTENANCE LETTER (OUTPATIENT)
Age: 62
End: 2022-12-18

## 2022-12-28 ENCOUNTER — LAB REQUISITION (OUTPATIENT)
Dept: LAB | Facility: CLINIC | Age: 62
End: 2022-12-28
Payer: MEDICARE

## 2022-12-28 DIAGNOSIS — E11.9 TYPE 2 DIABETES MELLITUS WITHOUT COMPLICATIONS (H): ICD-10-CM

## 2022-12-28 DIAGNOSIS — I50.22 CHRONIC SYSTOLIC (CONGESTIVE) HEART FAILURE (H): ICD-10-CM

## 2022-12-29 LAB
ANION GAP SERPL CALCULATED.3IONS-SCNC: 14 MMOL/L (ref 7–15)
BUN SERPL-MCNC: 22.1 MG/DL (ref 8–23)
CALCIUM SERPL-MCNC: 9.5 MG/DL (ref 8.8–10.2)
CHLORIDE SERPL-SCNC: 106 MMOL/L (ref 98–107)
CREAT SERPL-MCNC: 0.94 MG/DL (ref 0.51–0.95)
DEPRECATED HCO3 PLAS-SCNC: 24 MMOL/L (ref 22–29)
GFR SERPL CREATININE-BSD FRML MDRD: 69 ML/MIN/1.73M2
GLUCOSE SERPL-MCNC: 139 MG/DL (ref 70–99)
NT-PROBNP SERPL-MCNC: 353 PG/ML (ref 0–900)
POTASSIUM SERPL-SCNC: 2.9 MMOL/L (ref 3.4–5.3)
SODIUM SERPL-SCNC: 144 MMOL/L (ref 136–145)

## 2022-12-29 PROCEDURE — 83880 ASSAY OF NATRIURETIC PEPTIDE: CPT

## 2022-12-29 PROCEDURE — 80048 BASIC METABOLIC PNL TOTAL CA: CPT

## 2022-12-29 PROCEDURE — 36415 COLL VENOUS BLD VENIPUNCTURE: CPT

## 2022-12-29 PROCEDURE — P9604 ONE-WAY ALLOW PRORATED TRIP: HCPCS

## 2022-12-30 ENCOUNTER — LAB REQUISITION (OUTPATIENT)
Dept: LAB | Facility: CLINIC | Age: 62
End: 2022-12-30
Payer: MEDICARE

## 2022-12-30 DIAGNOSIS — E87.6 HYPOKALEMIA: ICD-10-CM

## 2023-01-02 LAB
MAGNESIUM SERPL-MCNC: 1.9 MG/DL (ref 1.7–2.3)
POTASSIUM SERPL-SCNC: 4.4 MMOL/L (ref 3.4–5.3)

## 2023-01-02 PROCEDURE — P9604 ONE-WAY ALLOW PRORATED TRIP: HCPCS

## 2023-01-02 PROCEDURE — 83735 ASSAY OF MAGNESIUM: CPT

## 2023-01-02 PROCEDURE — 36415 COLL VENOUS BLD VENIPUNCTURE: CPT

## 2023-01-02 PROCEDURE — 84132 ASSAY OF SERUM POTASSIUM: CPT

## 2023-01-06 NOTE — NURSING NOTE
"Chief Complaint   Patient presents with     RECHECK     diabetes       Initial /74 (BP Location: Left arm, Patient Position: Sitting, Cuff Size: Adult Large)   Pulse 98   Temp 98.9  F (37.2  C)   Resp 16   Wt 88.3 kg (194 lb 9.6 oz)   LMP 03/28/2014   BMI 34.20 kg/m   Estimated body mass index is 34.2 kg/m  as calculated from the following:    Height as of 3/20/19: 1.607 m (5' 3.25\").    Weight as of this encounter: 88.3 kg (194 lb 9.6 oz).  BP completed using cuff size: large  Medications and allergies reviewed.      Elham FISHMAN MA    "
present

## 2023-01-10 ENCOUNTER — LAB REQUISITION (OUTPATIENT)
Dept: LAB | Facility: CLINIC | Age: 63
End: 2023-01-10
Payer: MEDICARE

## 2023-01-10 DIAGNOSIS — R60.0 LOCALIZED EDEMA: ICD-10-CM

## 2023-01-10 DIAGNOSIS — R73.9 HYPERGLYCEMIA, UNSPECIFIED: ICD-10-CM

## 2023-01-10 DIAGNOSIS — R39.81 FUNCTIONAL URINARY INCONTINENCE: ICD-10-CM

## 2023-01-10 LAB
ALBUMIN UR-MCNC: 30 MG/DL
APPEARANCE UR: ABNORMAL
BILIRUB UR QL STRIP: NEGATIVE
COLOR UR AUTO: ABNORMAL
GLUCOSE UR STRIP-MCNC: 500 MG/DL
HGB UR QL STRIP: ABNORMAL
KETONES UR STRIP-MCNC: NEGATIVE MG/DL
LEUKOCYTE ESTERASE UR QL STRIP: ABNORMAL
MUCOUS THREADS #/AREA URNS LPF: PRESENT /LPF
NITRATE UR QL: NEGATIVE
PH UR STRIP: 6.5 [PH] (ref 5–7)
RBC URINE: 4 /HPF
SP GR UR STRIP: 1.01 (ref 1–1.03)
SQUAMOUS EPITHELIAL: 3 /HPF
TRANSITIONAL EPI: 1 /HPF
UROBILINOGEN UR STRIP-MCNC: NORMAL MG/DL
WBC CLUMPS #/AREA URNS HPF: PRESENT /HPF
WBC URINE: >182 /HPF

## 2023-01-10 PROCEDURE — 87088 URINE BACTERIA CULTURE: CPT

## 2023-01-10 PROCEDURE — 87106 FUNGI IDENTIFICATION YEAST: CPT

## 2023-01-10 PROCEDURE — 81001 URINALYSIS AUTO W/SCOPE: CPT

## 2023-01-11 LAB
ANION GAP SERPL CALCULATED.3IONS-SCNC: 16 MMOL/L (ref 7–15)
BUN SERPL-MCNC: 16.7 MG/DL (ref 8–23)
CALCIUM SERPL-MCNC: 9.5 MG/DL (ref 8.8–10.2)
CHLORIDE SERPL-SCNC: 108 MMOL/L (ref 98–107)
CREAT SERPL-MCNC: 1.11 MG/DL (ref 0.51–0.95)
DEPRECATED HCO3 PLAS-SCNC: 19 MMOL/L (ref 22–29)
GFR SERPL CREATININE-BSD FRML MDRD: 56 ML/MIN/1.73M2
GLUCOSE SERPL-MCNC: 92 MG/DL (ref 70–99)
NT-PROBNP SERPL-MCNC: 869 PG/ML (ref 0–900)
POTASSIUM SERPL-SCNC: 4.3 MMOL/L (ref 3.4–5.3)
SODIUM SERPL-SCNC: 143 MMOL/L (ref 136–145)

## 2023-01-11 PROCEDURE — 36415 COLL VENOUS BLD VENIPUNCTURE: CPT

## 2023-01-11 PROCEDURE — P9604 ONE-WAY ALLOW PRORATED TRIP: HCPCS

## 2023-01-11 PROCEDURE — 83880 ASSAY OF NATRIURETIC PEPTIDE: CPT

## 2023-01-11 PROCEDURE — 80048 BASIC METABOLIC PNL TOTAL CA: CPT

## 2023-01-12 LAB
BACTERIA UR CULT: ABNORMAL

## 2023-01-20 ENCOUNTER — NURSING HOME VISIT (OUTPATIENT)
Dept: GERIATRICS | Facility: CLINIC | Age: 63
End: 2023-01-20
Payer: MEDICARE

## 2023-01-20 VITALS
WEIGHT: 167.3 LBS | TEMPERATURE: 97.7 F | SYSTOLIC BLOOD PRESSURE: 135 MMHG | OXYGEN SATURATION: 97 % | RESPIRATION RATE: 18 BRPM | HEART RATE: 80 BPM | BODY MASS INDEX: 29.64 KG/M2 | HEIGHT: 63 IN | DIASTOLIC BLOOD PRESSURE: 70 MMHG

## 2023-01-20 DIAGNOSIS — I10 ESSENTIAL HYPERTENSION WITH GOAL BLOOD PRESSURE LESS THAN 140/90: ICD-10-CM

## 2023-01-20 DIAGNOSIS — N18.31 STAGE 3A CHRONIC KIDNEY DISEASE (H): ICD-10-CM

## 2023-01-20 DIAGNOSIS — I10 HYPERTENSION GOAL BP (BLOOD PRESSURE) < 140/90: ICD-10-CM

## 2023-01-20 DIAGNOSIS — E55.9 VITAMIN D DEFICIENCY: ICD-10-CM

## 2023-01-20 DIAGNOSIS — K59.01 SLOW TRANSIT CONSTIPATION: ICD-10-CM

## 2023-01-20 DIAGNOSIS — K21.00 GASTROESOPHAGEAL REFLUX DISEASE WITH ESOPHAGITIS WITHOUT HEMORRHAGE: ICD-10-CM

## 2023-01-20 DIAGNOSIS — H40.003 GLAUCOMA SUSPECT, BILATERAL: ICD-10-CM

## 2023-01-20 DIAGNOSIS — R60.0 BILATERAL LEG EDEMA: ICD-10-CM

## 2023-01-20 DIAGNOSIS — E10.37X3 TYPE 1 DIABETES MELLITUS WITH DIABETIC MACULAR EDEMA OF BOTH EYES RESOLVED AFTER TREATMENT (H): Primary | ICD-10-CM

## 2023-01-20 DIAGNOSIS — E87.6 HYPOKALEMIA: ICD-10-CM

## 2023-01-20 DIAGNOSIS — F81.9 COGNITIVE DEVELOPMENTAL DELAY: ICD-10-CM

## 2023-01-20 DIAGNOSIS — G63 POLYNEUROPATHY ASSOCIATED WITH UNDERLYING DISEASE (H): ICD-10-CM

## 2023-01-20 DIAGNOSIS — N32.81 OVERACTIVE BLADDER: ICD-10-CM

## 2023-01-20 DIAGNOSIS — E10.51 TYPE 1 DIABETES MELLITUS WITH DIABETIC PERIPHERAL ANGIOPATHY WITHOUT GANGRENE (H): ICD-10-CM

## 2023-01-20 PROBLEM — E66.01 MORBID OBESITY (H): Status: RESOLVED | Noted: 2019-07-12 | Resolved: 2023-01-20

## 2023-01-20 PROCEDURE — 99309 SBSQ NF CARE MODERATE MDM 30: CPT | Performed by: NURSE PRACTITIONER

## 2023-01-20 RX ORDER — POTASSIUM CHLORIDE 1500 MG/1
20 TABLET, EXTENDED RELEASE ORAL DAILY
COMMUNITY
End: 2024-07-18

## 2023-01-20 RX ORDER — METOPROLOL SUCCINATE 50 MG/1
50 TABLET, EXTENDED RELEASE ORAL DAILY
Start: 2023-01-20 | End: 2023-01-24

## 2023-01-20 RX ORDER — LISINOPRIL 40 MG/1
40 TABLET ORAL DAILY
Start: 2023-01-20

## 2023-01-20 RX ORDER — INSULIN DEGLUDEC 200 U/ML
18 INJECTION, SOLUTION SUBCUTANEOUS DAILY
Start: 2023-01-20 | End: 2023-02-03

## 2023-01-20 RX ORDER — INSULIN ASPART 100 [IU]/ML
6 INJECTION, SOLUTION INTRAVENOUS; SUBCUTANEOUS
Start: 2023-01-20 | End: 2023-02-17

## 2023-01-20 RX ORDER — FLUCONAZOLE 200 MG/1
200 TABLET ORAL DAILY
Status: CANCELLED
Start: 2023-01-20

## 2023-01-20 NOTE — LETTER
1/20/2023        RE: Lexy Rockwell  [address]  [place] [address]        [facility] GERIATRICS    PRIMARY CARE PROVIDER AND CLINIC:  TAYLA Sprague [facility], [address] [facility] [address]  Chief Complaint   Patient presents with     [facility] Medical Record Number:  [id number]  Place of Service where encounter took place:  [facility] HOME (NF) [[id number]]    Lexy Rockwell  is a 62 year old  (1960), admitted to the above facility from  [facility] . Hospital stay 12/13/22 through 12/26/22..       Her past medical history includes    Type 1 diabetes with gastroparesis and CKD history of DKA    Hypertension    CKD stage III    Hyperlipidemia    Bilateral leg edema    Overactive bladder    GERD    Constipation    Developmentally delayed    Polyneuropathy     Vitamin D deficiency    glaucoma    On 12/13/2022, patient was found down by police.  She was found to have blood sugar of over 1000 mg/dL.  She was started on DKA protocol.  She had a head CT and an abdominal and pelvic CT which showed some tissue gas in the left upper arm and multifocal nodule groundglass opacities in the left upper lung.  She was started on ceftriaxone and azithromycin for pneumonia.  She was intubated and on tube feeding.  Her enteral nutrition was discontinued on 12/16/2022 and TCU placement was recommended.    Patient was in the [facility] TCU.  She was recently admitted to long-term care.  While in the TCU her blood sugars remained between 130-525 with many in the 300s.  She continues to be incontinent of bladder and had pitting lower extremity edema.  She was noted to have poor endurance, mobility and overall weakness    Today patient was seen in her room today.  Her biggest complaint is gas after eating.  She states she is having regular Bowel movements and no vomiting.   She Lower extremity edema.  Her BS have been  running high and has a hx of DKA.         CODE STATUS/ADVANCE DIRECTIVES DISCUSSION:  Full code   ALLERGIES:   Allergies   Allergen Reactions     Amoxicillin      Sulfa Drugs       PAST MEDICAL HISTORY:   Past Medical History:   Diagnosis Date     Cerumen impacted      Development delay      Diabetic gastroparesis (H) 8/16/2013     Glaucoma (increased eye pressure)      Hyperlipaemia      Hypertension      Hypothyroid      Mental retardation      Nonsenile cataract      Obesity      Strabismus      Type 1 diabetes mellitus not at goal (H)       PAST SURGICAL HISTORY:   has a past surgical history that includes EYE SURG ANT SGMT PROC UNLISTED (remote) and strabismus surgery.  FAMILY HISTORY: family history includes Diabetes in her sister; Glaucoma in her maternal grandfather; Hypertension in her father.  SOCIAL HISTORY:   reports that she has never smoked. She has never used smokeless tobacco. She reports current alcohol use. She reports that she does not use drugs.  Patient's living condition: lives alone    Post Discharge Medication Reconciliation Status:   MED REC REQUIRED  Post Medication Reconciliation Status:  Discharge medications reconciled and changed, see notes/orders         Current Outpatient Medications   Medication Sig     AMLODIPINE BENZOATE PO Take 10 mg by mouth     ASPIRIN 81 MG OR TABS 1 TABLET DAILY     dorzolamide-timolol (COSOPT) 2-0.5 % ophthalmic solution Place 1 drop into both eyes 2 times daily     glucagon 1 MG SOLR injection Inject 1 mg into the muscle once     insulin aspart (NOVOLOG FLEXPEN) 100 UNIT/ML pen Inject 6 Units Subcutaneous 3 times daily (with meals) And sliding scale: 1 unit: 150 -199 mg/dl.   200-249= 2 units  250-299 3 units  300-249 = 4 units  350 - 399= 5 units  400+ = 6 units and call MD Up to 30 units daily     insulin degludec (TRESIBA FLEXTOUCH) 200 UNIT/ML pen Inject 18 Units Subcutaneous daily     latanoprost (XALATAN) 0.005 % ophthalmic solution INSTILL 1 DROP IN  "BOTH EYES AT BEDTIME     lisinopril (ZESTRIL) 20 MG tablet TAKE 2 TABLETS(40 MG) BY MOUTH DAILY     magnesium oxide (MAG-OX) 400 MG tablet Take 1 tablet (400 mg) by mouth daily     metoprolol succinate ER (TOPROL XL) 50 MG 24 hr tablet Take 1 tablet (50 mg) by mouth daily 1 po daily     MULTIVITAMIN TABS   OR 1 TABLET DAILY     nystatin (MYCOSTATIN) 886690 UNIT/GM external cream Apply topically 2 times daily To feet and toenails.     oxybutynin ER (DITROPAN XL) 10 MG 24 hr tablet TAKE 1 TABLET(10 MG) BY MOUTH DAILY     pantoprazole (PROTONIX) 40 MG EC tablet Take 1 tablet (40 mg) by mouth daily     potassium chloride ER (KLOR-CON M) 20 MEQ CR tablet Take 20 mEq by mouth 2 times daily     psyllium (METAMUCIL) 28.3 % POWD Take 1 capful by mouth daily     simvastatin (ZOCOR) 20 MG tablet Take 1 tablet (20 mg) by mouth At Bedtime     acetone urine (KETOSTIX) test strip Use as needed when BG is > 240 mg/dl for >4 hours with symptoms of DKA.     ciclopirox (LOPROX) 0.77 % cream Apply topically 2 times daily To feet and toenails.     Continuous Blood Gluc  (DEXCOM G6 ) KELLY USE AS DIRECTED FOR CONTINUOUS GLUCOSE MONITORING     Continuous Blood Gluc Sensor (DEXCOM G6 SENSOR) MISC USE AS DIRECTED 1 EVERY 10 DAYS     Continuous Blood Gluc Transmit (DEXCOM G6 TRANSMITTER) MISC USE ONE EVERY 3 MONTHS     insulin pen needle (B-D U/F) 31G X 8 MM miscellaneous Use 3-4 pen needles daily or as directed.     No current facility-administered medications for this visit.       ROS:  10 point ROS of systems including Constitutional, Eyes, Respiratory, Cardiovascular, Gastroenterology, Genitourinary, Integumentary, Musculoskeletal, Psychiatric were all negative except for pertinent positives noted in my HPI.    Vitals:  /70   Pulse 80   Temp 97.7  F (36.5  C)   Resp 18   Ht 1.6 m (5' 3\")   Wt 75.9 kg (167 lb 4.8 oz)   LMP 03/28/2014   SpO2 97%   BMI 29.64 kg/m    Exam:  GENERAL APPEARANCE:  Alert, in no " distress  RESP:  lungs clear to auscultation   CV:  peripheral edema 2+ in bilateral lower extremities  PSYCH:  oriented x 3    Lab/Diagnostic data:              ASSESSMENT/PLAN:    (E10.37X3) Type 1 diabetes mellitus with diabetic macular edema of both eyes resolved after treatment (H)  (primary enco(G63)   Polyneuropathy associated with underlying disease (H)unter diagnosis)  (N18.31) Stage 3a chronic kidney disease (H)  Comment: Chronic.  Uncontrolled.  Patient's blood sugar is very from 155-  410.  She receives insulin Lantus, sliding scale and meal coverage.  Patient has a [employer] continuous blood sugar monitor.  For secondary prevention she is currently taking simvastatin 20 mg daily, losartan 40 mg daily, and aspirin 81 mg daily.  Plan: We will reevaluate blood sugars in 3 days as patient just moved to the unit.    (I10) Hypertension goal BP (blood pressure) < 140/90  Comment: Chronic, elevated while taking metoprolol succinate 50 mg daily, lisinopril 40 mg daily, amlodipine 10 mg daily  Plan: To provide better blood pressure control consider changing metoprolol succinate to carvedilol.  Patient also has lower extremity edema therefore may look at decreasing amlodipine from 10 mg daily to 5 mg daily.  With both changes may need to increase carvedilol to 25 mg twice daily.      (R60.0) Bilateral leg edema  Comment: Acute.  May be related to amlodipine 10 mg daily which has a known side effect of lower extremity edema.  Plan: Change amlodipine from morning administration to evening administration now.  We will consider decreasing amlodipine from 10 mg to 5 mg    (N32.81) Overactive bladder  Comment: Chronic.  Stable while taking oxybutynin  Plan: Continue with plan of care no changes at this time, adjustment as needed    (K21.00) Gastroesophageal reflux disease with esophagitis without hemorrhage  Comment: Chronic, stable while taking Protonix  Plan: Continue with plan of care no changes at this time,  adjustment as needed      (K59.01) Slow transit constipation  Comment: Chronic, stable while taking Metamucil   plan: Continue with plan of care no changes at this time, adjustment as needed      (F81.9) Cognitive developmental delay  Comment: Chronic, stable.  Patient has a mother and father who live in [place] and 2 sisters who live in the [place] metropolitan area.  Plan: Continue with plan of care no changes at this time, adjustment as needed      (H40.003) Glaucoma suspect, bilateral  Comment: Chronic, stable.   Plan: Currently taking lantanoprost solution    (E87.6) hypokalemia  Comment: Recent diagnosis.  Currently taking potassium chloride supplementation  Plan: Continue with plan of care no changes at this time, adjustment as needed        Orders:  Consider increasing sliding scale insulin  Change amlodipine from morning administration to evening administration now  Consider changing metoprolol to carvedilol and decreasing amlodipine from 10 mg to 5 mg daily  Consider lymphedema referral    Electronically signed by:  TAYLA Sprague CNP on 1/20/2023 at 4:54 PM                      Sincerely,        TAYLA Sprague CNP

## 2023-01-20 NOTE — PROGRESS NOTES
[facility] GERIATRICS    PRIMARY CARE PROVIDER AND CLINIC:  TAYLA Sprague CNP, [address] [facility] [address]  Chief Complaint   Patient presents with     [facility] Medical Record Number:  [id number]  Place of Service where encounter took place:  [facility] HOME (NF) [[id number]]    Lexy Rockwell  is a 62 year old  (1960), admitted to the above facility from  [facility] . Hospital stay 12/13/22 through 12/26/22..       Her past medical history includes    Type 1 diabetes with gastroparesis and CKD history of DKA    Hypertension    CKD stage III    Hyperlipidemia    Bilateral leg edema    Overactive bladder    GERD    Constipation    Developmentally delayed    Polyneuropathy     Vitamin D deficiency    glaucoma    On 12/13/2022, patient was found down by police.  She was found to have blood sugar of over 1000 mg/dL.  She was started on DKA protocol.  She had a head CT and an abdominal and pelvic CT which showed some tissue gas in the left upper arm and multifocal nodule groundglass opacities in the left upper lung.  She was started on ceftriaxone and azithromycin for pneumonia.  She was intubated and on tube feeding.  Her enteral nutrition was discontinued on 12/16/2022 and TCU placement was recommended.    Patient was in the [facility] TCU.  She was recently admitted to long-term care.  While in the TCU her blood sugars remained between 130-525 with many in the 300s.  She continues to be incontinent of bladder and had pitting lower extremity edema.  She was noted to have poor endurance, mobility and overall weakness    Today patient was seen in her room today.  Her biggest complaint is gas after eating.  She states she is having regular Bowel movements and no vomiting.   She Lower extremity edema.  Her BS have been running high and has a hx of DKA.         CODE STATUS/ADVANCE DIRECTIVES DISCUSSION:  Full code   ALLERGIES:    Allergies   Allergen Reactions     Amoxicillin      Sulfa Drugs       PAST MEDICAL HISTORY:   Past Medical History:   Diagnosis Date     Cerumen impacted      Development delay      Diabetic gastroparesis (H) 8/16/2013     Glaucoma (increased eye pressure)      Hyperlipaemia      Hypertension      Hypothyroid      Mental retardation      Nonsenile cataract      Obesity      Strabismus      Type 1 diabetes mellitus not at goal (H)       PAST SURGICAL HISTORY:   has a past surgical history that includes EYE SURG ANT SGMT PROC UNLISTED (remote) and strabismus surgery.  FAMILY HISTORY: family history includes Diabetes in her sister; Glaucoma in her maternal grandfather; Hypertension in her father.  SOCIAL HISTORY:   reports that she has never smoked. She has never used smokeless tobacco. She reports current alcohol use. She reports that she does not use drugs.  Patient's living condition: lives alone    Post Discharge Medication Reconciliation Status:   MED REC REQUIRED  Post Medication Reconciliation Status:  Discharge medications reconciled and changed, see notes/orders         Current Outpatient Medications   Medication Sig     AMLODIPINE BENZOATE PO Take 10 mg by mouth     ASPIRIN 81 MG OR TABS 1 TABLET DAILY     dorzolamide-timolol (COSOPT) 2-0.5 % ophthalmic solution Place 1 drop into both eyes 2 times daily     glucagon 1 MG SOLR injection Inject 1 mg into the muscle once     insulin aspart (NOVOLOG FLEXPEN) 100 UNIT/ML pen Inject 6 Units Subcutaneous 3 times daily (with meals) And sliding scale: 1 unit: 150 -199 mg/dl.   200-249= 2 units  250-299 3 units  300-249 = 4 units  350 - 399= 5 units  400+ = 6 units and call MD Up to 30 units daily     insulin degludec (TRESIBA FLEXTOUCH) 200 UNIT/ML pen Inject 18 Units Subcutaneous daily     latanoprost (XALATAN) 0.005 % ophthalmic solution INSTILL 1 DROP IN BOTH EYES AT BEDTIME     lisinopril (ZESTRIL) 20 MG tablet TAKE 2 TABLETS(40 MG) BY MOUTH DAILY     magnesium  "oxide (MAG-OX) 400 MG tablet Take 1 tablet (400 mg) by mouth daily     metoprolol succinate ER (TOPROL XL) 50 MG 24 hr tablet Take 1 tablet (50 mg) by mouth daily 1 po daily     MULTIVITAMIN TABS   OR 1 TABLET DAILY     nystatin (MYCOSTATIN) 733181 UNIT/GM external cream Apply topically 2 times daily To feet and toenails.     oxybutynin ER (DITROPAN XL) 10 MG 24 hr tablet TAKE 1 TABLET(10 MG) BY MOUTH DAILY     pantoprazole (PROTONIX) 40 MG EC tablet Take 1 tablet (40 mg) by mouth daily     potassium chloride ER (KLOR-CON M) 20 MEQ CR tablet Take 20 mEq by mouth 2 times daily     psyllium (METAMUCIL) 28.3 % POWD Take 1 capful by mouth daily     simvastatin (ZOCOR) 20 MG tablet Take 1 tablet (20 mg) by mouth At Bedtime     acetone urine (KETOSTIX) test strip Use as needed when BG is > 240 mg/dl for >4 hours with symptoms of DKA.     ciclopirox (LOPROX) 0.77 % cream Apply topically 2 times daily To feet and toenails.     Continuous Blood Gluc  (DEXCOM G6 ) KELLY USE AS DIRECTED FOR CONTINUOUS GLUCOSE MONITORING     Continuous Blood Gluc Sensor (DEXCOM G6 SENSOR) MISC USE AS DIRECTED 1 EVERY 10 DAYS     Continuous Blood Gluc Transmit (DEXCOM G6 TRANSMITTER) MISC USE ONE EVERY 3 MONTHS     insulin pen needle (B-D U/F) 31G X 8 MM miscellaneous Use 3-4 pen needles daily or as directed.     No current facility-administered medications for this visit.       ROS:  10 point ROS of systems including Constitutional, Eyes, Respiratory, Cardiovascular, Gastroenterology, Genitourinary, Integumentary, Musculoskeletal, Psychiatric were all negative except for pertinent positives noted in my HPI.    Vitals:  /70   Pulse 80   Temp 97.7  F (36.5  C)   Resp 18   Ht 1.6 m (5' 3\")   Wt 75.9 kg (167 lb 4.8 oz)   LMP 03/28/2014   SpO2 97%   BMI 29.64 kg/m    Exam:  GENERAL APPEARANCE:  Alert, in no distress  RESP:  lungs clear to auscultation   CV:  peripheral edema 2+ in bilateral lower extremities  PSYCH:  " oriented x 3    Lab/Diagnostic data:              ASSESSMENT/PLAN:    (E10.37X3) Type 1 diabetes mellitus with diabetic macular edema of both eyes resolved after treatment (H)  (primary enco(G63)   Polyneuropathy associated with underlying disease (H)unter diagnosis)  (N18.31) Stage 3a chronic kidney disease (H)  Comment: Chronic.  Uncontrolled.  Patient's blood sugar is very from 155-  410.  She receives insulin Lantus, sliding scale and meal coverage.  Patient has a [employer] continuous blood sugar monitor.  For secondary prevention she is currently taking simvastatin 20 mg daily, losartan 40 mg daily, and aspirin 81 mg daily.  Plan: We will reevaluate blood sugars in 3 days as patient just moved to the unit.    (I10) Hypertension goal BP (blood pressure) < 140/90  Comment: Chronic, elevated while taking metoprolol succinate 50 mg daily, lisinopril 40 mg daily, amlodipine 10 mg daily  Plan: To provide better blood pressure control consider changing metoprolol succinate to carvedilol.  Patient also has lower extremity edema therefore may look at decreasing amlodipine from 10 mg daily to 5 mg daily.  With both changes may need to increase carvedilol to 25 mg twice daily.      (R60.0) Bilateral leg edema  Comment: Acute.  May be related to amlodipine 10 mg daily which has a known side effect of lower extremity edema.  Plan: Change amlodipine from morning administration to evening administration now.  We will consider decreasing amlodipine from 10 mg to 5 mg    (N32.81) Overactive bladder  Comment: Chronic.  Stable while taking oxybutynin  Plan: Continue with plan of care no changes at this time, adjustment as needed    (K21.00) Gastroesophageal reflux disease with esophagitis without hemorrhage  Comment: Chronic, stable while taking Protonix  Plan: Continue with plan of care no changes at this time, adjustment as needed      (K59.01) Slow transit constipation  Comment: Chronic, stable while taking Metamucil   plan:  Continue with plan of care no changes at this time, adjustment as needed      (F81.9) Cognitive developmental delay  Comment: Chronic, stable.  Patient has a mother and father who live in [place] and 2 sisters who live in the [place] metropolitan area.  Plan: Continue with plan of care no changes at this time, adjustment as needed      (H40.003) Glaucoma suspect, bilateral  Comment: Chronic, stable.   Plan: Currently taking lantanoprost solution    (E87.6) hypokalemia  Comment: Recent diagnosis.  Currently taking potassium chloride supplementation  Plan: Continue with plan of care no changes at this time, adjustment as needed        Orders:  Consider increasing sliding scale insulin  Change amlodipine from morning administration to evening administration now  Consider changing metoprolol to carvedilol and decreasing amlodipine from 10 mg to 5 mg daily  Consider lymphedema referral    Electronically signed by:  TAYLA Sprague CNP on 1/20/2023 at 4:54 PM

## 2023-01-24 ENCOUNTER — NURSING HOME VISIT (OUTPATIENT)
Dept: GERIATRICS | Facility: CLINIC | Age: 63
End: 2023-01-24
Payer: MEDICARE

## 2023-01-24 ENCOUNTER — LAB REQUISITION (OUTPATIENT)
Dept: LAB | Facility: CLINIC | Age: 63
End: 2023-01-24
Payer: MEDICARE

## 2023-01-24 VITALS
OXYGEN SATURATION: 94 % | TEMPERATURE: 97.7 F | DIASTOLIC BLOOD PRESSURE: 64 MMHG | RESPIRATION RATE: 18 BRPM | BODY MASS INDEX: 29.07 KG/M2 | HEART RATE: 82 BPM | SYSTOLIC BLOOD PRESSURE: 134 MMHG | WEIGHT: 164.1 LBS | HEIGHT: 63 IN

## 2023-01-24 DIAGNOSIS — I10 ESSENTIAL (PRIMARY) HYPERTENSION: ICD-10-CM

## 2023-01-24 DIAGNOSIS — E10.37X3 TYPE 1 DIABETES MELLITUS WITH DIABETIC MACULAR EDEMA OF BOTH EYES RESOLVED AFTER TREATMENT (H): ICD-10-CM

## 2023-01-24 DIAGNOSIS — R60.0 LOCALIZED EDEMA: ICD-10-CM

## 2023-01-24 DIAGNOSIS — E11.9 TYPE 2 DIABETES MELLITUS WITHOUT COMPLICATIONS (H): ICD-10-CM

## 2023-01-24 DIAGNOSIS — I10 HYPERTENSION GOAL BP (BLOOD PRESSURE) < 140/90: Primary | ICD-10-CM

## 2023-01-24 PROCEDURE — 99309 SBSQ NF CARE MODERATE MDM 30: CPT | Performed by: NURSE PRACTITIONER

## 2023-01-24 RX ORDER — CARVEDILOL 6.25 MG/1
6.25 TABLET ORAL 2 TIMES DAILY WITH MEALS
Start: 2023-01-24

## 2023-01-24 NOTE — LETTER
"    1/24/2023        RE: Lexy Rockwell  [address]  [facility] [address]        [facility] GERIATRICS    Chief Complaint   Patient presents with     RECHECK     HPI:  Lexy Rockwell is a 62 year old  (1960), who is being seen today for an episodic care visit at: [facility] () [[id number]].     Her past medical history includes    Type 1 diabetes with gastroparesis and CKD history of DKA    Hypertension    CKD stage III    Hyperlipidemia    Bilateral leg edema    Overactive bladder    GERD    Constipation    Developmentally delayed    Polyneuropathy     Vitamin D deficiency    Glaucoma    Today patient was seen in her room.  She had a fall during the middle of the night on 1/22/23.  Lexy denied chest pain, shortness of breath, dizziness, lightheadedness, and a poor appetite.   She states she has had LE edema for some time. Per nursing, skin open on right heel. Code status is full code.    BS has been usually between 100-200 with isolated BS in the 300-400 while taking 6 units insulin each meal, sliding scale and lantus.   She has lost 10# since a January 1.      Allergies, and PMH/PSH reviewed in [employer] today.  REVIEW OF SYSTEMS:  4 point ROS including Respiratory, CV, GI and , other than that noted in the HPI,  is negative    Objective:   /64   Pulse 82   Temp 97.7  F (36.5  C)   Resp 18   Ht 1.6 m (5' 3\")   Wt 74.4 kg (164 lb 1.6 oz)   LMP 03/28/2014   SpO2 94%   BMI 29.07 kg/m    GENERAL APPEARANCE:  Alert, oriented  RESP:  lungs clear to auscultation   CV:  regular rate and rhythm, no murmur, rub, or gallop  PSYCH:  oriented X 3        Assessment/Plan:  (I10) Hypertension goal BP (blood pressure) < 140/90  (primary encounter diagnosis)  (R60.0) edema  Comment: Chronic.  Edema is worsening.   BP is controlled while taking amlodipine 10 mg daily, metoprolol XL 50 mg daily, lisinopril 40 mg daily.   Amlodipine 10 mg could be worsening " the edema   Plan:     Decrease amlodipine 5 mg daily    Stop metoprolol XL 50 mg daily     Start coreg 6.25 mg BID     (E10.37X3) Type 1 diabetes mellitus with diabetic macular edema of both eyes resolved after treatment (H)  Comment: Chronic, stable.   Currently receiving insulin aspart 6 mg at meals, lantus 18 units, and sliding scale.     Plan: Continue with plan of care no changes at this time, adjustment as needed        Orders/Plan    Decrease amlodipine 5 mg daily    Stop metoprolol XL 50 mg daily     Start coreg 6.25 mg BID     Electronically signed by: TAYLA Sprague CNP             Sincerely,        TAYLA Sprague CNP

## 2023-01-24 NOTE — PROGRESS NOTES
"[facility] GERIATRICS    Chief Complaint   Patient presents with     RECHECK     HPI:  Lexy Rockwell is a 62 year old  (1960), who is being seen today for an episodic care visit at: [facility] () [[id number]].     Her past medical history includes    Type 1 diabetes with gastroparesis and CKD history of DKA    Hypertension    CKD stage III    Hyperlipidemia    Bilateral leg edema    Overactive bladder    GERD    Constipation    Developmentally delayed    Polyneuropathy     Vitamin D deficiency    Glaucoma    Today patient was seen in her room.  She had a fall during the middle of the night on 1/22/23.  Lexy denied chest pain, shortness of breath, dizziness, lightheadedness, and a poor appetite.   She states she has had LE edema for some time. Per nursing, skin open on right heel. Code status is full code.    BS has been usually between 100-200 with isolated BS in the 300-400 while taking 6 units insulin each meal, sliding scale and lantus.   She has lost 10# since a January 1.      Allergies, and PMH/PSH reviewed in [facility] today.  REVIEW OF SYSTEMS:  4 point ROS including Respiratory, CV, GI and , other than that noted in the HPI,  is negative    Objective:   /64   Pulse 82   Temp 97.7  F (36.5  C)   Resp 18   Ht 1.6 m (5' 3\")   Wt 74.4 kg (164 lb 1.6 oz)   LMP 03/28/2014   SpO2 94%   BMI 29.07 kg/m    GENERAL APPEARANCE:  Alert, oriented  RESP:  lungs clear to auscultation   CV:  regular rate and rhythm, no murmur, rub, or gallop  PSYCH:  oriented X 3        Assessment/Plan:  (I10) Hypertension goal BP (blood pressure) < 140/90  (primary encounter diagnosis)  (R60.0) edema  Comment: Chronic.  Edema is worsening.   BP is controlled while taking amlodipine 10 mg daily, metoprolol XL 50 mg daily, lisinopril 40 mg daily.   Amlodipine 10 mg could be worsening the edema   Plan:     Decrease amlodipine 5 mg daily    Stop metoprolol XL 50 mg daily     Start coreg 6.25 mg " BID     (E10.37X3) Type 1 diabetes mellitus with diabetic macular edema of both eyes resolved after treatment (H)  Comment: Chronic, stable.   Currently receiving insulin aspart 6 mg at meals, lantus 18 units, and sliding scale.     Plan: Continue with plan of care no changes at this time, adjustment as needed        Orders/Plan    Decrease amlodipine 5 mg daily    Stop metoprolol XL 50 mg daily     Start coreg 6.25 mg BID     Electronically signed by: TAYLA Sprague CNP

## 2023-01-25 LAB
ALBUMIN SERPL BCG-MCNC: 3.1 G/DL (ref 3.5–5.2)
ALP SERPL-CCNC: 79 U/L (ref 35–104)
ALT SERPL W P-5'-P-CCNC: 10 U/L (ref 10–35)
ANION GAP SERPL CALCULATED.3IONS-SCNC: 15 MMOL/L (ref 7–15)
AST SERPL W P-5'-P-CCNC: 19 U/L (ref 10–35)
BILIRUB SERPL-MCNC: <0.2 MG/DL
BUN SERPL-MCNC: 19.3 MG/DL (ref 8–23)
CALCIUM SERPL-MCNC: 9 MG/DL (ref 8.8–10.2)
CHLORIDE SERPL-SCNC: 106 MMOL/L (ref 98–107)
CHOLEST SERPL-MCNC: 112 MG/DL
CREAT SERPL-MCNC: 0.93 MG/DL (ref 0.51–0.95)
DEPRECATED HCO3 PLAS-SCNC: 21 MMOL/L (ref 22–29)
ERYTHROCYTE [DISTWIDTH] IN BLOOD BY AUTOMATED COUNT: 14.4 % (ref 10–15)
GFR SERPL CREATININE-BSD FRML MDRD: 70 ML/MIN/1.73M2
GLUCOSE SERPL-MCNC: 233 MG/DL (ref 70–99)
HBA1C MFR BLD: 8.7 %
HCT VFR BLD AUTO: 26.1 % (ref 35–47)
HDLC SERPL-MCNC: 37 MG/DL
HGB BLD-MCNC: 7.8 G/DL (ref 11.7–15.7)
LDLC SERPL CALC-MCNC: 58 MG/DL
MCH RBC QN AUTO: 26.6 PG (ref 26.5–33)
MCHC RBC AUTO-ENTMCNC: 29.9 G/DL (ref 31.5–36.5)
MCV RBC AUTO: 89 FL (ref 78–100)
NONHDLC SERPL-MCNC: 75 MG/DL
PLATELET # BLD AUTO: 281 10E3/UL (ref 150–450)
POTASSIUM SERPL-SCNC: 3.9 MMOL/L (ref 3.4–5.3)
PROT SERPL-MCNC: 5.6 G/DL (ref 6.4–8.3)
RBC # BLD AUTO: 2.93 10E6/UL (ref 3.8–5.2)
SODIUM SERPL-SCNC: 142 MMOL/L (ref 136–145)
TRIGL SERPL-MCNC: 84 MG/DL
WBC # BLD AUTO: 3.5 10E3/UL (ref 4–11)

## 2023-01-25 PROCEDURE — 36415 COLL VENOUS BLD VENIPUNCTURE: CPT | Mod: ORL | Performed by: NURSE PRACTITIONER

## 2023-01-25 PROCEDURE — 83036 HEMOGLOBIN GLYCOSYLATED A1C: CPT | Mod: ORL | Performed by: NURSE PRACTITIONER

## 2023-01-25 PROCEDURE — 85027 COMPLETE CBC AUTOMATED: CPT | Mod: ORL | Performed by: NURSE PRACTITIONER

## 2023-01-25 PROCEDURE — 80053 COMPREHEN METABOLIC PANEL: CPT | Performed by: NURSE PRACTITIONER

## 2023-01-25 PROCEDURE — P9604 ONE-WAY ALLOW PRORATED TRIP: HCPCS | Mod: ORL | Performed by: NURSE PRACTITIONER

## 2023-01-25 PROCEDURE — 80061 LIPID PANEL: CPT | Performed by: NURSE PRACTITIONER

## 2023-01-26 ENCOUNTER — TELEPHONE (OUTPATIENT)
Dept: GERIATRICS | Facility: CLINIC | Age: 63
End: 2023-01-26
Payer: MEDICARE

## 2023-01-26 DIAGNOSIS — D64.9 ANEMIA, UNSPECIFIED TYPE: Primary | ICD-10-CM

## 2023-01-26 RX ORDER — PANTOPRAZOLE SODIUM 40 MG/1
40 TABLET, DELAYED RELEASE ORAL 2 TIMES DAILY
Start: 2023-01-26 | End: 2023-02-03

## 2023-02-01 ENCOUNTER — LAB REQUISITION (OUTPATIENT)
Dept: LAB | Facility: CLINIC | Age: 63
End: 2023-02-01
Payer: MEDICARE

## 2023-02-03 ENCOUNTER — LAB REQUISITION (OUTPATIENT)
Dept: LAB | Facility: CLINIC | Age: 63
End: 2023-02-03
Payer: MEDICARE

## 2023-02-03 ENCOUNTER — TELEPHONE (OUTPATIENT)
Dept: GERIATRICS | Facility: CLINIC | Age: 63
End: 2023-02-03

## 2023-02-03 ENCOUNTER — NURSING HOME VISIT (OUTPATIENT)
Dept: GERIATRICS | Facility: CLINIC | Age: 63
End: 2023-02-03
Payer: MEDICARE

## 2023-02-03 VITALS
BODY MASS INDEX: 28.08 KG/M2 | WEIGHT: 158.5 LBS | SYSTOLIC BLOOD PRESSURE: 123 MMHG | RESPIRATION RATE: 16 BRPM | OXYGEN SATURATION: 96 % | HEIGHT: 63 IN | TEMPERATURE: 97.3 F | DIASTOLIC BLOOD PRESSURE: 80 MMHG | HEART RATE: 86 BPM

## 2023-02-03 DIAGNOSIS — R11.0 NAUSEA: ICD-10-CM

## 2023-02-03 DIAGNOSIS — K59.00 CONSTIPATION, UNSPECIFIED CONSTIPATION TYPE: ICD-10-CM

## 2023-02-03 DIAGNOSIS — E10.37X3 TYPE 1 DIABETES MELLITUS WITH DIABETIC MACULAR EDEMA OF BOTH EYES RESOLVED AFTER TREATMENT (H): ICD-10-CM

## 2023-02-03 DIAGNOSIS — D64.9 ANEMIA, UNSPECIFIED TYPE: ICD-10-CM

## 2023-02-03 DIAGNOSIS — I10 ESSENTIAL (PRIMARY) HYPERTENSION: ICD-10-CM

## 2023-02-03 DIAGNOSIS — E10.37X3 TYPE 1 DIABETES MELLITUS WITH DIABETIC MACULAR EDEMA OF BOTH EYES RESOLVED AFTER TREATMENT (H): Primary | ICD-10-CM

## 2023-02-03 PROBLEM — E11.10 DIABETIC KETOACIDOSIS WITHOUT COMA ASSOCIATED WITH TYPE 2 DIABETES MELLITUS (H): Status: ACTIVE | Noted: 2022-06-08

## 2023-02-03 PROBLEM — E87.5 ACUTE HYPERKALEMIA: Status: ACTIVE | Noted: 2021-08-22

## 2023-02-03 PROBLEM — K92.0 HEMATEMESIS WITH NAUSEA: Status: ACTIVE | Noted: 2022-06-08

## 2023-02-03 PROBLEM — N32.81 OAB (OVERACTIVE BLADDER): Status: ACTIVE | Noted: 2018-05-05

## 2023-02-03 PROBLEM — I50.32 CHRONIC DIASTOLIC HEART FAILURE (H): Status: ACTIVE | Noted: 2021-08-26

## 2023-02-03 PROBLEM — E10.10 DKA, TYPE 1 (H): Status: ACTIVE | Noted: 2022-12-13

## 2023-02-03 PROBLEM — E83.52 HYPERCALCEMIA: Status: ACTIVE | Noted: 2018-10-15

## 2023-02-03 PROBLEM — N18.30 CKD (CHRONIC KIDNEY DISEASE), STAGE III (H): Status: ACTIVE | Noted: 2018-10-15

## 2023-02-03 PROBLEM — E10.3393: Status: ACTIVE | Noted: 2022-10-03

## 2023-02-03 PROBLEM — E66.9 OBESITY (BMI 30-39.9): Status: ACTIVE | Noted: 2021-08-22

## 2023-02-03 PROBLEM — E87.20 LACTIC ACIDOSIS: Status: ACTIVE | Noted: 2022-06-08

## 2023-02-03 PROCEDURE — 99308 SBSQ NF CARE LOW MDM 20: CPT | Performed by: NURSE PRACTITIONER

## 2023-02-03 RX ORDER — INSULIN DEGLUDEC 200 U/ML
20 INJECTION, SOLUTION SUBCUTANEOUS DAILY
Qty: 15 ML
Start: 2023-02-03 | End: 2023-02-17

## 2023-02-03 NOTE — LETTER
"    2/3/2023        RE: Lexy Rockwell  [address]  [facility] [address]        [facility]    Chief Complaint   Patient presents with     Nursing Home Acute     diabetes     HPI:  Lexy Rockwell is a 62 year old  (1960), who is being seen today for an episodic care visit at: [facility] () [[id number]].     Her past medical history includes    Type 1 diabetes with gastroparesis and CKD history of DKA    Hypertension    CKD stage III    Hyperlipidemia    Bilateral leg edema    Overactive bladder    GERD    Constipation    Developmentally delayed    Polyneuropathy     Vitamin D deficiency    Glaucoma      On 1/9/2023, her degludec was increased from 16 to 18 units    On 1/24/2023, her amlodipine was decreased to 5 mg daily, her metoprolol XL was stopped and changed to coreg 6.25 mg BID, aspirin was stopped due to hgb of 7.8 - 8.7        Today patient was asked to see pt as he BS having been elevated.  She is slowly losing weight.  Her LE edema is improving with lymphedema.     BS @ 8 am =187-476   BS @ 11 am = 141-331   BS @ 1700 am = 172-335  BS @ HS = 135 - 334       Allergies, and PMH/PSH reviewed in EPIC today.  REVIEW OF SYSTEMS:  4 point ROS including Respiratory, CV, GI and , other than that noted in the HPI,  is negative    Objective:   /80   Pulse 86   Temp 97.3  F (36.3  C)   Resp 16   Ht 1.6 m (5' 3\")   Wt 71.9 kg (158 lb 8 oz)   LMP 03/28/2014   SpO2 96%   BMI 28.08 kg/m    GENERAL APPEARANCE:  Alert, in no distress  RESP:  no respiratory distress  PSYCH:  affect and mood normal      Most Recent 3 CBC's:Recent Labs   Lab Test 01/25/23  0820 10/13/21  1540 09/30/20  0910   WBC 3.5* 4.3 4.3   HGB 7.8* 12.0 11.9   MCV 89 85 80    239 194     Most Recent 3 BMP's:Recent Labs   Lab Test 01/25/23  0820 01/11/23  1450 01/02/23  0631 12/29/22  0625    143  --  144   POTASSIUM 3.9 4.3 4.4 2.9*   CHLORIDE 106 108*  " --  106   CO2 21* 19*  --  24   BUN 19.3 16.7  --  22.1   CR 0.93 1.11*  --  0.94   ANIONGAP 15 16*  --  14   EFRAIN 9.0 9.5  --  9.5   * 92  --  139*     Most Recent 2 LFT's:Recent Labs   Lab Test 01/25/23  0820 10/13/21  1540   AST 19 13   ALT 10 18   ALKPHOS 79 96   BILITOTAL <0.2 0.3     Most Recent TSH and T4:Recent Labs   Lab Test 10/13/21  1540 02/01/21  0909   TSH 3.41 7.07*   T4  --  1.05     Most Recent Hemoglobin A1c:Recent Labs   Lab Test 01/25/23  0820   A1C 8.7*       Assessment/Plan:  (E10.37X3) Type 1 diabetes mellitus with diabetic macular edema of both eyes resolved after treatment (H)  (primary encounter diagnosis)  Comment: Chronic.  Uncontrolled.  Patient's blood sugar is very from 141-  476.  She receives insulin degludec 18 units, sliding scale and 6 units aspart insulin prior to meals for coverage.  Patient has a [employer] continuous blood sugar monitor.  For secondary prevention she is currently taking simvastatin 20 mg daily, lisinopril 40 mg daily, and aspirin 81 mg daily is being held due to anemia.  Plan:   Increase  degludec from 18 to 20 units.      (R60.0) Bilateral leg edema  Comment: improving.  May be related to amlodipine 5 mg daily which has a known side effect of lower extremity edema.  She is seeing lymphedema.  Her legs are improving and I personally reviewed the PT notes regarding lymphedema therapy.  and her weight is decreasing.   Plan: Continue with plan of care no changes at this time, adjustment as needed     [D63.8] Anemia of chronic disease  Comment:  Appears to be anemia of chronic disease as her hgb and hematocrit are low and her MCV is normal.  She is taking MVI Aspirin was stopped but can be restarted.   Plan: restart aspirin.  Awaiting CBC    Plan:   Increase degludec from 18 to 20 units  Restart aspirin 81 mg daily    Electronically signed by:     TAYLA Sprague CNP on 2/3/2023 at 4:54 PM            Sincerely,        TAYLA Sprague CNP

## 2023-02-03 NOTE — PROGRESS NOTES
"[facility] GERIATRICS    Chief Complaint   Patient presents with     Nursing Home Acute     diabetes     HPI:  Lexy Rockwell is a 62 year old  (1960), who is being seen today for an episodic care visit at: [facility] () [[id number]].     Her past medical history includes    Type 1 diabetes with gastroparesis and CKD history of DKA    Hypertension    CKD stage III    Hyperlipidemia    Bilateral leg edema    Overactive bladder    GERD    Constipation    Developmentally delayed    Polyneuropathy     Vitamin D deficiency    Glaucoma      On 1/9/2023, her degludec was increased from 16 to 18 units    On 1/24/2023, her amlodipine was decreased to 5 mg daily, her metoprolol XL was stopped and changed to coreg 6.25 mg BID, aspirin was stopped due to hgb of 7.8 - 8.7        Today patient was asked to see pt as he BS having been elevated.  She is slowly losing weight.  Her LE edema is improving with lymphedema.     BS @ 8 am =187-476   BS @ 11 am = 141-331   BS @ 1700 am = 172-335  BS @ HS = 135 - 334       Allergies, and PMH/PSH reviewed in EPIC today.  REVIEW OF SYSTEMS:  4 point ROS including Respiratory, CV, GI and , other than that noted in the HPI,  is negative    Objective:   /80   Pulse 86   Temp 97.3  F (36.3  C)   Resp 16   Ht 1.6 m (5' 3\")   Wt 71.9 kg (158 lb 8 oz)   LMP 03/28/2014   SpO2 96%   BMI 28.08 kg/m    GENERAL APPEARANCE:  Alert, in no distress  RESP:  no respiratory distress  PSYCH:  affect and mood normal      Most Recent 3 CBC's:Recent Labs   Lab Test 01/25/23  0820 10/13/21  1540 09/30/20  0910   WBC 3.5* 4.3 4.3   HGB 7.8* 12.0 11.9   MCV 89 85 80    239 194     Most Recent 3 BMP's:Recent Labs   Lab Test 01/25/23  0820 01/11/23  1450 01/02/23  0631 12/29/22  0625    143  --  144   POTASSIUM 3.9 4.3 4.4 2.9*   CHLORIDE 106 108*  --  106   CO2 21* 19*  --  24   BUN 19.3 16.7  --  22.1   CR 0.93 1.11*  --  0.94   ANIONGAP 15 16*  --  14 "   EFRAIN 9.0 9.5  --  9.5   * 92  --  139*     Most Recent 2 LFT's:Recent Labs   Lab Test 01/25/23  0820 10/13/21  1540   AST 19 13   ALT 10 18   ALKPHOS 79 96   BILITOTAL <0.2 0.3     Most Recent TSH and T4:Recent Labs   Lab Test 10/13/21  1540 02/01/21  0909   TSH 3.41 7.07*   T4  --  1.05     Most Recent Hemoglobin A1c:Recent Labs   Lab Test 01/25/23  0820   A1C 8.7*       Assessment/Plan:  (E10.37X3) Type 1 diabetes mellitus with diabetic macular edema of both eyes resolved after treatment (H)  (primary encounter diagnosis)  Comment: Chronic.  Uncontrolled.  Patient's blood sugar is very from 141-  476.  She receives insulin degludec 18 units, sliding scale and 6 units aspart insulin prior to meals for coverage.  Patient has a [employer] continuous blood sugar monitor.  For secondary prevention she is currently taking simvastatin 20 mg daily, lisinopril 40 mg daily, and aspirin 81 mg daily is being held due to anemia.  Plan:   Increase  degludec from 18 to 20 units.      (R60.0) Bilateral leg edema  Comment: improving.  May be related to amlodipine 5 mg daily which has a known side effect of lower extremity edema.  She is seeing lymphedema.  Her legs are improving and I personally reviewed the PT notes regarding lymphedema therapy.  and her weight is decreasing.   Plan: Continue with plan of care no changes at this time, adjustment as needed    (D62.9] Anemia   Comment:  Acute, New anemia.   her hgb and hematocrit are low and her MCV is normal.  She is taking MVI Aspirin was stopped   Plan:   Awaiting CBC    Plan:   Increase degludec from 18 to 20 units  Awaiting CBC    Electronically signed by:     TAYLA Sprague CNP on 2/3/2023 at 4:54 PM

## 2023-02-06 LAB
ERYTHROCYTE [DISTWIDTH] IN BLOOD BY AUTOMATED COUNT: 14.4 % (ref 10–15)
HCT VFR BLD AUTO: 26.3 % (ref 35–47)
HGB BLD-MCNC: 8 G/DL (ref 11.7–15.7)
MCH RBC QN AUTO: 26.3 PG (ref 26.5–33)
MCHC RBC AUTO-ENTMCNC: 30.4 G/DL (ref 31.5–36.5)
MCV RBC AUTO: 87 FL (ref 78–100)
PLATELET # BLD AUTO: 282 10E3/UL (ref 150–450)
RBC # BLD AUTO: 3.04 10E6/UL (ref 3.8–5.2)
WBC # BLD AUTO: 3.5 10E3/UL (ref 4–11)

## 2023-02-06 PROCEDURE — P9604 ONE-WAY ALLOW PRORATED TRIP: HCPCS | Mod: ORL | Performed by: NURSE PRACTITIONER

## 2023-02-06 PROCEDURE — 36415 COLL VENOUS BLD VENIPUNCTURE: CPT | Mod: ORL | Performed by: NURSE PRACTITIONER

## 2023-02-06 PROCEDURE — 85027 COMPLETE CBC AUTOMATED: CPT | Performed by: NURSE PRACTITIONER

## 2023-02-07 ENCOUNTER — TELEPHONE (OUTPATIENT)
Dept: GERIATRICS | Facility: CLINIC | Age: 63
End: 2023-02-07

## 2023-02-07 ENCOUNTER — NURSING HOME VISIT (OUTPATIENT)
Dept: GERIATRICS | Facility: CLINIC | Age: 63
End: 2023-02-07
Payer: MEDICARE

## 2023-02-07 VITALS
SYSTOLIC BLOOD PRESSURE: 123 MMHG | HEIGHT: 63 IN | DIASTOLIC BLOOD PRESSURE: 80 MMHG | RESPIRATION RATE: 16 BRPM | HEART RATE: 86 BPM | WEIGHT: 158.2 LBS | TEMPERATURE: 97.3 F | OXYGEN SATURATION: 96 % | BODY MASS INDEX: 28.03 KG/M2

## 2023-02-07 DIAGNOSIS — R73.9 HYPERGLYCEMIA: ICD-10-CM

## 2023-02-07 DIAGNOSIS — N32.81 OAB (OVERACTIVE BLADDER): ICD-10-CM

## 2023-02-07 DIAGNOSIS — K21.00 GASTROESOPHAGEAL REFLUX DISEASE WITH ESOPHAGITIS WITHOUT HEMORRHAGE: ICD-10-CM

## 2023-02-07 DIAGNOSIS — F81.9 COGNITIVE DEVELOPMENTAL DELAY: ICD-10-CM

## 2023-02-07 DIAGNOSIS — E10.37X3 TYPE 1 DIABETES MELLITUS WITH DIABETIC MACULAR EDEMA OF BOTH EYES RESOLVED AFTER TREATMENT (H): ICD-10-CM

## 2023-02-07 DIAGNOSIS — D64.9 ANEMIA, UNSPECIFIED TYPE: Primary | ICD-10-CM

## 2023-02-07 DIAGNOSIS — E10.37X3 TYPE 1 DIABETES MELLITUS WITH DIABETIC MACULAR EDEMA OF BOTH EYES RESOLVED AFTER TREATMENT (H): Primary | ICD-10-CM

## 2023-02-07 DIAGNOSIS — I10 ESSENTIAL HYPERTENSION WITH GOAL BLOOD PRESSURE LESS THAN 140/90: ICD-10-CM

## 2023-02-07 DIAGNOSIS — R60.0 BILATERAL LEG EDEMA: ICD-10-CM

## 2023-02-07 PROCEDURE — 99306 1ST NF CARE HIGH MDM 50: CPT | Performed by: INTERNAL MEDICINE

## 2023-02-07 PROCEDURE — 99207 E-CONSULT TO ENDOCRINOLOGY (ADULT OUTPT PROVIDER TO SPECIALIST WRITTEN QUESTION & RESPONSE): CPT | Performed by: NURSE PRACTITIONER

## 2023-02-07 NOTE — PROGRESS NOTES
Lexy Rockwell is a 62 year old female seen February 7, 2023 at [facility] where she has resided for one month (admit 1/2023) seen for initial visit.   Pt is seen on the unit up to chair, reports she is feeling well.   Has wraps on her LEs and feels the swelling has gone down quite a bit.   She has had a 20 lb weight loss since admission to TCU in December 2022, at least part of that is fluid.   Pt reports she is eating well, denies any current GI symptoms.      By chart review, pt has Type 1 DM with multiple hospitalizations for DKA.  She was seen in the [facility] ED for hyperglycemia in November 2022, with accompanying symptoms of dizziness, BLE weakness and heartburn.   Noted that she sometimes forgets to administer her insulin.   Returned home, seeing diabetic educator but not an endocrinologist.         She was continuing to live independently in December 2022 when neighbors asked for a welfare check after she hadn't been seen in several days, and she was found down by police.   Hospitalized at [facility] 12/13-26 for DKA and rhabdomyolysis  Glucose >1000 and pH 6.8    She required pressor support, intubation and TF; treated with broad spectrum abx for SHIVAM pneumonia.  No sz on video EEG.  She had a +COVID19 test on 12/23   Slowly cleared and discharged to [facility] TCU before transitioning to LTC.     Pt was seen in the [facility] ED in January 2023 for urinary frequency related to hyperglycemia.   Improved and returned to LTC    Continued labile blood sugars, urinary incontinence, weakness and poor endurance    Past Medical History:   Diagnosis Date     Cerumen impacted      Development delay      Diabetic gastroparesis (H) 8/16/2013     Glaucoma (increased eye pressure)      Hyperlipaemia      Hypertension      Hypothyroid      Mental retardation      Nonsenile cataract      Obesity      Strabismus      Type 1 diabetes mellitus not at goal (H)        Past Surgical History:   Procedure Laterality Date      "STRABISMUS SURGERY       [facility] EYE SURG ANT SGMT PROC UNLISTED  remote    strabismus surgery       Family History   Problem Relation Age of Onset     Hypertension Father      Diabetes Sister      Glaucoma Maternal Grandfather      Cancer No family hx of      Cerebrovascular Disease No family hx of      Thyroid Disease No family hx of      Macular Degeneration No family hx of        Social History     Tobacco Use     Smoking status: Never     Smokeless tobacco: Never     Tobacco comments:     lives in smoke free household.   Substance Use Topics     Alcohol use: Yes     Comment: occass social      SH:  Single   Parents live in [place]  Her mother Francisca Rockwell is first contact   Father is hospitalized currently     She has 2 sisters that live in [facility]     Review Of Systems  Skin: negative   Eyes: impaired vision, glasses  Ears/Nose/Throat: no hearing loss  Respiratory: No shortness of breath, dyspnea on exertion, cough, or hemoptysis  Cardiovascular: lower extremity edema and exercise intolerance  Gastrointestinal: negative  Genitourinary: negative  Musculoskeletal: ambulatory with 4WW, fall on 1/27     Neurologic: developmental cognitive delay   BIMS 15/15      Psychiatric: negative, PHQ9 0   Hematologic/Lymphatic/Immunologic: anemia  Endocrine: diabetes      GENERAL APPEARANCE: alert and no distress  /80   Pulse 86   Temp 97.3  F (36.3  C)   Resp 16   Ht 1.6 m (5' 3\")   Wt 71.8 kg (158 lb 3.2 oz)   LMP 03/28/2014   SpO2 96%   BMI 28.02 kg/m     HEENT: normocephalic, no lesion or abnormalities  NECK: no adenopathy, no asymmetry, masses, or scars and thyroid normal to palpation  RESP: lungs clear to auscultation - no rales, rhonchi or wheezes  CV: regular rate and rhythm, normal S1 S2@90  ABDOMEN:  soft, nontender, no HSM or masses and bowel sounds normal  MS: extremities normal- no gross deformities noted, no evidence of inflammation in joints, FROM in all extremities.  SKIN: no suspicious lesions or " rashes  NEURO: Normal strength and tone, sensory exam grossly normal, and speech normal  PSYCH: affect okay  LYMPHATICS: No cervical,  or supraclavicular nodes    Last Comprehensive Metabolic Panel:  Sodium   Date Value Ref Range Status   01/25/2023 142 136 - 145 mmol/L Final     Potassium   Date Value Ref Range Status   01/25/2023 3.9 3.4 - 5.3 mmol/L Final     Carbon Dioxide (CO2)   Date Value Ref Range Status   01/25/2023 21 (L) 22 - 29 mmol/L Final     Glucose   Date Value Ref Range Status   01/25/2023 233 (H) 70 - 99 mg/dL Final     Urea Nitrogen   Date Value Ref Range Status   01/25/2023 19.3 8.0 - 23.0 mg/dL Final     Creatinine   Date Value Ref Range Status   01/25/2023 0.93 0.51 - 0.95 mg/dL Final     GFR Estimate   Date Value Ref Range Status   01/25/2023 70 >60 mL/min/1.73m2 Final     Calcium   Date Value Ref Range Status   01/25/2023 9.0 8.8 - 10.2 mg/dL Final     Lab Results   Component Value Date    AST 19 01/25/2023      ALBUMIN 3.1 01/25/2023      ALKPHOS 79 01/25/2023     Lab Results   Component Value Date    WBC 3.5 02/06/2023      HGB 8.0 02/06/2023      MCV 87 02/06/2023       02/06/2023      TSH   Date Value Ref Range Status   10/13/2021 3.41 0.40 - 4.00 mU/L Final   02/01/2021 7.07 (H) 0.40 - 4.00 mU/L Final         IMP/PLAN:   (D64.9) Anemia, unspecified type    Comment: new past couple of months  Plan: Guaiac stools   Then start Fe   Continue PPI   Recheck hgb, consider GI referral Dr Melendez     (E10.37X3) Type 1 diabetes mellitus with diabetic macular edema of both eyes resolved after treatment (H)  (R73.9) Hyperglycemia  Comment:  Recurrent DKA  CBGs 200-400 and sometimes higher, no hypoglycemia   Lab Results   Component Value Date    A1C 8.7 01/25/2023      Plan: Tresiba 18 units/day   Novolog 6 units tid with meals plus sliding scale   Endocrinologist referral to help manage high sugars and avoid DKA as possible   She is on lisinopril, simvastatin 20 mg/day   Not on daily ASA  secondary to anemia  Ophthalmology and Podiatry follow up        (R60.0) Bilateral leg edema  Comment: improved and weight is down.     Plan: compression, elevation     (K21.00) Gastroesophageal reflux disease with esophagitis without hemorrhage  Comment: by hx  Plan: pantoprazole 40 mg/day     (F81.9) Cognitive developmental delay  Comment: unable to live independently in the community   Plan: LTC support for med admin, meals, activity      (I10) Essential hypertension with goal blood pressure less than 140/90  Comment:   BP Readings from Last 3 Encounters:   02/07/23 123/80   02/03/23 123/80   01/24/23 134/64      Plan: amlodipine 5 mg/day, carvedilol 6.25 mg bid, lisinopril 40 mg/day   Follow BPs and BMP      (N32.81) OAB (overactive bladder)  Comment: by hx  Plan: oxybutynin XL 10 mg/day      Advance directive: DNR/DNI     Nia Martínez MD

## 2023-02-07 NOTE — LETTER
2/7/2023        RE: Lexy Rockwell  [address]  [facility] [address]        Lexy Rockwell is a 62 year old female seen February 7, 2023 at [facility] where she has resided for one month (admit 1/2023) seen for initial visit.   Pt is seen on the unit up to chair, reports she is feeling well.   Has wraps on her LEs and feels the swelling has gone down quite a bit.   She has had a 20 lb weight loss since admission to TCU in December 2022, at least part of that is fluid.   Pt reports she is eating well, denies any current GI symptoms.      By chart review, pt has Type 1 DM with multiple hospitalizations for DKA.  She was seen in the [facility] ED for hyperglycemia in November 2022, with accompanying symptoms of dizziness, BLE weakness and heartburn.   Noted that she sometimes forgets to administer her insulin.   Returned home, seeing diabetic educator but not an endocrinologist.         She was continuing to live independently in December 2022 when neighbors asked for a welfare check after she hadn't been seen in several days, and she was found down by police.   Hospitalized at [facility] 12/13-26 for DKA and rhabdomyolysis  Glucose >1000 and pH 6.8    She required pressor support, intubation and TF; treated with broad spectrum abx for SHIVAM pneumonia.  No sz on video EEG.  She had a +COVID19 test on 12/23   Slowly cleared and discharged to [facility] TCU before transitioning to LTC.     Pt was seen in the [facility] ED in January 2023 for urinary frequency related to hyperglycemia.   Improved and returned to LTC    Continued labile blood sugars, urinary incontinence, weakness and poor endurance    Past Medical History:   Diagnosis Date     Cerumen impacted      Development delay      Diabetic gastroparesis (H) 8/16/2013     Glaucoma (increased eye pressure)      Hyperlipaemia      Hypertension      Hypothyroid      Mental retardation      Nonsenile cataract      Obesity      Strabismus   "    Type 1 diabetes mellitus not at goal (H)        Past Surgical History:   Procedure Laterality Date     STRABISMUS SURGERY       [facility] EYE SURG ANT SGMT PROC UNLISTED  remote    strabismus surgery       Family History   Problem Relation Age of Onset     Hypertension Father      Diabetes Sister      Glaucoma Maternal Grandfather      Cancer No family hx of      Cerebrovascular Disease No family hx of      Thyroid Disease No family hx of      Macular Degeneration No family hx of        Social History     Tobacco Use     Smoking status: Never     Smokeless tobacco: Never     Tobacco comments:     lives in smoke free household.   Substance Use Topics     Alcohol use: Yes     Comment: occass social      SH:  Single   Parents live in [place]  Her mother Francisca Rockwell is first contact   Father is hospitalized currently     She has 2 sisters that live in [facility]     Review Of Systems  Skin: negative   Eyes: impaired vision, glasses  Ears/Nose/Throat: no hearing loss  Respiratory: No shortness of breath, dyspnea on exertion, cough, or hemoptysis  Cardiovascular: lower extremity edema and exercise intolerance  Gastrointestinal: negative  Genitourinary: negative  Musculoskeletal: ambulatory with 4WW, fall on 1/27     Neurologic: developmental cognitive delay   BIMS 15/15      Psychiatric: negative, PHQ9 0   Hematologic/Lymphatic/Immunologic: anemia  Endocrine: diabetes      GENERAL APPEARANCE: alert and no distress  /80   Pulse 86   Temp 97.3  F (36.3  C)   Resp 16   Ht 1.6 m (5' 3\")   Wt 71.8 kg (158 lb 3.2 oz)   LMP 03/28/2014   SpO2 96%   BMI 28.02 kg/m     HEENT: normocephalic, no lesion or abnormalities  NECK: no adenopathy, no asymmetry, masses, or scars and thyroid normal to palpation  RESP: lungs clear to auscultation - no rales, rhonchi or wheezes  CV: regular rate and rhythm, normal S1 S2@90  ABDOMEN:  soft, nontender, no HSM or masses and bowel sounds normal  MS: extremities normal- no gross deformities " noted, no evidence of inflammation in joints, FROM in all extremities.  SKIN: no suspicious lesions or rashes  NEURO: Normal strength and tone, sensory exam grossly normal, and speech normal  PSYCH: affect okay  LYMPHATICS: No cervical,  or supraclavicular nodes    Last Comprehensive Metabolic Panel:  Sodium   Date Value Ref Range Status   01/25/2023 142 136 - 145 mmol/L Final     Potassium   Date Value Ref Range Status   01/25/2023 3.9 3.4 - 5.3 mmol/L Final     Carbon Dioxide (CO2)   Date Value Ref Range Status   01/25/2023 21 (L) 22 - 29 mmol/L Final     Glucose   Date Value Ref Range Status   01/25/2023 233 (H) 70 - 99 mg/dL Final     Urea Nitrogen   Date Value Ref Range Status   01/25/2023 19.3 8.0 - 23.0 mg/dL Final     Creatinine   Date Value Ref Range Status   01/25/2023 0.93 0.51 - 0.95 mg/dL Final     GFR Estimate   Date Value Ref Range Status   01/25/2023 70 >60 mL/min/1.73m2 Final     Calcium   Date Value Ref Range Status   01/25/2023 9.0 8.8 - 10.2 mg/dL Final     Lab Results   Component Value Date    AST 19 01/25/2023      ALBUMIN 3.1 01/25/2023      ALKPHOS 79 01/25/2023     Lab Results   Component Value Date    WBC 3.5 02/06/2023      HGB 8.0 02/06/2023      MCV 87 02/06/2023       02/06/2023      TSH   Date Value Ref Range Status   10/13/2021 3.41 0.40 - 4.00 mU/L Final   02/01/2021 7.07 (H) 0.40 - 4.00 mU/L Final         IMP/PLAN:   (D64.9) Anemia, unspecified type    Comment: new past couple of months  Plan: Guaiac stools   Then start Fe   Continue PPI   Recheck hgb, consider GI referral Dr Melendez     (E10.37X3) Type 1 diabetes mellitus with diabetic macular edema of both eyes resolved after treatment (H)  (R73.9) Hyperglycemia  Comment:  Recurrent DKA  CBGs 200-400 and sometimes higher, no hypoglycemia   Lab Results   Component Value Date    A1C 8.7 01/25/2023      Plan: Tresiba 18 units/day   Novolog 6 units tid with meals plus sliding scale   Endocrinologist referral to help manage high  sugars and avoid DKA as possible   She is on lisinopril, simvastatin 20 mg/day   Not on daily ASA secondary to anemia  Ophthalmology and Podiatry follow up        (R60.0) Bilateral leg edema  Comment: improved and weight is down.     Plan: compression, elevation     (K21.00) Gastroesophageal reflux disease with esophagitis without hemorrhage  Comment: by hx  Plan: pantoprazole 40 mg/day     (F81.9) Cognitive developmental delay  Comment: unable to live independently in the community   Plan: LTC support for med admin, meals, activity      (I10) Essential hypertension with goal blood pressure less than 140/90  Comment:   BP Readings from Last 3 Encounters:   02/07/23 123/80   02/03/23 123/80   01/24/23 134/64      Plan: amlodipine 5 mg/day, carvedilol 6.25 mg bid, lisinopril 40 mg/day   Follow BPs and BMP      (N32.81) OAB (overactive bladder)  Comment: by hx  Plan: oxybutynin XL 10 mg/day      Advance directive: DNR/DNI     Nia Martínez MD         Sincerely,        Nia Martínez MD

## 2023-02-07 NOTE — TELEPHONE ENCOUNTER
REferral to Endocrinology due to hx of DKA with Type I   Currently living in [facility].     Zahra Way, TAYLA CNP on 2/7/2023 at 2:09 PM

## 2023-02-10 ENCOUNTER — E-CONSULT (OUTPATIENT)
Dept: ENDOCRINOLOGY | Facility: CLINIC | Age: 63
End: 2023-02-10
Payer: MEDICARE

## 2023-02-10 PROCEDURE — 99207 PR NO BILLABLE SERVICE THIS VISIT: CPT

## 2023-02-10 NOTE — PROGRESS NOTES
2/10/2023     E-Consult has been denied due to: Doesn't meet criteria for E-Consult - Did not include a specific clinical question, or no question provided.    Interprofessional consultation requested by:  Zahra Way APRN CNP      Clinical Question/Purpose: MY CLINICAL QUESTION IS: pt is a type I DM and having difficulty controlling BS.  SHe has an appointment in May but looking for advise now    Patient assessment and information reviewed:   DM1, DKA   She had been seeing dr Goss - last saw him 10/2021; scheduled with Dr Garcia 5/2023    Review of labs and AMR  10/13/21 HgbA1c 10.7  10/5/22 degludec 33 units  aspart 8 units three times/day with meals; 1/50> 150  1/20/23 degludec 18 units  1/25/23 HgbA1c 8.7  2/2/22 degludec 20 units/day      Recommendations:  Please send a new e-consult with specific question .  Tell her to accept the dexcom invitation I emailed her through the dexcom site - this will allow me to see her data if/when you send another e-consult .  I am attaching general e-consult information for your reference.     What is an e-consult  Physician to physician consultation to address a specific question.      Requirements for an e-consult  A specific question must be asked   Sending provider approves with the patient that an e-consult is being sent to address a specific question.      Reasons for denial of e-consult  A specific question was not asked  The E-consult is actually a scheduling /triage request  The problem is too complex for e-consult     If your e-consult is denied you may resend a new e-consult meeting criteria as outlined above.     An e-consult is not the following:  An ongoing conversation between the sending and e-consult physicians . If such a conversation is needed a full consult would be more appropriate.     Follow-up interpretation and secondary advice in response to an an initial e-consult. IF such an action is needed, a full consult would be  more appropriate    The recommendations provided in this E-Consult are based on a review of clinical data pertinent to the clinical question presented, without a review of the patient's complete medical record or, the benefit of a comprehensive in-person or virtual patient evaluation. This consultation should not replace the clinical judgement and evaluation of the provider ordering this E-Consult. Any new clinical issues, or changes in patient status since the filing of this E-Consult will need to be taken into account when assessing these recommendations. Please contact me if you have further questions.    My total time spent reviewing clinical information and formulating assessment was 10 minutes.        Mandy Mota MD

## 2023-02-13 ENCOUNTER — TELEPHONE (OUTPATIENT)
Dept: GERIATRICS | Facility: CLINIC | Age: 63
End: 2023-02-13
Payer: MEDICARE

## 2023-02-14 NOTE — TELEPHONE ENCOUNTER
"Blood sugar this evening reading \"high\" patient was given 6 units AC and another 6 units per sliding scale, no re check done.   Nursing state the blood sugar was reading high last week \"a couple of times\" and the on call ordered extra insulin  Order: give an extra 4 units of aspart now and recheck blood sugar at hs, call if < 60 or > 450    "

## 2023-02-17 ENCOUNTER — TELEPHONE (OUTPATIENT)
Dept: GERIATRICS | Facility: CLINIC | Age: 63
End: 2023-02-17
Payer: MEDICARE

## 2023-02-17 ENCOUNTER — TELEPHONE (OUTPATIENT)
Dept: EDUCATION SERVICES | Facility: CLINIC | Age: 63
End: 2023-02-17

## 2023-02-17 ENCOUNTER — NURSING HOME VISIT (OUTPATIENT)
Dept: GERIATRICS | Facility: CLINIC | Age: 63
End: 2023-02-17
Payer: MEDICARE

## 2023-02-17 VITALS
HEART RATE: 99 BPM | HEIGHT: 63 IN | DIASTOLIC BLOOD PRESSURE: 74 MMHG | BODY MASS INDEX: 27.64 KG/M2 | RESPIRATION RATE: 20 BRPM | TEMPERATURE: 97.2 F | SYSTOLIC BLOOD PRESSURE: 120 MMHG | WEIGHT: 156 LBS | OXYGEN SATURATION: 98 %

## 2023-02-17 DIAGNOSIS — D64.9 ANEMIA, UNSPECIFIED TYPE: Primary | ICD-10-CM

## 2023-02-17 DIAGNOSIS — R63.4 WEIGHT LOSS: ICD-10-CM

## 2023-02-17 DIAGNOSIS — E10.37X3 TYPE 1 DIABETES MELLITUS WITH DIABETIC MACULAR EDEMA OF BOTH EYES RESOLVED AFTER TREATMENT (H): ICD-10-CM

## 2023-02-17 PROBLEM — I50.32 CHRONIC DIASTOLIC HEART FAILURE (H): Status: RESOLVED | Noted: 2021-08-26 | Resolved: 2023-02-17

## 2023-02-17 PROCEDURE — 99207 E-CONSULT TO HEMATOLOGY (ADULT OUTPT PROVIDER TO SPECIALIST WRITTEN QUESTION & RESPONSE): CPT | Performed by: NURSE PRACTITIONER

## 2023-02-17 PROCEDURE — 99310 SBSQ NF CARE HIGH MDM 45: CPT | Performed by: NURSE PRACTITIONER

## 2023-02-17 RX ORDER — INSULIN DEGLUDEC 200 U/ML
24 INJECTION, SOLUTION SUBCUTANEOUS DAILY
Start: 2023-02-17 | End: 2023-03-02

## 2023-02-17 RX ORDER — FERROUS SULFATE 325(65) MG
325 TABLET ORAL
Start: 2023-02-17 | End: 2023-06-13

## 2023-02-17 RX ORDER — INSULIN ASPART 100 [IU]/ML
10 INJECTION, SOLUTION INTRAVENOUS; SUBCUTANEOUS
Qty: 15 ML
Start: 2023-02-17 | End: 2023-02-28

## 2023-02-17 NOTE — TELEPHONE ENCOUNTER
I heard back from Zahra. She reports patients BG has come down now to 358 after 18 units of novolog were given. The nursing home will check for urine ketones now. She reports that Lexy said she is feeling well so they would like to try to avoid the hospital at this time. We reviewed DKA prevention.    Zahra called back again to let me know she was at the facility with Lexy. The ketones are negative. Lexy told her that Tiffanie GIFFORD couldn't upload the dexcom reader at her last appt. I reviewed the note and see that Tiffanie did write that although I am not sure what the reason was. I told Zahra I would reach out to Tiffanie to see if she knows anything about why it could not be uploaded before they would try to bring for us to upload. Zahra is also going to discuss with Lexy the possibility of using her phone instead.    Will plan to follow-up with Zahra once Tiffanie returns next week.    Roseanne Stubbs RN, [facility]

## 2023-02-17 NOTE — TELEPHONE ENCOUNTER
Will ask facility to make an appointment with [facility] as pt's hgb is not improving.  She has hx of GI bleed.   She is continuing to lose weight despite eating well.      EGD 9/2019  Findings:        LA Grade A (one or more mucosal breaks less than 5 mm, not extending        between tops of 2 mucosal folds) esophagitis was found.        The stomach was normal.        The examined duodenum was normal.     Impressions/Post-Op Diagnosis:        - LA Grade A reflux esophagitis.        Possible gastroparesis vs N/V from DKA( had normal GES previously)        - Normal stomach.        - Normal examined duodenum.        - No specimens collected.       Colonoscopy 10/2020    Impressions/Post-Op Diagnosis:        - One 8 mm polyp in the transverse colon, removed with a cold snare.        Resected and retrieved.        - Hemorrhoids found on perianal exam.        - The examination was otherwise normal on direct and retroflexion views        BS are not controlled either.     Zahra Way, TAYLA CNP on 2/17/2023 at 7:44 AM

## 2023-02-17 NOTE — TELEPHONE ENCOUNTER
I returned Zahra's call. She had previously reached out to [facility] who told her they would send an email invite for the dexcom clarity to review the data. We discussed that since pt uses the dexcom reader and not a phone device it will need to be uploaded to a computer for us to see any data. This could be done remotely or during a visit. We briefly discussed how to upload to dexcom clarity as Zahra might be able to do it from her computer since patient is currently in a nursing home. Zahra also might be able to bring the reader to [facility] next week to have us upload data quick. I will look for availability. In discussing this plan though Zahra noted that pt BG's have been to high to read on the dexcom recently. I reviewed that the dexcom will read up to 400 and asked if they tested on a meter. She reports it was HI on the BG meter as well, so this means the BG is at least above 500 currently. They have increased insulin doses, but BG's dont seem to be improving. I asked about ketones and they have not been checked. Reviewed they could be checked by urine or blood. Lexy has been in DKA multiple times before. Reviewed that if ketones are present they cause you to be more resistant to the insulin.    Plan:  -Zahra will ask the nursing home to check urine ketones. If they can't do this then she will order blood ketones.  -If ketones are positive then pt needs to be sent to the hospital for further evaluation and treatment.  -Zahra will call me back later today with an update.    -[facility] options for next week: Tues 21 a (Sh[facility]), Weds 22 (ask Sh[facility]a)    Roseanne Stubbs RN, [facility]            Times for possible upload: [facility] closest, 930 on weds not good, Not before 8am

## 2023-02-17 NOTE — LETTER
2/17/2023        RE: Lexy Rockwell  [address]  [facility] [address]        [facility]AT[facility]    Chief Complaint   Patient presents with     Nursing Home Acute     HPI:  Lexy Rockwell is a 62 year old  (1960), who is being seen today for an episodic care visit at: [facility] () [[id number]].       Her past medical history includes    Type 1 diabetes with gastroparesis and CKD history of DKA    Hypertension    CKD stage III    Hyperlipidemia    Bilateral leg edema    Overactive bladder    GERD    Constipation    Developmentally delayed    Polyneuropathy     Vitamin D deficiency    Glaucoma        On 1/9/2023, her degludec was increased from 16 to 18 units     On 1/24/2023, her amlodipine was decreased to 5 mg daily, her metoprolol XL was stopped and changed to coreg 6.25 mg BID, aspirin was stopped due to hgb of 7.8 - 8.7      On 2/2/2023, her degludec was increased from 18 to 20 units    On 2/7/2023, an econsult was completed however the question was clear therefore no consult occurred.     On 2/9/2023, her degludec was increased from 20 to 24 units.      On 2/10/2023, her guaiac was negative.     On 2/13/2023, she received an extra 4 units of insulin at 2100.     On 2/14/2023, BG at 1730 showed as high on dexcom and glucose monitor, she was prescribed 4 units of Insulin Aspart and at 2000 it was 467.       Today patient was seen in her room and she is looking pale and her clothes are very large on her. She has not complaints.  the therapist and nurses stated her legs are much thinner since lymphedema wrap have been added.   She denies black stools and her guaiac was negative.       Nursing has concerns about labile BS. BIMS=15/15 (score 13 to 15 suggests the patient is cognitively intact) PHQ9=0/27.   Patient needs limited assistance with ADLs, uses a walker.    Her appetite is good and consumes a diabetic diet.  Per nursing, skin is intact.  "Code status is full code.      Her weight is down to 156# and was 179 (12/26/2023).      BS @ 8 am =152-448   BS @ 11 am = 187-399   BS @ 1700 am = 127-293  BS @ HS = 214 - 467     She is consistently receiving 2-6 units of sliding scale.     Allergies, and PMH/PSH reviewed in EPIC today.  REVIEW OF SYSTEMS:  4 point ROS including Respiratory, CV, GI and , other than that noted in the HPI,  is negative    Objective:   /74   Pulse 99   Temp 97.2  F (36.2  C)   Resp 20   Ht 1.6 m (5' 3\")   Wt 70.8 kg (156 lb)   LMP 03/28/2014   SpO2 98%   BMI 27.63 kg/m    GENERAL APPEARANCE:  Alert, in no distress  RESP:  no respiratory distress  SKIN:  pale  PSYCH:  normal insight, judgement and memory    Lab Requisition on 02/06/2023   Component Date Value Ref Range Status     WBC Count 02/06/2023 3.5 (L)  4.0 - 11.0 10e3/uL Final     RBC Count 02/06/2023 3.04 (L)  3.80 - 5.20 10e6/uL Final     Hemoglobin 02/06/2023 8.0 (L)  11.7 - 15.7 g/dL Final     Hematocrit 02/06/2023 26.3 (L)  35.0 - 47.0 % Final     MCV 02/06/2023 87  78 - 100 fL Final     MCH 02/06/2023 26.3 (L)  26.5 - 33.0 pg Final     MCHC 02/06/2023 30.4 (L)  31.5 - 36.5 g/dL Final     RDW 02/06/2023 14.4  10.0 - 15.0 % Final     Platelet Count 02/06/2023 282  150 - 450 10e3/uL Final         Peripheral blood morphology (12/17/2022)       Reticulocyte 12/2022           EGD 9/2019  Findings:        LA Grade A (one or more mucosal breaks less than 5 mm, not extending        between tops of 2 mucosal folds) esophagitis was found.        The stomach was normal.        The examined duodenum was normal.     Impressions/Post-Op Diagnosis:        - LA Grade A reflux esophagitis.        Possible gastroparesis vs N/V from DKA( had normal GES previously)        - Normal stomach.        - Normal examined duodenum.        - No specimens collected.         Colonoscopy 10/2020     Impressions/Post-Op Diagnosis:        - One 8 mm polyp in the transverse colon, removed " with a cold snare.        Resected and retrieved.        - Hemorrhoids found on perianal exam.        - The examination was otherwise normal on direct and retroflexion views       Assessment/Plan:  (D64.9) Anemia, unspecified type  (primary encounter diagnosis)  Comment: Acute on chronic.     Has had upper and lower endoscopy in 2019 and 2020 and in 2020 a polyp was removed.   Hgb on 12/23/2023 was 9 while in OSH and upon admission to OSH Patient with reported black vomit PTA. Recent EGD (3 years ago) without evidence of bleeding source. Given copious vomit reported at scene could potentially be related to Mitra-Burks tear. No evidence of esophageal perforation on abdominal CT. NG placed, drained dark bilious fluid initially, most recent fluid straw colored. No concerns for active GI bleed. Will discontinue NG.  On 12/17/2022 = I personally reviewed care everywhere; peripheral blood morphology showed normocytic anemia and reticulocyte was 0.02%  12/19/2023 = 9.0 at [facility]  12/26/2023 = note states discuss pancytopenia with hematology   2/6/2023 = 8   Pt denies having black stools, shortness of breath, dizziness .        Plan:   E-consult to hematology  Has a GI appointment with [facility]  2/23/23 1:40pm  [facility]   [address]  [facility]        (E10.37X3) Type 1 diabetes mellitus with diabetic macular edema of both eyes resolved after treatment (H)  Comment: Acute on chronic.  Pt's BS continue to be labile.  This is the 3rd time this week that she has had BS above 400.  Today her BS was unreadable.  Was given 5 units extra insulin and BS 2 hours later as 358.    E-consult was placed but question wasn't precise.  Endocrinology recommended downloading dexcom via e-mail however pt has a dexcom reader and not an mobile phone.     She had been following with Diabetic educator Tiffanie Zimmerman RD.  Writer reached out to diabetic education for assistance and pt has an appointment on  3/2/2023.     Plan:   Urine ketones today  Increase meal time insulin Aspart from 6 units to 9 units  Follow up with diabetic educator on 3/2/2023  Dietitian to evaluate intake .         [R63.4] Weight loss  Comment: ongoing.  Pt is having ongoing weight loss despite eating well.  Her weight is down to 156# (02/17/23) and was 179 (12/26/2023).   Plan:    As noted above check urine ketones  Follow up with hematology and GI    MED REC REQUIRED  Post Medication Reconciliation Status:  Discharge medications reconciled, continue medications without change        Orders:  Give extra 5 units of insulin, check in an hour   Urine ketones   Increase meal time insulin from 6 units to 9 units  Follow up with diabetic educator on 3/2/2023  Dietitian to evaluate intake.  Spoke with RD today to check on what pt ate today in particular since BS was so high  E-consult to hematology  Will attempt to download information from dexacom and send to diabetic educator    Electronically signed by:     TAYLA Sprague CNP on 2/17/2023 at 2:25 PM          Sincerely,        TAYLA Sprague CNP

## 2023-02-17 NOTE — PROGRESS NOTES
[facility] GERIATRICS    Chief Complaint   Patient presents with     Nursing Home Acute     HPI:  Lexy Rockwell is a 62 year old  (1960), who is being seen today for an episodic care visit at: [facility] () [[id number]].       Her past medical history includes    Type 1 diabetes with gastroparesis and CKD history of DKA    Hypertension    CKD stage III    Hyperlipidemia    Bilateral leg edema    Overactive bladder    GERD    Constipation    Developmentally delayed    Polyneuropathy     Vitamin D deficiency    Glaucoma        On 1/9/2023, her degludec was increased from 16 to 18 units     On 1/24/2023, her amlodipine was decreased to 5 mg daily, her metoprolol XL was stopped and changed to coreg 6.25 mg BID, aspirin was stopped due to hgb of 7.8 - 8.7      On 2/2/2023, her degludec was increased from 18 to 20 units    On 2/7/2023, an econsult was completed however the question was clear therefore no consult occurred.     On 2/9/2023, her degludec was increased from 20 to 24 units.      On 2/10/2023, her guaiac was negative.     On 2/13/2023, she received an extra 4 units of insulin at 2100.     On 2/14/2023, BG at 1730 showed as high on dexcom and glucose monitor, she was prescribed 4 units of Insulin Aspart and at 2000 it was 467.       Today patient was seen in her room and she is looking pale and her clothes are very large on her. She has not complaints.  the therapist and nurses stated her legs are much thinner since lymphedema wrap have been added.   She denies black stools and her guaiac was negative.       Nursing has concerns about labile BS. BIMS=15/15 (score 13 to 15 suggests the patient is cognitively intact) PHQ9=0/27.   Patient needs limited assistance with ADLs, uses a walker.    Her appetite is good and consumes a diabetic diet.  Per nursing, skin is intact. Code status is full code.      Her weight is down to 156# and was 179 (12/26/2023).      BS @ 8 am =152-448   BS  "@ 11 am = 187-399   BS @ 1700 am = 127-293  BS @ HS = 214 - 467     She is consistently receiving 2-6 units of sliding scale.     Allergies, and PMH/PSH reviewed in EPIC today.  REVIEW OF SYSTEMS:  4 point ROS including Respiratory, CV, GI and , other than that noted in the HPI,  is negative    Objective:   /74   Pulse 99   Temp 97.2  F (36.2  C)   Resp 20   Ht 1.6 m (5' 3\")   Wt 70.8 kg (156 lb)   LMP 03/28/2014   SpO2 98%   BMI 27.63 kg/m    GENERAL APPEARANCE:  Alert, in no distress  RESP:  no respiratory distress  SKIN:  pale  PSYCH:  normal insight, judgement and memory    Lab Requisition on 02/06/2023   Component Date Value Ref Range Status     WBC Count 02/06/2023 3.5 (L)  4.0 - 11.0 10e3/uL Final     RBC Count 02/06/2023 3.04 (L)  3.80 - 5.20 10e6/uL Final     Hemoglobin 02/06/2023 8.0 (L)  11.7 - 15.7 g/dL Final     Hematocrit 02/06/2023 26.3 (L)  35.0 - 47.0 % Final     MCV 02/06/2023 87  78 - 100 fL Final     MCH 02/06/2023 26.3 (L)  26.5 - 33.0 pg Final     MCHC 02/06/2023 30.4 (L)  31.5 - 36.5 g/dL Final     RDW 02/06/2023 14.4  10.0 - 15.0 % Final     Platelet Count 02/06/2023 282  150 - 450 10e3/uL Final         Peripheral blood morphology (12/17/2022)       Reticulocyte 12/2022           EGD 9/2019  Findings:        LA Grade A (one or more mucosal breaks less than 5 mm, not extending        between tops of 2 mucosal folds) esophagitis was found.        The stomach was normal.        The examined duodenum was normal.     Impressions/Post-Op Diagnosis:        - LA Grade A reflux esophagitis.        Possible gastroparesis vs N/V from DKA( had normal GES previously)        - Normal stomach.        - Normal examined duodenum.        - No specimens collected.         Colonoscopy 10/2020     Impressions/Post-Op Diagnosis:        - One 8 mm polyp in the transverse colon, removed with a cold snare.        Resected and retrieved.        - Hemorrhoids found on perianal exam.        - The " examination was otherwise normal on direct and retroflexion views       Assessment/Plan:  (D64.9) Anemia, unspecified type  (primary encounter diagnosis)  Comment: Acute on chronic.     Has had upper and lower endoscopy in 2019 and 2020 and in 2020 a polyp was removed.   Hgb on 12/23/2023 was 9 while in OSH and upon admission to OSH Patient with reported black vomit PTA. Recent EGD (3 years ago) without evidence of bleeding source. Given copious vomit reported at scene could potentially be related to Mitra-Burks tear. No evidence of esophageal perforation on abdominal CT. NG placed, drained dark bilious fluid initially, most recent fluid straw colored. No concerns for active GI bleed. Will discontinue NG.  On 12/17/2022 = I personally reviewed care everywhere; peripheral blood morphology showed normocytic anemia and reticulocyte was 0.02%  12/19/2023 = 9.0 at [facility]  12/26/2023 = note states discuss pancytopenia with hematology   2/6/2023 = 8   Pt denies having black stools, shortness of breath, dizziness .        Plan:   E-consult to hematology  Has a GI appointment with [facility]  2/23/23 1:40pm  [facility]   [address]  [facility]        (E10.37X3) Type 1 diabetes mellitus with diabetic macular edema of both eyes resolved after treatment (H)  Comment: Acute on chronic.  Pt's BS continue to be labile.  This is the 3rd time this week that she has had BS above 400.  Today her BS was unreadable.  Was given 5 units extra insulin and BS 2 hours later as 358.    E-consult was placed but question wasn't precise.  Endocrinology recommended downloading dexcom via e-mail however pt has a dexcom reader and not an mobile phone.     She had been following with Diabetic educator Tiffanie Zimmerman RD.  Writer reached out to diabetic education for assistance and pt has an appointment on 3/2/2023.     Plan:   Urine ketones today  Increase meal time insulin Aspart from 6 units to 9 units  Follow up  with diabetic educator on 3/2/2023  Dietitian to evaluate intake .         [R63.4] Weight loss  Comment: ongoing.  Pt is having ongoing weight loss despite eating well.  Her weight is down to 156# (02/17/23) and was 179 (12/26/2023).   Plan:    As noted above check urine ketones  Follow up with hematology and GI    MED REC REQUIRED  Post Medication Reconciliation Status:  Discharge medications reconciled, continue medications without change        Orders:  Give extra 5 units of insulin, check in an hour   Urine ketones   Increase meal time insulin from 6 units to 9 units  Follow up with diabetic educator on 3/2/2023   (addendum - urine ketones negative)   Dietitian to evaluate intake.  Spoke with RD today to check on what pt ate today in particular since BS was so high  E-consult to hematology  Will attempt to download information from dexacom and send to diabetic educator    Electronically signed by:     TAYLA Sprague CNP on 2/17/2023 at 2:25 PM

## 2023-02-21 ENCOUNTER — E-CONSULT (OUTPATIENT)
Dept: ONCOLOGY | Facility: CLINIC | Age: 63
End: 2023-02-21
Payer: MEDICARE

## 2023-02-21 ENCOUNTER — LAB REQUISITION (OUTPATIENT)
Dept: LAB | Facility: CLINIC | Age: 63
End: 2023-02-21
Payer: MEDICARE

## 2023-02-21 DIAGNOSIS — Z20.828 CONTACT WITH AND (SUSPECTED) EXPOSURE TO OTHER VIRAL COMMUNICABLE DISEASES: ICD-10-CM

## 2023-02-21 DIAGNOSIS — K77 LIVER DISORDERS IN DISEASES CLASSIFIED ELSEWHERE: ICD-10-CM

## 2023-02-21 DIAGNOSIS — I10 ESSENTIAL (PRIMARY) HYPERTENSION: ICD-10-CM

## 2023-02-21 DIAGNOSIS — B19.9 UNSPECIFIED VIRAL HEPATITIS WITHOUT HEPATIC COMA: ICD-10-CM

## 2023-02-21 DIAGNOSIS — K73.0: ICD-10-CM

## 2023-02-21 DIAGNOSIS — D64.9 ANEMIA, UNSPECIFIED: ICD-10-CM

## 2023-02-21 DIAGNOSIS — B15.0: ICD-10-CM

## 2023-02-21 DIAGNOSIS — B17.8 OTHER SPECIFIED ACUTE VIRAL HEPATITIS: ICD-10-CM

## 2023-02-21 PROCEDURE — 99451 NTRPROF PH1/NTRNET/EHR 5/>: CPT | Performed by: INTERNAL MEDICINE

## 2023-02-21 NOTE — PROGRESS NOTES
2/21/2023     E-Consult has been accepted.    Interprofessional consultation requested by:  Zahra Way APRN CNP      Clinical Question/Purpose: MY CLINICAL QUESTION IS: What labs do you want me to do for further work up prior to her seeing you? - pt has anemia and is living in LTC.   At OSH her reticulocyte count was 0.02 (12/2022) and peripheral blood showed moderate normocytic anemia and moderate thrombocytopenia.  Her DM continues to not be well controlled    Patient assessment and information reviewed:   62 year old woman with type 1 DM and complex medical history with recent hospital stay at [facility] for DKA with JEAN. Was noted anemia and leukopenia. During hospital course did have low platelets (128) as well. This has improved.    Noted to have variable hemoglobins in the range of 9.5-12.3 as outpatient in [facility] system in 2022. Did have upper and lower endoscopies done in 2019 and 2020.     Labs notable for normocytic anemia (Hgb 7.8-8.0, MCV 87) with leukopenia (WBC count 3.5, no differential in [facility] system).     Differential includes autoimmune processes (autoimmune hemolytic anemia, celiac, etc), anemia of chronic disease/inflammation, renal insufficiency, chronic blood loss. Can not exclude primary bone marrow disorder.     Is currently on daily oral iron supplement    Recommendations:   Please obtain the following:  [ ] peripheral smear  [ ] HIV, hepatitis B/C serologies  [ ] EPO  [ ] reticulocyte count  [ ] haptoglobin  [ ] MONTY  [ ] LDH  [ ] ferritin  [ ] iron studies  [ ] B12 and folic acid  [ ] copper and zinc  [ ] CRP  [ ] ESR  [ ] cystatin C  [ ]  Agree with planned assessment with GI for upper and lower endoscopy    If patient has other nutritional deficiencies- would replete   If patient has low ferritin and normal ESR/CRP likely needs continued oral iron and GI evaluation  If patient has positive MONTY will need hematology referral as starting first line therapy  (steriods) would not be ideal in this patient.     The recommendations provided in this E-Consult are based on a review of clinical data pertinent to the clinical question presented, without a review of the patient's complete medical record or, the benefit of a comprehensive in-person or virtual patient evaluation. This consultation should not replace the clinical judgement and evaluation of the provider ordering this E-Consult. Any new clinical issues, or changes in patient status since the filing of this E-Consult will need to be taken into account when assessing these recommendations.     My total time spent reviewing clinical information and formulating assessment was 20 minutes.        Anjali Ko MD/PhD

## 2023-02-22 ENCOUNTER — TRANSCRIBE ORDERS (OUTPATIENT)
Dept: OTHER | Age: 63
End: 2023-02-22

## 2023-02-22 ENCOUNTER — TELEPHONE (OUTPATIENT)
Dept: EDUCATION SERVICES | Facility: CLINIC | Age: 63
End: 2023-02-22
Payer: MEDICARE

## 2023-02-22 DIAGNOSIS — D64.9 CHRONIC ANEMIA: Primary | ICD-10-CM

## 2023-02-22 LAB
BASOPHILS # BLD AUTO: 0.1 10E3/UL (ref 0–0.2)
BASOPHILS NFR BLD AUTO: 1 %
CRP SERPL-MCNC: <3 MG/L
CYSTATIN C (ROCHE): 1.8 MG/L (ref 0.6–1)
EOSINOPHIL # BLD AUTO: 0.2 10E3/UL (ref 0–0.7)
EOSINOPHIL NFR BLD AUTO: 5 %
ERYTHROCYTE [DISTWIDTH] IN BLOOD BY AUTOMATED COUNT: 15 % (ref 10–15)
ERYTHROCYTE [SEDIMENTATION RATE] IN BLOOD BY WESTERGREN METHOD: 38 MM/HR (ref 0–30)
FERRITIN SERPL-MCNC: 50 NG/ML (ref 11–328)
FOLATE SERPL-MCNC: 19 NG/ML (ref 4.6–34.8)
GFR SERPL CREATININE-BSD FRML MDRD: 33 ML/MIN/1.73M2
HCT VFR BLD AUTO: 29.1 % (ref 35–47)
HGB BLD-MCNC: 8.6 G/DL (ref 11.7–15.7)
IMM GRANULOCYTES # BLD: 0 10E3/UL
IMM GRANULOCYTES NFR BLD: 0 %
IRON BINDING CAPACITY (ROCHE): 292 UG/DL (ref 240–430)
IRON SATN MFR SERPL: 13 % (ref 15–46)
IRON SERPL-MCNC: 39 UG/DL (ref 37–145)
LDH SERPL L TO P-CCNC: 183 U/L (ref 0–250)
LYMPHOCYTES # BLD AUTO: 1.3 10E3/UL (ref 0.8–5.3)
LYMPHOCYTES NFR BLD AUTO: 33 %
MCH RBC QN AUTO: 26.5 PG (ref 26.5–33)
MCHC RBC AUTO-ENTMCNC: 29.6 G/DL (ref 31.5–36.5)
MCV RBC AUTO: 90 FL (ref 78–100)
MONOCYTES # BLD AUTO: 0.3 10E3/UL (ref 0–1.3)
MONOCYTES NFR BLD AUTO: 9 %
NEUTROPHILS # BLD AUTO: 2 10E3/UL (ref 1.6–8.3)
NEUTROPHILS NFR BLD AUTO: 52 %
NRBC # BLD AUTO: 0 10E3/UL
NRBC BLD AUTO-RTO: 0 /100
PLATELET # BLD AUTO: 223 10E3/UL (ref 150–450)
RBC # BLD AUTO: 3.25 10E6/UL (ref 3.8–5.2)
RETICS # AUTO: 0.03 10E6/UL (ref 0.03–0.1)
RETICS/RBC NFR AUTO: 0.8 % (ref 0.5–2)
VIT B12 SERPL-MCNC: 784 PG/ML (ref 232–1245)
WBC # BLD AUTO: 3.9 10E3/UL (ref 4–11)

## 2023-02-22 PROCEDURE — 82668 ASSAY OF ERYTHROPOIETIN: CPT | Mod: ORL | Performed by: NURSE PRACTITIONER

## 2023-02-22 PROCEDURE — 82746 ASSAY OF FOLIC ACID SERUM: CPT | Performed by: NURSE PRACTITIONER

## 2023-02-22 PROCEDURE — 87389 HIV-1 AG W/HIV-1&-2 AB AG IA: CPT | Mod: ORL | Performed by: NURSE PRACTITIONER

## 2023-02-22 PROCEDURE — 83550 IRON BINDING TEST: CPT | Performed by: NURSE PRACTITIONER

## 2023-02-22 PROCEDURE — 85025 COMPLETE CBC W/AUTO DIFF WBC: CPT | Mod: ORL | Performed by: NURSE PRACTITIONER

## 2023-02-22 PROCEDURE — 83010 ASSAY OF HAPTOGLOBIN QUANT: CPT | Mod: ORL | Performed by: NURSE PRACTITIONER

## 2023-02-22 PROCEDURE — 85045 AUTOMATED RETICULOCYTE COUNT: CPT | Mod: ORL | Performed by: NURSE PRACTITIONER

## 2023-02-22 PROCEDURE — 85060 BLOOD SMEAR INTERPRETATION: CPT | Performed by: PATHOLOGY

## 2023-02-22 PROCEDURE — 82610 CYSTATIN C: CPT | Mod: ORL | Performed by: NURSE PRACTITIONER

## 2023-02-22 PROCEDURE — P9604 ONE-WAY ALLOW PRORATED TRIP: HCPCS | Mod: ORL | Performed by: NURSE PRACTITIONER

## 2023-02-22 PROCEDURE — 86880 COOMBS TEST DIRECT: CPT | Mod: ORL | Performed by: NURSE PRACTITIONER

## 2023-02-22 PROCEDURE — 83615 LACTATE (LD) (LDH) ENZYME: CPT | Mod: ORL | Performed by: NURSE PRACTITIONER

## 2023-02-22 PROCEDURE — 36415 COLL VENOUS BLD VENIPUNCTURE: CPT | Mod: ORL | Performed by: NURSE PRACTITIONER

## 2023-02-22 PROCEDURE — 82607 VITAMIN B-12: CPT | Performed by: NURSE PRACTITIONER

## 2023-02-22 PROCEDURE — 86140 C-REACTIVE PROTEIN: CPT | Mod: ORL | Performed by: NURSE PRACTITIONER

## 2023-02-22 PROCEDURE — 82728 ASSAY OF FERRITIN: CPT | Mod: ORL | Performed by: NURSE PRACTITIONER

## 2023-02-22 PROCEDURE — 86705 HEP B CORE ANTIBODY IGM: CPT | Performed by: NURSE PRACTITIONER

## 2023-02-22 PROCEDURE — 85652 RBC SED RATE AUTOMATED: CPT | Performed by: NURSE PRACTITIONER

## 2023-02-22 NOTE — TELEPHONE ENCOUNTER
I am available to download the Xiang reader at the [facility] ([address]) between 8-8:30a on Tuesday, 2/28. Zahra - just let me know if that time will work and I can put a note on my schedule.     Shagufta Rogers, RD, LD, [facility] [id number]

## 2023-02-22 NOTE — TELEPHONE ENCOUNTER
Shagufta and Tiffanie,    I have been speaking with Zahra about a time for her to bring Lexy's sanam reader to [place] to have it downloaded before her telephone appt with Tiffanie next Thursday 3/2. Do either of you have days/time to offer? It would just be a quick upload to have everything ready for the appt. Zahra was hoping for Tuesday if that is a possibility. I have included her in this message.    Thanks!    Roseanne Stubbs RN, [facility]

## 2023-02-22 NOTE — TELEPHONE ENCOUNTER
Spoke with Zahra. We will plan to upload the sanam reader early next week in preparation for telephone appt on 3/2 with Tiffanie MINOR     Recent BG's: 66 133, 318    I will reach out to Shagufta and Tiffanie to see when Zahra could bring the sanam reader to [place] for a quick upload. She plans to go to the facility to get it, bring it to clinic for us to upload, return to patient at LTC facility.    Roseanne Stubbs RN, [facility]

## 2023-02-23 LAB
DAT POLY: NEGATIVE
HAPTOGLOB SERPL-MCNC: 227 MG/DL (ref 32–197)
HAV IGM SERPL QL IA: NONREACTIVE
HBV CORE IGM SERPL QL IA: NONREACTIVE
HBV SURFACE AG SERPL QL IA: NONREACTIVE
HCV AB SERPL QL IA: NONREACTIVE
HIV 1+2 AB+HIV1 P24 AG SERPL QL IA: NONREACTIVE
PATH REPORT.COMMENTS IMP SPEC: NORMAL
PATH REPORT.COMMENTS IMP SPEC: NORMAL
PATH REPORT.FINAL DX SPEC: NORMAL
SPECIMEN EXPIRATION DATE: NORMAL

## 2023-02-23 PROCEDURE — 99207 BLOOD MORPHOLOGY PATHOLOGIST REVIEW: CPT | Performed by: PATHOLOGY

## 2023-02-24 LAB — EPO SERPL-ACNC: 20 MU/ML

## 2023-02-24 NOTE — TELEPHONE ENCOUNTER
RECORDS STATUS - ALL OTHER DIAGNOSIS      Chronic anemia [D64.9]    RECORDS RECEIVED FROM: INTERNAL   DATE RECEIVED: 2.24.23   NOTES STATUS DETAILS   OFFICE NOTE from referring provider 2.17.23 JAVON   OFFICE NOTE from medical oncologist 2.21.23 HARRIS - E CONSULT   OFFICE NOTE from other specialist 2.10.23  2.7.23 TONY - E CONSULT  GERIATRICS     DISCHARGE SUMMARY from hospital 12.13.22 - CE  9.10.22 HTN/ALLINA    DIABETIC KETOACIDOSIS     DISCHARGE REPORT from the ER 1.12.23 CE  12.12.22  CE  11.11.22  CE   HYPERGLYCEMIA/ALLINA  DIABETIC KETOACIDOSIS/ALLINA    HYPERGLYCEMIA/ALLINA       MEDICATION LIST 2.2.23 CE  MOST RECENT/ALLINA   CLINICAL TRIAL TREATMENTS TO DATE     LABS     ANYTHING RELATED TO DIAGNOSIS 2.22.23 MOST RECENT LABS

## 2023-02-27 ENCOUNTER — TELEPHONE (OUTPATIENT)
Dept: ONCOLOGY | Facility: CLINIC | Age: 63
End: 2023-02-27
Payer: MEDICARE

## 2023-02-27 ENCOUNTER — ONCOLOGY VISIT (OUTPATIENT)
Dept: ONCOLOGY | Facility: CLINIC | Age: 63
End: 2023-02-27
Attending: NURSE PRACTITIONER
Payer: MEDICARE

## 2023-02-27 ENCOUNTER — PRE VISIT (OUTPATIENT)
Dept: ONCOLOGY | Facility: CLINIC | Age: 63
End: 2023-02-27
Payer: MEDICARE

## 2023-02-27 VITALS
SYSTOLIC BLOOD PRESSURE: 99 MMHG | TEMPERATURE: 97.7 F | BODY MASS INDEX: 28.07 KG/M2 | HEART RATE: 88 BPM | DIASTOLIC BLOOD PRESSURE: 67 MMHG | WEIGHT: 164.4 LBS | OXYGEN SATURATION: 99 % | RESPIRATION RATE: 16 BRPM | HEIGHT: 64 IN

## 2023-02-27 DIAGNOSIS — D64.9 CHRONIC ANEMIA: ICD-10-CM

## 2023-02-27 PROCEDURE — G0463 HOSPITAL OUTPT CLINIC VISIT: HCPCS | Performed by: INTERNAL MEDICINE

## 2023-02-27 PROCEDURE — 99205 OFFICE O/P NEW HI 60 MIN: CPT | Performed by: INTERNAL MEDICINE

## 2023-02-27 RX ORDER — DOCUSATE SODIUM AND SENNOSIDES 8.6; 5 MG/1; MG/1
1 TABLET, FILM COATED ORAL DAILY
COMMUNITY
Start: 2023-02-25 | End: 2023-10-25

## 2023-02-27 ASSESSMENT — PAIN SCALES - GENERAL: PAINLEVEL: NO PAIN (0)

## 2023-02-27 NOTE — PROGRESS NOTES
"Oncology Rooming Note    February 27, 2023 8:30 AM   Lexy Rockwell is a 62 year old female who presents for:    Chief Complaint   Patient presents with     Oncology Clinic Visit     New Patient     Initial Vitals: BP 99/67   Pulse 88   Temp 97.7  F (36.5  C) (Oral)   Resp 16   Ht 1.626 m (5' 4\")   Wt 74.6 kg (164 lb 6.4 oz)   LMP 03/28/2014   SpO2 99%   BMI 28.22 kg/m   Estimated body mass index is 28.22 kg/m  as calculated from the following:    Height as of this encounter: 1.626 m (5' 4\").    Weight as of this encounter: 74.6 kg (164 lb 6.4 oz). Body surface area is 1.84 meters squared.  No Pain (0) Comment: Data Unavailable   Patient's last menstrual period was 03/28/2014.  Allergies reviewed: Yes  Medications reviewed: Yes    Medications: Medication refills not needed today.  Pharmacy name entered into [facility]:    [id number] - [place], [place] - [address] [place] [address] AT [facility] [place]  [facility],  [id number] - [place], [place] - [address] [facility]    Clinical concerns: New patient. Patient unsure what medications she is taking. I will reach out to her care facility to fax a copy of medication list.      Oma Stewart MA              "

## 2023-02-27 NOTE — PROGRESS NOTES
[facility]    Hematology/Oncology New Patient Note      Today's Date: 02/27/23    Reason for Consult: iron deficiency anemia      HISTORY OF PRESENT ILLNESS: Lexy Rockwell is a 62 year old female who presents with iron deficiency anemia..  Is multiple other comorbidities including Diabetes, hypertension, chronic kidney disease stage III, hyperlipidemia, bilateral severe leg edema, overactive bladder, GERD, constipation, developmentally delayed.  On routine blood work she was found to be anemic with an iron saturation of 13% ferritin A 58 normal LDH MONTY negative haptoglobin normal at 227 erythropoietin normal at 20 sedimentation rate at 38 B12 level normal at 784 folate level at 19 CBC showed a hemoglobin of 8.0 white count of 3.5 RDW normal at 14.4 platelet count of 282    Patient is currently living in [facility] asa a transition to assisted living. She has to pay 70 dollars each time she has a doctors appointment and she wants to work on minimizing her costs.         REVIEW OF SYSTEMS:   14 point ROS was reviewed and is negative other than as noted above in HPI.       HOME MEDICATIONS:  Current Outpatient Medications   Medication Sig Dispense Refill     acetone urine (KETOSTIX) test strip Use as needed when BG is > 240 mg/dl for >4 hours with symptoms of DKA. 50 strip 3     AMLODIPINE BENZOATE PO Take 5 mg by mouth       carvedilol (COREG) 6.25 MG tablet Take 1 tablet (6.25 mg) by mouth 2 times daily (with meals)       ciclopirox (LOPROX) 0.77 % cream Apply topically 2 times daily To feet and toenails. 90 g 5     Continuous Blood Gluc  (DEXCOM G6 ) KELLY USE AS DIRECTED FOR CONTINUOUS GLUCOSE MONITORING 1 Device 1     Continuous Blood Gluc Sensor (DEXCOM G6 SENSOR) MISC USE AS DIRECTED 1 EVERY 10 DAYS 3 each 5     Continuous Blood Gluc Transmit (DEXCOM G6 TRANSMITTER) MISC USE ONE EVERY 3 MONTHS 1 each 1     dorzolamide-timolol (COSOPT) 2-0.5 % ophthalmic solution  Place 1 drop into both eyes 2 times daily 5 mL 11     ferrous sulfate (FEROSUL) 325 (65 Fe) MG tablet Take 1 tablet (325 mg) by mouth daily (with breakfast)       glucagon 1 MG SOLR injection Inject 1 mg into the muscle once       insulin aspart (NOVOLOG FLEXPEN) 100 UNIT/ML pen Inject 10 Units Subcutaneous 3 times daily (with meals) And sliding scale: 1 unit: 150 -199 mg/dl.   200-249= 2 units  250-299 3 units  300-249 = 4 units  350 - 399= 5 units  400+ = 6 units and call MD Up to 30 units daily 15 mL      insulin degludec (TRESIBA FLEXTOUCH) 200 UNIT/ML pen Inject 24 Units Subcutaneous daily       insulin pen needle (B-D U/F) 31G X 8 MM miscellaneous Use 3-4 pen needles daily or as directed. 200 each 3     latanoprost (XALATAN) 0.005 % ophthalmic solution INSTILL 1 DROP IN BOTH EYES AT BEDTIME 10 mL 3     lisinopril (ZESTRIL) 40 MG tablet Take 1 tablet (40 mg) by mouth daily       magnesium oxide (MAG-OX) 400 MG tablet Take 1 tablet (400 mg) by mouth daily 90 tablet 3     MULTIVITAMIN TABS   OR 1 TABLET DAILY       nystatin (MYCOSTATIN) 648447 UNIT/GM external cream Apply topically 2 times daily To feet and toenails. 90 g 5     oxybutynin ER (DITROPAN XL) 10 MG 24 hr tablet TAKE 1 TABLET(10 MG) BY MOUTH DAILY 90 tablet 1     pantoprazole (PROTONIX) 40 MG EC tablet Take 40 mg by mouth daily       potassium chloride ER (KLOR-CON M) 20 MEQ CR tablet Take 20 mEq by mouth 2 times daily       psyllium (METAMUCIL) 28.3 % POWD Take 1 packet by mouth daily as needed (irregular BM) Take 1 capful by mouth daily       simvastatin (ZOCOR) 20 MG tablet Take 1 tablet (20 mg) by mouth At Bedtime 90 tablet 3         ALLERGIES:  Allergies   Allergen Reactions     Amoxicillin      Sulfa Drugs          PAST MEDICAL HISTORY:  Past Medical History:   Diagnosis Date     Cerumen impacted      Development delay      Diabetic gastroparesis (H) 8/16/2013     Glaucoma (increased eye pressure)      Hyperlipaemia      Hypertension       "Hypothyroid      Mental retardation      Nonsenile cataract      Obesity      Strabismus      Type 1 diabetes mellitus not at goal (H)          PAST SURGICAL HISTORY:  Past Surgical History:   Procedure Laterality Date     STRABISMUS SURGERY       ZZC EYE SURG ANT SGMT PROC UNLISTED  remote    strabismus surgery         SOCIAL HISTORY:  Social History     Socioeconomic History     Marital status: Single     Spouse name: Not on file     Number of children: Not on file     Years of education: Not on file     Highest education level: Not on file   Occupational History     Not on file   Tobacco Use     Smoking status: Never     Smokeless tobacco: Never     Tobacco comments:     lives in smoke free household.   Substance and Sexual Activity     Alcohol use: Yes     Comment: occass social     Drug use: No     Sexual activity: Never   Other Topics Concern     Parent/sibling w/ CABG, MI or angioplasty before 65F 55M? No   Social History Narrative     Not on file     Social Determinants of Health     Financial Resource Strain: Not on file   Food Insecurity: Not on file   Transportation Needs: Not on file   Physical Activity: Not on file   Stress: Not on file   Social Connections: Not on file   Intimate Partner Violence: Not on file   Housing Stability: Not on file         FAMILY HISTORY:  Family History   Problem Relation Age of Onset     Hypertension Father      Diabetes Sister      Glaucoma Maternal Grandfather      Cancer No family hx of      Cerebrovascular Disease No family hx of      Thyroid Disease No family hx of      Macular Degeneration No family hx of          PHYSICAL EXAM:  Vital signs:  BP 99/67   Pulse 88   Temp 97.7  F (36.5  C) (Oral)   Resp 16   Ht 1.626 m (5' 4\")   Wt 74.6 kg (164 lb 6.4 oz)   LMP 2014   SpO2 99%   BMI 28.22 kg/m     ECO-3  GENERAL/CONSTITUTIONAL: No acute distress.  EYES: Pupils are equal, round, and react to light and accommodation. Extraocular movements intact.  No " scleral icterus.  ENT/MOUTH: Neck supple. Oropharynx clear, no mucositis.  LYMPH: No anterior cervical, posterior cervical, supraclavicular, axillary or inguinal adenopathy.   RESPIRATORY: Clear to auscultation bilaterally. No crackles or wheezing.   CARDIOVASCULAR: Regular rate and rhythm without murmurs, gallops, or rubs.  GASTROINTESTINAL: No hepatosplenomegaly, masses, or tenderness. The patient has normal bowel sounds. No guarding.  No distention.  MUSCULOSKELETAL: bilateral lower extremity swelling  NEUROLOGIC: Cranial nerves II-XII are intact. Alert, oriented, answers questions appropriately.  INTEGUMENTARY: No rashes or jaundice.  GAIT: Steady, does not use assistive device      LABS:  CBC RESULTS:   Recent Labs   Lab Test 02/22/23  0537   WBC 3.9*   RBC 3.25*   HGB 8.6*   HCT 29.1*   MCV 90   MCH 26.5   MCHC 29.6*   RDW 15.0          Recent Labs   Lab Test 01/25/23  0820 01/11/23  1450    143   POTASSIUM 3.9 4.3   CHLORIDE 106 108*   CO2 21* 19*   ANIONGAP 15 16*   * 92   BUN 19.3 16.7   CR 0.93 1.11*   EFRAIN 9.0 9.5         PATHOLOGY:  na    IMAGING:  na    ASSESSMENT/PLAN:  Lexy Rockwell is a 62 year old female with iron deficiency anemia +AOCD    She has multiple other comorbidities. I dont think the degree of anemia she has is all explained by her iron deficiency. She may have underlying malignancy or MDS but we discussed that in the big picture of things if she doesn't correct with oral iron we can look further if she is interested. She wouldn't be a candidate for any aggressive treatment based on her PS and after a detailed discussion she would like to proceed with oral iron first (not IV iron) since that will be the most economical for her      -start oral iron for one month , she is not interested in GI work up at this time . Hemoccult test pending.   -repeat labs at that time with IQRA visit   -consideration of further work up after iron deficiency corrected, her PS is a  2-3 and she requires rides to come in therefore wants to hold off on any procedures testing, till she can get to assisted living       Total time spent on day of visit, including review of tests, obtaining/reviewing separately obtained history, ordering medications/tests/procedures, communicating with PCP/consultants, and documenting in electronic medical record: 60  minutes    Levy Shaver MD  Hematology/Oncology  [facility]

## 2023-02-27 NOTE — LETTER
"    2/27/2023         RE: Lexy Rockwell  [address]  [place] [address]        Dear Colleague,    Thank you for referring your patient, Lexy Rockwell, to the [facility]. Please see a copy of my visit note below.    Oncology Rooming Note    February 27, 2023 8:30 AM   Lexy Rockwell is a 62 year old female who presents for:    Chief Complaint   Patient presents with     Oncology Clinic Visit     New Patient     Initial Vitals: BP 99/67   Pulse 88   Temp 97.7  F (36.5  C) (Oral)   Resp 16   Ht 1.626 m (5' 4\")   Wt 74.6 kg (164 lb 6.4 oz)   LMP 03/28/2014   SpO2 99%   BMI 28.22 kg/m   Estimated body mass index is 28.22 kg/m  as calculated from the following:    Height as of this encounter: 1.626 m (5' 4\").    Weight as of this encounter: 74.6 kg (164 lb 6.4 oz). Body surface area is 1.84 meters squared.  No Pain (0) Comment: Data Unavailable   Patient's last menstrual period was 03/28/2014.  Allergies reviewed: Yes  Medications reviewed: Yes    Medications: Medication refills not needed today.  Pharmacy name entered into [employer]:    [id number] - [place], [place] - [address] AT [facility] [id number] - [place], [place] - [address] [place] [address]    Clinical concerns: New patient. Patient unsure what medications she is taking. I will reach out to her care facility to fax a copy of medication list.      Oma Stewart MA                [facility]    Hematology/Oncology New Patient Note      Today's Date: 02/27/23    Reason for Consult: iron deficiency anemia      HISTORY OF PRESENT ILLNESS: Lexy Rockwell is a 62 year old female who presents with iron deficiency anemia..  Is multiple other comorbidities including Diabetes, hypertension, chronic kidney disease stage III, hyperlipidemia, bilateral severe leg edema, overactive bladder, GERD, " constipation, developmentally delayed.  On routine blood work she was found to be anemic with an iron saturation of 13% ferritin A 58 normal LDH MONTY negative haptoglobin normal at 227 erythropoietin normal at 20 sedimentation rate at 38 B12 level normal at 784 folate level at 19 CBC showed a hemoglobin of 8.0 white count of 3.5 RDW normal at 14.4 platelet count of 282    Patient is currently living in [facility] as[facility] a transition to assisted living. She has to pay 70 dollars each time she has a doctors appointment and she wants to work on minimizing her costs.         REVIEW OF SYSTEMS:   14 point ROS was reviewed and is negative other than as noted above in HPI.       HOME MEDICATIONS:  Current Outpatient Medications   Medication Sig Dispense Refill     acetone urine (KETOSTIX) test strip Use as needed when BG is > 240 mg/dl for >4 hours with symptoms of DKA. 50 strip 3     AMLODIPINE BENZOATE PO Take 5 mg by mouth       carvedilol (COREG) 6.25 MG tablet Take 1 tablet (6.25 mg) by mouth 2 times daily (with meals)       ciclopirox (LOPROX) 0.77 % cream Apply topically 2 times daily To feet and toenails. 90 g 5     Continuous Blood Gluc  (DEXCOM G6 ) KELLY USE AS DIRECTED FOR CONTINUOUS GLUCOSE MONITORING 1 Device 1     Continuous Blood Gluc Sensor (DEXCOM G6 SENSOR) MISC USE AS DIRECTED 1 EVERY 10 DAYS 3 each 5     Continuous Blood Gluc Transmit (DEXCOM G6 TRANSMITTER) MISC USE ONE EVERY 3 MONTHS 1 each 1     dorzolamide-timolol (COSOPT) 2-0.5 % ophthalmic solution Place 1 drop into both eyes 2 times daily 5 mL 11     ferrous sulfate (FEROSUL) 325 (65 Fe) MG tablet Take 1 tablet (325 mg) by mouth daily (with breakfast)       glucagon 1 MG SOLR injection Inject 1 mg into the muscle once       insulin aspart (NOVOLOG FLEXPEN) 100 UNIT/ML pen Inject 10 Units Subcutaneous 3 times daily (with meals) And sliding scale: 1 unit: 150 -199 mg/dl.   200-249= 2 units  250-299 3 units  300-249 = 4 units  350 -  399= 5 units  400+ = 6 units and call MD Up to 30 units daily 15 mL      insulin degludec (TRESIBA FLEXTOUCH) 200 UNIT/ML pen Inject 24 Units Subcutaneous daily       insulin pen needle (B-D U/F) 31G X 8 MM miscellaneous Use 3-4 pen needles daily or as directed. 200 each 3     latanoprost (XALATAN) 0.005 % ophthalmic solution INSTILL 1 DROP IN BOTH EYES AT BEDTIME 10 mL 3     lisinopril (ZESTRIL) 40 MG tablet Take 1 tablet (40 mg) by mouth daily       magnesium oxide (MAG-OX) 400 MG tablet Take 1 tablet (400 mg) by mouth daily 90 tablet 3     MULTIVITAMIN TABS   OR 1 TABLET DAILY       nystatin (MYCOSTATIN) 274989 UNIT/GM external cream Apply topically 2 times daily To feet and toenails. 90 g 5     oxybutynin ER (DITROPAN XL) 10 MG 24 hr tablet TAKE 1 TABLET(10 MG) BY MOUTH DAILY 90 tablet 1     pantoprazole (PROTONIX) 40 MG EC tablet Take 40 mg by mouth daily       potassium chloride ER (KLOR-CON M) 20 MEQ CR tablet Take 20 mEq by mouth 2 times daily       psyllium (METAMUCIL) 28.3 % POWD Take 1 packet by mouth daily as needed (irregular BM) Take 1 capful by mouth daily       simvastatin (ZOCOR) 20 MG tablet Take 1 tablet (20 mg) by mouth At Bedtime 90 tablet 3         ALLERGIES:  Allergies   Allergen Reactions     Amoxicillin      Sulfa Drugs          PAST MEDICAL HISTORY:  Past Medical History:   Diagnosis Date     Cerumen impacted      Development delay      Diabetic gastroparesis (H) 8/16/2013     Glaucoma (increased eye pressure)      Hyperlipaemia      Hypertension      Hypothyroid      Mental retardation      Nonsenile cataract      Obesity      Strabismus      Type 1 diabetes mellitus not at goal (H)          PAST SURGICAL HISTORY:  Past Surgical History:   Procedure Laterality Date     STRABISMUS SURGERY       [facility] EYE SURG ANT [facility] PROC UNLISTED  remote    strabismus surgery         SOCIAL HISTORY:  Social History     Socioeconomic History     Marital status: Single     Spouse name: Not on file     Number  "of children: Not on file     Years of education: Not on file     Highest education level: Not on file   Occupational History     Not on file   Tobacco Use     Smoking status: Never     Smokeless tobacco: Never     Tobacco comments:     lives in smoke free household.   Substance and Sexual Activity     Alcohol use: Yes     Comment: occass social     Drug use: No     Sexual activity: Never   Other Topics Concern     Parent/sibling w/ CABG, MI or angioplasty before 65F 55M? No   Social History Narrative     Not on file     Social Determinants of Health     Financial Resource Strain: Not on file   Food Insecurity: Not on file   Transportation Needs: Not on file   Physical Activity: Not on file   Stress: Not on file   Social Connections: Not on file   Intimate Partner Violence: Not on file   Housing Stability: Not on file         FAMILY HISTORY:  Family History   Problem Relation Age of Onset     Hypertension Father      Diabetes Sister      Glaucoma Maternal Grandfather      Cancer No family hx of      Cerebrovascular Disease No family hx of      Thyroid Disease No family hx of      Macular Degeneration No family hx of          PHYSICAL EXAM:  Vital signs:  BP 99/67   Pulse 88   Temp 97.7  F (36.5  C) (Oral)   Resp 16   Ht 1.626 m (5' 4\")   Wt 74.6 kg (164 lb 6.4 oz)   LMP 2014   SpO2 99%   BMI 28.22 kg/m     ECO-3  GENERAL/CONSTITUTIONAL: No acute distress.  EYES: Pupils are equal, round, and react to light and accommodation. Extraocular movements intact.  No scleral icterus.  ENT/MOUTH: Neck supple. Oropharynx clear, no mucositis.  LYMPH: No anterior cervical, posterior cervical, supraclavicular, axillary or inguinal adenopathy.   RESPIRATORY: Clear to auscultation bilaterally. No crackles or wheezing.   CARDIOVASCULAR: Regular rate and rhythm without murmurs, gallops, or rubs.  GASTROINTESTINAL: No hepatosplenomegaly, masses, or tenderness. The patient has normal bowel sounds. No guarding.  No " distention.  MUSCULOSKELETAL: bilateral lower extremity swelling  NEUROLOGIC: Cranial nerves II-XII are intact. Alert, oriented, answers questions appropriately.  INTEGUMENTARY: No rashes or jaundice.  GAIT: Steady, does not use assistive device      LABS:  CBC RESULTS:   Recent Labs   Lab Test 02/22/23  0537   WBC 3.9*   RBC 3.25*   HGB 8.6*   HCT 29.1*   MCV 90   MCH 26.5   MCHC 29.6*   RDW 15.0          Recent Labs   Lab Test 01/25/23  0820 01/11/23  1450    143   POTASSIUM 3.9 4.3   CHLORIDE 106 108*   CO2 21* 19*   ANIONGAP 15 16*   * 92   BUN 19.3 16.7   CR 0.93 1.11*   EFRAIN 9.0 9.5         PATHOLOGY:  na    IMAGING:  na    ASSESSMENT/PLAN:  Lexy Rockwell is a 62 year old female with iron deficiency anemia +AOCD    She has multiple other comorbidities. I dont think the degree of anemia she has is all explained by her iron deficiency. She may have underlying malignancy or MDS but we discussed that in the big picture of things if she doesn't correct with oral iron we can look further if she is interested. She wouldn't be a candidate for any aggressive treatment based on her PS and after a detailed discussion she would like to proceed with oral iron first (not IV iron) since that will be the most economical for her      -start oral iron for one month , she is not interested in GI work up at this time . Hemoccult test pending.   -repeat labs at that time with IQRA visit   -consideration of further work up after iron deficiency corrected, her PS is a 2-3 and she requires rides to come in therefore wants to hold off on any procedures testing, till she can get to assisted living       Total time spent on day of visit, including review of tests, obtaining/reviewing separately obtained history, ordering medications/tests/procedures, communicating with PCP/consultants, and documenting in electronic medical record: 60  minutes    Levy Shaver MD  Hematology/Oncology  [facility]      Again, thank you for allowing me to participate in the care of your patient.        Sincerely,        Levy Shaver MD

## 2023-02-27 NOTE — TELEPHONE ENCOUNTER
Called [facility], spoke with RAYSA Lucas. She is aware of the plan to start Slow Fe 45 mg daily. Follow up in 4 weeks. Scheduling will call to schedule F/U. Shayy requested AVS be faxed to them at [phone]. Writer will send to [place] pool to have it faxed. Jane Posey RN,BSN,OCN,CBCN

## 2023-02-28 ENCOUNTER — NURSING HOME VISIT (OUTPATIENT)
Dept: GERIATRICS | Facility: CLINIC | Age: 63
End: 2023-02-28
Payer: MEDICARE

## 2023-02-28 VITALS
DIASTOLIC BLOOD PRESSURE: 79 MMHG | WEIGHT: 155.4 LBS | TEMPERATURE: 97.4 F | SYSTOLIC BLOOD PRESSURE: 125 MMHG | OXYGEN SATURATION: 97 % | RESPIRATION RATE: 20 BRPM | HEART RATE: 71 BPM | HEIGHT: 63 IN | BODY MASS INDEX: 27.54 KG/M2

## 2023-02-28 DIAGNOSIS — G63 POLYNEUROPATHY ASSOCIATED WITH UNDERLYING DISEASE (H): ICD-10-CM

## 2023-02-28 DIAGNOSIS — E10.37X3 TYPE 1 DIABETES MELLITUS WITH DIABETIC MACULAR EDEMA OF BOTH EYES RESOLVED AFTER TREATMENT (H): Primary | ICD-10-CM

## 2023-02-28 PROCEDURE — 99309 SBSQ NF CARE MODERATE MDM 30: CPT | Performed by: NURSE PRACTITIONER

## 2023-02-28 RX ORDER — INSULIN ASPART 100 [IU]/ML
8 INJECTION, SOLUTION INTRAVENOUS; SUBCUTANEOUS
Qty: 15 ML
Start: 2023-02-28 | End: 2023-03-31

## 2023-02-28 NOTE — LETTER
2/28/2023        RE: Lexy Rockwell  [address]  [facility] [address]        [facility]    Chief Complaint   Patient presents with     Nursing Home Acute     HPI:  Lexy Rockwell is a 62 year old  (1960), who is being seen today for an episodic care visit at: [facility] () [[id number]].     Her past medical history includes    Type 1 diabetes with gastroparesis and CKD history of DKA    Hypertension    CKD stage III    Hyperlipidemia    Bilateral leg edema    Overactive bladder    GERD    Constipation    Developmentally delayed    Polyneuropathy     Vitamin D deficiency    Glaucoma        On 1/9/2023, her degludec was increased from 16 to 18 units     On 1/24/2023, her amlodipine was decreased to 5 mg daily, her metoprolol XL was stopped and changed to coreg 6.25 mg BID, aspirin was stopped due to hgb of 7.8 - 8.7       On 2/2/2023, her degludec was increased from 18 to 20 units     On 2/7/2023, an econsult was completed however the question was clear therefore no consult occurred.      On 2/9/2023, her degludec was increased from 20 to 24 units.       On 2/10/2023, her guaiac was negative.      On 2/13/2023, she received an extra 4 units of insulin at 2100.      On 2/14/2023, BG at 1730 showed as high on dexcom and glucose monitor, she was prescribed 4 units of Insulin Aspart and at 2000 it was 467.      On 2/17/2023, her BS were elevated to over 400.  She was Given extra 5 units of insulin, and her ketone were negative. Her meal time insulin was increased from 6 units to 10 units    On 2/27/23, pt saw oncology who changed the type of oral iron with a recommended CBC follow up.  Pt reported she did not want a GI workup.      Today write brought her dexacom to the diabetic educator to be downloaded.  Her BS were 76 today.  Pt felt good today.      Allergies, and PMH/PSH reviewed in [employer] today.  REVIEW OF SYSTEMS:  4 point ROS including  "Respiratory, CV, GI and , other than that noted in the HPI,  is negative    Objective:   /79   Pulse 71   Temp 97.4  F (36.3  C)   Resp 20   Ht 1.6 m (5' 3\")   Wt 70.5 kg (155 lb 6.4 oz)   LMP 03/28/2014   SpO2 97%   BMI 27.53 kg/m    GENERAL APPEARANCE:  Alert, in no distress  RESP:  no respiratory distress  PSYCH:  normal insight, judgement and memory      Most Recent 3 CBC's:Recent Labs   Lab Test 02/22/23  0537 02/06/23  0815 01/25/23  0820   WBC 3.9* 3.5* 3.5*   HGB 8.6* 8.0* 7.8*   MCV 90 87 89    282 281     Most Recent 3 BMP's:Recent Labs   Lab Test 01/25/23  0820 01/11/23  1450 01/02/23  0631 12/29/22  0625    143  --  144   POTASSIUM 3.9 4.3 4.4 2.9*   CHLORIDE 106 108*  --  106   CO2 21* 19*  --  24   BUN 19.3 16.7  --  22.1   CR 0.93 1.11*  --  0.94   ANIONGAP 15 16*  --  14   EFRAIN 9.0 9.5  --  9.5   * 92  --  139*       Assessment/Plan:  (E10.37X3) Type 1 diabetes mellitus with diabetic macular edema of both eyes resolved after treatment (H)  (primary encounter diagnosis)  (G63) Polyneuropathy associated with underlying disease (H)  Comment: Acute on chronic.  Pt's BS continue to be labile.  Last week her BS were unreadable, her meal time insulin was increased to 10 units. Today her BS was 76.   Writer brought dexacom to Diabetic Educator for downloading in preparation for appointment.    Plan:   Blood sugar too tightly controlled now.  Liberalize meal time insulin from 10 units three times daily to 8 units three times daily at meals.    Follow up with diabetic educator on 03/02/23          Electronically signed by:    TAYLA Sprague CNP on 2/28/2023 at 8:36 PM            Sincerely,        TAYLA Sprague CNP    "

## 2023-02-28 NOTE — PROGRESS NOTES
[facility]    Chief Complaint   Patient presents with     Nursing Home Acute     HPI:  Lexy Rockwell is a 62 year old  (1960), who is being seen today for an episodic care visit at: [facility] () [[id number]].     Her past medical history includes    Type 1 diabetes with gastroparesis and CKD history of DKA    Hypertension    CKD stage III    Hyperlipidemia    Bilateral leg edema    Overactive bladder    GERD    Constipation    Developmentally delayed    Polyneuropathy     Vitamin D deficiency    Glaucoma        On 1/9/2023, her degludec was increased from 16 to 18 units     On 1/24/2023, her amlodipine was decreased to 5 mg daily, her metoprolol XL was stopped and changed to coreg 6.25 mg BID, aspirin was stopped due to hgb of 7.8 - 8.7       On 2/2/2023, her degludec was increased from 18 to 20 units     On 2/7/2023, an econsult was completed however the question was clear therefore no consult occurred.      On 2/9/2023, her degludec was increased from 20 to 24 units.       On 2/10/2023, her guaiac was negative.      On 2/13/2023, she received an extra 4 units of insulin at 2100.      On 2/14/2023, BG at 1730 showed as high on dexcom and glucose monitor, she was prescribed 4 units of Insulin Aspart and at 2000 it was 467.      On 2/17/2023, her BS were elevated to over 400.  She was Given extra 5 units of insulin, and her ketone were negative. Her meal time insulin was increased from 6 units to 10 units    On 2/27/23, pt saw oncology who changed the type of oral iron with a recommended CBC follow up.  Pt reported she did not want a GI workup.      Today write brought her dexacom to the diabetic educator to be downloaded.  Her BS were 76 today.  Pt felt good today.      Allergies, and PMH/PSH reviewed in [employer] today.  REVIEW OF SYSTEMS:  4 point ROS including Respiratory, CV, GI and , other than that noted in the HPI,  is negative    Objective:   /79   Pulse 71    "Temp 97.4  F (36.3  C)   Resp 20   Ht 1.6 m (5' 3\")   Wt 70.5 kg (155 lb 6.4 oz)   LMP 03/28/2014   SpO2 97%   BMI 27.53 kg/m    GENERAL APPEARANCE:  Alert, in no distress  RESP:  no respiratory distress  PSYCH:  normal insight, judgement and memory      Most Recent 3 CBC's:Recent Labs   Lab Test 02/22/23  0537 02/06/23  0815 01/25/23  0820   WBC 3.9* 3.5* 3.5*   HGB 8.6* 8.0* 7.8*   MCV 90 87 89    282 281     Most Recent 3 BMP's:Recent Labs   Lab Test 01/25/23  0820 01/11/23  1450 01/02/23  0631 12/29/22  0625    143  --  144   POTASSIUM 3.9 4.3 4.4 2.9*   CHLORIDE 106 108*  --  106   CO2 21* 19*  --  24   BUN 19.3 16.7  --  22.1   CR 0.93 1.11*  --  0.94   ANIONGAP 15 16*  --  14   EFRAIN 9.0 9.5  --  9.5   * 92  --  139*       Assessment/Plan:  (E10.37X3) Type 1 diabetes mellitus with diabetic macular edema of both eyes resolved after treatment (H)  (primary encounter diagnosis)  (G63) Polyneuropathy associated with underlying disease (H)  Comment: Acute on chronic.  Pt's BS continue to be labile.  Last week her BS were unreadable, her meal time insulin was increased to 10 units. Today her BS was 76.   Writer brought dexacom to Diabetic Educator for downloading in preparation for appointment.    Plan:   Blood sugar too tightly controlled now.  Liberalize meal time insulin from 10 units three times daily to 8 units three times daily at meals.    Follow up with diabetic educator on 03/02/23          Electronically signed by:    TAYLA Sprague CNP on 2/28/2023 at 8:36 PM      "

## 2023-03-01 NOTE — TELEPHONE ENCOUNTER
Please see encounter from 2/28/23. Shagufta uploaded the dexcom reader in clinic.    Roseanne Stubbs RN, [facility]

## 2023-03-02 ENCOUNTER — TELEPHONE (OUTPATIENT)
Dept: GERIATRICS | Facility: CLINIC | Age: 63
End: 2023-03-02

## 2023-03-02 ENCOUNTER — VIRTUAL VISIT (OUTPATIENT)
Dept: EDUCATION SERVICES | Facility: CLINIC | Age: 63
End: 2023-03-02
Payer: MEDICARE

## 2023-03-02 DIAGNOSIS — E10.40 TYPE 1 DIABETES MELLITUS WITH DIABETIC NEUROPATHY (H): Primary | ICD-10-CM

## 2023-03-02 DIAGNOSIS — E10.37X3 TYPE 1 DIABETES MELLITUS WITH DIABETIC MACULAR EDEMA OF BOTH EYES RESOLVED AFTER TREATMENT (H): ICD-10-CM

## 2023-03-02 PROCEDURE — 99207 PR DROP WITH A PROCEDURE: CPT | Performed by: DIETITIAN, REGISTERED

## 2023-03-02 PROCEDURE — G0108 DIAB MANAGE TRN  PER INDIV: HCPCS | Mod: 95 | Performed by: DIETITIAN, REGISTERED

## 2023-03-02 RX ORDER — INSULIN DEGLUDEC 200 U/ML
20 INJECTION, SOLUTION SUBCUTANEOUS DAILY
Qty: 15 ML
Start: 2023-03-02 | End: 2023-03-09

## 2023-03-02 NOTE — LETTER
3/2/2023         RE: Lexy Rockwell  [address]  [facility] [address]        Dear Colleague,    Thank you for referring your patient, Lexy Rockwell, to the [facility] [place]. Please see a copy of my visit note below.    Diabetes Self-Management Education & Support    Presents for: CGM Review    Type of Service: Telephone Visit    Audio only visit done, as patient does not have access to audio-visual technology.    Individual visit provided, given no group classes are available for 2 months.     Originating Location (Patient Location): Home  Distant Location (Provider Location): Home  Mode of Communication:  Telephone    Telephone Visit Start Time: 9:00  Telephone Visit End Time (telephone visit stop time): 9:30    How would patient like to obtain AVS? MyChart    Assessment Type:   REPORTS:                Pt verbalized understanding of concepts discussed and recommendations provided today.       Continue education with the following diabetes management concepts: Healthy Eating, Being Active, Monitoring, Taking Medication, Problem Solving, Reducing Risks and Healthy Coping    ASSESSMENT:    Having frequent hypoglycemia overnight and morning. Zahra, her PCP has been making insulin adjustments, most recently 2 days ago decreased her Novolog dose from 10 units before meal to 8 units before meal. Overall BG are much improved from prior to moving into LTC. We discussed moving to meal based/carb based Novolog dosing, unfortunately I do not believe Lexy could manage this and at current level of care, nursing staff would not be able to assist with this. We discussed a plan to decrease Tresiba dose to address overnight hypoglycemia.     Lexy has had some significant weight loss in the last few months, about 20+ pounds since December. It sounds like there is a dietitian at the facility that is helping with this. She also has had low hemoglobin, has not  "started iron supplementation yet.     Glucose Patterns & Trends:  Hypoglycemia, weekend- nocturnal and weekday- nocturnal      PLAN  Decrease Tresiba: 0-0-0-24 --> 0-0-0-20  Continue with 8 units of Novolog before meals + sliding scale. Might need additional insulin with dinner. Will review at next follow-up    Topics to cover at upcoming visits: Healthy Eating, Being Active, Monitoring, Taking Medication, Problem Solving, Reducing Risks and Healthy Coping    Follow-up: 3/30    See Care Plan for co-developed, patient-state behavior change goals.  AVS provided for patient today.    Education Materials Provided:  No new materials provided today      SUBJECTIVE/OBJECTIVE:  Presents for: CGM Review  Accompanied by: Self  Diabetes education in the past 24mo: Yes  Focus of Visit: Problem Solving, Monitoring, Taking Medication  Diabetes type: Type 1  Disease course: Worsening  Transportation concerns: Yes (gets rides or takes public tranportation)  Difficulty affording diabetes medication?: No  Difficulty affording diabetes testing supplies?: No  Other concerns:: Cognitive impairment  Cultural Influences/Ethnic Background:  Not  or     Diabetes Symptoms & Complications:  Fatigue: Yes  Neuropathy: Yes (in feet)  Polydipsia: No  Polyphagia: No  Polyuria: Sometimes (often getting up in the night)  Visual change: Sometimes  Slow healing wounds: No (a little cut near the toenails)  Symptom course: Stable  Weight trend: Decreasing  Complications assessed today?: No    Patient Problem List and Family Medical History reviewed for relevant medical history, current medical status, and diabetes risk factors.    Vitals:  [facility] 03/28/2014   Estimated body mass index is 27.53 kg/m  as calculated from the following:    Height as of 2/28/23: 1.6 m (5' 3\").    Weight as of 2/28/23: 70.5 kg (155 lb 6.4 oz).   Last 3 BP:   BP Readings from Last 3 Encounters:   02/28/23 125/79   02/27/23 99/67   02/17/23 120/74       History "   Smoking Status     Never   Smokeless Tobacco     Never       Labs:  Lab Results   Component Value Date    A1C 8.7 01/25/2023    A1C 9.7 02/01/2021     Lab Results   Component Value Date     01/25/2023     10/13/2021     02/01/2021     Lab Results   Component Value Date    LDL 58 01/25/2023     02/01/2021     HDL Cholesterol   Date Value Ref Range Status   02/01/2021 62 >49 mg/dL Final     Direct Measure HDL   Date Value Ref Range Status   01/25/2023 37 (L) >=50 mg/dL Final   ]  GFR Estimate   Date Value Ref Range Status   01/25/2023 70 >60 mL/min/1.73m2 Final     Comment:     eGFR calculated using 2021 CKD-EPI equation.   02/01/2021 90 >60 mL/min/[1.73_m2] Final     Comment:     Non  GFR Calc  Starting 12/18/2018, serum creatinine based estimated GFR (eGFR) will be   calculated using the Chronic Kidney Disease Epidemiology Collaboration   (CKD-EPI) equation.       GFR Estimate If Black   Date Value Ref Range Status   02/01/2021 >90 >60 mL/min/[1.73_m2] Final     Comment:      GFR Calc  Starting 12/18/2018, serum creatinine based estimated GFR (eGFR) will be   calculated using the Chronic Kidney Disease Epidemiology Collaboration   (CKD-EPI) equation.       Lab Results   Component Value Date    CR 0.93 01/25/2023    CR 0.73 02/01/2021     No results found for: MICROALBUMIN    Healthy Eating:  Healthy Eating Assessed Today: Yes  Cultural/Synagogue diet restrictions?: No  Meals include: Breakfast, Lunch, Dinner, Afternoon Snack, Evening Snack  Breakfast: 8:00 am: Oatmeal with hard boiled egg and barragan with toast  Lunch: 12-12:30:  salad OR Cheese ravioli OR sandwiches  Dinner: 5:00 -6:00 pm: meatball and mashed potatoes OR vegetables lasagna  Snacks: Has one bedtime snack- usually a cookie  sugra free candy every once and a while  Other: drinks coffee with sugar free creamer  Beverages: Water, Coffee, Milk (Keep some juice on hand for lows)  Has patient  met with a dietitian in the past?: Yes    Being Active:  Being Active Assessed Today: Yes (went on the bike at her apartment complex)  Exercise:: Yes (physical therapy, doing some balancing exercised after wraps)  Days per week of moderate to strenuous exercise (like a brisk walk): 2  On average, minutes per day of exercise at this level:  (Walking with friends)  How intense was your typical exercise? : Light (like stretching or slow walking)  Barrier to exercise: Safety, Physical limitation    Monitoring:  Monitoring Assessed Today: Yes  Did patient bring glucose meter to appointment? : Yes  Blood Glucose Meter: CGM  Times checking blood sugar at home (number): 5+  Times checking blood sugar at home (per): Day  Blood glucose trend: Fluctuating      Taking Medications:  Diabetes Medication(s)     Diabetic Other       glucagon 1 MG SOLR injection    Inject 1 mg into the muscle once    Insulin       insulin aspart (NOVOLOG FLEXPEN) 100 UNIT/ML pen    Inject 8 Units Subcutaneous 3 times daily (with meals) And sliding scale: 1 unit: 150 -199 mg/dl.   200-249= 2 units  250-299 3 units  300-249 = 4 units  350 - 399= 5 units  400+ = 6 units and call MD Up to 30 units daily     insulin degludec (TRESIBA FLEXTOUCH) 200 UNIT/ML pen    Inject 24 Units Subcutaneous daily          Taking Medication Assessed Today: Yes  Current Treatments: Insulin Injections  Dose schedule: Pre-breakfast, Pre-lunch, Pre-dinner, At bedtime  Given by: Patient, Adult caretaker, Nursing attendant  Injection/Infusion sites: Abdomen  Problems taking diabetes medications regularly?: No  Diabetes medication side effects?: No    Problem Solving:  Problem Solving Assessed Today: Yes  Is the patient at risk for hypoglycemia?: Yes  Hypoglycemia Frequency: Monthly  Hypoglycemia Treatment: Glucose (tablets or gel), Other food  Patient carries a carbohydrate source: Yes  Medical ID: Yes  Is the patient at risk for DKA?: Yes  Does patient have ketone test  strips?: Yes  Does patient have DKA prevention plan?: Yes  Does patient have severe weather/disaster plan for diabetes management?: No  Does patient have sick day plan for diabetes management?: Yes    Hypoglycemia symptoms  Confusion: No  Dizziness or Light-Headedness: No  Headaches: No  Hunger: No  Mood changes: No  Nervousness/Anxiety: No  Sleepiness: No  Speech difficulty: Yes  Sweats: Yes  Feeling shaky: Yes    Hypoglycemia Complications  Blackouts: No  Hospitalization: No  Nocturnal hypoglycemia: Yes  Required assistance: No  Seizures: No    Reducing Risks:  Reducing Risks Assessed Today: No  CAD Risks: Diabetes Mellitus  Has dilated eye exam at least once a year?: Yes  Sees dentist every 6 months?: Yes  Feet checked by healthcare provider in the last year?: Yes    Healthy Coping:  Healthy Coping Assessed Today: Yes  Emotional response to diabetes: Acceptance  Informal Support system:: Family, Parent  Stage of change: ACTION (Actively working towards change)  Support resources: Offerings in Clinic Communities  Patient Activation Measure Survey Score:  KHUSHI Score (Last Two) 3/7/2012   KHUSHI Raw Score 39   Activation Score 56.4   KHUSHI Level 3         Care Plan and Education Provided:  There are no care plans that you recently modified to display for this patient.      Tiffanie Mcfarland RD [facility]  Triage: [phone]  [phone]    Time Spent: 30 minutes  Encounter Type: Individual    Any diabetes medication dose changes were made via the CDE Protocol per the patient's referring provider. A copy of this encounter was shared with the provider.

## 2023-03-02 NOTE — TELEPHONE ENCOUNTER
DIAGNOSIS: Type 1 diabetes mellitus with diabetic macular edema of both eyes resolved after   DATE RECEIVED: 05.26.2023    NOTES (FOR ALL VISITS) STATUS DETAILS   OFFICE NOTES from referring provider Internal 02.07.2023 Zahra Way APRN CNP   OFFICE NOTES from other specialist Internal 10.03.2022 Fredi Fuentes MD   ED NOTES     OPERATIVE REPORT  (thyroid, pituitary, adrenal, parathyroid)     MEDICATION LIST Internal    IMAGING      DEXASCAN     MRI (BRAIN) Internal 01.10.2020 MRI OF THE BRAIN WITHOUT AND WITH CONTRAST    XR (Chest) Care Everywhere 12.12.2022,  XR chest 1 view  More in Epic   CT (HEAD/NECK/CHEST/ABDOMEN) Care Everywhere 12.12.2022, 06.08.2022CT Chest Abd   12.12.2022 CT Head Brain   NUCLEAR      ULTRASOUND (HEAD/NECK)     LABS     DIABETES: HBGA1C, CREATININE, FASTING LIPIDS, MICROALBUMIN URINE, POTASSIUM, TSH, T4    THYROID: TSH, T4, CBC, THYRODLONULIN, TOTAL T3, FREE T4, CALCITONIN, CEA Internal        Care Everywhere 01.25.2023 Lipids  02.06.2023 CBC      12.15.2022 TSH  09.10.2022 Potassium  06.06.2022 Creatinine

## 2023-03-02 NOTE — TELEPHONE ENCOUNTER
Met with pt and diabetic education    Orders changed her degludec insulin was decreased from 24 to 20 units due to BS in the 80s.      She will follow up with diabetic educator in a few weeks.

## 2023-03-02 NOTE — PROGRESS NOTES
Diabetes Self-Management Education & Support    Presents for: CGM Review    Type of Service: Telephone Visit    Audio only visit done, as patient does not have access to audio-visual technology.    Individual visit provided, given no group classes are available for 2 months.     Originating Location (Patient Location): Home  Distant Location (Provider Location): Home  Mode of Communication:  Telephone    Telephone Visit Start Time: 9:00  Telephone Visit End Time (telephone visit stop time): 9:30    How would patient like to obtain AVS? MyChart    Assessment Type:   REPORTS:                Pt verbalized understanding of concepts discussed and recommendations provided today.       Continue education with the following diabetes management concepts: Healthy Eating, Being Active, Monitoring, Taking Medication, Problem Solving, Reducing Risks and Healthy Coping    ASSESSMENT:    Having frequent hypoglycemia overnight and morning. Zahra, her PCP has been making insulin adjustments, most recently 2 days ago decreased her Novolog dose from 10 units before meal to 8 units before meal. Overall BG are much improved from prior to moving into LTC. We discussed moving to meal based/carb based Novolog dosing, unfortunately I do not believe Lexy could manage this and at current level of care, nursing staff would not be able to assist with this. We discussed a plan to decrease Tresiba dose to address overnight hypoglycemia.     Lexy has had some significant weight loss in the last few months, about 20+ pounds since December. It sounds like there is a dietitian at the facility that is helping with this. She also has had low hemoglobin, has not started iron supplementation yet.     Glucose Patterns & Trends:  Hypoglycemia, weekend- nocturnal and weekday- nocturnal      PLAN  Decrease Tresiba: 0-0-0-24 --> 0-0-0-20  Continue with 8 units of Novolog before meals + sliding scale. Might need additional insulin with dinner. Will  "review at next follow-up    Topics to cover at upcoming visits: Healthy Eating, Being Active, Monitoring, Taking Medication, Problem Solving, Reducing Risks and Healthy Coping    Follow-up: 3/30    See Care Plan for co-developed, patient-state behavior change goals.  AVS provided for patient today.    Education Materials Provided:  No new materials provided today      SUBJECTIVE/OBJECTIVE:  Presents for: CGM Review  Accompanied by: Self  Diabetes education in the past 24mo: Yes  Focus of Visit: Problem Solving, Monitoring, Taking Medication  Diabetes type: Type 1  Disease course: Worsening  Transportation concerns: Yes (gets rides or takes public tranportation)  Difficulty affording diabetes medication?: No  Difficulty affording diabetes testing supplies?: No  Other concerns:: Cognitive impairment  Cultural Influences/Ethnic Background:  Not  or     Diabetes Symptoms & Complications:  Fatigue: Yes  Neuropathy: Yes (in feet)  Polydipsia: No  Polyphagia: No  Polyuria: Sometimes (often getting up in the night)  Visual change: Sometimes  Slow healing wounds: No (a little cut near the toenails)  Symptom course: Stable  Weight trend: Decreasing  Complications assessed today?: No    Patient Problem List and Family Medical History reviewed for relevant medical history, current medical status, and diabetes risk factors.    Vitals:  LMP 03/28/2014   Estimated body mass index is 27.53 kg/m  as calculated from the following:    Height as of 2/28/23: 1.6 m (5' 3\").    Weight as of 2/28/23: 70.5 kg (155 lb 6.4 oz).   Last 3 BP:   BP Readings from Last 3 Encounters:   02/28/23 125/79   02/27/23 99/67   02/17/23 120/74       History   Smoking Status     Never   Smokeless Tobacco     Never       Labs:  Lab Results   Component Value Date    A1C 8.7 01/25/2023    A1C 9.7 02/01/2021     Lab Results   Component Value Date     01/25/2023     10/13/2021     02/01/2021     Lab Results   Component Value " Date    LDL 58 01/25/2023     02/01/2021     HDL Cholesterol   Date Value Ref Range Status   02/01/2021 62 >49 mg/dL Final     Direct Measure HDL   Date Value Ref Range Status   01/25/2023 37 (L) >=50 mg/dL Final   ]  GFR Estimate   Date Value Ref Range Status   01/25/2023 70 >60 mL/min/1.73m2 Final     Comment:     eGFR calculated using 2021 CKD-EPI equation.   02/01/2021 90 >60 mL/min/[1.73_m2] Final     Comment:     Non  GFR Calc  Starting 12/18/2018, serum creatinine based estimated GFR (eGFR) will be   calculated using the Chronic Kidney Disease Epidemiology Collaboration   (CKD-EPI) equation.       GFR Estimate If Black   Date Value Ref Range Status   02/01/2021 >90 >60 mL/min/[1.73_m2] Final     Comment:      GFR Calc  Starting 12/18/2018, serum creatinine based estimated GFR (eGFR) will be   calculated using the Chronic Kidney Disease Epidemiology Collaboration   (CKD-EPI) equation.       Lab Results   Component Value Date    CR 0.93 01/25/2023    CR 0.73 02/01/2021     No results found for: MICROALBUMIN    Healthy Eating:  Healthy Eating Assessed Today: Yes  Cultural/Roman Catholic diet restrictions?: No  Meals include: Breakfast, Lunch, Dinner, Afternoon Snack, Evening Snack  Breakfast: 8:00 am: Oatmeal with hard boiled egg and barragan with toast  Lunch: 12-12:30:  salad OR Cheese ravioli OR sandwiches  Dinner: 5:00 -6:00 pm: meatball and mashed potatoes OR vegetables lasagna  Snacks: Has one bedtime snack- usually a cookie  sugra free candy every once and a while  Other: drinks coffee with sugar free creamer  Beverages: Water, Coffee, Milk (Keep some juice on hand for lows)  Has patient met with a dietitian in the past?: Yes    Being Active:  Being Active Assessed Today: Yes (went on the bike at her apartment complex)  Exercise:: Yes (physical therapy, doing some balancing exercised after wraps)  Days per week of moderate to strenuous exercise (like a brisk walk): 2  On  average, minutes per day of exercise at this level:  (Walking with friends)  How intense was your typical exercise? : Light (like stretching or slow walking)  Barrier to exercise: Safety, Physical limitation    Monitoring:  Monitoring Assessed Today: Yes  Did patient bring glucose meter to appointment? : Yes  Blood Glucose Meter: CGM  Times checking blood sugar at home (number): 5+  Times checking blood sugar at home (per): Day  Blood glucose trend: Fluctuating      Taking Medications:  Diabetes Medication(s)     Diabetic Other       glucagon 1 MG SOLR injection    Inject 1 mg into the muscle once    Insulin       insulin aspart (NOVOLOG FLEXPEN) 100 UNIT/ML pen    Inject 8 Units Subcutaneous 3 times daily (with meals) And sliding scale: 1 unit: 150 -199 mg/dl.   200-249= 2 units  250-299 3 units  300-249 = 4 units  350 - 399= 5 units  400+ = 6 units and call MD Up to 30 units daily     insulin degludec (TRESIBA FLEXTOUCH) 200 UNIT/ML pen    Inject 24 Units Subcutaneous daily          Taking Medication Assessed Today: Yes  Current Treatments: Insulin Injections  Dose schedule: Pre-breakfast, Pre-lunch, Pre-dinner, At bedtime  Given by: Patient, Adult caretaker, Nursing attendant  Injection/Infusion sites: Abdomen  Problems taking diabetes medications regularly?: No  Diabetes medication side effects?: No    Problem Solving:  Problem Solving Assessed Today: Yes  Is the patient at risk for hypoglycemia?: Yes  Hypoglycemia Frequency: Monthly  Hypoglycemia Treatment: Glucose (tablets or gel), Other food  Patient carries a carbohydrate source: Yes  Medical ID: Yes  Is the patient at risk for DKA?: Yes  Does patient have ketone test strips?: Yes  Does patient have DKA prevention plan?: Yes  Does patient have severe weather/disaster plan for diabetes management?: No  Does patient have sick day plan for diabetes management?: Yes    Hypoglycemia symptoms  Confusion: No  Dizziness or Light-Headedness: No  Headaches:  No  Hunger: No  Mood changes: No  Nervousness/Anxiety: No  Sleepiness: No  Speech difficulty: Yes  Sweats: Yes  Feeling shaky: Yes    Hypoglycemia Complications  Blackouts: No  Hospitalization: No  Nocturnal hypoglycemia: Yes  Required assistance: No  Seizures: No    Reducing Risks:  Reducing Risks Assessed Today: No  CAD Risks: Diabetes Mellitus  Has dilated eye exam at least once a year?: Yes  Sees dentist every 6 months?: Yes  Feet checked by healthcare provider in the last year?: Yes    Healthy Coping:  Healthy Coping Assessed Today: Yes  Emotional response to diabetes: Acceptance  Informal Support system:: Family, Parent  Stage of change: ACTION (Actively working towards change)  Support resources: Offerings in Clinic Communities  Patient Activation Measure Survey Score:  KHUSHI Score (Last Two) 3/7/2012   KHUSHI Raw Score 39   Activation Score 56.4   KHUSHI Level 3         Care Plan and Education Provided:  There are no care plans that you recently modified to display for this patient.      KARLOS Haas [facility]  Triage: [phone]  [phone]    Time Spent: 30 minutes  Encounter Type: Individual    Any diabetes medication dose changes were made via the CDE Protocol per the patient's referring provider. A copy of this encounter was shared with the provider.

## 2023-03-06 ENCOUNTER — TRANSFERRED RECORDS (OUTPATIENT)
Dept: HEALTH INFORMATION MANAGEMENT | Facility: CLINIC | Age: 63
End: 2023-03-06
Payer: MEDICARE

## 2023-03-09 ENCOUNTER — NURSING HOME VISIT (OUTPATIENT)
Dept: GERIATRICS | Facility: CLINIC | Age: 63
End: 2023-03-09
Payer: MEDICARE

## 2023-03-09 VITALS
HEART RATE: 86 BPM | DIASTOLIC BLOOD PRESSURE: 85 MMHG | HEIGHT: 63 IN | BODY MASS INDEX: 28.3 KG/M2 | OXYGEN SATURATION: 98 % | TEMPERATURE: 97 F | RESPIRATION RATE: 16 BRPM | SYSTOLIC BLOOD PRESSURE: 129 MMHG | WEIGHT: 159.7 LBS

## 2023-03-09 DIAGNOSIS — K08.9 POOR DENTITION: ICD-10-CM

## 2023-03-09 DIAGNOSIS — E10.37X3 TYPE 1 DIABETES MELLITUS WITH DIABETIC MACULAR EDEMA OF BOTH EYES RESOLVED AFTER TREATMENT (H): Primary | ICD-10-CM

## 2023-03-09 DIAGNOSIS — R60.0 BILATERAL LEG EDEMA: ICD-10-CM

## 2023-03-09 DIAGNOSIS — F81.9 COGNITIVE DEVELOPMENTAL DELAY: ICD-10-CM

## 2023-03-09 DIAGNOSIS — I10 HYPERTENSION GOAL BP (BLOOD PRESSURE) < 140/90: ICD-10-CM

## 2023-03-09 DIAGNOSIS — N32.81 OAB (OVERACTIVE BLADDER): ICD-10-CM

## 2023-03-09 DIAGNOSIS — E87.6 HYPOKALEMIA: ICD-10-CM

## 2023-03-09 DIAGNOSIS — H40.003 GLAUCOMA SUSPECT, BILATERAL: ICD-10-CM

## 2023-03-09 DIAGNOSIS — D64.9 ANEMIA, UNSPECIFIED TYPE: ICD-10-CM

## 2023-03-09 DIAGNOSIS — K21.00 GASTROESOPHAGEAL REFLUX DISEASE WITH ESOPHAGITIS WITHOUT HEMORRHAGE: ICD-10-CM

## 2023-03-09 PROCEDURE — 99309 SBSQ NF CARE MODERATE MDM 30: CPT | Performed by: NURSE PRACTITIONER

## 2023-03-09 RX ORDER — INSULIN DEGLUDEC 200 U/ML
22 INJECTION, SOLUTION SUBCUTANEOUS DAILY
Start: 2023-03-09 | End: 2023-04-24

## 2023-03-09 NOTE — LETTER
3/9/2023        RE: Lexy Rockwell  [address]  [facility] [id number]        [facility] GERIATRICS  Chief Complaint   Patient presents with     custodial Regulatory     [place] Medical Record Number:  [id number]  Place of Service where encounter took place:  [facility] () [[id number]]    HPI:    Lexy Rockwell  is 62 year old (1960), who is being seen today for a federally mandated E/M visit.     Her past medical history includes    Type 1 diabetes with gastroparesis and CKD history of DKA    Hypertension    CKD stage III    Hyperlipidemia    Bilateral leg edema    Overactive bladder    GERD    Constipation    Developmentally delayed    Polyneuropathy     Vitamin D deficiency    Glaucoma        On 1/9/2023, her degludec was increased from 16 to 18 units     On 1/24/2023, her amlodipine was decreased to 5 mg daily, her metoprolol XL was stopped and changed to coreg 6.25 mg BID, aspirin was stopped due to hgb of 7.8 - 8.7       On 2/2/2023, her degludec was increased from 18 to 20 units     On 2/7/2023, an econsult was completed however the question was clear therefore no consult occurred.      On 2/9/2023, her degludec was increased from 20 to 24 units.       On 2/10/2023, her guaiac was negative.      On 2/13/2023, she received an extra 4 units of insulin at 2100.      On 2/14/2023, BG at 1730 showed as high on dexcom and glucose monitor, she was prescribed 4 units of Insulin Aspart and at 2000 it was 467.       On 2/17/2023, her BS were elevated to over 400.  She was Given extra 5 units of insulin, and her ketone were negative. Her meal time insulin was increased from 6 units to 10 units     On 2/27/23, pt saw oncology who changed the type of oral iron with a recommended CBC follow up.  Pt reported she did not want a GI workup.    On 2/28, her dexacom was downloaded at diabetic educator, she had a few episodes of BS in the 70s.  Her meal time  insulin was changed from 10 units to 8 units at each jonathan    On 3/2/2023, she saw Diabetic educator via phone.  Her insulin was changed to novalog 10 prior to meals to 8 and Degludec decreased from 24 to 20.       On 3/6/2023, [facility] and awaiting recommendations    On 3/7 her BS were 500 and her degludec insulin was increased to 22 units    Today patient was seen in her room.  Her clothes are large for her.  She denies dizziness, lightheadedness, melena, chest pain, shortness of breath.    Nursing has concerns about labile BS. BIMS=15/15 (score 13 to 15 suggests the patient is cognitively intact) PHQ9=o/27.   Patient needs limited assistance with ADLs, uses a walker.    Her appetite is good and consumes a diabetic diet.  Per nursing, skin is intact. Code status is full code.    In reviewing point weight has been stable x 6 weeks but has lost 20# since 12/26/2022.  Current weight is 159 and weight then was 179#.  She has had a decrease in LE edema with the help of lymphedema. Otherwise BP And HR stable.   BS still labile.         ALLERGIES:Amoxicillin and Sulfa drugs  PAST MEDICAL HISTORY:   Past Medical History:   Diagnosis Date     Cerumen impacted      Development delay      Diabetic gastroparesis (H) 8/16/2013     Glaucoma (increased eye pressure)      Hyperlipaemia      Hypertension      Hypothyroid      Mental retardation      Nonsenile cataract      Obesity      Strabismus      Type 1 diabetes mellitus not at goal (H)      PAST SURGICAL HISTORY:   has a past surgical history that includes EYE SURG ANT SGMT PROC UNLISTED (remote) and strabismus surgery.  FAMILY HISTORY: family history includes Diabetes in her sister; Glaucoma in her maternal grandfather; Hypertension in her father.  SOCIAL HISTORY:  reports that she has never smoked. She has never used smokeless tobacco. She reports current alcohol use. She reports that she does not use drugs.    MEDICATIONS:  MED REC REQUIRED  Post Medication Reconciliation  Status:  Discharge medications reconciled, continue medications without change           Review of your medicines          Accurate as of March 9, 2023  7:26 PM. If you have any questions, ask your nurse or doctor.            CONTINUE these medicines which may have CHANGED, or have new prescriptions. If we are uncertain of the size of tablets/capsules you have at home, strength may be listed as something that might have changed.      Dose / Directions   Tresiba FlexTouch 200 UNIT/ML pen  This may have changed: how much to take  Used for: Type 1 diabetes mellitus with diabetic macular edema of both eyes resolved after treatment (H)  Generic drug: insulin degludec      Dose: 20 Units  Inject 20 Units Subcutaneous daily  Quantity: 15 mL  Refills: 0        CONTINUE these medicines which have NOT CHANGED      Dose / Directions   acetone urine test strip  Commonly known as: KETOSTIX  Used for: Type 1 diabetes mellitus with diabetic neuropathy (H)      Use as needed when BG is > 240 mg/dl for >4 hours with symptoms of DKA.  Quantity: 50 strip  Refills: 3     AMLODIPINE BENZOATE PO      Dose: 5 mg  Take 5 mg by mouth  Refills: 0     B-D U/F 31G X 8 MM miscellaneous  Used for: Type 2 diabetes, HbA1C goal < 8% (H)  Generic drug: insulin pen needle      Use 3-4 pen needles daily or as directed.  Quantity: 200 each  Refills: 3     carvedilol 6.25 MG tablet  Commonly known as: COREG  Used for: Hypertension goal BP (blood pressure) < 140/90      Dose: 6.25 mg  Take 1 tablet (6.25 mg) by mouth 2 times daily (with meals)  Refills: 0     ciclopirox 0.77 % cream  Commonly known as: LOPROX  Used for: Tyloma, Dermatophytosis of nail, Ingrown toenail, Type 1 diabetes mellitus with pressure callus (H), DM type 1 with diabetic peripheral neuropathy (H)      Apply topically 2 times daily To feet and toenails.  Quantity: 90 g  Refills: 5     Dexcom G6  Padma  Used for: Type 1 diabetes mellitus with other specified complication (H)       USE AS DIRECTED FOR CONTINUOUS GLUCOSE MONITORING  Quantity: 1 Device  Refills: 1     Dexcom G6 Sensor Misc  Used for: Type 1 diabetes mellitus with other specified complication (H)      USE AS DIRECTED 1 EVERY 10 DAYS  Quantity: 3 each  Refills: 5     Dexcom G6 Transmitter Misc  Used for: Type 1 diabetes mellitus with other specified complication (H)      USE ONE EVERY 3 MONTHS  Quantity: 1 each  Refills: 1     dorzolamide-timolol 2-0.5 % ophthalmic solution  Commonly known as: COSOPT  Used for: Primary open angle glaucoma of both eyes, moderate stage      Dose: 1 drop  Place 1 drop into both eyes 2 times daily  Quantity: 5 mL  Refills: 11     ferrous sulfate 325 (65 Fe) MG tablet  Commonly known as: FEROSUL  Used for: Anemia, unspecified type      Dose: 325 mg  Take 1 tablet (325 mg) by mouth daily (with breakfast)  Refills: 0     glucagon 1 MG Solr injection      Dose: 1 mg  Inject 1 mg into the muscle once  Refills: 0     latanoprost 0.005 % ophthalmic solution  Commonly known as: XALATAN  Used for: Primary open angle glaucoma of both eyes, moderate stage      INSTILL 1 DROP IN BOTH EYES AT BEDTIME  Quantity: 10 mL  Refills: 3     lisinopril 40 MG tablet  Commonly known as: ZESTRIL  Used for: Essential hypertension with goal blood pressure less than 140/90      Dose: 40 mg  Take 1 tablet (40 mg) by mouth daily  Refills: 0     magnesium oxide 400 MG tablet  Commonly known as: MAG-OX  Used for: Hypomagnesemia      Dose: 400 mg  Take 1 tablet (400 mg) by mouth daily  Quantity: 90 tablet  Refills: 3     Metamucil 28.3 % Powd  Used for: Constipation, unspecified constipation type, Nausea  Generic drug: psyllium      Dose: 1 packet  Take 1 packet by mouth daily as needed (irregular BM) Take 1 capful by mouth daily  Refills: 0     Multivitamin Tabs      1 TABLET DAILY  Refills: 0     NovoLOG FLEXPEN 100 UNIT/ML pen  Used for: Type 1 diabetes mellitus with diabetic macular edema of both eyes resolved after treatment  "(H)  Generic drug: insulin aspart      Dose: 8 Units  Inject 8 Units Subcutaneous 3 times daily (with meals) And sliding scale: 1 unit: 150 -199 mg/dl.   200-249= 2 units  250-299 3 units  300-249 = 4 units  350 - 399= 5 units  400+ = 6 units and call MD Up to 30 units daily  Quantity: 15 mL  Refills: 0     nystatin [id number] UNIT/GM external cream  Commonly known as: MYCOSTATIN  Used for: Dermatophytosis of nail      Apply topically 2 times daily To feet and toenails.  Quantity: 90 g  Refills: 5     oxybutynin ER 10 MG 24 hr tablet  Commonly known as: DITROPAN XL  Used for: Overactive bladder      TAKE 1 TABLET(10 MG) BY MOUTH DAILY  Quantity: 90 tablet  Refills: 1     pantoprazole 40 MG EC tablet  Commonly known as: PROTONIX      Dose: 40 mg  Take 40 mg by mouth daily  Refills: 0     potassium chloride ER 20 MEQ CR tablet  Commonly known as: KLOR-CON M      Dose: 20 mEq  Take 20 mEq by mouth 2 times daily  Refills: 0     simvastatin 20 MG tablet  Commonly known as: ZOCOR  Used for: Hyperlipidemia LDL goal <100      Dose: 20 mg  Take 1 tablet (20 mg) by mouth At Bedtime  Quantity: 90 tablet  Refills: 3     Stool Softener/Laxative 8.6-50 MG tablet  Generic drug: senna-docusate      TAKE 1 TAB BY MOUTH ONCE DAILY FOR CONSTIPATION  Refills: 0            Case Management:  I have reviewed the care plan and MDS and do agree with the plan. Patient's desire to return to the community is present, but is not able due to care needs . Information reviewed:  Medications, vital signs, orders, and nursing notes.    ROS:  10 point ROS of systems including Constitutional, Eyes, Respiratory, Cardiovascular, Gastroenterology, Genitourinary, Integumentary, Musculoskeletal, Psychiatric were all negative except for pertinent positives noted in my HPI.    Vitals:  /85   Pulse 86   Temp 97  F (36.1  C)   Resp 16   Ht 1.6 m (5' 3\")   Wt 72.4 kg (159 lb 11.2 oz)   LMP 03/28/2014   SpO2 98%   BMI 28.29 kg/m    Body mass index is " 28.29 kg/m .  Exam:  GENERAL APPEARANCE:  Alert, in no distress, pale  RESP:  lungs clear to auscultation , no respiratory distress  CV:  regular rate and rhythm, no murmur, rub, or gallop  SKIN:  pale  PSYCH:  oriented X 3, normal insight, judgement and memory    Lab/Diagnostic data:     Most Recent 3 CBC's:Recent Labs   Lab Test 02/22/23  0537 02/06/23  0815 01/25/23  0820   WBC 3.9* 3.5* 3.5*   HGB 8.6* 8.0* 7.8*   MCV 90 87 89    282 281     Most Recent 3 BMP's:Recent Labs   Lab Test 01/25/23  0820 01/11/23  1450 01/02/23  0631 12/29/22  0625    143  --  144   POTASSIUM 3.9 4.3 4.4 2.9*   CHLORIDE 106 108*  --  106   CO2 21* 19*  --  24   BUN 19.3 16.7  --  22.1   CR 0.93 1.11*  --  0.94   ANIONGAP 15 16*  --  14   EFRAIN 9.0 9.5  --  9.5   * 92  --  139*     Most Recent 2 LFT's:Recent Labs   Lab Test 01/25/23  0820 10/13/21  1540   AST 19 13   ALT 10 18   ALKPHOS 79 96   BILITOTAL <0.2 0.3     Most Recent TSH and T4:Recent Labs   Lab Test 10/13/21  1540 02/01/21  0909   TSH 3.41 7.07*   T4  --  1.05     Most Recent Hemoglobin A1c:Recent Labs   Lab Test 01/25/23  0820   A1C 8.7*       ASSESSMENT/PLAN  (E10.37X3) Type 1 diabetes mellitus with diabetic macular edema of both eyes resolved after treatment (H)  (primary encounter diagnosis)  Comment: Chronic.  Labile and at times uncontrolled  Adjusted insulin as pt was having frequent high BS and then had lows.  Met with diabetic educated and insulin was decreased now having highs again.      Patient has a DEXAcom continuous blood sugar monitor and have now moved to phone verses monitor which could not be downloaded except for the diabetic office.  For secondary prevention she is currently taking simvastatin 20 mg daily, lisinopril 40 mg daily, and aspirin 81 mg daily is being held due to anemia.  Plan:   Increase  degludec from 20 units to 22 units  Give one time dose of Novalog 6 units due to hyperglycemia.    Follow up with diabetic educator in  3/2023  Follow up with endocrinology in 5/2023     (R60.0) Bilateral leg edema  Comment: improving.  May be related to amlodipine 5 mg daily which has a known side effect of lower extremity edema.  She is seeing lymphedema.  Her legs are improving and I personally reviewed the PT notes regarding lymphedema therapy. Her weight is down 20# some due to decrease edema.    Plan: Continue with plan of care no changes at this time, adjustment as needed     (D62.9] Anemia   Comment:  Acute,   She has seen [facility] and hematology.  Was started on iron daily by hematology.  Has hx of upper endoscopy in 2019 which showed esophagitis.  Colonoscopy in 202 found tubular adenomatous polyp and hemorrhoids.     Plan:     Ferritin, iron, iron binding, reticulocyte count, CBC with diff on 3/28 per hematology   GI recommend upper and lower endoscopy in the hospital setting and if negative consider a CT/pill cam    (I10) Hypertension goal BP (blood pressure) < 140/90  Comment: Chronic, Controlled while taking lisinopril, corg, amlodipine  Plan: Consider decreasing amlodipine to 2.5 mg daily      (N32.81) Overactive bladder  Comment: Chronic.  Stable while taking oxybutynin  Plan: Continue with plan of care no changes at this time, adjustment as needed     (K21.00) Gastroesophageal reflux disease with esophagitis without hemorrhage  Comment: Chronic, stable while taking Protonix  Plan: Continue with plan of care no changes at this time, adjustment as needed  Following with GI as noted above    (K59.01) Slow transit constipation  Comment: Chronic, stable while taking Senna S daily and metamucil as needed  plan: Continue with plan of care no changes at this time, adjustment as needed        (F81.9) Cognitive developmental delay  Comment: Chronic, stable.  Patient has a mother and father who live in [place] and 2 sisters who live in the [facility] area.   Her father is on hospice in the [facility]  Plan: Continue with plan of  care no changes at this time, adjustment as needed        (H40.003) Glaucoma suspect, bilateral  Comment: Chronic, stable.   Plan: Currently taking latanoprost solution and Cosopt BID to both eyes.       Sees ophthalmology on 4/21     (E87.6) hypokalemia  Comment: Chronic.   Currently taking potassium chloride supplementation.  K on BMP WNL  Plan: Continue with plan of care no changes at this time, adjustment as needed     (K08.9) Poor dentition  (primary encounter diagnosis)  Comment: Chronic.  Missing teeth and has broken teeth with poor gum health.    Plan: Refer to house dentist.     orders  Increase degludec from 20 units to 22 units  Give one time dose of Novalog 6 units due to hyperglycemia.   Decrease amlodipine to 2.5 mg daily   CMP, hgb A1c, Ferritin, iron, iron binding, reticulocyte count, CBC with diff on 3/28  Will notify diabetic educator the dexacom celso is now on pt's phone.     Electronically signed by:  TAYLA Sprague CNP              Sincerely,        TAYLA Sprague CNP

## 2023-03-09 NOTE — PROGRESS NOTES
[facility] GERIATRICS  Chief Complaint   Patient presents with     [facility]     [place] Medical Record Number:  [id number]  Place of Service where encounter took place:  [facility] () [[id number]]    HPI:    Lexy Rockwell  is 62 year old (1960), who is being seen today for a federally mandated E/M visit.     Her past medical history includes    Type 1 diabetes with gastroparesis and CKD history of DKA    Hypertension    CKD stage III    Hyperlipidemia    Bilateral leg edema    Overactive bladder    GERD    Constipation    Developmentally delayed    Polyneuropathy     Vitamin D deficiency    Glaucoma        On 1/9/2023, her degludec was increased from 16 to 18 units     On 1/24/2023, her amlodipine was decreased to 5 mg daily, her metoprolol XL was stopped and changed to coreg 6.25 mg BID, aspirin was stopped due to hgb of 7.8 - 8.7       On 2/2/2023, her degludec was increased from 18 to 20 units     On 2/7/2023, an econsult was completed however the question was clear therefore no consult occurred.      On 2/9/2023, her degludec was increased from 20 to 24 units.       On 2/10/2023, her guaiac was negative.      On 2/13/2023, she received an extra 4 units of insulin at 2100.      On 2/14/2023, BG at 1730 showed as high on dexcom and glucose monitor, she was prescribed 4 units of Insulin Aspart and at 2000 it was 467.       On 2/17/2023, her BS were elevated to over 400.  She was Given extra 5 units of insulin, and her ketone were negative. Her meal time insulin was increased from 6 units to 10 units     On 2/27/23, pt saw oncology who changed the type of oral iron with a recommended CBC follow up.  Pt reported she did not want a GI workup.    On 2/28, her dexacom was downloaded at diabetic educator, she had a few episodes of BS in the 70s.  Her meal time insulin was changed from 10 units to 8 units at each jonathan    On 3/2/2023, she saw Diabetic educator via phone.   Her insulin was changed to novalog 10 prior to meals to 8 and Degludec decreased from 24 to 20.       On 3/6/2023, [facility] and awaiting recommendations    On 3/7 her BS were 500 and her degludec insulin was increased to 22 units    Today patient was seen in her room.  Her clothes are large for her.  She denies dizziness, lightheadedness, melena, chest pain, shortness of breath.    Nursing has concerns about labile BS. BIMS=15/15 (score 13 to 15 suggests the patient is cognitively intact) PHQ9=o/27.   Patient needs limited assistance with ADLs, uses a walker.    Her appetite is good and consumes a diabetic diet.  Per nursing, skin is intact. Code status is full code.    In reviewing point weight has been stable x 6 weeks but has lost 20# since 12/26/2022.  Current weight is 159 and weight then was 179#.  She has had a decrease in LE edema with the help of lymphedema. Otherwise BP And HR stable.   BS still labile.         ALLERGIES:Amoxicillin and Sulfa drugs  PAST MEDICAL HISTORY:   Past Medical History:   Diagnosis Date     Cerumen impacted      Development delay      Diabetic gastroparesis (H) 8/16/2013     Glaucoma (increased eye pressure)      Hyperlipaemia      Hypertension      Hypothyroid      Mental retardation      Nonsenile cataract      Obesity      Strabismus      Type 1 diabetes mellitus not at goal (H)      PAST SURGICAL HISTORY:   has a past surgical history that includes EYE SURG ANT SGMT PROC UNLISTED (remote) and strabismus surgery.  FAMILY HISTORY: family history includes Diabetes in her sister; Glaucoma in her maternal grandfather; Hypertension in her father.  SOCIAL HISTORY:  reports that she has never smoked. She has never used smokeless tobacco. She reports current alcohol use. She reports that she does not use drugs.    MEDICATIONS:  MED REC REQUIRED  Post Medication Reconciliation Status:  Discharge medications reconciled, continue medications without change           Review of your medicines           Accurate as of March 9, 2023  7:26 PM. If you have any questions, ask your nurse or doctor.            CONTINUE these medicines which may have CHANGED, or have new prescriptions. If we are uncertain of the size of tablets/capsules you have at home, strength may be listed as something that might have changed.      Dose / Directions   Tresiba FlexTouch 200 UNIT/ML pen  This may have changed: how much to take  Used for: Type 1 diabetes mellitus with diabetic macular edema of both eyes resolved after treatment (H)  Generic drug: insulin degludec      Dose: 20 Units  Inject 20 Units Subcutaneous daily  Quantity: 15 mL  Refills: 0        CONTINUE these medicines which have NOT CHANGED      Dose / Directions   acetone urine test strip  Commonly known as: KETOSTIX  Used for: Type 1 diabetes mellitus with diabetic neuropathy (H)      Use as needed when BG is > 240 mg/dl for >4 hours with symptoms of DKA.  Quantity: 50 strip  Refills: 3     AMLODIPINE BENZOATE PO      Dose: 5 mg  Take 5 mg by mouth  Refills: 0     B-D U/F 31G X 8 MM miscellaneous  Used for: Type 2 diabetes, HbA1C goal < 8% (H)  Generic drug: insulin pen needle      Use 3-4 pen needles daily or as directed.  Quantity: 200 each  Refills: 3     carvedilol 6.25 MG tablet  Commonly known as: COREG  Used for: Hypertension goal BP (blood pressure) < 140/90      Dose: 6.25 mg  Take 1 tablet (6.25 mg) by mouth 2 times daily (with meals)  Refills: 0     ciclopirox 0.77 % cream  Commonly known as: LOPROX  Used for: Tyloma, Dermatophytosis of nail, Ingrown toenail, Type 1 diabetes mellitus with pressure callus (H), DM type 1 with diabetic peripheral neuropathy (H)      Apply topically 2 times daily To feet and toenails.  Quantity: 90 g  Refills: 5     Dexcom G6  Padma  Used for: Type 1 diabetes mellitus with other specified complication (H)      USE AS DIRECTED FOR CONTINUOUS GLUCOSE MONITORING  Quantity: 1 Device  Refills: 1     Dexcom G6 Sensor Misc  Used  for: Type 1 diabetes mellitus with other specified complication (H)      USE AS DIRECTED 1 EVERY 10 DAYS  Quantity: 3 each  Refills: 5     Dexcom G6 Transmitter Misc  Used for: Type 1 diabetes mellitus with other specified complication (H)      USE ONE EVERY 3 MONTHS  Quantity: 1 each  Refills: 1     dorzolamide-timolol 2-0.5 % ophthalmic solution  Commonly known as: COSOPT  Used for: Primary open angle glaucoma of both eyes, moderate stage      Dose: 1 drop  Place 1 drop into both eyes 2 times daily  Quantity: 5 mL  Refills: 11     ferrous sulfate 325 (65 Fe) MG tablet  Commonly known as: FEROSUL  Used for: Anemia, unspecified type      Dose: 325 mg  Take 1 tablet (325 mg) by mouth daily (with breakfast)  Refills: 0     glucagon 1 MG Solr injection      Dose: 1 mg  Inject 1 mg into the muscle once  Refills: 0     latanoprost 0.005 % ophthalmic solution  Commonly known as: XALATAN  Used for: Primary open angle glaucoma of both eyes, moderate stage      INSTILL 1 DROP IN BOTH EYES AT BEDTIME  Quantity: 10 mL  Refills: 3     lisinopril 40 MG tablet  Commonly known as: ZESTRIL  Used for: Essential hypertension with goal blood pressure less than 140/90      Dose: 40 mg  Take 1 tablet (40 mg) by mouth daily  Refills: 0     magnesium oxide 400 MG tablet  Commonly known as: MAG-OX  Used for: Hypomagnesemia      Dose: 400 mg  Take 1 tablet (400 mg) by mouth daily  Quantity: 90 tablet  Refills: 3     Metamucil 28.3 % Powd  Used for: Constipation, unspecified constipation type, Nausea  Generic drug: psyllium      Dose: 1 packet  Take 1 packet by mouth daily as needed (irregular BM) Take 1 capful by mouth daily  Refills: 0     Multivitamin Tabs      1 TABLET DAILY  Refills: 0     NovoLOG FLEXPEN 100 UNIT/ML pen  Used for: Type 1 diabetes mellitus with diabetic macular edema of both eyes resolved after treatment (H)  Generic drug: insulin aspart      Dose: 8 Units  Inject 8 Units Subcutaneous 3 times daily (with meals) And  "sliding scale: 1 unit: 150 -199 mg/dl.   200-249= 2 units  250-299 3 units  300-249 = 4 units  350 - 399= 5 units  400+ = 6 units and call MD Up to 30 units daily  Quantity: 15 mL  Refills: 0     nystatin [id number] UNIT/GM external cream  Commonly known as: MYCOSTATIN  Used for: Dermatophytosis of nail      Apply topically 2 times daily To feet and toenails.  Quantity: 90 g  Refills: 5     oxybutynin ER 10 MG 24 hr tablet  Commonly known as: DITROPAN XL  Used for: Overactive bladder      TAKE 1 TABLET(10 MG) BY MOUTH DAILY  Quantity: 90 tablet  Refills: 1     pantoprazole 40 MG EC tablet  Commonly known as: PROTONIX      Dose: 40 mg  Take 40 mg by mouth daily  Refills: 0     potassium chloride ER 20 MEQ CR tablet  Commonly known as: KLOR-CON M      Dose: 20 mEq  Take 20 mEq by mouth 2 times daily  Refills: 0     simvastatin 20 MG tablet  Commonly known as: ZOCOR  Used for: Hyperlipidemia LDL goal <100      Dose: 20 mg  Take 1 tablet (20 mg) by mouth At Bedtime  Quantity: 90 tablet  Refills: 3     Stool Softener/Laxative 8.6-50 MG tablet  Generic drug: senna-docusate      TAKE 1 TAB BY MOUTH ONCE DAILY FOR CONSTIPATION  Refills: 0            Case Management:  I have reviewed the care plan and MDS and do agree with the plan. Patient's desire to return to the community is present, but is not able due to care needs . Information reviewed:  Medications, vital signs, orders, and nursing notes.    ROS:  10 point ROS of systems including Constitutional, Eyes, Respiratory, Cardiovascular, Gastroenterology, Genitourinary, Integumentary, Musculoskeletal, Psychiatric were all negative except for pertinent positives noted in my HPI.    Vitals:  /85   Pulse 86   Temp 97  F (36.1  C)   Resp 16   Ht 1.6 m (5' 3\")   Wt 72.4 kg (159 lb 11.2 oz)   LMP 03/28/2014   SpO2 98%   BMI 28.29 kg/m    Body mass index is 28.29 kg/m .  Exam:  GENERAL APPEARANCE:  Alert, in no distress, pale  RESP:  lungs clear to auscultation , no " respiratory distress  CV:  regular rate and rhythm, no murmur, rub, or gallop  SKIN:  pale  PSYCH:  oriented X 3, normal insight, judgement and memory    Lab/Diagnostic data:     Most Recent 3 CBC's:Recent Labs   Lab Test 02/22/23  0537 02/06/23  0815 01/25/23  0820   WBC 3.9* 3.5* 3.5*   HGB 8.6* 8.0* 7.8*   MCV 90 87 89    282 281     Most Recent 3 BMP's:Recent Labs   Lab Test 01/25/23  0820 01/11/23  1450 01/02/23  0631 12/29/22  0625    143  --  144   POTASSIUM 3.9 4.3 4.4 2.9*   CHLORIDE 106 108*  --  106   CO2 21* 19*  --  24   BUN 19.3 16.7  --  22.1   CR 0.93 1.11*  --  0.94   ANIONGAP 15 16*  --  14   EFRAIN 9.0 9.5  --  9.5   * 92  --  139*     Most Recent 2 LFT's:Recent Labs   Lab Test 01/25/23  0820 10/13/21  1540   AST 19 13   ALT 10 18   ALKPHOS 79 96   BILITOTAL <0.2 0.3     Most Recent TSH and T4:Recent Labs   Lab Test 10/13/21  1540 02/01/21  0909   TSH 3.41 7.07*   T4  --  1.05     Most Recent Hemoglobin A1c:Recent Labs   Lab Test 01/25/23  0820   A1C 8.7*       ASSESSMENT/PLAN  (E10.37X3) Type 1 diabetes mellitus with diabetic macular edema of both eyes resolved after treatment (H)  (primary encounter diagnosis)  Comment: Chronic.  Labile and at times uncontrolled  Adjusted insulin as pt was having frequent high BS and then had lows.  Met with diabetic educated and insulin was decreased now having highs again.      Patient has a DEXAcom continuous blood sugar monitor and have now moved to phone verses monitor which could not be downloaded except for the diabetic office.  For secondary prevention she is currently taking simvastatin 20 mg daily, lisinopril 40 mg daily, and aspirin 81 mg daily is being held due to anemia.  Plan:   Increase  degludec from 20 units to 22 units  Give one time dose of Novalog 6 units due to hyperglycemia.    Follow up with diabetic educator in 3/2023  Follow up with endocrinology in 5/2023     (R60.0) Bilateral leg edema  Comment: improving.  May be  related to amlodipine 5 mg daily which has a known side effect of lower extremity edema.  She is seeing lymphedema.  Her legs are improving and I personally reviewed the PT notes regarding lymphedema therapy. Her weight is down 20# some due to decrease edema.    Plan: Continue with plan of care no changes at this time, adjustment as needed     (D62.9] Anemia   Comment:  Acute,   She has seen [facility] and hematology.  Was started on iron daily by hematology.  Has hx of upper endoscopy in 2019 which showed esophagitis.  Colonoscopy in 202 found tubular adenomatous polyp and hemorrhoids.     Plan:     Ferritin, iron, iron binding, reticulocyte count, CBC with diff on 3/28 per hematology   GI recommend upper and lower endoscopy in the hospital setting and if negative consider a CT/pill cam    (I10) Hypertension goal BP (blood pressure) < 140/90  Comment: Chronic, Controlled while taking lisinopril, corg, amlodipine  Plan: Consider decreasing amlodipine to 2.5 mg daily      (N32.81) Overactive bladder  Comment: Chronic.  Stable while taking oxybutynin  Plan: Continue with plan of care no changes at this time, adjustment as needed     (K21.00) Gastroesophageal reflux disease with esophagitis without hemorrhage  Comment: Chronic, stable while taking Protonix  Plan: Continue with plan of care no changes at this time, adjustment as needed  Following with GI as noted above    (K59.01) Slow transit constipation  Comment: Chronic, stable while taking Senna S daily and metamucil as needed  plan: Continue with plan of care no changes at this time, adjustment as needed        (F81.9) Cognitive developmental delay  Comment: Chronic, stable.  Patient has a mother and father who live in [place] and 2 sisters who live in the [facility] area.   Her father is on hospice in the [facility]  Plan: Continue with plan of care no changes at this time, adjustment as needed        (H40.003) Glaucoma suspect, bilateral  Comment:  Chronic, stable.   Plan: Currently taking latanoprost solution and Cosopt BID to both eyes.       Sees ophthalmology on 4/21     (E87.6) hypokalemia  Comment: Chronic.   Currently taking potassium chloride supplementation.  K on BMP WNL  Plan: Continue with plan of care no changes at this time, adjustment as needed     (K08.9) Poor dentition  (primary encounter diagnosis)  Comment: Chronic.  Missing teeth and has broken teeth with poor gum health.    Plan: Refer to house dentist.     orders  Increase degludec from 20 units to 22 units  Give one time dose of Novalog 6 units due to hyperglycemia.   Decrease amlodipine to 2.5 mg daily   CMP, hgb A1c, Ferritin, iron, iron binding, reticulocyte count, CBC with diff on 3/28  Will notify diabetic educator the dexacom celso is now on pt's phone.     Electronically signed by:  TAYLA Sprague CNP

## 2023-03-24 ENCOUNTER — TELEPHONE (OUTPATIENT)
Dept: GERIATRICS | Facility: CLINIC | Age: 63
End: 2023-03-24
Payer: MEDICARE

## 2023-03-24 NOTE — TELEPHONE ENCOUNTER
[facility] En[facility]    Provider: TAYLA Gagnon CNP  Facility: [facility] Facility Type:  [facility]    Caller: Darlyn  Call Back Number: [phone]    Allergies:    Allergies   Allergen Reactions     Amoxicillin      Sulfa Drugs         Reason for call: Nurse is reporting that patient's BG at noon is reading high.  AM BG was 62.  Patient is on Novolog 8 units TID with meals and sliding scale.  Patient got a total of 14 units of Novolog insulin around noon.  At 1300, it's still reading still high.      Addendum:  BG at 4:15pm is reading high on the Dexcom, but the fingerstick BG is 416.      Verbal Order/Direction given by Provider: Give an extra dose of Novolog 6 units x 1 now.      Addendum:  Check BG right before dinner around 5:30.  Give insulin as ordered.  If patient is outside of the sliding scale, call NP directly.      Provider giving Order:  TAYLA Gagnon CNP    Verbal Order given to: Tricia Goetz RN

## 2023-03-28 ENCOUNTER — LAB REQUISITION (OUTPATIENT)
Dept: LAB | Facility: CLINIC | Age: 63
End: 2023-03-28
Payer: MEDICARE

## 2023-03-28 DIAGNOSIS — E08.65 DIABETES MELLITUS DUE TO UNDERLYING CONDITION WITH HYPERGLYCEMIA (H): ICD-10-CM

## 2023-03-28 DIAGNOSIS — D64.9 ANEMIA, UNSPECIFIED: ICD-10-CM

## 2023-03-28 DIAGNOSIS — I10 ESSENTIAL (PRIMARY) HYPERTENSION: ICD-10-CM

## 2023-03-29 LAB
ALBUMIN SERPL BCG-MCNC: 3.5 G/DL (ref 3.5–5.2)
ALP SERPL-CCNC: 73 U/L (ref 35–104)
ALT SERPL W P-5'-P-CCNC: 19 U/L (ref 10–35)
ANION GAP SERPL CALCULATED.3IONS-SCNC: 9 MMOL/L (ref 7–15)
AST SERPL W P-5'-P-CCNC: 20 U/L (ref 10–35)
BASOPHILS # BLD AUTO: 0.1 10E3/UL (ref 0–0.2)
BASOPHILS NFR BLD AUTO: 1 %
BILIRUB SERPL-MCNC: <0.2 MG/DL
BUN SERPL-MCNC: 39.7 MG/DL (ref 8–23)
CALCIUM SERPL-MCNC: 9.6 MG/DL (ref 8.8–10.2)
CHLORIDE SERPL-SCNC: 110 MMOL/L (ref 98–107)
CREAT SERPL-MCNC: 1.11 MG/DL (ref 0.51–0.95)
DEPRECATED HCO3 PLAS-SCNC: 23 MMOL/L (ref 22–29)
EOSINOPHIL # BLD AUTO: 0.2 10E3/UL (ref 0–0.7)
EOSINOPHIL NFR BLD AUTO: 4 %
ERYTHROCYTE [DISTWIDTH] IN BLOOD BY AUTOMATED COUNT: 15.7 % (ref 10–15)
FERRITIN SERPL-MCNC: 49 NG/ML (ref 11–328)
GFR SERPL CREATININE-BSD FRML MDRD: 56 ML/MIN/1.73M2
GLUCOSE SERPL-MCNC: 102 MG/DL (ref 70–99)
HBA1C MFR BLD: 8.1 %
HCT VFR BLD AUTO: 29.8 % (ref 35–47)
HGB BLD-MCNC: 9 G/DL (ref 11.7–15.7)
IMM GRANULOCYTES # BLD: 0 10E3/UL
IMM GRANULOCYTES NFR BLD: 0 %
IRON BINDING CAPACITY (ROCHE): 269 UG/DL (ref 240–430)
IRON SATN MFR SERPL: 11 % (ref 15–46)
IRON SERPL-MCNC: 29 UG/DL (ref 37–145)
LYMPHOCYTES # BLD AUTO: 1.4 10E3/UL (ref 0.8–5.3)
LYMPHOCYTES NFR BLD AUTO: 30 %
MCH RBC QN AUTO: 26.2 PG (ref 26.5–33)
MCHC RBC AUTO-ENTMCNC: 30.2 G/DL (ref 31.5–36.5)
MCV RBC AUTO: 87 FL (ref 78–100)
MONOCYTES # BLD AUTO: 0.3 10E3/UL (ref 0–1.3)
MONOCYTES NFR BLD AUTO: 7 %
NEUTROPHILS # BLD AUTO: 2.6 10E3/UL (ref 1.6–8.3)
NEUTROPHILS NFR BLD AUTO: 58 %
NRBC # BLD AUTO: 0 10E3/UL
NRBC BLD AUTO-RTO: 0 /100
PLATELET # BLD AUTO: 220 10E3/UL (ref 150–450)
POTASSIUM SERPL-SCNC: 4.5 MMOL/L (ref 3.4–5.3)
PROT SERPL-MCNC: 6 G/DL (ref 6.4–8.3)
RBC # BLD AUTO: 3.44 10E6/UL (ref 3.8–5.2)
RETICS # AUTO: 0.03 10E6/UL (ref 0.03–0.1)
RETICS/RBC NFR AUTO: 0.7 % (ref 0.5–2)
SODIUM SERPL-SCNC: 142 MMOL/L (ref 136–145)
WBC # BLD AUTO: 4.5 10E3/UL (ref 4–11)

## 2023-03-29 PROCEDURE — 83036 HEMOGLOBIN GLYCOSYLATED A1C: CPT | Mod: ORL | Performed by: NURSE PRACTITIONER

## 2023-03-29 PROCEDURE — 85045 AUTOMATED RETICULOCYTE COUNT: CPT | Mod: ORL | Performed by: NURSE PRACTITIONER

## 2023-03-29 PROCEDURE — P9603 ONE-WAY ALLOW PRORATED MILES: HCPCS | Mod: ORL | Performed by: NURSE PRACTITIONER

## 2023-03-29 PROCEDURE — 80053 COMPREHEN METABOLIC PANEL: CPT | Mod: ORL | Performed by: NURSE PRACTITIONER

## 2023-03-29 PROCEDURE — 85025 COMPLETE CBC W/AUTO DIFF WBC: CPT | Mod: ORL | Performed by: NURSE PRACTITIONER

## 2023-03-29 PROCEDURE — 83550 IRON BINDING TEST: CPT | Mod: ORL | Performed by: NURSE PRACTITIONER

## 2023-03-29 PROCEDURE — 82728 ASSAY OF FERRITIN: CPT | Mod: ORL | Performed by: NURSE PRACTITIONER

## 2023-03-29 PROCEDURE — 36415 COLL VENOUS BLD VENIPUNCTURE: CPT | Mod: ORL | Performed by: NURSE PRACTITIONER

## 2023-03-30 ENCOUNTER — NURSING HOME VISIT (OUTPATIENT)
Dept: GERIATRICS | Facility: CLINIC | Age: 63
End: 2023-03-30
Payer: MEDICARE

## 2023-03-30 ENCOUNTER — VIRTUAL VISIT (OUTPATIENT)
Dept: EDUCATION SERVICES | Facility: CLINIC | Age: 63
End: 2023-03-30
Payer: MEDICARE

## 2023-03-30 VITALS
DIASTOLIC BLOOD PRESSURE: 76 MMHG | RESPIRATION RATE: 18 BRPM | OXYGEN SATURATION: 96 % | SYSTOLIC BLOOD PRESSURE: 123 MMHG | WEIGHT: 160.7 LBS | HEIGHT: 63 IN | TEMPERATURE: 97.8 F | HEART RATE: 96 BPM | BODY MASS INDEX: 28.47 KG/M2

## 2023-03-30 DIAGNOSIS — E10.37X3 TYPE 1 DIABETES MELLITUS WITH DIABETIC MACULAR EDEMA OF BOTH EYES RESOLVED AFTER TREATMENT (H): ICD-10-CM

## 2023-03-30 DIAGNOSIS — N32.81 OAB (OVERACTIVE BLADDER): ICD-10-CM

## 2023-03-30 DIAGNOSIS — K59.01 SLOW TRANSIT CONSTIPATION: Primary | ICD-10-CM

## 2023-03-30 DIAGNOSIS — D64.9 ANEMIA, UNSPECIFIED TYPE: ICD-10-CM

## 2023-03-30 DIAGNOSIS — E10.40 TYPE 1 DIABETES MELLITUS WITH DIABETIC NEUROPATHY (H): Primary | ICD-10-CM

## 2023-03-30 PROCEDURE — 99309 SBSQ NF CARE MODERATE MDM 30: CPT | Performed by: NURSE PRACTITIONER

## 2023-03-30 PROCEDURE — 98967 PH1 ASSMT&MGMT NQHP 11-20: CPT | Performed by: DIETITIAN, REGISTERED

## 2023-03-30 NOTE — PROGRESS NOTES
Diabetes Self-Management Education & Support    Presents for: CGM Review    Type of Service: Telephone Visit    Audio only visit done, as patient does not have access to audio-visual technology.    Individual visit provided, given no group classes are available for 2 months.     Originating Location (Patient Location): Assisted Living  Distant Location (Provider Location): [facility]  Mode of Communication:  Telephone    Telephone Visit Start Time: 1:00  Telephone Visit End Time (telephone visit stop time): 1:15 pm    How would patient like to obtain AVS? MyChart    Assessment Type:   REPORTS:              Pt verbalized understanding of concepts discussed and recommendations provided today.       Continue education with the following diabetes management concepts: Healthy Eating, Being Active, Monitoring, Taking Medication, Problem Solving, Reducing Risks and Healthy Coping    ASSESSMENT:  Glucose Patterns & Trends:  Having some rebound hyperglycemia. Leyx stated that she had a low last night and the nurses treated it with 2 cookies, orange juice and yogurt, this morning her BG was in the 400's. We discussed treating with just 1/2 - 1 cup of orange juice. The dietitian that is present at visit plans to put in an order to treat lows with just orange juice.     We discussed a plan to decrease insulin dose to prevent hypoglycemia.Recommend decreasing Novolog from 8 units before meals to 7 units before meals.        PLAN    Treat hypoglycemia with 1/2 - 1 cup of orange juice  Decrease Novolog dose: 8-8-8-0 --> 7-7-7-0      Topics to cover at upcoming visits: Healthy Eating, Being Active, Monitoring, Taking Medication, Problem Solving, Reducing Risks and Healthy Coping    Follow-up: April 24th    See Care Plan for co-developed, patient-state behavior change goals.  AVS provided for patient today.    Education Materials Provided:  No new materials provided  "today      SUBJECTIVE/OBJECTIVE:  Presents for: CGM Review  Accompanied by: Self, Other (Dietitian from Assisted Living)  Diabetes education in the past 24mo: Yes  Focus of Visit: Problem Solving, Monitoring, Taking Medication  Diabetes type: Type 1  Disease course: Worsening  Transportation concerns: Yes (gets rides or takes public tranportation)  Difficulty affording diabetes medication?: No  Difficulty affording diabetes testing supplies?: No  Other concerns:: Cognitive impairment  Cultural Influences/Ethnic Background:  Not  or     Diabetes Symptoms & Complications:  Fatigue: Yes  Neuropathy: Yes (in feet)  Polydipsia: No  Polyphagia: No  Polyuria: Sometimes (often getting up in the night)  Visual change: Sometimes  Slow healing wounds: No (a little cut near the toenails)  Symptom course: Stable  Weight trend: Decreasing  Complications assessed today?: No    Patient Problem List and Family Medical History reviewed for relevant medical history, current medical status, and diabetes risk factors.    Vitals:  [facility] 03/28/2014   Estimated body mass index is 28.47 kg/m  as calculated from the following:    Height as of an earlier encounter on 3/30/23: 1.6 m (5' 3\").    Weight as of an earlier encounter on 3/30/23: 72.9 kg (160 lb 11.2 oz).   Last 3 BP:   BP Readings from Last 3 Encounters:   03/30/23 123/76   03/09/23 129/85   02/28/23 125/79       History   Smoking Status     Never   Smokeless Tobacco     Never       Labs:  Lab Results   Component Value Date    A1C 8.1 03/29/2023    A1C 9.7 02/01/2021     Lab Results   Component Value Date     03/29/2023     10/13/2021     02/01/2021     Lab Results   Component Value Date    LDL 58 01/25/2023     02/01/2021     HDL Cholesterol   Date Value Ref Range Status   02/01/2021 62 >49 mg/dL Final     Direct Measure HDL   Date Value Ref Range Status   01/25/2023 37 (L) >=50 mg/dL Final   ]  GFR Estimate   Date Value Ref Range Status "   03/29/2023 56 (L) >60 mL/min/1.73m2 Final     Comment:     eGFR calculated using 2021 CKD-EPI equation.   02/01/2021 90 >60 mL/min/[1.73_m2] Final     Comment:     Non  GFR Calc  Starting 12/18/2018, serum creatinine based estimated GFR (eGFR) will be   calculated using the Chronic Kidney Disease Epidemiology Collaboration   (CKD-EPI) equation.       GFR Estimate If Black   Date Value Ref Range Status   02/01/2021 >90 >60 mL/min/[1.73_m2] Final     Comment:      GFR Calc  Starting 12/18/2018, serum creatinine based estimated GFR (eGFR) will be   calculated using the Chronic Kidney Disease Epidemiology Collaboration   (CKD-EPI) equation.       Lab Results   Component Value Date    CR 1.11 03/29/2023    CR 0.73 02/01/2021     No results found for: MICROALBUMIN    Healthy Eating:  Healthy Eating Assessed Today: Yes  Cultural/Buddhist diet restrictions?: No  Meals include: Breakfast, Lunch, Dinner, Afternoon Snack, Evening Snack  Breakfast: 8:00 am: Oatmeal with hard boiled egg and barragan with toast  Lunch: 12-12:30:  salad OR Cheese ravioli OR sandwiches  Dinner: 5:00 -6:00 pm: meatball and mashed potatoes OR vegetables lasagna  Snacks: Has one bedtime snack- usually a cookie  sugra free candy every once and a while  Other: drinks coffee with sugar free creamer  Beverages: Water, Coffee, Milk (Keep some juice on hand for lows)  Has patient met with a dietitian in the past?: Yes    Being Active:  Being Active Assessed Today: Yes (went on the bike at her apartment complex)  Exercise:: Yes (physical therapy, doing some balancing exercised after wraps)  Days per week of moderate to strenuous exercise (like a brisk walk): 2  On average, minutes per day of exercise at this level:  (Walking with friends)  How intense was your typical exercise? : Light (like stretching or slow walking)  Barrier to exercise: Safety, Physical limitation    Monitoring:  Monitoring Assessed Today: Yes  Did  patient bring glucose meter to appointment? : Yes  Blood Glucose Meter: CGM  Times checking blood sugar at home (number): 5+  Times checking blood sugar at home (per): Day  Blood glucose trend: Fluctuating    Taking Medications:  Diabetes Medication(s)     Diabetic Other       glucagon 1 MG SOLR injection    Inject 1 mg into the muscle once    Insulin       insulin aspart (NOVOLOG FLEXPEN) 100 UNIT/ML pen    Inject 8 Units Subcutaneous 3 times daily (with meals) And sliding scale: 1 unit: 150 -199 mg/dl.   200-249= 2 units  250-299 3 units  300-249 = 4 units  350 - 399= 5 units  400+ = 6 units and call MD Up to 30 units daily     insulin degludec (TRESIBA FLEXTOUCH) 200 UNIT/ML pen    Inject 22 Units Subcutaneous daily          Taking Medication Assessed Today: Yes  Current Treatments: Insulin Injections  Dose schedule: Pre-breakfast, Pre-lunch, Pre-dinner, At bedtime  Given by: Patient, Adult caretaker, Nursing attendant  Injection/Infusion sites: Abdomen  Problems taking diabetes medications regularly?: No  Diabetes medication side effects?: No    Problem Solving:  Problem Solving Assessed Today: Yes  Is the patient at risk for hypoglycemia?: Yes  Hypoglycemia Frequency: Monthly  Hypoglycemia Treatment: Glucose (tablets or gel), Other food  Patient carries a carbohydrate source: Yes  Medical ID: Yes  Is the patient at risk for DKA?: Yes  Does patient have ketone test strips?: Yes  Does patient have DKA prevention plan?: Yes  Does patient have severe weather/disaster plan for diabetes management?: No  Does patient have sick day plan for diabetes management?: Yes    Hypoglycemia symptoms  Confusion: No  Dizziness or Light-Headedness: No  Headaches: No  Hunger: No  Mood changes: No  Nervousness/Anxiety: No  Sleepiness: No  Speech difficulty: Yes  Sweats: Yes  Feeling shaky: Yes    Hypoglycemia Complications  Blackouts: No  Hospitalization: No  Nocturnal hypoglycemia: Yes  Required assistance: No  Seizures:  No    Reducing Risks:  Reducing Risks Assessed Today: No  CAD Risks: Diabetes Mellitus  Has dilated eye exam at least once a year?: Yes  Sees dentist every 6 months?: Yes  Feet checked by healthcare provider in the last year?: Yes    Healthy Coping:  Healthy Coping Assessed Today: Yes  Emotional response to diabetes: Acceptance  Informal Support system:: Family, Parent  Stage of change: ACTION (Actively working towards change)  Support resources: Offerings in Clinic Communities  Patient Activation Measure Survey Score:  KHUSHI Score (Last Two) 3/7/2012   KHUSHI Raw Score 39   Activation Score 56.4   KHUSHI Level 3         Care Plan and Education Provided:  There are no care plans that you recently modified to display for this patient.      Tiffanie Mcfarland RD [facility]  Triage: [phone]  [phone]      Time Spent: 15 minutes  Encounter Type: Individual    Any diabetes medication dose changes were made via the CDE Protocol per the patient's referring provider. A copy of this encounter was shared with the provider.

## 2023-03-30 NOTE — PROGRESS NOTES
[facility] GERIATRICS    Chief Complaint   Patient presents with     Nursing Home Acute     Labs & BS     HPI:  Lexy Rockwell is a 62 year old  (1960), who is being seen today for an episodic care visit at: [facility] () [[id number]].     Her past medical history includes    Type 1 diabetes with gastroparesis and CKD history of DKA    Hypertension    CKD stage III    Hyperlipidemia    Bilateral leg edema    Overactive bladder    GERD    Constipation    Developmentally delayed    Polyneuropathy     Vitamin D deficiency    Glaucoma     On 1/9/2023, her degludec was increased from 16 to 18 units     On 1/24/2023, her amlodipine was decreased to 5 mg daily, her metoprolol XL was stopped and changed to coreg 6.25 mg BID, aspirin was stopped due to hgb of 7.8 - 8.7       On 2/2/2023, her degludec was increased from 18 to 20 units     On 2/7/2023, an econsult was completed however the question was clear therefore no consult occurred.      On 2/9/2023, her degludec was increased from 20 to 24 units.       On 2/10/2023, her guaiac was negative.      On 2/13/2023, she received an extra 4 units of insulin at 2100.      On 2/14/2023, BG at 1730 showed as high on dexcom and glucose monitor, she was prescribed 4 units of Insulin Aspart and at 2000 it was 467.       On 2/17/2023, her BS were elevated to over 400.  She was Given extra 5 units of insulin, and her ketone were negative. Her meal time insulin was increased from 6 units to 10 units     On 2/27/23, pt saw oncology who changed the type of oral iron with a recommended CBC follow up.  Pt reported she did not want a GI workup.     On 2/28, her dexacom was downloaded at diabetic educator, she had a few episodes of BS in the 70s.  Her meal time insulin was changed from 10 units to 8 units at each jonathan     On 3/2/2023, she saw Diabetic educator via phone.  Her insulin was changed to novalog 10 prior to meals to 8 and Degludec decreased from 24  "to 20.        On 3/6/2023, [facility] and awaiting recommendations     On 3/7 her BS were 500 and her degludec insulin was increased to 22 units    On 3/8, her BS was noted to be 598 and 14 units of insulin were given.      On 3/11 her BS was 69 with symptoms of shakiness, nausea with emesis.    On 3/24/2023, pt's bS was noted to be high on dexacom.      Today patient was seen in her room and she stated her dad just passed away. Her LE edema is improved.  She is having constipation and erratic BS.   She denies chest pain, shortness of breath, dizziness, lightheadedness, and a poor appetite.    In reviewing point click care, weight is 160 and has been stable x 2 months, HR is noted to be irregular at times.                 Allergies, and PMH/PSH reviewed in EPIC today.  REVIEW OF SYSTEMS:  10 point ROS of systems including Constitutional, Eyes, Respiratory, Cardiovascular, Gastroenterology, Genitourinary, Integumentary, Musculoskeletal, Psychiatric were all negative except for pertinent positives noted in my HPI.    Objective:   /76   Pulse 96   Temp 97.8  F (36.6  C)   Resp 18   Ht 1.6 m (5' 3\")   Wt 72.9 kg (160 lb 11.2 oz)   LMP 03/28/2014   SpO2 96%   BMI 28.47 kg/m    GENERAL APPEARANCE:  Alert, cooperative, pale  RESP:  no respiratory distress  PSYCH:  oriented X 3, normal insight, judgement and memory    Lab Requisition on 03/29/2023   Component Date Value Ref Range Status     Sodium 03/29/2023 142  136 - 145 mmol/L Final     Potassium 03/29/2023 4.5  3.4 - 5.3 mmol/L Final     Chloride 03/29/2023 110 (H)  98 - 107 mmol/L Final     Carbon Dioxide (CO2) 03/29/2023 23  22 - 29 mmol/L Final     Anion Gap 03/29/2023 9  7 - 15 mmol/L Final     Urea Nitrogen 03/29/2023 39.7 (H)  8.0 - 23.0 mg/dL Final     Creatinine 03/29/2023 1.11 (H)  0.51 - 0.95 mg/dL Final     Calcium 03/29/2023 9.6  8.8 - 10.2 mg/dL Final     Glucose 03/29/2023 102 (H)  70 - 99 mg/dL Final     Alkaline Phosphatase 03/29/2023 73  35 - " 104 U/L Final     AST 03/29/2023 20  10 - 35 U/L Final     ALT 03/29/2023 19  10 - 35 U/L Final     Protein Total 03/29/2023 6.0 (L)  6.4 - 8.3 g/dL Final     Albumin 03/29/2023 3.5  3.5 - 5.2 g/dL Final     Bilirubin Total 03/29/2023 <0.2  <=1.2 mg/dL Final     GFR Estimate 03/29/2023 56 (L)  >60 mL/min/1.73m2 Final    eGFR calculated using 2021 CKD-EPI equation.     Hemoglobin A1C 03/29/2023 8.1 (H)  <5.7 % Final    Normal <5.7%   Prediabetes 5.7-6.4%    Diabetes 6.5% or higher     Note: Adopted from ADA consensus guidelines.     Ferritin 03/29/2023 49  11 - 328 ng/mL Final     Iron 03/29/2023 29 (L)  37 - 145 ug/dL Final     Iron Binding Capacity 03/29/2023 269  240 - 430 ug/dL Final     Iron Sat Index 03/29/2023 11 (L)  15 - 46 % Final     % Reticulocyte 03/29/2023 0.7  0.5 - 2.0 % Final     Absolute Reticulocyte 03/29/2023 0.026  0.025 - 0.095 10e6/uL Final     WBC Count 03/29/2023 4.5  4.0 - 11.0 10e3/uL Final     RBC Count 03/29/2023 3.44 (L)  3.80 - 5.20 10e6/uL Final     Hemoglobin 03/29/2023 9.0 (L)  11.7 - 15.7 g/dL Final     Hematocrit 03/29/2023 29.8 (L)  35.0 - 47.0 % Final     MCV 03/29/2023 87  78 - 100 fL Final     MCH 03/29/2023 26.2 (L)  26.5 - 33.0 pg Final     MCHC 03/29/2023 30.2 (L)  31.5 - 36.5 g/dL Final     RDW 03/29/2023 15.7 (H)  10.0 - 15.0 % Final     Platelet Count 03/29/2023 220  150 - 450 10e3/uL Final     % Neutrophils 03/29/2023 58  % Final     % Lymphocytes 03/29/2023 30  % Final     % Monocytes 03/29/2023 7  % Final     % Eosinophils 03/29/2023 4  % Final     % Basophils 03/29/2023 1  % Final     % Immature Granulocytes 03/29/2023 0  % Final     NRBCs per 100 WBC 03/29/2023 0  <1 /100 Final     Absolute Neutrophils 03/29/2023 2.6  1.6 - 8.3 10e3/uL Final     Absolute Lymphocytes 03/29/2023 1.4  0.8 - 5.3 10e3/uL Final     Absolute Monocytes 03/29/2023 0.3  0.0 - 1.3 10e3/uL Final     Absolute Eosinophils 03/29/2023 0.2  0.0 - 0.7 10e3/uL Final     Absolute Basophils 03/29/2023  0.1  0.0 - 0.2 10e3/uL Final     Absolute Immature Granulocytes 03/29/2023 0.0  <=0.4 10e3/uL Final     Absolute NRBCs 03/29/2023 0.0  10e3/uL Final       Assessment/Plan:  (K59.01) Slow transit constipation  (primary encounter diagnosis)  Comment: chronic.  Problematic despite prn metamucil and senna S once daily  Plan: increase senna S to twice daily  Stop psyllium      (E10.37X3) Type 1 diabetes mellitus with diabetic macular edema of both eyes resolved after treatment (H)  Comment: Chronic uncontrolled.  Her hgb A1c is 8.1 her BS remain erratic.   She will have intermittent low BS, nursing then compensates and gives her an abundant amount of carbohydrates and her BS then is above 400.  She also continues to have intermittent BS above 600.   She is currently taking sliding scale, long acting.  She has a Dexacom. For secondary prevention she is currently taking simvastatin 20 mg daily, lisinopril 40 mg daily, and aspirin 81 mg daily is being held due to anemia.  Plan:   Meet with diabetic educator today  Follow up with dietitian for better adjustment with low BS  Follow up with endocrinology in May 2023  Follow up with ophthalmology in April 2023  Follow-up with podiatry monthly in the facility    (N32.81) OAB (overactive bladder)  Comment: by hx  Plan: discontinue oxybutynin XL 10 mg/day        D64.9) Anemia, unspecified type  (primary encounter diagnosis)  Comment: Acute on chronic.     Has had upper and lower endoscopy in 2019 and 2020 and in 2020 a polyp was removed.   Hgb on 12/23/2023 was 9 while in OSH and upon admission to OSH Patient with reported black vomit PTA. Recent EGD (3 years ago) without evidence of bleeding source. Given copious vomit reported at scene could potentially be related to Mitra-Burks tear. No evidence of esophageal perforation on abdominal CT. NG placed, drained dark bilious fluid initially, most recent fluid straw colored. No concerns for active GI bleed. Will discontinue NG.  On  12/17/2022 =  peripheral blood morphology showed normocytic anemia and reticulocyte was 0.02%  12/19/2023 = 9.0 at [facility]  12/26/2023 = note states discuss pancytopenia with hematology   2/6/2023 = 8   On 2/17/2023 she was started on iron supplementation  3/29/23 her hgb was 9.0, hematocrit=29.8, ferritin=49, iron=29, TUK=551, Iron sat index=11.    Pt denies having black stools, shortness of breath, dizziness .    Plan:  Continue with iron   Has follow up with GI for endoscopy.     MED REC REQUIRED  Post Medication Reconciliation Status:  Discharge medications reconciled, continue medications without change        Orders:  increase senna S to twice daily  Stop psyllium  discontinue oxybutynin XL 10 mg/day  Meet with diabetic educator today  Follow up with dietitian for better adjustment with low BS  Follow up with endocrinology in May 2023  Follow up with ophthalmology in April 2023  Follow-up with podiatry monthly in the facility   Continue with iron   Has follow up with GI for endoscopy.  Request a follow up with hematology possibly virtually    Electronically signed by:     TAYLA Sprague CNP on 3/30/2023 at 6:45 PM

## 2023-03-30 NOTE — LETTER
3/30/2023         RE: Lexy Rockwell  [address]  [facility] [address]        Dear Colleague,    Thank you for referring your patient, Lexy Rockwell, to the [facility]. Please see a copy of my visit note below.    Diabetes Self-Management Education & Support    Presents for: CGM Review    Type of Service: Telephone Visit    Audio only visit done, as patient does not have access to audio-visual technology.    Individual visit provided, given no group classes are available for 2 months.     Originating Location (Patient Location): Assisted Living  Distant Location (Provider Location): [facility]  Mode of Communication:  Telephone    Telephone Visit Start Time: 1:00  Telephone Visit End Time (telephone visit stop time): 1:15 pm    How would patient like to obtain AVS? MyChart    Assessment Type:   REPORTS:              Pt verbalized understanding of concepts discussed and recommendations provided today.       Continue education with the following diabetes management concepts: Healthy Eating, Being Active, Monitoring, Taking Medication, Problem Solving, Reducing Risks and Healthy Coping    ASSESSMENT:  Glucose Patterns & Trends:  Having some rebound hyperglycemia. Lexy stated that she had a low last night and the nurses treated it with 2 cookies, orange juice and yogurt, this morning her BG was in the 400's. We discussed treating with just 1/2 - 1 cup of orange juice. The dietitian that is present at visit plans to put in an order to treat lows with just orange juice.     We discussed a plan to decrease insulin dose to prevent hypoglycemia.Recommend decreasing Novolog from 8 units before meals to 7 units before meals.        PLAN    Treat hypoglycemia with 1/2 - 1 cup of orange juice  Decrease Novolog dose: 8-8-8-0 --> 7-7-7-0      Topics to cover at upcoming visits: Healthy Eating, Being Active, Monitoring, Taking  "Medication, Problem Solving, Reducing Risks and Healthy Coping    Follow-up: April 24th    See Care Plan for co-developed, patient-state behavior change goals.  AVS provided for patient today.    Education Materials Provided:  No new materials provided today      SUBJECTIVE/OBJECTIVE:  Presents for: CGM Review  Accompanied by: Self, Other (Dietitian from Assisted Living)  Diabetes education in the past 24mo: Yes  Focus of Visit: Problem Solving, Monitoring, Taking Medication  Diabetes type: Type 1  Disease course: Worsening  Transportation concerns: Yes (gets rides or takes public tranportation)  Difficulty affording diabetes medication?: No  Difficulty affording diabetes testing supplies?: No  Other concerns:: Cognitive impairment  Cultural Influences/Ethnic Background:  Not  or     Diabetes Symptoms & Complications:  Fatigue: Yes  Neuropathy: Yes (in feet)  Polydipsia: No  Polyphagia: No  Polyuria: Sometimes (often getting up in the night)  Visual change: Sometimes  Slow healing wounds: No (a little cut near the toenails)  Symptom course: Stable  Weight trend: Decreasing  Complications assessed today?: No    Patient Problem List and Family Medical History reviewed for relevant medical history, current medical status, and diabetes risk factors.    Vitals:  LMP 03/28/2014   Estimated body mass index is 28.47 kg/m  as calculated from the following:    Height as of an earlier encounter on 3/30/23: 1.6 m (5' 3\").    Weight as of an earlier encounter on 3/30/23: 72.9 kg (160 lb 11.2 oz).   Last 3 BP:   BP Readings from Last 3 Encounters:   03/30/23 123/76   03/09/23 129/85   02/28/23 125/79       History   Smoking Status     Never   Smokeless Tobacco     Never       Labs:  Lab Results   Component Value Date    A1C 8.1 03/29/2023    A1C 9.7 02/01/2021     Lab Results   Component Value Date     03/29/2023     10/13/2021     02/01/2021     Lab Results   Component Value Date    LDL 58 " 01/25/2023     02/01/2021     HDL Cholesterol   Date Value Ref Range Status   02/01/2021 62 >49 mg/dL Final     Direct Measure HDL   Date Value Ref Range Status   01/25/2023 37 (L) >=50 mg/dL Final   ]  GFR Estimate   Date Value Ref Range Status   03/29/2023 56 (L) >60 mL/min/1.73m2 Final     Comment:     eGFR calculated using 2021 CKD-EPI equation.   02/01/2021 90 >60 mL/min/[1.73_m2] Final     Comment:     Non  GFR Calc  Starting 12/18/2018, serum creatinine based estimated GFR (eGFR) will be   calculated using the Chronic Kidney Disease Epidemiology Collaboration   (CKD-EPI) equation.       GFR Estimate If Black   Date Value Ref Range Status   02/01/2021 >90 >60 mL/min/[1.73_m2] Final     Comment:      GFR Calc  Starting 12/18/2018, serum creatinine based estimated GFR (eGFR) will be   calculated using the Chronic Kidney Disease Epidemiology Collaboration   (CKD-EPI) equation.       Lab Results   Component Value Date    CR 1.11 03/29/2023    CR 0.73 02/01/2021     No results found for: MICROALBUMIN    Healthy Eating:  Healthy Eating Assessed Today: Yes  Cultural/Jew diet restrictions?: No  Meals include: Breakfast, Lunch, Dinner, Afternoon Snack, Evening Snack  Breakfast: 8:00 am: Oatmeal with hard boiled egg and barragan with toast  Lunch: 12-12:30:  salad OR Cheese ravioli OR sandwiches  Dinner: 5:00 -6:00 pm: meatball and mashed potatoes OR vegetables lasagna  Snacks: Has one bedtime snack- usually a cookie  sugra free candy every once and a while  Other: drinks coffee with sugar free creamer  Beverages: Water, Coffee, Milk (Keep some juice on hand for lows)  Has patient met with a dietitian in the past?: Yes    Being Active:  Being Active Assessed Today: Yes (went on the bike at her apartment complex)  Exercise:: Yes (physical therapy, doing some balancing exercised after wraps)  Days per week of moderate to strenuous exercise (like a brisk walk): 2  On average,  minutes per day of exercise at this level:  (Walking with friends)  How intense was your typical exercise? : Light (like stretching or slow walking)  Barrier to exercise: Safety, Physical limitation    Monitoring:  Monitoring Assessed Today: Yes  Did patient bring glucose meter to appointment? : Yes  Blood Glucose Meter: CGM  Times checking blood sugar at home (number): 5+  Times checking blood sugar at home (per): Day  Blood glucose trend: Fluctuating    Taking Medications:  Diabetes Medication(s)     Diabetic Other       glucagon 1 MG SOLR injection    Inject 1 mg into the muscle once    Insulin       insulin aspart (NOVOLOG FLEXPEN) 100 UNIT/ML pen    Inject 8 Units Subcutaneous 3 times daily (with meals) And sliding scale: 1 unit: 150 -199 mg/dl.   200-249= 2 units  250-299 3 units  300-249 = 4 units  350 - 399= 5 units  400+ = 6 units and call MD Up to 30 units daily     insulin degludec (TRESIBA FLEXTOUCH) 200 UNIT/ML pen    Inject 22 Units Subcutaneous daily          Taking Medication Assessed Today: Yes  Current Treatments: Insulin Injections  Dose schedule: Pre-breakfast, Pre-lunch, Pre-dinner, At bedtime  Given by: Patient, Adult caretaker, Nursing attendant  Injection/Infusion sites: Abdomen  Problems taking diabetes medications regularly?: No  Diabetes medication side effects?: No    Problem Solving:  Problem Solving Assessed Today: Yes  Is the patient at risk for hypoglycemia?: Yes  Hypoglycemia Frequency: Monthly  Hypoglycemia Treatment: Glucose (tablets or gel), Other food  Patient carries a carbohydrate source: Yes  Medical ID: Yes  Is the patient at risk for DKA?: Yes  Does patient have ketone test strips?: Yes  Does patient have DKA prevention plan?: Yes  Does patient have severe weather/disaster plan for diabetes management?: No  Does patient have sick day plan for diabetes management?: Yes    Hypoglycemia symptoms  Confusion: No  Dizziness or Light-Headedness: No  Headaches: No  Hunger: No  Mood  changes: No  Nervousness/Anxiety: No  Sleepiness: No  Speech difficulty: Yes  Sweats: Yes  Feeling shaky: Yes    Hypoglycemia Complications  Blackouts: No  Hospitalization: No  Nocturnal hypoglycemia: Yes  Required assistance: No  Seizures: No    Reducing Risks:  Reducing Risks Assessed Today: No  CAD Risks: Diabetes Mellitus  Has dilated eye exam at least once a year?: Yes  Sees dentist every 6 months?: Yes  Feet checked by healthcare provider in the last year?: Yes    Healthy Coping:  Healthy Coping Assessed Today: Yes  Emotional response to diabetes: Acceptance  Informal Support system:: Family, Parent  Stage of change: ACTION (Actively working towards change)  Support resources: Offerings in Clinic Communities  Patient Activation Measure Survey Score:  KHUSHI Score (Last Two) 3/7/2012   KHUSHI Raw Score 39   Activation Score 56.4   KHUSHI Level 3         Care Plan and Education Provided:  There are no care plans that you recently modified to display for this patient.      Tiffanie Mcfarland RD LD [facility]  Triage: [phone]  [phone]      Time Spent: 15 minutes  Encounter Type: Individual    Any diabetes medication dose changes were made via the CDE Protocol per the patient's referring provider. A copy of this encounter was shared with the provider.

## 2023-03-30 NOTE — LETTER
3/30/2023        RE: Lexy Rockwell  [address]  [facility] [address]        [facility]    Chief Complaint   Patient presents with     Nursing Home Acute     Labs & BS     HPI:  Lexy Rockwell is a 62 year old  (1960), who is being seen today for an episodic care visit at: [facility] () [[id number]].     Her past medical history includes    Type 1 diabetes with gastroparesis and CKD history of DKA    Hypertension    CKD stage III    Hyperlipidemia    Bilateral leg edema    Overactive bladder    GERD    Constipation    Developmentally delayed    Polyneuropathy     Vitamin D deficiency    Glaucoma     On 1/9/2023, her degludec was increased from 16 to 18 units     On 1/24/2023, her amlodipine was decreased to 5 mg daily, her metoprolol XL was stopped and changed to coreg 6.25 mg BID, aspirin was stopped due to hgb of 7.8 - 8.7       On 2/2/2023, her degludec was increased from 18 to 20 units     On 2/7/2023, an econsult was completed however the question was clear therefore no consult occurred.      On 2/9/2023, her degludec was increased from 20 to 24 units.       On 2/10/2023, her guaiac was negative.      On 2/13/2023, she received an extra 4 units of insulin at 2100.      On 2/14/2023, BG at 1730 showed as high on dexcom and glucose monitor, she was prescribed 4 units of Insulin Aspart and at 2000 it was 467.       On 2/17/2023, her BS were elevated to over 400.  She was Given extra 5 units of insulin, and her ketone were negative. Her meal time insulin was increased from 6 units to 10 units     On 2/27/23, pt saw oncology who changed the type of oral iron with a recommended CBC follow up.  Pt reported she did not want a GI workup.     On 2/28, her dexacom was downloaded at diabetic educator, she had a few episodes of BS in the 70s.  Her meal time insulin was changed from 10 units to 8 units at each jonathan     On 3/2/2023, she saw Diabetic  "educator via phone.  Her insulin was changed to novalog 10 prior to meals to 8 and Degludec decreased from 24 to 20.        On 3/6/2023, [facility] and awaiting recommendations     On 3/7 her BS were 500 and her degludec insulin was increased to 22 units    On 3/8, her BS was noted to be 598 and 14 units of insulin were given.      On 3/11 her BS was 69 with symptoms of shakiness, nausea with emesis.    On 3/24/2023, pt's bS was noted to be high on dexacom.      Today patient was seen in her room and she stated her dad just passed away. Her LE edema is improved.  She is having constipation and erratic BS.   She denies chest pain, shortness of breath, dizziness, lightheadedness, and a poor appetite.    In reviewing point click care, weight is 160 and has been stable x 2 months, HR is noted to be irregular at times.                 Allergies, and PMH/PSH reviewed in EPIC today.  REVIEW OF SYSTEMS:  10 point ROS of systems including Constitutional, Eyes, Respiratory, Cardiovascular, Gastroenterology, Genitourinary, Integumentary, Musculoskeletal, Psychiatric were all negative except for pertinent positives noted in my HPI.    Objective:   /76   Pulse 96   Temp 97.8  F (36.6  C)   Resp 18   Ht 1.6 m (5' 3\")   Wt 72.9 kg (160 lb 11.2 oz)   LMP 03/28/2014   SpO2 96%   BMI 28.47 kg/m    GENERAL APPEARANCE:  Alert, cooperative, pale  RESP:  no respiratory distress  PSYCH:  oriented X 3, normal insight, judgement and memory    Lab Requisition on 03/29/2023   Component Date Value Ref Range Status     Sodium 03/29/2023 142  136 - 145 mmol/L Final     Potassium 03/29/2023 4.5  3.4 - 5.3 mmol/L Final     Chloride 03/29/2023 110 (H)  98 - 107 mmol/L Final     Carbon Dioxide (CO2) 03/29/2023 23  22 - 29 mmol/L Final     Anion Gap 03/29/2023 9  7 - 15 mmol/L Final     Urea Nitrogen 03/29/2023 39.7 (H)  8.0 - 23.0 mg/dL Final     Creatinine 03/29/2023 1.11 (H)  0.51 - 0.95 mg/dL Final     Calcium 03/29/2023 9.6  8.8 - 10.2 " mg/dL Final     Glucose 03/29/2023 102 (H)  70 - 99 mg/dL Final     Alkaline Phosphatase 03/29/2023 73  35 - 104 U/L Final     AST 03/29/2023 20  10 - 35 U/L Final     ALT 03/29/2023 19  10 - 35 U/L Final     Protein Total 03/29/2023 6.0 (L)  6.4 - 8.3 g/dL Final     Albumin 03/29/2023 3.5  3.5 - 5.2 g/dL Final     Bilirubin Total 03/29/2023 <0.2  <=1.2 mg/dL Final     GFR Estimate 03/29/2023 56 (L)  >60 mL/min/1.73m2 Final    eGFR calculated using 2021 CKD-EPI equation.     Hemoglobin A1C 03/29/2023 8.1 (H)  <5.7 % Final    Normal <5.7%   Prediabetes 5.7-6.4%    Diabetes 6.5% or higher     Note: Adopted from ADA consensus guidelines.     Ferritin 03/29/2023 49  11 - 328 ng/mL Final     Iron 03/29/2023 29 (L)  37 - 145 ug/dL Final     Iron Binding Capacity 03/29/2023 269  240 - 430 ug/dL Final     Iron Sat Index 03/29/2023 11 (L)  15 - 46 % Final     % Reticulocyte 03/29/2023 0.7  0.5 - 2.0 % Final     Absolute Reticulocyte 03/29/2023 0.026  0.025 - 0.095 10e6/uL Final     WBC Count 03/29/2023 4.5  4.0 - 11.0 10e3/uL Final     RBC Count 03/29/2023 3.44 (L)  3.80 - 5.20 10e6/uL Final     Hemoglobin 03/29/2023 9.0 (L)  11.7 - 15.7 g/dL Final     Hematocrit 03/29/2023 29.8 (L)  35.0 - 47.0 % Final     MCV 03/29/2023 87  78 - 100 fL Final     MCH 03/29/2023 26.2 (L)  26.5 - 33.0 pg Final     MCHC 03/29/2023 30.2 (L)  31.5 - 36.5 g/dL Final     RDW 03/29/2023 15.7 (H)  10.0 - 15.0 % Final     Platelet Count 03/29/2023 220  150 - 450 10e3/uL Final     % Neutrophils 03/29/2023 58  % Final     % Lymphocytes 03/29/2023 30  % Final     % Monocytes 03/29/2023 7  % Final     % Eosinophils 03/29/2023 4  % Final     % Basophils 03/29/2023 1  % Final     % Immature Granulocytes 03/29/2023 0  % Final     NRBCs per 100 WBC 03/29/2023 0  <1 /100 Final     Absolute Neutrophils 03/29/2023 2.6  1.6 - 8.3 10e3/uL Final     Absolute Lymphocytes 03/29/2023 1.4  0.8 - 5.3 10e3/uL Final     Absolute Monocytes 03/29/2023 0.3  0.0 - 1.3  10e3/uL Final     Absolute Eosinophils 03/29/2023 0.2  0.0 - 0.7 10e3/uL Final     Absolute Basophils 03/29/2023 0.1  0.0 - 0.2 10e3/uL Final     Absolute Immature Granulocytes 03/29/2023 0.0  <=0.4 10e3/uL Final     Absolute NRBCs 03/29/2023 0.0  10e3/uL Final       Assessment/Plan:  (K59.01) Slow transit constipation  (primary encounter diagnosis)  Comment: chronic.  Problematic despite prn metamucil and senna S once daily  Plan: increase senna S to twice daily  Stop psyllium      (E10.37X3) Type 1 diabetes mellitus with diabetic macular edema of both eyes resolved after treatment (H)  Comment: Chronic uncontrolled.  Her hgb A1c is 8.1 her BS remain erratic.   She will have intermittent low BS, nursing then compensates and gives her an abundant amount of carbohydrates and her BS then is above 400.  She also continues to have intermittent BS above 600.   She is currently taking sliding scale, long acting.  She has a Dexacom. For secondary prevention she is currently taking simvastatin 20 mg daily, lisinopril 40 mg daily, and aspirin 81 mg daily is being held due to anemia.  Plan:   Meet with diabetic educator today  Follow up with dietitian for better adjustment with low BS  Follow up with endocrinology in May 2023  Follow up with ophthalmology in April 2023  Follow-up with podiatry monthly in the facility    (N32.81) OAB (overactive bladder)  Comment: by hx  Plan: discontinue oxybutynin XL 10 mg/day        D64.9) Anemia, unspecified type  (primary encounter diagnosis)  Comment: Acute on chronic.     Has had upper and lower endoscopy in 2019 and 2020 and in 2020 a polyp was removed.   Hgb on 12/23/2023 was 9 while in OSH and upon admission to OSH Patient with reported black vomit PTA. Recent EGD (3 years ago) without evidence of bleeding source. Given copious vomit reported at scene could potentially be related to Mitra-Burks tear. No evidence of esophageal perforation on abdominal CT. NG placed, drained dark  bilious fluid initially, most recent fluid straw colored. No concerns for active GI bleed. Will discontinue NG.  On 12/17/2022 =  peripheral blood morphology showed normocytic anemia and reticulocyte was 0.02%  12/19/2023 = 9.0 at [facility]  12/26/2023 = note states discuss pancytopenia with hematology   2/6/2023 = 8   On 2/17/2023 she was started on iron supplementation  3/29/23 her hgb was 9.0, hematocrit=29.8, ferritin=49, iron=29, BVK=673, Iron sat index=11.    Pt denies having black stools, shortness of breath, dizziness .    Plan:  Continue with iron   Has follow up with GI for endoscopy.     MED REC REQUIRED  Post Medication Reconciliation Status:  Discharge medications reconciled, continue medications without change        Orders:  increase senna S to twice daily  Stop psyllium  discontinue oxybutynin XL 10 mg/day  Meet with diabetic educator today  Follow up with dietitian for better adjustment with low BS  Follow up with endocrinology in May 2023  Follow up with ophthalmology in April 2023  Follow-up with podiatry monthly in the facility   Continue with iron   Has follow up with GI for endoscopy.  Request a follow up with hematology possibly virtually    Electronically signed by:     TAYLA Sprague CNP on 3/30/2023 at 6:45 PM            Sincerely,        TAYLA Sprague CNP

## 2023-03-31 ENCOUNTER — TELEPHONE (OUTPATIENT)
Dept: GERIATRICS | Facility: CLINIC | Age: 63
End: 2023-03-31
Payer: MEDICARE

## 2023-03-31 DIAGNOSIS — E10.37X3 TYPE 1 DIABETES MELLITUS WITH DIABETIC MACULAR EDEMA OF BOTH EYES RESOLVED AFTER TREATMENT (H): ICD-10-CM

## 2023-03-31 RX ORDER — INSULIN ASPART 100 [IU]/ML
7 INJECTION, SOLUTION INTRAVENOUS; SUBCUTANEOUS
Qty: 15 ML
Start: 2023-03-31 | End: 2023-06-21

## 2023-03-31 NOTE — TELEPHONE ENCOUNTER
After appt with DM educated the following recommendations have been ordered at the facility      Decrease before meal novolog from 8 to 7 units    If blood sugar levels are low dietitian recommends to give ONE of the following choices of snacks: 8 ounces juice OR 1 pudding OR 1 yogurt. Give resident ONE choice and wait 20-30 minutes. Recheck blood sugar levels before giving another choice.      TAYLA Sprague CNP on 3/31/2023 at 11:03 AM

## 2023-04-05 ENCOUNTER — TELEPHONE (OUTPATIENT)
Dept: GERIATRICS | Facility: CLINIC | Age: 63
End: 2023-04-05
Payer: MEDICARE

## 2023-04-06 NOTE — TELEPHONE ENCOUNTER
[facility] GERIATRICS TELEPHONE NOTE    Concern: Hyperglycemia, type I DM     at 19:50  Received Tresiba 22 units at bedtime.  Rechecked   Parameter to call if > 400.    Fasting BG this AM: 457     BG at HS last few nights:  - 4/4 367  - 4/3 316    BGs running 300s to 400s over last several days.    No other change in status reported.    ASSESSMENT/PLAN:  - Nursing would feeling more comfortable rechecking BG this evening, order given to repeat BG in two hours, call back if not less than 400 as per parameter  - Route to primary team for follow-up in AM  - Consider call Endocrine triage [phone] and moving up follow-up from 4/24 to sooner date    TAYLA Topete CNP  04/05/23 9:13 PM

## 2023-04-09 ENCOUNTER — HEALTH MAINTENANCE LETTER (OUTPATIENT)
Age: 63
End: 2023-04-09

## 2023-04-14 ENCOUNTER — NURSING HOME VISIT (OUTPATIENT)
Dept: GERIATRICS | Facility: CLINIC | Age: 63
End: 2023-04-14
Payer: MEDICARE

## 2023-04-14 DIAGNOSIS — K21.00 GASTROESOPHAGEAL REFLUX DISEASE WITH ESOPHAGITIS WITHOUT HEMORRHAGE: ICD-10-CM

## 2023-04-14 DIAGNOSIS — D64.9 ANEMIA, UNSPECIFIED TYPE: ICD-10-CM

## 2023-04-14 DIAGNOSIS — E10.37X3 TYPE 1 DIABETES MELLITUS WITH DIABETIC MACULAR EDEMA OF BOTH EYES RESOLVED AFTER TREATMENT (H): Primary | ICD-10-CM

## 2023-04-14 DIAGNOSIS — I10 HYPERTENSION GOAL BP (BLOOD PRESSURE) < 140/90: ICD-10-CM

## 2023-04-14 DIAGNOSIS — R60.0 BILATERAL LEG EDEMA: ICD-10-CM

## 2023-04-14 PROCEDURE — 99309 SBSQ NF CARE MODERATE MDM 30: CPT | Performed by: INTERNAL MEDICINE

## 2023-04-14 NOTE — LETTER
"    4/14/2023        RE: Lexy Rockwell  [address]  [facility] [address]        Lexy Rockwell is a 62 year old female seen April 14, 2023 at [facility] where she has resided for 3 months (admit 1/2023) seen for regulatory visit and to follow up DM2.  Patient is seen on the unit, ambulatory with 4WW and pleased that she won at [facility].   Reports \"I feel fine.\"   No concerns reported by patient or facility staff   Has had labile blood sugars.   Has met with diabetic educator, but not seeing Endocrinologist until later this month.         By chart review, pt has Type 1 DM with multiple hospitalizations for DKA.  She was seen in the [facility] ED for hyperglycemia in November 2022, with accompanying symptoms of dizziness, BLE weakness and heartburn.   Noted that she sometimes forgot to administer her insulin.   Returned home, seeing diabetic educator but not an endocrinologist.         She was continuing to live independently in December 2022 when neighbors asked for a welfare check after she hadn't been seen in several days, and she was found down by police.   Hospitalized at [facility] 12/13-26 for DKA and rhabdomyolysis  Glucose >1000 and pH 6.8    She required pressor support, intubation and TF; treated with broad spectrum abx for SHIVAM pneumonia.  No sz on video EEG.  She had a +COVID19 test on 12/23   Slowly cleared and discharged to [facility] TCU before transitioning to LTC.     Pt was seen in the [facility] ED in January 2023 for urinary frequency related to hyperglycemia.   Improved and returned to LTC    Continued labile blood sugars, urinary incontinence, weakness and poor endurance    Past Medical History:   Diagnosis Date     Cerumen impacted      Development delay      Diabetic gastroparesis (H) 8/16/2013     Glaucoma (increased eye pressure)      Hyperlipaemia      Hypertension      Hypothyroid      Mental retardation      Nonsenile cataract      Obesity      Strabismus      " "Type 1 diabetes mellitus not at goal (H)        Past Surgical History:   Procedure Laterality Date     STRABISMUS SURGERY       ZZC EYE SURG ANT SGMT PROC UNLISTED  remote    strabismus surgery     SH:  Single   Her father just recently   Her mother Francisca Rockwell is first contact   Father is hospitalized currently     She has 2 sisters that [facility]   Non smoker    Review Of Systems  Developmental cognitive delay   BIMS 15/15      Ambulatory with 4WW  Weight at admission was 167 lbs>>> now 159 lbs     EXAM: NAD  BP (!) 145/81   Pulse 90   Temp 97.4  F (36.3  C)   Resp 16   Ht 1.6 m (5' 3\")   Wt 72.1 kg (159 lb)   LMP 2014   SpO2 100%   BMI 28.17 kg/m       Neck supple without adenopathy  Lungs clear bilaterally with fair air movement   Heart RRR s1s2 @85   Abd soft, NT, no distention or guarding, +BS  Ext with 1+ edema, wearing compression stockings  Neuro: ambulatory, normal speech  Psych: affect okay, bright    Last Comprehensive Metabolic Panel:  Lab Results   Component Value Date     2023    POTASSIUM 4.5 2023    CHLORIDE 110 (H) 2023    CO2 23 2023    ANIONGAP 9 2023     (H) 2023    BUN 39.7 (H) 2023    CR 1.11 (H) 2023    GFRESTIMATED 56 (L) 2023    EFRAIN 9.6 2023     Lab Results   Component Value Date    AST 20 2023      ALBUMIN 3.5 2023      ALKPHOS 73 2023     Lab Results   Component Value Date    WBC 4.5 2023      HGB 9.0 2023      MCV 87 2023       2023        IMP/PLAN:   (D64.9) Anemia, unspecified type    Comment: Fe 29     11 saturation  Ferritin 49     Plan: Continue PPI and Fe replacement 325 mg/day   Follow hgb   Has endoscopy scheduled for July     (E10.37X3) Type 1 diabetes mellitus with diabetic macular edema of both eyes resolved after treatment (H)  (R73.9) Hyperglycemia  Comment:  Recurrent DKA  Average glucose by monitor I 195   Lab Results "   Component Value Date    A1C 8.1 03/29/2023    A1C 8.7 01/25/2023    A1C 10.7 10/13/2021     Plan: Tresiba 22 units/day   Novolog 7 units tid with meals plus sliding scale   Doses recently adjusted   Endocrinologist appt pending for 4/24 and DM educator next appt 5/26   She is on lisinopril, simvastatin 20 mg/day   Not on daily ASA secondary to anemia  Ophthalmology (Appt 4/21) and Podiatry follow up        (R60.0) Bilateral leg edema  Comment: improved and weight is down a bit.     Plan: compression, elevation     (K21.00) Gastroesophageal reflux disease with esophagitis without hemorrhage  Comment: by hx  Plan: pantoprazole 40 mg/day     (F81.9) Cognitive developmental delay  Comment: unable to live independently in the community and manage her medical problems    Plan: LTC support for med admin, meals, activity      (I10) Essential hypertension with goal blood pressure less than 140/90  Comment: SBPs 120s   Plan: amlodipine 2.5 mg/day, carvedilol 6.25 mg bid, lisinopril 40 mg/day   Follow BPs and BMP      Advance directive: DNR/DNI     Nia Martínez MD         Sincerely,        Nia Martínez MD

## 2023-04-17 VITALS
HEIGHT: 63 IN | RESPIRATION RATE: 16 BRPM | OXYGEN SATURATION: 100 % | HEART RATE: 90 BPM | SYSTOLIC BLOOD PRESSURE: 145 MMHG | WEIGHT: 159 LBS | BODY MASS INDEX: 28.17 KG/M2 | DIASTOLIC BLOOD PRESSURE: 81 MMHG | TEMPERATURE: 97.4 F

## 2023-04-19 ENCOUNTER — TELEPHONE (OUTPATIENT)
Dept: GERIATRICS | Facility: CLINIC | Age: 63
End: 2023-04-19
Payer: MEDICARE

## 2023-04-20 ENCOUNTER — TELEPHONE (OUTPATIENT)
Dept: GERIATRICS | Facility: CLINIC | Age: 63
End: 2023-04-20
Payer: MEDICARE

## 2023-04-20 NOTE — TELEPHONE ENCOUNTER
[facility] TELEPHONE ENCOUNTER    Chief Complaint   Patient presents with     Hyperglycemia       Lexy Rockwell is a 62 year old  (1960),Nurse called today to report: Blood Glucose have been high >450 entire shift since dinner. Blood Glucose last taken 549 at 2215 Remains on novolog 8 units TID with meals along with sliding scale dose, which was given with dinner.  Received 22units of tresiba at HS.     ASSESSMENT/PLAN      Give novolog 6Units NOW    Check her Blood Glucose at 0200. If Blood Glucose >400 at 0200, then call for further orders    Electronically signed by:   TAYLA Ybarra CNP

## 2023-04-21 ENCOUNTER — OFFICE VISIT (OUTPATIENT)
Dept: OPHTHALMOLOGY | Facility: CLINIC | Age: 63
End: 2023-04-21
Payer: MEDICARE

## 2023-04-21 DIAGNOSIS — E10.3393 TYPE 1 DIABETES MELLITUS WITH MODERATE NONPROLIFERATIVE RETINOPATHY OF BOTH EYES WITHOUT MACULAR EDEMA (H): Primary | ICD-10-CM

## 2023-04-21 DIAGNOSIS — Z98.890 HISTORY OF STRABISMUS SURGERY: ICD-10-CM

## 2023-04-21 DIAGNOSIS — E11.3299 BACKGROUND DIABETIC RETINOPATHY (H): ICD-10-CM

## 2023-04-21 DIAGNOSIS — H52.4 PRESBYOPIA: ICD-10-CM

## 2023-04-21 DIAGNOSIS — Z01.01 ENCOUNTER FOR EXAMINATION OF EYES AND VISION WITH ABNORMAL FINDINGS: ICD-10-CM

## 2023-04-21 DIAGNOSIS — H40.1132 PRIMARY OPEN ANGLE GLAUCOMA OF BOTH EYES, MODERATE STAGE: ICD-10-CM

## 2023-04-21 DIAGNOSIS — H25.813 COMBINED FORMS OF AGE-RELATED CATARACT OF BOTH EYES: ICD-10-CM

## 2023-04-21 PROCEDURE — 92015 DETERMINE REFRACTIVE STATE: CPT | Mod: GY | Performed by: OPHTHALMOLOGY

## 2023-04-21 PROCEDURE — 92014 COMPRE OPH EXAM EST PT 1/>: CPT | Performed by: OPHTHALMOLOGY

## 2023-04-21 ASSESSMENT — REFRACTION_WEARINGRX
OD_SPHERE: -1.50
OS_SPHERE: -1.00
OD_AXIS: 180
OD_CYLINDER: +0.50
OD_ADD: +2.75
OS_CYLINDER: SPHERE
OS_ADD: +2.75

## 2023-04-21 ASSESSMENT — CONF VISUAL FIELD
OD_NORMAL: 1
OS_INFERIOR_TEMPORAL_RESTRICTION: 0
OS_SUPERIOR_TEMPORAL_RESTRICTION: 0
OS_SUPERIOR_NASAL_RESTRICTION: 0
OS_NORMAL: 1
OD_SUPERIOR_NASAL_RESTRICTION: 0
OD_SUPERIOR_TEMPORAL_RESTRICTION: 0
OS_INFERIOR_NASAL_RESTRICTION: 0
OD_INFERIOR_TEMPORAL_RESTRICTION: 0
OD_INFERIOR_NASAL_RESTRICTION: 0

## 2023-04-21 ASSESSMENT — REFRACTION_MANIFEST
OS_ADD: +2.75
OD_AXIS: 180
OD_ADD: +2.75
OD_CYLINDER: +0.50
OD_SPHERE: -1.25
OS_SPHERE: -0.75
OS_CYLINDER: SPHERE

## 2023-04-21 ASSESSMENT — CUP TO DISC RATIO
OS_RATIO: 0.8
OD_RATIO: 0.7

## 2023-04-21 ASSESSMENT — VISUAL ACUITY
OS_CC+: -1
CORRECTION_TYPE: GLASSES
METHOD: SNELLEN - LINEAR
OD_CC: 20/40
OS_CC: 20/40

## 2023-04-21 ASSESSMENT — SLIT LAMP EXAM - LIDS
COMMENTS: NORMAL
COMMENTS: NORMAL

## 2023-04-21 ASSESSMENT — TONOMETRY
OD_IOP_MMHG: 13
OS_IOP_MMHG: 12
IOP_METHOD: APPLANATION

## 2023-04-21 ASSESSMENT — EXTERNAL EXAM - LEFT EYE: OS_EXAM: PROLAPSED FAT PADS: UPPER, LOWER

## 2023-04-21 ASSESSMENT — EXTERNAL EXAM - RIGHT EYE: OD_EXAM: PROLAPSED FAT PADS: UPPER, LOWER

## 2023-04-21 NOTE — PATIENT INSTRUCTIONS
Continue Cosopt (dorzolamide-timolol -- dark blue top) twice daily both eyes.   Continue Latanoprost (green top) at bedtime both eyes  After instilling your drops, wait 10 minutes wash your face with water.  Possible posterior vitreous detachment (sudden onset large floater and/or flashing lights) both eyes  discussed.   Glasses Rx given - optional   May use artificial tears up to 4 times daily both eyes. (Refresh Tears, Systane Ultra/Balance, or Theratears)   Return visit 6 months for an intraocular pressure check, glaucoma OCT, retinal OCT, and Beard Visual Field.     Dayron Rios M.D.  [phone]      Patient Education   Diabetes weakens the blood vessels all over the body, including the eyes. Damage to the blood vessels in the eyes can cause swelling or bleeding into part of the eye (called the retina). This is called diabetic retinopathy ([facility]). If not treated, this disease can cause vision loss or blindness.   Symptoms may include blurred or distorted vision, but many people have no symptoms. It's important to see your eye doctor regularly to check for problems.   Early treatment and good control can help protect your vision. Here are the things you can do to help prevent vision loss:      1. Keep your blood sugar levels under tight control.      2. Bring high blood pressure under control.      3. No smoking.      4. Have yearly dilated eye exams.

## 2023-04-21 NOTE — LETTER
4/21/2023         RE: Lexy Rockwell  [address]  [place] [address]        Dear Colleague,    Thank you for referring your patient, Lexy Rockwell, to the [facility]. Please see a copy of my visit note below.     Current Eye Medications:    Cosopt (dorzolamide-timolol -- dark blue top) twice daily both eyes.   Latanoprost (green top) at bedtime both eyes     Subjective: Here for complete eye exam. Vision is stable at distance and near in both eyes. No eye pain or discomfort.     Patient is wondering if it is normal for vision to be blurry for a few hours after putting glaucoma drops in.     Lab Results   Component Value Date    A1C 8.1 03/29/2023    A1C 8.7 01/25/2023    A1C 10.7 10/13/2021    A1C 9.7 02/01/2021    A1C 9.2 09/30/2020    A1C 11.8 01/08/2020    A1C 11.8 05/19/2019    A1C 11.2 12/12/2018     Objective:  See Ophthalmology Exam.       Assessment:  Stable intraocular pressures and discs both eyes in patient with moderate open angle glaucoma.  Stable background diabetic retinopathy both eyes.      ICD-10-CM    1. Type 1 diabetes mellitus with moderate nonproliferative retinopathy of both eyes without macular edema (H)  E10.3393       2. Background diabetic retinopathy, mild-mod, ou  E11.3299 EYE EXAM (SIMPLE-NONBILLABLE)     REFRACTION      3. Primary open angle glaucoma of both eyes, moderate stage  H40.1132       4. Combined forms of age-related cataract, mild, of both eyes  H25.813       5. History of strabismus surgery  Z98.890       6. Encounter for examination of eyes and vision with abnormal findings  Z01.01       7. Presbyopia  H52.4            Plan:  Continue Cosopt (dorzolamide-timolol -- dark blue top) twice daily both eyes.   Continue Latanoprost (green top) at bedtime both eyes  After instilling your drops, wait 10 minutes wash your face with water.  Possible posterior vitreous detachment (sudden onset large floater and/or  flashing lights) both eyes  discussed.   Glasses Rx given - optional   May use artificial tears up to 4 times daily both eyes. (Refresh Tears, Systane Ultra/Balance, or Theratears)   Return visit 6 months for an intraocular pressure check, glaucoma OCT, retinal OCT, and Beard Visual Field.     Dayron Rios M.D.  [phone]           Again, thank you for allowing me to participate in the care of your patient.        Sincerely,        Dayron Rios MD

## 2023-04-21 NOTE — TELEPHONE ENCOUNTER
Nursing called on behalf of Lexy for her blood sugars.    8pm = 390 and received long acting insulin    About 1015pm checked again as she has a Dexcom and sugars was 548.    Looked at her sliding scale during the day.    Ordered Novolog 6 units now.  No further checking tonight as she will be sleeping and is checked again in AM.    Nurse wanted to know if a siding scale should be started at bedtime.  That is a good question for the regular NP and will send this note to her so she is aware PM staff is asking.    Electronically signed by Heavenly Lea RN, CNP

## 2023-04-21 NOTE — PROGRESS NOTES
Current Eye Medications:    Cosopt (dorzolamide-timolol -- dark blue top) twice daily both eyes.   Latanoprost (green top) at bedtime both eyes     Subjective: Here for complete eye exam. Vision is stable at distance and near in both eyes. No eye pain or discomfort.     Patient is wondering if it is normal for vision to be blurry for a few hours after putting glaucoma drops in.     Lab Results   Component Value Date    A1C 8.1 03/29/2023    A1C 8.7 01/25/2023    A1C 10.7 10/13/2021    A1C 9.7 02/01/2021    A1C 9.2 09/30/2020    A1C 11.8 01/08/2020    A1C 11.8 05/19/2019    A1C 11.2 12/12/2018     Objective:  See Ophthalmology Exam.       Assessment:  Stable intraocular pressures and discs both eyes in patient with moderate open angle glaucoma.  Stable background diabetic retinopathy both eyes.      ICD-10-CM    1. Type 1 diabetes mellitus with moderate nonproliferative retinopathy of both eyes without macular edema (H)  E10.3393       2. Background diabetic retinopathy, mild-mod, ou  E11.3299 EYE EXAM (SIMPLE-NONBILLABLE)     REFRACTION      3. Primary open angle glaucoma of both eyes, moderate stage  H40.1132       4. Combined forms of age-related cataract, mild, of both eyes  H25.813       5. History of strabismus surgery  Z98.890       6. Encounter for examination of eyes and vision with abnormal findings  Z01.01       7. Presbyopia  H52.4            Plan:  Continue Cosopt (dorzolamide-timolol -- dark blue top) twice daily both eyes.   Continue Latanoprost (green top) at bedtime both eyes  After instilling your drops, wait 10 minutes wash your face with water.  Possible posterior vitreous detachment (sudden onset large floater and/or flashing lights) both eyes  discussed.   Glasses Rx given - optional   May use artificial tears up to 4 times daily both eyes. (Refresh Tears, Systane Ultra/Balance, or Theratears)   Return visit 6 months for an intraocular pressure check, glaucoma OCT, retinal OCT, and Beard  Visual Field.     Dayron Rios M.D.  [phone]

## 2023-04-22 NOTE — PROGRESS NOTES
"Lexy Rockwell is a 62 year old female seen April 14, 2023 at [facility] where she has resided for 3 months (admit 1/2023) seen for regulatory visit and to follow up DM2.  Patient is seen on the unit, ambulatory with 4WW and pleased that she won at [facility].   Reports \"I feel fine.\"   No concerns reported by patient or facility staff   Has had labile blood sugars.   Has met with diabetic educator, but not seeing Endocrinologist until later this month.         By chart review, pt has Type 1 DM with multiple hospitalizations for DKA.  She was seen in the [facility] ED for hyperglycemia in November 2022, with accompanying symptoms of dizziness, BLE weakness and heartburn.   Noted that she sometimes forgot to administer her insulin.   Returned home, seeing diabetic educator but not an endocrinologist.         She was continuing to live independently in December 2022 when neighbors asked for a welfare check after she hadn't been seen in several days, and she was found down by police.   Hospitalized at [facility] 12/13-26 for DKA and rhabdomyolysis  Glucose >1000 and pH 6.8    She required pressor support, intubation and TF; treated with broad spectrum abx for SHIVAM pneumonia.  No sz on video EEG.  She had a +COVID19 test on 12/23   Slowly cleared and discharged to [facility] TCU before transitioning to LTC.     Pt was seen in the [facility] ED in January 2023 for urinary frequency related to hyperglycemia.   Improved and returned to LTC    Continued labile blood sugars, urinary incontinence, weakness and poor endurance    Past Medical History:   Diagnosis Date     Cerumen impacted      Development delay      Diabetic gastroparesis (H) 8/16/2013     Glaucoma (increased eye pressure)      Hyperlipaemia      Hypertension      Hypothyroid      Mental retardation      Nonsenile cataract      Obesity      Strabismus      Type 1 diabetes mellitus not at goal (H)        Past Surgical History:   Procedure Laterality Date     STRABISMUS " "SURGERY       Z EYE SURG ANT [facility] PROC UNLISTED  remote    strabismus surgery     SH:  Single   Her father just recently   Her mother Francisca Rockwell is first contact   Father is hospitalized currently     She [facility]   Non smoker    Review Of Systems  Developmental cognitive delay   BIMS 15/15      Ambulatory with 4WW  Weight at admission was 167 lbs>>> now 159 lbs     EXAM: NAD  BP (!) 145/81   Pulse 90   Temp 97.4  F (36.3  C)   Resp 16   Ht 1.6 m (5' 3\")   Wt 72.1 kg (159 lb)   LMP 2014   SpO2 100%   BMI 28.17 kg/m       Neck supple without adenopathy  Lungs clear bilaterally with fair air movement   Heart RRR s1s2 @85   Abd soft, NT, no distention or guarding, +BS  Ext with 1+ edema, wearing compression stockings  Neuro: ambulatory, normal speech  Psych: affect okay, bright    Last Comprehensive Metabolic Panel:  Lab Results   Component Value Date     2023    POTASSIUM 4.5 2023    CHLORIDE 110 (H) 2023    CO2 23 2023    ANIONGAP 9 2023     (H) 2023    BUN 39.7 (H) 2023    CR 1.11 (H) 2023    GFRESTIMATED 56 (L) 2023    EFRAIN 9.6 2023     Lab Results   Component Value Date    AST 20 2023      ALBUMIN 3.5 2023      ALKPHOS 73 2023     Lab Results   Component Value Date    WBC 4.5 2023      HGB 9.0 2023      MCV 87 2023       2023        IMP/PLAN:   (D64.9) Anemia, unspecified type    Comment: Fe 29     11 saturation  Ferritin 49     Plan: Continue PPI and Fe replacement 325 mg/day   Follow hgb   Has endoscopy scheduled for July     (E10.37X3) Type 1 diabetes mellitus with diabetic macular edema of both eyes resolved after treatment (H)  (R73.9) Hyperglycemia  Comment:  Recurrent DKA  Average glucose by monitor I 195   Lab Results   Component Value Date    A1C 8.1 2023    A1C 8.7 2023    A1C 10.7 10/13/2021     Plan: Tresiba 22 units/day "   Novolog 7 units tid with meals plus sliding scale   Doses recently adjusted   Endocrinologist appt pending for 4/24 and DM educator next appt 5/26   She is on lisinopril, simvastatin 20 mg/day   Not on daily ASA secondary to anemia  Ophthalmology (Appt 4/21) and Podiatry follow up        (R60.0) Bilateral leg edema  Comment: improved and weight is down a bit.     Plan: compression, elevation     (K21.00) Gastroesophageal reflux disease with esophagitis without hemorrhage  Comment: by hx  Plan: pantoprazole 40 mg/day     (F81.9) Cognitive developmental delay  Comment: unable to live independently in the community and manage her medical problems    Plan: LTC support for med admin, meals, activity      (I10) Essential hypertension with goal blood pressure less than 140/90  Comment: SBPs 120s   Plan: amlodipine 2.5 mg/day, carvedilol 6.25 mg bid, lisinopril 40 mg/day   Follow BPs and BMP      Advance directive: DNR/DNI     Nia Martínez MD

## 2023-04-24 ENCOUNTER — VIRTUAL VISIT (OUTPATIENT)
Dept: EDUCATION SERVICES | Facility: CLINIC | Age: 63
End: 2023-04-24
Payer: MEDICARE

## 2023-04-24 DIAGNOSIS — E10.37X3 TYPE 1 DIABETES MELLITUS WITH DIABETIC MACULAR EDEMA OF BOTH EYES RESOLVED AFTER TREATMENT (H): ICD-10-CM

## 2023-04-24 PROCEDURE — 98966 PH1 ASSMT&MGMT NQHP 5-10: CPT | Performed by: DIETITIAN, REGISTERED

## 2023-04-24 RX ORDER — INSULIN DEGLUDEC 200 U/ML
24 INJECTION, SOLUTION SUBCUTANEOUS DAILY
Qty: 15 ML | Refills: 3 | Status: SHIPPED | OUTPATIENT
Start: 2023-04-24 | End: 2023-06-13

## 2023-04-24 NOTE — PROGRESS NOTES
Diabetes Self-Management Education & Support    Presents for: CGM Review    Type of Service: Telephone Visit    Audio only visit done, as patient does not have access to audio-visual technology.    Individual visit provided, given no group classes are available for 2 months.     Originating Location (Patient Location): Long term Care  Distant Location (Provider Location): [facility]  Mode of Communication:  Telephone    Telephone Visit Start Time: 11:35  Telephone Visit End Time (telephone visit stop time): 11:43    How would patient like to obtain AVS? MyChart    Assessment Type:   REPORTS:          Pt verbalized understanding of concepts discussed and recommendations provided today.       Continue education with the following diabetes management concepts: Healthy Eating, Being Active, Monitoring, Taking Medication, Problem Solving, Reducing Risks and Healthy Coping    ASSESSMENT:  BG have remained elevated. Lexy states that she is still residing at [facility], but plans to move once her MA application is approved and a room opens up. She has an endo appointment next month, but feels that she can cancel it because writer and Zahra are taking care of her diabetes. Writer highly recommended that she keep her Endocrinology appointment that is coming up, explained the role of endocrinology in her diabetes care.     Glucose Patterns & Trends:  Hyperglycemia      PLAN    Increase Tresiba: 22 units --> 24 units  Continue with Novolog dose      Topics to cover at upcoming visits: Healthy Eating, Being Active, Monitoring, Taking Medication, Problem Solving, Reducing Risks and Healthy Coping    Follow-up: With Dr. Clemons (Endo) in May, with writer on June 16th    See Care Plan for co-developed, patient-state behavior change goals.  AVS provided for patient today.    Education Materials Provided:  No new materials provided today      SUBJECTIVE/OBJECTIVE:  Presents for: CGM  "Review  Accompanied by: Self, Other (Dietitian from Assisted Living)  Diabetes education in the past 24mo: Yes  Focus of Visit: Problem Solving, Monitoring, Taking Medication  Diabetes type: Type 1  Disease course: Worsening  Diabetes management related comments/concerns: Lexy states that her BG has been high today, Dexcom is reading high. She is getting lunch soon, so will get insulin+SSI  Transportation concerns: Yes (gets rides or takes public tranportation)  Difficulty affording diabetes medication?: No  Difficulty affording diabetes testing supplies?: No  Other concerns:: Cognitive impairment  Cultural Influences/Ethnic Background:  Not  or     Diabetes Symptoms & Complications:  Fatigue: Yes  Neuropathy: Yes (in feet)  Polydipsia: No  Polyphagia: No  Polyuria: Sometimes (often getting up in the night)  Visual change: Sometimes  Slow healing wounds: No (a little cut near the toenails)  Symptom course: Stable  Weight trend: Decreasing  Complications assessed today?: No    Patient Problem List and Family Medical History reviewed for relevant medical history, current medical status, and diabetes risk factors.    Vitals:  [facility] 03/28/2014   Estimated body mass index is 28.17 kg/m  as calculated from the following:    Height as of 4/17/23: 1.6 m (5' 3\").    Weight as of 4/17/23: 72.1 kg (159 lb).   Last 3 BP:   BP Readings from Last 3 Encounters:   04/17/23 (!) 145/81   03/30/23 123/76   03/09/23 129/85       History   Smoking Status     Never   Smokeless Tobacco     Never       Labs:  Lab Results   Component Value Date    A1C 8.1 03/29/2023    A1C 9.7 02/01/2021     Lab Results   Component Value Date     03/29/2023     10/13/2021     02/01/2021     Lab Results   Component Value Date    LDL 58 01/25/2023     02/01/2021     HDL Cholesterol   Date Value Ref Range Status   02/01/2021 62 >49 mg/dL Final     Direct Measure HDL   Date Value Ref Range Status   01/25/2023 37 (L) " >=50 mg/dL Final   ]  GFR Estimate   Date Value Ref Range Status   2023 56 (L) >60 mL/min/1.73m2 Final     Comment:     eGFR calculated using  CKD-EPI equation.   2021 90 >60 mL/min/[1.73_m2] Final     Comment:     Non  GFR Calc  Starting 2018, serum creatinine based estimated GFR (eGFR) will be   calculated using the Chronic Kidney Disease Epidemiology Collaboration   (CKD-EPI) equation.       GFR Estimate If Black   Date Value Ref Range Status   2021 >90 >60 mL/min/[1.73_m2] Final     Comment:      GFR Calc  Starting 2018, serum creatinine based estimated GFR (eGFR) will be   calculated using the Chronic Kidney Disease Epidemiology Collaboration   (CKD-EPI) equation.       Lab Results   Component Value Date    CR 1.11 2023    CR 0.73 2021     No results found for: MICROALBUMIN    Healthy Eating:       Being Active:       Monitoring:         Taking Medications:  Diabetes Medication(s)     Diabetic Other       glucagon 1 MG SOLR injection    Inject 1 mg into the muscle once    Insulin       insulin aspart (NOVOLOG FLEXPEN) 100 UNIT/ML pen    Inject 7 Units Subcutaneous 3 times daily (with meals) And sliding scale: 1 unit: 150 -199 mg/dl.   200-249= 2 units  250-299 3 units  300-249 = 4 units  350 - 399= 5 units  400+ = 6 units and call MD Up to 30 units daily     insulin degludec (TRESIBA FLEXTOUCH) 200 UNIT/ML pen    Inject 22 Units Subcutaneous daily               Problem Solving:                 Reducing Risks:       Healthy Coping:     Patient Activation Measure Survey Score:      3/7/2012     3:00 PM   KHUSHI Score (Last Two)   KHUSHI Raw Score 39   Activation Score 56.4   KHUSHI Level 3         Care Plan and Education Provided:  There are no care plans that you recently modified to display for this patient.      Tiffanie Mcfarland RD [facility]  Triage: [phone]  [phone]      Time Spent: 8 minutes  Encounter Type:  Individual    Any diabetes medication dose changes were made via the CDE Protocol per the patient's referring provider. A copy of this encounter was shared with the provider.

## 2023-04-24 NOTE — LETTER
4/24/2023         RE: Lexy Rockwell  [address]  [facility] [address]        Dear Colleague,    Thank you for referring your patient, Lexy Rockwell, to the [facility]. Please see a copy of my visit note below.    Diabetes Self-Management Education & Support    Presents for: CGM Review    Type of Service: Telephone Visit    Audio only visit done, as patient does not have access to audio-visual technology.    Individual visit provided, given no group classes are available for 2 months.     Originating Location (Patient Location): Long term Care  Distant Location (Provider Location): [facility]  Mode of Communication:  Telephone    Telephone Visit Start Time: 11:35  Telephone Visit End Time (telephone visit stop time): 11:43    How would patient like to obtain AVS? MyChart    Assessment Type:   REPORTS:          Pt verbalized understanding of concepts discussed and recommendations provided today.       Continue education with the following diabetes management concepts: Healthy Eating, Being Active, Monitoring, Taking Medication, Problem Solving, Reducing Risks and Healthy Coping    ASSESSMENT:  BG have remained elevated. Lexy states that she is still residing at [facility], but plans to move once her MA application is approved and a room opens up. She has an endo appointment next month, but feels that she can cancel it because writer and Zahra are taking care of her diabetes. Writer highly recommended that she keep her Endocrinology appointment that is coming up, explained the role of endocrinology in her diabetes care.     Glucose Patterns & Trends:  Hyperglycemia      PLAN    Increase Tresiba: 22 units --> 24 units  Continue with Novolog dose      Topics to cover at upcoming visits: Healthy Eating, Being Active, Monitoring, Taking Medication, Problem Solving, Reducing Risks and Healthy Coping    Follow-up:  "With Dr. Clemons (Endo) in May, with writer on June 16th    See Care Plan for co-developed, patient-state behavior change goals.  AVS provided for patient today.    Education Materials Provided:  No new materials provided today      SUBJECTIVE/OBJECTIVE:  Presents for: CGM Review  Accompanied by: Self, Other (Dietitian from Assisted Living)  Diabetes education in the past 24mo: Yes  Focus of Visit: Problem Solving, Monitoring, Taking Medication  Diabetes type: Type 1  Disease course: Worsening  Diabetes management related comments/concerns: Lexy states that her BG has been high today, Dexcom is reading high. She is getting lunch soon, so will get insulin+SSI  Transportation concerns: Yes (gets rides or takes public tranportation)  Difficulty affording diabetes medication?: No  Difficulty affording diabetes testing supplies?: No  Other concerns:: Cognitive impairment  Cultural Influences/Ethnic Background:  Not  or     Diabetes Symptoms & Complications:  Fatigue: Yes  Neuropathy: Yes (in feet)  Polydipsia: No  Polyphagia: No  Polyuria: Sometimes (often getting up in the night)  Visual change: Sometimes  Slow healing wounds: No (a little cut near the toenails)  Symptom course: Stable  Weight trend: Decreasing  Complications assessed today?: No    Patient Problem List and Family Medical History reviewed for relevant medical history, current medical status, and diabetes risk factors.    Vitals:  LMP 03/28/2014   Estimated body mass index is 28.17 kg/m  as calculated from the following:    Height as of 4/17/23: 1.6 m (5' 3\").    Weight as of 4/17/23: 72.1 kg (159 lb).   Last 3 BP:   BP Readings from Last 3 Encounters:   04/17/23 (!) 145/81   03/30/23 123/76   03/09/23 129/85       History   Smoking Status     Never   Smokeless Tobacco     Never       Labs:  Lab Results   Component Value Date    A1C 8.1 03/29/2023    A1C 9.7 02/01/2021     Lab Results   Component Value Date     03/29/2023     " 10/13/2021     02/01/2021     Lab Results   Component Value Date    LDL 58 01/25/2023     02/01/2021     HDL Cholesterol   Date Value Ref Range Status   02/01/2021 62 >49 mg/dL Final     Direct Measure HDL   Date Value Ref Range Status   01/25/2023 37 (L) >=50 mg/dL Final   ]  GFR Estimate   Date Value Ref Range Status   03/29/2023 56 (L) >60 mL/min/1.73m2 Final     Comment:     eGFR calculated using 2021 CKD-EPI equation.   02/01/2021 90 >60 mL/min/[1.73_m2] Final     Comment:     Non  GFR Calc  Starting 12/18/2018, serum creatinine based estimated GFR (eGFR) will be   calculated using the Chronic Kidney Disease Epidemiology Collaboration   (CKD-EPI) equation.       GFR Estimate If Black   Date Value Ref Range Status   02/01/2021 >90 >60 mL/min/[1.73_m2] Final     Comment:      GFR Calc  Starting 12/18/2018, serum creatinine based estimated GFR (eGFR) will be   calculated using the Chronic Kidney Disease Epidemiology Collaboration   (CKD-EPI) equation.       Lab Results   Component Value Date    CR 1.11 03/29/2023    CR 0.73 02/01/2021     No results found for: MICROALBUMIN    Healthy Eating:       Being Active:       Monitoring:         Taking Medications:  Diabetes Medication(s)     Diabetic Other       glucagon 1 MG SOLR injection    Inject 1 mg into the muscle once    Insulin       insulin aspart (NOVOLOG FLEXPEN) 100 UNIT/ML pen    Inject 7 Units Subcutaneous 3 times daily (with meals) And sliding scale: 1 unit: 150 -199 mg/dl.   200-249= 2 units  250-299 3 units  300-249 = 4 units  350 - 399= 5 units  400+ = 6 units and call MD Up to 30 units daily     insulin degludec (TRESIBA FLEXTOUCH) 200 UNIT/ML pen    Inject 22 Units Subcutaneous daily               Problem Solving:                 Reducing Risks:       Healthy Coping:     Patient Activation Measure Survey Score:      3/7/2012     3:00 PM   KHUSHI Score (Last Two)   KHUSHI Raw Score 39   Activation Score 56.4    KHUSHI Level 3         Care Plan and Education Provided:  There are no care plans that you recently modified to display for this patient.      Tiffanie Mcfarland RD [facility]  Triage: [phone]  [phone]      Time Spent: 8 minutes  Encounter Type: Individual    Any diabetes medication dose changes were made via the CDE Protocol per the patient's referring provider. A copy of this encounter was shared with the provider.

## 2023-04-28 ENCOUNTER — TELEPHONE (OUTPATIENT)
Dept: GERIATRICS | Facility: CLINIC | Age: 63
End: 2023-04-28
Payer: MEDICARE

## 2023-04-28 DIAGNOSIS — E10.37X3 TYPE 1 DIABETES MELLITUS WITH DIABETIC MACULAR EDEMA OF BOTH EYES RESOLVED AFTER TREATMENT (H): Primary | ICD-10-CM

## 2023-04-28 NOTE — TELEPHONE ENCOUNTER
Due to intermittent extremely elevated BS at HS     Bedtime sliding scale   200-249= 1 units aspart insulin  250-299 2 units   300-249 = 3 units   350 - 399= 4 units   400+ = 5 units       Tried calling unit at 3:47 PM on 04/28/23 but no answer.      Will ask triage to call       TAYLA Sprague CNP on 4/28/2023 at 3:47 PM

## 2023-05-04 ENCOUNTER — NURSING HOME VISIT (OUTPATIENT)
Dept: GERIATRICS | Facility: CLINIC | Age: 63
End: 2023-05-04
Payer: MEDICARE

## 2023-05-04 VITALS
DIASTOLIC BLOOD PRESSURE: 83 MMHG | HEART RATE: 87 BPM | RESPIRATION RATE: 18 BRPM | HEIGHT: 63 IN | BODY MASS INDEX: 28.17 KG/M2 | WEIGHT: 159 LBS | TEMPERATURE: 97.4 F | OXYGEN SATURATION: 99 % | SYSTOLIC BLOOD PRESSURE: 140 MMHG

## 2023-05-04 DIAGNOSIS — E11.3299 NONPROLIFERATIVE DIABETIC RETINOPATHY (H): Primary | ICD-10-CM

## 2023-05-04 DIAGNOSIS — H52.4 PRESBYOPIA: ICD-10-CM

## 2023-05-04 DIAGNOSIS — H25.813 COMBINED FORMS OF AGE-RELATED CATARACT OF BOTH EYES: ICD-10-CM

## 2023-05-04 DIAGNOSIS — D64.9 ANEMIA, UNSPECIFIED TYPE: ICD-10-CM

## 2023-05-04 DIAGNOSIS — H40.1132 PRIMARY OPEN ANGLE GLAUCOMA (POAG) OF BOTH EYES, MODERATE STAGE: ICD-10-CM

## 2023-05-04 DIAGNOSIS — E10.37X3 TYPE 1 DIABETES MELLITUS WITH DIABETIC MACULAR EDEMA OF BOTH EYES RESOLVED AFTER TREATMENT (H): ICD-10-CM

## 2023-05-04 PROCEDURE — 99309 SBSQ NF CARE MODERATE MDM 30: CPT | Performed by: NURSE PRACTITIONER

## 2023-05-04 NOTE — LETTER
"    5/4/2023        RE: Lexy Rockwell  [address]  [facility] [address]        [facility] GERIATR[facility]    Chief Complaint   Patient presents with     Nursing Home Acute     HPI:  Lexy Rockwell is a 62 year old  (1960), who is being seen today for an episodic care visit at: [facility] () [[id number]].    Her past medical history includes    Type 1 diabetes with gastroparesis and CKD history of DKA    Hypertension    CKD stage III    Hyperlipidemia    Bilateral leg edema    Overactive bladder    GERD    Constipation    Developmentally delayed    Polyneuropathy     Vitamin D deficiency    Glaucoma    On 4/21/2023, saw Ophthalmology who recommended Continue Cosopt (dorzolamide-timolol -- dark blue top) twice daily both eyes.  Continue Latanoprost (green top) at bedtime both eyes After instilling your drops, wait 10 minutes wash your face with water    On 4/24/2023, patient's Tresiba was increased to 24 units she continues to have Dexatrom and sliding scale at HS: if 200 - 249 = 1 Unit subcutaneously. ; 250 - 299 = 2 Units subcutaneously. ; 300 - 349 = 3 Units subcutaneously. ; 350 - 399 = 4 Units subcutaneously. ; 400+ = 5 Units subcutaneously.     Blood sugars fasting 212-464  Blood sugars at 1130 140-311   blood sugars at 1730   174-360  Blood sugars at 2000    152-270  weight stable since 1/31      Allergies, and PMH/PSH reviewed in [facility] today.  REVIEW OF SYSTEMS:  4 point ROS including Respiratory, CV, GI and , other than that noted in the HPI,  is negative    Objective:   BP (!) 140/83   Pulse 87   Temp 97.4  F (36.3  C)   Resp 18   Ht 1.6 m (5' 3\")   Wt 72.1 kg (159 lb)   LMP 03/28/2014   SpO2 99%   BMI 28.17 kg/m    GENERAL APPEARANCE:  Alert, poor dental cares  RESP:  no respiratory distress  PSYCH:  oriented X 3    no recent labs    Assessment/Plan:  (E10.37X3) Type 1 diabetes mellitus with diabetic macular edema of both eyes resolved " after treatment (H)  (E11.3299) Nonproliferative diabetic retinopathy (H)  (primary encounter diagnosis)  (H40.1132) Primary open angle glaucoma (POAG) of both eyes, moderate stage  (H25.813) Combined forms of age-related cataract of both eyes  (H52.4) Presbyopia  Comment: Chronic uncontrolled.  Her hgb A1c (3/2023) is 8.1, her BS remain erratic.   She will have intermittent low BS, nursing then compensates and gives her an abundant amount of carbohydrates and her BS then is above 400.   She is currently taking 7 units at meals, sliding scale at meals and HS, and long acting.    For continuous glucose monitoring she has a Dexcom which is connected to her phone.  Per diabetic educator note dated 4/24/2023, her average glucose was 209.        For secondary prevention she is currently taking simvastatin 20 mg daily, lisinopril 40 mg daily, and aspirin 81 mg daily is being held due to anemia.  She is optometry on 4/2023  She is podiatry on 3/2023      D64.9) Anemia, unspecified type    Comment: subacute. improving.     Has had upper and lower endoscopy in 2019 and 2020 and in 2020 a polyp was removed.   Hgb on 12/23/2023 was 9 while in OSH and upon admission to OSH Patient with reported black vomit PTA. Recent EGD (3 years ago) without evidence of bleeding source. Given copious vomit reported at scene could potentially be related to Mitra-Burks tear. No evidence of esophageal perforation on abdominal CT. NG placed, drained dark bilious fluid initially, most recent fluid straw colored. No concerns for active GI bleed. Will discontinue NG.  On 12/17/2022 =  peripheral blood morphology showed normocytic anemia and reticulocyte was 0.02%  12/19/2023 = 9.0 at [facility]  12/26/2023 = note states discussed pancytopenia with hematology   2/6/2023 = hgb = 8   On 2/17/2023 she was started on iron supplementation  3/29/23 her hgb was 9.0, hematocrit=29.8, ferritin=49, iron=29, GMT=267, Iron sat index=11.    Pt denies having black  stools, shortness of breath, dizziness .    Plan:  Cbc and BMP now    orders  CBC and BMP now  Follow-up with diabetic educator in June 2023  follow up with endocrinology in May 2023  Continue with continuous blood glucose close monitoring-Dexcom   follow-up with podiatry in the facility  Per ophthalmology Continue Cosopt (dorzolamide-timolol -- dark blue top) twice daily both eyes.  Continue Latanoprost (green top) at bedtime both eyes After instilling your drops,       MED REC REQUIRED  Post Medication Reconciliation Status:  Discharge medications reconciled, continue medications without change      Electronically signed by:     TALYA Sprague CNP on 5/4/2023 at 4:24 PM          Sincerely,        TAYLA Sprague CNP

## 2023-05-04 NOTE — PROGRESS NOTES
"[facility] GERIATRICS    Chief Complaint   Patient presents with     Nursing Home Acute     HPI:  Lexy Rockwell is a 62 year old  (1960), who is being seen today for an episodic care visit at: [facility] () [[id number]].    Her past medical history includes    Type 1 diabetes with gastroparesis and CKD history of DKA    Hypertension    CKD stage III    Hyperlipidemia    Bilateral leg edema    Overactive bladder    GERD    Constipation    Developmentally delayed    Polyneuropathy     Vitamin D deficiency    Glaucoma    On 4/21/2023, saw Ophthalmology who recommended Continue Cosopt (dorzolamide-timolol -- dark blue top) twice daily both eyes.  Continue Latanoprost (green top) at bedtime both eyes After instilling your drops, wait 10 minutes wash your face with water    On 4/24/2023, patient's Tresiba was increased to 24 units she continues to have Dexatrom and sliding scale at HS: if 200 - 249 = 1 Unit subcutaneously. ; 250 - 299 = 2 Units subcutaneously. ; 300 - 349 = 3 Units subcutaneously. ; 350 - 399 = 4 Units subcutaneously. ; 400+ = 5 Units subcutaneously.     Blood sugars fasting 212-464  Blood sugars at 1130 140-311   blood sugars at 1730   174-360  Blood sugars at 2000    152-270  weight stable since 1/31      Allergies, and PMH/PSH reviewed in [facility] today.  REVIEW OF SYSTEMS:  4 point ROS including Respiratory, CV, GI and , other than that noted in the HPI,  is negative    Objective:   BP (!) 140/83   Pulse 87   Temp 97.4  F (36.3  C)   Resp 18   Ht 1.6 m (5' 3\")   Wt 72.1 kg (159 lb)   LMP 03/28/2014   SpO2 99%   BMI 28.17 kg/m    GENERAL APPEARANCE:  Alert, poor dental cares  RESP:  no respiratory distress  PSYCH:  oriented X 3    no recent labs    Assessment/Plan:  (E10.37X3) Type 1 diabetes mellitus with diabetic macular edema of both eyes resolved after treatment (H)  (E11.3299) Nonproliferative diabetic retinopathy (H)  (primary encounter " diagnosis)  (H40.1132) Primary open angle glaucoma (POAG) of both eyes, moderate stage  (H25.813) Combined forms of age-related cataract of both eyes  (H52.4) Presbyopia  Comment: Chronic uncontrolled.  Her hgb A1c (3/2023) is 8.1, her BS remain erratic.   She will have intermittent low BS, nursing then compensates and gives her an abundant amount of carbohydrates and her BS then is above 400.   She is currently taking 7 units at meals, sliding scale at meals and HS, and long acting.    For continuous glucose monitoring she has a Dexcom which is connected to her phone.  Per diabetic educator note dated 4/24/2023, her average glucose was 209.        For secondary prevention she is currently taking simvastatin 20 mg daily, lisinopril 40 mg daily, and aspirin 81 mg daily is being held due to anemia.  She is optometry on 4/2023  She is podiatry on 3/2023      D64.9) Anemia, unspecified type    Comment: subacute. improving.     Has had upper and lower endoscopy in 2019 and 2020 and in 2020 a polyp was removed.   Hgb on 12/23/2023 was 9 while in OSH and upon admission to OSH Patient with reported black vomit PTA. Recent EGD (3 years ago) without evidence of bleeding source. Given copious vomit reported at scene could potentially be related to Mitra-Burks tear. No evidence of esophageal perforation on abdominal CT. NG placed, drained dark bilious fluid initially, most recent fluid straw colored. No concerns for active GI bleed. Will discontinue NG.  On 12/17/2022 =  peripheral blood morphology showed normocytic anemia and reticulocyte was 0.02%  12/19/2023 = 9.0 at [facility]  12/26/2023 = note states discussed pancytopenia with hematology   2/6/2023 = hgb = 8   On 2/17/2023 she was started on iron supplementation  3/29/23 her hgb was 9.0, hematocrit=29.8, ferritin=49, iron=29, AFT=279, Iron sat index=11.    Pt denies having black stools, shortness of breath, dizziness .    Plan:  Cbc and BMP now    orders  CBC and BMP  now  Follow-up with diabetic educator in June 2023  follow up with endocrinology in May 2023  Continue with continuous blood glucose close monitoring-Dexcom   follow-up with podiatry in the facility  Per ophthalmology Continue Cosopt (dorzolamide-timolol -- dark blue top) twice daily both eyes.  Continue Latanoprost (green top) at bedtime both eyes After instilling your drops,       MED REC REQUIRED  Post Medication Reconciliation Status:  Discharge medications reconciled, continue medications without change      Electronically signed by:     TAYLA Sprague CNP on 5/4/2023 at 4:24 PM

## 2023-05-05 ENCOUNTER — LAB REQUISITION (OUTPATIENT)
Dept: LAB | Facility: CLINIC | Age: 63
End: 2023-05-05
Payer: MEDICARE

## 2023-05-05 DIAGNOSIS — I10 ESSENTIAL (PRIMARY) HYPERTENSION: ICD-10-CM

## 2023-05-08 LAB
ANION GAP SERPL CALCULATED.3IONS-SCNC: 10 MMOL/L (ref 7–15)
BUN SERPL-MCNC: 33.9 MG/DL (ref 8–23)
CALCIUM SERPL-MCNC: 10 MG/DL (ref 8.8–10.2)
CHLORIDE SERPL-SCNC: 108 MMOL/L (ref 98–107)
CREAT SERPL-MCNC: 1 MG/DL (ref 0.51–0.95)
DEPRECATED HCO3 PLAS-SCNC: 23 MMOL/L (ref 22–29)
ERYTHROCYTE [DISTWIDTH] IN BLOOD BY AUTOMATED COUNT: 16.1 % (ref 10–15)
GFR SERPL CREATININE-BSD FRML MDRD: 64 ML/MIN/1.73M2
GLUCOSE SERPL-MCNC: 124 MG/DL (ref 70–99)
HCT VFR BLD AUTO: 33.3 % (ref 35–47)
HGB BLD-MCNC: 10.2 G/DL (ref 11.7–15.7)
MCH RBC QN AUTO: 26.9 PG (ref 26.5–33)
MCHC RBC AUTO-ENTMCNC: 30.6 G/DL (ref 31.5–36.5)
MCV RBC AUTO: 88 FL (ref 78–100)
PLATELET # BLD AUTO: 184 10E3/UL (ref 150–450)
POTASSIUM SERPL-SCNC: 4.8 MMOL/L (ref 3.4–5.3)
RBC # BLD AUTO: 3.79 10E6/UL (ref 3.8–5.2)
SODIUM SERPL-SCNC: 141 MMOL/L (ref 136–145)
WBC # BLD AUTO: 3.4 10E3/UL (ref 4–11)

## 2023-05-08 PROCEDURE — 80048 BASIC METABOLIC PNL TOTAL CA: CPT | Mod: ORL | Performed by: NURSE PRACTITIONER

## 2023-05-08 PROCEDURE — 36415 COLL VENOUS BLD VENIPUNCTURE: CPT | Mod: ORL | Performed by: NURSE PRACTITIONER

## 2023-05-08 PROCEDURE — P9604 ONE-WAY ALLOW PRORATED TRIP: HCPCS | Mod: ORL | Performed by: NURSE PRACTITIONER

## 2023-05-08 PROCEDURE — 85027 COMPLETE CBC AUTOMATED: CPT | Mod: ORL | Performed by: NURSE PRACTITIONER

## 2023-05-09 ENCOUNTER — TELEPHONE (OUTPATIENT)
Dept: ONCOLOGY | Facility: CLINIC | Age: 63
End: 2023-05-09
Payer: MEDICARE

## 2023-05-09 NOTE — TELEPHONE ENCOUNTER
Tried calling patient to schedule overdue appt. with ALTHEA Zavala. and lab appt.  No voicemail available.    Called patient 5/18, she didn't want to schedule any appointments and hung up the phone.

## 2023-05-12 ENCOUNTER — TELEPHONE (OUTPATIENT)
Dept: GERIATRICS | Facility: CLINIC | Age: 63
End: 2023-05-12
Payer: MEDICARE

## 2023-05-12 NOTE — TELEPHONE ENCOUNTER
[facility] Triage Nurse Telephone Encounter    Provider: TAYLA Gagnon CNP  Facility: [facility] Facility Type:  [facility]    Caller: Luis Manuel  Call Back Number: [phone]    Allergies:    Allergies   Allergen Reactions     Amoxicillin      Sulfa Antibiotics         Reason for call: Nurse called to report that patient's blood sugar before breakfast was 524.  Scheduled 6 units plus 6 units of sliding scale administered.  Blood sugar was rechecked an hour later and was 529.  Patient is asymptomatic.      Verbal Order/Direction given by Provider: Give an extra 8 units of insulin.      Provider giving Order:  TAYLA Gagnon CNP    Verbal Order given to: Shayy Velasquez RN

## 2023-05-15 ENCOUNTER — TELEPHONE (OUTPATIENT)
Dept: GERIATRICS | Facility: CLINIC | Age: 63
End: 2023-05-15
Payer: MEDICARE

## 2023-05-15 NOTE — TELEPHONE ENCOUNTER
[facility] Triage Nurse Telephone Encounter    Provider: TAYLA Gagnon CNP  Facility: [facility] Facility Type:  [facility]    Caller: Johny  Call Back Number: [phone]  Allergies:    Allergies   Allergen Reactions     Amoxicillin      Sulfa Antibiotics         Reason for call: Pt glucose level this AM was HIGH on glucometer. Prior to lunch it is 498. Pt is asymptomatic.     Verbal Order/Direction given by Provider: Give additional 6 units of Novolog with scheduled and sliding scale - total of 19 units.    Provider giving Order:  TALYA Gagnon CNP    Verbal Order given to: Johny Montoya RN

## 2023-05-16 ENCOUNTER — NURSING HOME VISIT (OUTPATIENT)
Dept: GERIATRICS | Facility: CLINIC | Age: 63
End: 2023-05-16
Payer: MEDICARE

## 2023-05-16 VITALS
BODY MASS INDEX: 28.7 KG/M2 | OXYGEN SATURATION: 97 % | DIASTOLIC BLOOD PRESSURE: 66 MMHG | HEIGHT: 63 IN | TEMPERATURE: 97.4 F | SYSTOLIC BLOOD PRESSURE: 132 MMHG | RESPIRATION RATE: 18 BRPM | WEIGHT: 162 LBS | HEART RATE: 70 BPM

## 2023-05-16 DIAGNOSIS — E10.37X3 TYPE 1 DIABETES MELLITUS WITH DIABETIC MACULAR EDEMA OF BOTH EYES RESOLVED AFTER TREATMENT (H): ICD-10-CM

## 2023-05-16 DIAGNOSIS — R73.9 HYPERGLYCEMIA: ICD-10-CM

## 2023-05-16 DIAGNOSIS — E11.3299 NONPROLIFERATIVE DIABETIC RETINOPATHY (H): Primary | ICD-10-CM

## 2023-05-16 DIAGNOSIS — D64.9 ANEMIA, UNSPECIFIED TYPE: ICD-10-CM

## 2023-05-16 PROCEDURE — 99309 SBSQ NF CARE MODERATE MDM 30: CPT | Performed by: NURSE PRACTITIONER

## 2023-05-16 NOTE — LETTER
"    5/16/2023        RE: Lexy Rockwell  [address]  [facility] [address]        [facility] GERIATRICS    Chief Complaint   Patient presents with     Nursing Home Acute     HPI:  Lexy Rockwell is a 62 year old  (1960), who is being seen today for an episodic care visit at: [facility] () [[id number]].     Her past medical history includes    Type 1 diabetes with gastroparesis and CKD history of DKA    Hypertension    CKD stage III    Hyperlipidemia    Bilateral leg edema    Overactive bladder    GERD    Constipation    Developmentally delayed    Polyneuropathy     Vitamin D deficiency    Glaucoma    Pt has no complaints she felt the RN did not inject the insulin correctly the other evening but reminded Lexy her BS in the morning has been elevated in the past week more than 1x.      Allergies, and PMH/PSH reviewed in [facility] today.  REVIEW OF SYSTEMS:  4 point ROS including Respiratory, CV, GI and , other than that noted in the HPI,  is negative    Objective:   /66   Pulse 70   Temp 97.4  F (36.3  C)   Resp 18   Ht 1.6 m (5' 3\")   Wt 73.5 kg (162 lb)   LMP 03/28/2014   SpO2 97%   BMI 28.70 kg/m    GENERAL APPEARANCE:  Alert, in no distress  RESP:  no respiratory distress  PSYCH:  oriented X 3      Most Recent 3 CBC's:Recent Labs   Lab Test 05/08/23  0835 03/29/23  0515 02/22/23  0537   WBC 3.4* 4.5 3.9*   HGB 10.2* 9.0* 8.6*   MCV 88 87 90    220 223     Most Recent 3 BMP's:Recent Labs   Lab Test 05/08/23  0835 03/29/23  0515 01/25/23  0820    142 142   POTASSIUM 4.8 4.5 3.9   CHLORIDE 108* 110* 106   CO2 23 23 21*   BUN 33.9* 39.7* 19.3   CR 1.00* 1.11* 0.93   ANIONGAP 10 9 15   EFRAIN 10.0 9.6 9.0   * 102* 233*     Most Recent Hemoglobin A1c:Recent Labs   Lab Test 03/29/23  0515   A1C 8.1*       Assessment/Plan:  (E10.37X3) Type 1 diabetes mellitus with diabetic macular edema of both eyes resolved after treatment " (H)  (E11.0989) Nonproliferative diabetic retinopathy (H)  (R73.9) hyperglycermia  Comment: Chronic uncontrolled.  Her hgb A1c (3/2023) is 8.1 which is the best she has been in 3 years.     She continues to have erratic BS.  THis week she has had 3 episodes of BS being above 500 prior to breakfast.  She states she believe the RN did not administer the insulin pen correctly at least one night.   Two weeks prior her fasting BS was 54-98 x 3 with no changes in insulin orders.    For continuous glucose monitoring she has a Dexcom which is connected to her phone.  Per diabetic educator note dated 4/24/2023, her average glucose was 209.        For secondary prevention she is currently taking simvastatin 20 mg daily, lisinopril 40 mg daily, and aspirin 81 mg daily is being held due to anemia.        D64.9) Anemia, unspecified type    Comment: Chronic. improving.     Has had upper and lower endoscopy in 2019 and 2020 and in 2020 a polyp was removed.   Hgb on 12/23/2023 was 9 while in OSH and upon admission to OSH Patient with reported black vomit PTA. Recent EGD (3 years ago) without evidence of bleeding source. Given copious vomit reported at scene could potentially be related to Mitra-Burks tear. No evidence of esophageal perforation on abdominal CT. NG placed, drained dark bilious fluid initially, most recent fluid straw colored. No concerns for active GI bleed. Will discontinue NG.  On 12/17/2022 =  peripheral blood morphology showed normocytic anemia and reticulocyte was 0.02%  12/19/2023 = 9.0 at OSH  12/26/2023 = note states discussed pancytopenia with hematology   2/6/2023 = hgb = 8   On 2/17/2023 she was started on iron supplementation  3/29/23 her hgb was 9.0, hematocrit=29.8, ferritin=49, iron=29, NJP=656, Iron sat index=11.  On 5/8/2023, I personally reviewed her hgb which is now 10.2 with hct 33.3.      Pt denies having black stools, shortness of breath, dizziness .    Plan:  Continue with iron  supplementation    Orders:  When BS above 500 give 6 units aspart insulin.    Dietitian to review food choices at   Reached out to Diabetic educator regarding advice  Will make no changes at this time due to erratic BS in the am   Follow up with endocrinology at the end of May      Electronically signed by:    TAYLA Sprague CNP on 5/16/2023 at 4:01 PM          Sincerely,        TAYLA Sprague CNP

## 2023-05-16 NOTE — PROGRESS NOTES
"[facility] GERIATRICS    Chief Complaint   Patient presents with     Nursing Home Acute     HPI:  Lexy Rockwell is a 62 year old  (1960), who is being seen today for an episodic care visit at: [facility] () [[id number]].     Her past medical history includes    Type 1 diabetes with gastroparesis and CKD history of DKA    Hypertension    CKD stage III    Hyperlipidemia    Bilateral leg edema    Overactive bladder    GERD    Constipation    Developmentally delayed    Polyneuropathy     Vitamin D deficiency    Glaucoma    Pt has no complaints she felt the RN did not inject the insulin correctly the other evening but reminded Lexy her BS in the morning has been elevated in the past week more than 1x.      Allergies, and PMH/PSH reviewed in EPIC today.  REVIEW OF SYSTEMS:  4 point ROS including Respiratory, CV, GI and , other than that noted in the HPI,  is negative    Objective:   /66   Pulse 70   Temp 97.4  F (36.3  C)   Resp 18   Ht 1.6 m (5' 3\")   Wt 73.5 kg (162 lb)   LMP 03/28/2014   SpO2 97%   BMI 28.70 kg/m    GENERAL APPEARANCE:  Alert, in no distress  RESP:  no respiratory distress  PSYCH:  oriented X 3      Most Recent 3 CBC's:Recent Labs   Lab Test 05/08/23  0835 03/29/23  0515 02/22/23  0537   WBC 3.4* 4.5 3.9*   HGB 10.2* 9.0* 8.6*   MCV 88 87 90    220 223     Most Recent 3 BMP's:Recent Labs   Lab Test 05/08/23  0835 03/29/23  0515 01/25/23  0820    142 142   POTASSIUM 4.8 4.5 3.9   CHLORIDE 108* 110* 106   CO2 23 23 21*   BUN 33.9* 39.7* 19.3   CR 1.00* 1.11* 0.93   ANIONGAP 10 9 15   EFRAIN 10.0 9.6 9.0   * 102* 233*     Most Recent Hemoglobin A1c:Recent Labs   Lab Test 03/29/23  0515   A1C 8.1*       Assessment/Plan:  (E10.37X3) Type 1 diabetes mellitus with diabetic macular edema of both eyes resolved after treatment (H)  (E11.3299) Nonproliferative diabetic retinopathy (H)  (R73.9) hyperglycermia  Comment: Chronic uncontrolled.  Her " hgb A1c (3/2023) is 8.1 which is the best she has been in 3 years.     She continues to have erratic BS.  THis week she has had 3 episodes of BS being above 500 prior to breakfast.  She states she believe the RN did not administer the insulin pen correctly at least one night.   Two weeks prior her fasting BS was 54-98 x 3 with no changes in insulin orders.    For continuous glucose monitoring she has a Dexcom which is connected to her phone.  Per diabetic educator note dated 4/24/2023, her average glucose was 209.        For secondary prevention she is currently taking simvastatin 20 mg daily, lisinopril 40 mg daily, and aspirin 81 mg daily is being held due to anemia.        D64.9) Anemia, unspecified type    Comment: Chronic. improving.     Has had upper and lower endoscopy in 2019 and 2020 and in 2020 a polyp was removed.   Hgb on 12/23/2023 was 9 while in OSH and upon admission to OSH Patient with reported black vomit PTA. Recent EGD (3 years ago) without evidence of bleeding source. Given copious vomit reported at scene could potentially be related to Mitra-Burks tear. No evidence of esophageal perforation on abdominal CT. NG placed, drained dark bilious fluid initially, most recent fluid straw colored. No concerns for active GI bleed. Will discontinue NG.  On 12/17/2022 =  peripheral blood morphology showed normocytic anemia and reticulocyte was 0.02%  12/19/2023 = 9.0 at OSH  12/26/2023 = note states discussed pancytopenia with hematology   2/6/2023 = hgb = 8   On 2/17/2023 she was started on iron supplementation  3/29/23 her hgb was 9.0, hematocrit=29.8, ferritin=49, iron=29, DAP=250, Iron sat index=11.  On 5/8/2023, I personally reviewed her hgb which is now 10.2 with hct 33.3.      Pt denies having black stools, shortness of breath, dizziness .    Plan:  Continue with iron supplementation    Orders:  When BS above 500 give 6 units aspart insulin.    Dietitian to review food choices at HS  Reached out to  Diabetic educator regarding advice  Will make no changes at this time due to erratic BS in the am   Follow up with endocrinology at the end of May      Electronically signed by:    TAYLA Sprague CNP on 5/16/2023 at 4:01 PM

## 2023-05-17 NOTE — PROGRESS NOTES
Outcome for 05/17/23 10:35 AM: [employer] message sent  Cris Arizmendi MA  Outcome for 05/24/23 1:57 PM: Data obtained via Dexcom website  Cris Arizmendi MA

## 2023-05-26 ENCOUNTER — VIRTUAL VISIT (OUTPATIENT)
Dept: ENDOCRINOLOGY | Facility: CLINIC | Age: 63
End: 2023-05-26
Attending: NURSE PRACTITIONER
Payer: MEDICARE

## 2023-05-26 ENCOUNTER — PRE VISIT (OUTPATIENT)
Dept: ENDOCRINOLOGY | Facility: CLINIC | Age: 63
End: 2023-05-26

## 2023-05-26 DIAGNOSIS — E10.37X3 TYPE 1 DIABETES MELLITUS WITH DIABETIC MACULAR EDEMA OF BOTH EYES RESOLVED AFTER TREATMENT (H): ICD-10-CM

## 2023-05-26 PROCEDURE — 99214 OFFICE O/P EST MOD 30 MIN: CPT | Mod: VID | Performed by: INTERNAL MEDICINE

## 2023-05-26 NOTE — NURSING NOTE
Is the patient currently in the state of [place]? YES    Visit mode:VIDEO    If the visit is dropped, the patient can be reconnected by: VIDEO VISIT: Send to e-mail at: [email or web address]    Will anyone else be joining the visit? Care facility member      How would you like to obtain your AVS? MyChart    Are changes needed to the allergy or medication list? Yes, see changes below.    Reason for visit: Video Visit (Patient resides at [facility]. Fax is [phone], Phone [phone])    Patient's nursing staff member at [facility] reports following medication changes from [place] List:  Patient is taking:  Centrum tablet   Potassium chloride ER 40 meq twice daily.   Ferrous sulfate 25 mg once a day  Nystatin cream is PRN  Novolog dosage when BG is 300-349 is 4 units.  Novolog dosage when blood sugar is 300-349 is 3 units (at bedtime).    Care facility reports no orders for ketostix.

## 2023-05-26 NOTE — PATIENT INSTRUCTIONS
Increase Tresiba 26 units daily  Novolog 7 units three times per day with meal   Sliding scale    150 -199 = 1 unit  200-249 = 2 units  250-299 = 3 units  300-249 = 4 units  350 - 399= 5 units   400+ = 6 units     See Tiffanie Mcfarland in 2 weeks  See Physician assistant in 2 months    If you have any questions, please do not hesitate to call clinic line at [phone] and ask for Endocrinology clinic.  If you need to fax, please fax to clinic fax number at [phone]    After clinic hours or weekends, please contact [phone] and ask for Endocrinologist-on call      Sincerely,    Deonte Garcia MD  Endocrinology       No

## 2023-05-26 NOTE — PROGRESS NOTES
Endocrinology Note         Lexy is a 62 year old female presents today for     HPI  Lexy is a 62 year old female history of type 1 diabetes with gastroparesis and chronic kidney disease stage 3, hypertension, hyperlipidemia developmental delay presents today for uncontrolled type 1 diabetes.    Patient was previously seen by Dr. Alfa Goss.  Last seen in October 2021.    Patient had history of suboptimal control of type 1 diabetes (previous A1c ranged between 9 to 12%).  Patient had history of diabetic ketoacidosis in 2021.  Patient had documented low C-peptide and positive angelica antibody in 3/2018.    She is currently living at [facility] (nursing home).   Recent A1c 8.1% in March 2023. Followed up closely with JANNETE, Tiffanie Mcfarland. BG gradually improved.     Current diabetic medications  Tresiba 24 units daily  Novolog 7 units three times per day with meal   Sliding scale --  1 unit per 50 above 150 mg/dl  150 -199 = 1 unit:   200-249 = 2 units  250-299 = 3 units  300-249 = 4 units  350 - 399= 5 units   400+ = 6 units and call MD Up to 30 units daily    She is using Dexcom to track glucose levels.      DM complications:  Retinopathy: 4/2023 -- moderate NPDR, glaucoma -- has f/up appointment 10/2023  Nephropathy: positive macroalbuminuria (test 2021), CKD stage 3   Neuropathy: some tingling and numbness in her feet -- saw podiatrist 3/3023.    Past Medical History  Past Medical History:   Diagnosis Date     Cerumen impacted      Development delay      Diabetic gastroparesis (H) 8/16/2013     Glaucoma (increased eye pressure)      Hyperlipaemia      Hypertension      Hypothyroid      Mental retardation      Nonsenile cataract      Obesity      Strabismus      Type 1 diabetes mellitus not at goal (H)        Allergies  Allergies   Allergen Reactions     Amoxicillin      Sulfa Antibiotics      Medications  Current Outpatient Medications   Medication Sig Dispense Refill     acetone urine  (KETOSTIX) test strip Use as needed when BG is > 240 mg/dl for >4 hours with symptoms of DKA. 50 strip 3     AMLODIPINE BENZOATE PO Take 2.5 mg by mouth       carvedilol (COREG) 6.25 MG tablet Take 1 tablet (6.25 mg) by mouth 2 times daily (with meals)       ciclopirox (LOPROX) 0.77 % cream Apply topically 2 times daily To feet and toenails. 90 g 5     Continuous Blood Gluc  (DEXCOM G6 ) KELLY USE AS DIRECTED FOR CONTINUOUS GLUCOSE MONITORING 1 Device 1     Continuous Blood Gluc Sensor (DEXCOM G6 SENSOR) MISC USE AS DIRECTED 1 EVERY 10 DAYS 3 each 5     Continuous Blood Gluc Transmit (DEXCOM G6 TRANSMITTER) MISC USE ONE EVERY 3 MONTHS 1 each 3     dorzolamide-timolol (COSOPT) 2-0.5 % ophthalmic solution Place 1 drop into both eyes 2 times daily 5 mL 11     ferrous sulfate (FEROSUL) 325 (65 Fe) MG tablet Take 1 tablet (325 mg) by mouth daily (with breakfast)       glucagon 1 MG SOLR injection Inject 1 mg into the muscle once       insulin aspart (NOVOLOG FLEXPEN) 100 UNIT/ML pen Inject 7 Units Subcutaneous 3 times daily (with meals) And sliding scale: 1 unit: 150 -199 mg/dl.   200-249= 2 units  250-299 3 units  300-249 = 4 units  350 - 399= 5 units  400+ = 6 units and call MD Up to 30 units daily 15 mL      insulin aspart (NOVOLOG PEN) 100 UNIT/ML pen Bedtime sliding scale   200-249= 1 units   250-299 2 units   300-249 = 3 units   350 - 399= 4 units   400+ = 5 units 15 mL 0     insulin degludec (TRESIBA FLEXTOUCH) 200 UNIT/ML pen Inject 24 Units Subcutaneous daily 15 mL 3     insulin pen needle (B-D U/F) 31G X 8 MM miscellaneous Use 3-4 pen needles daily or as directed. 200 each 3     latanoprost (XALATAN) 0.005 % ophthalmic solution INSTILL 1 DROP IN BOTH EYES AT BEDTIME 10 mL 3     lisinopril (ZESTRIL) 40 MG tablet Take 1 tablet (40 mg) by mouth daily       magnesium oxide (MAG-OX) 400 MG tablet Take 1 tablet (400 mg) by mouth daily 90 tablet 3     MULTIVITAMIN TABS   OR 1 TABLET DAILY       nystatin  (MYCOSTATIN) 059570 UNIT/GM external cream Apply topically 2 times daily To feet and toenails. 90 g 5     pantoprazole (PROTONIX) 40 MG EC tablet Take 40 mg by mouth daily       potassium chloride ER (KLOR-CON M) 20 MEQ CR tablet Take 20 mEq by mouth 2 times daily       simvastatin (ZOCOR) 20 MG tablet Take 1 tablet (20 mg) by mouth At Bedtime 90 tablet 3     STOOL SOFTENER/LAXATIVE 50-8.6 MG tablet Take 1 tablet by mouth 2 times daily       Family History  family history includes Diabetes in her sister; Glaucoma in her maternal grandfather; Hypertension in her father.     Social History  Social History     Tobacco Use     Smoking status: Never     Smokeless tobacco: Never     Tobacco comments:     lives in smoke free household.   Substance Use Topics     Alcohol use: Yes     Comment: occass social     Drug use: No       ROS  10 points ROS were negative otherwise mentioned in HPI    Physical Exam  LMP 03/28/2014   Limited due to virtual visit  Constitutional: no distress, comfortable, pleasant   Skin: no jaundice   Psychological: appropriate mood       RESULTS  I have personally reviewed labs and images. I also reviewed labs with patient and discussed the result and plan of care.    Lab Results   Component Value Date    A1C 8.1 03/29/2023    A1C 8.7 01/25/2023    A1C 10.7 10/13/2021    A1C 9.7 02/01/2021    A1C 9.2 09/30/2020    A1C 11.8 01/08/2020    A1C 11.8 05/19/2019    A1C 11.2 12/12/2018      Latest Ref Rng 1/25/2023  8:20 AM   ENDO DIABETES     Cholesterol <200 mg/dL 112    LDL Cholesterol Calculated <=100 mg/dL 58    HDL Cholesterol >=50 mg/dL 37 (L)    Non HDL Cholesterol <130 mg/dL 75    VLDL-Cholesterol 0 - 30 mg/dL    Triglycerides <150 mg/dL 84    TRIG (EXT) <150 mg/dL 84       Legend:  (L) Low    ASSESSMENT:    Lxey is a 62 year old female history of type 1 diabetes with gastroparesis and chronic kidney disease stage 3, hypertension, hyperlipidemia developmental delay presents today for  uncontrolled type 1 diabetes.    1) type 1 diabetes with CKD and gastroparesis: Her diabetes control is still suboptimal.  Previous A1c ranged between 8 to 12%.  Recent A1c in March was 8.1%.  She is currently using Dexcom glucose sensor to track and monitor blood glucose.  Review glucose pattern showed erratic blood glucose with mostly high blood sugar of day.  Her appetite is good.  She is mostly eat at meals.  She does not exercise much.  Based on her glucose pattern, I will increase Tresiba to 26 units daily.  I will continue with her current NovoLog 7 units at meals 3 times a day and continue current sliding scale.  She should continue using Dexcom glucose sensor  She will keep her appointment with her diabetic educator in 2 weeks.      2) DM complications:  Retinopathy: 4/2023 --moderate NPDR, glaucoma -- has f/up appointment 10/2023  Nephropathy: positive macroalbuminuria (test 2021), CKD stage 3   Neuropathy: some tingling and numbness in her feet -- saw podiatrist 3/3023.      PLAN:   - increase Tresiba to 26 units daily.  - continue with her current NovoLog 7 units at meals 3 times a day   - continue current sliding scale --   1 unit per 50 above 150 mg/dl  - continue using Dexcom sensor    See Tiffanie Mcfarland in 2 weeks  See Physician assistant in 2 months    Joined the call at 5/26/2023, 1:42:21 pm.  Left the call at 5/26/2023, 1:51:42 pm.  You were on the call for 9 minutes 21 seconds .    External notes/medical records independently reviewed, labs and imaging independently reviewed, medical management and tests to be discussed/communicated to patient.    Time: I spent 30 minutes spent on the date of the encounter preparing to see patient (including chart review and preparation), obtaining and or reviewing additional medical history, performing a physical exam and evaluation, documenting clinical information in the electronic health record, independently interpreting results, communicating results to  the patient and coordinating care.    Deonte Garcia MD  Division of Diabetes and Endocrinology  Department of Medicine

## 2023-05-26 NOTE — LETTER
5/26/2023       RE: Lexy Rockwell  [address]  [place] [address]     Dear Colleague,    Thank you for referring your patient, Lexy Rockwell, to the [facility] ENDOCRINOLOGY CLINIC [place] at [facility]. Please see a copy of my visit note below.    Outcome for 05/17/23 10:35 AM: [employer] message sent  Cris Arizmendi MA  Outcome for 05/24/23 1:57 PM: Data obtained via Dexcom website  Cris Arizmendi MA                             Endocrinology Note         Lexy is a 62 year old female presents today for     HPI  Lexy is a 62 year old female history of type 1 diabetes with gastroparesis and chronic kidney disease stage 3, hypertension, hyperlipidemia developmental delay presents today for uncontrolled type 1 diabetes.    Patient was previously seen by Dr. Alfa Goss.  Last seen in October 2021.    Patient had history of suboptimal control of type 1 diabetes (previous A1c ranged between 9 to 12%).  Patient had history of diabetic ketoacidosis in 2021.  Patient had documented low C-peptide and positive angelica antibody in 3/2018.    She is currently living at [facility] (nursing home).   Recent A1c 8.1% in March 2023. Followed up closely with JANNETE, Tiffanie Mcfarland. BG gradually improved.     Current diabetic medications  Tresiba 24 units daily  Novolog 7 units three times per day with meal   Sliding scale --  1 unit per 50 above 150 mg/dl  150 -199 = 1 unit:   200-249 = 2 units  250-299 = 3 units  300-249 = 4 units  350 - 399= 5 units   400+ = 6 units and call MD Up to 30 units daily    She is using Dexcom to track glucose levels.      DM complications:  Retinopathy: 4/2023 -- moderate NPDR, glaucoma -- has f/up appointment 10/2023  Nephropathy: positive macroalbuminuria (test 2021), CKD stage 3   Neuropathy: some tingling and numbness in her feet -- saw podiatrist 3/3023.    Past Medical  History  Past Medical History:   Diagnosis Date    Cerumen impacted     Development delay     Diabetic gastroparesis (H) 8/16/2013    Glaucoma (increased eye pressure)     Hyperlipaemia     Hypertension     Hypothyroid     Mental retardation     Nonsenile cataract     Obesity     Strabismus     Type 1 diabetes mellitus not at goal (H)        Allergies  Allergies   Allergen Reactions    Amoxicillin     Sulfa Antibiotics      Medications  Current Outpatient Medications   Medication Sig Dispense Refill    acetone urine (KETOSTIX) test strip Use as needed when BG is > 240 mg/dl for >4 hours with symptoms of DKA. 50 strip 3    AMLODIPINE BENZOATE PO Take 2.5 mg by mouth      carvedilol (COREG) 6.25 MG tablet Take 1 tablet (6.25 mg) by mouth 2 times daily (with meals)      ciclopirox (LOPROX) 0.77 % cream Apply topically 2 times daily To feet and toenails. 90 g 5    Continuous Blood Gluc  (DEXCOM G6 ) KELLY USE AS DIRECTED FOR CONTINUOUS GLUCOSE MONITORING 1 Device 1    Continuous Blood Gluc Sensor (DEXCOM G6 SENSOR) MISC USE AS DIRECTED 1 EVERY 10 DAYS 3 each 5    Continuous Blood Gluc Transmit (DEXCOM G6 TRANSMITTER) MISC USE ONE EVERY 3 MONTHS 1 each 3    dorzolamide-timolol (COSOPT) 2-0.5 % ophthalmic solution Place 1 drop into both eyes 2 times daily 5 mL 11    ferrous sulfate (FEROSUL) 325 (65 Fe) MG tablet Take 1 tablet (325 mg) by mouth daily (with breakfast)      glucagon 1 MG SOLR injection Inject 1 mg into the muscle once      insulin aspart (NOVOLOG FLEXPEN) 100 UNIT/ML pen Inject 7 Units Subcutaneous 3 times daily (with meals) And sliding scale: 1 unit: 150 -199 mg/dl.   200-249= 2 units  250-299 3 units  300-249 = 4 units  350 - 399= 5 units  400+ = 6 units and call MD Up to 30 units daily 15 mL     insulin aspart (NOVOLOG PEN) 100 UNIT/ML pen Bedtime sliding scale   200-249= 1 units   250-299 2 units   300-249 = 3 units   350 - 399= 4 units   400+ = 5 units 15 mL 0    insulin degludec  (TRESIBA FLEXTOUCH) 200 UNIT/ML pen Inject 24 Units Subcutaneous daily 15 mL 3    insulin pen needle (B-D U/F) 31G X 8 MM miscellaneous Use 3-4 pen needles daily or as directed. 200 each 3    latanoprost (XALATAN) 0.005 % ophthalmic solution INSTILL 1 DROP IN BOTH EYES AT BEDTIME 10 mL 3    lisinopril (ZESTRIL) 40 MG tablet Take 1 tablet (40 mg) by mouth daily      magnesium oxide (MAG-OX) 400 MG tablet Take 1 tablet (400 mg) by mouth daily 90 tablet 3    MULTIVITAMIN TABS   OR 1 TABLET DAILY      nystatin (MYCOSTATIN) 319127 UNIT/GM external cream Apply topically 2 times daily To feet and toenails. 90 g 5    pantoprazole (PROTONIX) 40 MG EC tablet Take 40 mg by mouth daily      potassium chloride ER (KLOR-CON M) 20 MEQ CR tablet Take 20 mEq by mouth 2 times daily      simvastatin (ZOCOR) 20 MG tablet Take 1 tablet (20 mg) by mouth At Bedtime 90 tablet 3    STOOL SOFTENER/LAXATIVE 50-8.6 MG tablet Take 1 tablet by mouth 2 times daily       Family History  family history includes Diabetes in her sister; Glaucoma in her maternal grandfather; Hypertension in her father.     Social History  Social History     Tobacco Use    Smoking status: Never    Smokeless tobacco: Never    Tobacco comments:     lives in smoke free household.   Substance Use Topics    Alcohol use: Yes     Comment: occass social    Drug use: No       ROS  10 points ROS were negative otherwise mentioned in HPI    Physical Exam  LMP 03/28/2014   Limited due to virtual visit  Constitutional: no distress, comfortable, pleasant   Skin: no jaundice   Psychological: appropriate mood       RESULTS  I have personally reviewed labs and images. I also reviewed labs with patient and discussed the result and plan of care.    Lab Results   Component Value Date    A1C 8.1 03/29/2023    A1C 8.7 01/25/2023    A1C 10.7 10/13/2021    A1C 9.7 02/01/2021    A1C 9.2 09/30/2020    A1C 11.8 01/08/2020    A1C 11.8 05/19/2019    A1C 11.2 12/12/2018      Latest Ref Rng  1/25/2023  8:20 AM   ENDO DIABETES     Cholesterol <200 mg/dL 112    LDL Cholesterol Calculated <=100 mg/dL 58    HDL Cholesterol >=50 mg/dL 37 (L)    Non HDL Cholesterol <130 mg/dL 75    VLDL-Cholesterol 0 - 30 mg/dL    Triglycerides <150 mg/dL 84    TRIG (EXT) <150 mg/dL 84       Legend:  (L) Low    ASSESSMENT:    Lexy is a 62 year old female history of type 1 diabetes with gastroparesis and chronic kidney disease stage 3, hypertension, hyperlipidemia developmental delay presents today for uncontrolled type 1 diabetes.    1) type 1 diabetes with CKD and gastroparesis: Her diabetes control is still suboptimal.  Previous A1c ranged between 8 to 12%.  Recent A1c in March was 8.1%.  She is currently using Dexcom glucose sensor to track and monitor blood glucose.  Review glucose pattern showed erratic blood glucose with mostly high blood sugar of day.  Her appetite is good.  She is mostly eat at meals.  She does not exercise much.  Based on her glucose pattern, I will increase Tresiba to 26 units daily.  I will continue with her current NovoLog 7 units at meals 3 times a day and continue current sliding scale.  She should continue using Dexcom glucose sensor  She will keep her appointment with her diabetic educator in 2 weeks.      2) DM complications:  Retinopathy: 4/2023 --moderate NPDR, glaucoma -- has f/up appointment 10/2023  Nephropathy: positive macroalbuminuria (test 2021), CKD stage 3   Neuropathy: some tingling and numbness in her feet -- saw podiatrist 3/3023.      PLAN:   - increase Tresiba to 26 units daily.  - continue with her current NovoLog 7 units at meals 3 times a day   - continue current sliding scale --   1 unit per 50 above 150 mg/dl  - continue using Dexcom sensor    See Tiffanie Mcfarland in 2 weeks  See Physician assistant in 2 months    Joined the call at 5/26/2023, 1:42:21 pm.  Left the call at 5/26/2023, 1:51:42 pm.  You were on the call for 9 minutes 21 seconds .    External notes/medical  records independently reviewed, labs and imaging independently reviewed, medical management and tests to be discussed/communicated to patient.    Time: I spent 30 minutes spent on the date of the encounter preparing to see patient (including chart review and preparation), obtaining and or reviewing additional medical history, performing a physical exam and evaluation, documenting clinical information in the electronic health record, independently interpreting results, communicating results to the patient and coordinating care.    Deonte Garcia MD  Division of Diabetes and Endocrinology  Department of Medicine

## 2023-06-13 ENCOUNTER — NURSING HOME VISIT (OUTPATIENT)
Dept: GERIATRICS | Facility: CLINIC | Age: 63
End: 2023-06-13
Payer: MEDICARE

## 2023-06-13 VITALS
OXYGEN SATURATION: 98 % | SYSTOLIC BLOOD PRESSURE: 128 MMHG | TEMPERATURE: 97.7 F | WEIGHT: 154.4 LBS | HEART RATE: 84 BPM | BODY MASS INDEX: 27.36 KG/M2 | DIASTOLIC BLOOD PRESSURE: 78 MMHG | RESPIRATION RATE: 18 BRPM | HEIGHT: 63 IN

## 2023-06-13 DIAGNOSIS — F81.9 COGNITIVE DEVELOPMENTAL DELAY: ICD-10-CM

## 2023-06-13 DIAGNOSIS — I10 HYPERTENSION GOAL BP (BLOOD PRESSURE) < 140/90: ICD-10-CM

## 2023-06-13 DIAGNOSIS — D64.9 ANEMIA, UNSPECIFIED TYPE: ICD-10-CM

## 2023-06-13 DIAGNOSIS — K08.9 POOR DENTITION: ICD-10-CM

## 2023-06-13 DIAGNOSIS — H40.1132 PRIMARY OPEN ANGLE GLAUCOMA (POAG) OF BOTH EYES, MODERATE STAGE: ICD-10-CM

## 2023-06-13 DIAGNOSIS — K59.01 SLOW TRANSIT CONSTIPATION: Primary | ICD-10-CM

## 2023-06-13 DIAGNOSIS — H52.4 PRESBYOPIA: ICD-10-CM

## 2023-06-13 DIAGNOSIS — E11.3299 NONPROLIFERATIVE DIABETIC RETINOPATHY (H): ICD-10-CM

## 2023-06-13 DIAGNOSIS — H25.813 COMBINED FORMS OF AGE-RELATED CATARACT OF BOTH EYES: ICD-10-CM

## 2023-06-13 DIAGNOSIS — K21.00 GASTROESOPHAGEAL REFLUX DISEASE WITH ESOPHAGITIS WITHOUT HEMORRHAGE: ICD-10-CM

## 2023-06-13 DIAGNOSIS — E10.37X3 TYPE 1 DIABETES MELLITUS WITH DIABETIC MACULAR EDEMA OF BOTH EYES RESOLVED AFTER TREATMENT (H): ICD-10-CM

## 2023-06-13 DIAGNOSIS — R60.0 BILATERAL LEG EDEMA: ICD-10-CM

## 2023-06-13 PROCEDURE — 99309 SBSQ NF CARE MODERATE MDM 30: CPT | Performed by: NURSE PRACTITIONER

## 2023-06-13 RX ORDER — NYSTATIN 100000 U/G
CREAM TOPICAL 2 TIMES DAILY PRN
Start: 2023-06-13 | End: 2024-09-06

## 2023-06-13 RX ORDER — INSULIN DEGLUDEC 200 U/ML
26 INJECTION, SOLUTION SUBCUTANEOUS DAILY
Start: 2023-06-13 | End: 2023-06-21

## 2023-06-13 NOTE — PROGRESS NOTES
[facility] GERIATRICS  Chief Complaint   Patient presents with     Annual Comprehensive Nursing Home     [place] Medical Record Number:  [id number]  Place of Service where encounter took place:  [facility] () [[id number]]    HPI:    Lexy Rockwell  is a 62 year old  (1960), who is being seen today for an annual comprehensive visit. HPI information obtained from: facility chart records, facility staff, patient report, [facility] chart review and Care Everywhere [facility] chart review.     Her past medical history includes    Type 1 diabetes with gastroparesis and CKD history of DKA    Hypertension    CKD stage III    Hyperlipidemia    Bilateral leg edema    Overactive bladder    GERD    Constipation    Developmentally delayed    Polyneuropathy     Vitamin D deficiency    Glaucoma    Saw endocrinology and her Tresiba was increased   Vs stable.   Right swelling more than left on LE     ALLERGIES: Amoxicillin and Sulfa antibiotics  PAST MEDICAL HISTORY:   Past Medical History:   Diagnosis Date     Cerumen impacted      Development delay      Diabetic gastroparesis (H) 8/16/2013     Glaucoma (increased eye pressure)      Hyperlipaemia      Hypertension      Hypothyroid      Mental retardation      Nonsenile cataract      Obesity      Strabismus      Type 1 diabetes mellitus not at goal (H)       PAST SURGICAL HISTORY:  has a past surgical history that includes EYE SURG ANT SGMT PROC UNLISTED (remote) and strabismus surgery.      Current Outpatient Medications:      acetone urine (KETOSTIX) test strip, Use as needed when BG is > 240 mg/dl for >4 hours with symptoms of DKA. (Patient not taking: Reported on 5/26/2023), Disp: 50 strip, Rfl: 3     AMLODIPINE BENZOATE PO, Take 2.5 mg by mouth, Disp: , Rfl:      carvedilol (COREG) 6.25 MG tablet, Take 1 tablet (6.25 mg) by mouth 2 times daily (with meals), Disp: , Rfl:      ciclopirox (LOPROX) 0.77 % cream, Apply topically 2 times daily To feet  and toenails. (Patient not taking: Reported on 5/26/2023), Disp: 90 g, Rfl: 5     Continuous Blood Gluc  (DEXCOM G6 ) KELLY, USE AS DIRECTED FOR CONTINUOUS GLUCOSE MONITORING, Disp: 1 Device, Rfl: 1     Continuous Blood Gluc Sensor (DEXCOM G6 SENSOR) MISC, USE AS DIRECTED 1 EVERY 10 DAYS, Disp: 3 each, Rfl: 5     Continuous Blood Gluc Transmit (DEXCOM G6 TRANSMITTER) MISC, USE ONE EVERY 3 MONTHS, Disp: 1 each, Rfl: 3     dorzolamide-timolol (COSOPT) 2-0.5 % ophthalmic solution, Place 1 drop into both eyes 2 times daily, Disp: 5 mL, Rfl: 11     ferrous sulfate (FEROSUL) 325 (65 Fe) MG tablet, Take 1 tablet (325 mg) by mouth daily (with breakfast) (Patient not taking: Reported on 5/26/2023), Disp: , Rfl:      glucagon 1 MG SOLR injection, Inject 1 mg into the muscle once, Disp: , Rfl:      insulin aspart (NOVOLOG FLEXPEN) 100 UNIT/ML pen, Inject 7 Units Subcutaneous 3 times daily (with meals) And sliding scale: 1 unit: 150 -199 mg/dl.  200-249= 2 units 250-299 3 units 300-249 = 4 units 350 - 399= 5 units 400+ = 6 units and call MD Up to 30 units daily, Disp: 15 mL, Rfl:      insulin aspart (NOVOLOG PEN) 100 UNIT/ML pen, Bedtime sliding scale  200-249= 1 units  250-299 2 units  300-249 = 3 units  350 - 399= 4 units  400+ = 5 units, Disp: 15 mL, Rfl: 0     insulin degludec (TRESIBA FLEXTOUCH) 200 UNIT/ML pen, Inject 24 Units Subcutaneous daily, Disp: 15 mL, Rfl: 3     insulin pen needle (B-D U/F) 31G X 8 MM miscellaneous, Use 3-4 pen needles daily or as directed., Disp: 200 each, Rfl: 3     latanoprost (XALATAN) 0.005 % ophthalmic solution, INSTILL 1 DROP IN BOTH EYES AT BEDTIME, Disp: 10 mL, Rfl: 3     lisinopril (ZESTRIL) 40 MG tablet, Take 1 tablet (40 mg) by mouth daily, Disp: , Rfl:      magnesium oxide (MAG-OX) 400 MG tablet, Take 1 tablet (400 mg) by mouth daily, Disp: 90 tablet, Rfl: 3     MULTIVITAMIN TABS   OR, 1 TABLET DAILY, Disp: , Rfl:      nystatin (MYCOSTATIN) 926511 UNIT/GM external  "cream, Apply topically 2 times daily To feet and toenails. (Patient not taking: Reported on 5/26/2023), Disp: 90 g, Rfl: 5     pantoprazole (PROTONIX) 40 MG EC tablet, Take 40 mg by mouth daily, Disp: , Rfl:      potassium chloride ER (KLOR-CON M) 20 MEQ CR tablet, Take 20 mEq by mouth 2 times daily (Patient not taking: Reported on 5/26/2023), Disp: , Rfl:      simvastatin (ZOCOR) 20 MG tablet, Take 1 tablet (20 mg) by mouth At Bedtime, Disp: 90 tablet, Rfl: 3     STOOL SOFTENER/LAXATIVE 50-8.6 MG tablet, Take 1 tablet by mouth 2 times daily, Disp: , Rfl:      MED REC REQUIRED  Post Medication Reconciliation Status:  Discharge medications reconciled, continue medications without change          ROS:  4 point ROS including Respiratory, CV, GI and , other than that noted in the HPI,  is negative    Vitals:  /78   Pulse 84   Temp 97.7  F (36.5  C)   Resp 18   Ht 1.6 m (5' 3\")   Wt 70 kg (154 lb 6.4 oz)   LMP 03/28/2014   SpO2 98%   BMI 27.35 kg/m   Body mass index is 27.35 kg/m .  Exam:  GENERAL APPEARANCE:  Alert, in no distress  RESP:  lungs clear to auscultation , no respiratory distress  CV:  Palpation and auscultation of heart done , rate-normal  PSYCH:  oriented X 3     Lab/Diagnostic data:     Most Recent 3 CBC's:Recent Labs   Lab Test 05/08/23  0835 03/29/23  0515 02/22/23  0537   WBC 3.4* 4.5 3.9*   HGB 10.2* 9.0* 8.6*   MCV 88 87 90    220 223     Most Recent 3 BMP's:Recent Labs   Lab Test 05/08/23  0835 03/29/23  0515 01/25/23  0820    142 142   POTASSIUM 4.8 4.5 3.9   CHLORIDE 108* 110* 106   CO2 23 23 21*   BUN 33.9* 39.7* 19.3   CR 1.00* 1.11* 0.93   ANIONGAP 10 9 15   EFRAIN 10.0 9.6 9.0   * 102* 233*     Most Recent 2 LFT's:Recent Labs   Lab Test 03/29/23  0515 01/25/23  0820   AST 20 19   ALT 19 10   ALKPHOS 73 79   BILITOTAL <0.2 <0.2     Most Recent Cholesterol Panel:Recent Labs   Lab Test 01/25/23  0820   CHOL 112   LDL 58   HDL 37*   TRIG 84     Most Recent TSH and " T4:Recent Labs   Lab Test 10/13/21  1540 02/01/21  0909   TSH 3.41 7.07*   T4  --  1.05     Most Recent Hemoglobin A1c:Recent Labs   Lab Test 03/29/23  0515   A1C 8.1*       ASSESSMENT/PLAN  (K59.01) Slow transit constipation  (primary encounter diagnosis)  Comment: chronic.  stable taking senna S once daily  Plan:Continue with plan of care no changes at this time, adjustment as needed        (E10.37X3) Type 1 diabetes mellitus with diabetic macular edema of both eyes resolved after treatment (H)  (E11.3299) Nonproliferative diabetic retinopathy (H)  (primary encounter diagnosis)  (H40.1132) Primary open angle glaucoma (POAG) of both eyes, moderate stage  (H25.813) Combined forms of age-related cataract of both eyes  (H52.4) Presbyopia  Comment: Chronic uncontrolled.  Her hgb A1c (3/2023) is 8.1, her BS remain erratic.   She will have intermittent low BS, nursing then compensates and gives her an abundant amount of carbohydrates and her BS then is above 400.   She is currently taking 7 units at meals, sliding scale at meals and HS, and long acting.    For continuous glucose monitoring she has a Dexcom which is connected to her phone.   She saw Endocrinology recently and her long acting insulin was increased.    For secondary prevention she is currently taking simvastatin 20 mg daily, lisinopril 40 mg daily, and aspirin 81 mg daily is being held due to anemia.  She is optometry on 4/2023; next appt in 10/2023  She is podiatry on 5/2023  Follow up with CDE in 6/2023       (I10) Hypertension goal BP (blood pressure) < 140/90  Comment: Chronic, Controlled while taking lisinopril, coreg, amlodipine  Plan: Continue with plan of care no changes at this time, adjustment as needed       (K21.00) Gastroesophageal reflux disease with esophagitis without hemorrhage  Comment: Chronic, stable while taking Protonix  Plan:   Following with GI in 7/2023      (R60.0) Bilateral leg edema  Comment: improving.  May be related to  amlodipine 5 mg daily which has a known side effect of lower extremity edema.   1-2+ edema Right worse than left.  Significant improvement in LE since admission.   Wearing compression socks.    Plan: Continue with plan of care no changes at this time, adjustment as needed       D64.9) Anemia, unspecified type    Comment:  improving.   Has had upper and lower endoscopy in 2019 and 2020 and in 2020 a polyp was removed.   Hgb on 12/23/2023 was 9 while in OSH and upon admission to OSH Patient with reported black vomit PTA. Recent EGD (3 years ago) without evidence of bleeding source. Given copious vomit reported at scene could potentially be related to Mitra-Burks tear. No evidence of esophageal perforation on abdominal CT. NG placed, drained dark bilious fluid initially, most recent fluid straw colored. No concerns for active GI bleed. Will discontinue NG.  On 12/17/2022 =  peripheral blood morphology showed normocytic anemia and reticulocyte was 0.02%  12/19/2023 = 9.0 at [facility]  12/26/2023 = note states discussed pancytopenia with hematology   2/6/2023 = hgb = 8   On 2/17/2023 she was started on iron supplementation  3/29/23 her hgb was 9.0, hematocrit=29.8, ferritin=49, iron=29, NFM=209, Iron sat index=11.    Pt denies having black stools, shortness of breath, dizziness .    5/8/023 hgb 10.2  Plan:  Follow up with GI in July 2023        (F81.9) Cognitive developmental delay  Comment: Chronic, stable.  Patient has a mother and father who live in [place] and 2 sisters who live in the [facility] area.   Her father is on hospice in the [facility]  Plan: Continue with plan of care no changes at this time, adjustment as needed         (K08.9) Poor dentition  (primary encounter diagnosis)  Comment: Chronic.  Missing teeth and has broken teeth with poor gum health.    Plan: seeing dentist in summer 2023     Electronically signed by:      TAYLA Sprague CNP on 6/13/2023 at 9:20 AM

## 2023-06-13 NOTE — LETTER
6/13/2023        RE: Lexy Rockwell  [address]  [facility] [address]        [facility] GERIATRICS  Chief Complaint   Patient presents with     Annual Comprehensive Nursing Home     [place] Medical Record Number:  [id number]  Place of Service where encounter took place:  [facility] () [[id number]]    HPI:    Lexy Rockwell  is a 62 year old  (1960), who is being seen today for an annual comprehensive visit. HPI information obtained from: facility chart records, facility staff, patient report, [facility] chart review and Care Everywhere [facility] chart review.     Her past medical history includes    Type 1 diabetes with gastroparesis and CKD history of DKA    Hypertension    CKD stage III    Hyperlipidemia    Bilateral leg edema    Overactive bladder    GERD    Constipation    Developmentally delayed    Polyneuropathy     Vitamin D deficiency    Glaucoma    Saw endocrinology and her Tresiba was increased   Vs stable.   Right swelling more than left on LE     ALLERGIES: Amoxicillin and Sulfa antibiotics  PAST MEDICAL HISTORY:   Past Medical History:   Diagnosis Date     Cerumen impacted      Development delay      Diabetic gastroparesis (H) 8/16/2013     Glaucoma (increased eye pressure)      Hyperlipaemia      Hypertension      Hypothyroid      Mental retardation      Nonsenile cataract      Obesity      Strabismus      Type 1 diabetes mellitus not at goal (H)       PAST SURGICAL HISTORY:  has a past surgical history that includes EYE SURG ANT SGMT PROC UNLISTED (remote) and strabismus surgery.      Current Outpatient Medications:      acetone urine (KETOSTIX) test strip, Use as needed when BG is > 240 mg/dl for >4 hours with symptoms of DKA. (Patient not taking: Reported on 5/26/2023), Disp: 50 strip, Rfl: 3     AMLODIPINE BENZOATE PO, Take 2.5 mg by mouth, Disp: , Rfl:      carvedilol (COREG) 6.25 MG tablet, Take 1 tablet (6.25 mg) by mouth 2 times  daily (with meals), Disp: , Rfl:      ciclopirox (LOPROX) 0.77 % cream, Apply topically 2 times daily To feet and toenails. (Patient not taking: Reported on 5/26/2023), Disp: 90 g, Rfl: 5     Continuous Blood Gluc  (DEXCOM G6 ) KELLY, USE AS DIRECTED FOR CONTINUOUS GLUCOSE MONITORING, Disp: 1 Device, Rfl: 1     Continuous Blood Gluc Sensor (DEXCOM G6 SENSOR) MISC, USE AS DIRECTED 1 EVERY 10 DAYS, Disp: 3 each, Rfl: 5     Continuous Blood Gluc Transmit (DEXCOM G6 TRANSMITTER) MISC, USE ONE EVERY 3 MONTHS, Disp: 1 each, Rfl: 3     dorzolamide-timolol (COSOPT) 2-0.5 % ophthalmic solution, Place 1 drop into both eyes 2 times daily, Disp: 5 mL, Rfl: 11     ferrous sulfate (FEROSUL) 325 (65 Fe) MG tablet, Take 1 tablet (325 mg) by mouth daily (with breakfast) (Patient not taking: Reported on 5/26/2023), Disp: , Rfl:      glucagon 1 MG SOLR injection, Inject 1 mg into the muscle once, Disp: , Rfl:      insulin aspart (NOVOLOG FLEXPEN) 100 UNIT/ML pen, Inject 7 Units Subcutaneous 3 times daily (with meals) And sliding scale: 1 unit: 150 -199 mg/dl.  200-249= 2 units 250-299 3 units 300-249 = 4 units 350 - 399= 5 units 400+ = 6 units and call MD Up to 30 units daily, Disp: 15 mL, Rfl:      insulin aspart (NOVOLOG PEN) 100 UNIT/ML pen, Bedtime sliding scale  200-249= 1 units  250-299 2 units  300-249 = 3 units  350 - 399= 4 units  400+ = 5 units, Disp: 15 mL, Rfl: 0     insulin degludec (TRESIBA FLEXTOUCH) 200 UNIT/ML pen, Inject 24 Units Subcutaneous daily, Disp: 15 mL, Rfl: 3     insulin pen needle (B-D U/F) 31G X 8 MM miscellaneous, Use 3-4 pen needles daily or as directed., Disp: 200 each, Rfl: 3     latanoprost (XALATAN) 0.005 % ophthalmic solution, INSTILL 1 DROP IN BOTH EYES AT BEDTIME, Disp: 10 mL, Rfl: 3     lisinopril (ZESTRIL) 40 MG tablet, Take 1 tablet (40 mg) by mouth daily, Disp: , Rfl:      magnesium oxide (MAG-OX) 400 MG tablet, Take 1 tablet (400 mg) by mouth daily, Disp: 90 tablet, Rfl: 3      "MULTIVITAMIN TABS   OR, 1 TABLET DAILY, Disp: , Rfl:      nystatin (MYCOSTATIN) 151797 UNIT/GM external cream, Apply topically 2 times daily To feet and toenails. (Patient not taking: Reported on 5/26/2023), Disp: 90 g, Rfl: 5     pantoprazole (PROTONIX) 40 MG EC tablet, Take 40 mg by mouth daily, Disp: , Rfl:      potassium chloride ER (KLOR-CON M) 20 MEQ CR tablet, Take 20 mEq by mouth 2 times daily (Patient not taking: Reported on 5/26/2023), Disp: , Rfl:      simvastatin (ZOCOR) 20 MG tablet, Take 1 tablet (20 mg) by mouth At Bedtime, Disp: 90 tablet, Rfl: 3     STOOL SOFTENER/LAXATIVE 50-8.6 MG tablet, Take 1 tablet by mouth 2 times daily, Disp: , Rfl:      MED REC REQUIRED  Post Medication Reconciliation Status:  Discharge medications reconciled, continue medications without change          ROS:  4 point ROS including Respiratory, CV, GI and , other than that noted in the HPI,  is negative    Vitals:  /78   Pulse 84   Temp 97.7  F (36.5  C)   Resp 18   Ht 1.6 m (5' 3\")   Wt 70 kg (154 lb 6.4 oz)   LMP 03/28/2014   SpO2 98%   BMI 27.35 kg/m   Body mass index is 27.35 kg/m .  Exam:  GENERAL APPEARANCE:  Alert, in no distress  RESP:  lungs clear to auscultation , no respiratory distress  CV:  Palpation and auscultation of heart done , rate-normal  PSYCH:  oriented X 3     Lab/Diagnostic data:     Most Recent 3 CBC's:Recent Labs   Lab Test 05/08/23  0835 03/29/23  0515 02/22/23  0537   WBC 3.4* 4.5 3.9*   HGB 10.2* 9.0* 8.6*   MCV 88 87 90    220 223     Most Recent 3 BMP's:Recent Labs   Lab Test 05/08/23  0835 03/29/23  0515 01/25/23  0820    142 142   POTASSIUM 4.8 4.5 3.9   CHLORIDE 108* 110* 106   CO2 23 23 21*   BUN 33.9* 39.7* 19.3   CR 1.00* 1.11* 0.93   ANIONGAP 10 9 15   EFRAIN 10.0 9.6 9.0   * 102* 233*     Most Recent 2 LFT's:Recent Labs   Lab Test 03/29/23  0515 01/25/23  0820   AST 20 19   ALT 19 10   ALKPHOS 73 79   BILITOTAL <0.2 <0.2     Most Recent Cholesterol " Panel:Recent Labs   Lab Test 01/25/23  0820   CHOL 112   LDL 58   HDL 37*   TRIG 84     Most Recent TSH and T4:Recent Labs   Lab Test 10/13/21  1540 02/01/21  0909   TSH 3.41 7.07*   T4  --  1.05     Most Recent Hemoglobin A1c:Recent Labs   Lab Test 03/29/23  0515   A1C 8.1*       ASSESSMENT/PLAN  (K59.01) Slow transit constipation  (primary encounter diagnosis)  Comment: chronic.  stable taking senna S once daily  Plan:Continue with plan of care no changes at this time, adjustment as needed        (E10.37X3) Type 1 diabetes mellitus with diabetic macular edema of both eyes resolved after treatment (H)  (E11.3299) Nonproliferative diabetic retinopathy (H)  (primary encounter diagnosis)  (H40.1132) Primary open angle glaucoma (POAG) of both eyes, moderate stage  (H25.813) Combined forms of age-related cataract of both eyes  (H52.4) Presbyopia  Comment: Chronic uncontrolled.  Her hgb A1c (3/2023) is 8.1, her BS remain erratic.   She will have intermittent low BS, nursing then compensates and gives her an abundant amount of carbohydrates and her BS then is above 400.   She is currently taking 7 units at meals, sliding scale at meals and HS, and long acting.    For continuous glucose monitoring she has a Dexcom which is connected to her phone.   She saw Endocrinology recently and her long acting insulin was increased.    For secondary prevention she is currently taking simvastatin 20 mg daily, lisinopril 40 mg daily, and aspirin 81 mg daily is being held due to anemia.  She is optometry on 4/2023; next appt in 10/2023  She is podiatry on 5/2023  Follow up with CDE in 6/2023       (I10) Hypertension goal BP (blood pressure) < 140/90  Comment: Chronic, Controlled while taking lisinopril, coreg, amlodipine  Plan: Continue with plan of care no changes at this time, adjustment as needed       (K21.00) Gastroesophageal reflux disease with esophagitis without hemorrhage  Comment: Chronic, stable while taking  Protonix  Plan:   Following with GI in 7/2023      (R60.0) Bilateral leg edema  Comment: improving.  May be related to amlodipine 5 mg daily which has a known side effect of lower extremity edema.   1-2+ edema Right worse than left.  Significant improvement in LE since admission.   Wearing compression socks.    Plan: Continue with plan of care no changes at this time, adjustment as needed       D64.9) Anemia, unspecified type    Comment:  improving.   Has had upper and lower endoscopy in 2019 and 2020 and in 2020 a polyp was removed.   Hgb on 12/23/2023 was 9 while in OSH and upon admission to OSH Patient with reported black vomit PTA. Recent EGD (3 years ago) without evidence of bleeding source. Given copious vomit reported at scene could potentially be related to Mitra-Burks tear. No evidence of esophageal perforation on abdominal CT. NG placed, drained dark bilious fluid initially, most recent fluid straw colored. No concerns for active GI bleed. Will discontinue NG.  On 12/17/2022 =  peripheral blood morphology showed normocytic anemia and reticulocyte was 0.02%  12/19/2023 = 9.0 at [facility]  12/26/2023 = note states discussed pancytopenia with hematology   2/6/2023 = hgb = 8   On 2/17/2023 she was started on iron supplementation  3/29/23 her hgb was 9.0, hematocrit=29.8, ferritin=49, iron=29, QSL=125, Iron sat index=11.    Pt denies having black stools, shortness of breath, dizziness .    5/8/023 hgb 10.2  Plan:  Follow up with GI in July 2023        (F81.9) Cognitive developmental delay  Comment: Chronic, stable.  Patient has a mother and father who live in [place] and 2 sisters who live in the [facility] area.   Her father is on hospice in the [facility]  Plan: Continue with plan of care no changes at this time, adjustment as needed         (K08.9) Poor dentition  (primary encounter diagnosis)  Comment: Chronic.  Missing teeth and has broken teeth with poor gum health.    Plan: seeing dentist in  summer 2023     Electronically signed by:      TAYLA Sprague CNP on 6/13/2023 at 9:20 AM            Sincerely,        TAYLA Sprague CNP

## 2023-06-16 ENCOUNTER — VIRTUAL VISIT (OUTPATIENT)
Dept: EDUCATION SERVICES | Facility: CLINIC | Age: 63
End: 2023-06-16
Payer: MEDICARE

## 2023-06-16 DIAGNOSIS — E10.37X3 TYPE 1 DIABETES MELLITUS WITH DIABETIC MACULAR EDEMA OF BOTH EYES RESOLVED AFTER TREATMENT (H): ICD-10-CM

## 2023-06-16 PROCEDURE — 98968 PH1 ASSMT&MGMT NQHP 21-30: CPT | Mod: 95 | Performed by: DIETITIAN, REGISTERED

## 2023-06-16 NOTE — PROGRESS NOTES
Diabetes Self-Management Education & Support    Presents for: CGM Review    Type of Service: Telephone Visit    Originating Location (Patient Location): Home  Distant Location (Provider Location): Offsite  Mode of Communication:  Telephone    Telephone Visit Start Time: 10:00 am  Telephone Visit End Time (telephone visit stop time): 10:23 am    How would patient like to obtain AVS? Agustinhart    Assessment Type:   REPORTS:                    Pt verbalized understanding of concepts discussed and recommendations provided today.       Continue education with the following diabetes management concepts: Healthy Eating, Being Active, Monitoring, Taking Medication, Problem Solving, Reducing Risks and Healthy Coping    ASSESSMENT:    BG average greatly improved: 225 --> 180 mg/dl. Although having some more hypoglycemia, especially early in the morning.  Lexy states that she is giving herself her insulin injections now, the insulin pens are brought to her by the nursing staff but she injects by herself. She is taking 26 units of Tresiba and 7 units of Novolog before meals plus sliding scale: 1u:50>200 ( was not using new sliding scale from Dr. lCemons. Informed her and Shayy (caregiver from care facility) of the sliding scale recommended by Dr. Clemons. We discussed hypoglycemia, Lexy is using her sliding scale at bedtime if her BG is high. Recommended a less aggressive bedtime sliding scale. We scheduled a follow-up next week, if hypoglycemia continues, will consider decreasing Tresiba dose.     Glucose Patterns & Trends:  early morning hypoglycemia      PLAN:  Adjust bedtime sliding scale:  250-299 = 1 units  300-249 = 2 units  350 - 399= 3 units   400+ = 4 units     Continue with 26 units of Tresiba    Continue with 7 units Novolog before meals and current sliding scale of 1u:50>150    Topics to cover at upcoming visits: Healthy Eating, Being Active, Monitoring, Taking Medication, Problem Solving, Reducing Risks and  "Healthy Coping    Follow-up: Wednesday 6/21 at 9:15 am    See Care Plan for co-developed, patient-state behavior change goals.  AVS provided for patient today.    Education Materials Provided:  No new materials provided today      SUBJECTIVE/OBJECTIVE:  Presents for: CGM Review  Accompanied by: Self, Other (Dietitian from Assisted Living)  Diabetes education in the past 24mo: Yes  Focus of Visit: Problem Solving, Monitoring, Taking Medication  Diabetes type: Type 1  Disease course: Worsening  Transportation concerns: Yes (gets rides or takes public tranportation)  Difficulty affording diabetes medication?: No  Difficulty affording diabetes testing supplies?: No  Other concerns:: Cognitive impairment  Cultural Influences/Ethnic Background:  Not  or     Diabetes Symptoms & Complications:  Fatigue: No  Neuropathy: Yes (in feet)  Polydipsia: Sometimes (with hyperglycemia)  Polyphagia: No  Polyuria: Sometimes (often getting up in the night)  Visual change: No  Slow healing wounds: No  Symptom course: Stable  Weight trend: Stable  Complications assessed today?: Yes    Patient Problem List and Family Medical History reviewed for relevant medical history, current medical status, and diabetes risk factors.    Vitals:  [facility] 03/28/2014   Estimated body mass index is 27.35 kg/m  as calculated from the following:    Height as of 6/13/23: 1.6 m (5' 3\").    Weight as of 6/13/23: 70 kg (154 lb 6.4 oz).   Last 3 BP:   BP Readings from Last 3 Encounters:   06/13/23 128/78   05/16/23 132/66   05/04/23 (!) 140/83       History   Smoking Status     Never   Smokeless Tobacco     Never       Labs:  Lab Results   Component Value Date    A1C 8.1 03/29/2023    A1C 9.7 02/01/2021     Lab Results   Component Value Date     05/08/2023     10/13/2021     02/01/2021     Lab Results   Component Value Date    LDL 58 01/25/2023     02/01/2021     HDL Cholesterol   Date Value Ref Range Status   02/01/2021 62 " >49 mg/dL Final     Direct Measure HDL   Date Value Ref Range Status   01/25/2023 37 (L) >=50 mg/dL Final   ]  GFR Estimate   Date Value Ref Range Status   05/08/2023 64 >60 mL/min/1.73m2 Final     Comment:     eGFR calculated using 2021 CKD-EPI equation.   02/01/2021 90 >60 mL/min/[1.73_m2] Final     Comment:     Non  GFR Calc  Starting 12/18/2018, serum creatinine based estimated GFR (eGFR) will be   calculated using the Chronic Kidney Disease Epidemiology Collaboration   (CKD-EPI) equation.       GFR Estimate If Black   Date Value Ref Range Status   02/01/2021 >90 >60 mL/min/[1.73_m2] Final     Comment:      GFR Calc  Starting 12/18/2018, serum creatinine based estimated GFR (eGFR) will be   calculated using the Chronic Kidney Disease Epidemiology Collaboration   (CKD-EPI) equation.       Lab Results   Component Value Date    CR 1.00 05/08/2023    CR 0.73 02/01/2021     No results found for: MICROALBUMIN    Healthy Eating:  Healthy Eating Assessed Today: Yes  Cultural/Zoroastrian diet restrictions?: No  Meals include: Breakfast, Lunch, Dinner, Afternoon Snack, Evening Snack  Breakfast: 8:00 am: Oatmeal with hard boiled egg and barragan with toast  Lunch: 12-12:30:  salad OR Cheese ravioli OR sandwiches oR hot dish  Dinner: 5:00 -6:00 pm: meatball and mashed potatoes OR vegetables lasagna OR scrab cake with vegetables and 2 sugar free cookies  Snacks: Has one bedtime snack- usually a cookie (if blood sugar is high, will not have snack) or yogurt OR sugra free candy every once and a while  Other: drinks coffee with sugar free creamer  Beverages: Water, Coffee, Milk (Keep some juice on hand for lows)  Has patient met with a dietitian in the past?: Yes    Being Active:  Being Active Assessed Today: Yes (went on the bike at her apartment complex)  Exercise:: Yes (walking, programs at care center)  Days per week of moderate to strenuous exercise (like a brisk walk): 2  On average, minutes  per day of exercise at this level:  (Walking with friends)  How intense was your typical exercise? : Light (like stretching or slow walking)  Barrier to exercise: Safety, Physical limitation    Monitoring:  Monitoring Assessed Today: Yes  Did patient bring glucose meter to appointment? : Yes  Blood Glucose Meter: CGM  Times checking blood sugar at home (number): 5+  Times checking blood sugar at home (per): Day  Blood glucose trend: Fluctuating      Taking Medications:  Diabetes Medication(s)     Diabetic Other       glucagon 1 MG SOLR injection    Inject 1 mg into the muscle once    Insulin       insulin aspart (NOVOLOG FLEXPEN) 100 UNIT/ML pen    Inject 7 Units Subcutaneous 3 times daily (with meals) And sliding scale: 1 unit: 150 -199 mg/dl.   200-249= 2 units  250-299 3 units  300-249 = 4 units  350 - 399= 5 units  400+ = 6 units and call MD Up to 30 units daily     insulin aspart (NOVOLOG PEN) 100 UNIT/ML pen    Bedtime sliding scale   200-249= 1 units   250-299 2 units   300-249 = 3 units   350 - 399= 4 units   400+ = 5 units     insulin degludec (TRESIBA FLEXTOUCH) 200 UNIT/ML pen    Inject 26 Units Subcutaneous daily          Taking Medication Assessed Today: Yes  Current Treatments: Insulin Injections  Dose schedule: Pre-breakfast, Pre-lunch, Pre-dinner, At bedtime  Given by: Patient, Adult caretaker, Nursing attendant  Injection/Infusion sites: Abdomen  Problems taking diabetes medications regularly?: No  Diabetes medication side effects?: No    Problem Solving:  Problem Solving Assessed Today: Yes  Is the patient at risk for hypoglycemia?: Yes  Hypoglycemia Frequency: Daily  Hypoglycemia Treatment: Glucose (tablets or gel), Other food, Juice, Candy (will have one piece of candy or some juice)  Patient carries a carbohydrate source: Yes  Medical ID: Yes  Is the patient at risk for DKA?: Yes  Does patient have ketone test strips?: Yes  Does patient have DKA prevention plan?: Yes  Does patient have severe  weather/disaster plan for diabetes management?: No  Does patient have sick day plan for diabetes management?: Yes    Hypoglycemia symptoms  Confusion: No  Dizziness or Light-Headedness: No  Headaches: No  Hunger: No  Mood changes: No  Nervousness/Anxiety: No  Sleepiness: No  Speech difficulty: Yes  Sweats: Yes  Feeling shaky: Yes    Hypoglycemia Complications  Blackouts: No  Hospitalization: No  Nocturnal hypoglycemia: Yes  Required assistance: No  Seizures: No    Reducing Risks:  Reducing Risks Assessed Today: No  CAD Risks: Diabetes Mellitus  Has dilated eye exam at least once a year?: Yes  Sees dentist every 6 months?: Yes  Feet checked by healthcare provider in the last year?: Yes    Healthy Coping:  Healthy Coping Assessed Today: Yes  Emotional response to diabetes: Acceptance  Informal Support system:: Family, Parent  Stage of change: ACTION (Actively working towards change)  Support resources: Offerings in Clinic Communities  Patient Activation Measure Survey Score:      3/7/2012     3:00 PM   KHUSHI Score (Last Two)   KHUSHI Raw Score 39   Activation Score 56.4   KHUSHI Level 3         Care Plan and Education Provided:  There are no care plans that you recently modified to display for this patient.      Tiffanie Mcfarland RD [facility]  Triage: [phone]  [phone]      Time Spent: 25 minutes  Encounter Type: Individual    Any diabetes medication dose changes were made via the CDE Protocol per the patient's referring provider. A copy of this encounter was shared with the provider.

## 2023-06-16 NOTE — LETTER
6/16/2023         RE: Lexy Rockwell  [address]  [place] [address]        Dear Colleague,    Thank you for referring your patient, Lexy Rockwell, to the [facility]. Please see a copy of my visit note below.    Diabetes Self-Management Education & Support    Presents for: CGM Review    Type of Service: Telephone Visit    Originating Location (Patient Location): Home  Distant Location (Provider Location): Offsite  Mode of Communication:  Telephone    Telephone Visit Start Time: 10:00 am  Telephone Visit End Time (telephone visit stop time): 10:23 am    How would patient like to obtain AVS? MyChart    Assessment Type:   REPORTS:                    Pt verbalized understanding of concepts discussed and recommendations provided today.       Continue education with the following diabetes management concepts: Healthy Eating, Being Active, Monitoring, Taking Medication, Problem Solving, Reducing Risks and Healthy Coping    ASSESSMENT:    BG average greatly improved: 225 --> 180 mg/dl. Although having some more hypoglycemia, especially early in the morning.  Lexy states that she is giving herself her insulin injections now, the insulin pens are brought to her by the nursing staff but she injects by herself. She is taking 26 units of Tresiba and 7 units of Novolog before meals plus sliding scale: 1u:50>200 ( was not using new sliding scale from Dr. Clemons. Informed her and Shayy (caregiver from care facility) of the sliding scale recommended by Dr. Clemons. We discussed hypoglycemia, Lexy is using her sliding scale at bedtime if her BG is high. Recommended a less aggressive bedtime sliding scale. We scheduled a follow-up next week, if hypoglycemia continues, will consider decreasing Tresiba dose.     Glucose Patterns & Trends:  early morning hypoglycemia      PLAN:  Adjust bedtime sliding scale:  250-299 = 1 units  300-249 = 2 units  350 - 399= 3  "units   400+ = 4 units     Continue with 26 units of Tresiba    Continue with 7 units Novolog before meals and current sliding scale of 1u:50>150    Topics to cover at upcoming visits: Healthy Eating, Being Active, Monitoring, Taking Medication, Problem Solving, Reducing Risks and Healthy Coping    Follow-up: Wednesday 6/21 at 9:15 am    See Care Plan for co-developed, patient-state behavior change goals.  AVS provided for patient today.    Education Materials Provided:  No new materials provided today      SUBJECTIVE/OBJECTIVE:  Presents for: CGM Review  Accompanied by: Self, Other (Dietitian from Assisted Living)  Diabetes education in the past 24mo: Yes  Focus of Visit: Problem Solving, Monitoring, Taking Medication  Diabetes type: Type 1  Disease course: Worsening  Transportation concerns: Yes (gets rides or takes public tranportation)  Difficulty affording diabetes medication?: No  Difficulty affording diabetes testing supplies?: No  Other concerns:: Cognitive impairment  Cultural Influences/Ethnic Background:  Not  or     Diabetes Symptoms & Complications:  Fatigue: No  Neuropathy: Yes (in feet)  Polydipsia: Sometimes (with hyperglycemia)  Polyphagia: No  Polyuria: Sometimes (often getting up in the night)  Visual change: No  Slow healing wounds: No  Symptom course: Stable  Weight trend: Stable  Complications assessed today?: Yes    Patient Problem List and Family Medical History reviewed for relevant medical history, current medical status, and diabetes risk factors.    Vitals:  [facility] 03/28/2014   Estimated body mass index is 27.35 kg/m  as calculated from the following:    Height as of 6/13/23: 1.6 m (5' 3\").    Weight as of 6/13/23: 70 kg (154 lb 6.4 oz).   Last 3 BP:   BP Readings from Last 3 Encounters:   06/13/23 128/78   05/16/23 132/66   05/04/23 (!) 140/83       History   Smoking Status     Never   Smokeless Tobacco     Never       Labs:  Lab Results   Component Value Date    A1C 8.1 " 03/29/2023    A1C 9.7 02/01/2021     Lab Results   Component Value Date     05/08/2023     10/13/2021     02/01/2021     Lab Results   Component Value Date    LDL 58 01/25/2023     02/01/2021     HDL Cholesterol   Date Value Ref Range Status   02/01/2021 62 >49 mg/dL Final     Direct Measure HDL   Date Value Ref Range Status   01/25/2023 37 (L) >=50 mg/dL Final   ]  GFR Estimate   Date Value Ref Range Status   05/08/2023 64 >60 mL/min/1.73m2 Final     Comment:     eGFR calculated using 2021 CKD-EPI equation.   02/01/2021 90 >60 mL/min/[1.73_m2] Final     Comment:     Non  GFR Calc  Starting 12/18/2018, serum creatinine based estimated GFR (eGFR) will be   calculated using the Chronic Kidney Disease Epidemiology Collaboration   (CKD-EPI) equation.       GFR Estimate If Black   Date Value Ref Range Status   02/01/2021 >90 >60 mL/min/[1.73_m2] Final     Comment:      GFR Calc  Starting 12/18/2018, serum creatinine based estimated GFR (eGFR) will be   calculated using the Chronic Kidney Disease Epidemiology Collaboration   (CKD-EPI) equation.       Lab Results   Component Value Date    CR 1.00 05/08/2023    CR 0.73 02/01/2021     No results found for: MICROALBUMIN    Healthy Eating:  Healthy Eating Assessed Today: Yes  Cultural/Orthodox diet restrictions?: No  Meals include: Breakfast, Lunch, Dinner, Afternoon Snack, Evening Snack  Breakfast: 8:00 am: Oatmeal with hard boiled egg and barragan with toast  Lunch: 12-12:30:  salad OR Cheese ravioli OR sandwiches oR hot dish  Dinner: 5:00 -6:00 pm: meatball and mashed potatoes OR vegetables lasagna OR scrab cake with vegetables and 2 sugar free cookies  Snacks: Has one bedtime snack- usually a cookie (if blood sugar is high, will not have snack) or yogurt OR sugra free candy every once and a while  Other: drinks coffee with sugar free creamer  Beverages: Water, Coffee, Milk (Keep some juice on hand for lows)  Has  patient met with a dietitian in the past?: Yes    Being Active:  Being Active Assessed Today: Yes (went on the bike at her apartment complex)  Exercise:: Yes (walking, programs at care center)  Days per week of moderate to strenuous exercise (like a brisk walk): 2  On average, minutes per day of exercise at this level:  (Walking with friends)  How intense was your typical exercise? : Light (like stretching or slow walking)  Barrier to exercise: Safety, Physical limitation    Monitoring:  Monitoring Assessed Today: Yes  Did patient bring glucose meter to appointment? : Yes  Blood Glucose Meter: CGM  Times checking blood sugar at home (number): 5+  Times checking blood sugar at home (per): Day  Blood glucose trend: Fluctuating      Taking Medications:  Diabetes Medication(s)     Diabetic Other       glucagon 1 MG SOLR injection    Inject 1 mg into the muscle once    Insulin       insulin aspart (NOVOLOG FLEXPEN) 100 UNIT/ML pen    Inject 7 Units Subcutaneous 3 times daily (with meals) And sliding scale: 1 unit: 150 -199 mg/dl.   200-249= 2 units  250-299 3 units  300-249 = 4 units  350 - 399= 5 units  400+ = 6 units and call MD Up to 30 units daily     insulin aspart (NOVOLOG PEN) 100 UNIT/ML pen    Bedtime sliding scale   200-249= 1 units   250-299 2 units   300-249 = 3 units   350 - 399= 4 units   400+ = 5 units     insulin degludec (TRESIBA FLEXTOUCH) 200 UNIT/ML pen    Inject 26 Units Subcutaneous daily          Taking Medication Assessed Today: Yes  Current Treatments: Insulin Injections  Dose schedule: Pre-breakfast, Pre-lunch, Pre-dinner, At bedtime  Given by: Patient, Adult caretaker, Nursing attendant  Injection/Infusion sites: Abdomen  Problems taking diabetes medications regularly?: No  Diabetes medication side effects?: No    Problem Solving:  Problem Solving Assessed Today: Yes  Is the patient at risk for hypoglycemia?: Yes  Hypoglycemia Frequency: Daily  Hypoglycemia Treatment: Glucose (tablets or gel),  Other food, Juice, Candy (will have one piece of candy or some juice)  Patient carries a carbohydrate source: Yes  Medical ID: Yes  Is the patient at risk for DKA?: Yes  Does patient have ketone test strips?: Yes  Does patient have DKA prevention plan?: Yes  Does patient have severe weather/disaster plan for diabetes management?: No  Does patient have sick day plan for diabetes management?: Yes    Hypoglycemia symptoms  Confusion: No  Dizziness or Light-Headedness: No  Headaches: No  Hunger: No  Mood changes: No  Nervousness/Anxiety: No  Sleepiness: No  Speech difficulty: Yes  Sweats: Yes  Feeling shaky: Yes    Hypoglycemia Complications  Blackouts: No  Hospitalization: No  Nocturnal hypoglycemia: Yes  Required assistance: No  Seizures: No    Reducing Risks:  Reducing Risks Assessed Today: No  CAD Risks: Diabetes Mellitus  Has dilated eye exam at least once a year?: Yes  Sees dentist every 6 months?: Yes  Feet checked by healthcare provider in the last year?: Yes    Healthy Coping:  Healthy Coping Assessed Today: Yes  Emotional response to diabetes: Acceptance  Informal Support system:: Family, Parent  Stage of change: ACTION (Actively working towards change)  Support resources: Offerings in Clinic Communities  Patient Activation Measure Survey Score:      3/7/2012     3:00 PM   KHUSHI Score (Last Two)   KHUSHI Raw Score 39   Activation Score 56.4   KHUSHI Level 3         Care Plan and Education Provided:  There are no care plans that you recently modified to display for this patient.      KARLOS Haas [facility]  Triage: [phone]  [phone]      Time Spent: 25 minutes  Encounter Type: Individual    Any diabetes medication dose changes were made via the CDE Protocol per the patient's referring provider. A copy of this encounter was shared with the provider.

## 2023-06-16 NOTE — PATIENT INSTRUCTIONS
Adjust bedtime sliding   250-299 = 1 units  300-249 = 2 units  350 - 399= 3 units   400+ = 4 units     Continue with Tresiba dose

## 2023-06-21 ENCOUNTER — TELEPHONE (OUTPATIENT)
Dept: EDUCATION SERVICES | Facility: CLINIC | Age: 63
End: 2023-06-21
Payer: MEDICARE

## 2023-06-21 DIAGNOSIS — E10.37X3 TYPE 1 DIABETES MELLITUS WITH DIABETIC MACULAR EDEMA OF BOTH EYES RESOLVED AFTER TREATMENT (H): ICD-10-CM

## 2023-06-21 RX ORDER — INSULIN DEGLUDEC 200 U/ML
24 INJECTION, SOLUTION SUBCUTANEOUS DAILY
Qty: 15 ML | Refills: 3 | Status: SHIPPED | OUTPATIENT
Start: 2023-06-21 | End: 2023-06-29

## 2023-06-21 RX ORDER — INSULIN ASPART 100 [IU]/ML
INJECTION, SOLUTION INTRAVENOUS; SUBCUTANEOUS
Qty: 15 ML | Refills: 3 | Status: SHIPPED | OUTPATIENT
Start: 2023-06-21 | End: 2023-06-29

## 2023-06-21 NOTE — TELEPHONE ENCOUNTER
Diabetes Education Follow-up    Subjective/Objective:    Lexy Escobedo Nelson called for BG review.  Last date of communication was: .    Diabetes is being managed with   Diabetes Medications   Diabetes Medication(s)     Diabetic Other       glucagon 1 MG SOLR injection    Inject 1 mg into the muscle once    Insulin       insulin aspart (NOVOLOG FLEXPEN) 100 UNIT/ML pen    Inject 7 Units Subcutaneous 3 times daily (with meals) And sliding scale: 1 unit: 150 -199 mg/dl.   200-249= 2 units  250-299 3 units  300-249 = 4 units  350 - 399= 5 units  400+ = 6 units and call MD Up to 30 units daily     insulin aspart (NOVOLOG PEN) 100 UNIT/ML pen    Bedtime sliding scale 250-299 1 units 300-249 = 2 units 350 - 399= 3 units 400+ = 4 units     insulin degludec (TRESIBA FLEXTOUCH) 200 UNIT/ML pen    Inject 26 Units Subcutaneous daily          BG/Food Log:             Assessment:    Continuing to have some hypoglycemia early in the morning    Plan/Response:  Insulin adjustments:    Decrease Tresiba to 24 units     Novolo units Breakfast  8 units Lunch  8 units Dinner     Continue with sliding scale    KARLOS Haas [facility]  Triage: [phone]  [phone]      Any diabetes medication dose changes were made via the CDE Protocol and Collaborative Practice Agreement with the patient's referring provider. A copy of this encounter was shared with the provider.

## 2023-06-27 ENCOUNTER — OFFICE VISIT (OUTPATIENT)
Dept: GERIATRICS | Facility: CLINIC | Age: 63
End: 2023-06-27
Payer: MEDICARE

## 2023-06-27 VITALS
RESPIRATION RATE: 18 BRPM | DIASTOLIC BLOOD PRESSURE: 71 MMHG | SYSTOLIC BLOOD PRESSURE: 117 MMHG | HEIGHT: 63 IN | OXYGEN SATURATION: 98 % | TEMPERATURE: 97.1 F | HEART RATE: 89 BPM | BODY MASS INDEX: 28.26 KG/M2 | WEIGHT: 159.5 LBS

## 2023-06-27 DIAGNOSIS — I10 ESSENTIAL HYPERTENSION WITH GOAL BLOOD PRESSURE LESS THAN 140/90: ICD-10-CM

## 2023-06-27 DIAGNOSIS — F81.9 COGNITIVE DEVELOPMENTAL DELAY: ICD-10-CM

## 2023-06-27 DIAGNOSIS — E10.37X3 TYPE 1 DIABETES MELLITUS WITH DIABETIC MACULAR EDEMA OF BOTH EYES RESOLVED AFTER TREATMENT (H): Primary | ICD-10-CM

## 2023-06-27 DIAGNOSIS — K08.9 POOR DENTITION: ICD-10-CM

## 2023-06-27 DIAGNOSIS — D64.9 ANEMIA, UNSPECIFIED TYPE: ICD-10-CM

## 2023-06-27 DIAGNOSIS — K59.01 SLOW TRANSIT CONSTIPATION: ICD-10-CM

## 2023-06-27 DIAGNOSIS — E11.3299 NONPROLIFERATIVE DIABETIC RETINOPATHY (H): ICD-10-CM

## 2023-06-27 PROCEDURE — 99309 SBSQ NF CARE MODERATE MDM 30: CPT | Performed by: NURSE PRACTITIONER

## 2023-06-27 NOTE — LETTER
6/27/2023        RE: Lexy Rockwell  [address]  [place] [address]        [facility]  [address] [place] [address]  Phone: [phone]  Fax: [phone]  Primary Provider: Marcos Way  Pre-op Performing Provider: MARCOS WAY      PREOPERATIVE EVALUATION:  Today's date: 6/27/2023    Lexy Rockwell is a 62 year old female who presents for a preoperative evaluation.    Surgical Information:  Surgery/Procedure: Endoscopy and Colonoscopy   Surgery Location:[facility]  Surgeon: unknown  Surgery Date: 7/3/23  Time of Surgery: unknown  Where patient plans to recover: At a nursing home  Fax number for surgical facility: unknown    Assessment & Plan     The proposed surgical procedure is considered LOW risk.  Nonproliferative diabetic retinopathy (H)  Type 1 diabetes mellitus with diabetic macular edema of both eyes resolved after treatment (H)  CHronic.  Pt has labile blood sugars.   Pt's tresbica was decreased to 18 units on 7/1 and 7/2 for the procedure on 7/3.   Her meal time insulin was decreased from 7 to 4 units at meal time on 7/1 and 7/2.   She continues to have sliding scale and has sanam on her phone for continuous blood sugars.   - Random Glucose; Future  - Hemoglobin A1c; Future    Slow transit constipation  Anemia, unspecified type  Chronic.  Takes stool softners.  Golytely was ordered for 1/2 jug on 7/2 and 7/3 and another 1/2 jug 6 hours prior to the procedure.  Her MVI and iron have been held.   Her hgb has increased with the addition of iron    Poor dentition  Chronic.  Following with dentist    Essential hypertension with goal blood pressure less than 140/90  Chronic. Controlled with amlodipine, coreg and losartan.      Implanted Device: none  Sleep apnea: no  Risks and Recommendations:  The patient has the following additional risks and recommendations for perioperative  complications:  Diabetes:  - Patient is on insulin therapy; diabetic NPO guidelines provided and discussed.  Anemia/Bleeding/Clotting:    - Anemia and does not require treatment prior to surgery. Monitor hemoglobin postoperatively  Social and Substance:    - pt is developmentally delayed    Antiplatelet or Anticoagulation Medication Instructions:   - Patient is on no antiplatelet or anticoagulation medications.   - aspirin: discontinued months ago due to anemia    Additional Medication Instructions:   - ACE/ARB: Continue without modification (e.g., MAC anesthesia, neurosurgery, spine surgery, heart failure, or labile hypertension with risk of hypertension).   - Beta Blockers: Continue taking on the day of surgery.   - Calcium Channel Blockers: May be continued on the day of surgery.   - Statins: Continue taking on the day of surgery.    - Long acting insulin (e.g. glargine, detemir): Take 80% of the usual evening dose before surgery 2 days prior to surgery    - short acting insulin (aspart): Take 50% of the usual dose on the morning of surgery.       RECOMMENDATION:  APPROVAL GIVEN to proceed with proposed procedure, without further diagnostic evaluation.    Ordering of each unique test  BMP and CBC    Subjective       HPI related to upcoming procedure:  Pt has no complaints . No shortness of breath, no chest pain, no blood in urine or stoole    Health Care Directive:  Pt is full code    Preoperative Review of :   reviewed - no record of controlled substances prescribed.      Status of Chronic Conditions:  ANEMIA - Patient has a recent history of moderate-severe anemia, which has not been symptomatic. Work up to date has revealed iron deficiency. Treatment has been ferrous sulfate.     DIABETES - Patient has a longstanding history of DiabetesType Type I . Patient is being treated with insulin injections and intensive insulin injection program and denies significant side effects. Control has been poor.  Complicating factors include but are not limited to: hypertension and retinopathy.     HYPERTENSION - Patient has longstanding history of HTN , currently denies any symptoms referable to elevated blood pressure. Specifically denies chest pain, palpitations, dyspnea, orthopnea, PND or peripheral edema. Blood pressure readings have been in normal range. Current medication regimen is as listed below. Patient denies any side effects of medication.       Review of Systems  CONSTITUTIONAL: NEGATIVE for fever, chills, change in weight  ENT/MOUTH: NEGATIVE for ear, mouth and throat problems  RESP: NEGATIVE for significant cough or SOB  CV: NEGATIVE for chest pain, palpitations or peripheral edema    Patient Active Problem List    Diagnosis Date Noted     HTN (hypertension) 02/03/2023     Priority: Medium     Stage 3a chronic kidney disease (H) 01/20/2023     Priority: Medium     DKA, type 1 (H) 12/13/2022     Priority: Medium     Type 1 diabetes mellitus with moderate nonproliferative retinopathy of both eyes, macular edema presence unspecified (H) 10/03/2022     Priority: Medium     Polyneuropathy associated with underlying disease (H) 10/03/2022     Priority: Medium     Type 1 diabetes mellitus with moderate nonproliferative retinopathy of both eyes (H) 10/03/2022     Priority: Medium     Hematemesis with nausea 06/08/2022     Priority: Medium     Lactic acidosis 06/08/2022     Priority: Medium     Diabetic ketoacidosis without coma associated with type 2 diabetes mellitus (H) 06/08/2022     Priority: Medium     Acute hyperkalemia 08/22/2021     Priority: Medium     Obesity (BMI 30-39.9) 08/22/2021     Priority: Medium     Background diabetic retinopathy, mild-mod, ou 04/03/2021     Priority: Medium     Combined forms of age-related cataract, mild, of both eyes 04/03/2021     Priority: Medium     Hypercalcemia 10/15/2018     Priority: Medium     CKD (chronic kidney disease), stage III (H) 10/15/2018     Priority: Medium      OAB (overactive bladder) 05/05/2018     Priority: Medium     DKA (diabetic ketoacidoses) 12/27/2017     Priority: Medium     Replacing diagnoses that were inactivated after the 10/1/2021 regulatory import.       Gastroesophageal reflux disease without esophagitis 04/17/2017     Priority: Medium     Obesity, unspecified obesity severity, unspecified obesity type 04/17/2017     Priority: Medium     JEAN (acute kidney injury) (H) 04/13/2017     Priority: Medium     Sinus tachycardia 12/13/2016     Priority: Medium     Primary open angle glaucoma of both eyes, moderate stage 11/03/2016     Priority: Medium     Advanced directives, counseling/discussion 09/28/2016     Priority: Medium     Acute retention of urine 04/28/2016     Priority: Medium     Type 1 diabetes mellitus with diabetic peripheral angiopathy without gangrene (H)      Priority: Medium     SIRS (systemic inflammatory response syndrome) (H) 10/18/2014     Priority: Medium     History of strabismus surgery 08/30/2014     Priority: Medium     Health Care Home 12/23/2013     Priority: Medium     No Active Care Coordination          Vitamin D deficiency 12/04/2012     Priority: Medium     Acute renal failure (H) 10/09/2012     Priority: Medium     Dehydration 10/09/2012     Priority: Medium     Proteinuria 06/27/2012     Priority: Medium     Diabetic ketoacidosis without coma associated with type 1 diabetes mellitus (H) 02/29/2012     Priority: Medium     Pain in joint, lower leg 11/23/2011     Priority: Medium     Hypertension goal BP (blood pressure) < 140/90 06/02/2011     Priority: Medium     Hyperlipidemia LDL goal <100 06/02/2011     Priority: Medium     Overactive bladder 06/02/2010     Priority: Medium     GERD (gastroesophageal reflux disease) 06/02/2010     Priority: Medium     Development delay      Priority: Medium      Past Medical History:   Diagnosis Date     Cerumen impacted      Development delay      Diabetic gastroparesis (H) 8/16/2013      Glaucoma (increased eye pressure)      Hyperlipaemia      Hypertension      Hypothyroid      Mental retardation      Nonsenile cataract      Obesity      Strabismus      Type 1 diabetes mellitus not at goal (H)      Past Surgical History:   Procedure Laterality Date     STRABISMUS SURGERY       [facility] EYE SURG ANT [facility] PROC UNLISTED  remote    strabismus surgery     Current Outpatient Medications   Medication Sig Dispense Refill     AMLODIPINE BENZOATE PO Take 2.5 mg by mouth       carvedilol (COREG) 6.25 MG tablet Take 1 tablet (6.25 mg) by mouth 2 times daily (with meals)       Continuous Blood Gluc  (DEXCOM G6 ) KELLY USE AS DIRECTED FOR CONTINUOUS GLUCOSE MONITORING 1 Device 1     Continuous Blood Gluc Sensor (DEXCOM G6 SENSOR) MISC USE AS DIRECTED 1 EVERY 10 DAYS 3 each 5     Continuous Blood Gluc Transmit (DEXCOM G6 TRANSMITTER) MISC USE ONE EVERY 3 MONTHS 1 each 3     dorzolamide-timolol (COSOPT) 2-0.5 % ophthalmic solution Place 1 drop into both eyes 2 times daily 5 mL 11     ferrous sulfate (SLO-FE) 142 (45 Fe) MG CR tablet Take 1 tablet (142 mg) by mouth daily       glucagon 1 MG SOLR injection Inject 1 mg into the muscle once       insulin aspart (NOVOLOG FLEXPEN) 100 UNIT/ML pen Inject 7 units before breakfast, 8 units before lunch and 8 units before dinner And sliding scale: 1 unit: 150 -199 mg/dl. 200-249= 2 units 250-299 3 units 300-249 = 4 units 350 - 399= 5 units 400+ = 6 units and call MD Up to 30 units daily 15 mL 3     insulin aspart (NOVOLOG PEN) 100 UNIT/ML pen Bedtime sliding scale 250-299 1 units 300-249 = 2 units 350 - 399= 3 units 400+ = 4 units 15 mL 0     insulin degludec (TRESIBA FLEXTOUCH) 200 UNIT/ML pen Inject 24 Units Subcutaneous daily 15 mL 3     latanoprost (XALATAN) 0.005 % ophthalmic solution INSTILL 1 DROP IN BOTH EYES AT BEDTIME 10 mL 3     lisinopril (ZESTRIL) 40 MG tablet Take 1 tablet (40 mg) by mouth daily       magnesium oxide (MAG-OX) 400 MG tablet Take 1  "tablet (400 mg) by mouth daily 90 tablet 3     MULTIVITAMIN TABS   OR 1 TABLET DAILY       nystatin (MYCOSTATIN) 482193 UNIT/GM external cream Apply topically 2 times daily as needed for dry skin To feet and toenails.       pantoprazole (PROTONIX) 40 MG EC tablet Take 40 mg by mouth daily       potassium chloride ER (KLOR-CON M) 20 MEQ CR tablet Take 20 mEq by mouth daily       simvastatin (ZOCOR) 20 MG tablet Take 1 tablet (20 mg) by mouth At Bedtime 90 tablet 3     STOOL SOFTENER/LAXATIVE 50-8.6 MG tablet Take 1 tablet by mouth 2 times daily         Allergies   Allergen Reactions     Amoxicillin      Sulfa Antibiotics         Social History     Tobacco Use     Smoking status: Never     Smokeless tobacco: Never     Tobacco comments:     lives in smoke free household.   Substance Use Topics     Alcohol use: Yes     Comment: occass social     Family History   Problem Relation Age of Onset     Hypertension Father      Diabetes Sister      Glaucoma Maternal Grandfather      Cancer No family hx of      Cerebrovascular Disease No family hx of      Thyroid Disease No family hx of      Macular Degeneration No family hx of      History   Drug Use No        Objective     /71   Pulse 89   Temp 97.1  F (36.2  C)   Resp 18   Ht 1.6 m (5' 3\")   Wt 72.3 kg (159 lb 8 oz)   LMP 03/28/2014   SpO2 98%   BMI 28.25 kg/m      Physical Exam  GENERAL APPEARANCE: healthy, alert and no distress  HENT: ear canals and TM's normal and nose and mouth without ulcers or lesions  RESP: lungs clear to auscultation - no rales, rhonchi or wheezes  CV: regular rate and rhythm, normal S1 S2, no S3 or S4 and no murmur, click or rub   ABDOMEN: soft, nontender, no HSM or masses and bowel sounds normal  NEURO: Normal strength and tone, sensory exam grossly normal, mentation intact and speech normal       Diagnostics:    Most Recent 3 CBC's:Recent Labs   Lab Test 05/08/23  0835 03/29/23  0515 02/22/23  0537   WBC 3.4* 4.5 3.9*   HGB 10.2* 9.0* " 8.6*   MCV 88 87 90    220 223     Most Recent 3 BMP's:  Recent Labs   Lab Test 05/08/23  0835 03/29/23  0515 01/25/23  0820    142 142   POTASSIUM 4.8 4.5 3.9   CHLORIDE 108* 110* 106   CO2 23 23 21*   BUN 33.9* 39.7* 19.3   CR 1.00* 1.11* 0.93   ANIONGAP 10 9 15   EFRAIN 10.0 9.6 9.0   * 102* 233*     Most Recent 2 LFT's:  Recent Labs   Lab Test 03/29/23  0515 01/25/23  0820   AST 20 19   ALT 19 10   ALKPHOS 73 79   BILITOTAL <0.2 <0.2     Most Recent 3 INR's:No lab results found.  Most Recent Cholesterol Panel:  Recent Labs   Lab Test 01/25/23  0820   CHOL 112   LDL 58   HDL 37*   TRIG 84     Most Recent TSH and T4:  Recent Labs   Lab Test 10/13/21  1540 02/01/21  0909   TSH 3.41 7.07*   T4  --  1.05     Most Recent Hemoglobin A1c:  Recent Labs   Lab Test 03/29/23  0515   A1C 8.1*     25 OH Vit D2   Date Value Ref Range Status   06/02/2011 <5 ug/L Final   05/11/2010 <5 ug/L Final   08/19/2009 <5 ug/L Final     25 OH Vit D3   Date Value Ref Range Status   06/02/2011 18 ug/L Final   05/11/2010 21 ug/L Final   08/19/2009 21 ug/L Final     25 OH Vit D total   Date Value Ref Range Status   04/05/2012 22 (L) 30 - 75 ug/L Final     Comment:     Season, race, dietary intake, and treatment affect the concentration of   25-hydroxy-Vitamin D. Values may decrease during winter months and increase   during summer months. Values less than 30 ug/L may indicate Vitamin D   deficiency.  Vitamin D determination is routinely performed by an immunoassay specific for   25   hydroxyvitamin D3.  If an individual is on vitamin D2 (ergocalciferol)   supplementation, please specify 25 OH vitamin D2 and D3 level determination   by   LCMSMS.  For questions, please contact the laboratory at [phone].   06/02/2011 (L) 30 - 75 ug/L Final    <23  Season, race, dietary intake, and treatment affect the concentration of   25-hydroxy-Vitamin D. Values may decrease during winter months and increase   during summer months. Values  less than 30 ug/L may indicate Vitamin D   deficiency.   05/11/2010 (L) 30 - 75 ug/L Final    <26  Season, race, dietary intake, and treatment affect the concentration of   25-hydroxy-Vitamin D. Values may decrease during winter months and increase   during summer months. Values less than 30 ug/L may indicate Vitamin D   deficiency.          No EKG required, no history of coronary heart disease, significant arrhythmia, peripheral arterial disease or other structural heart disease.    Revised Cardiac Risk Index (RCRI):  The patient has the following serious cardiovascular risks for perioperative complications:   - Diabetes Mellitus (on Insulin) = 1 point     RCRI Interpretation: 1 point: Class II (low risk - 0.9% complication rate)          Signed Electronically by: TAYLA Sprague CNP  Copy of this evaluation report is provided to requesting physician.          Sincerely,        TAYLA Sprague CNP

## 2023-06-27 NOTE — PROGRESS NOTES
[facility]  [address] [place] [address]  Phone: [phone]  Fax: [phone]  Primary Provider: Marcos Way  Pre-op Performing Provider: MARCOS WAY      PREOPERATIVE EVALUATION:  Today's date: 6/27/2023    Lexy Rockwell is a 62 year old female who presents for a preoperative evaluation.    Surgical Information:  Surgery/Procedure: Endoscopy and Colonoscopy   Surgery Location:[facility] clinic  Surgeon: unknown  Surgery Date: 7/3/23  Time of Surgery: unknown  Where patient plans to recover: At a nursing home  Fax number for surgical facility: unknown    Assessment & Plan     The proposed surgical procedure is considered LOW risk.  Nonproliferative diabetic retinopathy (H)  Type 1 diabetes mellitus with diabetic macular edema of both eyes resolved after treatment (H)  CHronic.  Pt has labile blood sugars.   Pt's tresbica was decreased to 18 units on 7/1 and 7/2 for the procedure on 7/3.   Her meal time insulin was decreased from 7 to 4 units at meal time on 7/1 and 7/2.   She continues to have sliding scale and has sanam on her phone for continuous blood sugars.   - Random Glucose; Future  - Hemoglobin A1c; Future    Slow transit constipation  Anemia, unspecified type  Chronic.  Takes stool softners.  Golytely was ordered for 1/2 jug on 7/2 and 7/3 and another 1/2 jug 6 hours prior to the procedure.  Her MVI and iron have been held.   Her hgb has increased with the addition of iron    Poor dentition  Chronic.  Following with dentist    Essential hypertension with goal blood pressure less than 140/90  Chronic. Controlled with amlodipine, coreg and losartan.      Implanted Device: none  Sleep apnea: no  Risks and Recommendations:  The patient has the following additional risks and recommendations for perioperative complications:  Diabetes:  - Patient is on insulin therapy; diabetic NPO guidelines provided and  discussed.  Anemia/Bleeding/Clotting:    - Anemia and does not require treatment prior to surgery. Monitor hemoglobin postoperatively  Social and Substance:    - pt is developmentally delayed    Antiplatelet or Anticoagulation Medication Instructions:   - Patient is on no antiplatelet or anticoagulation medications.   - aspirin: discontinued months ago due to anemia    Additional Medication Instructions:   - ACE/ARB: Continue without modification (e.g., MAC anesthesia, neurosurgery, spine surgery, heart failure, or labile hypertension with risk of hypertension).   - Beta Blockers: Continue taking on the day of surgery.   - Calcium Channel Blockers: May be continued on the day of surgery.   - Statins: Continue taking on the day of surgery.    - Long acting insulin (e.g. glargine, detemir): Take 80% of the usual evening dose before surgery 2 days prior to surgery    - short acting insulin (aspart): Take 50% of the usual dose on the morning of surgery.       RECOMMENDATION:  APPROVAL GIVEN to proceed with proposed procedure, without further diagnostic evaluation.    Ordering of each unique test  BMP and CBC    Subjective       HPI related to upcoming procedure:  Pt has no complaints . No shortness of breath, no chest pain, no blood in urine or stoole    Health Care Directive:  Pt is full code    Preoperative Review of :   reviewed - no record of controlled substances prescribed.      Status of Chronic Conditions:  ANEMIA - Patient has a recent history of moderate-severe anemia, which has not been symptomatic. Work up to date has revealed iron deficiency. Treatment has been ferrous sulfate.     DIABETES - Patient has a longstanding history of DiabetesType Type I . Patient is being treated with insulin injections and intensive insulin injection program and denies significant side effects. Control has been poor. Complicating factors include but are not limited to: hypertension and retinopathy.     HYPERTENSION -  Patient has longstanding history of HTN , currently denies any symptoms referable to elevated blood pressure. Specifically denies chest pain, palpitations, dyspnea, orthopnea, PND or peripheral edema. Blood pressure readings have been in normal range. Current medication regimen is as listed below. Patient denies any side effects of medication.       Review of Systems  CONSTITUTIONAL: NEGATIVE for fever, chills, change in weight  ENT/MOUTH: NEGATIVE for ear, mouth and throat problems  RESP: NEGATIVE for significant cough or SOB  CV: NEGATIVE for chest pain, palpitations or peripheral edema    Patient Active Problem List    Diagnosis Date Noted     HTN (hypertension) 02/03/2023     Priority: Medium     Stage 3a chronic kidney disease (H) 01/20/2023     Priority: Medium     DKA, type 1 (H) 12/13/2022     Priority: Medium     Type 1 diabetes mellitus with moderate nonproliferative retinopathy of both eyes, macular edema presence unspecified (H) 10/03/2022     Priority: Medium     Polyneuropathy associated with underlying disease (H) 10/03/2022     Priority: Medium     Type 1 diabetes mellitus with moderate nonproliferative retinopathy of both eyes (H) 10/03/2022     Priority: Medium     Hematemesis with nausea 06/08/2022     Priority: Medium     Lactic acidosis 06/08/2022     Priority: Medium     Diabetic ketoacidosis without coma associated with type 2 diabetes mellitus (H) 06/08/2022     Priority: Medium     Acute hyperkalemia 08/22/2021     Priority: Medium     Obesity (BMI 30-39.9) 08/22/2021     Priority: Medium     Background diabetic retinopathy, mild-mod, ou 04/03/2021     Priority: Medium     Combined forms of age-related cataract, mild, of both eyes 04/03/2021     Priority: Medium     Hypercalcemia 10/15/2018     Priority: Medium     CKD (chronic kidney disease), stage III (H) 10/15/2018     Priority: Medium     OAB (overactive bladder) 05/05/2018     Priority: Medium     DKA (diabetic ketoacidoses) 12/27/2017      Priority: Medium     Replacing diagnoses that were inactivated after the 10/1/2021 regulatory import.       Gastroesophageal reflux disease without esophagitis 04/17/2017     Priority: Medium     Obesity, unspecified obesity severity, unspecified obesity type 04/17/2017     Priority: Medium     JEAN (acute kidney injury) (H) 04/13/2017     Priority: Medium     Sinus tachycardia 12/13/2016     Priority: Medium     Primary open angle glaucoma of both eyes, moderate stage 11/03/2016     Priority: Medium     Advanced directives, counseling/discussion 09/28/2016     Priority: Medium     Acute retention of urine 04/28/2016     Priority: Medium     Type 1 diabetes mellitus with diabetic peripheral angiopathy without gangrene (H)      Priority: Medium     SIRS (systemic inflammatory response syndrome) (H) 10/18/2014     Priority: Medium     History of strabismus surgery 08/30/2014     Priority: Medium     Health Care Home 12/23/2013     Priority: Medium     No Active Care Coordination          Vitamin D deficiency 12/04/2012     Priority: Medium     Acute renal failure (H) 10/09/2012     Priority: Medium     Dehydration 10/09/2012     Priority: Medium     Proteinuria 06/27/2012     Priority: Medium     Diabetic ketoacidosis without coma associated with type 1 diabetes mellitus (H) 02/29/2012     Priority: Medium     Pain in joint, lower leg 11/23/2011     Priority: Medium     Hypertension goal BP (blood pressure) < 140/90 06/02/2011     Priority: Medium     Hyperlipidemia LDL goal <100 06/02/2011     Priority: Medium     Overactive bladder 06/02/2010     Priority: Medium     GERD (gastroesophageal reflux disease) 06/02/2010     Priority: Medium     Development delay      Priority: Medium      Past Medical History:   Diagnosis Date     Cerumen impacted      Development delay      Diabetic gastroparesis (H) 8/16/2013     Glaucoma (increased eye pressure)      Hyperlipaemia      Hypertension      Hypothyroid      Mental  retardation      Nonsenile cataract      Obesity      Strabismus      Type 1 diabetes mellitus not at goal (H)      Past Surgical History:   Procedure Laterality Date     STRABISMUS SURGERY       [facility] EYE SURG [facility]T [facility] PROC UNLISTED  remote    strabismus surgery     Current Outpatient Medications   Medication Sig Dispense Refill     AMLODIPINE BENZOATE PO Take 2.5 mg by mouth       carvedilol (COREG) 6.25 MG tablet Take 1 tablet (6.25 mg) by mouth 2 times daily (with meals)       Continuous Blood Gluc  (DEXCOM G6 ) KELLY USE AS DIRECTED FOR CONTINUOUS GLUCOSE MONITORING 1 Device 1     Continuous Blood Gluc Sensor (DEXCOM G6 SENSOR) MISC USE AS DIRECTED 1 EVERY 10 DAYS 3 each 5     Continuous Blood Gluc Transmit (DEXCOM G6 TRANSMITTER) MISC USE ONE EVERY 3 MONTHS 1 each 3     dorzolamide-timolol (COSOPT) 2-0.5 % ophthalmic solution Place 1 drop into both eyes 2 times daily 5 mL 11     ferrous sulfate (SLO-FE) 142 (45 Fe) MG CR tablet Take 1 tablet (142 mg) by mouth daily       glucagon 1 MG SOLR injection Inject 1 mg into the muscle once       insulin aspart (NOVOLOG FLEXPEN) 100 UNIT/ML pen Inject 7 units before breakfast, 8 units before lunch and 8 units before dinner And sliding scale: 1 unit: 150 -199 mg/dl. 200-249= 2 units 250-299 3 units 300-249 = 4 units 350 - 399= 5 units 400+ = 6 units and call MD Up to 30 units daily 15 mL 3     insulin aspart (NOVOLOG PEN) 100 UNIT/ML pen Bedtime sliding scale 250-299 1 units 300-249 = 2 units 350 - 399= 3 units 400+ = 4 units 15 mL 0     insulin degludec (TRESIBA FLEXTOUCH) 200 UNIT/ML pen Inject 24 Units Subcutaneous daily 15 mL 3     latanoprost (XALATAN) 0.005 % ophthalmic solution INSTILL 1 DROP IN BOTH EYES AT BEDTIME 10 mL 3     lisinopril (ZESTRIL) 40 MG tablet Take 1 tablet (40 mg) by mouth daily       magnesium oxide (MAG-OX) 400 MG tablet Take 1 tablet (400 mg) by mouth daily 90 tablet 3     MULTIVITAMIN TABS   OR 1 TABLET DAILY       nystatin  "(MYCOSTATIN) 730287 UNIT/GM external cream Apply topically 2 times daily as needed for dry skin To feet and toenails.       pantoprazole (PROTONIX) 40 MG EC tablet Take 40 mg by mouth daily       potassium chloride ER (KLOR-CON M) 20 MEQ CR tablet Take 20 mEq by mouth daily       simvastatin (ZOCOR) 20 MG tablet Take 1 tablet (20 mg) by mouth At Bedtime 90 tablet 3     STOOL SOFTENER/LAXATIVE 50-8.6 MG tablet Take 1 tablet by mouth 2 times daily         Allergies   Allergen Reactions     Amoxicillin      Sulfa Antibiotics         Social History     Tobacco Use     Smoking status: Never     Smokeless tobacco: Never     Tobacco comments:     lives in smoke free household.   Substance Use Topics     Alcohol use: Yes     Comment: occass social     Family History   Problem Relation Age of Onset     Hypertension Father      Diabetes Sister      Glaucoma Maternal Grandfather      Cancer No family hx of      Cerebrovascular Disease No family hx of      Thyroid Disease No family hx of      Macular Degeneration No family hx of      History   Drug Use No         Objective     /71   Pulse 89   Temp 97.1  F (36.2  C)   Resp 18   Ht 1.6 m (5' 3\")   Wt 72.3 kg (159 lb 8 oz)   LMP 03/28/2014   SpO2 98%   BMI 28.25 kg/m      Physical Exam  GENERAL APPEARANCE: healthy, alert and no distress  HENT: ear canals and TM's normal and nose and mouth without ulcers or lesions  RESP: lungs clear to auscultation - no rales, rhonchi or wheezes  CV: regular rate and rhythm, normal S1 S2, no S3 or S4 and no murmur, click or rub   ABDOMEN: soft, nontender, no HSM or masses and bowel sounds normal  NEURO: Normal strength and tone, sensory exam grossly normal, mentation intact and speech normal       Diagnostics:    Most Recent 3 CBC's:Recent Labs   Lab Test 05/08/23  0835 03/29/23  0515 02/22/23  0537   WBC 3.4* 4.5 3.9*   HGB 10.2* 9.0* 8.6*   MCV 88 87 90    220 223     Most Recent 3 BMP's:  Recent Labs   Lab Test " 05/08/23  0835 03/29/23  0515 01/25/23  0820    142 142   POTASSIUM 4.8 4.5 3.9   CHLORIDE 108* 110* 106   CO2 23 23 21*   BUN 33.9* 39.7* 19.3   CR 1.00* 1.11* 0.93   ANIONGAP 10 9 15   EFRAIN 10.0 9.6 9.0   * 102* 233*     Most Recent 2 LFT's:  Recent Labs   Lab Test 03/29/23  0515 01/25/23  0820   AST 20 19   ALT 19 10   ALKPHOS 73 79   BILITOTAL <0.2 <0.2     Most Recent 3 INR's:No lab results found.  Most Recent Cholesterol Panel:  Recent Labs   Lab Test 01/25/23  0820   CHOL 112   LDL 58   HDL 37*   TRIG 84     Most Recent TSH and T4:  Recent Labs   Lab Test 10/13/21  1540 02/01/21  0909   TSH 3.41 7.07*   T4  --  1.05     Most Recent Hemoglobin A1c:  Recent Labs   Lab Test 03/29/23  0515   A1C 8.1*     25 OH Vit D2   Date Value Ref Range Status   06/02/2011 <5 ug/L Final   05/11/2010 <5 ug/L Final   08/19/2009 <5 ug/L Final     25 OH Vit D3   Date Value Ref Range Status   06/02/2011 18 ug/L Final   05/11/2010 21 ug/L Final   08/19/2009 21 ug/L Final     25 OH Vit D total   Date Value Ref Range Status   04/05/2012 22 (L) 30 - 75 ug/L Final     Comment:     Season, race, dietary intake, and treatment affect the concentration of   25-hydroxy-Vitamin D. Values may decrease during winter months and increase   during summer months. Values less than 30 ug/L may indicate Vitamin D   deficiency.  Vitamin D determination is routinely performed by an immunoassay specific for   25   hydroxyvitamin D3.  If an individual is on vitamin D2 (ergocalciferol)   supplementation, please specify 25 OH vitamin D2 and D3 level determination   by   LCMSMS.  For questions, please contact the laboratory at [phone].   06/02/2011 (L) 30 - 75 ug/L Final    <23  Season, race, dietary intake, and treatment affect the concentration of   25-hydroxy-Vitamin D. Values may decrease during winter months and increase   during summer months. Values less than 30 ug/L may indicate Vitamin D   deficiency.   05/11/2010 (L) 30 - 75 ug/L  Final    <26  Season, race, dietary intake, and treatment affect the concentration of   25-hydroxy-Vitamin D. Values may decrease during winter months and increase   during summer months. Values less than 30 ug/L may indicate Vitamin D   deficiency.          No EKG required, no history of coronary heart disease, significant arrhythmia, peripheral arterial disease or other structural heart disease.    Revised Cardiac Risk Index (RCRI):  The patient has the following serious cardiovascular risks for perioperative complications:   - Diabetes Mellitus (on Insulin) = 1 point     RCRI Interpretation: 1 point: Class II (low risk - 0.9% complication rate)           Signed Electronically by: TAYLA Sprague CNP  Copy of this evaluation report is provided to requesting physician.

## 2023-06-29 ENCOUNTER — VIRTUAL VISIT (OUTPATIENT)
Dept: EDUCATION SERVICES | Facility: CLINIC | Age: 63
End: 2023-06-29
Payer: MEDICARE

## 2023-06-29 DIAGNOSIS — E10.37X3 TYPE 1 DIABETES MELLITUS WITH DIABETIC MACULAR EDEMA OF BOTH EYES RESOLVED AFTER TREATMENT (H): ICD-10-CM

## 2023-06-29 PROCEDURE — 98967 PH1 ASSMT&MGMT NQHP 11-20: CPT | Mod: 95 | Performed by: DIETITIAN, REGISTERED

## 2023-06-29 RX ORDER — INSULIN DEGLUDEC 200 U/ML
22 INJECTION, SOLUTION SUBCUTANEOUS DAILY
Qty: 15 ML | Refills: 3 | Status: SHIPPED | OUTPATIENT
Start: 2023-06-29 | End: 2023-09-22

## 2023-06-29 RX ORDER — INSULIN ASPART 100 [IU]/ML
INJECTION, SOLUTION INTRAVENOUS; SUBCUTANEOUS
Qty: 15 ML | Refills: 3 | Status: SHIPPED | OUTPATIENT
Start: 2023-06-29 | End: 2023-08-11

## 2023-06-29 NOTE — LETTER
2023         RE: Lxey Rockwell  [address]  [place] [address]        Dear Colleague,    Thank you for referring your patient, Lexy Rockwell, to the [facility] [place]. Please see a copy of my visit note below.    Diabetes Education Follow-up    Subjective/Objective:    Telephone visit with Lexy Rockwell for blood sugar review. Last date of communication was: .    Diabetes is being managed with   Diabetes Medications   Diabetes Medication(s)     Diabetic Other       glucagon 1 MG SOLR injection    Inject 1 mg into the muscle once    Insulin       insulin aspart (NOVOLOG FLEXPEN) 100 UNIT/ML pen    Inject 7 units before breakfast, 8 units before lunch and 8 units before dinner And sliding scale: 1 unit: 150 -199 mg/dl. 200-249= 2 units 250-299 3 units 300-249 = 4 units 350 - 399= 5 units 400+ = 6 units and call MD Up to 30 units daily     insulin aspart (NOVOLOG PEN) 100 UNIT/ML pen    Bedtime sliding scale 250-299 1 units 300-249 = 2 units 350 - 399= 3 units 400+ = 4 units     insulin degludec (TRESIBA FLEXTOUCH) 200 UNIT/ML pen    Inject 24 Units Subcutaneous daily          BG/Food Log:       Assessment:  Overall BG improved, still having some hypoglycemia early in the am. Recommended to decrease Tresiba and increase Novolog with dinner.     Plan/Response:  Decrease Tresiba: 24 units --> 22 units    Increase Novolog:   Breakfast: 7 units  Lunch: 8 units  Dinner: 8 units --> 9 units    Time spent: 17 minutes    Tiffanie Mcfarland RD [facility]  Triage: [phone]  [phone]      Any diabetes medication dose changes were made via the CDE Protocol and Collaborative Practice Agreement with the patient's referring provider. A copy of this encounter was shared with the provider.

## 2023-06-29 NOTE — PROGRESS NOTES
Diabetes Education Follow-up    Subjective/Objective:    Telephone visit with Lexy Rockwell for blood sugar review. Last date of communication was: .    Diabetes is being managed with   Diabetes Medications   Diabetes Medication(s)     Diabetic Other       glucagon 1 MG SOLR injection    Inject 1 mg into the muscle once    Insulin       insulin aspart (NOVOLOG FLEXPEN) 100 UNIT/ML pen    Inject 7 units before breakfast, 8 units before lunch and 8 units before dinner And sliding scale: 1 unit: 150 -199 mg/dl. 200-249= 2 units 250-299 3 units 300-249 = 4 units 350 - 399= 5 units 400+ = 6 units and call MD Up to 30 units daily     insulin aspart (NOVOLOG PEN) 100 UNIT/ML pen    Bedtime sliding scale 250-299 1 units 300-249 = 2 units 350 - 399= 3 units 400+ = 4 units     insulin degludec (TRESIBA FLEXTOUCH) 200 UNIT/ML pen    Inject 24 Units Subcutaneous daily          BG/Food Log:       Assessment:  Overall BG improved, still having some hypoglycemia early in the am. Recommended to decrease Tresiba and increase Novolog with dinner.     Plan/Response:  Decrease Tresiba: 24 units --> 22 units    Increase Novolog:   Breakfast: 7 units  Lunch: 8 units  Dinner: 8 units --> 9 units    Time spent: 17 minutes    Tiffanie Mcfarland RD [facility]  Triage: [phone]  [phone]      Any diabetes medication dose changes were made via the CDE Protocol and Collaborative Practice Agreement with the patient's referring provider. A copy of this encounter was shared with the provider.

## 2023-06-29 NOTE — PATIENT INSTRUCTIONS
Decrease Tresiba: 24 units --> 22 units    Increase Novolog:   Breakfast: 7 units  Lunch: 8 units  Dinner: 9 units

## 2023-07-01 ENCOUNTER — HEALTH MAINTENANCE LETTER (OUTPATIENT)
Age: 63
End: 2023-07-01

## 2023-07-03 ENCOUNTER — HOSPITAL ENCOUNTER (OUTPATIENT)
Facility: CLINIC | Age: 63
Discharge: HOME | End: 2023-07-03
Attending: INTERNAL MEDICINE | Admitting: INTERNAL MEDICINE
Payer: MEDICARE

## 2023-07-03 ENCOUNTER — ANESTHESIA (OUTPATIENT)
Dept: GASTROENTEROLOGY | Facility: CLINIC | Age: 63
End: 2023-07-03
Payer: MEDICARE

## 2023-07-03 ENCOUNTER — ANESTHESIA EVENT (OUTPATIENT)
Dept: GASTROENTEROLOGY | Facility: CLINIC | Age: 63
End: 2023-07-03
Payer: MEDICARE

## 2023-07-03 VITALS
OXYGEN SATURATION: 100 % | HEIGHT: 63 IN | RESPIRATION RATE: 14 BRPM | HEART RATE: 83 BPM | SYSTOLIC BLOOD PRESSURE: 147 MMHG | DIASTOLIC BLOOD PRESSURE: 80 MMHG | WEIGHT: 160 LBS | BODY MASS INDEX: 28.35 KG/M2

## 2023-07-03 LAB
COLONOSCOPY: NORMAL
UPPER GI ENDOSCOPY: NORMAL

## 2023-07-03 PROCEDURE — 999N000010 HC STATISTIC ANES STAT CODE-CRNA PER MINUTE: Performed by: INTERNAL MEDICINE

## 2023-07-03 PROCEDURE — 999N000128 HC STATISTIC PERIPHERAL IV START W/O US GUIDANCE

## 2023-07-03 PROCEDURE — 43239 EGD BIOPSY SINGLE/MULTIPLE: CPT | Performed by: INTERNAL MEDICINE

## 2023-07-03 PROCEDURE — 88305 TISSUE EXAM BY PATHOLOGIST: CPT | Mod: 26 | Performed by: PATHOLOGY

## 2023-07-03 PROCEDURE — 250N000011 HC RX IP 250 OP 636: Mod: JZ | Performed by: NURSE ANESTHETIST, CERTIFIED REGISTERED

## 2023-07-03 PROCEDURE — 88305 TISSUE EXAM BY PATHOLOGIST: CPT | Mod: TC | Performed by: INTERNAL MEDICINE

## 2023-07-03 PROCEDURE — 45378 DIAGNOSTIC COLONOSCOPY: CPT | Mod: XU | Performed by: INTERNAL MEDICINE

## 2023-07-03 PROCEDURE — 250N000009 HC RX 250: Performed by: NURSE ANESTHETIST, CERTIFIED REGISTERED

## 2023-07-03 PROCEDURE — 258N000003 HC RX IP 258 OP 636: Performed by: NURSE ANESTHETIST, CERTIFIED REGISTERED

## 2023-07-03 PROCEDURE — 370N000017 HC ANESTHESIA TECHNICAL FEE, PER MIN: Performed by: INTERNAL MEDICINE

## 2023-07-03 RX ORDER — PROPOFOL 10 MG/ML
INJECTION, EMULSION INTRAVENOUS CONTINUOUS PRN
Status: DISCONTINUED | OUTPATIENT
Start: 2023-07-03 | End: 2023-07-03

## 2023-07-03 RX ORDER — ONDANSETRON 2 MG/ML
4 INJECTION INTRAMUSCULAR; INTRAVENOUS EVERY 6 HOURS PRN
Status: CANCELLED | OUTPATIENT
Start: 2023-07-03

## 2023-07-03 RX ORDER — ONDANSETRON 2 MG/ML
INJECTION INTRAMUSCULAR; INTRAVENOUS PRN
Status: DISCONTINUED | OUTPATIENT
Start: 2023-07-03 | End: 2023-07-03

## 2023-07-03 RX ORDER — DEXMEDETOMIDINE HYDROCHLORIDE 4 UG/ML
INJECTION, SOLUTION INTRAVENOUS PRN
Status: DISCONTINUED | OUTPATIENT
Start: 2023-07-03 | End: 2023-07-03

## 2023-07-03 RX ORDER — NALOXONE HYDROCHLORIDE 0.4 MG/ML
0.4 INJECTION, SOLUTION INTRAMUSCULAR; INTRAVENOUS; SUBCUTANEOUS
Status: CANCELLED | OUTPATIENT
Start: 2023-07-03

## 2023-07-03 RX ORDER — NALOXONE HYDROCHLORIDE 0.4 MG/ML
0.2 INJECTION, SOLUTION INTRAMUSCULAR; INTRAVENOUS; SUBCUTANEOUS
Status: CANCELLED | OUTPATIENT
Start: 2023-07-03

## 2023-07-03 RX ORDER — PROCHLORPERAZINE MALEATE 10 MG
10 TABLET ORAL EVERY 6 HOURS PRN
Status: CANCELLED | OUTPATIENT
Start: 2023-07-03

## 2023-07-03 RX ORDER — ONDANSETRON 4 MG/1
4 TABLET, ORALLY DISINTEGRATING ORAL EVERY 6 HOURS PRN
Status: CANCELLED | OUTPATIENT
Start: 2023-07-03

## 2023-07-03 RX ORDER — LIDOCAINE HYDROCHLORIDE 20 MG/ML
INJECTION, SOLUTION INFILTRATION; PERINEURAL PRN
Status: DISCONTINUED | OUTPATIENT
Start: 2023-07-03 | End: 2023-07-03

## 2023-07-03 RX ORDER — FLUMAZENIL 0.1 MG/ML
0.2 INJECTION, SOLUTION INTRAVENOUS
Status: CANCELLED | OUTPATIENT
Start: 2023-07-03 | End: 2023-07-03

## 2023-07-03 RX ORDER — SODIUM CHLORIDE, SODIUM LACTATE, POTASSIUM CHLORIDE, CALCIUM CHLORIDE 600; 310; 30; 20 MG/100ML; MG/100ML; MG/100ML; MG/100ML
INJECTION, SOLUTION INTRAVENOUS CONTINUOUS PRN
Status: DISCONTINUED | OUTPATIENT
Start: 2023-07-03 | End: 2023-07-03

## 2023-07-03 RX ADMIN — PHENYLEPHRINE HYDROCHLORIDE 100 MCG: 10 INJECTION INTRAVENOUS at 10:12

## 2023-07-03 RX ADMIN — SODIUM CHLORIDE, POTASSIUM CHLORIDE, SODIUM LACTATE AND CALCIUM CHLORIDE: 600; 310; 30; 20 INJECTION, SOLUTION INTRAVENOUS at 09:46

## 2023-07-03 RX ADMIN — TOPICAL ANESTHETIC 1 EACH: 200 SPRAY DENTAL; PERIODONTAL at 09:46

## 2023-07-03 RX ADMIN — PROPOFOL 150 MCG/KG/MIN: 10 INJECTION, EMULSION INTRAVENOUS at 09:50

## 2023-07-03 RX ADMIN — DEXMEDETOMIDINE HYDROCHLORIDE 4 MCG: 200 INJECTION INTRAVENOUS at 10:08

## 2023-07-03 RX ADMIN — DEXMEDETOMIDINE HYDROCHLORIDE 8 MCG: 200 INJECTION INTRAVENOUS at 09:46

## 2023-07-03 RX ADMIN — ONDANSETRON 4 MG: 2 INJECTION INTRAMUSCULAR; INTRAVENOUS at 09:58

## 2023-07-03 RX ADMIN — LIDOCAINE HYDROCHLORIDE 50 MG: 20 INJECTION, SOLUTION INFILTRATION; PERINEURAL at 09:47

## 2023-07-03 ASSESSMENT — ACTIVITIES OF DAILY LIVING (ADL)
ADLS_ACUITY_SCORE: 35
ADLS_ACUITY_SCORE: 35

## 2023-07-03 NOTE — ANESTHESIA POSTPROCEDURE EVALUATION
Patient: Lexy Rockwell    Procedure: Procedure(s):  Esophagoscopy, gastroscopy, duodenoscopy (EGD), combined  Colonoscopy       Anesthesia Type:  MAC    Note:     Postop Pain Control: Uneventful            Sign Out: Well controlled pain   PONV: No   Neuro/Psych: Uneventful            Sign Out: Acceptable/Baseline neuro status   Airway/Respiratory: Uneventful            Sign Out: Acceptable/Baseline resp. status   CV/Hemodynamics: Uneventful            Sign Out: Acceptable CV status; No obvious hypovolemia; No obvious fluid overload   Other NRE: NONE   DID A NON-ROUTINE EVENT OCCUR?            Last vitals:  Vitals Value Taken Time   /80 07/03/23 1050   Temp     Pulse 83 07/03/23 1100   Resp 14 07/03/23 1100   SpO2 100 % 07/03/23 1100       Electronically Signed By: Darrin Koch DO,   July 3, 2023  1:46 PM

## 2023-07-03 NOTE — ANESTHESIA PREPROCEDURE EVALUATION
Anesthesia Pre-Procedure Evaluation    Patient: Lexy Rockwell   MRN: [id number] : 1960        Procedure : Procedure(s):  Esophagoscopy, gastroscopy, duodenoscopy (EGD), combined  Colonoscopy          Past Medical History:   Diagnosis Date     Cerumen impacted      Development delay      Diabetic gastroparesis (H) 2013     Glaucoma (increased eye pressure)      Hyperlipaemia      Hypertension      Hypothyroid      Mental retardation      Nonsenile cataract      Obesity      Strabismus      Type 1 diabetes mellitus not at goal (H)       Past Surgical History:   Procedure Laterality Date     STRABISMUS SURGERY       [facility] EYE SURG ANT [facility] PROC UNLISTED  remote    strabismus surgery      Allergies   Allergen Reactions     Amoxicillin      Sulfa Antibiotics       Social History     Tobacco Use     Smoking status: Never     Smokeless tobacco: Never     Tobacco comments:     lives in smoke free household.   Substance Use Topics     Alcohol use: Yes     Comment: occass social      Wt Readings from Last 1 Encounters:   23 72.3 kg (159 lb 8 oz)        Anesthesia Evaluation            ROS/MED HX  ENT/Pulmonary:  - neg pulmonary ROS  (-) sleep apnea   Neurologic:  - neg neurologic ROS     Cardiovascular:     (+) hypertension-----    METS/Exercise Tolerance:     Hematologic:     (+) anemia,     Musculoskeletal:       GI/Hepatic:     (+) GERD,     Renal/Genitourinary:     (+) renal disease, type: CRI, Pt does not require dialysis,     Endo:     (+) type I DM, Diabetic complications: retinopathy. thyroid problem, hypothyroidism, Obesity,     Psychiatric/Substance Use:  - neg psychiatric ROS     Infectious Disease:       Malignancy:       Other:            Physical Exam    Airway        Mallampati: II   TM distance: > 3 FB   Neck ROM: full   Mouth opening: > 3 cm    Respiratory Devices and Support         Dental       (+) Modest Abnormalities - crowns, retainers, 1 or 2 missing teeth      Cardiovascular    cardiovascular exam normal          Pulmonary   pulmonary exam normal                OUTSIDE LABS:  CBC:   Lab Results   Component Value Date    WBC 3.4 (L) 05/08/2023    WBC 4.5 03/29/2023    HGB 10.2 (L) 05/08/2023    HGB 9.0 (L) 03/29/2023    HCT 33.3 (L) 05/08/2023    HCT 29.8 (L) 03/29/2023     05/08/2023     03/29/2023     BMP:   Lab Results   Component Value Date     05/08/2023     03/29/2023    POTASSIUM 4.8 05/08/2023    POTASSIUM 4.5 03/29/2023    CHLORIDE 108 (H) 05/08/2023    CHLORIDE 110 (H) 03/29/2023    CO2 23 05/08/2023    CO2 23 03/29/2023    BUN 33.9 (H) 05/08/2023    BUN 39.7 (H) 03/29/2023    CR 1.00 (H) 05/08/2023    CR 1.11 (H) 03/29/2023     (H) 05/08/2023     (H) 03/29/2023     COAGS: No results found for: PTT, INR, FIBR  POC: No results found for: BGM, HCG, HCGS  HEPATIC:   Lab Results   Component Value Date    ALBUMIN 3.5 03/29/2023    PROTTOTAL 6.0 (L) 03/29/2023    ALT 19 03/29/2023    AST 20 03/29/2023    ALKPHOS 73 03/29/2023    BILITOTAL <0.2 03/29/2023     OTHER:   Lab Results   Component Value Date    A1C 8.1 (H) 03/29/2023    EFRAIN 10.0 05/08/2023    PHOS 3.7 09/21/2015    MAG 1.9 01/02/2023    LIPASE 17 08/13/2013    TSH 3.41 10/13/2021    T4 1.05 02/01/2021    SED 38 (H) 02/22/2023       Anesthesia Plan    ASA Status:  3      Anesthesia Type: MAC.     - Reason for MAC: straight local not clinically adequate              Consents    Anesthesia Plan(s) and associated risks, benefits, and realistic alternatives discussed. Questions answered and patient/representative(s) expressed understanding.    - Discussed:     - Discussed with:  Patient         Postoperative Care       PONV prophylaxis: Ondansetron (or other 5HT-3)     Comments:                Darrin Koch DO, DO

## 2023-07-05 LAB
PATH REPORT.COMMENTS IMP SPEC: NORMAL
PATH REPORT.COMMENTS IMP SPEC: NORMAL
PATH REPORT.FINAL DX SPEC: NORMAL
PATH REPORT.GROSS SPEC: NORMAL
PATH REPORT.MICROSCOPIC SPEC OTHER STN: NORMAL
PATH REPORT.RELEVANT HX SPEC: NORMAL
PHOTO IMAGE: NORMAL

## 2023-07-27 ENCOUNTER — NURSING HOME VISIT (OUTPATIENT)
Dept: GERIATRICS | Facility: CLINIC | Age: 63
End: 2023-07-27
Payer: MEDICARE

## 2023-07-27 VITALS
RESPIRATION RATE: 20 BRPM | HEART RATE: 70 BPM | OXYGEN SATURATION: 96 % | SYSTOLIC BLOOD PRESSURE: 130 MMHG | BODY MASS INDEX: 28.7 KG/M2 | DIASTOLIC BLOOD PRESSURE: 69 MMHG | TEMPERATURE: 97.7 F | WEIGHT: 162 LBS | HEIGHT: 63 IN

## 2023-07-27 DIAGNOSIS — E10.37X3 TYPE 1 DIABETES MELLITUS WITH DIABETIC MACULAR EDEMA OF BOTH EYES RESOLVED AFTER TREATMENT (H): Primary | ICD-10-CM

## 2023-07-27 DIAGNOSIS — E11.3299 NONPROLIFERATIVE DIABETIC RETINOPATHY (H): ICD-10-CM

## 2023-07-27 DIAGNOSIS — R73.9 HYPERGLYCEMIA: ICD-10-CM

## 2023-07-27 PROCEDURE — 99309 SBSQ NF CARE MODERATE MDM 30: CPT | Performed by: NURSE PRACTITIONER

## 2023-07-27 NOTE — LETTER
"    7/27/2023        RE: Lexy Rockwell  [address]  [facility] [address]        [facility] GERIATRICS    Chief Complaint   Patient presents with     Nursing Home Acute     HPI:  Lexy Rockwell is a 63 year old  (1960), who is being seen today for an episodic care visit at: [facility] () [[id number]].     Her past medical history includes  Type 1 diabetes with gastroparesis and CKD history of DKA  Hypertension  CKD stage III  Hyperlipidemia  Bilateral leg edema  Overactive bladder  GERD  Constipation  Developmentally delayed  Polyneuropathy   Vitamin D deficiency  Glaucoma    Insurance not covering current insulin  Spoke with insurance they will cover Lantus and novolog  VS stable.     Allergies, and PMH/PSH reviewed in EPIC today.  REVIEW OF SYSTEMS:  4 point ROS including Respiratory, CV, GI and , other than that noted in the HPI,  is negative    Objective:   /69   Pulse 70   Temp 97.7  F (36.5  C)   Resp 20   Ht 1.6 m (5' 3\")   Wt 73.5 kg (162 lb)   LMP 03/28/2014   SpO2 96%   BMI 28.70 kg/m    GENERAL APPEARANCE:  Alert, in no distress  PSYCH:  oriented X 3    Most Recent 3 CBC's:  Recent Labs   Lab Test 05/08/23  0835 03/29/23  0515 02/22/23  0537   WBC 3.4* 4.5 3.9*   HGB 10.2* 9.0* 8.6*   MCV 88 87 90    220 223     Most Recent 3 BMP's:  Recent Labs   Lab Test 05/08/23  0835 03/29/23  0515 01/25/23  0820    142 142   POTASSIUM 4.8 4.5 3.9   CHLORIDE 108* 110* 106   CO2 23 23 21*   BUN 33.9* 39.7* 19.3   CR 1.00* 1.11* 0.93   ANIONGAP 10 9 15   EFRAIN 10.0 9.6 9.0   * 102* 233*     Most Recent Hemoglobin A1c:  Recent Labs   Lab Test 03/29/23  0515   A1C 8.1*       Assessment/Plan:    (E10.37X3) Type 1 diabetes mellitus with diabetic macular edema of both eyes resolved after treatment (H)  (E11.3299) Nonproliferative diabetic retinopathy (H)  (R73.9) hyperglycemia    She continues to have labile BS.  BS vary from "  in the morning.  102-444 before meals and 111-336 at HS.  Receives 7 units prior to breakfast.  8 units prior to lunch and 9 units prior to dinner of aspart insulin.  She also receives a sliding scale prior to meals and will receive 0-6 units of insulin.   she receives 22 units of degludec insulin.     For continuous glucose monitoring she has a Dexcom which is connected to her phone. Since being at the facility she started working with endocrinology again and diabetic educator.   Her hgb A1c went from over 11.8 three years ago to 8.1 in March 2023.      For secondary prevention she is currently taking simvastatin 20 mg daily, lisinopril 40 mg daily, and aspirin 81 mg daily is being held due to anemia.        Orders:  Insurance does not cover current insulin  Spoke with insurance for 33 minutes today on phone regarding alternatives covered including novolog and Lantus  Pt was not using medicare D for coverage just supplement therefore facility will help further understand medication coverage  Completed request for coverage from insurance due to pt BS are being more in line with acceptable. She has avoided hospitalization for ketoacidosis since being in facility.       Electronically signed by:     TAYLA Sprague CNP on 7/27/2023 at 4:43 PM           Sincerely,        TAYLA Sprague CNP

## 2023-07-27 NOTE — PROGRESS NOTES
"[facility] GERIATRICS    Chief Complaint   Patient presents with    Nursing Home Acute     HPI:  Lexy Rockwell is a 63 year old  (1960), who is being seen today for an episodic care visit at: [facility] () [[id number]].     Her past medical history includes  Type 1 diabetes with gastroparesis and CKD history of DKA  Hypertension  CKD stage III  Hyperlipidemia  Bilateral leg edema  Overactive bladder  GERD  Constipation  Developmentally delayed  Polyneuropathy   Vitamin D deficiency  Glaucoma    Insurance not covering current insulin  Spoke with insurance they will cover Lantus and novolog  VS stable.     Allergies, and PMH/PSH reviewed in EPIC today.  REVIEW OF SYSTEMS:  4 point ROS including Respiratory, CV, GI and , other than that noted in the HPI,  is negative    Objective:   /69   Pulse 70   Temp 97.7  F (36.5  C)   Resp 20   Ht 1.6 m (5' 3\")   Wt 73.5 kg (162 lb)   LMP 03/28/2014   SpO2 96%   BMI 28.70 kg/m    GENERAL APPEARANCE:  Alert, in no distress  PSYCH:  oriented X 3    Most Recent 3 CBC's:  Recent Labs   Lab Test 05/08/23  0835 03/29/23  0515 02/22/23  0537   WBC 3.4* 4.5 3.9*   HGB 10.2* 9.0* 8.6*   MCV 88 87 90    220 223     Most Recent 3 BMP's:  Recent Labs   Lab Test 05/08/23  0835 03/29/23  0515 01/25/23  0820    142 142   POTASSIUM 4.8 4.5 3.9   CHLORIDE 108* 110* 106   CO2 23 23 21*   BUN 33.9* 39.7* 19.3   CR 1.00* 1.11* 0.93   ANIONGAP 10 9 15   EFRAIN 10.0 9.6 9.0   * 102* 233*     Most Recent Hemoglobin A1c:  Recent Labs   Lab Test 03/29/23  0515   A1C 8.1*       Assessment/Plan:    (E10.37X3) Type 1 diabetes mellitus with diabetic macular edema of both eyes resolved after treatment (H)  (E11.3299) Nonproliferative diabetic retinopathy (H)  (R73.9) hyperglycemia    She continues to have labile BS.  BS vary from  in the morning.  102-444 before meals and 111-336 at HS.  Receives 7 units prior to breakfast.  8 units prior " to lunch and 9 units prior to dinner of aspart insulin.  She also receives a sliding scale prior to meals and will receive 0-6 units of insulin.   she receives 22 units of degludec insulin.     For continuous glucose monitoring she has a Dexcom which is connected to her phone. Since being at the facility she started working with endocrinology again and diabetic educator.   Her hgb A1c went from over 11.8 three years ago to 8.1 in March 2023.      For secondary prevention she is currently taking simvastatin 20 mg daily, lisinopril 40 mg daily, and aspirin 81 mg daily is being held due to anemia.        Orders:  Insurance does not cover current insulin  Spoke with insurance for 33 minutes today on phone regarding alternatives covered including novolog and Lantus  Pt was not using medicare D for coverage just supplement therefore facility will help further understand medication coverage  Completed request for coverage from insurance due to pt BS are being more in line with acceptable. She has avoided hospitalization for ketoacidosis since being in facility.       Electronically signed by:     TAYLA Sprague CNP on 7/27/2023 at 4:43 PM

## 2023-08-01 NOTE — PATIENT INSTRUCTIONS
"     Orthopedic PA Progress Note    Interval changes:  Patient doing well.  Dressings loosened slightly  LLE splint and dressings are CDI  NWB LLE  DVT Prophylaxis outpatient: ASA 81 mg PO QD x4 weeks  Follow up with Dr. Davies in 10-14 days postop.   Cleared for DC from orthopedic standpoint pending therapy recs and medical optimization    ROS - Patient denies any new issues.  Denies any numbness or tingling. Pain well controlled.    BP (!) 145/78   Pulse 69   Temp 36.3 °C (97.3 °F) (Temporal)   Resp 19   Ht 1.778 m (5' 10\")   Wt 92.1 kg (203 lb)   SpO2 98%     Patient seen and examined  No acute distress  Breathing non labored  RRR  LLE: Dressings and splint CDI. Moderate ecchymosis to toes. Wiggles toes. Sensation intact. Cap refill <2s.     Recent Labs     07/30/23  0440 07/31/23  0459 08/01/23  0116   WBC 10.9* 13.1* 11.8*   RBC 4.52* 4.17* 3.95*   HEMOGLOBIN 13.5* 12.4* 12.0*   HEMATOCRIT 40.5* 39.0* 37.2*   MCV 89.6 93.5 94.2   MCH 29.9 29.7 30.4   MCHC 33.3 31.8* 32.3   RDW 53.3* 56.8* 57.5*   PLATELETCT 132* 163* 159*   MPV 9.5 9.5 9.8         Active Hospital Problems    Diagnosis     Closed fracture of multiple ribs of left side [S22.42XA]      Priority: High    Closed left ankle fracture [S82.892A]      Priority: Medium    Contusion of left lung [S27.321A]      Priority: Medium    Urinary retention with incomplete bladder emptying [R33.9]      Priority: Low    Chronic pain [G89.29]      Priority: Low    Trauma [T14.90XA]      Priority: Low    No contraindication to deep vein thrombosis (DVT) prophylaxis [Z78.9]      Priority: Low    Gastroesophageal reflux disease [K21.9]      Priority: Low    Depression [F32.A]      Priority: Low    Dyslipidemia [E78.5]      Priority: Low    Essential hypertension [I10]      Priority: Low    Rheumatoid arthritis (HCC) [M06.9]      Priority: Low     ICD-10 transition         Assessment/Plan:  Patient doing well.  Dressings loosened slightly  LLE splint and " Jaqui Sampson will contact you regarding phone visits every 4 weeks for follow up.      Sending blood sugars to your provider at [place]:  We want to help you with your diabetes management, which often requires frequent adjustments to your therapy. For your convenience, we have several ways to send your blood sugars to your doctor for review.    - Send message directly to your doctor through My Chart.  Please ask the rooming staff if you would like to sign up for My Chart.  This is a fast and confidential way to send your information and communicate directly with your provider.   - Record readings and fax to [phone].  We have a template for you to use for your convenience.  - Stop by the clinic with your meter for download if advised by provider.   - My Chart or call Dai Roman, Diabetes Educator at [phone]  - Call the clinic and speak to one of the endocrine nurses to relay information on the telephone.  Anna Fulton, or Libia at [phone].   -    Please call the on-call Endocrinologist at the [facility] for after       hours/weekend needs at [phone] Option #4.    Please note that you do not need to FAST if you are just having an A1C drawn. Please remember to ALWAYS bring your glucose meter with to your appointment. This data is very important for the management of your care.    Thank you!  Your [place] Diabetes Care Team         dressings are CDI  NWB LLE  DVT Prophylaxis outpatient: ASA 81 mg PO QD x4 weeks  Follow up with Dr. Davies in 10-14 days postop.   Cleared for DC from orthopedic standpoint pending therapy recs and medical optimization    POD#2 S/p  1.  Open treatment of left trimalleolar ankle fracture dislocation  2.  Open reduction internal fixation of left ankle syndesmosis  Wt bearing status - NWB LLE  Wound care/Drains - Dressings to be left in place  Future Procedures - None planned   Lovenox: Start 7/31, Duration-until ambulatory > 150'  Sutures/Staples out- 14-21 days post operatively. Removal will completed by ortho ALEX's unless transferred.  PT/OT-initiated  Antibiotics:  Perioperative completed  DVT Prophylaxis- TEDS/SCDs/Foot pumps  [place]-not needed per ortho  Case Coordination for Discharge Planning - Disposition per therapy recs.

## 2023-08-11 ENCOUNTER — VIRTUAL VISIT (OUTPATIENT)
Dept: EDUCATION SERVICES | Facility: CLINIC | Age: 63
End: 2023-08-11
Payer: MEDICARE

## 2023-08-11 ENCOUNTER — TELEPHONE (OUTPATIENT)
Dept: GERIATRICS | Facility: CLINIC | Age: 63
End: 2023-08-11

## 2023-08-11 DIAGNOSIS — E10.37X3 TYPE 1 DIABETES MELLITUS WITH DIABETIC MACULAR EDEMA OF BOTH EYES RESOLVED AFTER TREATMENT (H): ICD-10-CM

## 2023-08-11 PROCEDURE — 98967 PH1 ASSMT&MGMT NQHP 11-20: CPT | Mod: 95 | Performed by: DIETITIAN, REGISTERED

## 2023-08-11 RX ORDER — INSULIN ASPART 100 [IU]/ML
INJECTION, SOLUTION INTRAVENOUS; SUBCUTANEOUS
Qty: 15 ML | Refills: 3 | Status: SHIPPED | OUTPATIENT
Start: 2023-08-11 | End: 2023-09-22

## 2023-08-11 NOTE — PROGRESS NOTES
Diabetes Self-Management Education & Support    Presents for: CGM Review    Type of Service: Telephone Visit    Audio only visit done, as patient does not have access to audio-visual technology.    Individual visit provided, given no group classes are available for 2 months.     Originating Location (Patient Location): Home  Distant Location (Provider Location): Offsite  Mode of Communication:  Telephone    Telephone Visit Start Time:  9:00  Telephone Visit End Time (telephone visit stop time): 9:11 am    How would patient like to obtain AVS? MyChart    Assessment Type:   REPORTS:            Pt verbalized understanding of concepts discussed and recommendations provided today.       Continue education with the following diabetes management concepts: Healthy Eating, Being Active, Monitoring, Taking Medication, Problem Solving, Reducing Risks, and Healthy Coping    ASSESSMENT:  BG have been fluctuating quite a bit still. BG during the day are within target, seeing a spike in early afternoon remaining high throughout the night and slowly decreasing from MN-6 am.       PLAN    Increase Novolo-8-9-0 --> 7-8-10-0  Continue with 22 units of Tresiba    Topics to cover at upcoming visits: Healthy Eating, Being Active, Monitoring, Taking Medication, Problem Solving, Reducing Risks, and Healthy Coping    Follow-up:     See Care Plan for co-developed, patient-state behavior change goals.  AVS provided for patient today.    Education Materials Provided:  No new materials provided today      SUBJECTIVE/OBJECTIVE:  Presents for: CGM Review  Accompanied by: Self, Other (Dietitian from Assisted Living)  Diabetes education in the past 24mo: Yes  Focus of Visit: Problem Solving, Monitoring, Taking Medication  Diabetes type: Type 1  Disease course: Worsening  Transportation concerns: Yes (gets rides or takes public tranportation)  Difficulty affording diabetes medication?: No  Difficulty affording diabetes testing supplies?:  "No  Other concerns:: Cognitive impairment  Cultural Influences/Ethnic Background:  Not  or     Diabetes Symptoms & Complications:  Fatigue: No  Neuropathy: Yes (in feet)  Polydipsia: Sometimes (with hyperglycemia)  Polyphagia: No  Polyuria: Sometimes (often getting up in the night)  Visual change: No  Slow healing wounds: No  Symptom course: Stable  Weight trend: Stable  Complications assessed today?: Yes    Patient Problem List and Family Medical History reviewed for relevant medical history, current medical status, and diabetes risk factors.    Vitals:  LMP 03/28/2014   Estimated body mass index is 28.7 kg/m  as calculated from the following:    Height as of 7/27/23: 1.6 m (5' 3\").    Weight as of 7/27/23: 73.5 kg (162 lb).   Last 3 BP:   BP Readings from Last 3 Encounters:   07/27/23 130/69   07/03/23 (!) 147/80   06/27/23 117/71       History   Smoking Status    Never   Smokeless Tobacco    Never       Labs:  Lab Results   Component Value Date    A1C 8.1 03/29/2023    A1C 9.7 02/01/2021     Lab Results   Component Value Date     05/08/2023     10/13/2021     02/01/2021     Lab Results   Component Value Date    LDL 58 01/25/2023     02/01/2021     HDL Cholesterol   Date Value Ref Range Status   02/01/2021 62 >49 mg/dL Final     Direct Measure HDL   Date Value Ref Range Status   01/25/2023 37 (L) >=50 mg/dL Final   ]  GFR Estimate   Date Value Ref Range Status   05/08/2023 64 >60 mL/min/1.73m2 Final     Comment:     eGFR calculated using 2021 CKD-EPI equation.   02/01/2021 90 >60 mL/min/[1.73_m2] Final     Comment:     Non  GFR Calc  Starting 12/18/2018, serum creatinine based estimated GFR (eGFR) will be   calculated using the Chronic Kidney Disease Epidemiology Collaboration   (CKD-EPI) equation.       GFR Estimate If Black   Date Value Ref Range Status   02/01/2021 >90 >60 mL/min/[1.73_m2] Final     Comment:      GFR Calc  Starting " 12/18/2018, serum creatinine based estimated GFR (eGFR) will be   calculated using the Chronic Kidney Disease Epidemiology Collaboration   (CKD-EPI) equation.       Lab Results   Component Value Date    CR 1.00 05/08/2023    CR 0.73 02/01/2021     No results found for: MICROALBUMIN    Healthy Eating:  Healthy Eating Assessed Today: Yes  Cultural/Shinto diet restrictions?: No  Meals include: Breakfast, Lunch, Dinner, Afternoon Snack, Evening Snack  Breakfast: 8:00 am: Oatmeal with hard boiled egg and barragan with toast  Lunch: 12-12:30:  salad OR Cheese ravioli OR sandwiches oR hot dish  Dinner: 5:00 -6:00 pm: meatball and mashed potatoes OR vegetables lasagna OR scrab cake with vegetables and 2 sugar free cookies  Snacks: Has one bedtime snack- usually a cookie (if blood sugar is high, will not have snack) or yogurt OR sugra free candy every once and a while  Other: drinks coffee with sugar free creamer  Beverages: Water, Coffee, Milk (Keep some juice on hand for lows)  Has patient met with a dietitian in the past?: Yes    Being Active:  Being Active Assessed Today: Yes (went on the bike at her apartment complex)  Exercise:: Yes (walking, programs at care center)  Days per week of moderate to strenuous exercise (like a brisk walk): 2  On average, minutes per day of exercise at this level:  (Walking with friends)  How intense was your typical exercise? : Light (like stretching or slow walking)  Barrier to exercise: Safety, Physical limitation    Monitoring:  Monitoring Assessed Today: Yes  Did patient bring glucose meter to appointment? : Yes  Blood Glucose Meter: CGM  Times checking blood sugar at home (number): 5+  Times checking blood sugar at home (per): Day  Blood glucose trend: Fluctuating      Taking Medications:  Diabetes Medication(s)       Diabetic Other       glucagon 1 MG SOLR injection    Inject 1 mg into the muscle once      Insulin       insulin aspart (NOVOLOG FLEXPEN) 100 UNIT/ML pen    Inject 7  units before breakfast, 8 units before lunch and 10 units before dinner And sliding scale: 1 unit: 150 -199 mg/dl. 200-249= 2 units 250-299 3 units 300-249 = 4 units 350 - 399= 5 units 400+ = 6 units and call MD Up to 30 units daily     insulin aspart (NOVOLOG PEN) 100 UNIT/ML pen    Bedtime sliding scale 250-299 1 units 300-249 = 2 units 350 - 399= 3 units 400+ = 4 units     insulin degludec (TRESIBA FLEXTOUCH) 200 UNIT/ML pen    Inject 22 Units Subcutaneous daily            Taking Medication Assessed Today: Yes  Current Treatments: Insulin Injections  Dose schedule: Pre-breakfast, Pre-lunch, Pre-dinner, At bedtime  Given by: Patient, Adult caretaker, Nursing attendant  Injection/Infusion sites: Abdomen  Problems taking diabetes medications regularly?: No  Diabetes medication side effects?: No    Problem Solving:  Problem Solving Assessed Today: Yes  Is the patient at risk for hypoglycemia?: Yes  Hypoglycemia Frequency: Daily  Hypoglycemia Treatment: Glucose (tablets or gel), Other food, Juice, Candy (will have one piece of candy or some juice)  Patient carries a carbohydrate source: Yes  Medical ID: Yes  Is the patient at risk for DKA?: Yes  Does patient have ketone test strips?: Yes  Does patient have DKA prevention plan?: Yes  Does patient have severe weather/disaster plan for diabetes management?: No  Does patient have sick day plan for diabetes management?: Yes    Hypoglycemia symptoms  Confusion: No  Dizziness or Light-Headedness: No  Headaches: No  Hunger: No  Mood changes: No  Nervousness/Anxiety: No  Sleepiness: No  Speech difficulty: Yes  Sweats: Yes  Feeling shaky: Yes    Hypoglycemia Complications  Blackouts: No  Hospitalization: No  Nocturnal hypoglycemia: Yes  Required assistance: No  Seizures: No    Reducing Risks:  Reducing Risks Assessed Today: No  CAD Risks: Diabetes Mellitus  Has dilated eye exam at least once a year?: Yes  Sees dentist every 6 months?: Yes  Feet checked by healthcare provider in  the last year?: Yes    Healthy Coping:  Healthy Coping Assessed Today: Yes  Emotional response to diabetes: Acceptance  Informal Support system:: Family, Parent  Stage of change: ACTION (Actively working towards change)  Support resources: Offerings in Clinic Communities  Patient Activation Measure Survey Score:      3/7/2012     3:00 PM   KHUSHI Score (Last Two)   KHUSHI Raw Score 39   Activation Score 56.4   KHUSHI Level 3         Care Plan and Education Provided:  There are no care plans that you recently modified to display for this patient.      Tiffanie Mcfarland RD [facility]  Triage: [phone]  [phone]      Time Spent: 11 minutes  Encounter Type: Individual    Any diabetes medication dose changes were made via the CDE Protocol per the patient's referring provider. A copy of this encounter was shared with the provider.

## 2023-08-11 NOTE — LETTER
2023         RE: Lexy Rockwell  [address]  [facility] [address]        Dear Colleague,    Thank you for referring your patient, Lexy Rockwell, to the [facility]. Please see a copy of my visit note below.    Diabetes Self-Management Education & Support    Presents for: CGM Review    Type of Service: Telephone Visit    Audio only visit done, as patient does not have access to audio-visual technology.    Individual visit provided, given no group classes are available for 2 months.     Originating Location (Patient Location): Home  Distant Location (Provider Location): Offsite  Mode of Communication:  Telephone    Telephone Visit Start Time:  9:00  Telephone Visit End Time (telephone visit stop time): 9:11 am    How would patient like to obtain AVS? MyChart    Assessment Type:   REPORTS:            Pt verbalized understanding of concepts discussed and recommendations provided today.       Continue education with the following diabetes management concepts: Healthy Eating, Being Active, Monitoring, Taking Medication, Problem Solving, Reducing Risks, and Healthy Coping    ASSESSMENT:  BG have been fluctuating quite a bit still. BG during the day are within target, seeing a spike in early afternoon remaining high throughout the night and slowly decreasing from MN-6 am.       PLAN    Increase Novolo-8-9-0 --> 7-8-10-0  Continue with 22 units of Tresiba    Topics to cover at upcoming visits: Healthy Eating, Being Active, Monitoring, Taking Medication, Problem Solving, Reducing Risks, and Healthy Coping    Follow-up:     See Care Plan for co-developed, patient-state behavior change goals.  AVS provided for patient today.    Education Materials Provided:  No new materials provided today      SUBJECTIVE/OBJECTIVE:  Presents for: CGM Review  Accompanied by: Self, Other (Dietitian from Assisted Living)  Diabetes education in the past 24mo:  "Yes  Focus of Visit: Problem Solving, Monitoring, Taking Medication  Diabetes type: Type 1  Disease course: Worsening  Transportation concerns: Yes (gets rides or takes public tranportation)  Difficulty affording diabetes medication?: No  Difficulty affording diabetes testing supplies?: No  Other concerns:: Cognitive impairment  Cultural Influences/Ethnic Background:  Not  or     Diabetes Symptoms & Complications:  Fatigue: No  Neuropathy: Yes (in feet)  Polydipsia: Sometimes (with hyperglycemia)  Polyphagia: No  Polyuria: Sometimes (often getting up in the night)  Visual change: No  Slow healing wounds: No  Symptom course: Stable  Weight trend: Stable  Complications assessed today?: Yes    Patient Problem List and Family Medical History reviewed for relevant medical history, current medical status, and diabetes risk factors.    Vitals:  LMP 03/28/2014   Estimated body mass index is 28.7 kg/m  as calculated from the following:    Height as of 7/27/23: 1.6 m (5' 3\").    Weight as of 7/27/23: 73.5 kg (162 lb).   Last 3 BP:   BP Readings from Last 3 Encounters:   07/27/23 130/69   07/03/23 (!) 147/80   06/27/23 117/71       History   Smoking Status     Never   Smokeless Tobacco     Never       Labs:  Lab Results   Component Value Date    A1C 8.1 03/29/2023    A1C 9.7 02/01/2021     Lab Results   Component Value Date     05/08/2023     10/13/2021     02/01/2021     Lab Results   Component Value Date    LDL 58 01/25/2023     02/01/2021     HDL Cholesterol   Date Value Ref Range Status   02/01/2021 62 >49 mg/dL Final     Direct Measure HDL   Date Value Ref Range Status   01/25/2023 37 (L) >=50 mg/dL Final   ]  GFR Estimate   Date Value Ref Range Status   05/08/2023 64 >60 mL/min/1.73m2 Final     Comment:     eGFR calculated using 2021 CKD-EPI equation.   02/01/2021 90 >60 mL/min/[1.73_m2] Final     Comment:     Non  GFR Calc  Starting 12/18/2018, serum creatinine " based estimated GFR (eGFR) will be   calculated using the Chronic Kidney Disease Epidemiology Collaboration   (CKD-EPI) equation.       GFR Estimate If Black   Date Value Ref Range Status   02/01/2021 >90 >60 mL/min/[1.73_m2] Final     Comment:      GFR Calc  Starting 12/18/2018, serum creatinine based estimated GFR (eGFR) will be   calculated using the Chronic Kidney Disease Epidemiology Collaboration   (CKD-EPI) equation.       Lab Results   Component Value Date    CR 1.00 05/08/2023    CR 0.73 02/01/2021     No results found for: MICROALBUMIN    Healthy Eating:  Healthy Eating Assessed Today: Yes  Cultural/Baptism diet restrictions?: No  Meals include: Breakfast, Lunch, Dinner, Afternoon Snack, Evening Snack  Breakfast: 8:00 am: Oatmeal with hard boiled egg and barragan with toast  Lunch: 12-12:30:  salad OR Cheese ravioli OR sandwiches oR hot dish  Dinner: 5:00 -6:00 pm: meatball and mashed potatoes OR vegetables lasagna OR scrab cake with vegetables and 2 sugar free cookies  Snacks: Has one bedtime snack- usually a cookie (if blood sugar is high, will not have snack) or yogurt OR sugra free candy every once and a while  Other: drinks coffee with sugar free creamer  Beverages: Water, Coffee, Milk (Keep some juice on hand for lows)  Has patient met with a dietitian in the past?: Yes    Being Active:  Being Active Assessed Today: Yes (went on the bike at her apartment complex)  Exercise:: Yes (walking, programs at care center)  Days per week of moderate to strenuous exercise (like a brisk walk): 2  On average, minutes per day of exercise at this level:  (Walking with friends)  How intense was your typical exercise? : Light (like stretching or slow walking)  Barrier to exercise: Safety, Physical limitation    Monitoring:  Monitoring Assessed Today: Yes  Did patient bring glucose meter to appointment? : Yes  Blood Glucose Meter: CGM  Times checking blood sugar at home (number): 5+  Times checking  blood sugar at home (per): Day  Blood glucose trend: Fluctuating      Taking Medications:  Diabetes Medication(s)       Diabetic Other       glucagon 1 MG SOLR injection    Inject 1 mg into the muscle once      Insulin       insulin aspart (NOVOLOG FLEXPEN) 100 UNIT/ML pen    Inject 7 units before breakfast, 8 units before lunch and 10 units before dinner And sliding scale: 1 unit: 150 -199 mg/dl. 200-249= 2 units 250-299 3 units 300-249 = 4 units 350 - 399= 5 units 400+ = 6 units and call MD Up to 30 units daily     insulin aspart (NOVOLOG PEN) 100 UNIT/ML pen    Bedtime sliding scale 250-299 1 units 300-249 = 2 units 350 - 399= 3 units 400+ = 4 units     insulin degludec (TRESIBA FLEXTOUCH) 200 UNIT/ML pen    Inject 22 Units Subcutaneous daily            Taking Medication Assessed Today: Yes  Current Treatments: Insulin Injections  Dose schedule: Pre-breakfast, Pre-lunch, Pre-dinner, At bedtime  Given by: Patient, Adult caretaker, Nursing attendant  Injection/Infusion sites: Abdomen  Problems taking diabetes medications regularly?: No  Diabetes medication side effects?: No    Problem Solving:  Problem Solving Assessed Today: Yes  Is the patient at risk for hypoglycemia?: Yes  Hypoglycemia Frequency: Daily  Hypoglycemia Treatment: Glucose (tablets or gel), Other food, Juice, Candy (will have one piece of candy or some juice)  Patient carries a carbohydrate source: Yes  Medical ID: Yes  Is the patient at risk for DKA?: Yes  Does patient have ketone test strips?: Yes  Does patient have DKA prevention plan?: Yes  Does patient have severe weather/disaster plan for diabetes management?: No  Does patient have sick day plan for diabetes management?: Yes    Hypoglycemia symptoms  Confusion: No  Dizziness or Light-Headedness: No  Headaches: No  Hunger: No  Mood changes: No  Nervousness/Anxiety: No  Sleepiness: No  Speech difficulty: Yes  Sweats: Yes  Feeling shaky: Yes    Hypoglycemia Complications  Blackouts:  No  Hospitalization: No  Nocturnal hypoglycemia: Yes  Required assistance: No  Seizures: No    Reducing Risks:  Reducing Risks Assessed Today: No  CAD Risks: Diabetes Mellitus  Has dilated eye exam at least once a year?: Yes  Sees dentist every 6 months?: Yes  Feet checked by healthcare provider in the last year?: Yes    Healthy Coping:  Healthy Coping Assessed Today: Yes  Emotional response to diabetes: Acceptance  Informal Support system:: Family, Parent  Stage of change: ACTION (Actively working towards change)  Support resources: Offerings in Clinic Communities  Patient Activation Measure Survey Score:      3/7/2012     3:00 PM   KHUSHI Score (Last Two)   KHUSHI Raw Score 39   Activation Score 56.4   KHUSHI Level 3         Care Plan and Education Provided:  There are no care plans that you recently modified to display for this patient.      Tiffanie Mcfarland RD [facility]  Triage: [phone]  [phone]      Time Spent: 11 minutes  Encounter Type: Individual    Any diabetes medication dose changes were made via the CDE Protocol per the patient's referring provider. A copy of this encounter was shared with the provider.

## 2023-08-11 NOTE — TELEPHONE ENCOUNTER
[facility] Triage Nurse Telephone Encounter    Provider: TAYLA Gagnon CNP  Facility: [facility] Facility Type:  [facility]      Call Back Number: [phone]    Allergies:    Allergies   Allergen Reactions     Amoxicillin      Sulfa Antibiotics         Reason for call: New orders per NP.      Verbal Order/Direction given by Provider: Increase dinner Novolog to 10 units daily.  Continue with Novolog 7 units with breakfast and 8 units with lunch.      Provider giving Order:  TAYLA Gagnon CNP    Verbal Order given to: Sarath Goetz RN

## 2023-08-18 ENCOUNTER — NURSING HOME VISIT (OUTPATIENT)
Dept: GERIATRICS | Facility: CLINIC | Age: 63
End: 2023-08-18
Payer: MEDICARE

## 2023-08-18 VITALS
SYSTOLIC BLOOD PRESSURE: 149 MMHG | HEART RATE: 98 BPM | OXYGEN SATURATION: 97 % | BODY MASS INDEX: 29.45 KG/M2 | DIASTOLIC BLOOD PRESSURE: 88 MMHG | TEMPERATURE: 98.6 F | WEIGHT: 166.2 LBS | HEIGHT: 63 IN | RESPIRATION RATE: 18 BRPM

## 2023-08-18 DIAGNOSIS — K21.00 GASTROESOPHAGEAL REFLUX DISEASE WITH ESOPHAGITIS WITHOUT HEMORRHAGE: ICD-10-CM

## 2023-08-18 DIAGNOSIS — E10.37X3 TYPE 1 DIABETES MELLITUS WITH DIABETIC MACULAR EDEMA OF BOTH EYES RESOLVED AFTER TREATMENT (H): ICD-10-CM

## 2023-08-18 DIAGNOSIS — F81.9 COGNITIVE DEVELOPMENTAL DELAY: ICD-10-CM

## 2023-08-18 DIAGNOSIS — D64.9 ANEMIA, UNSPECIFIED TYPE: Primary | ICD-10-CM

## 2023-08-18 DIAGNOSIS — I10 ESSENTIAL HYPERTENSION WITH GOAL BLOOD PRESSURE LESS THAN 140/90: ICD-10-CM

## 2023-08-18 PROCEDURE — 99309 SBSQ NF CARE MODERATE MDM 30: CPT | Performed by: INTERNAL MEDICINE

## 2023-08-18 NOTE — LETTER
8/18/2023        RE: Lexy Rockwell  [address]  [facility] [address]        No notes on file      Sincerely,        Nia Martínez MD

## 2023-08-29 ENCOUNTER — TELEPHONE (OUTPATIENT)
Dept: GERIATRICS | Facility: CLINIC | Age: 63
End: 2023-08-29
Payer: MEDICARE

## 2023-08-29 ENCOUNTER — LAB REQUISITION (OUTPATIENT)
Dept: LAB | Facility: CLINIC | Age: 63
End: 2023-08-29
Payer: MEDICARE

## 2023-08-29 DIAGNOSIS — R55 SYNCOPE AND COLLAPSE: ICD-10-CM

## 2023-08-30 LAB
ANION GAP SERPL CALCULATED.3IONS-SCNC: 10 MMOL/L (ref 7–15)
BUN SERPL-MCNC: 30.8 MG/DL (ref 8–23)
CALCIUM SERPL-MCNC: 9.3 MG/DL (ref 8.8–10.2)
CHLORIDE SERPL-SCNC: 109 MMOL/L (ref 98–107)
CREAT SERPL-MCNC: 0.89 MG/DL (ref 0.51–0.95)
DEPRECATED HCO3 PLAS-SCNC: 22 MMOL/L (ref 22–29)
ERYTHROCYTE [DISTWIDTH] IN BLOOD BY AUTOMATED COUNT: 15.3 % (ref 10–15)
GFR SERPL CREATININE-BSD FRML MDRD: 73 ML/MIN/1.73M2
GLUCOSE SERPL-MCNC: 227 MG/DL (ref 70–99)
HCT VFR BLD AUTO: 33.9 % (ref 35–47)
HGB BLD-MCNC: 10.5 G/DL (ref 11.7–15.7)
MCH RBC QN AUTO: 27.1 PG (ref 26.5–33)
MCHC RBC AUTO-ENTMCNC: 31 G/DL (ref 31.5–36.5)
MCV RBC AUTO: 87 FL (ref 78–100)
PLATELET # BLD AUTO: 181 10E3/UL (ref 150–450)
POTASSIUM SERPL-SCNC: 4.3 MMOL/L (ref 3.4–5.3)
RBC # BLD AUTO: 3.88 10E6/UL (ref 3.8–5.2)
SODIUM SERPL-SCNC: 141 MMOL/L (ref 136–145)
WBC # BLD AUTO: 4.3 10E3/UL (ref 4–11)

## 2023-08-30 PROCEDURE — 85027 COMPLETE CBC AUTOMATED: CPT | Mod: ORL | Performed by: INTERNAL MEDICINE

## 2023-08-30 PROCEDURE — 36415 COLL VENOUS BLD VENIPUNCTURE: CPT | Mod: ORL | Performed by: INTERNAL MEDICINE

## 2023-08-30 PROCEDURE — P9603 ONE-WAY ALLOW PRORATED MILES: HCPCS | Mod: ORL | Performed by: INTERNAL MEDICINE

## 2023-08-30 PROCEDURE — 80048 BASIC METABOLIC PNL TOTAL CA: CPT | Mod: ORL | Performed by: INTERNAL MEDICINE

## 2023-08-30 NOTE — TELEPHONE ENCOUNTER
Staff called to report that Lexy had c/o dizziness and staff went to assess her.  She had a episode of syncope and came too with a little bit of emesis.    Decided to have labs checked in morning.  Continue to monitor her with vitals. If she worsens then can go in to hospital.        Orders:  CBC and BMP in morning for syncope    TAYLA Alvarez CNP

## 2023-09-07 NOTE — PROGRESS NOTES
Lexy Rockwell is a 63 year old female seen August 18, 2023 at [facility] where she has resided for 7 months (admit 1/2023) seen for regulatory visit.  Pt is seen on the unit up to chair, ambulatory with 4WW.  Reports feeling okay, no new concerns reported by pt or nursing staff.  She had upper and lower endoscopy done last month, no significant findings      By chart review, pt has Type 1 DM with multiple hospitalizations for DKA.  She was seen in the [facility] ED for hyperglycemia in November 2022, with accompanying symptoms of dizziness, BLE weakness and heartburn.   Noted that she sometimes forgot to administer her insulin.   Returned home, seeing diabetic educator but not an endocrinologist.         She was continuing to live independently in December 2022 when neighbors asked for a welfare check after she hadn't been seen in several days, and she was found down by police.   Hospitalized at [facility] 12/13-26 for DKA and rhabdomyolysis  Glucose >1000 and pH 6.8    She required pressor support, intubation and TF; treated with broad spectrum abx for SHIVAM pneumonia.  No sz on video EEG.  She had a +COVID19 test on 12/23   Slowly cleared and discharged to [facility] TCU before transitioning to LTC.     Pt was seen in the [facility] ED in January 2023 for urinary frequency related to hyperglycemia.  Improved and returned to LTC with continued labile blood sugars, urinary incontinence, weakness and poor endurance    Past Medical History:   Diagnosis Date    Cerumen impacted     Development delay     Diabetic gastroparesis (H) 8/16/2013    Glaucoma (increased eye pressure)     Hyperlipaemia     Hypertension     Hypothyroid     Mental retardation     Nonsenile cataract     Obesity     Strabismus     Type 1 diabetes mellitus not at goal (H)        Past Surgical History:   Procedure Laterality Date    COLONOSCOPY N/A 7/3/2023    Procedure: Colonoscopy;  Surgeon: Merlyn Acevedo MD;  Location:  GI    ESOPHAGOSCOPY,  "GASTROSCOPY, DUODENOSCOPY (EGD), COMBINED N/A 7/3/2023    Procedure: Esophagoscopy, gastroscopy, duodenoscopy (EGD), combined;  Surgeon: Merlyn Acevedo MD;  Location:  GI    STRABISMUS SURGERY      [facility] EYE SURG [facility] PROC UNLISTED  remote    strabismus surgery     SH:  Single   Her father just recently   Her mother Francisca Rockwell is first contact    She has 2 sisters that [facility] [place]   Non smoker    Review Of Systems  Developmental cognitive delay   BIMS 15/15      Ambulatory with 4WW  Weight at admission was 167 lbs  Wt Readings from Last 5 Encounters:   23 75.4 kg (166 lb 3.2 oz)   23 73.5 kg (162 lb)   23 72.6 kg (160 lb)   23 72.3 kg (159 lb 8 oz)   23 70 kg (154 lb 6.4 oz)      EXAM: NAD  BP (!) 149/88   Pulse 98   Temp 98.6  F (37  C)   Resp 18   Ht 1.6 m (5' 3\")   Wt 75.4 kg (166 lb 3.2 oz)   LMP 2014   SpO2 97%   BMI 29.44 kg/m       Neck supple without adenopathy  Lungs clear bilaterally with fair air movement   Heart RRR s1s2    Abd soft, NT, no distention or guarding, +BS  Ext with 1+ edema, wearing compression stockings  Neuro: ambulatory, normal speech  Psych: affect okay, bright    Labs reviewed: in May 2023 hgb 10.2  MCV 88   BMP unremarkable, GFR 64       IMP/PLAN:   (D64.9) Anemia, unspecified type    Comment: Fe deficiency   Hgb has improved from 7.8>>> 10.2   Upper and lower endoscopy done last month, unrevealing although colonoscopy limited by poor prep.    Plan: Continue PPI and Fe replacement 142 mg/day   Follow hgb     (E10.37X3) Type 1 diabetes mellitus with diabetic macular edema of both eyes resolved after treatment (H)  (R73.9) Hyperglycemia  Comment:  Recurrent DKA, labile sugars but no hospitalization for DKA since C admission   Lab Results   Component Value Date    A1C 8.1 2023     Plan: Tresiba 22 units/day - may be an issue with insurance coverage   Novolog tid with meals 7/8/10 plus sliding scale   BGM per Dexcom   Follows " with Endocrinology and Diabetic educator.   She is on lisinopril, simvastatin 20 mg/day   Not on daily ASA secondary to anemia  Ophthalmology and Podiatry follow up        (K21.00) Gastroesophageal reflux disease with esophagitis without hemorrhage  Comment: by history  Biopsies unremarkable at EGD   Plan: pantoprazole 40 mg/day     (F81.9) Cognitive developmental delay  Comment: unable to live independently in the community and manage her medical problems    Plan: [facility] support for med admin, meals, activity      (I10) Essential hypertension with goal blood pressure less than 140/90  Comment:   BP Readings from Last 3 Encounters:   08/18/23 (!) 149/88   07/27/23 130/69   07/03/23 (!) 147/80      Plan: amlodipine 2.5 mg/day, carvedilol 6.25 mg bid, lisinopril 40 mg/day   Follow BPs and BMP      Advance directive: DNR/DNI     Nia Martínez MD

## 2023-09-08 ENCOUNTER — TELEPHONE (OUTPATIENT)
Dept: EDUCATION SERVICES | Facility: CLINIC | Age: 63
End: 2023-09-08
Payer: MEDICARE

## 2023-09-08 DIAGNOSIS — E10.51 TYPE 1 DIABETES MELLITUS WITH DIABETIC PERIPHERAL ANGIOPATHY WITHOUT GANGRENE (H): ICD-10-CM

## 2023-09-08 RX ORDER — ACYCLOVIR 400 MG/1
TABLET ORAL
Qty: 3 EACH | Refills: 5 | Status: SHIPPED | OUTPATIENT
Start: 2023-09-08 | End: 2024-09-23

## 2023-09-08 RX ORDER — ACYCLOVIR 400 MG/1
TABLET ORAL
Qty: 1 EACH | Refills: 0 | Status: SHIPPED | OUTPATIENT
Start: 2023-09-08 | End: 2024-09-23

## 2023-09-08 NOTE — TELEPHONE ENCOUNTER
Shayy left a message that the dexcom G7 Rx's need to go to the [facility] on [address] instead of [facility] [address]. I resent the Rx's to the [facility] per request and left Shayy a VM that this was completed.    Roseanne Stubbs RN, [facility]

## 2023-09-13 ENCOUNTER — TELEPHONE (OUTPATIENT)
Dept: EDUCATION SERVICES | Facility: CLINIC | Age: 63
End: 2023-09-13
Payer: MEDICARE

## 2023-09-13 NOTE — TELEPHONE ENCOUNTER
Central Prior Authorization Team   Phone: [phone]    PA Initiation    Medication: DEXCOM G7 SENSOR MISC  Insurance Company: [employer] [place] - Phone [phone] Fax [phone]  Pharmacy Filling the Rx: [id number] [place] [id number] AT [facility] [address]  Filling Pharmacy Phone: [phone]  Filling Pharmacy Fax:    Start Date: 9/13/2023

## 2023-09-13 NOTE — TELEPHONE ENCOUNTER
Prior Authorization Retail Medication Request    Medication/Dose: DEXCOM G7 SENSOR  ICD code (if different than what is on RX):  E10.51  Previously Tried and Failed:  ACCU-CHEK COMPACT CARE KIT; DEXCOM G6 SENSOR  Rationale:  Change every 10 days    Insurance Name:  MEDICARE  Insurance ID:  [id number]     Secondary Insurance: [facility]  Insurance ID: [id number]     Pharmacy Information (if different than what is on RX)  Name:  [facility] [address] [facility] [address]  Phone:  [phone]  Fax: [phone]

## 2023-09-14 NOTE — TELEPHONE ENCOUNTER
PRIOR AUTHORIZATION DENIED    Called pharmacy to notify them this needs to be billed to patient's part B insurance     Medication: DEXCOM G7 SENSOR MISC  Insurance Company: [employer] - Phone [phone] Fax [phone]  Denial Date: 9/14/2023  Denial Rational: DIABETIC TESTING SUPPLIES ARE NOT COVERED UNDER PATIENT'S PART D - MAY BE COVERED BY PATIENT'S PART B      Appeal Information: IF PROVIDER WOULD LIKE TO APPEAL THIS DECISION PLEASE PROVIDE PA TEAM WITH LETTER OF MEDICAL NECESSITY      Patient Notified: No

## 2023-09-15 ENCOUNTER — MYC MEDICAL ADVICE (OUTPATIENT)
Dept: EDUCATION SERVICES | Facility: CLINIC | Age: 63
End: 2023-09-15
Payer: MEDICARE

## 2023-09-15 NOTE — TELEPHONE ENCOUNTER
Shayy, nurse from [facility] called for help with getting Dexcom set up. Writer called back, no answer. Left voicemail recommending BG target range of  mg/dl. Recommended that she call back and let us know if they need additional help.     Tiffanie Mcfarland RD [facility]  Triage: [phone]  [phone]

## 2023-09-19 NOTE — TELEPHONE ENCOUNTER
Called Shayy again to see if they need any help with the Dexcom. Left voicemail explaining that I have appt with Lexy on Friday and can help at that time, if needed, otherwise encouraged to call triage if they need any help before then.     Tiffanie Mcfarland RD [facility]  Triage: [phone]  [phone]

## 2023-09-22 ENCOUNTER — VIRTUAL VISIT (OUTPATIENT)
Dept: EDUCATION SERVICES | Facility: CLINIC | Age: 63
End: 2023-09-22
Payer: MEDICARE

## 2023-09-22 DIAGNOSIS — E10.37X3 TYPE 1 DIABETES MELLITUS WITH DIABETIC MACULAR EDEMA OF BOTH EYES RESOLVED AFTER TREATMENT (H): ICD-10-CM

## 2023-09-22 DIAGNOSIS — E10.69 TYPE 1 DIABETES MELLITUS WITH OTHER SPECIFIED COMPLICATION (H): ICD-10-CM

## 2023-09-22 DIAGNOSIS — E10.51 TYPE 1 DIABETES MELLITUS WITH DIABETIC PERIPHERAL ANGIOPATHY WITHOUT GANGRENE (H): Primary | ICD-10-CM

## 2023-09-22 PROCEDURE — G0108 DIAB MANAGE TRN  PER INDIV: HCPCS | Mod: 95 | Performed by: DIETITIAN, REGISTERED

## 2023-09-22 RX ORDER — INSULIN DEGLUDEC 200 U/ML
23 INJECTION, SOLUTION SUBCUTANEOUS DAILY
Qty: 15 ML | Refills: 3 | Status: SHIPPED | OUTPATIENT
Start: 2023-09-22 | End: 2023-10-25

## 2023-09-22 RX ORDER — INSULIN ASPART 100 [IU]/ML
INJECTION, SOLUTION INTRAVENOUS; SUBCUTANEOUS
Qty: 15 ML | Refills: 3 | Status: SHIPPED | OUTPATIENT
Start: 2023-09-22 | End: 2023-10-25

## 2023-09-22 NOTE — LETTER
2023         RE: Lexy Rockwell  [address]  [facility] [address]        Dear Colleague,    Thank you for referring your patient, Lexy Rockwell, to the [facility]. Please see a copy of my visit note below.    Diabetes Self-Management Education & Support    Presents for: CGM Review    Type of Service: Telephone Visit    Audio only visit done, as patient does not have access to audio-visual technology.    Individual visit provided, given no group classes are available for 2 months.     Originating Location (Patient Location): Assisted Living  Distant Location (Provider Location): Offsite  Mode of Communication:  Telephone    Telephone Visit Start Time:  9:00  Telephone Visit End Time (telephone visit stop time): :30    How would patient like to obtain AVS? MyChart    Assessment Type:   ASSESSMENT:  G7 is going well, we adjusted the alarms today (low 70 high 300) and adjusted the sounds. BG continues to vary quite a bit.     Patient's most recent   Lab Results   Component Value Date    A1C 8.1 2023    A1C 9.7 2021     is not meeting goal of <7.0    Diabetes knowledge and skills assessment:   Patient is knowledgeable in diabetes management concepts related to: none    Continue education with the following diabetes management concepts: Healthy Eating, Being Active, Monitoring, Taking Medication, Problem Solving, Reducing Risks, and Healthy Coping    Based on learning assessment above, most appropriate setting for further diabetes education would be: Individual setting.      PLAN    Adjust insulin:  Novolo-8-9-0 --> 7-9-11-0    Tresiba: 0-0-0-22 --> 0-0-0-23      Topics to cover at upcoming visits: Healthy Eating, Being Active, Monitoring, Taking Medication, Problem Solving, Reducing Risks, and Healthy Coping    Follow-up: 10/30    See Care Plan for co-developed, patient-state behavior change goals.  AVS provided for patient  "today.    Education Materials Provided:  No new materials provided today      SUBJECTIVE/OBJECTIVE:  Presents for: CGM Review  Accompanied by: Self, Other (Dietitian from Assisted Living)  Diabetes education in the past 24mo: Yes  Focus of Visit: Problem Solving, Monitoring, Taking Medication  Diabetes type: Type 1  Disease course: Worsening  Transportation concerns: Yes (gets rides or takes public tranportation)  Difficulty affording diabetes medication?: No  Difficulty affording diabetes testing supplies?: No  Other concerns:: Cognitive impairment  Cultural Influences/Ethnic Background:  Not  or     Diabetes Symptoms & Complications:  Fatigue: No  Neuropathy: Yes (in feet)  Polydipsia: Sometimes (with hyperglycemia)  Polyphagia: No  Polyuria: Sometimes (often getting up in the night)  Visual change: No  Slow healing wounds: No  Symptom course: Stable  Weight trend: Stable  Complications assessed today?: Yes    Patient Problem List and Family Medical History reviewed for relevant medical history, current medical status, and diabetes risk factors.    Vitals:  LMP 03/28/2014   Estimated body mass index is 29.44 kg/m  as calculated from the following:    Height as of 8/18/23: 1.6 m (5' 3\").    Weight as of 8/18/23: 75.4 kg (166 lb 3.2 oz).   Last 3 BP:   BP Readings from Last 3 Encounters:   08/18/23 (!) 149/88   07/27/23 130/69   07/03/23 (!) 147/80       History   Smoking Status     Never   Smokeless Tobacco     Never       Labs:  Lab Results   Component Value Date    A1C 8.1 03/29/2023    A1C 9.7 02/01/2021     Lab Results   Component Value Date     08/30/2023     10/13/2021     02/01/2021     Lab Results   Component Value Date    LDL 58 01/25/2023     02/01/2021     HDL Cholesterol   Date Value Ref Range Status   02/01/2021 62 >49 mg/dL Final     Direct Measure HDL   Date Value Ref Range Status   01/25/2023 37 (L) >=50 mg/dL Final   ]  GFR Estimate   Date Value Ref Range " Status   08/30/2023 73 >60 mL/min/1.73m2 Final   02/01/2021 90 >60 mL/min/[1.73_m2] Final     Comment:     Non  GFR Calc  Starting 12/18/2018, serum creatinine based estimated GFR (eGFR) will be   calculated using the Chronic Kidney Disease Epidemiology Collaboration   (CKD-EPI) equation.       GFR Estimate If Black   Date Value Ref Range Status   02/01/2021 >90 >60 mL/min/[1.73_m2] Final     Comment:      GFR Calc  Starting 12/18/2018, serum creatinine based estimated GFR (eGFR) will be   calculated using the Chronic Kidney Disease Epidemiology Collaboration   (CKD-EPI) equation.       Lab Results   Component Value Date    CR 0.89 08/30/2023    CR 0.73 02/01/2021     No results found for: MICROALBUMIN    Healthy Eating:  Healthy Eating Assessed Today: Yes  Cultural/Hinduism diet restrictions?: No  Meal planning/habits: None  Meals include: Breakfast, Lunch, Dinner, Afternoon Snack, Evening Snack  Breakfast: 8:00 am: Oatmeal with hard boiled egg and barragan with toast  Lunch: 12-12:30:  salad OR Cheese ravioli OR sandwiches oR hot dish  Dinner: 5:00 -6:00 pm: meatball and mashed potatoes OR vegetables lasagna OR scrab cake with vegetables and 2 sugar free cookies  Snacks: Has one bedtime snack- usually a cookie (if blood sugar is high, will not have snack) or yogurt OR sugra free candy every once and a while  Other: drinks coffee with sugar free creamer  Beverages: Water, Coffee, Milk (Keep some juice on hand for lows)  Has patient met with a dietitian in the past?: Yes    Being Active:  Being Active Assessed Today: Yes (went on the bike at her apartment complex)  Exercise:: Yes (walking, programs at care center)  Days per week of moderate to strenuous exercise (like a brisk walk): 2  On average, minutes per day of exercise at this level:  (Walking with friends)  How intense was your typical exercise? : Light (like stretching or slow walking)  Barrier to exercise: Safety, Physical  limitation    Monitoring:  Monitoring Assessed Today: Yes  Did patient bring glucose meter to appointment? : Yes  Blood Glucose Meter: CGM  Times checking blood sugar at home (number): 5+  Times checking blood sugar at home (per): Day  Blood glucose trend: Fluctuating    Date Breakfast  Lunch  Dinner  Bedtime    Before After Before After Before After    9/22 120         9/21 148  122  174  95   9/20 189  195  126  178   9/19 108  101  351  139   9/18 275  124  387  232   9/17 160  150    258   9/16 183  130  159  347   9/15 224  118, 137  291  222       Taking Medications:  Diabetes Medication(s)       Diabetic Other       glucagon 1 MG SOLR injection    Inject 1 mg into the muscle once      Insulin       insulin aspart (NOVOLOG FLEXPEN) 100 UNIT/ML pen    Inject 7 units before breakfast, 8 units before lunch and 10 units before dinner And sliding scale: 1 unit: 150 -199 mg/dl. 200-249= 2 units 250-299 3 units 300-249 = 4 units 350 - 399= 5 units 400+ = 6 units and call MD Up to 30 units daily     insulin aspart (NOVOLOG PEN) 100 UNIT/ML pen    Bedtime sliding scale 250-299 1 units 300-249 = 2 units 350 - 399= 3 units 400+ = 4 units     insulin degludec (TRESIBA FLEXTOUCH) 200 UNIT/ML pen    Inject 22 Units Subcutaneous daily            Taking Medication Assessed Today: Yes  Current Treatments: Insulin Injections  Dose schedule: Pre-breakfast, Pre-lunch, Pre-dinner, At bedtime  Given by: Patient, Adult caretaker, Nursing attendant  Injection/Infusion sites: Abdomen  Problems taking diabetes medications regularly?: No  Diabetes medication side effects?: No    Problem Solving:  Problem Solving Assessed Today: Yes  Is the patient at risk for hypoglycemia?: Yes  Hypoglycemia Frequency: Daily  Hypoglycemia Treatment: Glucose (tablets or gel), Other food, Juice, Candy (will have one piece of candy or some juice)  Patient carries a carbohydrate source: Yes  Medical ID: Yes  Is the patient at risk for DKA?: Yes  Does  patient have ketone test strips?: Yes  Does patient have DKA prevention plan?: Yes  Does patient have severe weather/disaster plan for diabetes management?: No  Does patient have sick day plan for diabetes management?: Yes    Hypoglycemia symptoms  Confusion: No  Dizziness or Light-Headedness: No  Headaches: No  Hunger: No  Mood changes: No  Nervousness/Anxiety: No  Sleepiness: No  Speech difficulty: Yes  Sweats: Yes  Feeling shaky: Yes    Hypoglycemia Complications  Blackouts: No  Hospitalization: No  Nocturnal hypoglycemia: Yes  Required assistance: No  Seizures: No    Reducing Risks:  Reducing Risks Assessed Today: No  CAD Risks: Diabetes Mellitus  Has dilated eye exam at least once a year?: Yes  Sees dentist every 6 months?: Yes  Feet checked by healthcare provider in the last year?: Yes    Healthy Coping:  Healthy Coping Assessed Today: Yes  Emotional response to diabetes: Acceptance  Informal Support system:: Family, Parent  Stage of change: ACTION (Actively working towards change)  Support resources: Offerings in Clinic Communities  Patient Activation Measure Survey Score:      3/7/2012     3:00 PM   KHUSHI Score (Last Two)   KHUSHI Raw Score 39   Activation Score 56.4   KHUSHI Level 3         Care Plan and Education Provided:  There are no care plans that you recently modified to display for this patient.      KARLOS Haas [facility]  Triage: [phone]  [phone]      Time Spent: 30 minutes  Encounter Type: Individual    Any diabetes medication dose changes were made via the CDE Protocol per the patient's referring provider. A copy of this encounter was shared with the provider.

## 2023-09-22 NOTE — PROGRESS NOTES
Diabetes Self-Management Education & Support    Presents for: CGM Review    Type of Service: Telephone Visit    Audio only visit done, as patient does not have access to audio-visual technology.    Individual visit provided, given no group classes are available for 2 months.     Originating Location (Patient Location): Assisted Living  Distant Location (Provider Location): Offsite  Mode of Communication:  Telephone    Telephone Visit Start Time:  9:00  Telephone Visit End Time (telephone visit stop time): 9:30    How would patient like to obtain AVS? MyChart    Assessment Type:   ASSESSMENT:  G7 is going well, we adjusted the alarms today (low 70 high 300) and adjusted the sounds. BG continues to vary quite a bit.     Patient's most recent   Lab Results   Component Value Date    A1C 8.1 2023    A1C 9.7 2021     is not meeting goal of <7.0    Diabetes knowledge and skills assessment:   Patient is knowledgeable in diabetes management concepts related to: none    Continue education with the following diabetes management concepts: Healthy Eating, Being Active, Monitoring, Taking Medication, Problem Solving, Reducing Risks, and Healthy Coping    Based on learning assessment above, most appropriate setting for further diabetes education would be: Individual setting.      PLAN    Adjust insulin:  Novolo-8-9-0 --> 7-9-11-0    Tresiba: 0-0-0-22 --> 0-0-0-23      Topics to cover at upcoming visits: Healthy Eating, Being Active, Monitoring, Taking Medication, Problem Solving, Reducing Risks, and Healthy Coping    Follow-up: 10/30    See Care Plan for co-developed, patient-state behavior change goals.  AVS provided for patient today.    Education Materials Provided:  No new materials provided today      SUBJECTIVE/OBJECTIVE:  Presents for: CGM Review  Accompanied by: Self, Other (Dietitian from Assisted Living)  Diabetes education in the past 24mo: Yes  Focus of Visit: Problem Solving, Monitoring, Taking  "Medication  Diabetes type: Type 1  Disease course: Worsening  Transportation concerns: Yes (gets rides or takes public tranportation)  Difficulty affording diabetes medication?: No  Difficulty affording diabetes testing supplies?: No  Other concerns:: Cognitive impairment  Cultural Influences/Ethnic Background:  Not  or     Diabetes Symptoms & Complications:  Fatigue: No  Neuropathy: Yes (in feet)  Polydipsia: Sometimes (with hyperglycemia)  Polyphagia: No  Polyuria: Sometimes (often getting up in the night)  Visual change: No  Slow healing wounds: No  Symptom course: Stable  Weight trend: Stable  Complications assessed today?: Yes    Patient Problem List and Family Medical History reviewed for relevant medical history, current medical status, and diabetes risk factors.    Vitals:  LMP 03/28/2014   Estimated body mass index is 29.44 kg/m  as calculated from the following:    Height as of 8/18/23: 1.6 m (5' 3\").    Weight as of 8/18/23: 75.4 kg (166 lb 3.2 oz).   Last 3 BP:   BP Readings from Last 3 Encounters:   08/18/23 (!) 149/88   07/27/23 130/69   07/03/23 (!) 147/80       History   Smoking Status    Never   Smokeless Tobacco    Never       Labs:  Lab Results   Component Value Date    A1C 8.1 03/29/2023    A1C 9.7 02/01/2021     Lab Results   Component Value Date     08/30/2023     10/13/2021     02/01/2021     Lab Results   Component Value Date    LDL 58 01/25/2023     02/01/2021     HDL Cholesterol   Date Value Ref Range Status   02/01/2021 62 >49 mg/dL Final     Direct Measure HDL   Date Value Ref Range Status   01/25/2023 37 (L) >=50 mg/dL Final   ]  GFR Estimate   Date Value Ref Range Status   08/30/2023 73 >60 mL/min/1.73m2 Final   02/01/2021 90 >60 mL/min/[1.73_m2] Final     Comment:     Non  GFR Calc  Starting 12/18/2018, serum creatinine based estimated GFR (eGFR) will be   calculated using the Chronic Kidney Disease Epidemiology Collaboration "   (CKD-EPI) equation.       GFR Estimate If Black   Date Value Ref Range Status   02/01/2021 >90 >60 mL/min/[1.73_m2] Final     Comment:      GFR Calc  Starting 12/18/2018, serum creatinine based estimated GFR (eGFR) will be   calculated using the Chronic Kidney Disease Epidemiology Collaboration   (CKD-EPI) equation.       Lab Results   Component Value Date    CR 0.89 08/30/2023    CR 0.73 02/01/2021     No results found for: MICROALBUMIN    Healthy Eating:  Healthy Eating Assessed Today: Yes  Cultural/Baptist diet restrictions?: No  Meal planning/habits: None  Meals include: Breakfast, Lunch, Dinner, Afternoon Snack, Evening Snack  Breakfast: 8:00 am: Oatmeal with hard boiled egg and barragan with toast  Lunch: 12-12:30:  salad OR Cheese ravioli OR sandwiches oR hot dish  Dinner: 5:00 -6:00 pm: meatball and mashed potatoes OR vegetables lasagna OR scrab cake with vegetables and 2 sugar free cookies  Snacks: Has one bedtime snack- usually a cookie (if blood sugar is high, will not have snack) or yogurt OR sugra free candy every once and a while  Other: drinks coffee with sugar free creamer  Beverages: Water, Coffee, Milk (Keep some juice on hand for lows)  Has patient met with a dietitian in the past?: Yes    Being Active:  Being Active Assessed Today: Yes (went on the bike at her apartment complex)  Exercise:: Yes (walking, programs at care center)  Days per week of moderate to strenuous exercise (like a brisk walk): 2  On average, minutes per day of exercise at this level:  (Walking with friends)  How intense was your typical exercise? : Light (like stretching or slow walking)  Barrier to exercise: Safety, Physical limitation    Monitoring:  Monitoring Assessed Today: Yes  Did patient bring glucose meter to appointment? : Yes  Blood Glucose Meter: CGM  Times checking blood sugar at home (number): 5+  Times checking blood sugar at home (per): Day  Blood glucose trend: Fluctuating    Date  Breakfast  Lunch  Dinner  Bedtime    Before After Before After Before After    9/22 120         9/21 148  122  174  95   9/20 189  195  126  178   9/19 108  101  351  139   9/18 275  124  387  232   9/17 160  150    258   9/16 183  130  159  347   9/15 224  118, 137  291  222       Taking Medications:  Diabetes Medication(s)       Diabetic Other       glucagon 1 MG SOLR injection    Inject 1 mg into the muscle once      Insulin       insulin aspart (NOVOLOG FLEXPEN) 100 UNIT/ML pen    Inject 7 units before breakfast, 8 units before lunch and 10 units before dinner And sliding scale: 1 unit: 150 -199 mg/dl. 200-249= 2 units 250-299 3 units 300-249 = 4 units 350 - 399= 5 units 400+ = 6 units and call MD Up to 30 units daily     insulin aspart (NOVOLOG PEN) 100 UNIT/ML pen    Bedtime sliding scale 250-299 1 units 300-249 = 2 units 350 - 399= 3 units 400+ = 4 units     insulin degludec (TRESIBA FLEXTOUCH) 200 UNIT/ML pen    Inject 22 Units Subcutaneous daily            Taking Medication Assessed Today: Yes  Current Treatments: Insulin Injections  Dose schedule: Pre-breakfast, Pre-lunch, Pre-dinner, At bedtime  Given by: Patient, Adult caretaker, Nursing attendant  Injection/Infusion sites: Abdomen  Problems taking diabetes medications regularly?: No  Diabetes medication side effects?: No    Problem Solving:  Problem Solving Assessed Today: Yes  Is the patient at risk for hypoglycemia?: Yes  Hypoglycemia Frequency: Daily  Hypoglycemia Treatment: Glucose (tablets or gel), Other food, Juice, Candy (will have one piece of candy or some juice)  Patient carries a carbohydrate source: Yes  Medical ID: Yes  Is the patient at risk for DKA?: Yes  Does patient have ketone test strips?: Yes  Does patient have DKA prevention plan?: Yes  Does patient have severe weather/disaster plan for diabetes management?: No  Does patient have sick day plan for diabetes management?: Yes    Hypoglycemia symptoms  Confusion: No  Dizziness or  Light-Headedness: No  Headaches: No  Hunger: No  Mood changes: No  Nervousness/Anxiety: No  Sleepiness: No  Speech difficulty: Yes  Sweats: Yes  Feeling shaky: Yes    Hypoglycemia Complications  Blackouts: No  Hospitalization: No  Nocturnal hypoglycemia: Yes  Required assistance: No  Seizures: No    Reducing Risks:  Reducing Risks Assessed Today: No  CAD Risks: Diabetes Mellitus  Has dilated eye exam at least once a year?: Yes  Sees dentist every 6 months?: Yes  Feet checked by healthcare provider in the last year?: Yes    Healthy Coping:  Healthy Coping Assessed Today: Yes  Emotional response to diabetes: Acceptance  Informal Support system:: Family, Parent  Stage of change: ACTION (Actively working towards change)  Support resources: Offerings in Clinic Communities  Patient Activation Measure Survey Score:      3/7/2012     3:00 PM   KHUSHI Score (Last Two)   KHUSHI Raw Score 39   Activation Score 56.4   KHUSHI Level 3         Care Plan and Education Provided:  There are no care plans that you recently modified to display for this patient.      Tiffanie Mcfarland RD [facility]  Triage: [phone]  [phone]      Time Spent: 30 minutes  Encounter Type: Individual    Any diabetes medication dose changes were made via the CDE Protocol per the patient's referring provider. A copy of this encounter was shared with the provider.

## 2023-09-28 ENCOUNTER — TELEPHONE (OUTPATIENT)
Dept: EDUCATION SERVICES | Facility: OTHER | Age: 63
End: 2023-09-28
Payer: MEDICARE

## 2023-09-28 NOTE — TELEPHONE ENCOUNTER
[facility] [place] on [facility] where patient resides called and requested parameters for when to hold scheduled meal time insulin.  Discussed holding scheduled meal time insulin (aspart) if pt is not eating a meal or having a meal without carbohydrates.  Also discussed calling Educator or Clinic (if after hours) for any 2 consecutive blood sugars less than 70 or 2 consecutive blood sugars greater than 300.  Pt's Nurse Asim took as verbal order and able to read back parameters/expressed verbal understanding.    Zainab Cao RD [facility]  [phone]

## 2023-10-04 ENCOUNTER — NURSING HOME VISIT (OUTPATIENT)
Dept: GERIATRICS | Facility: CLINIC | Age: 63
End: 2023-10-04
Payer: MEDICARE

## 2023-10-04 VITALS
TEMPERATURE: 97.8 F | HEART RATE: 87 BPM | WEIGHT: 172.4 LBS | OXYGEN SATURATION: 95 % | RESPIRATION RATE: 18 BRPM | SYSTOLIC BLOOD PRESSURE: 97 MMHG | BODY MASS INDEX: 30.55 KG/M2 | HEIGHT: 63 IN | DIASTOLIC BLOOD PRESSURE: 54 MMHG

## 2023-10-04 DIAGNOSIS — I10 ESSENTIAL HYPERTENSION WITH GOAL BLOOD PRESSURE LESS THAN 140/90: ICD-10-CM

## 2023-10-04 DIAGNOSIS — E10.37X3 TYPE 1 DIABETES MELLITUS WITH DIABETIC MACULAR EDEMA OF BOTH EYES RESOLVED AFTER TREATMENT (H): ICD-10-CM

## 2023-10-04 DIAGNOSIS — E11.3299 NONPROLIFERATIVE DIABETIC RETINOPATHY (H): ICD-10-CM

## 2023-10-04 DIAGNOSIS — K59.01 SLOW TRANSIT CONSTIPATION: ICD-10-CM

## 2023-10-04 DIAGNOSIS — F81.9 COGNITIVE DEVELOPMENTAL DELAY: ICD-10-CM

## 2023-10-04 DIAGNOSIS — K21.00 GASTROESOPHAGEAL REFLUX DISEASE WITH ESOPHAGITIS WITHOUT HEMORRHAGE: ICD-10-CM

## 2023-10-04 DIAGNOSIS — Z97.8 USES SELF-APPLIED CONTINUOUS GLUCOSE MONITORING DEVICE: ICD-10-CM

## 2023-10-04 DIAGNOSIS — M25.552 HIP PAIN, LEFT: Primary | ICD-10-CM

## 2023-10-04 DIAGNOSIS — R60.0 BILATERAL LEG EDEMA: ICD-10-CM

## 2023-10-04 DIAGNOSIS — K08.9 POOR DENTITION: ICD-10-CM

## 2023-10-04 DIAGNOSIS — H52.4 PRESBYOPIA: ICD-10-CM

## 2023-10-04 DIAGNOSIS — D64.9 ANEMIA, UNSPECIFIED TYPE: ICD-10-CM

## 2023-10-04 DIAGNOSIS — H25.813 COMBINED FORMS OF AGE-RELATED CATARACT OF BOTH EYES: ICD-10-CM

## 2023-10-04 DIAGNOSIS — H40.1132 PRIMARY OPEN ANGLE GLAUCOMA (POAG) OF BOTH EYES, MODERATE STAGE: ICD-10-CM

## 2023-10-04 PROCEDURE — 99309 SBSQ NF CARE MODERATE MDM 30: CPT | Performed by: NURSE PRACTITIONER

## 2023-10-04 RX ORDER — ACETAMINOPHEN 500 MG
1000 TABLET ORAL 3 TIMES DAILY
Start: 2023-10-04 | End: 2023-10-23

## 2023-10-04 NOTE — PROGRESS NOTES
[facility] GERIATRICS  Chief Complaint   Patient presents with    correction Regulatory     [place] Medical Record Number:  [id number]  Place of Service where encounter took place:  [facility] () [[id number]]    HPI:    Lexy Rockwell  is 63 year old (1960), who is being seen today for a federally mandated E/M visit.     Her past medical history includes  Type 1 diabetes with gastroparesis and CKD history of DKA  Hypertension  CKD stage III  Hyperlipidemia  Bilateral leg edema  Overactive bladder  GERD  Constipation  Developmentally delayed  Polyneuropathy   Vitamin D deficiency  Glaucoma    On 9/22/2023, pt saw diabetic educator and her insulin was adjusted.        Today patient was seen in her room.  She is having left hip pain worse with walking but can hurt sitting.  Take tylenol.  patient denied chest pain, shortness of breath, dizziness, lightheadedness, and a poor appetite.   Nursing has no concerns. BIMS=15/15 (score 13 to 15 suggests the patient is cognitively intact) PHQ9=0/27.   Patient needs cuing assistance with ADLs, uses a walker.    her appetite is good and consumes a regular low carb diet.  Per nursing, skin is intact. Code status is full.      In reviewing point click care, she has gained 10# in 3 months.   VS stable.       ALLERGIES:Amoxicillin and Sulfa antibiotics  PAST MEDICAL HISTORY:   Past Medical History:   Diagnosis Date    Cerumen impacted     Development delay     Diabetic gastroparesis (H) 8/16/2013    Glaucoma (increased eye pressure)     Hyperlipaemia     Hypertension     Hypothyroid     Mental retardation     Nonsenile cataract     Obesity     Strabismus     Type 1 diabetes mellitus not at goal (H)      PAST SURGICAL HISTORY:   has a past surgical history that includes EYE SURG ANT SGMT PROC UNLISTED (remote); strabismus surgery; Esophagoscopy, gastroscopy, duodenoscopy (EGD), combined (N/A, 7/3/2023); and Colonoscopy (N/A, 7/3/2023).  FAMILY  HISTORY: family history includes Diabetes in her sister; Glaucoma in her maternal grandfather; Hypertension in her father.  SOCIAL HISTORY:  reports that she has never smoked. She has never used smokeless tobacco. She reports current alcohol use. She reports that she does not use drugs.    MEDICATIONS:  MED REC REQUIRED  Post Medication Reconciliation Status:  Discharge medications reconciled, continue medications without change         Review of your medicines            Accurate as of October 4, 2023  4:00 PM. If you have any questions, ask your nurse or doctor.                CONTINUE these medicines which have NOT CHANGED        Dose / Directions   AMLODIPINE BENZOATE PO      Dose: 2.5 mg  Take 2.5 mg by mouth  Refills: 0     carvedilol 6.25 MG tablet  Commonly known as: COREG  Used for: Hypertension goal BP (blood pressure) < 140/90      Dose: 6.25 mg  Take 1 tablet (6.25 mg) by mouth 2 times daily (with meals)  Refills: 0     Dexcom G7  [place]ma  Used for: Type 1 diabetes mellitus with diabetic peripheral angiopathy without gangrene (H)      Use to read blood sugars as per 's instructions.  Quantity: 1 each  Refills: 0     Dexcom G7 Sensor Misc  Used for: Type 1 diabetes mellitus with diabetic peripheral angiopathy without gangrene (H)      Change every 10 days.  Quantity: 3 each  Refills: 5     dorzolamide-timolol 22.3-6.8 MG/ML ophthalmic solution  Commonly known as: COSOPT  Used for: Primary open angle glaucoma of both eyes, moderate stage      Dose: 1 drop  Place 1 drop into both eyes 2 times daily  Quantity: 5 mL  Refills: 11     ferrous sulfate 142 (45 Fe) MG CR tablet  Commonly known as: SLO-FE      Dose: 142 mg  Take 1 tablet (142 mg) by mouth daily  Refills: 0     glucagon 1 MG Solr injection      Dose: 1 mg  Inject 1 mg into the muscle once  Refills: 0     * insulin aspart 100 UNIT/ML pen  Commonly known as: NovoLOG PEN  Used for: Type 1 diabetes mellitus with diabetic macular edema  of both eyes resolved after treatment (H)      Bedtime sliding scale 250-299 1 units 300-249 = 2 units 350 - 399= 3 units 400+ = 4 units  Quantity: 15 mL  Refills: 0     * NovoLOG FLEXPEN 100 UNIT/ML pen  Used for: Type 1 diabetes mellitus with diabetic macular edema of both eyes resolved after treatment (H)  Generic drug: insulin aspart      Inject 7 units before breakfast, 9 units before lunch and 11 units before dinner And sliding scale: 1 unit: 150 -199 mg/dl. 200-249= 2 units 250-299 3 units 300-249 = 4 units 350 - 399= 5 units 400+ = 6 units and call MD Up to 30 units daily  Quantity: 15 mL  Refills: 3     latanoprost 0.005 % ophthalmic solution  Commonly known as: XALATAN  Used for: Primary open angle glaucoma of both eyes, moderate stage      INSTILL 1 DROP IN BOTH EYES AT BEDTIME  Quantity: 10 mL  Refills: 3     lisinopril 40 MG tablet  Commonly known as: ZESTRIL  Used for: Essential hypertension with goal blood pressure less than 140/90      Dose: 40 mg  Take 1 tablet (40 mg) by mouth daily  Refills: 0     magnesium oxide 400 MG tablet  Commonly known as: MAG-OX  Used for: Hypomagnesemia      Dose: 400 mg  Take 1 tablet (400 mg) by mouth daily  Quantity: 90 tablet  Refills: 3     Multivitamin Tabs      1 TABLET DAILY  Refills: 0     nystatin [id number] UNIT/GM external cream  Commonly known as: MYCOSTATIN      Apply topically 2 times daily as needed for dry skin To feet and toenails.  Refills: 0     pantoprazole 40 MG EC tablet  Commonly known as: PROTONIX      Dose: 40 mg  Take 40 mg by mouth daily  Refills: 0     potassium chloride ER 20 MEQ CR tablet  Commonly known as: KLOR-CON M      Dose: 20 mEq  Take 20 mEq by mouth daily  Refills: 0     simvastatin 20 MG tablet  Commonly known as: ZOCOR  Used for: Hyperlipidemia LDL goal <100      Dose: 20 mg  Take 1 tablet (20 mg) by mouth At Bedtime  Quantity: 90 tablet  Refills: 3     Stool Softener/Laxative 8.6-50 MG tablet  Generic drug: senna-docusate      Dose:  "1 tablet  Take 1 tablet by mouth daily  Refills: 0     Tresiba FlexTouch 200 UNIT/ML pen  Used for: Type 1 diabetes mellitus with diabetic macular edema of both eyes resolved after treatment (H)  Generic drug: insulin degludec      Dose: 23 Units  Inject 23 Units Subcutaneous daily  Quantity: 15 mL  Refills: 3           * This list has 2 medication(s) that are the same as other medications prescribed for you. Read the directions carefully, and ask your doctor or other care provider to review them with you.                   Case Management:  I have reviewed the care plan and MDS and do agree with the plan. Patient's desire to return to the community is not present. Information reviewed:  Medications, vital signs, orders, and nursing notes.    ROS:  4 point ROS including Respiratory, CV, GI and , other than that noted in the HPI,  is negative    Vitals:  BP 97/54   Pulse 87   Temp 97.8  F (36.6  C)   Resp 18   Ht 1.6 m (5' 3\")   Wt 78.2 kg (172 lb 6.4 oz)   LMP 03/28/2014   SpO2 95%   BMI 30.54 kg/m    Body mass index is 30.54 kg/m .  Exam:  GENERAL APPEARANCE:  Alert, in no distress, poor dental status  RESP:  lungs clear to auscultation , no respiratory distress  CV:  Palpation and auscultation of heart done , peripheral edema 2+ in bilateral LE, rate-normal  PSYCH:  insight and judgement impaired, affect and mood normal    Lab/Diagnostic data:   Most Recent 3 CBC's:  Recent Labs   Lab Test 08/30/23  0535 05/08/23  0835 03/29/23  0515   WBC 4.3 3.4* 4.5   HGB 10.5* 10.2* 9.0*   MCV 87 88 87    184 220     Most Recent 3 BMP's:  Recent Labs   Lab Test 08/30/23  0535 05/08/23  0835 03/29/23  0515    141 142   POTASSIUM 4.3 4.8 4.5   CHLORIDE 109* 108* 110*   CO2 22 23 23   BUN 30.8* 33.9* 39.7*   CR 0.89 1.00* 1.11*   ANIONGAP 10 10 9   EFRAIN 9.3 10.0 9.6   * 124* 102*     Most Recent 2 LFT's:  Recent Labs   Lab Test 03/29/23  0515 01/25/23  0820   AST 20 19   ALT 19 10   ALKPHOS 73 79 "   BILITOTAL <0.2 <0.2     Most Recent Cholesterol Panel:  Recent Labs   Lab Test 01/25/23  0820   CHOL 112   LDL 58   HDL 37*   TRIG 84     Most Recent TSH and T4:  Recent Labs   Lab Test 10/13/21  1540 02/01/21  0909   TSH 3.41 7.07*   T4  --  1.05     Most Recent Hemoglobin A1c:  Recent Labs   Lab Test 03/29/23  0515   A1C 8.1*       ASSESSMENT/PLAN  (M25.552) Hip pain, left  (primary encounter diagnosis)  Comment: acute.  Worse with walking.    Plan: Ice 20 minutes on TID, tylenol 1000 mg TID, PT to eval and treat          (K59.01) Slow transit constipation  (primary encounter diagnosis)  Comment: chronic.  stable taking senna S once daily  Plan:Continue with plan of care no changes at this time, adjustment as needed        [Z97.8]  Uses self-applied continuous glucose monitoring device  (E10.37X3) Type 1 diabetes mellitus with diabetic macular edema of both eyes resolved after treatment (H)  (E11.3299) Nonproliferative diabetic retinopathy (H)  (primary encounter diagnosis)  (H40.1132) Primary open angle glaucoma (POAG) of both eyes, moderate stage  (H25.813) Combined forms of age-related cataract of both eyes  (H52.4) Presbyopia  Comment: Chronic control is improving.  Her hgb A1c (3/2023) is 8.1, her BS remain erratic.   Her erratic BS have lessoned.  She is following with diabetic educator and follows with endocrinology.   She is currently taking 7 units at meals, sliding scale at meals and HS, and long acting.    For continuous glucose monitoring she has a Dexcom which is connected to her phone.   .    For primary prevention she is currently taking simvastatin 20 mg daily, lisinopril 40 mg daily, and aspirin 81 mg daily is being held due to anemia.  Follows with optometry, podiatry and CDE        (I10) Hypertension goal BP (blood pressure) < 140/90  Comment: Chronic, Controlled while taking lisinopril, coreg, amlodipine  Plan: Continue with plan of care no changes at this time, adjustment as needed         (K21.00) Gastroesophageal reflux disease with esophagitis without hemorrhage  Comment: Chronic, stable while taking Protonix.  Had GI workup with no findings  Plan:   Continue with plan of care no changes at this time, adjustment as needed         (R60.0) Bilateral leg edema  Comment: improving.  May be related to amlodipine 5 mg daily which has a known side effect of lower extremity edema.   Significant improvement in LE since admission.   Wearing compression socks.   Has gained 10# in the past few months but denies increase in LE edema.   Plan: Continue with plan of care no changes at this time, adjustment as needed        D64.9) Anemia, unspecified type    Comment:  improving.   Has had upper and lower endoscopy in 2019 and 2020 and in 2020 a polyp was removed.   Hgb on 12/23/2023 was 9 while in OSH and upon admission to OSH Patient with reported black vomit PTA. Recent EGD (3 years ago) without evidence of bleeding source. Given copious vomit reported at scene could potentially be related to Mitra-Burks tear. No evidence of esophageal perforation on abdominal CT. NG placed, drained dark bilious fluid initially, most recent fluid straw colored. No concerns for active GI bleed. Will discontinue NG.  On 12/17/2022 =  peripheral blood morphology showed normocytic anemia and reticulocyte was 0.02%  12/19/2023 = 9.0 at [facility]  12/26/2023 = note states discussed pancytopenia with hematology   2/6/2023 = hgb = 8   On 2/17/2023 she was started on iron supplementation  3/29/23 her hgb was 9.0, hematocrit=29.8, ferritin=49, iron=29, RJP=945, Iron sat index=11.    Pt denies having black stools, shortness of breath, dizziness .    5/8/023 hgb 10.2  7/3/2023 GI work up negative   8/30/2023 hgb 10.5  Plan:  Continue with plan of care no changes at this time, adjustment as needed          (F81.9) Cognitive developmental delay  Comment: Chronic, stable.  Patient has a mother and father who live in [place] and 2 sisters who  live in the [place] area.   Her father  in   Plan: Continue with plan of care no changes at this time, adjustment as needed         (K08.9) Poor dentition  (primary encounter diagnosis)  Comment: Chronic.  Missing teeth and has broken teeth with poor gum health.  was suppose to see dentist in summer of  but was rescheduled due to COVID 19  Plan:   Continue with plan of care no changes at this time, adjustment as needed      Electronically signed by:      TAYLA Sprague CNP on 10/4/2023 at 7:22 AM

## 2023-10-04 NOTE — LETTER
10/4/2023        RE: Lexy Rockwell  [address]  [facility] [address]        [facility]  Chief Complaint   Patient presents with     correction Regulatory     [place] Medical Record Number:  [id number]  Place of Service where encounter took place:  [facility] () [[id number]]    HPI:    Lexy Rockwell  is 63 year old (1960), who is being seen today for a federally mandated E/M visit.     Her past medical history includes  Type 1 diabetes with gastroparesis and CKD history of DKA  Hypertension  CKD stage III  Hyperlipidemia  Bilateral leg edema  Overactive bladder  GERD  Constipation  Developmentally delayed  Polyneuropathy   Vitamin D deficiency  Glaucoma    On 9/22/2023, pt saw diabetic educator and her insulin was adjusted.        Today patient was seen in her room.  She is having left hip pain worse with walking but can hurt sitting.  Take tylenol.  patient denied chest pain, shortness of breath, dizziness, lightheadedness, and a poor appetite.   Nursing has no concerns. BIMS=15/15 (score 13 to 15 suggests the patient is cognitively intact) PHQ9=0/27.   Patient needs cuing assistance with ADLs, uses a walker.    her appetite is good and consumes a regular low carb diet.  Per nursing, skin is intact. Code status is full.      In reviewing point click care, she has gained 10# in 3 months.   VS stable.       ALLERGIES:Amoxicillin and Sulfa antibiotics  PAST MEDICAL HISTORY:   Past Medical History:   Diagnosis Date     Cerumen impacted      Development delay      Diabetic gastroparesis (H) 8/16/2013     Glaucoma (increased eye pressure)      Hyperlipaemia      Hypertension      Hypothyroid      Mental retardation      Nonsenile cataract      Obesity      Strabismus      Type 1 diabetes mellitus not at goal (H)      PAST SURGICAL HISTORY:   has a past surgical history that includes EYE SURG ANT SGMT PROC UNLISTED (remote); strabismus  surgery; Esophagoscopy, gastroscopy, duodenoscopy (EGD), combined (N/A, 7/3/2023); and Colonoscopy (N/A, 7/3/2023).  FAMILY HISTORY: family history includes Diabetes in her sister; Glaucoma in her maternal grandfather; Hypertension in her father.  SOCIAL HISTORY:  reports that she has never smoked. She has never used smokeless tobacco. She reports current alcohol use. She reports that she does not use drugs.    MEDICATIONS:  MED REC REQUIRED  Post Medication Reconciliation Status:  Discharge medications reconciled, continue medications without change         Review of your medicines            Accurate as of October 4, 2023  4:00 PM. If you have any questions, ask your nurse or doctor.                CONTINUE these medicines which have NOT CHANGED        Dose / Directions   AMLODIPINE BENZOATE PO      Dose: 2.5 mg  Take 2.5 mg by mouth  Refills: 0     carvedilol 6.25 MG tablet  Commonly known as: COREG  Used for: Hypertension goal BP (blood pressure) < 140/90      Dose: 6.25 mg  Take 1 tablet (6.25 mg) by mouth 2 times daily (with meals)  Refills: 0     Dexcom G7  [place]ma  Used for: Type 1 diabetes mellitus with diabetic peripheral angiopathy without gangrene (H)      Use to read blood sugars as per 's instructions.  Quantity: 1 each  Refills: 0     Dexcom G7 Sensor Misc  Used for: Type 1 diabetes mellitus with diabetic peripheral angiopathy without gangrene (H)      Change every 10 days.  Quantity: 3 each  Refills: 5     dorzolamide-timolol 22.3-6.8 MG/ML ophthalmic solution  Commonly known as: COSOPT  Used for: Primary open angle glaucoma of both eyes, moderate stage      Dose: 1 drop  Place 1 drop into both eyes 2 times daily  Quantity: 5 mL  Refills: 11     ferrous sulfate 142 (45 Fe) MG CR tablet  Commonly known as: SLO-FE      Dose: 142 mg  Take 1 tablet (142 mg) by mouth daily  Refills: 0     glucagon 1 MG Solr injection      Dose: 1 mg  Inject 1 mg into the muscle once  Refills: 0     *  insulin aspart 100 UNIT/ML pen  Commonly known as: NovoLOG PEN  Used for: Type 1 diabetes mellitus with diabetic macular edema of both eyes resolved after treatment (H)      Bedtime sliding scale 250-299 1 units 300-249 = 2 units 350 - 399= 3 units 400+ = 4 units  Quantity: 15 mL  Refills: 0     * NovoLOG FLEXPEN 100 UNIT/ML pen  Used for: Type 1 diabetes mellitus with diabetic macular edema of both eyes resolved after treatment (H)  Generic drug: insulin aspart      Inject 7 units before breakfast, 9 units before lunch and 11 units before dinner And sliding scale: 1 unit: 150 -199 mg/dl. 200-249= 2 units 250-299 3 units 300-249 = 4 units 350 - 399= 5 units 400+ = 6 units and call MD Up to 30 units daily  Quantity: 15 mL  Refills: 3     latanoprost 0.005 % ophthalmic solution  Commonly known as: XALATAN  Used for: Primary open angle glaucoma of both eyes, moderate stage      INSTILL 1 DROP IN BOTH EYES AT BEDTIME  Quantity: 10 mL  Refills: 3     lisinopril 40 MG tablet  Commonly known as: ZESTRIL  Used for: Essential hypertension with goal blood pressure less than 140/90      Dose: 40 mg  Take 1 tablet (40 mg) by mouth daily  Refills: 0     magnesium oxide 400 MG tablet  Commonly known as: MAG-OX  Used for: Hypomagnesemia      Dose: 400 mg  Take 1 tablet (400 mg) by mouth daily  Quantity: 90 tablet  Refills: 3     Multivitamin Tabs      1 TABLET DAILY  Refills: 0     nystatin 048703 UNIT/GM external cream  Commonly known as: MYCOSTATIN      Apply topically 2 times daily as needed for dry skin To feet and toenails.  Refills: 0     pantoprazole 40 MG EC tablet  Commonly known as: PROTONIX      Dose: 40 mg  Take 40 mg by mouth daily  Refills: 0     potassium chloride ER 20 MEQ CR tablet  Commonly known as: KLOR-CON M      Dose: 20 mEq  Take 20 mEq by mouth daily  Refills: 0     simvastatin 20 MG tablet  Commonly known as: ZOCOR  Used for: Hyperlipidemia LDL goal <100      Dose: 20 mg  Take 1 tablet (20 mg) by mouth At  "Bedtime  Quantity: 90 tablet  Refills: 3     Stool Softener/Laxative 8.6-50 MG tablet  Generic drug: senna-docusate      Dose: 1 tablet  Take 1 tablet by mouth daily  Refills: 0     Tresiba FlexTouch 200 UNIT/ML pen  Used for: Type 1 diabetes mellitus with diabetic macular edema of both eyes resolved after treatment (H)  Generic drug: insulin degludec      Dose: 23 Units  Inject 23 Units Subcutaneous daily  Quantity: 15 mL  Refills: 3           * This list has 2 medication(s) that are the same as other medications prescribed for you. Read the directions carefully, and ask your doctor or other care provider to review them with you.                   Case Management:  I have reviewed the care plan and MDS and do agree with the plan. Patient's desire to return to the community is not present. Information reviewed:  Medications, vital signs, orders, and nursing notes.    ROS:  4 point ROS including Respiratory, CV, GI and , other than that noted in the HPI,  is negative    Vitals:  BP 97/54   Pulse 87   Temp 97.8  F (36.6  C)   Resp 18   Ht 1.6 m (5' 3\")   Wt 78.2 kg (172 lb 6.4 oz)   LMP 03/28/2014   SpO2 95%   BMI 30.54 kg/m    Body mass index is 30.54 kg/m .  Exam:  GENERAL APPEARANCE:  Alert, in no distress, poor dental status  RESP:  lungs clear to auscultation , no respiratory distress  CV:  Palpation and auscultation of heart done , peripheral edema 2+ in bilateral LE, rate-normal  PSYCH:  insight and judgement impaired, affect and mood normal    Lab/Diagnostic data:   Most Recent 3 CBC's:  Recent Labs   Lab Test 08/30/23  0535 05/08/23  0835 03/29/23  0515   WBC 4.3 3.4* 4.5   HGB 10.5* 10.2* 9.0*   MCV 87 88 87    184 220     Most Recent 3 BMP's:  Recent Labs   Lab Test 08/30/23  0535 05/08/23  0835 03/29/23  0515    141 142   POTASSIUM 4.3 4.8 4.5   CHLORIDE 109* 108* 110*   CO2 22 23 23   BUN 30.8* 33.9* 39.7*   CR 0.89 1.00* 1.11*   ANIONGAP 10 10 9   EFRAIN 9.3 10.0 9.6   * 124* " 102*     Most Recent 2 LFT's:  Recent Labs   Lab Test 03/29/23  0515 01/25/23  0820   AST 20 19   ALT 19 10   ALKPHOS 73 79   BILITOTAL <0.2 <0.2     Most Recent Cholesterol Panel:  Recent Labs   Lab Test 01/25/23  0820   CHOL 112   LDL 58   HDL 37*   TRIG 84     Most Recent TSH and T4:  Recent Labs   Lab Test 10/13/21  1540 02/01/21  0909   TSH 3.41 7.07*   T4  --  1.05     Most Recent Hemoglobin A1c:  Recent Labs   Lab Test 03/29/23  0515   A1C 8.1*       ASSESSMENT/PLAN  (M25.552) Hip pain, left  (primary encounter diagnosis)  Comment: acute.  Worse with walking.    Plan: Ice 20 minutes on TID, tylenol 1000 mg TID, PT to eval and treat          (K59.01) Slow transit constipation  (primary encounter diagnosis)  Comment: chronic.  stable taking senna S once daily  Plan:Continue with plan of care no changes at this time, adjustment as needed        [Z97.8]  Uses self-applied continuous glucose monitoring device  (E10.37X3) Type 1 diabetes mellitus with diabetic macular edema of both eyes resolved after treatment (H)  (E11.3299) Nonproliferative diabetic retinopathy (H)  (primary encounter diagnosis)  (H40.1132) Primary open angle glaucoma (POAG) of both eyes, moderate stage  (H25.813) Combined forms of age-related cataract of both eyes  (H52.4) Presbyopia  Comment: Chronic control is improving.  Her hgb A1c (3/2023) is 8.1, her BS remain erratic.   Her erratic BS have lessoned.  She is following with diabetic educator and follows with endocrinology.   She is currently taking 7 units at meals, sliding scale at meals and HS, and long acting.    For continuous glucose monitoring she has a Dexcom which is connected to her phone.   .    For primary prevention she is currently taking simvastatin 20 mg daily, lisinopril 40 mg daily, and aspirin 81 mg daily is being held due to anemia.  Follows with optometry, podiatry and CDE        (I10) Hypertension goal BP (blood pressure) < 140/90  Comment: Chronic, Controlled while  taking lisinopril, coreg, amlodipine  Plan: Continue with plan of care no changes at this time, adjustment as needed        (K21.00) Gastroesophageal reflux disease with esophagitis without hemorrhage  Comment: Chronic, stable while taking Protonix.  Had GI workup with no findings  Plan:   Continue with plan of care no changes at this time, adjustment as needed         (R60.0) Bilateral leg edema  Comment: improving.  May be related to amlodipine 5 mg daily which has a known side effect of lower extremity edema.   Significant improvement in LE since admission.   Wearing compression socks.   Has gained 10# in the past few months but denies increase in LE edema.   Plan: Continue with plan of care no changes at this time, adjustment as needed        D64.9) Anemia, unspecified type    Comment:  improving.   Has had upper and lower endoscopy in 2019 and 2020 and in 2020 a polyp was removed.   Hgb on 12/23/2023 was 9 while in OSH and upon admission to OSH Patient with reported black vomit PTA. Recent EGD (3 years ago) without evidence of bleeding source. Given copious vomit reported at scene could potentially be related to Mitra-Burks tear. No evidence of esophageal perforation on abdominal CT. NG placed, drained dark bilious fluid initially, most recent fluid straw colored. No concerns for active GI bleed. Will discontinue NG.  On 12/17/2022 =  peripheral blood morphology showed normocytic anemia and reticulocyte was 0.02%  12/19/2023 = 9.0 at [facility]  12/26/2023 = note states discussed pancytopenia with hematology   2/6/2023 = hgb = 8   On 2/17/2023 she was started on iron supplementation  3/29/23 her hgb was 9.0, hematocrit=29.8, ferritin=49, iron=29, QLB=469, Iron sat index=11.    Pt denies having black stools, shortness of breath, dizziness .    5/8/023 hgb 10.2  7/3/2023 GI work up negative   8/30/2023 hgb 10.5  Plan:  Continue with plan of care no changes at this time, adjustment as needed          (F81.9) Cognitive  developmental delay  Comment: Chronic, stable.  Patient has a mother and father who live in [place] and 2 sisters who live in the [facility] area.   Her father  in   Plan: Continue with plan of care no changes at this time, adjustment as needed         (K08.9) Poor dentition  (primary encounter diagnosis)  Comment: Chronic.  Missing teeth and has broken teeth with poor gum health.  was suppose to see dentist in summer of  but was rescheduled due to COVID 19  Plan:   Continue with plan of care no changes at this time, adjustment as needed      Electronically signed by:      TAYLA Sprague CNP on 10/4/2023 at 7:22 AM             Sincerely,        TAYLA Sprague CNP

## 2023-10-09 ENCOUNTER — TELEPHONE (OUTPATIENT)
Dept: GERIATRICS | Facility: CLINIC | Age: 63
End: 2023-10-09
Payer: MEDICARE

## 2023-10-09 DIAGNOSIS — M25.552 HIP PAIN, LEFT: Primary | ICD-10-CM

## 2023-10-09 RX ORDER — TROLAMINE SALICYLATE 10 G/100G
CREAM TOPICAL 3 TIMES DAILY
Start: 2023-10-09 | End: 2023-10-16

## 2023-10-09 NOTE — TELEPHONE ENCOUNTER
Pt continuing to have left hip pain    (M25.552) Hip pain, left  (primary encounter diagnosis)  Plan: trolamine salicylate (ASPERCREME) 10 % external cream TID to left hip x 7 days then three times a day as needed.     Left hip xray   Therapy to evaluate and treat (I ordered this last week).     TAYLA Sprague CNP on 10/9/2023 at 2:30 PM

## 2023-10-09 NOTE — TELEPHONE ENCOUNTER
[facility] Triage Nurse Telephone Encounter    Provider: TAYLA Gagnon CNP  Facility: [facility] Facility Type:  [facility]    Caller: Pamela  Call Back Number: [phone]    Allergies:    Allergies   Allergen Reactions    Amoxicillin     Sulfa Antibiotics         Reason for call: Patient was just started on Tylenol 1000 mg 3 times a day for left hip pain, nursing is calling to request some kind of pain cream.  Possibly diclofenac gel or Aspercreme due to ongoing left hip pain.     Verbal Order/Direction given by Provider:   Trolamine salicylate (ASPERCREME) 10 % external cream TID to left hip x 7 days then three times a day as needed.      Left hip xray     Provider giving Order:  TAYLA Gagnon CNP    Verbal Order given to: Pamela Ellison RN

## 2023-10-12 ENCOUNTER — NURSING HOME VISIT (OUTPATIENT)
Dept: GERIATRICS | Facility: CLINIC | Age: 63
End: 2023-10-12
Payer: MEDICARE

## 2023-10-12 VITALS
TEMPERATURE: 97.5 F | HEIGHT: 63 IN | BODY MASS INDEX: 30.83 KG/M2 | DIASTOLIC BLOOD PRESSURE: 85 MMHG | RESPIRATION RATE: 18 BRPM | HEART RATE: 91 BPM | WEIGHT: 174 LBS | SYSTOLIC BLOOD PRESSURE: 145 MMHG | OXYGEN SATURATION: 97 %

## 2023-10-12 DIAGNOSIS — R52 PAIN: ICD-10-CM

## 2023-10-12 DIAGNOSIS — M25.552 HIP PAIN, LEFT: Primary | ICD-10-CM

## 2023-10-12 DIAGNOSIS — M79.652 PAIN OF LEFT THIGH: ICD-10-CM

## 2023-10-12 PROCEDURE — 99309 SBSQ NF CARE MODERATE MDM 30: CPT | Performed by: NURSE PRACTITIONER

## 2023-10-12 NOTE — LETTER
"    10/12/2023        RE: Lexy Rockwell  [address]  [facility] [address]        [facility] G[facility]IATR[facility]    Chief Complaint   Patient presents with     Nursing Home Acute     LEFT HIP PAIN      HPI:  Lexy Rockwell is a 63 year old  (1960), who is being seen today for an episodic care visit at: [facility] () [[id number]].     Pt states her leg is much better since starting therapy.       Allergies, and PMH/PSH reviewed in [facility] today.  REVIEW OF SYSTEMS:  4 point ROS including Respiratory, CV, GI and , other than that noted in the HPI,  is negative    Objective:   BP (!) 145/85   Pulse 91   Temp 97.5  F (36.4  C)   Resp 18   Ht 1.6 m (5' 3\")   Wt 78.9 kg (174 lb)   LMP 03/28/2014   SpO2 97%   BMI 30.82 kg/m    GENERAL APPEARANCE:  Alert, in no distress  RESP:  lungs clear to auscultation , no respiratory distress  PSYCH:  oriented X 3    Most Recent 3 CBC's:  Recent Labs   Lab Test 08/30/23  0535 05/08/23  0835 03/29/23  0515   WBC 4.3 3.4* 4.5   HGB 10.5* 10.2* 9.0*   MCV 87 88 87    184 220     Most Recent 3 BMP's:  Recent Labs   Lab Test 08/30/23  0535 05/08/23  0835 03/29/23  0515    141 142   POTASSIUM 4.3 4.8 4.5   CHLORIDE 109* 108* 110*   CO2 22 23 23   BUN 30.8* 33.9* 39.7*   CR 0.89 1.00* 1.11*   ANIONGAP 10 10 9   EFRAIN 9.3 10.0 9.6   * 124* 102*       Assessment/Plan:  (M25.552) Hip pain, left  (primary encounter diagnosis)  (R52) Pain  (M79.652) Pain of left thigh  Comment: improved with therapy, tylenol and ice.  .    Plan: Continue with plan of care no changes at this time, adjustment as needed.m     MED REC REQUIRED  Post Medication Reconciliation Status:  Medication reconciliation previously completed during another office visit          Electronically signed by:       TAYLA Sprague CNP on 10/12/2023 at 2:51 PM           Sincerely,        TAYLA Sprague CNP      "

## 2023-10-12 NOTE — PROGRESS NOTES
"[facility] GERIATRICS    Chief Complaint   Patient presents with    Nursing Home Acute     LEFT HIP PAIN      HPI:  Lexy Rockwell is a 63 year old  (1960), who is being seen today for an episodic care visit at: [facility] () [[id number]].     Pt states her leg is much better since starting therapy.       Allergies, and PMH/PSH reviewed in [facility] today.  REVIEW OF SYSTEMS:  4 point ROS including Respiratory, CV, GI and , other than that noted in the HPI,  is negative    Objective:   BP (!) 145/85   Pulse 91   Temp 97.5  F (36.4  C)   Resp 18   Ht 1.6 m (5' 3\")   Wt 78.9 kg (174 lb)   LMP 03/28/2014   SpO2 97%   BMI 30.82 kg/m    GENERAL APPEARANCE:  Alert, in no distress  RESP:  lungs clear to auscultation , no respiratory distress  PSYCH:  oriented X 3    Most Recent 3 CBC's:  Recent Labs   Lab Test 08/30/23  0535 05/08/23  0835 03/29/23  0515   WBC 4.3 3.4* 4.5   HGB 10.5* 10.2* 9.0*   MCV 87 88 87    184 220     Most Recent 3 BMP's:  Recent Labs   Lab Test 08/30/23  0535 05/08/23  0835 03/29/23  0515    141 142   POTASSIUM 4.3 4.8 4.5   CHLORIDE 109* 108* 110*   CO2 22 23 23   BUN 30.8* 33.9* 39.7*   CR 0.89 1.00* 1.11*   ANIONGAP 10 10 9   EFRAIN 9.3 10.0 9.6   * 124* 102*       Assessment/Plan:  (M25.552) Hip pain, left  (primary encounter diagnosis)  (R52) Pain  (M79.652) Pain of left thigh  Comment: improved with therapy, tylenol and ice.  .    Plan: Continue with plan of care no changes at this time, adjustment as needed.m     MED REC REQUIRED  Post Medication Reconciliation Status:  Medication reconciliation previously completed during another office visit          Electronically signed by:       TAYLA Sprague CNP on 10/12/2023 at 2:51 PM       "

## 2023-10-17 ENCOUNTER — LAB REQUISITION (OUTPATIENT)
Dept: LAB | Facility: CLINIC | Age: 63
End: 2023-10-17
Payer: MEDICARE

## 2023-10-17 ENCOUNTER — TELEPHONE (OUTPATIENT)
Dept: EDUCATION SERVICES | Facility: CLINIC | Age: 63
End: 2023-10-17
Payer: MEDICARE

## 2023-10-17 DIAGNOSIS — R53.1 WEAKNESS: ICD-10-CM

## 2023-10-17 DIAGNOSIS — R30.0 DYSURIA: ICD-10-CM

## 2023-10-17 LAB
ALBUMIN UR-MCNC: 100 MG/DL
APPEARANCE UR: ABNORMAL
BACTERIA #/AREA URNS HPF: ABNORMAL /HPF
BILIRUB UR QL STRIP: NEGATIVE
COLOR UR AUTO: ABNORMAL
GLUCOSE UR STRIP-MCNC: 500 MG/DL
HGB UR QL STRIP: ABNORMAL
HYALINE CASTS: 3 /LPF
KETONES UR STRIP-MCNC: NEGATIVE MG/DL
LEUKOCYTE ESTERASE UR QL STRIP: ABNORMAL
MUCOUS THREADS #/AREA URNS LPF: PRESENT /LPF
NITRATE UR QL: NEGATIVE
PH UR STRIP: 5.5 [PH] (ref 5–7)
RBC URINE: 2 /HPF
SP GR UR STRIP: 1.02 (ref 1–1.03)
SQUAMOUS EPITHELIAL: 1 /HPF
TRANSITIONAL EPI: <1 /HPF
UROBILINOGEN UR STRIP-MCNC: NORMAL MG/DL
WBC URINE: 20 /HPF

## 2023-10-17 PROCEDURE — 87088 URINE BACTERIA CULTURE: CPT | Mod: ORL | Performed by: NURSE PRACTITIONER

## 2023-10-17 PROCEDURE — 81001 URINALYSIS AUTO W/SCOPE: CPT | Mod: ORL | Performed by: NURSE PRACTITIONER

## 2023-10-17 NOTE — TELEPHONE ENCOUNTER
Writer returned call to Saima,  Saima states that today BG before breakfast was at 437 mg/dl, 1 hour after insulin (13 units) came down to 408 mg/dl    587 mg/dl at lunch time yesterday (scheduled insulin), 3:00 still at 538 mg/dl    Ketones have not been tested, they are wondering what to do. 4 days ago, BG were normal. Lexy is not having any symptoms, but considering history of DKA, would benefit from ketone testing. Huddled with Zahra Knowles (PCP) who Is currently in the building. She checked on Lexy and Lexy's BG are down to 179 mg/dl at lunch. Zahra ran urine labs to test for ketones and will have staff monitor Lexy more closely for the next few days.     Date Breakfast  Lunch  Dinner  Bedtime    Before After Before After Before After    10/17 437 408        10/16 323  587 538 (3h) 230   113   10/15 272  252  218  248   10/14 309  134  213  145   10/13 127  97  101  89     Plan to call Lexy tomorrow to check in.     Tiffanie Mcfarland RD [facility]  Triage: [phone]  [phone]

## 2023-10-18 ENCOUNTER — TELEPHONE (OUTPATIENT)
Dept: ENDOCRINOLOGY | Facility: CLINIC | Age: 63
End: 2023-10-18

## 2023-10-18 ENCOUNTER — NURSING HOME VISIT (OUTPATIENT)
Dept: GERIATRICS | Facility: CLINIC | Age: 63
End: 2023-10-18
Payer: MEDICARE

## 2023-10-18 VITALS
DIASTOLIC BLOOD PRESSURE: 94 MMHG | SYSTOLIC BLOOD PRESSURE: 171 MMHG | HEIGHT: 63 IN | OXYGEN SATURATION: 96 % | HEART RATE: 88 BPM | TEMPERATURE: 96.4 F | WEIGHT: 174.2 LBS | BODY MASS INDEX: 30.87 KG/M2 | RESPIRATION RATE: 18 BRPM

## 2023-10-18 DIAGNOSIS — E11.3299 NONPROLIFERATIVE DIABETIC RETINOPATHY (H): ICD-10-CM

## 2023-10-18 DIAGNOSIS — R73.09 ELEVATED GLUCOSE: ICD-10-CM

## 2023-10-18 DIAGNOSIS — E10.37X3 TYPE 1 DIABETES MELLITUS WITH DIABETIC MACULAR EDEMA OF BOTH EYES RESOLVED AFTER TREATMENT (H): Primary | ICD-10-CM

## 2023-10-18 PROCEDURE — 99309 SBSQ NF CARE MODERATE MDM 30: CPT | Performed by: NURSE PRACTITIONER

## 2023-10-18 NOTE — PROGRESS NOTES
"[facility] GERIATRICS    Chief Complaint   Patient presents with    RECHECK     HPI:  Lexy Rockwell is a 63 year old  (1960), who is being seen today for an episodic care visit at: [facility] () [[id number]].       Today pt has no pain, no symptoms associated with hyperglycemia or infections  BS is 500+ at lunch today    Allergies, and PMH/PSH reviewed in [facility] today.  REVIEW OF SYSTEMS:  4 point ROS including Respiratory, CV, GI and , other than that noted in the HPI,  is negative    Objective:   BP (!) 171/94   Pulse 88   Temp (!) 96.4  F (35.8  C)   Resp 18   Ht 1.6 m (5' 3\")   Wt 79 kg (174 lb 3.2 oz)   LMP 03/28/2014   SpO2 96%   BMI 30.86 kg/m    GENERAL APPEARANCE:  Alert, in no distress  RESP:  lungs clear to auscultation , no respiratory distress  PSYCH:  oriented X 3    Most Recent 3 CBC's:  Recent Labs   Lab Test 08/30/23  0535 05/08/23  0835 03/29/23  0515   WBC 4.3 3.4* 4.5   HGB 10.5* 10.2* 9.0*   MCV 87 88 87    184 220     Most Recent 3 BMP's:  Recent Labs   Lab Test 08/30/23  0535 05/08/23  0835 03/29/23  0515    141 142   POTASSIUM 4.3 4.8 4.5   CHLORIDE 109* 108* 110*   CO2 22 23 23   BUN 30.8* 33.9* 39.7*   CR 0.89 1.00* 1.11*   ANIONGAP 10 10 9   EFRAIN 9.3 10.0 9.6   * 124* 102*     Most Recent Hemoglobin A1c:  Recent Labs   Lab Test 03/29/23  0515   A1C 8.1*       Assessment/Plan:     Type 1 diabetes mellitus with diabetic macular edema of both eyes resolved after treatment (H)  Nonproliferative diabetic retinopathy (H)  Elevated glucose  Comment: for the past 3 days patients blood sugars have been over 500 at times. Today she received extra insulin early this morning.   At lunch today her BS was above readable for the monitor.  Her continuous glucose monitor is not working.   She had a UA yesterday which showed no ketones.  She denies any signs and symptoms of illness.  Communicated with diabetic educator today who recommended giving " extra aspart and increasing long acting.     Plan: given 10 units extra aspart for lunch  Increase degludec from 23 units to 30 units tonight .  Update triage in am with BS.      MED REC REQUIRED  Post Medication Reconciliation Status:  Medication reconciliation previously completed during another office visit      Electronically signed by:       TAYLA Sprague CNP on 10/18/2023 at 6:53 PM

## 2023-10-18 NOTE — LETTER
"    10/18/2023        RE: Lexy Rockwell  [address]  [facility] [address]        [facility]    Chief Complaint   Patient presents with     RECHECK     HPI:  Lexy Rockwell is a 63 year old  (1960), who is being seen today for an episodic care visit at: [facility] () [[id number]].       Today pt has no pain, no symptoms associated with hyperglycemia or infections  BS is 500+ at lunch today    Allergies, and PMH/PSH reviewed in [facility] today.  REVIEW OF SYSTEMS:  4 point ROS including Respiratory, CV, GI and , other than that noted in the HPI,  is negative    Objective:   BP (!) 171/94   Pulse 88   Temp (!) 96.4  F (35.8  C)   Resp 18   Ht 1.6 m (5' 3\")   Wt 79 kg (174 lb 3.2 oz)   LMP 03/28/2014   SpO2 96%   BMI 30.86 kg/m    GENERAL APPEARANCE:  Alert, in no distress  RESP:  lungs clear to auscultation , no respiratory distress  PSYCH:  oriented X 3    Most Recent 3 CBC's:  Recent Labs   Lab Test 08/30/23  0535 05/08/23  0835 03/29/23  0515   WBC 4.3 3.4* 4.5   HGB 10.5* 10.2* 9.0*   MCV 87 88 87    184 220     Most Recent 3 BMP's:  Recent Labs   Lab Test 08/30/23  0535 05/08/23  0835 03/29/23  0515    141 142   POTASSIUM 4.3 4.8 4.5   CHLORIDE 109* 108* 110*   CO2 22 23 23   BUN 30.8* 33.9* 39.7*   CR 0.89 1.00* 1.11*   ANIONGAP 10 10 9   EFRAIN 9.3 10.0 9.6   * 124* 102*     Most Recent Hemoglobin A1c:  Recent Labs   Lab Test 03/29/23  0515   A1C 8.1*       Assessment/Plan:     Type 1 diabetes mellitus with diabetic macular edema of both eyes resolved after treatment (H)  Nonproliferative diabetic retinopathy (H)  Elevated glucose  Comment: for the past 3 days patients blood sugars have been over 500 at times. Today she received extra insulin early this morning.   At lunch today her BS was above readable for the monitor.  Her continuous glucose monitor is not working.   She had a UA yesterday which showed no ketones. "  She denies any signs and symptoms of illness.  Communicated with diabetic educator today who recommended giving extra aspart and increasing long acting.     Plan: given 10 units extra aspart for lunch  Increase degludec from 23 units to 30 units tonight .  Update triage in am with BS.      MED REC REQUIRED  Post Medication Reconciliation Status:  Medication reconciliation previously completed during another office visit      Electronically signed by:       TAYLA Sprague CNP on 10/18/2023 at 6:53 PM      Sincerely,        TAYLA Sprague CNP

## 2023-10-18 NOTE — TELEPHONE ENCOUNTER
Patient call:     Appointment type: RETURN DIABETES  Provider:   Return date:RESCHEDULE 03/08 [facility]  Speciality phone number: [phone]  Additional appointment(s) needed:   Additional notes:  CALLED NO VM SET UP , SENT MYC AND SENT LETTER X1  Mer Galarza on 10/18/2023 at 4:29 PM

## 2023-10-19 ENCOUNTER — TELEPHONE (OUTPATIENT)
Dept: GERIATRICS | Facility: CLINIC | Age: 63
End: 2023-10-19
Payer: MEDICARE

## 2023-10-19 ENCOUNTER — LAB REQUISITION (OUTPATIENT)
Dept: LAB | Facility: CLINIC | Age: 63
End: 2023-10-19
Payer: MEDICARE

## 2023-10-19 DIAGNOSIS — R53.1 WEAKNESS: ICD-10-CM

## 2023-10-19 DIAGNOSIS — N30.00 ACUTE CYSTITIS WITHOUT HEMATURIA: Primary | ICD-10-CM

## 2023-10-19 LAB — BACTERIA UR CULT: ABNORMAL

## 2023-10-19 RX ORDER — NITROFURANTOIN 25; 75 MG/1; MG/1
100 CAPSULE ORAL 2 TIMES DAILY
Start: 2023-10-19 | End: 2023-10-24

## 2023-10-19 NOTE — TELEPHONE ENCOUNTER
[facility] Geriatrics Lab Note     Provider: TAYLA Gagnon CNP  Facility: [facility] Facility Type:  LT    Allergies   Allergen Reactions    Amoxicillin     Sulfa Antibiotics        Labs Reviewed by provider: Urine Culture     Verbal Order/Direction given by Provider:   1) NitroFURantoin macrocrystal-monohydrate (MACROBID) 100 MG capsule 1 capsule (100 mg) PO  BID x 5 days; dx: UTI.  2) BMP and CBC on 10/20  3) Push fluids 2 L daily x 3 days  4) Increase Degludec to 30 units; call triage in the AM with glucose levels    Provider giving Order:  TAYLA Gagnon CNP    Verbal Order given to: Pamela Montoya RN

## 2023-10-20 NOTE — PROGRESS NOTES
Nurse is calling to report blood sugar is 598 tonight, earlier this evening it was reading high on the glucometer.  Long-acting insulin was increased today to 30 units.  She is asymptomatic.  Per chart review patient is type I diabetic and has been having elevated sugars secondary to presumed UTI.  Orders: Give extra 5 units NovoLog right now, check blood sugar in 1 hour to ensure it is coming down.  Push fluids.  Tiffanie Phillips, CNP

## 2023-10-23 ENCOUNTER — TELEPHONE (OUTPATIENT)
Dept: GERIATRICS | Facility: CLINIC | Age: 63
End: 2023-10-23

## 2023-10-23 RX ORDER — ACETAMINOPHEN 500 MG
1000 TABLET ORAL 3 TIMES DAILY PRN
COMMUNITY
End: 2024-02-06

## 2023-10-23 NOTE — TELEPHONE ENCOUNTER
[facility] Geriatrics Triage Nurse Telephone Encounter    Provider: TAYLA Gagnon CNP  Facility: [facility] Facility Type:  [facility]    Caller: Pamela  Call Back Number: [phone]    Allergies:    Allergies   Allergen Reactions    Amoxicillin     Sulfa Antibiotics         Reason for call: The patient is currently being treated with Tylenol 1000 mg 3 times a day scheduled for hip pain.  The patient is stating that she does not have any pain and would like to change this to 3 times a day as needed.     Verbal Order/Direction given by Provider: Change Tylenol to 1000mg tid prn     Provider giving Order:  TAYLA Gagnon CNP    Verbal Order given to: Pamela Ellison RN

## 2023-10-24 ENCOUNTER — TELEPHONE (OUTPATIENT)
Dept: ENDOCRINOLOGY | Facility: CLINIC | Age: 63
End: 2023-10-24
Payer: MEDICARE

## 2023-10-24 NOTE — PROGRESS NOTES
"Outcome for 10/24/23 3:11 PM: Glucose readings are under \"labs\" from patients recent hospitalization.   Elham Paul LPN   Outcome for 10/24/23 3:24 PM:  Assisted living facility faxing over BG reading from post hospital and medication list.   Elham Paul LPN   Outcome for 10/26/23 7:33 AM:  Data obtained via fax.   Elham Paul LPN             HPI:  Lexy is a very pleasant 62 year old woman here for hospital follow up for type 1 diabetes.  Her diabetes is complicated by gastroparesis and chronic kidney disease stage 3.  She also has hypertension, hyperlipidemia and developmental delay. She is on the video with Shayy, her nursing home staff from [place].  She usually sees Dr. Deonte Garcia and MARY Haas.  Today is the first time I am meeting her.     Patient has been having very high glucose with her recent UTI (Hospitalized 10/20-22- main sign was hyperglycemia), however she also has a history of suboptimal glucose control.  She has 3 more days of antibiotics.  She had     Current treatment strategy:   - Tresiba to 30 units daily- increased yesterday (PTA- 26 units).  - NovoLog 8B/8L/9D units at meals 3 times a day   - No snack coverage-    - sliding scale before meals/HS --   1 unit per 50 above 150 mg/dL    Checks blood sugar right before eating. Gets insulin at the time of the meal, sometimes a few minutes after.      Patient had history of suboptimal control of type 1 diabetes (previous A1c ranged between 9 to 12%).  Patient had history of diabetic ketoacidosis in 2021.  Patient had documented low C-peptide and positive angelica antibody in 3/2018. Recent A1c 8.1% in March 2023. Followed up closely with Tiffanie HERNANDEZ.      DM complications:  Retinopathy: 4/2023 -- moderate NPDR, glaucoma -- has f/up appointment 10/2023  Nephropathy: positive macroalbuminuria (test 2021), CKD stage 3   Neuropathy: some tingling and numbness in her feet -- saw podiatrist 3/3023.    She feels great.  "  Appetite has been ok.  Drinking lots of water.  Staff is concerned she is adding ice cream, dessert. She has no orders for snack coverage.     Glucose since hospital discharge:     24- 406/185/230/319  25- 548/500/312/302  26- 590/9:30- went to 471    Peanut butter sandwiches/toast if she goes low.    Fridays- have ice cream. No coverage.  Diabetes Control:   Lab Results   Component Value Date    A1C 8.1 03/29/2023    A1C 8.7 01/25/2023    A1C 10.7 10/13/2021    A1C 9.7 02/01/2021    A1C 9.2 09/30/2020    A1C 11.8 01/08/2020    A1C 11.8 05/19/2019    A1C 11.2 12/12/2018       Past Medical History:   Diagnosis Date    Cerumen impacted     Development delay     Diabetic gastroparesis (H) 8/16/2013    Glaucoma (increased eye pressure)     Hyperlipaemia     Hypertension     Hypothyroid     Mental retardation     Nonsenile cataract     Obesity     Strabismus     Type 1 diabetes mellitus not at goal (H)        Past Surgical History:   Procedure Laterality Date    COLONOSCOPY N/A 7/3/2023    Procedure: Colonoscopy;  Surgeon: Merlyn Acevedo MD;  Location:  GI    ESOPHAGOSCOPY, GASTROSCOPY, DUODENOSCOPY (EGD), COMBINED N/A 7/3/2023    Procedure: Esophagoscopy, gastroscopy, duodenoscopy (EGD), combined;  Surgeon: Merlyn Acevedo MD;  Location:  GI    STRABISMUS SURGERY      [facility] EYE SURG ANT SGMT PROC UNLISTED  remote    strabismus surgery       Family History   Problem Relation Age of Onset    Hypertension Father     Diabetes Sister     Glaucoma Maternal Grandfather     Cancer No family hx of     Cerebrovascular Disease No family hx of     Thyroid Disease No family hx of     Macular Degeneration No family hx of        Social History     Socioeconomic History    Marital status: Single   Tobacco Use    Smoking status: Never    Smokeless tobacco: Never    Tobacco comments:     lives in smoke free household.   Substance and Sexual Activity    Alcohol use: Yes     Comment: occass social    Drug use: No    Sexual activity:  Never   Other Topics Concern    Parent/sibling w/ CABG, MI or angioplasty before 65F 55M? No       Current Outpatient Medications   Medication    acetaminophen (TYLENOL) 500 MG tablet    AMLODIPINE BENZOATE PO    carvedilol (COREG) 6.25 MG tablet    ciprofloxacin (CIPRO) 250 MG tablet    Continuous Blood Gluc  (DEXCOM G7 ) KELLY    Continuous Blood Gluc Sensor (DEXCOM G7 SENSOR) MISC    dorzolamide-timolol (COSOPT) 2-0.5 % ophthalmic solution    ferrous sulfate (SLO-FE) 142 (45 Fe) MG CR tablet    glucagon 1 MG SOLR injection    insulin aspart (NOVOLOG FLEXPEN) 100 UNIT/ML pen    insulin aspart (NOVOLOG PEN) 100 UNIT/ML pen    insulin degludec (TRESIBA FLEXTOUCH) 200 UNIT/ML pen    latanoprost (XALATAN) 0.005 % ophthalmic solution    lisinopril (ZESTRIL) 40 MG tablet    magnesium oxide (MAG-OX) 400 MG tablet    MULTIVITAMIN TABS   OR    nystatin (MYCOSTATIN) 871321 UNIT/GM external cream    pantoprazole (PROTONIX) 40 MG EC tablet    potassium chloride ER (KLOR-CON M) 20 MEQ CR tablet    sennosides (SENOKOT) 8.6 MG tablet    simvastatin (ZOCOR) 20 MG tablet     No current facility-administered medications for this visit.          Allergies   Allergen Reactions    Amoxicillin     Sulfa Antibiotics        Physical Exam  LMP 03/28/2014   GENERAL: healthy, alert and no distress  RESP: no audible wheeze, cough, or visible cyanosis.  No visible retractions or increased work of breathing.  Able to speak fully in complete sentences.  PSYCH: mentation appears normal, affect normal/bright, judgement and insight intact, normal speech and appearance well-groomed  RESULTS  Lab Results   Component Value Date    A1C 8.1 (H) 03/29/2023    A1C 8.7 (H) 01/25/2023    A1C 10.7 (H) 10/13/2021    A1C 9.7 (H) 02/01/2021    A1C 9.2 (H) 09/30/2020    A1C 11.8 (H) 01/08/2020    A1C 11.8 (H) 05/19/2019    A1C 11.2 (H) 12/12/2018       TSH   Date Value Ref Range Status   10/13/2021 3.41 0.40 - 4.00 mU/L Final   02/01/2021 7.07  (H) 0.40 - 4.00 mU/L Final   01/08/2020 4.48 (H) 0.40 - 4.00 mU/L Final   10/02/2019 4.07 (H) 0.40 - 4.00 mU/L Final   07/24/2019 5.08 (H) 0.40 - 4.00 mU/L Final   12/12/2018 6.71 (H) 0.40 - 4.00 mU/L Final     T4 Free   Date Value Ref Range Status   02/01/2021 1.05 0.76 - 1.46 ng/dL Final   01/08/2020 1.08 0.76 - 1.46 ng/dL Final   10/02/2019 1.00 0.76 - 1.46 ng/dL Final   07/24/2019 1.10 0.76 - 1.46 ng/dL Final   09/28/2016 1.01 0.76 - 1.46 ng/dL Final       ALT   Date Value Ref Range Status   03/29/2023 19 10 - 35 U/L Final   01/25/2023 10 10 - 35 U/L Final   09/30/2020 18 0 - 50 U/L Final   05/19/2019 7 (L) 8 - 45 IU/L Final   ]    Recent Labs   Lab Test 01/25/23  0820 02/01/21  0909   CHOL 112 200*   HDL 37* 62   LDL 58 120*   TRIG 84 89       Lab Results   Component Value Date     08/30/2023     02/01/2021      Lab Results   Component Value Date    POTASSIUM 4.3 08/30/2023    POTASSIUM 4.2 10/13/2021    POTASSIUM 4.1 02/01/2021     Lab Results   Component Value Date    CHLORIDE 109 08/30/2023    CHLORIDE 107 10/13/2021    CHLORIDE 107 02/01/2021     Lab Results   Component Value Date    EFRAIN 9.3 08/30/2023    EFRAIN 10.3 02/01/2021     Lab Results   Component Value Date    CO2 22 08/30/2023    CO2 26 10/13/2021    CO2 32 02/01/2021     Lab Results   Component Value Date    BUN 30.8 08/30/2023    BUN 13 10/13/2021    BUN 17 02/01/2021     Lab Results   Component Value Date    CR 0.89 08/30/2023    CR 0.73 02/01/2021       GFR Estimate   Date Value Ref Range Status   08/30/2023 73 >60 mL/min/1.73m2 Final   05/08/2023 64 >60 mL/min/1.73m2 Final     Comment:     eGFR calculated using 2021 CKD-EPI equation.   03/29/2023 56 (L) >60 mL/min/1.73m2 Final     Comment:     eGFR calculated using 2021 CKD-EPI equation.   02/01/2021 90 >60 mL/min/[1.73_m2] Final     Comment:     Non  GFR Calc  Starting 12/18/2018, serum creatinine based estimated GFR (eGFR) will be   calculated using the Chronic  "Kidney Disease Epidemiology Collaboration   (CKD-EPI) equation.     09/30/2020 >90 >60 mL/min/[1.73_m2] Final     Comment:     Non  GFR Calc  Starting 12/18/2018, serum creatinine based estimated GFR (eGFR) will be   calculated using the Chronic Kidney Disease Epidemiology Collaboration   (CKD-EPI) equation.     07/12/2019 >90 >60 mL/min/[1.73_m2] Final     Comment:     Non  GFR Calc  Starting 12/18/2018, serum creatinine based estimated GFR (eGFR) will be   calculated using the Chronic Kidney Disease Epidemiology Collaboration   (CKD-EPI) equation.       GFR Estimate If Black   Date Value Ref Range Status   02/01/2021 >90 >60 mL/min/[1.73_m2] Final     Comment:      GFR Calc  Starting 12/18/2018, serum creatinine based estimated GFR (eGFR) will be   calculated using the Chronic Kidney Disease Epidemiology Collaboration   (CKD-EPI) equation.     09/30/2020 >90 >60 mL/min/[1.73_m2] Final     Comment:      GFR Calc  Starting 12/18/2018, serum creatinine based estimated GFR (eGFR) will be   calculated using the Chronic Kidney Disease Epidemiology Collaboration   (CKD-EPI) equation.     07/12/2019 >90 >60 mL/min/[1.73_m2] Final     Comment:      GFR Calc  Starting 12/18/2018, serum creatinine based estimated GFR (eGFR) will be   calculated using the Chronic Kidney Disease Epidemiology Collaboration   (CKD-EPI) equation.         Lab Results   Component Value Date    MICROL 306 02/01/2021     No results found for: \"MICROALBUMIN\"  Lab Results   Component Value Date    CPEPT 0.6 (L) 03/14/2018    GADAB >250.0 (H) 03/14/2018       Vitamin B12   Date Value Ref Range Status   02/22/2023 784 232 - 1,245 pg/mL Final   12/23/2013 455 >210 pg/mL Final     Comment:     Interp: 247-911 = Normal   08/19/2009 659 >210 pg/mL Final     Comment:     Interp: 247-911 = Normal   ]    Most recent eye exam date: : Not Found     Assessment/Plan:     1.  Type 1 " diabetes- Patient has been dealing with significant hyperglycemia with her current UTI and she was hospitalized 10/20-22, primarily because glucose was so high.  Since discharge, glucose is still quite high, so I made more aggressive changes.  She will see CDE next week for further adjustments.  Discussed when to go to hospital (see below). Encouraged her to push fluids while glucose is so high.      Fax orders to:   Shayy at [phone]    Temporary changes while glucose is high with infection:   - Continue Tresiba 30 units daily (was 26 units before hospital)  - Change NovoLog to 10 (was 8) B/10 (was 8) L/10 (was 9)D   - Snack coverage-  3 units prn. This is NEW.   - Change sliding scale to 1/40 over 120 before meals and over 200 at bedtime (was 1/50 over 150 before meals and over 200 at bedtime).     Pre-meal correction scale:   -160 = 1 unit.  -200 = 2 units.  -240 = 3 units.  -280 = 4 units.  -320 = 5 units.  -360 = 6 units.  -400 = 7 units.  -440 = 8 units.  -480 = 9 units.  -520 = 10 units.  BG >/=520 = 11 units.    Bedtime:  -240 = 1 units.  -280 = 2 units.  -320 = 3 units.  -359 = 4 units.  -399 = 5 units.  -439 = 6 units.  -479 = 7 units.  -519 = 8 units.  BG >/=520 = 9 units.    Follow up with Tiffanie monthly and in 3 months with me.     Emergency issues: Here are some concerns you should contact us about.  -Vomiting: more than twice.  Please check ketones.  If positive, go to ER. Monitor glucose hourly.   -High glucose (over 300 mg/dL twice in a row): Please check ketones.  If ketones are negative, take an insulin correction and recheck glucose in 1 hour.  If glucose is not coming down, please call the clinic. If ketones are moderate or large, drink lots of water, take an insulin correction 1.5 times your usual correction, and recheck ketones in 1 hour.  If ketones are still present (or you are  vomiting), go to the ER.  -Hypoglycemia (low glucose):   If glucose is less than 70 mg/dL, treat with 15g carb (4 glucose tablets), recheck glucose in 15 minutes.  If low again, repeat.   If glucose is less than 54 mg/dL, treat with 30g carb, recheck glucose in 15 minutes.  If low again, repeat.  Keep glucagon in your home in case of severe hypoglycemia and train someone how to use this.    Emergency kit (please ensure you always have these with you):   Glucose tablets  Glucagon  Insulin  Syringes/needles   Extra infusion set (if on a pump)  Ketone strips    Contact information:   If you have concerns, please send me a eSentire message or call the clinic at [phone].  For more urgent concerns, please call [phone] after hours/weekends and ask to speak with the endocrinologist on call.      Please let me know if you are having low blood sugars less than 70 or over 400 mg/dL.  Do not wait until your next appointment if this is happening.       2.   F/U in DM education next week, and at least monthly going forward.  Will f/up in 3 mos with me, sooner with concerns/hypoglycemia.  Staff will fax glucose with concerns.    62 minutes spent on the date of the encounter doing chart review, review of test results, review of continuous glucose sensor, insulin pump data, interpretation of glucose data, patient visit and documentation, counseling/coordination of care, and discussion of follow up plan for worsening hyper and hypoglycemia.  The patient understood and is satisfied with today's visit.          Jaylene Perez PA-C, MPAS   [facility]  Department of Medicine  Division of Endocrinology and Diabetes

## 2023-10-24 NOTE — TELEPHONE ENCOUNTER
Spoke with nurse manager and was having issues with dexcom device. They just applied a new sensor and is unable to upload device. Will fax over readings and med list tomorrow.    Elham Paul LPN 10/24/23 3:14 PM

## 2023-10-24 NOTE — TELEPHONE ENCOUNTER
Patient call:      Appointment type: RETURN DIABETES  Provider:   Return date:RESCHEDULE 03/08 APPT (CANCELLED)  Speciality phone number: [phone]  Additional appointment(s) needed:   Additional notes: spoke with pt and then called nurse number in appt notes LVM, sent myc, and letter x2 (FINAL ATTEMPT)     Reji Garcia on 10/24/2023 at 10:04 AM

## 2023-10-25 ENCOUNTER — NURSING HOME VISIT (OUTPATIENT)
Dept: GERIATRICS | Facility: CLINIC | Age: 63
End: 2023-10-25
Payer: MEDICARE

## 2023-10-25 VITALS
BODY MASS INDEX: 30.19 KG/M2 | DIASTOLIC BLOOD PRESSURE: 67 MMHG | OXYGEN SATURATION: 94 % | HEART RATE: 68 BPM | SYSTOLIC BLOOD PRESSURE: 142 MMHG | RESPIRATION RATE: 18 BRPM | HEIGHT: 63 IN | WEIGHT: 170.4 LBS | TEMPERATURE: 97.7 F

## 2023-10-25 DIAGNOSIS — E10.37X3 TYPE 1 DIABETES MELLITUS WITH DIABETIC MACULAR EDEMA OF BOTH EYES RESOLVED AFTER TREATMENT (H): ICD-10-CM

## 2023-10-25 DIAGNOSIS — R73.09 ELEVATED GLUCOSE: Primary | ICD-10-CM

## 2023-10-25 DIAGNOSIS — Z97.8 USES SELF-APPLIED CONTINUOUS GLUCOSE MONITORING DEVICE: ICD-10-CM

## 2023-10-25 PROCEDURE — 99309 SBSQ NF CARE MODERATE MDM 30: CPT | Performed by: NURSE PRACTITIONER

## 2023-10-25 RX ORDER — INSULIN DEGLUDEC 200 U/ML
30 INJECTION, SOLUTION SUBCUTANEOUS DAILY
Start: 2023-10-25 | End: 2023-11-01

## 2023-10-25 RX ORDER — SENNOSIDES 8.6 MG
1 TABLET ORAL DAILY PRN
COMMUNITY

## 2023-10-25 RX ORDER — INSULIN ASPART 100 [IU]/ML
INJECTION, SOLUTION INTRAVENOUS; SUBCUTANEOUS
Start: 2023-10-25 | End: 2023-10-26

## 2023-10-25 RX ORDER — CIPROFLOXACIN 250 MG/1
250 TABLET, FILM COATED ORAL 2 TIMES DAILY
COMMUNITY
End: 2023-10-29

## 2023-10-25 NOTE — PROGRESS NOTES
[facility] GERIATRICS      Visit Type: No chief complaint on file.     Facility:   No question data found.         History of Present Illness: Lexy Rockwell is a 63 year old female   Blood sugars continue to be very labile from 185 at lunch on 10/24/23 to 548 today before breakfast.    She was in the hospital last week with elevated blood sugars.    Pt's weight is up to 170 which is a 10% weight gain in 6 months - compared to 157 (4/2023)  Pt was treated for UTI 1 week ago with no recurrent symptoms.     Current Outpatient Medications   Medication Sig Dispense Refill    acetaminophen (TYLENOL) 500 MG tablet Take 1,000 mg by mouth 3 times daily as needed for mild pain      AMLODIPINE BENZOATE PO Take 2.5 mg by mouth      carvedilol (COREG) 6.25 MG tablet Take 1 tablet (6.25 mg) by mouth 2 times daily (with meals)      Continuous Blood Gluc  (DEXCOM G7 ) KELLY Use to read blood sugars as per 's instructions. 1 each 0    Continuous Blood Gluc Sensor (DEXCOM G7 SENSOR) MISC Change every 10 days. 3 each 5    dorzolamide-timolol (COSOPT) 2-0.5 % ophthalmic solution Place 1 drop into both eyes 2 times daily 5 mL 11    ferrous sulfate (SLO-FE) 142 (45 Fe) MG CR tablet Take 1 tablet (142 mg) by mouth daily      glucagon 1 MG SOLR injection Inject 1 mg into the muscle once      insulin aspart (NOVOLOG FLEXPEN) 100 UNIT/ML pen Inject 7 units before breakfast, 9 units before lunch and 11 units before dinner And sliding scale: 1 unit: 150 -199 mg/dl. 200-249= 2 units 250-299 3 units 300-249 = 4 units 350 - 399= 5 units 400+ = 6 units and call MD Up to 30 units daily 15 mL 3    insulin aspart (NOVOLOG PEN) 100 UNIT/ML pen Bedtime sliding scale 250-299 1 units 300-249 = 2 units 350 - 399= 3 units 400+ = 4 units 15 mL 0    insulin degludec (TRESIBA FLEXTOUCH) 200 UNIT/ML pen Inject 23 Units Subcutaneous daily 15 mL 3    latanoprost (XALATAN) 0.005 % ophthalmic solution INSTILL 1 DROP IN  BOTH EYES AT BEDTIME 10 mL 3    lisinopril (ZESTRIL) 40 MG tablet Take 1 tablet (40 mg) by mouth daily      magnesium oxide (MAG-OX) 400 MG tablet Take 1 tablet (400 mg) by mouth daily 90 tablet 3    MULTIVITAMIN TABS   OR 1 TABLET DAILY      nystatin (MYCOSTATIN) 079078 UNIT/GM external cream Apply topically 2 times daily as needed for dry skin To feet and toenails.      pantoprazole (PROTONIX) 40 MG EC tablet Take 40 mg by mouth daily      potassium chloride ER (KLOR-CON M) 20 MEQ CR tablet Take 20 mEq by mouth daily      simvastatin (ZOCOR) 20 MG tablet Take 1 tablet (20 mg) by mouth At Bedtime 90 tablet 3    STOOL SOFTENER/LAXATIVE 50-8.6 MG tablet Take 1 tablet by mouth daily         ALLERGIES:Amoxicillin and Sulfa antibiotics    Vitals:  [facility] 03/28/2014   There is no height or weight on file to calculate BMI.    Review of Systems   All other systems reviewed and are negative.         Physical Exam  Vitals and nursing note reviewed.   Constitutional:       Appearance: Normal appearance.   Pulmonary:      Effort: Pulmonary effort is normal.      Breath sounds: Normal breath sounds.   Neurological:      Mental Status: She is alert. Mental status is at baseline.   Psychiatric:         Mood and Affect: Mood normal.         Behavior: Behavior normal.         Thought Content: Thought content normal.         Judgment: Judgment normal.       Labs:   Labs reviewed in [facility] care everywhere  Most Recent 3 CBC's:  Recent Labs   Lab Test 08/30/23  0535 05/08/23  0835 03/29/23  0515   WBC 4.3 3.4* 4.5   HGB 10.5* 10.2* 9.0*   MCV 87 88 87    184 220     Most Recent 3 BMP's:  Recent Labs   Lab Test 08/30/23  0535 05/08/23  0835 03/29/23  0515    141 142   POTASSIUM 4.3 4.8 4.5   CHLORIDE 109* 108* 110*   CO2 22 23 23   BUN 30.8* 33.9* 39.7*   CR 0.89 1.00* 1.11*   ANIONGAP 10 10 9   EFRAIN 9.3 10.0 9.6   * 124* 102*     Most Recent 2 LFT's:  Recent Labs   Lab Test 03/29/23  0515 01/25/23  0820   AST 20 19   ALT  19 10   ALKPHOS 73 79   BILITOTAL <0.2 <0.2       Most Recent Hemoglobin A1c:  Recent Labs   Lab Test 03/29/23  0515   A1C 8.1*              Assessment/Plan:  Type 1 diabetes mellitus with diabetic macular edema of both eyes resolved after treatment (H)  Elevated glucose  Uses self-applied continuous glucose monitoring device  Comment: Pt's BS has been elevated x 2 weeks.  She was better controlled prior.  Last week was treated for UTI, went to the hospital, now on cipro and she is asymptomatic.  She does not feel ill.  She does not know why her BS is so high.  Today for breakfast and lunch it was 500.    Unfortunately when pt went to the hospital on 10/21,   Her insulins at breakfast had been 7 units now is 8 units  Her insulin Aspart is 8 units at lunch and is now 9 units  her insulin at dinner was reduced from 11 units to 9 units  She was getting Degludec 23 units until 10/19 when she received 30 units.   On 10/22, 23, 24 she received Degludec 25 units  Communicated with DM educator today with no recommendations as pt seeing MD on 10/26/23  Plan:  Stop current Degluduc insulin  Start insulin degludec (TRESIBA FLEXTOUCH) 200 UNIT/ML pen; Inject 30 Units Subcutaneous daily  Due to BS being 500 give insulin aspart (NOVOLOG PEN) 100 UNIT/ML pen; Inject 10 Units Subcutaneous once for 1 dose  Pt see endocrinology on 10/26/23          Electronically signed by:    TAYLA Sprague CNP on 10/25/2023 at 7:24 PM      MEDICATIONS:  MED REC REQUIRED  Post Medication Reconciliation Status:  Discharge medications reconciled and changed, see notes/orders

## 2023-10-25 NOTE — LETTER
10/25/2023        RE: Lexy Rockwell  [address]  [facility] [address]         [facility] [place] [facility]      Visit Type: No chief complaint on file.     Facility:   No question data found.         History of Present Illness: Lexy Rockwell is a 63 year old female   Blood sugars continue to be very labile from 185 at lunch on 10/24/23 to 548 today before breakfast.    She was in the hospital last week with elevated blood sugars.    Pt's weight is up to 170 which is a 10% weight gain in 6 months - compared to 157 (4/2023)  Pt was treated for UTI 1 week ago with no recurrent symptoms.     Current Outpatient Medications   Medication Sig Dispense Refill     acetaminophen (TYLENOL) 500 MG tablet Take 1,000 mg by mouth 3 times daily as needed for mild pain       AMLODIPINE BENZOATE PO Take 2.5 mg by mouth       carvedilol (COREG) 6.25 MG tablet Take 1 tablet (6.25 mg) by mouth 2 times daily (with meals)       Continuous Blood Gluc  (DEXCOM G7 ) KELLY Use to read blood sugars as per 's instructions. 1 each 0     Continuous Blood Gluc Sensor (DEXCOM G7 SENSOR) MISC Change every 10 days. 3 each 5     dorzolamide-timolol (COSOPT) 2-0.5 % ophthalmic solution Place 1 drop into both eyes 2 times daily 5 mL 11     ferrous sulfate (SLO-FE) 142 (45 Fe) MG CR tablet Take 1 tablet (142 mg) by mouth daily       glucagon 1 MG SOLR injection Inject 1 mg into the muscle once       insulin aspart (NOVOLOG FLEXPEN) 100 UNIT/ML pen Inject 7 units before breakfast, 9 units before lunch and 11 units before dinner And sliding scale: 1 unit: 150 -199 mg/dl. 200-249= 2 units 250-299 3 units 300-249 = 4 units 350 - 399= 5 units 400+ = 6 units and call MD Up to 30 units daily 15 mL 3     insulin aspart (NOVOLOG PEN) 100 UNIT/ML pen Bedtime sliding scale 250-299 1 units 300-249 = 2 units 350 - 399= 3 units 400+ = 4 units 15 mL 0     insulin degludec (TRESIBA FLEXTOUCH) 200 UNIT/ML pen Inject  23 Units Subcutaneous daily 15 mL 3     latanoprost (XALATAN) 0.005 % ophthalmic solution INSTILL 1 DROP IN BOTH EYES AT BEDTIME 10 mL 3     lisinopril (ZESTRIL) 40 MG tablet Take 1 tablet (40 mg) by mouth daily       magnesium oxide (MAG-OX) 400 MG tablet Take 1 tablet (400 mg) by mouth daily 90 tablet 3     MULTIVITAMIN TABS   OR 1 TABLET DAILY       nystatin (MYCOSTATIN) 277946 UNIT/GM external cream Apply topically 2 times daily as needed for dry skin To feet and toenails.       pantoprazole (PROTONIX) 40 MG EC tablet Take 40 mg by mouth daily       potassium chloride ER (KLOR-CON M) 20 MEQ CR tablet Take 20 mEq by mouth daily       simvastatin (ZOCOR) 20 MG tablet Take 1 tablet (20 mg) by mouth At Bedtime 90 tablet 3     STOOL SOFTENER/LAXATIVE 50-8.6 MG tablet Take 1 tablet by mouth daily         ALLERGIES:Amoxicillin and Sulfa antibiotics    Vitals:  [facility] 03/28/2014   There is no height or weight on file to calculate BMI.    Review of Systems   All other systems reviewed and are negative.         Physical Exam  Vitals and nursing note reviewed.   Constitutional:       Appearance: Normal appearance.   Pulmonary:      Effort: Pulmonary effort is normal.      Breath sounds: Normal breath sounds.   Neurological:      Mental Status: She is alert. Mental status is at baseline.   Psychiatric:         Mood and Affect: Mood normal.         Behavior: Behavior normal.         Thought Content: Thought content normal.         Judgment: Judgment normal.       Labs:   Labs reviewed in [facility] care everywhere  Most Recent 3 CBC's:  Recent Labs   Lab Test 08/30/23  0535 05/08/23  0835 03/29/23  0515   WBC 4.3 3.4* 4.5   HGB 10.5* 10.2* 9.0*   MCV 87 88 87    184 220     Most Recent 3 BMP's:  Recent Labs   Lab Test 08/30/23  0535 05/08/23  0835 03/29/23  0515    141 142   POTASSIUM 4.3 4.8 4.5   CHLORIDE 109* 108* 110*   CO2 22 23 23   BUN 30.8* 33.9* 39.7*   CR 0.89 1.00* 1.11*   ANIONGAP 10 10 9   EFRAIN 9.3 10.0 9.6    * 124* 102*     Most Recent 2 LFT's:  Recent Labs   Lab Test 03/29/23  0515 01/25/23  0820   AST 20 19   ALT 19 10   ALKPHOS 73 79   BILITOTAL <0.2 <0.2       Most Recent Hemoglobin A1c:  Recent Labs   Lab Test 03/29/23  0515   A1C 8.1*              Assessment/Plan:  Type 1 diabetes mellitus with diabetic macular edema of both eyes resolved after treatment (H)  Elevated glucose  Uses self-applied continuous glucose monitoring device  Comment: Pt's BS has been elevated x 2 weeks.  She was better controlled prior.  Last week was treated for UTI, went to the hospital, now on cipro and she is asymptomatic.  She does not feel ill.  She does not know why her BS is so high.  Today for breakfast and lunch it was 500.    Unfortunately when pt went to the hospital on 10/21,   Her insulins at breakfast had been 7 units now is 8 units  Her insulin Aspart is 8 units at lunch and is now 9 units  her insulin at dinner was reduced from 11 units to 9 units  She was getting Degludec 23 units until 10/19 when she received 30 units.   On 10/22, 23, 24 she received Degludec 25 units  Communicated with DM educator today with no recommendations as pt seeing MD on 10/26/23  Plan:  Stop current Degluduc insulin  Start insulin degludec (TRESIBA FLEXTOUCH) 200 UNIT/ML pen; Inject 30 Units Subcutaneous daily  Due to BS being 500 give insulin aspart (NOVOLOG PEN) 100 UNIT/ML pen; Inject 10 Units Subcutaneous once for 1 dose  Pt see endocrinology on 10/26/23          Electronically signed by:    TAYLA Sprague CNP on 10/25/2023 at 7:24 PM      MEDICATIONS:  MED REC REQUIRED  Post Medication Reconciliation Status:  Discharge medications reconciled and changed, see notes/orders                Sincerely,        TAYLA Sprague CNP

## 2023-10-26 ENCOUNTER — TELEPHONE (OUTPATIENT)
Dept: ENDOCRINOLOGY | Facility: CLINIC | Age: 63
End: 2023-10-26

## 2023-10-26 ENCOUNTER — VIRTUAL VISIT (OUTPATIENT)
Dept: ENDOCRINOLOGY | Facility: CLINIC | Age: 63
End: 2023-10-26
Payer: MEDICARE

## 2023-10-26 DIAGNOSIS — E10.37X3 TYPE 1 DIABETES MELLITUS WITH DIABETIC MACULAR EDEMA OF BOTH EYES RESOLVED AFTER TREATMENT (H): ICD-10-CM

## 2023-10-26 PROCEDURE — 99215 OFFICE O/P EST HI 40 MIN: CPT | Mod: VID | Performed by: PHYSICIAN ASSISTANT

## 2023-10-26 RX ORDER — INSULIN DEGLUDEC 100 U/ML
30 INJECTION, SOLUTION SUBCUTANEOUS DAILY
Qty: 30 ML | Refills: 3 | Status: SHIPPED | OUTPATIENT
Start: 2023-10-26 | End: 2023-10-30

## 2023-10-26 RX ORDER — URINE ACETONE TEST STRIPS
STRIP MISCELLANEOUS
Qty: 50 STRIP | Refills: 3 | Status: SHIPPED | OUTPATIENT
Start: 2023-10-26

## 2023-10-26 RX ORDER — INSULIN ASPART 100 [IU]/ML
INJECTION, SOLUTION INTRAVENOUS; SUBCUTANEOUS
Qty: 30 ML | Refills: 3 | Status: SHIPPED | OUTPATIENT
Start: 2023-10-26 | End: 2023-10-30

## 2023-10-26 NOTE — Clinical Note
Cristi Moreno.  I just met Lexy for the first time today.  She was hospitalized for UTI and glucose is still really high.  I just increased her insulin more aggressively, knowing that she has close follow up with you next week.  I suspect that we will need to go back to her pre-UTI insulin doses once her infection clears, or she will have hypoglycemia.  Would you be able to schedule frequent follow up after your next appt as we transition her dosing?  Thank you! Jaylene

## 2023-10-26 NOTE — PATIENT INSTRUCTIONS
Fax orders to:   Shayy at [phone]    Temporary changes while glucose is high with infection:   - Continue Tresiba 30 units daily (was 26 units before hospital)  - Change NovoLog to 10 (was 8) B/10 (was 8) L/10 (was 9)D   - Snack coverage-  3 units prn. This is NEW.   - Change sliding scale to 1/40 over 120 before meals and over 200 at bedtime (was 1/50 over 150 before meals and over 200 at bedtime).     Pre-meal correction scale:   -160 = 1 unit.  -200 = 2 units.  -240 = 3 units.  -280 = 4 units.  -320 = 5 units.  -360 = 6 units.  -400 = 7 units.  -440 = 8 units.  -480 = 9 units.  -520 = 10 units.  BG >/=520 = 11 units.    Bedtime:  -240 = 1 units.  -280 = 2 units.  -320 = 3 units.  -359 = 4 units.  -399 = 5 units.  -439 = 6 units.  -479 = 7 units.  -519 = 8 units.  BG >/=520 = 9 units.    Follow up with Tiffanie monthly and in 3 months with me.     Emergency issues: Here are some concerns you should contact us about.  -Vomiting: more than twice.  Please check ketones.  If positive, go to ER. Monitor glucose hourly.   -High glucose (over 300 mg/dL twice in a row): Please check ketones.  If ketones are negative, take an insulin correction and recheck glucose in 1 hour.  If glucose is not coming down, please call the clinic. If ketones are moderate or large, drink lots of water, take an insulin correction 1.5 times your usual correction, and recheck ketones in 1 hour.  If ketones are still present (or you are vomiting), go to the ER.  -Hypoglycemia (low glucose):   If glucose is less than 70 mg/dL, treat with 15g carb (4 glucose tablets), recheck glucose in 15 minutes.  If low again, repeat.   If glucose is less than 54 mg/dL, treat with 30g carb, recheck glucose in 15 minutes.  If low again, repeat.  Keep glucagon in your home in case of severe hypoglycemia and train someone how to use this.    Emergency kit  (please ensure you always have these with you):   Glucose tablets  Glucagon  Insulin  Syringes/needles   Extra infusion set (if on a pump)  Ketone strips    Contact information:   If you have concerns, please send me a [employer] message or call the clinic at [phone].  For more urgent concerns, please call [phone] after hours/weekends and ask to speak with the endocrinologist on call.      Please let me know if you are having low blood sugars less than 70 or over 400 mg/dL.  Do not wait until your next appointment if this is happening.

## 2023-10-26 NOTE — LETTER
"10/26/2023       RE: Lexy Rockwell  [address]  [facility] [address]     Dear Colleague,    Thank you for referring your patient, Lexy Rockwell, to the [facility] [place] [facility]. Please see a copy of my visit note below.    Outcome for 10/24/23 3:11 PM: Glucose readings are under \"labs\" from patients recent hospitalization.   Elham Paul LPN   Outcome for 10/24/23 3:24 PM:  Assisted living facility faxing over BG reading from post hospital and medication list.   Elham Paul LPN   Outcome for 10/26/23 7:33 AM:  Data obtained via fax.   Elham Paul LPN             HPI:  Lexy is a very pleasant 62 year old woman here for hospital follow up for type 1 diabetes.  Her diabetes is complicated by gastroparesis and chronic kidney disease stage 3.  She also has hypertension, hyperlipidemia and developmental delay. She is on the video with Shayy, her nursing home staff from [place].  She usually sees Dr. Deonte Garcia and MARY Haas.  Today is the first time I am meeting her.     Patient has been having very high glucose with her recent UTI (Hospitalized 10/20-22- main sign was hyperglycemia), however she also has a history of suboptimal glucose control.  She has 3 more days of antibiotics.  She had     Current treatment strategy:   - Tresiba to 30 units daily- increased yesterday (PTA- 26 units).  - NovoLog 8B/8L/9D units at meals 3 times a day   - No snack coverage-    - sliding scale before meals/HS --   1 unit per 50 above 150 mg/dL    Checks blood sugar right before eating. Gets insulin at the time of the meal, sometimes a few minutes after.      Patient had history of suboptimal control of type 1 diabetes (previous A1c ranged between 9 to 12%).  Patient had history of diabetic ketoacidosis in 2021.  Patient had documented low C-peptide and positive angelica antibody in 3/2018. Recent A1c " 8.1% in March 2023. Followed up closely with MARY, Tiffanie Mcfarland.      DM complications:  Retinopathy: 4/2023 -- moderate NPDR, glaucoma -- has f/up appointment 10/2023  Nephropathy: positive macroalbuminuria (test 2021), CKD stage 3   Neuropathy: some tingling and numbness in her feet -- saw podiatrist 3/3023.    She feels great.   Appetite has been ok.  Drinking lots of water.  Staff is concerned she is adding ice cream, dessert. She has no orders for snack coverage.     Glucose since hospital discharge:     24- 406/185/230/319  25- 548/500/312/302  26- 590/9:30- went to 471    Peanut butter sandwiches/toast if she goes low.    Fridays- have ice cream. No coverage.  Diabetes Control:   Lab Results   Component Value Date    A1C 8.1 03/29/2023    A1C 8.7 01/25/2023    A1C 10.7 10/13/2021    A1C 9.7 02/01/2021    A1C 9.2 09/30/2020    A1C 11.8 01/08/2020    A1C 11.8 05/19/2019    A1C 11.2 12/12/2018       Past Medical History:   Diagnosis Date    Cerumen impacted     Development delay     Diabetic gastroparesis (H) 8/16/2013    Glaucoma (increased eye pressure)     Hyperlipaemia     Hypertension     Hypothyroid     Mental retardation     Nonsenile cataract     Obesity     Strabismus     Type 1 diabetes mellitus not at goal (H)        Past Surgical History:   Procedure Laterality Date    COLONOSCOPY N/A 7/3/2023    Procedure: Colonoscopy;  Surgeon: Merlyn Acevedo MD;  Location:  GI    ESOPHAGOSCOPY, GASTROSCOPY, DUODENOSCOPY (EGD), COMBINED N/A 7/3/2023    Procedure: Esophagoscopy, gastroscopy, duodenoscopy (EGD), combined;  Surgeon: Merlyn Acevedo MD;  Location:  GI    STRABISMUS SURGERY      [facility] EYE SURG ANT [facility] PROC UNLISTED  remote    strabismus surgery       Family History   Problem Relation Age of Onset    Hypertension Father     Diabetes Sister     Glaucoma Maternal Grandfather     Cancer No family hx of     Cerebrovascular Disease No family hx of     Thyroid Disease No family hx of     Macular  Degeneration No family hx of        Social History     Socioeconomic History    Marital status: Single   Tobacco Use    Smoking status: Never    Smokeless tobacco: Never    Tobacco comments:     lives in smoke free household.   Substance and Sexual Activity    Alcohol use: Yes     Comment: occass social    Drug use: No    Sexual activity: Never   Other Topics Concern    Parent/sibling w/ CABG, MI or angioplasty before 65F 55M? No       Current Outpatient Medications   Medication    acetaminophen (TYLENOL) 500 MG tablet    AMLODIPINE BENZOATE PO    carvedilol (COREG) 6.25 MG tablet    ciprofloxacin (CIPRO) 250 MG tablet    Continuous Blood Gluc  (DEXCOM G7 ) KELLY    Continuous Blood Gluc Sensor (DEXCOM G7 SENSOR) MISC    dorzolamide-timolol (COSOPT) 2-0.5 % ophthalmic solution    ferrous sulfate (SLO-FE) 142 (45 Fe) MG CR tablet    glucagon 1 MG SOLR injection    insulin aspart (NOVOLOG FLEXPEN) 100 UNIT/ML pen    insulin aspart (NOVOLOG PEN) 100 UNIT/ML pen    insulin degludec (TRESIBA FLEXTOUCH) 200 UNIT/ML pen    latanoprost (XALATAN) 0.005 % ophthalmic solution    lisinopril (ZESTRIL) 40 MG tablet    magnesium oxide (MAG-OX) 400 MG tablet    MULTIVITAMIN TABS   OR    nystatin (MYCOSTATIN) 299013 UNIT/GM external cream    pantoprazole (PROTONIX) 40 MG EC tablet    potassium chloride ER (KLOR-CON M) 20 MEQ CR tablet    sennosides (SENOKOT) 8.6 MG tablet    simvastatin (ZOCOR) 20 MG tablet     No current facility-administered medications for this visit.          Allergies   Allergen Reactions    Amoxicillin     Sulfa Antibiotics        Physical Exam  LMP 03/28/2014   GENERAL: healthy, alert and no distress  RESP: no audible wheeze, cough, or visible cyanosis.  No visible retractions or increased work of breathing.  Able to speak fully in complete sentences.  PSYCH: mentation appears normal, affect normal/bright, judgement and insight intact, normal speech and appearance well-groomed  RESULTS  Lab  Results   Component Value Date    A1C 8.1 (H) 03/29/2023    A1C 8.7 (H) 01/25/2023    A1C 10.7 (H) 10/13/2021    A1C 9.7 (H) 02/01/2021    A1C 9.2 (H) 09/30/2020    A1C 11.8 (H) 01/08/2020    A1C 11.8 (H) 05/19/2019    A1C 11.2 (H) 12/12/2018       TSH   Date Value Ref Range Status   10/13/2021 3.41 0.40 - 4.00 mU/L Final   02/01/2021 7.07 (H) 0.40 - 4.00 mU/L Final   01/08/2020 4.48 (H) 0.40 - 4.00 mU/L Final   10/02/2019 4.07 (H) 0.40 - 4.00 mU/L Final   07/24/2019 5.08 (H) 0.40 - 4.00 mU/L Final   12/12/2018 6.71 (H) 0.40 - 4.00 mU/L Final     T4 Free   Date Value Ref Range Status   02/01/2021 1.05 0.76 - 1.46 ng/dL Final   01/08/2020 1.08 0.76 - 1.46 ng/dL Final   10/02/2019 1.00 0.76 - 1.46 ng/dL Final   07/24/2019 1.10 0.76 - 1.46 ng/dL Final   09/28/2016 1.01 0.76 - 1.46 ng/dL Final       ALT   Date Value Ref Range Status   03/29/2023 19 10 - 35 U/L Final   01/25/2023 10 10 - 35 U/L Final   09/30/2020 18 0 - 50 U/L Final   05/19/2019 7 (L) 8 - 45 IU/L Final   ]    Recent Labs   Lab Test 01/25/23  0820 02/01/21  0909   CHOL 112 200*   HDL 37* 62   LDL 58 120*   TRIG 84 89       Lab Results   Component Value Date     08/30/2023     02/01/2021      Lab Results   Component Value Date    POTASSIUM 4.3 08/30/2023    POTASSIUM 4.2 10/13/2021    POTASSIUM 4.1 02/01/2021     Lab Results   Component Value Date    CHLORIDE 109 08/30/2023    CHLORIDE 107 10/13/2021    CHLORIDE 107 02/01/2021     Lab Results   Component Value Date    EFRAIN 9.3 08/30/2023    EFRAIN 10.3 02/01/2021     Lab Results   Component Value Date    CO2 22 08/30/2023    CO2 26 10/13/2021    CO2 32 02/01/2021     Lab Results   Component Value Date    BUN 30.8 08/30/2023    BUN 13 10/13/2021    BUN 17 02/01/2021     Lab Results   Component Value Date    CR 0.89 08/30/2023    CR 0.73 02/01/2021       GFR Estimate   Date Value Ref Range Status   08/30/2023 73 >60 mL/min/1.73m2 Final   05/08/2023 64 >60 mL/min/1.73m2 Final     Comment:     eGFR  "calculated using 2021 CKD-EPI equation.   03/29/2023 56 (L) >60 mL/min/1.73m2 Final     Comment:     eGFR calculated using 2021 CKD-EPI equation.   02/01/2021 90 >60 mL/min/[1.73_m2] Final     Comment:     Non  GFR Calc  Starting 12/18/2018, serum creatinine based estimated GFR (eGFR) will be   calculated using the Chronic Kidney Disease Epidemiology Collaboration   (CKD-EPI) equation.     09/30/2020 >90 >60 mL/min/[1.73_m2] Final     Comment:     Non  GFR Calc  Starting 12/18/2018, serum creatinine based estimated GFR (eGFR) will be   calculated using the Chronic Kidney Disease Epidemiology Collaboration   (CKD-EPI) equation.     07/12/2019 >90 >60 mL/min/[1.73_m2] Final     Comment:     Non  GFR Calc  Starting 12/18/2018, serum creatinine based estimated GFR (eGFR) will be   calculated using the Chronic Kidney Disease Epidemiology Collaboration   (CKD-EPI) equation.       GFR Estimate If Black   Date Value Ref Range Status   02/01/2021 >90 >60 mL/min/[1.73_m2] Final     Comment:      GFR Calc  Starting 12/18/2018, serum creatinine based estimated GFR (eGFR) will be   calculated using the Chronic Kidney Disease Epidemiology Collaboration   (CKD-EPI) equation.     09/30/2020 >90 >60 mL/min/[1.73_m2] Final     Comment:      GFR Calc  Starting 12/18/2018, serum creatinine based estimated GFR (eGFR) will be   calculated using the Chronic Kidney Disease Epidemiology Collaboration   (CKD-EPI) equation.     07/12/2019 >90 >60 mL/min/[1.73_m2] Final     Comment:      GFR Calc  Starting 12/18/2018, serum creatinine based estimated GFR (eGFR) will be   calculated using the Chronic Kidney Disease Epidemiology Collaboration   (CKD-EPI) equation.         Lab Results   Component Value Date    MICROL 306 02/01/2021     No results found for: \"MICROALBUMIN\"  Lab Results   Component Value Date    CPEPT 0.6 (L) 03/14/2018    GADAB >250.0 " (H) 03/14/2018       Vitamin B12   Date Value Ref Range Status   02/22/2023 784 232 - 1,245 pg/mL Final   12/23/2013 455 >210 pg/mL Final     Comment:     Interp: 247-911 = Normal   08/19/2009 659 >210 pg/mL Final     Comment:     Interp: 247-911 = Normal   ]    Most recent eye exam date: : Not Found     Assessment/Plan:     1.  Type 1 diabetes- Patient has been dealing with significant hyperglycemia with her current UTI and she was hospitalized 10/20-22, primarily because glucose was so high.  Since discharge, glucose is still quite high, so I made more aggressive changes.  She will see CDE next week for further adjustments.  Discussed when to go to hospital (see below). Encouraged her to push fluids while glucose is so high.      Fax orders to:   Shayy at [phone]    Temporary changes while glucose is high with infection:   - Continue Tresiba 30 units daily (was 26 units before hospital)  - Change NovoLog to 10 (was 8) B/10 (was 8) L/10 (was 9)D   - Snack coverage-  3 units prn. This is NEW.   - Change sliding scale to 1/40 over 120 before meals and over 200 at bedtime (was 1/50 over 150 before meals and over 200 at bedtime).     Pre-meal correction scale:   -160 = 1 unit.  -200 = 2 units.  -240 = 3 units.  -280 = 4 units.  -320 = 5 units.  -360 = 6 units.  -400 = 7 units.  -440 = 8 units.  -480 = 9 units.  -520 = 10 units.  BG >/=520 = 11 units.    Bedtime:  -240 = 1 units.  -280 = 2 units.  -320 = 3 units.  -359 = 4 units.  -399 = 5 units.  -439 = 6 units.  -479 = 7 units.  -519 = 8 units.  BG >/=520 = 9 units.    Follow up with Tiffanie monthly and in 3 months with me.     Emergency issues: Here are some concerns you should contact us about.  -Vomiting: more than twice.  Please check ketones.  If positive, go to ER. Monitor glucose hourly.   -High glucose (over 300 mg/dL twice in a row): Please check  ketones.  If ketones are negative, take an insulin correction and recheck glucose in 1 hour.  If glucose is not coming down, please call the clinic. If ketones are moderate or large, drink lots of water, take an insulin correction 1.5 times your usual correction, and recheck ketones in 1 hour.  If ketones are still present (or you are vomiting), go to the ER.  -Hypoglycemia (low glucose):   If glucose is less than 70 mg/dL, treat with 15g carb (4 glucose tablets), recheck glucose in 15 minutes.  If low again, repeat.   If glucose is less than 54 mg/dL, treat with 30g carb, recheck glucose in 15 minutes.  If low again, repeat.  Keep glucagon in your home in case of severe hypoglycemia and train someone how to use this.    Emergency kit (please ensure you always have these with you):   Glucose tablets  Glucagon  Insulin  Syringes/needles   Extra infusion set (if on a pump)  Ketone strips    Contact information:   If you have concerns, please send me a [employer] message or call the clinic at [phone].  For more urgent concerns, please call [phone] after hours/weekends and ask to speak with the endocrinologist on call.      Please let me know if you are having low blood sugars less than 70 or over 400 mg/dL.  Do not wait until your next appointment if this is happening.       2.   F/U in DM education next week, and at least monthly going forward.  Will f/up in 3 mos with me, sooner with concerns/hypoglycemia.  Staff will fax glucose with concerns.    62 minutes spent on the date of the encounter doing chart review, review of test results, review of continuous glucose sensor, insulin pump data, interpretation of glucose data, patient visit and documentation, counseling/coordination of care, and discussion of follow up plan for worsening hyper and hypoglycemia.  The patient understood and is satisfied with today's visit.          Jaylene Perez PA-C, MPAS   [facility]  Department of Medicine  Division  of Endocrinology and Diabetes

## 2023-10-26 NOTE — NURSING NOTE
Is the patient currently in the state of [place]? YES    Visit mode:VIDEO    If the visit is dropped, the patient can be reconnected by: VIDEO VISIT: Send to e-mail at: [email or web address]    Will anyone else be joining the visit? NO  (If patient encounters technical issues they should call [phone])    How would you like to obtain your AVS? MyChart    Are changes needed to the allergy or medication list? No changes to allergies and medication list was sent to provider for review, so VF did not review medication list again at check in.    Reason for visit: No chief complaint on file.    Serene KRUEGER

## 2023-10-29 ENCOUNTER — TELEPHONE (OUTPATIENT)
Dept: ENDOCRINOLOGY | Facility: CLINIC | Age: 63
End: 2023-10-29
Payer: MEDICARE

## 2023-10-30 ENCOUNTER — NURSE TRIAGE (OUTPATIENT)
Dept: NURSING | Facility: CLINIC | Age: 63
End: 2023-10-30

## 2023-10-30 ENCOUNTER — VIRTUAL VISIT (OUTPATIENT)
Dept: EDUCATION SERVICES | Facility: CLINIC | Age: 63
End: 2023-10-30
Payer: MEDICARE

## 2023-10-30 DIAGNOSIS — E10.37X3 TYPE 1 DIABETES MELLITUS WITH DIABETIC MACULAR EDEMA OF BOTH EYES RESOLVED AFTER TREATMENT (H): ICD-10-CM

## 2023-10-30 DIAGNOSIS — E10.51 TYPE 1 DIABETES MELLITUS WITH DIABETIC PERIPHERAL ANGIOPATHY WITHOUT GANGRENE (H): Primary | ICD-10-CM

## 2023-10-30 PROCEDURE — 98967 PH1 ASSMT&MGMT NQHP 11-20: CPT | Mod: 95 | Performed by: DIETITIAN, REGISTERED

## 2023-10-30 RX ORDER — INSULIN ASPART 100 [IU]/ML
INJECTION, SOLUTION INTRAVENOUS; SUBCUTANEOUS
Qty: 60 ML | Refills: 3 | Status: SHIPPED | OUTPATIENT
Start: 2023-10-30 | End: 2023-10-30

## 2023-10-30 NOTE — TELEPHONE ENCOUNTER
Jaylene,    Please see diabetes education encounter from today for details. BG remains elevated. Recommend the following changes to insulin dosing. Please let me know if you agree with plan and sign pended orders. I did also confirm that the nurse is injecting the insulin.     Tresiba: 30 units --> 35 units     Novolog: 10 units with meals  --> 12 units at meal times    I have a follow-up planned on Thursday with eLxy.     Thanks!  Tiffanie Mcfarland RD [facility]  Triage: [phone]  [phone]

## 2023-10-30 NOTE — LETTER
10/30/2023         RE: Lexy Rockwell  [address]  [facility] [address]        Dear Colleague,    Thank you for referring your patient, Lexy Rockwell, to the [facility]. Please see a copy of my visit note below.    Diabetes Self-Management Education & Support    Presents for: CGM Review    Type of Service: Telephone Visit    Audio only visit done, as patient does not have access to audio-visual technology.    Individual visit provided, given no group classes are available for 2 months.     Originating Location (Patient Location): [facility]  Distant Location (Provider Location): [facility]  Mode of Communication:  Telephone    Telephone Visit Start Time: 10:15 AM  Telephone Visit End Time (telephone visit stop time): 10:35 am    How would patient like to obtain AVS? MyChart    Assessment Type:   ASSESSMENT:  BG has been higher since dx of UTI.  Lexy saw Endo last week who made some adjustments to her insulin and added insulin with snack. Lexy and Shayy (nurse manager at [facility]) also mention that last week, the nurses started administering the insulin, instead of Lexy doing it herself. We discussed increasing insulin doses. We als reviewed parameters around ketone testing, recommended to test ketones with any DKA symptoms or if BG remains >250 4 hours after correction.     Patient's most recent   Lab Results   Component Value Date    A1C 8.1 03/29/2023    A1C 9.7 02/01/2021     is not meeting goal of <7.0    Diabetes knowledge and skills assessment:   Patient is knowledgeable in diabetes management concepts related to: Healthy Eating, Being Active, Monitoring, Taking Medication, Problem Solving, Reducing Risks, and Healthy Coping    Continue education with the following diabetes management concepts: Healthy Eating, Being Active, Monitoring, Taking Medication, Problem Solving, Reducing Risks, and Healthy  "Coping    Based on learning assessment above, most appropriate setting for further diabetes education would be: Individual setting.      PLAN    Insulin adjustments:  Tresiba: 30 units --> 35 units     Novolog: 10 units with meals and 3 units with each snack --> 12 units at meal times    Routing to provider for approval (out of [facility] protocol)    Topics to cover at upcoming visits: Healthy Eating, Being Active, Monitoring, Taking Medication, Problem Solving, Reducing Risks, and Healthy Coping    Follow-up: Thursday    See Care Plan for co-developed, patient-state behavior change goals.  AVS provided for patient today.    Education Materials Provided:  No new materials provided today      SUBJECTIVE/OBJECTIVE:  Presents for: CGM Review  Accompanied by: Self, Other (Dietitian from [facility]isted Living)  Diabetes education in the past 24mo: Yes  Focus of Visit: Problem Solving, Monitoring, Taking Medication  Diabetes type: Type 1  Disease course: Worsening  Transportation concerns: Yes (gets rides or takes public tranportation)  Difficulty affording diabetes medication?: No  Difficulty affording diabetes testing supplies?: No  Other concerns:: Cognitive impairment  Cultural Influences/Ethnic Background:  Not  or     Diabetes Symptoms & Complications:  Fatigue: No  Neuropathy: Yes (in feet)  Polydipsia: Sometimes (with hyperglycemia)  Polyphagia: No  Polyuria: Sometimes (often getting up in the night)  Visual change: No  Slow healing wounds: No  Symptom course: Stable  Weight trend: Stable  Complications assessed today?: Yes    Patient Problem List and Family Medical History reviewed for relevant medical history, current medical status, and diabetes risk factors.    Vitals:  [facility] 03/28/2014   Estimated body mass index is 30.19 kg/m  as calculated from the following:    Height as of 10/25/23: 1.6 m (5' 3\").    Weight as of 10/25/23: 77.3 kg (170 lb 6.4 oz).   Last 3 BP:   BP Readings from Last 3 Encounters: " "  10/25/23 (!) 142/67   10/18/23 (!) 171/94   10/12/23 (!) 145/85       History   Smoking Status     Never   Smokeless Tobacco     Never       Labs:  Lab Results   Component Value Date    A1C 8.1 03/29/2023    A1C 9.7 02/01/2021     Lab Results   Component Value Date     08/30/2023     10/13/2021     02/01/2021     Lab Results   Component Value Date    LDL 58 01/25/2023     02/01/2021     HDL Cholesterol   Date Value Ref Range Status   02/01/2021 62 >49 mg/dL Final     Direct Measure HDL   Date Value Ref Range Status   01/25/2023 37 (L) >=50 mg/dL Final   ]  GFR Estimate   Date Value Ref Range Status   08/30/2023 73 >60 mL/min/1.73m2 Final   02/01/2021 90 >60 mL/min/[1.73_m2] Final     Comment:     Non  GFR Calc  Starting 12/18/2018, serum creatinine based estimated GFR (eGFR) will be   calculated using the Chronic Kidney Disease Epidemiology Collaboration   (CKD-EPI) equation.       GFR Estimate If Black   Date Value Ref Range Status   02/01/2021 >90 >60 mL/min/[1.73_m2] Final     Comment:      GFR Calc  Starting 12/18/2018, serum creatinine based estimated GFR (eGFR) will be   calculated using the Chronic Kidney Disease Epidemiology Collaboration   (CKD-EPI) equation.       Lab Results   Component Value Date    CR 0.89 08/30/2023    CR 0.73 02/01/2021     No results found for: \"MICROALBUMIN\"    Healthy Eating:  Healthy Eating Assessed Today: No  Cultural/Orthodox diet restrictions?: No  Meal planning/habits: None  Meals include: Breakfast, Lunch, Dinner, Afternoon Snack, Evening Snack  Breakfast: 8:00 am: Oatmeal with hard boiled egg and barragan with toast  Lunch: 12-12:30:  salad OR Cheese ravioli OR sandwiches oR hot dish  Dinner: 5:00 -6:00 pm: meatball and mashed potatoes OR vegetables lasagna OR scrab cake with vegetables and 2 sugar free cookies  Snacks: Has one bedtime snack- usually a cookie (if blood sugar is high, will not have snack) or " yogurt OR sugra free candy every once and a while  Other: drinks coffee with sugar free creamer  Beverages: Water, Coffee, Milk (Keep some juice on hand for lows)  Has patient met with a dietitian in the past?: Yes    Being Active:  Being Active Assessed Today: Yes (went on the bike at her apartment complex)  Exercise:: Yes (walking, programs at care center)  Days per week of moderate to strenuous exercise (like a brisk walk): 2  On average, minutes per day of exercise at this level:  (Walking with friends)  How intense was your typical exercise? : Light (like stretching or slow walking)  Barrier to exercise: Safety, Physical limitation    Monitoring:  Monitoring Assessed Today: Yes  Did patient bring glucose meter to appointment? : Yes  Blood Glucose Meter: CGM  Times checking blood sugar at home (number): 5+  Times checking blood sugar at home (per): Day  Blood glucose trend: Fluctuating    Date Breakfast  Lunch  Dinner  Bedtime    Before After Before After Before After    10/30 492         10/29 426  500  372  202   10/28 142  396  441  401   10/27 458  378  233  178   10/26 [phone] 446 202  359       Taking Medications:  Diabetes Medication(s)       Diabetic Other       Glucagon (BAQSIMI) 3 MG/DOSE nasal powder    Spray 1 spray (3 mg) in nostril as needed in the event of unconscious hypoglycemia or hypoglycemic seizure. May repeat dose if no response after 15 minutes.     glucagon 1 MG SOLR injection    Inject 1 mg into the muscle once      Insulin       insulin aspart (NOVOLOG FLEXPEN) 100 UNIT/ML pen    Inject 10 units before each meal, 3 units before each snack.  Add correction scale before meals. -160 = 1 unit. -200 = 2 units. -240 = 3 units. -280 = 4 units. -320 = 5 units. -360 = 6 units. -400 = 7 units. -440 = 8 units. -480 = 9 units. -520 = 10 units. BG >/=520 = 11 units.Bedtime correction scale ONLY: -240 = 1 unit. -280 = 2  units. -320 = 3 units. -359 = 4 units. -399 = 5 units. -439 = 6 units. -479 = 7 units. -519 = 8 units. BG >/=520 = 9 units.  Max 65 units daily     insulin aspart (NOVOLOG PEN) 100 UNIT/ML pen    Inject 10 Units Subcutaneous once for 1 dose     insulin degludec (TRESIBA FLEXTOUCH) 100 UNIT/ML pen    Inject 30 Units Subcutaneous daily     insulin degludec (TRESIBA FLEXTOUCH) 200 UNIT/ML pen    Inject 30 Units Subcutaneous daily            Taking Medication Assessed Today: Yes  Current Treatments: Insulin Injections  Dose schedule: Pre-breakfast, Pre-lunch, Pre-dinner, At bedtime  Given by: Patient, Adult caretaker, Nursing attendant  Injection/Infusion sites: Abdomen  Problems taking diabetes medications regularly?: No  Diabetes medication side effects?: No    Problem Solving:  Problem Solving Assessed Today: Yes  Is the patient at risk for hypoglycemia?: Yes  Hypoglycemia Frequency: Daily  Hypoglycemia Treatment: Glucose (tablets or gel), Other food, Juice, Candy (will have one piece of candy or some juice)  Patient carries a carbohydrate source: Yes  Medical ID: Yes  Is the patient at risk for DKA?: Yes  Does patient have ketone test strips?: Yes  Does patient have DKA prevention plan?: Yes  Does patient have severe weather/disaster plan for diabetes management?: No  Does patient have sick day plan for diabetes management?: Yes    Hypoglycemia symptoms  Confusion: No  Dizziness or Light-Headedness: No  Headaches: No  Hunger: No  Mood changes: No  Nervousness/Anxiety: No  Sleepiness: No  Speech difficulty: Yes  Sweats: Yes  Feeling shaky: Yes    Hypoglycemia Complications  Blackouts: No  Hospitalization: No  Nocturnal hypoglycemia: Yes  Required assistance: No  Seizures: No    Reducing Risks:  Reducing Risks Assessed Today: No  CAD Risks: Diabetes Mellitus  Has dilated eye exam at least once a year?: Yes  Sees dentist every 6 months?: Yes  Feet checked by healthcare provider in  the last year?: Yes    Healthy Coping:  Healthy Coping Assessed Today: Yes  Emotional response to diabetes: Acceptance  Informal Support system:: Family, Parent  Stage of change: ACTION (Actively working towards change)  Support resources: Offerings in Clinic Communities  Patient Activation Measure Survey Score:      3/7/2012     3:00 PM   KHUSHI Score (Last Two)   KHUSHI Raw Score 39   Activation Score 56.4   KHUSHI Level 3         Care Plan and Education Provided:  There are no care plans that you recently modified to display for this patient.      Tiffanie Mcfarland RD [facility]  Triage: [phone]  [phone]      Time Spent: 20 minutes  Encounter Type: Individual    Any diabetes medication dose changes were made via the CDE Protocol per the patient's referring provider. A copy of this encounter was shared with the provider.

## 2023-10-30 NOTE — PROGRESS NOTES
Diabetes Self-Management Education & Support    Presents for: CGM Review    Type of Service: Telephone Visit    Audio only visit done, as patient does not have access to audio-visual technology.    Individual visit provided, given no group classes are available for 2 months.     Originating Location (Patient Location): [facility]  Distant Location (Provider Location): [facility]  Mode of Communication:  Telephone    Telephone Visit Start Time: 10:15 AM  Telephone Visit End Time (telephone visit stop time): 10:35 am    How would patient like to obtain AVS? MyChart    Assessment Type:   ASSESSMENT:  BG has been higher since dx of UTI.  Lexy saw Endo last week who made some adjustments to her insulin and added insulin with snack. Lexy and Shayy (nurse manager at [facility]) also mention that last week, the nurses started administering the insulin, instead of Lexy doing it herself. We discussed increasing insulin doses. We als reviewed parameters around ketone testing, recommended to test ketones with any DKA symptoms or if BG remains >250 4 hours after correction.     Patient's most recent   Lab Results   Component Value Date    A1C 8.1 03/29/2023    A1C 9.7 02/01/2021     is not meeting goal of <7.0    Diabetes knowledge and skills assessment:   Patient is knowledgeable in diabetes management concepts related to: Healthy Eating, Being Active, Monitoring, Taking Medication, Problem Solving, Reducing Risks, and Healthy Coping    Continue education with the following diabetes management concepts: Healthy Eating, Being Active, Monitoring, Taking Medication, Problem Solving, Reducing Risks, and Healthy Coping    Based on learning assessment above, most appropriate setting for further diabetes education would be: Individual setting.      PLAN    Insulin adjustments:  Tresiba: 30 units --> 35 units     Novolog: 10 units with meals and 3 units with each snack --> 12 units at  "meal times    Routing to provider for approval (out of [facility] protocol)    Topics to cover at upcoming visits: Healthy Eating, Being Active, Monitoring, Taking Medication, Problem Solving, Reducing Risks, and Healthy Coping    Follow-up: Thursday    See Care Plan for co-developed, patient-state behavior change goals.  AVS provided for patient today.    Education Materials Provided:  No new materials provided today      SUBJECTIVE/OBJECTIVE:  Presents for: CGM Review  Accompanied by: Self, Other (Dietitian from Assisted Living)  Diabetes education in the past 24mo: Yes  Focus of Visit: Problem Solving, Monitoring, Taking Medication  Diabetes type: Type 1  Disease course: Worsening  Transportation concerns: Yes (gets rides or takes public tranportation)  Difficulty affording diabetes medication?: No  Difficulty affording diabetes testing supplies?: No  Other concerns:: Cognitive impairment  Cultural Influences/Ethnic Background:  Not  or     Diabetes Symptoms & Complications:  Fatigue: No  Neuropathy: Yes (in feet)  Polydipsia: Sometimes (with hyperglycemia)  Polyphagia: No  Polyuria: Sometimes (often getting up in the night)  Visual change: No  Slow healing wounds: No  Symptom course: Stable  Weight trend: Stable  Complications assessed today?: Yes    Patient Problem List and Family Medical History reviewed for relevant medical history, current medical status, and diabetes risk factors.    Vitals:  LMP 03/28/2014   Estimated body mass index is 30.19 kg/m  as calculated from the following:    Height as of 10/25/23: 1.6 m (5' 3\").    Weight as of 10/25/23: 77.3 kg (170 lb 6.4 oz).   Last 3 BP:   BP Readings from Last 3 Encounters:   10/25/23 (!) 142/67   10/18/23 (!) 171/94   10/12/23 (!) 145/85       History   Smoking Status    Never   Smokeless Tobacco    Never       Labs:  Lab Results   Component Value Date    A1C 8.1 03/29/2023    A1C 9.7 02/01/2021     Lab Results   Component Value Date     " "08/30/2023     10/13/2021     02/01/2021     Lab Results   Component Value Date    LDL 58 01/25/2023     02/01/2021     HDL Cholesterol   Date Value Ref Range Status   02/01/2021 62 >49 mg/dL Final     Direct Measure HDL   Date Value Ref Range Status   01/25/2023 37 (L) >=50 mg/dL Final   ]  GFR Estimate   Date Value Ref Range Status   08/30/2023 73 >60 mL/min/1.73m2 Final   02/01/2021 90 >60 mL/min/[1.73_m2] Final     Comment:     Non  GFR Calc  Starting 12/18/2018, serum creatinine based estimated GFR (eGFR) will be   calculated using the Chronic Kidney Disease Epidemiology Collaboration   (CKD-EPI) equation.       GFR Estimate If Black   Date Value Ref Range Status   02/01/2021 >90 >60 mL/min/[1.73_m2] Final     Comment:      GFR Calc  Starting 12/18/2018, serum creatinine based estimated GFR (eGFR) will be   calculated using the Chronic Kidney Disease Epidemiology Collaboration   (CKD-EPI) equation.       Lab Results   Component Value Date    CR 0.89 08/30/2023    CR 0.73 02/01/2021     No results found for: \"MICROALBUMIN\"    Healthy Eating:  Healthy Eating Assessed Today: No  Cultural/Anabaptism diet restrictions?: No  Meal planning/habits: None  Meals include: Breakfast, Lunch, Dinner, Afternoon Snack, Evening Snack  Breakfast: 8:00 am: Oatmeal with hard boiled egg and barragan with toast  Lunch: 12-12:30:  salad OR Cheese ravioli OR sandwiches oR hot dish  Dinner: 5:00 -6:00 pm: meatball and mashed potatoes OR vegetables lasagna OR scrab cake with vegetables and 2 sugar free cookies  Snacks: Has one bedtime snack- usually a cookie (if blood sugar is high, will not have snack) or yogurt OR sugra free candy every once and a while  Other: drinks coffee with sugar free creamer  Beverages: Water, Coffee, Milk (Keep some juice on hand for lows)  Has patient met with a dietitian in the past?: Yes    Being Active:  Being Active Assessed Today: Yes (went on the bike " at her apartment complex)  Exercise:: Yes (walking, programs at care center)  Days per week of moderate to strenuous exercise (like a brisk walk): 2  On average, minutes per day of exercise at this level:  (Walking with friends)  How intense was your typical exercise? : Light (like stretching or slow walking)  Barrier to exercise: Safety, Physical limitation    Monitoring:  Monitoring Assessed Today: Yes  Did patient bring glucose meter to appointment? : Yes  Blood Glucose Meter: CGM  Times checking blood sugar at home (number): 5+  Times checking blood sugar at home (per): Day  Blood glucose trend: Fluctuating    Date Breakfast  Lunch  Dinner  Bedtime    Before After Before After Before After    10/30 492         10/29 426  500  372  202   10/28 142  396  441  401   10/27 458  378  233  178   10/26 590 471 527 446 202  359       Taking Medications:  Diabetes Medication(s)       Diabetic Other       Glucagon (BAQSIMI) 3 MG/DOSE nasal powder    Spray 1 spray (3 mg) in nostril as needed in the event of unconscious hypoglycemia or hypoglycemic seizure. May repeat dose if no response after 15 minutes.     glucagon 1 MG SOLR injection    Inject 1 mg into the muscle once      Insulin       insulin aspart (NOVOLOG FLEXPEN) 100 UNIT/ML pen    Inject 10 units before each meal, 3 units before each snack.  Add correction scale before meals. -160 = 1 unit. -200 = 2 units. -240 = 3 units. -280 = 4 units. -320 = 5 units. -360 = 6 units. -400 = 7 units. -440 = 8 units. -480 = 9 units. -520 = 10 units. BG >/=520 = 11 units.Bedtime correction scale ONLY: -240 = 1 unit. -280 = 2 units. -320 = 3 units. -359 = 4 units. -399 = 5 units. -439 = 6 units. -479 = 7 units. -519 = 8 units. BG >/=520 = 9 units.  Max 65 units daily     insulin aspart (NOVOLOG PEN) 100 UNIT/ML pen    Inject 10 Units Subcutaneous once for 1 dose     insulin  degludec (TRESIBA FLEXTOUCH) 100 UNIT/ML pen    Inject 30 Units Subcutaneous daily     insulin degludec (TRESIBA FLEXTOUCH) 200 UNIT/ML pen    Inject 30 Units Subcutaneous daily            Taking Medication Assessed Today: Yes  Current Treatments: Insulin Injections  Dose schedule: Pre-breakfast, Pre-lunch, Pre-dinner, At bedtime  Given by: Patient, Adult caretaker, Nursing attendant  Injection/Infusion sites: Abdomen  Problems taking diabetes medications regularly?: No  Diabetes medication side effects?: No    Problem Solving:  Problem Solving Assessed Today: Yes  Is the patient at risk for hypoglycemia?: Yes  Hypoglycemia Frequency: Daily  Hypoglycemia Treatment: Glucose (tablets or gel), Other food, Juice, Candy (will have one piece of candy or some juice)  Patient carries a carbohydrate source: Yes  Medical ID: Yes  Is the patient at risk for DKA?: Yes  Does patient have ketone test strips?: Yes  Does patient have DKA prevention plan?: Yes  Does patient have severe weather/disaster plan for diabetes management?: No  Does patient have sick day plan for diabetes management?: Yes    Hypoglycemia symptoms  Confusion: No  Dizziness or Light-Headedness: No  Headaches: No  Hunger: No  Mood changes: No  Nervousness/Anxiety: No  Sleepiness: No  Speech difficulty: Yes  Sweats: Yes  Feeling shaky: Yes    Hypoglycemia Complications  Blackouts: No  Hospitalization: No  Nocturnal hypoglycemia: Yes  Required assistance: No  Seizures: No    Reducing Risks:  Reducing Risks Assessed Today: No  CAD Risks: Diabetes Mellitus  Has dilated eye exam at least once a year?: Yes  Sees dentist every 6 months?: Yes  Feet checked by healthcare provider in the last year?: Yes    Healthy Coping:  Healthy Coping Assessed Today: Yes  Emotional response to diabetes: Acceptance  Informal Support system:: Family, Parent  Stage of change: ACTION (Actively working towards change)  Support resources: Offerings in Clinic Communities  Patient Activation  Measure Survey Score:      3/7/2012     3:00 PM   KHUSHI Score (Last Two)   KHUSHI Raw Score 39   Activation Score 56.4   KHUSHI Level 3         Care Plan and Education Provided:  There are no care plans that you recently modified to display for this patient.      KARLOS Ha[facility]  Triage: [phone]  [phone]      Time Spent: 20 minutes  Encounter Type: Individual    Any diabetes medication dose changes were made via the CDE Protocol per the patient's referring provider. A copy of this encounter was shared with the provider.

## 2023-10-30 NOTE — TELEPHONE ENCOUNTER
"Justina Gomes, RN 22 minutes ago (8:45 AM)     [place]  [phone]      Multiple attempts to reach RAYSA Lowry at number above. No answer. Unable to leave message.   Gabi Shafer, RN on 10/30/2023 at 9:10 AM     Called general number for [facility]  Health Unit Coordinator [place] [id number]-[phone]     Spoke w/ [facility] Shayy. No one on Nurse care team available at this time: No symptoms. Just high BS. Glucose has not been rechecked since 802AM  ASked for recheck.    States Glucose is reading \"high\" Pt just ate breakfast.   Meeting at 10:00AM today with Tiffanie HERNANDEZ.  Provider notified.   Gaib Shafer RN on 10/30/2023 at 9:14 AM                         RE    Call from pt long term facility: [facility] [place],   Blood glucose today 8 AM, 492  8:05 AM Given  aspart insulin AM dose 10 units, plus sliding scale 10 units.  Orders say to call  provider if glucose above 400   See note    Pt of    Jaylene Perez PA-C     High priority note sent to Endocrine for call back/plan    [facility] # [phone]    Reason for Disposition    Blood glucose > 400 mg/dL (22.2 mmol/L)    Additional Information    Negative: Unconscious or difficult to awaken    Negative: Acting confused (e.g., disoriented, slurred speech)    Negative: Very weak (can't stand)    Negative: Sounds like a life-threatening emergency to the triager    Negative: Vomiting and signs of dehydration (e.g., very dry mouth, lightheaded, dark urine)    Negative: Blood glucose > 240 mg/dL (13.3 mmol/L) and rapid breathing    Negative: Blood glucose > 500 mg/dL (27.8 mmol/L)    Negative: Blood glucose > 240 mg/dL (13.3 mmol/L) AND urine ketones moderate-large (or more than 1+)    Negative: Blood glucose > 240 mg/dL (13.3 mmol/L) and blood ketones > 1.4 mmol/L    Negative: Blood glucose > 240 mg/dL (13.3 mmol/L) AND vomiting AND unable to check for ketones (in blood or urine)    Negative: Vomiting lasting > 4 hours    Negative: " "Patient sounds very sick or weak to the triager    Negative: Fever > 100.4 F (38.0 C)    Negative: Caller has URGENT medication or insulin pump question and triager unable to answer question    Answer Assessment - Initial Assessment Questions  1. BLOOD GLUCOSE: \"What is your blood glucose level?\"   8 AM today glucose  492    Sliding scale : Aspart 10 units given  AM dose of Aspart 10 units given   Total of 20 units given  at 8:02  2. ONSET: \"When did you check the blood glucose?\"      This AM  3. USUAL RANGE: \"What is your glucose level usually?\" (e.g., usual fasting morning value, usual evening value)      Yesterday , noon 500 ,  This care provider unsure if anyone called yesterday    10-28-23 8   4. KETONES: \"Do you check for ketones (urine or blood test strips)?\" If Yes, ask: \"What does the test show now?\"         5. TYPE 1 or 2:  \"Do you know what type of diabetes you have?\"  (e.g., Type 1, Type 2, Gestational; doesn't know)       Type 1  6. INSULIN: \"Do you take insulin?\" \"What type of insulin(s) do you use? What is the mode of delivery? (syringe, pen; injection or pump)?\"       See above  7. DIABETES PILLS: \"Do you take any pills for your diabetes?\" If Yes, ask: \"Have you missed taking any pills recently?\"      no  8. OTHER SYMPTOMS: \"Do you have any symptoms?\" (e.g., fever, frequent urination, difficulty breathing, dizziness, weakness, vomiting)      Denies   9. PREGNANCY: \"Is there any chance you are pregnant?\" \"When was your last menstrual period?\"    Protocols used: Diabetes - High Blood Sugar-A-OH    "

## 2023-10-31 RX ORDER — INSULIN DEGLUDEC 100 U/ML
33 INJECTION, SOLUTION SUBCUTANEOUS DAILY
Qty: 30 ML | Refills: 3 | Status: SHIPPED | OUTPATIENT
Start: 2023-10-31 | End: 2023-11-06

## 2023-10-31 RX ORDER — INSULIN ASPART 100 [IU]/ML
INJECTION, SOLUTION INTRAVENOUS; SUBCUTANEOUS
Qty: 60 ML | Refills: 3 | Status: SHIPPED | OUTPATIENT
Start: 2023-10-31 | End: 2023-11-06

## 2023-11-01 ENCOUNTER — OFFICE VISIT (OUTPATIENT)
Dept: OPHTHALMOLOGY | Facility: CLINIC | Age: 63
End: 2023-11-01
Payer: MEDICARE

## 2023-11-01 DIAGNOSIS — H40.1132 PRIMARY OPEN ANGLE GLAUCOMA OF BOTH EYES, MODERATE STAGE: Primary | ICD-10-CM

## 2023-11-01 PROCEDURE — 92133 CPTRZD OPH DX IMG PST SGM ON: CPT | Performed by: OPHTHALMOLOGY

## 2023-11-01 PROCEDURE — 92012 INTRM OPH EXAM EST PATIENT: CPT | Performed by: OPHTHALMOLOGY

## 2023-11-01 PROCEDURE — 92083 EXTENDED VISUAL FIELD XM: CPT | Performed by: OPHTHALMOLOGY

## 2023-11-01 ASSESSMENT — EXTERNAL EXAM - LEFT EYE: OS_EXAM: PROLAPSED FAT PADS: UPPER, LOWER

## 2023-11-01 ASSESSMENT — VISUAL ACUITY
CORRECTION_TYPE: GLASSES
OS_CC+: -2
OS_PH_CC: 20/40
OD_CC: 20/30
METHOD: SNELLEN - LINEAR
OS_CC: 20/50

## 2023-11-01 ASSESSMENT — PACHYMETRY
OD_CT(UM): 562
OS_CT(UM): 566

## 2023-11-01 ASSESSMENT — TONOMETRY
OS_IOP_MMHG: 14
OD_IOP_MMHG: 16
IOP_METHOD: APPLANATION

## 2023-11-01 ASSESSMENT — SLIT LAMP EXAM - LIDS
COMMENTS: NORMAL
COMMENTS: NORMAL

## 2023-11-01 ASSESSMENT — EXTERNAL EXAM - RIGHT EYE: OD_EXAM: PROLAPSED FAT PADS: UPPER, LOWER

## 2023-11-01 NOTE — PATIENT INSTRUCTIONS
Continue:   Latanoprost (green top) every evening both eyes.  Cosopt (dorzolamide-timolol -- dark blue top) twice daily both eyes. About 12 hours apart.     Wait at least 5 minutes between drops if using more than one at a time.     Return visit in 6 months for a complete exam.     Dayron Rios M.D.  [phone]

## 2023-11-01 NOTE — PROGRESS NOTES
Current Eye Medications:  Latanoprost both eyes every evening, Cosopt both eyes twice a day.  Last drops:  7am.      Subjective:  Follow up Open Angle Glaucoma.  Patient is here for a pressure check, OCT, and visual field.  No vision changes or concerns.       Objective:  See Ophthalmology Exam.       Assessment:  Stable intraocular pressures, glaucoma OCT, and Beard Visual Field both eyes in patient with moderate open angle glaucoma.      Plan:  Continue:   Latanoprost (green top) every evening both eyes.  Cosopt (dorzolamide-timolol -- dark blue top) twice daily both eyes. About 12 hours apart.     Wait at least 5 minutes between drops if using more than one at a time.     Return visit in 6 months for a complete exam.     Dayron Rios M.D.  [phone]

## 2023-11-01 NOTE — LETTER
11/1/2023         RE: Lexy Rockwell  [address] [facility] [address]        Dear Colleague,    Thank you for referring your patient, Lexy Rockwell, to the [facility]. Please see a copy of my visit note below.     Current Eye Medications:  Latanoprost both eyes every evening, Cosopt both eyes twice a day.  Last drops:  7am.      Subjective:  Follow up Open Angle Glaucoma.  Patient is here for a pressure check, OCT, and visual field.  No vision changes or concerns.       Objective:  See Ophthalmology Exam.       Assessment:  Stable intraocular pressures, glaucoma OCT, and Beard Visual Field both eyes in patient with moderate open angle glaucoma.      Plan:  Continue:   Latanoprost (green top) every evening both eyes.  Cosopt (dorzolamide-timolol -- dark blue top) twice daily both eyes. About 12 hours apart.     Wait at least 5 minutes between drops if using more than one at a time.     Return visit in 6 months for a complete exam.     Dayron Rios M.D.  [phone]         Again, thank you for allowing me to participate in the care of your patient.        Sincerely,        Dayron Rios MD

## 2023-11-02 ENCOUNTER — VIRTUAL VISIT (OUTPATIENT)
Dept: EDUCATION SERVICES | Facility: CLINIC | Age: 63
End: 2023-11-02
Payer: MEDICARE

## 2023-11-02 DIAGNOSIS — E10.37X3 TYPE 1 DIABETES MELLITUS WITH DIABETIC MACULAR EDEMA OF BOTH EYES RESOLVED AFTER TREATMENT (H): ICD-10-CM

## 2023-11-02 PROCEDURE — 98967 PH1 ASSMT&MGMT NQHP 11-20: CPT | Performed by: DIETITIAN, REGISTERED

## 2023-11-02 NOTE — PROGRESS NOTES
Diabetes Education Follow-up    Subjective/Objective:    Lexy Rockwell was called for BG review. Last date of communication was: 10/30.    Diabetes is being managed with Diabetes Medications   Diabetes Medication(s)       Diabetic Other       Glucagon (BAQSIMI) 3 MG/DOSE nasal powder    Spray 1 spray (3 mg) in nostril as needed in the event of unconscious hypoglycemia or hypoglycemic seizure. May repeat dose if no response after 15 minutes.     glucagon 1 MG SOLR injection    Inject 1 mg into the muscle once      Insulin       insulin aspart (NOVOLOG FLEXPEN) 100 UNIT/ML pen    Inject 12 units before each meal, 3 units before each snack.  Add correction scale before meals. -160 = 1 unit. -200 = 2 units. -240 = 3 units. -280 = 4 units. -320 = 5 units. -360 = 6 units. -400 = 7 units. -440 = 8 units. -480 = 9 units. -520 = 10 units. BG >/=520 = 11 units.Bedtime correction scale ONLY: -240 = 1 unit. -280 = 2 units. -320 = 3 units. -359 = 4 units. -399 = 5 units. -439 = 6 units. -479 = 7 units. -519 = 8 units. BG >/=520 = 9 units.  Max 65 units daily     insulin degludec (TRESIBA FLEXTOUCH) 100 UNIT/ML pen    Inject 33 Units Subcutaneous daily     Patient taking differently: Inject 35 Units Subcutaneous daily            BG/Food Log:   Date Breakfast  Lunch  Dinner  Bedtime    Before After Before After Before After    11/2 120 55-->65        11/1 253  89  133  85   10/31 265  156  204  217   10/30                Assessment:    Fasting blood glucose: 33% in target.  After breakfast glucose: 0% in target.(Low)  Before lunch glucose: 50% in target.  After lunch glucose: x% in target.  Before dinner glucose: 50% in target.  After dinner glucose: x% in target.  Bedtime glucose: 50% in target.    Having some hypoglycemia over the last few days, likely the infection is clearing.     Plan/Response:  Tresiba: 0-0-0-  33 --> 0-0-0-30  Novolo-12-12-0 --> 10-10-10-0    Time spent: 15 minutes      Tiffanie Mcfarland RD [facility]  Triage: [phone]  [phone]      Any diabetes medication dose changes were made via the CDE Protocol and Collaborative Practice Agreement with the patient's referring provider. A copy of this encounter was shared with the provider.

## 2023-11-02 NOTE — LETTER
11/2/2023         RE: Lexy Rockwell  [address] [facility] [address]        Dear Colleague,    Thank you for referring your patient, Lexy Rockwell, to the [facility] [place]. Please see a copy of my visit note below.    Diabetes Education Follow-up    Subjective/Objective:    Lexy Rockwell was called for BG review. Last date of communication was: 10/30.    Diabetes is being managed with Diabetes Medications   Diabetes Medication(s)       Diabetic Other       Glucagon (BAQSIMI) 3 MG/DOSE nasal powder    Spray 1 spray (3 mg) in nostril as needed in the event of unconscious hypoglycemia or hypoglycemic seizure. May repeat dose if no response after 15 minutes.     glucagon 1 MG SOLR injection    Inject 1 mg into the muscle once      Insulin       insulin aspart (NOVOLOG FLEXPEN) 100 UNIT/ML pen    Inject 12 units before each meal, 3 units before each snack.  Add correction scale before meals. -160 = 1 unit. -200 = 2 units. -240 = 3 units. -280 = 4 units. -320 = 5 units. -360 = 6 units. -400 = 7 units. -440 = 8 units. -480 = 9 units. -520 = 10 units. BG >/=520 = 11 units.Bedtime correction scale ONLY: -240 = 1 unit. -280 = 2 units. -320 = 3 units. -359 = 4 units. -399 = 5 units. -439 = 6 units. -479 = 7 units. -519 = 8 units. BG >/=520 = 9 units.  Max 65 units daily     insulin degludec (TRESIBA FLEXTOUCH) 100 UNIT/ML pen    Inject 33 Units Subcutaneous daily     Patient taking differently: Inject 35 Units Subcutaneous daily            BG/Food Log:   Date Breakfast  Lunch  Dinner  Bedtime    Before After Before After Before After    11/2 120 55-->65        11/1 253  89  133  85   10/31 265  156  204  217   10/30                Assessment:    Fasting blood glucose: 33% in target.  After breakfast glucose: 0% in target.(Low)  Before lunch glucose: 50% in  target.  After lunch glucose: x% in target.  Before dinner glucose: 50% in target.  After dinner glucose: x% in target.  Bedtime glucose: 50% in target.    Having some hypoglycemia over the last few days, likely the infection is clearing.     Plan/Response:  Tresiba: 0-0-0- 33 --> 0-0-0-30  Novolo-12-12-0 --> 10-10-10-0    Time spent: 15 minutes      Tiffanie Mcfarland RD [facility]  Triage: [phone]  [phone]      Any diabetes medication dose changes were made via the CDE Protocol and Collaborative Practice Agreement with the patient's referring provider. A copy of this encounter was shared with the provider.

## 2023-11-06 RX ORDER — INSULIN DEGLUDEC 100 U/ML
30 INJECTION, SOLUTION SUBCUTANEOUS DAILY
Qty: 30 ML | Refills: 3 | Status: SHIPPED | OUTPATIENT
Start: 2023-11-06 | End: 2023-11-07

## 2023-11-06 RX ORDER — INSULIN ASPART 100 [IU]/ML
INJECTION, SOLUTION INTRAVENOUS; SUBCUTANEOUS
Qty: 60 ML | Refills: 3 | Status: SHIPPED | OUTPATIENT
Start: 2023-11-06 | End: 2023-11-30

## 2023-11-07 ENCOUNTER — VIRTUAL VISIT (OUTPATIENT)
Dept: EDUCATION SERVICES | Facility: CLINIC | Age: 63
End: 2023-11-07
Payer: MEDICARE

## 2023-11-07 DIAGNOSIS — E10.37X3 TYPE 1 DIABETES MELLITUS WITH DIABETIC MACULAR EDEMA OF BOTH EYES RESOLVED AFTER TREATMENT (H): ICD-10-CM

## 2023-11-07 PROCEDURE — 98966 PH1 ASSMT&MGMT NQHP 5-10: CPT | Performed by: DIETITIAN, REGISTERED

## 2023-11-07 RX ORDER — INSULIN DEGLUDEC 100 U/ML
28 INJECTION, SOLUTION SUBCUTANEOUS DAILY
Qty: 30 ML | Refills: 3 | Status: SHIPPED | OUTPATIENT
Start: 2023-11-07 | End: 2023-11-30

## 2023-11-07 NOTE — LETTER
11/7/2023         RE: Lexy Rockwell  [address] [facility] [address]        Dear Colleague,    Thank you for referring your patient, Lexy Rockwell, to the [facility] [place]. Please see a copy of my visit note below.    Diabetes Education Follow-up    Subjective/Objective:    Lexy Rockwell sent in blood glucose log for review. Last date of communication was: 11/2.    Diabetes is being managed with Diabetes Medications   Diabetes Medication(s)       Diabetic Other       Glucagon (BAQSIMI) 3 MG/DOSE nasal powder    Spray 1 spray (3 mg) in nostril as needed in the event of unconscious hypoglycemia or hypoglycemic seizure. May repeat dose if no response after 15 minutes.     glucagon 1 MG SOLR injection    Inject 1 mg into the muscle once      Insulin       insulin aspart (NOVOLOG FLEXPEN) 100 UNIT/ML pen    Inject 10 units before each meal, 3 units before each snack.  Add correction scale before meals. -160 = 1 unit. -200 = 2 units. -240 = 3 units. -280 = 4 units. -320 = 5 units. -360 = 6 units. -400 = 7 units. -440 = 8 units. -480 = 9 units. -520 = 10 units. BG >/=520 = 11 units.Bedtime correction scale ONLY: -240 = 1 unit. -280 = 2 units. -320 = 3 units. -359 = 4 units. -399 = 5 units. -439 = 6 units. -479 = 7 units. -519 = 8 units. BG >/=520 = 9 units.  Max 65 units daily     insulin degludec (TRESIBA FLEXTOUCH) 100 UNIT/ML pen    Inject 30 Units Subcutaneous daily            BG/Food Log:   Date Breakfast  Lunch  Dinner  Bedtime    Before After Before After Before After    11/7 81  259       11/6 108  78  123  148   11/5 101  85  174  166   11/4 89  84  76  223   11/3 80  152  390  288         Assessment:    Fasting blood glucose: 100% in target.  After breakfast glucose: x% in target.  Before lunch glucose: 80% in target.  After lunch glucose: x% in  target.  Before dinner glucose: 50% in target.  After dinner glucose: x% in target.  Bedtime glucose: 50% in target.    Lexy and Shayy (nurse at [facility]) report that Lexy's Novolog was held today at breakfast because of the lower reading. Recommended that Lexy still get her Novolog with meals, even if BG is on lower end.     Plan/Response:  Tresiba: 0-0-0-30 --> 0-0-0-28    Tiffanie Mcfarland RD [facility]  Triage: [phone]  [phone]    Time spent: 8 minutes    Any diabetes medication dose changes were made via the CDE Protocol and Collaborative Practice Agreement with the patient's referring provider. A copy of this encounter was shared with the provider.

## 2023-11-07 NOTE — PROGRESS NOTES
Diabetes Education Follow-up    Subjective/Objective:    Lexydonna Escobedo Moiz sent in blood glucose log for review. Last date of communication was: 11/2.    Diabetes is being managed with Diabetes Medications   Diabetes Medication(s)       Diabetic Other       Glucagon (BAQSIMI) 3 MG/DOSE nasal powder    Spray 1 spray (3 mg) in nostril as needed in the event of unconscious hypoglycemia or hypoglycemic seizure. May repeat dose if no response after 15 minutes.     glucagon 1 MG SOLR injection    Inject 1 mg into the muscle once      Insulin       insulin aspart (NOVOLOG FLEXPEN) 100 UNIT/ML pen    Inject 10 units before each meal, 3 units before each snack.  Add correction scale before meals. -160 = 1 unit. -200 = 2 units. -240 = 3 units. -280 = 4 units. -320 = 5 units. -360 = 6 units. -400 = 7 units. -440 = 8 units. -480 = 9 units. -520 = 10 units. BG >/=520 = 11 units.Bedtime correction scale ONLY: -240 = 1 unit. -280 = 2 units. -320 = 3 units. -359 = 4 units. -399 = 5 units. -439 = 6 units. -479 = 7 units. -519 = 8 units. BG >/=520 = 9 units.  Max 65 units daily     insulin degludec (TRESIBA FLEXTOUCH) 100 UNIT/ML pen    Inject 30 Units Subcutaneous daily            BG/Food Log:   Date Breakfast  Lunch  Dinner  Bedtime    Before After Before After Before After    11/7 81  259       11/6 108  78  123  148   11/5 101  85  174  166   11/4 89  84  76  223   11/3 80  152  390  288         Assessment:    Fasting blood glucose: 100% in target.  After breakfast glucose: x% in target.  Before lunch glucose: 80% in target.  After lunch glucose: x% in target.  Before dinner glucose: 50% in target.  After dinner glucose: x% in target.  Bedtime glucose: 50% in target.    Lexy and Shayy (nurse at [facility]) report that Lexy's Novolog was held today at breakfast because of the lower reading. Recommended that  Lexy still get her Novolog with meals, even if BG is on lower end.     Plan/Response:  Tresiba: 0-0-0-30 --> 0-0-0-28    Tiffanie Mcfarland RD [facility]  Triage: [phone]  [phone]    Time spent: 8 minutes    Any diabetes medication dose changes were made via the CDE Protocol and Collaborative Practice Agreement with the patient's referring provider. A copy of this encounter was shared with the provider.     Detail Level: Detailed

## 2023-11-11 ENCOUNTER — TELEPHONE (OUTPATIENT)
Dept: ENDOCRINOLOGY | Facility: CLINIC | Age: 63
End: 2023-11-11
Payer: MEDICARE

## 2023-11-11 NOTE — TELEPHONE ENCOUNTER
Got page from Darlyn Moser regarding patient Bg - hypoglycemia    Called back on the call back number provided # [phone] but no one answered call  There was no voice mail option also    Brigette Monge MD on 11/11/2023 at 12:34 PM

## 2023-11-12 ENCOUNTER — TELEPHONE (OUTPATIENT)
Dept: GERIATRICS | Facility: CLINIC | Age: 63
End: 2023-11-12
Payer: MEDICARE

## 2023-11-12 NOTE — TELEPHONE ENCOUNTER
Resident went to [place] and came back not acting self and BS = 60   Gave her OJ and came up to 121 for lunch.    Is to get 10 units short acting at lunch.       Orders:  Give 5 units at lunch x1 order.  Ok to give even after she eats.    TAYLA Alvarez CNP

## 2023-11-18 ENCOUNTER — HEALTH MAINTENANCE LETTER (OUTPATIENT)
Age: 63
End: 2023-11-18

## 2023-11-19 ENCOUNTER — TELEPHONE (OUTPATIENT)
Dept: ENDOCRINOLOGY | Facility: CLINIC | Age: 63
End: 2023-11-19
Payer: MEDICARE

## 2023-11-19 NOTE — TELEPHONE ENCOUNTER
Got a page this morning that patient is having hypoglycemic episode.  Call was returned and discussed with patient's facility staff.  This morning patient was found to have hypoglycemic episode with blood glucose in 60s which was treated with orange juice.  At around 8 AM blood glucose was 95.  Patient ate her breakfast and received 10 units insulin aspart for breakfast and around 11 AM was found to have blood glucose of 77.  Patient is expecting to eat lunch at around 12 and staff is wondering if they should give meal coverage or change any dosage of mealtime insulin.    Current regimen:  Insulin Tresiba 28 units daily  Insulin aspart 10 units 3 times daily before meals  Insulin aspart 1 unit per 50 above 150 with meals.    Recommended:  Decreasing insulin Tresiba to 24 units daily  Decreasing insulin aspart to 8 units 3 times before meals  Continue insulin aspart correction scale, 1 unit per 50 above 150  Advised to call back if any concerns of hypo or hyper glycemia Ev[facility] in future.    Filippo Gomez  PGY-4  Endocrinology fellow

## 2023-11-20 ENCOUNTER — NURSE TRIAGE (OUTPATIENT)
Dept: NURSING | Facility: CLINIC | Age: 63
End: 2023-11-20
Payer: MEDICARE

## 2023-11-20 ENCOUNTER — MYC MEDICAL ADVICE (OUTPATIENT)
Dept: ENDOCRINOLOGY | Facility: CLINIC | Age: 63
End: 2023-11-20
Payer: MEDICARE

## 2023-11-20 ENCOUNTER — TELEPHONE (OUTPATIENT)
Dept: ENDOCRINOLOGY | Facility: CLINIC | Age: 63
End: 2023-11-20
Payer: MEDICARE

## 2023-11-20 NOTE — TELEPHONE ENCOUNTER
"  Was told to call Provider if blood glucose above 500     Blood sugar this AM  at 8 AM- 542  Giving sliding scale 11 units of aspart,  8 units of Reg at 8:20 AM  Asymptomatic-  see attached not    High priority note to Endo - [facility]-,for call back w/ plan, recommendation    Pt of  DENISA Perez PA-C    Pt at resident at [facility]  # [phone], ask for Pamela-    Reason for Disposition   Blood glucose > 500 mg/dL (27.8 mmol/L)    Additional Information   Negative: Unconscious or difficult to awaken   Negative: Acting confused (e.g., disoriented, slurred speech)   Negative: Very weak (can't stand)   Negative: Sounds like a life-threatening emergency to the triager   Negative: Vomiting and signs of dehydration (e.g., very dry mouth, lightheaded, dark urine)   Negative: Blood glucose > 240 mg/dL (13.3 mmol/L) and rapid breathing    Answer Assessment - Initial Assessment Questions  1. BLOOD GLUCOSE: \"What is your blood glucose level?\"   Was told to call Provider if blood glucose above 500     Blood sugar this AM  at 8 AM- 542  Giving sliding scale 11 units of aspart,  8 units of Reg  Asymptomatic- see below.    2. ONSET: \"When did you check the blood glucose?\"      This AM  3. USUAL RANGE: \"What is your glucose level usually?\" (e.g., usual fasting morning value, usual evening value)      Did not work w/ pt this past w/e. Ask that they have this information ready for when Clinic returns call.  4. KETONES: \"Do you check for ketones (urine or blood test strips)?\" If Yes, ask: \"What does the test show now?\"       Does not check  5. TYPE 1 or 2:  \"Do you know what type of diabetes you have?\"  (e.g., Type 1, Type 2, Gestational; doesn't know)       Type  1  6. INSULIN: \"Do you take insulin?\" \"What type of insulin(s) do you use? What is the mode of delivery? (syringe, pen; injection or pump)?\"       See above  7. DIABETES PILLS: \"Do you take any pills for your diabetes?\" If Yes, ask: \"Have you missed " "taking any pills recently?\"        8. OTHER SYMPTOMS: \"Do you have any symptoms?\" (e.g., fever, frequent urination, difficulty breathing, dizziness, weakness, vomiting)      No other symptoms: denies above  9. PREGNANCY: \"Is there any chance you are pregnant?\" \"When was your last menstrual period?\"    Protocols used: Diabetes - High Blood Sugar-A-OH    "

## 2023-11-21 ENCOUNTER — TELEPHONE (OUTPATIENT)
Dept: ENDOCRINOLOGY | Facility: CLINIC | Age: 63
End: 2023-11-21
Payer: MEDICARE

## 2023-11-21 NOTE — TELEPHONE ENCOUNTER
Message  Received: Yesterday  Paige Gibbs, Gabi Harrell RN; [facility]  Caller: Unspecified (Yesterday,  8:11 AM)  Called again. No answer. JOSE Henson (Care coordinator)

## 2023-11-21 NOTE — TELEPHONE ENCOUNTER
"Spoke w/ RAYSA Lucas from [facility]:   \"Today has been good.\"  AARON will fax recent glucose results and current Rx dosing.   When Pt was last seen, was being treated for UTI. Has now finished abx.   No s/sx of infection.   Pt See's CDE/diet Tiffanie Mcfarland who also recently made insulin changes   And changes have been made by on-call.   Providers notified.   Gabi Shafer RN on 11/21/2023 at 2:57 PM     November 12, 2023  Heavenly Lea APRN CNP     CL    11/12/23 11:45 AM  Note     Resident went to [place] and came back not acting self and BS = 60   Gave her OJ and came up to 121 for lunch.     Is to get 10 units short acting at lunch.        Orders:  Give 5 units at lunch x1 order.  Ok to give even after she eats.     TAYLA Alvraez CNP            Please use  Nursing Station.       "

## 2023-11-21 NOTE — TELEPHONE ENCOUNTER
Glucose data and current Rx dosing received via fax and forwarded to provider via email.   Provider notified.   Copy labeled to scan.   Gabi Shafer RN on 11/21/2023 at 3:44 PM

## 2023-11-22 ENCOUNTER — TELEPHONE (OUTPATIENT)
Dept: ENDOCRINOLOGY | Facility: CLINIC | Age: 63
End: 2023-11-22

## 2023-11-22 ENCOUNTER — NURSING HOME VISIT (OUTPATIENT)
Dept: GERIATRICS | Facility: CLINIC | Age: 63
End: 2023-11-22
Payer: MEDICARE

## 2023-11-22 VITALS
SYSTOLIC BLOOD PRESSURE: 152 MMHG | HEART RATE: 82 BPM | BODY MASS INDEX: 31.7 KG/M2 | HEIGHT: 63 IN | OXYGEN SATURATION: 100 % | RESPIRATION RATE: 16 BRPM | TEMPERATURE: 97.3 F | WEIGHT: 178.9 LBS | DIASTOLIC BLOOD PRESSURE: 83 MMHG

## 2023-11-22 DIAGNOSIS — E87.70 EDEMA DUE TO HYPERVOLEMIA: ICD-10-CM

## 2023-11-22 DIAGNOSIS — R60.0 LOWER EXTREMITY EDEMA: ICD-10-CM

## 2023-11-22 DIAGNOSIS — R63.5 WEIGHT GAIN: Primary | ICD-10-CM

## 2023-11-22 PROCEDURE — 99309 SBSQ NF CARE MODERATE MDM 30: CPT | Performed by: NURSE PRACTITIONER

## 2023-11-22 RX ORDER — FUROSEMIDE 20 MG
20 TABLET ORAL DAILY
Start: 2023-11-22 | End: 2024-06-20

## 2023-11-22 NOTE — TELEPHONE ENCOUNTER
[phone] Nursing Station.   Spoke w/ AARON Lora RN confirms understanding of review and recommendation from Jaylene Perez. Recent glucose results have been wnl. No questions.   Gabi Shafer RN on 11/22/2023 at 1:36 PM         RE    Patient Call  (Newest Message First)  View All Conversations on this Encounter   Jaylene Perez PA-C You 5 minutes ago (1:27 PM)     AK  Thank you.  I reviewed this and do not see any obvious patterns which require adjustments at this time.  Please ask patient to follow up as scheduled.     Jaylene Hopkins routed conversation to Jaylene Perez PA-C 21 hours ago (3:44 PM)     You 21 hours ago (3:44 PM)     DM  Glucose data and current Rx dosing received via fax and forwarded to provider via email.   Provider notified.   Copy labeled to scan.   Gabi Shafer RN on 11/21/2023 at 3:44 PM

## 2023-11-22 NOTE — LETTER
"    11/22/2023        RE: Lexy Rockwell  [address]  [facility] [address]        [facility] GERIATRICS    Chief Complaint   Patient presents with     Nursing Home Acute     RECHECK     HPI:  Lexy Rockwell is a 63 year old  (1960), who is being seen today for an episodic care visit at: [facility] () [[id number]].     Asked to see pt today due to fluctuating BS  Weight is up 22# and she does not know why.  Her legs are somewhat edematous  BS have been fluctuating    Allergies, and PMH/PSH reviewed in [facility] today.  REVIEW OF SYSTEMS:  4 point ROS including Respiratory, CV, GI and , other than that noted in the HPI,  is negative    Objective:   BP (!) 152/83   Pulse 82   Temp 97.3  F (36.3  C)   Resp 16   Ht 1.6 m (5' 3\")   Wt 81.1 kg (178 lb 14.4 oz)   LMP 03/28/2014   SpO2 100%   BMI 31.69 kg/m    GENERAL APPEARANCE:  Alert, in no distress  RESP:  no respiratory distress  CV:  Palpation and auscultation of heart done , rate-normal  PSYCH:  oriented X 3, insight and judgement impaired    Most Recent 3 CBC's:  Recent Labs   Lab Test 08/30/23  0535 05/08/23  0835 03/29/23  0515   WBC 4.3 3.4* 4.5   HGB 10.5* 10.2* 9.0*   MCV 87 88 87    184 220     Most Recent 3 BMP's:  Recent Labs   Lab Test 08/30/23  0535 05/08/23  0835 03/29/23  0515    141 142   POTASSIUM 4.3 4.8 4.5   CHLORIDE 109* 108* 110*   CO2 22 23 23   BUN 30.8* 33.9* 39.7*   CR 0.89 1.00* 1.11*   ANIONGAP 10 10 9   EFRAIN 9.3 10.0 9.6   * 124* 102*     Most Recent TSH and T4:  Recent Labs   Lab Test 10/13/21  1540 02/01/21  0909   TSH 3.41 7.07*   T4  --  1.05     Most Recent Hemoglobin A1c:  Recent Labs   Lab Test 03/29/23  0515   A1C 8.1*       Assessment/Plan:  1. Weight gain    2. Lower extremity edema    3. Edema due to hypervolemia    Pt has had a 20# weight gain in the past few months.  Her Legs do have some edema.  She doesn't not know why she is gaining weight.  Her BS has " been better controlled overall.  She denies VENEGAS/SOB  Plan:   Start lasix 20 mg daily   CBC, BMP and hgb A1c on 11/29    MED REC REQUIRED  Post Medication Reconciliation Status:  Medication reconciliation previously completed during another office visit      Electronically signed by:       TAYLA Sprague CNP on 11/22/2023 at 4:38 PM             Sincerely,        TAYLA Sprague CNP

## 2023-11-22 NOTE — PROGRESS NOTES
"[facility] GERIATRICS    Chief Complaint   Patient presents with    Nursing Home Acute    RECHECK     HPI:  Lexy Rockwell is a 63 year old  (1960), who is being seen today for an episodic care visit at: [facility] () [[id number]].     Asked to see pt today due to fluctuating BS  Weight is up 22# and she does not know why.  Her legs are somewhat edematous  BS have been fluctuating    Allergies, and PMH/PSH reviewed in [facility] today.  REVIEW OF SYSTEMS:  4 point ROS including Respiratory, CV, GI and , other than that noted in the HPI,  is negative    Objective:   BP (!) 152/83   Pulse 82   Temp 97.3  F (36.3  C)   Resp 16   Ht 1.6 m (5' 3\")   Wt 81.1 kg (178 lb 14.4 oz)   LMP 03/28/2014   SpO2 100%   BMI 31.69 kg/m    GENERAL APPEARANCE:  Alert, in no distress  RESP:  no respiratory distress  CV:  Palpation and auscultation of heart done , rate-normal  PSYCH:  oriented X 3, insight and judgement impaired    Most Recent 3 CBC's:  Recent Labs   Lab Test 08/30/23  0535 05/08/23  0835 03/29/23  0515   WBC 4.3 3.4* 4.5   HGB 10.5* 10.2* 9.0*   MCV 87 88 87    184 220     Most Recent 3 BMP's:  Recent Labs   Lab Test 08/30/23  0535 05/08/23  0835 03/29/23  0515    141 142   POTASSIUM 4.3 4.8 4.5   CHLORIDE 109* 108* 110*   CO2 22 23 23   BUN 30.8* 33.9* 39.7*   CR 0.89 1.00* 1.11*   ANIONGAP 10 10 9   EFRAIN 9.3 10.0 9.6   * 124* 102*     Most Recent TSH and T4:  Recent Labs   Lab Test 10/13/21  1540 02/01/21  0909   TSH 3.41 7.07*   T4  --  1.05     Most Recent Hemoglobin A1c:  Recent Labs   Lab Test 03/29/23  0515   A1C 8.1*       Assessment/Plan:  1. Weight gain    2. Lower extremity edema    3. Edema due to hypervolemia    Pt has had a 20# weight gain in the past few months.  Her Legs do have some edema.  She doesn't not know why she is gaining weight.  Her BS has been better controlled overall.  She denies VENEGAS/SOB  Plan:   Start lasix 20 mg daily   CBC, BMP and " hgb A1c on 11/29    MED REC REQUIRED  Post Medication Reconciliation Status:  Medication reconciliation previously completed during another office visit      Electronically signed by:       TAYLA Sprague CNP on 11/22/2023 at 4:38 PM          Detail Level: Simple

## 2023-11-27 ENCOUNTER — TELEPHONE (OUTPATIENT)
Dept: EDUCATION SERVICES | Facility: CLINIC | Age: 63
End: 2023-11-27

## 2023-11-27 NOTE — TELEPHONE ENCOUNTER
Lexy's nurse left VM asking if we were calling for Lexy's 11Am appt. Schedulers left VM earlier in the day needing to reschedule appt since diabetes educator out sick today. I called both numbers on Lexy's contacts and left messages requesting she call back to reschedule. Tiffanie Mcfarland does have an opening on her schedule on 11/30 @ 10AM, held this spot for Lexy if she is able to take it.    Margaret Heard RD, LD, [facility]

## 2023-11-27 NOTE — TELEPHONE ENCOUNTER
Called back to reschedule and unable to schedule for this work in. Please call back to schedule. They would like that time slot 11/30 at 10:00 am.

## 2023-11-28 ENCOUNTER — LAB REQUISITION (OUTPATIENT)
Dept: LAB | Facility: CLINIC | Age: 63
End: 2023-11-28
Payer: MEDICARE

## 2023-11-28 DIAGNOSIS — I10 ESSENTIAL (PRIMARY) HYPERTENSION: ICD-10-CM

## 2023-11-28 DIAGNOSIS — E11.9 TYPE 2 DIABETES MELLITUS WITHOUT COMPLICATIONS (H): ICD-10-CM

## 2023-11-29 ENCOUNTER — NURSING HOME VISIT (OUTPATIENT)
Dept: GERIATRICS | Facility: CLINIC | Age: 63
End: 2023-11-29
Payer: MEDICARE

## 2023-11-29 VITALS
TEMPERATURE: 97.3 F | RESPIRATION RATE: 18 BRPM | HEIGHT: 63 IN | SYSTOLIC BLOOD PRESSURE: 158 MMHG | HEART RATE: 94 BPM | WEIGHT: 179 LBS | OXYGEN SATURATION: 97 % | DIASTOLIC BLOOD PRESSURE: 67 MMHG | BODY MASS INDEX: 31.71 KG/M2

## 2023-11-29 DIAGNOSIS — R60.0 LOWER EXTREMITY EDEMA: ICD-10-CM

## 2023-11-29 DIAGNOSIS — E87.70 EDEMA DUE TO HYPERVOLEMIA: ICD-10-CM

## 2023-11-29 DIAGNOSIS — R63.5 WEIGHT GAIN: Primary | ICD-10-CM

## 2023-11-29 LAB
ANION GAP SERPL CALCULATED.3IONS-SCNC: 10 MMOL/L (ref 7–15)
BUN SERPL-MCNC: 33.5 MG/DL (ref 8–23)
CALCIUM SERPL-MCNC: 9.8 MG/DL (ref 8.8–10.2)
CHLORIDE SERPL-SCNC: 109 MMOL/L (ref 98–107)
CREAT SERPL-MCNC: 0.97 MG/DL (ref 0.51–0.95)
DEPRECATED HCO3 PLAS-SCNC: 23 MMOL/L (ref 22–29)
EGFRCR SERPLBLD CKD-EPI 2021: 66 ML/MIN/1.73M2
ERYTHROCYTE [DISTWIDTH] IN BLOOD BY AUTOMATED COUNT: 13.4 % (ref 10–15)
GLUCOSE SERPL-MCNC: 60 MG/DL (ref 70–99)
HBA1C MFR BLD: 8.5 %
HCT VFR BLD AUTO: 32.2 % (ref 35–47)
HGB BLD-MCNC: 10.4 G/DL (ref 11.7–15.7)
MCH RBC QN AUTO: 28.8 PG (ref 26.5–33)
MCHC RBC AUTO-ENTMCNC: 32.3 G/DL (ref 31.5–36.5)
MCV RBC AUTO: 89 FL (ref 78–100)
PLATELET # BLD AUTO: 335 10E3/UL (ref 150–450)
POTASSIUM SERPL-SCNC: 5.2 MMOL/L (ref 3.4–5.3)
RBC # BLD AUTO: 3.61 10E6/UL (ref 3.8–5.2)
SODIUM SERPL-SCNC: 142 MMOL/L (ref 135–145)
WBC # BLD AUTO: 7.6 10E3/UL (ref 4–11)

## 2023-11-29 PROCEDURE — 85027 COMPLETE CBC AUTOMATED: CPT | Mod: ORL | Performed by: NURSE PRACTITIONER

## 2023-11-29 PROCEDURE — P9603 ONE-WAY ALLOW PRORATED MILES: HCPCS | Mod: ORL | Performed by: NURSE PRACTITIONER

## 2023-11-29 PROCEDURE — 99309 SBSQ NF CARE MODERATE MDM 30: CPT | Performed by: NURSE PRACTITIONER

## 2023-11-29 PROCEDURE — 83036 HEMOGLOBIN GLYCOSYLATED A1C: CPT | Mod: ORL | Performed by: NURSE PRACTITIONER

## 2023-11-29 PROCEDURE — 36415 COLL VENOUS BLD VENIPUNCTURE: CPT | Mod: ORL | Performed by: NURSE PRACTITIONER

## 2023-11-29 PROCEDURE — 80048 BASIC METABOLIC PNL TOTAL CA: CPT | Mod: ORL | Performed by: NURSE PRACTITIONER

## 2023-11-29 NOTE — PROGRESS NOTES
"[facility] GERIATRICS    Chief Complaint   Patient presents with    Nursing Home Acute    weight gain     HPI:  Lexy Rockwell is a 63 year old  (1960), who is being seen today for an episodic care visit at: [facility] () [[id number]].     Pt gaining weight.   Having some LE edema  BS remain variable sees endocrinology  Using a walker  No concerns    Allergies, and PMH/PSH reviewed in [facility] today.  REVIEW OF SYSTEMS:  4 point ROS including Respiratory, CV, GI and , other than that noted in the HPI,  is negative    Objective:   BP (!) 158/67   Pulse 94   Temp 97.3  F (36.3  C)   Resp 18   Ht 1.6 m (5' 3\")   Wt 81.2 kg (179 lb)   LMP 03/28/2014   SpO2 97%   BMI 31.71 kg/m    GENERAL APPEARANCE:  Alert, in no distress  RESP:  lungs clear to auscultation , no respiratory distress  CV:  Palpation and auscultation of heart done , peripheral edema 1-2+ in bLE, rate-normal  PSYCH:  oriented X 3    Most Recent 3 CBC's:  Recent Labs   Lab Test 11/29/23  1150 08/30/23  0535 05/08/23  0835   WBC 7.6 4.3 3.4*   HGB 10.4* 10.5* 10.2*   MCV 89 87 88    181 184     Most Recent 3 BMP's:  Recent Labs   Lab Test 11/29/23  1150 08/30/23  0535 05/08/23  0835    141 141   POTASSIUM 5.2 4.3 4.8   CHLORIDE 109* 109* 108*   CO2 23 22 23   BUN 33.5* 30.8* 33.9*   CR 0.97* 0.89 1.00*   ANIONGAP 10 10 10   EFRAIN 9.8 9.3 10.0   GLC 60* 227* 124*       Most Recent Hemoglobin A1c:  Recent Labs   Lab Test 11/29/23  1150   A1C 8.5*       Assessment/Plan:     Lower extremity edema  Weight gain  Edema due to hypervolemia  Pt's weight has not decreased since starting lasix 40 mg daily on 11/22,   Her current weight is 179#.     Plan:  Lymphedema to evaluate and treat  BMP and CBC and hgb A1c    MED REC REQUIRED  Post Medication Reconciliation Status:  Discharge medications reconciled, continue medications without change      Electronically signed by:       TAYLA Sprague CNP      "

## 2023-11-29 NOTE — LETTER
"    11/29/2023        RE: Lexy Rockwell  [address]  [facility] [address]        [facility] GERIATRICS    Chief Complaint   Patient presents with     Nursing Home Acute     weight gain     HPI:  Lexy Rockwell is a 63 year old  (1960), who is being seen today for an episodic care visit at: [facility] () [[id number]].     Pt gaining weight.   Having some LE edema  BS remain variable sees endocrinology  Using a walker  No concerns    Allergies, and PMH/PSH reviewed in [facility] today.  REVIEW OF SYSTEMS:  4 point ROS including Respiratory, CV, GI and , other than that noted in the HPI,  is negative    Objective:   BP (!) 158/67   Pulse 94   Temp 97.3  F (36.3  C)   Resp 18   Ht 1.6 m (5' 3\")   Wt 81.2 kg (179 lb)   LMP 03/28/2014   SpO2 97%   BMI 31.71 kg/m    GENERAL APPEARANCE:  Alert, in no distress  RESP:  lungs clear to auscultation , no respiratory distress  CV:  Palpation and auscultation of heart done , peripheral edema 1-2+ in bLE, rate-normal  PSYCH:  oriented X 3    Most Recent 3 CBC's:  Recent Labs   Lab Test 11/29/23  1150 08/30/23  0535 05/08/23  0835   WBC 7.6 4.3 3.4*   HGB 10.4* 10.5* 10.2*   MCV 89 87 88    181 184     Most Recent 3 BMP's:  Recent Labs   Lab Test 11/29/23  1150 08/30/23  0535 05/08/23  0835    141 141   POTASSIUM 5.2 4.3 4.8   CHLORIDE 109* 109* 108*   CO2 23 22 23   BUN 33.5* 30.8* 33.9*   CR 0.97* 0.89 1.00*   ANIONGAP 10 10 10   EFRAIN 9.8 9.3 10.0   GLC 60* 227* 124*       Most Recent Hemoglobin A1c:  Recent Labs   Lab Test 11/29/23  1150   A1C 8.5*       Assessment/Plan:     Lower extremity edema  Weight gain  Edema due to hypervolemia  Pt's weight has not decreased since starting lasix 40 mg daily on 11/22,   Her current weight is 179#.     Plan:  Lymphedema to evaluate and treat  BMP and CBC and hgb A1c    MED REC REQUIRED  Post Medication Reconciliation Status:  Discharge medications reconciled, continue " medications without change      Electronically signed by:       TAYLA Sprague CNP    1960      Orders   BMP, TSH with reflex to free T4 on 12/6/2023    TAYLA Sprague CNP on 12/2/2023 at 11:12 AM      Sincerely,        TAYLA Sprague CNP

## 2023-11-30 ENCOUNTER — VIRTUAL VISIT (OUTPATIENT)
Dept: EDUCATION SERVICES | Facility: CLINIC | Age: 63
End: 2023-11-30
Payer: MEDICARE

## 2023-11-30 ENCOUNTER — MEDICAL CORRESPONDENCE (OUTPATIENT)
Dept: HEALTH INFORMATION MANAGEMENT | Facility: CLINIC | Age: 63
End: 2023-11-30

## 2023-11-30 DIAGNOSIS — E10.37X3 TYPE 1 DIABETES MELLITUS WITH DIABETIC MACULAR EDEMA OF BOTH EYES RESOLVED AFTER TREATMENT (H): ICD-10-CM

## 2023-11-30 PROCEDURE — 98968 PH1 ASSMT&MGMT NQHP 21-30: CPT | Mod: 95 | Performed by: DIETITIAN, REGISTERED

## 2023-11-30 RX ORDER — INSULIN ASPART 100 [IU]/ML
INJECTION, SOLUTION INTRAVENOUS; SUBCUTANEOUS
Qty: 60 ML | Refills: 3 | Status: SHIPPED | OUTPATIENT
Start: 2023-11-30 | End: 2023-12-29

## 2023-11-30 RX ORDER — INSULIN DEGLUDEC 100 U/ML
22 INJECTION, SOLUTION SUBCUTANEOUS DAILY
Qty: 30 ML | Refills: 3 | Status: SHIPPED | OUTPATIENT
Start: 2023-11-30 | End: 2023-12-29

## 2023-11-30 NOTE — LETTER
11/30/2023         RE: Rabia Rockwell  [address]  [facility] [address]        Dear Colleague,    Thank you for referring your patient, Rabia Rockwell, to the [facility]. Please see a copy of my visit note below.    Diabetes Self-Management Education & Support    Presents for: Follow-up    Type of Service: Telephone Visit    Audio only visit done, as patient does not have access to audio-visual technology.    Individual visit provided, given no group classes are available for 2 months.     Originating Location (Patient Location): Assisted Living  Distant Location (Provider Location): [facility]  Mode of Communication:  Telephone    Telephone Visit Start Time:  10 am  Telephone Visit End Time (telephone visit stop time): 10:22    How would patient like to obtain AVS? MyChart      ASSESSMENT:  Rabia/care team report that she has had some lows that are not documented. They reported lows overnight, they found chocolate wrappers in Rabia's bed and rabia told the nurses that she had a low overnight and just ate some chocolate. She first reports that this happens often, but then says that it only happens every once and a while. She also reports that she often has lows between breakfast and lunch that she does not report as well. We discussed plan to reduce Tresiba dose and novolog at breakfast    Patient's most recent   Lab Results   Component Value Date    A1C 8.5 11/29/2023    A1C 9.7 02/01/2021     is not meeting goal of <7.0    Diabetes knowledge and skills assessment:   Patient is knowledgeable in diabetes management concepts related to: none    Continue education with the following diabetes management concepts: Healthy Eating, Being Active, Monitoring, Taking Medication, Problem Solving, Reducing Risks, and Healthy Coping    Based on learning assessment above, most appropriate setting for further diabetes education would  "be: Individual setting.      PLAN  Tresiba: 24 units --> 22 units  Novolo-8-8-0 --> 6-8-8-0      Topics to cover at upcoming visits: Healthy Eating, Being Active, Monitoring, Taking Medication, Problem Solving, Reducing Risks, and Healthy Coping    Follow-up:     See Care Plan for co-developed, patient-state behavior change goals.  AVS provided for patient today.    Education Materials Provided:  No new materials provided today      SUBJECTIVE/OBJECTIVE:  Presents for: Follow-up  Accompanied by: Self, Other (Dietitian from Assisted Living)  Diabetes education in the past 24mo: Yes  Focus of Visit: Problem Solving, Monitoring, Taking Medication  Diabetes type: Type 1  Disease course: Worsening  Transportation concerns: Yes (gets rides or takes public tranportation)  Difficulty affording diabetes medication?: No  Difficulty affording diabetes testing supplies?: No  Other concerns:: Cognitive impairment  Cultural Influences/Ethnic Background:  Not  or     Diabetes Symptoms & Complications:  Fatigue: No  Neuropathy: Yes (in feet)  Polydipsia: Sometimes (with hyperglycemia)  Polyphagia: No  Polyuria: Sometimes (often getting up in the night)  Visual change: No  Slow healing wounds: No  Symptom course: Stable  Weight trend: Stable  Complications assessed today?: Yes    Patient Problem List and Family Medical History reviewed for relevant medical history, current medical status, and diabetes risk factors.    Vitals:  LMP 2014   Estimated body mass index is 31.71 kg/m  as calculated from the following:    Height as of 23: 1.6 m (5' 3\").    Weight as of 23: 81.2 kg (179 lb).   Last 3 BP:   BP Readings from Last 3 Encounters:   23 (!) 158/67   23 (!) 152/83   23 (!) 157/89       History   Smoking Status     Never   Smokeless Tobacco     Never       Labs:  Lab Results   Component Value Date    A1C 8.5 2023    A1C 9.7 2021     Lab Results   Component Value " "Date    GLC 60 11/29/2023     10/13/2021     02/01/2021     Lab Results   Component Value Date    LDL 58 01/25/2023     02/01/2021     HDL Cholesterol   Date Value Ref Range Status   02/01/2021 62 >49 mg/dL Final     Direct Measure HDL   Date Value Ref Range Status   01/25/2023 37 (L) >=50 mg/dL Final   ]  GFR Estimate   Date Value Ref Range Status   11/29/2023 66 >60 mL/min/1.73m2 Final   02/01/2021 90 >60 mL/min/[1.73_m2] Final     Comment:     Non  GFR Calc  Starting 12/18/2018, serum creatinine based estimated GFR (eGFR) will be   calculated using the Chronic Kidney Disease Epidemiology Collaboration   (CKD-EPI) equation.       GFR Estimate If Black   Date Value Ref Range Status   02/01/2021 >90 >60 mL/min/[1.73_m2] Final     Comment:      GFR Calc  Starting 12/18/2018, serum creatinine based estimated GFR (eGFR) will be   calculated using the Chronic Kidney Disease Epidemiology Collaboration   (CKD-EPI) equation.       Lab Results   Component Value Date    CR 0.97 11/29/2023    CR 0.73 02/01/2021     No results found for: \"MICROALBUMIN\"    Healthy Eating:  Healthy Eating Assessed Today: No  Cultural/Episcopal diet restrictions?: No  Meal planning/habits: None  Meals include: Breakfast, Lunch, Dinner, Afternoon Snack, Evening Snack  Breakfast: 8:00 am: Oatmeal with hard boiled egg and barragan with toast  Lunch: 12-12:30:  salad OR Cheese ravioli OR sandwiches oR hot dish  Dinner: 5:00 -6:00 pm: meatball and mashed potatoes OR vegetables lasagna OR scrab cake with vegetables and 2 sugar free cookies  Snacks: Has one bedtime snack- usually a cookie (if blood sugar is high, will not have snack) or yogurt OR sugra free candy every once and a while  Other: drinks coffee with sugar free creamer  Beverages: Water, Coffee, Milk (Keep some juice on hand for lows)  Has patient met with a dietitian in the past?: Yes    Being Active:  Being Active Assessed Today: Yes " (went on the bike at her apartment complex)  Exercise:: Yes (walking, programs at care center)  Days per week of moderate to strenuous exercise (like a brisk walk): 2  On average, minutes per day of exercise at this level:  (Walking with friends)  How intense was your typical exercise? : Light (like stretching or slow walking)  Barrier to exercise: Safety, Physical limitation    Monitoring:  Monitoring Assessed Today: Yes  Did patient bring glucose meter to appointment? : Yes  Blood Glucose Meter: CGM  Times checking blood sugar at home (number): 5+  Times checking blood sugar at home (per): Day  Blood glucose trend: Fluctuating    Date Breakfast  Lunch  Dinner  Bedtime    Before After Before After Before After    11/30 206         11/29 249  54, 65, 96  120  182   11/28 215, 96    208  150   11/27 143  116  188  229   11/26 145  91  255  242   11/25 246  232  251  312   11/24 150    210  352  350   11/23 513  188  226  235   11/22 132  158  124  270, 287   11/21 127  127  117  162       Taking Medications:  Diabetes Medication(s)       Diabetic Other       Glucagon (BAQSIMI) 3 MG/DOSE nasal powder    Spray 1 spray (3 mg) in nostril as needed in the event of unconscious hypoglycemia or hypoglycemic seizure. May repeat dose if no response after 15 minutes.     glucagon 1 MG SOLR injection    Inject 1 mg into the muscle once      Insulin       insulin aspart (NOVOLOG FLEXPEN) 100 UNIT/ML pen    Inject 10 units before each meal, 3 units before each snack.  Add correction scale before meals. -160 = 1 unit. -200 = 2 units. -240 = 3 units. -280 = 4 units. -320 = 5 units. -360 = 6 units. -400 = 7 units. -440 = 8 units. -480 = 9 units. -520 = 10 units. BG >/=520 = 11 units.Bedtime correction scale ONLY: -240 = 1 unit. -280 = 2 units. -320 = 3 units. -359 = 4 units. -399 = 5 units. -439 = 6 units. -479 = 7 units. -519  = 8 units. BG >/=520 = 9 units.  Max 65 units daily     insulin degludec (TRESIBA FLEXTOUCH) 100 UNIT/ML pen    Inject 28 Units Subcutaneous daily            Taking Medication Assessed Today: Yes  Current Treatments: Insulin Injections  Dose schedule: Pre-breakfast, Pre-lunch, Pre-dinner, At bedtime  Given by: Patient, Adult caretaker, Nursing attendant  Injection/Infusion sites: Abdomen  Problems taking diabetes medications regularly?: No  Diabetes medication side effects?: No    Problem Solving:  Problem Solving Assessed Today: Yes  Is the patient at risk for hypoglycemia?: Yes  Hypoglycemia Frequency: Daily  Hypoglycemia Treatment: Glucose (tablets or gel), Other food, Juice, Candy (will have one piece of candy or some juice)  Patient carries a carbohydrate source: Yes  Medical ID: Yes  Is the patient at risk for DKA?: Yes  Does patient have ketone test strips?: Yes  Does patient have DKA prevention plan?: Yes  Does patient have severe weather/disaster plan for diabetes management?: No  Does patient have sick day plan for diabetes management?: Yes    Hypoglycemia symptoms  Confusion: No  Dizziness or Light-Headedness: No  Headaches: No  Hunger: No  Mood changes: No  Nervousness/Anxiety: No  Sleepiness: No  Speech difficulty: Yes  Sweats: Yes  Feeling shaky: Yes    Hypoglycemia Complications  Blackouts: No  Hospitalization: No  Nocturnal hypoglycemia: Yes  Required assistance: No  Seizures: No    Reducing Risks:  Reducing Risks Assessed Today: No  CAD Risks: Diabetes Mellitus  Has dilated eye exam at least once a year?: Yes  Sees dentist every 6 months?: Yes  Feet checked by healthcare provider in the last year?: Yes    Healthy Coping:  Healthy Coping Assessed Today: Yes  Emotional response to diabetes: Acceptance  Informal Support system:: Family, Parent  Stage of change: ACTION (Actively working towards change)  Support resources: Offerings in Clinic Communities  Patient Activation Measure Survey Score:       3/7/2012     3:00 PM   KHUSHI Score (Last Two)   KHUSHI Raw Score 39   Activation Score 56.4   KHUSHI Level 3         Care Plan and Education Provided:  There are no care plans that you recently modified to display for this patient.      KARLOS [facility]  Triage: [phone]  [phone]      Time Spent: 21 minutes  Encounter Type: Individual    Any diabetes medication dose changes were made via the CDE Protocol per the patient's referring provider. A copy of this encounter was shared with the provider.

## 2023-11-30 NOTE — PROGRESS NOTES
Diabetes Self-Management Education & Support    Presents for: Follow-up    Type of Service: Telephone Visit    Audio only visit done, as patient does not have access to audio-visual technology.    Individual visit provided, given no group classes are available for 2 months.     Originating Location (Patient Location): Assisted Living  Distant Location (Provider Location): [facility]  Mode of Communication:  Telephone    Telephone Visit Start Time:  10 am  Telephone Visit End Time (telephone visit stop time): 10:22    How would patient like to obtain AVS? MyChart      ASSESSMENT:  Rabia/care team report that she has had some lows that are not documented. They reported lows overnight, they found chocolate wrappers in Rabia's bed and rabia told the nurses that she had a low overnight and just ate some chocolate. She first reports that this happens often, but then says that it only happens every once and a while. She also reports that she often has lows between breakfast and lunch that she does not report as well. We discussed plan to reduce Tresiba dose and novolog at breakfast    Patient's most recent   Lab Results   Component Value Date    A1C 8.5 2023    A1C 9.7 2021     is not meeting goal of <7.0    Diabetes knowledge and skills assessment:   Patient is knowledgeable in diabetes management concepts related to: none    Continue education with the following diabetes management concepts: Healthy Eating, Being Active, Monitoring, Taking Medication, Problem Solving, Reducing Risks, and Healthy Coping    Based on learning assessment above, most appropriate setting for further diabetes education would be: Individual setting.      PLAN  Tresiba: 24 units --> 22 units  Novolo-8-8-0 --> 6-8-8-0      Topics to cover at upcoming visits: Healthy Eating, Being Active, Monitoring, Taking Medication, Problem Solving, Reducing Risks, and Healthy Coping    Follow-up:  "12/18    See Care Plan for co-developed, patient-state behavior change goals.  AVS provided for patient today.    Education Materials Provided:  No new materials provided today      SUBJECTIVE/OBJECTIVE:  Presents for: Follow-up  Accompanied by: Self, Other (Dietitian from Assisted Living)  Diabetes education in the past 24mo: Yes  Focus of Visit: Problem Solving, Monitoring, Taking Medication  Diabetes type: Type 1  Disease course: Worsening  Transportation concerns: Yes (gets rides or takes public tranportation)  Difficulty affording diabetes medication?: No  Difficulty affording diabetes testing supplies?: No  Other concerns:: Cognitive impairment  Cultural Influences/Ethnic Background:  Not  or     Diabetes Symptoms & Complications:  Fatigue: No  Neuropathy: Yes (in feet)  Polydipsia: Sometimes (with hyperglycemia)  Polyphagia: No  Polyuria: Sometimes (often getting up in the night)  Visual change: No  Slow healing wounds: No  Symptom course: Stable  Weight trend: Stable  Complications assessed today?: Yes    Patient Problem List and Family Medical History reviewed for relevant medical history, current medical status, and diabetes risk factors.    Vitals:  LMP 03/28/2014   Estimated body mass index is 31.71 kg/m  as calculated from the following:    Height as of 11/29/23: 1.6 m (5' 3\").    Weight as of 11/29/23: 81.2 kg (179 lb).   Last 3 BP:   BP Readings from Last 3 Encounters:   11/29/23 (!) 158/67   11/22/23 (!) 152/83   11/01/23 (!) 157/89       History   Smoking Status    Never   Smokeless Tobacco    Never       Labs:  Lab Results   Component Value Date    A1C 8.5 11/29/2023    A1C 9.7 02/01/2021     Lab Results   Component Value Date    GLC 60 11/29/2023     10/13/2021     02/01/2021     Lab Results   Component Value Date    LDL 58 01/25/2023     02/01/2021     HDL Cholesterol   Date Value Ref Range Status   02/01/2021 62 >49 mg/dL Final     Direct Measure HDL   Date " "Value Ref Range Status   01/25/2023 37 (L) >=50 mg/dL Final   ]  GFR Estimate   Date Value Ref Range Status   11/29/2023 66 >60 mL/min/1.73m2 Final   02/01/2021 90 >60 mL/min/[1.73_m2] Final     Comment:     Non  GFR Calc  Starting 12/18/2018, serum creatinine based estimated GFR (eGFR) will be   calculated using the Chronic Kidney Disease Epidemiology Collaboration   (CKD-EPI) equation.       GFR Estimate If Black   Date Value Ref Range Status   02/01/2021 >90 >60 mL/min/[1.73_m2] Final     Comment:      GFR Calc  Starting 12/18/2018, serum creatinine based estimated GFR (eGFR) will be   calculated using the Chronic Kidney Disease Epidemiology Collaboration   (CKD-EPI) equation.       Lab Results   Component Value Date    CR 0.97 11/29/2023    CR 0.73 02/01/2021     No results found for: \"MICROALBUMIN\"    Healthy Eating:  Healthy Eating Assessed Today: No  Cultural/Yazdanism diet restrictions?: No  Meal planning/habits: None  Meals include: Breakfast, Lunch, Dinner, Afternoon Snack, Evening Snack  Breakfast: 8:00 am: Oatmeal with hard boiled egg and barragan with toast  Lunch: 12-12:30:  salad OR Cheese ravioli OR sandwiches oR hot dish  Dinner: 5:00 -6:00 pm: meatball and mashed potatoes OR vegetables lasagna OR scrab cake with vegetables and 2 sugar free cookies  Snacks: Has one bedtime snack- usually a cookie (if blood sugar is high, will not have snack) or yogurt OR sugra free candy every once and a while  Other: drinks coffee with sugar free creamer  Beverages: Water, Coffee, Milk (Keep some juice on hand for lows)  Has patient met with a dietitian in the past?: Yes    Being Active:  Being Active Assessed Today: Yes (went on the bike at her apartment complex)  Exercise:: Yes (walking, programs at care center)  Days per week of moderate to strenuous exercise (like a brisk walk): 2  On average, minutes per day of exercise at this level:  (Walking with friends)  How intense was " your typical exercise? : Light (like stretching or slow walking)  Barrier to exercise: Safety, Physical limitation    Monitoring:  Monitoring Assessed Today: Yes  Did patient bring glucose meter to appointment? : Yes  Blood Glucose Meter: CGM  Times checking blood sugar at home (number): 5+  Times checking blood sugar at home (per): Day  Blood glucose trend: Fluctuating    Date Breakfast  Lunch  Dinner  Bedtime    Before After Before After Before After    11/30 206         11/29 249  54, 65, 96  120  182   11/28 215, 96    208  150   11/27 143  116  188  229   11/26 145  91  255  242   11/25 246  232  251  312   11/24 150    210  352  350   11/23 513  188  226  235   11/22 132  158  124  270, 287   11/21 127  127  117  162       Taking Medications:  Diabetes Medication(s)       Diabetic Other       Glucagon (BAQSIMI) 3 MG/DOSE nasal powder    Spray 1 spray (3 mg) in nostril as needed in the event of unconscious hypoglycemia or hypoglycemic seizure. May repeat dose if no response after 15 minutes.     glucagon 1 MG SOLR injection    Inject 1 mg into the muscle once      Insulin       insulin aspart (NOVOLOG FLEXPEN) 100 UNIT/ML pen    Inject 10 units before each meal, 3 units before each snack.  Add correction scale before meals. -160 = 1 unit. -200 = 2 units. -240 = 3 units. -280 = 4 units. -320 = 5 units. -360 = 6 units. -400 = 7 units. -440 = 8 units. -480 = 9 units. -520 = 10 units. BG >/=520 = 11 units.Bedtime correction scale ONLY: -240 = 1 unit. -280 = 2 units. -320 = 3 units. -359 = 4 units. -399 = 5 units. -439 = 6 units. -479 = 7 units. -519 = 8 units. BG >/=520 = 9 units.  Max 65 units daily     insulin degludec (TRESIBA FLEXTOUCH) 100 UNIT/ML pen    Inject 28 Units Subcutaneous daily            Taking Medication Assessed Today: Yes  Current Treatments: Insulin Injections  Dose schedule:  Pre-breakfast, Pre-lunch, Pre-dinner, At bedtime  Given by: Patient, Adult caretaker, Nursing attendant  Injection/Infusion sites: Abdomen  Problems taking diabetes medications regularly?: No  Diabetes medication side effects?: No    Problem Solving:  Problem Solving Assessed Today: Yes  Is the patient at risk for hypoglycemia?: Yes  Hypoglycemia Frequency: Daily  Hypoglycemia Treatment: Glucose (tablets or gel), Other food, Juice, Candy (will have one piece of candy or some juice)  Patient carries a carbohydrate source: Yes  Medical ID: Yes  Is the patient at risk for DKA?: Yes  Does patient have ketone test strips?: Yes  Does patient have DKA prevention plan?: Yes  Does patient have severe weather/disaster plan for diabetes management?: No  Does patient have sick day plan for diabetes management?: Yes    Hypoglycemia symptoms  Confusion: No  Dizziness or Light-Headedness: No  Headaches: No  Hunger: No  Mood changes: No  Nervousness/Anxiety: No  Sleepiness: No  Speech difficulty: Yes  Sweats: Yes  Feeling shaky: Yes    Hypoglycemia Complications  Blackouts: No  Hospitalization: No  Nocturnal hypoglycemia: Yes  Required assistance: No  Seizures: No    Reducing Risks:  Reducing Risks Assessed Today: No  CAD Risks: Diabetes Mellitus  Has dilated eye exam at least once a year?: Yes  Sees dentist every 6 months?: Yes  Feet checked by healthcare provider in the last year?: Yes    Healthy Coping:  Healthy Coping Assessed Today: Yes  Emotional response to diabetes: Acceptance  Informal Support system:: Family, Parent  Stage of change: ACTION (Actively working towards change)  Support resources: Offerings in Clinic Communities  Patient Activation Measure Survey Score:      3/7/2012     3:00 PM   KHUSHI Score (Last Two)   KHUSHI Raw Score 39   Activation Score 56.4   KHUSHI Level 3         Care Plan and Education Provided:  There are no care plans that you recently modified to display for this patient.      KARLOS Haas  [facility]  Triage: [phone]  [phone]      Time Spent: 21 minutes  Encounter Type: Individual    Any diabetes medication dose changes were made via the CDE Protocol per the patient's referring provider. A copy of this encounter was shared with the provider.

## 2023-12-02 NOTE — PROGRESS NOTES
Lexy Rockwell    1960      Orders   BMP, TSH with reflex to free T4 on 12/6/2023    Zahra Way, APRN CNP on 12/2/2023 at 11:12 AM

## 2023-12-04 ENCOUNTER — NURSING HOME VISIT (OUTPATIENT)
Dept: GERIATRICS | Facility: CLINIC | Age: 63
End: 2023-12-04
Payer: MEDICARE

## 2023-12-04 VITALS
BODY MASS INDEX: 31.71 KG/M2 | DIASTOLIC BLOOD PRESSURE: 67 MMHG | SYSTOLIC BLOOD PRESSURE: 158 MMHG | TEMPERATURE: 97.3 F | RESPIRATION RATE: 18 BRPM | WEIGHT: 179 LBS | OXYGEN SATURATION: 97 % | HEART RATE: 94 BPM | HEIGHT: 63 IN

## 2023-12-04 DIAGNOSIS — K21.00 GASTROESOPHAGEAL REFLUX DISEASE WITH ESOPHAGITIS WITHOUT HEMORRHAGE: ICD-10-CM

## 2023-12-04 DIAGNOSIS — E87.70 EDEMA DUE TO HYPERVOLEMIA: ICD-10-CM

## 2023-12-04 DIAGNOSIS — D64.9 ANEMIA, UNSPECIFIED TYPE: ICD-10-CM

## 2023-12-04 DIAGNOSIS — I10 ESSENTIAL HYPERTENSION WITH GOAL BLOOD PRESSURE LESS THAN 140/90: ICD-10-CM

## 2023-12-04 DIAGNOSIS — F81.9 COGNITIVE DEVELOPMENTAL DELAY: ICD-10-CM

## 2023-12-04 DIAGNOSIS — E10.22 TYPE 1 DIABETES MELLITUS WITH STAGE 3A CHRONIC KIDNEY DISEASE (H): ICD-10-CM

## 2023-12-04 DIAGNOSIS — R63.5 WEIGHT GAIN: Primary | ICD-10-CM

## 2023-12-04 DIAGNOSIS — N18.31 TYPE 1 DIABETES MELLITUS WITH STAGE 3A CHRONIC KIDNEY DISEASE (H): ICD-10-CM

## 2023-12-04 PROCEDURE — 99309 SBSQ NF CARE MODERATE MDM 30: CPT | Performed by: INTERNAL MEDICINE

## 2023-12-04 NOTE — LETTER
"    12/4/2023        RE: Lexy Rockwell  [address]  [place] [address]        Lexy Rockwell is a 63 year old female seen December 4, 2023 at [facility] where she has resided for 11 months (admit 1/2023) seen for regulatory visit and to follow DM type 1 and weight gain   Pt is seen in her room up to chair   Reports she is \"good\"   Uses her Dexcom during visit and notes average glucose 166 today     Her blood sugars continue to fluctuate quite a bit.       By chart review, pt has Type 1 DM with multiple hospitalizations for DKA.  She was seen in the [facility] ED for hyperglycemia in November 2022, with accompanying symptoms of dizziness, BLE weakness and heartburn.   Noted that she sometimes forgot to administer her insulin.   Returned home, seeing diabetic educator but not an endocrinologist.         She was continuing to live independently in December 2022 when neighbors asked for a welfare check after she hadn't been seen in several days, and she was found down by police.   Hospitalized at [facility] 12/13-26 for DKA and rhabdomyolysis  Glucose >1000 and pH 6.8    She required pressor support, intubation and TF; treated with broad spectrum abx for SHIVAM pneumonia.  No sz on video EEG.  She had a +COVID19 test on 12/23   Slowly cleared and discharged to [facility] TCU before transitioning to LT.     Pt was seen in the [facility] ED in January 2023 for urinary frequency related to hyperglycemia.  Improved and returned to LTC with continued labile blood sugars, urinary incontinence, weakness and poor endurance  She had upper and lower endoscopy in July 2023, no significant findings    Pt had an October 2023 [facility] hospitalization for hyperglycemia, seen by Endocrinology and diabetic regimen adjusted    She was treated with ciprofloxacin for Enterobacter cloacae UTI    Returned to LTC     Past Medical History:   Diagnosis Date     Cerumen impacted      Development delay      Diabetic gastroparesis (H) " "8/16/2013     Glaucoma (increased eye pressure)      Hyperlipaemia      Hypertension      Hypothyroid      Mental retardation      Nonsenile cataract      Obesity      Strabismus      Type 1 diabetes mellitus not at goal (H)        Past Surgical History:   Procedure Laterality Date     COLONOSCOPY N/A 7/3/2023    Procedure: Colonoscopy;  Surgeon: Merlyn Acevedo MD;  Location:  GI     ESOPHAGOSCOPY, GASTROSCOPY, DUODENOSCOPY (EGD), COMBINED N/A 7/3/2023    Procedure: Esophagoscopy, gastroscopy, duodenoscopy (EGD), combined;  Surgeon: Merlyn Acevedo MD;  Location:  GI     STRABISMUS SURGERY       Z EYE SURG ANT SGMT PROC UNLISTED  remote    strabismus surgery     SH:  Single    Her mother Francisca Rockwell is first contact    She has 2 sisters that live in [place]   Non smoker    Review Of Systems  Developmental cognitive delay   BIMS 15/15      Ambulatory with 4WW  Weight at admission was 167 lbs>>>in April 2023 was 156 lbs>>> in Sept 2023 was 168 lbs  12/04/23 81.2 kg (179 lb)   11/29/23 81.2 kg (179 lb)   11/22/23 81.1 kg (178 lb 14.4 oz)      EXAM: NAD  BP (!) 158/67   Pulse 94   Temp 97.3  F (36.3  C)   Resp 18   Ht 1.6 m (5' 3\")   Wt 81.2 kg (179 lb)   LMP 03/28/2014   SpO2 97%   BMI 31.71 kg/m       Neck supple without adenopathy  Lungs clear bilaterally with fair air movement   Heart RRR s1s2    Abd soft, NT, no distention or guarding, +BS  Ext with 2+ edema above the knees, wearing velcro wraps  Neuro: ambulatory, normal speech  Psych: affect okay      GFR 54-70     Lab Results   Component Value Date    WBC 7.6 11/29/2023      HGB 10.4 11/29/2023      MCV 89 11/29/2023       11/29/2023       IMP/PLAN:   (D64.9) Anemia, unspecified type    Comment: Fe deficiency   Hgb has improved from 7.8>>> 10.4   Upper and lower endoscopy in July unrevealing although colonoscopy limited by poor prep.    Plan: Continue PPI   Slow release Fe 45 mg/day    Follow hgb     (R63.5) Weight gain    (E87.70) Edema " due to hypervolemia  Comment: as above, looks to be mostly fluid, but could also represent some true weight gain   Plan: LE compression, elevation  Furosemide 40 mg/day with K and Mg replacement   Consider ECHO     (E10.22,  N18.31) Type 1 diabetes mellitus with stage 3a chronic kidney disease (H)  (R73.9) Hyperglycemia  Comment:  Recurrent DKA, labile sugars      A1C has not been below 8% in past several years   Sequelae includes gastroparesis and CKD3      Lab Results   Component Value Date    A1C 8.5 11/29/2023     Plan: insulin degludec 22 units /HS   Insulin aspart 6 /8/8 and sliding scale   BGM per Dexcom   Follows with Endocrinology and Diabetic educator.   She is on lisinopril, simvastatin 20 mg/day     Not on daily ASA secondary to anemia  Ophthalmology and Podiatry follow up        (K21.00) Gastroesophageal reflux disease with esophagitis without hemorrhage  Comment: by history  Biopsies unremarkable at EGD   Plan: pantoprazole 40 mg/day     (F81.9) Cognitive developmental delay  Comment: unable to live independently in the community and manage her medical problems    Plan: LTC support for med admin, meals, activity      (I10) Essential hypertension with goal blood pressure less than 140/90  Comment: higher bps   Plan: amlodipine 2.5 mg/day, carvedilol 6.25 mg bid, lisinopril 40 mg/day   Follow BPs and BMP  - consider increasing carvedilol dose for better bp control     Advance directive: DNR/DNI     Nia Martínez MD       Sincerely,        Nia Martínez MD

## 2023-12-05 ENCOUNTER — LAB REQUISITION (OUTPATIENT)
Dept: LAB | Facility: CLINIC | Age: 63
End: 2023-12-05
Payer: MEDICARE

## 2023-12-05 DIAGNOSIS — I10 ESSENTIAL (PRIMARY) HYPERTENSION: ICD-10-CM

## 2023-12-05 DIAGNOSIS — E03.9 HYPOTHYROIDISM, UNSPECIFIED: ICD-10-CM

## 2023-12-06 ENCOUNTER — NURSING HOME VISIT (OUTPATIENT)
Dept: GERIATRICS | Facility: CLINIC | Age: 63
End: 2023-12-06
Payer: MEDICARE

## 2023-12-06 VITALS
HEIGHT: 63 IN | SYSTOLIC BLOOD PRESSURE: 158 MMHG | RESPIRATION RATE: 18 BRPM | TEMPERATURE: 97.2 F | HEART RATE: 91 BPM | BODY MASS INDEX: 31.4 KG/M2 | OXYGEN SATURATION: 95 % | WEIGHT: 177.2 LBS | DIASTOLIC BLOOD PRESSURE: 77 MMHG

## 2023-12-06 DIAGNOSIS — R63.5 WEIGHT GAIN: ICD-10-CM

## 2023-12-06 DIAGNOSIS — N17.9 AKI (ACUTE KIDNEY INJURY) (H): ICD-10-CM

## 2023-12-06 DIAGNOSIS — R60.0 LOWER EXTREMITY EDEMA: Primary | ICD-10-CM

## 2023-12-06 LAB
ANION GAP SERPL CALCULATED.3IONS-SCNC: 10 MMOL/L (ref 7–15)
BUN SERPL-MCNC: 39.8 MG/DL (ref 8–23)
CALCIUM SERPL-MCNC: 9.4 MG/DL (ref 8.8–10.2)
CHLORIDE SERPL-SCNC: 108 MMOL/L (ref 98–107)
CREAT SERPL-MCNC: 1.15 MG/DL (ref 0.51–0.95)
DEPRECATED HCO3 PLAS-SCNC: 24 MMOL/L (ref 22–29)
EGFRCR SERPLBLD CKD-EPI 2021: 54 ML/MIN/1.73M2
GLUCOSE SERPL-MCNC: 137 MG/DL (ref 70–99)
POTASSIUM SERPL-SCNC: 4.3 MMOL/L (ref 3.4–5.3)
SODIUM SERPL-SCNC: 142 MMOL/L (ref 135–145)
TSH SERPL DL<=0.005 MIU/L-ACNC: 3.7 UIU/ML (ref 0.3–4.2)

## 2023-12-06 PROCEDURE — P9604 ONE-WAY ALLOW PRORATED TRIP: HCPCS | Mod: ORL | Performed by: NURSE PRACTITIONER

## 2023-12-06 PROCEDURE — 99309 SBSQ NF CARE MODERATE MDM 30: CPT | Performed by: NURSE PRACTITIONER

## 2023-12-06 PROCEDURE — 80048 BASIC METABOLIC PNL TOTAL CA: CPT | Mod: ORL | Performed by: NURSE PRACTITIONER

## 2023-12-06 PROCEDURE — 36415 COLL VENOUS BLD VENIPUNCTURE: CPT | Mod: ORL | Performed by: NURSE PRACTITIONER

## 2023-12-06 PROCEDURE — 84443 ASSAY THYROID STIM HORMONE: CPT | Mod: ORL | Performed by: NURSE PRACTITIONER

## 2023-12-06 NOTE — LETTER
"    12/6/2023        RE: Lexy Rockwell  [address]  [place] [address]        [facility]ERIATR[facility]    Chief Complaint   Patient presents with     Nursing Home Acute     weight gain      labs     HPI:  Lexy Rockwell is a 63 year old  (1960), who is being seen today for an episodic care visit at: [facility] ([facility]) [[id number]].     Legs are decreasing   Weight is down   Feels better   No VENEGAS    Allergies, and PMH/PSH reviewed in [facility] today.  REVIEW OF SYSTEMS:  4 point ROS including Respiratory, CV, GI and , other than that noted in the HPI,  is negative    Objective:   BP (!) 158/77   Pulse 91   Temp 97.2  F (36.2  C)   Resp 18   Ht 1.6 m (5' 3\")   Wt 80.4 kg (177 lb 3.2 oz)   LMP 03/28/2014   SpO2 95%   BMI 31.39 kg/m    GENERAL APPEARANCE:  Alert, in no distress  RESP:  no respiratory distress  CV:  Palpation and auscultation of heart done , peripheral edema 2-3+ in Bilateral LE  PSYCH:  insight and judgement impaired, memory impaired     Most Recent 3 CBC's:  Recent Labs   Lab Test 11/29/23  1150 08/30/23  0535 05/08/23  0835   WBC 7.6 4.3 3.4*   HGB 10.4* 10.5* 10.2*   MCV 89 87 88    181 184     Most Recent 3 BMP's:  Recent Labs   Lab Test 12/06/23  0542 11/29/23  1150 08/30/23  0535    142 141   POTASSIUM 4.3 5.2 4.3   CHLORIDE 108* 109* 109*   CO2 24 23 22   BUN 39.8* 33.5* 30.8*   CR 1.15* 0.97* 0.89   ANIONGAP 10 10 10   EFRAIN 9.4 9.8 9.3   * 60* 227*       Assessment/Plan:     Lower extremity edema  Weight gain  JEAN (acute kidney injury) (H24)  I personally reviewed today's BMP and Creatinine and BUN up slightly   Edema is improving  Weight down slightly  Plan:   Continue with lasix 20 mg daily  Repeat BMP in 1 week    MED REC REQUIRED  Post Medication Reconciliation Status:  Medication reconciliation previously completed during another office visit      Electronically signed by:       Zahra Way APRN CNP on 12/6/2023 at " 6:27 PM           Sincerely,        TAYLA Sprague CNP

## 2023-12-06 NOTE — PROGRESS NOTES
"[facility] GERIATRICS    Chief Complaint   Patient presents with    Nursing Home Acute    weight gain     labs     HPI:  Lexy Rockwell is a 63 year old  (1960), who is being seen today for an episodic care visit at: [facility] ([facility]) [[id number]].     Legs are decreasing   Weight is down   Feels better   No EVNEGAS    Allergies, and PMH/PSH reviewed in [facility] today.  REVIEW OF SYSTEMS:  4 point ROS including Respiratory, CV, GI and , other than that noted in the HPI,  is negative    Objective:   BP (!) 158/77   Pulse 91   Temp 97.2  F (36.2  C)   Resp 18   Ht 1.6 m (5' 3\")   Wt 80.4 kg (177 lb 3.2 oz)   LMP 03/28/2014   SpO2 95%   BMI 31.39 kg/m    GENERAL APPEARANCE:  Alert, in no distress  RESP:  no respiratory distress  CV:  Palpation and auscultation of heart done , peripheral edema 2-3+ in Bilateral LE  PSYCH:  insight and judgement impaired, memory impaired     Most Recent 3 CBC's:  Recent Labs   Lab Test 11/29/23  1150 08/30/23  0535 05/08/23  0835   WBC 7.6 4.3 3.4*   HGB 10.4* 10.5* 10.2*   MCV 89 87 88    181 184     Most Recent 3 BMP's:  Recent Labs   Lab Test 12/06/23  0542 11/29/23  1150 08/30/23  0535    142 141   POTASSIUM 4.3 5.2 4.3   CHLORIDE 108* 109* 109*   CO2 24 23 22   BUN 39.8* 33.5* 30.8*   CR 1.15* 0.97* 0.89   ANIONGAP 10 10 10   EFRAIN 9.4 9.8 9.3   * 60* 227*       Assessment/Plan:     Lower extremity edema  Weight gain  JEAN (acute kidney injury) (H24)  I personally reviewed today's BMP and Creatinine and BUN up slightly   Edema is improving  Weight down slightly  Plan:   Continue with lasix 20 mg daily  Repeat BMP in 1 week    MED REC REQUIRED  Post Medication Reconciliation Status:  Medication reconciliation previously completed during another office visit      Electronically signed by:       TAYLA Sprague CNP on 12/6/2023 at 6:27 PM       "

## 2023-12-08 ENCOUNTER — TELEPHONE (OUTPATIENT)
Dept: FAMILY MEDICINE | Facility: CLINIC | Age: 63
End: 2023-12-08
Payer: MEDICARE

## 2023-12-08 NOTE — TELEPHONE ENCOUNTER
Patient Quality Outreach    Patient is due for the following:       Topic Date Due    Hepatitis A Vaccine (1 of 2 - Risk 2-dose series) Never done    Zoster (Shingles) Vaccine (1 of 2) Never done    Hepatitis B Vaccine (2 of 3 - Risk 3-dose series) 07/19/2021       Next Steps:   Patient received flu shot at pharmacy. I have updated this in her chart.    Type of outreach:    Chart review performed, no outreach needed.    Next Steps:  Reach out within 90 days via Phone.    Max number of attempts reached: Yes. Will try again in 90 days if patient still on fail list.    Questions for provider review:    None           Salma Meier, [facility]  Chart routed to chart closed.

## 2023-12-09 ENCOUNTER — TELEPHONE (OUTPATIENT)
Dept: GERIATRICS | Facility: CLINIC | Age: 63
End: 2023-12-09
Payer: MEDICARE

## 2023-12-10 NOTE — TELEPHONE ENCOUNTER
Component      Latest Ref Rng 12/6/2023  5:42 AM   Sodium      135 - 145 mmol/L 142    Potassium      3.4 - 5.3 mmol/L 4.3    Chloride      98 - 107 mmol/L 108 (H)    Carbon Dioxide (CO2)      22 - 29 mmol/L 24    Anion Gap      7 - 15 mmol/L 10    Urea Nitrogen      8.0 - 23.0 mg/dL 39.8 (H)    Creatinine      0.51 - 0.95 mg/dL 1.15 (H)    GFR Estimate      >60 mL/min/1.73m2 54 (L)    Calcium      8.8 - 10.2 mg/dL 9.4    Glucose      70 - 99 mg/dL 137 (H)    TSH      0.30 - 4.20 uIU/mL 3.70         Nursing called to report labs done already on 12/6/23 as it appeared like they were not reviewed on the facility's end.    Stable labs.  No new orders.    TAYLA Alvarez CNP

## 2023-12-11 ENCOUNTER — VIRTUAL VISIT (OUTPATIENT)
Dept: ENDOCRINOLOGY | Facility: CLINIC | Age: 63
End: 2023-12-11
Payer: MEDICARE

## 2023-12-11 ENCOUNTER — NURSE TRIAGE (OUTPATIENT)
Dept: NURSING | Facility: CLINIC | Age: 63
End: 2023-12-11

## 2023-12-11 ENCOUNTER — TELEPHONE (OUTPATIENT)
Dept: ENDOCRINOLOGY | Facility: CLINIC | Age: 63
End: 2023-12-11
Payer: MEDICARE

## 2023-12-11 DIAGNOSIS — E10.37X3 TYPE 1 DIABETES MELLITUS WITH DIABETIC MACULAR EDEMA OF BOTH EYES RESOLVED AFTER TREATMENT (H): Primary | ICD-10-CM

## 2023-12-11 PROCEDURE — 99213 OFFICE O/P EST LOW 20 MIN: CPT | Mod: VID | Performed by: PHYSICIAN ASSISTANT

## 2023-12-11 RX ORDER — PROCHLORPERAZINE 25 MG/1
SUPPOSITORY RECTAL
Qty: 1 EACH | Refills: 1 | Status: SHIPPED | OUTPATIENT
Start: 2023-12-11 | End: 2024-01-23

## 2023-12-11 RX ORDER — PROCHLORPERAZINE 25 MG/1
SUPPOSITORY RECTAL
Qty: 3 EACH | Refills: 5 | Status: SHIPPED | OUTPATIENT
Start: 2023-12-11 | End: 2024-01-23

## 2023-12-11 NOTE — PATIENT INSTRUCTIONS
If glucose <70- give 1 juice (15g-20g)  Recheck glucose in 15 minutes (finger stick).   If still <70, repeat above.     If glucose is <55, give 2 juices (30-40g carb).   Recheck glucose in 15 minutes (finger stick).   If still <55, repeat above.     Emergency issues: Here are some concerns you should contact us about.  -Vomiting: more than twice.  Please check ketones.  If positive, go to ER. Monitor glucose hourly.   -High glucose (over 300 mg/dL twice in a row): Please check ketones.  If ketones are negative, take an insulin correction and recheck glucose in 1 hour.  If glucose is not coming down, please call the clinic. If ketones are moderate or large, drink lots of water, take an insulin correction 1.5 times your usual correction, and recheck ketones in 1 hour.  If ketones are still present (or you are vomiting), go to the ER.  -Hypoglycemia (low glucose):   If glucose is less than 70 mg/dL, treat with 15g carb (4 glucose tablets), recheck glucose in 15 minutes.  If low again, repeat.   If glucose is less than 54 mg/dL, treat with 30g carb, recheck glucose in 15 minutes.  If low again, repeat.  Keep glucagon in your home in case of severe hypoglycemia and train someone how to use this.    Emergency kit (please ensure you always have these with you):   Glucose tablets  Glucagon  Insulin  Syringes/needles   Extra infusion set (if on a pump)  Ketone strips    Contact information:   If you have concerns, please send me a Plexx message or call the clinic at [phone].  For more urgent concerns, please call [phone] after hours/weekends and ask to speak with the endocrinologist on call.      Please let me know if you are having low blood sugars less than 70 or over 400 mg/dL.  Do not wait until your next appointment if this is happening.

## 2023-12-11 NOTE — TELEPHONE ENCOUNTER
Calledf pt facility to see if/when fax was sent. Rep stated she will provide BG readings verbally during appt from the log she has w her. .

## 2023-12-11 NOTE — NURSING NOTE
Is the patient currently in the state of [place]? YES    Visit mode:VIDEO    If the visit is dropped, the patient can be reconnected by: VIDEO VISIT: Send to e-mail at: [email or web address]    Will anyone else be joining the visit? Yes- nurse manager  (If patient encounters technical issues they should call [phone])    How would you like to obtain your AVS? MyChart    Are changes needed to the allergy or medication list? Faxing over medication list.     Reason for visit: Follow Up    Elham Paul LPN LPN

## 2023-12-11 NOTE — LETTER
12/11/2023       RE: Lexy Rockwell  [address]  [facility] [address]     Dear Colleague,    Thank you for referring your patient, Lexy Rockwell, to the [facility] [place] [facility]. Please see a copy of my visit note below.    Outcome for 12/11/23 2:32 PM:  Assisted living faxing BG log.   Elham Paul LPN     Start time: 1445  End time: 1506  Provider location: off site- home  Patient location: off site- home.    HPI:  Lexy is a very pleasant 63 year old woman here for hospital follow up for type 1 diabetes.  Her diabetes is complicated by gastroparesis and chronic kidney disease stage 3.  She also has hypertension, hyperlipidemia and developmental delay. She is on the video with Shayy, her nursing home staff from [place].  She usually sees Dr. Deonte Garcia and MARY Haas.      She was added on today due to fluctuating glucose values.  She reports that she was low yesterday and staff made her eat, ice cream, cookie, juice.  Glucose was over 400 this morning.     Current treatment strategy: (doses have been lowered due to hypoglycemia)  - Tresiba 22 units daily  - NovoLog 6B/8L/8D units at meals 3 times a day   - No snack coverage-  3 units   - Sliding scale before meals/HS --   1 unit per 40 above 120 mg/dL.      Checks blood sugar right before eating. Gets insulin at the time of the meal, sometimes a few minutes after.      Last night went low. Had normal bedtime snack.  Was ok when she had her bedtime snack- then she dropped.        Patient had history of suboptimal control of type 1 diabetes (previous A1c ranged between 9 to 12%).  Patient had history of diabetic ketoacidosis in 2021.  Patient had documented low C-peptide and positive angelica antibody in 3/2018. Recent A1c 8.1% in March 2023. Followed up closely with Tiffanie HERNANDEZ.      DM complications:  Retinopathy: 4/2023  -- moderate NPDR, glaucoma -- has f/up appointment 10/2023  Nephropathy: positive macroalbuminuria (test 2021), CKD stage 3   Neuropathy: some tingling and numbness in her feet -- saw podiatrist 3/3023.    Diabetes Control:   Lab Results   Component Value Date    A1C 8.1 03/29/2023    A1C 8.7 01/25/2023    A1C 10.7 10/13/2021    A1C 9.7 02/01/2021    A1C 9.2 09/30/2020    A1C 11.8 01/08/2020    A1C 11.8 05/19/2019    A1C 11.2 12/12/2018       Past Medical History:   Diagnosis Date    Cerumen impacted     Development delay     Diabetic gastroparesis (H) 8/16/2013    Glaucoma (increased eye pressure)     Hyperlipaemia     Hypertension     Hypothyroid     Mental retardation     Nonsenile cataract     Obesity     Strabismus     Type 1 diabetes mellitus not at goal (H)        Past Surgical History:   Procedure Laterality Date    COLONOSCOPY N/A 7/3/2023    Procedure: Colonoscopy;  Surgeon: Merlyn Acevedo MD;  Location:  GI    ESOPHAGOSCOPY, GASTROSCOPY, DUODENOSCOPY (EGD), COMBINED N/A 7/3/2023    Procedure: Esophagoscopy, gastroscopy, duodenoscopy (EGD), combined;  Surgeon: Merlyn Acevedo MD;  Location:  GI    STRABISMUS SURGERY      RUST EYE SURG [facility] PROC UNLISTED  remote    strabismus surgery       Family History   Problem Relation Age of Onset    Hypertension Father     Diabetes Sister     Glaucoma Maternal Grandfather     Cancer No family hx of     Cerebrovascular Disease No family hx of     Thyroid Disease No family hx of     Macular Degeneration No family hx of        Social History     Socioeconomic History    Marital status: Single   Tobacco Use    Smoking status: Never    Smokeless tobacco: Never    Tobacco comments:     lives in smoke free household.   Substance and Sexual Activity    Alcohol use: Yes     Comment: occass social    Drug use: No    Sexual activity: Never   Other Topics Concern    Parent/sibling w/ CABG, MI or angioplasty before 65F 55M? No       Current Outpatient Medications    Medication    acetaminophen (TYLENOL) 500 MG tablet    AMLODIPINE BENZOATE PO    carvedilol (COREG) 6.25 MG tablet    Continuous Blood Gluc  (DEXCOM G7 ) KELLY    Continuous Blood Gluc Sensor (DEXCOM G7 SENSOR) MISC    dorzolamide-timolol (COSOPT) 2-0.5 % ophthalmic solution    ferrous sulfate (SLO-FE) 142 (45 Fe) MG CR tablet    furosemide (LASIX) 20 MG tablet    Glucagon (BAQSIMI) 3 MG/DOSE nasal powder    glucagon 1 MG SOLR injection    insulin aspart (NOVOLOG FLEXPEN) 100 UNIT/ML pen    insulin degludec (TRESIBA FLEXTOUCH) 100 UNIT/ML pen    KETOSTIX test strip    latanoprost (XALATAN) 0.005 % ophthalmic solution    lisinopril (ZESTRIL) 40 MG tablet    magnesium oxide (MAG-OX) 400 MG tablet    MULTIVITAMIN TABS   OR    nystatin (MYCOSTATIN) 783370 UNIT/GM external cream    pantoprazole (PROTONIX) 40 MG EC tablet    potassium chloride ER (KLOR-CON M) 20 MEQ CR tablet    sennosides (SENOKOT) 8.6 MG tablet    simvastatin (ZOCOR) 20 MG tablet     No current facility-administered medications for this visit.          Allergies   Allergen Reactions    Amoxicillin     Sulfa Antibiotics        Physical Exam  LMP 03/28/2014   GENERAL: healthy, alert and no distress  RESP: no audible wheeze, cough, or visible cyanosis.  No visible retractions or increased work of breathing.  Able to speak fully in complete sentences.  PSYCH: mentation appears normal, affect normal/bright, judgement and insight intact, normal speech and appearance well-groomed  RESULTS  Lab Results   Component Value Date    A1C 8.5 (H) 11/29/2023    A1C 8.1 (H) 03/29/2023    A1C 8.7 (H) 01/25/2023    A1C 10.7 (H) 10/13/2021    A1C 9.7 (H) 02/01/2021    A1C 9.2 (H) 09/30/2020    A1C 11.8 (H) 01/08/2020    A1C 11.8 (H) 05/19/2019    A1C 11.2 (H) 12/12/2018       TSH   Date Value Ref Range Status   12/06/2023 3.70 0.30 - 4.20 uIU/mL Final   10/13/2021 3.41 0.40 - 4.00 mU/L Final   02/01/2021 7.07 (H) 0.40 - 4.00 mU/L Final   01/08/2020 4.48  (H) 0.40 - 4.00 mU/L Final   10/02/2019 4.07 (H) 0.40 - 4.00 mU/L Final   07/24/2019 5.08 (H) 0.40 - 4.00 mU/L Final   12/12/2018 6.71 (H) 0.40 - 4.00 mU/L Final     T4 Free   Date Value Ref Range Status   02/01/2021 1.05 0.76 - 1.46 ng/dL Final   01/08/2020 1.08 0.76 - 1.46 ng/dL Final   10/02/2019 1.00 0.76 - 1.46 ng/dL Final   07/24/2019 1.10 0.76 - 1.46 ng/dL Final   09/28/2016 1.01 0.76 - 1.46 ng/dL Final       ALT   Date Value Ref Range Status   03/29/2023 19 10 - 35 U/L Final   01/25/2023 10 10 - 35 U/L Final   09/30/2020 18 0 - 50 U/L Final   05/19/2019 7 (L) 8 - 45 IU/L Final   ]    Recent Labs   Lab Test 01/25/23  0820 02/01/21  0909   CHOL 112 200*   HDL 37* 62   LDL 58 120*   TRIG 84 89       Lab Results   Component Value Date     08/30/2023     02/01/2021      Lab Results   Component Value Date    POTASSIUM 4.3 08/30/2023    POTASSIUM 4.2 10/13/2021    POTASSIUM 4.1 02/01/2021     Lab Results   Component Value Date    CHLORIDE 109 08/30/2023    CHLORIDE 107 10/13/2021    CHLORIDE 107 02/01/2021     Lab Results   Component Value Date    EFRAIN 9.3 08/30/2023    EFRAIN 10.3 02/01/2021     Lab Results   Component Value Date    CO2 22 08/30/2023    CO2 26 10/13/2021    CO2 32 02/01/2021     Lab Results   Component Value Date    BUN 30.8 08/30/2023    BUN 13 10/13/2021    BUN 17 02/01/2021     Lab Results   Component Value Date    CR 0.89 08/30/2023    CR 0.73 02/01/2021       GFR Estimate   Date Value Ref Range Status   12/06/2023 54 (L) >60 mL/min/1.73m2 Final   11/29/2023 66 >60 mL/min/1.73m2 Final   08/30/2023 73 >60 mL/min/1.73m2 Final   02/01/2021 90 >60 mL/min/[1.73_m2] Final     Comment:     Non  GFR Calc  Starting 12/18/2018, serum creatinine based estimated GFR (eGFR) will be   calculated using the Chronic Kidney Disease Epidemiology Collaboration   (CKD-EPI) equation.     09/30/2020 >90 >60 mL/min/[1.73_m2] Final     Comment:     Non  GFR Calc  Starting  "12/18/2018, serum creatinine based estimated GFR (eGFR) will be   calculated using the Chronic Kidney Disease Epidemiology Collaboration   (CKD-EPI) equation.     07/12/2019 >90 >60 mL/min/[1.73_m2] Final     Comment:     Non  GFR Calc  Starting 12/18/2018, serum creatinine based estimated GFR (eGFR) will be   calculated using the Chronic Kidney Disease Epidemiology Collaboration   (CKD-EPI) equation.       GFR Estimate If Black   Date Value Ref Range Status   02/01/2021 >90 >60 mL/min/[1.73_m2] Final     Comment:      GFR Calc  Starting 12/18/2018, serum creatinine based estimated GFR (eGFR) will be   calculated using the Chronic Kidney Disease Epidemiology Collaboration   (CKD-EPI) equation.     09/30/2020 >90 >60 mL/min/[1.73_m2] Final     Comment:      GFR Calc  Starting 12/18/2018, serum creatinine based estimated GFR (eGFR) will be   calculated using the Chronic Kidney Disease Epidemiology Collaboration   (CKD-EPI) equation.     07/12/2019 >90 >60 mL/min/[1.73_m2] Final     Comment:      GFR Calc  Starting 12/18/2018, serum creatinine based estimated GFR (eGFR) will be   calculated using the Chronic Kidney Disease Epidemiology Collaboration   (CKD-EPI) equation.         Lab Results   Component Value Date    MICROL 306 02/01/2021     No results found for: \"MICROALBUMIN\"  Lab Results   Component Value Date    CPEPT 0.6 (L) 03/14/2018    GADAB >250.0 (H) 03/14/2018       Vitamin B12   Date Value Ref Range Status   02/22/2023 784 232 - 1,245 pg/mL Final   12/23/2013 455 >210 pg/mL Final     Comment:     Interp: 247-911 = Normal   08/19/2009 659 >210 pg/mL Final     Comment:     Interp: 247-911 = Normal   ]    Most recent eye exam date: : Not Found     Assessment/Plan:     1.  Type 1 diabetes- Patient has been dealing with fluctuating glucose values.  Staff have been nervous about her lows and over-treating them.  We discussed appropriate treatment.  " Also will plan to switch from G7 to G6, as she has a compatible celso for this and we can share data.  No changes in dosing today.  We made the following plan today (instructions given to patient):      If glucose <70- give 1 juice (15g-20g)  Recheck glucose in 15 minutes (finger stick).   If still <70, repeat above.     If glucose is <55, give 2 juices (30-40g carb).   Recheck glucose in 15 minutes (finger stick).   If still <55, repeat above.     Emergency issues: Here are some concerns you should contact us about.  -Vomiting: more than twice.  Please check ketones.  If positive, go to ER. Monitor glucose hourly.   -High glucose (over 300 mg/dL twice in a row): Please check ketones.  If ketones are negative, take an insulin correction and recheck glucose in 1 hour.  If glucose is not coming down, please call the clinic. If ketones are moderate or large, drink lots of water, take an insulin correction 1.5 times your usual correction, and recheck ketones in 1 hour.  If ketones are still present (or you are vomiting), go to the ER.  -Hypoglycemia (low glucose):   If glucose is less than 70 mg/dL, treat with 15g carb (4 glucose tablets), recheck glucose in 15 minutes.  If low again, repeat.   If glucose is less than 54 mg/dL, treat with 30g carb, recheck glucose in 15 minutes.  If low again, repeat.  Keep glucagon in your home in case of severe hypoglycemia and train someone how to use this.    Emergency kit (please ensure you always have these with you):   Glucose tablets  Glucagon  Insulin  Syringes/needles   Extra infusion set (if on a pump)  Ketone strips    Contact information:   If you have concerns, please send me a Nugg-it message or call the clinic at [phone].  For more urgent concerns, please call [phone] after hours/weekends and ask to speak with the endocrinologist on call.      Please let me know if you are having low blood sugars less than 70 or over 400 mg/dL.  Do not wait until your next  appointment if this is happening.       2.   F/U in DM education monthly going forward.  Will f/up in 3 mos with me, sooner with concerns/hypoglycemia.  Staff will fax glucose with concerns.    22minutes spent on the date of the encounter doing chart review, review of test results, review of continuous glucose sensor, insulin pump data, interpretation of glucose data, patient visit and documentation, counseling/coordination of care, and discussion of follow up plan for worsening hyper and hypoglycemia.  The patient understood and is satisfied with today's visit.          Jaylene Perez PA-C, MPAS   [facility]  Department of Medicine  Division of Endocrinology and Diabetes

## 2023-12-11 NOTE — TELEPHONE ENCOUNTER
Nurse Triage SBAR    Is this a 2nd Level Triage?    None    Situation: High Blood sugars    Background/Assessment:     Nurse (Kennedi) from [facility]       Reporting, Pt's blood sugar was 473 before breakfast at 8AM.  Gave the Pt her schedule insulin and sliding scale insulin as well.    Pt's blood sugar after breakfast was 274.   Asymptomatic.    Nurse has an order for sliding scale all the way up to blood sugars reaching 520.    So Nurse was wondering why she has to call to report the blood sugar when they have  sliding scale insulin and the Pt is in stable condition with no symptoms.    Nurse Would like a call back @ [phone] to discuss.     Also if there are any new orders.   Okay to fax at [phone].       Thank you     Leyla Shirley RN  Central Triage Red Flags/Med Refills              Protocol Recommended Disposition:   Discuss With PCP And Callback By Nurse Within 1 Hour      Reason for Disposition   Blood glucose > 400 mg/dL (22.2 mmol/L)    Additional Information   Negative: Unconscious or difficult to awaken   Negative: Acting confused (e.g., disoriented, slurred speech)   Negative: Very weak (can't stand)   Negative: Sounds like a life-threatening emergency to the triager   Negative: Vomiting and signs of dehydration (e.g., very dry mouth, lightheaded, dark urine)   Negative: Blood glucose > 240 mg/dL (13.3 mmol/L) and rapid breathing   Negative: Blood glucose > 500 mg/dL (27.8 mmol/L)   Negative: Blood glucose > 240 mg/dL (13.3 mmol/L) AND urine ketones moderate-large (or more than 1+)   Negative: Blood glucose > 240 mg/dL (13.3 mmol/L) and blood ketones > 1.4 mmol/L   Negative: Blood glucose > 240 mg/dL (13.3 mmol/L) AND vomiting AND unable to check for ketones (in blood or urine)   Negative: Vomiting lasting > 4 hours   Negative: Patient sounds very sick or weak to the triager   Negative: Caller has URGENT medication or insulin pump question and triager unable to answer question    Protocols  used: Diabetes - High Blood Sugar-A-OH

## 2023-12-11 NOTE — PROGRESS NOTES
Outcome for 12/11/23 2:32 PM:  Assisted living faxing BG log.   Elham PaulRIA     Start time: 1445  End time: 1506  Provider location: off site- home  Patient location: off site- home.    HPI:  Lexy is a very pleasant 63 year old woman here for hospital follow up for type 1 diabetes.  Her diabetes is complicated by gastroparesis and chronic kidney disease stage 3.  She also has hypertension, hyperlipidemia and developmental delay. She is on the video with Shayy, her nursing home staff from [facility].  She usually sees Dr. Deonte Garcia and MARY Haas.      She was added on today due to fluctuating glucose values.  She reports that she was low yesterday and staff made her eat, ice cream, cookie, juice.  Glucose was over 400 this morning.     Current treatment strategy: (doses have been lowered due to hypoglycemia)  - Tresiba 22 units daily  - NovoLog 6B/8L/8D units at meals 3 times a day   - No snack coverage-  3 units   - Sliding scale before meals/HS --   1 unit per 40 above 120 mg/dL.      Checks blood sugar right before eating. Gets insulin at the time of the meal, sometimes a few minutes after.      Last night went low. Had normal bedtime snack.  Was ok when she had her bedtime snack- then she dropped.        Patient had history of suboptimal control of type 1 diabetes (previous A1c ranged between 9 to 12%).  Patient had history of diabetic ketoacidosis in 2021.  Patient had documented low C-peptide and positive angelica antibody in 3/2018. Recent A1c 8.1% in March 2023. Followed up closely with Tiffanie HERNANDEZ.      DM complications:  Retinopathy: 4/2023 -- moderate NPDR, glaucoma -- has f/up appointment 10/2023  Nephropathy: positive macroalbuminuria (test 2021), CKD stage 3   Neuropathy: some tingling and numbness in her feet -- saw podiatrist 3/3023.    Diabetes Control:   Lab Results   Component Value Date    A1C 8.1 03/29/2023    A1C 8.7 01/25/2023    A1C 10.7 10/13/2021    A1C  9.7 02/01/2021    A1C 9.2 09/30/2020    A1C 11.8 01/08/2020    A1C 11.8 05/19/2019    A1C 11.2 12/12/2018       Past Medical History:   Diagnosis Date    Cerumen impacted     Development delay     Diabetic gastroparesis (H) 8/16/2013    Glaucoma (increased eye pressure)     Hyperlipaemia     Hypertension     Hypothyroid     Mental retardation     Nonsenile cataract     Obesity     Strabismus     Type 1 diabetes mellitus not at goal (H)        Past Surgical History:   Procedure Laterality Date    COLONOSCOPY N/A 7/3/2023    Procedure: Colonoscopy;  Surgeon: Merlyn Acevedo MD;  Location:  GI    ESOPHAGOSCOPY, GASTROSCOPY, DUODENOSCOPY (EGD), COMBINED N/A 7/3/2023    Procedure: Esophagoscopy, gastroscopy, duodenoscopy (EGD), combined;  Surgeon: Merlyn Acevedo MD;  Location:  GI    STRABISMUS SURGERY      ZZC EYE SURG ANT SGMT PROC UNLISTED  remote    strabismus surgery       Family History   Problem Relation Age of Onset    Hypertension Father     Diabetes Sister     Glaucoma Maternal Grandfather     Cancer No family hx of     Cerebrovascular Disease No family hx of     Thyroid Disease No family hx of     Macular Degeneration No family hx of        Social History     Socioeconomic History    Marital status: Single   Tobacco Use    Smoking status: Never    Smokeless tobacco: Never    Tobacco comments:     lives in smoke free household.   Substance and Sexual Activity    Alcohol use: Yes     Comment: occass social    Drug use: No    Sexual activity: Never   Other Topics Concern    Parent/sibling w/ CABG, MI or angioplasty before 65F 55M? No       Current Outpatient Medications   Medication    acetaminophen (TYLENOL) 500 MG tablet    AMLODIPINE BENZOATE PO    carvedilol (COREG) 6.25 MG tablet    Continuous Blood Gluc  (DEXCOM G7 ) KELLY    Continuous Blood Gluc Sensor (DEXCOM G7 SENSOR) MISC    dorzolamide-timolol (COSOPT) 2-0.5 % ophthalmic solution    ferrous sulfate (SLO-FE) 142 (45 Fe) MG CR  tablet    furosemide (LASIX) 20 MG tablet    Glucagon (BAQSIMI) 3 MG/DOSE nasal powder    glucagon 1 MG SOLR injection    insulin aspart (NOVOLOG FLEXPEN) 100 UNIT/ML pen    insulin degludec (TRESIBA FLEXTOUCH) 100 UNIT/ML pen    KETOSTIX test strip    latanoprost (XALATAN) 0.005 % ophthalmic solution    lisinopril (ZESTRIL) 40 MG tablet    magnesium oxide (MAG-OX) 400 MG tablet    MULTIVITAMIN TABS   OR    nystatin (MYCOSTATIN) 890455 UNIT/GM external cream    pantoprazole (PROTONIX) 40 MG EC tablet    potassium chloride ER (KLOR-CON M) 20 MEQ CR tablet    sennosides (SENOKOT) 8.6 MG tablet    simvastatin (ZOCOR) 20 MG tablet     No current facility-administered medications for this visit.          Allergies   Allergen Reactions    Amoxicillin     Sulfa Antibiotics        Physical Exam  LMP 03/28/2014   GENERAL: healthy, alert and no distress  RESP: no audible wheeze, cough, or visible cyanosis.  No visible retractions or increased work of breathing.  Able to speak fully in complete sentences.  PSYCH: mentation appears normal, affect normal/bright, judgement and insight intact, normal speech and appearance well-groomed  RESULTS  Lab Results   Component Value Date    A1C 8.5 (H) 11/29/2023    A1C 8.1 (H) 03/29/2023    A1C 8.7 (H) 01/25/2023    A1C 10.7 (H) 10/13/2021    A1C 9.7 (H) 02/01/2021    A1C 9.2 (H) 09/30/2020    A1C 11.8 (H) 01/08/2020    A1C 11.8 (H) 05/19/2019    A1C 11.2 (H) 12/12/2018       TSH   Date Value Ref Range Status   12/06/2023 3.70 0.30 - 4.20 uIU/mL Final   10/13/2021 3.41 0.40 - 4.00 mU/L Final   02/01/2021 7.07 (H) 0.40 - 4.00 mU/L Final   01/08/2020 4.48 (H) 0.40 - 4.00 mU/L Final   10/02/2019 4.07 (H) 0.40 - 4.00 mU/L Final   07/24/2019 5.08 (H) 0.40 - 4.00 mU/L Final   12/12/2018 6.71 (H) 0.40 - 4.00 mU/L Final     T4 Free   Date Value Ref Range Status   02/01/2021 1.05 0.76 - 1.46 ng/dL Final   01/08/2020 1.08 0.76 - 1.46 ng/dL Final   10/02/2019 1.00 0.76 - 1.46 ng/dL Final    07/24/2019 1.10 0.76 - 1.46 ng/dL Final   09/28/2016 1.01 0.76 - 1.46 ng/dL Final       ALT   Date Value Ref Range Status   03/29/2023 19 10 - 35 U/L Final   01/25/2023 10 10 - 35 U/L Final   09/30/2020 18 0 - 50 U/L Final   05/19/2019 7 (L) 8 - 45 IU/L Final   ]    Recent Labs   Lab Test 01/25/23  0820 02/01/21  0909   CHOL 112 200*   HDL 37* 62   LDL 58 120*   TRIG 84 89       Lab Results   Component Value Date     08/30/2023     02/01/2021      Lab Results   Component Value Date    POTASSIUM 4.3 08/30/2023    POTASSIUM 4.2 10/13/2021    POTASSIUM 4.1 02/01/2021     Lab Results   Component Value Date    CHLORIDE 109 08/30/2023    CHLORIDE 107 10/13/2021    CHLORIDE 107 02/01/2021     Lab Results   Component Value Date    EFRAIN 9.3 08/30/2023    EFRAIN 10.3 02/01/2021     Lab Results   Component Value Date    CO2 22 08/30/2023    CO2 26 10/13/2021    CO2 32 02/01/2021     Lab Results   Component Value Date    BUN 30.8 08/30/2023    BUN 13 10/13/2021    BUN 17 02/01/2021     Lab Results   Component Value Date    CR 0.89 08/30/2023    CR 0.73 02/01/2021       GFR Estimate   Date Value Ref Range Status   12/06/2023 54 (L) >60 mL/min/1.73m2 Final   11/29/2023 66 >60 mL/min/1.73m2 Final   08/30/2023 73 >60 mL/min/1.73m2 Final   02/01/2021 90 >60 mL/min/[1.73_m2] Final     Comment:     Non  GFR Calc  Starting 12/18/2018, serum creatinine based estimated GFR (eGFR) will be   calculated using the Chronic Kidney Disease Epidemiology Collaboration   (CKD-EPI) equation.     09/30/2020 >90 >60 mL/min/[1.73_m2] Final     Comment:     Non  GFR Calc  Starting 12/18/2018, serum creatinine based estimated GFR (eGFR) will be   calculated using the Chronic Kidney Disease Epidemiology Collaboration   (CKD-EPI) equation.     07/12/2019 >90 >60 mL/min/[1.73_m2] Final     Comment:     Non  GFR Calc  Starting 12/18/2018, serum creatinine based estimated GFR (eGFR) will be  "  calculated using the Chronic Kidney Disease Epidemiology Collaboration   (CKD-EPI) equation.       GFR Estimate If Black   Date Value Ref Range Status   02/01/2021 >90 >60 mL/min/[1.73_m2] Final     Comment:      GFR Calc  Starting 12/18/2018, serum creatinine based estimated GFR (eGFR) will be   calculated using the Chronic Kidney Disease Epidemiology Collaboration   (CKD-EPI) equation.     09/30/2020 >90 >60 mL/min/[1.73_m2] Final     Comment:      GFR Calc  Starting 12/18/2018, serum creatinine based estimated GFR (eGFR) will be   calculated using the Chronic Kidney Disease Epidemiology Collaboration   (CKD-EPI) equation.     07/12/2019 >90 >60 mL/min/[1.73_m2] Final     Comment:      GFR Calc  Starting 12/18/2018, serum creatinine based estimated GFR (eGFR) will be   calculated using the Chronic Kidney Disease Epidemiology Collaboration   (CKD-EPI) equation.         Lab Results   Component Value Date    MICROL 306 02/01/2021     No results found for: \"MICROALBUMIN\"  Lab Results   Component Value Date    CPEPT 0.6 (L) 03/14/2018    GADAB >250.0 (H) 03/14/2018       Vitamin B12   Date Value Ref Range Status   02/22/2023 784 232 - 1,245 pg/mL Final   12/23/2013 455 >210 pg/mL Final     Comment:     Interp: 247-911 = Normal   08/19/2009 659 >210 pg/mL Final     Comment:     Interp: 247-911 = Normal   ]    Most recent eye exam date: : Not Found     Assessment/Plan:     1.  Type 1 diabetes- Patient has been dealing with fluctuating glucose values.  Staff have been nervous about her lows and over-treating them.  We discussed appropriate treatment.  Also will plan to switch from G7 to G6, as she has a compatible celso for this and we can share data.  No changes in dosing today.  We made the following plan today (instructions given to patient):      If glucose <70- give 1 juice (15g-20g)  Recheck glucose in 15 minutes (finger stick).   If still <70, repeat above.     If " glucose is <55, give 2 juices (30-40g carb).   Recheck glucose in 15 minutes (finger stick).   If still <55, repeat above.     Emergency issues: Here are some concerns you should contact us about.  -Vomiting: more than twice.  Please check ketones.  If positive, go to ER. Monitor glucose hourly.   -High glucose (over 300 mg/dL twice in a row): Please check ketones.  If ketones are negative, take an insulin correction and recheck glucose in 1 hour.  If glucose is not coming down, please call the clinic. If ketones are moderate or large, drink lots of water, take an insulin correction 1.5 times your usual correction, and recheck ketones in 1 hour.  If ketones are still present (or you are vomiting), go to the ER.  -Hypoglycemia (low glucose):   If glucose is less than 70 mg/dL, treat with 15g carb (4 glucose tablets), recheck glucose in 15 minutes.  If low again, repeat.   If glucose is less than 54 mg/dL, treat with 30g carb, recheck glucose in 15 minutes.  If low again, repeat.  Keep glucagon in your home in case of severe hypoglycemia and train someone how to use this.    Emergency kit (please ensure you always have these with you):   Glucose tablets  Glucagon  Insulin  Syringes/needles   Extra infusion set (if on a pump)  Ketone strips    Contact information:   If you have concerns, please send me a MagMe message or call the clinic at [phone].  For more urgent concerns, please call [phone] after hours/weekends and ask to speak with the endocrinologist on call.      Please let me know if you are having low blood sugars less than 70 or over 400 mg/dL.  Do not wait until your next appointment if this is happening.       2.   F/U in DM education monthly going forward.  Will f/up in 3 mos with me, sooner with concerns/hypoglycemia.  Staff will fax glucose with concerns.    22minutes spent on the date of the encounter doing chart review, review of test results, review of continuous glucose sensor, insulin  pump data, interpretation of glucose data, patient visit and documentation, counseling/coordination of care, and discussion of follow up plan for worsening hyper and hypoglycemia.  The patient understood and is satisfied with today's visit.          Jaylene Perez PA-C, MPAS   [facility]  Department of Medicine  Division of Endocrinology and Diabetes

## 2023-12-11 NOTE — TELEPHONE ENCOUNTER
They are unable to bring today, she needs more than a 1 day notice for transportation, please call with other openings.

## 2023-12-11 NOTE — TELEPHONE ENCOUNTER
Patient call:     Appointment type: Return diabetes   Provider: Chris   Return date: see below   Speciality phone number: [phone]  Additional appointment(s) needed: N/A   Additional notes: Offered pt in person gricelda Perez today at 2:30 pm LVM, MyC x1. No other appts available this week. Sent message to Emi about no other availability.     Meghan Miles on 12/11/2023 at 1:20 PM

## 2023-12-11 NOTE — TELEPHONE ENCOUNTER
12/11/23 RAYSA Ashley called pt to confirmed it's a virtual appointment with Dr. Perez at 2:30pm.    Judith Chang -General Care Navigator

## 2023-12-11 NOTE — TELEPHONE ENCOUNTER
Called PT number listed and received a rep from her assisted living. Rep stated she will fax BG log and meds. Awaiting fax to come through.

## 2023-12-12 VITALS
SYSTOLIC BLOOD PRESSURE: 143 MMHG | OXYGEN SATURATION: 98 % | RESPIRATION RATE: 20 BRPM | WEIGHT: 177 LBS | BODY MASS INDEX: 31.36 KG/M2 | HEIGHT: 63 IN | HEART RATE: 97 BPM | DIASTOLIC BLOOD PRESSURE: 74 MMHG | TEMPERATURE: 98 F

## 2023-12-13 ENCOUNTER — LAB REQUISITION (OUTPATIENT)
Dept: LAB | Facility: CLINIC | Age: 63
End: 2023-12-13
Payer: MEDICARE

## 2023-12-13 ENCOUNTER — NURSING HOME VISIT (OUTPATIENT)
Dept: GERIATRICS | Facility: CLINIC | Age: 63
End: 2023-12-13
Payer: MEDICARE

## 2023-12-13 DIAGNOSIS — R60.0 LOWER EXTREMITY EDEMA: Primary | ICD-10-CM

## 2023-12-13 DIAGNOSIS — E11.9 TYPE 2 DIABETES MELLITUS WITHOUT COMPLICATIONS (H): ICD-10-CM

## 2023-12-13 DIAGNOSIS — I10 ESSENTIAL (PRIMARY) HYPERTENSION: ICD-10-CM

## 2023-12-13 DIAGNOSIS — N17.9 AKI (ACUTE KIDNEY INJURY) (H): ICD-10-CM

## 2023-12-13 DIAGNOSIS — R63.5 WEIGHT GAIN: ICD-10-CM

## 2023-12-13 PROCEDURE — 99309 SBSQ NF CARE MODERATE MDM 30: CPT | Performed by: NURSE PRACTITIONER

## 2023-12-13 NOTE — LETTER
12/13/2023        RE: Lexy Rockwell  [address]  [facility] [address]         [facility]      Visit Type: Nursing Home Acute, Weight Check, and Lab Result Notice     Facility:   [facility] () [[id number]]         History of Present Illness: Lexy Rockwell is a 63 year old female       Weight is stable at 177 x 3 weeks but remains up 17# in 180 days.   Therapy is seeing pt for leg wrapping  Saw endocrinology this week who made changes to insulin.  Patient feels good today.  Her legs are less edematous.  She does not have any abdominal edema.       Current Outpatient Medications   Medication Sig Dispense Refill     acetaminophen (TYLENOL) 500 MG tablet Take 1,000 mg by mouth 3 times daily as needed for mild pain       AMLODIPINE BENZOATE PO Take 2.5 mg by mouth       carvedilol (COREG) 6.25 MG tablet Take 1 tablet (6.25 mg) by mouth 2 times daily (with meals)       Continuous Blood Gluc  (DEXCOM G7 ) KELLY Use to read blood sugars as per 's instructions. 1 each 0     Continuous Blood Gluc Sensor (DEXCOM G6 SENSOR) MISC Change every 10 days. 3 each 5     Continuous Blood Gluc Sensor (DEXCOM G7 SENSOR) MISC Change every 10 days. 3 each 5     Continuous Blood Gluc Transmit (DEXCOM G6 TRANSMITTER) MISC Change every 3 months. 1 each 1     dorzolamide-timolol (COSOPT) 2-0.5 % ophthalmic solution Place 1 drop into both eyes 2 times daily 5 mL 11     ferrous sulfate (SLO-FE) 142 (45 Fe) MG CR tablet Take 1 tablet (142 mg) by mouth daily       furosemide (LASIX) 20 MG tablet Take 1 tablet (20 mg) by mouth daily       Glucagon (BAQSIMI) 3 MG/DOSE nasal powder Spray 1 spray (3 mg) in nostril as needed in the event of unconscious hypoglycemia or hypoglycemic seizure. May repeat dose if no response after 15 minutes. 2 each 3     glucagon 1 MG SOLR injection Inject 1 mg into the muscle once       insulin aspart (NOVOLOG FLEXPEN) 100 UNIT/ML pen  "Inject 6 units before breakfast, 8 units before lunch and dinner, 3 units before each snack.  Add correction scale before meals. -160 = 1 unit. -200 = 2 units. -240 = 3 units. -280 = 4 units. -320 = 5 units. -360 = 6 units. -400 = 7 units. -440 = 8 units. -480 = 9 units. -520 = 10 units. BG >/=520 = 11 units.Bedtime correction scale ONLY: -240 = 1 unit. -280 = 2 units. -320 = 3 units. -359 = 4 units. -399 = 5 units. -439 = 6 units. -479 = 7 units. -519 = 8 units. BG >/=520 = 9 units.  Max 65 units daily 60 mL 3     insulin degludec (TRESIBA FLEXTOUCH) 100 UNIT/ML pen Inject 22 Units Subcutaneous daily 30 mL 3     KETOSTIX test strip Use as directed in case of high glucose, vomiting or illness. 50 strip 3     latanoprost (XALATAN) 0.005 % ophthalmic solution INSTILL 1 DROP IN BOTH EYES AT BEDTIME 10 mL 3     lisinopril (ZESTRIL) 40 MG tablet Take 1 tablet (40 mg) by mouth daily       magnesium oxide (MAG-OX) 400 MG tablet Take 1 tablet (400 mg) by mouth daily 90 tablet 3     MULTIVITAMIN TABS   OR 1 TABLET DAILY       nystatin (MYCOSTATIN) 451584 UNIT/GM external cream Apply topically 2 times daily as needed for dry skin To feet and toenails.       pantoprazole (PROTONIX) 40 MG EC tablet Take 40 mg by mouth daily       potassium chloride ER (KLOR-CON M) 20 MEQ CR tablet Take 20 mEq by mouth daily       sennosides (SENOKOT) 8.6 MG tablet Take 1 tablet by mouth daily       simvastatin (ZOCOR) 20 MG tablet Take 1 tablet (20 mg) by mouth At Bedtime 90 tablet 3       ALLERGIES:Amoxicillin and Sulfa antibiotics    Vitals:  BP (!) 143/74   Pulse 97   Temp 98  F (36.7  C)   Resp 20   Ht 1.6 m (5' 3\")   Wt 80.3 kg (177 lb)   LMP 03/28/2014   SpO2 98%   BMI 31.35 kg/m    Body mass index is 31.35 kg/m .    Review of Systems   All other systems reviewed and are negative.         Physical Exam  Pulmonary:      " Effort: Pulmonary effort is normal.   Musculoskeletal:      Right lower leg: Edema present.      Left lower leg: Edema present.   Neurological:      Mental Status: She is alert. Mental status is at baseline.   Psychiatric:         Mood and Affect: Mood normal.         Behavior: Behavior normal.           Labs:     Most Recent 3 CBC's:  Recent Labs   Lab Test 11/29/23  1150 08/30/23  0535 05/08/23  0835   WBC 7.6 4.3 3.4*   HGB 10.4* 10.5* 10.2*   MCV 89 87 88    181 184     Most Recent 3 BMP's:  Recent Labs   Lab Test 12/06/23  0542 11/29/23  1150 08/30/23  0535    142 141   POTASSIUM 4.3 5.2 4.3   CHLORIDE 108* 109* 109*   CO2 24 23 22   BUN 39.8* 33.5* 30.8*   CR 1.15* 0.97* 0.89   ANIONGAP 10 10 10   EFRAIN 9.4 9.8 9.3   * 60* 227*     Most Recent 2 LFT's:  Recent Labs   Lab Test 03/29/23  0515 01/25/23  0820   AST 20 19   ALT 19 10   ALKPHOS 73 79   BILITOTAL <0.2 <0.2     Most Recent 3 INR's:No lab results found.  Most Recent Cholesterol Panel:  Recent Labs   Lab Test 01/25/23  0820   CHOL 112   LDL 58   HDL 37*   TRIG 84     Most Recent TSH and T4:  Recent Labs   Lab Test 12/06/23  0542 10/13/21  1540 02/01/21  0909   TSH 3.70   < > 7.07*   T4  --   --  1.05    < > = values in this interval not displayed.     Most Recent Hemoglobin A1c:  Recent Labs   Lab Test 11/29/23  1150   A1C 8.5*              Assessment/Plan:  Lower extremity edema  Weight gain  JEAN (acute kidney injury) (H24)  I personally reviewed  BMP dated 12/6 and Creatinine and BUN are stable.   Edema is improving  Weight stable x 3 weeks.   Plan:   Continue with lasix 20 mg daily  Repeat BMP pending and if BUN and creatinine worsen then discontinue furosemide.          Electronically signed by:    TAYLA Sprague CNP on 12/13/2023 at 12:25 PM      MEDICATIONS:  MED REC REQUIRED  Post Medication Reconciliation Status:  Medication reconciliation previously completed during another office visit              Lexy Escobedo  [place]  1960    Pt has a order for BMP to be drawn on 12/13.  If it was drawn we will wait for results . I don't believe it was drawn so     1) please have a BMP drawn 12/14/23 due to weight gain.     TAYLA Sprague CNP on 12/13/2023 at 12:27 PM        Sincerely,        TAYLA Sprague CNP

## 2023-12-13 NOTE — PROGRESS NOTES
Lexy Rockwell  1960    Pt has a order for BMP to be drawn on 12/13.  If it was drawn we will wait for results . I don't believe it was drawn so     1) please have a BMP drawn 12/14/23 due to weight gain.     Zahra Way, TAYLA CNP on 12/13/2023 at 12:27 PM

## 2023-12-13 NOTE — PROGRESS NOTES
[facility] GERIATRICS      Visit Type: Nursing Home Acute, Weight Check, and Lab Result Notice     Facility:   [facility] () [[id number]]         History of Present Illness: Lexy Rockwell is a 63 year old female       Weight is stable at 177 x 3 weeks but remains up 17# in 180 days.   Therapy is seeing pt for leg wrapping  Saw endocrinology this week who made changes to insulin.  Patient feels good today.  Her legs are less edematous.  She does not have any abdominal edema.       Current Outpatient Medications   Medication Sig Dispense Refill    acetaminophen (TYLENOL) 500 MG tablet Take 1,000 mg by mouth 3 times daily as needed for mild pain      AMLODIPINE BENZOATE PO Take 2.5 mg by mouth      carvedilol (COREG) 6.25 MG tablet Take 1 tablet (6.25 mg) by mouth 2 times daily (with meals)      Continuous Blood Gluc  (DEXCOM G7 ) KELLY Use to read blood sugars as per 's instructions. 1 each 0    Continuous Blood Gluc Sensor (DEXCOM G6 SENSOR) MISC Change every 10 days. 3 each 5    Continuous Blood Gluc Sensor (DEXCOM G7 SENSOR) MISC Change every 10 days. 3 each 5    Continuous Blood Gluc Transmit (DEXCOM G6 TRANSMITTER) MISC Change every 3 months. 1 each 1    dorzolamide-timolol (COSOPT) 2-0.5 % ophthalmic solution Place 1 drop into both eyes 2 times daily 5 mL 11    ferrous sulfate (SLO-FE) 142 (45 Fe) MG CR tablet Take 1 tablet (142 mg) by mouth daily      furosemide (LASIX) 20 MG tablet Take 1 tablet (20 mg) by mouth daily      Glucagon (BAQSIMI) 3 MG/DOSE nasal powder Spray 1 spray (3 mg) in nostril as needed in the event of unconscious hypoglycemia or hypoglycemic seizure. May repeat dose if no response after 15 minutes. 2 each 3    glucagon 1 MG SOLR injection Inject 1 mg into the muscle once      insulin aspart (NOVOLOG FLEXPEN) 100 UNIT/ML pen Inject 6 units before breakfast, 8 units before lunch and dinner, 3 units before each snack.  Add correction scale  "before meals. -160 = 1 unit. -200 = 2 units. -240 = 3 units. -280 = 4 units. -320 = 5 units. -360 = 6 units. -400 = 7 units. -440 = 8 units. -480 = 9 units. -520 = 10 units. BG >/=520 = 11 units.Bedtime correction scale ONLY: -240 = 1 unit. -280 = 2 units. -320 = 3 units. -359 = 4 units. -399 = 5 units. -439 = 6 units. -479 = 7 units. -519 = 8 units. BG >/=520 = 9 units.  Max 65 units daily 60 mL 3    insulin degludec (TRESIBA FLEXTOUCH) 100 UNIT/ML pen Inject 22 Units Subcutaneous daily 30 mL 3    KETOSTIX test strip Use as directed in case of high glucose, vomiting or illness. 50 strip 3    latanoprost (XALATAN) 0.005 % ophthalmic solution INSTILL 1 DROP IN BOTH EYES AT BEDTIME 10 mL 3    lisinopril (ZESTRIL) 40 MG tablet Take 1 tablet (40 mg) by mouth daily      magnesium oxide (MAG-OX) 400 MG tablet Take 1 tablet (400 mg) by mouth daily 90 tablet 3    MULTIVITAMIN TABS   OR 1 TABLET DAILY      nystatin (MYCOSTATIN) 537615 UNIT/GM external cream Apply topically 2 times daily as needed for dry skin To feet and toenails.      pantoprazole (PROTONIX) 40 MG EC tablet Take 40 mg by mouth daily      potassium chloride ER (KLOR-CON M) 20 MEQ CR tablet Take 20 mEq by mouth daily      sennosides (SENOKOT) 8.6 MG tablet Take 1 tablet by mouth daily      simvastatin (ZOCOR) 20 MG tablet Take 1 tablet (20 mg) by mouth At Bedtime 90 tablet 3       ALLERGIES:Amoxicillin and Sulfa antibiotics    Vitals:  BP (!) 143/74   Pulse 97   Temp 98  F (36.7  C)   Resp 20   Ht 1.6 m (5' 3\")   Wt 80.3 kg (177 lb)   LMP 03/28/2014   SpO2 98%   BMI 31.35 kg/m    Body mass index is 31.35 kg/m .    Review of Systems   All other systems reviewed and are negative.         Physical Exam  Pulmonary:      Effort: Pulmonary effort is normal.   Musculoskeletal:      Right lower leg: Edema present.      Left lower leg: Edema present. "   Neurological:      Mental Status: She is alert. Mental status is at baseline.   Psychiatric:         Mood and Affect: Mood normal.         Behavior: Behavior normal.           Labs:     Most Recent 3 CBC's:  Recent Labs   Lab Test 11/29/23  1150 08/30/23  0535 05/08/23  0835   WBC 7.6 4.3 3.4*   HGB 10.4* 10.5* 10.2*   MCV 89 87 88    181 184     Most Recent 3 BMP's:  Recent Labs   Lab Test 12/06/23  0542 11/29/23  1150 08/30/23  0535    142 141   POTASSIUM 4.3 5.2 4.3   CHLORIDE 108* 109* 109*   CO2 24 23 22   BUN 39.8* 33.5* 30.8*   CR 1.15* 0.97* 0.89   ANIONGAP 10 10 10   EFRAIN 9.4 9.8 9.3   * 60* 227*     Most Recent 2 LFT's:  Recent Labs   Lab Test 03/29/23  0515 01/25/23  0820   AST 20 19   ALT 19 10   ALKPHOS 73 79   BILITOTAL <0.2 <0.2     Most Recent 3 INR's:No lab results found.  Most Recent Cholesterol Panel:  Recent Labs   Lab Test 01/25/23  0820   CHOL 112   LDL 58   HDL 37*   TRIG 84     Most Recent TSH and T4:  Recent Labs   Lab Test 12/06/23  0542 10/13/21  1540 02/01/21  0909   TSH 3.70   < > 7.07*   T4  --   --  1.05    < > = values in this interval not displayed.     Most Recent Hemoglobin A1c:  Recent Labs   Lab Test 11/29/23  1150   A1C 8.5*              Assessment/Plan:  Lower extremity edema  Weight gain  JEAN (acute kidney injury) (H24)  I personally reviewed  BMP dated 12/6 and Creatinine and BUN are stable.   Edema is improving  Weight stable x 3 weeks.   Plan:   Continue with lasix 20 mg daily  Repeat BMP pending and if BUN and creatinine worsen then discontinue furosemide.          Electronically signed by:    TAYLA Sprague CNP on 12/13/2023 at 12:25 PM      MEDICATIONS:  MED REC REQUIRED  Post Medication Reconciliation Status:  Medication reconciliation previously completed during another office visit

## 2023-12-14 LAB
ANION GAP SERPL CALCULATED.3IONS-SCNC: 9 MMOL/L (ref 7–15)
BUN SERPL-MCNC: 51.4 MG/DL (ref 8–23)
CALCIUM SERPL-MCNC: 9.2 MG/DL (ref 8.8–10.2)
CHLORIDE SERPL-SCNC: 112 MMOL/L (ref 98–107)
CREAT SERPL-MCNC: 0.95 MG/DL (ref 0.51–0.95)
DEPRECATED HCO3 PLAS-SCNC: 22 MMOL/L (ref 22–29)
EGFRCR SERPLBLD CKD-EPI 2021: 67 ML/MIN/1.73M2
GLUCOSE SERPL-MCNC: 112 MG/DL (ref 70–99)
POTASSIUM SERPL-SCNC: 4.5 MMOL/L (ref 3.4–5.3)
SODIUM SERPL-SCNC: 143 MMOL/L (ref 135–145)

## 2023-12-14 PROCEDURE — 80048 BASIC METABOLIC PNL TOTAL CA: CPT | Mod: ORL | Performed by: NURSE PRACTITIONER

## 2023-12-14 PROCEDURE — 36415 COLL VENOUS BLD VENIPUNCTURE: CPT | Mod: ORL | Performed by: NURSE PRACTITIONER

## 2023-12-14 PROCEDURE — P9604 ONE-WAY ALLOW PRORATED TRIP: HCPCS | Mod: ORL | Performed by: NURSE PRACTITIONER

## 2023-12-18 ENCOUNTER — VIRTUAL VISIT (OUTPATIENT)
Dept: EDUCATION SERVICES | Facility: CLINIC | Age: 63
End: 2023-12-18
Payer: MEDICARE

## 2023-12-18 DIAGNOSIS — E10.37X3 TYPE 1 DIABETES MELLITUS WITH DIABETIC MACULAR EDEMA OF BOTH EYES RESOLVED AFTER TREATMENT (H): Primary | ICD-10-CM

## 2023-12-18 PROCEDURE — 98967 PH1 ASSMT&MGMT NQHP 11-20: CPT | Performed by: DIETITIAN, REGISTERED

## 2023-12-18 NOTE — LETTER
12/18/2023         RE: Lexy Rockwell  [address] [facility] [address]        Dear Colleague,    Thank you for referring your patient, Lexy Rockwell, to the [facility] [employer]. Please see a copy of my visit note below.    Diabetes Education Follow-up    Subjective/Objective:    Telephone visit for BG review. Last date of communication was: 11/30.    Diabetes is being managed with Diabetes Medications   Diabetes Medication(s)       Diabetic Other       Glucagon (BAQSIMI) 3 MG/DOSE nasal powder Spray 1 spray (3 mg) in nostril as needed in the event of unconscious hypoglycemia or hypoglycemic seizure. May repeat dose if no response after 15 minutes.     glucagon 1 MG SOLR injection Inject 1 mg into the muscle once       Insulin       insulin aspart (NOVOLOG FLEXPEN) 100 UNIT/ML pen Inject 6 units before breakfast, 8 units before lunch and dinner, 3 units before each snack.  Add correction scale before meals. -160 = 1 unit. -200 = 2 units. -240 = 3 units. -280 = 4 units. -320 = 5 units. -360 = 6 units. -400 = 7 units. -440 = 8 units. -480 = 9 units. -520 = 10 units. BG >/=520 = 11 units.Bedtime correction scale ONLY: -240 = 1 unit. -280 = 2 units. -320 = 3 units. -359 = 4 units. -399 = 5 units. -439 = 6 units. -479 = 7 units. -519 = 8 units. BG >/=520 = 9 units.  Max 65 units daily     insulin degludec (TRESIBA FLEXTOUCH) 100 UNIT/ML pen Inject 22 Units Subcutaneous daily            BG/Food Log:   Date Breakfast  Lunch  Dinner  Bedtime    Before After Before After Before After    12/18 95         12/17 164  236  170  164   12/16 171  214  395  237   12/15 223  82  240  315   12/14 89  220  123  170   12/13 231  256  233  205   12/12 364  302  334  242       Assessment:    Fasting blood glucose: 29% in target.  After breakfast glucose: x% in target.  Before lunch  glucose: 16% in target.  After lunch glucose: x% in target.  Before dinner glucose: 16% in target.  After dinner glucose: x% in target.  Bedtime glucose: x% in target.    Having lows overnight that haven't been documented. Lexy states that she wakes up at night to an alert on her Dexcom for a low blood sugar and she will just eat some candy because no one is around to bring her orange juice. Discussed decreasing Tresiba dose to 20 units to help prevent hypoglycemia.     Lexy is planning to switch back to Dexcom G6 because that celso works on her phone. Then we will be able to view her CGM reports.     Plan/Response:  See Patient Instructions for co-developed, patient-stated behavior change goals.  Tresiba: 0-0-0-22 --> 0-0-0-20    Tiffanie Mcfarland RD LD [facility]  Triage: [phone]  [phone]    Time spent: 15 minutes        Any diabetes medication dose changes were made via the CDE Protocol and Collaborative Practice Agreement with the patient's referring provider. A copy of this encounter was shared with the provider.

## 2023-12-18 NOTE — PROGRESS NOTES
Diabetes Education Follow-up    Subjective/Objective:    Telephone visit for BG review. Last date of communication was: 11/30.    Diabetes is being managed with Diabetes Medications   Diabetes Medication(s)       Diabetic Other       Glucagon (BAQSIMI) 3 MG/DOSE nasal powder Spray 1 spray (3 mg) in nostril as needed in the event of unconscious hypoglycemia or hypoglycemic seizure. May repeat dose if no response after 15 minutes.     glucagon 1 MG SOLR injection Inject 1 mg into the muscle once       Insulin       insulin aspart (NOVOLOG FLEXPEN) 100 UNIT/ML pen Inject 6 units before breakfast, 8 units before lunch and dinner, 3 units before each snack.  Add correction scale before meals. -160 = 1 unit. -200 = 2 units. -240 = 3 units. -280 = 4 units. -320 = 5 units. -360 = 6 units. -400 = 7 units. -440 = 8 units. -480 = 9 units. -520 = 10 units. BG >/=520 = 11 units.Bedtime correction scale ONLY: -240 = 1 unit. -280 = 2 units. -320 = 3 units. -359 = 4 units. -399 = 5 units. -439 = 6 units. -479 = 7 units. -519 = 8 units. BG >/=520 = 9 units.  Max 65 units daily     insulin degludec (TRESIBA FLEXTOUCH) 100 UNIT/ML pen Inject 22 Units Subcutaneous daily            BG/Food Log:   Date Breakfast  Lunch  Dinner  Bedtime    Before After Before After Before After    12/18 95         12/17 164  236  170  164   12/16 171  214  395  237   12/15 223  82  240  315   12/14 89  220  123  170   12/13 231  256  233  205   12/12 364  302  334  242       Assessment:    Fasting blood glucose: 29% in target.  After breakfast glucose: x% in target.  Before lunch glucose: 16% in target.  After lunch glucose: x% in target.  Before dinner glucose: 16% in target.  After dinner glucose: x% in target.  Bedtime glucose: x% in target.    Having lows overnight that haven't been documented. Lexy states that she wakes up at night to an  alert on her Dexcom for a low blood sugar and she will just eat some candy because no one is around to bring her orange juice. Discussed decreasing Tresiba dose to 20 units to help prevent hypoglycemia.     Lexy is planning to switch back to Dexcom G6 because that celso works on her phone. Then we will be able to view her CGM reports.     Plan/Response:  See Patient Instructions for co-developed, patient-stated behavior change goals.  Tresiba: 0-0-0-22 --> 0-0-0-20    Tiffanie Mcfarland RD [facility]  Triage: [phone]  [phone]    Time spent: 15 minutes        Any diabetes medication dose changes were made via the CDE Protocol and Collaborative Practice Agreement with the patient's referring provider. A copy of this encounter was shared with the provider.

## 2023-12-18 NOTE — PROGRESS NOTES
"Lexy Rockwell is a 63 year old female seen December 4, 2023 at [facility] where she has resided for 11 months (admit 1/2023) seen for regulatory visit and to follow DM type 1 and weight gain   Pt is seen in her room up to chair   Reports she is \"good\"   Uses her Dexcom during visit and notes average glucose 166 today     Her blood sugars continue to fluctuate quite a bit.       By chart review, pt has Type 1 DM with multiple hospitalizations for DKA.  She was seen in the [facility] ED for hyperglycemia in November 2022, with accompanying symptoms of dizziness, BLE weakness and heartburn.   Noted that she sometimes forgot to administer her insulin.   Returned home, seeing diabetic educator but not an endocrinologist.         She was continuing to live independently in December 2022 when neighbors asked for a welfare check after she hadn't been seen in several days, and she was found down by police.   Hospitalized at [facility] 12/13-26 for DKA and rhabdomyolysis  Glucose >1000 and pH 6.8    She required pressor support, intubation and TF; treated with broad spectrum abx for SHIVAM pneumonia.  No sz on video EEG.  She had a +COVID19 test on 12/23   Slowly cleared and discharged to [facility] TCU before transitioning to LTC.     Pt was seen in the [facility] ED in January 2023 for urinary frequency related to hyperglycemia.  Improved and returned to LTC with continued labile blood sugars, urinary incontinence, weakness and poor endurance  She had upper and lower endoscopy in July 2023, no significant findings    Pt had an October 2023 [facility] hospitalization for hyperglycemia, seen by Endocrinology and diabetic regimen adjusted    She was treated with ciprofloxacin for Enterobacter cloacae UTI    Returned to LTC     Past Medical History:   Diagnosis Date    Cerumen impacted     Development delay     Diabetic gastroparesis (H) 8/16/2013    Glaucoma (increased eye pressure)     Hyperlipaemia     Hypertension     Hypothyroid     " "Mental retardation     Nonsenile cataract     Obesity     Strabismus     Type 1 diabetes mellitus not at goal (H)        Past Surgical History:   Procedure Laterality Date    COLONOSCOPY N/A 7/3/2023    Procedure: Colonoscopy;  Surgeon: Merlyn Acevedo MD;  Location:  GI    ESOPHAGOSCOPY, GASTROSCOPY, DUODENOSCOPY (EGD), COMBINED N/A 7/3/2023    Procedure: Esophagoscopy, gastroscopy, duodenoscopy (EGD), combined;  Surgeon: Merlyn Acevedo MD;  Location:  GI    STRABISMUS SURGERY      [facility] EYE SURG ANT [facility] PROC UNLISTED  remote    strabismus surgery     SH:  Single    Her mother Francisca Rockwell is first contact    She has 2 sisters that live in [facility]   Non smoker    Review Of Systems  Developmental cognitive delay   BIMS 15/15      Ambulatory with 4WW  Weight at admission was 167 lbs>>>in April 2023 was 156 lbs>>> in Sept 2023 was 168 lbs  12/04/23 81.2 kg (179 lb)   11/29/23 81.2 kg (179 lb)   11/22/23 81.1 kg (178 lb 14.4 oz)      EXAM: NAD  BP (!) 158/67   Pulse 94   Temp 97.3  F (36.3  C)   Resp 18   Ht 1.6 m (5' 3\")   Wt 81.2 kg (179 lb)   LMP 03/28/2014   SpO2 97%   BMI 31.71 kg/m       Neck supple without adenopathy  Lungs clear bilaterally with fair air movement   Heart RRR s1s2    Abd soft, NT, no distention or guarding, +BS  Ext with 2+ edema above the knees, wearing velcro wraps  Neuro: ambulatory, normal speech  Psych: affect okay      GFR 54-70     Lab Results   Component Value Date    WBC 7.6 11/29/2023      HGB 10.4 11/29/2023      MCV 89 11/29/2023       11/29/2023       IMP/PLAN:   (D64.9) Anemia, unspecified type    Comment: Fe deficiency   Hgb has improved from 7.8>>> 10.4   Upper and lower endoscopy in July unrevealing although colonoscopy limited by poor prep.    Plan: Continue PPI   Slow release Fe 45 mg/day    Follow hgb     (R63.5) Weight gain    (E87.70) Edema due to hypervolemia  Comment: as above, looks to be mostly fluid, but could also represent some true weight gain "   Plan: LE compression, elevation  Furosemide 40 mg/day with K and Mg replacement   Consider ECHO     (E10.22,  N18.31) Type 1 diabetes mellitus with stage 3a chronic kidney disease (H)  (R73.9) Hyperglycemia  Comment:  Recurrent DKA, labile sugars      A1C has not been below 8% in past several years   Sequelae includes gastroparesis and CKD3      Lab Results   Component Value Date    A1C 8.5 11/29/2023     Plan: insulin degludec 22 units /HS   Insulin aspart 6 /8/8 and sliding scale   BGM per Dexcom   Follows with Endocrinology and Diabetic educator.   She is on lisinopril, simvastatin 20 mg/day     Not on daily ASA secondary to anemia  Ophthalmology and Podiatry follow up        (K21.00) Gastroesophageal reflux disease with esophagitis without hemorrhage  Comment: by history  Biopsies unremarkable at EGD   Plan: pantoprazole 40 mg/day     (F81.9) Cognitive developmental delay  Comment: unable to live independently in the community and manage her medical problems    Plan: LTC support for med admin, meals, activity      (I10) Essential hypertension with goal blood pressure less than 140/90  Comment: higher bps   Plan: amlodipine 2.5 mg/day, carvedilol 6.25 mg bid, lisinopril 40 mg/day   Follow BPs and BMP  - consider increasing carvedilol dose for better bp control     Advance directive: DNR/DNI     Nia Martínez MD

## 2023-12-24 ENCOUNTER — TELEPHONE (OUTPATIENT)
Dept: MULTI SPECIALTY CLINIC | Facility: CLINIC | Age: 63
End: 2023-12-24
Payer: MEDICARE

## 2023-12-24 NOTE — TELEPHONE ENCOUNTER
Received a call from the nursing home [facility]ongo from The nurse Pamela regarding BG reading of 67 today.      Currently the patient is on Tresiba 20 units was reduced from 22 units 2 days ago.  NovoLog 6 units with breakfast/8 units with lunch and dinner and SSI of 1: 40 for BG above 120.      BG readings:  12/24:  Breakfast 157  She developed hypoglycemia symptoms at 11 AM BG reading was 67      12/23:  Breakfast 102  Lunch 94  Dinner 249  Bedtime 267    12/22:  Breakfast 56 following that Tresiba dose was reduced.  Lunch 144  Dinner 161  Bedtime 350.      Recommendations:  -Continue with Tresiba 20 units daily.  -Reduce the breakfast NovoLog dose down from 6 units to 4 units.  -Continue NovoLog 8 units with lunch and dinner.  Continue the current SSI.  Contact us with any concerns or questions.    Kd Pearson     Endocrinology diabetes and metabolism  fellow   Pager number: [phone]

## 2023-12-25 NOTE — TELEPHONE ENCOUNTER
Received a call from the nursing facility ([facility]) RAYSA burr, they received fax from the patient's pharmacy stating NovoLog is not covered by her insurance despite she is currently receiving it.    Verbal order was given to switch her from NovoLog to Humalog with the same doses.

## 2023-12-28 ENCOUNTER — TELEPHONE (OUTPATIENT)
Dept: GERIATRICS | Facility: CLINIC | Age: 63
End: 2023-12-28

## 2024-01-01 ENCOUNTER — TELEPHONE (OUTPATIENT)
Dept: ENDOCRINOLOGY | Facility: CLINIC | Age: 64
End: 2024-01-01
Payer: COMMERCIAL

## 2024-01-01 ENCOUNTER — HOSPITAL ENCOUNTER (EMERGENCY)
Facility: CLINIC | Age: 64
Discharge: HOME | End: 2024-01-01
Attending: STUDENT IN AN ORGANIZED HEALTH CARE EDUCATION/TRAINING PROGRAM | Admitting: STUDENT IN AN ORGANIZED HEALTH CARE EDUCATION/TRAINING PROGRAM
Payer: MEDICARE

## 2024-01-01 VITALS
BODY MASS INDEX: 31.18 KG/M2 | WEIGHT: 176 LBS | TEMPERATURE: 97.6 F | RESPIRATION RATE: 20 BRPM | SYSTOLIC BLOOD PRESSURE: 142 MMHG | OXYGEN SATURATION: 100 % | HEART RATE: 91 BPM | DIASTOLIC BLOOD PRESSURE: 81 MMHG

## 2024-01-01 DIAGNOSIS — E10.65 TYPE 1 DIABETES MELLITUS WITH HYPERGLYCEMIA (H): Primary | ICD-10-CM

## 2024-01-01 DIAGNOSIS — E86.0 MILD DEHYDRATION: ICD-10-CM

## 2024-01-01 PROBLEM — R82.71 ASYMPTOMATIC BACTERIURIA: Status: ACTIVE | Noted: 2023-10-20

## 2024-01-01 PROBLEM — N39.0 UTI (URINARY TRACT INFECTION): Status: ACTIVE | Noted: 2023-10-20

## 2024-01-01 LAB
ALBUMIN UR-MCNC: 20 MG/DL
ANION GAP SERPL CALCULATED.3IONS-SCNC: 10 MMOL/L (ref 7–15)
APPEARANCE UR: CLEAR
BACTERIA #/AREA URNS HPF: ABNORMAL /HPF
BILIRUB UR QL STRIP: NEGATIVE
BUN SERPL-MCNC: 52.4 MG/DL (ref 8–23)
CALCIUM SERPL-MCNC: 10.1 MG/DL (ref 8.8–10.2)
CHLORIDE SERPL-SCNC: 103 MMOL/L (ref 98–107)
COLOR UR AUTO: ABNORMAL
CREAT SERPL-MCNC: 1.28 MG/DL (ref 0.51–0.95)
DEPRECATED HCO3 PLAS-SCNC: 26 MMOL/L (ref 22–29)
EGFRCR SERPLBLD CKD-EPI 2021: 47 ML/MIN/1.73M2
GLUCOSE SERPL-MCNC: 177 MG/DL (ref 70–99)
GLUCOSE UR STRIP-MCNC: 1000 MG/DL
HGB UR QL STRIP: ABNORMAL
HOLD SPECIMEN: NORMAL
KETONES UR STRIP-MCNC: NEGATIVE MG/DL
LEUKOCYTE ESTERASE UR QL STRIP: ABNORMAL
NITRATE UR QL: NEGATIVE
PH UR STRIP: 6 [PH] (ref 5–7)
POTASSIUM SERPL-SCNC: 4.3 MMOL/L (ref 3.4–5.3)
RBC URINE: 5 /HPF
SODIUM SERPL-SCNC: 139 MMOL/L (ref 135–145)
SP GR UR STRIP: 1.01 (ref 1–1.03)
SQUAMOUS EPITHELIAL: 3 /HPF
UROBILINOGEN UR STRIP-MCNC: NORMAL MG/DL
WBC URINE: 18 /HPF

## 2024-01-01 PROCEDURE — 87086 URINE CULTURE/COLONY COUNT: CPT | Performed by: STUDENT IN AN ORGANIZED HEALTH CARE EDUCATION/TRAINING PROGRAM

## 2024-01-01 PROCEDURE — 81001 URINALYSIS AUTO W/SCOPE: CPT | Performed by: STUDENT IN AN ORGANIZED HEALTH CARE EDUCATION/TRAINING PROGRAM

## 2024-01-01 PROCEDURE — 80048 BASIC METABOLIC PNL TOTAL CA: CPT | Performed by: STUDENT IN AN ORGANIZED HEALTH CARE EDUCATION/TRAINING PROGRAM

## 2024-01-01 PROCEDURE — 99283 EMERGENCY DEPT VISIT LOW MDM: CPT

## 2024-01-01 PROCEDURE — 36415 COLL VENOUS BLD VENIPUNCTURE: CPT | Performed by: STUDENT IN AN ORGANIZED HEALTH CARE EDUCATION/TRAINING PROGRAM

## 2024-01-01 ASSESSMENT — ACTIVITIES OF DAILY LIVING (ADL): ADLS_ACUITY_SCORE: 35

## 2024-01-01 NOTE — ED NOTES
Bed: ED06  Expected date:   Expected time:   Means of arrival:   Comments:  452  63 F sugar 280/dka history  0918

## 2024-01-01 NOTE — TELEPHONE ENCOUNTER
I was called by patient's RN from nursing facility ( [facility]) that patient's blood glucose this morning is >600. They don't have ketone strips to check ketones, patient was given Novolog total of 10 U on corrections  scale.    RN was unsure if patient received her Insulin tresiba last night. After these 10 U blood glucose was still 480 in an hour. Pt is polyuric at this time without any mentation issues.    Recommendations  Pt is type 1 diabetic, with h/o remote DKA, she is at risk of progression to DKA. Nursing facility don't have any ketone strips to check her ketones in urine. Recommended sending patient to ER to rule out DKA and treat appropriately as patient is at risk of progression to DKA.    Filippo Gomez  PGY-4  Endocrinology fellow

## 2024-01-01 NOTE — DISCHARGE INSTRUCTIONS
Thank you for allowing us to evaluate you today.  Follow up with primary care clinician  in 1 week as needed for reevaluation..  Drink plenty of fluids.  Please read the guidance provided with your discharge instructions.  Immediately return to the emergency department with any concerns.

## 2024-01-01 NOTE — ED PROVIDER NOTES
"History   Chief Complaint:  Hyperglycemia    HPI:  Lexy Rockwell is a very pleasant 63 year old female presenting from [facility] via EMS with hyperglycemia. Per EMS, staff did a glucose check this morning which read \"high\" on their monitor, indicating the blood glucose was over 600. States that staff gave her insulin and her read was still \"high.\" The patient was given a total of 18 unit(s) of insulin throughout the morning and her most recent blood glucose check en route read 282. Notes that staff was concerned because the patient has history of DKA. Adds that the patient was complaining of thirst. Denies increase in urinary frequency, dysuria, nausea, vomiting, and recent illness.     Independent Historian:  Independent history was obtained from the patient's EMS. They provided information detailed above in HPI.     Review of External Notes:  I personally reviewed notes from the patient's paperwork from nursing home brought by EMS. This provided me with information regarding patient's baseline medical problems. I also reviewed notes from 12/11/23 and 12/18/23 which provided information on the patients recent clinical care.     I personally reviewed the patient's chart, including available medication list and available past medical history, past surgical history, family history, and social history.    Physical Exam   Patient Vitals for the past 24 hrs:   BP Temp Temp src Pulse Resp SpO2 Weight   01/01/24 0928 (!) 142/81 97.6  F (36.4  C) Oral 91 20 100 % 79.8 kg (176 lb)      Physical Exam  Vitals and nursing note reviewed.   Constitutional:       General: She is not in acute distress.     Appearance: Normal appearance. She is not diaphoretic.   HENT:      Mouth/Throat:      Mouth: Mucous membranes are moist.   Eyes:      General: No scleral icterus.     Conjunctiva/sclera: Conjunctivae normal.   Cardiovascular:      Rate and Rhythm: Normal rate.      Heart sounds: Normal heart sounds.   Pulmonary:      " Effort: No respiratory distress.      Breath sounds: Normal breath sounds.   Abdominal:      General: Abdomen is flat.      Tenderness: There is no abdominal tenderness. There is no guarding or rebound.   Musculoskeletal:      Cervical back: Neck supple.   Skin:     General: Skin is warm and dry.      Findings: No rash.   Neurological:      Mental Status: She is alert and oriented to person, place, and time.      Sensory: No sensory deficit.      Motor: No weakness.             Emergency Department Course     Imaging & ECG: Laboratory:   No ECG performed.    No orders to display        Labs Ordered and Resulted from Time of ED Arrival to Time of ED Departure   BASIC METABOLIC PANEL - Abnormal       Result Value    Sodium 139      Potassium 4.3      Chloride 103      Carbon Dioxide (CO2) 26      Anion Gap 10      Urea Nitrogen 52.4 (*)     Creatinine 1.28 (*)     GFR Estimate 47 (*)     Calcium 10.1      Glucose 177 (*)    ROUTINE UA WITH MICROSCOPIC - Abnormal    Color Urine Light Yellow      Appearance Urine Clear      Glucose Urine 1000 (*)     Bilirubin Urine Negative      Ketones Urine Negative      Specific Gravity Urine 1.013      Blood Urine Small (*)     pH Urine 6.0      Protein Albumin Urine 20 (*)     Urobilinogen Urine Normal      Nitrite Urine Negative      Leukocyte Esterase Urine Moderate (*)     Bacteria Urine Few (*)     RBC Urine 5 (*)     WBC Urine 18 (*)     Squamous Epithelials Urine 3 (*)    URINE CULTURE         Procedures  None performed    Interventions & Assessments:     Interventions:  Medications - No data to display     Assessments:  ED Course as of 01/01/24 1631   Mon Jan 01, 2024   0924 I obtained history and examined the patient as noted above.    1057 I rechecked and updated the patient.      Independent Interpretation (X-rays, CTs, rhythm strip):  None    Consultations/Discussion of Management or Tests:  None    Social Determinants of Health affecting care:   None.      Disposition:  The patient was discharged to home.     Impression & Plan   Medical Decision Making:  Patient presenting with elevated blood glucose from nursing home.  Vital signs are reassuring upon arrival.  Patient has no symptoms or concerns.  Patient was hyperglycemic for medics.  She does have a significant history of DKA.  I wanted to rule out diabetic emergency such as DKA/HHS.  Blood glucose is within normal limits.  No decreased bicarbonate or elevated anion gap to suggest diabetic emergency.  No ketonuria.  Patient's renal function is slightly decreased from baseline.  She did state she was very thirsty this morning.  There may be component of dehydration.  She does not meet criteria for JEAN.  She is able to take oral fluids. Findings were discussed.  Additional verbal instructions were provided.  I discussed specific warning signs and instructed the patient to return to the emergency department if there are any concerns. Understanding of instructions was voiced, questions were answered and the patient was discharged.     Diagnosis:    ICD-10-CM    1. Type 1 diabetes mellitus with hyperglycemia (H)  E10.65       2. Mild dehydration  E86.0          Scribe Disclosure:  I, Lori Pozo, am serving as a scribe at 9:29 AM on 1/1/2024 to document services personally performed by Robert Jain MD based on my observations and the provider's statements to me.           Robert Jain MD  01/01/24 5712

## 2024-01-01 NOTE — ED TRIAGE NOTES
BIBA from NH, hx of DKA. Glucose was high this morning, unreadable. NH gave 18 units of regular insulin. Glucose came down to 288 per medics. Pt has no complaints.

## 2024-01-02 ENCOUNTER — TELEPHONE (OUTPATIENT)
Dept: ENDOCRINOLOGY | Facility: CLINIC | Age: 64
End: 2024-01-02
Payer: COMMERCIAL

## 2024-01-02 LAB — BACTERIA UR CULT: NORMAL

## 2024-01-02 NOTE — TELEPHONE ENCOUNTER
[facility] Call Center    Phone Message    May a detailed message be left on voicemail: yes     Reason for Call: Medication Question or concern regarding medication   Prescription Clarification  Name of Medication: Dexcom sensor  Prescribing Provider: Jaylene holly      What on the order needs clarification? Shayy at pt's care center called and stated that in order for medicare to cover the pt's dexcom sensor electronically signed visit notes need to be sent to medicare at fax [phone], phone is [phone]

## 2024-01-08 ENCOUNTER — VIRTUAL VISIT (OUTPATIENT)
Dept: EDUCATION SERVICES | Facility: CLINIC | Age: 64
End: 2024-01-08
Payer: MEDICARE

## 2024-01-08 DIAGNOSIS — E10.3393 TYPE 1 DIABETES MELLITUS WITH MODERATE NONPROLIFERATIVE RETINOPATHY OF BOTH EYES, MACULAR EDEMA PRESENCE UNSPECIFIED (H): Primary | ICD-10-CM

## 2024-01-08 PROCEDURE — 99207 PR NO CHARGE LOS: CPT | Mod: 93 | Performed by: DIETITIAN, REGISTERED

## 2024-01-08 NOTE — PROGRESS NOTES
Diabetes Education:    I called Lexy for our appointment today. Lexy tells me that she moved last week and she isn't totally sure of the process for gathering her blood sugar reports. She isn't sure if the nurses are writing down her blood sugars or not. She isn't even sure who she should ask. She states that she is still using Dexcom G7, it looks like dexcom G6 is in the works (pt is switching because she has the G6 celso on her phone and she reports that her phone is not compatible with Dexcom G7.     Writer will send a message to Zahra Way to see if she has any information on who could be contacted to get Lexy's BG logs. Writer will call Lexy once received. She reports that she did have one low yesterday and thinks that she overtreated with candy and ended up high this morning.     KARLOS Haas [facility]  Triage: [phone]  [phone]

## 2024-01-08 NOTE — LETTER
2024         RE: Lexy Rockwell  [address] [facility] [address]        Dear Colleague,    Thank you for referring your patient, Lexy Rockwell, to the [facility]. Please see a copy of my visit note below.    Diabetes Education:    I called Lexy for our appointment today. Lexy tells me that she moved last week and she isn't totally sure of the process for gathering her blood sugar reports. She isn't sure if the nurses are writing down her blood sugars or not. She isn't even sure who she should ask. She states that she is still using Dexcom G7, it looks like dexcom G6 is in the works (pt is switching because she has the G6 celso on her phone and she reports that her phone is not compatible with Dexcom G7.     Writer will send a message to Zahra Way to see if she has any information on who could be contacted to get Lexy's BG logs. Writer will call Lexy once received. She reports that she did have one low yesterday and thinks that she overtreated with candy and ended up high this morning.     Tiffanie Mcfarland RD LD [facility]  Triage: [phone]  [phone]

## 2024-01-09 DIAGNOSIS — E10.51 TYPE 1 DIABETES MELLITUS WITH DIABETIC PERIPHERAL ANGIOPATHY WITHOUT GANGRENE (H): Primary | ICD-10-CM

## 2024-01-09 RX ORDER — DORZOLAMIDE HYDROCHLORIDE AND TIMOLOL MALEATE PRESERVATIVE FREE 20; 5 MG/ML; MG/ML
1 SOLUTION/ DROPS OPHTHALMIC 2 TIMES DAILY
Qty: 60 EACH | Refills: 3 | Status: SHIPPED | OUTPATIENT
Start: 2024-01-09 | End: 2024-08-12

## 2024-01-15 ENCOUNTER — TELEPHONE (OUTPATIENT)
Dept: GERIATRICS | Facility: CLINIC | Age: 64
End: 2024-01-15
Payer: COMMERCIAL

## 2024-01-15 DIAGNOSIS — J34.89 RHINORRHEA: Primary | ICD-10-CM

## 2024-01-15 DIAGNOSIS — E10.65 HYPERGLYCEMIA DUE TO TYPE 1 DIABETES MELLITUS (H): Primary | ICD-10-CM

## 2024-01-15 RX ORDER — INSULIN ADMIN. SUPPLIES
1 INSULIN PEN (EA) SUBCUTANEOUS SEE ADMIN INSTRUCTIONS
Qty: 100 EACH | Refills: 11 | Status: SHIPPED | OUTPATIENT
Start: 2024-01-15

## 2024-01-15 RX ORDER — FLUTICASONE PROPIONATE 50 MCG
1 SPRAY, SUSPENSION (ML) NASAL DAILY
Start: 2024-01-15 | End: 2024-09-06

## 2024-01-15 RX ORDER — INSULIN ADMIN. SUPPLIES
1 INSULIN PEN (EA) SUBCUTANEOUS SEE ADMIN INSTRUCTIONS
COMMUNITY
End: 2024-01-15

## 2024-01-15 NOTE — PROGRESS NOTES
Outcome for 01/15/24 9:37 AM: [employer] message sent  Renata Burgos MA  Outcome for 01/19/24 1:40 PM:  Assisted living will fax BG readings and MAR to clinic.   Renata Burgos MA  Outcome for 01/22/24 12:30 PM:  Assisted living will fax BG readings and MAR to clinic  Renata Burgos MA  Outcome for 01/22/24 2:49 PM:  Received fax from assisted living. Forwarded fax to provider via email.   Renata Burgos MA    Start time: 0942  End time: 1010  Provider location: off site- home  Patient location: off site- home.    HPI:  Lexy is a very pleasant 63 year old woman here for follow up for type 1 diabetes.  Her diabetes is complicated by gastroparesis and chronic kidney disease stage 3.  She also has hypertension, hyperlipidemia and developmental delay. She is on the video with her nursing home staff from [place].  She also sees Dr. Deonte Garcia and MARY Haas.      She recently moved over from nursing home side to the assisted living side. She likes this better. Nurse gives insulin every time she eats.  Eats meals in the dining room.     Doing ok, sometimes a little bit high.  Used to have a sensor.  Now she has been out. Tried to get the G7, but it was not covered by her insurance. She feels like she has been high in the morning.  No low blood sugars.  Not often.      Current treatment strategy: (doses have NOT been confirmed- this is from previous visit- nurse is confused on the phone and is not clear on dosing- I have asked her to fax over full report)  - Tresiba 20 units daily  - NovoLog 6B/8L/8D units at meals 3 times a day   - Snack coverage-  3 units- patient reports she is not getting this.   - Sliding scale before meals/HS-1 unit per 40 above 120 mg/dL.      Checks blood sugar right before eating. Gets insulin at the time of the meal, sometimes a few minutes after.      Med list says that she is on regular insulin, but she reports she is taking Novolog. Reports she has been  on Regular insulin when she was living on the other side (?nursing home side).           Patient had history of suboptimal control of type 1 diabetes (previous A1c ranged between 9 to 12%).  Patient had history of diabetic ketoacidosis in 2021.  Patient had documented low C-peptide and positive angelica antibody in 3/2018. Recent A1c 8.1% in March 2023. Followed up closely with Tiffanie HERNANDEZ.      DM complications:  Retinopathy: 4/2023 -- moderate NPDR, glaucoma -- last appointment 10/2023  Nephropathy: positive macroalbuminuria (test 2021), CKD stage 3   Neuropathy: some tingling and numbness in her feet -- saw podiatrist 3/3023.    Diabetes Control:   Lab Results   Component Value Date    A1C 8.1 03/29/2023    A1C 8.7 01/25/2023    A1C 10.7 10/13/2021    A1C 9.7 02/01/2021    A1C 9.2 09/30/2020    A1C 11.8 01/08/2020    A1C 11.8 05/19/2019    A1C 11.2 12/12/2018       Past Medical History:   Diagnosis Date    Cerumen impacted     Development delay     Diabetic gastroparesis (H) 8/16/2013    Glaucoma (increased eye pressure)     Hyperlipaemia     Hypertension     Hypothyroid     Mental retardation     Nonsenile cataract     Obesity     Strabismus     Type 1 diabetes mellitus not at goal (H)        Past Surgical History:   Procedure Laterality Date    COLONOSCOPY N/A 7/3/2023    Procedure: Colonoscopy;  Surgeon: Merlyn Acevedo MD;  Location:  GI    ESOPHAGOSCOPY, GASTROSCOPY, DUODENOSCOPY (EGD), COMBINED N/A 7/3/2023    Procedure: Esophagoscopy, gastroscopy, duodenoscopy (EGD), combined;  Surgeon: Merlyn Acevedo MD;  Location:  GI    STRABISMUS SURGERY      Z EYE SURG ANT [facility] PROC UNLISTED  remote    strabismus surgery       Family History   Problem Relation Age of Onset    Hypertension Father     Diabetes Sister     Glaucoma Maternal Grandfather     Cancer No family hx of     Cerebrovascular Disease No family hx of     Thyroid Disease No family hx of     Macular Degeneration No family hx of         Social History     Socioeconomic History    Marital status: Single   Tobacco Use    Smoking status: Never    Smokeless tobacco: Never    Tobacco comments:     lives in smoke free household.   Substance and Sexual Activity    Alcohol use: Yes     Comment: occass social    Drug use: No    Sexual activity: Never   Other Topics Concern    Parent/sibling w/ CABG, MI or angioplasty before 65F 55M? No       Current Outpatient Medications   Medication    acetaminophen (TYLENOL) 500 MG tablet    AMLODIPINE BENZOATE PO    carvedilol (COREG) 6.25 MG tablet    Continuous Blood Gluc  (DEXCOM G7 ) KELLY    Continuous Blood Gluc Sensor (DEXCOM G6 SENSOR) MISC    Continuous Blood Gluc Sensor (DEXCOM G7 SENSOR) MISC    Continuous Blood Gluc Transmit (DEXCOM G6 TRANSMITTER) MISC    dorzolamide-timolol (COSOPT) 2-0.5 % ophthalmic solution    dorzolamide-timolol PF (COSOPT PF) 2-0.5 % opthalmic solutionh    ferrous sulfate (SLO-FE) 142 (45 Fe) MG CR tablet    fluticasone (FLONASE) 50 MCG/ACT nasal spray    furosemide (LASIX) 20 MG tablet    Glucagon (BAQSIMI) 3 MG/DOSE nasal powder    glucagon 1 MG SOLR injection    insulin aspart (NOVOLOG FLEXPEN) 100 UNIT/ML pen    insulin degludec (TRESIBA FLEXTOUCH) 100 UNIT/ML pen    insulin pen needle (NOVOFINE AUTOCOVER PEN NEEDLE) 30G X 8 MM miscellaneous    KETOSTIX test strip    latanoprost (XALATAN) 0.005 % ophthalmic solution    lisinopril (ZESTRIL) 40 MG tablet    magnesium oxide (MAG-OX) 400 MG tablet    MULTIVITAMIN TABS   OR    nystatin (MYCOSTATIN) 241320 UNIT/GM external cream    pantoprazole (PROTONIX) 40 MG EC tablet    potassium chloride ER (KLOR-CON M) 20 MEQ CR tablet    sennosides (SENOKOT) 8.6 MG tablet    simvastatin (ZOCOR) 20 MG tablet     No current facility-administered medications for this visit.          Allergies   Allergen Reactions    Amoxicillin     Sulfa Antibiotics        Physical Exam  LMP 03/28/2014   GENERAL: healthy, alert and no  distress  RESP: no audible wheeze, cough, or visible cyanosis.  No visible retractions or increased work of breathing.  Able to speak fully in complete sentences.  PSYCH: mentation appears normal, affect normal/bright, judgement and insight intact, normal speech and appearance well-groomed  RESULTS  Lab Results   Component Value Date    A1C 8.5 (H) 11/29/2023    A1C 8.1 (H) 03/29/2023    A1C 8.7 (H) 01/25/2023    A1C 10.7 (H) 10/13/2021    A1C 9.7 (H) 02/01/2021    A1C 9.2 (H) 09/30/2020    A1C 11.8 (H) 01/08/2020    A1C 11.8 (H) 05/19/2019    A1C 11.2 (H) 12/12/2018       TSH   Date Value Ref Range Status   12/06/2023 3.70 0.30 - 4.20 uIU/mL Final   10/13/2021 3.41 0.40 - 4.00 mU/L Final   02/01/2021 7.07 (H) 0.40 - 4.00 mU/L Final   01/08/2020 4.48 (H) 0.40 - 4.00 mU/L Final   10/02/2019 4.07 (H) 0.40 - 4.00 mU/L Final   07/24/2019 5.08 (H) 0.40 - 4.00 mU/L Final   12/12/2018 6.71 (H) 0.40 - 4.00 mU/L Final     T4 Free   Date Value Ref Range Status   02/01/2021 1.05 0.76 - 1.46 ng/dL Final   01/08/2020 1.08 0.76 - 1.46 ng/dL Final   10/02/2019 1.00 0.76 - 1.46 ng/dL Final   07/24/2019 1.10 0.76 - 1.46 ng/dL Final   09/28/2016 1.01 0.76 - 1.46 ng/dL Final       ALT   Date Value Ref Range Status   03/29/2023 19 10 - 35 U/L Final   01/25/2023 10 10 - 35 U/L Final   09/30/2020 18 0 - 50 U/L Final   05/19/2019 7 (L) 8 - 45 IU/L Final   ]    Recent Labs   Lab Test 01/25/23  0820 02/01/21  0909   CHOL 112 200*   HDL 37* 62   LDL 58 120*   TRIG 84 89       Lab Results   Component Value Date     01/01/2024     02/01/2021      Lab Results   Component Value Date    POTASSIUM 4.3 01/01/2024    POTASSIUM 4.2 10/13/2021    POTASSIUM 4.1 02/01/2021     Lab Results   Component Value Date    CHLORIDE 103 01/01/2024    CHLORIDE 107 10/13/2021    CHLORIDE 107 02/01/2021     Lab Results   Component Value Date    EFRAIN 10.1 01/01/2024    EFRAIN 10.3 02/01/2021     Lab Results   Component Value Date    CO2 26 01/01/2024    CO2  26 10/13/2021    CO2 32 02/01/2021     Lab Results   Component Value Date    BUN 52.4 01/01/2024    BUN 13 10/13/2021    BUN 17 02/01/2021     Lab Results   Component Value Date    CR 1.28 01/01/2024    CR 0.73 02/01/2021       GFR Estimate   Date Value Ref Range Status   01/01/2024 47 (L) >60 mL/min/1.73m2 Final   12/14/2023 67 >60 mL/min/1.73m2 Final   12/06/2023 54 (L) >60 mL/min/1.73m2 Final   02/01/2021 90 >60 mL/min/[1.73_m2] Final     Comment:     Non  GFR Calc  Starting 12/18/2018, serum creatinine based estimated GFR (eGFR) will be   calculated using the Chronic Kidney Disease Epidemiology Collaboration   (CKD-EPI) equation.     09/30/2020 >90 >60 mL/min/[1.73_m2] Final     Comment:     Non  GFR Calc  Starting 12/18/2018, serum creatinine based estimated GFR (eGFR) will be   calculated using the Chronic Kidney Disease Epidemiology Collaboration   (CKD-EPI) equation.     07/12/2019 >90 >60 mL/min/[1.73_m2] Final     Comment:     Non  GFR Calc  Starting 12/18/2018, serum creatinine based estimated GFR (eGFR) will be   calculated using the Chronic Kidney Disease Epidemiology Collaboration   (CKD-EPI) equation.       GFR Estimate If Black   Date Value Ref Range Status   02/01/2021 >90 >60 mL/min/[1.73_m2] Final     Comment:      GFR Calc  Starting 12/18/2018, serum creatinine based estimated GFR (eGFR) will be   calculated using the Chronic Kidney Disease Epidemiology Collaboration   (CKD-EPI) equation.     09/30/2020 >90 >60 mL/min/[1.73_m2] Final     Comment:      GFR Calc  Starting 12/18/2018, serum creatinine based estimated GFR (eGFR) will be   calculated using the Chronic Kidney Disease Epidemiology Collaboration   (CKD-EPI) equation.     07/12/2019 >90 >60 mL/min/[1.73_m2] Final     Comment:      GFR Calc  Starting 12/18/2018, serum creatinine based estimated GFR (eGFR) will be   calculated using the Chronic  "Kidney Disease Epidemiology Collaboration   (CKD-EPI) equation.         Lab Results   Component Value Date    MICROL 306 02/01/2021     No results found for: \"MICROALBUMIN\"  Lab Results   Component Value Date    CPEPT 0.6 (L) 03/14/2018    GADAB >250.0 (H) 03/14/2018       Vitamin B12   Date Value Ref Range Status   02/22/2023 784 232 - 1,245 pg/mL Final   12/23/2013 455 >210 pg/mL Final     Comment:     Interp: 247-911 = Normal   08/19/2009 659 >210 pg/mL Final     Comment:     Interp: 247-911 = Normal   ]    Most recent eye exam date: : Not Found       Assessment/Plan:     1.  Type 1 diabetes- Patient has been doing fairly well.  Clarified with nurse that patient should NOT be on regular insulin, only Novolog for mealtime coverage.  Insulin doses have NOT been confirmed- nurse is confused on the phone and is not clear on dosing- I have asked her to fax over full report.  I will ask our clinic nurse to reach out to confirm.  Patient says she is not receiving snack coverage at night, which is likely contributing to AM hyperglycemia.  She does need to have coverage for her snacks. Will continue current dosing.   We made the following plan today (instructions given to patient):      Remove regular insulin from medication list.     No changes in insulin dosing today.      We will reorder the dexcom sensor for you.      If glucose <70- give 1 juice (15g-20g)  Recheck glucose in 15 minutes (finger stick).   If still <70, repeat above.     If glucose is <55, give 2 juices (30-40g carb).   Recheck glucose in 15 minutes (finger stick).   If still <55, repeat above.     Emergency issues: Here are some concerns you should contact us about.  -Vomiting: more than twice.  Please check ketones.  If positive, go to ER. Monitor glucose hourly.   -High glucose (over 300 mg/dL twice in a row): Please check ketones.  If ketones are negative, take an insulin correction and recheck glucose in 1 hour.  If glucose is not coming down, " please call the clinic. If ketones are moderate or large, drink lots of water, take an insulin correction 1.5 times your usual correction, and recheck ketones in 1 hour.  If ketones are still present (or you are vomiting), go to the ER.  -Hypoglycemia (low glucose):   If glucose is less than 70 mg/dL, treat with 15g carb (4 glucose tablets), recheck glucose in 15 minutes.  If low again, repeat.   If glucose is less than 54 mg/dL, treat with 30g carb, recheck glucose in 15 minutes.  If low again, repeat.  Keep glucagon in your home in case of severe hypoglycemia and train someone how to use this.    Emergency kit (please ensure you always have these with you):   Glucose tablets  Glucagon  Insulin  Syringes/needles   Extra infusion set (if on a pump)  Ketone strips    Contact information:   If you have concerns, please send me a [employer] message or call the clinic at [phone].  For more urgent concerns, please call [phone] after hours/weekends and ask to speak with the endocrinologist on call.      Please let me know if you are having low blood sugars less than 70 or over 400 mg/dL.  Do not wait until your next appointment if this is happening.     2.   F/U in DM education monthly going forward.  Will f/up in 3 mos with me, sooner with concerns/hypoglycemia.  Staff will fax glucose with concerns.    32 minutes spent on the date of the encounter doing chart review, review of test results, review of continuous glucose sensor, insulin pump data, interpretation of glucose data, patient visit and documentation, counseling/coordination of care, and discussion of follow up plan for worsening hyper and hypoglycemia.  The patient understood and is satisfied with today's visit.          Jaylene Perez PA-C, MPAS   [facility]  Department of Medicine  Division of Endocrinology and Diabetes

## 2024-01-19 ENCOUNTER — TELEPHONE (OUTPATIENT)
Dept: ENDOCRINOLOGY | Facility: CLINIC | Age: 64
End: 2024-01-19
Payer: COMMERCIAL

## 2024-01-19 NOTE — TELEPHONE ENCOUNTER
Spoke with RN at [facility]. Advised that patient was discharged to [facility]. Tranferred to RN at [phone]. Staff at assisted living will fax BG albert and MAR to clinic. Renata Burgos CMA on 1/19/2024 at 1:44 PM

## 2024-01-22 NOTE — TELEPHONE ENCOUNTER
Spoke with Rn. They will fax BG readings and MAR to clinic. Renata Burgos CMA on 1/22/2024 at 12:31 PM

## 2024-01-23 ENCOUNTER — VIRTUAL VISIT (OUTPATIENT)
Dept: ENDOCRINOLOGY | Facility: CLINIC | Age: 64
End: 2024-01-23
Payer: MEDICARE

## 2024-01-23 VITALS — WEIGHT: 177 LBS | BODY MASS INDEX: 31.35 KG/M2

## 2024-01-23 DIAGNOSIS — E10.37X3 TYPE 1 DIABETES MELLITUS WITH DIABETIC MACULAR EDEMA OF BOTH EYES RESOLVED AFTER TREATMENT (H): ICD-10-CM

## 2024-01-23 PROCEDURE — 99214 OFFICE O/P EST MOD 30 MIN: CPT | Mod: 95 | Performed by: PHYSICIAN ASSISTANT

## 2024-01-23 RX ORDER — PROCHLORPERAZINE 25 MG/1
SUPPOSITORY RECTAL
Qty: 1 EACH | Refills: 1 | Status: SHIPPED | OUTPATIENT
Start: 2024-01-23 | End: 2024-03-07

## 2024-01-23 RX ORDER — PROCHLORPERAZINE 25 MG/1
SUPPOSITORY RECTAL
Qty: 3 EACH | Refills: 5 | Status: SHIPPED | OUTPATIENT
Start: 2024-01-23 | End: 2024-03-07

## 2024-01-23 NOTE — Clinical Note
Cristi Moreno.  Can you please schedule monthly follow up with Lexy?  I'm concerned that she is not getting snack coverage, which is likely resulting in high numbers in the AM.  Thanks! Jaylene

## 2024-01-23 NOTE — LETTER
1/23/2024       RE: Lexy Rockwell  [address]  [facility] [address]     Dear Colleague,    Thank you for referring your patient, Lexy Rockwell, to the [facility] [place] [facility]. Please see a copy of my visit note below.    Outcome for 01/15/24 9:37 AM: [employer] message sent  Renata Burgos MA  Outcome for 01/19/24 1:40 PM:  Assisted living will fax BG readings and MAR to clinic.   Renata Burgos MA  Outcome for 01/22/24 12:30 PM:  Assisted living will fax BG readings and MAR to clinic  Renata Burgos MA  Outcome for 01/22/24 2:49 PM:  Received fax from assisted living. Forwarded fax to provider via email.   Renata Burgos MA    Start time: 0942  End time: 1010  Provider location: off site- home  Patient location: off site- home.    HPI:  Lexy is a very pleasant 63 year old woman here for follow up for type 1 diabetes.  Her diabetes is complicated by gastroparesis and chronic kidney disease stage 3.  She also has hypertension, hyperlipidemia and developmental delay. She is on the video with her nursing home staff from [facility].  She also sees Dr. Deonte Garcia and MARY Haas.      She recently moved over from nursing home side to the assisted living side. She likes this better. Nurse gives insulin every time she eats.  Eats meals in the dining room.     Doing ok, sometimes a little bit high.  Used to have a sensor.  Now she has been out. Tried to get the G7, but it was not covered by her insurance. She feels like she has been high in the morning.  No low blood sugars.  Not often.      Current treatment strategy: (doses have NOT been confirmed- this is from previous visit- nurse is confused on the phone and is not clear on dosing- I have asked her to fax over full report)  - Tresiba 20 units daily  - NovoLog 6B/8L/8D units at meals 3 times a day   - Snack coverage-  3  units- patient reports she is not getting this.   - Sliding scale before meals/HS-1 unit per 40 above 120 mg/dL.      Checks blood sugar right before eating. Gets insulin at the time of the meal, sometimes a few minutes after.      Med list says that she is on regular insulin, but she reports she is taking Novolog. Reports she has been on Regular insulin when she was living on the other side (?nursing home side).           Patient had history of suboptimal control of type 1 diabetes (previous A1c ranged between 9 to 12%).  Patient had history of diabetic ketoacidosis in 2021.  Patient had documented low C-peptide and positive angelica antibody in 3/2018. Recent A1c 8.1% in March 2023. Followed up closely with Tiffanie HERNANDEZ.      DM complications:  Retinopathy: 4/2023 -- moderate NPDR, glaucoma -- last appointment 10/2023  Nephropathy: positive macroalbuminuria (test 2021), CKD stage 3   Neuropathy: some tingling and numbness in her feet -- saw podiatrist 3/3023.    Diabetes Control:   Lab Results   Component Value Date    A1C 8.1 03/29/2023    A1C 8.7 01/25/2023    A1C 10.7 10/13/2021    A1C 9.7 02/01/2021    A1C 9.2 09/30/2020    A1C 11.8 01/08/2020    A1C 11.8 05/19/2019    A1C 11.2 12/12/2018       Past Medical History:   Diagnosis Date    Cerumen impacted     Development delay     Diabetic gastroparesis (H) 8/16/2013    Glaucoma (increased eye pressure)     Hyperlipaemia     Hypertension     Hypothyroid     Mental retardation     Nonsenile cataract     Obesity     Strabismus     Type 1 diabetes mellitus not at goal (H)        Past Surgical History:   Procedure Laterality Date    COLONOSCOPY N/A 7/3/2023    Procedure: Colonoscopy;  Surgeon: Merlyn Acevedo MD;  Location:  GI    ESOPHAGOSCOPY, GASTROSCOPY, DUODENOSCOPY (EGD), COMBINED N/A 7/3/2023    Procedure: Esophagoscopy, gastroscopy, duodenoscopy (EGD), combined;  Surgeon: Merlyn Acevedo MD;  Location:  GI    STRABISMUS SURGERY      [facility] EYE SURG ANT  SGMT PROC UNLISTED  remote    strabismus surgery       Family History   Problem Relation Age of Onset    Hypertension Father     Diabetes Sister     Glaucoma Maternal Grandfather     Cancer No family hx of     Cerebrovascular Disease No family hx of     Thyroid Disease No family hx of     Macular Degeneration No family hx of        Social History     Socioeconomic History    Marital status: Single   Tobacco Use    Smoking status: Never    Smokeless tobacco: Never    Tobacco comments:     lives in smoke free household.   Substance and Sexual Activity    Alcohol use: Yes     Comment: occass social    Drug use: No    Sexual activity: Never   Other Topics Concern    Parent/sibling w/ CABG, MI or angioplasty before 65F 55M? No       Current Outpatient Medications   Medication    acetaminophen (TYLENOL) 500 MG tablet    AMLODIPINE BENZOATE PO    carvedilol (COREG) 6.25 MG tablet    Continuous Blood Gluc  (DEXCOM G7 ) KELLY    Continuous Blood Gluc Sensor (DEXCOM G6 SENSOR) MISC    Continuous Blood Gluc Sensor (DEXCOM G7 SENSOR) MISC    Continuous Blood Gluc Transmit (DEXCOM G6 TRANSMITTER) MISC    dorzolamide-timolol (COSOPT) 2-0.5 % ophthalmic solution    dorzolamide-timolol PF (COSOPT PF) 2-0.5 % opthalmic solutionh    ferrous sulfate (SLO-FE) 142 (45 Fe) MG CR tablet    fluticasone (FLONASE) 50 MCG/ACT nasal spray    furosemide (LASIX) 20 MG tablet    Glucagon (BAQSIMI) 3 MG/DOSE nasal powder    glucagon 1 MG SOLR injection    insulin aspart (NOVOLOG FLEXPEN) 100 UNIT/ML pen    insulin degludec (TRESIBA FLEXTOUCH) 100 UNIT/ML pen    insulin pen needle (NOVOFINE AUTOCOVER PEN NEEDLE) 30G X 8 MM miscellaneous    KETOSTIX test strip    latanoprost (XALATAN) 0.005 % ophthalmic solution    lisinopril (ZESTRIL) 40 MG tablet    magnesium oxide (MAG-OX) 400 MG tablet    MULTIVITAMIN TABS   OR    nystatin (MYCOSTATIN) 340923 UNIT/GM external cream    pantoprazole (PROTONIX) 40 MG EC tablet    potassium chloride  ER (KLOR-CON M) 20 MEQ CR tablet    sennosides (SENOKOT) 8.6 MG tablet    simvastatin (ZOCOR) 20 MG tablet     No current facility-administered medications for this visit.          Allergies   Allergen Reactions    Amoxicillin     Sulfa Antibiotics        Physical Exam  LMP 03/28/2014   GENERAL: healthy, alert and no distress  RESP: no audible wheeze, cough, or visible cyanosis.  No visible retractions or increased work of breathing.  Able to speak fully in complete sentences.  PSYCH: mentation appears normal, affect normal/bright, judgement and insight intact, normal speech and appearance well-groomed  RESULTS  Lab Results   Component Value Date    A1C 8.5 (H) 11/29/2023    A1C 8.1 (H) 03/29/2023    A1C 8.7 (H) 01/25/2023    A1C 10.7 (H) 10/13/2021    A1C 9.7 (H) 02/01/2021    A1C 9.2 (H) 09/30/2020    A1C 11.8 (H) 01/08/2020    A1C 11.8 (H) 05/19/2019    A1C 11.2 (H) 12/12/2018       TSH   Date Value Ref Range Status   12/06/2023 3.70 0.30 - 4.20 uIU/mL Final   10/13/2021 3.41 0.40 - 4.00 mU/L Final   02/01/2021 7.07 (H) 0.40 - 4.00 mU/L Final   01/08/2020 4.48 (H) 0.40 - 4.00 mU/L Final   10/02/2019 4.07 (H) 0.40 - 4.00 mU/L Final   07/24/2019 5.08 (H) 0.40 - 4.00 mU/L Final   12/12/2018 6.71 (H) 0.40 - 4.00 mU/L Final     T4 Free   Date Value Ref Range Status   02/01/2021 1.05 0.76 - 1.46 ng/dL Final   01/08/2020 1.08 0.76 - 1.46 ng/dL Final   10/02/2019 1.00 0.76 - 1.46 ng/dL Final   07/24/2019 1.10 0.76 - 1.46 ng/dL Final   09/28/2016 1.01 0.76 - 1.46 ng/dL Final       ALT   Date Value Ref Range Status   03/29/2023 19 10 - 35 U/L Final   01/25/2023 10 10 - 35 U/L Final   09/30/2020 18 0 - 50 U/L Final   05/19/2019 7 (L) 8 - 45 IU/L Final   ]    Recent Labs   Lab Test 01/25/23  0820 02/01/21  0909   CHOL 112 200*   HDL 37* 62   LDL 58 120*   TRIG 84 89       Lab Results   Component Value Date     01/01/2024     02/01/2021      Lab Results   Component Value Date    POTASSIUM 4.3 01/01/2024     POTASSIUM 4.2 10/13/2021    POTASSIUM 4.1 02/01/2021     Lab Results   Component Value Date    CHLORIDE 103 01/01/2024    CHLORIDE 107 10/13/2021    CHLORIDE 107 02/01/2021     Lab Results   Component Value Date    EFRAIN 10.1 01/01/2024    EFRAIN 10.3 02/01/2021     Lab Results   Component Value Date    CO2 26 01/01/2024    CO2 26 10/13/2021    CO2 32 02/01/2021     Lab Results   Component Value Date    BUN 52.4 01/01/2024    BUN 13 10/13/2021    BUN 17 02/01/2021     Lab Results   Component Value Date    CR 1.28 01/01/2024    CR 0.73 02/01/2021       GFR Estimate   Date Value Ref Range Status   01/01/2024 47 (L) >60 mL/min/1.73m2 Final   12/14/2023 67 >60 mL/min/1.73m2 Final   12/06/2023 54 (L) >60 mL/min/1.73m2 Final   02/01/2021 90 >60 mL/min/[1.73_m2] Final     Comment:     Non  GFR Calc  Starting 12/18/2018, serum creatinine based estimated GFR (eGFR) will be   calculated using the Chronic Kidney Disease Epidemiology Collaboration   (CKD-EPI) equation.     09/30/2020 >90 >60 mL/min/[1.73_m2] Final     Comment:     Non  GFR Calc  Starting 12/18/2018, serum creatinine based estimated GFR (eGFR) will be   calculated using the Chronic Kidney Disease Epidemiology Collaboration   (CKD-EPI) equation.     07/12/2019 >90 >60 mL/min/[1.73_m2] Final     Comment:     Non  GFR Calc  Starting 12/18/2018, serum creatinine based estimated GFR (eGFR) will be   calculated using the Chronic Kidney Disease Epidemiology Collaboration   (CKD-EPI) equation.       GFR Estimate If Black   Date Value Ref Range Status   02/01/2021 >90 >60 mL/min/[1.73_m2] Final     Comment:      GFR Calc  Starting 12/18/2018, serum creatinine based estimated GFR (eGFR) will be   calculated using the Chronic Kidney Disease Epidemiology Collaboration   (CKD-EPI) equation.     09/30/2020 >90 >60 mL/min/[1.73_m2] Final     Comment:      GFR Calc  Starting 12/18/2018, serum creatinine  "based estimated GFR (eGFR) will be   calculated using the Chronic Kidney Disease Epidemiology Collaboration   (CKD-EPI) equation.     07/12/2019 >90 >60 mL/min/[1.73_m2] Final     Comment:      GFR Calc  Starting 12/18/2018, serum creatinine based estimated GFR (eGFR) will be   calculated using the Chronic Kidney Disease Epidemiology Collaboration   (CKD-EPI) equation.         Lab Results   Component Value Date    MICROL 306 02/01/2021     No results found for: \"MICROALBUMIN\"  Lab Results   Component Value Date    CPEPT 0.6 (L) 03/14/2018    GADAB >250.0 (H) 03/14/2018       Vitamin B12   Date Value Ref Range Status   02/22/2023 784 232 - 1,245 pg/mL Final   12/23/2013 455 >210 pg/mL Final     Comment:     Interp: 247-911 = Normal   08/19/2009 659 >210 pg/mL Final     Comment:     Interp: 247-911 = Normal   ]    Most recent eye exam date: : Not Found       Assessment/Plan:     1.  Type 1 diabetes- Patient has been doing fairly well.  Clarified with nurse that patient should NOT be on regular insulin, only Novolog for mealtime coverage.  Insulin doses have NOT been confirmed- nurse is confused on the phone and is not clear on dosing- I have asked her to fax over full report.  I will ask our clinic nurse to reach out to confirm.  Patient says she is not receiving snack coverage at night, which is likely contributing to AM hyperglycemia.  She does need to have coverage for her snacks. Will continue current dosing.   We made the following plan today (instructions given to patient):      Remove regular insulin from medication list.     No changes in insulin dosing today.      We will reorder the dexcom sensor for you.      If glucose <70- give 1 juice (15g-20g)  Recheck glucose in 15 minutes (finger stick).   If still <70, repeat above.     If glucose is <55, give 2 juices (30-40g carb).   Recheck glucose in 15 minutes (finger stick).   If still <55, repeat above.     Emergency issues: Here are some " concerns you should contact us about.  -Vomiting: more than twice.  Please check ketones.  If positive, go to ER. Monitor glucose hourly.   -High glucose (over 300 mg/dL twice in a row): Please check ketones.  If ketones are negative, take an insulin correction and recheck glucose in 1 hour.  If glucose is not coming down, please call the clinic. If ketones are moderate or large, drink lots of water, take an insulin correction 1.5 times your usual correction, and recheck ketones in 1 hour.  If ketones are still present (or you are vomiting), go to the ER.  -Hypoglycemia (low glucose):   If glucose is less than 70 mg/dL, treat with 15g carb (4 glucose tablets), recheck glucose in 15 minutes.  If low again, repeat.   If glucose is less than 54 mg/dL, treat with 30g carb, recheck glucose in 15 minutes.  If low again, repeat.  Keep glucagon in your home in case of severe hypoglycemia and train someone how to use this.    Emergency kit (please ensure you always have these with you):   Glucose tablets  Glucagon  Insulin  Syringes/needles   Extra infusion set (if on a pump)  Ketone strips    Contact information:   If you have concerns, please send me a [employer] message or call the clinic at [phone].  For more urgent concerns, please call [phone] after hours/weekends and ask to speak with the endocrinologist on call.      Please let me know if you are having low blood sugars less than 70 or over 400 mg/dL.  Do not wait until your next appointment if this is happening.     2.   F/U in DM education monthly going forward.  Will f/up in 3 mos with me, sooner with concerns/hypoglycemia.  Staff will fax glucose with concerns.    32 minutes spent on the date of the encounter doing chart review, review of test results, review of continuous glucose sensor, insulin pump data, interpretation of glucose data, patient visit and documentation, counseling/coordination of care, and discussion of follow up plan for worsening hyper  and hypoglycemia.  The patient understood and is satisfied with today's visit.          Jaylene Perez PA-C, MPAS   [facility]  Department of Medicine  Division of Endocrinology and Diabetes

## 2024-01-23 NOTE — NURSING NOTE
Is the patient currently in the state of [place]? YES    Visit mode:VIDEO    If the visit is dropped, the patient can be reconnected by: VIDEO VISIT: Text to cell phone:   Telephone Information:   Mobile [phone]       Will anyone else be joining the visit? NO  (If patient encounters technical issues they should call [phone])    How would you like to obtain your AVS? MyChart    Are changes needed to the allergy or medication list? No    Reason for visit: RECHECK and Video Visit    Arabella KRUEGER

## 2024-01-23 NOTE — PATIENT INSTRUCTIONS
Remove regular insulin from medication list.     No changes in insulin dosing today.      We will reorder the dexcom sensor for you.      If glucose <70- give 1 juice (15g-20g)  Recheck glucose in 15 minutes (finger stick).   If still <70, repeat above.     If glucose is <55, give 2 juices (30-40g carb).   Recheck glucose in 15 minutes (finger stick).   If still <55, repeat above.     Emergency issues: Here are some concerns you should contact us about.  -Vomiting: more than twice.  Please check ketones.  If positive, go to ER. Monitor glucose hourly.   -High glucose (over 300 mg/dL twice in a row): Please check ketones.  If ketones are negative, take an insulin correction and recheck glucose in 1 hour.  If glucose is not coming down, please call the clinic. If ketones are moderate or large, drink lots of water, take an insulin correction 1.5 times your usual correction, and recheck ketones in 1 hour.  If ketones are still present (or you are vomiting), go to the ER.  -Hypoglycemia (low glucose):   If glucose is less than 70 mg/dL, treat with 15g carb (4 glucose tablets), recheck glucose in 15 minutes.  If low again, repeat.   If glucose is less than 54 mg/dL, treat with 30g carb, recheck glucose in 15 minutes.  If low again, repeat.  Keep glucagon in your home in case of severe hypoglycemia and train someone how to use this.    Emergency kit (please ensure you always have these with you):   Glucose tablets  Glucagon  Insulin  Syringes/needles   Extra infusion set (if on a pump)  Ketone strips    Contact information:   If you have concerns, please send me a [employer] message or call the clinic at [phone].  For more urgent concerns, please call [phone] after hours/weekends and ask to speak with the endocrinologist on call.      Please let me know if you are having low blood sugars less than 70 or over 400 mg/dL.  Do not wait until your next appointment if this is happening.

## 2024-01-27 ENCOUNTER — HEALTH MAINTENANCE LETTER (OUTPATIENT)
Age: 64
End: 2024-01-27

## 2024-02-06 ENCOUNTER — LAB REQUISITION (OUTPATIENT)
Dept: LAB | Facility: CLINIC | Age: 64
End: 2024-02-06
Payer: MEDICARE

## 2024-02-06 DIAGNOSIS — R05.9 COUGH, UNSPECIFIED: ICD-10-CM

## 2024-02-06 DIAGNOSIS — M25.562 ARTHRALGIA OF LEFT LOWER LEG: Primary | ICD-10-CM

## 2024-02-06 LAB
FLUAV RNA SPEC QL NAA+PROBE: NEGATIVE
FLUBV RNA RESP QL NAA+PROBE: NEGATIVE
RSV RNA SPEC NAA+PROBE: NEGATIVE
SARS-COV-2 RNA RESP QL NAA+PROBE: NEGATIVE

## 2024-02-06 PROCEDURE — 87637 SARSCOV2&INF A&B&RSV AMP PRB: CPT | Mod: ORL | Performed by: NURSE PRACTITIONER

## 2024-02-06 RX ORDER — PSEUDOEPHED/ACETAMINOPH/DIPHEN 30MG-500MG
TABLET ORAL
Qty: 60 TABLET | Refills: 11 | Status: SHIPPED | OUTPATIENT
Start: 2024-02-06 | End: 2024-02-09

## 2024-02-09 DIAGNOSIS — M25.562 ARTHRALGIA OF LEFT LOWER LEG: ICD-10-CM

## 2024-02-09 RX ORDER — PSEUDOEPHED/ACETAMINOPH/DIPHEN 30MG-500MG
TABLET ORAL
Qty: 248 TABLET | Refills: 11 | Status: SHIPPED | OUTPATIENT
Start: 2024-02-09

## 2024-02-13 ENCOUNTER — LAB REQUISITION (OUTPATIENT)
Dept: LAB | Facility: CLINIC | Age: 64
End: 2024-02-13
Payer: MEDICARE

## 2024-02-13 DIAGNOSIS — R05.9 COUGH, UNSPECIFIED: ICD-10-CM

## 2024-02-13 PROCEDURE — 87637 SARSCOV2&INF A&B&RSV AMP PRB: CPT | Mod: ORL | Performed by: NURSE PRACTITIONER

## 2024-02-14 ENCOUNTER — DOCUMENTATION ONLY (OUTPATIENT)
Dept: GERIATRICS | Facility: CLINIC | Age: 64
End: 2024-02-14
Payer: COMMERCIAL

## 2024-02-19 ENCOUNTER — VIRTUAL VISIT (OUTPATIENT)
Dept: EDUCATION SERVICES | Facility: CLINIC | Age: 64
End: 2024-02-19
Attending: DIETITIAN, REGISTERED
Payer: MEDICARE

## 2024-02-19 ENCOUNTER — TELEPHONE (OUTPATIENT)
Dept: FAMILY MEDICINE | Facility: CLINIC | Age: 64
End: 2024-02-19

## 2024-02-19 DIAGNOSIS — E10.37X3 TYPE 1 DIABETES MELLITUS WITH DIABETIC MACULAR EDEMA OF BOTH EYES RESOLVED AFTER TREATMENT (H): ICD-10-CM

## 2024-02-19 DIAGNOSIS — E10.3393 TYPE 1 DIABETES MELLITUS WITH MODERATE NONPROLIFERATIVE RETINOPATHY OF BOTH EYES, MACULAR EDEMA PRESENCE UNSPECIFIED (H): Primary | ICD-10-CM

## 2024-02-19 PROCEDURE — 98967 PH1 ASSMT&MGMT NQHP 11-20: CPT | Mod: 93 | Performed by: DIETITIAN, REGISTERED

## 2024-02-19 NOTE — LETTER
2/19/2024         RE: Lexy Rockwell  [address]  [facility] [address]        Dear Colleague,    Thank you for referring your patient, Lexy Rockwell, to the [facility]. Please see a copy of my visit note below.    Diabetes Self-Management Education & Support    Presents for: Follow-up    Type of Service: Telephone Visit    Originating Location (Patient Location): Long term Care  Distant Location (Provider Location): [facility]  Mode of Communication:  Telephone    Telephone Visit Start Time:  11:00 am  Telephone Visit End Time (telephone visit stop time): 11:15    How would patient like to obtain AVS? MyChart      ASSESSMENT:  Lexy reports that she has not been having hypoglycemia, but continues to have big fluctuations in her BG. She still has not been able to get the Dexcom. Writer called the pharmacy, who reports that since Lexy is living in a skilled nursing facility, the facility is to provide testing supplies. So if Lexy needed the Dexcom, the facility would have to pay for it. Lexy would benefit greatly from having a continuous glucose monitor.    We discussed BG log (have records from 2/15). Continues to fluctuate greatly, but no hypoglycemia in the last week. Fluctuations are likely related to inconsistent carbohydrate intake and snacking without insulin coverage.     Patient's most recent   Lab Results   Component Value Date    A1C 8.5 11/29/2023    A1C 9.7 02/01/2021     is not meeting goal of <8.0    Diabetes knowledge and skills assessment:   Patient is knowledgeable in diabetes management concepts related to: none    Continue education with the following diabetes management concepts: Healthy Eating, Being Active, Monitoring, Taking Medication, Problem Solving, Reducing Risks, and Healthy Coping    Based on learning assessment above, most appropriate setting for further diabetes education would be:  "Individual setting.      PLAN    Will continue working on getting Dexcom approved, will reach out to Jaylene Perez to see if she has any other ideas ideas      Topics to cover at upcoming visits: Healthy Eating, Being Active, Monitoring, Taking Medication, Problem Solving, Reducing Risks, and Healthy Coping    Follow-up: 3/14    See Care Plan for co-developed, patient-state behavior change goals.  AVS provided for patient today.    Education Materials Provided:  No new materials provided today      SUBJECTIVE/OBJECTIVE:  Presents for: Follow-up  Accompanied by: Self (Dietitian from Assisted Living)  Diabetes education in the past 24mo: Yes  Focus of Visit: Problem Solving, Monitoring, Taking Medication  Diabetes type: Type 1  Disease course: Worsening  Transportation concerns: Yes (gets rides or takes public tranportation)  Difficulty affording diabetes medication?: No  Difficulty affording diabetes testing supplies?: No  Other concerns:: Cognitive impairment  Cultural Influences/Ethnic Background:  Not  or     Diabetes Symptoms & Complications:  Weight trend: Stable  Symptom course: Stable  Disease course: Worsening  Complications assessed today?: Yes    Patient Problem List and Family Medical History reviewed for relevant medical history, current medical status, and diabetes risk factors.    Vitals:  LMP 03/28/2014   Estimated body mass index is 32.12 kg/m  as calculated from the following:    Height as of 2/21/24: 1.6 m (5' 3\").    Weight as of 2/21/24: 82.2 kg (181 lb 4.8 oz).   Last 3 BP:   BP Readings from Last 3 Encounters:   01/01/24 (!) 142/81   12/29/23 128/74   12/28/23 128/74       History   Smoking Status     Never   Smokeless Tobacco     Never       Labs:  Lab Results   Component Value Date    A1C 8.5 11/29/2023    A1C 9.7 02/01/2021     Lab Results   Component Value Date     01/01/2024     10/13/2021     02/01/2021     Lab Results   Component Value Date    LDL 58 " "01/25/2023     02/01/2021     HDL Cholesterol   Date Value Ref Range Status   02/01/2021 62 >49 mg/dL Final     Direct Measure HDL   Date Value Ref Range Status   01/25/2023 37 (L) >=50 mg/dL Final   ]  GFR Estimate   Date Value Ref Range Status   01/01/2024 47 (L) >60 mL/min/1.73m2 Final   02/01/2021 90 >60 mL/min/[1.73_m2] Final     Comment:     Non  GFR Calc  Starting 12/18/2018, serum creatinine based estimated GFR (eGFR) will be   calculated using the Chronic Kidney Disease Epidemiology Collaboration   (CKD-EPI) equation.       GFR Estimate If Black   Date Value Ref Range Status   02/01/2021 >90 >60 mL/min/[1.73_m2] Final     Comment:      GFR Calc  Starting 12/18/2018, serum creatinine based estimated GFR (eGFR) will be   calculated using the Chronic Kidney Disease Epidemiology Collaboration   (CKD-EPI) equation.       Lab Results   Component Value Date    CR 1.28 01/01/2024    CR 0.73 02/01/2021     No results found for: \"MICROALBUMIN\"    Healthy Eating:  Healthy Eating Assessed Today: No  Cultural/Jew diet restrictions?: No  Meal planning/habits: None  Who cooks/prepares meals for you?: Other  Who purchases food in  your home?: Other  How many times a week on average do you eat food made away from home (restaurant/take-out)?: 0  Meals include: Breakfast, Lunch, Dinner, Afternoon Snack, Evening Snack  Breakfast: 8:00 am: Oatmeal with hard boiled egg and barragan with toast  Lunch: 12-12:30:  salad OR Cheese ravioli OR sandwiches oR hot dish  Dinner: 5:00 -6:00 pm: meatball and mashed potatoes OR vegetables lasagna OR scrab cake with vegetables and 2 sugar free cookies  Snacks: Has one bedtime snack- usually a cookie (if blood sugar is high, will not have snack) or yogurt OR sugra free candy every once and a while  Other: drinks coffee with sugar free creamer  Beverages: Water, Coffee, Milk (Keep some juice on hand for lows)  Has patient met with a dietitian in the " past?: Yes    Being Active:  Being Active Assessed Today: Yes (went on the bike at her apartment complex)  Exercise:: Yes (walking, programs at care center)  Days per week of moderate to strenuous exercise (like a brisk walk): 2  On average, minutes per day of exercise at this level:  (Walking with friends)  How intense was your typical exercise? : Light (like stretching or slow walking)  Barrier to exercise: Safety, Physical limitation    Monitoring:  Monitoring Assessed Today: Yes  Did patient bring glucose meter to appointment? : Yes  Blood Glucose Meter: CGM  Times checking blood sugar at home (number): 5+  Times checking blood sugar at home (per): Day  Blood glucose trend: Fluctuating        Taking Medications:  Diabetes Medication(s)       Diabetic Other       Glucagon (BAQSIMI) 3 MG/DOSE nasal powder Spray 1 spray (3 mg) in nostril as needed in the event of unconscious hypoglycemia or hypoglycemic seizure. May repeat dose if no response after 15 minutes.     glucagon 1 MG SOLR injection Inject 1 mg into the muscle once       Insulin       insulin aspart (NOVOLOG FLEXPEN) 100 UNIT/ML pen Inject 4 units before breakfast, 8 units before lunch and dinner, 3 units before each snack.  Add correction scale before meals. -160 = 1 unit. -200 = 2 units. -240 = 3 units. -280 = 4 units. -320 = 5 u     insulin degludec (TRESIBA FLEXTOUCH) 100 UNIT/ML pen Inject 20 Units Subcutaneous daily            Taking Medication Assessed Today: Yes  Current Treatments: Insulin Injections  Dose schedule: Pre-breakfast, Pre-lunch, Pre-dinner, At bedtime  Given by: Patient, Adult caretaker, Nursing attendant  Injection/Infusion sites: Abdomen  Problems taking diabetes medications regularly?: No  Diabetes medication side effects?: No    Problem Solving:  Problem Solving Assessed Today: Yes  Is the patient at risk for hypoglycemia?: Yes  Hypoglycemia Frequency: Daily  Hypoglycemia Treatment: Glucose (tablets  or gel), Other food, Juice, Candy (will have one piece of candy or some juice)  Patient carries a carbohydrate source: Yes  Medical ID: Yes  Is the patient at risk for DKA?: Yes  Does patient have ketone test strips?: Yes  Does patient have DKA prevention plan?: Yes  Does patient have severe weather/disaster plan for diabetes management?: No  Does patient have sick day plan for diabetes management?: Yes    Reducing Risks:  Reducing Risks Assessed Today: No  CAD Risks: Diabetes Mellitus  Has dilated eye exam at least once a year?: Yes  Sees dentist every 6 months?: Yes  Feet checked by healthcare provider in the last year?: Yes    Healthy Coping:  Healthy Coping Assessed Today: Yes  Emotional response to diabetes: Acceptance  Informal Support system:: Family, Parent  Stage of change: ACTION (Actively working towards change)  Support resources: Offerings in Clinic Communities  Patient Activation Measure Survey Score:      3/7/2012     3:00 PM   KHUSHI Score (Last Two)   KHUSHI Raw Score 39   Activation Score 56.4   KHUSHI Level 3         Care Plan and Education Provided:  There are no care plans that you recently modified to display for this patient.      Tiffanie Mcfarland RD [facility]  Triage: [phone]  [phone]      Time Spent: 15 minutes  Encounter Type: Individual    Any diabetes medication dose changes were made via the CDE Protocol per the patient's referring provider. A copy of this encounter was shared with the provider.

## 2024-02-19 NOTE — LETTER
February 19, 2024    To  Lexy Fannykayla Rockwell  [address]  [facility] [address]    Your team at [facility] cares about your health. We have reviewed your chart and based on our findings; we are making the following recommendations to better manage your health.     You are in particular need of attention regarding the following:     Schedule a DIABETIC FOLLOW UP appointment for Office Visit. Patients with diabetes should see their provider regularly.  Schedule Annual MAMMOGRAPHY. [facility] scheduling number is [phone] or schedule in [email or web address] (self referral).  1 in 8 women will develop invasive breast cancer during her lifetime and it is the most common non-skin cancer in [place]. EARLY detection, new treatments, and a better understanding of the disease have increased survival rates- the 5 year survival rate in the 1960's was 63% and today it is close to 90%.  If you are under/uninsured, we recommend you contact the [facility]. They offer mammograms at no charge or on a sliding fee charge. You can schedule with them at [phone]. Please have them send us the results.   Schedule a primary care office visit with your provider for a Pap Smear to screen for Cervical Cancer.  PREVENTATIVE VISIT: Annual Medicare Wellness:Schedule an Annual Medicare Wellness Exam. Please call your [facility] clinic to set up your appointment.    If you have already completed these items, please contact the clinic via phone or   [email or web address] so your care team can review and update your records. Thank you for   choosing [facility] for your healthcare needs. For any questions,   concerns, or to schedule an appointment please contact our clinic.    Healthy Regards,      Your [facility] Care Team

## 2024-02-19 NOTE — TELEPHONE ENCOUNTER
Patient Quality Outreach    Patient is due for the following:   Diabetes -  A1C and Foot Exam  Breast Cancer Screening - Mammogram  Cervical Cancer Screening - PAP Needed  Physical Annual Wellness Visit    Next Steps:   Schedule a Annual Wellness Visit    Type of outreach:    Sent letter.    Next Steps:  Reach out within 90 days via Phone.    Max number of attempts reached: Yes. Will try again in 90 days if patient still on fail list.    Questions for provider review:    None           Salma Meier, ANJELICA  Chart routed to chart closed.       MED

## 2024-02-20 ENCOUNTER — LAB REQUISITION (OUTPATIENT)
Dept: LAB | Facility: CLINIC | Age: 64
End: 2024-02-20
Payer: MEDICARE

## 2024-02-20 DIAGNOSIS — R05.9 COUGH, UNSPECIFIED: ICD-10-CM

## 2024-02-20 LAB
FLUAV RNA SPEC QL NAA+PROBE: NEGATIVE
FLUBV RNA RESP QL NAA+PROBE: NEGATIVE
RSV RNA SPEC NAA+PROBE: POSITIVE
SARS-COV-2 RNA RESP QL NAA+PROBE: NEGATIVE

## 2024-02-20 PROCEDURE — 87637 SARSCOV2&INF A&B&RSV AMP PRB: CPT | Mod: ORL | Performed by: NURSE PRACTITIONER

## 2024-02-21 ENCOUNTER — ASSISTED LIVING VISIT (OUTPATIENT)
Dept: GERIATRICS | Facility: CLINIC | Age: 64
End: 2024-02-21
Payer: MEDICARE

## 2024-02-21 VITALS
WEIGHT: 181.3 LBS | SYSTOLIC BLOOD PRESSURE: 106 MMHG | DIASTOLIC BLOOD PRESSURE: 57 MMHG | TEMPERATURE: 97.5 F | HEART RATE: 86 BPM | BODY MASS INDEX: 32.12 KG/M2 | OXYGEN SATURATION: 96 % | HEIGHT: 63 IN | RESPIRATION RATE: 18 BRPM

## 2024-02-21 DIAGNOSIS — D64.9 ANEMIA, UNSPECIFIED TYPE: ICD-10-CM

## 2024-02-21 DIAGNOSIS — N18.31 TYPE 1 DIABETES MELLITUS WITH STAGE 3A CHRONIC KIDNEY DISEASE (H): Primary | ICD-10-CM

## 2024-02-21 DIAGNOSIS — E10.22 TYPE 1 DIABETES MELLITUS WITH STAGE 3A CHRONIC KIDNEY DISEASE (H): Primary | ICD-10-CM

## 2024-02-21 DIAGNOSIS — K21.00 GASTROESOPHAGEAL REFLUX DISEASE WITH ESOPHAGITIS WITHOUT HEMORRHAGE: ICD-10-CM

## 2024-02-21 DIAGNOSIS — R63.5 WEIGHT GAIN: ICD-10-CM

## 2024-02-21 DIAGNOSIS — I10 ESSENTIAL HYPERTENSION WITH GOAL BLOOD PRESSURE LESS THAN 140/90: ICD-10-CM

## 2024-02-21 DIAGNOSIS — F81.9 COGNITIVE DEVELOPMENTAL DELAY: ICD-10-CM

## 2024-02-21 PROCEDURE — 99349 HOME/RES VST EST MOD MDM 40: CPT | Performed by: INTERNAL MEDICINE

## 2024-02-21 NOTE — PROGRESS NOTES
Lexy Rockwell is a 63 year old female seen February 21, 2024 at [facility] where she has resided for one year (admit to LTC 1/2023) seen for regulatory visit and to follow up RSV, just dx'd today   Notes sore throat and drippy nose today   No cough or dyspnea.   Found in her room playing computer games.  Bright and cheerful.     Pt seen by her diabetic educator this week, noted to have continued wide fluctuations in blood glucose readings related to carbohydrate intake and snacking without insulin coverage.   No hypoglycemia   Attempting to get a DexCom for pt's BGM       By chart review, pt has Type 1 DM with multiple hospitalizations for DKA.  She was seen in the [facility] ED for hyperglycemia in November 2022, with accompanying symptoms of dizziness, BLE weakness and heartburn.   Noted that she sometimes forgot to administer her insulin.   Returned home, seeing diabetic educator but not an endocrinologist.         She was continuing to live independently in December 2022 when neighbors asked for a welfare check after she hadn't been seen in several days, and she was found down by police.   Hospitalized at [facility] 12/13-26 for DKA and rhabdomyolysis  Glucose >1000 and pH 6.8    She required pressor support, intubation and TF; treated with broad spectrum abx for SHIVAM pneumonia.  No sz on video EEG.  She had a +COVID19 test on 12/23   Slowly cleared and discharged to [facility] TCU before transitioning to LTC.     Pt was seen in the [facility] ED in January 2023 for urinary frequency related to hyperglycemia.  Improved and returned to LTC with continued labile blood sugars, urinary incontinence, weakness and poor endurance  She had upper and lower endoscopy in July 2023, no significant findings    Pt had an October 2023 [facility] hospitalization for hyperglycemia, seen by Endocrinology and diabetic regimen adjusted    She was treated with ciprofloxacin for Enterobacter cloacae UTI    Returned to LTC   She  "was seen in the ED January 2024 for hyperglycemia, but no evidence of DKA or other abnormality.         Past Medical History:   Diagnosis Date    Cerumen impacted     Development delay     Diabetic gastroparesis (H) 8/16/2013    Glaucoma (increased eye pressure)     Hyperlipaemia     Hypertension     Hypothyroid     Mental retardation     Nonsenile cataract     Obesity     Strabismus     Type 1 diabetes mellitus not at goal (H)        Past Surgical History:   Procedure Laterality Date    COLONOSCOPY N/A 7/3/2023    Procedure: Colonoscopy;  Surgeon: Merlyn Acevedo MD;  Location:  GI    ESOPHAGOSCOPY, GASTROSCOPY, DUODENOSCOPY (EGD), COMBINED N/A 7/3/2023    Procedure: Esophagoscopy, gastroscopy, duodenoscopy (EGD), combined;  Surgeon: Merlyn Acevedo MD;  Location:  GI    STRABISMUS SURGERY      [facility] EYE SURG ANT SGMT PROC UNLISTED  remote    strabismus surgery     SH:  Single    Her mother Francisca Rockwell is first contact    She has 2 sisters that live in hospitals   Non smoker    Review Of Systems  Developmental cognitive delay   BIMS 15/15      Ambulatory with 4WW  Weight at admission was 167 lbs>>>in April 2023 was 156 lbs>>> in Sept 2023 was 168 lbs  Wt Readings from Last 5 Encounters:   02/21/24 82.2 kg (181 lb 4.8 oz)   01/23/24 80.3 kg (177 lb)   01/01/24 79.8 kg (176 lb)   12/29/23 80.2 kg (176 lb 12.8 oz)   12/28/23 80.2 kg (176 lb 12.8 oz)      EXAM: NAD  /57   Pulse 86   Temp 97.5  F (36.4  C)   Resp 18   Ht 1.6 m (5' 3\")   Wt 82.2 kg (181 lb 4.8 oz)   LMP 03/28/2014   SpO2 96%   BMI 32.12 kg/m       Neck supple without adenopathy  Lungs clear bilaterally with fair air movement   Heart RRR s1s2    Abd soft, NT, no distention or guarding, +BS  Ext with 2+ edema above the knees, wearing velcro wraps  Neuro: ambulatory, normal speech  Psych: affect okay    Lab Results   Component Value Date     01/01/2024    POTASSIUM 4.3 01/01/2024    CHLORIDE 103 01/01/2024    CO2 26 01/01/2024    " ANIONGAP 10 01/01/2024     (H) 01/01/2024    BUN 52.4 (H) 01/01/2024    CR 1.28 (H) 01/01/2024    GFRESTIMATED 47 (L) 01/01/2024    EFRAIN 10.1 01/01/2024     Lab Results   Component Value Date    WBC 7.6 11/29/2023      HGB 10.4 11/29/2023      MCV 89 11/29/2023       11/29/2023       IMP/PLAN:   (D64.9) Anemia, unspecified type    Comment: Fe deficiency   Hgb has improved from 7.8>>> 10.4   Upper and lower endoscopy in July unrevealing although colonoscopy limited by poor prep.    Plan: Continue PPI   Slow release Fe 45 mg/day    >>>Recheck hgb and Fe studies at next labs    (R63.5) Weight gain    (E87.70) Edema due to hypervolemia  Comment: as above, looks to be mostly fluid, but could also represent some true weight gain   Plan: LE compression, elevation  Furosemide decreased to 20 mg/day with K and Mg replacement   >>>Consider ECHO     (E10.22,  N18.31) Type 1 diabetes mellitus with stage 3a chronic kidney disease (H)  (R73.9) Hyperglycemia  Comment: she has a long history of recurrent DKA and labile sugars      A1C has not been below 8% in past several years   Sequelae includes gastroparesis and CKD3      Lab Results   Component Value Date    A1C 8.5 11/29/2023     Plan: insulin degludec 20 units /HS   Insulin aspart 4/8/8 and sliding scale   BGM per Dexcom   Follows with Endocrinology and Diabetic educator.   She is on lisinopril, simvastatin 20 mg/day     Not on daily ASA secondary to anemia  Ophthalmology and Podiatry follow up        (K21.00) Gastroesophageal reflux disease with esophagitis without hemorrhage  Comment: by history  Biopsies unremarkable at EGD   Plan: pantoprazole 40 mg/day     (F81.9) Cognitive developmental delay  Comment: unable to live independently in the community and manage her medical problems    Plan: [facility] support for med admin, meals, activity      (I10) Essential hypertension with goal blood pressure less than 140/90  Comment:   BP Readings from Last 3 Encounters:    02/21/24 106/57   01/01/24 (!) 142/81   12/29/23 128/74      Plan: amlodipine 2.5 mg/day, carvedilol 6.25 mg bid, lisinopril 40 mg/day   Follow bps and BMP     Advance directive: DNR/DNI     Nia Martínez MD

## 2024-02-21 NOTE — LETTER
2/21/2024        RE: Lexy Rockwell  [address] [facility] [place] [address]        No notes on file      Sincerely,        Nia Martínez MD

## 2024-02-21 NOTE — PROGRESS NOTES
Diabetes Self-Management Education & Support    Presents for: Follow-up    Type of Service: Telephone Visit    Originating Location (Patient Location): Long term Care  Distant Location (Provider Location): [facility]  Mode of Communication:  Telephone    Telephone Visit Start Time:  11:00 am  Telephone Visit End Time (telephone visit stop time): 11:15    How would patient like to obtain AVS? Jarett      ASSESSMENT:  Lexy reports that she has not been having hypoglycemia, but continues to have big fluctuations in her BG. She still has not been able to get the Dexcom. Writer called the pharmacy, who reports that since Lexy is living in a skilled nursing facility, the facility is to provide testing supplies. So if Lexy needed the Dexcom, the facility would have to pay for it. Lexy would benefit greatly from having a continuous glucose monitor.    We discussed BG log (have records from 2/15). Continues to fluctuate greatly, but no hypoglycemia in the last week. Fluctuations are likely related to inconsistent carbohydrate intake and snacking without insulin coverage.     Patient's most recent   Lab Results   Component Value Date    A1C 8.5 11/29/2023    A1C 9.7 02/01/2021     is not meeting goal of <8.0    Diabetes knowledge and skills assessment:   Patient is knowledgeable in diabetes management concepts related to: none    Continue education with the following diabetes management concepts: Healthy Eating, Being Active, Monitoring, Taking Medication, Problem Solving, Reducing Risks, and Healthy Coping    Based on learning assessment above, most appropriate setting for further diabetes education would be: Individual setting.      PLAN    Will continue working on getting Dexcom approved, will reach out to Jaylene Perez to see if she has any other ideas ideas      Topics to cover at upcoming visits: Healthy Eating, Being Active, Monitoring, Taking Medication, Problem Solving,  "Reducing Risks, and Healthy Coping    Follow-up: 3/14    See Care Plan for co-developed, patient-state behavior change goals.  AVS provided for patient today.    Education Materials Provided:  No new materials provided today      SUBJECTIVE/OBJECTIVE:  Presents for: Follow-up  Accompanied by: Self (Dietitian from Assisted Living)  Diabetes education in the past 24mo: Yes  Focus of Visit: Problem Solving, Monitoring, Taking Medication  Diabetes type: Type 1  Disease course: Worsening  Transportation concerns: Yes (gets rides or takes public tranportation)  Difficulty affording diabetes medication?: No  Difficulty affording diabetes testing supplies?: No  Other concerns:: Cognitive impairment  Cultural Influences/Ethnic Background:  Not  or     Diabetes Symptoms & Complications:  Weight trend: Stable  Symptom course: Stable  Disease course: Worsening  Complications assessed today?: Yes    Patient Problem List and Family Medical History reviewed for relevant medical history, current medical status, and diabetes risk factors.    Vitals:  [facility] 03/28/2014   Estimated body mass index is 32.12 kg/m  as calculated from the following:    Height as of 2/21/24: 1.6 m (5' 3\").    Weight as of 2/21/24: 82.2 kg (181 lb 4.8 oz).   Last 3 BP:   BP Readings from Last 3 Encounters:   01/01/24 (!) 142/81   12/29/23 128/74   12/28/23 128/74       History   Smoking Status    Never   Smokeless Tobacco    Never       Labs:  Lab Results   Component Value Date    A1C 8.5 11/29/2023    A1C 9.7 02/01/2021     Lab Results   Component Value Date     01/01/2024     10/13/2021     02/01/2021     Lab Results   Component Value Date    LDL 58 01/25/2023     02/01/2021     HDL Cholesterol   Date Value Ref Range Status   02/01/2021 62 >49 mg/dL Final     Direct Measure HDL   Date Value Ref Range Status   01/25/2023 37 (L) >=50 mg/dL Final   ]  GFR Estimate   Date Value Ref Range Status   01/01/2024 47 (L) >60 " "mL/min/1.73m2 Final   02/01/2021 90 >60 mL/min/[1.73_m2] Final     Comment:     Non  GFR Calc  Starting 12/18/2018, serum creatinine based estimated GFR (eGFR) will be   calculated using the Chronic Kidney Disease Epidemiology Collaboration   (CKD-EPI) equation.       GFR Estimate If Black   Date Value Ref Range Status   02/01/2021 >90 >60 mL/min/[1.73_m2] Final     Comment:      GFR Calc  Starting 12/18/2018, serum creatinine based estimated GFR (eGFR) will be   calculated using the Chronic Kidney Disease Epidemiology Collaboration   (CKD-EPI) equation.       Lab Results   Component Value Date    CR 1.28 01/01/2024    CR 0.73 02/01/2021     No results found for: \"MICROALBUMIN\"    Healthy Eating:  Healthy Eating Assessed Today: No  Cultural/Temple diet restrictions?: No  Meal planning/habits: None  Who cooks/prepares meals for you?: Other  Who purchases food in  your home?: Other  How many times a week on average do you eat food made away from home (restaurant/take-out)?: 0  Meals include: Breakfast, Lunch, Dinner, Afternoon Snack, Evening Snack  Breakfast: 8:00 am: Oatmeal with hard boiled egg and barragan with toast  Lunch: 12-12:30:  salad OR Cheese ravioli OR sandwiches oR hot dish  Dinner: 5:00 -6:00 pm: meatball and mashed potatoes OR vegetables lasagna OR scrab cake with vegetables and 2 sugar free cookies  Snacks: Has one bedtime snack- usually a cookie (if blood sugar is high, will not have snack) or yogurt OR sugra free candy every once and a while  Other: drinks coffee with sugar free creamer  Beverages: Water, Coffee, Milk (Keep some juice on hand for lows)  Has patient met with a dietitian in the past?: Yes    Being Active:  Being Active Assessed Today: Yes (went on the bike at her apartment complex)  Exercise:: Yes (walking, programs at care center)  Days per week of moderate to strenuous exercise (like a brisk walk): 2  On average, minutes per day of exercise at " this level:  (Walking with friends)  How intense was your typical exercise? : Light (like stretching or slow walking)  Barrier to exercise: Safety, Physical limitation    Monitoring:  Monitoring Assessed Today: Yes  Did patient bring glucose meter to appointment? : Yes  Blood Glucose Meter: CGM  Times checking blood sugar at home (number): 5+  Times checking blood sugar at home (per): Day  Blood glucose trend: Fluctuating        Taking Medications:  Diabetes Medication(s)       Diabetic Other       Glucagon (BAQSIMI) 3 MG/DOSE nasal powder Spray 1 spray (3 mg) in nostril as needed in the event of unconscious hypoglycemia or hypoglycemic seizure. May repeat dose if no response after 15 minutes.     glucagon 1 MG SOLR injection Inject 1 mg into the muscle once       Insulin       insulin aspart (NOVOLOG FLEXPEN) 100 UNIT/ML pen Inject 4 units before breakfast, 8 units before lunch and dinner, 3 units before each snack.  Add correction scale before meals. -160 = 1 unit. -200 = 2 units. -240 = 3 units. -280 = 4 units. -320 = 5 u     insulin degludec (TRESIBA FLEXTOUCH) 100 UNIT/ML pen Inject 20 Units Subcutaneous daily            Taking Medication Assessed Today: Yes  Current Treatments: Insulin Injections  Dose schedule: Pre-breakfast, Pre-lunch, Pre-dinner, At bedtime  Given by: Patient, Adult caretaker, Nursing attendant  Injection/Infusion sites: Abdomen  Problems taking diabetes medications regularly?: No  Diabetes medication side effects?: No    Problem Solving:  Problem Solving Assessed Today: Yes  Is the patient at risk for hypoglycemia?: Yes  Hypoglycemia Frequency: Daily  Hypoglycemia Treatment: Glucose (tablets or gel), Other food, Juice, Candy (will have one piece of candy or some juice)  Patient carries a carbohydrate source: Yes  Medical ID: Yes  Is the patient at risk for DKA?: Yes  Does patient have ketone test strips?: Yes  Does patient have DKA prevention plan?: Yes  Does  patient have severe weather/disaster plan for diabetes management?: No  Does patient have sick day plan for diabetes management?: Yes    Reducing Risks:  Reducing Risks Assessed Today: No  CAD Risks: Diabetes Mellitus  Has dilated eye exam at least once a year?: Yes  Sees dentist every 6 months?: Yes  Feet checked by healthcare provider in the last year?: Yes    Healthy Coping:  Healthy Coping Assessed Today: Yes  Emotional response to diabetes: Acceptance  Informal Support system:: Family, Parent  Stage of change: ACTION (Actively working towards change)  Support resources: Offerings in Clinic Communities  Patient Activation Measure Survey Score:      3/7/2012     3:00 PM   KHUSHI Score (Last Two)   KHUSHI Raw Score 39   Activation Score 56.4   KHUSHI Level 3         Care Plan and Education Provided:  There are no care plans that you recently modified to display for this patient.      Tiffanie Mcfarland RD [facility]  Triage: [phone]  [phone]      Time Spent: 15 minutes  Encounter Type: Individual    Any diabetes medication dose changes were made via the CDE Protocol per the patient's referring provider. A copy of this encounter was shared with the provider.

## 2024-03-07 RX ORDER — PROCHLORPERAZINE 25 MG/1
SUPPOSITORY RECTAL
Qty: 1 EACH | Refills: 1 | Status: SHIPPED | OUTPATIENT
Start: 2024-03-07 | End: 2024-03-14

## 2024-03-07 RX ORDER — PROCHLORPERAZINE 25 MG/1
SUPPOSITORY RECTAL
Qty: 3 EACH | Refills: 5 | Status: SHIPPED | OUTPATIENT
Start: 2024-03-07 | End: 2024-03-14

## 2024-03-12 ENCOUNTER — MEDICAL CORRESPONDENCE (OUTPATIENT)
Dept: HEALTH INFORMATION MANAGEMENT | Facility: CLINIC | Age: 64
End: 2024-03-12

## 2024-03-12 PROBLEM — G63 POLYNEUROPATHY ASSOCIATED WITH UNDERLYING DISEASE (H): Status: RESOLVED | Noted: 2022-10-03 | Resolved: 2024-03-12

## 2024-03-13 ENCOUNTER — ASSISTED LIVING VISIT (OUTPATIENT)
Dept: GERIATRICS | Facility: CLINIC | Age: 64
End: 2024-03-13
Payer: MEDICARE

## 2024-03-13 VITALS
HEART RATE: 85 BPM | HEIGHT: 64 IN | WEIGHT: 186.5 LBS | OXYGEN SATURATION: 96 % | TEMPERATURE: 97.9 F | SYSTOLIC BLOOD PRESSURE: 149 MMHG | BODY MASS INDEX: 31.84 KG/M2 | RESPIRATION RATE: 18 BRPM | DIASTOLIC BLOOD PRESSURE: 64 MMHG

## 2024-03-13 DIAGNOSIS — D64.9 ANEMIA, UNSPECIFIED TYPE: ICD-10-CM

## 2024-03-13 DIAGNOSIS — K21.00 GASTROESOPHAGEAL REFLUX DISEASE WITH ESOPHAGITIS WITHOUT HEMORRHAGE: ICD-10-CM

## 2024-03-13 DIAGNOSIS — K59.01 SLOW TRANSIT CONSTIPATION: Primary | ICD-10-CM

## 2024-03-13 DIAGNOSIS — N18.31 TYPE 1 DIABETES MELLITUS WITH STAGE 3A CHRONIC KIDNEY DISEASE (H): ICD-10-CM

## 2024-03-13 DIAGNOSIS — R60.0 LOWER EXTREMITY EDEMA: ICD-10-CM

## 2024-03-13 DIAGNOSIS — F81.9 COGNITIVE DEVELOPMENTAL DELAY: ICD-10-CM

## 2024-03-13 DIAGNOSIS — E10.22 TYPE 1 DIABETES MELLITUS WITH STAGE 3A CHRONIC KIDNEY DISEASE (H): ICD-10-CM

## 2024-03-13 DIAGNOSIS — H52.4 PRESBYOPIA: ICD-10-CM

## 2024-03-13 DIAGNOSIS — Z97.8 USES SELF-APPLIED CONTINUOUS GLUCOSE MONITORING DEVICE: ICD-10-CM

## 2024-03-13 DIAGNOSIS — H25.813 COMBINED FORMS OF AGE-RELATED CATARACT OF BOTH EYES: ICD-10-CM

## 2024-03-13 DIAGNOSIS — K08.9 POOR DENTITION: ICD-10-CM

## 2024-03-13 DIAGNOSIS — H40.1132 PRIMARY OPEN ANGLE GLAUCOMA (POAG) OF BOTH EYES, MODERATE STAGE: ICD-10-CM

## 2024-03-13 DIAGNOSIS — I10 ESSENTIAL HYPERTENSION WITH GOAL BLOOD PRESSURE LESS THAN 140/90: ICD-10-CM

## 2024-03-13 DIAGNOSIS — R63.5 WEIGHT GAIN: ICD-10-CM

## 2024-03-13 PROCEDURE — 99349 HOME/RES VST EST MOD MDM 40: CPT | Performed by: NURSE PRACTITIONER

## 2024-03-13 NOTE — LETTER
3/13/2024        RE: Lexy Rockwell  [address]  [place] [facility] [address]        [facility] GERIATRICS  Chief Complaint   Patient presents with     [facility] Medical Record Number:  [id number]  Place of Service where encounter took place:  [facility] [[id number]]    HPI:    Lexy Rockwell  is 63 year old (1960), who is being seen today for a federally mandated E/M visit.     Her past medical history includes  Type 1 diabetes with gastroparesis and CKD history of DKA  Hypertension  CKD stage III  Hyperlipidemia  Bilateral leg edema  Overactive bladder  GERD  Constipation  Developmentally delayed  Polyneuropathy   Vitamin D deficiency  Glaucoma          Today patient was seen in her room.   patient denied chest pain, shortness of breath, dizziness, lightheadedness, and a poor appetite.   Nursing has no concerns. BIMS=15/15 (score 13 to 15 suggests the patient is cognitively intact) PHQ9=2/27.   Patient needs cuing assistance with ADLs, uses a walker.    her appetite is good and consumes a regular low carb diet.  Per nursing, skin is intact. Code status is full.      In reviewing point click care, she has gained 6# in 3 months.  Current weight is 186 which is a 9# weight is in a month,   VS stable.       ALLERGIES:Amoxicillin and Sulfa antibiotics  PAST MEDICAL HISTORY:   Past Medical History:   Diagnosis Date     Cerumen impacted      Development delay      Diabetic gastroparesis (H) 8/16/2013     Glaucoma (increased eye pressure)      Hyperlipaemia      Hypertension      Hypothyroid      Mental retardation      Nonsenile cataract      Obesity      Strabismus      Type 1 diabetes mellitus not at goal (H)      PAST SURGICAL HISTORY:   has a past surgical history that includes EYE SURG ANT SGMT PROC UNLISTED (remote); strabismus surgery; Esophagoscopy, gastroscopy, duodenoscopy (EGD), combined (N/A, 7/3/2023); and Colonoscopy (N/A, 7/3/2023).  FAMILY  HISTORY: family history includes Diabetes in her sister; Glaucoma in her maternal grandfather; Hypertension in her father.  SOCIAL HISTORY:  reports that she has never smoked. She has never used smokeless tobacco. She reports current alcohol use. She reports that she does not use drugs.    MEDICATIONS:  MED REC REQUIRED  Post Medication Reconciliation Status:  Discharge medications reconciled and changed, see notes/orders  Medications reconciled       Review of your medicines            Accurate as of March 13, 2024 11:59 PM. If you have any questions, ask your nurse or doctor.                CONTINUE these medicines which may have CHANGED, or have new prescriptions. If we are uncertain of the size of tablets/capsules you have at home, strength may be listed as something that might have changed.        Dose / Directions   dorzolamide-timolol PF 2-0.5 % opthalmic solutionh  Commonly known as: COSOPT PF  This may have changed: Another medication with the same name was removed. Continue taking this medication, and follow the directions you see here.  Used for: Type 1 diabetes mellitus with diabetic peripheral angiopathy without gangrene (H)  Changed by: TAYLA Sprague CNP      Dose: 1 drop  INSTILL 1 DROP IN BOTH EYES TWICE DAILY  Quantity: 60 each  Refills: 3     NovoLOG FLEXPEN 100 UNIT/ML soln  This may have changed: additional instructions  Generic drug: insulin aspart      Inject 4 units before breakfast, 8 units before lunch and dinner, 3 units before each snack.  Add correction scale before meals. -160 = 1 unit. -200 = 2 units. -240 = 3 units. -280 = 4 units. -320 = 5 u  Refills: 0            CONTINUE these medicines which have NOT CHANGED        Dose / Directions   acetaminophen 500 MG tablet  Commonly known as: TYLENOL  Used for: Arthralgia of left lower leg      TAKE 2 TABLETS BY MOUTH EVERY 6 HOURS AS NEEDED (1000MG) FOR PAIN  Quantity: 248 tablet  Refills: 11      AMLODIPINE BENZOATE PO      Dose: 2.5 mg  Take 2.5 mg by mouth  Refills: 0     carvedilol 6.25 MG tablet  Commonly known as: COREG  Used for: Hypertension goal BP (blood pressure) < 140/90      Dose: 6.25 mg  Take 1 tablet (6.25 mg) by mouth 2 times daily (with meals)  Refills: 0     Dexcom G7  Padma  Used for: Type 1 diabetes mellitus with diabetic peripheral angiopathy without gangrene (H)      Use to read blood sugars as per 's instructions.  Quantity: 1 each  Refills: 0     Dexcom G7 Sensor Misc  Used for: Type 1 diabetes mellitus with diabetic peripheral angiopathy without gangrene (H)      Change every 10 days.  Quantity: 3 each  Refills: 5     ferrous sulfate 142 (45 Fe) MG CR tablet  Commonly known as: SLO-FE      Dose: 142 mg  Take 1 tablet (142 mg) by mouth daily  Refills: 0     fluticasone 50 MCG/ACT nasal spray  Commonly known as: FLONASE  Used for: Rhinorrhea      Dose: 1 spray  Spray 1 spray into both nostrils daily  Refills: 0     furosemide 20 MG tablet  Commonly known as: LASIX  Used for: Lower extremity edema      Dose: 20 mg  Take 1 tablet (20 mg) by mouth daily  Refills: 0     glucagon 1 MG Solr injection      Dose: 1 mg  Inject 1 mg into the muscle once  Refills: 0     Glucagon 3 MG/DOSE nasal powder  Commonly known as: BAQSIMI  Used for: Type 1 diabetes mellitus with diabetic macular edema of both eyes resolved after treatment (H)      Dose: 1 spray  Spray 1 spray (3 mg) in nostril as needed in the event of unconscious hypoglycemia or hypoglycemic seizure. May repeat dose if no response after 15 minutes.  Quantity: 2 each  Refills: 3     Ketostix test strip  Used for: Type 1 diabetes mellitus with diabetic macular edema of both eyes resolved after treatment (H)  Generic drug: acetone urine      Use as directed in case of high glucose, vomiting or illness.  Quantity: 50 strip  Refills: 3     latanoprost 0.005 % ophthalmic solution  Commonly known as: XALATAN  Used for:  Primary open angle glaucoma of both eyes, moderate stage      INSTILL 1 DROP IN BOTH EYES AT BEDTIME  Quantity: 10 mL  Refills: 3     lisinopril 40 MG tablet  Commonly known as: ZESTRIL  Used for: Essential hypertension with goal blood pressure less than 140/90      Dose: 40 mg  Take 1 tablet (40 mg) by mouth daily  Refills: 0     magnesium oxide 400 MG tablet  Commonly known as: MAG-OX  Used for: Hypomagnesemia      Dose: 400 mg  Take 1 tablet (400 mg) by mouth daily  Quantity: 90 tablet  Refills: 3     Multivitamin Tabs      1 TABLET DAILY  Refills: 0     NovoFine Autocover Pen Needle 30G X 8 MM miscellaneous  Used for: Hyperglycemia due to type 1 diabetes mellitus (H)  Generic drug: insulin pen needle      Dose: 1 each  1 each See Admin Instructions as directed.  Quantity: 100 each  Refills: 11     nystatin 763764 UNIT/GM external cream  Commonly known as: MYCOSTATIN      Apply topically 2 times daily as needed for dry skin To feet and toenails.  Refills: 0     pantoprazole 40 MG EC tablet  Commonly known as: PROTONIX      Dose: 40 mg  Take 40 mg by mouth daily  Refills: 0     potassium chloride lucia ER 20 MEQ CR tablet  Commonly known as: KLOR-CON M20      Dose: 20 mEq  Take 20 mEq by mouth daily  Refills: 0     sennosides 8.6 MG tablet  Commonly known as: SENOKOT      Dose: 1 tablet  Take 1 tablet by mouth daily as needed  Refills: 0     simvastatin 20 MG tablet  Commonly known as: ZOCOR  Used for: Hyperlipidemia LDL goal <100      Dose: 20 mg  Take 1 tablet (20 mg) by mouth At Bedtime  Quantity: 90 tablet  Refills: 3     Tresiba FlexTouch 100 UNIT/ML pen  Generic drug: insulin degludec      Dose: 20 Units  Inject 20 Units Subcutaneous daily  Refills: 0               Case Management:  I have reviewed the care plan and MDS and do agree with the plan. Patient's desire to return to the community is not present. Information reviewed:  Medications, vital signs, orders, and nursing notes.    ROS:  4 point ROS  "including Respiratory, CV, GI and , other than that noted in the HPI,  is negative    Vitals:  BP (!) 149/64   Pulse 85   Temp 97.9  F (36.6  C)   Resp 18   Ht 1.626 m (5' 4\")   Wt 84.6 kg (186 lb 8 oz)   LMP 03/28/2014   SpO2 96%   BMI 32.01 kg/m    Body mass index is 32.01 kg/m .  Exam:  GENERAL APPEARANCE:  Alert, in no distress, poor dental status  RESP:  lungs clear to auscultation , no respiratory distress  CV:  Palpation and auscultation of heart done , peripheral edema 2+ in bilateral LE, rate-normal  PSYCH:  insight and judgement impaired, affect and mood normal    Lab/Diagnostic data:   Most Recent 3 CBC's:  Recent Labs   Lab Test 11/29/23  1150 08/30/23  0535 05/08/23  0835   WBC 7.6 4.3 3.4*   HGB 10.4* 10.5* 10.2*   MCV 89 87 88    181 184     Most Recent 3 BMP's:  Recent Labs   Lab Test 01/01/24  0949 12/14/23  0544 12/06/23  0542    143 142   POTASSIUM 4.3 4.5 4.3   CHLORIDE 103 112* 108*   CO2 26 22 24   BUN 52.4* 51.4* 39.8*   CR 1.28* 0.95 1.15*   ANIONGAP 10 9 10   EFRAIN 10.1 9.2 9.4   * 112* 137*     Most Recent 2 LFT's:  Recent Labs   Lab Test 03/29/23  0515 01/25/23  0820   AST 20 19   ALT 19 10   ALKPHOS 73 79   BILITOTAL <0.2 <0.2     Most Recent Cholesterol Panel:  Recent Labs   Lab Test 01/25/23  0820   CHOL 112   LDL 58   HDL 37*   TRIG 84     Most Recent TSH and T4:  Recent Labs   Lab Test 12/06/23  0542 10/13/21  1540 02/01/21  0909   TSH 3.70   < > 7.07*   T4  --   --  1.05    < > = values in this interval not displayed.     Most Recent Hemoglobin A1c:  Recent Labs   Lab Test 11/29/23  1150   A1C 8.5*       ASSESSMENT/PLAN    (K59.01) Slow transit constipation    Comment: chronic.  stable taking senna S   Plan:Continue with plan of care no changes at this time, adjustment as needed        [Z97.8]  Uses self-applied continuous glucose monitoring device  (E10.37X3) Type 1 diabetes mellitus with diabetic macular edema of both eyes resolved after treatment " (H)  (E11.3299) Nonproliferative diabetic retinopathy (H)  (primary encounter diagnosis)  (H40.1132) Primary open angle glaucoma (POAG) of both eyes, moderate stage  (H25.813) Combined forms of age-related cataract of both eyes  (H52.4) Presbyopia  Comment: Chronic control is improving.    Her hgb A1c (11/2023) is 8.5, her BS remain erratic.     She is following with diabetic educator and follows with endocrinology.     She is currently insulin at meals, sliding scale at meals and HS, and long acting.      Unfortunately insurance will not currently cover a  continuous glucose monitoring device.   It is unfortunate as patient has only been hospitalized 1 time for DKA.  And prior to admission she was hospitalized multiple times.   .    For primary prevention she is currently taking simvastatin 20 mg daily, lisinopril 40 mg daily, and aspirin 81 mg daily is being held due to anemia.  Follows with optometry, podiatry and CDE   plan: BMP, CBC,hgb A1c     (I10) Hypertension goal BP (blood pressure) < 140/90  Comment: Chronic, Controlled while taking lisinopril, coreg, amlodipine  Plan: Continue with plan of care no changes at this time, adjustment as needed        (K21.00) Gastroesophageal reflux disease with esophagitis without hemorrhage  Comment: Chronic, stable while taking Protonix.  Had GI workup with no findings  Plan:   Continue with plan of care no changes at this time, adjustment as needed         (R60.0) Bilateral leg edema  Comment: improving.  May be related to amlodipine 5 mg daily which has a known side effect of lower extremity edema.   Significant improvement in LE since admission.     Wearing compression socks.     Has gained 10# in the past few months but denies increase in LE edema.   Plan: Continue with plan of care no changes at this time, adjustment as needed        D64.9) Anemia, unspecified type    Comment:  improving.   Has had upper and lower endoscopy in 2019 and 2020 and in 2020 a polyp was  removed.   Hgb on 2023 was 9 while in OSH and upon admission to OSH Patient with reported black vomit PTA. Recent EGD (3 years ago) without evidence of bleeding source. Given copious vomit reported at scene could potentially be related to Mitra-Burks tear. No evidence of esophageal perforation on abdominal CT. NG placed, drained dark bilious fluid initially, most recent fluid straw colored. No concerns for active GI bleed. Will discontinue NG.  On 2022 =  peripheral blood morphology showed normocytic anemia and reticulocyte was 0.02%  2023 = 9.0 at [facility]  2023 = note states discussed pancytopenia with hematology   2023 = hgb = 8   On 2023 she was started on iron supplementation  3/29/23 her hgb was 9.0, hematocrit=29.8, ferritin=49, iron=29, IDR=783, Iron sat index=11.    Pt denies having black stools, shortness of breath, dizziness .     hgb 10.2  7/3/2023 GI work up negative   2023 hgb 10.5  Plan:  cbc          (F81.9) Cognitive developmental delay  Comment: Chronic, stable.  Patient has a mother and father who live in [place] and 2 sisters who live in the [facility] area.   Her father  in   Plan: Continue with plan of care no changes at this time, adjustment as needed         (K08.9) Poor dentition  (primary encounter diagnosis)  Comment: Chronic.  Missing teeth and has broken teeth with poor gum health.  was suppose to see dentist in summer of 2023 but was rescheduled due to COVID 19  Plan:   Continue with plan of care no changes at this time, adjustment as needed    (R63.5) weight gain  Comment: no edema noted  Plan: Will ask dietitian to evaluate and treat  Will notify diabetic educator      Electronically signed by:    TAYLA Sprague CNP  1960    Orders  BNP, BMP, CBC, hgb A1c dx DM type 1  Discontinue Cepacol   Discontinue order to encourage fluids      TAYLA Sprague CNP on 3/17/2024  at 2:00 PM      Sincerely,        TAYLA Sprague CNP

## 2024-03-13 NOTE — PROGRESS NOTES
[facility] GERIATRICS  Chief Complaint   Patient presents with    [facility]     [facility] Medical Record Number:  [id number]  Place of Service where encounter took place:  [facility] ([facility]) [[id number]]    HPI:    Lexy Rockwell  is 63 year old (1960), who is being seen today for a federally mandated E/M visit.     Her past medical history includes  Type 1 diabetes with gastroparesis and CKD history of DKA  Hypertension  CKD stage III  Hyperlipidemia  Bilateral leg edema  Overactive bladder  GERD  Constipation  Developmentally delayed  Polyneuropathy   Vitamin D deficiency  Glaucoma          Today patient was seen in her room.   patient denied chest pain, shortness of breath, dizziness, lightheadedness, and a poor appetite.   Nursing has no concerns. BIMS=15/15 (score 13 to 15 suggests the patient is cognitively intact) PHQ9=2/27.   Patient needs cuing assistance with ADLs, uses a walker.    her appetite is good and consumes a regular low carb diet.  Per nursing, skin is intact. Code status is full.      In reviewing point click care, she has gained 6# in 3 months.  Current weight is 186 which is a 9# weight is in a month,   VS stable.       ALLERGIES:Amoxicillin and Sulfa antibiotics  PAST MEDICAL HISTORY:   Past Medical History:   Diagnosis Date    Cerumen impacted     Development delay     Diabetic gastroparesis (H) 8/16/2013    Glaucoma (increased eye pressure)     Hyperlipaemia     Hypertension     Hypothyroid     Mental retardation     Nonsenile cataract     Obesity     Strabismus     Type 1 diabetes mellitus not at goal (H)      PAST SURGICAL HISTORY:   has a past surgical history that includes EYE SURG ANT SGMT PROC UNLISTED (remote); strabismus surgery; Esophagoscopy, gastroscopy, duodenoscopy (EGD), combined (N/A, 7/3/2023); and Colonoscopy (N/A, 7/3/2023).  FAMILY HISTORY: family history includes Diabetes in her sister; Glaucoma in her maternal grandfather; Hypertension in her  father.  SOCIAL HISTORY:  reports that she has never smoked. She has never used smokeless tobacco. She reports current alcohol use. She reports that she does not use drugs.    MEDICATIONS:  MED REC REQUIRED  Post Medication Reconciliation Status:  Discharge medications reconciled and changed, see notes/orders  Medications reconciled       Review of your medicines            Accurate as of March 13, 2024 11:59 PM. If you have any questions, ask your nurse or doctor.                CONTINUE these medicines which may have CHANGED, or have new prescriptions. If we are uncertain of the size of tablets/capsules you have at home, strength may be listed as something that might have changed.        Dose / Directions   dorzolamide-timolol PF 2-0.5 % opthalmic solutionh  Commonly known as: COSOPT PF  This may have changed: Another medication with the same name was removed. Continue taking this medication, and follow the directions you see here.  Used for: Type 1 diabetes mellitus with diabetic peripheral angiopathy without gangrene (H)  Changed by: TAYLA Sprague CNP      Dose: 1 drop  INSTILL 1 DROP IN BOTH EYES TWICE DAILY  Quantity: 60 each  Refills: 3     NovoLOG FLEXPEN 100 UNIT/ML soln  This may have changed: additional instructions  Generic drug: insulin aspart      Inject 4 units before breakfast, 8 units before lunch and dinner, 3 units before each snack.  Add correction scale before meals. -160 = 1 unit. -200 = 2 units. -240 = 3 units. -280 = 4 units. -320 = 5 u  Refills: 0            CONTINUE these medicines which have NOT CHANGED        Dose / Directions   acetaminophen 500 MG tablet  Commonly known as: TYLENOL  Used for: Arthralgia of left lower leg      TAKE 2 TABLETS BY MOUTH EVERY 6 HOURS AS NEEDED (1000MG) FOR PAIN  Quantity: 248 tablet  Refills: 11     AMLODIPINE BENZOATE PO      Dose: 2.5 mg  Take 2.5 mg by mouth  Refills: 0     carvedilol 6.25 MG tablet  Commonly known  as: COREG  Used for: Hypertension goal BP (blood pressure) < 140/90      Dose: 6.25 mg  Take 1 tablet (6.25 mg) by mouth 2 times daily (with meals)  Refills: 0     Dexcom G7  Padma  Used for: Type 1 diabetes mellitus with diabetic peripheral angiopathy without gangrene (H)      Use to read blood sugars as per 's instructions.  Quantity: 1 each  Refills: 0     Dexcom G7 Sensor Misc  Used for: Type 1 diabetes mellitus with diabetic peripheral angiopathy without gangrene (H)      Change every 10 days.  Quantity: 3 each  Refills: 5     ferrous sulfate 142 (45 Fe) MG CR tablet  Commonly known as: SLO-FE      Dose: 142 mg  Take 1 tablet (142 mg) by mouth daily  Refills: 0     fluticasone 50 MCG/ACT nasal spray  Commonly known as: FLONASE  Used for: Rhinorrhea      Dose: 1 spray  Spray 1 spray into both nostrils daily  Refills: 0     furosemide 20 MG tablet  Commonly known as: LASIX  Used for: Lower extremity edema      Dose: 20 mg  Take 1 tablet (20 mg) by mouth daily  Refills: 0     glucagon 1 MG Solr injection      Dose: 1 mg  Inject 1 mg into the muscle once  Refills: 0     Glucagon 3 MG/DOSE nasal powder  Commonly known as: BAQSIMI  Used for: Type 1 diabetes mellitus with diabetic macular edema of both eyes resolved after treatment (H)      Dose: 1 spray  Spray 1 spray (3 mg) in nostril as needed in the event of unconscious hypoglycemia or hypoglycemic seizure. May repeat dose if no response after 15 minutes.  Quantity: 2 each  Refills: 3     Ketostix test strip  Used for: Type 1 diabetes mellitus with diabetic macular edema of both eyes resolved after treatment (H)  Generic drug: acetone urine      Use as directed in case of high glucose, vomiting or illness.  Quantity: 50 strip  Refills: 3     latanoprost 0.005 % ophthalmic solution  Commonly known as: XALATAN  Used for: Primary open angle glaucoma of both eyes, moderate stage      INSTILL 1 DROP IN BOTH EYES AT BEDTIME  Quantity: 10 mL  Refills:  3     lisinopril 40 MG tablet  Commonly known as: ZESTRIL  Used for: Essential hypertension with goal blood pressure less than 140/90      Dose: 40 mg  Take 1 tablet (40 mg) by mouth daily  Refills: 0     magnesium oxide 400 MG tablet  Commonly known as: MAG-OX  Used for: Hypomagnesemia      Dose: 400 mg  Take 1 tablet (400 mg) by mouth daily  Quantity: 90 tablet  Refills: 3     Multivitamin Tabs      1 TABLET DAILY  Refills: 0     NovoFine Autocover Pen Needle 30G X 8 MM miscellaneous  Used for: Hyperglycemia due to type 1 diabetes mellitus (H)  Generic drug: insulin pen needle      Dose: 1 each  1 each See Admin Instructions as directed.  Quantity: 100 each  Refills: 11     nystatin 360682 UNIT/GM external cream  Commonly known as: MYCOSTATIN      Apply topically 2 times daily as needed for dry skin To feet and toenails.  Refills: 0     pantoprazole 40 MG EC tablet  Commonly known as: PROTONIX      Dose: 40 mg  Take 40 mg by mouth daily  Refills: 0     potassium chloride lucia ER 20 MEQ CR tablet  Commonly known as: KLOR-CON M20      Dose: 20 mEq  Take 20 mEq by mouth daily  Refills: 0     sennosides 8.6 MG tablet  Commonly known as: SENOKOT      Dose: 1 tablet  Take 1 tablet by mouth daily as needed  Refills: 0     simvastatin 20 MG tablet  Commonly known as: ZOCOR  Used for: Hyperlipidemia LDL goal <100      Dose: 20 mg  Take 1 tablet (20 mg) by mouth At Bedtime  Quantity: 90 tablet  Refills: 3     Tresiba FlexTouch 100 UNIT/ML pen  Generic drug: insulin degludec      Dose: 20 Units  Inject 20 Units Subcutaneous daily  Refills: 0               Case Management:  I have reviewed the care plan and MDS and do agree with the plan. Patient's desire to return to the community is not present. Information reviewed:  Medications, vital signs, orders, and nursing notes.    ROS:  4 point ROS including Respiratory, CV, GI and , other than that noted in the HPI,  is negative    Vitals:  BP (!) 149/64   Pulse 85   Temp 97.9  " F (36.6  C)   Resp 18   Ht 1.626 m (5' 4\")   Wt 84.6 kg (186 lb 8 oz)   LMP 03/28/2014   SpO2 96%   BMI 32.01 kg/m    Body mass index is 32.01 kg/m .  Exam:  GENERAL APPEARANCE:  Alert, in no distress, poor dental status  RESP:  lungs clear to auscultation , no respiratory distress  CV:  Palpation and auscultation of heart done , peripheral edema 2+ in bilateral LE, rate-normal  PSYCH:  insight and judgement impaired, affect and mood normal    Lab/Diagnostic data:   Most Recent 3 CBC's:  Recent Labs   Lab Test 11/29/23  1150 08/30/23  0535 05/08/23  0835   WBC 7.6 4.3 3.4*   HGB 10.4* 10.5* 10.2*   MCV 89 87 88    181 184     Most Recent 3 BMP's:  Recent Labs   Lab Test 01/01/24  0949 12/14/23  0544 12/06/23  0542    143 142   POTASSIUM 4.3 4.5 4.3   CHLORIDE 103 112* 108*   CO2 26 22 24   BUN 52.4* 51.4* 39.8*   CR 1.28* 0.95 1.15*   ANIONGAP 10 9 10   EFRAIN 10.1 9.2 9.4   * 112* 137*     Most Recent 2 LFT's:  Recent Labs   Lab Test 03/29/23  0515 01/25/23  0820   AST 20 19   ALT 19 10   ALKPHOS 73 79   BILITOTAL <0.2 <0.2     Most Recent Cholesterol Panel:  Recent Labs   Lab Test 01/25/23  0820   CHOL 112   LDL 58   HDL 37*   TRIG 84     Most Recent TSH and T4:  Recent Labs   Lab Test 12/06/23  0542 10/13/21  1540 02/01/21  0909   TSH 3.70   < > 7.07*   T4  --   --  1.05    < > = values in this interval not displayed.     Most Recent Hemoglobin A1c:  Recent Labs   Lab Test 11/29/23  1150   A1C 8.5*       ASSESSMENT/PLAN    (K59.01) Slow transit constipation    Comment: chronic.  stable taking senna S   Plan:Continue with plan of care no changes at this time, adjustment as needed        [Z97.8]  Uses self-applied continuous glucose monitoring device  (E10.37X3) Type 1 diabetes mellitus with diabetic macular edema of both eyes resolved after treatment (H)  (E11.3299) Nonproliferative diabetic retinopathy (H)  (primary encounter diagnosis)  (H40.1132) Primary open angle glaucoma (POAG) of " both eyes, moderate stage  (H25.813) Combined forms of age-related cataract of both eyes  (H52.4) Presbyopia  Comment: Chronic control is improving.    Her hgb A1c (11/2023) is 8.5, her BS remain erratic.     She is following with diabetic educator and follows with endocrinology.     She is currently insulin at meals, sliding scale at meals and HS, and long acting.      Unfortunately insurance will not currently cover a  continuous glucose monitoring device.   It is unfortunate as patient has only been hospitalized 1 time for DKA.  And prior to admission she was hospitalized multiple times.   .    For primary prevention she is currently taking simvastatin 20 mg daily, lisinopril 40 mg daily, and aspirin 81 mg daily is being held due to anemia.  Follows with optometry, podiatry and CDE   plan: BMP, CBC,hgb A1c     (I10) Hypertension goal BP (blood pressure) < 140/90  Comment: Chronic, Controlled while taking lisinopril, coreg, amlodipine  Plan: Continue with plan of care no changes at this time, adjustment as needed        (K21.00) Gastroesophageal reflux disease with esophagitis without hemorrhage  Comment: Chronic, stable while taking Protonix.  Had GI workup with no findings  Plan:   Continue with plan of care no changes at this time, adjustment as needed         (R60.0) Bilateral leg edema  Comment: improving.  May be related to amlodipine 5 mg daily which has a known side effect of lower extremity edema.   Significant improvement in LE since admission.     Wearing compression socks.     Has gained 10# in the past few months but denies increase in LE edema.   Plan: Continue with plan of care no changes at this time, adjustment as needed        D64.9) Anemia, unspecified type    Comment:  improving.   Has had upper and lower endoscopy in 2019 and 2020 and in 2020 a polyp was removed.   Hgb on 12/23/2023 was 9 while in OSH and upon admission to OSH Patient with reported black vomit PTA. Recent EGD (3 years ago)  without evidence of bleeding source. Given copious vomit reported at scene could potentially be related to Mitra-Burks tear. No evidence of esophageal perforation on abdominal CT. NG placed, drained dark bilious fluid initially, most recent fluid straw colored. No concerns for active GI bleed. Will discontinue NG.  On 2022 =  peripheral blood morphology showed normocytic anemia and reticulocyte was 0.02%  2023 = 9.0 at [facility]  2023 = note states discussed pancytopenia with hematology   2023 = hgb = 8   On 2023 she was started on iron supplementation  3/29/23 her hgb was 9.0, hematocrit=29.8, ferritin=49, iron=29, CZQ=845, Iron sat index=11.    Pt denies having black stools, shortness of breath, dizziness .     hgb 10.2  7/3/2023 GI work up negative   2023 hgb 10.5  Plan:  cbc          (F81.9) Cognitive developmental delay  Comment: Chronic, stable.  Patient has a mother and father who live in [place] and 2 sisters who live in the [place] area.   Her father  in   Plan: Continue with plan of care no changes at this time, adjustment as needed         (K08.9) Poor dentition  (primary encounter diagnosis)  Comment: Chronic.  Missing teeth and has broken teeth with poor gum health.  was suppose to see dentist in summer of  but was rescheduled due to COVID 19  Plan:   Continue with plan of care no changes at this time, adjustment as needed    (R63.5) weight gain  Comment: no edema noted  Plan: Will ask dietitian to evaluate and treat  Will notify diabetic educator      Electronically signed by:    TAYLA Sprague CNP

## 2024-03-14 ENCOUNTER — MEDICAL CORRESPONDENCE (OUTPATIENT)
Dept: HEALTH INFORMATION MANAGEMENT | Facility: CLINIC | Age: 64
End: 2024-03-14

## 2024-03-14 ENCOUNTER — VIRTUAL VISIT (OUTPATIENT)
Dept: EDUCATION SERVICES | Facility: CLINIC | Age: 64
End: 2024-03-14
Payer: MEDICARE

## 2024-03-14 ENCOUNTER — TELEPHONE (OUTPATIENT)
Dept: EDUCATION SERVICES | Facility: CLINIC | Age: 64
End: 2024-03-14

## 2024-03-14 DIAGNOSIS — E10.37X3 TYPE 1 DIABETES MELLITUS WITH DIABETIC MACULAR EDEMA OF BOTH EYES RESOLVED AFTER TREATMENT (H): ICD-10-CM

## 2024-03-14 PROCEDURE — 98967 PH1 ASSMT&MGMT NQHP 11-20: CPT | Mod: 93 | Performed by: DIETITIAN, REGISTERED

## 2024-03-14 RX ORDER — PROCHLORPERAZINE 25 MG/1
SUPPOSITORY RECTAL
Qty: 3 EACH | Refills: 5 | Status: SHIPPED | OUTPATIENT
Start: 2024-03-14 | End: 2024-03-14

## 2024-03-14 RX ORDER — PROCHLORPERAZINE 25 MG/1
SUPPOSITORY RECTAL
Qty: 3 EACH | Refills: 5 | Status: SHIPPED | OUTPATIENT
Start: 2024-03-14 | End: 2024-07-08

## 2024-03-14 RX ORDER — PROCHLORPERAZINE 25 MG/1
SUPPOSITORY RECTAL
Qty: 1 EACH | Refills: 1 | Status: SHIPPED | OUTPATIENT
Start: 2024-03-14 | End: 2024-07-08

## 2024-03-14 RX ORDER — INSULIN ASPART 100 [IU]/ML
INJECTION, SOLUTION INTRAVENOUS; SUBCUTANEOUS
Qty: 15 ML | Refills: 2 | Status: SHIPPED | OUTPATIENT
Start: 2024-03-14 | End: 2024-04-17

## 2024-03-14 RX ORDER — PROCHLORPERAZINE 25 MG/1
SUPPOSITORY RECTAL
Qty: 1 EACH | Refills: 1 | Status: SHIPPED | OUTPATIENT
Start: 2024-03-14 | End: 2024-03-14

## 2024-03-14 NOTE — LETTER
3/14/2024         RE: Lexy Rockwell  [address]  [facility] [address]        Hel! Please see note.  Please make the following change to Lexy's Novolog dosing:  Breakfast: 4 units --> 5 units  Lunch: 8 units --> 9 units  Sliding scale and dinner dose will remain the same.       Diabetes Self-Management Education & Support    Presents for: Follow-up    Type of Service: Telephone Visit    Audio only visit done, as patient does not have access to audio-visual technology.    Individual visit provided, given no group classes are available for 2 months.     Originating Location (Patient Location): Assisted Living  Distant Location (Provider Location): Offsite  Mode of Communication:  Telephone    Telephone Visit Start Time:  1:30 pm  Telephone Visit End Time (telephone visit stop time): 1:45    How would patient like to obtain AVS? MyChart      ASSESSMENT:  Lexy stated that she feels everything is going okay. She does report low blood sugars the last 2 mornings, though these were not reported on the blood sugar records that were faxed to writer from [place]. We discussed BG briefly. Discussed Dexcom G6, Lexy reports that she has not received this.     Writer spent 2+ hours trying to figure out the issue with Lexy not being able to get the Dexcom.   [facility] (which is the pharmacy that [place] recommended using) reported that they were showing that Lexy is still living in a skilled nursing facility despite writer letting them know that Lexy has moved to the assisted living in [place], they reported that [place] would have to pay for the Dexcom. Writer called [place] who explained that they would not pay for it. Writer called [facility] who was not able to find Lexy's insurance and unable to run the claim. Writer called Lexy back who reports that she has MA now. Writer put in a PA and a pharmacy liaison was able to help  and is working on getting her prescription filled!    Patient's most recent   Lab Results   Component Value Date    A1C 8.5 2023    A1C 9.7 2021     is not meeting goal of <7.0    Diabetes knowledge and skills assessment:   Patient is knowledgeable in diabetes management concepts related to: None    Continue education with the following diabetes management concepts: Healthy Eating, Being Active, Monitoring, Taking Medication, Problem Solving, Reducing Risks, and Healthy Coping    Based on learning assessment above, most appropriate setting for further diabetes education would be: Individual setting.      PLAN    Novolo-8-8-0 (3 for snack) --> 5-9-8-0 (+3 for snack) +sliding scale  Continue with Tresiba 20 units    Topics to cover at upcoming visits: Healthy Eating, Being Active, Monitoring, Taking Medication, Problem Solving, Reducing Risks, and Healthy Coping    Follow-up:     See Care Plan for co-developed, patient-state behavior change goals.  AVS provided for patient today.    Education Materials Provided:  No new materials provided today      SUBJECTIVE/OBJECTIVE:  Presents for: Follow-up  Accompanied by: Self (Dietitian from Assisted Living)  Diabetes education in the past 24mo: Yes  Focus of Visit: Problem Solving, Monitoring, Taking Medication  Diabetes type: Type 1  Disease course: Worsening  Transportation concerns: Yes (gets rides or takes public tranportation)  Difficulty affording diabetes medication?: No  Difficulty affording diabetes testing supplies?: No  Other concerns:: Cognitive impairment  Cultural Influences/Ethnic Background:  Not  or     Diabetes Symptoms & Complications:  Weight trend: Stable  Symptom course: Stable  Disease course: Worsening  Complications assessed today?: Yes    Patient Problem List and Family Medical History reviewed for relevant medical history, current medical status, and diabetes risk factors.    Vitals:  LMP 2014   Estimated body  "mass index is 32.01 kg/m  as calculated from the following:    Height as of 3/13/24: 1.626 m (5' 4\").    Weight as of 3/13/24: 84.6 kg (186 lb 8 oz).   Last 3 BP:   BP Readings from Last 3 Encounters:   03/13/24 (!) 149/64   02/21/24 106/57   01/01/24 (!) 142/81       History   Smoking Status    Never   Smokeless Tobacco    Never       Labs:  Lab Results   Component Value Date    A1C 8.5 11/29/2023    A1C 9.7 02/01/2021     Lab Results   Component Value Date     01/01/2024     10/13/2021     02/01/2021     Lab Results   Component Value Date    LDL 58 01/25/2023     02/01/2021     HDL Cholesterol   Date Value Ref Range Status   02/01/2021 62 >49 mg/dL Final     Direct Measure HDL   Date Value Ref Range Status   01/25/2023 37 (L) >=50 mg/dL Final   ]  GFR Estimate   Date Value Ref Range Status   01/01/2024 47 (L) >60 mL/min/1.73m2 Final   02/01/2021 90 >60 mL/min/[1.73_m2] Final     Comment:     Non  GFR Calc  Starting 12/18/2018, serum creatinine based estimated GFR (eGFR) will be   calculated using the Chronic Kidney Disease Epidemiology Collaboration   (CKD-EPI) equation.       GFR Estimate If Black   Date Value Ref Range Status   02/01/2021 >90 >60 mL/min/[1.73_m2] Final     Comment:      GFR Calc  Starting 12/18/2018, serum creatinine based estimated GFR (eGFR) will be   calculated using the Chronic Kidney Disease Epidemiology Collaboration   (CKD-EPI) equation.       Lab Results   Component Value Date    CR 1.28 01/01/2024    CR 0.73 02/01/2021     No results found for: \"MICROALBUMIN\"    Healthy Eating:  Healthy Eating Assessed Today: No  Cultural/[employer] diet restrictions?: No  Meal planning/habits: None  Who cooks/prepares meals for you?: Other  Who purchases food in  your home?: Other  How many times a week on average do you eat food made away from home (restaurant/take-out)?: 0  Meals include: Breakfast, Lunch, Dinner, Afternoon Snack, Evening " Snack  Breakfast: 8:00 am: Oatmeal with hard boiled egg and barragan with toast  Lunch: 12-12:30:  salad OR Cheese ravioli OR sandwiches oR hot dish  Dinner: 5:00 -6:00 pm: meatball and mashed potatoes OR vegetables lasagna OR scrab cake with vegetables and 2 sugar free cookies  Snacks: Has one bedtime snack- usually a cookie (if blood sugar is high, will not have snack) or yogurt OR sugra free candy every once and a while  Other: drinks coffee with sugar free creamer  Beverages: Water, Coffee, Milk (Keep some juice on hand for lows)  Has patient met with a dietitian in the past?: Yes    Being Active:  Being Active Assessed Today: Yes (went on the bike at her apartment complex)  Exercise:: Yes (walking, programs at care center)  Days per week of moderate to strenuous exercise (like a brisk walk): 2  On average, minutes per day of exercise at this level:  (Walking with friends)  How intense was your typical exercise? : Light (like stretching or slow walking)  Barrier to exercise: Safety, Physical limitation    Monitoring:  Monitoring Assessed Today: Yes  Did patient bring glucose meter to appointment? : Yes  Blood Glucose Meter: Unknown  Times checking blood sugar at home (number): 4  Times checking blood sugar at home (per): Day  Blood glucose trend: Fluctuating    Date Breakfast  Lunch  Dinner  Bedtime    Before After Before After Before After    2/25 354  279  149  66, 97   2/26 78  464  336  165   2/27 268  164  132  125   2/28 273  246  204  110   2/29 189  181  182  206, 182   3/1 398  400  185  56, 117   3/2 300  303  267  203   3/3 314  256  201  154   3/4 115  142    192   3/5 113  177  244  151   3/6 284  268  254  216   3/7 76  317  256  107   3/8 239  146  537  280   3/9 252  232  293  133   3/10 191  215  195  117   3/11 190  133  321  118   3/12 116  278  442  337   3/13 85  259  321  116   3/14 86  335           Taking Medications:  Diabetes Medication(s)       Diabetic Other       Glucagon (BAQSIMI)  3 MG/DOSE nasal powder Spray 1 spray (3 mg) in nostril as needed in the event of unconscious hypoglycemia or hypoglycemic seizure. May repeat dose if no response after 15 minutes.     glucagon 1 MG SOLR injection Inject 1 mg into the muscle once       Insulin       insulin aspart (NOVOLOG FLEXPEN) 100 UNIT/ML pen Inject 4 units before breakfast, 8 units before lunch and dinner, 3 units before each snack.  Add correction scale before meals. -160 = 1 unit. -200 = 2 units. -240 = 3 units. -280 = 4 units. -320 = 5 u     Patient taking differently: Inject 4 units before breakfast, 8 units before lunch and dinner, 3 units before each snack.  Add correction scale before meals. -160 = 1 unit. -200 = 2 units. -240 = 3 units. -280 = 4 units. -320 = 5 unit(s)  Bedtime correction  per sliding scale:   if 201 - 240 = 1 unit;   241 - 280 = 2 units;   281 - 320 = 3 units;   321 - 359 = 4 units;   360 - 399 = 5 units;   400 - 439 = 6 units;   440 - 479 = 7 units;   480 - 519 = 8 units 520+ = 9 units,   subcutaneously at bedtime     insulin degludec (TRESIBA FLEXTOUCH) 100 UNIT/ML pen Inject 20 Units Subcutaneous daily            Taking Medication Assessed Today: Yes  Current Treatments: Insulin Injections  Dose schedule: Pre-breakfast, Pre-lunch, Pre-dinner, At bedtime  Given by: Patient, Adult caretaker, Nursing attendant  Injection/Infusion sites: Abdomen  Problems taking diabetes medications regularly?: No  Diabetes medication side effects?: No    Problem Solving:  Problem Solving Assessed Today: Yes  Is the patient at risk for hypoglycemia?: Yes  Hypoglycemia Frequency: Weekly  Hypoglycemia Treatment: Glucose (tablets or gel), Other food, Juice, Candy (will have one piece of candy or some juice)  Patient carries a carbohydrate source: Yes  Medical ID: Yes  Is the patient at risk for DKA?: Yes  Does patient have ketone test strips?: Yes  Does patient have DKA prevention  plan?: Yes  Does patient have severe weather/disaster plan for diabetes management?: No  Does patient have sick day plan for diabetes management?: Yes              Reducing Risks:  Reducing Risks Assessed Today: No  CAD Risks: Diabetes Mellitus  Has dilated eye exam at least once a year?: Yes  Sees dentist every 6 months?: Yes  Feet checked by healthcare provider in the last year?: Yes    Healthy Coping:  Healthy Coping Assessed Today: Yes  Emotional response to diabetes: Acceptance  Informal Support system:: Family, Parent  Stage of change: ACTION (Actively working towards change)  Support resources: Offerings in Clinic Communities  Patient Activation Measure Survey Score:      3/7/2012     3:00 PM   KHUSHI Score (Last Two)   KHUSHI Raw Score 39   Activation Score 56.4   KHUSHI Level 3         Care Plan and Education Provided:  There are no care plans that you recently modified to display for this patient.      Tiffanie Mcfarland RD [facility]  Triage: [phone]  [phone]      Time Spent: 15 minutes  Encounter Type: Individual    Any diabetes medication dose changes were made via the CDE Protocol per the patient's referring provider. A copy of this encounter was shared with the provider.    Diabetes Self-Management Education & Support    Presents for: Follow-up    Type of Service: Telephone Visit    Audio only visit done, as patient does not have access to audio-visual technology.    Individual visit provided, given no group classes are available for 2 months.     Originating Location (Patient Location): Assisted Living  Distant Location (Provider Location): Offsite  Mode of Communication:  Telephone    Telephone Visit Start Time:  1:30 pm  Telephone Visit End Time (telephone visit stop time): 1:45    How would patient like to obtain AVS? MyChart      ASSESSMENT:  Lexy stated that she feels everything is going okay. She does report low blood sugars the last 2 mornings, though these were not reported on  the blood sugar records that were faxed to writer from [place]. We discussed BG briefly. Discussed Dexcom G6, Lexy reports that she has not received this.     Writer spent 2+ hours trying to figure out the issue with Lexy not being able to get the Dexcom.   [facility] (which is the pharmacy that [facility] recommended using) reported that they were showing that Lexy is still living in a skilled nursing facility despite writer letting them know that Lexy has moved to the assisted living in [place], they reported that [place] would have to pay for the Dexcom. Writer called [place] who explained that they would not pay for it. Writer called [facility] who was not able to find Lexy's insurance and unable to run the claim. Writer called Lexy back who reports that she has MA now. Writer put in a PA and a pharmacy liaison was able to help and is working on getting her prescription filled!    Patient's most recent   Lab Results   Component Value Date    A1C 8.5 2023    A1C 9.7 2021     is not meeting goal of <7.0    Diabetes knowledge and skills assessment:   Patient is knowledgeable in diabetes management concepts related to: None    Continue education with the following diabetes management concepts: Healthy Eating, Being Active, Monitoring, Taking Medication, Problem Solving, Reducing Risks, and Healthy Coping    Based on learning assessment above, most appropriate setting for further diabetes education would be: Individual setting.      PLAN    Novolo-8-8-0 (3 for snack) --> 5-9-8-0 (+3 for snack) +sliding scale  Continue with Tresiba 20 units    Topics to cover at upcoming visits: Healthy Eating, Being Active, Monitoring, Taking Medication, Problem Solving, Reducing Risks, and Healthy Coping    Follow-up:     See Care Plan for co-developed, patient-state behavior change goals.  AVS provided for patient today.    Education Materials  "Provided:  No new materials provided today      SUBJECTIVE/OBJECTIVE:  Presents for: Follow-up  Accompanied by: Self (Dietitian from Assisted Living)  Diabetes education in the past 24mo: Yes  Focus of Visit: Problem Solving, Monitoring, Taking Medication  Diabetes type: Type 1  Disease course: Worsening  Transportation concerns: Yes (gets rides or takes public tranportation)  Difficulty affording diabetes medication?: No  Difficulty affording diabetes testing supplies?: No  Other concerns:: Cognitive impairment  Cultural Influences/Ethnic Background:  Not  or     Diabetes Symptoms & Complications:  Weight trend: Stable  Symptom course: Stable  Disease course: Worsening  Complications assessed today?: Yes    Patient Problem List and Family Medical History reviewed for relevant medical history, current medical status, and diabetes risk factors.    Vitals:  LMP 03/28/2014   Estimated body mass index is 32.01 kg/m  as calculated from the following:    Height as of 3/13/24: 1.626 m (5' 4\").    Weight as of 3/13/24: 84.6 kg (186 lb 8 oz).   Last 3 BP:   BP Readings from Last 3 Encounters:   03/13/24 (!) 149/64   02/21/24 106/57   01/01/24 (!) 142/81       History   Smoking Status    Never   Smokeless Tobacco    Never       Labs:  Lab Results   Component Value Date    A1C 8.5 11/29/2023    A1C 9.7 02/01/2021     Lab Results   Component Value Date     01/01/2024     10/13/2021     02/01/2021     Lab Results   Component Value Date    LDL 58 01/25/2023     02/01/2021     HDL Cholesterol   Date Value Ref Range Status   02/01/2021 62 >49 mg/dL Final     Direct Measure HDL   Date Value Ref Range Status   01/25/2023 37 (L) >=50 mg/dL Final   ]  GFR Estimate   Date Value Ref Range Status   01/01/2024 47 (L) >60 mL/min/1.73m2 Final   02/01/2021 90 >60 mL/min/[1.73_m2] Final     Comment:     Non  GFR Calc  Starting 12/18/2018, serum creatinine based estimated GFR (eGFR) will " "be   calculated using the Chronic Kidney Disease Epidemiology Collaboration   (CKD-EPI) equation.       GFR Estimate If Black   Date Value Ref Range Status   02/01/2021 >90 >60 mL/min/[1.73_m2] Final     Comment:      GFR Calc  Starting 12/18/2018, serum creatinine based estimated GFR (eGFR) will be   calculated using the Chronic Kidney Disease Epidemiology Collaboration   (CKD-EPI) equation.       Lab Results   Component Value Date    CR 1.28 01/01/2024    CR 0.73 02/01/2021     No results found for: \"MICROALBUMIN\"    Healthy Eating:  Healthy Eating Assessed Today: No  Cultural/[employer] diet restrictions?: No  Meal planning/habits: None  Who cooks/prepares meals for you?: Other  Who purchases food in  your home?: Other  How many times a week on average do you eat food made away from home (restaurant/take-out)?: 0  Meals include: Breakfast, Lunch, Dinner, Afternoon Snack, Evening Snack  Breakfast: 8:00 am: Oatmeal with hard boiled egg and barragan with toast  Lunch: 12-12:30:  salad OR Cheese ravioli OR sandwiches oR hot dish  Dinner: 5:00 -6:00 pm: meatball and mashed potatoes OR vegetables lasagna OR scrab cake with vegetables and 2 sugar free cookies  Snacks: Has one bedtime snack- usually a cookie (if blood sugar is high, will not have snack) or yogurt OR sugra free candy every once and a while  Other: drinks coffee with sugar free creamer  Beverages: Water, Coffee, Milk (Keep some juice on hand for lows)  Has patient met with a dietitian in the past?: Yes    Being Active:  Being Active Assessed Today: Yes (went on the bike at her apartment complex)  Exercise:: Yes (walking, programs at care center)  Days per week of moderate to strenuous exercise (like a brisk walk): 2  On average, minutes per day of exercise at this level:  (Walking with friends)  How intense was your typical exercise? : Light (like stretching or slow walking)  Barrier to exercise: Safety, Physical " limitation    Monitoring:  Monitoring Assessed Today: Yes  Did patient bring glucose meter to appointment? : Yes  Blood Glucose Meter: Unknown  Times checking blood sugar at home (number): 4  Times checking blood sugar at home (per): Day  Blood glucose trend: Fluctuating    Date Breakfast  Lunch  Dinner  Bedtime    Before After Before After Before After    2/25 354  279  149  66, 97   2/26 78  464  336  165   2/27 268  164  132  125   2/28 273  246  204  110   2/29 189  181  182  206, 182   3/1 398  400  185  56, 117   3/2 300  303  267  203   3/3 314  256  201  154   3/4 115  142    192   3/5 113  177  244  151   3/6 284  268  254  216   3/7 76  317  256  107   3/8 239  146  537  280   3/9 252  232  293  133   3/10 191  215  195  117   3/11 190  133  321  118   3/12 116  278  442  337   3/13 85  259  321  116   3/14 86  335           Taking Medications:  Diabetes Medication(s)       Diabetic Other       Glucagon (BAQSIMI) 3 MG/DOSE nasal powder Spray 1 spray (3 mg) in nostril as needed in the event of unconscious hypoglycemia or hypoglycemic seizure. May repeat dose if no response after 15 minutes.     glucagon 1 MG SOLR injection Inject 1 mg into the muscle once       Insulin       insulin aspart (NOVOLOG FLEXPEN) 100 UNIT/ML pen Inject 4 units before breakfast, 8 units before lunch and dinner, 3 units before each snack.  Add correction scale before meals. -160 = 1 unit. -200 = 2 units. -240 = 3 units. -280 = 4 units. -320 = 5 u     Patient taking differently: Inject 4 units before breakfast, 8 units before lunch and dinner, 3 units before each snack.  Add correction scale before meals. -160 = 1 unit. -200 = 2 units. -240 = 3 units. -280 = 4 units. -320 = 5 unit(s)  Bedtime correction  per sliding scale:   if 201 - 240 = 1 unit;   241 - 280 = 2 units;   281 - 320 = 3 units;   321 - 359 = 4 units;   360 - 399 = 5 units;   400 - 439 = 6 units;   440 - 479 =  7 units;   480 - 519 = 8 units 520+ = 9 units,   subcutaneously at bedtime     insulin degludec (TRESIBA FLEXTOUCH) 100 UNIT/ML pen Inject 20 Units Subcutaneous daily            Taking Medication Assessed Today: Yes  Current Treatments: Insulin Injections  Dose schedule: Pre-breakfast, Pre-lunch, Pre-dinner, At bedtime  Given by: Patient, Adult caretaker, Nursing attendant  Injection/Infusion sites: Abdomen  Problems taking diabetes medications regularly?: No  Diabetes medication side effects?: No    Problem Solving:  Problem Solving Assessed Today: Yes  Is the patient at risk for hypoglycemia?: Yes  Hypoglycemia Frequency: Weekly  Hypoglycemia Treatment: Glucose (tablets or gel), Other food, Juice, Candy (will have one piece of candy or some juice)  Patient carries a carbohydrate source: Yes  Medical ID: Yes  Is the patient at risk for DKA?: Yes  Does patient have ketone test strips?: Yes  Does patient have DKA prevention plan?: Yes  Does patient have severe weather/disaster plan for diabetes management?: No  Does patient have sick day plan for diabetes management?: Yes              Reducing Risks:  Reducing Risks Assessed Today: No  CAD Risks: Diabetes Mellitus  Has dilated eye exam at least once a year?: Yes  Sees dentist every 6 months?: Yes  Feet checked by healthcare provider in the last year?: Yes    Healthy Coping:  Healthy Coping Assessed Today: Yes  Emotional response to diabetes: Acceptance  Informal Support system:: Family, Parent  Stage of change: ACTION (Actively working towards change)  Support resources: Offerings in Clinic Communities  Patient Activation Measure Survey Score:      3/7/2012     3:00 PM   KHUSHI Score (Last Two)   KHUSHI Raw Score 39   Activation Score 56.4   KHUSHI Level 3         Care Plan and Education Provided:  There are no care plans that you recently modified to display for this patient.      Tiffanie Mcfarland RD [facility]  Triage: [phone]  [phone]      Time Spent:  15 minutes  Encounter Type: Individual    Any diabetes medication dose changes were made via the CDE Protocol per the patient's referring provider. A copy of this encounter was shared with the provider.

## 2024-03-14 NOTE — TELEPHONE ENCOUNTER
"Please initiate prior authorization for Dexcom G6 sensor and transmitters.     Back story:   Lexy has type 1 diabetes and has blood sugars that have fluctuated greatly. For her safety, should would benefit from continuous glucose monitoring. Lexy was using the Dexcom prior to moving to a nursing home ([place]) which was when she stopped using the Dexcom because it was not covered by her insurance sine she was in a \"skilled facility.\" She has since moved to the Assisted Living facility in [place] and her insurance is not covering the Dexcom G6.     Thanks,   KARLOS Haas [facility]  Triage: [phone]  [phone]        "

## 2024-03-14 NOTE — LETTER
3/14/2024         RE: Lexy Rockwell  [address]  [facility] [address]        Dear Colleague,    Thank you for referring your patient, Lexy Rockwell, to the [facility]. Please see a copy of my visit note below.    Diabetes Self-Management Education & Support    Presents for: Follow-up    Type of Service: Telephone Visit    Audio only visit done, as patient does not have access to audio-visual technology.    Individual visit provided, given no group classes are available for 2 months.     Originating Location (Patient Location): Assisted Living  Distant Location (Provider Location): Offsite  Mode of Communication:  Telephone    Telephone Visit Start Time:  1:30 pm  Telephone Visit End Time (telephone visit stop time): 1:45    How would patient like to obtain AVS? MyChart      ASSESSMENT:  Lexy stated that she feels everything is going okay. She does report low blood sugars the last 2 mornings, though these were not reported on the blood sugar records that were faxed to writer from [place]. We discussed BG briefly. Discussed Dexcom G6, Lexy reports that she has not received this.     Writer spent 2+ hours trying to figure out the issue with Lexy not being able to get the Dexcom.   [facility] (which is the pharmacy that [place] recommended using) reported that they were showing that Lexy is still living in a skilled nursing facility despite writer letting them know that Lexy has moved to the assisted living in [place], they reported that [place] would have to pay for the Dexcom. Writer called [place] who explained that they would not pay for it. Writer called [facility] who was not able to find Lexy's insurance and unable to run the claim. Writer called Lexy back who reports that she has MA now. Writer put in a PA and a pharmacy liaison was able to help and is working on getting her  prescription filled!    Patient's most recent   Lab Results   Component Value Date    A1C 8.5 2023    A1C 9.7 2021     is not meeting goal of <7.0    Diabetes knowledge and skills assessment:   Patient is knowledgeable in diabetes management concepts related to: None    Continue education with the following diabetes management concepts: Healthy Eating, Being Active, Monitoring, Taking Medication, Problem Solving, Reducing Risks, and Healthy Coping    Based on learning assessment above, most appropriate setting for further diabetes education would be: Individual setting.      PLAN    Novolo-8-8-0 (3 for snack) --> 5-9-8-0 (+3 for snack) +sliding scale  Continue with Tresiba 20 units    Topics to cover at upcoming visits: Healthy Eating, Being Active, Monitoring, Taking Medication, Problem Solving, Reducing Risks, and Healthy Coping    Follow-up:     See Care Plan for co-developed, patient-state behavior change goals.  AVS provided for patient today.    Education Materials Provided:  No new materials provided today      SUBJECTIVE/OBJECTIVE:  Presents for: Follow-up  Accompanied by: Self (Dietitian from Assisted Living)  Diabetes education in the past 24mo: Yes  Focus of Visit: Problem Solving, Monitoring, Taking Medication  Diabetes type: Type 1  Disease course: Worsening  Transportation concerns: Yes (gets rides or takes public tranportation)  Difficulty affording diabetes medication?: No  Difficulty affording diabetes testing supplies?: No  Other concerns:: Cognitive impairment  Cultural Influences/Ethnic Background:  Not  or     Diabetes Symptoms & Complications:  Weight trend: Stable  Symptom course: Stable  Disease course: Worsening  Complications assessed today?: Yes    Patient Problem List and Family Medical History reviewed for relevant medical history, current medical status, and diabetes risk factors.    Vitals:  [facility] 2014   Estimated body mass index is 32.01 kg/m  as  "calculated from the following:    Height as of 3/13/24: 1.626 m (5' 4\").    Weight as of 3/13/24: 84.6 kg (186 lb 8 oz).   Last 3 BP:   BP Readings from Last 3 Encounters:   03/13/24 (!) 149/64   02/21/24 106/57   01/01/24 (!) 142/81       History   Smoking Status     Never   Smokeless Tobacco     Never       Labs:  Lab Results   Component Value Date    A1C 8.5 11/29/2023    A1C 9.7 02/01/2021     Lab Results   Component Value Date     01/01/2024     10/13/2021     02/01/2021     Lab Results   Component Value Date    LDL 58 01/25/2023     02/01/2021     HDL Cholesterol   Date Value Ref Range Status   02/01/2021 62 >49 mg/dL Final     Direct Measure HDL   Date Value Ref Range Status   01/25/2023 37 (L) >=50 mg/dL Final   ]  GFR Estimate   Date Value Ref Range Status   01/01/2024 47 (L) >60 mL/min/1.73m2 Final   02/01/2021 90 >60 mL/min/[1.73_m2] Final     Comment:     Non  GFR Calc  Starting 12/18/2018, serum creatinine based estimated GFR (eGFR) will be   calculated using the Chronic Kidney Disease Epidemiology Collaboration   (CKD-EPI) equation.       GFR Estimate If Black   Date Value Ref Range Status   02/01/2021 >90 >60 mL/min/[1.73_m2] Final     Comment:      GFR Calc  Starting 12/18/2018, serum creatinine based estimated GFR (eGFR) will be   calculated using the Chronic Kidney Disease Epidemiology Collaboration   (CKD-EPI) equation.       Lab Results   Component Value Date    CR 1.28 01/01/2024    CR 0.73 02/01/2021     No results found for: \"MICROALBUMIN\"    Healthy Eating:  Healthy Eating Assessed Today: No  Cultural/Gnosticist diet restrictions?: No  Meal planning/habits: None  Who cooks/prepares meals for you?: Other  Who purchases food in  your home?: Other  How many times a week on average do you eat food made away from home (restaurant/take-out)?: 0  Meals include: Breakfast, Lunch, Dinner, Afternoon Snack, Evening Snack  Breakfast: 8:00 am: " Oatmeal with hard boiled egg and barragan with toast  Lunch: 12-12:30:  salad OR Cheese ravioli OR sandwiches oR hot dish  Dinner: 5:00 -6:00 pm: meatball and mashed potatoes OR vegetables lasagna OR scrab cake with vegetables and 2 sugar free cookies  Snacks: Has one bedtime snack- usually a cookie (if blood sugar is high, will not have snack) or yogurt OR sugra free candy every once and a while  Other: drinks coffee with sugar free creamer  Beverages: Water, Coffee, Milk (Keep some juice on hand for lows)  Has patient met with a dietitian in the past?: Yes    Being Active:  Being Active Assessed Today: Yes (went on the bike at her apartment complex)  Exercise:: Yes (walking, programs at care center)  Days per week of moderate to strenuous exercise (like a brisk walk): 2  On average, minutes per day of exercise at this level:  (Walking with friends)  How intense was your typical exercise? : Light (like stretching or slow walking)  Barrier to exercise: Safety, Physical limitation    Monitoring:  Monitoring Assessed Today: Yes  Did patient bring glucose meter to appointment? : Yes  Blood Glucose Meter: Unknown  Times checking blood sugar at home (number): 4  Times checking blood sugar at home (per): Day  Blood glucose trend: Fluctuating    Date Breakfast  Lunch  Dinner  Bedtime    Before After Before After Before After    2/25 354  279  149  66, 97   2/26 78  464  336  165   2/27 268  164  132  125   2/28 273  246  204  110   2/29 189  181  182  206, 182   3/1 398  400  185  56, 117   3/2 300  303  267  203   3/3 314  256  201  154   3/4 115  142    192   3/5 113  177  244  151   3/6 284  268  254  216   3/7 76  317  256  107   3/8 239  146  537  280   3/9 252  232  293  133   3/10 191  215  195  117   3/11 190  133  321  118   3/12 116  278  442  337   3/13 85  259  321  116   3/14 86  335           Taking Medications:  Diabetes Medication(s)       Diabetic Other       Glucagon (BAQSIMI) 3 MG/DOSE nasal powder  Spray 1 spray (3 mg) in nostril as needed in the event of unconscious hypoglycemia or hypoglycemic seizure. May repeat dose if no response after 15 minutes.     glucagon 1 MG SOLR injection Inject 1 mg into the muscle once       Insulin       insulin aspart (NOVOLOG FLEXPEN) 100 UNIT/ML pen Inject 4 units before breakfast, 8 units before lunch and dinner, 3 units before each snack.  Add correction scale before meals. -160 = 1 unit. -200 = 2 units. -240 = 3 units. -280 = 4 units. -320 = 5 u     Patient taking differently: Inject 4 units before breakfast, 8 units before lunch and dinner, 3 units before each snack.  Add correction scale before meals. -160 = 1 unit. -200 = 2 units. -240 = 3 units. -280 = 4 units. -320 = 5 unit(s)  Bedtime correction  per sliding scale:   if 201 - 240 = 1 unit;   241 - 280 = 2 units;   281 - 320 = 3 units;   321 - 359 = 4 units;   360 - 399 = 5 units;   400 - 439 = 6 units;   440 - 479 = 7 units;   480 - 519 = 8 units 520+ = 9 units,   subcutaneously at bedtime     insulin degludec (TRESIBA FLEXTOUCH) 100 UNIT/ML pen Inject 20 Units Subcutaneous daily            Taking Medication Assessed Today: Yes  Current Treatments: Insulin Injections  Dose schedule: Pre-breakfast, Pre-lunch, Pre-dinner, At bedtime  Given by: Patient, Adult caretaker, Nursing attendant  Injection/Infusion sites: Abdomen  Problems taking diabetes medications regularly?: No  Diabetes medication side effects?: No    Problem Solving:  Problem Solving Assessed Today: Yes  Is the patient at risk for hypoglycemia?: Yes  Hypoglycemia Frequency: Weekly  Hypoglycemia Treatment: Glucose (tablets or gel), Other food, Juice, Candy (will have one piece of candy or some juice)  Patient carries a carbohydrate source: Yes  Medical ID: Yes  Is the patient at risk for DKA?: Yes  Does patient have ketone test strips?: Yes  Does patient have DKA prevention plan?: Yes  Does  patient have severe weather/disaster plan for diabetes management?: No  Does patient have sick day plan for diabetes management?: Yes              Reducing Risks:  Reducing Risks Assessed Today: No  CAD Risks: Diabetes Mellitus  Has dilated eye exam at least once a year?: Yes  Sees dentist every 6 months?: Yes  Feet checked by healthcare provider in the last year?: Yes    Healthy Coping:  Healthy Coping Assessed Today: Yes  Emotional response to diabetes: Acceptance  Informal Support system:: Family, Parent  Stage of change: ACTION (Actively working towards change)  Support resources: Offerings in Clinic Communities  Patient Activation Measure Survey Score:      3/7/2012     3:00 PM   KHUSHI Score (Last Two)   KHUSHI Raw Score 39   Activation Score 56.4   KHUSHI Level 3         Care Plan and Education Provided:  There are no care plans that you recently modified to display for this patient.      Tiffanie Mcfarland RD [facility]  Triage: [phone]  [phone]      Time Spent: 15 minutes  Encounter Type: Individual    Any diabetes medication dose changes were made via the CDE Protocol per the patient's referring provider. A copy of this encounter was shared with the provider.

## 2024-03-14 NOTE — PROGRESS NOTES
Diabetes Self-Management Education & Support    Presents for: Follow-up    Type of Service: Telephone Visit    Audio only visit done, as patient does not have access to audio-visual technology.    Individual visit provided, given no group classes are available for 2 months.     Originating Location (Patient Location): Assisted Living  Distant Location (Provider Location): Offsite  Mode of Communication:  Telephone    Telephone Visit Start Time:  1:30 pm  Telephone Visit End Time (telephone visit stop time): 1:45    How would patient like to obtain AVS? MyChart      ASSESSMENT:  Lexy stated that she feels everything is going okay. She does report low blood sugars the last 2 mornings, though these were not reported on the blood sugar records that were faxed to writer from [place]. We discussed BG briefly. Discussed Dexcom G6, Lexy reports that she has not received this.     Writer spent 2+ hours trying to figure out the issue with Lexy not being able to get the Dexcom.   [facility] (which is the pharmacy that [place] recommended using) reported that they were showing that Lexy is still living in a skilled nursing facility despite writer letting them know that Lexy has moved to the assisted living in [place], they reported that [place] would have to pay for the Dexcom. Writer called [place] who explained that they would not pay for it. Writer called [facility] who was not able to find Lexy's insurance and unable to run the claim. Writer called Lexy back who reports that she has MA now. Writer put in a PA and a pharmacy liaison was able to help and is working on getting her prescription filled!    Patient's most recent   Lab Results   Component Value Date    A1C 8.5 11/29/2023    A1C 9.7 02/01/2021     is not meeting goal of <7.0    Diabetes knowledge and skills assessment:   Patient is knowledgeable in diabetes management concepts related to:  "None    Continue education with the following diabetes management concepts: Healthy Eating, Being Active, Monitoring, Taking Medication, Problem Solving, Reducing Risks, and Healthy Coping    Based on learning assessment above, most appropriate setting for further diabetes education would be: Individual setting.      PLAN    Novolo-8-8-0 (3 for snack) --> 5-9-8-0 (+3 for snack) +sliding scale  Continue with Tresiba 20 units    Topics to cover at upcoming visits: Healthy Eating, Being Active, Monitoring, Taking Medication, Problem Solving, Reducing Risks, and Healthy Coping    Follow-up:     See Care Plan for co-developed, patient-state behavior change goals.  AVS provided for patient today.    Education Materials Provided:  No new materials provided today      SUBJECTIVE/OBJECTIVE:  Presents for: Follow-up  Accompanied by: Self (Dietitian from Assisted Living)  Diabetes education in the past 24mo: Yes  Focus of Visit: Problem Solving, Monitoring, Taking Medication  Diabetes type: Type 1  Disease course: Worsening  Transportation concerns: Yes (gets rides or takes public tranportation)  Difficulty affording diabetes medication?: No  Difficulty affording diabetes testing supplies?: No  Other concerns:: Cognitive impairment  Cultural Influences/Ethnic Background:  Not  or     Diabetes Symptoms & Complications:  Weight trend: Stable  Symptom course: Stable  Disease course: Worsening  Complications assessed today?: Yes    Patient Problem List and Family Medical History reviewed for relevant medical history, current medical status, and diabetes risk factors.    Vitals:  [facility] 2014   Estimated body mass index is 32.01 kg/m  as calculated from the following:    Height as of 3/13/24: 1.626 m (5' 4\").    Weight as of 3/13/24: 84.6 kg (186 lb 8 oz).   Last 3 BP:   BP Readings from Last 3 Encounters:   24 (!) 149/64   24 106/57   24 (!) 142/81       History   Smoking Status    Never " "  Smokeless Tobacco    Never       Labs:  Lab Results   Component Value Date    A1C 8.5 11/29/2023    A1C 9.7 02/01/2021     Lab Results   Component Value Date     01/01/2024     10/13/2021     02/01/2021     Lab Results   Component Value Date    LDL 58 01/25/2023     02/01/2021     HDL Cholesterol   Date Value Ref Range Status   02/01/2021 62 >49 mg/dL Final     Direct Measure HDL   Date Value Ref Range Status   01/25/2023 37 (L) >=50 mg/dL Final   ]  GFR Estimate   Date Value Ref Range Status   01/01/2024 47 (L) >60 mL/min/1.73m2 Final   02/01/2021 90 >60 mL/min/[1.73_m2] Final     Comment:     Non  GFR Calc  Starting 12/18/2018, serum creatinine based estimated GFR (eGFR) will be   calculated using the Chronic Kidney Disease Epidemiology Collaboration   (CKD-EPI) equation.       GFR Estimate If Black   Date Value Ref Range Status   02/01/2021 >90 >60 mL/min/[1.73_m2] Final     Comment:      GFR Calc  Starting 12/18/2018, serum creatinine based estimated GFR (eGFR) will be   calculated using the Chronic Kidney Disease Epidemiology Collaboration   (CKD-EPI) equation.       Lab Results   Component Value Date    CR 1.28 01/01/2024    CR 0.73 02/01/2021     No results found for: \"MICROALBUMIN\"    Healthy Eating:  Healthy Eating Assessed Today: No  Cultural/Orthodox diet restrictions?: No  Meal planning/habits: None  Who cooks/prepares meals for you?: Other  Who purchases food in  your home?: Other  How many times a week on average do you eat food made away from home (restaurant/take-out)?: 0  Meals include: Breakfast, Lunch, Dinner, Afternoon Snack, Evening Snack  Breakfast: 8:00 am: Oatmeal with hard boiled egg and barragan with toast  Lunch: 12-12:30:  salad OR Cheese ravioli OR sandwiches oR hot dish  Dinner: 5:00 -6:00 pm: meatball and mashed potatoes OR vegetables lasagna OR scrab cake with vegetables and 2 sugar free cookies  Snacks: Has one bedtime " snack- usually a cookie (if blood sugar is high, will not have snack) or yogurt OR sugra free candy every once and a while  Other: drinks coffee with sugar free creamer  Beverages: Water, Coffee, Milk (Keep some juice on hand for lows)  Has patient met with a dietitian in the past?: Yes    Being Active:  Being Active Assessed Today: Yes (went on the bike at her apartment complex)  Exercise:: Yes (walking, programs at care center)  Days per week of moderate to strenuous exercise (like a brisk walk): 2  On average, minutes per day of exercise at this level:  (Walking with friends)  How intense was your typical exercise? : Light (like stretching or slow walking)  Barrier to exercise: Safety, Physical limitation    Monitoring:  Monitoring Assessed Today: Yes  Did patient bring glucose meter to appointment? : Yes  Blood Glucose Meter: Unknown  Times checking blood sugar at home (number): 4  Times checking blood sugar at home (per): Day  Blood glucose trend: Fluctuating    Date Breakfast  Lunch  Dinner  Bedtime    Before After Before After Before After    2/25 354  279  149  66, 97   2/26 78  464  336  165   2/27 268  164  132  125   2/28 273  246  204  110   2/29 189  181  182  206, 182   3/1 398  400  185  56, 117   3/2 300  303  267  203   3/3 314  256  201  154   3/4 115  142    192   3/5 113  177  244  151   3/6 284  268  254  216   3/7 76  317  256  107   3/8 239  146  537  280   3/9 252  232  293  133   3/10 191  215  195  117   3/11 190  133  321  118   3/12 116  278  442  337   3/13 85  259  321  116   3/14 86  335           Taking Medications:  Diabetes Medication(s)       Diabetic Other       Glucagon (BAQSIMI) 3 MG/DOSE nasal powder Spray 1 spray (3 mg) in nostril as needed in the event of unconscious hypoglycemia or hypoglycemic seizure. May repeat dose if no response after 15 minutes.     glucagon 1 MG SOLR injection Inject 1 mg into the muscle once       Insulin       insulin aspart (NOVOLOG FLEXPEN) 100  UNIT/ML pen Inject 4 units before breakfast, 8 units before lunch and dinner, 3 units before each snack.  Add correction scale before meals. -160 = 1 unit. -200 = 2 units. -240 = 3 units. -280 = 4 units. -320 = 5 u     Patient taking differently: Inject 4 units before breakfast, 8 units before lunch and dinner, 3 units before each snack.  Add correction scale before meals. -160 = 1 unit. -200 = 2 units. -240 = 3 units. -280 = 4 units. -320 = 5 unit(s)  Bedtime correction  per sliding scale:   if 201 - 240 = 1 unit;   241 - 280 = 2 units;   281 - 320 = 3 units;   321 - 359 = 4 units;   360 - 399 = 5 units;   400 - 439 = 6 units;   440 - 479 = 7 units;   480 - 519 = 8 units 520+ = 9 units,   subcutaneously at bedtime     insulin degludec (TRESIBA FLEXTOUCH) 100 UNIT/ML pen Inject 20 Units Subcutaneous daily            Taking Medication Assessed Today: Yes  Current Treatments: Insulin Injections  Dose schedule: Pre-breakfast, Pre-lunch, Pre-dinner, At bedtime  Given by: Patient, Adult caretaker, Nursing attendant  Injection/Infusion sites: Abdomen  Problems taking diabetes medications regularly?: No  Diabetes medication side effects?: No    Problem Solving:  Problem Solving Assessed Today: Yes  Is the patient at risk for hypoglycemia?: Yes  Hypoglycemia Frequency: Weekly  Hypoglycemia Treatment: Glucose (tablets or gel), Other food, Juice, Candy (will have one piece of candy or some juice)  Patient carries a carbohydrate source: Yes  Medical ID: Yes  Is the patient at risk for DKA?: Yes  Does patient have ketone test strips?: Yes  Does patient have DKA prevention plan?: Yes  Does patient have severe weather/disaster plan for diabetes management?: No  Does patient have sick day plan for diabetes management?: Yes              Reducing Risks:  Reducing Risks Assessed Today: No  CAD Risks: Diabetes Mellitus  Has dilated eye exam at least once a year?: Yes  Sees  dentist every 6 months?: Yes  Feet checked by healthcare provider in the last year?: Yes    Healthy Coping:  Healthy Coping Assessed Today: Yes  Emotional response to diabetes: Acceptance  Informal Support system:: Family, Parent  Stage of change: ACTION (Actively working towards change)  Support resources: Offerings in Clinic Communities  Patient Activation Measure Survey Score:      3/7/2012     3:00 PM   KHUSHI Score (Last Two)   KHUSHI Raw Score 39   Activation Score 56.4   KHUSHI Level 3         Care Plan and Education Provided:  There are no care plans that you recently modified to display for this patient.      Tiffanie Mcfarland RD [facility]  Triage: [phone]  [phone]      Time Spent: 15 minutes  Encounter Type: Individual    Any diabetes medication dose changes were made via the CDE Protocol per the patient's referring provider. A copy of this encounter was shared with the provider.

## 2024-03-17 NOTE — PROGRESS NOTES
Lexy Rockwell  1960    Orders  BNP, BMP, CBC, hgb A1c dx DM type 1  Discontinue Cepacol   Discontinue order to encourage fluids      TAYLA Sprague CNP on 3/17/2024 at 2:00 PM

## 2024-03-19 ENCOUNTER — MYC MEDICAL ADVICE (OUTPATIENT)
Dept: EDUCATION SERVICES | Facility: CLINIC | Age: 64
End: 2024-03-19
Payer: COMMERCIAL

## 2024-03-21 NOTE — TELEPHONE ENCOUNTER
Called [place] [facility] and they agree to call the [facility] to coordinate Dexcom G6 delivery.     Tiffanie Mcfarland RD [facility]  Triage: [phone]  [phone]

## 2024-03-22 ENCOUNTER — MYC MEDICAL ADVICE (OUTPATIENT)
Dept: EDUCATION SERVICES | Facility: CLINIC | Age: 64
End: 2024-03-22
Payer: COMMERCIAL

## 2024-04-03 DIAGNOSIS — H40.1132 PRIMARY OPEN ANGLE GLAUCOMA OF BOTH EYES, MODERATE STAGE: ICD-10-CM

## 2024-04-03 RX ORDER — LATANOPROST 50 UG/ML
SOLUTION/ DROPS OPHTHALMIC
Qty: 10 ML | Refills: 11 | Status: SHIPPED | OUTPATIENT
Start: 2024-04-03

## 2024-04-04 ENCOUNTER — LAB REQUISITION (OUTPATIENT)
Dept: LAB | Facility: CLINIC | Age: 64
End: 2024-04-04
Payer: MEDICARE

## 2024-04-04 DIAGNOSIS — E83.42 HYPOMAGNESEMIA: ICD-10-CM

## 2024-04-04 DIAGNOSIS — E10.3393 TYPE 1 DIABETES MELLITUS WITH MODERATE NONPROLIFERATIVE RETINOPATHY OF BOTH EYES, MACULAR EDEMA PRESENCE UNSPECIFIED (H): Primary | ICD-10-CM

## 2024-04-04 RX ORDER — INSULIN DEGLUDEC 100 U/ML
INJECTION, SOLUTION SUBCUTANEOUS
Qty: 6 ML | Status: SHIPPED | OUTPATIENT
Start: 2024-04-04 | End: 2024-06-06

## 2024-04-06 ENCOUNTER — HEALTH MAINTENANCE LETTER (OUTPATIENT)
Age: 64
End: 2024-04-06

## 2024-04-08 ENCOUNTER — TELEPHONE (OUTPATIENT)
Dept: GERIATRICS | Facility: CLINIC | Age: 64
End: 2024-04-08

## 2024-04-08 LAB — MAGNESIUM SERPL-MCNC: 2.5 MG/DL (ref 1.7–2.3)

## 2024-04-08 PROCEDURE — 36415 COLL VENOUS BLD VENIPUNCTURE: CPT | Mod: ORL | Performed by: NURSE PRACTITIONER

## 2024-04-08 PROCEDURE — 83735 ASSAY OF MAGNESIUM: CPT | Mod: ORL | Performed by: NURSE PRACTITIONER

## 2024-04-08 PROCEDURE — P9604 ONE-WAY ALLOW PRORATED TRIP: HCPCS | Mod: ORL | Performed by: NURSE PRACTITIONER

## 2024-04-08 NOTE — TELEPHONE ENCOUNTER
Orders relayed to facility nurseBatool.    ----- Message from TAYLA Garcia CNP sent at 4/8/2024 12:50 PM CDT -----  Discontinue mag oxide    TAYLA Sprague CNP on 4/8/2024 at 12:50 PM

## 2024-04-11 ENCOUNTER — VIRTUAL VISIT (OUTPATIENT)
Dept: EDUCATION SERVICES | Facility: CLINIC | Age: 64
End: 2024-04-11
Payer: MEDICARE

## 2024-04-11 DIAGNOSIS — E10.37X3 TYPE 1 DIABETES MELLITUS WITH DIABETIC MACULAR EDEMA OF BOTH EYES RESOLVED AFTER TREATMENT (H): Primary | ICD-10-CM

## 2024-04-11 PROCEDURE — 99207 PR NO CHARGE LOS: CPT | Mod: 93 | Performed by: DIETITIAN, REGISTERED

## 2024-04-11 NOTE — PROGRESS NOTES
Called Lexy for our scheduled diabetes education visit. Lexy reports that she got the Dexcom G6, but the sensor ID was entered into the wrong transmitter so she isn't getting any readings, although the sensor is on her body. She reports that the staff is working on helping resolve this. Recommended an in person appointment so writer can help patient with getting her Dexcom up and running.  During this discussion Lexy mentioned that she felt she was having a low blood sugar and didn't feel good. Writer asked if there was a nurse around that could assist her, but Lexy states that she is not near the nurses station and doesn't know how to contact them. Writer let Lexy know that I would be ending the call, so I could contact the nurses station and let them know.  Writer called the nurses station 3 times with no answer. Writer then called the other phone number for [place], where Lexy previously resided. They agreed to call down to the assisted living wing to get help for Lexy.     Writer called back at 10:45 and Lexy states that the staff gave her orange juice and a fig bar and her blood sugar was slowly coming up. She reports that she is feeling better. Due to lack of time, rescheduled patients appointment. Scheduled for in person visit for May 9th and May 13th (Lexy will check with her sister on which time works best, and will call our triage line to let us know. Okay to cancel the other appointment). Lexy has an appointment with Antonino on .     KARLOS Haas [facility]  Triage: [phone]  [phone]

## 2024-04-11 NOTE — LETTER
2024         RE: Lexy Rockwell  [address]  [facility] [address]        Dear Colleague,    Thank you for referring your patient, Lexy Rockwell, to the [facility] SPECIAL[facility]. Please see a copy of my visit note below.    Called Lexy for our scheduled diabetes education visit. Lexy reports that she got the Dexcom G6, but the sensor ID was entered into the wrong transmitter so she isn't getting any readings, although the sensor is on her body. She reports that the staff is working on helping resolve this. Recommended an in person appointment so writer can help patient with getting her Dexcom up and running.  During this discussion Lexy mentioned that she felt she was having a low blood sugar and didn't feel good. Writer asked if there was a nurse around that could assist her, but Lexy states that she is not near the nurses station and doesn't know how to contact them. Writer let Lexy know that I would be ending the call, so I could contact the nurses station and let them know.  Writer called the nurses station 3 times with no answer. Writer then called the other phone number for [place], where Lexy previously resided. They agreed to call down to the assisted living wing to get help for Lexy.     Writer called back at 10:45 and Lexy states that the staff gave her orange juice and a fig bar and her blood sugar was slowly coming up. She reports that she is feeling better. Due to lack of time, rescheduled patients appointment. Scheduled for in person visit for May 9th and May 13th (Lexy will check with her sister on which time works best, and will call our triage line to let us know. Okay to cancel the other appointment). Lexy has an appointment with Antonino on .     Tiffanie Mcfarland RD [facility]  Triage: [phone]  [phone]

## 2024-04-17 ENCOUNTER — TELEPHONE (OUTPATIENT)
Dept: GERIATRICS | Facility: CLINIC | Age: 64
End: 2024-04-17
Payer: COMMERCIAL

## 2024-04-17 DIAGNOSIS — E10.37X3 TYPE 1 DIABETES MELLITUS WITH DIABETIC MACULAR EDEMA OF BOTH EYES RESOLVED AFTER TREATMENT (H): ICD-10-CM

## 2024-04-17 RX ORDER — INSULIN ASPART 100 [IU]/ML
INJECTION, SOLUTION INTRAVENOUS; SUBCUTANEOUS
Status: SHIPPED
Start: 2024-04-17 | End: 2024-05-09

## 2024-04-17 NOTE — TELEPHONE ENCOUNTER
Pt having hypoglycemia today.   Had hypoglycemia last week as well    Plan  Decreased aspart insulin at lunch from 8 units to 6 units  Decrease aspart insulin at dinner from 9 units to 7 units.   Will reach out to Endocrinology for an earlier appointment to help set up Dexacom.     TAYLA Sprague CNP on 4/17/2024 at 3:39 PM

## 2024-04-19 ENCOUNTER — TELEPHONE (OUTPATIENT)
Dept: ENDOCRINOLOGY | Facility: CLINIC | Age: 64
End: 2024-04-19
Payer: COMMERCIAL

## 2024-04-19 NOTE — TELEPHONE ENCOUNTER
Spoke with assisted living staff at [facility]. They will fax BG readings and MAR to clinic. Renata Burgos CMA on 4/19/2024 at 1:58 PM

## 2024-04-19 NOTE — PROGRESS NOTES
Outcome for 04/19/24 1:55 PM:  Assisted Living will fax BG readings and medlist to clinic.  Renata Burgos MA  Outcome for 04/19/24 3:51 PM:  Received fax from assisted living. Fax forward to provider via email. BG readings below.   Renata ED Burgos      Start time: 1020  End time: 1035  Provider location: off site- home  Patient location: off site- home.    HPI:  Lexy is a very pleasant 63 year old woman here for follow up for type 1 diabetes.  Her diabetes is complicated by gastroparesis and chronic kidney disease stage 3.  She also has hypertension, hyperlipidemia and developmental delay. She is on the phone on her own today.  No staff present from [place].  She also sees Dr. Deonte Garcia and MARY Haas.      She recently moved over from nursing home side to the assisted living side. She likes this better.  Getting good care. Nurse gives insulin every time she eats and at bedtime.  Eats meals in the dining room.     Doing ok, sugars are up and down.  Used to have a sensor.  Now she has been out.  Has appointment to see diabetes educator this Thursday to start the sensor again. She feels like her glucose has been really up and down.    She calls the nurse if she is feeling low.     Has a snack before going to bed.  No insulin coverage.  Last week she was running low and novolog was reduced by provider at nursing home.     Current treatment strategy:   - Tresiba 20 units daily  - NovoLog 5B/6L/7D (was 6B/8L/8D) units at meals 3 times a day   - Snack coverage-  3 units- patient reports she is not getting this.   - Sliding scale before meals/HS-1 unit per 40 above 120 mg/dL.      Notes from NP- geriatrics 4/17:   Decreased aspart insulin at lunch from 8 units to 6 units  Decrease aspart insulin at dinner from 9 units to 7 units.   Will reach out to Endocrinology for an earlier appointment to help set up Dexcom.     Checks blood sugar right before eating. Gets insulin at the time of  the meal, sometimes a few minutes after.                Patient had history of suboptimal control of type 1 diabetes (previous A1c ranged between 9 to 12%).  Patient had history of diabetic ketoacidosis in 2021.  Patient had documented low C-peptide and positive angelica antibody in 3/2018. Recent A1c 8.1% in March 2023. Followed up closely with Tiffanie HERNANDEZ.      DM complications:  Retinopathy: 4/2023 -- moderate NPDR, glaucoma -- last appointment 10/2023  Nephropathy: positive macroalbuminuria (test 2021), CKD stage 3   Neuropathy: some tingling and numbness in her feet -- sees podiatrist.     Diabetes Control:   Lab Results   Component Value Date    A1C 8.1 03/29/2023    A1C 8.7 01/25/2023    A1C 10.7 10/13/2021    A1C 9.7 02/01/2021    A1C 9.2 09/30/2020    A1C 11.8 01/08/2020    A1C 11.8 05/19/2019    A1C 11.2 12/12/2018       Past Medical History:   Diagnosis Date    Cerumen impacted     Development delay     Diabetic gastroparesis (H) 8/16/2013    Glaucoma (increased eye pressure)     Hyperlipaemia     Hypertension     Hypothyroid     Mental retardation     Nonsenile cataract     Obesity     Strabismus     Type 1 diabetes mellitus not at goal (H)        Past Surgical History:   Procedure Laterality Date    COLONOSCOPY N/A 7/3/2023    Procedure: Colonoscopy;  Surgeon: Merlyn Aceevdo MD;  Location:  GI    ESOPHAGOSCOPY, GASTROSCOPY, DUODENOSCOPY (EGD), COMBINED N/A 7/3/2023    Procedure: Esophagoscopy, gastroscopy, duodenoscopy (EGD), combined;  Surgeon: Merlyn Acevedo MD;  Location:  GI    STRABISMUS SURGERY      [facility] EYE SURG ANT [facility] PROC UNLISTED  remote    strabismus surgery       Family History   Problem Relation Age of Onset    Hypertension Father     Diabetes Sister     Glaucoma Maternal Grandfather     Cancer No family hx of     Cerebrovascular Disease No family hx of     Thyroid Disease No family hx of     Macular Degeneration No family hx of        Social History     Socioeconomic History     Marital status: Single   Tobacco Use    Smoking status: Never    Smokeless tobacco: Never    Tobacco comments:     lives in smoke free household.   Substance and Sexual Activity    Alcohol use: Yes     Comment: occass social    Drug use: No    Sexual activity: Never   Other Topics Concern    Parent/sibling w/ CABG, MI or angioplasty before 65F 55M? No       Current Outpatient Medications   Medication Sig Dispense Refill    acetaminophen (TYLENOL) 500 MG tablet TAKE 2 TABLETS BY MOUTH EVERY 6 HOURS AS NEEDED (1000MG) FOR PAIN 248 tablet 11    AMLODIPINE BENZOATE PO Take 2.5 mg by mouth      carvedilol (COREG) 6.25 MG tablet Take 1 tablet (6.25 mg) by mouth 2 times daily (with meals)      Continuous Blood Gluc  (DEXCOM G7 ) KELLY Use to read blood sugars as per 's instructions. 1 each 0    Continuous Blood Gluc Sensor (DEXCOM G6 SENSOR) MISC Change every 10 days. 3 each 5    Continuous Blood Gluc Sensor (DEXCOM G7 SENSOR) MISC Change every 10 days. 3 each 5    Continuous Blood Gluc Transmit (DEXCOM G6 TRANSMITTER) MISC Change every 3 months. 1 each 1    dorzolamide-timolol PF (COSOPT PF) 2-0.5 % opthalmic solutionh INSTILL 1 DROP IN BOTH EYES TWICE DAILY 60 each 3    ferrous sulfate (SLO-FE) 142 (45 Fe) MG CR tablet Take 1 tablet (142 mg) by mouth daily      fluticasone (FLONASE) 50 MCG/ACT nasal spray Spray 1 spray into both nostrils daily      furosemide (LASIX) 20 MG tablet Take 1 tablet (20 mg) by mouth daily      Glucagon (BAQSIMI) 3 MG/DOSE nasal powder Spray 1 spray (3 mg) in nostril as needed in the event of unconscious hypoglycemia or hypoglycemic seizure. May repeat dose if no response after 15 minutes. 2 each 3    glucagon 1 MG SOLR injection Inject 1 mg into the muscle once      insulin aspart (NOVOLOG FLEXPEN) 100 UNIT/ML pen Inject 5 units before breakfast, 6 units before lunch and 7 units before dinner, 3 units before each snack.  Add correction scale before meals. BG  121-160 = 1 unit. -200 = 2 units. -240 = 3 units. -280 = 4 units. -320 = 5 u      Insulin Degludec FlexTouch 100 UNIT/ML SOPN INJECT 20 UNITS SUBCUTANEOUSLY EVERY NIGHT AT BEDTIME FOR DM *DISCARD 56 DAYS AFTER OPENING* 6 mL PRN    insulin pen needle (NOVOFINE AUTOCOVER PEN NEEDLE) 30G X 8 MM miscellaneous 1 each See Admin Instructions as directed. 100 each 11    KETOSTIX test strip Use as directed in case of high glucose, vomiting or illness. 50 strip 3    latanoprost (XALATAN) 0.005 % ophthalmic solution INSTILL 1 DROP IN BOTH EYES AT BEDTIME 10 mL 11    lisinopril (ZESTRIL) 40 MG tablet Take 1 tablet (40 mg) by mouth daily      MULTIVITAMIN TABS   OR 1 TABLET DAILY      nystatin (MYCOSTATIN) 586285 UNIT/GM external cream Apply topically 2 times daily as needed for dry skin To feet and toenails.      pantoprazole (PROTONIX) 40 MG EC tablet Take 40 mg by mouth daily      potassium chloride ER (KLOR-CON M) 20 MEQ CR tablet Take 20 mEq by mouth daily      sennosides (SENOKOT) 8.6 MG tablet Take 1 tablet by mouth daily as needed      simvastatin (ZOCOR) 20 MG tablet Take 1 tablet (20 mg) by mouth At Bedtime 90 tablet 3     No current facility-administered medications for this visit.          Allergies   Allergen Reactions    Amoxicillin     Sulfa Antibiotics        Physical Exam  LMP 03/28/2014   GENERAL: healthy, alert and no distress  RESP: no audible wheeze, cough, or visible cyanosis.  No visible retractions or increased work of breathing.  Able to speak fully in complete sentences.  PSYCH: mentation appears normal, affect normal/bright, judgement and insight intact, normal speech and appearance well-groomed    RESULTS  Lab Results   Component Value Date    A1C 8.5 (H) 11/29/2023    A1C 8.1 (H) 03/29/2023    A1C 8.7 (H) 01/25/2023    A1C 10.7 (H) 10/13/2021    A1C 9.7 (H) 02/01/2021    A1C 9.2 (H) 09/30/2020    A1C 11.8 (H) 01/08/2020    A1C 11.8 (H) 05/19/2019    A1C 11.2 (H) 12/12/2018        TSH   Date Value Ref Range Status   12/06/2023 3.70 0.30 - 4.20 uIU/mL Final   10/13/2021 3.41 0.40 - 4.00 mU/L Final   02/01/2021 7.07 (H) 0.40 - 4.00 mU/L Final   01/08/2020 4.48 (H) 0.40 - 4.00 mU/L Final   10/02/2019 4.07 (H) 0.40 - 4.00 mU/L Final   07/24/2019 5.08 (H) 0.40 - 4.00 mU/L Final   12/12/2018 6.71 (H) 0.40 - 4.00 mU/L Final     T4 Free   Date Value Ref Range Status   02/01/2021 1.05 0.76 - 1.46 ng/dL Final   01/08/2020 1.08 0.76 - 1.46 ng/dL Final   10/02/2019 1.00 0.76 - 1.46 ng/dL Final   07/24/2019 1.10 0.76 - 1.46 ng/dL Final   09/28/2016 1.01 0.76 - 1.46 ng/dL Final       ALT   Date Value Ref Range Status   03/29/2023 19 10 - 35 U/L Final   01/25/2023 10 10 - 35 U/L Final   09/30/2020 18 0 - 50 U/L Final   05/19/2019 7 (L) 8 - 45 IU/L Final   ]    Recent Labs   Lab Test 01/25/23  0820 02/01/21  0909   CHOL 112 200*   HDL 37* 62   LDL 58 120*   TRIG 84 89       Lab Results   Component Value Date     01/01/2024     02/01/2021      Lab Results   Component Value Date    POTASSIUM 4.3 01/01/2024    POTASSIUM 4.2 10/13/2021    POTASSIUM 4.1 02/01/2021     Lab Results   Component Value Date    CHLORIDE 103 01/01/2024    CHLORIDE 107 10/13/2021    CHLORIDE 107 02/01/2021     Lab Results   Component Value Date    EFRAIN 10.1 01/01/2024    EFRAIN 10.3 02/01/2021     Lab Results   Component Value Date    CO2 26 01/01/2024    CO2 26 10/13/2021    CO2 32 02/01/2021     Lab Results   Component Value Date    BUN 52.4 01/01/2024    BUN 13 10/13/2021    BUN 17 02/01/2021     Lab Results   Component Value Date    CR 1.28 01/01/2024    CR 0.73 02/01/2021       GFR Estimate   Date Value Ref Range Status   01/01/2024 47 (L) >60 mL/min/1.73m2 Final   12/14/2023 67 >60 mL/min/1.73m2 Final   12/06/2023 54 (L) >60 mL/min/1.73m2 Final   02/01/2021 90 >60 mL/min/[1.73_m2] Final     Comment:     Non  GFR Calc  Starting 12/18/2018, serum creatinine based estimated GFR (eGFR) will be   calculated  "using the Chronic Kidney Disease Epidemiology Collaboration   (CKD-EPI) equation.     09/30/2020 >90 >60 mL/min/[1.73_m2] Final     Comment:     Non  GFR Calc  Starting 12/18/2018, serum creatinine based estimated GFR (eGFR) will be   calculated using the Chronic Kidney Disease Epidemiology Collaboration   (CKD-EPI) equation.     07/12/2019 >90 >60 mL/min/[1.73_m2] Final     Comment:     Non  GFR Calc  Starting 12/18/2018, serum creatinine based estimated GFR (eGFR) will be   calculated using the Chronic Kidney Disease Epidemiology Collaboration   (CKD-EPI) equation.       GFR Estimate If Black   Date Value Ref Range Status   02/01/2021 >90 >60 mL/min/[1.73_m2] Final     Comment:      GFR Calc  Starting 12/18/2018, serum creatinine based estimated GFR (eGFR) will be   calculated using the Chronic Kidney Disease Epidemiology Collaboration   (CKD-EPI) equation.     09/30/2020 >90 >60 mL/min/[1.73_m2] Final     Comment:      GFR Calc  Starting 12/18/2018, serum creatinine based estimated GFR (eGFR) will be   calculated using the Chronic Kidney Disease Epidemiology Collaboration   (CKD-EPI) equation.     07/12/2019 >90 >60 mL/min/[1.73_m2] Final     Comment:      GFR Calc  Starting 12/18/2018, serum creatinine based estimated GFR (eGFR) will be   calculated using the Chronic Kidney Disease Epidemiology Collaboration   (CKD-EPI) equation.         Lab Results   Component Value Date    MICROL 306 02/01/2021     No results found for: \"MICROALBUMIN\"  Lab Results   Component Value Date    CPEPT 0.6 (L) 03/14/2018    GADAB >250.0 (H) 03/14/2018       Vitamin B12   Date Value Ref Range Status   02/22/2023 784 232 - 1,245 pg/mL Final   12/23/2013 455 >210 pg/mL Final     Comment:     Interp: [id number] = Normal   08/19/2009 659 >210 pg/mL Final     Comment:     Interp: [id number] = Normal       Most recent eye exam date: : Not Found     Assessment/Plan: "     1.  Type 1 diabetes- Patient has had wide fluctuations in her glucose.  Novolog was reduced by nursing home provider last week due to hypoglycemia.  She reports that she is having a bedtime snack and not getting Novolog coverage for this, often resulting in high AM glucose over 300 mg/dL.  She will be meeting with diabetes education later this week to start up on her sensor again, which is very important.  Once we have more glucose data, we can assess patterns and make a plan.   She does need to have coverage for her snacks. Will continue current dosing.   We made the following plan today (instructions given to patient):      Start taking Novolog with bedtime snack (3 units).     Meet with diabetes education this week to resume dexcom sensor.      If glucose <70- give 1 juice (15g-20g)  Recheck glucose in 15 minutes (finger stick).   If still <70, repeat above.     If glucose is <55, give 2 juices (30-40g carb).   Recheck glucose in 15 minutes (finger stick).   If still <55, repeat above.     Emergency issues: Here are some concerns you should contact us about.  -Vomiting: more than twice.  Please check ketones.  If positive, go to ER. Monitor glucose hourly.   -High glucose (over 300 mg/dL twice in a row): Please check ketones.  If ketones are negative, take an insulin correction and recheck glucose in 1 hour.  If glucose is not coming down, please call the clinic. If ketones are moderate or large, drink lots of water, take an insulin correction 1.5 times your usual correction, and recheck ketones in 1 hour.  If ketones are still present (or you are vomiting), go to the ER.  -Hypoglycemia (low glucose):   If glucose is less than 70 mg/dL, treat with 15g carb (4 glucose tablets), recheck glucose in 15 minutes.  If low again, repeat.   If glucose is less than 54 mg/dL, treat with 30g carb, recheck glucose in 15 minutes.  If low again, repeat.  Keep glucagon in your home in case of severe hypoglycemia and train  someone how to use this.    Emergency kit (please ensure you always have these with you):   Glucose tablets  Glucagon  Insulin  Syringes/needles   Extra infusion set (if on a pump)  Ketone strips    Contact information:   If you have concerns, please send me a [employer] message or call the clinic at [phone].  For more urgent concerns, please call [phone] after hours/weekends and ask to speak with the endocrinologist on call.      Please let me know if you are having low blood sugars less than 70 or over 400 mg/dL.  Do not wait until your next appointment if this is happening.     2.   F/U in DM education monthly going forward.  Will f/up in 6 mos with me in person, sooner with concerns/hypoglycemia.  Staff will fax glucose with concerns.    32 minutes spent on the date of the encounter doing chart review, review of test results, review of continuous glucose sensor, insulin pump data, interpretation of glucose data, patient visit and documentation, counseling/coordination of care, and discussion of follow up plan for worsening hyper and hypoglycemia.  The patient understood and is satisfied with today's visit.          Jaylene Perez PA-C, MPAS   [facility]  Department of Medicine  Division of Endocrinology and Diabetes

## 2024-04-22 ENCOUNTER — TELEPHONE (OUTPATIENT)
Dept: ENDOCRINOLOGY | Facility: CLINIC | Age: 64
End: 2024-04-22

## 2024-04-22 NOTE — TELEPHONE ENCOUNTER
Patient confirmed scheduled appointment:  Date: 8/9   Time: 1:20 pm   Visit type: return diabetes virtual  Provider: Deonte  Location: [facility]  Testing/imaging: NA   Additional notes: Spoke to pts nursing team and re-olvin due to Dr. Clemons's schedule change     Meghan Miles on 4/22/2024 at 1:19 PM

## 2024-04-23 ENCOUNTER — VIRTUAL VISIT (OUTPATIENT)
Dept: ENDOCRINOLOGY | Facility: CLINIC | Age: 64
End: 2024-04-23
Payer: MEDICARE

## 2024-04-23 VITALS — BODY MASS INDEX: 31.89 KG/M2 | HEIGHT: 63 IN | WEIGHT: 180 LBS

## 2024-04-23 DIAGNOSIS — E10.37X3 TYPE 1 DIABETES MELLITUS WITH DIABETIC MACULAR EDEMA OF BOTH EYES RESOLVED AFTER TREATMENT (H): Primary | ICD-10-CM

## 2024-04-23 PROCEDURE — 99214 OFFICE O/P EST MOD 30 MIN: CPT | Mod: 95 | Performed by: PHYSICIAN ASSISTANT

## 2024-04-23 RX ORDER — FUROSEMIDE 40 MG
40 TABLET ORAL
COMMUNITY
Start: 2023-12-31 | End: 2024-07-18

## 2024-04-23 ASSESSMENT — PAIN SCALES - GENERAL: PAINLEVEL: NO PAIN (0)

## 2024-04-23 NOTE — PATIENT INSTRUCTIONS
Start taking Novolog with bedtime snack (3 units).     Meet with diabetes education this week to resume dexcom sensor.      If glucose <70- give 1 juice (15g-20g)  Recheck glucose in 15 minutes (finger stick).   If still <70, repeat above.     If glucose is <55, give 2 juices (30-40g carb).   Recheck glucose in 15 minutes (finger stick).   If still <55, repeat above.     Emergency issues: Here are some concerns you should contact us about.  -Vomiting: more than twice.  Please check ketones.  If positive, go to ER. Monitor glucose hourly.   -High glucose (over 300 mg/dL twice in a row): Please check ketones.  If ketones are negative, take an insulin correction and recheck glucose in 1 hour.  If glucose is not coming down, please call the clinic. If ketones are moderate or large, drink lots of water, take an insulin correction 1.5 times your usual correction, and recheck ketones in 1 hour.  If ketones are still present (or you are vomiting), go to the ER.  -Hypoglycemia (low glucose):   If glucose is less than 70 mg/dL, treat with 15g carb (4 glucose tablets), recheck glucose in 15 minutes.  If low again, repeat.   If glucose is less than 54 mg/dL, treat with 30g carb, recheck glucose in 15 minutes.  If low again, repeat.  Keep glucagon in your home in case of severe hypoglycemia and train someone how to use this.    Emergency kit (please ensure you always have these with you):   Glucose tablets  Glucagon  Insulin  Syringes/needles   Extra infusion set (if on a pump)  Ketone strips    Contact information:   If you have concerns, please send me a [employer] message or call the clinic at [phone].  For more urgent concerns, please call [phone] after hours/weekends and ask to speak with the endocrinologist on call.      Please let me know if you are having low blood sugars less than 70 or over 400 mg/dL.  Do not wait until your next appointment if this is happening.

## 2024-04-23 NOTE — Clinical Note
Not sure who to talk to at her facility, but glucose is highly variable.  She was on her appt alone today.  Seems to need snack coverage at night.  Dexcom will be really helpful.  Can you schedule with her monthly for adjustments?  In person seems the best.  Thanks! Jaylene

## 2024-04-23 NOTE — LETTER
4/23/2024       RE: Lexy Rockwell  [address]  [facility] [address]     Dear Colleague,    Thank you for referring your patient, Lexy Rockwell, to the [facility] ENDOCRINOLOGY [facility] [place] at [facility]. Please see a copy of my visit note below.    Outcome for 04/19/24 1:55 PM:  Assisted Living will fax BG readings and medlist to clinic.  Renata Burgos MA  Outcome for 04/19/24 3:51 PM:  Received fax from assisted living. Fax forward to provider via email. BG readings below.   Renata Burgos MA      Start time: 1020  End time: 1035  Provider location: off site- home  Patient location: off site- home.    HPI:  Lexy is a very pleasant 63 year old woman here for follow up for type 1 diabetes.  Her diabetes is complicated by gastroparesis and chronic kidney disease stage 3.  She also has hypertension, hyperlipidemia and developmental delay. She is on the phone on her own today.  No staff present from [place].  She also sees Dr. Deonte Garcia and MARY Haas.      She recently moved over from nursing home side to the assisted living side. She likes this better.  Getting good care. Nurse gives insulin every time she eats and at bedtime.  Eats meals in the dining room.     Doing ok, sugars are up and down.  Used to have a sensor.  Now she has been out.  Has appointment to see diabetes educator this Thursday to start the sensor again. She feels like her glucose has been really up and down.    She calls the nurse if she is feeling low.     Has a snack before going to bed.  No insulin coverage.  Last week she was running low and novolog was reduced by provider at nursing [place].     Current treatment strategy:   - Tresiba 20 units daily  - NovoLog 5B/6L/7D (was 6B/8L/8D) units at meals 3 times a day   - Snack coverage-  3 units- patient reports she is not getting this.   - Sliding scale before meals/HS-1 unit per  40 above 120 mg/dL.      Notes from NP- geriatrics 4/17:   Decreased aspart insulin at lunch from 8 units to 6 units  Decrease aspart insulin at dinner from 9 units to 7 units.   Will reach out to Endocrinology for an earlier appointment to help set up Dexcom.     Checks blood sugar right before eating. Gets insulin at the time of the meal, sometimes a few minutes after.                Patient had history of suboptimal control of type 1 diabetes (previous A1c ranged between 9 to 12%).  Patient had history of diabetic ketoacidosis in 2021.  Patient had documented low C-peptide and positive angelica antibody in 3/2018. Recent A1c 8.1% in March 2023. Followed up closely with Tiffanie HERNANDEZ.      DM complications:  Retinopathy: 4/2023 -- moderate NPDR, glaucoma -- last appointment 10/2023  Nephropathy: positive macroalbuminuria (test 2021), CKD stage 3   Neuropathy: some tingling and numbness in her feet -- sees podiatrist.     Diabetes Control:   Lab Results   Component Value Date    A1C 8.1 03/29/2023    A1C 8.7 01/25/2023    A1C 10.7 10/13/2021    A1C 9.7 02/01/2021    A1C 9.2 09/30/2020    A1C 11.8 01/08/2020    A1C 11.8 05/19/2019    A1C 11.2 12/12/2018       Past Medical History:   Diagnosis Date    Cerumen impacted     Development delay     Diabetic gastroparesis (H) 8/16/2013    Glaucoma (increased eye pressure)     Hyperlipaemia     Hypertension     Hypothyroid     Mental retardation     Nonsenile cataract     Obesity     Strabismus     Type 1 diabetes mellitus not at goal (H)        Past Surgical History:   Procedure Laterality Date    COLONOSCOPY N/A 7/3/2023    Procedure: Colonoscopy;  Surgeon: Merlyn Acevedo MD;  Location:  GI    ESOPHAGOSCOPY, GASTROSCOPY, DUODENOSCOPY (EGD), COMBINED N/A 7/3/2023    Procedure: Esophagoscopy, gastroscopy, duodenoscopy (EGD), combined;  Surgeon: Merlyn Acevedo MD;  Location:  GI    STRABISMUS SURGERY      [facility] EYE SURG [facility] PROC UNLISTED  remote    strabismus  surgery       Family History   Problem Relation Age of Onset    Hypertension Father     Diabetes Sister     Glaucoma Maternal Grandfather     Cancer No family hx of     Cerebrovascular Disease No family hx of     Thyroid Disease No family hx of     Macular Degeneration No family hx of        Social History     Socioeconomic History    Marital status: Single   Tobacco Use    Smoking status: Never    Smokeless tobacco: Never    Tobacco comments:     lives in smoke free household.   Substance and Sexual Activity    Alcohol use: Yes     Comment: occass social    Drug use: No    Sexual activity: Never   Other Topics Concern    Parent/sibling w/ CABG, MI or angioplasty before 65F 55M? No       Current Outpatient Medications   Medication Sig Dispense Refill    acetaminophen (TYLENOL) 500 MG tablet TAKE 2 TABLETS BY MOUTH EVERY 6 HOURS AS NEEDED (1000MG) FOR PAIN 248 tablet 11    AMLODIPINE BENZOATE PO Take 2.5 mg by mouth      carvedilol (COREG) 6.25 MG tablet Take 1 tablet (6.25 mg) by mouth 2 times daily (with meals)      Continuous Blood Gluc  (DEXCOM G7 ) KELLY Use to read blood sugars as per 's instructions. 1 each 0    Continuous Blood Gluc Sensor (DEXCOM G6 SENSOR) MISC Change every 10 days. 3 each 5    Continuous Blood Gluc Sensor (DEXCOM G7 SENSOR) MISC Change every 10 days. 3 each 5    Continuous Blood Gluc Transmit (DEXCOM G6 TRANSMITTER) MISC Change every 3 months. 1 each 1    dorzolamide-timolol PF (COSOPT PF) 2-0.5 % opthalmic solutionh INSTILL 1 DROP IN BOTH EYES TWICE DAILY 60 each 3    ferrous sulfate (SLO-FE) 142 (45 Fe) MG CR tablet Take 1 tablet (142 mg) by mouth daily      fluticasone (FLONASE) 50 MCG/ACT nasal spray Spray 1 spray into both nostrils daily      furosemide (LASIX) 20 MG tablet Take 1 tablet (20 mg) by mouth daily      Glucagon (BAQSIMI) 3 MG/DOSE nasal powder Spray 1 spray (3 mg) in nostril as needed in the event of unconscious hypoglycemia or hypoglycemic  seizure. May repeat dose if no response after 15 minutes. 2 each 3    glucagon 1 MG SOLR injection Inject 1 mg into the muscle once      insulin aspart (NOVOLOG FLEXPEN) 100 UNIT/ML pen Inject 5 units before breakfast, 6 units before lunch and 7 units before dinner, 3 units before each snack.  Add correction scale before meals. -160 = 1 unit. -200 = 2 units. -240 = 3 units. -280 = 4 units. -320 = 5 u      Insulin Degludec FlexTouch 100 UNIT/ML SOPN INJECT 20 UNITS SUBCUTANEOUSLY EVERY NIGHT AT BEDTIME FOR DM *DISCARD 56 DAYS AFTER OPENING* 6 mL PRN    insulin pen needle (NOVOFINE AUTOCOVER PEN NEEDLE) 30G X 8 MM miscellaneous 1 each See Admin Instructions as directed. 100 each 11    KETOSTIX test strip Use as directed in case of high glucose, vomiting or illness. 50 strip 3    latanoprost (XALATAN) 0.005 % ophthalmic solution INSTILL 1 DROP IN BOTH EYES AT BEDTIME 10 mL 11    lisinopril (ZESTRIL) 40 MG tablet Take 1 tablet (40 mg) by mouth daily      MULTIVITAMIN TABS   OR 1 TABLET DAILY      nystatin (MYCOSTATIN) 190317 UNIT/GM external cream Apply topically 2 times daily as needed for dry skin To feet and toenails.      pantoprazole (PROTONIX) 40 MG EC tablet Take 40 mg by mouth daily      potassium chloride ER (KLOR-CON M) 20 MEQ CR tablet Take 20 mEq by mouth daily      sennosides (SENOKOT) 8.6 MG tablet Take 1 tablet by mouth daily as needed      simvastatin (ZOCOR) 20 MG tablet Take 1 tablet (20 mg) by mouth At Bedtime 90 tablet 3     No current facility-administered medications for this visit.          Allergies   Allergen Reactions    Amoxicillin     Sulfa Antibiotics        Physical Exam  LMP 03/28/2014   GENERAL: healthy, alert and no distress  RESP: no audible wheeze, cough, or visible cyanosis.  No visible retractions or increased work of breathing.  Able to speak fully in complete sentences.  PSYCH: mentation appears normal, affect normal/bright, judgement and insight  intact, normal speech and appearance well-groomed    RESULTS  Lab Results   Component Value Date    A1C 8.5 (H) 11/29/2023    A1C 8.1 (H) 03/29/2023    A1C 8.7 (H) 01/25/2023    A1C 10.7 (H) 10/13/2021    A1C 9.7 (H) 02/01/2021    A1C 9.2 (H) 09/30/2020    A1C 11.8 (H) 01/08/2020    A1C 11.8 (H) 05/19/2019    A1C 11.2 (H) 12/12/2018       TSH   Date Value Ref Range Status   12/06/2023 3.70 0.30 - 4.20 uIU/mL Final   10/13/2021 3.41 0.40 - 4.00 mU/L Final   02/01/2021 7.07 (H) 0.40 - 4.00 mU/L Final   01/08/2020 4.48 (H) 0.40 - 4.00 mU/L Final   10/02/2019 4.07 (H) 0.40 - 4.00 mU/L Final   07/24/2019 5.08 (H) 0.40 - 4.00 mU/L Final   12/12/2018 6.71 (H) 0.40 - 4.00 mU/L Final     T4 Free   Date Value Ref Range Status   02/01/2021 1.05 0.76 - 1.46 ng/dL Final   01/08/2020 1.08 0.76 - 1.46 ng/dL Final   10/02/2019 1.00 0.76 - 1.46 ng/dL Final   07/24/2019 1.10 0.76 - 1.46 ng/dL Final   09/28/2016 1.01 0.76 - 1.46 ng/dL Final       ALT   Date Value Ref Range Status   03/29/2023 19 10 - 35 U/L Final   01/25/2023 10 10 - 35 U/L Final   09/30/2020 18 0 - 50 U/L Final   05/19/2019 7 (L) 8 - 45 IU/L Final   ]    Recent Labs   Lab Test 01/25/23  0820 02/01/21  0909   CHOL 112 200*   HDL 37* 62   LDL 58 120*   TRIG 84 89       Lab Results   Component Value Date     01/01/2024     02/01/2021      Lab Results   Component Value Date    POTASSIUM 4.3 01/01/2024    POTASSIUM 4.2 10/13/2021    POTASSIUM 4.1 02/01/2021     Lab Results   Component Value Date    CHLORIDE 103 01/01/2024    CHLORIDE 107 10/13/2021    CHLORIDE 107 02/01/2021     Lab Results   Component Value Date    EFRAIN 10.1 01/01/2024    EFRAIN 10.3 02/01/2021     Lab Results   Component Value Date    CO2 26 01/01/2024    CO2 26 10/13/2021    CO2 32 02/01/2021     Lab Results   Component Value Date    BUN 52.4 01/01/2024    BUN 13 10/13/2021    BUN 17 02/01/2021     Lab Results   Component Value Date    CR 1.28 01/01/2024    CR 0.73 02/01/2021       GFR Estimate  "  Date Value Ref Range Status   01/01/2024 47 (L) >60 mL/min/1.73m2 Final   12/14/2023 67 >60 mL/min/1.73m2 Final   12/06/2023 54 (L) >60 mL/min/1.73m2 Final   02/01/2021 90 >60 mL/min/[1.73_m2] Final     Comment:     Non  GFR Calc  Starting 12/18/2018, serum creatinine based estimated GFR (eGFR) will be   calculated using the Chronic Kidney Disease Epidemiology Collaboration   (CKD-EPI) equation.     09/30/2020 >90 >60 mL/min/[1.73_m2] Final     Comment:     Non  GFR Calc  Starting 12/18/2018, serum creatinine based estimated GFR (eGFR) will be   calculated using the Chronic Kidney Disease Epidemiology Collaboration   (CKD-EPI) equation.     07/12/2019 >90 >60 mL/min/[1.73_m2] Final     Comment:     Non  GFR Calc  Starting 12/18/2018, serum creatinine based estimated GFR (eGFR) will be   calculated using the Chronic Kidney Disease Epidemiology Collaboration   (CKD-EPI) equation.       GFR Estimate If Black   Date Value Ref Range Status   02/01/2021 >90 >60 mL/min/[1.73_m2] Final     Comment:      GFR Calc  Starting 12/18/2018, serum creatinine based estimated GFR (eGFR) will be   calculated using the Chronic Kidney Disease Epidemiology Collaboration   (CKD-EPI) equation.     09/30/2020 >90 >60 mL/min/[1.73_m2] Final     Comment:      GFR Calc  Starting 12/18/2018, serum creatinine based estimated GFR (eGFR) will be   calculated using the Chronic Kidney Disease Epidemiology Collaboration   (CKD-EPI) equation.     07/12/2019 >90 >60 mL/min/[1.73_m2] Final     Comment:      GFR Calc  Starting 12/18/2018, serum creatinine based estimated GFR (eGFR) will be   calculated using the Chronic Kidney Disease Epidemiology Collaboration   (CKD-EPI) equation.         Lab Results   Component Value Date    MICROL 306 02/01/2021     No results found for: \"MICROALBUMIN\"  Lab Results   Component Value Date    CPEPT 0.6 (L) 03/14/2018    " GADAB >250.0 (H) 03/14/2018       Vitamin B12   Date Value Ref Range Status   02/22/2023 784 232 - 1,245 pg/mL Final   12/23/2013 455 >210 pg/mL Final     Comment:     Interp: [id number] = Normal   08/19/2009 659 >210 pg/mL Final     Comment:     Interp: 247-911 = Normal       Most recent eye exam date: : Not Found     Assessment/Plan:     1.  Type 1 diabetes- Patient has had wide fluctuations in her glucose.  Novolog was reduced by nursing home provider last week due to hypoglycemia.  She reports that she is having a bedtime snack and not getting Novolog coverage for this, often resulting in high AM glucose over 300 mg/dL.  She will be meeting with diabetes education later this week to start up on her sensor again, which is very important.  Once we have more glucose data, we can assess patterns and make a plan.   She does need to have coverage for her snacks. Will continue current dosing.   We made the following plan today (instructions given to patient):      Start taking Novolog with bedtime snack (3 units).     Meet with diabetes education this week to resume dexcom sensor.      If glucose <70- give 1 juice (15g-20g)  Recheck glucose in 15 minutes (finger stick).   If still <70, repeat above.     If glucose is <55, give 2 juices (30-40g carb).   Recheck glucose in 15 minutes (finger stick).   If still <55, repeat above.     Emergency issues: Here are some concerns you should contact us about.  -Vomiting: more than twice.  Please check ketones.  If positive, go to ER. Monitor glucose hourly.   -High glucose (over 300 mg/dL twice in a row): Please check ketones.  If ketones are negative, take an insulin correction and recheck glucose in 1 hour.  If glucose is not coming down, please call the clinic. If ketones are moderate or large, drink lots of water, take an insulin correction 1.5 times your usual correction, and recheck ketones in 1 hour.  If ketones are still present (or you are vomiting), go to the  ER.  -Hypoglycemia (low glucose):   If glucose is less than 70 mg/dL, treat with 15g carb (4 glucose tablets), recheck glucose in 15 minutes.  If low again, repeat.   If glucose is less than 54 mg/dL, treat with 30g carb, recheck glucose in 15 minutes.  If low again, repeat.  Keep glucagon in your home in case of severe hypoglycemia and train someone how to use this.    Emergency kit (please ensure you always have these with you):   Glucose tablets  Glucagon  Insulin  Syringes/needles   Extra infusion set (if on a pump)  Ketone strips    Contact information:   If you have concerns, please send me a [facility] message or call the clinic at [phone].  For more urgent concerns, please call [phone] after hours/weekends and ask to speak with the endocrinologist on call.      Please let me know if you are having low blood sugars less than 70 or over 400 mg/dL.  Do not wait until your next appointment if this is happening.     2.   F/U in DM education monthly going forward.  Will f/up in 6 mos with me in person, sooner with concerns/hypoglycemia.  Staff will fax glucose with concerns.    32 minutes spent on the date of the encounter doing chart review, review of test results, review of continuous glucose sensor, insulin pump data, interpretation of glucose data, patient visit and documentation, counseling/coordination of care, and discussion of follow up plan for worsening hyper and hypoglycemia.  The patient understood and is satisfied with today's visit.          Jaylene Perez PA-C, MPAS   [facility]  Department of Medicine  Division of Endocrinology and Diabetes

## 2024-04-24 ENCOUNTER — ASSISTED LIVING VISIT (OUTPATIENT)
Dept: GERIATRICS | Facility: CLINIC | Age: 64
End: 2024-04-24
Payer: MEDICARE

## 2024-04-24 VITALS
TEMPERATURE: 97.7 F | BODY MASS INDEX: 33.96 KG/M2 | HEART RATE: 85 BPM | WEIGHT: 191.7 LBS | SYSTOLIC BLOOD PRESSURE: 114 MMHG | HEIGHT: 63 IN | DIASTOLIC BLOOD PRESSURE: 61 MMHG | OXYGEN SATURATION: 94 % | RESPIRATION RATE: 18 BRPM

## 2024-04-24 DIAGNOSIS — N18.31 TYPE 1 DIABETES MELLITUS WITH STAGE 3A CHRONIC KIDNEY DISEASE (H): Primary | ICD-10-CM

## 2024-04-24 DIAGNOSIS — K21.00 GASTROESOPHAGEAL REFLUX DISEASE WITH ESOPHAGITIS WITHOUT HEMORRHAGE: ICD-10-CM

## 2024-04-24 DIAGNOSIS — D64.9 ANEMIA, UNSPECIFIED TYPE: ICD-10-CM

## 2024-04-24 DIAGNOSIS — F81.9 COGNITIVE DEVELOPMENTAL DELAY: ICD-10-CM

## 2024-04-24 DIAGNOSIS — I10 ESSENTIAL HYPERTENSION WITH GOAL BLOOD PRESSURE LESS THAN 140/90: ICD-10-CM

## 2024-04-24 DIAGNOSIS — R63.5 WEIGHT GAIN: ICD-10-CM

## 2024-04-24 DIAGNOSIS — E10.22 TYPE 1 DIABETES MELLITUS WITH STAGE 3A CHRONIC KIDNEY DISEASE (H): Primary | ICD-10-CM

## 2024-04-24 PROCEDURE — 99349 HOME/RES VST EST MOD MDM 40: CPT | Performed by: INTERNAL MEDICINE

## 2024-04-24 NOTE — LETTER
"    4/24/2024        RE: Lexy Rockwell  [address]  [facility] [address]        Lexy Rockwell is a 63 year old female seen April 24, 2024 at [facility] where she has resided for one year (admit to LTC 1/2023) seen for regulatory visit and to follow up labile DM1.   CBGs have been overall high, with a couple of lows    Pt is seen on the unit up to chair, noted at lunch to eat her cake, then had a serving of soft serve ice cream.     States she is \"doing okay.\"   Still attempting to get a DexCom for pt's BGM       By chart review, pt has Type 1 DM with multiple hospitalizations for DKA.  She was seen in the [facility] ED for hyperglycemia in November 2022, with accompanying symptoms of dizziness, BLE weakness and heartburn.   Noted that she sometimes forgot to administer her insulin.   Returned home, seeing diabetic educator but not an endocrinologist.         She was continuing to live independently in December 2022 when neighbors asked for a welfare check after she hadn't been seen in several days, and she was found down by police.   Hospitalized at [facility] 12/13-26 for DKA and rhabdomyolysis  Glucose >1000 and pH 6.8    She required pressor support, intubation and TF; treated with broad spectrum abx for SHIVAM pneumonia.  No sz on video EEG.  She had a +COVID19 test. Slowly cleared and discharged to [facility] TCU before transitioning to LTC.     Pt was seen in the [facility] ED in January 2023 for urinary frequency related to hyperglycemia.  Improved and returned to LTC with continued labile blood sugars, urinary incontinence, weakness and poor endurance  She had upper and lower endoscopy in July 2023, no significant findings    Pt had an October 2023 [facility] hospitalization for hyperglycemia, seen by Endocrinology and diabetic regimen adjusted    She was treated with ciprofloxacin for Enterobacter cloacae UTI    Returned to LTC, pt transitioned to [facility] [place] in January 2024      She " "was seen in the ED January 2024 for hyperglycemia, but no evidence of DKA or other abnormality.         Past Medical History:   Diagnosis Date     Cerumen impacted      Development delay      Diabetic gastroparesis (H) 8/16/2013     Glaucoma (increased eye pressure)      Hyperlipaemia      Hypertension      Hypothyroid      Mental retardation      Nonsenile cataract      Obesity      Strabismus      Type 1 diabetes mellitus not at goal (H)        Past Surgical History:   Procedure Laterality Date     COLONOSCOPY N/A 7/3/2023    Procedure: Colonoscopy;  Surgeon: Merlyn Acevedo MD;  Location:  GI     ESOPHAGOSCOPY, GASTROSCOPY, DUODENOSCOPY (EGD), COMBINED N/A 7/3/2023    Procedure: Esophagoscopy, gastroscopy, duodenoscopy (EGD), combined;  Surgeon: Merlyn Acevedo MD;  Location:  GI     STRABISMUS SURGERY       [facility] EYE SURG ANT SGMT PROC UNLISTED  remote    strabismus surgery     SH:  Single    Her mother Francisca Rockwell is first contact    She has 2 sisters that live in hospitals   Non smoker    Review Of Systems  Developmental cognitive delay   BIMS 15/15      Ambulatory with 4WW  Weight at admission was 167 lbs>>>in April 2023 was 156 lbs>>> in Sept 2023 was 168 lbs  Wt Readings from Last 5 Encounters:   04/24/24 87 kg (191 lb 11.2 oz)   04/23/24 81.6 kg (180 lb)   03/13/24 84.6 kg (186 lb 8 oz)   02/21/24 82.2 kg (181 lb 4.8 oz)   01/23/24 80.3 kg (177 lb)      EXAM: NAD  /61   Pulse 85   Temp 97.7  F (36.5  C)   Resp 18   Ht 1.6 m (5' 3\")   Wt 87 kg (191 lb 11.2 oz)   LMP 03/28/2014   SpO2 94%   BMI 33.96 kg/m       Neck supple without adenopathy  Lungs clear bilaterally with fair air movement   Heart RRR s1s2    Abd soft, NT, no distention or guarding, +BS  Ext with 2+ edema above the knees, wearing velcro wraps  Neuro: valgus deformities, ambulatory with waddling gait using 4WW    Psych: affect okay, pleasant      Lab Results   Component Value Date     01/01/2024    POTASSIUM 4.3 01/01/2024 "    CHLORIDE 103 01/01/2024    CO2 26 01/01/2024    ANIONGAP 10 01/01/2024     (H) 01/01/2024    BUN 52.4 (H) 01/01/2024    CR 1.28 (H) 01/01/2024    GFRESTIMATED 47 (L) 01/01/2024    EFRAIN 10.1 01/01/2024         IMP/PLAN:   (D64.9) Anemia, unspecified type    Comment: Fe deficiency   Hgb has improved    Upper and lower endoscopy in July 2023, unrevealing although colonoscopy limited by poor prep.    Plan: Continue PPI   Slow release Fe 45 mg/day    Recheck CBC     (R63.5) Weight gain    Comment: eating well, some peripheral edema    Plan: LE compression, elevation  Furosemide 20 mg/day with KCl 20 mEq/day   >>>Consider ECHO     (E10.22,  N18.31) Type 1 diabetes mellitus with stage 3a chronic kidney disease (H)  Comment: long history of recurrent DKA and labile sugars   Noncompliant with diet, as above    Sequelae includes gastroparesis and CKD3    Lab Results   Component Value Date    A1C 8.5 11/29/2023     Plan: insulin degludec 20 units /HS   Insulin aspart 5/6/7and sliding scale   Follows with Endocrinology and Diabetic educator.   She is on lisinopril, simvastatin 20 mg/day     Not on daily ASA secondary to anemia  Ophthalmology and Podiatry follow up        (K21.00) Gastroesophageal reflux disease with esophagitis without hemorrhage  Comment: Biopsies unremarkable at EGD   Plan: pantoprazole 40 mg/day     (F81.9) Cognitive developmental delay  Comment: unable to live independently in the community and manage her medical problems    Plan: Board and Care support for med admin, meals, activity      (I10) Essential hypertension with goal blood pressure less than 140/90  Comment:   BP Readings from Last 3 Encounters:   04/24/24 114/61   03/13/24 (!) 149/64   02/21/24 106/57      Plan: amlodipine 2.5 mg/day, carvedilol 6.25 mg bid, lisinopril 40 mg/day   Follow bps and BMP     Advance directive: DNR/DNI     Nia Martínez MD       Sincerely,        Nia Martínez MD

## 2024-05-02 ENCOUNTER — TELEPHONE (OUTPATIENT)
Dept: ENDOCRINOLOGY | Facility: CLINIC | Age: 64
End: 2024-05-02
Payer: COMMERCIAL

## 2024-05-02 NOTE — TELEPHONE ENCOUNTER
Left Voicemail (1st Attempt) and Sent Mychart (1st Attempt) for the patient to call back and schedule the following:    Appointment type: Return Diabetes  Provider: Jaylene Perez   Return date: 6 mo (around 10/23/24) ** in person visit  Specialty phone number: [phone]  Additional appointment(s) needed: NA  Additonal Notes: called preferred number on file- they stated patient doesn't live on that side and provided number to call: [phone] and transferred- left call back number on number provided

## 2024-05-04 NOTE — PROGRESS NOTES
"Lexy Rockwell is a 63 year old female seen April 24, 2024 at [facility] where she has resided for one year (admit to LTC 1/2023) seen for regulatory visit and to follow up labile DM1.   CBGs have been overall high, with a couple of lows    Pt is seen on the unit up to chair, noted at lunch to eat her cake, then had a serving of soft serve ice cream.     States she is \"doing okay.\"   Still attempting to get a DexCom for pt's BGM       By chart review, pt has Type 1 DM with multiple hospitalizations for DKA.  She was seen in the [facility] ED for hyperglycemia in November 2022, with accompanying symptoms of dizziness, BLE weakness and heartburn.   Noted that she sometimes forgot to administer her insulin.   Returned home, seeing diabetic educator but not an endocrinologist.         She was continuing to live independently in December 2022 when neighbors asked for a welfare check after she hadn't been seen in several days, and she was found down by police.   Hospitalized at [facility] 12/13-26 for DKA and rhabdomyolysis  Glucose >1000 and pH 6.8    She required pressor support, intubation and TF; treated with broad spectrum abx for SHIVAM pneumonia.  No sz on video EEG.  She had a +COVID19 test. Slowly cleared and discharged to [facility] TCU before transitioning to LTC.     Pt was seen in the [facility] ED in January 2023 for urinary frequency related to hyperglycemia.  Improved and returned to LTC with continued labile blood sugars, urinary incontinence, weakness and poor endurance  She had upper and lower endoscopy in July 2023, no significant findings    Pt had an October 2023 [facility] hospitalization for hyperglycemia, seen by Endocrinology and diabetic regimen adjusted    She was treated with ciprofloxacin for Enterobacter cloacae UTI    Returned to LTC, pt transitioned to [facility] in January 2024      She was seen in the ED January 2024 for hyperglycemia, but no evidence of DKA or other abnormality.     " "    Past Medical History:   Diagnosis Date    Cerumen impacted     Development delay     Diabetic gastroparesis (H) 8/16/2013    Glaucoma (increased eye pressure)     Hyperlipaemia     Hypertension     Hypothyroid     Mental retardation     Nonsenile cataract     Obesity     Strabismus     Type 1 diabetes mellitus not at goal (H)        Past Surgical History:   Procedure Laterality Date    COLONOSCOPY N/A 7/3/2023    Procedure: Colonoscopy;  Surgeon: Merlyn Acevedo MD;  Location:  GI    ESOPHAGOSCOPY, GASTROSCOPY, DUODENOSCOPY (EGD), COMBINED N/A 7/3/2023    Procedure: Esophagoscopy, gastroscopy, duodenoscopy (EGD), combined;  Surgeon: Merlyn Acevedo MD;  Location:  GI    STRABISMUS SURGERY      [facility] EYE SURG ANT [facility] PROC UNLISTED  remote    strabismus surgery     SH:  Single    Her mother Francisca Rockwell is first contact    She has 2 sisters that live in [facility]   Non smoker    Review Of Systems  Developmental cognitive delay   BIMS 15/15      Ambulatory with 4WW  Weight at admission was 167 lbs>>>in April 2023 was 156 lbs>>> in Sept 2023 was 168 lbs  Wt Readings from Last 5 Encounters:   04/24/24 87 kg (191 lb 11.2 oz)   04/23/24 81.6 kg (180 lb)   03/13/24 84.6 kg (186 lb 8 oz)   02/21/24 82.2 kg (181 lb 4.8 oz)   01/23/24 80.3 kg (177 lb)      EXAM: NAD  /61   Pulse 85   Temp 97.7  F (36.5  C)   Resp 18   Ht 1.6 m (5' 3\")   Wt 87 kg (191 lb 11.2 oz)   LMP 03/28/2014   SpO2 94%   BMI 33.96 kg/m       Neck supple without adenopathy  Lungs clear bilaterally with fair air movement   Heart RRR s1s2    Abd soft, NT, no distention or guarding, +BS  Ext with 2+ edema above the knees, wearing velcro wraps  Neuro: valgus deformities, ambulatory with waddling gait using 4WW    Psych: affect okay, pleasant      Lab Results   Component Value Date     01/01/2024    POTASSIUM 4.3 01/01/2024    CHLORIDE 103 01/01/2024    CO2 26 01/01/2024    ANIONGAP 10 01/01/2024     (H) 01/01/2024    BUN 52.4 (H) " 01/01/2024    CR 1.28 (H) 01/01/2024    GFRESTIMATED 47 (L) 01/01/2024    EFRAIN 10.1 01/01/2024         IMP/PLAN:   (D64.9) Anemia, unspecified type    Comment: Fe deficiency   Hgb has improved    Upper and lower endoscopy in July 2023, unrevealing although colonoscopy limited by poor prep.    Plan: Continue PPI   Slow release Fe 45 mg/day    Recheck CBC     (R63.5) Weight gain    Comment: eating well, some peripheral edema    Plan: LE compression, elevation  Furosemide 20 mg/day with KCl 20 mEq/day   >>>Consider ECHO     (E10.22,  N18.31) Type 1 diabetes mellitus with stage 3a chronic kidney disease (H)  Comment: long history of recurrent DKA and labile sugars   Noncompliant with diet, as above    Sequelae includes gastroparesis and CKD3    Lab Results   Component Value Date    A1C 8.5 11/29/2023     Plan: insulin degludec 20 units /HS   Insulin aspart 5/6/7and sliding scale   Follows with Endocrinology and Diabetic educator.   She is on lisinopril, simvastatin 20 mg/day     Not on daily ASA secondary to anemia  Ophthalmology and Podiatry follow up        (K21.00) Gastroesophageal reflux disease with esophagitis without hemorrhage  Comment: Biopsies unremarkable at EGD   Plan: pantoprazole 40 mg/day     (F81.9) Cognitive developmental delay  Comment: unable to live independently in the community and manage her medical problems    Plan: Board and Care support for med admin, meals, activity      (I10) Essential hypertension with goal blood pressure less than 140/90  Comment:   BP Readings from Last 3 Encounters:   04/24/24 114/61   03/13/24 (!) 149/64   02/21/24 106/57      Plan: amlodipine 2.5 mg/day, carvedilol 6.25 mg bid, lisinopril 40 mg/day   Follow bps and BMP     Advance directive: DNR/DNI     Nia Martínez MD

## 2024-05-09 ENCOUNTER — ALLIED HEALTH/NURSE VISIT (OUTPATIENT)
Dept: EDUCATION SERVICES | Facility: CLINIC | Age: 64
End: 2024-05-09
Payer: MEDICARE

## 2024-05-09 DIAGNOSIS — E10.37X3 TYPE 1 DIABETES MELLITUS WITH DIABETIC MACULAR EDEMA OF BOTH EYES RESOLVED AFTER TREATMENT (H): ICD-10-CM

## 2024-05-09 PROCEDURE — G0108 DIAB MANAGE TRN  PER INDIV: HCPCS | Performed by: DIETITIAN, REGISTERED

## 2024-05-09 RX ORDER — INSULIN ASPART 100 [IU]/ML
INJECTION, SOLUTION INTRAVENOUS; SUBCUTANEOUS
Qty: 15 ML | Refills: 2 | Status: SHIPPED | OUTPATIENT
Start: 2024-05-09 | End: 2024-07-03

## 2024-05-09 NOTE — PROGRESS NOTES
Diabetes Self-Management Education & Support    Presents for: Follow-up    Type of Service: In Person Visit      ASSESSMENT:  Lexy presented to clinic to help get her Dexcom G6 back up and running. However, Lexy did not bring any Dexcom sensors with her.  We scheduled another appointment for May 30th and writer made a list of the supplies needed for that appointment. We discussed Lexy's recent weight gain (30 pounds in the last year). Leyx feels that this is from the Lasix that she is taking. We discussed some lifestyle strategies, such as cutting back on desserts. Lexy reports that she has  dessert with her lunch and dinner most days. We discussed limiting dessert to a few times/week. We also discussed walking, Lexy states that [place] Moser might start a walking program that she could join in.     We reviewed BG, looking a little bit more stable than they have been lately. Having some higher readings before dinner. Discussed plan to increase Novolog at lunch time.     Patient's most recent   Lab Results   Component Value Date    A1C 8.5 11/29/2023    A1C 9.7 02/01/2021     is not meeting goal of <7.0    Diabetes knowledge and skills assessment:   Patient is knowledgeable in diabetes management concepts related to: none    Continue education with the following diabetes management concepts: Healthy Eating, Being Active, Monitoring, Taking Medication, Problem Solving, Reducing Risks, and Healthy Coping    Based on learning assessment above, most appropriate setting for further diabetes education would be: Individual setting.      PLAN    Adjust Novolog dose: 5-6-7-0(plus sliding scale) --> 5-7-7--0 (plus sliding scale)  Continue with 20 units of Tresiba    Goals:  1) Cut back on desserts - aim for 5 desserts/week, instead of after every lunch and dinner  2) Add in some walking into your routine    Topics to cover at upcoming visits: Healthy Eating, Being Active, Monitoring, Taking Medication,  "Problem Solving, Reducing Risks, and Healthy Coping    Follow-up: May 30th    See Care Plan for co-developed, patient-state behavior change goals.  AVS provided for patient today.    Education Materials Provided:  No new materials provided today      SUBJECTIVE/OBJECTIVE:  Presents for: Follow-up  Accompanied by: Self (Dietitian from Assisted Living)  Diabetes education in the past 24mo: Yes  Focus of Visit: Problem Solving, Monitoring, Taking Medication  Diabetes type: Type 1  Disease course: Worsening  Transportation concerns: Yes (gets rides or takes public tranportation)  Difficulty affording diabetes medication?: No  Difficulty affording diabetes testing supplies?: No  Other concerns:: Cognitive impairment  Cultural Influences/Ethnic Background:  Not  or     Diabetes Symptoms & Complications:  Diabetes Related Symptoms: Neuropathy, Polydipsia (increased thirst), Polyuria (increased urination) (neuropathy)  Weight trend: Increasing  Symptom course: Stable  Disease course: Worsening  Complications assessed today?: Yes  Autonomic neuropathy: No  CVA: No  Heart disease: No  Nephropathy: Yes  Peripheral neuropathy: Yes  Peripheral Vascular Disease: No  Retinopathy: No  Sexual dysfunction: No    Patient Problem List and Family Medical History reviewed for relevant medical history, current medical status, and diabetes risk factors.    Vitals:  LMP 03/28/2014   Estimated body mass index is 33.96 kg/m  as calculated from the following:    Height as of 4/24/24: 1.6 m (5' 3\").    Weight as of 4/24/24: 87 kg (191 lb 11.2 oz).   Last 3 BP:   BP Readings from Last 3 Encounters:   04/24/24 114/61   03/13/24 (!) 149/64   02/21/24 106/57       History   Smoking Status    Never   Smokeless Tobacco    Never       Labs:  Lab Results   Component Value Date    A1C 8.5 11/29/2023    A1C 9.7 02/01/2021     Lab Results   Component Value Date     01/01/2024     10/13/2021     02/01/2021     Lab Results " "  Component Value Date    LDL 58 01/25/2023     02/01/2021     HDL Cholesterol   Date Value Ref Range Status   02/01/2021 62 >49 mg/dL Final     Direct Measure HDL   Date Value Ref Range Status   01/25/2023 37 (L) >=50 mg/dL Final   ]  GFR Estimate   Date Value Ref Range Status   01/01/2024 47 (L) >60 mL/min/1.73m2 Final   02/01/2021 90 >60 mL/min/[1.73_m2] Final     Comment:     Non  GFR Calc  Starting 12/18/2018, serum creatinine based estimated GFR (eGFR) will be   calculated using the Chronic Kidney Disease Epidemiology Collaboration   (CKD-EPI) equation.       GFR Estimate If Black   Date Value Ref Range Status   02/01/2021 >90 >60 mL/min/[1.73_m2] Final     Comment:      GFR Calc  Starting 12/18/2018, serum creatinine based estimated GFR (eGFR) will be   calculated using the Chronic Kidney Disease Epidemiology Collaboration   (CKD-EPI) equation.       Lab Results   Component Value Date    CR 1.28 01/01/2024    CR 0.73 02/01/2021     No results found for: \"MICROALBUMIN\"    Healthy Eating:  Healthy Eating Assessed Today: No  Cultural/Moravian diet restrictions?: No  Do you have any food allergies or intolerances?: No  Meal planning/habits: None  Who cooks/prepares meals for you?: Other  Who purchases food in  your home?: Other  How many times a week on average do you eat food made away from home (restaurant/take-out)?: 0  Meals include: Breakfast, Lunch, Dinner, Afternoon Snack, Evening Snack  Breakfast: 8:00 am: Oatmeal with hard boiled egg or scrambled eggs and barragan with toast OR pancake/waffle with eggs OR Polish toast  Lunch: 12-12:30: Stir Edouard OR hamburgers with vegetables (with dessert- small cake or cookie)  Dinner: 5:00 -6:00 pm: sandwich OR chili (vegetarian) OR chicken OR fish  with vegetables with desserts (cake or cookies, fruit)  Snacks: mornnig snack: yogurt or pudding AND Has one bedtime snack- usually a cookie - oatmeal raisin or chocolate chip or yogurt " OR sugra free candy every once and a while  Other: drinks coffee with sugar free creamer  Beverages: Water, Coffee, Milk (Keep some juice on hand for lows)  Has patient met with a dietitian in the past?: Yes    Being Active:  Being Active Assessed Today: Yes (went on the bike at her apartment complex)  Exercise:: Yes (walking, programs at care center)  Days per week of moderate to strenuous exercise (like a brisk walk): 2  On average, minutes per day of exercise at this level:  (Walking with friends)  How intense was your typical exercise? : Light (like stretching or slow walking)  Barrier to exercise: Safety, Physical limitation    Monitoring:  Monitoring Assessed Today: Yes  Did patient bring glucose meter to appointment? : Yes  Blood Glucose Meter: Unknown  Times checking blood sugar at home (number): 4  Times checking blood sugar at home (per): Day  Blood glucose trend: Fluctuating    Date Breakfast  Lunch  Dinner  Bedtime    Before After Before After Before After    5/9 138         5/8 202  98  165  187   5/7 156  133  395     5/6 76  319  347  133   5/5 153  217  332  241   5/4 148  167  413  195   5/3 195  218  191     5/2 194  135  314  261   5/1 93  383  192  92       Taking Medications:  Diabetes Medication(s)       Diabetic Other       Glucagon (BAQSIMI) 3 MG/DOSE nasal powder Spray 1 spray (3 mg) in nostril as needed in the event of unconscious hypoglycemia or hypoglycemic seizure. May repeat dose if no response after 15 minutes.     glucagon 1 MG SOLR injection Inject 1 mg into the muscle once       Insulin       insulin aspart (NOVOLOG FLEXPEN) 100 UNIT/ML pen Inject 5 units before breakfast, 7 units before lunch and 7 units before dinner, 3 units before each snack.  Add correction scale before meals. -160 = 1 unit. -200 = 2 units. -240 = 3 units. -280 = 4 units. -320 = 5 u     Insulin Degludec FlexTouch 100 UNIT/ML SOPN INJECT 20 UNITS SUBCUTANEOUSLY EVERY NIGHT AT  BEDTIME FOR DM *DISCARD 56 DAYS AFTER OPENING*            Taking Medication Assessed Today: Yes  Current Treatments: Insulin Injections  Dose schedule: Pre-breakfast, Pre-lunch, Pre-dinner, At bedtime  Given by: Patient, Adult caretaker, Nursing attendant  Injection/Infusion sites: Abdomen  Problems taking diabetes medications regularly?: No  Diabetes medication side effects?: No    Problem Solving:  Problem Solving Assessed Today: Yes  Is the patient at risk for hypoglycemia?: Yes  Hypoglycemia Frequency: Weekly  Hypoglycemia Treatment: Glucose (tablets or gel), Other food, Juice, Candy (will have one piece of candy or some juice)  Patient carries a carbohydrate source: Yes  Medical ID: Yes  Is the patient at risk for DKA?: Yes  Does patient have ketone test strips?: Yes  Does patient have DKA prevention plan?: Yes  Does patient have severe weather/disaster plan for diabetes management?: No  Does patient have sick day plan for diabetes management?: Yes              Reducing Risks:  Reducing Risks Assessed Today: No  CAD Risks: Diabetes Mellitus  Has dilated eye exam at least once a year?: Yes  Sees dentist every 6 months?: Yes  Feet checked by healthcare provider in the last year?: Yes    Healthy Coping:  Healthy Coping Assessed Today: Yes  Emotional response to diabetes: Acceptance  Informal Support system:: Family, Parent  Stage of change: ACTION (Actively working towards change)  Support resources: Offerings in Clinic Communities  Patient Activation Measure Survey Score:      3/7/2012     3:00 PM   KHUSHI Score (Last Two)   KHUSHI Raw Score 39   Activation Score 56.4   KHUSHI Level 3         Care Plan and Education Provided:  Healthy Eating: Balanced meals, Carbohydrate Counting, Consistency in amount and timing of carbohydrate intake, and Plate planning method, Being Active: Finding a physical activity routine that works for you, Monitoring: Frequency of monitoring, and Taking Medication: When to take  medication(s)    KARLOS Haas [facility]  Triage: [phone]  [phone]      Time Spent: 50 minutes  Encounter Type: Individual    Any diabetes medication dose changes were made via the CDE Protocol per the patient's referring provider. A copy of this encounter was shared with the provider.

## 2024-05-09 NOTE — PATIENT INSTRUCTIONS
To the appointment on May 30th, bring:  - Dexcom sensor  - Dexcom Transmitter  - Phone   - Log in Information for Dexcom     Goals:  1) Cut back on desserts - aim for 5 desserts/week, instead of after every lunch and dinner  2) Add in some walking into your routine    Increase your Novolog dose with lunch, your new dosing:  Breakfast: 5 units  Lunch: 7 units  Dinner: 7 units  No changes to sliding scale    Continue with 20 units of Tresiba    KARLOS Haas [facility]  Triage: [phone]  [phone]

## 2024-05-30 ENCOUNTER — ALLIED HEALTH/NURSE VISIT (OUTPATIENT)
Dept: EDUCATION SERVICES | Facility: CLINIC | Age: 64
End: 2024-05-30
Payer: MEDICARE

## 2024-05-30 DIAGNOSIS — E10.37X3 TYPE 1 DIABETES MELLITUS WITH DIABETIC MACULAR EDEMA OF BOTH EYES RESOLVED AFTER TREATMENT (H): Primary | ICD-10-CM

## 2024-05-30 PROCEDURE — G0108 DIAB MANAGE TRN  PER INDIV: HCPCS | Performed by: DIETITIAN, REGISTERED

## 2024-05-30 NOTE — PATIENT INSTRUCTIONS
Bring your new sensor and transmitter with you to our appointment next week.    Bring blood glucose log sheets from [place] as well.    KARLOS Haas [facility]  Triage: [phone]  [phone]

## 2024-05-30 NOTE — LETTER
5/30/2024         RE: Lexy Rockwell  [address] [facility] [address]        Dear Colleague,    Thank you for referring your patient, Lexy Rockwell, to the [facility] [place]. Please see a copy of my visit note below.    Diabetes Self-Management Education & Support    Presents for: Personal CGM Start    Type of Service: In Person Visit      Patient completed homework (online training and/or Getting started with CGM guide)? : No  Sensor started:: Other      CGM-specific education:   Dexcom sensor: insertion technique, sensor site location and rotation, insulin administration in relation to sensor placement and Dexcom Mobile celso         ASSESSMENT:  Spent the entirety of our diabetes education visit troubleshooting Lexy's Dexcom. Lexy has not been able to log into her Dexcom celso because she could not remember her password. We were able to get logged in. On 5/25, someone from [employer] helped Lexy get started with the Dexcom, they got the sensor applied and connected the transmitter but couldn't get readings from it.   We spent  1 hour on the phone with Dexcom Technical support and found out that the wrong transmitter ID was entered into the celso, therefor it wasn't connecting. Dexcom will be sending her a replacement sensor and she will bring sensor and transmitter to our appointment next week to get started again.     Lexy reports that she has been working on cutting back on desserts and still has plans to join the walking club.     Time did not allow for BG review today, Lexy did not bring any BG logs.     Pt verbalized understanding of concepts discussed and recommendations provided today.    Continue education with the following diabetes management concepts: Healthy Eating, Being Active, Monitoring, Taking Medication, Problem Solving, Reducing Risks, and Healthy Coping    PLAN    Dexcom will send replacement sensor  Lexy will bring sensor  "and transmitter to our appointment next week    Topics to cover at upcoming visits: Healthy Eating, Being Active, Monitoring, Taking Medication, Problem Solving, Reducing Risks, and Healthy Coping    Follow-up: 6/6    See Care Plan for co-developed, patient-state behavior change goals.  AVS provided for patient today.    Education Materials Provided:  No new materials provided today      SUBJECTIVE/OBJECTIVE:  Presents for: Personal CGM Start  Accompanied by: Self, Sister (Dietitian from [facility])  Diabetes education in the past 24mo: Yes  Focus of Visit: Problem Solving, Monitoring, Taking Medication  Diabetes type: Type 1  Disease course: Worsening  Transportation concerns: Yes (gets rides or takes public tranportation)  Difficulty affording diabetes medication?: No  Difficulty affording diabetes testing supplies?: No  Other concerns:: Cognitive impairment  Cultural Influences/Ethnic Background:  Not  or     Diabetes Symptoms & Complications:  Diabetes Related Symptoms: Neuropathy, Polydipsia (increased thirst), Polyuria (increased urination) (neuropathy)  Weight trend: Decreasing  Symptom course: Stable  Disease course: Worsening  Complications assessed today?: Yes  Autonomic neuropathy: No  CVA: No  Heart disease: No  Nephropathy: Yes  Peripheral neuropathy: Yes  Peripheral Vascular Disease: No  Retinopathy: No  Sexual dysfunction: No    Patient Problem List and Family Medical History reviewed for relevant medical history, current medical status, and diabetes risk factors.    Vitals:  LMP 03/28/2014   Estimated body mass index is 33.96 kg/m  as calculated from the following:    Height as of 4/24/24: 1.6 m (5' 3\").    Weight as of 4/24/24: 87 kg (191 lb 11.2 oz).   Last 3 BP:   BP Readings from Last 3 Encounters:   04/24/24 114/61   03/13/24 (!) 149/64   02/21/24 106/57       History   Smoking Status     Never   Smokeless Tobacco     Never       Labs:  Lab Results   Component Value Date    " "A1C 8.5 11/29/2023    A1C 9.7 02/01/2021     Lab Results   Component Value Date     01/01/2024     10/13/2021     02/01/2021     Lab Results   Component Value Date    LDL 58 01/25/2023     02/01/2021     HDL Cholesterol   Date Value Ref Range Status   02/01/2021 62 >49 mg/dL Final     Direct Measure HDL   Date Value Ref Range Status   01/25/2023 37 (L) >=50 mg/dL Final   ]  GFR Estimate   Date Value Ref Range Status   01/01/2024 47 (L) >60 mL/min/1.73m2 Final   02/01/2021 90 >60 mL/min/[1.73_m2] Final     Comment:     Non  GFR Calc  Starting 12/18/2018, serum creatinine based estimated GFR (eGFR) will be   calculated using the Chronic Kidney Disease Epidemiology Collaboration   (CKD-EPI) equation.       GFR Estimate If Black   Date Value Ref Range Status   02/01/2021 >90 >60 mL/min/[1.73_m2] Final     Comment:      GFR Calc  Starting 12/18/2018, serum creatinine based estimated GFR (eGFR) will be   calculated using the Chronic Kidney Disease Epidemiology Collaboration   (CKD-EPI) equation.       Lab Results   Component Value Date    CR 1.28 01/01/2024    CR 0.73 02/01/2021     No results found for: \"MICROALBUMIN\"    Healthy Eating:  Healthy Eating Assessed Today: No  Cultural/Synagogue diet restrictions?: No  Do you have any food allergies or intolerances?: No  Meal planning/habits: None  Who cooks/prepares meals for you?: Other  Who purchases food in  your home?: Other  How many times a week on average do you eat food made away from home (restaurant/take-out)?: 0  Meals include: Breakfast, Lunch, Dinner, Afternoon Snack, Evening Snack  Breakfast: 8:00 am: Oatmeal with hard boiled egg or scrambled eggs and barragan with toast OR pancake/waffle with eggs OR Micronesian toast  Lunch: 12-12:30: Stir [place] OR hamburgers with vegetables (with dessert- small cake or cookie)  Dinner: 5:00 -6:00 pm: sandwich OR chili (vegetarian) OR chicken OR fish  with vegetables with " desserts (cake or cookies, fruit)  Snacks: mornnig snack: yogurt or pudding AND Has one bedtime snack- usually a cookie - oatmeal raisin or chocolate chip or yogurt OR sugra free candy every once and a while  Other: drinks coffee with sugar free creamer  Beverages: Water, Coffee, Milk (Keep some juice on hand for lows)  Has patient met with a dietitian in the past?: Yes    Being Active:  Being Active Assessed Today: Yes (went on the bike at her apartment complex)  Exercise:: Yes (walking, programs at [facility])  Days per week of moderate to strenuous exercise (like a brisk walk): 2  On average, minutes per day of exercise at this level:  (Walking with friends)  How intense was your typical exercise? : Light (like stretching or slow walking)  Barrier to exercise: Safety, Physical limitation    Monitoring:  Monitoring Assessed Today: Yes  Did patient bring glucose meter to appointment? : No  Blood Glucose Meter: Unknown  Times checking blood sugar at home (number): 4  Times checking blood sugar at home (per): Day  Blood glucose trend: Fluctuating    No BG logs to review today    Taking Medications:  Diabetes Medication(s)       Diabetic Other       Glucagon (BAQSIMI) 3 MG/DOSE nasal powder Spray 1 spray (3 mg) in nostril as needed in the event of unconscious hypoglycemia or hypoglycemic seizure. May repeat dose if no response after 15 minutes.     glucagon 1 MG SOLR injection Inject 1 mg into the muscle once       Insulin       insulin aspart (NOVOLOG FLEXPEN) 100 UNIT/ML pen Inject 5 units before breakfast, 7 units before lunch and 7 units before dinner, 3 units before each snack.  Add correction scale before meals. -160 = 1 unit. -200 = 2 units. -240 = 3 units. -280 = 4 units. -320 = 5 u     Insulin Degludec FlexTouch 100 UNIT/ML SOPN INJECT 20 UNITS SUBCUTANEOUSLY EVERY NIGHT AT BEDTIME FOR DM *DISCARD 56 DAYS AFTER OPENING*            Taking Medication Assessed Today: Yes  Current  Treatments: Insulin Injections  Dose schedule: Pre-breakfast, Pre-lunch, Pre-dinner, At bedtime  Given by: Patient, Adult caretaker, Nursing attendant  Injection/Infusion sites: Abdomen  Problems taking diabetes medications regularly?: No  Diabetes medication side effects?: No    Problem Solving:  Problem Solving Assessed Today: Yes  Is the patient at risk for hypoglycemia?: Yes  Hypoglycemia Frequency: Weekly  Hypoglycemia Treatment: Glucose (tablets or gel), Other food, Juice, Candy (will have one piece of candy or some juice)  Patient carries a carbohydrate source: Yes  Medical ID: Yes  Is the patient at risk for DKA?: Yes  Does patient have ketone test strips?: Yes  Does patient have DKA prevention plan?: Yes  Does patient have severe weather/disaster plan for diabetes management?: No  Does patient have sick day plan for diabetes management?: Yes    Reducing Risks:  Reducing Risks Assessed Today: Yes  Diabetes Risks: Age over 45 years, Sedentary Lifestyle  CAD Risks: Diabetes Mellitus  Has dilated eye exam at least once a year?: Yes  Sees dentist every 6 months?: Yes  Feet checked by healthcare provider in the last year?: Yes    Healthy Coping:  Healthy Coping Assessed Today: Yes  Emotional response to diabetes: Acceptance  Informal Support system:: Family, Parent  Stage of change: ACTION (Actively working towards change)  Support resources: Offerings in Clinic Communities  Patient Activation Measure Survey Score:      3/7/2012     3:00 PM   KHUSHI Score (Last Two)   KHUSHI Raw Score 39   Activation Score 56.4   KHUSHI Level 3         Care Plan and Education Provided:  Monitoring: CGM instruction: Patient was instructed on Dexcom CGM system: Dexcom sensor: insertion technique, sensor site location and rotation, insulin administration in relation to sensor placement and Dexcom Mobile celso    Tiffanie Mcfarland RD [facility]  Triage: [phone]  [phone]      Time Spent: 75 minutes  Encounter Type:  Individual    Any diabetes medication dose changes were made via the CDE Protocol per the patient's referring provider. A copy of this encounter was shared with the provider.

## 2024-05-30 NOTE — PROGRESS NOTES
Diabetes Self-Management Education & Support    Presents for: Personal CGM Start    Type of Service: In Person Visit      Patient completed homework (online training and/or Getting started with CGM guide)? : No  Sensor started:: Other      CGM-specific education:   Dexcom sensor: insertion technique, sensor site location and rotation, insulin administration in relation to sensor placement and Dexcom Mobile celso         ASSESSMENT:  Spent the entirety of our diabetes education visit troubleshooting Lexy's Dexcom. Lexy has not been able to log into her Dexcom celso because she could not remember her password. We were able to get logged in. On 5/25, someone from [employer] helped Lexy get started with the Dexcom, they got the sensor applied and connected the transmitter but couldn't get readings from it.   We spent  1 hour on the phone with Dexcom Technical support and found out that the wrong transmitter ID was entered into the celso, therefor it wasn't connecting. Dexcom will be sending her a replacement sensor and she will bring sensor and transmitter to our appointment next week to get started again.     Lexy reports that she has been working on cutting back on desserts and still has plans to join the walking club.     Time did not allow for BG review today, Lexy did not bring any BG logs.     Pt verbalized understanding of concepts discussed and recommendations provided today.    Continue education with the following diabetes management concepts: Healthy Eating, Being Active, Monitoring, Taking Medication, Problem Solving, Reducing Risks, and Healthy Coping    PLAN    Dexcom will send replacement sensor  Lexy will bring sensor and transmitter to our appointment next week    Topics to cover at upcoming visits: Healthy Eating, Being Active, Monitoring, Taking Medication, Problem Solving, Reducing Risks, and Healthy Coping    Follow-up: 6/6    See Care Plan for co-developed, patient-state  "behavior change goals.  AVS provided for patient today.    Education Materials Provided:  No new materials provided today      SUBJECTIVE/OBJECTIVE:  Presents for: Personal CGM Start  Accompanied by: Self, Sister (Dietitian from [facility])  Diabetes education in the past 24mo: Yes  Focus of Visit: Problem Solving, Monitoring, Taking Medication  Diabetes type: Type 1  Disease course: Worsening  Transportation concerns: Yes (gets rides or takes public tranportation)  Difficulty affording diabetes medication?: No  Difficulty affording diabetes testing supplies?: No  Other concerns:: Cognitive impairment  Cultural Influences/Ethnic Background:  Not  or     Diabetes Symptoms & Complications:  Diabetes Related Symptoms: Neuropathy, Polydipsia (increased thirst), Polyuria (increased urination) (neuropathy)  Weight trend: Decreasing  Symptom course: Stable  Disease course: Worsening  Complications assessed today?: Yes  Autonomic neuropathy: No  CVA: No  Heart disease: No  Nephropathy: Yes  Peripheral neuropathy: Yes  Peripheral Vascular Disease: No  Retinopathy: No  Sexual dysfunction: No    Patient Problem List and Family Medical History reviewed for relevant medical history, current medical status, and diabetes risk factors.    Vitals:  LMP 03/28/2014   Estimated body mass index is 33.96 kg/m  as calculated from the following:    Height as of 4/24/24: 1.6 m (5' 3\").    Weight as of 4/24/24: 87 kg (191 lb 11.2 oz).   Last 3 BP:   BP Readings from Last 3 Encounters:   04/24/24 114/61   03/13/24 (!) 149/64   02/21/24 106/57       History   Smoking Status    Never   Smokeless Tobacco    Never       Labs:  Lab Results   Component Value Date    A1C 8.5 11/29/2023    A1C 9.7 02/01/2021     Lab Results   Component Value Date     01/01/2024     10/13/2021     02/01/2021     Lab Results   Component Value Date    LDL 58 01/25/2023     02/01/2021     HDL Cholesterol   Date Value Ref " "Range Status   02/01/2021 62 >49 mg/dL Final     Direct Measure HDL   Date Value Ref Range Status   01/25/2023 37 (L) >=50 mg/dL Final   ]  GFR Estimate   Date Value Ref Range Status   01/01/2024 47 (L) >60 mL/min/1.73m2 Final   02/01/2021 90 >60 mL/min/[1.73_m2] Final     Comment:     Non  GFR Calc  Starting 12/18/2018, serum creatinine based estimated GFR (eGFR) will be   calculated using the Chronic Kidney Disease Epidemiology Collaboration   (CKD-EPI) equation.       GFR Estimate If Black   Date Value Ref Range Status   02/01/2021 >90 >60 mL/min/[1.73_m2] Final     Comment:      GFR Calc  Starting 12/18/2018, serum creatinine based estimated GFR (eGFR) will be   calculated using the Chronic Kidney Disease Epidemiology Collaboration   (CKD-EPI) equation.       Lab Results   Component Value Date    CR 1.28 01/01/2024    CR 0.73 02/01/2021     No results found for: \"MICROALBUMIN\"    Healthy Eating:  Healthy Eating Assessed Today: No  Cultural/Yarsani diet restrictions?: No  Do you have any food allergies or intolerances?: No  Meal planning/habits: None  Who cooks/prepares meals for you?: Other  Who purchases food in  your home?: Other  How many times a week on average do you eat food made away from home (restaurant/take-out)?: 0  Meals include: Breakfast, Lunch, Dinner, Afternoon Snack, Evening Snack  Breakfast: 8:00 am: Oatmeal with hard boiled egg or scrambled eggs and barragan with toast OR pancake/waffle with eggs OR Italian toast  Lunch: 12-12:30: Stir [place] OR hamburgers with vegetables (with dessert- small cake or cookie)  Dinner: 5:00 -6:00 pm: sandwich OR chili (vegetarian) OR chicken OR fish  with vegetables with desserts (cake or cookies, fruit)  Snacks: mornnig snack: yogurt or pudding AND Has one bedtime snack- usually a cookie - oatmeal raisin or chocolate chip or yogurt OR sugra free candy every once and a while  Other: drinks coffee with sugar free creamer  Beverages: " Water, Coffee, Milk (Keep some juice on hand for lows)  Has patient met with a dietitian in the past?: Yes    Being Active:  Being Active Assessed Today: Yes (went on the bike at her apartment complex)  Exercise:: Yes (walking, programs at care center)  Days per week of moderate to strenuous exercise (like a brisk walk): 2  On average, minutes per day of exercise at this level:  (Walking with friends)  How intense was your typical exercise? : Light (like stretching or slow walking)  Barrier to exercise: Safety, Physical limitation    Monitoring:  Monitoring Assessed Today: Yes  Did patient bring glucose meter to appointment? : No  Blood Glucose Meter: Unknown  Times checking blood sugar at home (number): 4  Times checking blood sugar at home (per): Day  Blood glucose trend: Fluctuating    No BG logs to review today    Taking Medications:  Diabetes Medication(s)       Diabetic Other       Glucagon (BAQSIMI) 3 MG/DOSE nasal powder Spray 1 spray (3 mg) in nostril as needed in the event of unconscious hypoglycemia or hypoglycemic seizure. May repeat dose if no response after 15 minutes.     glucagon 1 MG SOLR injection Inject 1 mg into the muscle once       Insulin       insulin aspart (NOVOLOG FLEXPEN) 100 UNIT/ML pen Inject 5 units before breakfast, 7 units before lunch and 7 units before dinner, 3 units before each snack.  Add correction scale before meals. -160 = 1 unit. -200 = 2 units. -240 = 3 units. -280 = 4 units. -320 = 5 u     Insulin Degludec FlexTouch 100 UNIT/ML SOPN INJECT 20 UNITS SUBCUTANEOUSLY EVERY NIGHT AT BEDTIME FOR DM *DISCARD 56 DAYS AFTER OPENING*            Taking Medication Assessed Today: Yes  Current Treatments: Insulin Injections  Dose schedule: Pre-breakfast, Pre-lunch, Pre-dinner, At bedtime  Given by: Patient, Adult caretaker, Nursing attendant  Injection/Infusion sites: Abdomen  Problems taking diabetes medications regularly?: No  Diabetes medication side  effects?: No    Problem Solving:  Problem Solving Assessed Today: Yes  Is the patient at risk for hypoglycemia?: Yes  Hypoglycemia Frequency: Weekly  Hypoglycemia Treatment: Glucose (tablets or gel), Other food, Juice, Candy (will have one piece of candy or some juice)  Patient carries a carbohydrate source: Yes  Medical ID: Yes  Is the patient at risk for DKA?: Yes  Does patient have ketone test strips?: Yes  Does patient have DKA prevention plan?: Yes  Does patient have severe weather/disaster plan for diabetes management?: No  Does patient have sick day plan for diabetes management?: Yes    Reducing Risks:  Reducing Risks Assessed Today: Yes  Diabetes Risks: Age over 45 years, Sedentary Lifestyle  CAD Risks: Diabetes Mellitus  Has dilated eye exam at least once a year?: Yes  Sees dentist every 6 months?: Yes  Feet checked by healthcare provider in the last year?: Yes    Healthy Coping:  Healthy Coping Assessed Today: Yes  Emotional response to diabetes: Acceptance  Informal Support system:: Family, Parent  Stage of change: ACTION (Actively working towards change)  Support resources: Offerings in Clinic Communities  Patient Activation Measure Survey Score:      3/7/2012     3:00 PM   KHUSHI Score (Last Two)   KHUSHI Raw Score 39   Activation Score 56.4   KHUSHI Level 3         Care Plan and Education Provided:  Monitoring: CGM instruction: Patient was instructed on Dexcom CGM system: Dexcom sensor: insertion technique, sensor site location and rotation, insulin administration in relation to sensor placement and Dexcom Mobile celso    KARLOS [facility]  Triage: [phone]  [phone]      Time Spent: 75 minutes  Encounter Type: Individual    Any diabetes medication dose changes were made via the CDE Protocol per the patient's referring provider. A copy of this encounter was shared with the provider.

## 2024-06-06 ENCOUNTER — ALLIED HEALTH/NURSE VISIT (OUTPATIENT)
Dept: EDUCATION SERVICES | Facility: CLINIC | Age: 64
End: 2024-06-06
Payer: MEDICARE

## 2024-06-06 DIAGNOSIS — E10.3393 TYPE 1 DIABETES MELLITUS WITH MODERATE NONPROLIFERATIVE RETINOPATHY OF BOTH EYES, MACULAR EDEMA PRESENCE UNSPECIFIED (H): ICD-10-CM

## 2024-06-06 PROCEDURE — G0108 DIAB MANAGE TRN  PER INDIV: HCPCS | Performed by: DIETITIAN, REGISTERED

## 2024-06-06 RX ORDER — INSULIN DEGLUDEC 100 U/ML
22 INJECTION, SOLUTION SUBCUTANEOUS DAILY
Qty: 6 ML | Status: SHIPPED | OUTPATIENT
Start: 2024-06-06

## 2024-06-06 NOTE — LETTER
6/6/2024         RE: Lexy Rockwell  [address]  [facility] [address]        Dear Colleague,    Thank you for referring your patient, Lexy Rockwell, to the [facility]. Please see a copy of my visit note below.    Diabetes Self-Management Education & Support    Presents for: Personal CGM Start    Type of Service: In Person Visit      Patient completed homework (online training and/or Getting started with CGM guide)? : No  Sensor started:: Other    Sensor was inserted with no resistance or bleeding at insertion site.    CGM-specific education:   Dexcom sensor: insertion technique, sensor site location and rotation, insulin administration in relation to sensor placement, Dexcom : setting alerts/alarms, Dexcom Mobile celso, and Dexcom Clarity software         ASSESSMENT:  Lexy brought all of her Dexcom supplies today an we were able to get her started on the G6! She was active in the warm-up period when she left the appointment. She is connected to [facility] account as well. Lexy brought in BG records from [place]. She has had elevated fasting BG. Before meal readings are also elevated. Plan on increasing Tresiba dose.       Pt verbalized understanding of concepts discussed and recommendations provided today.    Continue education with the following diabetes management concepts: Healthy Eating, Being Active, Monitoring, Taking Medication, Problem Solving, Reducing Risks, and Healthy Coping    PLAN    Continue using Dexcom G6  Increase Tresiba: 0-0-0-20 --> 0-0-0-22    Topics to cover at upcoming visits: Healthy Eating, Being Active, Monitoring, Taking Medication, Problem Solving, Reducing Risks, and Healthy Coping    Follow-up: 6/27 (telephone), 7/11 (in person)    See Care Plan for co-developed, patient-state behavior change goals.  AVS provided for patient today.    Education Materials Provided:  No new materials provided  "today      SUBJECTIVE/OBJECTIVE:  Presents for: Personal CGM Start  Accompanied by: Self, Sister (Dietitian from [facility]isted Living)  Diabetes education in the past 24mo: Yes  Focus of Visit: Problem Solving, Monitoring, Taking Medication  Diabetes type: Type 1  Disease course: Worsening  Transportation concerns: Yes (gets rides or takes public tranportation)  Difficulty affording diabetes medication?: No  Difficulty affording diabetes testing supplies?: No  Other concerns:: Cognitive impairment  Cultural Influences/Ethnic Background:  Not  or     Diabetes Symptoms & Complications:  Diabetes Related Symptoms: Neuropathy, Polydipsia (increased thirst), Polyuria (increased urination) (neuropathy)  Weight trend: Decreasing  Symptom course: Stable  Disease course: Worsening  Complications assessed today?: Yes  Autonomic neuropathy: No  CVA: No  Heart disease: No  Nephropathy: Yes  Peripheral neuropathy: Yes  Peripheral Vascular Disease: No  Retinopathy: No  Sexual dysfunction: No    Patient Problem List and Family Medical History reviewed for relevant medical history, current medical status, and diabetes risk factors.    Vitals:  LMP 03/28/2014   Estimated body mass index is 33.96 kg/m  as calculated from the following:    Height as of 4/24/24: 1.6 m (5' 3\").    Weight as of 4/24/24: 87 kg (191 lb 11.2 oz).   Last 3 BP:   BP Readings from Last 3 Encounters:   04/24/24 114/61   03/13/24 (!) 149/64   02/21/24 106/57       History   Smoking Status     Never   Smokeless Tobacco     Never       Labs:  Lab Results   Component Value Date    A1C 8.5 11/29/2023    A1C 9.7 02/01/2021     Lab Results   Component Value Date     01/01/2024     10/13/2021     02/01/2021     Lab Results   Component Value Date    LDL 58 01/25/2023     02/01/2021     HDL Cholesterol   Date Value Ref Range Status   02/01/2021 62 >49 mg/dL Final     Direct Measure HDL   Date Value Ref Range Status   01/25/2023 37 (L) " ">=50 mg/dL Final   ]  GFR Estimate   Date Value Ref Range Status   01/01/2024 47 (L) >60 mL/min/1.73m2 Final   02/01/2021 90 >60 mL/min/[1.73_m2] Final     Comment:     Non  GFR Calc  Starting 12/18/2018, serum creatinine based estimated GFR (eGFR) will be   calculated using the Chronic Kidney Disease Epidemiology Collaboration   (CKD-EPI) equation.       GFR Estimate If Black   Date Value Ref Range Status   02/01/2021 >90 >60 mL/min/[1.73_m2] Final     Comment:      GFR Calc  Starting 12/18/2018, serum creatinine based estimated GFR (eGFR) will be   calculated using the Chronic Kidney Disease Epidemiology Collaboration   (CKD-EPI) equation.       Lab Results   Component Value Date    CR 1.28 01/01/2024    CR 0.73 02/01/2021     No results found for: \"MICROALBUMIN\"    Healthy Eating:       Being Active:       Monitoring:    Date Breakfast  Lunch  Dinner  Bedtime    Before After Before After Before After    6/6 343         6/5 384  166  213  203   6/4 177  165  333  239   6/3 319  297  193  229   6/2 492  176  270  300   6/1 293  96  274  264       Taking Medications:  Diabetes Medication(s)       Diabetic Other       Glucagon (BAQSIMI) 3 MG/DOSE nasal powder Spray 1 spray (3 mg) in nostril as needed in the event of unconscious hypoglycemia or hypoglycemic seizure. May repeat dose if no response after 15 minutes.     glucagon 1 MG SOLR injection Inject 1 mg into the muscle once       Insulin       Insulin Degludec FlexTouch 100 UNIT/ML SOPN Inject 22 Units Subcutaneous daily EVERY NIGHT AT BEDTIME FOR DM *DISCARD 56 DAYS AFTER OPENING*     insulin aspart (NOVOLOG FLEXPEN) 100 UNIT/ML pen Inject 5 units before breakfast, 7 units before lunch and 7 units before dinner, 3 units before each snack.  Add correction scale before meals. -160 = 1 unit. -200 = 2 units. -240 = 3 units. -280 = 4 units. -320 = 5 u            Problem Solving:     Reducing Risks:   "     Healthy Coping:     Patient Activation Measure Survey Score:      3/7/2012     3:00 PM   KHUSHI Score (Last Two)   KHUSHI Raw Score 39   Activation Score 56.4   KHUSHI Level 3         Care Plan and Education Provided:  Monitoring: Blood glucose versus Continuous Glucose Monitoring, Frequency of monitoring, and CGM instruction: Patient was instructed on Dexcom CGM system: Dexcom sensor: insertion technique, sensor site location and rotation, insulin administration in relation to sensor placement, Dexcom : setting alerts/alarms, Dexcom Mobile celso, and Dexcom Clarity software and Taking Medication: When to take medication(s)    KARLOS Haas [facility]  Triage: [phone]  [phone]      Time Spent: 50 minutes  Encounter Type: Individual    Any diabetes medication dose changes were made via the CDE Protocol per the patient's referring provider. A copy of this encounter was shared with the provider.

## 2024-06-06 NOTE — PROGRESS NOTES
Diabetes Self-Management Education & Support    Presents for: Personal CGM Start    Type of Service: In Person Visit      Patient completed homework (online training and/or Getting started with CGM guide)? : No  Sensor started:: Other    Sensor was inserted with no resistance or bleeding at insertion site.    CGM-specific education:   Dexcom sensor: insertion technique, sensor site location and rotation, insulin administration in relation to sensor placement, Dexcom : setting alerts/alarms, Dexcom Mobile celso, and Dexcom Clarity software         ASSESSMENT:  Lexy brought all of her Dexcom supplies today an we were able to get her started on the G6! She was active in the warm-up period when she left the appointment. She is connected to [facility] account as well. Lexy brought in BG records from [place]. She has had elevated fasting BG. Before meal readings are also elevated. Plan on increasing Tresiba dose.       Pt verbalized understanding of concepts discussed and recommendations provided today.    Continue education with the following diabetes management concepts: Healthy Eating, Being Active, Monitoring, Taking Medication, Problem Solving, Reducing Risks, and Healthy Coping    PLAN    Continue using Dexcom G6  Increase Tresiba: 0-0-0-20 --> 0-0-0-22    Topics to cover at upcoming visits: Healthy Eating, Being Active, Monitoring, Taking Medication, Problem Solving, Reducing Risks, and Healthy Coping    Follow-up: 6/27 (telephone), 7/11 (in person)    See Care Plan for co-developed, patient-state behavior change goals.  AVS provided for patient today.    Education Materials Provided:  No new materials provided today      SUBJECTIVE/OBJECTIVE:  Presents for: Personal CGM Start  Accompanied by: Self, Sister (Dietitian from [facility]isted Living)  Diabetes education in the past 24mo: Yes  Focus of Visit: Problem Solving, Monitoring, Taking Medication  Diabetes type: Type 1  Disease course:  "Worsening  Transportation concerns: Yes (gets rides or takes public tranportation)  Difficulty affording diabetes medication?: No  Difficulty affording diabetes testing supplies?: No  Other concerns:: Cognitive impairment  Cultural Influences/Ethnic Background:  Not  or     Diabetes Symptoms & Complications:  Diabetes Related Symptoms: Neuropathy, Polydipsia (increased thirst), Polyuria (increased urination) (neuropathy)  Weight trend: Decreasing  Symptom course: Stable  Disease course: Worsening  Complications assessed today?: Yes  Autonomic neuropathy: No  CVA: No  Heart disease: No  Nephropathy: Yes  Peripheral neuropathy: Yes  Peripheral Vascular Disease: No  Retinopathy: No  Sexual dysfunction: No    Patient Problem List and Family Medical History reviewed for relevant medical history, current medical status, and diabetes risk factors.    Vitals:  LMP 03/28/2014   Estimated body mass index is 33.96 kg/m  as calculated from the following:    Height as of 4/24/24: 1.6 m (5' 3\").    Weight as of 4/24/24: 87 kg (191 lb 11.2 oz).   Last 3 BP:   BP Readings from Last 3 Encounters:   04/24/24 114/61   03/13/24 (!) 149/64   02/21/24 106/57       History   Smoking Status    Never   Smokeless Tobacco    Never       Labs:  Lab Results   Component Value Date    A1C 8.5 11/29/2023    A1C 9.7 02/01/2021     Lab Results   Component Value Date     01/01/2024     10/13/2021     02/01/2021     Lab Results   Component Value Date    LDL 58 01/25/2023     02/01/2021     HDL Cholesterol   Date Value Ref Range Status   02/01/2021 62 >49 mg/dL Final     Direct Measure HDL   Date Value Ref Range Status   01/25/2023 37 (L) >=50 mg/dL Final   ]  GFR Estimate   Date Value Ref Range Status   01/01/2024 47 (L) >60 mL/min/1.73m2 Final   02/01/2021 90 >60 mL/min/[1.73_m2] Final     Comment:     Non  GFR Calc  Starting 12/18/2018, serum creatinine based estimated GFR (eGFR) will be " "  calculated using the Chronic Kidney Disease Epidemiology Collaboration   (CKD-EPI) equation.       GFR Estimate If Black   Date Value Ref Range Status   02/01/2021 >90 >60 mL/min/[1.73_m2] Final     Comment:      GFR Calc  Starting 12/18/2018, serum creatinine based estimated GFR (eGFR) will be   calculated using the Chronic Kidney Disease Epidemiology Collaboration   (CKD-EPI) equation.       Lab Results   Component Value Date    CR 1.28 01/01/2024    CR 0.73 02/01/2021     No results found for: \"MICROALBUMIN\"    Healthy Eating:       Being Active:       Monitoring:    Date Breakfast  Lunch  Dinner  Bedtime    Before After Before After Before After    6/6 343         6/5 384  166  213  203   6/4 177  165  333  239   6/3 319  297  193  229   6/2 492  176  270  300   6/1 293  96  274  264       Taking Medications:  Diabetes Medication(s)       Diabetic Other       Glucagon (BAQSIMI) 3 MG/DOSE nasal powder Spray 1 spray (3 mg) in nostril as needed in the event of unconscious hypoglycemia or hypoglycemic seizure. May repeat dose if no response after 15 minutes.     glucagon 1 MG SOLR injection Inject 1 mg into the muscle once       Insulin       Insulin Degludec FlexTouch 100 UNIT/ML SOPN Inject 22 Units Subcutaneous daily EVERY NIGHT AT BEDTIME FOR DM *DISCARD 56 DAYS AFTER OPENING*     insulin aspart (NOVOLOG FLEXPEN) 100 UNIT/ML pen Inject 5 units before breakfast, 7 units before lunch and 7 units before dinner, 3 units before each snack.  Add correction scale before meals. -160 = 1 unit. -200 = 2 units. -240 = 3 units. -280 = 4 units. -320 = 5 u            Problem Solving:     Reducing Risks:       Healthy Coping:     Patient Activation Measure Survey Score:      3/7/2012     3:00 PM   KHUSHI Score (Last Two)   KHUSHI Raw Score 39   Activation Score 56.4   KHSUHI Level 3         Care Plan and Education Provided:  Monitoring: Blood glucose versus Continuous Glucose Monitoring, " Frequency of monitoring, and CGM instruction: Patient was instructed on Dexcom CGM system: Dexcom sensor: insertion technique, sensor site location and rotation, insulin administration in relation to sensor placement, Dexcom : setting alerts/alarms, Dexcom Mobile celso, and Dexcom Clarity software and Taking Medication: When to take medication(s)    KARLOS Haas [facility]  Triage: [phone]  [phone]      Time Spent: 50 minutes  Encounter Type: Individual    Any diabetes medication dose changes were made via the CDE Protocol per the patient's referring provider. A copy of this encounter was shared with the provider.

## 2024-06-06 NOTE — PATIENT INSTRUCTIONS
If you are having connection issue with your Dexcom, restart your phone    Increase your long acting insulin to 22 units at bedtime    KARLOS Haas [facility]  Triage: [phone]  [phone]

## 2024-06-14 ENCOUNTER — ASSISTED LIVING VISIT (OUTPATIENT)
Dept: GERIATRICS | Facility: CLINIC | Age: 64
End: 2024-06-14
Payer: MEDICARE

## 2024-06-14 VITALS
SYSTOLIC BLOOD PRESSURE: 128 MMHG | DIASTOLIC BLOOD PRESSURE: 65 MMHG | RESPIRATION RATE: 18 BRPM | TEMPERATURE: 97.3 F | HEIGHT: 64 IN | HEART RATE: 92 BPM | WEIGHT: 196.4 LBS | BODY MASS INDEX: 33.53 KG/M2 | OXYGEN SATURATION: 97 %

## 2024-06-14 DIAGNOSIS — E10.22 TYPE 1 DIABETES MELLITUS WITH STAGE 3A CHRONIC KIDNEY DISEASE (H): ICD-10-CM

## 2024-06-14 DIAGNOSIS — R63.5 WEIGHT GAIN: ICD-10-CM

## 2024-06-14 DIAGNOSIS — R21 RASH: Primary | ICD-10-CM

## 2024-06-14 DIAGNOSIS — N18.31 TYPE 1 DIABETES MELLITUS WITH STAGE 3A CHRONIC KIDNEY DISEASE (H): ICD-10-CM

## 2024-06-14 PROCEDURE — 99348 HOME/RES VST EST LOW MDM 30: CPT | Performed by: NURSE PRACTITIONER

## 2024-06-14 RX ORDER — CETIRIZINE HYDROCHLORIDE 10 MG/1
10 TABLET ORAL DAILY
Status: SHIPPED
Start: 2024-06-14 | End: 2024-06-21

## 2024-06-14 RX ORDER — DIAPER,BRIEF,INFANT-TODD,DISP
EACH MISCELLANEOUS 2 TIMES DAILY
Status: SHIPPED
Start: 2024-06-14 | End: 2024-06-21

## 2024-06-14 NOTE — PROGRESS NOTES
Lexy Rockwell  1960    Orders  CMP, CBC, hgb A1c, TSH next lab day     Zahra Way, APRN CNP on 6/14/2024 at 6:26 PM

## 2024-06-14 NOTE — LETTER
6/14/2024      Lexy Rockwell  [address]  [facility] [address]         [facility]      Code Status:  FULL CODE  Visit Type: Diabetes     Facility:   [facility] ([facility]) [[id number]]         History of Present Illness: Lexy Rockwell is a 63 year old female     Today pt was having a low BS  Has a rash on chest and flank    Current Outpatient Medications   Medication Sig Dispense Refill     acetaminophen (TYLENOL) 500 MG tablet TAKE 2 TABLETS BY MOUTH EVERY 6 HOURS AS NEEDED (1000MG) FOR PAIN 248 tablet 11     AMLODIPINE BENZOATE PO Take 2.5 mg by mouth       carvedilol (COREG) 6.25 MG tablet Take 1 tablet (6.25 mg) by mouth 2 times daily (with meals)       Continuous Blood Gluc  (DEXCOM G7 ) KELLY Use to read blood sugars as per 's instructions. 1 each 0     Continuous Blood Gluc Sensor (DEXCOM G6 SENSOR) MISC Change every 10 days. 3 each 5     Continuous Blood Gluc Sensor (DEXCOM G7 SENSOR) MISC Change every 10 days. 3 each 5     Continuous Blood Gluc Transmit (DEXCOM G6 TRANSMITTER) MISC Change every 3 months. 1 each 1     dorzolamide-timolol PF (COSOPT PF) 2-0.5 % opthalmic solutionh INSTILL 1 DROP IN BOTH EYES TWICE DAILY 60 each 3     ferrous sulfate (SLO-FE) 142 (45 Fe) MG CR tablet Take 1 tablet (142 mg) by mouth daily       fluticasone (FLONASE) 50 MCG/ACT nasal spray Spray 1 spray into both nostrils daily       furosemide (LASIX) 20 MG tablet Take 1 tablet (20 mg) by mouth daily       furosemide (LASIX) 40 MG tablet 40 mg       Glucagon (BAQSIMI) 3 MG/DOSE nasal powder Spray 1 spray (3 mg) in nostril as needed in the event of unconscious hypoglycemia or hypoglycemic seizure. May repeat dose if no response after 15 minutes. 2 each 3     glucagon 1 MG SOLR injection Inject 1 mg into the muscle once       insulin aspart (NOVOLOG FLEXPEN) 100 UNIT/ML pen Inject 5 units before breakfast, 7 units before lunch and 7 units before dinner, 3 units before  "each snack.  Add correction scale before meals. -160 = 1 unit. -200 = 2 units. -240 = 3 units. -280 = 4 units. -320 = 5 u 15 mL 2     Insulin Degludec FlexTouch 100 UNIT/ML SOPN Inject 22 Units Subcutaneous daily EVERY NIGHT AT BEDTIME FOR DM *DISCARD 56 DAYS AFTER OPENING* 6 mL PRN     insulin pen needle (NOVOFINE AUTOCOVER PEN NEEDLE) 30G X 8 MM miscellaneous 1 each See Admin Instructions as directed. 100 each 11     KETOSTIX test strip Use as directed in case of high glucose, vomiting or illness. 50 strip 3     latanoprost (XALATAN) 0.005 % ophthalmic solution INSTILL 1 DROP IN BOTH EYES AT BEDTIME 10 mL 11     lisinopril (ZESTRIL) 40 MG tablet Take 1 tablet (40 mg) by mouth daily       MULTIVITAMIN TABS   OR 1 TABLET DAILY       nystatin (MYCOSTATIN) 003500 UNIT/GM external cream Apply topically 2 times daily as needed for dry skin To feet and toenails.       pantoprazole (PROTONIX) 40 MG EC tablet Take 40 mg by mouth daily       potassium chloride ER (KLOR-CON M) 20 MEQ CR tablet Take 20 mEq by mouth daily       sennosides (SENOKOT) 8.6 MG tablet Take 1 tablet by mouth daily as needed       simvastatin (ZOCOR) 20 MG tablet Take 1 tablet (20 mg) by mouth At Bedtime 90 tablet 3       ALLERGIES:Amoxicillin and Sulfa antibiotics    Vitals:  /65   Pulse 92   Temp 97.3  F (36.3  C)   Resp 18   Ht 1.626 m (5' 4\")   Wt 89.1 kg (196 lb 6.4 oz)   LMP 03/28/2014   SpO2 97%   BMI 33.71 kg/m    Body mass index is 33.71 kg/m .    Review of Systems   All other systems reviewed and are negative.         Physical Exam  Vitals and nursing note reviewed.   Skin:     Findings: Erythema and rash present.   Neurological:      Mental Status: She is alert.       Labs:     Most Recent 3 CBC's:  Recent Labs   Lab Test 11/29/23  1150 08/30/23  0535 05/08/23  0835   WBC 7.6 4.3 3.4*   HGB 10.4* 10.5* 10.2*   MCV 89 87 88    181 184     Most Recent 3 BMP's:  Recent Labs   Lab Test " 01/01/24  0949 12/14/23  0544 12/06/23  0542    143 142   POTASSIUM 4.3 4.5 4.3   CHLORIDE 103 112* 108*   CO2 26 22 24   BUN 52.4* 51.4* 39.8*   CR 1.28* 0.95 1.15*   ANIONGAP 10 9 10   EFRAIN 10.1 9.2 9.4   * 112* 137*          Assessment/Plan:  (R21) Rash  (primary encounter diagnosis)  Comment: pt has rash on chest and torso  Plan: start cetirizine (ZYRTEC) 10 MG tablet x 7 days        hydrocortisone (CORTAID) 1 % external ointment BID x 7 days      (E10.22,  N18.31) Type 1 diabetes mellitus with stage 3a chronic kidney disease (H)  Comment: BS 70 while writer in room.   Plan: OJ given and blood sugar increased     (R63.5) Weight gain  Comment: pt gaining weight  Wt Readings from Last 4 Encounters:   06/14/24 89.1 kg (196 lb 6.4 oz)   04/24/24 87 kg (191 lb 11.2 oz)   04/23/24 81.6 kg (180 lb)   03/13/24 84.6 kg (186 lb 8 oz)   Weight in 2/2024 was 178  Plan:   CMP, CBC, hgb A1c, TSH next lab day         Electronically signed by:      TAYLA Sprague CNP on 6/14/2024 at 6:26 PM      MEDICATIONS:  MED REC REQUIRED  Post Medication Reconciliation Status:  Medication reconciliation previously completed during another office visit                Lexy Rockwell  1960    Orders  CMP, CBC, hgb A1c, TSH next lab day     TAYLA Sprague CNP on 6/14/2024 at 6:26 PM        Sincerely,        TAYLA Sprague CNP

## 2024-06-14 NOTE — PROGRESS NOTES
[facility]      Code Status:  FULL CODE  Visit Type: Diabetes     Facility:   [facility]) [[id number]]         History of Present Illness: Lexy Rockwell is a 63 year old female     Today pt was having a low BS  Has a rash on chest and flank    Current Outpatient Medications   Medication Sig Dispense Refill    acetaminophen (TYLENOL) 500 MG tablet TAKE 2 TABLETS BY MOUTH EVERY 6 HOURS AS NEEDED (1000MG) FOR PAIN 248 tablet 11    AMLODIPINE BENZOATE PO Take 2.5 mg by mouth      carvedilol (COREG) 6.25 MG tablet Take 1 tablet (6.25 mg) by mouth 2 times daily (with meals)      Continuous Blood Gluc  (DEXCOM G7 ) KELLY Use to read blood sugars as per 's instructions. 1 each 0    Continuous Blood Gluc Sensor (DEXCOM G6 SENSOR) MISC Change every 10 days. 3 each 5    Continuous Blood Gluc Sensor (DEXCOM G7 SENSOR) MISC Change every 10 days. 3 each 5    Continuous Blood Gluc Transmit (DEXCOM G6 TRANSMITTER) MISC Change every 3 months. 1 each 1    dorzolamide-timolol PF (COSOPT PF) 2-0.5 % opthalmic solutionh INSTILL 1 DROP IN BOTH EYES TWICE DAILY 60 each 3    ferrous sulfate (SLO-FE) 142 (45 Fe) MG CR tablet Take 1 tablet (142 mg) by mouth daily      fluticasone (FLONASE) 50 MCG/ACT nasal spray Spray 1 spray into both nostrils daily      furosemide (LASIX) 20 MG tablet Take 1 tablet (20 mg) by mouth daily      furosemide (LASIX) 40 MG tablet 40 mg      Glucagon (BAQSIMI) 3 MG/DOSE nasal powder Spray 1 spray (3 mg) in nostril as needed in the event of unconscious hypoglycemia or hypoglycemic seizure. May repeat dose if no response after 15 minutes. 2 each 3    glucagon 1 MG SOLR injection Inject 1 mg into the muscle once      insulin aspart (NOVOLOG FLEXPEN) 100 UNIT/ML pen Inject 5 units before breakfast, 7 units before lunch and 7 units before dinner, 3 units before each snack.  Add correction scale before meals. -160 = 1 unit. -200 = 2 units. -240 =  "3 units. -280 = 4 units. -320 = 5 u 15 mL 2    Insulin Degludec FlexTouch 100 UNIT/ML SOPN Inject 22 Units Subcutaneous daily EVERY NIGHT AT BEDTIME FOR DM *DISCARD 56 DAYS AFTER OPENING* 6 mL PRN    insulin pen needle (NOVOFINE AUTOCOVER PEN NEEDLE) 30G X 8 MM miscellaneous 1 each See Admin Instructions as directed. 100 each 11    KETOSTIX test strip Use as directed in case of high glucose, vomiting or illness. 50 strip 3    latanoprost (XALATAN) 0.005 % ophthalmic solution INSTILL 1 DROP IN BOTH EYES AT BEDTIME 10 mL 11    lisinopril (ZESTRIL) 40 MG tablet Take 1 tablet (40 mg) by mouth daily      MULTIVITAMIN TABS   OR 1 TABLET DAILY      nystatin (MYCOSTATIN) 363703 UNIT/GM external cream Apply topically 2 times daily as needed for dry skin To feet and toenails.      pantoprazole (PROTONIX) 40 MG EC tablet Take 40 mg by mouth daily      potassium chloride ER (KLOR-CON M) 20 MEQ CR tablet Take 20 mEq by mouth daily      sennosides (SENOKOT) 8.6 MG tablet Take 1 tablet by mouth daily as needed      simvastatin (ZOCOR) 20 MG tablet Take 1 tablet (20 mg) by mouth At Bedtime 90 tablet 3       ALLERGIES:Amoxicillin and Sulfa antibiotics    Vitals:  /65   Pulse 92   Temp 97.3  F (36.3  C)   Resp 18   Ht 1.626 m (5' 4\")   Wt 89.1 kg (196 lb 6.4 oz)   LMP 03/28/2014   SpO2 97%   BMI 33.71 kg/m    Body mass index is 33.71 kg/m .    Review of Systems   All other systems reviewed and are negative.         Physical Exam  Vitals and nursing note reviewed.   Skin:     Findings: Erythema and rash present.   Neurological:      Mental Status: She is alert.       Labs:     Most Recent 3 CBC's:  Recent Labs   Lab Test 11/29/23  1150 08/30/23  0535 05/08/23  0835   WBC 7.6 4.3 3.4*   HGB 10.4* 10.5* 10.2*   MCV 89 87 88    181 184     Most Recent 3 BMP's:  Recent Labs   Lab Test 01/01/24  0949 12/14/23  0544 12/06/23  0542    143 142   POTASSIUM 4.3 4.5 4.3   CHLORIDE 103 112* 108*   CO2 26 22 " 24   BUN 52.4* 51.4* 39.8*   CR 1.28* 0.95 1.15*   ANIONGAP 10 9 10   EFRAIN 10.1 9.2 9.4   * 112* 137*          Assessment/Plan:  (R21) Rash  (primary encounter diagnosis)  Comment: pt has rash on chest and torso  Plan: start cetirizine (ZYRTEC) 10 MG tablet x 7 days        hydrocortisone (CORTAID) 1 % external ointment BID x 7 days      (E10.22,  N18.31) Type 1 diabetes mellitus with stage 3a chronic kidney disease (H)  Comment: BS 70 while writer in room.   Plan: OJ given and blood sugar increased     (R63.5) Weight gain  Comment: pt gaining weight  Wt Readings from Last 4 Encounters:   06/14/24 89.1 kg (196 lb 6.4 oz)   04/24/24 87 kg (191 lb 11.2 oz)   04/23/24 81.6 kg (180 lb)   03/13/24 84.6 kg (186 lb 8 oz)   Weight in 2/2024 was 178  Plan:   CMP, CBC, hgb A1c, TSH next lab day         Electronically signed by:      TAYLA Sprague CNP on 6/14/2024 at 6:26 PM      MEDICATIONS:  MED REC REQUIRED  Post Medication Reconciliation Status:  Medication reconciliation previously completed during another office visit

## 2024-06-17 ENCOUNTER — LAB REQUISITION (OUTPATIENT)
Dept: LAB | Facility: CLINIC | Age: 64
End: 2024-06-17
Payer: MEDICARE

## 2024-06-17 DIAGNOSIS — E03.9 HYPOTHYROIDISM, UNSPECIFIED: ICD-10-CM

## 2024-06-17 DIAGNOSIS — E11.9 TYPE 2 DIABETES MELLITUS WITHOUT COMPLICATIONS (H): ICD-10-CM

## 2024-06-19 LAB
ALBUMIN SERPL BCG-MCNC: 3.4 G/DL (ref 3.5–5.2)
ALP SERPL-CCNC: 114 U/L (ref 40–150)
ALT SERPL W P-5'-P-CCNC: 14 U/L (ref 0–50)
ANION GAP SERPL CALCULATED.3IONS-SCNC: 10 MMOL/L (ref 7–15)
AST SERPL W P-5'-P-CCNC: 17 U/L (ref 0–45)
BILIRUB SERPL-MCNC: 0.2 MG/DL
BUN SERPL-MCNC: 67 MG/DL (ref 8–23)
CALCIUM SERPL-MCNC: 9.5 MG/DL (ref 8.8–10.2)
CHLORIDE SERPL-SCNC: 111 MMOL/L (ref 98–107)
CREAT SERPL-MCNC: 1.31 MG/DL (ref 0.51–0.95)
DEPRECATED HCO3 PLAS-SCNC: 21 MMOL/L (ref 22–29)
EGFRCR SERPLBLD CKD-EPI 2021: 46 ML/MIN/1.73M2
ERYTHROCYTE [DISTWIDTH] IN BLOOD BY AUTOMATED COUNT: 13 % (ref 10–15)
GLUCOSE SERPL-MCNC: 125 MG/DL (ref 70–99)
HBA1C MFR BLD: 9.7 %
HCT VFR BLD AUTO: 31 % (ref 35–47)
HGB BLD-MCNC: 9.7 G/DL (ref 11.7–15.7)
MCH RBC QN AUTO: 28.5 PG (ref 26.5–33)
MCHC RBC AUTO-ENTMCNC: 31.3 G/DL (ref 31.5–36.5)
MCV RBC AUTO: 91 FL (ref 78–100)
PLATELET # BLD AUTO: 196 10E3/UL (ref 150–450)
POTASSIUM SERPL-SCNC: 4.7 MMOL/L (ref 3.4–5.3)
PROT SERPL-MCNC: 6.4 G/DL (ref 6.4–8.3)
RBC # BLD AUTO: 3.4 10E6/UL (ref 3.8–5.2)
SODIUM SERPL-SCNC: 142 MMOL/L (ref 135–145)
TSH SERPL DL<=0.005 MIU/L-ACNC: 5.29 UIU/ML (ref 0.3–4.2)
WBC # BLD AUTO: 5.6 10E3/UL (ref 4–11)

## 2024-06-19 PROCEDURE — 83036 HEMOGLOBIN GLYCOSYLATED A1C: CPT | Mod: ORL | Performed by: NURSE PRACTITIONER

## 2024-06-19 PROCEDURE — 80053 COMPREHEN METABOLIC PANEL: CPT | Mod: ORL | Performed by: NURSE PRACTITIONER

## 2024-06-19 PROCEDURE — 84443 ASSAY THYROID STIM HORMONE: CPT | Mod: ORL | Performed by: NURSE PRACTITIONER

## 2024-06-19 PROCEDURE — 85027 COMPLETE CBC AUTOMATED: CPT | Mod: ORL | Performed by: NURSE PRACTITIONER

## 2024-06-19 PROCEDURE — 36415 COLL VENOUS BLD VENIPUNCTURE: CPT | Mod: ORL | Performed by: NURSE PRACTITIONER

## 2024-06-19 PROCEDURE — P9604 ONE-WAY ALLOW PRORATED TRIP: HCPCS | Mod: ORL | Performed by: NURSE PRACTITIONER

## 2024-06-20 ENCOUNTER — ASSISTED LIVING VISIT (OUTPATIENT)
Dept: GERIATRICS | Facility: CLINIC | Age: 64
End: 2024-06-20
Payer: MEDICARE

## 2024-06-20 VITALS
DIASTOLIC BLOOD PRESSURE: 75 MMHG | HEART RATE: 78 BPM | RESPIRATION RATE: 18 BRPM | WEIGHT: 196.4 LBS | SYSTOLIC BLOOD PRESSURE: 130 MMHG | TEMPERATURE: 96.8 F | BODY MASS INDEX: 33.53 KG/M2 | HEIGHT: 64 IN | OXYGEN SATURATION: 98 %

## 2024-06-20 DIAGNOSIS — R63.5 WEIGHT GAIN: ICD-10-CM

## 2024-06-20 DIAGNOSIS — E10.22 TYPE 1 DIABETES MELLITUS WITH STAGE 3A CHRONIC KIDNEY DISEASE (H): ICD-10-CM

## 2024-06-20 DIAGNOSIS — N18.31 TYPE 1 DIABETES MELLITUS WITH STAGE 3A CHRONIC KIDNEY DISEASE (H): ICD-10-CM

## 2024-06-20 DIAGNOSIS — R21 RASH: Primary | ICD-10-CM

## 2024-06-20 PROCEDURE — 99349 HOME/RES VST EST MOD MDM 40: CPT | Performed by: NURSE PRACTITIONER

## 2024-06-20 NOTE — PROGRESS NOTES
"[facility] GERIATRICS    Chief Complaint   Patient presents with    RECHECK     HPI:  Lexy Rockwell is a 63 year old  (1960), who is being seen today for an episodic care visit at: [facility] ([facility]) [[id number]].     On 6/14, pt was treated for a rash   Blood sugars look controlled in the past week         Allergies, and PMH/PSH reviewed in [facility] today.  REVIEW OF SYSTEMS:  4 point ROS including Respiratory, CV, GI and , other than that noted in the HPI,  is negative    Objective:   /75   Pulse 78   Temp 96.8  F (36  C)   Resp 18   Ht 1.626 m (5' 4\")   Wt 89.1 kg (196 lb 6.4 oz)   LMP 03/28/2014   SpO2 98%   BMI 33.71 kg/m    GENERAL APPEARANCE:  Alert  SKIN:  scratch marks on torso, panus  PSYCH:  oriented X 3    Recent Results (from the past 240 hour(s))   CBC with platelets    Collection Time: 06/19/24  6:53 AM   Result Value Ref Range    WBC Count 5.6 4.0 - 11.0 10e3/uL    RBC Count 3.40 (L) 3.80 - 5.20 10e6/uL    Hemoglobin 9.7 (L) 11.7 - 15.7 g/dL    Hematocrit 31.0 (L) 35.0 - 47.0 %    MCV 91 78 - 100 fL    MCH 28.5 26.5 - 33.0 pg    MCHC 31.3 (L) 31.5 - 36.5 g/dL    RDW 13.0 10.0 - 15.0 %    Platelet Count 196 150 - 450 10e3/uL   TSH    Collection Time: 06/19/24  6:53 AM   Result Value Ref Range    TSH 5.29 (H) 0.30 - 4.20 uIU/mL   Hemoglobin A1c    Collection Time: 06/19/24  6:53 AM   Result Value Ref Range    Hemoglobin A1C 9.7 (H) <5.7 %   Glucose (OUTREACH)    Collection Time: 06/19/24  6:53 AM   Result Value Ref Range    Glucose 125 (H) 70 - 99 mg/dL   Comprehensive Metabolic Panel No Glucose (OUTREACH)    Collection Time: 06/19/24  6:53 AM   Result Value Ref Range    Sodium 142 135 - 145 mmol/L    Potassium 4.7 3.4 - 5.3 mmol/L    Carbon Dioxide (CO2) 21 (L) 22 - 29 mmol/L    Anion Gap 10 7 - 15 mmol/L    Urea Nitrogen 67.0 (H) 8.0 - 23.0 mg/dL    Creatinine 1.31 (H) 0.51 - 0.95 mg/dL    GFR Estimate 46 (L) >60 mL/min/1.73m2    Calcium 9.5 8.8 - 10.2 mg/dL    Chloride " 111 (H) 98 - 107 mmol/L    Alkaline Phosphatase 114 40 - 150 U/L    AST 17 0 - 45 U/L    ALT 14 0 - 50 U/L    Protein Total 6.4 6.4 - 8.3 g/dL    Albumin 3.4 (L) 3.5 - 5.2 g/dL    Bilirubin Total 0.2 <=1.2 mg/dL       Assessment/Plan:  (R21) Rash  (primary encounter diagnosis)  Comment: pt has rash on chest and torso  Has not resolve with zyrtec and cortaid for 7 days  Plan: continue with cetirizine (ZYRTEC) 10 MG tablet and hydrocortisone (CORTAID) 1 % external ointment BID and recheck in 4 days.        (E10.22,  N18.31) Type 1 diabetes mellitus with stage 3a chronic kidney disease (H)  Comment:   BS difficult to control and has worsened with moving to [facility].    Hgb A1c 9.7  = follows with diabetic educator   GFR was stable at 46  Estimated Creatinine Clearance: 48.2 mL/min (A) (based on SCr of 1.31 mg/dL (H)).  Lab Results   Component Value Date    A1C 9.7 06/19/2024    A1C 8.5 11/29/2023    A1C 8.1 03/29/2023    A1C 8.7 01/25/2023    A1C 10.7 10/13/2021    A1C 9.7 02/01/2021    A1C 9.2 09/30/2020    A1C 11.8 01/08/2020    A1C 11.8 05/19/2019    A1C 11.2 12/12/2018       Plan: insurance wants to change insulin at this time do not agree with this as pt's blood sugars are difficult to control with multiple lows and highs     (R63.5) Weight gain  Comment: pt gaining weight she stated this morning it was 200#  TSH is slightly elevated.       Wt Readings from Last 4 Encounters:   06/14/24 89.1 kg (196 lb 6.4 oz)   04/24/24 87 kg (191 lb 11.2 oz)   04/23/24 81.6 kg (180 lb)   03/13/24 84.6 kg (186 lb 8 oz)   Weight in 2/2024 was 178  Plan:   Repeat TSH in 2 months    MED REC REQUIRED  Post Medication Reconciliation Status:  Medication reconciliation previously completed during another office visit          Electronically signed by:     TAYLA Sprague CNP

## 2024-06-20 NOTE — LETTER
" 6/20/2024      Lexy Rockwell  [address]  [facility] [id number]        [facility] GERIATRICS    Chief Complaint   Patient presents with     RECHECK     HPI:  Lexy Rockwell is a 63 year old  (1960), who is being seen today for an episodic care visit at: [facility] ([facility]) [[id number]].     On 6/14, pt was treated for a rash   Blood sugars look controlled in the past week         Allergies, and PMH/PSH reviewed in [facility] today.  REVIEW OF SYSTEMS:  4 point ROS including Respiratory, CV, GI and , other than that noted in the HPI,  is negative    Objective:   /75   Pulse 78   Temp 96.8  F (36  C)   Resp 18   Ht 1.626 m (5' 4\")   Wt 89.1 kg (196 lb 6.4 oz)   LMP 03/28/2014   SpO2 98%   BMI 33.71 kg/m    GENERAL APPEARANCE:  Alert  SKIN:  scratch marks on torso, panus  PSYCH:  oriented X 3    Recent Results (from the past 240 hour(s))   CBC with platelets    Collection Time: 06/19/24  6:53 AM   Result Value Ref Range    WBC Count 5.6 4.0 - 11.0 10e3/uL    RBC Count 3.40 (L) 3.80 - 5.20 10e6/uL    Hemoglobin 9.7 (L) 11.7 - 15.7 g/dL    Hematocrit 31.0 (L) 35.0 - 47.0 %    MCV 91 78 - 100 fL    MCH 28.5 26.5 - 33.0 pg    MCHC 31.3 (L) 31.5 - 36.5 g/dL    RDW 13.0 10.0 - 15.0 %    Platelet Count 196 150 - 450 10e3/uL   TSH    Collection Time: 06/19/24  6:53 AM   Result Value Ref Range    TSH 5.29 (H) 0.30 - 4.20 uIU/mL   Hemoglobin A1c    Collection Time: 06/19/24  6:53 AM   Result Value Ref Range    Hemoglobin A1C 9.7 (H) <5.7 %   Glucose (OUTREACH)    Collection Time: 06/19/24  6:53 AM   Result Value Ref Range    Glucose 125 (H) 70 - 99 mg/dL   Comprehensive Metabolic Panel No Glucose (OUTREACH)    Collection Time: 06/19/24  6:53 AM   Result Value Ref Range    Sodium 142 135 - 145 mmol/L    Potassium 4.7 3.4 - 5.3 mmol/L    Carbon Dioxide (CO2) 21 (L) 22 - 29 mmol/L    Anion Gap 10 7 - 15 mmol/L    Urea Nitrogen 67.0 (H) 8.0 - 23.0 mg/dL    Creatinine 1.31 (H) 0.51 - 0.95 " mg/dL    GFR Estimate 46 (L) >60 mL/min/1.73m2    Calcium 9.5 8.8 - 10.2 mg/dL    Chloride 111 (H) 98 - 107 mmol/L    Alkaline Phosphatase 114 40 - 150 U/L    AST 17 0 - 45 U/L    ALT 14 0 - 50 U/L    Protein Total 6.4 6.4 - 8.3 g/dL    Albumin 3.4 (L) 3.5 - 5.2 g/dL    Bilirubin Total 0.2 <=1.2 mg/dL       Assessment/Plan:  (R21) Rash  (primary encounter diagnosis)  Comment: pt has rash on chest and torso  Has not resolve with zyrtec and cortaid for 7 days  Plan: continue with cetirizine (ZYRTEC) 10 MG tablet and hydrocortisone (CORTAID) 1 % external ointment BID and recheck in 4 days.        (E10.22,  N18.31) Type 1 diabetes mellitus with stage 3a chronic kidney disease (H)  Comment:   BS difficult to control and has worsened with moving to [facility].    Hgb A1c 9.7  = follows with diabetic educator   GFR was stable at 46  Estimated Creatinine Clearance: 48.2 mL/min (A) (based on SCr of 1.31 mg/dL (H)).  Lab Results   Component Value Date    A1C 9.7 06/19/2024    A1C 8.5 11/29/2023    A1C 8.1 03/29/2023    A1C 8.7 01/25/2023    A1C 10.7 10/13/2021    A1C 9.7 02/01/2021    A1C 9.2 09/30/2020    A1C 11.8 01/08/2020    A1C 11.8 05/19/2019    A1C 11.2 12/12/2018       Plan: insurance wants to change insulin at this time do not agree with this as pt's blood sugars are difficult to control with multiple lows and highs     (R63.5) Weight gain  Comment: pt gaining weight she stated this morning it was 200#  TSH is slightly elevated.       Wt Readings from Last 4 Encounters:   06/14/24 89.1 kg (196 lb 6.4 oz)   04/24/24 87 kg (191 lb 11.2 oz)   04/23/24 81.6 kg (180 lb)   03/13/24 84.6 kg (186 lb 8 oz)   Weight in 2/2024 was 178  Plan:   Repeat TSH in 2 months    MED REC REQUIRED  Post Medication Reconciliation Status:  Medication reconciliation previously completed during another office visit          Electronically signed by:     TAYLA Sprague CNP            Sincerely,        TAYLA Sprague CNP

## 2024-06-24 ENCOUNTER — ASSISTED LIVING VISIT (OUTPATIENT)
Dept: GERIATRICS | Facility: CLINIC | Age: 64
End: 2024-06-24
Payer: MEDICARE

## 2024-06-24 VITALS
RESPIRATION RATE: 18 BRPM | HEIGHT: 64 IN | WEIGHT: 196.4 LBS | HEART RATE: 87 BPM | OXYGEN SATURATION: 98 % | BODY MASS INDEX: 33.53 KG/M2 | DIASTOLIC BLOOD PRESSURE: 80 MMHG | SYSTOLIC BLOOD PRESSURE: 145 MMHG | TEMPERATURE: 96.8 F

## 2024-06-24 DIAGNOSIS — F81.9 COGNITIVE DEVELOPMENTAL DELAY: ICD-10-CM

## 2024-06-24 DIAGNOSIS — R21 RASH: Primary | ICD-10-CM

## 2024-06-24 DIAGNOSIS — R63.5 WEIGHT GAIN: ICD-10-CM

## 2024-06-24 PROCEDURE — 99348 HOME/RES VST EST LOW MDM 30: CPT | Performed by: NURSE PRACTITIONER

## 2024-06-24 NOTE — LETTER
" 6/24/2024      Lexy Rockwell  [address]  [facility] [facility] [address]        [facility] GERIATRICS    Chief Complaint   Patient presents with     Derm Problem     HPI:  Lexy Rockwell is a 63 year old  (1960), who is being seen today for an episodic care visit at: [facility] ([facility]) [[id number]].      Skin is impoving    Allergies, and PMH/PSH reviewed in [facility] today.  REVIEW OF SYSTEMS:  4 point ROS including Respiratory, CV, GI and , other than that noted in the HPI,  is negative    Objective:   BP (!) 145/80   Pulse 87   Temp 96.8  F (36  C)   Resp 18   Ht 1.626 m (5' 4\")   Wt 89.1 kg (196 lb 6.4 oz)   LMP 03/28/2014   SpO2 98%   BMI 33.71 kg/m    GENERAL APPEARANCE:  Alert  SKIN:  scratches, no infection, no weeping        Assessment/Plan:  (R21) Rash  (primary encounter diagnosis)  Comment: pt has rash on chest and torso  Has not resolve with zyrtec and cortaid for 7 days  Plan: continue with cetirizine (ZYRTEC) 10 MG tablet and hydrocortisone (CORTAID) 1 % external ointment BID    (R63.5) Weight gain  Comment: encouraged good nutrition to help with BS and decrease risk of infection  Plan: notified diabetic educator of weight gain    (F81.9) Cognitive developmental delay  Comment: chronic  Pt slums was 22/30 in 2021 making dietary education more difficult   Plan: Continue with plan of care no changes at this time, adjustment as needed      MED REC REQUIRED  Post Medication Reconciliation Status:  Medication reconciliation previously completed during another office visit        Electronically signed by:     TAYLA Sprague CNP on 6/24/2024 at 3:23 PM           Sincerely,        TAYLA Sprague CNP      "

## 2024-06-24 NOTE — PROGRESS NOTES
"[facility] GERIATRICS    Chief Complaint   Patient presents with    Derm Problem     HPI:  Lexy Rockwell is a 63 year old  (1960), who is being seen today for an episodic care visit at: [facility] ([facility]) [[id number]].      Skin is impoving    Allergies, and PMH/PSH reviewed in [facility] today.  REVIEW OF SYSTEMS:  4 point ROS including Respiratory, CV, GI and , other than that noted in the HPI,  is negative    Objective:   BP (!) 145/80   Pulse 87   Temp 96.8  F (36  C)   Resp 18   Ht 1.626 m (5' 4\")   Wt 89.1 kg (196 lb 6.4 oz)   LMP 03/28/2014   SpO2 98%   BMI 33.71 kg/m    GENERAL APPEARANCE:  Alert  SKIN:  scratches, no infection, no weeping        Assessment/Plan:  (R21) Rash  (primary encounter diagnosis)  Comment: pt has rash on chest and torso  Has not resolve with zyrtec and cortaid for 7 days  Plan: continue with cetirizine (ZYRTEC) 10 MG tablet and hydrocortisone (CORTAID) 1 % external ointment BID    (R63.5) Weight gain  Comment: encouraged good nutrition to help with BS and decrease risk of infection  Plan: notified diabetic educator of weight gain    (F81.9) Cognitive developmental delay  Comment: chronic  Pt slums was 22/30 in 2021 making dietary education more difficult   Plan: Continue with plan of care no changes at this time, adjustment as needed      MED REC REQUIRED  Post Medication Reconciliation Status:  Medication reconciliation previously completed during another office visit        Electronically signed by:     TAYLA Sprague CNP on 6/24/2024 at 3:23 PM       "

## 2024-06-27 ENCOUNTER — VIRTUAL VISIT (OUTPATIENT)
Dept: EDUCATION SERVICES | Facility: CLINIC | Age: 64
End: 2024-06-27
Payer: MEDICARE

## 2024-06-27 DIAGNOSIS — E10.3393 TYPE 1 DIABETES MELLITUS WITH MODERATE NONPROLIFERATIVE RETINOPATHY OF BOTH EYES, MACULAR EDEMA PRESENCE UNSPECIFIED (H): ICD-10-CM

## 2024-06-27 PROCEDURE — 98966 PH1 ASSMT&MGMT NQHP 5-10: CPT | Mod: 93 | Performed by: DIETITIAN, REGISTERED

## 2024-06-27 NOTE — LETTER
6/27/2024         RE: Lexy Rockwell  [address]  [place] [place] [facility] [address]        Dear Colleague,    Thank you for referring your patient, Lexy Rockwell, to the [facility]. Please see a copy of my visit note below.    Diabetes Self-Management Education & Support    Presents for: Personal CGM Start    Type of Service: Telephone Visit    Audio only visit done, as patient does not have access to audio-visual technology.    Individual visit provided, given no group classes are available for 2 months.     Originating Location (Patient Location): Long term Care  Distant Location (Provider Location): [facility]  Mode of Communication:  Telephone    Telephone Visit Start Time:  9 am  Telephone Visit End Time (telephone visit stop time): 9:15 am    How would patient like to obtain AVS? MyChart      REPORTS:                  Lexy reports that the low blood sugars that are shown on June 26th were possibly inaccurate. She states that they tested her BG and and BG reading did not display low and she didn't have any low symptoms.       Pt verbalized understanding of concepts discussed and recommendations provided today.       Continue education with the following diabetes management concepts: Healthy Eating, Being Active, Monitoring, Taking Medication, Problem Solving, Reducing Risks, and Healthy Coping    ASSESSMENT:  BG slightly more stable the last 2 weeks, seeing a BG spike in the evenings. Lexy reports that she is  still eating a cookie at bedtime to prevent an overnight low. We discussed skipping the cookie, or just doing half of the cookie as it appears the snack could be causing higher blood sugars overnight.       Glucose Patterns & Trends:  Hyperglycemia, weekend- postmeal and nocturnal and weekday- postmeal and nocturnal    PLAN    Skip or only eat 1/2 cookie at bedtime  No changes to insulin today      Topics to cover at  "upcoming visits: Healthy Eating, Being Active, Monitoring, Taking Medication, Problem Solving, Reducing Risks, and Healthy Coping    Follow-up: 7/11    See Care Plan for co-developed, patient-state behavior change goals.  AVS provided for patient today.    Education Materials Provided:  No new materials provided today      SUBJECTIVE/OBJECTIVE:  Presents for: Personal CGM Start  Accompanied by: Self (Dietitian from Assisted Living)  Diabetes education in the past 24mo: Yes  Focus of Visit: Problem Solving, Monitoring, Taking Medication  Diabetes type: Type 1  Disease course: Worsening  Transportation concerns: Yes (gets rides or takes public tranportation)  Difficulty affording diabetes medication?: No  Difficulty affording diabetes testing supplies?: No  Other concerns:: Cognitive impairment  Cultural Influences/Ethnic Background:  Not  or     Diabetes Symptoms & Complications:  Diabetes Related Symptoms: Neuropathy, Polydipsia (increased thirst), Polyuria (increased urination) (neuropathy)  Weight trend: Decreasing  Symptom course: Stable  Disease course: Worsening  Complications assessed today?: Yes  Autonomic neuropathy: No  CVA: No  Heart disease: No  Nephropathy: Yes  Peripheral neuropathy: Yes  Peripheral Vascular Disease: No  Retinopathy: No  Sexual dysfunction: No    Patient Problem List and Family Medical History reviewed for relevant medical history, current medical status, and diabetes risk factors.    Vitals:  LMP 03/28/2014   Estimated body mass index is 34.54 kg/m  as calculated from the following:    Height as of 6/28/24: 1.626 m (5' 4\").    Weight as of 6/28/24: 91.3 kg (201 lb 3.2 oz).   Last 3 BP:   BP Readings from Last 3 Encounters:   06/28/24 (!) 152/67   06/24/24 (!) 145/80   06/20/24 130/75       History   Smoking Status     Never   Smokeless Tobacco     Never       Labs:  Lab Results   Component Value Date    A1C 9.7 06/19/2024    A1C 9.7 02/01/2021     Lab Results   Component " "Value Date     06/19/2024     10/13/2021     02/01/2021     Lab Results   Component Value Date    LDL 58 01/25/2023     02/01/2021     HDL Cholesterol   Date Value Ref Range Status   02/01/2021 62 >49 mg/dL Final     Direct Measure HDL   Date Value Ref Range Status   01/25/2023 37 (L) >=50 mg/dL Final   ]  GFR Estimate   Date Value Ref Range Status   06/19/2024 46 (L) >60 mL/min/1.73m2 Final   02/01/2021 90 >60 mL/min/[1.73_m2] Final     Comment:     Non  GFR Calc  Starting 12/18/2018, serum creatinine based estimated GFR (eGFR) will be   calculated using the Chronic Kidney Disease Epidemiology Collaboration   (CKD-EPI) equation.       GFR Estimate If Black   Date Value Ref Range Status   02/01/2021 >90 >60 mL/min/[1.73_m2] Final     Comment:      GFR Calc  Starting 12/18/2018, serum creatinine based estimated GFR (eGFR) will be   calculated using the Chronic Kidney Disease Epidemiology Collaboration   (CKD-EPI) equation.       Lab Results   Component Value Date    CR 1.31 06/19/2024    CR 0.73 02/01/2021     No results found for: \"MICROALBUMIN\"    Healthy Eating:  Healthy Eating Assessed Today: No  Cultural/Restorationism diet restrictions?: No  Do you have any food allergies or intolerances?: No  Meal planning/habits: None  Who cooks/prepares meals for you?: Other  Who purchases food in  your home?: Other  How many times a week on average do you eat food made away from home (restaurant/take-out)?: 0  Meals include: Breakfast, Lunch, Dinner, Afternoon Snack, Evening Snack  Breakfast: 8:00 am: Oatmeal with hard boiled egg or scrambled eggs and barragan with toast OR pancake/waffle with eggs OR Khmer toast  Lunch: 12-12:30: Stir Edouard OR hamburgers with vegetables (with dessert- small cake or cookie)  Dinner: 5:00 -6:00 pm: sandwich OR chili (vegetarian) OR chicken OR fish  with vegetables with desserts (cake or cookies, fruit)  Snacks: mornnig snack: yogurt or " pudding AND Has one bedtime snack- usually a cookie - oatmeal raisin or chocolate chip or yogurt OR sugra free candy every once and a while  Other: drinks coffee with sugar free creamer  Beverages: Water, Coffee, Milk (Keep some juice on hand for lows)  Has patient met with a dietitian in the past?: Yes    Being Active:  Being Active Assessed Today: Yes (went on the bike at her apartment complex)  Exercise:: Yes (walking, programs at care center)  Days per week of moderate to strenuous exercise (like a brisk walk): 2  On average, minutes per day of exercise at this level:  (Walking with friends)  How intense was your typical exercise? : Light (like stretching or slow walking)  Barrier to exercise: Safety, Physical limitation    Monitoring:  Monitoring Assessed Today: Yes  Did patient bring glucose meter to appointment? : No  Blood Glucose Meter: Unknown  Times checking blood sugar at home (number): 4  Times checking blood sugar at home (per): Day  Blood glucose trend: Fluctuating      Taking Medications:  Diabetes Medication(s)       Diabetic Other       Glucagon (BAQSIMI) 3 MG/DOSE nasal powder Spray 1 spray (3 mg) in nostril as needed in the event of unconscious hypoglycemia or hypoglycemic seizure. May repeat dose if no response after 15 minutes.     glucagon 1 MG SOLR injection Inject 1 mg into the muscle once       Insulin       Insulin Aspart, w/Niacinamide, (FIASP PENFILL) 100 UNIT/ML SOCT Inject 5-7 Units Subcutaneous 3 times daily (before meals) Inject 5 units before breakfast, 7 units before lunch and 7 units before dinner, 3 units before each snack. Add correction scale before meals. -160 = 1 unit. -200 = 2 units. -240 = 3 units. -280 = 4 units. -320 = 5 u     Insulin Degludec FlexTouch 100 UNIT/ML SOPN Inject 22 Units Subcutaneous daily EVERY NIGHT AT BEDTIME FOR DM *DISCARD 56 DAYS AFTER OPENING*            Taking Medication Assessed Today: Yes  Current Treatments: Insulin  Injections  Dose schedule: Pre-breakfast, Pre-lunch, Pre-dinner, At bedtime  Given by: Patient, Adult caretaker, Nursing attendant  Injection/Infusion sites: Abdomen  Problems taking diabetes medications regularly?: No  Diabetes medication side effects?: No    Problem Solving:  Problem Solving Assessed Today: Yes  Is the patient at risk for hypoglycemia?: Yes  Hypoglycemia Frequency: Weekly  Hypoglycemia Treatment: Glucose (tablets or gel), Other food, Juice, Candy (will have one piece of candy or some juice)  Patient carries a carbohydrate source: Yes  Medical ID: Yes  Is the patient at risk for DKA?: Yes  Does patient have ketone test strips?: Yes  Does patient have DKA prevention plan?: Yes  Does patient have severe weather/disaster plan for diabetes management?: No  Does patient have sick day plan for diabetes management?: Yes              Reducing Risks:  Reducing Risks Assessed Today: Yes  Diabetes Risks: Age over 45 years, Sedentary Lifestyle  CAD Risks: Diabetes Mellitus  Has dilated eye exam at least once a year?: Yes  Sees dentist every 6 months?: Yes  Feet checked by healthcare provider in the last year?: Yes    Healthy Coping:  Healthy Coping Assessed Today: Yes  Emotional response to diabetes: Acceptance  Informal Support system:: Family, Parent  Stage of change: ACTION (Actively working towards change)  Support resources: Offerings in Clinic Communities  Patient Activation Measure Survey Score:      3/7/2012     3:00 PM   KHUSHI Score (Last Two)   KHUSHI Raw Score 39   Activation Score 56.4   KHUSHI Level 3         Care Plan and Education Provided:  Monitoring: Blood glucose versus Continuous Glucose Monitoring and Taking Medication: Action of prescribed medication(s)    Tiffanie Mcfarland RD Formerly named [facility]  Triage: [phone]  [phone]      Time Spent: 9 minutes  Encounter Type: Individual    Any diabetes medication dose changes were made via the CDE Protocol per the patient's referring provider. JACK  copy of this encounter was shared with the provider.

## 2024-06-28 ENCOUNTER — PATIENT OUTREACH (OUTPATIENT)
Dept: CARE COORDINATION | Facility: CLINIC | Age: 64
End: 2024-06-28

## 2024-06-28 ENCOUNTER — ASSISTED LIVING VISIT (OUTPATIENT)
Dept: GERIATRICS | Facility: CLINIC | Age: 64
End: 2024-06-28
Payer: MEDICARE

## 2024-06-28 VITALS
DIASTOLIC BLOOD PRESSURE: 67 MMHG | SYSTOLIC BLOOD PRESSURE: 152 MMHG | OXYGEN SATURATION: 95 % | BODY MASS INDEX: 34.35 KG/M2 | WEIGHT: 201.2 LBS | HEART RATE: 100 BPM | RESPIRATION RATE: 18 BRPM | TEMPERATURE: 97.5 F | HEIGHT: 64 IN

## 2024-06-28 DIAGNOSIS — H40.1132 PRIMARY OPEN ANGLE GLAUCOMA OF BOTH EYES, MODERATE STAGE: ICD-10-CM

## 2024-06-28 DIAGNOSIS — K21.00 GASTROESOPHAGEAL REFLUX DISEASE WITH ESOPHAGITIS WITHOUT HEMORRHAGE: ICD-10-CM

## 2024-06-28 DIAGNOSIS — H52.4 PRESBYOPIA: ICD-10-CM

## 2024-06-28 DIAGNOSIS — R63.5 WEIGHT GAIN: ICD-10-CM

## 2024-06-28 DIAGNOSIS — Z97.8 USES SELF-APPLIED CONTINUOUS GLUCOSE MONITORING DEVICE: ICD-10-CM

## 2024-06-28 DIAGNOSIS — E10.22 TYPE 1 DIABETES MELLITUS WITH STAGE 3A CHRONIC KIDNEY DISEASE (H): ICD-10-CM

## 2024-06-28 DIAGNOSIS — I10 ESSENTIAL HYPERTENSION WITH GOAL BLOOD PRESSURE LESS THAN 140/90: ICD-10-CM

## 2024-06-28 DIAGNOSIS — F81.9 COGNITIVE DEVELOPMENTAL DELAY: ICD-10-CM

## 2024-06-28 DIAGNOSIS — K59.01 SLOW TRANSIT CONSTIPATION: ICD-10-CM

## 2024-06-28 DIAGNOSIS — D64.9 ANEMIA, UNSPECIFIED TYPE: ICD-10-CM

## 2024-06-28 DIAGNOSIS — E10.37X3 TYPE 1 DIABETES MELLITUS WITH DIABETIC MACULAR EDEMA OF BOTH EYES RESOLVED AFTER TREATMENT (H): ICD-10-CM

## 2024-06-28 DIAGNOSIS — R60.0 LOWER EXTREMITY EDEMA: ICD-10-CM

## 2024-06-28 DIAGNOSIS — R21 RASH: Primary | ICD-10-CM

## 2024-06-28 DIAGNOSIS — N18.31 TYPE 1 DIABETES MELLITUS WITH STAGE 3A CHRONIC KIDNEY DISEASE (H): ICD-10-CM

## 2024-06-28 PROCEDURE — 99349 HOME/RES VST EST MOD MDM 40: CPT | Performed by: NURSE PRACTITIONER

## 2024-06-28 NOTE — LETTER
6/28/2024      Lexy Rockwell  [address]  [facility] [address]        [facility] GERIATRICS  Chief Complaint   Patient presents with     Annual Comprehensive Exam Assisted Living     [place] Medical Record Number:  [id number]  Place of Service where encounter took place:  [facility] [[id number]]    HPI:    Lexy Rockwell  is a 63 year old  (1960), who is being seen today for an annual comprehensive visit. HPI information obtained from: facility chart records, facility staff, patient report, and [facility] chart review.         Her past medical history includes  Type 1 diabetes with gastroparesis and CKD history of DKA  Hypertension  CKD stage III  Hyperlipidemia  Bilateral leg edema  Overactive bladder  GERD  Constipation  Developmentally delayed  Polyneuropathy   Vitamin D deficiency  Glaucoma    Today patient was seen in her room.  patient denied chest pain, shortness of breath, dizziness, lightheadedness, and a poor appetite.   Nursing has concerns of flucuating blood sugars. BIMS=15/15 (score 13 to 15 suggests the patient is cognitively intact, 8 to 12 suggests moderately impaired and 0 to 7 suggest severe impairment) PHQ9=1/27.   Patient needs cuing assistance with ADLs.    Her appetite is Excellent and consumes a low carb diet.  Per nursing, skin has a rash. Code status is full code.   In reviewing point click care her weight is up to 200#, BP slightly elevated.  HR controlled.        ALLERGIES: Amoxicillin and Sulfa antibiotics  PAST MEDICAL HISTORY:   Past Medical History:   Diagnosis Date     Cerumen impacted      Development delay      Diabetic gastroparesis (H) 8/16/2013     Glaucoma (increased eye pressure)      Hyperlipaemia      Hypertension      Hypothyroid      Mental retardation      Nonsenile cataract      Obesity      Strabismus      Type 1 diabetes mellitus not at goal (H)       PAST SURGICAL HISTORY:  has a past surgical history that includes EYE SURG  ANT SGMT PROC UNLISTED (remote); strabismus surgery; Esophagoscopy, gastroscopy, duodenoscopy (EGD), combined (N/A, 7/3/2023); and Colonoscopy (N/A, 7/3/2023).      Current Outpatient Medications:      acetaminophen (TYLENOL) 500 MG tablet, TAKE 2 TABLETS BY MOUTH EVERY 6 HOURS AS NEEDED (1000MG) FOR PAIN, Disp: 248 tablet, Rfl: 11     AMLODIPINE BENZOATE PO, Take 2.5 mg by mouth, Disp: , Rfl:      carvedilol (COREG) 6.25 MG tablet, Take 1 tablet (6.25 mg) by mouth 2 times daily (with meals), Disp: , Rfl:      cetirizine (ZYRTEC) 10 MG tablet, Take 10 mg by mouth daily, Disp: , Rfl:      dorzolamide-timolol PF (COSOPT PF) 2-0.5 % opthalmic solutionh, INSTILL 1 DROP IN BOTH EYES TWICE DAILY, Disp: 60 each, Rfl: 3     ferrous sulfate (SLO-FE) 142 (45 Fe) MG CR tablet, Take 1 tablet (142 mg) by mouth daily, Disp: , Rfl:      fluticasone (FLONASE) 50 MCG/ACT nasal spray, Spray 1 spray into both nostrils daily, Disp: , Rfl:      furosemide (LASIX) 40 MG tablet, 40 mg, Disp: , Rfl:      Glucagon (BAQSIMI) 3 MG/DOSE nasal powder, Spray 1 spray (3 mg) in nostril as needed in the event of unconscious hypoglycemia or hypoglycemic seizure. May repeat dose if no response after 15 minutes., Disp: 2 each, Rfl: 3     hydrocortisone 1 % CREA cream, Place rectally 2 times daily, Disp: , Rfl:      Insulin Aspart, w/Niacinamide, (FIASP PENFILL) 100 UNIT/ML SOCT, Inject 5-7 Units Subcutaneous 3 times daily (before meals) Inject 5 units before breakfast, 7 units before lunch and 7 units before dinner, 3 units before each snack. Add correction scale before meals. -160 = 1 unit. -200 = 2 units. -240 = 3 units. -280 = 4 units. -320 = 5 u, Disp: 3 mL, Rfl: 11     Insulin Degludec FlexTouch 100 UNIT/ML SOPN, Inject 22 Units Subcutaneous daily EVERY NIGHT AT BEDTIME FOR DM *DISCARD 56 DAYS AFTER OPENING*, Disp: 6 mL, Rfl: PRN     KETOSTIX test strip, Use as directed in case of high glucose, vomiting or illness.,  Disp: 50 strip, Rfl: 3     latanoprost (XALATAN) 0.005 % ophthalmic solution, INSTILL 1 DROP IN BOTH EYES AT BEDTIME, Disp: 10 mL, Rfl: 11     lisinopril (ZESTRIL) 40 MG tablet, Take 1 tablet (40 mg) by mouth daily, Disp: , Rfl:      MULTIVITAMIN TABS   OR, 1 TABLET DAILY, Disp: , Rfl:      pantoprazole (PROTONIX) 40 MG EC tablet, Take 40 mg by mouth daily, Disp: , Rfl:      potassium chloride ER (KLOR-CON M) 20 MEQ CR tablet, Take 20 mEq by mouth daily, Disp: , Rfl:      sennosides (SENOKOT) 8.6 MG tablet, Take 1 tablet by mouth daily as needed, Disp: , Rfl:      simvastatin (ZOCOR) 20 MG tablet, Take 1 tablet (20 mg) by mouth At Bedtime, Disp: 90 tablet, Rfl: 3     Continuous Blood Gluc  (DEXCOM G7 ) KELLY, Use to read blood sugars as per 's instructions., Disp: 1 each, Rfl: 0     Continuous Blood Gluc Sensor (DEXCOM G6 SENSOR) MISC, Change every 10 days., Disp: 3 each, Rfl: 5     Continuous Blood Gluc Sensor (DEXCOM G7 SENSOR) MISC, Change every 10 days., Disp: 3 each, Rfl: 5     Continuous Blood Gluc Transmit (DEXCOM G6 TRANSMITTER) MISC, Change every 3 months., Disp: 1 each, Rfl: 1     glucagon 1 MG SOLR injection, Inject 1 mg into the muscle once, Disp: , Rfl:      insulin pen needle (NOVOFINE AUTOCOVER PEN NEEDLE) 30G X 8 MM miscellaneous, 1 each See Admin Instructions as directed., Disp: 100 each, Rfl: 11     nystatin (MYCOSTATIN) 811064 UNIT/GM external cream, Apply topically 2 times daily as needed for dry skin To feet and toenails., Disp: , Rfl:      MED REC REQUIRED  Post Medication Reconciliation Status:  Discharge medications reconciled and changed, see notes/orders      Case Management:  I have reviewed the facility/SNF care plan/MDS, including the falls risk, nutrition and pain screening. I also reviewed the current immunizations, and preventive care.. Future cancer screening indicated is testing as needed and provider and pt will formulate a POC. Patient's desire to return  "to the community is not present. Current Level of Care is appropriate.mhgeroimmunization: Annual Influenza per facility protocol    Advance Directive Discussion:    I reviewed the current advanced directives as reflected in EPIC, the POLST and the facility chart, and verified the congruency of orders. I did not contact the first party and  discuss  plan of care. I did review the advance directives with the resident.     Team Discussion:  I communicated with the appropriate disciplines involved with the Plan of Care: Nursing  .   Patient's goal is: to live as long as possible.  Information reviewed: Medications, vital signs, orders, and nursing notes.    ROS:  4 point ROS including Respiratory, CV, GI and , other than that noted in the HPI,  is negative    Vitals:  BP (!) 152/67   Pulse 100   Temp 97.5  F (36.4  C)   Resp 18   Ht 1.626 m (5' 4\")   Wt 91.3 kg (201 lb 3.2 oz)   LMP 03/28/2014   SpO2 95%   BMI 34.54 kg/m   Body mass index is 34.54 kg/m .  Exam:  GENERAL APPEARANCE:  Alert, in no distress  RESP:  lungs clear to auscultation , no respiratory distress  CV:  peripheral edema 1-2+ in Bilateral LE, rate-normal  SKIN:  has scratches  PSYCH:  insight and judgement impaired, affect and mood normal     Lab/Diagnostic data:   Most Recent 3 CBC's:  Recent Labs   Lab Test 06/19/24  0653 11/29/23  1150 08/30/23  0535   WBC 5.6 7.6 4.3   HGB 9.7* 10.4* 10.5*   MCV 91 89 87    335 181     Most Recent 3 BMP's:  Recent Labs   Lab Test 06/19/24  0653 01/01/24  0949 12/14/23  0544    139 143   POTASSIUM 4.7 4.3 4.5   CHLORIDE 111* 103 112*   CO2 21* 26 22   BUN 67.0* 52.4* 51.4*   CR 1.31* 1.28* 0.95   ANIONGAP 10 10 9   EFRAIN 9.5 10.1 9.2   * 177* 112*     Most Recent 2 LFT's:  Recent Labs   Lab Test 06/19/24  0653 03/29/23  0515   AST 17 20   ALT 14 19   ALKPHOS 114 73   BILITOTAL 0.2 <0.2     Most Recent Cholesterol Panel:  Recent Labs   Lab Test 01/25/23  0820   CHOL 112   LDL 58   HDL 37* "   TRIG 84     Most Recent TSH and T4:  Recent Labs   Lab Test 06/19/24  0653 10/13/21  1540 02/01/21  0909   TSH 5.29*   < > 7.07*   T4  --   --  1.05    < > = values in this interval not displayed.     Most Recent Hemoglobin A1c:  Recent Labs   Lab Test 06/19/24  0653   A1C 9.7*       ASSESSMENT/PLAN    [Z97.8]  Uses self-applied continuous glucose monitoring device  (E10.37X3) Type 1 diabetes mellitus with diabetic macular edema of both eyes resolved after treatment (H)  (E11.3299) Nonproliferative diabetic retinopathy (H)  (primary encounter diagnosis)  (H40.1132) Primary open angle glaucoma (POAG) of both eyes, moderate stage  (H25.813) Combined forms of age-related cataract of both eyes  (H52.4) Presbyopia  Comment: Chronic.    Lab Results   Component Value Date    A1C 9.7 06/19/2024    A1C 8.5 11/29/2023    A1C 8.1 03/29/2023    A1C 8.7 01/25/2023    A1C 10.7 10/13/2021    A1C 9.7 02/01/2021    A1C 9.2 09/30/2020    A1C 11.8 01/08/2020    A1C 11.8 05/19/2019    A1C 11.2 12/12/2018    She receives insulin Lantus, sliding scale and meal coverage.    Patient has a [employer] continuous blood sugar monitor.    For secondary prevention she is currently taking simvastatin 20 mg daily, losartan 40 mg daily, and aspirin 81 mg daily.  Plan: continue following with diabetic educator   Continue following with podiatry  Pt needs to see optometry.      (I10) Hypertension goal BP (blood pressure) < 140/90  Comment: Chronic,   Controlled  Plan: avoid nephrotoxic medications      (R60.0) Bilateral leg edema  Comment:present   Taking lasix and potassium   Plan: refer to lymphedema to evaluate and treat  Will order an ECHO         (K21.00) Gastroesophageal reflux disease with esophagitis without hemorrhage  Comment: Chronic, stable while taking Protonix  Plan: Continue with plan of care no changes at this time, adjustment as needed        (K59.01) Slow transit constipation  Comment: Chronic, stable while taking prune juice  plan:  Continue with plan of care no changes at this time, adjustment as needed        (F81.9) Cognitive developmental delay  Comment: Chronic, stable.    Patient has a mother and father who live in [place] and 2 sisters who live in the [place] metropolitan area.  Plan: Continue with plan of care no changes at this time, adjustment as needed        (H40.003) Glaucoma suspect, bilateral  Comment: Chronic, stable.   Plan: Currently taking lantanoprost solution     (E87.6) hypokalemia  Comment: taking lasix and potassium chloride  Monitor BMP   Last Comprehensive Metabolic Panel:  Lab Results   Component Value Date     06/19/2024    POTASSIUM 4.7 06/19/2024    CHLORIDE 111 (H) 06/19/2024    CO2 21 (L) 06/19/2024    ANIONGAP 10 06/19/2024     (H) 06/19/2024    BUN 67.0 (H) 06/19/2024    CR 1.31 (H) 06/19/2024    GFRESTIMATED 46 (L) 06/19/2024    EFRAIN 9.5 06/19/2024     Plan: Continue with plan of care no changes at this time, adjustment as needed        D64.9) Anemia, unspecified type    Comment:  stable  On 12/17/2022 =  peripheral blood morphology showed normocytic anemia and reticulocyte was 0.02%  12/19/2023 = 9.0 at [facility]  12/26/2023 = note states discussed pancytopenia with hematology   2/6/2023 = hgb = 8   On 2/17/2023 she was started on iron supplementation  3/29/23 her hgb was 9.0, hematocrit=29.8, ferritin=49, iron=29, VEF=077, Iron sat index=11.    Pt denies having black stools, shortness of breath, dizziness .    5/8/023 hgb 10.2  7/3/2023 GI work up negative   8/30/2023 hgb 10.5  Hemoglobin   Date Value Ref Range Status   06/19/2024 9.7 (L) 11.7 - 15.7 g/dL Final   09/30/2020 11.9 11.7 - 15.7 g/dL Final   Taking iron  Plan:  Will recheck iron studies in 1 month           Rash  Comment: acute   Plan: continue with hydrocortisone cream and zyrtec.    Weight gain:  Comment: multi factorial including developmental delay, [facility] with food readily available, insulin  Plan:   Encourage weight loss  Lymphedema  to reassess LE edema.     Electronically signed by:      TAYLA Sprague CNP          Sincerely,        TAYLA Sprague CNP

## 2024-06-28 NOTE — PROGRESS NOTES
[facility] GERIATRICS  Chief Complaint   Patient presents with    Annual Comprehensive Exam Assisted Living     [place] Medical Record Number:  [id number]  Place of Service where encounter took place:  [facility] [[id number]]    HPI:    Lexy Rockwell  is a 63 year old  (1960), who is being seen today for an annual comprehensive visit. HPI information obtained from: facility chart records, facility staff, patient report, and [facility] chart review.         Her past medical history includes  Type 1 diabetes with gastroparesis and CKD history of DKA  Hypertension  CKD stage III  Hyperlipidemia  Bilateral leg edema  Overactive bladder  GERD  Constipation  Developmentally delayed  Polyneuropathy   Vitamin D deficiency  Glaucoma    Today patient was seen in her room.  patient denied chest pain, shortness of breath, dizziness, lightheadedness, and a poor appetite.   Nursing has concerns of flucuating blood sugars. BIMS=15/15 (score 13 to 15 suggests the patient is cognitively intact, 8 to 12 suggests moderately impaired and 0 to 7 suggest severe impairment) PHQ9=1/27.   Patient needs cuing assistance with ADLs.    Her appetite is Excellent and consumes a low carb diet.  Per nursing, skin has a rash. Code status is full code.   In reviewing point click care her weight is up to 200#, BP slightly elevated.  HR controlled.        ALLERGIES: Amoxicillin and Sulfa antibiotics  PAST MEDICAL HISTORY:   Past Medical History:   Diagnosis Date    Cerumen impacted     Development delay     Diabetic gastroparesis (H) 8/16/2013    Glaucoma (increased eye pressure)     Hyperlipaemia     Hypertension     Hypothyroid     Mental retardation     Nonsenile cataract     Obesity     Strabismus     Type 1 diabetes mellitus not at goal (H)       PAST SURGICAL HISTORY:  has a past surgical history that includes EYE SURG ANT SGMT PROC UNLISTED (remote); strabismus surgery; Esophagoscopy, gastroscopy, duodenoscopy (EGD),  combined (N/A, 7/3/2023); and Colonoscopy (N/A, 7/3/2023).      Current Outpatient Medications:     acetaminophen (TYLENOL) 500 MG tablet, TAKE 2 TABLETS BY MOUTH EVERY 6 HOURS AS NEEDED (1000MG) FOR PAIN, Disp: 248 tablet, Rfl: 11    AMLODIPINE BENZOATE PO, Take 2.5 mg by mouth, Disp: , Rfl:     carvedilol (COREG) 6.25 MG tablet, Take 1 tablet (6.25 mg) by mouth 2 times daily (with meals), Disp: , Rfl:     cetirizine (ZYRTEC) 10 MG tablet, Take 10 mg by mouth daily, Disp: , Rfl:     dorzolamide-timolol PF (COSOPT PF) 2-0.5 % opthalmic solutionh, INSTILL 1 DROP IN BOTH EYES TWICE DAILY, Disp: 60 each, Rfl: 3    ferrous sulfate (SLO-FE) 142 (45 Fe) MG CR tablet, Take 1 tablet (142 mg) by mouth daily, Disp: , Rfl:     fluticasone (FLONASE) 50 MCG/ACT nasal spray, Spray 1 spray into both nostrils daily, Disp: , Rfl:     furosemide (LASIX) 40 MG tablet, 40 mg, Disp: , Rfl:     Glucagon (BAQSIMI) 3 MG/DOSE nasal powder, Spray 1 spray (3 mg) in nostril as needed in the event of unconscious hypoglycemia or hypoglycemic seizure. May repeat dose if no response after 15 minutes., Disp: 2 each, Rfl: 3    hydrocortisone 1 % CREA cream, Place rectally 2 times daily, Disp: , Rfl:     Insulin Aspart, w/Niacinamide, (FIASP PENFILL) 100 UNIT/ML SOCT, Inject 5-7 Units Subcutaneous 3 times daily (before meals) Inject 5 units before breakfast, 7 units before lunch and 7 units before dinner, 3 units before each snack. Add correction scale before meals. -160 = 1 unit. -200 = 2 units. -240 = 3 units. -280 = 4 units. -320 = 5 u, Disp: 3 mL, Rfl: 11    Insulin Degludec FlexTouch 100 UNIT/ML SOPN, Inject 22 Units Subcutaneous daily EVERY NIGHT AT BEDTIME FOR DM *DISCARD 56 DAYS AFTER OPENING*, Disp: 6 mL, Rfl: PRN    KETOSTIX test strip, Use as directed in case of high glucose, vomiting or illness., Disp: 50 strip, Rfl: 3    latanoprost (XALATAN) 0.005 % ophthalmic solution, INSTILL 1 DROP IN BOTH EYES AT  BEDTIME, Disp: 10 mL, Rfl: 11    lisinopril (ZESTRIL) 40 MG tablet, Take 1 tablet (40 mg) by mouth daily, Disp: , Rfl:     MULTIVITAMIN TABS   OR, 1 TABLET DAILY, Disp: , Rfl:     pantoprazole (PROTONIX) 40 MG EC tablet, Take 40 mg by mouth daily, Disp: , Rfl:     potassium chloride ER (KLOR-CON M) 20 MEQ CR tablet, Take 20 mEq by mouth daily, Disp: , Rfl:     sennosides (SENOKOT) 8.6 MG tablet, Take 1 tablet by mouth daily as needed, Disp: , Rfl:     simvastatin (ZOCOR) 20 MG tablet, Take 1 tablet (20 mg) by mouth At Bedtime, Disp: 90 tablet, Rfl: 3    Continuous Blood Gluc  (DEXCOM G7 ) KELLY, Use to read blood sugars as per 's instructions., Disp: 1 each, Rfl: 0    Continuous Blood Gluc Sensor (DEXCOM G6 SENSOR) MISC, Change every 10 days., Disp: 3 each, Rfl: 5    Continuous Blood Gluc Sensor (DEXCOM G7 SENSOR) MISC, Change every 10 days., Disp: 3 each, Rfl: 5    Continuous Blood Gluc Transmit (DEXCOM G6 TRANSMITTER) MISC, Change every 3 months., Disp: 1 each, Rfl: 1    glucagon 1 MG SOLR injection, Inject 1 mg into the muscle once, Disp: , Rfl:     insulin pen needle (NOVOFINE AUTOCOVER PEN NEEDLE) 30G X 8 MM miscellaneous, 1 each See Admin Instructions as directed., Disp: 100 each, Rfl: 11    nystatin (MYCOSTATIN) 091461 UNIT/GM external cream, Apply topically 2 times daily as needed for dry skin To feet and toenails., Disp: , Rfl:      MED REC REQUIRED  Post Medication Reconciliation Status:  Discharge medications reconciled and changed, see notes/orders      Case Management:  I have reviewed the facility/SNF care plan/MDS, including the falls risk, nutrition and pain screening. I also reviewed the current immunizations, and preventive care.. Future cancer screening indicated is testing as needed and provider and pt will formulate a POC. Patient's desire to return to the community is not present. Current Level of Care is appropriate.mhgeroimmunization: Annual Influenza per facility  "protocol    Advance Directive Discussion:    I reviewed the current advanced directives as reflected in EPIC, the POLST and the facility chart, and verified the congruency of orders. I did not contact the first party and  discuss  plan of care. I did review the advance directives with the resident.     Team Discussion:  I communicated with the appropriate disciplines involved with the Plan of Care: Nursing  .   Patient's goal is: to live as long as possible.  Information reviewed: Medications, vital signs, orders, and nursing notes.    ROS:  4 point ROS including Respiratory, CV, GI and , other than that noted in the HPI,  is negative    Vitals:  BP (!) 152/67   Pulse 100   Temp 97.5  F (36.4  C)   Resp 18   Ht 1.626 m (5' 4\")   Wt 91.3 kg (201 lb 3.2 oz)   LMP 03/28/2014   SpO2 95%   BMI 34.54 kg/m   Body mass index is 34.54 kg/m .  Exam:  GENERAL APPEARANCE:  Alert, in no distress  RESP:  lungs clear to auscultation , no respiratory distress  CV:  peripheral edema 1-2+ in Bilateral LE, rate-normal  SKIN:  has scratches  PSYCH:  insight and judgement impaired, affect and mood normal     Lab/Diagnostic data:   Most Recent 3 CBC's:  Recent Labs   Lab Test 06/19/24  0653 11/29/23  1150 08/30/23  0535   WBC 5.6 7.6 4.3   HGB 9.7* 10.4* 10.5*   MCV 91 89 87    335 181     Most Recent 3 BMP's:  Recent Labs   Lab Test 06/19/24  0653 01/01/24  0949 12/14/23  0544    139 143   POTASSIUM 4.7 4.3 4.5   CHLORIDE 111* 103 112*   CO2 21* 26 22   BUN 67.0* 52.4* 51.4*   CR 1.31* 1.28* 0.95   ANIONGAP 10 10 9   EFRAIN 9.5 10.1 9.2   * 177* 112*     Most Recent 2 LFT's:  Recent Labs   Lab Test 06/19/24  0653 03/29/23  0515   AST 17 20   ALT 14 19   ALKPHOS 114 73   BILITOTAL 0.2 <0.2     Most Recent Cholesterol Panel:  Recent Labs   Lab Test 01/25/23  0820   CHOL 112   LDL 58   HDL 37*   TRIG 84     Most Recent TSH and T4:  Recent Labs   Lab Test 06/19/24  0653 10/13/21  1540 02/01/21  0909   TSH 5.29*   " < > 7.07*   T4  --   --  1.05    < > = values in this interval not displayed.     Most Recent Hemoglobin A1c:  Recent Labs   Lab Test 06/19/24  0653   A1C 9.7*       ASSESSMENT/PLAN    [Z97.8]  Uses self-applied continuous glucose monitoring device  (E10.37X3) Type 1 diabetes mellitus with diabetic macular edema of both eyes resolved after treatment (H)  (E11.3299) Nonproliferative diabetic retinopathy (H)  (primary encounter diagnosis)  (H40.1132) Primary open angle glaucoma (POAG) of both eyes, moderate stage  (H25.813) Combined forms of age-related cataract of both eyes  (H52.4) Presbyopia  Comment: Chronic.    Lab Results   Component Value Date    A1C 9.7 06/19/2024    A1C 8.5 11/29/2023    A1C 8.1 03/29/2023    A1C 8.7 01/25/2023    A1C 10.7 10/13/2021    A1C 9.7 02/01/2021    A1C 9.2 09/30/2020    A1C 11.8 01/08/2020    A1C 11.8 05/19/2019    A1C 11.2 12/12/2018    She receives insulin Lantus, sliding scale and meal coverage.    Patient has a [employer] continuous blood sugar monitor.    For secondary prevention she is currently taking simvastatin 20 mg daily, losartan 40 mg daily, and aspirin 81 mg daily.  Plan: continue following with diabetic educator   Continue following with podiatry  Pt needs to see optometry.      (I10) Hypertension goal BP (blood pressure) < 140/90  Comment: Chronic,   Controlled  Plan: avoid nephrotoxic medications      (R60.0) Bilateral leg edema  Comment:present   Taking lasix and potassium   Plan: refer to lymphedema to evaluate and treat  Will order an ECHO         (K21.00) Gastroesophageal reflux disease with esophagitis without hemorrhage  Comment: Chronic, stable while taking Protonix  Plan: Continue with plan of care no changes at this time, adjustment as needed        (K59.01) Slow transit constipation  Comment: Chronic, stable while taking prune juice  plan: Continue with plan of care no changes at this time, adjustment as needed        (F81.9) Cognitive developmental  delay  Comment: Chronic, stable.    Patient has a mother and father who live in [place] and 2 sisters who live in the [facility] area.  Plan: Continue with plan of care no changes at this time, adjustment as needed        (H40.003) Glaucoma suspect, bilateral  Comment: Chronic, stable.   Plan: Currently taking lantanoprost solution     (E87.6) hypokalemia  Comment: taking lasix and potassium chloride  Monitor BMP   Last Comprehensive Metabolic Panel:  Lab Results   Component Value Date     06/19/2024    POTASSIUM 4.7 06/19/2024    CHLORIDE 111 (H) 06/19/2024    CO2 21 (L) 06/19/2024    ANIONGAP 10 06/19/2024     (H) 06/19/2024    BUN 67.0 (H) 06/19/2024    CR 1.31 (H) 06/19/2024    GFRESTIMATED 46 (L) 06/19/2024    EFRAIN 9.5 06/19/2024     Plan: Continue with plan of care no changes at this time, adjustment as needed        D64.9) Anemia, unspecified type    Comment:  stable  On 12/17/2022 =  peripheral blood morphology showed normocytic anemia and reticulocyte was 0.02%  12/19/2023 = 9.0 at [facility]  12/26/2023 = note states discussed pancytopenia with hematology   2/6/2023 = hgb = 8   On 2/17/2023 she was started on iron supplementation  3/29/23 her hgb was 9.0, hematocrit=29.8, ferritin=49, iron=29, WRJ=669, Iron sat index=11.    Pt denies having black stools, shortness of breath, dizziness .    5/8/023 hgb 10.2  7/3/2023 GI work up negative   8/30/2023 hgb 10.5  Hemoglobin   Date Value Ref Range Status   06/19/2024 9.7 (L) 11.7 - 15.7 g/dL Final   09/30/2020 11.9 11.7 - 15.7 g/dL Final   Taking iron  Plan:  Will recheck iron studies in 1 month           Rash  Comment: acute   Plan: continue with hydrocortisone cream and zyrtec.    Weight gain:  Comment: multi factorial including developmental delay, group home with food readily available, insulin  Plan:   Encourage weight loss  Lymphedema to reassess LE edema.     Electronically signed by:      TAYLA Sprague CNP

## 2024-07-03 ENCOUNTER — TELEPHONE (OUTPATIENT)
Dept: GERIATRICS | Facility: CLINIC | Age: 64
End: 2024-07-03
Payer: COMMERCIAL

## 2024-07-03 DIAGNOSIS — E10.3393 TYPE 1 DIABETES MELLITUS WITH MODERATE NONPROLIFERATIVE RETINOPATHY OF BOTH EYES, MACULAR EDEMA PRESENCE UNSPECIFIED (H): Primary | ICD-10-CM

## 2024-07-03 RX ORDER — INSULIN ASPART INJECTION 100 [IU]/ML
INJECTION, SOLUTION SUBCUTANEOUS
COMMUNITY
End: 2024-07-03

## 2024-07-03 RX ORDER — INSULIN ASPART INJECTION 100 [IU]/ML
5-7 INJECTION, SOLUTION SUBCUTANEOUS
Qty: 3 ML | Refills: 11 | Status: SHIPPED | OUTPATIENT
Start: 2024-07-03 | End: 2024-07-11

## 2024-07-03 NOTE — TELEPHONE ENCOUNTER
[facility] Triage Nurse Telephone Encounter    Provider: TAYLA Gagnon CNP  Facility: [facility] Facility Type:  LT    Caller: Pharmacy  Call Back Number:     Allergies:    Allergies   Allergen Reactions    Amoxicillin     Sulfa Antibiotics         Reason for call: Pharmacy called to report that insurance will no longer cover Novolog insulin.  Covered insulin is now Fiasp.      Verbal Order/Direction given by Provider: Okay to change Novolog to Fiasp.      Provider giving Order:  TAYLA Gagnon CNP    Verbal Order given to: Pharmacy    Tiffanie Velasquez RN

## 2024-07-07 RX ORDER — HYDROCORTISONE 10 MG/G
CREAM TOPICAL 2 TIMES DAILY
COMMUNITY

## 2024-07-07 RX ORDER — CETIRIZINE HYDROCHLORIDE 10 MG/1
10 TABLET ORAL DAILY
COMMUNITY
End: 2024-07-18

## 2024-07-08 ENCOUNTER — TELEPHONE (OUTPATIENT)
Dept: ENDOCRINOLOGY | Facility: CLINIC | Age: 64
End: 2024-07-08
Payer: COMMERCIAL

## 2024-07-08 DIAGNOSIS — E10.37X3 TYPE 1 DIABETES MELLITUS WITH DIABETIC MACULAR EDEMA OF BOTH EYES RESOLVED AFTER TREATMENT (H): ICD-10-CM

## 2024-07-08 RX ORDER — PROCHLORPERAZINE 25 MG/1
SUPPOSITORY RECTAL
Qty: 3 EACH | Refills: 5 | Status: SHIPPED | OUTPATIENT
Start: 2024-07-08 | End: 2024-09-26

## 2024-07-08 RX ORDER — PROCHLORPERAZINE 25 MG/1
SUPPOSITORY RECTAL
Qty: 1 EACH | Refills: 1 | Status: SHIPPED | OUTPATIENT
Start: 2024-07-08 | End: 2024-09-23

## 2024-07-08 NOTE — TELEPHONE ENCOUNTER
This message is to inform you that we are in need of Medicare compliant prescriptions for Dexcom G6 Sensors & Transmitter.     Prescription must be written by: Jaylene Perez.  Must include full supply name: Dexcom G6 Sensor  Quantity: 3  Refills: 5  SIG: Change every 10 days    Prescription must be written by: Jaylene Perez.  Must include full supply name: Dexcom G6 Transmitter  Quantity: 1  Refills: 1  SIG: Change every 90 days    As a reminder, Medicare requires patients to be seen every 3 months for insulin supplies and every 6 months for CGMs. The provider who sees the patient must be the provider on the prescription, must be written on the day of or after office visit date and office visit must include notes about diabetes and insulin regimen. The Diabetes Care Services team at [facility] may request new prescriptions before renewal dates of the prescription is filled over the allowable amount. Writing the order to match what is above will help ensure we will only need to request every 3 or 6 months.    Thank you!    [facility]  Diabetes Care Services Team  [address] [place] [address]  Provider Phone: [phone]  Patient Line: [phone]  Fax: [phone]  E-mail: [email or web address]

## 2024-07-08 NOTE — PROGRESS NOTES
Diabetes Self-Management Education & Support    Presents for: Personal CGM Start    Type of Service: Telephone Visit    Audio only visit done, as patient does not have access to audio-visual technology.    Individual visit provided, given no group classes are available for 2 months.     Originating Location (Patient Location): Long term Care  Distant Location (Provider Location): [facility]  Mode of Communication:  Telephone    Telephone Visit Start Time:  9 am  Telephone Visit End Time (telephone visit stop time): 9:15 am    How would patient like to obtain AVS? MyChart      REPORTS:                  Lexy reports that the low blood sugars that are shown on June 26th were possibly inaccurate. She states that they tested her BG and and BG reading did not display low and she didn't have any low symptoms.       Pt verbalized understanding of concepts discussed and recommendations provided today.       Continue education with the following diabetes management concepts: Healthy Eating, Being Active, Monitoring, Taking Medication, Problem Solving, Reducing Risks, and Healthy Coping    ASSESSMENT:  BG slightly more stable the last 2 weeks, seeing a BG spike in the evenings. Lexy reports that she is  still eating a cookie at bedtime to prevent an overnight low. We discussed skipping the cookie, or just doing half of the cookie as it appears the snack could be causing higher blood sugars overnight.       Glucose Patterns & Trends:  Hyperglycemia, weekend- postmeal and nocturnal and weekday- postmeal and nocturnal    PLAN    Skip or only eat 1/2 cookie at bedtime  No changes to insulin today      Topics to cover at upcoming visits: Healthy Eating, Being Active, Monitoring, Taking Medication, Problem Solving, Reducing Risks, and Healthy Coping    Follow-up: 7/11    See Care Plan for co-developed, patient-state behavior change goals.  AVS provided for patient today.    Education Materials  "Provided:  No new materials provided today      SUBJECTIVE/OBJECTIVE:  Presents for: Personal CGM Start  Accompanied by: Self (Dietitian from Assisted Living)  Diabetes education in the past 24mo: Yes  Focus of Visit: Problem Solving, Monitoring, Taking Medication  Diabetes type: Type 1  Disease course: Worsening  Transportation concerns: Yes (gets rides or takes public tranportation)  Difficulty affording diabetes medication?: No  Difficulty affording diabetes testing supplies?: No  Other concerns:: Cognitive impairment  Cultural Influences/Ethnic Background:  Not  or     Diabetes Symptoms & Complications:  Diabetes Related Symptoms: Neuropathy, Polydipsia (increased thirst), Polyuria (increased urination) (neuropathy)  Weight trend: Decreasing  Symptom course: Stable  Disease course: Worsening  Complications assessed today?: Yes  Autonomic neuropathy: No  CVA: No  Heart disease: No  Nephropathy: Yes  Peripheral neuropathy: Yes  Peripheral Vascular Disease: No  Retinopathy: No  Sexual dysfunction: No    Patient Problem List and Family Medical History reviewed for relevant medical history, current medical status, and diabetes risk factors.    Vitals:  LMP 03/28/2014   Estimated body mass index is 34.54 kg/m  as calculated from the following:    Height as of 6/28/24: 1.626 m (5' 4\").    Weight as of 6/28/24: 91.3 kg (201 lb 3.2 oz).   Last 3 BP:   BP Readings from Last 3 Encounters:   06/28/24 (!) 152/67   06/24/24 (!) 145/80   06/20/24 130/75       History   Smoking Status    Never   Smokeless Tobacco    Never       Labs:  Lab Results   Component Value Date    A1C 9.7 06/19/2024    A1C 9.7 02/01/2021     Lab Results   Component Value Date     06/19/2024     10/13/2021     02/01/2021     Lab Results   Component Value Date    LDL 58 01/25/2023     02/01/2021     HDL Cholesterol   Date Value Ref Range Status   02/01/2021 62 >49 mg/dL Final     Direct Measure HDL   Date Value " "Ref Range Status   01/25/2023 37 (L) >=50 mg/dL Final   ]  GFR Estimate   Date Value Ref Range Status   06/19/2024 46 (L) >60 mL/min/1.73m2 Final   02/01/2021 90 >60 mL/min/[1.73_m2] Final     Comment:     Non  GFR Calc  Starting 12/18/2018, serum creatinine based estimated GFR (eGFR) will be   calculated using the Chronic Kidney Disease Epidemiology Collaboration   (CKD-EPI) equation.       GFR Estimate If Black   Date Value Ref Range Status   02/01/2021 >90 >60 mL/min/[1.73_m2] Final     Comment:      GFR Calc  Starting 12/18/2018, serum creatinine based estimated GFR (eGFR) will be   calculated using the Chronic Kidney Disease Epidemiology Collaboration   (CKD-EPI) equation.       Lab Results   Component Value Date    CR 1.31 06/19/2024    CR 0.73 02/01/2021     No results found for: \"MICROALBUMIN\"    Healthy Eating:  Healthy Eating Assessed Today: No  Cultural/Pentecostal diet restrictions?: No  Do you have any food allergies or intolerances?: No  Meal planning/habits: None  Who cooks/prepares meals for you?: Other  Who purchases food in  your home?: Other  How many times a week on average do you eat food made away from home (restaurant/take-out)?: 0  Meals include: Breakfast, Lunch, Dinner, Afternoon Snack, Evening Snack  Breakfast: 8:00 am: Oatmeal with hard boiled egg or scrambled eggs and barragan with toast OR pancake/waffle with eggs OR Slovenian toast  Lunch: 12-12:30: Stir Edouard OR hamburgers with vegetables (with dessert- small cake or cookie)  Dinner: 5:00 -6:00 pm: sandwich OR chili (vegetarian) OR chicken OR fish  with vegetables with desserts (cake or cookies, fruit)  Snacks: mornnig snack: yogurt or pudding AND Has one bedtime snack- usually a cookie - oatmeal raisin or chocolate chip or yogurt OR sugra free candy every once and a while  Other: drinks coffee with sugar free creamer  Beverages: Water, Coffee, Milk (Keep some juice on hand for lows)  Has patient met with a " dietitian in the past?: Yes    Being Active:  Being Active Assessed Today: Yes (went on the bike at her apartment complex)  Exercise:: Yes (walking, programs at care center)  Days per week of moderate to strenuous exercise (like a brisk walk): 2  On average, minutes per day of exercise at this level:  (Walking with friends)  How intense was your typical exercise? : Light (like stretching or slow walking)  Barrier to exercise: Safety, Physical limitation    Monitoring:  Monitoring Assessed Today: Yes  Did patient bring glucose meter to appointment? : No  Blood Glucose Meter: Unknown  Times checking blood sugar at home (number): 4  Times checking blood sugar at home (per): Day  Blood glucose trend: Fluctuating      Taking Medications:  Diabetes Medication(s)       Diabetic Other       Glucagon (BAQSIMI) 3 MG/DOSE nasal powder Spray 1 spray (3 mg) in nostril as needed in the event of unconscious hypoglycemia or hypoglycemic seizure. May repeat dose if no response after 15 minutes.     glucagon 1 MG SOLR injection Inject 1 mg into the muscle once       Insulin       Insulin Aspart, w/Niacinamide, (FIASP PENFILL) 100 UNIT/ML SOCT Inject 5-7 Units Subcutaneous 3 times daily (before meals) Inject 5 units before breakfast, 7 units before lunch and 7 units before dinner, 3 units before each snack. Add correction scale before meals. -160 = 1 unit. -200 = 2 units. -240 = 3 units. -280 = 4 units. -320 = 5 u     Insulin Degludec FlexTouch 100 UNIT/ML SOPN Inject 22 Units Subcutaneous daily EVERY NIGHT AT BEDTIME FOR DM *DISCARD 56 DAYS AFTER OPENING*            Taking Medication Assessed Today: Yes  Current Treatments: Insulin Injections  Dose schedule: Pre-breakfast, Pre-lunch, Pre-dinner, At bedtime  Given by: Patient, Adult caretaker, Nursing attendant  Injection/Infusion sites: Abdomen  Problems taking diabetes medications regularly?: No  Diabetes medication side effects?: No    Problem  Solving:  Problem Solving Assessed Today: Yes  Is the patient at risk for hypoglycemia?: Yes  Hypoglycemia Frequency: Weekly  Hypoglycemia Treatment: Glucose (tablets or gel), Other food, Juice, Candy (will have one piece of candy or some juice)  Patient carries a carbohydrate source: Yes  Medical ID: Yes  Is the patient at risk for DKA?: Yes  Does patient have ketone test strips?: Yes  Does patient have DKA prevention plan?: Yes  Does patient have severe weather/disaster plan for diabetes management?: No  Does patient have sick day plan for diabetes management?: Yes              Reducing Risks:  Reducing Risks Assessed Today: Yes  Diabetes Risks: Age over 45 years, Sedentary Lifestyle  CAD Risks: Diabetes Mellitus  Has dilated eye exam at least once a year?: Yes  Sees dentist every 6 months?: Yes  Feet checked by healthcare provider in the last year?: Yes    Healthy Coping:  Healthy Coping Assessed Today: Yes  Emotional response to diabetes: Acceptance  Informal Support system:: Family, Parent  Stage of change: ACTION (Actively working towards change)  Support resources: Offerings in Clinic Communities  Patient Activation Measure Survey Score:      3/7/2012     3:00 PM   KHUSHI Score (Last Two)   KHUSHI Raw Score 39   Activation Score 56.4   KHUSHI Level 3         Care Plan and Education Provided:  Monitoring: Blood glucose versus Continuous Glucose Monitoring and Taking Medication: Action of prescribed medication(s)    Tiffanie Mcfarland RD [facility]  Triage: [phone]  [phone]      Time Spent: 9 minutes  Encounter Type: Individual    Any diabetes medication dose changes were made via the CDE Protocol per the patient's referring provider. A copy of this encounter was shared with the provider.

## 2024-07-10 ENCOUNTER — LAB REQUISITION (OUTPATIENT)
Dept: LAB | Facility: CLINIC | Age: 64
End: 2024-07-10
Payer: MEDICARE

## 2024-07-10 DIAGNOSIS — I50.22 CHRONIC SYSTOLIC (CONGESTIVE) HEART FAILURE (H): ICD-10-CM

## 2024-07-11 ENCOUNTER — ALLIED HEALTH/NURSE VISIT (OUTPATIENT)
Dept: EDUCATION SERVICES | Facility: CLINIC | Age: 64
End: 2024-07-11
Payer: MEDICARE

## 2024-07-11 DIAGNOSIS — E10.3393 TYPE 1 DIABETES MELLITUS WITH MODERATE NONPROLIFERATIVE RETINOPATHY OF BOTH EYES, MACULAR EDEMA PRESENCE UNSPECIFIED (H): ICD-10-CM

## 2024-07-11 PROCEDURE — G0108 DIAB MANAGE TRN  PER INDIV: HCPCS | Performed by: DIETITIAN, REGISTERED

## 2024-07-11 RX ORDER — INSULIN ASPART INJECTION 100 [IU]/ML
5-8 INJECTION, SOLUTION SUBCUTANEOUS
Qty: 3 ML | Refills: 11 | Status: SHIPPED | OUTPATIENT
Start: 2024-07-11 | End: 2024-07-11

## 2024-07-11 NOTE — PROGRESS NOTES
Diabetes Self-Management Education & Support    Presents for: CGM Review    Type of Service: In Person Visit      REPORTS:                            Pt verbalized understanding of concepts discussed and recommendations provided today.       Continue education with the following diabetes management concepts: Healthy Eating, Being Active, Monitoring, Taking Medication, Problem Solving, Reducing Risks, and Healthy Coping    ASSESSMENT:  Having some hypoglycemia around breakfast time, highs in the evening/through the night. Lexy reports weight gain, we again discussed limiting desserts and working on increasing walking time.       Glucose Patterns & Trends:  Hyperglycemia, weekend- postmeal and nocturnal and weekday- postmeal and nocturnal    PLAN    Insulin Adjustments:    Tresiba: Continue with 22 units     Fiasp: Breakfast: 5 --> 4 units  Lunch: 7 units  Dinner: 7 --> 8 units  Snacks: 3 units      Topics to cover at upcoming visits: Healthy Eating, Being Active, Monitoring, Taking Medication, Problem Solving, Reducing Risks, and Healthy Coping    Follow-up: 7/25    See Care Plan for co-developed, patient-state behavior change goals.  AVS provided for patient today.    Education Materials Provided:  No new materials provided today      SUBJECTIVE/OBJECTIVE:  Presents for: CGM Review  Accompanied by: Self (Dietitian from Assisted Living)  Diabetes education in the past 24mo: Yes  Focus of Visit: Problem Solving, Monitoring, Taking Medication  Diabetes type: Type 1  Disease course: Worsening  How confident are you filling out medical forms by yourself:: Not Assessed  Diabetes management related comments/concerns: No questions today  Transportation concerns: Yes (gets rides or takes public tranportation)  Difficulty affording diabetes medication?: No  Difficulty affording diabetes testing supplies?: No  Other concerns:: Cognitive impairment  Cultural Influences/Ethnic Background:  Not  or     Diabetes  "Symptoms & Complications:  Diabetes Related Symptoms: Neuropathy, Polydipsia (increased thirst), Polyuria (increased urination), Fatigue (feeling more fatigue in the morning, neuropathy)  Weight trend: Increasing  Symptom course: Stable  Disease course: Worsening  Complications assessed today?: Yes  Autonomic neuropathy: No  CVA: No  Heart disease: No  Nephropathy: Yes  Peripheral neuropathy: Yes  Peripheral Vascular Disease: No  Retinopathy: No  Sexual dysfunction: No    Patient Problem List and Family Medical History reviewed for relevant medical history, current medical status, and diabetes risk factors.    Vitals:  LMP 03/28/2014   Estimated body mass index is 33.99 kg/m  as calculated from the following:    Height as of 7/10/24: 1.626 m (5' 4\").    Weight as of 7/10/24: 89.8 kg (198 lb).   Last 3 BP:   BP Readings from Last 3 Encounters:   07/10/24 125/75   06/28/24 (!) 152/67   06/24/24 (!) 145/80       History   Smoking Status    Never   Smokeless Tobacco    Never       Labs:  Lab Results   Component Value Date    A1C 9.7 06/19/2024    A1C 9.7 02/01/2021     Lab Results   Component Value Date     06/19/2024     10/13/2021     02/01/2021     Lab Results   Component Value Date    LDL 58 01/25/2023     02/01/2021     HDL Cholesterol   Date Value Ref Range Status   02/01/2021 62 >49 mg/dL Final     Direct Measure HDL   Date Value Ref Range Status   01/25/2023 37 (L) >=50 mg/dL Final   ]  GFR Estimate   Date Value Ref Range Status   06/19/2024 46 (L) >60 mL/min/1.73m2 Final   02/01/2021 90 >60 mL/min/[1.73_m2] Final     Comment:     Non  GFR Calc  Starting 12/18/2018, serum creatinine based estimated GFR (eGFR) will be   calculated using the Chronic Kidney Disease Epidemiology Collaboration   (CKD-EPI) equation.       GFR Estimate If Black   Date Value Ref Range Status   02/01/2021 >90 >60 mL/min/[1.73_m2] Final     Comment:      GFR Calc  Starting " "12/18/2018, serum creatinine based estimated GFR (eGFR) will be   calculated using the Chronic Kidney Disease Epidemiology Collaboration   (CKD-EPI) equation.       Lab Results   Component Value Date    CR 1.31 06/19/2024    CR 0.73 02/01/2021     No results found for: \"MICROALBUMIN\"    Healthy Eating:  Healthy Eating Assessed Today: No  Cultural/Anglican diet restrictions?: No  Do you have any food allergies or intolerances?: No  Meal planning/habits: None  Who cooks/prepares meals for you?: Other  Who purchases food in  your home?: Other  How many times a week on average do you eat food made away from home (restaurant/take-out)?: 0  Meals include: Breakfast, Lunch, Dinner, Afternoon Snack, Evening Snack  Breakfast: 8:00 am: Oatmeal with hard boiled egg or scrambled eggs and barragan with toast OR pancake/waffle with eggs OR Estonian toast  Lunch: 12-12:30: Stir Edouard OR hamburgers with vegetables (with dessert- small cake or cookie)  Dinner: 5:00 -6:00 pm: sandwich OR chili (vegetarian) OR chicken OR fish  with vegetables with desserts (cake or cookies, fruit)  Snacks: mornnig snack: yogurt or pudding AND Has one bedtime snack- usually a cookie - oatmeal raisin or chocolate chip or yogurt OR sugra free candy every once and a while  Other: drinks coffee with sugar free creamer  Beverages: Water, Coffee, Milk (Keep some juice on hand for lows)  Has patient met with a dietitian in the past?: Yes    Being Active:  Being Active Assessed Today: Yes (went on the bike at her apartment complex)  Exercise:: Yes (walking, programs at care center)  Days per week of moderate to strenuous exercise (like a brisk walk): 2  On average, minutes per day of exercise at this level:  (Walking with friends)  How intense was your typical exercise? : Light (like stretching or slow walking)  Barrier to exercise: Safety, Physical limitation    Monitoring:  Monitoring Assessed Today: Yes  Did patient bring glucose meter to appointment? : No  Blood " Glucose Meter: Unknown  Times checking blood sugar at home (number): 4  Times checking blood sugar at home (per): Day  Blood glucose trend: Fluctuating    Taking Medications:  Diabetes Medication(s)       Diabetic Other       Glucagon (BAQSIMI) 3 MG/DOSE nasal powder Spray 1 spray (3 mg) in nostril as needed in the event of unconscious hypoglycemia or hypoglycemic seizure. May repeat dose if no response after 15 minutes.     glucagon 1 MG SOLR injection Inject 1 mg into the muscle once       Insulin       Insulin Aspart, w/Niacinamide, (FIASP PENFILL) 100 UNIT/ML SOCT Inject 5-8 Units subcutaneously 3 times daily (before meals) Inject 4 units before breakfast, 7 units before lunch and 8 units before dinner, 3 units before each snack. Add correction scale before meals. -160 = 1 unit. -200 = 2 units. -240 = 3 units. -280 = 4 units. -320 = 5 u     Insulin Degludec FlexTouch 100 UNIT/ML SOPN Inject 22 Units Subcutaneous daily EVERY NIGHT AT BEDTIME FOR DM *DISCARD 56 DAYS AFTER OPENING*            Taking Medication Assessed Today: Yes  Current Treatments: Insulin Injections  Dose schedule: Pre-breakfast, Pre-lunch, Pre-dinner, At bedtime  Given by: Nursing attendant  Injection/Infusion sites: Abdomen  Problems taking diabetes medications regularly?: No  Diabetes medication side effects?: No    Problem Solving:  Problem Solving Assessed Today: Yes  Is the patient at risk for hypoglycemia?: Yes  Hypoglycemia Frequency: Weekly  Hypoglycemia Treatment: Juice, Candy (will have one piece of candy or some juice)  Patient carries a carbohydrate source: Yes  Medical ID: Yes  Is the patient at risk for DKA?: Yes  Does patient have ketone test strips?: Yes  Does patient have DKA prevention plan?: Yes  Does patient have severe weather/disaster plan for diabetes management?: No  Does patient have sick day plan for diabetes management?: Yes              Reducing Risks:  Reducing Risks Assessed Today:  Yes  Diabetes Risks: Age over 45 years, Sedentary Lifestyle  CAD Risks: Diabetes Mellitus  Has dilated eye exam at least once a year?: Yes  Sees dentist every 6 months?: Yes  Feet checked by healthcare provider in the last year?: Yes    Healthy Coping:  Healthy Coping Assessed Today: Yes  Emotional response to diabetes: Acceptance  Informal Support system:: Family, Parent  Stage of change: ACTION (Actively working towards change)  Support resources: Offerings in Clinic Communities  Patient Activation Measure Survey Score:      3/7/2012     3:00 PM   KHUSHI Score (Last Two)   KHUSHI Raw Score 39   Activation Score 56.4   KHUSHI Level 3         Care Plan and Education Provided:  Healthy Eating: Balanced meals and Portion control, Being Active: Finding a physical activity routine that works for you, and Problem Solving: Low glucose - causes, signs/symptoms, treatment and prevention and Rule of 15 and carrying a carbohydrate source at all times in case of low glucose    Tiffanie Mcfarland RD LD [facility]  Triage: [phone]  [phone]      Time Spent: 30 minutes  Encounter Type: Individual    Any diabetes medication dose changes were made via the CDE Protocol per the patient's referring provider. A copy of this encounter was shared with the provider.

## 2024-07-11 NOTE — LETTER
7/11/2024         RE: Lexy Rockwell  [address]  [facility] [address]        Dear Colleague,    Thank you for referring your patient, Lexy Rockwell, to the [facility] [place]. Please see a copy of my visit note below.    Diabetes Self-Management Education & Support    Presents for: CGM Review    Type of Service: In Person Visit      REPORTS:                            Pt verbalized understanding of concepts discussed and recommendations provided today.       Continue education with the following diabetes management concepts: Healthy Eating, Being Active, Monitoring, Taking Medication, Problem Solving, Reducing Risks, and Healthy Coping    ASSESSMENT:  Having some hypoglycemia around breakfast time, highs in the evening/through the night. Lexy reports weight gain, we again discussed limiting desserts and working on increasing walking time.       Glucose Patterns & Trends:  Hyperglycemia, weekend- postmeal and nocturnal and weekday- postmeal and nocturnal    PLAN    Insulin Adjustments:    Tresiba: Continue with 22 units     Fiasp: Breakfast: 5 --> 4 units  Lunch: 7 units  Dinner: 7 --> 8 units  Snacks: 3 units      Topics to cover at upcoming visits: Healthy Eating, Being Active, Monitoring, Taking Medication, Problem Solving, Reducing Risks, and Healthy Coping    Follow-up: 7/25    See Care Plan for co-developed, patient-state behavior change goals.  AVS provided for patient today.    Education Materials Provided:  No new materials provided today      SUBJECTIVE/OBJECTIVE:  Presents for: CGM Review  Accompanied by: Self (Dietitian from Assisted Living)  Diabetes education in the past 24mo: Yes  Focus of Visit: Problem Solving, Monitoring, Taking Medication  Diabetes type: Type 1  Disease course: Worsening  How confident are you filling out medical forms by yourself:: Not Assessed  Diabetes management related comments/concerns: No questions today  Transportation  "concerns: Yes (gets rides or takes public tranportation)  Difficulty affording diabetes medication?: No  Difficulty affording diabetes testing supplies?: No  Other concerns:: Cognitive impairment  Cultural Influences/Ethnic Background:  Not  or     Diabetes Symptoms & Complications:  Diabetes Related Symptoms: Neuropathy, Polydipsia (increased thirst), Polyuria (increased urination), Fatigue (feeling more fatigue in the morning, neuropathy)  Weight trend: Increasing  Symptom course: Stable  Disease course: Worsening  Complications assessed today?: Yes  Autonomic neuropathy: No  CVA: No  Heart disease: No  Nephropathy: Yes  Peripheral neuropathy: Yes  Peripheral Vascular Disease: No  Retinopathy: No  Sexual dysfunction: No    Patient Problem List and Family Medical History reviewed for relevant medical history, current medical status, and diabetes risk factors.    Vitals:  LMP 03/28/2014   Estimated body mass index is 33.99 kg/m  as calculated from the following:    Height as of 7/10/24: 1.626 m (5' 4\").    Weight as of 7/10/24: 89.8 kg (198 lb).   Last 3 BP:   BP Readings from Last 3 Encounters:   07/10/24 125/75   06/28/24 (!) 152/67   06/24/24 (!) 145/80       History   Smoking Status     Never   Smokeless Tobacco     Never       Labs:  Lab Results   Component Value Date    A1C 9.7 06/19/2024    A1C 9.7 02/01/2021     Lab Results   Component Value Date     06/19/2024     10/13/2021     02/01/2021     Lab Results   Component Value Date    LDL 58 01/25/2023     02/01/2021     HDL Cholesterol   Date Value Ref Range Status   02/01/2021 62 >49 mg/dL Final     Direct Measure HDL   Date Value Ref Range Status   01/25/2023 37 (L) >=50 mg/dL Final   ]  GFR Estimate   Date Value Ref Range Status   06/19/2024 46 (L) >60 mL/min/1.73m2 Final   02/01/2021 90 >60 mL/min/[1.73_m2] Final     Comment:     Non  GFR Calc  Starting 12/18/2018, serum creatinine based estimated " "GFR (eGFR) will be   calculated using the Chronic Kidney Disease Epidemiology Collaboration   (CKD-EPI) equation.       GFR Estimate If Black   Date Value Ref Range Status   02/01/2021 >90 >60 mL/min/[1.73_m2] Final     Comment:      GFR Calc  Starting 12/18/2018, serum creatinine based estimated GFR (eGFR) will be   calculated using the Chronic Kidney Disease Epidemiology Collaboration   (CKD-EPI) equation.       Lab Results   Component Value Date    CR 1.31 06/19/2024    CR 0.73 02/01/2021     No results found for: \"MICROALBUMIN\"    Healthy Eating:  Healthy Eating Assessed Today: No  Cultural/Restorationist diet restrictions?: No  Do you have any food allergies or intolerances?: No  Meal planning/habits: None  Who cooks/prepares meals for you?: Other  Who purchases food in  your home?: Other  How many times a week on average do you eat food made away from home (restaurant/take-out)?: 0  Meals include: Breakfast, Lunch, Dinner, Afternoon Snack, Evening Snack  Breakfast: 8:00 am: Oatmeal with hard boiled egg or scrambled eggs and barragan with toast OR pancake/waffle with eggs OR Latvian toast  Lunch: 12-12:30: Stir Edouard OR hamburgers with vegetables (with dessert- small cake or cookie)  Dinner: 5:00 -6:00 pm: sandwich OR chili (vegetarian) OR chicken OR fish  with vegetables with desserts (cake or cookies, fruit)  Snacks: mornnig snack: yogurt or pudding AND Has one bedtime snack- usually a cookie - oatmeal raisin or chocolate chip or yogurt OR sugra free candy every once and a while  Other: drinks coffee with sugar free creamer  Beverages: Water, Coffee, Milk (Keep some juice on hand for lows)  Has patient met with a dietitian in the past?: Yes    Being Active:  Being Active Assessed Today: Yes (went on the bike at her apartment complex)  Exercise:: Yes (walking, programs at care center)  Days per week of moderate to strenuous exercise (like a brisk walk): 2  On average, minutes per day of exercise at this " level:  (Walking with friends)  How intense was your typical exercise? : Light (like stretching or slow walking)  Barrier to exercise: Safety, Physical limitation    Monitoring:  Monitoring Assessed Today: Yes  Did patient bring glucose meter to appointment? : No  Blood Glucose Meter: Unknown  Times checking blood sugar at home (number): 4  Times checking blood sugar at home (per): Day  Blood glucose trend: Fluctuating    Taking Medications:  Diabetes Medication(s)       Diabetic Other       Glucagon (BAQSIMI) 3 MG/DOSE nasal powder Spray 1 spray (3 mg) in nostril as needed in the event of unconscious hypoglycemia or hypoglycemic seizure. May repeat dose if no response after 15 minutes.     glucagon 1 MG SOLR injection Inject 1 mg into the muscle once       Insulin       Insulin Aspart, w/Niacinamide, (FIASP PENFILL) 100 UNIT/ML SOCT Inject 5-8 Units subcutaneously 3 times daily (before meals) Inject 4 units before breakfast, 7 units before lunch and 8 units before dinner, 3 units before each snack. Add correction scale before meals. -160 = 1 unit. -200 = 2 units. -240 = 3 units. -280 = 4 units. -320 = 5 u     Insulin Degludec FlexTouch 100 UNIT/ML SOPN Inject 22 Units Subcutaneous daily EVERY NIGHT AT BEDTIME FOR DM *DISCARD 56 DAYS AFTER OPENING*            Taking Medication Assessed Today: Yes  Current Treatments: Insulin Injections  Dose schedule: Pre-breakfast, Pre-lunch, Pre-dinner, At bedtime  Given by: Nursing attendant  Injection/Infusion sites: Abdomen  Problems taking diabetes medications regularly?: No  Diabetes medication side effects?: No    Problem Solving:  Problem Solving Assessed Today: Yes  Is the patient at risk for hypoglycemia?: Yes  Hypoglycemia Frequency: Weekly  Hypoglycemia Treatment: Juice, Candy (will have one piece of candy or some juice)  Patient carries a carbohydrate source: Yes  Medical ID: Yes  Is the patient at risk for DKA?: Yes  Does patient have  ketone test strips?: Yes  Does patient have DKA prevention plan?: Yes  Does patient have severe weather/disaster plan for diabetes management?: No  Does patient have sick day plan for diabetes management?: Yes              Reducing Risks:  Reducing Risks Assessed Today: Yes  Diabetes Risks: Age over 45 years, Sedentary Lifestyle  CAD Risks: Diabetes Mellitus  Has dilated eye exam at least once a year?: Yes  Sees dentist every 6 months?: Yes  Feet checked by healthcare provider in the last year?: Yes    Healthy Coping:  Healthy Coping Assessed Today: Yes  Emotional response to diabetes: Acceptance  Informal Support system:: Family, Parent  Stage of change: ACTION (Actively working towards change)  Support resources: Offerings in Clinic Communities  Patient Activation Measure Survey Score:      3/7/2012     3:00 PM   KHUSHI Score (Last Two)   KHUSHI Raw Score 39   Activation Score 56.4   KHUSHI Level 3         Care Plan and Education Provided:  Healthy Eating: Balanced meals and Portion control, Being Active: Finding a physical activity routine that works for you, and Problem Solving: Low glucose - causes, signs/symptoms, treatment and prevention and Rule of 15 and carrying a carbohydrate source at all times in case of low glucose    Tiffanie Mcfarland RD [facility]  Triage: [phone]  [phone]      Time Spent: 30 minutes  Encounter Type: Individual    Any diabetes medication dose changes were made via the CDE Protocol per the patient's referring provider. A copy of this encounter was shared with the provider.

## 2024-07-11 NOTE — PATIENT INSTRUCTIONS
Insulin Adjustments:    Tresiba: Continue with 22 units     Fiasp: Breakfast: 4 units  Lunch: 7 units  Dinner: 8 units  Snacks: 3 units    Tiffanie Mcfarland RD [facility]  Triage: [phone]  [phone]

## 2024-07-17 ENCOUNTER — TELEPHONE (OUTPATIENT)
Dept: GERIATRICS | Facility: CLINIC | Age: 64
End: 2024-07-17

## 2024-07-17 LAB
ALBUMIN SERPL BCG-MCNC: 3.8 G/DL (ref 3.5–5.2)
ALP SERPL-CCNC: 102 U/L (ref 40–150)
ALT SERPL W P-5'-P-CCNC: 14 U/L (ref 0–50)
ANION GAP SERPL CALCULATED.3IONS-SCNC: 11 MMOL/L (ref 7–15)
AST SERPL W P-5'-P-CCNC: 17 U/L (ref 0–45)
BILIRUB SERPL-MCNC: 0.2 MG/DL
BUN SERPL-MCNC: 72.7 MG/DL (ref 8–23)
CALCIUM SERPL-MCNC: 9.5 MG/DL (ref 8.8–10.4)
CHLORIDE SERPL-SCNC: 111 MMOL/L (ref 98–107)
CREAT SERPL-MCNC: 1.42 MG/DL (ref 0.51–0.95)
EGFRCR SERPLBLD CKD-EPI 2021: 41 ML/MIN/1.73M2
ERYTHROCYTE [DISTWIDTH] IN BLOOD BY AUTOMATED COUNT: 13.8 % (ref 10–15)
FERRITIN SERPL-MCNC: 156 NG/ML (ref 11–328)
FOLATE SERPL-MCNC: 26.9 NG/ML (ref 4.6–34.8)
HCO3 SERPL-SCNC: 20 MMOL/L (ref 22–29)
HCT VFR BLD AUTO: 30.8 % (ref 35–47)
HGB BLD-MCNC: 10.2 G/DL (ref 11.7–15.7)
IRON BINDING CAPACITY (ROCHE): 248 UG/DL (ref 240–430)
IRON SATN MFR SERPL: 23 % (ref 15–46)
IRON SERPL-MCNC: 56 UG/DL (ref 37–145)
MCH RBC QN AUTO: 28.9 PG (ref 26.5–33)
MCHC RBC AUTO-ENTMCNC: 33.1 G/DL (ref 31.5–36.5)
MCV RBC AUTO: 87 FL (ref 78–100)
PLATELET # BLD AUTO: 255 10E3/UL (ref 150–450)
POTASSIUM SERPL-SCNC: 4.6 MMOL/L (ref 3.4–5.3)
PROT SERPL-MCNC: 7 G/DL (ref 6.4–8.3)
RBC # BLD AUTO: 3.53 10E6/UL (ref 3.8–5.2)
RETICS # AUTO: 0.05 10E6/UL (ref 0.03–0.1)
RETICS/RBC NFR AUTO: 1.5 % (ref 0.5–2)
SODIUM SERPL-SCNC: 142 MMOL/L (ref 135–145)
TRANSFERRIN SERPL-MCNC: 207 MG/DL (ref 200–360)
VIT B12 SERPL-MCNC: 1133 PG/ML (ref 232–1245)
WBC # BLD AUTO: 5.1 10E3/UL (ref 4–11)

## 2024-07-17 PROCEDURE — 82607 VITAMIN B-12: CPT | Mod: ORL | Performed by: NURSE PRACTITIONER

## 2024-07-17 PROCEDURE — 82728 ASSAY OF FERRITIN: CPT | Mod: ORL | Performed by: NURSE PRACTITIONER

## 2024-07-17 PROCEDURE — 84466 ASSAY OF TRANSFERRIN: CPT | Mod: ORL | Performed by: NURSE PRACTITIONER

## 2024-07-17 PROCEDURE — 83550 IRON BINDING TEST: CPT | Mod: ORL | Performed by: NURSE PRACTITIONER

## 2024-07-17 PROCEDURE — 36415 COLL VENOUS BLD VENIPUNCTURE: CPT | Mod: ORL | Performed by: NURSE PRACTITIONER

## 2024-07-17 PROCEDURE — P9604 ONE-WAY ALLOW PRORATED TRIP: HCPCS | Mod: ORL | Performed by: NURSE PRACTITIONER

## 2024-07-17 PROCEDURE — 85045 AUTOMATED RETICULOCYTE COUNT: CPT | Mod: ORL | Performed by: NURSE PRACTITIONER

## 2024-07-17 PROCEDURE — 80053 COMPREHEN METABOLIC PANEL: CPT | Mod: ORL | Performed by: NURSE PRACTITIONER

## 2024-07-17 PROCEDURE — 82746 ASSAY OF FOLIC ACID SERUM: CPT | Mod: ORL | Performed by: NURSE PRACTITIONER

## 2024-07-17 PROCEDURE — 85027 COMPLETE CBC AUTOMATED: CPT | Mod: ORL | Performed by: NURSE PRACTITIONER

## 2024-07-17 NOTE — TELEPHONE ENCOUNTER
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 PA:  Previously Tried and Failed:  N/A  Rationale:  SEE DX    Insurance   Primary: MEDICARE   Insurance ID:  [id number]     Secondary (if applicable):MEDICA   Insurance ID:  [id number]     Pharmacy Information (if different than what is on RX)  Name:  [address] [[id number]]   Phone: [phone]   Fax: [phone]

## 2024-07-18 ENCOUNTER — TELEPHONE (OUTPATIENT)
Dept: GERIATRICS | Facility: CLINIC | Age: 64
End: 2024-07-18
Payer: COMMERCIAL

## 2024-07-18 ENCOUNTER — LAB REQUISITION (OUTPATIENT)
Dept: LAB | Facility: CLINIC | Age: 64
End: 2024-07-18
Payer: MEDICARE

## 2024-07-18 DIAGNOSIS — I10 ESSENTIAL (PRIMARY) HYPERTENSION: ICD-10-CM

## 2024-07-18 DIAGNOSIS — I10 HYPERTENSION, UNSPECIFIED TYPE: Primary | ICD-10-CM

## 2024-07-18 LAB — GLUCOSE SERPL-MCNC: 58 MG/DL (ref 70–99)

## 2024-07-18 RX ORDER — FUROSEMIDE 20 MG
20 TABLET ORAL DAILY PRN
Status: SHIPPED
Start: 2024-07-18 | End: 2024-08-12 | Stop reason: DRUGHIGH

## 2024-07-18 NOTE — TELEPHONE ENCOUNTER
Lexy Rockwell  1960    Orders  Discontinue Cetirizine  Discontinue orders to encourage fluids  Discontinue furosemide due to worsening kidney function and pt does not have swelling  Start furosemide 20 mg daily as needed for swelling or shortness of breath   Repeat BMP on 8/8/2024  Discontinue potassium chloride 20 mEq  If rash is resolved discontinue Hydrocortisone ointment    Electronically signed     TAYLA Sprague CNP on 7/18/2024 at 1:26 PM

## 2024-07-22 NOTE — TELEPHONE ENCOUNTER
Central Prior Authorization Team - Phone: [phone]     PA Initiation    Medication: DORZOLAMIDE HCL-TIMOLOL MAL PF 2-0.5 % OP SOLN  Insurance Company: [employer] - Phone [phone] Fax [phone]  Pharmacy Filling the Rx: [facility] - [place], [place] - [address]  Filling Pharmacy Phone: [phone]  Filling Pharmacy Fax:    Start Date: 7/22/2024

## 2024-07-23 NOTE — TELEPHONE ENCOUNTER
Central Prior Authorization Team - Phone: [phone]     PRIOR AUTHORIZATION DENIED    Medication: DORZOLAMIDE HCL-TIMOLOL MAL PF 2-0.5 % OP SOLN  Insurance Company: [employer] - Phone [phone] Fax [phone]  Denial Date: 7/23/2024    Denial Reason(s):           Appeal Information: If the provider would like to appeal, please provide a letter of medical necessity and route back to the team. Otherwise you can close the encounter. Thank you, Central PA Team    Patient Notified: NO  Unfortunately, we cannot call the patient with denials because we do not know what next steps the MD will take nor can we give medical advice, please notify the patient of what they are to expect for the continuation of their therapy from the provider.

## 2024-07-30 NOTE — PROGRESS NOTES
Outcome for 07/30/24 9:29 AM: Mychart message sent  Elham Paul LPN   Outcome for 08/07/24 11:18 AM: Unable to leave voicemail  Elham Paul LPN   Outcome for 08/08/24 9:12 AM: Data obtained via Dexcom website  Renata Burgos MA

## 2024-08-01 ENCOUNTER — TELEPHONE (OUTPATIENT)
Dept: OPHTHALMOLOGY | Facility: CLINIC | Age: 64
End: 2024-08-01

## 2024-08-01 DIAGNOSIS — H40.1132 PRIMARY OPEN ANGLE GLAUCOMA OF BOTH EYES, MODERATE STAGE: Primary | ICD-10-CM

## 2024-08-01 NOTE — TELEPHONE ENCOUNTER
Received a form from [facility] requesting additional information regarding the necessity of Preservative Free Cosopt.  Dr. Rios will review this request today.

## 2024-08-07 ENCOUNTER — TELEPHONE (OUTPATIENT)
Dept: ENDOCRINOLOGY | Facility: CLINIC | Age: 64
End: 2024-08-07
Payer: COMMERCIAL

## 2024-08-07 NOTE — TELEPHONE ENCOUNTER
Called patient and unable to leave voicemail. Patient has an appointment on 8/9/24. Need patient to upload their Dexcom device to site for provider to review prior to their appointment.    Elham Paul LPN 08/07/24 11:18 AM

## 2024-08-08 LAB
ANION GAP SERPL CALCULATED.3IONS-SCNC: 7 MMOL/L (ref 7–15)
BUN SERPL-MCNC: 31.3 MG/DL (ref 8–23)
CALCIUM SERPL-MCNC: 8.9 MG/DL (ref 8.8–10.4)
CHLORIDE SERPL-SCNC: 114 MMOL/L (ref 98–107)
CREAT SERPL-MCNC: 1.01 MG/DL (ref 0.51–0.95)
EGFRCR SERPLBLD CKD-EPI 2021: 62 ML/MIN/1.73M2
GLUCOSE SERPL-MCNC: 111 MG/DL (ref 70–99)
HCO3 SERPL-SCNC: 21 MMOL/L (ref 22–29)
POTASSIUM SERPL-SCNC: 4.6 MMOL/L (ref 3.4–5.3)
SODIUM SERPL-SCNC: 142 MMOL/L (ref 135–145)

## 2024-08-08 PROCEDURE — P9604 ONE-WAY ALLOW PRORATED TRIP: HCPCS | Mod: ORL | Performed by: NURSE PRACTITIONER

## 2024-08-08 PROCEDURE — 80048 BASIC METABOLIC PNL TOTAL CA: CPT | Mod: ORL | Performed by: NURSE PRACTITIONER

## 2024-08-08 PROCEDURE — 36415 COLL VENOUS BLD VENIPUNCTURE: CPT | Mod: ORL | Performed by: NURSE PRACTITIONER

## 2024-08-09 ENCOUNTER — VIRTUAL VISIT (OUTPATIENT)
Dept: ENDOCRINOLOGY | Facility: CLINIC | Age: 64
End: 2024-08-09
Payer: MEDICARE

## 2024-08-09 ENCOUNTER — TELEPHONE (OUTPATIENT)
Dept: ENDOCRINOLOGY | Facility: CLINIC | Age: 64
End: 2024-08-09

## 2024-08-09 DIAGNOSIS — E10.3393 TYPE 1 DIABETES MELLITUS WITH MODERATE NONPROLIFERATIVE RETINOPATHY OF BOTH EYES WITHOUT MACULAR EDEMA (H): Primary | ICD-10-CM

## 2024-08-09 PROCEDURE — 99441 PR PHYSICIAN TELEPHONE EVALUATION 5-10 MIN: CPT | Performed by: INTERNAL MEDICINE

## 2024-08-09 NOTE — PROGRESS NOTES
Endocrinology Note         Lexy is a 64 year old female presents today for type 1 diabetes    HPI  Lexy is a 64 year old female history of type 1 diabetes with gastroparesis and chronic kidney disease stage 3, hypertension, hyperlipidemia developmental delay presents today for uncontrolled type 1 diabetes.    Last seen in Jaylene Perez and MARY Haas    Patient has had history of suboptimal control of type 1 diabetes (previous A1c ranged between 9 to 12%).  Patient had history of diabetic ketoacidosis in 2021.  Patient had documented low C-peptide and positive angelica antibody in 3/2018.    She is currently living at [facility] (nursing home).   A1c 9.7 in June 2024    Current diabetic medications  - Tresiba 22 units daily  - Humalog 4 units with breakfast, 7 units with lunch, 8 units with dinner  - Snack coverage-  3 units  - Sliding scale --  1 unit per 50 above 150 mg/dl  150 -199 = 1 unit:   200-249 = 2 units  250-299 = 3 units  300-249 = 4 units  350 - 399= 5 units   400+ = 6 units and call MD    Diet recalled:  BF: barragan, egg, toast, oatmeal  Lunch: sandwich, soup, egg  Dinner: cannot remember    She is using Dexcom to track glucose levels.      DM complications:  Retinopathy:  moderate NPDR, glaucoma -- due to for exam this year  Nephropathy: positive macroalbuminuria (test 2021), CKD stage 3   Neuropathy: some tingling and numbness in her feet -- saw podiatrist 3/3023.    Past Medical History  Past Medical History:   Diagnosis Date    Cerumen impacted     Development delay     Diabetic gastroparesis (H) 8/16/2013    Glaucoma (increased eye pressure)     Hyperlipaemia     Hypertension     Hypothyroid     Mental retardation     Nonsenile cataract     Obesity     Strabismus     Type 1 diabetes mellitus not at goal (H)        Allergies  Allergies   Allergen Reactions    Amoxicillin     Sulfa Antibiotics      Medications  Current Outpatient Medications   Medication Sig Dispense  Refill    acetaminophen (TYLENOL) 500 MG tablet TAKE 2 TABLETS BY MOUTH EVERY 6 HOURS AS NEEDED (1000MG) FOR PAIN 248 tablet 11    AMLODIPINE BENZOATE PO Take 2.5 mg by mouth      carvedilol (COREG) 6.25 MG tablet Take 1 tablet (6.25 mg) by mouth 2 times daily (with meals)      Continuous Blood Gluc  (DEXCOM G7 ) KELLY Use to read blood sugars as per 's instructions. 1 each 0    Continuous Blood Gluc Sensor (DEXCOM G7 SENSOR) MISC Change every 10 days. 3 each 5    Continuous Glucose Sensor (DEXCOM G6 SENSOR) MISC Change every 10 days. 3 each 5    Continuous Glucose Transmitter (DEXCOM G6 TRANSMITTER) MISC Change every 3 months. 1 each 1    dorzolamide-timolol PF (COSOPT PF) 2-0.5 % opthalmic solutionh INSTILL 1 DROP IN BOTH EYES TWICE DAILY 60 each 3    ferrous sulfate (SLO-FE) 142 (45 Fe) MG CR tablet Take 1 tablet (142 mg) by mouth daily      fluticasone (FLONASE) 50 MCG/ACT nasal spray Spray 1 spray into both nostrils daily      furosemide (LASIX) 20 MG tablet Take 1 tablet (20 mg) by mouth daily as needed (short of breath, swelling)      Glucagon (BAQSIMI) 3 MG/DOSE nasal powder Spray 1 spray (3 mg) in nostril as needed in the event of unconscious hypoglycemia or hypoglycemic seizure. May repeat dose if no response after 15 minutes. 2 each 3    glucagon 1 MG SOLR injection Inject 1 mg into the muscle once      hydrocortisone 1 % CREA cream Place rectally 2 times daily      Insulin Aspart, w/Niacinamide, (FIASP PENFILL) 100 UNIT/ML SOCT Inject 4-8 Units subcutaneously 3 times daily (before meals) Inject 4 units before breakfast, 7 units before lunch and 8 units before dinner, 3 units before each snacks 2x daily. Add correction scale before meals. -160 = 1 unit. -200 = 2 units. -240 = 3 units. -280 = 4 units. -320 = 5 unit(s). Max daily dose is 45 units. 6 mL 11    Insulin Degludec FlexTouch 100 UNIT/ML SOPN Inject 22 Units Subcutaneous daily EVERY NIGHT AT  BEDTIME FOR DM *DISCARD 56 DAYS AFTER OPENING* 6 mL PRN    insulin pen needle (NOVOFINE AUTOCOVER PEN NEEDLE) 30G X 8 MM miscellaneous 1 each See Admin Instructions as directed. 100 each 11    KETOSTIX test strip Use as directed in case of high glucose, vomiting or illness. 50 strip 3    latanoprost (XALATAN) 0.005 % ophthalmic solution INSTILL 1 DROP IN BOTH EYES AT BEDTIME 10 mL 11    lisinopril (ZESTRIL) 40 MG tablet Take 1 tablet (40 mg) by mouth daily      MULTIVITAMIN TABS   OR 1 TABLET DAILY      nystatin (MYCOSTATIN) 794299 UNIT/GM external cream Apply topically 2 times daily as needed for dry skin To feet and toenails.      pantoprazole (PROTONIX) 40 MG EC tablet Take 40 mg by mouth daily      sennosides (SENOKOT) 8.6 MG tablet Take 1 tablet by mouth daily as needed      simvastatin (ZOCOR) 20 MG tablet Take 1 tablet (20 mg) by mouth At Bedtime 90 tablet 3     Family History  family history includes Diabetes in her sister; Glaucoma in her maternal grandfather; Hypertension in her father.     Social History  Social History     Tobacco Use    Smoking status: Never    Smokeless tobacco: Never    Tobacco comments:     lives in smoke free household.   Substance Use Topics    Alcohol use: Yes     Comment: occass social    Drug use: No       ROS  10 points ROS were negative otherwise mentioned in HPI    Physical Exam  LMP 03/28/2014   Limited due to virtual visit  Constitutional: no distress, comfortable, pleasant   Psychological: appropriate mood       RESULTS  I have personally reviewed labs and images. I also reviewed labs with patient and discussed the result and plan of care.    Lab Results   Component Value Date    A1C 9.7 06/19/2024    A1C 8.5 11/29/2023    A1C 8.1 03/29/2023    A1C 8.7 01/25/2023    A1C 10.7 10/13/2021    A1C 9.7 02/01/2021    A1C 9.2 09/30/2020    A1C 11.8 01/08/2020    A1C 11.8 05/19/2019    A1C 11.2 12/12/2018      Latest Ref Rng 1/25/2023  8:20 AM   ENDO DIABETES     Cholesterol <200  mg/dL 112    LDL Cholesterol Calculated <=100 mg/dL 58    HDL Cholesterol >=50 mg/dL 37 (L)    Non HDL Cholesterol <130 mg/dL 75    VLDL-Cholesterol 0 - 30 mg/dL    Triglycerides <150 mg/dL 84    TRIG (EXT) <150 mg/dL 84       Latest Ref Rng 8/8/2024  6:57 AM   ENDO DIABETES     Creatinine 0.51 - 0.95 mg/dL 1.01 (H)      ASSESSMENT:    Lexy is a 64 year old female history of type 1 diabetes with gastroparesis and chronic kidney disease stage 3, hypertension, hyperlipidemia developmental delay presents today for uncontrolled type 1 diabetes.    1) type 1 diabetes with CKD and gastroparesis: Her diabetes control is still suboptimal.  Previous A1c ranged between 8 to 12%.  Recent A1c in March was 9.7%.  She is currently using Dexcom glucose sensor to track and monitor blood glucose.  Review glucose pattern showed erratic blood glucose with postprandial hyperglycemia and fasting hypoglycemia    Based on her glucose pattern, I reduce Tresiba to 20 units daily.  continue Humalog 4 units with breakfast, 7 units with lunch, 8 units with dinner  She should continue using Dexcom glucose sensor  She will keep her appointment with her diabetic educator in 2 weeks.    2) DM complications:  Retinopathy:  moderate NPDR, glaucoma -- due to for exam this year  Nephropathy: positive macroalbuminuria (test 2021), CKD stage 3   Neuropathy: some tingling and numbness in her feet -- saw podiatrist 3/3023.      PLAN:   - reduce Tresiba to 20 units daily.  - continue Humalog 4 units with breakfast, 7 units with lunch, 8 units with dinner  - continue sliding scale --  1 unit per 50 above 150 mg/dl  150 -199 = 1 unit:   200-249 = 2 units  250-299 = 3 units  300-249 = 4 units  350 - 399= 5 units   400+ = 6 units and call MD     - continue using Dexcom sensor  - will fax insulin regiment to assisted living    See Tiffanie Mcfarland in 2 weeks  See PA or MD in 2-3 months    Patient visit on August 9, 2024          Started at 1:21 PM           Ended at 1:31 PM          Total length of 10m13s         Visit was conducted over [employer] . Patient was informed of the benefits and limitations of telemedicine. Patient consented to real time communication over audio, video, and/or data.         Signed, Dr. Deonte Garcia MD  Division of Diabetes and Endocrinology  Department of Medicine

## 2024-08-09 NOTE — NURSING NOTE
Current patient location: [place]    Is the patient currently in the state of [place]? YES    Visit mode:TELEPHONE    If the visit is dropped, the patient can be reconnected by: TELEPHONE VISIT: Phone number: [phone]    Will anyone else be joining the visit? NO  (If patient encounters technical issues they should call [phone] :[phone])    How would you like to obtain your AVS? MyChart    Are changes needed to the allergy or medication list? No, Pt stated no changes to allergies, and Pt stated no med changes    Are refills needed on medications prescribed by this physician? Unknown    Rooming Documentation:  Not applicable      Reason for visit: RECHECK    Serene KRUEGER

## 2024-08-09 NOTE — TELEPHONE ENCOUNTER
08/09/24 Writer faxed AVS to [facility] @[phone].    Judith Chang -General Care Navigator

## 2024-08-09 NOTE — PATIENT INSTRUCTIONS
PLAN:   - reduce Tresiba to 20 units daily.  - continue Fiasp 4 units with breakfast, 7 units with lunch, 8 units with dinner  - continue sliding scale --  1 unit per 50 above 150 mg/dl  150 -199 = 1 unit:   200-249 = 2 units  250-299 = 3 units  300-249 = 4 units  350 - 399= 5 units   400+ = 6 units and call MD Up to 30 units daily    See Tiffanie Mcfarland in 2 weeks  See PA or MD in 2-3 months    If you have any questions, please do not hesitate to call clinic line at [phone] and ask for Endocrinology clinic.  If you need to fax, please fax to clinic fax number at [phone]    After clinic hours or weekends, please contact [phone] and ask for Endocrinologist-on call      Sincerely,    Deonte Garcia MD  Endocrinology

## 2024-08-09 NOTE — LETTER
8/9/2024       RE: Lexy Rockwell  [address]  [facility] [address]     Dear Colleague,    Thank you for referring your patient, Lexy Rockwell, to the [facility] ENDOCRINOLOGY CLINIC [place] at [facility]. Please see a copy of my visit note below.    Outcome for 07/30/24 9:29 AM: [employer] message sent  Elham Paul LPN   Outcome for 08/07/24 11:18 AM: Unable to leave voicemail  Elham Paul LPN   Outcome for 08/08/24 9:12 AM: Data obtained via Dexcom website  Renata Burgos MA                       Endocrinology Note         Lexy is a 64 year old female presents today for type 1 diabetes    HPI  Lexy is a 64 year old female history of type 1 diabetes with gastroparesis and chronic kidney disease stage 3, hypertension, hyperlipidemia developmental delay presents today for uncontrolled type 1 diabetes.    Last seen in Jaylene Perez and MARY Haas    Patient has had history of suboptimal control of type 1 diabetes (previous A1c ranged between 9 to 12%).  Patient had history of diabetic ketoacidosis in 2021.  Patient had documented low C-peptide and positive angelica antibody in 3/2018.    She is currently living at [facility] (nursing home).   A1c 9.7 in June 2024    Current diabetic medications  - Tresiba 22 units daily  - Humalog 4 units with breakfast, 7 units with lunch, 8 units with dinner  - Snack coverage-  3 units  - Sliding scale --  1 unit per 50 above 150 mg/dl  150 -199 = 1 unit:   200-249 = 2 units  250-299 = 3 units  300-249 = 4 units  350 - 399= 5 units   400+ = 6 units and call MD    Diet recalled:  BF: barragan, egg, toast, oatmeal  Lunch: sandwich, soup, egg  Dinner: cannot remember    She is using Dexcom to track glucose levels.      DM complications:  Retinopathy:  moderate NPDR, glaucoma -- due to for exam this year  Nephropathy: positive macroalbuminuria (test 2021), CKD stage 3    Neuropathy: some tingling and numbness in her feet -- saw podiatrist 3/3023.    Past Medical History  Past Medical History:   Diagnosis Date     Cerumen impacted      Development delay      Diabetic gastroparesis (H) 8/16/2013     Glaucoma (increased eye pressure)      Hyperlipaemia      Hypertension      Hypothyroid      Mental retardation      Nonsenile cataract      Obesity      Strabismus      Type 1 diabetes mellitus not at goal (H)        Allergies  Allergies   Allergen Reactions     Amoxicillin      Sulfa Antibiotics      Medications  Current Outpatient Medications   Medication Sig Dispense Refill     acetaminophen (TYLENOL) 500 MG tablet TAKE 2 TABLETS BY MOUTH EVERY 6 HOURS AS NEEDED (1000MG) FOR PAIN 248 tablet 11     AMLODIPINE BENZOATE PO Take 2.5 mg by mouth       carvedilol (COREG) 6.25 MG tablet Take 1 tablet (6.25 mg) by mouth 2 times daily (with meals)       Continuous Blood Gluc  (DEXCOM G7 ) KELLY Use to read blood sugars as per 's instructions. 1 each 0     Continuous Blood Gluc Sensor (DEXCOM G7 SENSOR) MISC Change every 10 days. 3 each 5     Continuous Glucose Sensor (DEXCOM G6 SENSOR) MISC Change every 10 days. 3 each 5     Continuous Glucose Transmitter (DEXCOM G6 TRANSMITTER) MISC Change every 3 months. 1 each 1     dorzolamide-timolol PF (COSOPT PF) 2-0.5 % opthalmic solutionh INSTILL 1 DROP IN BOTH EYES TWICE DAILY 60 each 3     ferrous sulfate (SLO-FE) 142 (45 Fe) MG CR tablet Take 1 tablet (142 mg) by mouth daily       fluticasone (FLONASE) 50 MCG/ACT nasal spray Spray 1 spray into both nostrils daily       furosemide (LASIX) 20 MG tablet Take 1 tablet (20 mg) by mouth daily as needed (short of breath, swelling)       Glucagon (BAQSIMI) 3 MG/DOSE nasal powder Spray 1 spray (3 mg) in nostril as needed in the event of unconscious hypoglycemia or hypoglycemic seizure. May repeat dose if no response after 15 minutes. 2 each 3     glucagon 1 MG SOLR injection  Inject 1 mg into the muscle once       hydrocortisone 1 % CREA cream Place rectally 2 times daily       Insulin Aspart, w/Niacinamide, (FIASP PENFILL) 100 UNIT/ML SOCT Inject 4-8 Units subcutaneously 3 times daily (before meals) Inject 4 units before breakfast, 7 units before lunch and 8 units before dinner, 3 units before each snacks 2x daily. Add correction scale before meals. -160 = 1 unit. -200 = 2 units. -240 = 3 units. -280 = 4 units. -320 = 5 unit(s). Max daily dose is 45 units. 6 mL 11     Insulin Degludec FlexTouch 100 UNIT/ML SOPN Inject 22 Units Subcutaneous daily EVERY NIGHT AT BEDTIME FOR DM *DISCARD 56 DAYS AFTER OPENING* 6 mL PRN     insulin pen needle (NOVOFINE AUTOCOVER PEN NEEDLE) 30G X 8 MM miscellaneous 1 each See Admin Instructions as directed. 100 each 11     KETOSTIX test strip Use as directed in case of high glucose, vomiting or illness. 50 strip 3     latanoprost (XALATAN) 0.005 % ophthalmic solution INSTILL 1 DROP IN BOTH EYES AT BEDTIME 10 mL 11     lisinopril (ZESTRIL) 40 MG tablet Take 1 tablet (40 mg) by mouth daily       MULTIVITAMIN TABS   OR 1 TABLET DAILY       nystatin (MYCOSTATIN) 073063 UNIT/GM external cream Apply topically 2 times daily as needed for dry skin To feet and toenails.       pantoprazole (PROTONIX) 40 MG EC tablet Take 40 mg by mouth daily       sennosides (SENOKOT) 8.6 MG tablet Take 1 tablet by mouth daily as needed       simvastatin (ZOCOR) 20 MG tablet Take 1 tablet (20 mg) by mouth At Bedtime 90 tablet 3     Family History  family history includes Diabetes in her sister; Glaucoma in her maternal grandfather; Hypertension in her father.     Social History  Social History     Tobacco Use     Smoking status: Never     Smokeless tobacco: Never     Tobacco comments:     lives in smoke free household.   Substance Use Topics     Alcohol use: Yes     Comment: occass social     Drug use: No       ROS  10 points ROS were negative otherwise  mentioned in HPI    Physical Exam  LMP 03/28/2014   Limited due to virtual visit  Constitutional: no distress, comfortable, pleasant   Psychological: appropriate mood       RESULTS  I have personally reviewed labs and images. I also reviewed labs with patient and discussed the result and plan of care.    Lab Results   Component Value Date    A1C 9.7 06/19/2024    A1C 8.5 11/29/2023    A1C 8.1 03/29/2023    A1C 8.7 01/25/2023    A1C 10.7 10/13/2021    A1C 9.7 02/01/2021    A1C 9.2 09/30/2020    A1C 11.8 01/08/2020    A1C 11.8 05/19/2019    A1C 11.2 12/12/2018      Latest Ref Rng 1/25/2023  8:20 AM   ENDO DIABETES     Cholesterol <200 mg/dL 112    LDL Cholesterol Calculated <=100 mg/dL 58    HDL Cholesterol >=50 mg/dL 37 (L)    Non HDL Cholesterol <130 mg/dL 75    VLDL-Cholesterol 0 - 30 mg/dL    Triglycerides <150 mg/dL 84    TRIG (EXT) <150 mg/dL 84       Latest Ref Rng 8/8/2024  6:57 AM   ENDO DIABETES     Creatinine 0.51 - 0.95 mg/dL 1.01 (H)      ASSESSMENT:    Lexy is a 64 year old female history of type 1 diabetes with gastroparesis and chronic kidney disease stage 3, hypertension, hyperlipidemia developmental delay presents today for uncontrolled type 1 diabetes.    1) type 1 diabetes with CKD and gastroparesis: Her diabetes control is still suboptimal.  Previous A1c ranged between 8 to 12%.  Recent A1c in March was 9.7%.  She is currently using Dexcom glucose sensor to track and monitor blood glucose.  Review glucose pattern showed erratic blood glucose with postprandial hyperglycemia and fasting hypoglycemia    Based on her glucose pattern, I reduce Tresiba to 20 units daily.  continue Humalog 4 units with breakfast, 7 units with lunch, 8 units with dinner  She should continue using Dexcom glucose sensor  She will keep her appointment with her diabetic educator in 2 weeks.    2) DM complications:  Retinopathy:  moderate NPDR, glaucoma -- due to for exam this year  Nephropathy: positive macroalbuminuria  (test 2021), CKD stage 3   Neuropathy: some tingling and numbness in her feet -- saw podiatrist 3/3023.      PLAN:   - reduce Tresiba to 20 units daily.  - continue Humalog 4 units with breakfast, 7 units with lunch, 8 units with dinner  - continue sliding scale --  1 unit per 50 above 150 mg/dl  150 -199 = 1 unit:   200-249 = 2 units  250-299 = 3 units  300-249 = 4 units  350 - 399= 5 units   400+ = 6 units and call MD     - continue using Dexcom sensor  - will fax insulin regiment to assisted living    See Tiffanie Mcfarland in 2 weeks  See PA or MD in 2-3 months    Patient visit on August 9, 2024           Started at 1:21 PM           Ended at 1:31 PM           Total length of 10m13s         Visit was conducted over [employer] . Patient was informed of the benefits and limitations of telemedicine. Patient consented to real time communication over audio, video, and/or data.         Signed, Dr. Deonte Garcia MD  Division of Diabetes and Endocrinology  Department of Medicine        Again, thank you for allowing me to participate in the care of your patient.      Sincerely,    Deonte Garcia MD

## 2024-08-12 ENCOUNTER — TELEPHONE (OUTPATIENT)
Dept: GERIATRICS | Facility: CLINIC | Age: 64
End: 2024-08-12
Payer: COMMERCIAL

## 2024-08-12 DIAGNOSIS — I10 HYPERTENSION, UNSPECIFIED TYPE: ICD-10-CM

## 2024-08-12 RX ORDER — DORZOLAMIDE HYDROCHLORIDE AND TIMOLOL MALEATE 20; 5 MG/ML; MG/ML
1 SOLUTION/ DROPS OPHTHALMIC 2 TIMES DAILY
Qty: 20 ML | Refills: 3 | Status: SHIPPED | OUTPATIENT
Start: 2024-08-12

## 2024-08-12 RX ORDER — FUROSEMIDE 40 MG
40 TABLET ORAL DAILY
COMMUNITY

## 2024-08-12 NOTE — TELEPHONE ENCOUNTER
[facility] En[facility]    Provider: TAYLA Gagnon CNP  Facility: [facility] Facility Type:  AL    Caller: Hannah  Call Back Number: [phone]    Allergies:    Allergies   Allergen Reactions    Amoxicillin     Sulfa Antibiotics         Reason for call: pt has had a weigh gain. today she is 222.5 lbs. last weight was 8/8 - 221.5; 8/5 - 201.0 lbs. her baseline is around 200 lbs. RLE 3+ edema. No SOB and lungs are clear. Vitals: 156/75, 88, 97.0, 99%RA, 18. she is on lasix 20 mg daily and wears velcro wraps. she reports not wearing her wraps for 2 days     Verbal Order/Direction given by Provider:   1) increase lasix to 40 mg PO daily  2) Daily weights  3) Therapy to evaluate and treat for lymphedema  4) VS daily     Provider giving Order:  TAYLA Gagnon CNP    Verbal Order given to: Hannah Montoya RN

## 2024-08-12 NOTE — TELEPHONE ENCOUNTER
Dr. Rios has reviewed her chart and does not feel a preservative-free version of Cosopt is necessary.  It was originally sent by Dr. Way on 7-17-24.  I called the [facility] pharmacy in [place] and told them to cancel the Prior Authorization.  We will be sending a new prescription as Cosopt (not preservative free).    The pharmacist told me that a 30 day supply of Cosopt had been dispensed to the patient on 8-1-24.

## 2024-08-13 ENCOUNTER — LAB REQUISITION (OUTPATIENT)
Dept: LAB | Facility: CLINIC | Age: 64
End: 2024-08-13
Payer: MEDICARE

## 2024-08-13 ENCOUNTER — ASSISTED LIVING VISIT (OUTPATIENT)
Dept: GERIATRICS | Facility: CLINIC | Age: 64
End: 2024-08-13
Payer: MEDICARE

## 2024-08-13 VITALS
HEIGHT: 64 IN | HEART RATE: 83 BPM | TEMPERATURE: 97.4 F | RESPIRATION RATE: 20 BRPM | OXYGEN SATURATION: 98 % | SYSTOLIC BLOOD PRESSURE: 179 MMHG | DIASTOLIC BLOOD PRESSURE: 79 MMHG | WEIGHT: 221.5 LBS | BODY MASS INDEX: 37.81 KG/M2

## 2024-08-13 DIAGNOSIS — D64.9 ANEMIA, UNSPECIFIED TYPE: ICD-10-CM

## 2024-08-13 DIAGNOSIS — F81.9 COGNITIVE DEVELOPMENTAL DELAY: ICD-10-CM

## 2024-08-13 DIAGNOSIS — R60.0 LOWER EXTREMITY EDEMA: ICD-10-CM

## 2024-08-13 DIAGNOSIS — E10.22 TYPE 1 DIABETES MELLITUS WITH STAGE 3A CHRONIC KIDNEY DISEASE (H): Primary | ICD-10-CM

## 2024-08-13 DIAGNOSIS — R63.5 WEIGHT GAIN: ICD-10-CM

## 2024-08-13 DIAGNOSIS — I10 ESSENTIAL HYPERTENSION WITH GOAL BLOOD PRESSURE LESS THAN 140/90: ICD-10-CM

## 2024-08-13 DIAGNOSIS — K21.00 GASTROESOPHAGEAL REFLUX DISEASE WITH ESOPHAGITIS WITHOUT HEMORRHAGE: ICD-10-CM

## 2024-08-13 DIAGNOSIS — N18.31 TYPE 1 DIABETES MELLITUS WITH STAGE 3A CHRONIC KIDNEY DISEASE (H): Primary | ICD-10-CM

## 2024-08-13 DIAGNOSIS — I50.9 HEART FAILURE, UNSPECIFIED (H): ICD-10-CM

## 2024-08-13 PROCEDURE — 99349 HOME/RES VST EST MOD MDM 40: CPT | Performed by: INTERNAL MEDICINE

## 2024-08-19 PROCEDURE — 82565 ASSAY OF CREATININE: CPT | Mod: ORL | Performed by: NURSE PRACTITIONER

## 2024-08-19 PROCEDURE — 36415 COLL VENOUS BLD VENIPUNCTURE: CPT | Mod: ORL | Performed by: NURSE PRACTITIONER

## 2024-08-19 PROCEDURE — P9604 ONE-WAY ALLOW PRORATED TRIP: HCPCS | Mod: ORL | Performed by: NURSE PRACTITIONER

## 2024-08-20 LAB
ANION GAP SERPL CALCULATED.3IONS-SCNC: 11 MMOL/L (ref 7–15)
BUN SERPL-MCNC: 39.9 MG/DL (ref 8–23)
CALCIUM SERPL-MCNC: 9.5 MG/DL (ref 8.8–10.4)
CHLORIDE SERPL-SCNC: 108 MMOL/L (ref 98–107)
CREAT SERPL-MCNC: 1.07 MG/DL (ref 0.51–0.95)
EGFRCR SERPLBLD CKD-EPI 2021: 58 ML/MIN/1.73M2
HCO3 SERPL-SCNC: 23 MMOL/L (ref 22–29)
POTASSIUM SERPL-SCNC: 4.8 MMOL/L (ref 3.4–5.3)
SODIUM SERPL-SCNC: 142 MMOL/L (ref 135–145)

## 2024-08-22 ENCOUNTER — DOCUMENTATION ONLY (OUTPATIENT)
Dept: GERIATRICS | Facility: CLINIC | Age: 64
End: 2024-08-22
Payer: COMMERCIAL

## 2024-08-22 ENCOUNTER — TELEPHONE (OUTPATIENT)
Dept: OPHTHALMOLOGY | Facility: CLINIC | Age: 64
End: 2024-08-22
Payer: COMMERCIAL

## 2024-08-22 DIAGNOSIS — E10.65 HYPERGLYCEMIA DUE TO TYPE 1 DIABETES MELLITUS (H): ICD-10-CM

## 2024-08-22 DIAGNOSIS — H40.1132 PRIMARY OPEN ANGLE GLAUCOMA OF BOTH EYES, MODERATE STAGE: ICD-10-CM

## 2024-08-22 NOTE — TELEPHONE ENCOUNTER
Received another Prior Auth request from [facility] for Preservative Free Cosopt.  I called [facility] and told them that a different prescription had been sent on 8-1-24 that was for regular Cosopt.  The pharmacist still thinks that Cosopt will need a Prior Auth., so she will be faxing a form.    [facility] faxed another request for a Prior Auth for Preservative Free Cosopt.  I  wrote a message on the PA request stating the Preservative free option is not needed, and faxed this back to [facility].

## 2024-08-27 PROBLEM — E66.812 CLASS 2 SEVERE OBESITY DUE TO EXCESS CALORIES WITH SERIOUS COMORBIDITY IN ADULT (H): Status: ACTIVE | Noted: 2024-08-27

## 2024-08-27 PROBLEM — E66.01 CLASS 2 SEVERE OBESITY DUE TO EXCESS CALORIES WITH SERIOUS COMORBIDITY IN ADULT (H): Status: ACTIVE | Noted: 2024-08-27

## 2024-08-27 NOTE — PROGRESS NOTES
Lexy Rockwell is a 64 year old female seen August 13, 2024 at [facility] where she has resided for one year (admit to LTC 1/2023) seen for regulatory visit and to follow up labile DM1 with sequelae of retinopathy, nephropathy and neuropathy.   Pt is seen on the unit up to chair, feels well.  She is spectacularly noncompliant with a diabetic diet, and her weight has continued to climb, now >220 lbs representing a 75 lb weight gain over the past year, and A1C higher.   She is followed regularly by Endocrinology, last visit on 8/9 and insulin doses adjusted  More rapid weight gain reported this past week, and pt not wearing her compression wraps.  Furosemide dose increased to 40 mg/day for 3 days, then back to 20 mg/day   Follow up labs pending        By chart review, pt has Type 1 DM with multiple hospitalizations for DKA.  She was seen in the [facility] ED for hyperglycemia in November 2022, with accompanying symptoms of dizziness, BLE weakness and heartburn.   Noted that she sometimes forgot to administer her insulin.   Returned home, seeing diabetic educator but not an endocrinologist.         She was continuing to live independently in December 2022 when neighbors asked for a welfare check after she hadn't been seen in several days, and she was found down by police.   Hospitalized at [facility] 12/13-26 for DKA and rhabdomyolysis  Glucose >1000 and pH 6.8    She required pressor support, intubation and TF; treated with broad spectrum abx for SHIVAM pneumonia.  No sz on video EEG.  She had a +COVID19 test. Slowly cleared and discharged to [facility] before transitioning to LTC.     Pt was seen in the [facility] ED in January 2023 for urinary frequency related to hyperglycemia.  Improved and returned to LTC with continued labile blood sugars, urinary incontinence, weakness and poor endurance  She had upper and lower endoscopy in July 2023, no significant findings    Pt had an October 2023 [facility]  "hospitalization for hyperglycemia, seen by Endocrinology and diabetic regimen adjusted    She was treated with ciprofloxacin for Enterobacter cloacae UTI    Returned to [facility], pt transitioned to Board and Care in January 2024      She was seen in the ED January 2024 for hyperglycemia, but no evidence of DKA or other abnormality.         Past Medical History:   Diagnosis Date    Cerumen impacted     Development delay     Diabetic gastroparesis (H) 8/16/2013    Glaucoma (increased eye pressure)     Hyperlipaemia     Hypertension     Hypothyroid     Mental retardation     Nonsenile cataract     Obesity     Strabismus     Type 1 diabetes mellitus not at goal (H)        Past Surgical History:   Procedure Laterality Date    COLONOSCOPY N/A 7/3/2023    Procedure: Colonoscopy;  Surgeon: Merlyn Acevedo MD;  Location:  GI    ESOPHAGOSCOPY, GASTROSCOPY, DUODENOSCOPY (EGD), COMBINED N/A 7/3/2023    Procedure: Esophagoscopy, gastroscopy, duodenoscopy (EGD), combined;  Surgeon: Merlyn Acevedo MD;  Location:  GI    STRABISMUS SURGERY      [facility] EYE SURG ANT SGMT PROC UNLISTED  remote    strabismus surgery     SH:  Single    Her mother Francisca Rockwell is first contact    She has 2 sisters that live in [facility]   Non smoker    Review Of Systems  Developmental cognitive delay   BIMS 15/15    PHQ9 1/27    SLUMS 22/30 in 2021  Ambulatory with 4WW  Weight at admission was 167 lbs>>>in April 2023 was 156 lbs>>> in Sept 2023 was 168 lbs>>>in Dec 2023 was 179 lbs  Wt Readings from Last 5 Encounters:   08/13/24 100.5 kg (221 lb 8 oz)   07/31/24 91.4 kg (201 lb 6.4 oz)   07/10/24 89.8 kg (198 lb)   06/28/24 91.3 kg (201 lb 3.2 oz)   06/24/24 89.1 kg (196 lb 6.4 oz)      EXAM: NAD  BP (!) 179/79   Pulse 83   Temp 97.4  F (36.3  C)   Resp 20   Ht 1.626 m (5' 4\")   Wt 100.5 kg (221 lb 8 oz)   LMP 03/28/2014   SpO2 98%   BMI 38.02 kg/m       Breathing non labored, comfortable   Ext with 2-3+ edema   Neuro: valgus deformities, ambulatory " with waddling gait using 4WW    Psych: affect okay, pleasant      Lab Results   Component Value Date    WBC 5.1 07/17/2024      HGB 10.2 07/17/2024      MCV 87 07/17/2024       07/17/2024         IMP/PLAN:   (D64.9) Anemia, unspecified type    Comment: Fe deficiency   Upper and lower endoscopy in July 2023, unrevealing although colonoscopy limited by poor prep.    Plan: Continue pantoprazole 40 mg/day   Slow release Fe 45 mg/day      (R63.5) Weight gain and LE edema  Comment:    Very difficult to restrict diet in the Board and Care setting   Plan: LE compression, elevation  Furosemide bumped to 40 mg/day x3 days, then resume 20 mg/day with KCl 20 mEq/day   Recheck BMP  >>>Consider ECHO     (E10.22,  N18.31) Type 1 diabetes mellitus with stage 3a chronic kidney disease (H)  Comment: long history of recurrent DKA and labile sugars   Noncompliant with diet, as above    Sequelae includes retinopathy, gastroparesis and CKD3    Lab Results   Component Value Date    A1C 9.7 06/19/2024     Plan: insulin degludec 22 units /HS   Insulin aspart 4/7/8/3 and sliding scale   Follows with Endocrinology and Diabetic educator.   She is on lisinopril, simvastatin 20 mg/day     Not on daily ASA secondary to anemia  Ophthalmology and Podiatry follow up        (K21.00) Gastroesophageal reflux disease with esophagitis without hemorrhage  Comment: Biopsies unremarkable at EGD   Plan: pantoprazole 40 mg/day     (F81.9) Cognitive developmental delay  Comment: unable to live independently in the community and manage her medical problems    Plan: Board and Care support for med admin, meals, activity      (I10) Essential hypertension with goal blood pressure less than 140/90  Comment:   BP Readings from Last 3 Encounters:   08/13/24 (!) 179/79   07/31/24 (!) 147/63   07/10/24 125/75      Plan: amlodipine 2.5 mg/day, carvedilol 6.25 mg bid, lisinopril 40 mg/day   With weight gain, may need more medications, consider addition of a  thiazide diuretic.     Follow bps and BMP     Advance directive: DNR/DNI     Nia Martínez MD

## 2024-09-03 ENCOUNTER — TELEPHONE (OUTPATIENT)
Dept: ENDOCRINOLOGY | Facility: CLINIC | Age: 64
End: 2024-09-03
Payer: COMMERCIAL

## 2024-09-03 ENCOUNTER — MYC MEDICAL ADVICE (OUTPATIENT)
Dept: ENDOCRINOLOGY | Facility: CLINIC | Age: 64
End: 2024-09-03
Payer: COMMERCIAL

## 2024-09-03 DIAGNOSIS — E10.51 TYPE 1 DIABETES MELLITUS WITH DIABETIC PERIPHERAL ANGIOPATHY WITHOUT GANGRENE (H): ICD-10-CM

## 2024-09-03 NOTE — TELEPHONE ENCOUNTER
Sent Mychart (1st Attempt) for the patient to call back and schedule the following:    Appointment type: return diabetes  Provider: Deonte or adriana [place]  Return date: November 2024  Specialty phone number: [phone]  Additional appointment(s) needed:   Additonal Notes: phone kept ringing and I couldn't leave a SCOTTIE, MyC x1    Per checkout notes:  See Tiffanie Mcfarland in 2 weeks See PA or MD in 2-3 months.    Tiffanie Whiteside on 9/3/2024 at 12:09 PM

## 2024-09-06 ENCOUNTER — LAB REQUISITION (OUTPATIENT)
Dept: LAB | Facility: CLINIC | Age: 64
End: 2024-09-06
Payer: MEDICARE

## 2024-09-06 ENCOUNTER — ASSISTED LIVING VISIT (OUTPATIENT)
Dept: GERIATRICS | Facility: CLINIC | Age: 64
End: 2024-09-06
Payer: MEDICARE

## 2024-09-06 VITALS
BODY MASS INDEX: 38.31 KG/M2 | DIASTOLIC BLOOD PRESSURE: 78 MMHG | WEIGHT: 224.4 LBS | OXYGEN SATURATION: 96 % | HEART RATE: 74 BPM | TEMPERATURE: 97.7 F | HEIGHT: 64 IN | SYSTOLIC BLOOD PRESSURE: 154 MMHG | RESPIRATION RATE: 18 BRPM

## 2024-09-06 DIAGNOSIS — R21 RASH: ICD-10-CM

## 2024-09-06 DIAGNOSIS — D64.9 ANEMIA, UNSPECIFIED: ICD-10-CM

## 2024-09-06 DIAGNOSIS — E66.01 CLASS 2 SEVERE OBESITY DUE TO EXCESS CALORIES WITH SERIOUS COMORBIDITY IN ADULT, UNSPECIFIED BMI (H): ICD-10-CM

## 2024-09-06 DIAGNOSIS — E10.22 TYPE 1 DIABETES MELLITUS WITH STAGE 3A CHRONIC KIDNEY DISEASE (H): ICD-10-CM

## 2024-09-06 DIAGNOSIS — E66.812 CLASS 2 SEVERE OBESITY DUE TO EXCESS CALORIES WITH SERIOUS COMORBIDITY IN ADULT, UNSPECIFIED BMI (H): ICD-10-CM

## 2024-09-06 DIAGNOSIS — N18.31 TYPE 1 DIABETES MELLITUS WITH STAGE 3A CHRONIC KIDNEY DISEASE (H): ICD-10-CM

## 2024-09-06 DIAGNOSIS — E03.9 HYPOTHYROIDISM, UNSPECIFIED: ICD-10-CM

## 2024-09-06 DIAGNOSIS — I50.9 HEART FAILURE, UNSPECIFIED (H): ICD-10-CM

## 2024-09-06 DIAGNOSIS — E11.9 TYPE 2 DIABETES MELLITUS WITHOUT COMPLICATIONS (H): ICD-10-CM

## 2024-09-06 DIAGNOSIS — I10 ESSENTIAL HYPERTENSION WITH GOAL BLOOD PRESSURE LESS THAN 140/90: ICD-10-CM

## 2024-09-06 DIAGNOSIS — I10 ESSENTIAL (PRIMARY) HYPERTENSION: ICD-10-CM

## 2024-09-06 DIAGNOSIS — R63.5 WEIGHT GAIN: Primary | ICD-10-CM

## 2024-09-06 PROCEDURE — 99349 HOME/RES VST EST MOD MDM 40: CPT | Performed by: NURSE PRACTITIONER

## 2024-09-06 NOTE — PROGRESS NOTES
"[facility] GERIATRICS    Chief Complaint   Patient presents with    Nursing Home Acute     Weight Gain      HPI:  Lexy Rockwell is a 64 year old  (1960), who [id number]    PT has gained weight in the last 30 days and last 6 months  Now over 200#.    BP is elevated  Appears to be edematous.   No VENEGAS, No SOB      Allergies, and PMH/PSH reviewed in EPIC today.  REVIEW OF SYSTEMS:  10 point ROS of systems including Constitutional, Eyes, Respiratory, Cardiovascular, Gastroenterology, Genitourinary, Integumentary, Musculoskeletal, Psychiatric were all negative except for pertinent positives noted in my HPI.    Objective:   BP (!) 154/78   Pulse 74   Temp 97.7  F (36.5  C)   Resp 18   Ht 1.626 m (5' 4\")   Wt 101.8 kg (224 lb 6.4 oz)   LMP 03/28/2014   SpO2 96%   BMI 38.52 kg/m    GENERAL APPEARANCE:  Alert  RESP:  no respiratory distress  CV:  peripheral edema 2-3+ in BIlateral LE  PSYCH:  insight and judgement impaired    Most Recent 3 CBC's:  Recent Labs   Lab Test 07/17/24  0753 06/19/24  0653 11/29/23  1150   WBC 5.1 5.6 7.6   HGB 10.2* 9.7* 10.4*   MCV 87 91 89    196 335     Most Recent 3 BMP's:  Recent Labs   Lab Test 08/19/24  1629 08/08/24  0657 07/17/24  0753 06/19/24  0653    142 142 142   POTASSIUM 4.8 4.6 4.6 4.7   CHLORIDE 108* 114* 111* 111*   CO2 23 21* 20* 21*   BUN 39.9* 31.3* 72.7* 67.0*   CR 1.07* 1.01* 1.42* 1.31*   ANIONGAP 11 7 11 10   EFRAIN 9.5 8.9 9.5 9.5   GLC  --  111* 58* 125*     Most Recent 2 LFT's:  Recent Labs   Lab Test 07/17/24  0753 06/19/24  0653   AST 17 17   ALT 14 14   ALKPHOS 102 114   BILITOTAL 0.2 0.2     Most Recent TSH and T4:  Recent Labs   Lab Test 06/19/24  0653 10/13/21  1540 02/01/21  0909   TSH 5.29*   < > 7.07*   T4  --   --  1.05    < > = values in this interval not displayed.     Most Recent Hemoglobin A1c:  Recent Labs   Lab Test 06/19/24  0653   A1C 9.7* "       Assessment/Plan:  (R63.5) Weight gain  (primary encounter diagnosis)  Obesity   Comment: excessive weight gain  Wt Readings from Last 4 Encounters:   09/06/24 101.8 kg (224 lb 6.4 oz)   08/13/24 100.5 kg (221 lb 8 oz)   07/31/24 91.4 kg (201 lb 6.4 oz)   07/10/24 89.8 kg (198 lb)   Taking lasix 20 mg daily.   Plan: increase to 40 mg daily     (E10.22,  N18.31) Type 1 diabetes mellitus with stage 3a chronic kidney disease (H)  Comment: uncontrolled  Uses dexacom  See endocrinology and diabetic educator  Taking 20 units Degludec at HS   aspart at meals and snacks   Plan: follow up with diabetic educator and MD  Encouraged pt to eat healthy    (R21.0) rash  Comment: Patient continues to have open area on left inner arm.  Plan: Restart Zyrtec    MED REC REQUIRED  Post Medication Reconciliation Status:  Discharge medications reconciled and changed, see notes/orders      Electronically signed by:     TAYLA Sprague CNP

## 2024-09-09 ENCOUNTER — TELEPHONE (OUTPATIENT)
Dept: GERIATRICS | Facility: CLINIC | Age: 64
End: 2024-09-09

## 2024-09-09 LAB
ALBUMIN SERPL BCG-MCNC: 3.3 G/DL (ref 3.5–5.2)
ALP SERPL-CCNC: 118 U/L (ref 40–150)
ALT SERPL W P-5'-P-CCNC: 16 U/L (ref 0–50)
ANION GAP SERPL CALCULATED.3IONS-SCNC: 9 MMOL/L (ref 7–15)
AST SERPL W P-5'-P-CCNC: 21 U/L (ref 0–45)
BILIRUB SERPL-MCNC: 0.2 MG/DL
BUN SERPL-MCNC: 46.7 MG/DL (ref 8–23)
CALCIUM SERPL-MCNC: 9.1 MG/DL (ref 8.8–10.4)
CHLORIDE SERPL-SCNC: 106 MMOL/L (ref 98–107)
CREAT SERPL-MCNC: 1.1 MG/DL (ref 0.51–0.95)
EGFRCR SERPLBLD CKD-EPI 2021: 56 ML/MIN/1.73M2
ERYTHROCYTE [DISTWIDTH] IN BLOOD BY AUTOMATED COUNT: 13.5 % (ref 10–15)
GLUCOSE SERPL-MCNC: 172 MG/DL (ref 70–99)
HBA1C MFR BLD: 7.8 %
HCO3 SERPL-SCNC: 23 MMOL/L (ref 22–29)
HCT VFR BLD AUTO: 35.2 % (ref 35–47)
HGB BLD-MCNC: 10.8 G/DL (ref 11.7–15.7)
MCH RBC QN AUTO: 28.1 PG (ref 26.5–33)
MCHC RBC AUTO-ENTMCNC: 30.7 G/DL (ref 31.5–36.5)
MCV RBC AUTO: 91 FL (ref 78–100)
NT-PROBNP SERPL-MCNC: 424 PG/ML (ref 0–900)
PLATELET # BLD AUTO: 197 10E3/UL (ref 150–450)
POTASSIUM SERPL-SCNC: 4.5 MMOL/L (ref 3.4–5.3)
PROT SERPL-MCNC: 6.2 G/DL (ref 6.4–8.3)
RBC # BLD AUTO: 3.85 10E6/UL (ref 3.8–5.2)
SODIUM SERPL-SCNC: 138 MMOL/L (ref 135–145)
TSH SERPL DL<=0.005 MIU/L-ACNC: 10.7 UIU/ML (ref 0.3–4.2)
WBC # BLD AUTO: 5.6 10E3/UL (ref 4–11)

## 2024-09-09 PROCEDURE — 80053 COMPREHEN METABOLIC PANEL: CPT | Mod: ORL | Performed by: NURSE PRACTITIONER

## 2024-09-09 PROCEDURE — 85027 COMPLETE CBC AUTOMATED: CPT | Mod: ORL | Performed by: NURSE PRACTITIONER

## 2024-09-09 PROCEDURE — 83880 ASSAY OF NATRIURETIC PEPTIDE: CPT | Mod: ORL | Performed by: NURSE PRACTITIONER

## 2024-09-09 PROCEDURE — 83036 HEMOGLOBIN GLYCOSYLATED A1C: CPT | Mod: ORL | Performed by: NURSE PRACTITIONER

## 2024-09-09 PROCEDURE — 84443 ASSAY THYROID STIM HORMONE: CPT | Mod: ORL | Performed by: NURSE PRACTITIONER

## 2024-09-09 PROCEDURE — P9604 ONE-WAY ALLOW PRORATED TRIP: HCPCS | Mod: ORL | Performed by: NURSE PRACTITIONER

## 2024-09-09 PROCEDURE — 36415 COLL VENOUS BLD VENIPUNCTURE: CPT | Mod: ORL | Performed by: NURSE PRACTITIONER

## 2024-09-09 RX ORDER — LEVOTHYROXINE SODIUM 50 UG/1
50 TABLET ORAL DAILY
COMMUNITY

## 2024-09-09 RX ORDER — LEVOTHYROXINE SODIUM 75 UG/1
75 TABLET ORAL DAILY
COMMUNITY
End: 2024-09-09 | Stop reason: DRUGHIGH

## 2024-09-09 NOTE — TELEPHONE ENCOUNTER
Telephone order given to nurse Michael Ellison RN on 9/9/2024 at 3:27 PM     ----- Message from Carmen LONDONO sent at 9/9/2024  1:35 PM CDT -----    ----- Message -----  From: Maribeth Mooney NP  Sent: 9/9/2024   1:26 PM CDT  To: Carmen Ellison RN    Start levothyroxine 75mcg daily and recheck TSH in 8 weeks. Dx hypothyroidism.

## 2024-09-09 NOTE — TELEPHONE ENCOUNTER
[facility] ORDER ENCOUNTER    Lexy Rockwell is a 64 year old  (1960),    ORDER given to Michael TABARES from Maribeth Mooney NP:  Order clarification - Levothyroxine should be 50 mcg PO daily (not 75 mcg).      Electronically signed by:   Rebeca Montoya RN  
no

## 2024-09-11 NOTE — TELEPHONE ENCOUNTER
Patient confirmed scheduled appointment:  Date: 11/26   Time: 2 pm   Visit type: return diabetes   Provider: Viet   Location: [facility]  Testing/imaging:   Additional notes: Spoke to pt and scheduled per below message. Could not schedule with Tiffanie due to pt preferring telephone call. No schedule available for a telephone call. Will Send message to Kaylen HERNANDEZ and see if they can further schedule   Check out comments from visit on 8/9:  1. See Tiffanie Mcfarland in 2 weeks  2. See PA or MD in 2-3 months     Meghan Miles on 9/11/2024 at 3:02 PM

## 2024-09-13 ENCOUNTER — TELEPHONE (OUTPATIENT)
Dept: GERIATRICS | Facility: CLINIC | Age: 64
End: 2024-09-13
Payer: COMMERCIAL

## 2024-09-13 ENCOUNTER — LAB REQUISITION (OUTPATIENT)
Dept: LAB | Facility: CLINIC | Age: 64
End: 2024-09-13
Payer: MEDICARE

## 2024-09-13 DIAGNOSIS — R63.5 ABNORMAL WEIGHT GAIN: ICD-10-CM

## 2024-09-13 RX ORDER — CETIRIZINE HYDROCHLORIDE 10 MG/1
10 TABLET ORAL DAILY
Status: SHIPPED
Start: 2024-09-13

## 2024-09-16 ENCOUNTER — VIRTUAL VISIT (OUTPATIENT)
Dept: EDUCATION SERVICES | Facility: CLINIC | Age: 64
End: 2024-09-16
Payer: MEDICARE

## 2024-09-16 DIAGNOSIS — E10.3393 TYPE 1 DIABETES MELLITUS WITH MODERATE NONPROLIFERATIVE RETINOPATHY OF BOTH EYES, MACULAR EDEMA PRESENCE UNSPECIFIED (H): ICD-10-CM

## 2024-09-16 LAB
ANION GAP SERPL CALCULATED.3IONS-SCNC: 15 MMOL/L (ref 7–15)
BUN SERPL-MCNC: 60.8 MG/DL (ref 8–23)
CALCIUM SERPL-MCNC: 9.1 MG/DL (ref 8.8–10.4)
CHLORIDE SERPL-SCNC: 111 MMOL/L (ref 98–107)
CREAT SERPL-MCNC: 1.41 MG/DL (ref 0.51–0.95)
EGFRCR SERPLBLD CKD-EPI 2021: 42 ML/MIN/1.73M2
GLUCOSE SERPL-MCNC: 69 MG/DL (ref 70–99)
HCO3 SERPL-SCNC: 22 MMOL/L (ref 22–29)
POTASSIUM SERPL-SCNC: 4 MMOL/L (ref 3.4–5.3)
SODIUM SERPL-SCNC: 148 MMOL/L (ref 135–145)

## 2024-09-16 PROCEDURE — 36415 COLL VENOUS BLD VENIPUNCTURE: CPT | Mod: ORL | Performed by: NURSE PRACTITIONER

## 2024-09-16 PROCEDURE — 80048 BASIC METABOLIC PNL TOTAL CA: CPT | Mod: ORL | Performed by: NURSE PRACTITIONER

## 2024-09-16 PROCEDURE — 98966 PH1 ASSMT&MGMT NQHP 5-10: CPT | Mod: 93 | Performed by: DIETITIAN, REGISTERED

## 2024-09-16 PROCEDURE — P9604 ONE-WAY ALLOW PRORATED TRIP: HCPCS | Mod: ORL | Performed by: NURSE PRACTITIONER

## 2024-09-16 NOTE — LETTER
9/16/2024         RE: Lexy Rockwell  [address]  [facility] [address]        Dear Colleague,    Thank you for referring your patient, Lexy Rockwell, to the [facility] [place]. Please see a copy of my visit note below.    Diabetes Self-Management Education & Support    Presents for: CGM Review    Type of Service: Telephone Visit    Audio only visit done, as patient does not have access to audio-visual technology.    Individual visit provided, given no group classes are available for 2 months.     Originating Location (Patient Location): Assisted Living  Distant Location (Provider Location): Offsite  Mode of Communication:  Telephone    Telephone Visit Start Time:  10:00 am  Telephone Visit End Time (telephone visit stop time): 10:10 am    How would patient like to obtain AVS? Jarett      REPORTS:                          Pt verbalized understanding of concepts discussed and recommendations provided today.       Continue education with the following diabetes management concepts: Healthy Eating, Being Active, Monitoring, Taking Medication, Problem Solving, Reducing Risks, and Healthy Coping    ASSESSMENT:  BG remain elevated. Lexy reports that she has been going for walks 1 day/week      PLAN    Continue with Levemir : 22 units  Increase Fiasp: 4-7-8-0 --> 4-8-9-0  Fax sent to [facility] with new insulin doses    Topics to cover at upcoming visits: Healthy Eating, Being Active, Monitoring, Taking Medication, Problem Solving, Reducing Risks, and Healthy Coping    Follow-up: 10/31    See Care Plan for co-developed, patient-state behavior change goals.  AVS provided for patient today.    Education Materials Provided:  No new materials provided today      SUBJECTIVE/OBJECTIVE:  Presents for: CGM Review  Accompanied by: Self (Dietitian from [facility])  Diabetes education in the past 24mo: Yes  Focus of Visit: Problem Solving, Monitoring, Taking  "Medication  Diabetes type: Type 1  Disease course: Worsening  How confident are you filling out medical forms by yourself:: Not Assessed  Diabetes management related comments/concerns: No questions today  Transportation concerns: Yes (gets rides or takes public tranportation)  Difficulty affording diabetes medication?: No  Difficulty affording diabetes testing supplies?: No  Other concerns:: Cognitive impairment  Cultural Influences/Ethnic Background:  Not  or     Diabetes Symptoms & Complications:  Diabetes Related Symptoms: Neuropathy, Polydipsia (increased thirst), Polyuria (increased urination), Fatigue, Polyphagia (increased hunger) (feeling more fatigue in the morning, neuropathy)  Weight trend: Increasing  Symptom course: Stable  Disease course: Worsening  Complications assessed today?: Yes  Autonomic neuropathy: No  CVA: No  Heart disease: No  Nephropathy: Yes  Peripheral neuropathy: Yes  Peripheral Vascular Disease: No  Retinopathy: No  Sexual dysfunction: No    Patient Problem List and Family Medical History reviewed for relevant medical history, current medical status, and diabetes risk factors.    Vitals:  LMP 03/28/2014   Estimated body mass index is 38.52 kg/m  as calculated from the following:    Height as of 9/6/24: 1.626 m (5' 4\").    Weight as of 9/6/24: 101.8 kg (224 lb 6.4 oz).   Last 3 BP:   BP Readings from Last 3 Encounters:   09/06/24 (!) 154/78   08/13/24 (!) 179/79   07/31/24 (!) 147/63       History   Smoking Status     Never   Smokeless Tobacco     Never       Labs:  Lab Results   Component Value Date    A1C 7.8 09/09/2024    A1C 9.7 02/01/2021     Lab Results   Component Value Date    GLC 69 09/16/2024     10/13/2021     02/01/2021     Lab Results   Component Value Date    LDL 58 01/25/2023     02/01/2021     HDL Cholesterol   Date Value Ref Range Status   02/01/2021 62 >49 mg/dL Final     Direct Measure HDL   Date Value Ref Range Status   01/25/2023 37 " "(L) >=50 mg/dL Final   ]  GFR Estimate   Date Value Ref Range Status   09/16/2024 42 (L) >60 mL/min/1.73m2 Final   02/01/2021 90 >60 mL/min/[1.73_m2] Final     Comment:     Non  GFR Calc  Starting 12/18/2018, serum creatinine based estimated GFR (eGFR) will be   calculated using the Chronic Kidney Disease Epidemiology Collaboration   (CKD-EPI) equation.       GFR Estimate If Black   Date Value Ref Range Status   02/01/2021 >90 >60 mL/min/[1.73_m2] Final     Comment:      GFR Calc  Starting 12/18/2018, serum creatinine based estimated GFR (eGFR) will be   calculated using the Chronic Kidney Disease Epidemiology Collaboration   (CKD-EPI) equation.       Lab Results   Component Value Date    CR 1.41 09/16/2024    CR 0.73 02/01/2021     No results found for: \"MICROALBUMIN\"    Healthy Eating:  Healthy Eating Assessed Today: No  Cultural/Restorationism diet restrictions?: No  Do you have any food allergies or intolerances?: No  Meal planning/habits: None  Who cooks/prepares meals for you?: Other  Who purchases food in  your home?: Other  How many times a week on average do you eat food made away from home (restaurant/take-out)?: 0  Meals include: Breakfast, Lunch, Dinner, Afternoon Snack, Evening Snack, Morning Snack  Breakfast: 8:00 am: Oatmeal with hard boiled egg or scrambled eggs and barragan with toast OR pancake/waffle with eggs OR Micronesian toast  Lunch: 12-12:30: Stir Edouard OR hamburgers with vegetables (with dessert- small cake or cookie)  Dinner: 5:00 -6:00 pm: sandwich OR chili (vegetarian) OR chicken OR fish  with vegetables with desserts (cake or cookies, fruit)  Snacks: mornnig snack: yogurt or pudding AND Has one bedtime snack- usually a cookie - oatmeal raisin or chocolate chip or yogurt OR sugra free candy every once and a while  Other: drinks coffee with sugar free creamer  Beverages: Water, Coffee, Milk (Keep some juice on hand for lows)  Has patient met with a dietitian in the past?: " Yes    Being Active:  Being Active Assessed Today: Yes (went on the bike at her apartment complex)  Exercise:: Yes (walking, programs at care center)  Days per week of moderate to strenuous exercise (like a brisk walk): 1  On average, minutes per day of exercise at this level:  (Walking with friends)  How intense was your typical exercise? : Light (like stretching or slow walking)  Barrier to exercise: Safety, Physical limitation    Monitoring:  Monitoring Assessed Today: Yes  Did patient bring glucose meter to appointment? : No  Blood Glucose Meter: Unknown, CGM  Times checking blood sugar at home (number): 5+  Times checking blood sugar at home (per): Day  Blood glucose trend: Fluctuating    Taking Medications:  Diabetes Medication(s)       Diabetic Other       Glucagon (BAQSIMI) 3 MG/DOSE nasal powder Spray 1 spray (3 mg) in nostril as needed in the event of unconscious hypoglycemia or hypoglycemic seizure. May repeat dose if no response after 15 minutes.     glucagon 1 MG SOLR injection Inject 1 mg into the muscle once       Insulin       Insulin Aspart, w/Niacinamide, (FIASP PENFILL) 100 UNIT/ML SOCT Inject 4-8 Units subcutaneously 3 times daily (before meals) Inject 4 units before breakfast, 7 units before lunch and 8 units before dinner, 3 units before each snacks 2x daily. Add correction scale before meals. -160 = 1 unit. -200 = 2 units. -240 = 3 units. -280 = 4 units. -320 = 5 unit(s). Max daily dose is 45 units.     Insulin Degludec FlexTouch 100 UNIT/ML SOPN Inject 22 Units Subcutaneous daily EVERY NIGHT AT BEDTIME FOR DM *DISCARD 56 DAYS AFTER OPENING*            Taking Medication Assessed Today: Yes  Current Treatments: Insulin Injections  Dose schedule: Pre-breakfast, Pre-lunch, Pre-dinner, At bedtime  Given by: Nursing attendant  Injection/Infusion sites: Abdomen  Problems taking diabetes medications regularly?: No  Diabetes medication side effects?: No    Problem  Solving:  Problem Solving Assessed Today: Yes  Is the patient at risk for hypoglycemia?: Yes  Hypoglycemia Frequency: Weekly  Hypoglycemia Treatment: Juice, Candy (will have one piece of candy or some juice)  Patient carries a carbohydrate source: Yes  Medical ID: Yes  Is the patient at risk for DKA?: Yes  Does patient have ketone test strips?: Yes  Does patient have DKA prevention plan?: Yes  Does patient have severe weather/disaster plan for diabetes management?: No  Does patient have sick day plan for diabetes management?: Yes              Reducing Risks:  Reducing Risks Assessed Today: Yes  Diabetes Risks: Age over 45 years, Sedentary Lifestyle  CAD Risks: Diabetes Mellitus  Has dilated eye exam at least once a year?: Yes  Sees dentist every 6 months?: Yes  Feet checked by healthcare provider in the last year?: Yes    Healthy Coping:  Healthy Coping Assessed Today: Yes  Emotional response to diabetes: Acceptance  Informal Support system:: Family, Parent  Stage of change: ACTION (Actively working towards change)  Support resources: Offerings in Clinic Communities  Patient Activation Measure Survey Score:      3/7/2012     3:00 PM   KHUSHI Score (Last Two)   KHUSHI Raw Score 39   Activation Score 56.4   KHUSHI Level 3         Care Plan and Education Provided:  Being Active: Finding a physical activity routine that works for you and Relationship of activity to glucose and Problem Solving: High glucose - causes, signs/symptoms, treatment and prevention and Low glucose - causes, signs/symptoms, treatment and prevention    KARLOS Haas [facility]  Triage: [phone]  [phone]      Time Spent: 10 minutes  Encounter Type: Individual    Any diabetes medication dose changes were made via the CDE Protocol per the patient's referring provider. A copy of this encounter was shared with the provider.

## 2024-09-16 NOTE — LETTER
9/16/2024         RE: Lexy Rockwell  [address]  [facility] [address]    Hello! I met with Lexy this week, and we made the following changes to her Fiasp insulin dose:  Breakfast: 4 units (no changes)  Lunch: 8 units  Dinner: 9 units    NO changes to Tresiba dose.      Diabetes Self-Management Education & Support    Presents for: CGM Review    Type of Service: Telephone Visit    Audio only visit done, as patient does not have access to audio-visual technology.    Individual visit provided, given no group classes are available for 2 months.     Originating Location (Patient Location): Assisted Living  Distant Location (Provider Location): Offsite  Mode of Communication:  Telephone    Telephone Visit Start Time:  10:00 am  Telephone Visit End Time (telephone visit stop time): 10:10 am    How would patient like to obtain AVS? Jarett      REPORTS:                          Pt verbalized understanding of concepts discussed and recommendations provided today.       Continue education with the following diabetes management concepts: Healthy Eating, Being Active, Monitoring, Taking Medication, Problem Solving, Reducing Risks, and Healthy Coping    ASSESSMENT:  BG remain elevated. Lexy reports that she has been going for walks 1 day/week      PLAN    Continue with Levemir : 22 units  Increase Fiasp: 4-7-8-0 --> 4-8-9-0    Topics to cover at upcoming visits: Healthy Eating, Being Active, Monitoring, Taking Medication, Problem Solving, Reducing Risks, and Healthy Coping    Follow-up: 10/31    See Care Plan for co-developed, patient-state behavior change goals.  AVS provided for patient today.    Education Materials Provided:  No new materials provided today      SUBJECTIVE/OBJECTIVE:  Presents for: CGM Review  Accompanied by: Self (Dietitian from Assisted Living)  Diabetes education in the past 24mo: Yes  Focus of Visit: Problem Solving, Monitoring, Taking Medication  Diabetes type: Type 1  Disease  "course: Worsening  How confident are you filling out medical forms by yourself:: Not Assessed  Diabetes management related comments/concerns: No questions today  Transportation concerns: Yes (gets rides or takes public tranportation)  Difficulty affording diabetes medication?: No  Difficulty affording diabetes testing supplies?: No  Other concerns:: Cognitive impairment  Cultural Influences/Ethnic Background:  Not  or     Diabetes Symptoms & Complications:  Diabetes Related Symptoms: Neuropathy, Polydipsia (increased thirst), Polyuria (increased urination), Fatigue, Polyphagia (increased hunger) (feeling more fatigue in the morning, neuropathy)  Weight trend: Increasing  Symptom course: Stable  Disease course: Worsening  Complications assessed today?: Yes  Autonomic neuropathy: No  CVA: No  Heart disease: No  Nephropathy: Yes  Peripheral neuropathy: Yes  Peripheral Vascular Disease: No  Retinopathy: No  Sexual dysfunction: No    Patient Problem List and Family Medical History reviewed for relevant medical history, current medical status, and diabetes risk factors.    Vitals:  [facility] 03/28/2014   Estimated body mass index is 38.52 kg/m  as calculated from the following:    Height as of 9/6/24: 1.626 m (5' 4\").    Weight as of 9/6/24: 101.8 kg (224 lb 6.4 oz).   Last 3 BP:   BP Readings from Last 3 Encounters:   09/06/24 (!) 154/78   08/13/24 (!) 179/79   07/31/24 (!) 147/63       History   Smoking Status    Never   Smokeless Tobacco    Never       Labs:  Lab Results   Component Value Date    A1C 7.8 09/09/2024    A1C 9.7 02/01/2021     Lab Results   Component Value Date    GLC 69 09/16/2024     10/13/2021     02/01/2021     Lab Results   Component Value Date    LDL 58 01/25/2023     02/01/2021     HDL Cholesterol   Date Value Ref Range Status   02/01/2021 62 >49 mg/dL Final     Direct Measure HDL   Date Value Ref Range Status   01/25/2023 37 (L) >=50 mg/dL Final   ]  GFR Estimate " "  Date Value Ref Range Status   09/16/2024 42 (L) >60 mL/min/1.73m2 Final   02/01/2021 90 >60 mL/min/[1.73_m2] Final     Comment:     Non  GFR Calc  Starting 12/18/2018, serum creatinine based estimated GFR (eGFR) will be   calculated using the Chronic Kidney Disease Epidemiology Collaboration   (CKD-EPI) equation.       GFR Estimate If Black   Date Value Ref Range Status   02/01/2021 >90 >60 mL/min/[1.73_m2] Final     Comment:      GFR Calc  Starting 12/18/2018, serum creatinine based estimated GFR (eGFR) will be   calculated using the Chronic Kidney Disease Epidemiology Collaboration   (CKD-EPI) equation.       Lab Results   Component Value Date    CR 1.41 09/16/2024    CR 0.73 02/01/2021     No results found for: \"MICROALBUMIN\"    Healthy Eating:  Healthy Eating Assessed Today: No  Cultural/Christianity diet restrictions?: No  Do you have any food allergies or intolerances?: No  Meal planning/habits: None  Who cooks/prepares meals for you?: Other  Who purchases food in  your home?: Other  How many times a week on average do you eat food made away from home (restaurant/take-out)?: 0  Meals include: Breakfast, Lunch, Dinner, Afternoon Snack, Evening Snack, Morning Snack  Breakfast: 8:00 am: Oatmeal with hard boiled egg or scrambled eggs and barragan with toast OR pancake/waffle with eggs OR B[place]wanan toast  Lunch: 12-12:30: Stir Edouard OR hamburgers with vegetables (with dessert- small cake or cookie)  Dinner: 5:00 -6:00 pm: sandwich OR chili (vegetarian) OR chicken OR fish  with vegetables with desserts (cake or cookies, fruit)  Snacks: mornnig snack: yogurt or pudding AND Has one bedtime snack- usually a cookie - oatmeal raisin or chocolate chip or yogurt OR sugra free candy every once and a while  Other: drinks coffee with sugar free creamer  Beverages: Water, Coffee, Milk (Keep some juice on hand for lows)  Has patient met with a dietitian in the past?: Yes    Being Active:  Being Active " Assessed Today: Yes (went on the bike at her apartment complex)  Exercise:: Yes (walking, programs at care center)  Days per week of moderate to strenuous exercise (like a brisk walk): 1  On average, minutes per day of exercise at this level:  (Walking with friends)  How intense was your typical exercise? : Light (like stretching or slow walking)  Barrier to exercise: Safety, Physical limitation    Monitoring:  Monitoring Assessed Today: Yes  Did patient bring glucose meter to appointment? : No  Blood Glucose Meter: Unknown, CGM  Times checking blood sugar at home (number): 5+  Times checking blood sugar at home (per): Day  Blood glucose trend: Fluctuating    Taking Medications:  Diabetes Medication(s)       Diabetic Other       Glucagon (BAQSIMI) 3 MG/DOSE nasal powder Spray 1 spray (3 mg) in nostril as needed in the event of unconscious hypoglycemia or hypoglycemic seizure. May repeat dose if no response after 15 minutes.     glucagon 1 MG SOLR injection Inject 1 mg into the muscle once       Insulin       Insulin Aspart, w/Niacinamide, (FIASP PENFILL) 100 UNIT/ML SOCT Inject 4-8 Units subcutaneously 3 times daily (before meals) Inject 4 units before breakfast, 7 units before lunch and 8 units before dinner, 3 units before each snacks 2x daily. Add correction scale before meals. -160 = 1 unit. -200 = 2 units. -240 = 3 units. -280 = 4 units. -320 = 5 unit(s). Max daily dose is 45 units.     Insulin Degludec FlexTouch 100 UNIT/ML SOPN Inject 22 Units Subcutaneous daily EVERY NIGHT AT BEDTIME FOR DM *DISCARD 56 DAYS AFTER OPENING*            Taking Medication Assessed Today: Yes  Current Treatments: Insulin Injections  Dose schedule: Pre-breakfast, Pre-lunch, Pre-dinner, At bedtime  Given by: Nursing attendant  Injection/Infusion sites: Abdomen  Problems taking diabetes medications regularly?: No  Diabetes medication side effects?: No    Problem Solving:  Problem Solving Assessed Today:  Yes  Is the patient at risk for hypoglycemia?: Yes  Hypoglycemia Frequency: Weekly  Hypoglycemia Treatment: Juice, Candy (will have one piece of candy or some juice)  Patient carries a carbohydrate source: Yes  Medical ID: Yes  Is the patient at risk for DKA?: Yes  Does patient have ketone test strips?: Yes  Does patient have DKA prevention plan?: Yes  Does patient have severe weather/disaster plan for diabetes management?: No  Does patient have sick day plan for diabetes management?: Yes              Reducing Risks:  Reducing Risks Assessed Today: Yes  Diabetes Risks: Age over 45 years, Sedentary Lifestyle  CAD Risks: Diabetes Mellitus  Has dilated eye exam at least once a year?: Yes  Sees dentist every 6 months?: Yes  Feet checked by healthcare provider in the last year?: Yes    Healthy Coping:  Healthy Coping Assessed Today: Yes  Emotional response to diabetes: Acceptance  Informal Support system:: Family, Parent  Stage of change: ACTION (Actively working towards change)  Support resources: Offerings in Clinic Communities  Patient Activation Measure Survey Score:      3/7/2012     3:00 PM   KHUSHI Score (Last Two)   KHUSHI Raw Score 39   Activation Score 56.4   KHUSHI Level 3         Care Plan and Education Provided:  Being Active: Finding a physical activity routine that works for you and Relationship of activity to glucose and Problem Solving: High glucose - causes, signs/symptoms, treatment and prevention and Low glucose - causes, signs/symptoms, treatment and prevention    Tiffanie Mcfarland RD [facility]  Triage: [phone]  [phone]      Time Spent: 10 minutes  Encounter Type: Individual    Any diabetes medication dose changes were made via the CDE Protocol per the patient's referring provider. A copy of this encounter was shared with the provider.

## 2024-09-19 RX ORDER — INSULIN ASPART INJECTION 100 [IU]/ML
4-10 INJECTION, SOLUTION SUBCUTANEOUS
Qty: 6 ML | Refills: 11 | Status: SHIPPED | OUTPATIENT
Start: 2024-09-19

## 2024-09-19 NOTE — PROGRESS NOTES
Diabetes Self-Management Education & Support    Presents for: CGM Review    Type of Service: Telephone Visit    Audio only visit done, as patient does not have access to audio-visual technology.    Individual visit provided, given no group classes are available for 2 months.     Originating Location (Patient Location): Assisted Living  Distant Location (Provider Location): Offsite  Mode of Communication:  Telephone    Telephone Visit Start Time:  10:00 am  Telephone Visit End Time (telephone visit stop time): 10:10 am    How would patient like to obtain AVS? Jarett      REPORTS:                          Pt verbalized understanding of concepts discussed and recommendations provided today.       Continue education with the following diabetes management concepts: Healthy Eating, Being Active, Monitoring, Taking Medication, Problem Solving, Reducing Risks, and Healthy Coping    ASSESSMENT:  BG remain elevated. Lexy reports that she has been going for walks 1 day/week      PLAN    Continue with Levemir : 22 units  Increase Fiasp: 4-7-8-0 --> 4-8-9-0  Fax sent to [place] [facility] with new insulin doses    Topics to cover at upcoming visits: Healthy Eating, Being Active, Monitoring, Taking Medication, Problem Solving, Reducing Risks, and Healthy Coping    Follow-up: 10/31    See Care Plan for co-developed, patient-state behavior change goals.  AVS provided for patient today.    Education Materials Provided:  No new materials provided today      SUBJECTIVE/OBJECTIVE:  Presents for: CGM Review  Accompanied by: Self (Dietitian from Assisted Living)  Diabetes education in the past 24mo: Yes  Focus of Visit: Problem Solving, Monitoring, Taking Medication  Diabetes type: Type 1  Disease course: Worsening  How confident are you filling out medical forms by yourself:: Not Assessed  Diabetes management related comments/concerns: No questions today  Transportation concerns: Yes (gets rides or takes public tranportation)  Difficulty  "affording diabetes medication?: No  Difficulty affording diabetes testing supplies?: No  Other concerns:: Cognitive impairment  Cultural Influences/Ethnic Background:  Not  or     Diabetes Symptoms & Complications:  Diabetes Related Symptoms: Neuropathy, Polydipsia (increased thirst), Polyuria (increased urination), Fatigue, Polyphagia (increased hunger) (feeling more fatigue in the morning, neuropathy)  Weight trend: Increasing  Symptom course: Stable  Disease course: Worsening  Complications assessed today?: Yes  Autonomic neuropathy: No  CVA: No  Heart disease: No  Nephropathy: Yes  Peripheral neuropathy: Yes  Peripheral Vascular Disease: No  Retinopathy: No  Sexual dysfunction: No    Patient Problem List and Family Medical History reviewed for relevant medical history, current medical status, and diabetes risk factors.    Vitals:  LMP 03/28/2014   Estimated body mass index is 38.52 kg/m  as calculated from the following:    Height as of 9/6/24: 1.626 m (5' 4\").    Weight as of 9/6/24: 101.8 kg (224 lb 6.4 oz).   Last 3 BP:   BP Readings from Last 3 Encounters:   09/06/24 (!) 154/78   08/13/24 (!) 179/79   07/31/24 (!) 147/63       History   Smoking Status    Never   Smokeless Tobacco    Never       Labs:  Lab Results   Component Value Date    A1C 7.8 09/09/2024    A1C 9.7 02/01/2021     Lab Results   Component Value Date    GLC 69 09/16/2024     10/13/2021     02/01/2021     Lab Results   Component Value Date    LDL 58 01/25/2023     02/01/2021     HDL Cholesterol   Date Value Ref Range Status   02/01/2021 62 >49 mg/dL Final     Direct Measure HDL   Date Value Ref Range Status   01/25/2023 37 (L) >=50 mg/dL Final   ]  GFR Estimate   Date Value Ref Range Status   09/16/2024 42 (L) >60 mL/min/1.73m2 Final   02/01/2021 90 >60 mL/min/[1.73_m2] Final     Comment:     Non  GFR Calc  Starting 12/18/2018, serum creatinine based estimated GFR (eGFR) will be   calculated " "using the Chronic Kidney Disease Epidemiology Collaboration   (CKD-EPI) equation.       GFR Estimate If Black   Date Value Ref Range Status   02/01/2021 >90 >60 mL/min/[1.73_m2] Final     Comment:      GFR Calc  Starting 12/18/2018, serum creatinine based estimated GFR (eGFR) will be   calculated using the Chronic Kidney Disease Epidemiology Collaboration   (CKD-EPI) equation.       Lab Results   Component Value Date    CR 1.41 09/16/2024    CR 0.73 02/01/2021     No results found for: \"MICROALBUMIN\"    Healthy Eating:  Healthy Eating Assessed Today: No  Cultural/Temple diet restrictions?: No  Do you have any food allergies or intolerances?: No  Meal planning/habits: None  Who cooks/prepares meals for you?: Other  Who purchases food in  your home?: Other  How many times a week on average do you eat food made away from home (restaurant/take-out)?: 0  Meals include: Breakfast, Lunch, Dinner, Afternoon Snack, Evening Snack, Morning Snack  Breakfast: 8:00 am: Oatmeal with hard boiled egg or scrambled eggs and barragan with toast OR pancake/waffle with eggs OR Ivorian toast  Lunch: 12-12:30: Stir Edouard OR hamburgers with vegetables (with dessert- small cake or cookie)  Dinner: 5:00 -6:00 pm: sandwich OR chili (vegetarian) OR chicken OR fish  with vegetables with desserts (cake or cookies, fruit)  Snacks: mornnig snack: yogurt or pudding AND Has one bedtime snack- usually a cookie - oatmeal raisin or chocolate chip or yogurt OR sugra free candy every once and a while  Other: drinks coffee with sugar free creamer  Beverages: Water, Coffee, Milk (Keep some juice on hand for lows)  Has patient met with a dietitian in the past?: Yes    Being Active:  Being Active Assessed Today: Yes (went on the bike at her apartment complex)  Exercise:: Yes (walking, programs at care center)  Days per week of moderate to strenuous exercise (like a brisk walk): 1  On average, minutes per day of exercise at this level:  (Walking " with friends)  How intense was your typical exercise? : Light (like stretching or slow walking)  Barrier to exercise: Safety, Physical limitation    Monitoring:  Monitoring Assessed Today: Yes  Did patient bring glucose meter to appointment? : No  Blood Glucose Meter: Unknown, CGM  Times checking blood sugar at home (number): 5+  Times checking blood sugar at home (per): Day  Blood glucose trend: Fluctuating    Taking Medications:  Diabetes Medication(s)       Diabetic Other       Glucagon (BAQSIMI) 3 MG/DOSE nasal powder Spray 1 spray (3 mg) in nostril as needed in the event of unconscious hypoglycemia or hypoglycemic seizure. May repeat dose if no response after 15 minutes.     glucagon 1 MG SOLR injection Inject 1 mg into the muscle once       Insulin       Insulin Aspart, w/Niacinamide, (FIASP PENFILL) 100 UNIT/ML SOCT Inject 4-8 Units subcutaneously 3 times daily (before meals) Inject 4 units before breakfast, 7 units before lunch and 8 units before dinner, 3 units before each snacks 2x daily. Add correction scale before meals. -160 = 1 unit. -200 = 2 units. -240 = 3 units. -280 = 4 units. -320 = 5 unit(s). Max daily dose is 45 units.     Insulin Degludec FlexTouch 100 UNIT/ML SOPN Inject 22 Units Subcutaneous daily EVERY NIGHT AT BEDTIME FOR DM *DISCARD 56 DAYS AFTER OPENING*            Taking Medication Assessed Today: Yes  Current Treatments: Insulin Injections  Dose schedule: Pre-breakfast, Pre-lunch, Pre-dinner, At bedtime  Given by: Nursing attendant  Injection/Infusion sites: Abdomen  Problems taking diabetes medications regularly?: No  Diabetes medication side effects?: No    Problem Solving:  Problem Solving Assessed Today: Yes  Is the patient at risk for hypoglycemia?: Yes  Hypoglycemia Frequency: Weekly  Hypoglycemia Treatment: Juice, Candy (will have one piece of candy or some juice)  Patient carries a carbohydrate source: Yes  Medical ID: Yes  Is the patient at risk  for DKA?: Yes  Does patient have ketone test strips?: Yes  Does patient have DKA prevention plan?: Yes  Does patient have severe weather/disaster plan for diabetes management?: No  Does patient have sick day plan for diabetes management?: Yes              Reducing Risks:  Reducing Risks Assessed Today: Yes  Diabetes Risks: Age over 45 years, Sedentary Lifestyle  CAD Risks: Diabetes Mellitus  Has dilated eye exam at least once a year?: Yes  Sees dentist every 6 months?: Yes  Feet checked by healthcare provider in the last year?: Yes    Healthy Coping:  Healthy Coping Assessed Today: Yes  Emotional response to diabetes: Acceptance  Informal Support system:: Family, Parent  Stage of change: ACTION (Actively working towards change)  Support resources: Offerings in Clinic Communities  Patient Activation Measure Survey Score:      3/7/2012     3:00 PM   KHUSHI Score (Last Two)   KHUSHI Raw Score 39   Activation Score 56.4   KHUSHI Level 3         Care Plan and Education Provided:  Being Active: Finding a physical activity routine that works for you and Relationship of activity to glucose and Problem Solving: High glucose - causes, signs/symptoms, treatment and prevention and Low glucose - causes, signs/symptoms, treatment and prevention    Tiffanie Mcfarladn RD [facility]  Triage: [phone]  [phone]      Time Spent: 10 minutes  Encounter Type: Individual    Any diabetes medication dose changes were made via the CDE Protocol per the patient's referring provider. A copy of this encounter was shared with the provider.

## 2024-09-20 ENCOUNTER — ASSISTED LIVING VISIT (OUTPATIENT)
Dept: GERIATRICS | Facility: CLINIC | Age: 64
End: 2024-09-20
Payer: MEDICARE

## 2024-09-20 VITALS
SYSTOLIC BLOOD PRESSURE: 129 MMHG | DIASTOLIC BLOOD PRESSURE: 85 MMHG | BODY MASS INDEX: 37.24 KG/M2 | HEART RATE: 80 BPM | TEMPERATURE: 97.7 F | RESPIRATION RATE: 18 BRPM | OXYGEN SATURATION: 94 % | HEIGHT: 64 IN | WEIGHT: 218.1 LBS

## 2024-09-20 DIAGNOSIS — E66.01 CLASS 2 SEVERE OBESITY DUE TO EXCESS CALORIES WITH SERIOUS COMORBIDITY IN ADULT, UNSPECIFIED BMI (H): ICD-10-CM

## 2024-09-20 DIAGNOSIS — R60.0 LOWER EXTREMITY EDEMA: ICD-10-CM

## 2024-09-20 DIAGNOSIS — E66.812 CLASS 2 SEVERE OBESITY DUE TO EXCESS CALORIES WITH SERIOUS COMORBIDITY IN ADULT, UNSPECIFIED BMI (H): ICD-10-CM

## 2024-09-20 DIAGNOSIS — E10.22 TYPE 1 DIABETES MELLITUS WITH STAGE 3A CHRONIC KIDNEY DISEASE (H): Primary | ICD-10-CM

## 2024-09-20 DIAGNOSIS — N18.31 TYPE 1 DIABETES MELLITUS WITH STAGE 3A CHRONIC KIDNEY DISEASE (H): Primary | ICD-10-CM

## 2024-09-20 DIAGNOSIS — E03.9 ACQUIRED HYPOTHYROIDISM: ICD-10-CM

## 2024-09-20 PROCEDURE — 99349 HOME/RES VST EST MOD MDM 40: CPT | Performed by: NURSE PRACTITIONER

## 2024-09-20 NOTE — PROGRESS NOTES
"[facility] GERIATRICS    Chief Complaint   Patient presents with    Weight Gain      HPI:  Lexy Rockwell is a 64 year old  (1960), who is being seen today for an episodic care visit at: [facility] ([facility]) [[id number]].    Blood sugars elevated - seeing diabetic educated  LE edema controlled  - seeing Lymphedema  Weight increasing       Allergies, and PMH/PSH reviewed in [facility] today.  REVIEW OF SYSTEMS:  4 point ROS including Respiratory, CV, GI and , other than that noted in the HPI,  is negative    Objective:   /85   Pulse 80   Temp 97.7  F (36.5  C)   Resp 18   Ht 1.626 m (5' 4\")   Wt 98.9 kg (218 lb 1.6 oz)   LMP 03/28/2014   SpO2 94%   BMI 37.44 kg/m    GENERAL APPEARANCE:  Alert, in no distress  RESP:  no respiratory distress  PSYCH:  oriented X 3    Most Recent 3 CBC's:  Recent Labs   Lab Test 09/09/24  0723 07/17/24  0753 06/19/24  0653   WBC 5.6 5.1 5.6   HGB 10.8* 10.2* 9.7*   MCV 91 87 91    255 196     Most Recent 3 BMP's:  Recent Labs   Lab Test 09/16/24  0902 09/09/24  0723 08/19/24  1629 08/08/24  0657   * 138 142 142   POTASSIUM 4.0 4.5 4.8 4.6   CHLORIDE 111* 106 108* 114*   CO2 22 23 23 21*   BUN 60.8* 46.7* 39.9* 31.3*   CR 1.41* 1.10* 1.07* 1.01*   ANIONGAP 15 9 11 7   EFRAIN 9.1 9.1 9.5 8.9   GLC 69* 172*  --  111*       Assessment/Plan:  (E10.22,  N18.31) Type 1 diabetes mellitus with stage 3a chronic kidney disease (H)  (primary encounter diagnosis)  Comment: chronic  Elevated  Working with endocrine and diabetic educator  Plan: Will be get educator recently increased mealtime insulin    (E66.01) Class 2 severe obesity due to excess calories with serious comorbidity in adult, unspecified BMI (H)  Comment: Acute  Current BMI 34  Weight 217  17% weight gain and 180 days  Plan: Encourage good eating habits    (R60.0) Lower extremity edema  Comment: Chronic working with lymphedema  Receiving lasix 40 mg BID     Plan: Lymphedema is finished working with her " as she has reduced her leg girth by 7%  Hold lasix x 2 days   Then restart 40 mg daily     (E03.9) Acquired hypothyroidism  Comment: chronic  Plan: synthroid 50 mcg   Repeat TSH in 12/2024        MED REC REQUIRED  Post Medication Reconciliation Status:  Medication reconciliation previously completed during another office visit      Electronically signed by:     TAYLA Sprague CNP

## 2024-09-23 ENCOUNTER — TELEPHONE (OUTPATIENT)
Dept: ENDOCRINOLOGY | Facility: CLINIC | Age: 64
End: 2024-09-23
Payer: COMMERCIAL

## 2024-09-23 ENCOUNTER — LAB REQUISITION (OUTPATIENT)
Dept: LAB | Facility: CLINIC | Age: 64
End: 2024-09-23
Payer: MEDICARE

## 2024-09-23 DIAGNOSIS — I10 ESSENTIAL (PRIMARY) HYPERTENSION: ICD-10-CM

## 2024-09-23 DIAGNOSIS — E10.37X3 TYPE 1 DIABETES MELLITUS WITH DIABETIC MACULAR EDEMA OF BOTH EYES RESOLVED AFTER TREATMENT (H): ICD-10-CM

## 2024-09-23 RX ORDER — ACYCLOVIR 400 MG/1
TABLET ORAL
Qty: 3 EACH | Refills: 5 | Status: SHIPPED | OUTPATIENT
Start: 2024-09-23 | End: 2024-09-26 | Stop reason: ALTCHOICE

## 2024-09-23 RX ORDER — PROCHLORPERAZINE 25 MG/1
SUPPOSITORY RECTAL
Qty: 1 EACH | Refills: 1 | Status: SHIPPED | OUTPATIENT
Start: 2024-09-23

## 2024-09-23 RX ORDER — ACYCLOVIR 400 MG/1
TABLET ORAL
Qty: 1 EACH | Refills: 0 | Status: SHIPPED | OUTPATIENT
Start: 2024-09-23 | End: 2024-09-26

## 2024-09-23 NOTE — TELEPHONE ENCOUNTER
This message is to inform you that we are in need of Medicare compliant prescriptions for Dexcom G6 Sensors & Transmitter.     Prescription must be written by: Deonte Garcia.  Must include full supply name: Dexcom G6 Sensor  Quantity: 3  Refills: 5  SIG: Change every 10 days    Prescription must be written by: Deonte Garcia.  Must include full supply name: Dexcom G6 Transmitter  Quantity: 1  Refills: 1  SIG: Change every 90 days    As a reminder, Medicare requires patients to be seen every 3 months for insulin supplies and every 6 months for CGMs. The provider who sees the patient must be the provider on the prescription, must be written on the day of or after office visit date and office visit must include notes about diabetes and insulin regimen. The Diabetes Care Services team at [facility] may request new prescriptions before renewal dates of the prescription is filled over the allowable amount. Writing the order to match what is above will help ensure we will only need to request every 3 or 6 months.    Thank you!    [facility]  Diabetes Care Services Team  [address] [address]  Provider Phone: [phone]  Patient Line: [phone]  Fax: [phone]  E-mail: [email or web address]

## 2024-09-26 DIAGNOSIS — E10.37X3 TYPE 1 DIABETES MELLITUS WITH DIABETIC MACULAR EDEMA OF BOTH EYES RESOLVED AFTER TREATMENT (H): ICD-10-CM

## 2024-09-26 RX ORDER — PROCHLORPERAZINE 25 MG/1
SUPPOSITORY RECTAL
Qty: 3 EACH | Refills: 5 | Status: SHIPPED | OUTPATIENT
Start: 2024-09-26

## 2024-09-30 LAB
ANION GAP SERPL CALCULATED.3IONS-SCNC: 12 MMOL/L (ref 7–15)
BUN SERPL-MCNC: 58.2 MG/DL (ref 8–23)
CALCIUM SERPL-MCNC: 9.7 MG/DL (ref 8.8–10.4)
CHLORIDE SERPL-SCNC: 112 MMOL/L (ref 98–107)
CREAT SERPL-MCNC: 1.28 MG/DL (ref 0.51–0.95)
EGFRCR SERPLBLD CKD-EPI 2021: 47 ML/MIN/1.73M2
GLUCOSE SERPL-MCNC: 54 MG/DL (ref 70–99)
HCO3 SERPL-SCNC: 22 MMOL/L (ref 22–29)
POTASSIUM SERPL-SCNC: 4.4 MMOL/L (ref 3.4–5.3)
SODIUM SERPL-SCNC: 146 MMOL/L (ref 135–145)

## 2024-09-30 PROCEDURE — 36415 COLL VENOUS BLD VENIPUNCTURE: CPT | Mod: ORL | Performed by: NURSE PRACTITIONER

## 2024-09-30 PROCEDURE — P9603 ONE-WAY ALLOW PRORATED MILES: HCPCS | Mod: ORL | Performed by: NURSE PRACTITIONER

## 2024-09-30 PROCEDURE — 80048 BASIC METABOLIC PNL TOTAL CA: CPT | Mod: ORL | Performed by: NURSE PRACTITIONER

## 2024-10-02 ENCOUNTER — LAB REQUISITION (OUTPATIENT)
Dept: LAB | Facility: CLINIC | Age: 64
End: 2024-10-02
Payer: MEDICARE

## 2024-10-02 ENCOUNTER — ASSISTED LIVING VISIT (OUTPATIENT)
Dept: GERIATRICS | Facility: CLINIC | Age: 64
End: 2024-10-02
Payer: MEDICARE

## 2024-10-02 VITALS
WEIGHT: 219.1 LBS | DIASTOLIC BLOOD PRESSURE: 81 MMHG | HEIGHT: 64 IN | RESPIRATION RATE: 18 BRPM | SYSTOLIC BLOOD PRESSURE: 131 MMHG | OXYGEN SATURATION: 97 % | BODY MASS INDEX: 37.4 KG/M2 | HEART RATE: 98 BPM | TEMPERATURE: 97.2 F

## 2024-10-02 DIAGNOSIS — E66.812 CLASS 2 SEVERE OBESITY DUE TO EXCESS CALORIES WITH SERIOUS COMORBIDITY AND BODY MASS INDEX (BMI) OF 37.0 TO 37.9 IN ADULT (H): ICD-10-CM

## 2024-10-02 DIAGNOSIS — R21 RASH: ICD-10-CM

## 2024-10-02 DIAGNOSIS — E10.22 TYPE 1 DIABETES MELLITUS WITH STAGE 3A CHRONIC KIDNEY DISEASE (H): ICD-10-CM

## 2024-10-02 DIAGNOSIS — E87.0 HYPERNATREMIA: Primary | ICD-10-CM

## 2024-10-02 DIAGNOSIS — N18.31 TYPE 1 DIABETES MELLITUS WITH STAGE 3A CHRONIC KIDNEY DISEASE (H): ICD-10-CM

## 2024-10-02 DIAGNOSIS — R63.5 WEIGHT GAIN: ICD-10-CM

## 2024-10-02 DIAGNOSIS — N18.31 CHRONIC KIDNEY DISEASE, STAGE 3A (H): ICD-10-CM

## 2024-10-02 DIAGNOSIS — E66.01 CLASS 2 SEVERE OBESITY DUE TO EXCESS CALORIES WITH SERIOUS COMORBIDITY AND BODY MASS INDEX (BMI) OF 37.0 TO 37.9 IN ADULT (H): ICD-10-CM

## 2024-10-02 PROCEDURE — 99349 HOME/RES VST EST MOD MDM 40: CPT | Performed by: NURSE PRACTITIONER

## 2024-10-02 NOTE — PROGRESS NOTES
[facility] GERIATRICS  Chief Complaint   Patient presents with    residential Regulatory    Medication Problem    Lab Follow Up      [facility] Medical Record Number:  [id number]  Place of Service where encounter took place:  [facility] ([facility]) [[id number]]    HPI:    Lexy Rockwell  is 64 year old (1960), who is being seen today for a federally mandated E/M visit.       Her past medical history includes  Type 1 diabetes with gastroparesis and CKD history of DKA  Hypertension  CKD stage III  Hyperlipidemia  Bilateral leg edema  Overactive bladder  GERD  Constipation  Developmentally delayed  Polyneuropathy   Vitamin D deficiency  Glaucoma      Today patient was seen in her room.   patient denied chest pain, shortness of breath, dizziness, lightheadedness, and a poor appetite.   Nursing has no concerns. BIMS=15/15 (score 13 to 15 suggests the patient is cognitively intact) PHQ9=2/27.   Patient needs cuing assistance with ADLs, uses a walker.    her appetite is good and consumes a regular low carb diet.  Per nursing, skin is intact. Code status is full.      In reviewing point click care, she has gained weight current weight is  291 which is up 16% or 32 lbs in 180 days. VS stable.     ALLERGIES:Amoxicillin and Sulfa antibiotics  PAST MEDICAL HISTORY:   Past Medical History:   Diagnosis Date    Cerumen impacted     Development delay     Diabetic gastroparesis (H) 8/16/2013    Glaucoma (increased eye pressure)     Hyperlipaemia     Hypertension     Hypothyroid     Mental retardation     Nonsenile cataract     Obesity     Strabismus     Type 1 diabetes mellitus not at goal (H)      PAST SURGICAL HISTORY:   has a past surgical history that includes EYE SURG ANT SGMT PROC UNLISTED (remote); strabismus surgery; Esophagoscopy, gastroscopy, duodenoscopy (EGD), combined (N/A, 7/3/2023); and Colonoscopy (N/A, 7/3/2023).  FAMILY HISTORY: family history includes Diabetes in her sister; Glaucoma in her maternal  grandfather; Hypertension in her father.  SOCIAL HISTORY:  reports that she has never smoked. She has never used smokeless tobacco. She reports current alcohol use. She reports that she does not use drugs.    MEDICATIONS:  MED REC REQUIRED  Post Medication Reconciliation Status:  Medication reconciliation previously completed during another office visit         Review of your medicines            Accurate as of October 2, 2024  6:43 AM. If you have any questions, ask your nurse or doctor.                CONTINUE these medicines which have NOT CHANGED        Dose / Directions   acetaminophen 500 MG tablet  Commonly known as: TYLENOL  Used for: Arthralgia of left lower leg      TAKE 2 TABLETS BY MOUTH EVERY 6 HOURS AS NEEDED (1000MG) FOR PAIN  Quantity: 248 tablet  Refills: 11     AMLODIPINE BENZOATE PO      Dose: 2.5 mg  Take 2.5 mg by mouth  Refills: 0     carvedilol 6.25 MG tablet  Commonly known as: COREG  Used for: Hypertension goal BP (blood pressure) < 140/90      Dose: 6.25 mg  Take 1 tablet (6.25 mg) by mouth 2 times daily (with meals)  Refills: 0     cetirizine 10 MG tablet  Commonly known as: zyrTEC  Used for: Rash      Dose: 10 mg  Take 1 tablet (10 mg) by mouth daily.  Refills: 0     Dexcom G6 Sensor Misc  Used for: Type 1 diabetes mellitus with diabetic macular edema of both eyes resolved after treatment (H)      Change every 10 days.  Quantity: 3 each  Refills: 5     Dexcom G6 Transmitter Misc  Used for: Type 1 diabetes mellitus with diabetic macular edema of both eyes resolved after treatment (H)      Change every 3 months.  Quantity: 1 each  Refills: 1     dorzolamide-timolol 2-0.5 % ophthalmic solution  Commonly known as: COSOPT  Used for: Primary open angle glaucoma of both eyes, moderate stage      Dose: 1 drop  Place 1 drop into both eyes 2 times daily  Quantity: 20 mL  Refills: 3     ferrous sulfate 142 (45 Fe) MG CR tablet  Commonly known as: SLO-FE      Dose: 142 mg  Take 1 tablet (142 mg) by  mouth daily  Refills: 0     Fiasp PenFill 100 UNIT/ML Soct  Used for: Type 1 diabetes mellitus with moderate nonproliferative retinopathy of both eyes, macular edema presence unspecified (H)  Generic drug: Insulin Aspart (w/Niacinamide)      Dose: 4-10 Units  Inject 4-10 Units subcutaneously 3 times daily (before meals). Inject 4 units before breakfast, 8 units before lunch and 9 units before dinner, 3 units before each snacks 2x daily. Add correction scale before meals. -160 = 1 unit. -200 = 2 units. -240 = 3 units. -280 = 4 units. -320 = 5 unit(s). Max daily dose is 50 units.  Quantity: 6 mL  Refills: 11     furosemide 40 MG tablet  Commonly known as: LASIX      Dose: 40 mg  Take 40 mg by mouth daily.  Refills: 0     glucagon 1 MG Solr injection      Dose: 1 mg  Inject 1 mg into the muscle once  Refills: 0     Glucagon 3 MG/DOSE nasal powder  Commonly known as: BAQSIMI  Used for: Type 1 diabetes mellitus with diabetic macular edema of both eyes resolved after treatment (H)      Dose: 1 spray  Spray 1 spray (3 mg) in nostril as needed in the event of unconscious hypoglycemia or hypoglycemic seizure. May repeat dose if no response after 15 minutes.  Quantity: 2 each  Refills: 3     hydrocortisone 1 % Crea cream      Place rectally 2 times daily  Refills: 0     Insulin Degludec FlexTouch 100 UNIT/ML Sopn  Used for: Type 1 diabetes mellitus with moderate nonproliferative retinopathy of both eyes, macular edema presence unspecified (H)      Dose: 22 Units  Inject 22 Units Subcutaneous daily EVERY NIGHT AT BEDTIME FOR DM *DISCARD 56 DAYS AFTER OPENING*  Quantity: 6 mL  Refills: PRN     Ketostix test strip  Used for: Type 1 diabetes mellitus with diabetic macular edema of both eyes resolved after treatment (H)  Generic drug: acetone urine      Use as directed in case of high glucose, vomiting or illness.  Quantity: 50 strip  Refills: 3     latanoprost 0.005 % ophthalmic solution  Commonly  known as: XALATAN  Used for: Primary open angle glaucoma of both eyes, moderate stage      INSTILL 1 DROP IN BOTH EYES AT BEDTIME  Quantity: 10 mL  Refills: 11     levothyroxine 50 MCG tablet  Commonly known as: SYNTHROID/LEVOTHROID      Dose: 50 mcg  Take 50 mcg by mouth daily.  Refills: 0     lisinopril 40 MG tablet  Commonly known as: ZESTRIL  Used for: Essential hypertension with goal blood pressure less than 140/90      Dose: 40 mg  Take 1 tablet (40 mg) by mouth daily  Refills: 0     Multivitamin Tabs      1 TABLET DAILY  Refills: 0     * NovoFine Autocover Pen Needle 30G X 8 MM miscellaneous  Used for: Hyperglycemia due to type 1 diabetes mellitus (H)  Generic drug: insulin pen needle      Dose: 1 each  1 each See Admin Instructions as directed.  Quantity: 100 each  Refills: 11     * insulin pen needle 30G X 5 MM      2 times daily. Use 2 pen needles daily or as directed.  Refills: 0     pantoprazole 40 MG EC tablet  Commonly known as: PROTONIX      Dose: 40 mg  Take 40 mg by mouth daily  Refills: 0     sennosides 8.6 MG tablet  Commonly known as: SENOKOT      Dose: 1 tablet  Take 1 tablet by mouth daily as needed  Refills: 0     simvastatin 20 MG tablet  Commonly known as: ZOCOR  Used for: Hyperlipidemia LDL goal <100      Dose: 20 mg  Take 1 tablet (20 mg) by mouth At Bedtime  Quantity: 90 tablet  Refills: 3           * This list has 2 medication(s) that are the same as other medications prescribed for you. Read the directions carefully, and ask your doctor or other care provider to review them with you.                  Case Management:  I have reviewed the care plan and MDS and do agree with the plan. Patient's desire to return to the community is not present. Information reviewed:  Medications, vital signs, orders, and nursing notes.    ROS:  10 point ROS of systems including Constitutional, Eyes, Respiratory, Cardiovascular, Gastroenterology, Genitourinary, Integumentary, Musculoskeletal, Psychiatric  "were all negative except for pertinent positives noted in my HPI.    Vitals:  /81   Pulse 98   Temp 97.2  F (36.2  C)   Resp 18   Ht 1.626 m (5' 4\")   Wt 99.4 kg (219 lb 1.6 oz)   LMP 03/28/2014   SpO2 97%   BMI 37.61 kg/m    Body mass index is 37.61 kg/m .  Exam:  GENERAL APPEARANCE:  Alert, in no distress  RESP:  lungs clear to auscultation , no respiratory distress  CV:  rate-normal  SKIN:  in slightly irritated in left antecubital area   PSYCH:  oriented X 3    Lab/Diagnostic data:   Most Recent 3 CBC's:  Recent Labs   Lab Test 09/09/24  0723 07/17/24  0753 06/19/24  0653   WBC 5.6 5.1 5.6   HGB 10.8* 10.2* 9.7*   MCV 91 87 91    255 196     Most Recent 3 BMP's:  Recent Labs   Lab Test 09/30/24  1026 09/16/24  0902 09/09/24  0723   * 148* 138   POTASSIUM 4.4 4.0 4.5   CHLORIDE 112* 111* 106   CO2 22 22 23   BUN 58.2* 60.8* 46.7*   CR 1.28* 1.41* 1.10*   ANIONGAP 12 15 9   EFRAIN 9.7 9.1 9.1   GLC 54* 69* 172*     Most Recent 2 LFT's:  Recent Labs   Lab Test 09/09/24  0723 07/17/24  0753   AST 21 17   ALT 16 14   ALKPHOS 118 102   BILITOTAL 0.2 0.2     Most Recent TSH and T4:  Recent Labs   Lab Test 09/09/24  0723 10/13/21  1540 02/01/21  0909   TSH 10.70*   < > 7.07*   T4  --   --  1.05    < > = values in this interval not displayed.     Most Recent Hemoglobin A1c:  Recent Labs   Lab Test 09/09/24 0723   A1C 7.8*       ASSESSMENT/PLAN  (E87.0) Hypernatremia  (primary encounter diagnosis)  Comment: sodium elevated  Plan: decrease lasix  Repeat BMP in 1 month    (R63.5) Weight gain  (E10.22,  N18.31) Type 1 diabetes mellitus with stage 3a chronic kidney disease (H)  (E66.812,  E66.01) Class 2 severe obesity due to excess calories with serious comorbidity in adult, BMI 37  Comment: having weight gain   Likely due to calories  Body mass index is 37.61 kg/m .  Plan: continue to work with DM educator    (R21) Rash  Comment: rash in left arm  Continue with zyrtec and hydrocortisone cream  Plan: " stop cream in 1 month      Electronically signed by:      TAYLA Sprague CNP

## 2024-10-28 ENCOUNTER — TELEPHONE (OUTPATIENT)
Dept: GERIATRICS | Facility: CLINIC | Age: 64
End: 2024-10-28
Payer: MEDICARE

## 2024-10-28 NOTE — TELEPHONE ENCOUNTER
Lexy Rockwell  1960    Orders  Stop current furosemide order  Add furosemide 40 mg daily by mouth.  Dx weight gain.     Electronically signed    TAYLA Sprague CNP on 10/28/2024 at 8:45 AM

## 2024-10-28 NOTE — TELEPHONE ENCOUNTER
Lexy Rockwell  1960    Orders  AM cortisol level.  Dx Weight gain   TSH.  Dx Weight gain  BMP.  Dx weight gain  Discontinue 11/4/2024 labs      Electronically signed    TAYLA Sprague CNP on 10/28/2024 at 3:12 PM

## 2024-10-29 ENCOUNTER — LAB REQUISITION (OUTPATIENT)
Dept: LAB | Facility: CLINIC | Age: 64
End: 2024-10-29
Payer: MEDICARE

## 2024-10-29 DIAGNOSIS — R63.5 ABNORMAL WEIGHT GAIN: ICD-10-CM

## 2024-10-31 ENCOUNTER — VIRTUAL VISIT (OUTPATIENT)
Dept: EDUCATION SERVICES | Facility: CLINIC | Age: 64
End: 2024-10-31
Payer: MEDICARE

## 2024-10-31 DIAGNOSIS — E10.3393 TYPE 1 DIABETES MELLITUS WITH MODERATE NONPROLIFERATIVE RETINOPATHY OF BOTH EYES, MACULAR EDEMA PRESENCE UNSPECIFIED (H): Primary | ICD-10-CM

## 2024-10-31 PROCEDURE — 82533 TOTAL CORTISOL: CPT | Mod: ORL | Performed by: NURSE PRACTITIONER

## 2024-10-31 PROCEDURE — 80048 BASIC METABOLIC PNL TOTAL CA: CPT | Mod: ORL | Performed by: NURSE PRACTITIONER

## 2024-10-31 PROCEDURE — 84443 ASSAY THYROID STIM HORMONE: CPT | Mod: ORL | Performed by: NURSE PRACTITIONER

## 2024-10-31 PROCEDURE — 98967 PH1 ASSMT&MGMT NQHP 11-20: CPT | Mod: 93 | Performed by: DIETITIAN, REGISTERED

## 2024-10-31 PROCEDURE — P9603 ONE-WAY ALLOW PRORATED MILES: HCPCS | Mod: ORL | Performed by: NURSE PRACTITIONER

## 2024-10-31 NOTE — PROGRESS NOTES
Diabetes Self-Management Education & Support    Presents for: CGM Review    Type of Service: Telephone Visit    Originating Location (Patient Location): Long term Care  Distant Location (Provider Location): [facility]  Mode of Communication:  Telephone    Telephone Visit Start Time:  10:30 am  Telephone Visit End Time (telephone visit stop time): 10:50 am    How would patient like to obtain AVS? [place]      REPORTS:                      Pt verbalized understanding of concepts discussed and recommendations provided today.       Continue education with the following diabetes management concepts: Healthy Eating, Being Active, Monitoring, Taking Medication, Problem Solving, Reducing Risks, and Healthy Coping    ASSESSMENT:  BG high overnight. We discussed weight loss, discussed limiting desserts and adding in more walking.       Glucose Patterns & Trends:  Hyperglycemia, weekend- nocturnal and weekday- nocturnal    PLAN    Increase Tresiba: 0-0-0-20 --> 0-0-0-22  Lexy will work on limiting desserts    Topics to cover at upcoming visits: Healthy Eating, Being Active, Monitoring, Taking Medication, Problem Solving, Reducing Risks, and Healthy Coping    Follow-up: November 12th at 10 am    See Care Plan for co-developed, patient-state behavior change goals.  AVS provided for patient today.    Education Materials Provided:  No new materials provided today      SUBJECTIVE/OBJECTIVE:  Presents for: CGM Review  Accompanied by: Self (Dietitian from Assisted Living)  Diabetes education in the past 24mo: Yes  Focus of Visit: Problem Solving, Monitoring, Taking Medication  Diabetes type: Type 1  Disease course: Worsening  How confident are you filling out medical forms by yourself:: Not Assessed  Diabetes management related comments/concerns: No questions today  Transportation concerns: Yes (gets rides or takes public tranportation)  Difficulty affording diabetes medication?: No  Difficulty  "affording diabetes testing supplies?: No  Other concerns:: Cognitive impairment  Cultural Influences/Ethnic Background:  Not  or     Diabetes Symptoms & Complications:  Diabetes Related Symptoms: Neuropathy, Polydipsia (increased thirst), Polyuria (increased urination), Fatigue, Polyphagia (increased hunger) (feeling more fatigue in the morning, neuropathy)  Weight trend: Increasing  Symptom course: Stable  Disease course: Worsening  Complications assessed today?: Yes  Autonomic neuropathy: No  CVA: No  Heart disease: No  Nephropathy: Yes  Peripheral neuropathy: Yes  Peripheral Vascular Disease: No  Retinopathy: No  Sexual dysfunction: No    Patient Problem List and Family Medical History reviewed for relevant medical history, current medical status, and diabetes risk factors.    Vitals:  LMP 03/28/2014   Estimated body mass index is 38.31 kg/m  as calculated from the following:    Height as of an earlier encounter on 10/31/24: 1.626 m (5' 4\").    Weight as of an earlier encounter on 10/31/24: 101.2 kg (223 lb 3.2 oz).   Last 3 BP:   BP Readings from Last 3 Encounters:   10/31/24 (!) 165/80   10/02/24 131/81   09/20/24 129/85       History   Smoking Status    Never   Smokeless Tobacco    Never       Labs:  Lab Results   Component Value Date    A1C 7.8 09/09/2024    A1C 9.7 02/01/2021     Lab Results   Component Value Date    GLC 54 09/30/2024     10/13/2021     02/01/2021     Lab Results   Component Value Date    LDL 58 01/25/2023     02/01/2021     HDL Cholesterol   Date Value Ref Range Status   02/01/2021 62 >49 mg/dL Final     Direct Measure HDL   Date Value Ref Range Status   01/25/2023 37 (L) >=50 mg/dL Final   ]  GFR Estimate   Date Value Ref Range Status   09/30/2024 47 (L) >60 mL/min/1.73m2 Final   02/01/2021 90 >60 mL/min/[1.73_m2] Final     Comment:     Non  GFR Calc  Starting 12/18/2018, serum creatinine based estimated GFR (eGFR) will be   calculated " "using the Chronic Kidney Disease Epidemiology Collaboration   (CKD-EPI) equation.       GFR Estimate If Black   Date Value Ref Range Status   02/01/2021 >90 >60 mL/min/[1.73_m2] Final     Comment:      GFR Calc  Starting 12/18/2018, serum creatinine based estimated GFR (eGFR) will be   calculated using the Chronic Kidney Disease Epidemiology Collaboration   (CKD-EPI) equation.       Lab Results   Component Value Date    CR 1.28 09/30/2024    CR 0.73 02/01/2021     No results found for: \"MICROALBUMIN\"    Healthy Eating:       Being Active:       Monitoring:         Taking Medications:  Diabetes Medication(s)       Diabetic Other       Glucagon (BAQSIMI) 3 MG/DOSE nasal powder Spray 1 spray (3 mg) in nostril as needed in the event of unconscious hypoglycemia or hypoglycemic seizure. May repeat dose if no response after 15 minutes.     glucagon 1 MG SOLR injection Inject 1 mg into the muscle once       Insulin       Insulin Aspart, w/Niacinamide, (FIASP PENFILL) 100 UNIT/ML SOCT Inject 4-10 Units subcutaneously 3 times daily (before meals). Inject 4 units before breakfast, 8 units before lunch and 9 units before dinner, 3 units before each snacks 2x daily. Add correction scale before meals. -160 = 1 unit. -200 = 2 units. -240 = 3 units. -280 = 4 units. -320 = 5 unit(s). Max daily dose is 50 units.     Insulin Degludec FlexTouch 100 UNIT/ML SOPN Inject 22 Units Subcutaneous daily EVERY NIGHT AT BEDTIME FOR DM *DISCARD 56 DAYS AFTER OPENING*                 Problem Solving:                 Reducing Risks:       Healthy Coping:     Patient Activation Measure Survey Score:      3/7/2012     3:00 PM   KHUSHI Score (Last Two)   KHUSHI Raw Score 39   Activation Score 56.4   KHUSHI Level 3         Care Plan and Education Provided:  Healthy Eating: Portion control and Weight Management and Being Active: Finding a physical activity routine that works for you      Time Spent: 20 " minutes  Encounter Type: Individual    Any diabetes medication dose changes were made via the CDE Protocol per the patient's referring provider. A copy of this encounter was shared with the provider.

## 2024-10-31 NOTE — LETTER
10/31/2024         RE: Lexy Rockwell  [address]  [place] [facility] [address]        Dear Colleague,    Thank you for referring your patient, Lexy Rockwell, to the [facility]. Please see a copy of my visit note below.    Diabetes Self-Management Education & Support    Presents for: CGM Review    Type of Service: Telephone Visit    Originating Location (Patient Location): Long term Care  Distant Location (Provider Location): [facility]  Mode of Communication:  Telephone    Telephone Visit Start Time:  10:30 am  Telephone Visit End Time (telephone visit stop time): 10:50 am    How would patient like to obtain AVS? MyChart      REPORTS:                      Pt verbalized understanding of concepts discussed and recommendations provided today.       Continue education with the following diabetes management concepts: Healthy Eating, Being Active, Monitoring, Taking Medication, Problem Solving, Reducing Risks, and Healthy Coping    ASSESSMENT:  BG high overnight. We discussed weight loss, discussed limiting desserts and adding in more walking.       Glucose Patterns & Trends:  Hyperglycemia, weekend- nocturnal and weekday- nocturnal    PLAN    Increase Tresiba: 0-0-0-20 --> 0-0-0-22  Lexy will work on limiting desserts    Topics to cover at upcoming visits: Healthy Eating, Being Active, Monitoring, Taking Medication, Problem Solving, Reducing Risks, and Healthy Coping    Follow-up: November 12th at 10 am    See Care Plan for co-developed, patient-state behavior change goals.  AVS provided for patient today.    Education Materials Provided:  No new materials provided today      SUBJECTIVE/OBJECTIVE:  Presents for: CGM Review  Accompanied by: Self (Dietitian from Assisted Living)  Diabetes education in the past 24mo: Yes  Focus of Visit: Problem Solving, Monitoring, Taking Medication  Diabetes type: Type 1  Disease course: Worsening  How  "confident are you filling out medical forms by yourself:: Not Assessed  Diabetes management related comments/concerns: No questions today  Transportation concerns: Yes (gets rides or takes public tranportation)  Difficulty affording diabetes medication?: No  Difficulty affording diabetes testing supplies?: No  Other concerns:: Cognitive impairment  Cultural Influences/Ethnic Background:  Not  or     Diabetes Symptoms & Complications:  Diabetes Related Symptoms: Neuropathy, Polydipsia (increased thirst), Polyuria (increased urination), Fatigue, Polyphagia (increased hunger) (feeling more fatigue in the morning, neuropathy)  Weight trend: Increasing  Symptom course: Stable  Disease course: Worsening  Complications assessed today?: Yes  Autonomic neuropathy: No  CVA: No  Heart disease: No  Nephropathy: Yes  Peripheral neuropathy: Yes  Peripheral Vascular Disease: No  Retinopathy: No  Sexual dysfunction: No    Patient Problem List and Family Medical History reviewed for relevant medical history, current medical status, and diabetes risk factors.    Vitals:  [facility] 03/28/2014   Estimated body mass index is 38.31 kg/m  as calculated from the following:    Height as of an earlier encounter on 10/31/24: 1.626 m (5' 4\").    Weight as of an earlier encounter on 10/31/24: 101.2 kg (223 lb 3.2 oz).   Last 3 BP:   BP Readings from Last 3 Encounters:   10/31/24 (!) 165/80   10/02/24 131/81   09/20/24 129/85       History   Smoking Status     Never   Smokeless Tobacco     Never       Labs:  Lab Results   Component Value Date    A1C 7.8 09/09/2024    A1C 9.7 02/01/2021     Lab Results   Component Value Date    GLC 54 09/30/2024     10/13/2021     02/01/2021     Lab Results   Component Value Date    LDL 58 01/25/2023     02/01/2021     HDL Cholesterol   Date Value Ref Range Status   02/01/2021 62 >49 mg/dL Final     Direct Measure HDL   Date Value Ref Range Status   01/25/2023 37 (L) >=50 mg/dL Final " "  ]  GFR Estimate   Date Value Ref Range Status   09/30/2024 47 (L) >60 mL/min/1.73m2 Final   02/01/2021 90 >60 mL/min/[1.73_m2] Final     Comment:     Non  GFR Calc  Starting 12/18/2018, serum creatinine based estimated GFR (eGFR) will be   calculated using the Chronic Kidney Disease Epidemiology Collaboration   (CKD-EPI) equation.       GFR Estimate If Black   Date Value Ref Range Status   02/01/2021 >90 >60 mL/min/[1.73_m2] Final     Comment:      GFR Calc  Starting 12/18/2018, serum creatinine based estimated GFR (eGFR) will be   calculated using the Chronic Kidney Disease Epidemiology Collaboration   (CKD-EPI) equation.       Lab Results   Component Value Date    CR 1.28 09/30/2024    CR 0.73 02/01/2021     No results found for: \"MICROALBUMIN\"    Healthy Eating:       Being Active:       Monitoring:         Taking Medications:  Diabetes Medication(s)       Diabetic Other       Glucagon (BAQSIMI) 3 MG/DOSE nasal powder Spray 1 spray (3 mg) in nostril as needed in the event of unconscious hypoglycemia or hypoglycemic seizure. May repeat dose if no response after 15 minutes.     glucagon 1 MG SOLR injection Inject 1 mg into the muscle once       Insulin       Insulin Aspart, w/Niacinamide, (FIASP PENFILL) 100 UNIT/ML SOCT Inject 4-10 Units subcutaneously 3 times daily (before meals). Inject 4 units before breakfast, 8 units before lunch and 9 units before dinner, 3 units before each snacks 2x daily. Add correction scale before meals. -160 = 1 unit. -200 = 2 units. -240 = 3 units. -280 = 4 units. -320 = 5 unit(s). Max daily dose is 50 units.     Insulin Degludec FlexTouch 100 UNIT/ML SOPN Inject 22 Units Subcutaneous daily EVERY NIGHT AT BEDTIME FOR DM *DISCARD 56 DAYS AFTER OPENING*                 Problem Solving:                 Reducing Risks:       Healthy Coping:     Patient Activation Measure Survey Score:      3/7/2012     3:00 PM   KHUSHI Score " (Last Two)   KHUSHI Raw Score 39   Activation Score 56.4   KHUSHI Level 3         Care Plan and Education Provided:  Healthy Eating: Portion control and Weight Management and Being Active: Finding a physical activity routine that works for you      Time Spent: 20 minutes  Encounter Type: Individual    Any diabetes medication dose changes were made via the CDE Protocol per the patient's referring provider. A copy of this encounter was shared with the provider.

## 2024-10-31 NOTE — LETTER
10/31/2024         RE: Lexy Rockwell  [address]  [place] [address]        Dear Colleague,    Please Increase Lexy's Tresiba dose from 20 units daily to 22 units.     Tiffanie Mcfarland RD LD [facility] [place][note 2]  Triage: [phone]  [phone]          Diabetes Self-Management Education & Support    Presents for: CGM Review    Type of Service: Telephone Visit    Originating Location (Patient Location): Long term Care  Distant Location (Provider Location): [facility]  Mode of Communication:  Telephone    Telephone Visit Start Time:  10:30 am  Telephone Visit End Time (telephone visit stop time): 10:50 am    How would patient like to obtain AVS? MyChart      REPORTS:                                Pt verbalized understanding of concepts discussed and recommendations provided today.       Continue education with the following diabetes management concepts: Healthy Eating, Being Active, Monitoring, Taking Medication, Problem Solving, Reducing Risks, and Healthy Coping    ASSESSMENT:  BG high overnight. We discussed weight loss, discussed limiting desserts and adding in more walking.       Glucose Patterns & Trends:  Hyperglycemia, weekend- nocturnal and weekday- nocturnal    PLAN    Increase Tresiba: 0-0-0-20 --> 0-0-0-22  Lexy will work on limiting desserts    Topics to cover at upcoming visits: Healthy Eating, Being Active, Monitoring, Taking Medication, Problem Solving, Reducing Risks, and Healthy Coping    Follow-up:  at 10 am    See Care Plan for co-developed, patient-state behavior change goals.  AVS provided for patient today.    Education Materials Provided:  No new materials provided today      SUBJECTIVE/OBJECTIVE:  Presents for: CGM Review  Accompanied by: Self (Dietitian from Assisted Living)  Diabetes education in the past 24mo: Yes  Focus of Visit: Problem Solving, Monitoring, Taking Medication  Diabetes type: Type 1  Disease course:  "Worsening  How confident are you filling out medical forms by yourself:: Not Assessed  Diabetes management related comments/concerns: No questions today  Transportation concerns: Yes (gets rides or takes public tranportation)  Difficulty affording diabetes medication?: No  Difficulty affording diabetes testing supplies?: No  Other concerns:: Cognitive impairment  Cultural Influences/Ethnic Background:  Not  or     Diabetes Symptoms & Complications:  Diabetes Related Symptoms: Neuropathy, Polydipsia (increased thirst), Polyuria (increased urination), Fatigue, Polyphagia (increased hunger) (feeling more fatigue in the morning, neuropathy)  Weight trend: Increasing  Symptom course: Stable  Disease course: Worsening  Complications assessed today?: Yes  Autonomic neuropathy: No  CVA: No  Heart disease: No  Nephropathy: Yes  Peripheral neuropathy: Yes  Peripheral Vascular Disease: No  Retinopathy: No  Sexual dysfunction: No    Patient Problem List and Family Medical History reviewed for relevant medical history, current medical status, and diabetes risk factors.    Vitals:  [facility] 03/28/2014   Estimated body mass index is 38.31 kg/m  as calculated from the following:    Height as of an earlier encounter on 10/31/24: 1.626 m (5' 4\").    Weight as of an earlier encounter on 10/31/24: 101.2 kg (223 lb 3.2 oz).   Last 3 BP:   BP Readings from Last 3 Encounters:   10/31/24 (!) 165/80   10/02/24 131/81   09/20/24 129/85       History   Smoking Status    Never   Smokeless Tobacco    Never       Labs:  Lab Results   Component Value Date    A1C 7.8 09/09/2024    A1C 9.7 02/01/2021     Lab Results   Component Value Date    GLC 54 09/30/2024     10/13/2021     02/01/2021     Lab Results   Component Value Date    LDL 58 01/25/2023     02/01/2021     HDL Cholesterol   Date Value Ref Range Status   02/01/2021 62 >49 mg/dL Final     Direct Measure HDL   Date Value Ref Range Status   01/25/2023 37 (L) >=50 " "mg/dL Final   ]  GFR Estimate   Date Value Ref Range Status   09/30/2024 47 (L) >60 mL/min/1.73m2 Final   02/01/2021 90 >60 mL/min/[1.73_m2] Final     Comment:     Non  GFR Calc  Starting 12/18/2018, serum creatinine based estimated GFR (eGFR) will be   calculated using the Chronic Kidney Disease Epidemiology Collaboration   (CKD-EPI) equation.       GFR Estimate If Black   Date Value Ref Range Status   02/01/2021 >90 >60 mL/min/[1.73_m2] Final     Comment:      GFR Calc  Starting 12/18/2018, serum creatinine based estimated GFR (eGFR) will be   calculated using the Chronic Kidney Disease Epidemiology Collaboration   (CKD-EPI) equation.       Lab Results   Component Value Date    CR 1.28 09/30/2024    CR 0.73 02/01/2021     No results found for: \"MICROALBUMIN\"    Healthy Eating:       Being Active:       Monitoring:         Taking Medications:  Diabetes Medication(s)       Diabetic Other       Glucagon (BAQSIMI) 3 MG/DOSE nasal powder Spray 1 spray (3 mg) in nostril as needed in the event of unconscious hypoglycemia or hypoglycemic seizure. May repeat dose if no response after 15 minutes.     glucagon 1 MG SOLR injection Inject 1 mg into the muscle once       Insulin       Insulin Aspart, w/Niacinamide, (FIASP PENFILL) 100 UNIT/ML SOCT Inject 4-10 Units subcutaneously 3 times daily (before meals). Inject 4 units before breakfast, 8 units before lunch and 9 units before dinner, 3 units before each snacks 2x daily. Add correction scale before meals. -160 = 1 unit. -200 = 2 units. -240 = 3 units. -280 = 4 units. -320 = 5 unit(s). Max daily dose is 50 units.     Insulin Degludec FlexTouch 100 UNIT/ML SOPN Inject 22 Units Subcutaneous daily EVERY NIGHT AT BEDTIME FOR DM *DISCARD 56 DAYS AFTER OPENING*                 Problem Solving:                 Reducing Risks:       Healthy Coping:     Patient Activation Measure Survey Score:      3/7/2012     3:00 PM "   KHUSHI Score (Last Two)   KHUSHI Raw Score 39   Activation Score 56.4   KHUSHI Level 3         Care Plan and Education Provided:  Healthy Eating: Portion control and Weight Management and Being Active: Finding a physical activity routine that works for you      Time Spent: 20 minutes  Encounter Type: Individual    Any diabetes medication dose changes were made via the CDE Protocol per the patient's referring provider. A copy of this encounter was shared with the provider.

## 2024-11-01 ENCOUNTER — TELEPHONE (OUTPATIENT)
Dept: GERIATRICS | Facility: CLINIC | Age: 64
End: 2024-11-01
Payer: MEDICARE

## 2024-11-01 LAB
ANION GAP SERPL CALCULATED.3IONS-SCNC: 14 MMOL/L (ref 7–15)
BUN SERPL-MCNC: 50.2 MG/DL (ref 8–23)
CALCIUM SERPL-MCNC: 9.5 MG/DL (ref 8.8–10.4)
CHLORIDE SERPL-SCNC: 108 MMOL/L (ref 98–107)
CORTIS SERPL-MCNC: 15.3 UG/DL
CREAT SERPL-MCNC: 1.17 MG/DL (ref 0.51–0.95)
EGFRCR SERPLBLD CKD-EPI 2021: 52 ML/MIN/1.73M2
GLUCOSE SERPL-MCNC: 123 MG/DL (ref 70–99)
HCO3 SERPL-SCNC: 20 MMOL/L (ref 22–29)
POTASSIUM SERPL-SCNC: 5 MMOL/L (ref 3.4–5.3)
SODIUM SERPL-SCNC: 142 MMOL/L (ref 135–145)
TSH SERPL DL<=0.005 MIU/L-ACNC: 5.24 UIU/ML (ref 0.3–4.2)

## 2024-11-01 RX ORDER — FUROSEMIDE 20 MG/1
10 TABLET ORAL DAILY
COMMUNITY
Start: 2024-11-04

## 2024-11-01 NOTE — TELEPHONE ENCOUNTER
[facility] Lab Note     Provider: TAYLA Gagnon CNP  Facility: [facility] Facility Type:  LT    Allergies   Allergen Reactions    Amoxicillin     Sulfa Antibiotics        Labs Reviewed by provider: BMP, TSH, cortisol----from 10/31/24     Verbal Order/Direction given by Provider: Please discontinue 40 mg of furosemide.   On 11/4/2024, start furosemide 10 mg daily by mouth     Provider giving Order:  TAYLA Gagnon CNP    Verbal Order given to: Michael Goetz RN

## 2024-11-06 LAB
ANION GAP SERPL CALCULATED.3IONS-SCNC: 7 MMOL/L (ref 7–15)
BUN SERPL-MCNC: 46.2 MG/DL (ref 8–23)
CALCIUM SERPL-MCNC: 9.4 MG/DL (ref 8.8–10.4)
CHLORIDE SERPL-SCNC: 111 MMOL/L (ref 98–107)
CREAT SERPL-MCNC: 1.13 MG/DL (ref 0.51–0.95)
EGFRCR SERPLBLD CKD-EPI 2021: 54 ML/MIN/1.73M2
GLUCOSE SERPL-MCNC: 201 MG/DL (ref 70–99)
HCO3 SERPL-SCNC: 21 MMOL/L (ref 22–29)
POTASSIUM SERPL-SCNC: 4.9 MMOL/L (ref 3.4–5.3)
SODIUM SERPL-SCNC: 139 MMOL/L (ref 135–145)

## 2024-11-06 PROCEDURE — P9604 ONE-WAY ALLOW PRORATED TRIP: HCPCS | Mod: ORL | Performed by: NURSE PRACTITIONER

## 2024-11-06 PROCEDURE — 36415 COLL VENOUS BLD VENIPUNCTURE: CPT | Mod: ORL | Performed by: NURSE PRACTITIONER

## 2024-11-06 PROCEDURE — 80048 BASIC METABOLIC PNL TOTAL CA: CPT | Mod: ORL | Performed by: NURSE PRACTITIONER

## 2024-11-11 NOTE — PROGRESS NOTES
Endocrinology Note         Lexy is a 64 year old female presents today for type 1 diabetes    HPI  Lexy is a 64 year old female history of type 1 diabetes with gastroparesis and chronic kidney disease stage 3, hypertension, hyperlipidemia developmental delay presents today for uncontrolled type 1 diabetes.    Last MARY Cramer    Patient has had history of suboptimal control of type 1 diabetes (previous A1c ranged between 9 to 12%).  Patient had history of diabetic ketoacidosis in 2021.  Patient had documented low C-peptide and positive angelica antibody in 3/2018.    She is currently living at [facility] (nursing home).   A1c 7.8 in September which is improved from 9.7 in July.   She feels things are going well she is so glad to see her blood sugar numbers much better.  She is enjoying working with Tiffanie Mcfarland diabetes education.  They currently do not have follow-up scheduled as Tiffanie is changing offices, but she notes that Tiffanie plans to contact her when she has her new schedule.    She will still be seeing a new eye doctor in [place], things seem to be stable.  A podiatrist comes to check her feet every 2 to 3 months at her assisted living facility and they cut her nails and sand in her callus.  She denies any lesions or concerns with her feet.  She does apply lotion regularly and feels that there is no injury.  Her thyroid medication has been managed by her primary care provider Yessenia May who visits her at her assisted living facility.      Current diabetic medications  - Tresiba 22 units daily  - Humalog 4 units with breakfast, 8 units with lunch, 9 units with dinner  - Snack coverage-she states she is not using.  - Sliding scale --  1 unit per 40 greater than 120.      Diet recalled:  BF: Always includes oatmeal but some days may also be scrambled eggs and toast others pancakes and sausage.    Lunch: Around 1 PM  Dinner: 5:45 PM    She then has a small  cookie to prevent low blood sugar overnight.  She does not take any insulin with this.      She is using Dexcom to track glucose levels.    Continuous glucose monitoring:  See details below.  Recently 41% time in range 2% low including some very low.  Her average blood sugar last 14 days 194 she has a very high coefficient of variance at 35.9.    DM complications:  Retinopathy:  moderate NPDR, glaucoma -- due to for exam this year  Nephropathy: positive macroalbuminuria (test 2021), CKD stage 3   Neuropathy: some tingling and numbness in her feet -- saw podiatrist 3/3023.    Past Medical History  Past Medical History:   Diagnosis Date    Cerumen impacted     Development delay     Diabetic gastroparesis (H) 8/16/2013    Glaucoma (increased eye pressure)     Hyperlipaemia     Hypertension     Hypothyroid     Mental retardation     Nonsenile cataract     Obesity     Strabismus     Type 1 diabetes mellitus not at goal (H)        Allergies  Allergies   Allergen Reactions    Amoxicillin     Sulfa Antibiotics      Medications  Current Outpatient Medications   Medication Sig Dispense Refill    acetaminophen (TYLENOL) 500 MG tablet TAKE 2 TABLETS BY MOUTH EVERY 6 HOURS AS NEEDED (1000MG) FOR PAIN 248 tablet 11    AMLODIPINE BENZOATE PO Take 2.5 mg by mouth      carvedilol (COREG) 6.25 MG tablet Take 1 tablet (6.25 mg) by mouth 2 times daily (with meals)      cetirizine (ZYRTEC) 10 MG tablet Take 1 tablet (10 mg) by mouth daily.      Continuous Glucose Sensor (DEXCOM G6 SENSOR) MISC Change every 10 days. 3 each 5    Continuous Glucose Transmitter (DEXCOM G6 TRANSMITTER) MISC Change every 3 months. 1 each 1    dorzolamide-timolol (COSOPT) 2-0.5 % ophthalmic solution Place 1 drop into both eyes 2 times daily 20 mL 3    ferrous sulfate (SLO-FE) 142 (45 Fe) MG CR tablet Take 1 tablet (142 mg) by mouth daily      furosemide (LASIX) 20 MG tablet Take 10 mg by mouth daily.      Glucagon (BAQSIMI) 3 MG/DOSE nasal powder Spray 1 spray  (3 mg) in nostril as needed in the event of unconscious hypoglycemia or hypoglycemic seizure. May repeat dose if no response after 15 minutes. 2 each 3    glucagon 1 MG SOLR injection Inject 1 mg into the muscle once      hydrocortisone 1 % CREA cream Place rectally 2 times daily      Insulin Aspart, w/Niacinamide, (FIASP PENFILL) 100 UNIT/ML SOCT Inject 4-10 Units subcutaneously 3 times daily (before meals). Inject 4 units before breakfast, 8 units before lunch and 9 units before dinner, 3 units before each snacks 2x daily. Add correction scale before meals. -160 = 1 unit. -200 = 2 units. -240 = 3 units. -280 = 4 units. -320 = 5 unit(s). Max daily dose is 50 units. 6 mL 11    Insulin Degludec FlexTouch 100 UNIT/ML SOPN Inject 22 Units Subcutaneous daily EVERY NIGHT AT BEDTIME FOR DM *DISCARD 56 DAYS AFTER OPENING* 6 mL PRN    insulin pen needle (NOVOFINE AUTOCOVER PEN NEEDLE) 30G X 8 MM miscellaneous 1 each See Admin Instructions as directed. 100 each 11    insulin pen needle 30G X 5 MM 2 times daily. Use 2 pen needles daily or as directed.      KETOSTIX test strip Use as directed in case of high glucose, vomiting or illness. 50 strip 3    latanoprost (XALATAN) 0.005 % ophthalmic solution INSTILL 1 DROP IN BOTH EYES AT BEDTIME 10 mL 11    levothyroxine (SYNTHROID/LEVOTHROID) 50 MCG tablet Take 50 mcg by mouth daily.      lisinopril (ZESTRIL) 40 MG tablet Take 1 tablet (40 mg) by mouth daily      MULTIVITAMIN TABS   OR 1 TABLET DAILY      pantoprazole (PROTONIX) 40 MG EC tablet Take 40 mg by mouth daily      sennosides (SENOKOT) 8.6 MG tablet Take 1 tablet by mouth daily as needed      simvastatin (ZOCOR) 20 MG tablet Take 1 tablet (20 mg) by mouth At Bedtime 90 tablet 3     Family History  family history includes Diabetes in her sister; Glaucoma in her maternal grandfather; Hypertension in her father.     Social History  Social History     Tobacco Use    Smoking status: Never     Smokeless tobacco: Never    Tobacco comments:     lives in smoke free household.   Substance Use Topics    Alcohol use: Yes     Comment: occass social    Drug use: No       ROS  10 points ROS were negative otherwise mentioned in HPI    Physical Exam  LMP 03/28/2014   Limited due to video visit.  Pleasant articulate female with reasonable recall.  Dentition is poor.  Lips are dry.  Constitutional: no distress, comfortable, pleasant   Psychological: appropriate mood       RESULTS  I have personally reviewed labs and images. I also reviewed labs with patient and discussed the result and plan of care.    Lab Results   Component Value Date    A1C 9.7 06/19/2024    A1C 8.5 11/29/2023    A1C 8.1 03/29/2023    A1C 8.7 01/25/2023    A1C 10.7 10/13/2021    A1C 9.7 02/01/2021    A1C 9.2 09/30/2020    A1C 11.8 01/08/2020    A1C 11.8 05/19/2019    A1C 11.2 12/12/2018      Latest Ref Rng 1/25/2023  8:20 AM   ENDO DIABETES     Cholesterol <200 mg/dL 112    LDL Cholesterol Calculated <=100 mg/dL 58    HDL Cholesterol >=50 mg/dL 37 (L)    Non HDL Cholesterol <130 mg/dL 75    VLDL-Cholesterol 0 - 30 mg/dL    Triglycerides <150 mg/dL 84    TRIG (EXT) <150 mg/dL 84       Latest Ref Rng 8/8/2024  6:57 AM   ENDO DIABETES     Creatinine 0.51 - 0.95 mg/dL 1.01 (H)      Recent Labs   Lab Test 11/06/24  0723 10/31/24  1539 09/16/24  0902 09/09/24  0723 07/17/24  0753 06/19/24  0653 03/29/23  0515 01/25/23  0820 10/13/21  1540 02/01/21  0909 09/30/20  0910 01/08/20  1030 10/02/19  1448   A1C  --   --   --  7.8*  --  9.7*   < > 8.7*   < > 9.7*   < > 11.8*  --    TSH  --  5.24*  --  10.70*  --  5.29*   < >  --    < > 7.07*  --  4.48*  --    T4  --   --   --   --   --   --   --   --   --  1.05  --  1.08  --    LDL  --   --   --   --   --   --   --  58  --  120*  --   --   --    HDL  --   --   --   --   --   --   --  37*  --  62  --   --   --    TRIG  --   --   --   --   --   --   --  84  --  89  --   --   --    CR 1.13* 1.17*   < > 1.10*   < >  1.31*   < > 0.93   < > 0.73   < >  --   --    MICROL  --   --   --   --   --   --   --   --   --  306  --   --  117    < > = values in this interval not displayed.         ASSESSMENT:    Lexy is a 64 year old female history of type 1 diabetes with gastroparesis and chronic kidney disease stage 3, hypertension, hyperlipidemia developmental delay presents today for uncontrolled type 1 diabetes.    1) type 1 diabetes with CKD and gastroparesis: Her diabetes control is improved but I am concerned about midday hypoglycemia and overnight hyperglycemia.    I do not have an MAR to verify medication doses, but understand from Peace Bruna's note that she is now taking 22 units of Tresiba daily which we will continue.   Based on current needs and to avoid midday-full glycemia overnight hyperglycemia we reduced her breakfast dose of Humalog to just 3 units maintain the lunch dose of 8 units and increase the dinner dose from 9 to 10 units.  I am asking nursing to contact her home to verify current doses and update them as stated.    She should continue using Dexcom glucose sensor  She should continue to see diabetes education monthly until her blood sugars are in goal.    I am requesting her in her home I have an A1c drawn.    2) DM complications:  Retinopathy:  moderate NPDR, glaucoma [facility]  Nephropathy: positive macroalbuminuria (test 2021), CKD stage 3 , continue to follow with current provider.  Neuropathy: some tingling and numbness in her feet --continue to work with local podiatrist.     3.  Hypothyroidism: Most recent TSH continue to be elevated.  She reports that the dose has been adjusted and she does have a follow-up TSH scheduled that will be drawn at her home.      PLAN:   -Continue Tresiba 22 units daily.  -Reduce  Humalog at breakfast from 4 to 3 units with breakfast, continue 7 units with lunch, increased from 9-10 units with dinner  - continue sliding scale --  1 unit per  40 above 120.      - continue using Dexcom sensor  - will fax insulin regiment to assisted living    See Tiffanie Mcfarland in 2-4 weeks  See myself or Dr. Clemons in 3 to 6 months.     It is my privilege to be involved in the care of the above patient.     Rika Llanos PA-C, MPAS  [facility]  Diabetes, Endocrinology, and Metabolism  [phone] Appointments/Nurse Line  [phone] Fax  On VOCERA (inpatient)    To Page:  Dial [phone]  Enter the messaging ID when prompted ([id number])  That will give the option to leave a callback number.      Joined the call at 11/26/2024, 2:15:48 pm.  Left the call at 11/26/2024, 2:34:41 pm.  You were on the call for 18 minutes 52 seconds .  Patient at home in assisted living facility seen independently, provider off-site platform [facility].    40 minutes spent on the date of the encounter doing chart review, history and exam, documentation, education and counseling, as well as communication and coordination of care, and further activities as noted above.   This time includes time spent reviewing CGM, reviewing and ordering lab tests, medications and supplies.        Outcome for 11/11/24 2:43 PM: Data uploaded on Dexcom  Renata Burgos MA  Outcome for 11/22/24 7:40 AM: Data obtained via Dexcom website  Bobbilynn Grossaint, VF

## 2024-11-12 ENCOUNTER — VIRTUAL VISIT (OUTPATIENT)
Dept: EDUCATION SERVICES | Facility: CLINIC | Age: 64
End: 2024-11-12
Payer: MEDICARE

## 2024-11-12 ENCOUNTER — ASSISTED LIVING VISIT (OUTPATIENT)
Dept: GERIATRICS | Facility: CLINIC | Age: 64
End: 2024-11-12
Payer: MEDICARE

## 2024-11-12 ENCOUNTER — MYC MEDICAL ADVICE (OUTPATIENT)
Dept: GERIATRICS | Facility: CLINIC | Age: 64
End: 2024-11-12

## 2024-11-12 VITALS
HEART RATE: 89 BPM | BODY MASS INDEX: 38.91 KG/M2 | OXYGEN SATURATION: 96 % | SYSTOLIC BLOOD PRESSURE: 157 MMHG | HEIGHT: 64 IN | RESPIRATION RATE: 18 BRPM | DIASTOLIC BLOOD PRESSURE: 77 MMHG | TEMPERATURE: 97.1 F | WEIGHT: 227.9 LBS

## 2024-11-12 DIAGNOSIS — Z53.9 ERRONEOUS ENCOUNTER--DISREGARD: Primary | ICD-10-CM

## 2024-11-12 DIAGNOSIS — E10.3393 TYPE 1 DIABETES MELLITUS WITH MODERATE NONPROLIFERATIVE RETINOPATHY OF BOTH EYES, MACULAR EDEMA PRESENCE UNSPECIFIED (H): Primary | ICD-10-CM

## 2024-11-12 PROCEDURE — 99207 PR NO CHARGE LOS: CPT | Performed by: DIETITIAN, REGISTERED

## 2024-11-12 NOTE — PROGRESS NOTES
Diabetes Education Follow-up    Subjective/Objective:    Lxey Rockwell was called for BG review. Last date of communication was: 10/31.    Diabetes is being managed with Diabetes Medications   Diabetes Medication(s)       Diabetic Other       Glucagon (BAQSIMI) 3 MG/DOSE nasal powder Spray 1 spray (3 mg) in nostril as needed in the event of unconscious hypoglycemia or hypoglycemic seizure. May repeat dose if no response after 15 minutes.     glucagon 1 MG SOLR injection Inject 1 mg into the muscle once       Insulin       Insulin Aspart, w/Niacinamide, (FIASP PENFILL) 100 UNIT/ML SOCT Inject 4-10 Units subcutaneously 3 times daily (before meals). Inject 4 units before breakfast, 8 units before lunch and 9 units before dinner, 3 units before each snacks 2x daily. Add correction scale before meals. -160 = 1 unit. -200 = 2 units. -240 = 3 units. -280 = 4 units. -320 = 5 unit(s). Max daily dose is 50 units.     Insulin Degludec FlexTouch 100 UNIT/ML SOPN Inject 22 Units Subcutaneous daily EVERY NIGHT AT BEDTIME FOR DM *DISCARD 56 DAYS AFTER OPENING*            BG/Food Log:       Assessment:  BG in much better control than the last few months.     Plan/Response:  See Patient Instructions for co-developed, patient-stated behavior change goals.  No changes in the patient's current treatment plan  Writer will call patient to schedule when new template is open in [place]    KARLOS Haas [facility]  Triage: [phone]  [phone]      Any diabetes medication dose changes were made via the CDE Protocol and Collaborative Practice Agreement with the patient's referring provider. A copy of this encounter was shared with the provider.

## 2024-11-12 NOTE — LETTER
2024         RE: Lexy Rockwell  [address]  [facility] [address]        Dear Colleague,    Thank you for referring your patient, Lexy Rockwell, to the [facility] [place]. Please see a copy of my visit note below.    Diabetes Education Follow-up    Subjective/Objective:    Lexy Rockwell was called for BG review. Last date of communication was: 10/31.    Diabetes is being managed with Diabetes Medications   Diabetes Medication(s)       Diabetic Other       Glucagon (BAQSIMI) 3 MG/DOSE nasal powder Spray 1 spray (3 mg) in nostril as needed in the event of unconscious hypoglycemia or hypoglycemic seizure. May repeat dose if no response after 15 minutes.     glucagon 1 MG SOLR injection Inject 1 mg into the muscle once       Insulin       Insulin Aspart, w/Niacinamide, (FIASP PENFILL) 100 UNIT/ML SOCT Inject 4-10 Units subcutaneously 3 times daily (before meals). Inject 4 units before breakfast, 8 units before lunch and 9 units before dinner, 3 units before each snacks 2x daily. Add correction scale before meals. -160 = 1 unit. -200 = 2 units. -240 = 3 units. -280 = 4 units. -320 = 5 unit(s). Max daily dose is 50 units.     Insulin Degludec FlexTouch 100 UNIT/ML SOPN Inject 22 Units Subcutaneous daily EVERY NIGHT AT BEDTIME FOR DM *DISCARD 56 DAYS AFTER OPENING*            BG/Food Log:       Assessment:  BG in much better control than the last few months.     Plan/Response:  See Patient Instructions for co-developed, patient-stated behavior change goals.  No changes in the patient's current treatment plan  Writer will call patient to schedule when new template is open in [place]    KARLOS Haas [facility]  Triage: [phone]  [phone]      Any diabetes medication dose changes were made via the CDE Protocol and Collaborative Practice Agreement with the patient's referring provider. A copy of this  encounter was shared with the provider.

## 2024-11-26 ENCOUNTER — VIRTUAL VISIT (OUTPATIENT)
Dept: ENDOCRINOLOGY | Facility: CLINIC | Age: 64
End: 2024-11-26
Payer: MEDICARE

## 2024-11-26 DIAGNOSIS — E10.3393 TYPE 1 DIABETES MELLITUS WITH MODERATE NONPROLIFERATIVE RETINOPATHY OF BOTH EYES, MACULAR EDEMA PRESENCE UNSPECIFIED (H): ICD-10-CM

## 2024-11-26 DIAGNOSIS — E10.65 HYPERGLYCEMIA DUE TO TYPE 1 DIABETES MELLITUS (H): ICD-10-CM

## 2024-11-26 DIAGNOSIS — E10.37X3 TYPE 1 DIABETES MELLITUS WITH DIABETIC MACULAR EDEMA OF BOTH EYES RESOLVED AFTER TREATMENT (H): Primary | ICD-10-CM

## 2024-11-26 PROCEDURE — 99215 OFFICE O/P EST HI 40 MIN: CPT | Mod: 95 | Performed by: PHYSICIAN ASSISTANT

## 2024-11-26 RX ORDER — INSULIN ASPART INJECTION 100 [IU]/ML
4-10 INJECTION, SOLUTION SUBCUTANEOUS
Qty: 6 ML | Refills: 11 | Status: SHIPPED | OUTPATIENT
Start: 2024-11-26

## 2024-11-26 RX ORDER — URINE ACETONE TEST STRIPS
STRIP MISCELLANEOUS
Qty: 50 STRIP | Refills: 3 | Status: SHIPPED | OUTPATIENT
Start: 2024-11-26

## 2024-11-26 RX ORDER — INSULIN ADMIN. SUPPLIES
1 INSULIN PEN (EA) SUBCUTANEOUS 4 TIMES DAILY
Qty: 100 EACH | Refills: 11 | Status: SHIPPED | OUTPATIENT
Start: 2024-11-26

## 2024-11-26 RX ORDER — PROCHLORPERAZINE 25 MG/1
SUPPOSITORY RECTAL
Qty: 9 EACH | Refills: 3 | Status: SHIPPED | OUTPATIENT
Start: 2024-11-26

## 2024-11-26 RX ORDER — PROCHLORPERAZINE 25 MG/1
SUPPOSITORY RECTAL
Qty: 1 EACH | Refills: 3 | Status: SHIPPED | OUTPATIENT
Start: 2024-11-26

## 2024-11-26 NOTE — PATIENT INSTRUCTIONS
Dear Lexy,  Please reduce your breakfast Novolog dose to 3 units and increase dinner dose to 10 units.  Please continue correction and lunch dose as they are.      Please have a A1C lab drawn and send us the result.      Please schedule your annual physical with your primary care provider to get your labs and preventive care updated.    Continue to see Tiffanie diabetes education monthly until her blood sugars are in goal.  I will see you again in 3-4 months.      My best wishes,    Rika Llanos PA-C, MPAS  [facility]  Diabetes, Endocrinology, and Metabolism  [phone] Appointments/Nurse  [phone] Fax

## 2024-11-26 NOTE — Clinical Note
Please reduce your breakfast Novolog dose to 3 units and increase dinner dose to 10 units.  Please continue correction and lunch dose as they are.

## 2024-11-26 NOTE — NURSING NOTE
Current patient location: [address]  [facility] [address]    Is the patient currently in the state of [place]? YES    Visit mode:VIDEO    If the visit is dropped, the patient can be reconnected by:VIDEO VISIT: Send to e-mail at: [email or web address]    Will anyone else be joining the visit? NO  (If patient encounters technical issues they should call [phone]    Are changes needed to the allergy or medication list?     Pt does not know all of the medications -- she was not able to individually go over the medications as she did not know all the names and what she was taking.     Are refills needed on medications prescribed by this physician? Discuss with provider    Rooming Documentation:  Questionnaire(s) completed    Reason for visit: RECHECK (Follow-up )    Areli Faustin [facility]    Nursing staff did not answer on any of the numbers that were listed or transferred to. Pt answered check in questions on her own-

## 2024-11-26 NOTE — LETTER
11/26/2024       RE: Lexy Rockwell  [address]  [facility] [address]     Dear Colleague,    Thank you for referring your patient, Lexy Rockwell, to the [facility] ENDOCRINOLOG[facility] [place] [facility]. Please see a copy of my visit note below.         Endocrinology Note         Lexy is a 64 year old female presents today for type 1 diabetes    HPI  Lexy is a 64 year old female history of type 1 diabetes with gastroparesis and chronic kidney disease stage 3, hypertension, hyperlipidemia developmental delay presents today for uncontrolled type 1 diabetes.    Last MARY Cramer    Patient has had history of suboptimal control of type 1 diabetes (previous A1c ranged between 9 to 12%).  Patient had history of diabetic ketoacidosis in 2021.  Patient had documented low C-peptide and positive angelica antibody in 3/2018.    She is currently living at [facility] (nursing home).   A1c 7.8 in September which is improved from 9.7 in July.   She feels things are going well she is so glad to see her blood sugar numbers much better.  She is enjoying working with Tiffanie Mcfarland diabetes education.  They currently do not have follow-up scheduled as Tiffanie is changing offices, but she notes that Tiffanie plans to contact her when she has her new schedule.    She will still be seeing a new eye doctor in [place], things seem to be stable.  A podiatrist comes to check her feet every 2 to 3 months at her assisted living facility and they cut her nails and sand in her callus.  She denies any lesions or concerns with her feet.  She does apply lotion regularly and feels that there is no injury.  Her thyroid medication has been managed by her primary care provider Yessenia May who visits her at her assisted living facility.      Current diabetic medications  - Tresiba 22 units daily  - Humalog 4 units with  breakfast, 8 units with lunch, 9 units with dinner  - Snack coverage-she states she is not using.  - Sliding scale --  1 unit per 40 greater than 120.      Diet recalled:  BF: Always includes oatmeal but some days may also be scrambled eggs and toast others pancakes and sausage.    Lunch: Around 1 PM  Dinner: 5:45 PM    She then has a small cookie to prevent low blood sugar overnight.  She does not take any insulin with this.      She is using Dexcom to track glucose levels.    Continuous glucose monitoring:  See details below.  Recently 41% time in range 2% low including some very low.  Her average blood sugar last 14 days 194 she has a very high coefficient of variance at 35.9.    DM complications:  Retinopathy:  moderate NPDR, glaucoma -- due to for exam this year  Nephropathy: positive macroalbuminuria (test 2021), CKD stage 3   Neuropathy: some tingling and numbness in her feet -- saw podiatrist 3/3023.    Past Medical History  Past Medical History:   Diagnosis Date     Cerumen impacted      Development delay      Diabetic gastroparesis (H) 8/16/2013     Glaucoma (increased eye pressure)      Hyperlipaemia      Hypertension      Hypothyroid      Mental retardation      Nonsenile cataract      Obesity      Strabismus      Type 1 diabetes mellitus not at goal (H)        Allergies  Allergies   Allergen Reactions     Amoxicillin      Sulfa Antibiotics      Medications  Current Outpatient Medications   Medication Sig Dispense Refill     acetaminophen (TYLENOL) 500 MG tablet TAKE 2 TABLETS BY MOUTH EVERY 6 HOURS AS NEEDED (1000MG) FOR PAIN 248 tablet 11     AMLODIPINE BENZOATE PO Take 2.5 mg by mouth       carvedilol (COREG) 6.25 MG tablet Take 1 tablet (6.25 mg) by mouth 2 times daily (with meals)       cetirizine (ZYRTEC) 10 MG tablet Take 1 tablet (10 mg) by mouth daily.       Continuous Glucose Sensor (DEXCOM G6 SENSOR) MISC Change every 10 days. 3 each 5     Continuous Glucose Transmitter (DEXCOM G6 TRANSMITTER)  MISC Change every 3 months. 1 each 1     dorzolamide-timolol (COSOPT) 2-0.5 % ophthalmic solution Place 1 drop into both eyes 2 times daily 20 mL 3     ferrous sulfate (SLO-FE) 142 (45 Fe) MG CR tablet Take 1 tablet (142 mg) by mouth daily       furosemide (LASIX) 20 MG tablet Take 10 mg by mouth daily.       Glucagon (BAQSIMI) 3 MG/DOSE nasal powder Spray 1 spray (3 mg) in nostril as needed in the event of unconscious hypoglycemia or hypoglycemic seizure. May repeat dose if no response after 15 minutes. 2 each 3     glucagon 1 MG SOLR injection Inject 1 mg into the muscle once       hydrocortisone 1 % CREA cream Place rectally 2 times daily       Insulin Aspart, w/Niacinamide, (FIASP PENFILL) 100 UNIT/ML SOCT Inject 4-10 Units subcutaneously 3 times daily (before meals). Inject 4 units before breakfast, 8 units before lunch and 9 units before dinner, 3 units before each snacks 2x daily. Add correction scale before meals. -160 = 1 unit. -200 = 2 units. -240 = 3 units. -280 = 4 units. -320 = 5 unit(s). Max daily dose is 50 units. 6 mL 11     Insulin Degludec FlexTouch 100 UNIT/ML SOPN Inject 22 Units Subcutaneous daily EVERY NIGHT AT BEDTIME FOR DM *DISCARD 56 DAYS AFTER OPENING* 6 mL PRN     insulin pen needle (NOVOFINE AUTOCOVER PEN NEEDLE) 30G X 8 MM miscellaneous 1 each See Admin Instructions as directed. 100 each 11     insulin pen needle 30G X 5 MM 2 times daily. Use 2 pen needles daily or as directed.       KETOSTIX test strip Use as directed in case of high glucose, vomiting or illness. 50 strip 3     latanoprost (XALATAN) 0.005 % ophthalmic solution INSTILL 1 DROP IN BOTH EYES AT BEDTIME 10 mL 11     levothyroxine (SYNTHROID/LEVOTHROID) 50 MCG tablet Take 50 mcg by mouth daily.       lisinopril (ZESTRIL) 40 MG tablet Take 1 tablet (40 mg) by mouth daily       MULTIVITAMIN TABS   OR 1 TABLET DAILY       pantoprazole (PROTONIX) 40 MG EC tablet Take 40 mg by mouth daily        sennosides (SENOKOT) 8.6 MG tablet Take 1 tablet by mouth daily as needed       simvastatin (ZOCOR) 20 MG tablet Take 1 tablet (20 mg) by mouth At Bedtime 90 tablet 3     Family History  family history includes Diabetes in her sister; Glaucoma in her maternal grandfather; Hypertension in her father.     Social History  Social History     Tobacco Use     Smoking status: Never     Smokeless tobacco: Never     Tobacco comments:     lives in smoke free household.   Substance Use Topics     Alcohol use: Yes     Comment: occass social     Drug use: No       ROS  10 points ROS were negative otherwise mentioned in HPI    Physical Exam  LMP 03/28/2014   Limited due to video visit.  Pleasant articulate female with reasonable recall.  Dentition is poor.  Lips are dry.  Constitutional: no distress, comfortable, pleasant   Psychological: appropriate mood       RESULTS  I have personally reviewed labs and images. I also reviewed labs with patient and discussed the result and plan of care.    Lab Results   Component Value Date    A1C 9.7 06/19/2024    A1C 8.5 11/29/2023    A1C 8.1 03/29/2023    A1C 8.7 01/25/2023    A1C 10.7 10/13/2021    A1C 9.7 02/01/2021    A1C 9.2 09/30/2020    A1C 11.8 01/08/2020    A1C 11.8 05/19/2019    A1C 11.2 12/12/2018      Latest Ref Rng 1/25/2023  8:20 AM   ENDO DIABETES     Cholesterol <200 mg/dL 112    LDL Cholesterol Calculated <=100 mg/dL 58    HDL Cholesterol >=50 mg/dL 37 (L)    Non HDL Cholesterol <130 mg/dL 75    VLDL-Cholesterol 0 - 30 mg/dL    Triglycerides <150 mg/dL 84    TRIG (EXT) <150 mg/dL 84       Latest Ref Rng 8/8/2024  6:57 AM   ENDO DIABETES     Creatinine 0.51 - 0.95 mg/dL 1.01 (H)      Recent Labs   Lab Test 11/06/24  0723 10/31/24  1539 09/16/24  0902 09/09/24  0723 07/17/24  0753 06/19/24  0653 03/29/23  0515 01/25/23  0820 10/13/21  1540 02/01/21  0909 09/30/20  0910 01/08/20  1030 10/02/19  1448   A1C  --   --   --  7.8*  --  9.7*   < > 8.7*   < > 9.7*   < > 11.8*  --    TSH   --  5.24*  --  10.70*  --  5.29*   < >  --    < > 7.07*  --  4.48*  --    T4  --   --   --   --   --   --   --   --   --  1.05  --  1.08  --    LDL  --   --   --   --   --   --   --  58  --  120*  --   --   --    HDL  --   --   --   --   --   --   --  37*  --  62  --   --   --    TRIG  --   --   --   --   --   --   --  84  --  89  --   --   --    CR 1.13* 1.17*   < > 1.10*   < > 1.31*   < > 0.93   < > 0.73   < >  --   --    MICROL  --   --   --   --   --   --   --   --   --  306  --   --  117    < > = values in this interval not displayed.         ASSESSMENT:    Lexy is a 64 year old female history of type 1 diabetes with gastroparesis and chronic kidney disease stage 3, hypertension, hyperlipidemia developmental delay presents today for uncontrolled type 1 diabetes.    1) type 1 diabetes with CKD and gastroparesis: Her diabetes control is improved but I am concerned about midday hypoglycemia and overnight hyperglycemia.    I do not have an MAR to verify medication doses, but understand from Peace Mcfarland's note that she is now taking 22 units of Tresiba daily which we will continue.   Based on current needs and to avoid midday-full glycemia overnight hyperglycemia we reduced her breakfast dose of Humalog to just 3 units maintain the lunch dose of 8 units and increase the dinner dose from 9 to 10 units.  I am asking nursing to contact her home to verify current doses and update them as stated.    She should continue using Dexcom glucose sensor  She should continue to see diabetes education monthly until her blood sugars are in goal.    I am requesting her in her home I have an A1c drawn.    2) DM complications:  Retinopathy:  moderate NPDR, glaucoma --she has an exam scheduled inJanuary.  Nephropathy: positive macroalbuminuria (test 2021), CKD stage 3 , continue to follow with current provider.  Neuropathy: some tingling and numbness in her feet --continue to work with local podiatrist.     3.  Hypothyroidism:  Most recent TSH continue to be elevated.  She reports that the dose has been adjusted and she does have a follow-up TSH scheduled that will be drawn at her home.      PLAN:   -Continue Tresiba 22 units daily.  -Reduce  Humalog at breakfast from 4 to 3 units with breakfast, continue 7 units with lunch, increased from 9-10 units with dinner  - continue sliding scale --  1 unit per 40 above 120.      - continue using Dexcom sensor  - will fax insulin regiment to assisted living    See Tiffanie Mcfarland in 2-4 weeks  See myself or Dr. Clemons in 3 to 6 months.     It is my privilege to be involved in the care of the above patient.     Rika Llanos PA-C, [facility]  [facility]  Diabetes, Endocrinology, and Metabolism  [phone] Appointments/Nurse Line  [phone] Fax  On VOCERA (inpatient)    To Page:  Dial [phone]  Enter the messaging ID when prompted ([phone])  That will give the option to leave a callback number.      Joined the call at 11/26/2024, 2:15:48 pm.  Left the call at 11/26/2024, 2:34:41 pm.  You were on the call for 18 minutes 52 seconds .  Patient at home in assisted living facility seen independently, provider off-site platform [facility].    40 minutes spent on the date of the encounter doing chart review, history and exam, documentation, education and counseling, as well as communication and coordination of care, and further activities as noted above.   This time includes time spent reviewing CGM, reviewing and ordering lab tests, medications and supplies.        Outcome for 11/11/24 2:43 PM: Data uploaded on Dexcom  Renata Burgos MA  Outcome for 11/22/24 7:40 AM: Data obtained via Dexcom website  Bobbilynn Grossaint, VF                    Again, thank you for allowing me to participate in the care of your patient.      Sincerely,    Rika Llanos PA-C

## 2024-12-04 ENCOUNTER — ASSISTED LIVING VISIT (OUTPATIENT)
Dept: GERIATRICS | Facility: CLINIC | Age: 64
End: 2024-12-04
Payer: MEDICARE

## 2024-12-04 VITALS
DIASTOLIC BLOOD PRESSURE: 83 MMHG | TEMPERATURE: 97.6 F | BODY MASS INDEX: 39.81 KG/M2 | HEART RATE: 88 BPM | WEIGHT: 233.2 LBS | SYSTOLIC BLOOD PRESSURE: 153 MMHG | OXYGEN SATURATION: 94 % | HEIGHT: 64 IN | RESPIRATION RATE: 18 BRPM

## 2024-12-04 DIAGNOSIS — R60.0 LOWER EXTREMITY EDEMA: ICD-10-CM

## 2024-12-04 DIAGNOSIS — E10.22 TYPE 1 DIABETES MELLITUS WITH STAGE 3A CHRONIC KIDNEY DISEASE (H): ICD-10-CM

## 2024-12-04 DIAGNOSIS — E03.9 ACQUIRED HYPOTHYROIDISM: ICD-10-CM

## 2024-12-04 DIAGNOSIS — R63.5 WEIGHT GAIN: ICD-10-CM

## 2024-12-04 DIAGNOSIS — F81.9 COGNITIVE DEVELOPMENTAL DELAY: ICD-10-CM

## 2024-12-04 DIAGNOSIS — D64.9 ANEMIA, UNSPECIFIED TYPE: ICD-10-CM

## 2024-12-04 DIAGNOSIS — K21.00 GASTROESOPHAGEAL REFLUX DISEASE WITH ESOPHAGITIS WITHOUT HEMORRHAGE: ICD-10-CM

## 2024-12-04 DIAGNOSIS — N18.31 TYPE 1 DIABETES MELLITUS WITH STAGE 3A CHRONIC KIDNEY DISEASE (H): ICD-10-CM

## 2024-12-04 DIAGNOSIS — I10 ESSENTIAL HYPERTENSION WITH GOAL BLOOD PRESSURE LESS THAN 140/90: ICD-10-CM

## 2024-12-04 DIAGNOSIS — R53.83 LETHARGY: Primary | ICD-10-CM

## 2024-12-04 PROCEDURE — 99349 HOME/RES VST EST MOD MDM 40: CPT | Performed by: INTERNAL MEDICINE

## 2024-12-04 NOTE — PROGRESS NOTES
Lexy Rockwell is a 64 year old female seen December 4, 2024 at [facility] where she has resided for almost 2 years (admit to LTC 1/2023) seen for regulatory visit and to follow up recent weight gain with labile DM1 with sequelae of retinopathy, nephropathy and neuropathy.       By chart review, pt has Type 1 DM with multiple hospitalizations for DKA.  She was seen in the [facility] ED for hyperglycemia in November 2022, with accompanying symptoms of dizziness, BLE weakness and heartburn.   Noted that she sometimes forgot to administer her insulin.   Returned home, seeing diabetic educator but not an endocrinologist.         She was continuing to live independently in December 2022 when neighbors asked for a welfare check after she hadn't been seen in several days, and she was found down by police.   Hospitalized at [facility] 12/13-26 for DKA and rhabdomyolysis  Glucose >1000 and pH 6.8    She required pressor support, intubation and TF; treated with broad spectrum abx for SHIVAM pneumonia.  No sz on video EEG.  She had a +COVID19 test. Slowly cleared and discharged to [facility] TCU before transitioning to LTC.     Pt was seen in the [facility] ED in January 2023 for urinary frequency related to hyperglycemia.  Improved and returned to LTC with continued labile blood sugars, urinary incontinence, weakness and poor endurance  She had upper and lower endoscopy in July 2023, no significant findings    Pt had an October 2023 [facility] hospitalization for hyperglycemia, seen by Endocrinology and diabetic regimen adjusted    She was treated with ciprofloxacin for Enterobacter cloacae UTI    Returned to LT, pt transitioned to [facility] in January 2024      She was seen in the ED January 2024 for hyperglycemia, but no evidence of DKA or other abnormality.         Past Medical History:   Diagnosis Date    Cerumen impacted     Development delay     Diabetic gastroparesis (H) 8/16/2013    Glaucoma (increased eye  "pressure)     Hyperlipaemia     Hypertension     Hypothyroid     Mental retardation     Nonsenile cataract     Obesity     Strabismus     Type 1 diabetes mellitus not at goal (H)        Past Surgical History:   Procedure Laterality Date    COLONOSCOPY N/A 7/3/2023    Procedure: Colonoscopy;  Surgeon: Merlyn Acevedo MD;  Location:  GI    ESOPHAGOSCOPY, GASTROSCOPY, DUODENOSCOPY (EGD), COMBINED N/A 7/3/2023    Procedure: Esophagoscopy, gastroscopy, duodenoscopy (EGD), combined;  Surgeon: Merlyn Acevedo MD;  Location:  GI    STRABISMUS SURGERY      [facility] EYE SURG ANT SGMT PROC UNLISTED  remote    strabismus surgery     SH:  Single    Her mother Francisca Rockwell is first contact    She has 2 sisters that [facility]   Non smoker    Review Of Systems  Developmental cognitive delay   BIMS 15/15    PHQ9 1/27    SLUMS 22/30 in 2021  Ambulatory with 4WW  Weight at admission was 167 lbs>>>in April 2023 was 156 lbs>>> in Sept 2023 was 168 lbs>>>in Dec 2023 was 179 lbs >>> in July 2024 was 201 lbs  Wt Readings from Last 5 Encounters:   12/04/24 105.8 kg (233 lb 3.2 oz)   11/12/24 103.4 kg (227 lb 14.4 oz)   10/31/24 101.2 kg (223 lb 3.2 oz)   10/02/24 99.4 kg (219 lb 1.6 oz)   09/20/24 98.9 kg (218 lb 1.6 oz)      EXAM: NAD  BP (!) 153/83   Pulse 88   Temp 97.6  F (36.4  C)   Resp 18   Ht 1.626 m (5' 4\")   Wt 105.8 kg (233 lb 3.2 oz)   LMP 03/28/2014   SpO2 94%   BMI 40.03 kg/m       Breathing non labored, comfortable   Ext with 2-3+ edema   Neuro: valgus deformities, ambulatory with waddling gait using 4WW    Psych: affect okay, pleasant      Last Comprehensive Metabolic Panel:  Lab Results   Component Value Date     11/06/2024    POTASSIUM 4.9 11/06/2024    CHLORIDE 111 (H) 11/06/2024    CO2 21 (L) 11/06/2024    ANIONGAP 7 11/06/2024     (H) 11/06/2024    BUN 46.2 (H) 11/06/2024    CR 1.13 (H) 11/06/2024    GFRESTIMATED 54 (L) 11/06/2024    EFRAIN 9.4 11/06/2024     Lab Results   Component Value Date    " "AST 21 09/09/2024    AST 19 09/30/2020     Lab Results   Component Value Date    ALT 16 09/09/2024    ALT 18 09/30/2020     No results found for: \"BILICONJ\"   Lab Results   Component Value Date    BILITOTAL 0.2 09/09/2024    BILITOTAL 0.5 09/30/2020     Lab Results   Component Value Date    ALBUMIN 3.3 09/09/2024    ALBUMIN 3.3 10/13/2021    ALBUMIN 3.4 09/30/2020     Lab Results   Component Value Date    PROTTOTAL 6.2 09/09/2024    PROTTOTAL 7.1 09/30/2020      Lab Results   Component Value Date    ALKPHOS 118 09/09/2024    ALKPHOS 125 09/30/2020      Lab Results   Component Value Date    WBC 5.6 09/09/2024    WBC 4.3 09/30/2020     Lab Results   Component Value Date    RBC 3.85 09/09/2024    RBC 4.50 09/30/2020     Lab Results   Component Value Date    HGB 10.8 09/09/2024    HGB 11.9 09/30/2020     Lab Results   Component Value Date    HCT 35.2 09/09/2024    HCT 35.9 09/30/2020     No components found for: \"MCT\"  Lab Results   Component Value Date    MCV 91 09/09/2024    MCV 80 09/30/2020     Lab Results   Component Value Date    MCH 28.1 09/09/2024    MCH 26.4 09/30/2020     Lab Results   Component Value Date    MCHC 30.7 09/09/2024    MCHC 33.1 09/30/2020     Lab Results   Component Value Date    RDW 13.5 09/09/2024    RDW 14.7 09/30/2020     Lab Results   Component Value Date     09/09/2024     09/30/2020      TSH   Date Value Ref Range Status   10/31/2024 5.24 (H) 0.30 - 4.20 uIU/mL Final   10/13/2021 3.41 0.40 - 4.00 mU/L Final   02/01/2021 7.07 (H) 0.40 - 4.00 mU/L Final            IMP/PLAN:   (D64.9) Anemia, unspecified type    Comment: Fe deficiency   Upper and lower endoscopy in July 2023, unrevealing although colonoscopy limited by poor prep.    Plan: Continue pantoprazole 40 mg/day   Slow release Fe 45 mg/day      (R63.5) Weight gain and LE edema  Comment:    Very difficult to restrict diet in the Board and Care setting   Pt has ice cream twice a day     Plan: LE compression, " elevation  Recheck BMP  >>>Consider ECHO     (E10.22,  N18.31) Type 1 diabetes mellitus with stage 3a chronic kidney disease (H)  Comment: long history of recurrent DKA and labile sugars   Noncompliant with diet, as above    Sequelae includes retinopathy, gastroparesis and CKD3    Lab Results   Component Value Date    A1C 7.8 09/09/2024    A1C 9.7 06/19/2024     Plan: insulin degludec 22 units /HS   Insulin aspart 4/7/8/3 and sliding scale   Follows with Endocrinology and Diabetic educator.   She is on lisinopril, simvastatin 20 mg/day     Not on daily ASA secondary to anemia  Ophthalmology and Podiatry follow up        (K21.00) Gastroesophageal reflux disease with esophagitis without hemorrhage  Comment: Biopsies unremarkable at EGD   Plan: pantoprazole 40 mg/day     (F81.9) Cognitive developmental delay  Comment: unable to live independently in the community and manage her medical problems    Plan: Board and Care support for med admin, meals, activity      (I10) Essential hypertension with goal blood pressure less than 140/90  Comment:   BP Readings from Last 3 Encounters:   12/04/24 (!) 153/83   11/12/24 (!) 157/77   10/31/24 (!) 165/80      Plan: amlodipine 2.5 mg/day, carvedilol 6.25 mg bid, lisinopril 40 mg/day   With weight gain, may need more medications, consider addition of a thiazide diuretic.     Follow bps and BMP     Advance directive: DNR/DNI     Nia Martínez MD

## 2024-12-10 ENCOUNTER — VIRTUAL VISIT (OUTPATIENT)
Dept: EDUCATION SERVICES | Facility: CLINIC | Age: 64
End: 2024-12-10
Payer: MEDICARE

## 2024-12-10 DIAGNOSIS — E10.37X3 TYPE 1 DIABETES MELLITUS WITH DIABETIC MACULAR EDEMA OF BOTH EYES RESOLVED AFTER TREATMENT (H): ICD-10-CM

## 2024-12-10 DIAGNOSIS — E10.3393 TYPE 1 DIABETES MELLITUS WITH MODERATE NONPROLIFERATIVE RETINOPATHY OF BOTH EYES, MACULAR EDEMA PRESENCE UNSPECIFIED (H): ICD-10-CM

## 2024-12-10 RX ORDER — INSULIN ASPART INJECTION 100 [IU]/ML
4-10 INJECTION, SOLUTION SUBCUTANEOUS
Qty: 6 ML | Refills: 11 | Status: SHIPPED | OUTPATIENT
Start: 2024-12-10

## 2024-12-10 NOTE — LETTER
12/10/2024      Lexy Rockwell  [address]  [facility] [address]      Dear Colleague,    Thank you for referring your patient, Lexy Rockwell, to the [facility]. Please see a copy of my visit note below.    Diabetes Self-Management Education & Support    Presents for: CGM Review    Type of Service: Telephone Visit    Audio only visit done, as patient does not have access to audio-visual technology.    Individual visit provided, given no group classes are available for 2 months.     Originating Location (Patient Location): Assisted Living  Distant Location (Provider Location): Offsite  Mode of Communication:  Telephone    Telephone Visit Start Time: 1:00 pm  Telephone Visit End Time (telephone visit stop time): 1:10 pm    How would patient like to obtain AVS? Chanelt      REPORTS:                      Pt verbalized understanding of concepts discussed and recommendations provided today.       Continue education with the following diabetes management concepts: Healthy Eating, Being Active, Monitoring, Taking Medication, Problem Solving, Reducing Risks, and Healthy Coping    ASSESSMENT:  Lexy continues to have high BG in the evening through the morning, and some hypoglycemia overnight.       PLAN    Continue with 22 units of Tresiba  Humalog: 3-7-9-0 --> 2-6-11-0      Topics to cover at upcoming visits: Healthy Eating, Being Active, Monitoring, Taking Medication, Problem Solving, Reducing Risks, and Healthy Coping    Follow-up: 12/23    See Care Plan for co-developed, patient-state behavior change goals.  AVS provided for patient today.    Education Materials Provided:  No new materials provided today      SUBJECTIVE/OBJECTIVE:  Presents for: CGM Review  Accompanied by: Self (Dietitian from Assisted Living)  Diabetes education in the past 24mo: Yes  Focus of Visit: Problem Solving, Monitoring, Taking Medication  Diabetes type: Type 1  Disease course: Worsening  How  "confident are you filling out medical forms by yourself:: Not Assessed  Diabetes management related comments/concerns: No questions today  Transportation concerns: Yes (gets rides or takes public tranportation)  Difficulty affording diabetes medication?: No  Difficulty affording diabetes testing supplies?: No  Other concerns:: Cognitive impairment  Cultural Influences/Ethnic Background:  Not  or     Diabetes Symptoms & Complications:  Diabetes Related Symptoms: Neuropathy, Polydipsia (increased thirst), Polyuria (increased urination), Fatigue, Polyphagia (increased hunger) (feeling more fatigue in the morning, neuropathy)  Weight trend: Increasing  Symptom course: Stable  Disease course: Worsening  Complications assessed today?: Yes  Autonomic neuropathy: No  CVA: No  Heart disease: No  Nephropathy: Yes  Peripheral neuropathy: Yes  Peripheral Vascular Disease: No  Retinopathy: No  Sexual dysfunction: No    Patient Problem List and Family Medical History reviewed for relevant medical history, current medical status, and diabetes risk factors.    Vitals:  [facility] 03/28/2014   Estimated body mass index is 40.03 kg/m  as calculated from the following:    Height as of 12/4/24: 1.626 m (5' 4\").    Weight as of 12/4/24: 105.8 kg (233 lb 3.2 oz).   Last 3 BP:   BP Readings from Last 3 Encounters:   12/04/24 (!) 153/83   11/12/24 (!) 157/77   10/31/24 (!) 165/80       History   Smoking Status     Never   Smokeless Tobacco     Never       Labs:  Lab Results   Component Value Date    A1C 7.8 09/09/2024    A1C 9.7 02/01/2021     Lab Results   Component Value Date     11/06/2024     10/13/2021     02/01/2021     Lab Results   Component Value Date    LDL 58 01/25/2023     02/01/2021     HDL Cholesterol   Date Value Ref Range Status   02/01/2021 62 >49 mg/dL Final     Direct Measure HDL   Date Value Ref Range Status   01/25/2023 37 (L) >=50 mg/dL Final   ]  GFR Estimate   Date Value Ref Range " "Status   11/06/2024 54 (L) >60 mL/min/1.73m2 Final   02/01/2021 90 >60 mL/min/[1.73_m2] Final     Comment:     Non  GFR Calc  Starting 12/18/2018, serum creatinine based estimated GFR (eGFR) will be   calculated using the Chronic Kidney Disease Epidemiology Collaboration   (CKD-EPI) equation.       GFR Estimate If Black   Date Value Ref Range Status   02/01/2021 >90 >60 mL/min/[1.73_m2] Final     Comment:      GFR Calc  Starting 12/18/2018, serum creatinine based estimated GFR (eGFR) will be   calculated using the Chronic Kidney Disease Epidemiology Collaboration   (CKD-EPI) equation.       Lab Results   Component Value Date    CR 1.13 11/06/2024    CR 0.73 02/01/2021     No results found for: \"MICROALBUMIN\"    Healthy Eating:  Healthy Eating Assessed Today: No  Cultural/Sikhism diet restrictions?: No  Do you have any food allergies or intolerances?: No  Meal planning/habits: None  Who cooks/prepares meals for you?: Other  Who purchases food in  your home?: Other  How many times a week on average do you eat food made away from home (restaurant/take-out)?: 0  Meals include: Breakfast, Lunch, Dinner, Afternoon Snack, Evening Snack, Morning Snack  Breakfast: 8:00 am: Oatmeal with hard boiled egg or scrambled eggs and barragan with toast OR pancake/waffle with eggs OR Bulgarian toast  Lunch: 12-12:30: Stir Edouard OR hamburgers with vegetables (with dessert- small cake or cookie)  Dinner: 5:00 -6:00 pm: sandwich OR chili (vegetarian) OR chicken OR fish  with vegetables with desserts (cake or cookies, fruit)  Snacks: mornnig snack: yogurt or pudding AND Has one bedtime snack- usually a cookie - oatmeal raisin or chocolate chip or yogurt OR sugra free candy every once and a while  Other: drinks coffee with sugar free creamer  Beverages: Water, Coffee, Milk (Keep some juice on hand for lows)  Has patient met with a dietitian in the past?: Yes    Being Active:  Being Active Assessed Today: Yes (went " on the bike at her apartment complex)  Exercise:: Yes (walking, programs at care center)  Days per week of moderate to strenuous exercise (like a brisk walk): 1  On average, minutes per day of exercise at this level:  (Walking with friends)  How intense was your typical exercise? : Light (like stretching or slow walking)  Barrier to exercise: Safety, Physical limitation    Monitoring:  Monitoring Assessed Today: Yes  Did patient bring glucose meter to appointment? : No  Blood Glucose Meter: Unknown, CGM  Times checking blood sugar at home (number): 5+  Times checking blood sugar at home (per): Day  Blood glucose trend: Fluctuating    Taking Medications:  Diabetes Medication(s)       Diabetic Other       Glucagon (BAQSIMI) 3 MG/DOSE nasal powder Spray 1 spray (3 mg) in nostril as needed. in the event of unconscious hypoglycemia or hypoglycemic seizure. May repeat dose if no response after 15 minutes.     glucagon 1 MG SOLR injection Inject 1 mg into the muscle once       Insulin       Insulin Aspart, w/Niacinamide, (FIASP PENFILL) 100 UNIT/ML SOCT Inject 4-10 Units subcutaneously 3 times daily (before meals). Inject 3 units before breakfast, 8 units before lunch and 10 units before dinner, 3 units before each snacks 2x daily. Add correction scale before meals. -160 = 1 unit. -200 = 2 units. -240 = 3 units. -280 = 4 units. -320 = 5 unit(s). Max daily dose is 50 units.     Insulin Degludec FlexTouch 100 UNIT/ML SOPN Inject 22 Units Subcutaneous daily EVERY NIGHT AT BEDTIME FOR DM *DISCARD 56 DAYS AFTER OPENING*            Taking Medication Assessed Today: Yes  Current Treatments: Insulin Injections  Dose schedule: Pre-breakfast, Pre-lunch, Pre-dinner, At bedtime  Given by: Nursing attendant  Injection/Infusion sites: Abdomen  Problems taking diabetes medications regularly?: No  Diabetes medication side effects?: No    Problem Solving:  Problem Solving Assessed Today: Yes  Is the patient at  risk for hypoglycemia?: Yes  Hypoglycemia Frequency: Weekly  Hypoglycemia Treatment: Juice, Candy (will have one piece of candy or some juice)  Patient carries a carbohydrate source: Yes  Medical ID: Yes  Is the patient at risk for DKA?: Yes  Does patient have ketone test strips?: Yes  Does patient have DKA prevention plan?: Yes  Does patient have severe weather/disaster plan for diabetes management?: No  Does patient have sick day plan for diabetes management?: Yes              Reducing Risks:  Reducing Risks Assessed Today: Yes  Diabetes Risks: Age over 45 years, Sedentary Lifestyle  CAD Risks: Diabetes Mellitus  Has dilated eye exam at least once a year?: Yes  Sees dentist every 6 months?: Yes  Feet checked by healthcare provider in the last year?: Yes    Healthy Coping:  Healthy Coping Assessed Today: Yes  Emotional response to diabetes: Acceptance  Informal Support system:: Family, Parent  Stage of change: ACTION (Actively working towards change)  Support resources: Offerings in Clinic Communities  Patient Activation Measure Survey Score:      3/7/2012     3:00 PM   KHUSHI Score (Last Two)   KHUSHI Raw Score 39   Activation Score 56.4   KHUSHI Level 3         Care Plan and Education Provided:  Taking Medication: Action of prescribed medication(s) and When to take medication(s)    Tiffanie Mcfarland RD LD [facility]  Diabetes Education Department  [facility]  Phone: [phone]  [phone]      Time Spent: 10 minutes  Encounter Type: Individual    Any diabetes medication dose changes were made via the CDE Protocol per the patient's referring provider. A copy of this encounter was shared with the provider.      Again, thank you for allowing me to participate in the care of your patient.        Sincerely,        Tiffanie Mcfarland RD

## 2024-12-10 NOTE — LETTER
12/10/2024      Lexy Rockwell  [address]  [facility] [address]      Dear Colleague,    Please see note for insulin dosing adjustments.     KARLOS Haas [facility]  Diabetes Education Department  [facility] [place]  Phone: [phone]  [phone]      Diabetes Self-Management Education & Support    Presents for: CGM Review    Type of Service: Telephone Visit    Audio only visit done, as patient does not have access to audio-visual technology.    Individual visit provided, given no group classes are available for 2 months.     Originating Location (Patient Location): Assisted Living  Distant Location (Provider Location): Offsite  Mode of Communication:  Telephone    Telephone Visit Start Time: 1:00 pm  Telephone Visit End Time (telephone visit stop time): 1:10 pm    How would patient like to obtain AVS? Jarett      REPORTS:                      Pt verbalized understanding of concepts discussed and recommendations provided today.       Continue education with the following diabetes management concepts: Healthy Eating, Being Active, Monitoring, Taking Medication, Problem Solving, Reducing Risks, and Healthy Coping    ASSESSMENT:  Lexy continues to have high BG in the evening through the morning, and some hypoglycemia overnight.       PLAN    Continue with 22 units of Tresiba  Humalog: 3-7-9-0 --> 2-6-11-0      Topics to cover at upcoming visits: Healthy Eating, Being Active, Monitoring, Taking Medication, Problem Solving, Reducing Risks, and Healthy Coping    Follow-up:     See Care Plan for co-developed, patient-state behavior change goals.  AVS provided for patient today.    Education Materials Provided:  No new materials provided today      SUBJECTIVE/OBJECTIVE:  Presents for: CGM Review  Accompanied by: Self (Dietitian from Assisted Living)  Diabetes education in the past 24mo: Yes  Focus of Visit: Problem Solving, Monitoring, Taking  "Medication  Diabetes type: Type 1  Disease course: Worsening  How confident are you filling out medical forms by yourself:: Not Assessed  Diabetes management related comments/concerns: No questions today  Transportation concerns: Yes (gets rides or takes public tranportation)  Difficulty affording diabetes medication?: No  Difficulty affording diabetes testing supplies?: No  Other concerns:: Cognitive impairment  Cultural Influences/Ethnic Background:  Not  or     Diabetes Symptoms & Complications:  Diabetes Related Symptoms: Neuropathy, Polydipsia (increased thirst), Polyuria (increased urination), Fatigue, Polyphagia (increased hunger) (feeling more fatigue in the morning, neuropathy)  Weight trend: Increasing  Symptom course: Stable  Disease course: Worsening  Complications assessed today?: Yes  Autonomic neuropathy: No  CVA: No  Heart disease: No  Nephropathy: Yes  Peripheral neuropathy: Yes  Peripheral Vascular Disease: No  Retinopathy: No  Sexual dysfunction: No    Patient Problem List and Family Medical History reviewed for relevant medical history, current medical status, and diabetes risk factors.    Vitals:  LMP 03/28/2014   Estimated body mass index is 40.03 kg/m  as calculated from the following:    Height as of 12/4/24: 1.626 m (5' 4\").    Weight as of 12/4/24: 105.8 kg (233 lb 3.2 oz).   Last 3 BP:   BP Readings from Last 3 Encounters:   12/04/24 (!) 153/83   11/12/24 (!) 157/77   10/31/24 (!) 165/80       History   Smoking Status     Never   Smokeless Tobacco     Never       Labs:  Lab Results   Component Value Date    A1C 7.8 09/09/2024    A1C 9.7 02/01/2021     Lab Results   Component Value Date     11/06/2024     10/13/2021     02/01/2021     Lab Results   Component Value Date    LDL 58 01/25/2023     02/01/2021     HDL Cholesterol   Date Value Ref Range Status   02/01/2021 62 >49 mg/dL Final     Direct Measure HDL   Date Value Ref Range Status   01/25/2023 " "37 (L) >=50 mg/dL Final   ]  GFR Estimate   Date Value Ref Range Status   11/06/2024 54 (L) >60 mL/min/1.73m2 Final   02/01/2021 90 >60 mL/min/[1.73_m2] Final     Comment:     Non  GFR Calc  Starting 12/18/2018, serum creatinine based estimated GFR (eGFR) will be   calculated using the Chronic Kidney Disease Epidemiology Collaboration   (CKD-EPI) equation.       GFR Estimate If Black   Date Value Ref Range Status   02/01/2021 >90 >60 mL/min/[1.73_m2] Final     Comment:      GFR Calc  Starting 12/18/2018, serum creatinine based estimated GFR (eGFR) will be   calculated using the Chronic Kidney Disease Epidemiology Collaboration   (CKD-EPI) equation.       Lab Results   Component Value Date    CR 1.13 11/06/2024    CR 0.73 02/01/2021     No results found for: \"MICROALBUMIN\"    Healthy Eating:  Healthy Eating Assessed Today: No  Cultural/Congregational diet restrictions?: No  Do you have any food allergies or intolerances?: No  Meal planning/habits: None  Who cooks/prepares meals for you?: Other  Who purchases food in  your home?: Other  How many times a week on average do you eat food made away from home (restaurant/take-out)?: 0  Meals include: Breakfast, Lunch, Dinner, Afternoon Snack, Evening Snack, Morning Snack  Breakfast: 8:00 am: Oatmeal with hard boiled egg or scrambled eggs and barragan with toast OR pancake/waffle with eggs OR Lithuanian toast  Lunch: 12-12:30: Stir Edouard OR hamburgers with vegetables (with dessert- small cake or cookie)  Dinner: 5:00 -6:00 pm: sandwich OR chili (vegetarian) OR chicken OR fish  with vegetables with desserts (cake or cookies, fruit)  Snacks: mornnig snack: yogurt or pudding AND Has one bedtime snack- usually a cookie - oatmeal raisin or chocolate chip or yogurt OR sugra free candy every once and a while  Other: drinks coffee with sugar free creamer  Beverages: Water, Coffee, Milk (Keep some juice on hand for lows)  Has patient met with a dietitian in the " past?: Yes    Being Active:  Being Active Assessed Today: Yes (went on the bike at her apartment complex)  Exercise:: Yes (walking, programs at care center)  Days per week of moderate to strenuous exercise (like a brisk walk): 1  On average, minutes per day of exercise at this level:  (Walking with friends)  How intense was your typical exercise? : Light (like stretching or slow walking)  Barrier to exercise: Safety, Physical limitation    Monitoring:  Monitoring Assessed Today: Yes  Did patient bring glucose meter to appointment? : No  Blood Glucose Meter: Unknown, CGM  Times checking blood sugar at home (number): 5+  Times checking blood sugar at home (per): Day  Blood glucose trend: Fluctuating    Taking Medications:  Diabetes Medication(s)       Diabetic Other       Glucagon (BAQSIMI) 3 MG/DOSE nasal powder Spray 1 spray (3 mg) in nostril as needed. in the event of unconscious hypoglycemia or hypoglycemic seizure. May repeat dose if no response after 15 minutes.     glucagon 1 MG SOLR injection Inject 1 mg into the muscle once       Insulin       Insulin Aspart, w/Niacinamide, (FIASP PENFILL) 100 UNIT/ML SOCT Inject 4-10 Units subcutaneously 3 times daily (before meals). Inject 3 units before breakfast, 8 units before lunch and 10 units before dinner, 3 units before each snacks 2x daily. Add correction scale before meals. -160 = 1 unit. -200 = 2 units. -240 = 3 units. -280 = 4 units. -320 = 5 unit(s). Max daily dose is 50 units.     Insulin Degludec FlexTouch 100 UNIT/ML SOPN Inject 22 Units Subcutaneous daily EVERY NIGHT AT BEDTIME FOR DM *DISCARD 56 DAYS AFTER OPENING*            Taking Medication Assessed Today: Yes  Current Treatments: Insulin Injections  Dose schedule: Pre-breakfast, Pre-lunch, Pre-dinner, At bedtime  Given by: Nursing attendant  Injection/Infusion sites: Abdomen  Problems taking diabetes medications regularly?: No  Diabetes medication side effects?:  No    Problem Solving:  Problem Solving Assessed Today: Yes  Is the patient at risk for hypoglycemia?: Yes  Hypoglycemia Frequency: Weekly  Hypoglycemia Treatment: Juice, Candy (will have one piece of candy or some juice)  Patient carries a carbohydrate source: Yes  Medical ID: Yes  Is the patient at risk for DKA?: Yes  Does patient have ketone test strips?: Yes  Does patient have DKA prevention plan?: Yes  Does patient have severe weather/disaster plan for diabetes management?: No  Does patient have sick day plan for diabetes management?: Yes              Reducing Risks:  Reducing Risks Assessed Today: Yes  Diabetes Risks: Age over 45 years, Sedentary Lifestyle  CAD Risks: Diabetes Mellitus  Has dilated eye exam at least once a year?: Yes  Sees dentist every 6 months?: Yes  Feet checked by healthcare provider in the last year?: Yes    Healthy Coping:  Healthy Coping Assessed Today: Yes  Emotional response to diabetes: Acceptance  Informal Support system:: Family, Parent  Stage of change: ACTION (Actively working towards change)  Support resources: Offerings in Clinic Communities  Patient Activation Measure Survey Score:      3/7/2012     3:00 PM   KHUSHI Score (Last Two)   KHUSHI Raw Score 39   Activation Score 56.4   KHUSHI Level 3         Care Plan and Education Provided:  Taking Medication: Action of prescribed medication(s) and When to take medication(s)    Tiffanie Mcfarland RD LD [facility]  Diabetes Education Department  [facility]  Phone: [phone]  [phone]      Time Spent: 10 minutes  Encounter Type: Individual    Any diabetes medication dose changes were made via the CDE Protocol per the patient's referring provider. A copy of this encounter was shared with the provider.    Again, thank you for allowing me to participate in the care of your patient.        Sincerely,        Tiffanie Mcfarland RD

## 2024-12-10 NOTE — PROGRESS NOTES
Diabetes Self-Management Education & Support    Presents for: CGM Review    Type of Service: Telephone Visit    Audio only visit done, as patient does not have access to audio-visual technology.    Individual visit provided, given no group classes are available for 2 months.     Originating Location (Patient Location): Assisted Living  Distant Location (Provider Location): Offsite  Mode of Communication:  Telephone    Telephone Visit Start Time: 1:00 pm  Telephone Visit End Time (telephone visit stop time): 1:10 pm    How would patient like to obtain AVS? Chanelt      REPORTS:                      Pt verbalized understanding of concepts discussed and recommendations provided today.       Continue education with the following diabetes management concepts: Healthy Eating, Being Active, Monitoring, Taking Medication, Problem Solving, Reducing Risks, and Healthy Coping    ASSESSMENT:  Lexy continues to have high BG in the evening through the morning, and some hypoglycemia overnight.       PLAN    Continue with 22 units of Tresiba  Humalog: 3-7-9-0 --> 2-6-11-0      Topics to cover at upcoming visits: Healthy Eating, Being Active, Monitoring, Taking Medication, Problem Solving, Reducing Risks, and Healthy Coping    Follow-up: 12/23    See Care Plan for co-developed, patient-state behavior change goals.  AVS provided for patient today.    Education Materials Provided:  No new materials provided today      SUBJECTIVE/OBJECTIVE:  Presents for: CGM Review  Accompanied by: Self (Dietitian from Assisted Living)  Diabetes education in the past 24mo: Yes  Focus of Visit: Problem Solving, Monitoring, Taking Medication  Diabetes type: Type 1  Disease course: Worsening  How confident are you filling out medical forms by yourself:: Not Assessed  Diabetes management related comments/concerns: No questions today  Transportation concerns: Yes (gets rides or takes public tranportation)  Difficulty affording diabetes medication?:  "No  Difficulty affording diabetes testing supplies?: No  Other concerns:: Cognitive impairment  Cultural Influences/Ethnic Background:  Not  or     Diabetes Symptoms & Complications:  Diabetes Related Symptoms: Neuropathy, Polydipsia (increased thirst), Polyuria (increased urination), Fatigue, Polyphagia (increased hunger) (feeling more fatigue in the morning, neuropathy)  Weight trend: Increasing  Symptom course: Stable  Disease course: Worsening  Complications assessed today?: Yes  Autonomic neuropathy: No  CVA: No  Heart disease: No  Nephropathy: Yes  Peripheral neuropathy: Yes  Peripheral Vascular Disease: No  Retinopathy: No  Sexual dysfunction: No    Patient Problem List and Family Medical History reviewed for relevant medical history, current medical status, and diabetes risk factors.    Vitals:  LMP 03/28/2014   Estimated body mass index is 40.03 kg/m  as calculated from the following:    Height as of 12/4/24: 1.626 m (5' 4\").    Weight as of 12/4/24: 105.8 kg (233 lb 3.2 oz).   Last 3 BP:   BP Readings from Last 3 Encounters:   12/04/24 (!) 153/83   11/12/24 (!) 157/77   10/31/24 (!) 165/80       History   Smoking Status    Never   Smokeless Tobacco    Never       Labs:  Lab Results   Component Value Date    A1C 7.8 09/09/2024    A1C 9.7 02/01/2021     Lab Results   Component Value Date     11/06/2024     10/13/2021     02/01/2021     Lab Results   Component Value Date    LDL 58 01/25/2023     02/01/2021     HDL Cholesterol   Date Value Ref Range Status   02/01/2021 62 >49 mg/dL Final     Direct Measure HDL   Date Value Ref Range Status   01/25/2023 37 (L) >=50 mg/dL Final   ]  GFR Estimate   Date Value Ref Range Status   11/06/2024 54 (L) >60 mL/min/1.73m2 Final   02/01/2021 90 >60 mL/min/[1.73_m2] Final     Comment:     Non  GFR Calc  Starting 12/18/2018, serum creatinine based estimated GFR (eGFR) will be   calculated using the Chronic Kidney " "Disease Epidemiology Collaboration   (CKD-EPI) equation.       GFR Estimate If Black   Date Value Ref Range Status   02/01/2021 >90 >60 mL/min/[1.73_m2] Final     Comment:      GFR Calc  Starting 12/18/2018, serum creatinine based estimated GFR (eGFR) will be   calculated using the Chronic Kidney Disease Epidemiology Collaboration   (CKD-EPI) equation.       Lab Results   Component Value Date    CR 1.13 11/06/2024    CR 0.73 02/01/2021     No results found for: \"MICROALBUMIN\"    Healthy Eating:  Healthy Eating Assessed Today: No  Cultural/Latter day diet restrictions?: No  Do you have any food allergies or intolerances?: No  Meal planning/habits: None  Who cooks/prepares meals for you?: Other  Who purchases food in  your home?: Other  How many times a week on average do you eat food made away from home (restaurant/take-out)?: 0  Meals include: Breakfast, Lunch, Dinner, Afternoon Snack, Evening Snack, Morning Snack  Breakfast: 8:00 am: Oatmeal with hard boiled egg or scrambled eggs and barragan with toast OR pancake/waffle with eggs OR Gabonese toast  Lunch: 12-12:30: Stir Edouard OR hamburgers with vegetables (with dessert- small cake or cookie)  Dinner: 5:00 -6:00 pm: sandwich OR chili (vegetarian) OR chicken OR fish  with vegetables with desserts (cake or cookies, fruit)  Snacks: mornnig snack: yogurt or pudding AND Has one bedtime snack- usually a cookie - oatmeal raisin or chocolate chip or yogurt OR sugra free candy every once and a while  Other: drinks coffee with sugar free creamer  Beverages: Water, Coffee, Milk (Keep some juice on hand for lows)  Has patient met with a dietitian in the past?: Yes    Being Active:  Being Active Assessed Today: Yes (went on the bike at her apartment complex)  Exercise:: Yes (walking, programs at care center)  Days per week of moderate to strenuous exercise (like a brisk walk): 1  On average, minutes per day of exercise at this level:  (Walking with friends)  How intense " was your typical exercise? : Light (like stretching or slow walking)  Barrier to exercise: Safety, Physical limitation    Monitoring:  Monitoring Assessed Today: Yes  Did patient bring glucose meter to appointment? : No  Blood Glucose Meter: Unknown, CGM  Times checking blood sugar at home (number): 5+  Times checking blood sugar at home (per): Day  Blood glucose trend: Fluctuating    Taking Medications:  Diabetes Medication(s)       Diabetic Other       Glucagon (BAQSIMI) 3 MG/DOSE nasal powder Spray 1 spray (3 mg) in nostril as needed. in the event of unconscious hypoglycemia or hypoglycemic seizure. May repeat dose if no response after 15 minutes.     glucagon 1 MG SOLR injection Inject 1 mg into the muscle once       Insulin       Insulin Aspart, w/Niacinamide, (FIASP PENFILL) 100 UNIT/ML SOCT Inject 4-10 Units subcutaneously 3 times daily (before meals). Inject 3 units before breakfast, 8 units before lunch and 10 units before dinner, 3 units before each snacks 2x daily. Add correction scale before meals. -160 = 1 unit. -200 = 2 units. -240 = 3 units. -280 = 4 units. -320 = 5 unit(s). Max daily dose is 50 units.     Insulin Degludec FlexTouch 100 UNIT/ML SOPN Inject 22 Units Subcutaneous daily EVERY NIGHT AT BEDTIME FOR DM *DISCARD 56 DAYS AFTER OPENING*            Taking Medication Assessed Today: Yes  Current Treatments: Insulin Injections  Dose schedule: Pre-breakfast, Pre-lunch, Pre-dinner, At bedtime  Given by: Nursing attendant  Injection/Infusion sites: Abdomen  Problems taking diabetes medications regularly?: No  Diabetes medication side effects?: No    Problem Solving:  Problem Solving Assessed Today: Yes  Is the patient at risk for hypoglycemia?: Yes  Hypoglycemia Frequency: Weekly  Hypoglycemia Treatment: Juice, Candy (will have one piece of candy or some juice)  Patient carries a carbohydrate source: Yes  Medical ID: Yes  Is the patient at risk for DKA?: Yes  Does  patient have ketone test strips?: Yes  Does patient have DKA prevention plan?: Yes  Does patient have severe weather/disaster plan for diabetes management?: No  Does patient have sick day plan for diabetes management?: Yes              Reducing Risks:  Reducing Risks Assessed Today: Yes  Diabetes Risks: Age over 45 years, Sedentary Lifestyle  CAD Risks: Diabetes Mellitus  Has dilated eye exam at least once a year?: Yes  Sees dentist every 6 months?: Yes  Feet checked by healthcare provider in the last year?: Yes    Healthy Coping:  Healthy Coping Assessed Today: Yes  Emotional response to diabetes: Acceptance  Informal Support system:: Family, Parent  Stage of change: ACTION (Actively working towards change)  Support resources: Offerings in Clinic Communities  Patient Activation Measure Survey Score:      3/7/2012     3:00 PM   KHUSHI Score (Last Two)   KHUSHI Raw Score 39   Activation Score 56.4   KHUSHI Level 3         Care Plan and Education Provided:  Taking Medication: Action of prescribed medication(s) and When to take medication(s)    KARLOS Haas [facility]  Diabetes Education Department  [facility], [place]  Phone: [phone]  [phone]      Time Spent: 10 minutes  Encounter Type: Individual    Any diabetes medication dose changes were made via the CDE Protocol per the patient's referring provider. A copy of this encounter was shared with the provider.

## 2024-12-17 ENCOUNTER — TELEPHONE (OUTPATIENT)
Dept: GERIATRICS | Facility: CLINIC | Age: 64
End: 2024-12-17
Payer: MEDICARE

## 2024-12-17 NOTE — TELEPHONE ENCOUNTER
[facility] Geriatrics Triage Call    Provider: TAYLA Gagnon CNP  Facility: [facility] Facility Type:  LTC    Caller: Faxed request     Allergies:    Allergies   Allergen Reactions    Amoxicillin     Sulfa Antibiotics         SBAR:     S-(situation): faxed request       B-(background): n/a    A-(assessment): n/a       Verbal Order per: TAYLA Gagnon CNP  Wash and katrin area BID and apply nystatin powder BID until resolved   Verbal order/direction given to: RAYSA Sahu, RN

## 2024-12-19 ENCOUNTER — TELEPHONE (OUTPATIENT)
Dept: ENDOCRINOLOGY | Facility: CLINIC | Age: 64
End: 2024-12-19
Payer: MEDICARE

## 2024-12-19 NOTE — TELEPHONE ENCOUNTER
Patient confirmed scheduled appointment:  Date: 03/25  Time: 2:00  Visit type: return diabetes  Provider: Viet  Location: virtual  Testing/imaging:   Additional notes: Patient confirmed she should be in [place] for the visit. Patient declined to schedule follow up with Dr. Clemons at this time    Disposition: Return in about 3 months (around 2/26/2025), or self or Deonte, 3-6 months,  Tiffanie GIFFORD in 1-2 months.     Tiffanie Whiteside on 12/19/2024 at 3:23 PM

## 2024-12-23 ENCOUNTER — VIRTUAL VISIT (OUTPATIENT)
Dept: EDUCATION SERVICES | Facility: CLINIC | Age: 64
End: 2024-12-23
Payer: MEDICARE

## 2024-12-23 DIAGNOSIS — E10.3393 TYPE 1 DIABETES MELLITUS WITH MODERATE NONPROLIFERATIVE RETINOPATHY OF BOTH EYES, MACULAR EDEMA PRESENCE UNSPECIFIED (H): ICD-10-CM

## 2024-12-23 PROCEDURE — 99207 PR NO BILLABLE SERVICE THIS VISIT: CPT | Mod: 93 | Performed by: DIETITIAN, REGISTERED

## 2024-12-23 RX ORDER — INSULIN DEGLUDEC 100 U/ML
24 INJECTION, SOLUTION SUBCUTANEOUS DAILY
Qty: 6 ML | Status: SHIPPED | OUTPATIENT
Start: 2024-12-23

## 2024-12-23 NOTE — LETTER
12/23/2024      Lexy Rockwell  [address]  [place] [facility] [address]      Dear Colleague,    Thank you for referring your patient, Lexy Rockwell, to the [facility]. Please see a copy of my visit note below.    Diabetes Self-Management Education & Support    Presents for:      Type of Service: Telephone Visit    Audio only visit done, as patient does not have access to audio-visual technology.    Individual visit provided, given no group classes are available for 2 months.     Originating Location (Patient Location): Assisted Living  Distant Location (Provider Location): Offsite  Mode of Communication:  Telephone    Telephone Visit Start Time:  11:00 am  Telephone Visit End Time (telephone visit stop time): 11:10 am    How would patient like to obtain AVS? MyChart      Assessment  Lexy feels that things are going well with BG management right now, she has had less lows. Although yesterday shortly after dinner she did have a low blood sugar, which was strange to her because she had some candy before dinner and also had cake and ice cream with dinner. She reports that she has switched from whole milk to skim milk in an effort to lose some weight.       See CGM Reports in Monitoring section below.[email or web address]      Glucose Patterns & Trends:  Hyperglycemia, weekend- nocturnal and weekday- nocturnal    Care Plan and Education Provided:  Patient verbalized understanding of diabetes self-management education concepts discussed, opportunities for ongoing education and support, and recommendations provided today.    Plan    Increase Tresiba: 22 units --> 24 units   Aspart: 2-6-11-0 (no changes)      Follow-up:  Upcoming Diabetes Ed Appointments     Visit Type Date Time Department    DIABETES ED SHORT 12/23/2024 11:00 AM [facility]    DIABETES ED 12/31/2024  1:00 PM [facility]ET[facility] ED        See Care Plan for co-developed, patient-state behavior change goals.    Education Materials  "Provided:  No new materials provided today      Subjective/Objective  Lexy is an 64 year old year old, presenting for the following diabetes education related to:    Cultural Influences/Ethnic Background:  Not  or     Diabetes Symptoms & Complications:     Patient Problem List and Family Medical History reviewed for relevant medical history, current medical status, and diabetes risk factors.    Vitals:  LMP 03/28/2014   Estimated body mass index is 40.03 kg/m  as calculated from the following:    Height as of 12/4/24: 1.626 m (5' 4\").    Weight as of 12/4/24: 105.8 kg (233 lb 3.2 oz).   Last 3 BP:   BP Readings from Last 3 Encounters:   12/04/24 (!) 153/83   11/12/24 (!) 157/77   10/31/24 (!) 165/80       History   Smoking Status     Never   Smokeless Tobacco     Never       Labs:  Lab Results   Component Value Date    A1C 7.8 09/09/2024    A1C 9.7 02/01/2021     Lab Results   Component Value Date     11/06/2024     10/13/2021     02/01/2021     Lab Results   Component Value Date    LDL 58 01/25/2023     02/01/2021     HDL Cholesterol   Date Value Ref Range Status   02/01/2021 62 >49 mg/dL Final     Direct Measure HDL   Date Value Ref Range Status   01/25/2023 37 (L) >=50 mg/dL Final   ]  GFR Estimate   Date Value Ref Range Status   11/06/2024 54 (L) >60 mL/min/1.73m2 Final   02/01/2021 90 >60 mL/min/[1.73_m2] Final     Comment:     Non  GFR Calc  Starting 12/18/2018, serum creatinine based estimated GFR (eGFR) will be   calculated using the Chronic Kidney Disease Epidemiology Collaboration   (CKD-EPI) equation.       GFR Estimate If Black   Date Value Ref Range Status   02/01/2021 >90 >60 mL/min/[1.73_m2] Final     Comment:      GFR Calc  Starting 12/18/2018, serum creatinine based estimated GFR (eGFR) will be   calculated using the Chronic Kidney Disease Epidemiology Collaboration   (CKD-EPI) equation.       Lab Results   Component Value " "Date    CR 1.13 11/06/2024    CR 0.73 02/01/2021     No results found for: \"MICROALBUMIN\"    Tiffanie Mcfarland RD  Time Spent: 10 minutes  Encounter Type: Individual    Any diabetes medication dose changes were made via the CDCES Standing Orders under the patient's referring provider.      Again, thank you for allowing me to participate in the care of your patient.        Sincerely,        Tiffanie Mcfarland RD    Electronically signed"

## 2024-12-23 NOTE — LETTER
2024      Lexy Rockwell  [address]  [place] [address]    Hello! We increased Lexy's insulin Degludec today from 22 units to 24 units.  Thanks,   KARLOS Haas [facility]  Diabetes Education Department  [facility], [place]  Phone: [phone]  [phone]          Diabetes Self-Management Education & Support    Presents for:      Type of Service: Telephone Visit    Audio only visit done, as patient does not have access to audio-visual technology.    Individual visit provided, given no group classes are available for 2 months.     Originating Location (Patient Location): Assisted Living  Distant Location (Provider Location): Offsite  Mode of Communication:  Telephone    Telephone Visit Start Time:  11:00 am  Telephone Visit End Time (telephone visit stop time): 11:10 am    How would patient like to obtain AVS? MyChart      Assessment  Lexy feels that things are going well with BG management right now, she has had less lows. Although yesterday shortly after dinner she did have a low blood sugar, which was strange to her because she had some candy before dinner and also had cake and ice cream with dinner. She reports that she has switched from whole milk to skim milk in an effort to lose some weight.       See CGM Reports in Monitoring section below.y6u      Glucose Patterns & Trends:  Hyperglycemia, weekend- nocturnal and weekday- nocturnal    Care Plan and Education Provided:  Patient verbalized understanding of diabetes self-management education concepts discussed, opportunities for ongoing education and support, and recommendations provided today.    Plan    Increase Tresiba: 22 units --> 24 units   Aspart: 2-6-11-0 (no changes)      Follow-up:  Upcoming Diabetes Ed Appointments     Visit Type Date Time Department    DIABETES ED SHORT 2024 11:00 AM [facility]ET[facility] ED    DIABETES ED 2024  1:00 PM [facility] ED        See Care  "Plan for co-developed, patient-state behavior change goals.    Education Materials Provided:  No new materials provided today      Subjective/Objective  Lexy is an 64 year old year old, presenting for the following diabetes education related to:    Cultural Influences/Ethnic Background:  Not  or     Diabetes Symptoms & Complications:     Patient Problem List and Family Medical History reviewed for relevant medical history, current medical status, and diabetes risk factors.    Vitals:  LMP 03/28/2014   Estimated body mass index is 40.03 kg/m  as calculated from the following:    Height as of 12/4/24: 1.626 m (5' 4\").    Weight as of 12/4/24: 105.8 kg (233 lb 3.2 oz).   Last 3 BP:   BP Readings from Last 3 Encounters:   12/04/24 (!) 153/83   11/12/24 (!) 157/77   10/31/24 (!) 165/80       History   Smoking Status     Never   Smokeless Tobacco     Never       Labs:  Lab Results   Component Value Date    A1C 7.8 09/09/2024    A1C 9.7 02/01/2021     Lab Results   Component Value Date     11/06/2024     10/13/2021     02/01/2021     Lab Results   Component Value Date    LDL 58 01/25/2023     02/01/2021     HDL Cholesterol   Date Value Ref Range Status   02/01/2021 62 >49 mg/dL Final     Direct Measure HDL   Date Value Ref Range Status   01/25/2023 37 (L) >=50 mg/dL Final   ]  GFR Estimate   Date Value Ref Range Status   11/06/2024 54 (L) >60 mL/min/1.73m2 Final   02/01/2021 90 >60 mL/min/[1.73_m2] Final     Comment:     Non  GFR Calc  Starting 12/18/2018, serum creatinine based estimated GFR (eGFR) will be   calculated using the Chronic Kidney Disease Epidemiology Collaboration   (CKD-EPI) equation.       GFR Estimate If Black   Date Value Ref Range Status   02/01/2021 >90 >60 mL/min/[1.73_m2] Final     Comment:      GFR Calc  Starting 12/18/2018, serum creatinine based estimated GFR (eGFR) will be   calculated using the Chronic Kidney Disease " "Epidemiology Collaboration   (CKD-EPI) equation.       Lab Results   Component Value Date    CR 1.13 11/06/2024    CR 0.73 02/01/2021     No results found for: \"MICROALBUMIN\"    Tiffanie Mcfarland RD  Time Spent: 10 minutes  Encounter Type: Individual    Any diabetes medication dose changes were made via the [facility] Standing Orders under the patient's referring provider.       Sincerely,        Tiffanie Mcfarland RD    Electronically signed  "

## 2024-12-29 ENCOUNTER — HEALTH MAINTENANCE LETTER (OUTPATIENT)
Age: 64
End: 2024-12-29

## 2024-12-31 ENCOUNTER — VIRTUAL VISIT (OUTPATIENT)
Dept: EDUCATION SERVICES | Facility: CLINIC | Age: 64
End: 2024-12-31
Payer: MEDICARE

## 2024-12-31 DIAGNOSIS — E10.3393 TYPE 1 DIABETES MELLITUS WITH MODERATE NONPROLIFERATIVE RETINOPATHY OF BOTH EYES, MACULAR EDEMA PRESENCE UNSPECIFIED (H): Primary | ICD-10-CM

## 2024-12-31 NOTE — PROGRESS NOTES
Call to Lexy for telephone visit. She reports that she was just getting back from lunch. She reports a BG of 68 mg/dl at the time of call. Writer let the patient go to allow her to treat the low, called back 10 minutes later and Lexy states that she ate 5 pieces of candy, and her BG was down to 55 mg/dl with a down arrow.  Lexy was slurring her words and told writer that she doesn't feel good and is dizzy.  Writer instructed patient to sit down and told her I would call the nurses station. Writer called the [place] nurses station and spoke with Lexy's nurse, explained that Lexy was having a low blood sugar and could use some assistance.  Writer will call back tomorrow to check in on her.     [facility]  Diabetes Education Department  [facility], [place]  Phone: [phone]  [phone]

## 2024-12-31 NOTE — LETTER
2024      Lexy Rockwell  [address] [place] [address]      Dear Colleague,    Thank you for referring your patient, Lexy Rockwell, to the [facility]. Please see a copy of my visit note below.    Call to Lexy for telephone visit. She reports that she was just getting back from lunch. She reports a BG of 68 mg/dl at the time of call. Writer let the patient go to allow her to treat the low, called back 10 minutes later and Lexy states that she ate 5 pieces of candy, and her BG was down to 55 mg/dl with a down arrow.  Lexy was slurring her words and told writer that she doesn't feel good and is dizzy.  Writer instructed patient to sit down and told her I would call the nurses station. Writer called the [place] nurses station and spoke with Lexy's nurse, explained that Lexy was having a low blood sugar and could use some assistance.  Writer will call back tomorrow to check in on her.     Tiffanie Mcfarland RD [facility]  Diabetes Education Department  [facility], [place]  Phone: [phone]  [phone]      Again, thank you for allowing me to participate in the care of your patient.        Sincerely,        Tiffanie Mcfarland RD    Electronically signed

## 2025-01-14 ENCOUNTER — VIRTUAL VISIT (OUTPATIENT)
Dept: EDUCATION SERVICES | Facility: CLINIC | Age: 65
End: 2025-01-14
Payer: MEDICARE

## 2025-01-14 DIAGNOSIS — E10.3393 TYPE 1 DIABETES MELLITUS WITH MODERATE NONPROLIFERATIVE RETINOPATHY OF BOTH EYES, MACULAR EDEMA PRESENCE UNSPECIFIED (H): ICD-10-CM

## 2025-01-14 PROCEDURE — 98966 PH1 ASSMT&MGMT NQHP 5-10: CPT | Mod: 93 | Performed by: DIETITIAN, REGISTERED

## 2025-01-14 RX ORDER — INSULIN DEGLUDEC 100 U/ML
22 INJECTION, SOLUTION SUBCUTANEOUS DAILY
Qty: 6 ML | Status: SHIPPED | OUTPATIENT
Start: 2025-01-14

## 2025-01-14 NOTE — PROGRESS NOTES
Diabetes Education Follow-up    Subjective/Objective:    Lexy Rockwell was called for blood glucose review. Last date of communication was: .    Diabetes is being managed with Diabetes Medications   Diabetes Medication(s)       Diabetic Other       Glucagon (BAQSIMI) 3 MG/DOSE nasal powder Spray 1 spray (3 mg) in nostril as needed. in the event of unconscious hypoglycemia or hypoglycemic seizure. May repeat dose if no response after 15 minutes.     glucagon 1 MG SOLR injection Inject 1 mg into the muscle once       Insulin       Insulin Aspart, w/Niacinamide, (FIASP PENFILL) 100 UNIT/ML SOCT Inject 4-10 Units subcutaneously 3 times daily (before meals). Inject 2 units before breakfast, 6 units before lunch and 11 units before dinner, 3 units before each snacks 2x daily. Add correction scale before meals. -160 = 1 unit. -200 = 2 units. -240 = 3 units. -280 = 4 units. -320 = 5 unit(s). Max daily dose is 50 units.     Insulin Degludec FlexTouch 100 UNIT/ML SOPN Inject 24 Units subcutaneously daily. EVERY NIGHT AT BEDTIME FOR DM *DISCARD 56 DAYS AFTER OPENING*            BG/Food Log:                 Assessment:    The last 4 days patient had 87% of BG in target (3% low), Lexy isn't sure what has been different for the last few days.     Plan/Response:  Adjust insulin:  Increase Tresiba:  24 units --> 22 units  Aspart: 2-6-11-0  - no change    Follow-up next week    KARLOS Haas [facility]  Diabetes Education Department  [facility]  Phone: [phone]  [phone]      Any diabetes medication dose changes were made via the CDE Protocol and Collaborative Practice Agreement with the patient's referring provider. A copy of this encounter was shared with the provider.

## 2025-01-14 NOTE — LETTER
1/14/2025      Lexy Rockwell  [address]  [facility] [address]        Hello! Please decrease Lexy's Tresiba dose to 22 units. Thanks!  Tiffanie    Virtual Visit Details    Type of service:  Telephone Visit   Phone call duration: 10 minutes   Originating Location (pt. Location): Home    Distant Location (provider location):  Off-site  Telephone visit completed due to the patient did not consent to a video visit.        Current patient location: [address]  [place] [address]    Is the patient currently in the state of [place]? YES    Visit mode: TELEPHONE    If the visit is dropped, the patient can be reconnected by:TELEPHONE VISIT: Phone number:   Telephone Information:   Mobile [phone]       Will anyone else be joining the visit? NO  (If patient encounters technical issues they should call [phone])    Are changes needed to the allergy or medication list? No    Are refills needed on medications prescribed by this physician? NO    Rooming Documentation:  Questionnaire(s) completed    Reason for visit: Diabetes Education    Mirela Dempsey [facility]      Diabetes Education Follow-up    Subjective/Objective:    Lexy Rockwell was called for blood glucose review. Last date of communication was: 12/31.    Diabetes is being managed with Diabetes Medications   Diabetes Medication(s)       Diabetic Other       Glucagon (BAQSIMI) 3 MG/DOSE nasal powder Spray 1 spray (3 mg) in nostril as needed. in the event of unconscious hypoglycemia or hypoglycemic seizure. May repeat dose if no response after 15 minutes.     glucagon 1 MG SOLR injection Inject 1 mg into the muscle once       Insulin       Insulin Aspart, w/Niacinamide, (FIASP PENFILL) 100 UNIT/ML SOCT Inject 4-10 Units subcutaneously 3 times daily (before meals). Inject 2 units before breakfast, 6 units before lunch and 11 units before dinner, 3 units before each snacks 2x daily. Add correction scale before meals. -160 = 1  unit. -200 = 2 units. -240 = 3 units. -280 = 4 units. -320 = 5 unit(s). Max daily dose is 50 units.     Insulin Degludec FlexTouch 100 UNIT/ML SOPN Inject 24 Units subcutaneously daily. EVERY NIGHT AT BEDTIME FOR DM *DISCARD 56 DAYS AFTER OPENING*            BG/Food Log:                 Assessment:    The last 4 days patient had 87% of BG in target (3% low), Lexy isn't sure what has been different for the last few days.     Plan/Response:  Adjust insulin:  Increase Tresiba:  24 units --> 22 units  Aspart: 2-6-11-0  - no change    Follow-up next week    Tiffanie Mcfarland RD [facility]  Diabetes Education Department  [facility], [place]  Phone: [phone]  [phone]      Any diabetes medication dose changes were made via the CDE Protocol and Collaborative Practice Agreement with the patient's referring provider. A copy of this encounter was shared with the provider.        Sincerely,        Tiffanie Mcfarland RD    Electronically signed

## 2025-01-14 NOTE — PROGRESS NOTES
Virtual Visit Details    Type of service:  Telephone Visit   Phone call duration: 10 minutes   Originating Location (pt. Location): Home    Distant Location (provider location):  Off-site  Telephone visit completed due to the patient did not consent to a video visit.        Current patient location: [facility] [address] [facility] [address]    Is the patient currently in the state [facility]? YES    Visit mode: TELEPHONE    If the visit is dropped, the patient can be reconnected by:TELEPHONE VISIT: Phone number:   Telephone Information:   Mobile [phone]       Will anyone else be joining the visit? NO  (If patient encounters technical issues they should call [phone])    Are changes needed to the allergy or medication list? No    Are refills needed on medications prescribed by this physician? NO    Rooming Documentation:  Questionnaire(s) completed    Reason for visit: Diabetes Education    Mirela DRUMMONDF

## 2025-01-14 NOTE — Clinical Note
"1/14/2025      Lexy Rockwell  [address]  [facility] [address]      Dear Colleague,    Thank you for referring your patient, Lexy Rockwell, to the [facility]. Please see a copy of my visit note below.    Virtual Visit Details    Type of service:  Telephone Visit   Phone call duration: *** minutes   Originating Location (pt. Location): {patient location:004153::\"Home\"}  {PROVIDER LOCATION On-site should be selected for visits conducted from your clinic location or adjoining [facility] hospital, academic office, or other nearby [facility] building. Off-site should be selected for all other provider locations, including home:835021}  Distant Location (provider location):  {virtual location provider:754252}  Telephone visit completed due to {audio only reason:691716}        Current patient location: [address]  [facility] [address]    Is the patient currently in the state of [place]? YES    Visit mode: TELEPHONE    If the visit is dropped, the patient can be reconnected by:TELEPHONE VISIT: Phone number:   Telephone Information:   Mobile [phone]       Will anyone else be joining the visit? NO  (If patient encounters technical issues they should call [phone])    Are changes needed to the allergy or medication list? No    Are refills needed on medications prescribed by this physician? NO    Rooming Documentation:  Questionnaire(s) completed    Reason for visit: Diabetes Education    Mirela Dempsey [facility]      Diabetes Education Follow-up    Subjective/Objective:    Lexy Rockwell was called for blood glucose review. Last date of communication was: 12/31.    Diabetes is being managed with Diabetes Medications   Diabetes Medication(s)       Diabetic Other       Glucagon (BAQSIMI) 3 MG/DOSE nasal powder Spray 1 spray (3 mg) in nostril as needed. in the event of unconscious hypoglycemia or hypoglycemic seizure. May repeat dose if no response after 15 minutes.     " glucagon 1 MG SOLR injection Inject 1 mg into the muscle once       Insulin       Insulin Aspart, w/Niacinamide, (FIASP PENFILL) 100 UNIT/ML SOCT Inject 4-10 Units subcutaneously 3 times daily (before meals). Inject 2 units before breakfast, 6 units before lunch and 11 units before dinner, 3 units before each snacks 2x daily. Add correction scale before meals. -160 = 1 unit. -200 = 2 units. -240 = 3 units. -280 = 4 units. -320 = 5 unit(s). Max daily dose is 50 units.     Insulin Degludec FlexTouch 100 UNIT/ML SOPN Inject 24 Units subcutaneously daily. EVERY NIGHT AT BEDTIME FOR DM *DISCARD 56 DAYS AFTER OPENING*            BG/Food Log:                 Assessment:    The last 4 days patient had 87% of BG in target (3% low), Lexy isn't sure what has been different for the last few days.     Plan/Response:  Adjust insulin:  Increase Tresiba:  24 units --> 22 units  Aspart: 2-6-11-0  - no change    Follow-up next week    KARLOS Haas [facility]  Diabetes Education Department  [facility]  Phone: [phone]  [phone]      Any diabetes medication dose changes were made via the CDE Protocol and Collaborative Practice Agreement with the patient's referring provider. A copy of this encounter was shared with the provider.        Again, thank you for allowing me to participate in the care of your patient.        Sincerely,        Tiffanie Mcfarland RD    Electronically signed

## 2025-01-21 ENCOUNTER — VIRTUAL VISIT (OUTPATIENT)
Dept: EDUCATION SERVICES | Facility: CLINIC | Age: 65
End: 2025-01-21
Payer: MEDICARE

## 2025-01-21 DIAGNOSIS — E10.3393 TYPE 1 DIABETES MELLITUS WITH MODERATE NONPROLIFERATIVE RETINOPATHY OF BOTH EYES, MACULAR EDEMA PRESENCE UNSPECIFIED (H): Primary | ICD-10-CM

## 2025-01-21 PROCEDURE — 98966 PH1 ASSMT&MGMT NQHP 5-10: CPT | Mod: 93 | Performed by: DIETITIAN, REGISTERED

## 2025-01-21 NOTE — LETTER
1/21/2025      Lexy Rockwell  [address]  [facility] [address]      Dear Colleague,    Thank you for referring your patient, Lexy Rockwell, to the [facility]. Please see a copy of my visit note below.       Current [place] location: [place]    Is the patient currently in [place] of [place]? YES    Visit mode: TELEPHONE    If the visit is dropped, the patient can be reconnected by:TELEPHONE VISIT: Phone number:   Telephone Information:   Mobile [phone]       Will anyone else be joining the visit? NO  (If patient encounters technical issues they should call [phone])    Are changes needed to the allergy or medication list? No, Pt stated no changes to allergies, and Pt stated no med changes    Are refills needed on medications prescribed by this physician? Discuss with provider    Rooming Documentation:  Not applicable    Reason for visit: RECHECK    Serene KRUEGER       Diabetes Education Follow-up    Subjective/Objective:    Lexy Rockwell was called for a BG review. Last date of communication was: 1/14.    Diabetes is being managed with Diabetes Medications   Diabetes Medication(s)       Diabetic Other       Glucagon (BAQSIMI) 3 MG/DOSE nasal powder Spray 1 spray (3 mg) in nostril as needed. in the event of unconscious hypoglycemia or hypoglycemic seizure. May repeat dose if no response after 15 minutes.     glucagon 1 MG SOLR injection Inject 1 mg into the muscle once       Insulin       Insulin Aspart, w/Niacinamide, (FIASP PENFILL) 100 UNIT/ML SOCT Inject 4-10 Units subcutaneously 3 times daily (before meals). Inject 2 units before breakfast, 6 units before lunch and 11 units before dinner, 3 units before each snacks 2x daily. Add correction scale before meals. -160 = 1 unit. -200 = 2 units. -240 = 3 units. -280 = 4 units. -320 = 5 unit(s). Max daily dose is 50 units.     Insulin Degludec FlexTouch 100 UNIT/ML SOPN  Inject 22 Units subcutaneously daily. EVERY NIGHT AT BEDTIME FOR DM *DISCARD 56 DAYS AFTER OPENING*            BG/Food Log:       Assessment:    BG average is trending down. Still seeing higher glucose overnight, although lower than it has been!    Plan/Response:  No changes in the patient's current treatment plan  Follow-up in 2 weeks    Time spent: 5 minutes    Tiffanie Mcfarland RD [facility]  Diabetes Education Department  [facility]  Phone: [phone]  [phone]      Any diabetes medication dose changes were made via the CDE Protocol and Collaborative Practice Agreement with the patient's referring provider. A copy of this encounter was shared with the provider.      Again, thank you for allowing me to participate in the care of your patient.        Sincerely,        Tiffanie Mcfarland RD    Electronically signed

## 2025-01-21 NOTE — PROGRESS NOTES
Current [place] location: [place]    Is the patient currently in [place] of [place]? YES    Visit mode: TELEPHONE    If the visit is dropped, the patient can be reconnected by:TELEPHONE VISIT: Phone number:   Telephone Information:   Mobile [phone]       Will anyone else be joining the visit? NO  (If patient encounters technical issues they should call [phone])    Are changes needed to the allergy or medication list? No, Pt stated no changes to allergies, and Pt stated no med changes    Are refills needed on medications prescribed by this physician? Discuss with provider    Rooming Documentation:  Not applicable    Reason for visit: RECHECK    Serene KRUEGER

## 2025-01-21 NOTE — PROGRESS NOTES
Diabetes Education Follow-up    Subjective/Objective:    Lexy Rockwell was called for a BG review. Last date of communication was: .    Diabetes is being managed with Diabetes Medications   Diabetes Medication(s)       Diabetic Other       Glucagon (BAQSIMI) 3 MG/DOSE nasal powder Spray 1 spray (3 mg) in nostril as needed. in the event of unconscious hypoglycemia or hypoglycemic seizure. May repeat dose if no response after 15 minutes.     glucagon 1 MG SOLR injection Inject 1 mg into the muscle once       Insulin       Insulin Aspart, w/Niacinamide, (FIASP PENFILL) 100 UNIT/ML SOCT Inject 4-10 Units subcutaneously 3 times daily (before meals). Inject 2 units before breakfast, 6 units before lunch and 11 units before dinner, 3 units before each snacks 2x daily. Add correction scale before meals. -160 = 1 unit. -200 = 2 units. -240 = 3 units. -280 = 4 units. -320 = 5 unit(s). Max daily dose is 50 units.     Insulin Degludec FlexTouch 100 UNIT/ML SOPN Inject 22 Units subcutaneously daily. EVERY NIGHT AT BEDTIME FOR DM *DISCARD 56 DAYS AFTER OPENING*            BG/Food Log:       Assessment:    BG average is trending down. Still seeing higher glucose overnight, although lower than it has been!    Plan/Response:  No changes in the patient's current treatment plan  Follow-up in 2 weeks    Time spent: 5 minutes    KRALOS Haas [facility]  Diabetes Education Department  [facility]  Phone: [phone]  [phone]      Any diabetes medication dose changes were made via the CDE Protocol and Collaborative Practice Agreement with the patient's referring provider. A copy of this encounter was shared with the provider.

## 2025-02-01 ENCOUNTER — HEALTH MAINTENANCE LETTER (OUTPATIENT)
Age: 65
End: 2025-02-01

## 2025-02-04 ENCOUNTER — VIRTUAL VISIT (OUTPATIENT)
Dept: EDUCATION SERVICES | Facility: CLINIC | Age: 65
End: 2025-02-04
Payer: MEDICARE

## 2025-02-04 DIAGNOSIS — E10.3393 TYPE 1 DIABETES MELLITUS WITH MODERATE NONPROLIFERATIVE RETINOPATHY OF BOTH EYES, MACULAR EDEMA PRESENCE UNSPECIFIED (H): Primary | ICD-10-CM

## 2025-02-04 PROCEDURE — 98966 PH1 ASSMT&MGMT NQHP 5-10: CPT | Mod: 93 | Performed by: DIETITIAN, REGISTERED

## 2025-02-04 NOTE — NURSING NOTE
Current patient location: [address] [facility] [address]    Is the patient currently in the state of [place]? YES    Visit mode: VIDEO    If the visit is dropped, the patient can be reconnected by:TELEPHONE VISIT: Phone number:   Telephone Information:   Mobile [phone]       Will anyone else be joining the visit? NO  (If patient encounters technical issues they should call [phone])    Are changes needed to the allergy or medication list? Pt stated no med changes    Are refills needed on medications prescribed by this physician? NO    Rooming Documentation:  Not applicable    Reason for visit: Diabetes Education    Areli KRUEGER

## 2025-02-04 NOTE — LETTER
2/4/2025      Lexy Rockwell  [address]  [facility] [address]      Dear Colleague,    Thank you for referring your patient, Lexy Rockwell, to the [facility]. Please see a copy of my visit note below.    Virtual Visit Details    Type of service:  Telephone Visit   Phone call duration: 5 minutes   Originating Location (pt. Location): Home    Distant Location (provider location):  Off-site  Telephone visit completed due to the patient did not have access to video, while the distant provider did.    Diabetes Education Follow-up    Subjective/Objective:    Lexy Rockwell was called for a BG review. Last date of communication was: 1/21.    Diabetes is being managed with Diabetes Medications   Diabetes Medication(s)       Diabetic Other       Glucagon (BAQSIMI) 3 MG/DOSE nasal powder Spray 1 spray (3 mg) in nostril as needed. in the event of unconscious hypoglycemia or hypoglycemic seizure. May repeat dose if no response after 15 minutes.     glucagon 1 MG SOLR injection Inject 1 mg into the muscle once       Insulin       Insulin Aspart, w/Niacinamide, (FIASP PENFILL) 100 UNIT/ML SOCT Inject 4-10 Units subcutaneously 3 times daily (before meals). Inject 2 units before breakfast, 6 units before lunch and 11 units before dinner, 3 units before each snacks 2x daily. Add correction scale before meals. -160 = 1 unit. -200 = 2 units. -240 = 3 units. -280 = 4 units. -320 = 5 unit(s). Max daily dose is 50 units.     Insulin Degludec FlexTouch 100 UNIT/ML SOPN Inject 22 Units subcutaneously daily. EVERY NIGHT AT BEDTIME FOR DM *DISCARD 56 DAYS AFTER OPENING*            BG/Food Log:                         Assessment:    BG is still varying quite a bit, no clear pattern identified.     Plan/Response:  No changes in the patient's current treatment plan  Follow-up in 2 weeks    Time spent: 5 minutes    KARLOS Haas [facility]  Diabetes  Education Department  [facility] [place]  Phone: [phone]  [phone]      Any diabetes medication dose changes were made via the CDE Protocol and Collaborative Practice Agreement with the patient's referring provider. A copy of this encounter was shared with the provider.        Again, thank you for allowing me to participate in the care of your patient.        Sincerely,        Tiffanie Mcfarland RD    Electronically signed

## 2025-02-04 NOTE — PROGRESS NOTES
Virtual Visit Details    Type of service:  Telephone Visit   Phone call duration: 5 minutes   Originating Location (pt. Location): Home    Distant Location (provider location):  Off-site  Telephone visit completed due to the patient did not have access to video, while the distant provider did.    Diabetes Education Follow-up    Subjective/Objective:    Lexy Rockwell was called for a BG review. Last date of communication was: .    Diabetes is being managed with Diabetes Medications   Diabetes Medication(s)       Diabetic Other       Glucagon (BAQSIMI) 3 MG/DOSE nasal powder Spray 1 spray (3 mg) in nostril as needed. in the event of unconscious hypoglycemia or hypoglycemic seizure. May repeat dose if no response after 15 minutes.     glucagon 1 MG SOLR injection Inject 1 mg into the muscle once       Insulin       Insulin Aspart, w/Niacinamide, (FIASP PENFILL) 100 UNIT/ML SOCT Inject 4-10 Units subcutaneously 3 times daily (before meals). Inject 2 units before breakfast, 6 units before lunch and 11 units before dinner, 3 units before each snacks 2x daily. Add correction scale before meals. -160 = 1 unit. -200 = 2 units. -240 = 3 units. -280 = 4 units. -320 = 5 unit(s). Max daily dose is 50 units.     Insulin Degludec FlexTouch 100 UNIT/ML SOPN Inject 22 Units subcutaneously daily. EVERY NIGHT AT BEDTIME FOR DM *DISCARD 56 DAYS AFTER OPENING*            BG/Food Log:                         Assessment:    BG is still varying quite a bit, no clear pattern identified.     Plan/Response:  No changes in the patient's current treatment plan  Follow-up in 2 weeks    Time spent: 5 minutes    [facility]  Diabetes Education Department  [facility]  Phone: [phone]  [phone]      Any diabetes medication dose changes were made via the CDE Protocol and Collaborative Practice Agreement with the patient's referring  provider. A copy of this encounter was shared with the provider.

## 2025-02-06 ENCOUNTER — ASSISTED LIVING VISIT (OUTPATIENT)
Dept: GERIATRICS | Facility: CLINIC | Age: 65
End: 2025-02-06
Payer: MEDICARE

## 2025-02-06 VITALS
HEIGHT: 64 IN | SYSTOLIC BLOOD PRESSURE: 155 MMHG | OXYGEN SATURATION: 97 % | TEMPERATURE: 97.8 F | BODY MASS INDEX: 40.36 KG/M2 | WEIGHT: 236.4 LBS | RESPIRATION RATE: 18 BRPM | DIASTOLIC BLOOD PRESSURE: 87 MMHG | HEART RATE: 88 BPM

## 2025-02-06 DIAGNOSIS — H40.1132 PRIMARY OPEN ANGLE GLAUCOMA OF BOTH EYES, MODERATE STAGE: ICD-10-CM

## 2025-02-06 DIAGNOSIS — E03.9 ACQUIRED HYPOTHYROIDISM: ICD-10-CM

## 2025-02-06 DIAGNOSIS — R21 RASH: ICD-10-CM

## 2025-02-06 DIAGNOSIS — D64.9 ANEMIA, UNSPECIFIED TYPE: ICD-10-CM

## 2025-02-06 DIAGNOSIS — Z97.8 USES SELF-APPLIED CONTINUOUS GLUCOSE MONITORING DEVICE: ICD-10-CM

## 2025-02-06 DIAGNOSIS — I13.0 HYPERTENSIVE HEART AND CHRONIC KIDNEY DISEASE WITH HEART FAILURE AND STAGE 1 THROUGH STAGE 4 CHRONIC KIDNEY DISEASE, OR UNSPECIFIED CHRONIC KIDNEY DISEASE (H): ICD-10-CM

## 2025-02-06 DIAGNOSIS — F81.9 COGNITIVE DEVELOPMENTAL DELAY: ICD-10-CM

## 2025-02-06 DIAGNOSIS — R53.83 LETHARGY: ICD-10-CM

## 2025-02-06 DIAGNOSIS — N18.31 STAGE 3A CHRONIC KIDNEY DISEASE (H): ICD-10-CM

## 2025-02-06 DIAGNOSIS — R60.0 LOWER EXTREMITY EDEMA: ICD-10-CM

## 2025-02-06 DIAGNOSIS — E66.01 CLASS 2 SEVERE OBESITY DUE TO EXCESS CALORIES WITH SERIOUS COMORBIDITY IN ADULT, UNSPECIFIED BMI (H): ICD-10-CM

## 2025-02-06 DIAGNOSIS — E10.22 TYPE 1 DIABETES MELLITUS WITH STAGE 3A CHRONIC KIDNEY DISEASE (H): ICD-10-CM

## 2025-02-06 DIAGNOSIS — Z79.4 TYPE 2 DIABETES MELLITUS WITH DIABETIC AUTONOMIC NEUROPATHY, WITH LONG-TERM CURRENT USE OF INSULIN (H): ICD-10-CM

## 2025-02-06 DIAGNOSIS — N18.31 TYPE 1 DIABETES MELLITUS WITH STAGE 3A CHRONIC KIDNEY DISEASE (H): ICD-10-CM

## 2025-02-06 DIAGNOSIS — K21.00 GASTROESOPHAGEAL REFLUX DISEASE WITH ESOPHAGITIS WITHOUT HEMORRHAGE: ICD-10-CM

## 2025-02-06 DIAGNOSIS — I10 ESSENTIAL HYPERTENSION WITH GOAL BLOOD PRESSURE LESS THAN 140/90: ICD-10-CM

## 2025-02-06 DIAGNOSIS — E66.812 CLASS 2 SEVERE OBESITY DUE TO EXCESS CALORIES WITH SERIOUS COMORBIDITY IN ADULT, UNSPECIFIED BMI (H): ICD-10-CM

## 2025-02-06 DIAGNOSIS — R63.5 WEIGHT GAIN: Primary | ICD-10-CM

## 2025-02-06 DIAGNOSIS — E11.43 TYPE 2 DIABETES MELLITUS WITH DIABETIC AUTONOMIC NEUROPATHY, WITH LONG-TERM CURRENT USE OF INSULIN (H): ICD-10-CM

## 2025-02-06 DIAGNOSIS — K59.01 SLOW TRANSIT CONSTIPATION: ICD-10-CM

## 2025-02-06 DIAGNOSIS — H52.4 PRESBYOPIA: ICD-10-CM

## 2025-02-06 PROCEDURE — 99349 HOME/RES VST EST MOD MDM 40: CPT | Performed by: NURSE PRACTITIONER

## 2025-02-06 NOTE — PROGRESS NOTES
[facility] GERIATRICS  Chief Complaint   Patient presents with    alf Regulatory     [place] Medical Record Number:  [id number]  Place of Service where encounter took place:  [facility] ([facility]) [[id number]]    HPI:    Lexy Rockwell  is 64 year old (1960), who is being seen today for a federally mandated E/M visit.         Her past medical history includes  Type 1 diabetes with gastroparesis and CKD history of DKA  Hypertension  CKD stage III  Hyperlipidemia  Bilateral leg edema  Overactive bladder  GERD  Constipation  Developmentally delayed  Polyneuropathy   Vitamin D deficiency  Glaucoma     Today patient was seen in her room.  patient denied chest pain, shortness of breath, dizziness, lightheadedness, and a poor appetite.   Nursing has concerns of flucuating blood sugars. BIMS=15/15 (score 13 to 15 suggests the patient is cognitively intact, 8 to 12 suggests moderately impaired and 0 to 7 suggest severe impairment) PHQ9=1/27.   Patient needs cuing assistance with ADLs.    Her appetite is Excellent and consumes a low carb diet. Code status is full code.   In reviewing point click care her weight is up to 236# which is a 35# weight gain in 180 days, BP slightly elevated.  HR controlled.            ALLERGIES:Amoxicillin and Sulfa antibiotics  PAST MEDICAL HISTORY:   Past Medical History:   Diagnosis Date    Cerumen impacted     Development delay     Diabetic gastroparesis (H) 8/16/2013    Glaucoma (increased eye pressure)     Hyperlipaemia     Hypertension     Hypothyroid     Mental retardation     Nonsenile cataract     Obesity     Strabismus     Type 1 diabetes mellitus not at goal (H)      PAST SURGICAL HISTORY:   has a past surgical history that includes EYE SURG ANT SGMT PROC UNLISTED (remote); strabismus surgery; Esophagoscopy, gastroscopy, duodenoscopy (EGD), combined (N/A, 7/3/2023); and Colonoscopy (N/A, 7/3/2023).  FAMILY HISTORY: family history includes Diabetes in her sister;  Glaucoma in her maternal grandfather; Hypertension in her father.  SOCIAL HISTORY:  reports that she has never smoked. She has never used smokeless tobacco. She reports current alcohol use. She reports that she does not use drugs.    MEDICATIONS:  MED REC REQUIRED{TIP  Click the link below to document or use med rec list, use list to pull in response :[id number]}  Post Medication Reconciliation Status:  Discharge medications reconciled and changed, see notes/orders         Review of your medicines            Accurate as of February 6, 2025  8:09 AM. If you have any questions, ask your nurse or doctor.                CONTINUE these medicines which have NOT CHANGED        Dose / Directions   acetaminophen 500 MG tablet  Commonly known as: TYLENOL  Used for: Arthralgia of left lower leg      TAKE 2 TABLETS BY MOUTH EVERY 6 HOURS AS NEEDED (1000MG) FOR PAIN  Quantity: 248 tablet  Refills: 11     AMLODIPINE BENZOATE PO      Dose: 2.5 mg  Take 2.5 mg by mouth  Refills: 0     carvedilol 6.25 MG tablet  Commonly known as: COREG  Used for: Hypertension goal BP (blood pressure) < 140/90      Dose: 6.25 mg  Take 1 tablet (6.25 mg) by mouth 2 times daily (with meals)  Refills: 0     cetirizine 10 MG tablet  Commonly known as: zyrTEC  Used for: Rash      Dose: 10 mg  Take 1 tablet (10 mg) by mouth daily.  Refills: 0     Dexcom G6 Sensor Misc  Used for: Type 1 diabetes mellitus with diabetic macular edema of both eyes resolved after treatment (H)      Change every 10 days.  Quantity: 9 each  Refills: 3     Dexcom G6 Transmitter Misc  Used for: Type 1 diabetes mellitus with diabetic macular edema of both eyes resolved after treatment (H)      Change every 3 months.  Quantity: 1 each  Refills: 3     dorzolamide-timolol 2-0.5 % ophthalmic solution  Commonly known as: COSOPT  Used for: Primary open angle glaucoma of both eyes, moderate stage      Dose: 1 drop  Place 1 drop into both eyes 2 times daily  Quantity: 20 mL  Refills: 3      ferrous sulfate 142 (45 Fe) MG CR tablet  Commonly known as: SLO-FE      Dose: 142 mg  Take 1 tablet (142 mg) by mouth daily  Refills: 0     Fiasp PenFill 100 UNIT/ML Soct  Used for: Type 1 diabetes mellitus with moderate nonproliferative retinopathy of both eyes, macular edema presence unspecified (H)  Generic drug: Insulin Aspart (w/Niacinamide)      Dose: 4-10 Units  Inject 4-10 Units subcutaneously 3 times daily (before meals). Inject 2 units before breakfast, 6 units before lunch and 11 units before dinner, 3 units before each snacks 2x daily. Add correction scale before meals. -160 = 1 unit. -200 = 2 units. -240 = 3 units. -280 = 4 units. -320 = 5 unit(s). Max daily dose is 50 units.  Quantity: 6 mL  Refills: 11     furosemide 20 MG tablet  Commonly known as: LASIX  Indication: Cardiac Failure      Dose: 10 mg  Take 10 mg by mouth daily.  Refills: 0     glucagon 1 MG Solr injection      Dose: 1 mg  Inject 1 mg into the muscle once  Refills: 0     Glucagon 3 MG/DOSE nasal powder  Commonly known as: BAQSIMI  Used for: Type 1 diabetes mellitus with diabetic macular edema of both eyes resolved after treatment (H)      Dose: 1 spray  Spray 1 spray (3 mg) in nostril as needed. in the event of unconscious hypoglycemia or hypoglycemic seizure. May repeat dose if no response after 15 minutes.  Quantity: 2 each  Refills: 1     hydrocortisone 1 % Crea cream      Place rectally 2 times daily  Refills: 0     Insulin Degludec FlexTouch 100 UNIT/ML Sopn  Used for: Type 1 diabetes mellitus with moderate nonproliferative retinopathy of both eyes, macular edema presence unspecified (H)      Dose: 22 Units  Inject 22 Units subcutaneously daily. EVERY NIGHT AT BEDTIME FOR DM *DISCARD 56 DAYS AFTER OPENING*  Quantity: 6 mL  Refills: PRN     * NovoFine Autocover Pen Needle 30G X 8 MM miscellaneous  Used for: Hyperglycemia due to type 1 diabetes mellitus (H)  Generic drug: insulin pen needle       Dose: 1 each  1 each 4 times daily. as directed.  Quantity: 100 each  Refills: 11     * insulin pen needle 30G X 5 MM      2 times daily. Use 2 pen needles daily or as directed.  Refills: 0     Ketostix test strip  Used for: Type 1 diabetes mellitus with diabetic macular edema of both eyes resolved after treatment (H)  Generic drug: acetone urine      Use as directed in case of high glucose, vomiting or illness or blood sugar >350.  Quantity: 50 strip  Refills: 3     latanoprost 0.005 % ophthalmic solution  Commonly known as: XALATAN  Used for: Primary open angle glaucoma of both eyes, moderate stage      INSTILL 1 DROP IN BOTH EYES AT BEDTIME  Quantity: 10 mL  Refills: 11     levothyroxine 50 MCG tablet  Commonly known as: SYNTHROID/LEVOTHROID      Dose: 50 mcg  Take 50 mcg by mouth daily.  Refills: 0     lisinopril 40 MG tablet  Commonly known as: ZESTRIL  Used for: Essential hypertension with goal blood pressure less than 140/90      Dose: 40 mg  Take 1 tablet (40 mg) by mouth daily  Refills: 0     Multivitamin Tabs      1 TABLET DAILY  Refills: 0     pantoprazole 40 MG EC tablet  Commonly known as: PROTONIX      Dose: 40 mg  Take 40 mg by mouth daily  Refills: 0     sennosides 8.6 MG tablet  Commonly known as: SENOKOT      Dose: 1 tablet  Take 1 tablet by mouth daily as needed  Refills: 0     simvastatin 20 MG tablet  Commonly known as: ZOCOR  Used for: Hyperlipidemia LDL goal <100      Dose: 20 mg  Take 1 tablet (20 mg) by mouth At Bedtime  Quantity: 90 tablet  Refills: 3           * This list has 2 medication(s) that are the same as other medications prescribed for you. Read the directions carefully, and ask your doctor or other care provider to review them with you.                Case Management:  I have reviewed the care plan and MDS and do agree with the plan. Patient's desire to return to the community is not present. Information reviewed:  Medications, vital signs, orders, and nursing notes.    ROS:  10  "point ROS of systems including Constitutional, Eyes, Respiratory, Cardiovascular, Gastroenterology, Genitourinary, Integumentary, Musculoskeletal, Psychiatric were all negative except for pertinent positives noted in my HPI.    Vitals:  BP (!) 155/87   Pulse 88   Temp 97.8  F (36.6  C)   Resp 18   Ht 1.626 m (5' 4\")   Wt 107.2 kg (236 lb 6.4 oz)   LMP 03/28/2014   SpO2 97%   BMI 40.58 kg/m    Body mass index is 40.58 kg/m .  Exam:  GENERAL APPEARANCE:  {long-term GENERAL APPEARANCE:913212}  RESP:  {NURSING HOME RESP PE:885912}  CV:  {long-term CV PE:103029}  PSYCH:  {NURSING HOME PSYCH PE:565098}    Lab/Diagnostic data:   Most Recent 3 CBC's:  Recent Labs   Lab Test 09/09/24  0723 07/17/24  0753 06/19/24  0653   WBC 5.6 5.1 5.6   HGB 10.8* 10.2* 9.7*   MCV 91 87 91    255 196     Most Recent 3 BMP's:  Recent Labs   Lab Test 11/06/24  0723 10/31/24  1539 09/30/24  1026    142 146*   POTASSIUM 4.9 5.0 4.4   CHLORIDE 111* 108* 112*   CO2 21* 20* 22   BUN 46.2* 50.2* 58.2*   CR 1.13* 1.17* 1.28*   ANIONGAP 7 14 12   EFRAIN 9.4 9.5 9.7   * 123* 54*     Most Recent 2 LFT's:  Recent Labs   Lab Test 09/09/24  0723 07/17/24  0753   AST 21 17   ALT 16 14   ALKPHOS 118 102   BILITOTAL 0.2 0.2     Most Recent 3 BNP's:  Recent Labs   Lab Test 09/09/24  0723 01/11/23  1450 12/29/22  0625   NTBNP 424 869 353     Most Recent TSH and T4:  Recent Labs   Lab Test 10/31/24  1539 10/13/21  1540 02/01/21  0909   TSH 5.24*   < > 7.07*   T4  --   --  1.05    < > = values in this interval not displayed.     Most Recent Hemoglobin A1c:  Recent Labs   Lab Test 09/09/24  0723   A1C 7.8*       ASSESSMENT/PLAN    [Z97.8]  Uses self-applied continuous glucose monitoring device  (E10.37X3) Type 1 diabetes mellitus with diabetic macular edema of both eyes resolved after treatment (H)  (E11.6069) Nonproliferative diabetic retinopathy (H)  (primary encounter diagnosis)  (H40.1132) Primary open angle glaucoma (POAG) of " both eyes, moderate stage  (H25.813) Combined forms of age-related cataract of both eyes  (H52.4) Presbyopia  Comment: Chronic.          Lab Results   Component Value Date     A1C 9.7 06/19/2024     A1C 8.5 11/29/2023     A1C 8.1 03/29/2023     A1C 8.7 01/25/2023     A1C 10.7 10/13/2021     A1C 9.7 02/01/2021     A1C 9.2 09/30/2020     A1C 11.8 01/08/2020     A1C 11.8 05/19/2019     A1C 11.2 12/12/2018    She receives insulin Lantus, sliding scale and meal coverage.    Patient has a DEXAcom continuous blood sugar monitor.    For secondary prevention she is currently taking simvastatin 20 mg daily, losartan 40 mg daily, and aspirin 81 mg daily.  Plan: continue following with diabetic educator   Continue following with podiatry  Pt needs to see optometry.      (I10) Hypertension goal BP (blood pressure) < 140/90  Comment: Chronic,   Controlled  Plan: avoid nephrotoxic medications      (R60.0) Bilateral leg edema  Comment:present   Taking lasix and potassium   Plan: refer to lymphedema to evaluate and treat  Will order an ECHO         (K21.00) Gastroesophageal reflux disease with esophagitis without hemorrhage  Comment: Chronic, stable while taking Protonix  Plan: Continue with plan of care no changes at this time, adjustment as needed        (K59.01) Slow transit constipation  Comment: Chronic, stable while taking prune juice  plan: Continue with plan of care no changes at this time, adjustment as needed        (F81.9) Cognitive developmental delay  Comment: Chronic, stable.    Patient has a mother and father who live in [place] and 2 sisters who live in the [facility] area.  Plan: Continue with plan of care no changes at this time, adjustment as needed        (H40.003) Glaucoma suspect, bilateral  Comment: Chronic, stable.   Plan: Currently taking lantanoprost solution     (E87.6) hypokalemia  Comment: taking lasix and potassium chloride  Monitor BMP   Last Comprehensive Metabolic Panel:        Lab  Results   Component Value Date      06/19/2024     POTASSIUM 4.7 06/19/2024     CHLORIDE 111 (H) 06/19/2024     CO2 21 (L) 06/19/2024     ANIONGAP 10 06/19/2024      (H) 06/19/2024     BUN 67.0 (H) 06/19/2024     CR 1.31 (H) 06/19/2024     GFRESTIMATED 46 (L) 06/19/2024     EFRAIN 9.5 06/19/2024      Plan: Continue with plan of care no changes at this time, adjustment as needed        D64.9) Anemia, unspecified type    Comment:  stable  On 12/17/2022 =  peripheral blood morphology showed normocytic anemia and reticulocyte was 0.02%  12/19/2023 = 9.0 at [facility]  12/26/2023 = note states discussed pancytopenia with hematology   2/6/2023 = hgb = 8   On 2/17/2023 she was started on iron supplementation  3/29/23 her hgb was 9.0, hematocrit=29.8, ferritin=49, iron=29, SPO=362, Iron sat index=11.    Pt denies having black stools, shortness of breath, dizziness .    5/8/023 hgb 10.2  7/3/2023 GI work up negative   8/30/2023 hgb 10.5        Hemoglobin   Date Value Ref Range Status   06/19/2024 9.7 (L) 11.7 - 15.7 g/dL Final   09/30/2020 11.9 11.7 - 15.7 g/dL Final   Taking iron  Plan:  Will recheck iron studies in 1 month               Rash  Comment: acute   Plan: continue with hydrocortisone cream and zyrtec.     Weight gain:  Comment: multi factorial including developmental delay, AARON with food readily available, insulin  Plan:   Encourage weight loss  Lymphedema to reassess LE edema.     Electronically signed by:  Bailee Freedman***

## 2025-02-11 ENCOUNTER — LAB REQUISITION (OUTPATIENT)
Dept: LAB | Facility: CLINIC | Age: 65
End: 2025-02-11
Payer: MEDICARE

## 2025-02-11 DIAGNOSIS — R63.5 ABNORMAL WEIGHT GAIN: ICD-10-CM

## 2025-02-11 DIAGNOSIS — D64.9 ANEMIA, UNSPECIFIED: ICD-10-CM

## 2025-02-11 DIAGNOSIS — E03.9 HYPOTHYROIDISM, UNSPECIFIED: ICD-10-CM

## 2025-02-11 DIAGNOSIS — E11.9 TYPE 2 DIABETES MELLITUS WITHOUT COMPLICATIONS (H): ICD-10-CM

## 2025-02-11 PROBLEM — I13.0 HYPERTENSIVE HEART AND CHRONIC KIDNEY DISEASE WITH HEART FAILURE AND STAGE 1 THROUGH STAGE 4 CHRONIC KIDNEY DISEASE, OR UNSPECIFIED CHRONIC KIDNEY DISEASE (H): Status: ACTIVE | Noted: 2025-02-11

## 2025-02-12 LAB
ALBUMIN SERPL BCG-MCNC: 3.5 G/DL (ref 3.5–5.2)
ALP SERPL-CCNC: 126 U/L (ref 40–150)
ALT SERPL W P-5'-P-CCNC: 20 U/L (ref 0–50)
ANION GAP SERPL CALCULATED.3IONS-SCNC: 13 MMOL/L (ref 7–15)
AST SERPL W P-5'-P-CCNC: 22 U/L (ref 0–45)
BILIRUB SERPL-MCNC: <0.2 MG/DL
BUN SERPL-MCNC: 41.9 MG/DL (ref 8–23)
CALCIUM SERPL-MCNC: 9.4 MG/DL (ref 8.8–10.4)
CHLORIDE SERPL-SCNC: 109 MMOL/L (ref 98–107)
CREAT SERPL-MCNC: 1.21 MG/DL (ref 0.51–0.95)
EGFRCR SERPLBLD CKD-EPI 2021: 50 ML/MIN/1.73M2
ERYTHROCYTE [DISTWIDTH] IN BLOOD BY AUTOMATED COUNT: 14.1 % (ref 10–15)
EST. AVERAGE GLUCOSE BLD GHB EST-MCNC: 177 MG/DL
FERRITIN SERPL-MCNC: 94 NG/ML (ref 11–328)
GLUCOSE SERPL-MCNC: 123 MG/DL (ref 70–99)
HBA1C MFR BLD: 7.8 %
HCO3 SERPL-SCNC: 20 MMOL/L (ref 22–29)
HCT VFR BLD AUTO: 36.8 % (ref 35–47)
HGB BLD-MCNC: 11.6 G/DL (ref 11.7–15.7)
IRON BINDING CAPACITY (ROCHE): 239 UG/DL (ref 240–430)
IRON SATN MFR SERPL: 19 % (ref 15–46)
IRON SERPL-MCNC: 45 UG/DL (ref 37–145)
MCH RBC QN AUTO: 28 PG (ref 26.5–33)
MCHC RBC AUTO-ENTMCNC: 31.5 G/DL (ref 31.5–36.5)
MCV RBC AUTO: 89 FL (ref 78–100)
PLATELET # BLD AUTO: 208 10E3/UL (ref 150–450)
POTASSIUM SERPL-SCNC: 4.3 MMOL/L (ref 3.4–5.3)
PROT SERPL-MCNC: 6.5 G/DL (ref 6.4–8.3)
RBC # BLD AUTO: 4.15 10E6/UL (ref 3.8–5.2)
SODIUM SERPL-SCNC: 142 MMOL/L (ref 135–145)
TSH SERPL DL<=0.005 MIU/L-ACNC: 8.16 UIU/ML (ref 0.3–4.2)
VIT B12 SERPL-MCNC: 1509 PG/ML (ref 232–1245)
WBC # BLD AUTO: 5.8 10E3/UL (ref 4–11)

## 2025-02-12 PROCEDURE — 80053 COMPREHEN METABOLIC PANEL: CPT | Mod: ORL | Performed by: NURSE PRACTITIONER

## 2025-02-12 PROCEDURE — P9604 ONE-WAY ALLOW PRORATED TRIP: HCPCS | Mod: ORL | Performed by: NURSE PRACTITIONER

## 2025-02-12 PROCEDURE — 84443 ASSAY THYROID STIM HORMONE: CPT | Mod: ORL | Performed by: NURSE PRACTITIONER

## 2025-02-12 PROCEDURE — 82607 VITAMIN B-12: CPT | Mod: ORL | Performed by: NURSE PRACTITIONER

## 2025-02-12 PROCEDURE — 36415 COLL VENOUS BLD VENIPUNCTURE: CPT | Mod: ORL | Performed by: NURSE PRACTITIONER

## 2025-02-12 PROCEDURE — 83550 IRON BINDING TEST: CPT | Mod: ORL | Performed by: NURSE PRACTITIONER

## 2025-02-12 PROCEDURE — 85027 COMPLETE CBC AUTOMATED: CPT | Mod: ORL | Performed by: NURSE PRACTITIONER

## 2025-02-12 PROCEDURE — 82728 ASSAY OF FERRITIN: CPT | Mod: ORL | Performed by: NURSE PRACTITIONER

## 2025-02-12 PROCEDURE — 83036 HEMOGLOBIN GLYCOSYLATED A1C: CPT | Mod: ORL | Performed by: NURSE PRACTITIONER

## 2025-02-18 ENCOUNTER — TELEPHONE (OUTPATIENT)
Dept: ENDOCRINOLOGY | Facility: CLINIC | Age: 65
End: 2025-02-18
Payer: MEDICARE

## 2025-02-18 NOTE — TELEPHONE ENCOUNTER
[facility] pharmacy [facility] tells us that the Fiasp Flextouch pen isn't covered. They want to change her to generic insulin aspart Novolog. Is this change ok or is there a reason she needs the Fiasp  ? Kathie Figueroa RN on 2/18/2025 at 9:00 AM

## 2025-02-19 ENCOUNTER — TELEPHONE (OUTPATIENT)
Dept: ENDOCRINOLOGY | Facility: CLINIC | Age: 65
End: 2025-02-19
Payer: MEDICARE

## 2025-02-19 DIAGNOSIS — E10.37X3 TYPE 1 DIABETES MELLITUS WITH DIABETIC MACULAR EDEMA OF BOTH EYES RESOLVED AFTER TREATMENT (H): Primary | ICD-10-CM

## 2025-02-19 DIAGNOSIS — E10.37X3 TYPE 1 DIABETES MELLITUS WITH DIABETIC MACULAR EDEMA OF BOTH EYES RESOLVED AFTER TREATMENT (H): ICD-10-CM

## 2025-02-19 NOTE — TELEPHONE ENCOUNTER
"No allergies listed so Insulin aspart  was sent to pharmacy  to replace fiasp per Alsion Viet \"Please check to assure to recorded allergy or reaction but otherwise, That should be fine - thank you! \". Message sent to patient on the change Kathie Figueroa RN on 2/19/2025 at 10:19 AM    "

## 2025-02-19 NOTE — TELEPHONE ENCOUNTER
[facility] Call Center    Phone Message    May a detailed message be left on voicemail: yes     Reason for Call: Medication Question or concern regarding medication   Prescription Clarification  Name of Medication: insulin aspart (NOVOLOG PEN) 100 UNIT/ML pen [[id number]]   Prescribing Provider: Viet   Pharmacy: [place], [place] - [address]   What on the order needs clarification? Pharmacy has dispensing and dosage questions that they would like to clarify      Action Taken: Other: endo    Travel Screening: Not Applicable     Date of Service:

## 2025-02-20 NOTE — TELEPHONE ENCOUNTER
Call to pharmacy, Novolog prescription was sent to the pharmacy with the instructions that were on Lexy's Insulin Degludec prescription (22 units daily) instead of the instructions that were on her Fiasp prescription.  Writer clarified that Lexy's insurance no longer covers Fiasp, so we are switching to Novolog.  Writer sent new Novolog prescription with the correct instruction. No changes to Degludec.    KARLOS [facility]  Diabetes Education Department  [facility], [place]  Phone: [phone]  [phone]

## 2025-02-25 ENCOUNTER — VIRTUAL VISIT (OUTPATIENT)
Dept: EDUCATION SERVICES | Facility: CLINIC | Age: 65
End: 2025-02-25
Payer: MEDICARE

## 2025-02-25 DIAGNOSIS — E10.3393 TYPE 1 DIABETES MELLITUS WITH MODERATE NONPROLIFERATIVE RETINOPATHY OF BOTH EYES, MACULAR EDEMA PRESENCE UNSPECIFIED (H): Primary | ICD-10-CM

## 2025-02-25 PROCEDURE — 98966 PH1 ASSMT&MGMT NQHP 5-10: CPT | Mod: 93 | Performed by: DIETITIAN, REGISTERED

## 2025-02-25 NOTE — LETTER
2/25/2025      Lexy Rockwell  [address]  [facility] [address]      Dear Colleague,    Thank you for referring your patient, Lexy Rockwell, to the [facility]. Please see a copy of my visit note below.    Virtual Visit Details    Type of service:  Telephone Visit   Phone call duration: 6 minutes   Originating Location (pt. Location): Assisted Living    Distant Location (provider location):  Off-site  Telephone visit completed due to the patient did not have access to video, while the distant provider did.    Diabetes Self-Management Education & Support    Presents for: CGM Review      Assessment  Lexy reports that she currently has a cold, but is starting to feel better.  She reports that she feels she has had less low blood sugars lately. Lexy had inquired about starting Mounjaro.  Writer explained that it is only approved for Type 2 diabetes, but recommended that she discuss with Rika at upcoming appointment. Could consider Wegovy, etc for weight loss. We reviewed Dexcom reports, it is difficult to determine a pattern, BG fluctuate quite a bit. Average BG silghtly elevated at 182 mg/dl for the last 2 weeks (GMI- 7.7%). No changes recommended today.     See CGM Reports in Monitoring section below.      Glucose Patterns & Trends:  Unable to identify a pattern, BG fluctuating quite a bit    Care Plan and Education Provided:    Patient verbalized understanding of diabetes self-management education concepts discussed, opportunities for ongoing education and support, and recommendations provided today.    Plan    Continue with current insulin plan    Topics to cover at upcoming visits: Healthy Eating, Being Active, Monitoring, Taking Medication, Problem Solving, Reducing Risks, and Healthy Coping    Follow-up:  Upcoming Diabetes Ed Appointments     Visit Type Date Time Department    DIABETES ED SHORT 2/25/2025 11:00 AM  DIABETES ED        See Care Plan for  "co-developed, patient-state behavior change goals.    Education Materials Provided:  No new materials provided today      Subjective/Objective  Lexy is an 64 year old year old, presenting for the following diabetes education related to: Presents for: CGM Review  Accompanied by: Self (Dietitian from Assisted Living)  Diabetes education in the past 24mo: Yes  Focus of Visit: Problem Solving, Monitoring, Taking Medication  Diabetes type: Type 1  Disease course: Worsening  How confident are you filling out medical forms by yourself:: Not Assessed  Diabetes management related comments/concerns: No questions today  Transportation concerns: Yes (gets rides or takes public tranportation)  Difficulty affording diabetes medication?: No  Difficulty affording diabetes testing supplies?: No  Other concerns:: Cognitive impairment  Cultural Influences/Ethnic Background:  Not  or     Diabetes Symptoms & Complications:  Diabetes Related Symptoms: Neuropathy, Polydipsia (increased thirst), Polyuria (increased urination), Fatigue, Polyphagia (increased hunger) (feeling more fatigue in the morning, neuropathy)  Weight trend: Increasing  Symptom course: Stable  Disease course: Worsening  Complications assessed today?: Yes  Autonomic neuropathy: No  CVA: No  Heart disease: No  Nephropathy: Yes  Peripheral neuropathy: Yes  Peripheral Vascular Disease: No  Retinopathy: No  Sexual dysfunction: No    Patient Problem List and Family Medical History reviewed for relevant medical history, current medical status, and diabetes risk factors.    Vitals:  LMP 03/28/2014   Estimated body mass index is 41.25 kg/m  as calculated from the following:    Height as of 2/20/25: 1.626 m (5' 4\").    Weight as of 2/20/25: 109 kg (240 lb 4.8 oz).   Last 3 BP:   BP Readings from Last 3 Encounters:   02/20/25 (!) 167/87   02/06/25 (!) 155/87   12/04/24 (!) 153/83       History   Smoking Status     Never   Smokeless Tobacco     Never " "      Labs:  Lab Results   Component Value Date    A1C 7.8 02/12/2025    A1C 9.7 02/01/2021     Lab Results   Component Value Date     02/12/2025     10/13/2021     02/01/2021     Lab Results   Component Value Date    LDL 58 01/25/2023     02/01/2021     HDL Cholesterol   Date Value Ref Range Status   02/01/2021 62 >49 mg/dL Final     Direct Measure HDL   Date Value Ref Range Status   01/25/2023 37 (L) >=50 mg/dL Final   ]  GFR Estimate   Date Value Ref Range Status   02/12/2025 50 (L) >60 mL/min/1.73m2 Final   02/01/2021 90 >60 mL/min/[1.73_m2] Final     Comment:     Non  GFR Calc  Starting 12/18/2018, serum creatinine based estimated GFR (eGFR) will be   calculated using the Chronic Kidney Disease Epidemiology Collaboration   (CKD-EPI) equation.       GFR Estimate If Black   Date Value Ref Range Status   02/01/2021 >90 >60 mL/min/[1.73_m2] Final     Comment:      GFR Calc  Starting 12/18/2018, serum creatinine based estimated GFR (eGFR) will be   calculated using the Chronic Kidney Disease Epidemiology Collaboration   (CKD-EPI) equation.       Lab Results   Component Value Date    CR 1.21 02/12/2025    CR 0.73 02/01/2021     No results found for: \"MICROALBUMIN\"    Healthy Eating:  Healthy Eating Assessed Today: Yes  Cultural/Hoahaoism diet restrictions?: No  Do you have any food allergies or intolerances?: No  Meal planning/habits: None  Who cooks/prepares meals for you?: Other  Who purchases food in  your home?: Other  How many times a week on average do you eat food made away from home (restaurant/take-out)?: 0  Meals include: Breakfast, Lunch, Dinner, Afternoon Snack, Evening Snack, Morning Snack  Breakfast: 8:00 am: Oatmeal with hard boiled egg or scrambled eggs and barragan with toast OR pancake/waffle with eggs OR Cameroonian toast  Lunch: 12-12:30: Stir [place]ouard OR hamburgers with vegetables (with dessert- small cake or cookie)  Dinner: 5:00 -6:00 pm: sandwich " OR chili (vegetarian) OR chicken OR fish  with vegetables with desserts (cake or cookies, fruit)  Snacks: mornnig snack: yogurt or pudding AND Has one bedtime snack- usually a cookie - oatmeal raisin or chocolate chip or yogurt OR sugra free candy every once and a while  Other: drinks coffee with sugar free creamer  Beverages: Water, Coffee, Milk (Keep some juice on hand for lows)  Has patient met with a dietitian in the past?: Yes    Being Active:  Being Active Assessed Today: Yes (went on the bike at her apartment complex)  Exercise:: Yes (walking, programs at care center)  Days per week of moderate to strenuous exercise (like a brisk walk): 1  On average, minutes per day of exercise at this level:  (Walking with friends)  How intense was your typical exercise? : Light (like stretching or slow walking)  Barrier to exercise: Safety, Physical limitation    Monitoring:  Monitoring Assessed Today: Yes  Did patient bring glucose meter to appointment? : No  Blood Glucose Meter: Unknown, CGM  Times checking blood sugar at home (number): 5+  Times checking blood sugar at home (per): Day  Blood glucose trend: Fluctuating        Taking Medications:  Diabetes Medication(s)       Diabetic Other       Glucagon (BAQSIMI) 3 MG/DOSE nasal powder Spray 1 spray (3 mg) in nostril as needed. in the event of unconscious hypoglycemia or hypoglycemic seizure. May repeat dose if no response after 15 minutes.     glucagon 1 MG SOLR injection Inject 1 mg into the muscle once       Insulin       insulin aspart (NOVOLOG PEN) 100 UNIT/ML pen Inject 4-10 Units subcutaneously 3 times daily (before meals). Inject 2 units before breakfast, 6 units before lunch and 11 units before dinner, 3 units before each snacks 2x daily. Add correction scale before meals. -160 = 1 unit. -200 = 2 units. -240 = 3 units. -280 = 4 units. -320 = 5 unit(s). Max daily dose is 50 units.     Insulin Aspart, w/Niacinamide, (FIASP PENFILL)  100 UNIT/ML SOCT Inject 4-10 Units subcutaneously 3 times daily (before meals). Inject 2 units before breakfast, 6 units before lunch and 11 units before dinner, 3 units before each snacks 2x daily. Add correction scale before meals. -160 = 1 unit. -200 = 2 units. -240 = 3 units. -280 = 4 units. -320 = 5 unit(s). Max daily dose is 50 units.     Insulin Degludec FlexTouch 100 UNIT/ML SOPN Inject 22 Units subcutaneously daily. EVERY NIGHT AT BEDTIME FOR DM *DISCARD 56 DAYS AFTER OPENING*          Taking Medication Assessed Today: Yes  Current Treatments: Insulin Injections  Dose schedule: Pre-breakfast, Pre-lunch, Pre-dinner, At bedtime  Given by: Nursing attendant  Injection/Infusion sites: Abdomen  Problems taking diabetes medications regularly?: No  Diabetes medication side effects?: No    Tiffanie Mcfarland RD  Time Spent: 6 minutes  Encounter Type: Individual    Any diabetes medication dose changes were made via the CDCES Standing Orders under the patient's referring provider.      Again, thank you for allowing me to participate in the care of your patient.        Sincerely,        Tiffanie Mcfarland RD    Electronically signed

## 2025-02-25 NOTE — PROGRESS NOTES
Virtual Visit Details    Type of service:  Telephone Visit   Phone call duration: 6 minutes   Originating Location (pt. Location): Assisted Living    Distant Location (provider location):  Off-site  Telephone visit completed due to the patient did not have access to video, while the distant provider did.    Diabetes Self-Management Education & Support    Presents for: CGM Review      Assessment  Lexy reports that she currently has a cold, but is starting to feel better.  She reports that she feels she has had less low blood sugars lately. Lexy had inquired about starting Mounjaro.  Writer explained that it is only approved for Type 2 diabetes, but recommended that she discuss with Rika at upcoming appointment. Could consider Wegovy, etc for weight loss. We reviewed Dexcom reports, it is difficult to determine a pattern, BG fluctuate quite a bit. Average BG silghtly elevated at 182 mg/dl for the last 2 weeks (GMI- 7.7%). No changes recommended today.     See CGM Reports in Monitoring section below.      Glucose Patterns & Trends:  Unable to identify a pattern, BG fluctuating quite a bit    Care Plan and Education Provided:    Patient verbalized understanding of diabetes self-management education concepts discussed, opportunities for ongoing education and support, and recommendations provided today.    Plan    Continue with current insulin plan    Topics to cover at upcoming visits: Healthy Eating, Being Active, Monitoring, Taking Medication, Problem Solving, Reducing Risks, and Healthy Coping    Follow-up:  Upcoming Diabetes Ed Appointments     Visit Type Date Time Department    DIABETES ED SHORT 2/25/2025 11:00 AM  DIABETES ED        See Care Plan for co-developed, patient-state behavior change goals.    Education Materials Provided:  No new materials provided today      Subjective/Objective  Lexy is an 64 year old year old, presenting for the following diabetes education related to: Presents for:  "CGM Review  Accompanied by: Self (Dietitian from Assisted Living)  Diabetes education in the past 24mo: Yes  Focus of Visit: Problem Solving, Monitoring, Taking Medication  Diabetes type: Type 1  Disease course: Worsening  How confident are you filling out medical forms by yourself:: Not Assessed  Diabetes management related comments/concerns: No questions today  Transportation concerns: Yes (gets rides or takes public tranportation)  Difficulty affording diabetes medication?: No  Difficulty affording diabetes testing supplies?: No  Other concerns:: Cognitive impairment  Cultural Influences/Ethnic Background:  Not  or     Diabetes Symptoms & Complications:  Diabetes Related Symptoms: Neuropathy, Polydipsia (increased thirst), Polyuria (increased urination), Fatigue, Polyphagia (increased hunger) (feeling more fatigue in the morning, neuropathy)  Weight trend: Increasing  Symptom course: Stable  Disease course: Worsening  Complications assessed today?: Yes  Autonomic neuropathy: No  CVA: No  Heart disease: No  Nephropathy: Yes  Peripheral neuropathy: Yes  Peripheral Vascular Disease: No  Retinopathy: No  Sexual dysfunction: No    Patient Problem List and Family Medical History reviewed for relevant medical history, current medical status, and diabetes risk factors.    Vitals:  LMP 03/28/2014   Estimated body mass index is 41.25 kg/m  as calculated from the following:    Height as of 2/20/25: 1.626 m (5' 4\").    Weight as of 2/20/25: 109 kg (240 lb 4.8 oz).   Last 3 BP:   BP Readings from Last 3 Encounters:   02/20/25 (!) 167/87   02/06/25 (!) 155/87   12/04/24 (!) 153/83       History   Smoking Status    Never   Smokeless Tobacco    Never       Labs:  Lab Results   Component Value Date    A1C 7.8 02/12/2025    A1C 9.7 02/01/2021     Lab Results   Component Value Date     02/12/2025     10/13/2021     02/01/2021     Lab Results   Component Value Date    LDL 58 01/25/2023     " "02/01/2021     HDL Cholesterol   Date Value Ref Range Status   02/01/2021 62 >49 mg/dL Final     Direct Measure HDL   Date Value Ref Range Status   01/25/2023 37 (L) >=50 mg/dL Final   ]  GFR Estimate   Date Value Ref Range Status   02/12/2025 50 (L) >60 mL/min/1.73m2 Final   02/01/2021 90 >60 mL/min/[1.73_m2] Final     Comment:     Non  GFR Calc  Starting 12/18/2018, serum creatinine based estimated GFR (eGFR) will be   calculated using the Chronic Kidney Disease Epidemiology Collaboration   (CKD-EPI) equation.       GFR Estimate If Black   Date Value Ref Range Status   02/01/2021 >90 >60 mL/min/[1.73_m2] Final     Comment:      GFR Calc  Starting 12/18/2018, serum creatinine based estimated GFR (eGFR) will be   calculated using the Chronic Kidney Disease Epidemiology Collaboration   (CKD-EPI) equation.       Lab Results   Component Value Date    CR 1.21 02/12/2025    CR 0.73 02/01/2021     No results found for: \"MICROALBUMIN\"    Healthy Eating:  Healthy Eating Assessed Today: Yes  Cultural/Spiritism diet restrictions?: No  Do you have any food allergies or intolerances?: No  Meal planning/habits: None  Who cooks/prepares meals for you?: Other  Who purchases food in  your home?: Other  How many times a week on average do you eat food made away from home (restaurant/take-out)?: 0  Meals include: Breakfast, Lunch, Dinner, Afternoon Snack, Evening Snack, Morning Snack  Breakfast: 8:00 am: Oatmeal with hard boiled egg or scrambled eggs and barragan with toast OR pancake/waffle with eggs OR Croatian toast  Lunch: 12-12:30: Stir Edouard OR hamburgers with vegetables (with dessert- small cake or cookie)  Dinner: 5:00 -6:00 pm: sandwich OR chili (vegetarian) OR chicken OR fish  with vegetables with desserts (cake or cookies, fruit)  Snacks: mornnig snack: yogurt or pudding AND Has one bedtime snack- usually a cookie - oatmeal raisin or chocolate chip or yogurt OR sugra free candy every once and a " while  Other: drinks coffee with sugar free creamer  Beverages: Water, Coffee, Milk (Keep some juice on hand for lows)  Has patient met with a dietitian in the past?: Yes    Being Active:  Being Active Assessed Today: Yes (went on the bike at her apartment complex)  Exercise:: Yes (walking, programs at care center)  Days per week of moderate to strenuous exercise (like a brisk walk): 1  On average, minutes per day of exercise at this level:  (Walking with friends)  How intense was your typical exercise? : Light (like stretching or slow walking)  Barrier to exercise: Safety, Physical limitation    Monitoring:  Monitoring Assessed Today: Yes  Did patient bring glucose meter to appointment? : No  Blood Glucose Meter: Unknown, CGM  Times checking blood sugar at home (number): 5+  Times checking blood sugar at home (per): Day  Blood glucose trend: Fluctuating        Taking Medications:  Diabetes Medication(s)       Diabetic Other       Glucagon (BAQSIMI) 3 MG/DOSE nasal powder Spray 1 spray (3 mg) in nostril as needed. in the event of unconscious hypoglycemia or hypoglycemic seizure. May repeat dose if no response after 15 minutes.     glucagon 1 MG SOLR injection Inject 1 mg into the muscle once       Insulin       insulin aspart (NOVOLOG PEN) 100 UNIT/ML pen Inject 4-10 Units subcutaneously 3 times daily (before meals). Inject 2 units before breakfast, 6 units before lunch and 11 units before dinner, 3 units before each snacks 2x daily. Add correction scale before meals. -160 = 1 unit. -200 = 2 units. -240 = 3 units. -280 = 4 units. -320 = 5 unit(s). Max daily dose is 50 units.     Insulin Aspart, w/Niacinamide, (FIASP PENFILL) 100 UNIT/ML SOCT Inject 4-10 Units subcutaneously 3 times daily (before meals). Inject 2 units before breakfast, 6 units before lunch and 11 units before dinner, 3 units before each snacks 2x daily. Add correction scale before meals. -160 = 1 unit. -200  = 2 units. -240 = 3 units. -280 = 4 units. -320 = 5 unit(s). Max daily dose is 50 units.     Insulin Degludec FlexTouch 100 UNIT/ML SOPN Inject 22 Units subcutaneously daily. EVERY NIGHT AT BEDTIME FOR DM *DISCARD 56 DAYS AFTER OPENING*          Taking Medication Assessed Today: Yes  Current Treatments: Insulin Injections  Dose schedule: Pre-breakfast, Pre-lunch, Pre-dinner, At bedtime  Given by: Nursing attendant  Injection/Infusion sites: Abdomen  Problems taking diabetes medications regularly?: No  Diabetes medication side effects?: No    Tiffanie Mcfarland RD  Time Spent: 6 minutes  Encounter Type: Individual    Any diabetes medication dose changes were made via the CDCES Standing Orders under the patient's referring provider.

## 2025-03-13 NOTE — PROGRESS NOTES
Endocrinology Note         Lexy is a 64 year old female presents today for type 1 diabetes    HPI  Lexy is a 64 year old female history of type 1 diabetes with gastroparesis and chronic kidney disease stage 3, hypertension, hyperlipidemia developmental delay presents today for uncontrolled type 1 diabetes.      Patient has had history of suboptimal control of type 1 diabetes (previous A1c ranged between 9 to 12%).  Patient had history of diabetic ketoacidosis in 2021.  Patient had documented low C-peptide and positive angelica antibody in 3/2018.    I last saw her in November 2024 and advised:  Please reduce your breakfast Novolog dose to 3 units and increase dinner dose to 10 units.  Please continue correction and lunch dose as they are.      Please have a A1C lab drawn and send us the result.      Please schedule your annual physical with your primary care provider to get your labs and preventive care updated.    Continue to see Tiffanie diabetes education monthly until her blood sugars are in goal.  I will see you again in 3-4 months.      TODAY:    She continues living at [facility] (nursing home).   A1c steady at 7.8% last month.  She is particularly interested in weight loss medications and has other appointments this with diabetes that take a once a week injection and she would like to do this.  She has been trying to lose weight with activity and diet but she finds her appetite always wins out and she is very anxious about her increasing weight and would like to stop it.    She would really like to lose some weight and feels that most of her health problems would be better if she could.  She denies any personal or family history of pancreatitis, pancreatic cancer, multiple endocrine neoplasia's.  Her mother did have breast cancer and recovered from that but now is being treated for colon cancer.  She does not use alcohol.  Otherwise she feels her diabetes management is kind of  good.  She has been struggling with low blood sugars.  She usually has a bedtime snack and feels that she has to to prevent overnight low.  She has been waking up in the morning with low blood sugar around 3 AM.  She has glucose tablets and candy at her bedside to treat this.  She sometimes is woken up by her sensor she sometimes wakes up on her own she does have symptoms of feeling sweaty sometimes but more so notes she feels weak when she has a low blood sugar and this is resolved when she has something to eat.       Current diabetic medications  - Tresiba 22 units daily  - Humalog 3 units with breakfast, 8 units with lunch, 10 units with dinner  - Snack coverage-she states she is not using.  - Sliding scale --  1 unit per 40 greater than 120.      Diet recalled:  BF: Always includes oatmeal but some days may also be scrambled eggs and toast others pancakes and sausage.    Lunch: Around 1 PM  Dinner: 5:45 PM    She then has a small cookie to prevent low blood sugar overnight.  She does not take any insulin with this.      She is using Dexcom to track glucose levels.    Continuous glucose monitoring:  See details below.  Blood sugars currently 38% time in range with an average blood sugar of 210 which correlates to a GMI of 8.3% coefficient of variance is slightly improved at 32.4%.  Reading is officially 0% low but I do note she had a significant low blood sugar at 3 AM within the last 2 weeks.    DM complications:  Retinopathy:  moderate NPDR, glaucoma -- due to for exam this year  Nephropathy: positive macroalbuminuria (test 2021), CKD stage 3   Neuropathy: some tingling and numbness in her feet -- saw podiatrist 3/3023.    Past Medical History  Past Medical History:   Diagnosis Date    Cerumen impacted     Development delay     Diabetic gastroparesis (H) 8/16/2013    Glaucoma (increased eye pressure)     Hyperlipaemia     Hypertension     Hypothyroid     Mental retardation     Nonsenile cataract     Obesity      Strabismus     Type 1 diabetes mellitus not at goal (H)        Allergies  Allergies   Allergen Reactions    Amoxicillin     Sulfa Antibiotics      Medications  Current Outpatient Medications   Medication Sig Dispense Refill    acetaminophen (TYLENOL) 500 MG tablet TAKE 2 TABLETS BY MOUTH EVERY 6 HOURS AS NEEDED (1000MG) FOR PAIN 248 tablet 11    AMLODIPINE BENZOATE PO Take 2.5 mg by mouth      carvedilol (COREG) 6.25 MG tablet Take 1 tablet (6.25 mg) by mouth 2 times daily (with meals)      cetirizine (ZYRTEC) 10 MG tablet Take 1 tablet (10 mg) by mouth daily.      Continuous Glucose Sensor (DEXCOM G6 SENSOR) MISC Change every 10 days. 9 each 3    Continuous Glucose Transmitter (DEXCOM G6 TRANSMITTER) MISC Change every 3 months. 1 each 3    dorzolamide-timolol (COSOPT) 2-0.5 % ophthalmic solution Place 1 drop into both eyes 2 times daily 20 mL 3    ferrous sulfate (SLO-FE) 142 (45 Fe) MG CR tablet Take 1 tablet (142 mg) by mouth daily      furosemide (LASIX) 20 MG tablet Take 10 mg by mouth daily.      Glucagon (BAQSIMI) 3 MG/DOSE nasal powder Spray 1 spray (3 mg) in nostril as needed. in the event of unconscious hypoglycemia or hypoglycemic seizure. May repeat dose if no response after 15 minutes. 2 each 1    glucagon 1 MG SOLR injection Inject 1 mg into the muscle once      hydrocortisone 1 % CREA cream Place rectally 2 times daily      insulin aspart (NOVOLOG PEN) 100 UNIT/ML pen Inject 4-10 Units subcutaneously 3 times daily (before meals). Inject 2 units before breakfast, 6 units before lunch and 11 units before dinner, 3 units before each snacks 2x daily. Add correction scale before meals. -160 = 1 unit. -200 = 2 units. -240 = 3 units. -280 = 4 units. -320 = 5 unit(s). Max daily dose is 50 units. 6 mL 11    Insulin Aspart, w/Niacinamide, (FIASP PENFILL) 100 UNIT/ML SOCT Inject 4-10 Units subcutaneously 3 times daily (before meals). Inject 2 units before breakfast, 6 units before  lunch and 11 units before dinner, 3 units before each snacks 2x daily. Add correction scale before meals. -160 = 1 unit. -200 = 2 units. -240 = 3 units. -280 = 4 units. -320 = 5 unit(s). Max daily dose is 50 units. 6 mL 11    Insulin Degludec FlexTouch 100 UNIT/ML SOPN Inject 22 Units subcutaneously daily. EVERY NIGHT AT BEDTIME FOR DM *DISCARD 56 DAYS AFTER OPENING* 6 mL PRN    insulin pen needle (NOVOFINE AUTOCOVER PEN NEEDLE) 30G X 8 MM miscellaneous 1 each 4 times daily. as directed. 100 each 11    insulin pen needle 30G X 5 MM 2 times daily. Use 2 pen needles daily or as directed.      KETOSTIX test strip Use as directed in case of high glucose, vomiting or illness or blood sugar >350. 50 strip 3    latanoprost (XALATAN) 0.005 % ophthalmic solution INSTILL 1 DROP IN BOTH EYES AT BEDTIME 10 mL 11    levothyroxine (SYNTHROID/LEVOTHROID) 50 MCG tablet Take 50 mcg by mouth daily.      lisinopril (ZESTRIL) 40 MG tablet Take 1 tablet (40 mg) by mouth daily      MULTIVITAMIN TABS   OR 1 TABLET DAILY      pantoprazole (PROTONIX) 40 MG EC tablet Take 40 mg by mouth daily      sennosides (SENOKOT) 8.6 MG tablet Take 1 tablet by mouth daily as needed      simvastatin (ZOCOR) 20 MG tablet Take 1 tablet (20 mg) by mouth At Bedtime 90 tablet 3     Family History  family history includes Diabetes in her sister; Glaucoma in her maternal grandfather; Hypertension in her father.     Social History  Social History     Tobacco Use    Smoking status: Never    Smokeless tobacco: Never    Tobacco comments:     lives in smoke free household.   Substance Use Topics    Alcohol use: Yes     Comment: occass social    Drug use: No       ROS  10 points ROS were negative otherwise mentioned in HPI    Physical Exam  LMP 03/28/2014   Limited due to video visit.  Pleasant articulate female with reasonable recall.  Dentition is poor.  Lips are dry.  Constitutional: no distress, comfortable, pleasant   Psychological:  "appropriate mood     Wt Readings from Last 10 Encounters:   02/20/25 109 kg (240 lb 4.8 oz)   02/06/25 107.2 kg (236 lb 6.4 oz)   12/04/24 105.8 kg (233 lb 3.2 oz)   11/12/24 103.4 kg (227 lb 14.4 oz)   10/31/24 101.2 kg (223 lb 3.2 oz)   10/02/24 99.4 kg (219 lb 1.6 oz)   09/20/24 98.9 kg (218 lb 1.6 oz)   09/06/24 101.8 kg (224 lb 6.4 oz)   08/13/24 100.5 kg (221 lb 8 oz)   07/31/24 91.4 kg (201 lb 6.4 oz)     Estimated body mass index is 41.25 kg/m  as calculated from the following:    Height as of 2/20/25: 1.626 m (5' 4\").    Weight as of 2/20/25: 109 kg (240 lb 4.8 oz).        RESULTS  I have personally reviewed labs and images. I also reviewed labs with patient and discussed the result and plan of care.    Lab Results   Component Value Date    A1C 9.7 06/19/2024    A1C 8.5 11/29/2023    A1C 8.1 03/29/2023    A1C 8.7 01/25/2023    A1C 10.7 10/13/2021    A1C 9.7 02/01/2021    A1C 9.2 09/30/2020    A1C 11.8 01/08/2020    A1C 11.8 05/19/2019    A1C 11.2 12/12/2018      Latest Ref Rng 1/25/2023  8:20 AM   ENDO DIABETES     Cholesterol <200 mg/dL 112    LDL Cholesterol Calculated <=100 mg/dL 58    HDL Cholesterol >=50 mg/dL 37 (L)    Non HDL Cholesterol <130 mg/dL 75    VLDL-Cholesterol 0 - 30 mg/dL    Triglycerides <150 mg/dL 84    TRIG (EXT) <150 mg/dL 84       Latest Ref Rng 8/8/2024  6:57 AM   ENDO DIABETES     Creatinine 0.51 - 0.95 mg/dL 1.01 (H)      Recent Labs   Lab Test 02/12/25  0834 11/06/24  0723 10/31/24  1539 09/16/24  0902 09/09/24  0723 03/29/23  0515 01/25/23  0820 10/13/21  1540 02/01/21  0909 09/30/20  0910 01/08/20  1030 10/02/19  1448   A1C 7.8*  --   --   --  7.8*   < > 8.7*   < > 9.7*   < > 11.8*  --    TSH 8.16*  --  5.24*  --  10.70*   < >  --    < > 7.07*  --  4.48*  --    T4  --   --   --   --   --   --   --   --  1.05  --  1.08  --    LDL  --   --   --   --   --   --  58  --  120*  --   --   --    HDL  --   --   --   --   --   --  37*  --  62  --   --   --    TRIG  --   --   --   --   -- "   --  84  --  89  --   --   --    CR 1.21* 1.13* 1.17*   < > 1.10*   < > 0.93   < > 0.73   < >  --   --    MICROL  --   --   --   --   --   --   --   --  306  --   --  117    < > = values in this interval not displayed.     GFR Estimate   Date Value Ref Range Status   02/12/2025 50 (L) >60 mL/min/1.73m2 Final   11/06/2024 54 (L) >60 mL/min/1.73m2 Final   10/31/2024 52 (L) >60 mL/min/1.73m2 Final   02/01/2021 90 >60 mL/min/[1.73_m2] Final     Comment:     Non  GFR Calc  Starting 12/18/2018, serum creatinine based estimated GFR (eGFR) will be   calculated using the Chronic Kidney Disease Epidemiology Collaboration   (CKD-EPI) equation.     09/30/2020 >90 >60 mL/min/[1.73_m2] Final     Comment:     Non  GFR Calc  Starting 12/18/2018, serum creatinine based estimated GFR (eGFR) will be   calculated using the Chronic Kidney Disease Epidemiology Collaboration   (CKD-EPI) equation.     07/12/2019 >90 >60 mL/min/[1.73_m2] Final     Comment:     Non  GFR Calc  Starting 12/18/2018, serum creatinine based estimated GFR (eGFR) will be   calculated using the Chronic Kidney Disease Epidemiology Collaboration   (CKD-EPI) equation.           ASSESSMENT:    Lexy is a 64 year old female history of type 1 diabetes with gastroparesis and chronic kidney disease stage 3, hypertension, hyperlipidemia developmental delay presents today for uncontrolled type 1 diabetes.    1) type 1 diabetes with CKD and gastroparesis: Her diabetes control is improved but I am concerned about midday hypoglycemia and overnight hyperglycemia.    I do not have an MAR to verify medication doses, but understand from Peace Mcfarland's note that she is now taking 22 units of Tresiba daily which we will continue.   Based on current needs and to avoid midday-full glycemia overnight hyperglycemia we reduced her breakfast dose of Humalog to just 3 units maintain the lunch dose of 8 units and increase the dinner dose  from 9 to 10 units.  I am asking nursing to contact her home to verify current doses and update them as stated.    She should continue using Dexcom glucose sensor  She should continue to see diabetes education monthly until her blood sugars are in goal.    I am requesting her in her home I have an A1c drawn.    2) DM complications:  Retinopathy:  moderate NPDR, glaucoma [facility]  Nephropathy: positive macroalbuminuria (test 2021), CKD stage 3 , continue to follow with current provider.  Neuropathy: some tingling and numbness in her feet --continue to work with local podiatrist.     3.  Hypothyroidism: Most recent TSH continue to be elevated.  She reports that the dose has been adjusted and she does have a follow-up TSH scheduled that will be drawn at her home.      PLAN:   -Continue Tresiba 22 units daily.  -Continue Humalog at breakfast from 4 to 3 units with breakfast, continue 7 units with lunch, DECREASE Humalog dose with dinner to 10 units- continue sliding scale --  1 unit per 40 above 120.     - Please start Ozempic 0.25 mg weekly for weight loss.  If tolerated, discuss 0.5 mg weekly next month with Tiffanie,but your Humalog insulin dose will need to be decreased before your start the higher dose.  - continue using Dexcom sensor  - will fax insulin regiment to assisted living    I have evaluated this patient today and I am prescribing/continuing to prescribe a continuous glucose monitor for the following reasons:  1. The patient has a diagnosis of Type 1 Diabetes; and  2. I have concluded that the patient (or patient's caregiver) has sufficient training using the continuous glucose monitor prescribed as evidenced by providing a prescription; and  3. The continuous glucose monitor is prescribed in accordance with its FDA indications for use; and  4. The patient for whom a continuous glucose monitor is being prescribed, meets at least ONE of the criteria below:   The patient is  insulin-treated  Has recurrent hypoglycemia.   5. Within the last six (6) months I have had an in-person or Medicare-approved telehealth visit with the beneficiary to evaluate their diabetes control and determined that criteria 1-4 above are met.       See Tiffanie Mcfarland in 2-4 weeks  See myself or Dr. Clemons in 3 to 4 months.     It is my privilege to be involved in the care of the above patient.     Rika Llanos PA-C, MPAS  [facility]  Diabetes, Endocrinology, and Metabolism  [phone] Appointments/Nurse Line  [phone] Fax  On [facility] (inpatient)    To Page:  Dial [phone]  Enter the messaging ID when prompted ([id number])  That will give the option to leave a callback number.       Joined the call at 3/25/2025, 2:16:43 pm.  Left the call at 3/25/2025, 2:31:35 pm.  You were on the call for 14 minutes 51 seconds .   Patient at home in assisted living facility seen independently, provider off-site platform [employer].    31 minutes spent on the date of the encounter doing chart review, history and exam, documentation, education and counseling, as well as communication and coordination of care, and further activities as noted above.   This time includes time spent reviewing CGM, reviewing and ordering lab tests, medications and supplies.      Outcome for 03/21/25 10:39 AM: Data obtained via Dexcom website  Ramón Beltre MA

## 2025-03-25 ENCOUNTER — VIRTUAL VISIT (OUTPATIENT)
Dept: ENDOCRINOLOGY | Facility: CLINIC | Age: 65
End: 2025-03-25
Payer: MEDICARE

## 2025-03-25 ENCOUNTER — TELEPHONE (OUTPATIENT)
Dept: ENDOCRINOLOGY | Facility: CLINIC | Age: 65
End: 2025-03-25

## 2025-03-25 ENCOUNTER — MYC MEDICAL ADVICE (OUTPATIENT)
Dept: ENDOCRINOLOGY | Facility: CLINIC | Age: 65
End: 2025-03-25

## 2025-03-25 DIAGNOSIS — E10.10 DIABETIC KETOACIDOSIS WITHOUT COMA ASSOCIATED WITH TYPE 1 DIABETES MELLITUS (H): ICD-10-CM

## 2025-03-25 DIAGNOSIS — E10.3393 TYPE 1 DIABETES MELLITUS WITH MODERATE NONPROLIFERATIVE RETINOPATHY OF BOTH EYES, MACULAR EDEMA PRESENCE UNSPECIFIED (H): Primary | ICD-10-CM

## 2025-03-25 DIAGNOSIS — N18.31 STAGE 3A CHRONIC KIDNEY DISEASE (H): ICD-10-CM

## 2025-03-25 DIAGNOSIS — E10.37X3 TYPE 1 DIABETES MELLITUS WITH DIABETIC MACULAR EDEMA OF BOTH EYES RESOLVED AFTER TREATMENT (H): ICD-10-CM

## 2025-03-25 DIAGNOSIS — E10.22 TYPE 1 DIABETES MELLITUS WITH STAGE 3A CHRONIC KIDNEY DISEASE (H): ICD-10-CM

## 2025-03-25 DIAGNOSIS — E16.2 HYPOGLYCEMIA: ICD-10-CM

## 2025-03-25 DIAGNOSIS — E66.9 OBESITY (BMI 30-39.9): ICD-10-CM

## 2025-03-25 DIAGNOSIS — N18.31 TYPE 1 DIABETES MELLITUS WITH STAGE 3A CHRONIC KIDNEY DISEASE (H): ICD-10-CM

## 2025-03-25 DIAGNOSIS — I10 HYPERTENSION GOAL BP (BLOOD PRESSURE) < 140/90: ICD-10-CM

## 2025-03-25 PROCEDURE — 98006 SYNCH AUDIO-VIDEO EST MOD 30: CPT | Performed by: PHYSICIAN ASSISTANT

## 2025-03-25 PROCEDURE — 1126F AMNT PAIN NOTED NONE PRSNT: CPT | Mod: 95 | Performed by: PHYSICIAN ASSISTANT

## 2025-03-25 NOTE — TELEPHONE ENCOUNTER
PA Initiation    Medication: OZEMPIC (0.25 OR 0.5 MG/DOSE) 2 MG/3ML SC SOPN  Insurance Company: [employer] - Phone [phone] Fax [phone]  Pharmacy Filling the Rx: [facility] - [place], [place] - [address]  Filling Pharmacy Phone: [phone]  Filling Pharmacy Fax: [phone]  Start Date: 3/25/2025

## 2025-03-25 NOTE — NURSING NOTE
Current patient location: [address]  [facility] [address]    Is the patient currently in the state of [place]? YES    Visit mode: VIDEO    If the visit is dropped, the patient can be reconnected by:VIDEO VISIT: Text to cell phone:   Telephone Information:   Mobile [phone]       Will anyone else be joining the visit? NO  (If patient encounters technical issues they should call [phone]    Are changes needed to the allergy or medication list? No and Pt stated no med changes    Are refills needed on medications prescribed by this physician? NO    Rooming Documentation:  Not applicable    Reason for visit: RECHITZ DRUMMONDF

## 2025-03-25 NOTE — LETTER
3/25/2025       RE: Lexy Rockwell  [address]  [facility] [address]     Dear Colleague,    Thank you for referring your patient, Lexy Rockwell, to the [facility] [place] at [facility]. Please see a copy of my visit note below.           Endocrinology Note         Lexy is a 64 year old female presents today for type 1 diabetes    HPI  Lexy is a 64 year old female history of type 1 diabetes with gastroparesis and chronic kidney disease stage 3, hypertension, hyperlipidemia developmental delay presents today for uncontrolled type 1 diabetes.      Patient has had history of suboptimal control of type 1 diabetes (previous A1c ranged between 9 to 12%).  Patient had history of diabetic ketoacidosis in 2021.  Patient had documented low C-peptide and positive angelica antibody in 3/2018.    I last saw her in November 2024 and advised:  Please reduce your breakfast Novolog dose to 3 units and increase dinner dose to 10 units.  Please continue correction and lunch dose as they are.      Please have a A1C lab drawn and send us the result.      Please schedule your annual physical with your primary care provider to get your labs and preventive care updated.    Continue to see Tiffanie diabetes education monthly until her blood sugars are in goal.  I will see you again in 3-4 months.      TODAY:    She continues living at [facility] (nursing home).   A1c steady at 7.8% last month.  She is particularly interested in weight loss medications and has other appointments this with diabetes that take a once a week injection and she would like to do this.  She has been trying to lose weight with activity and diet but she finds her appetite always wins out and she is very anxious about her increasing weight and would like to stop it.    She would really like to lose some weight and feels that most of her health problems  would be better if she could.  She denies any personal or family history of pancreatitis, pancreatic cancer, multiple endocrine neoplasia's.  Her mother did have breast cancer and recovered from that but now is being treated for colon cancer.  She does not use alcohol.  Otherwise she feels her diabetes management is kind of good.  She has been struggling with low blood sugars.  She usually has a bedtime snack and feels that she has to to prevent overnight low.  She has been waking up in the morning with low blood sugar around 3 AM.  She has glucose tablets and candy at her bedside to treat this.  She sometimes is woken up by her sensor she sometimes wakes up on her own she does have symptoms of feeling sweaty sometimes but more so notes she feels weak when she has a low blood sugar and this is resolved when she has something to eat.       Current diabetic medications  - Tresiba 22 units daily  - Humalog 3 units with breakfast, 8 units with lunch, 10 units with dinner  - Snack coverage-she states she is not using.  - Sliding scale --  1 unit per 40 greater than 120.      Diet recalled:  BF: Always includes oatmeal but some days may also be scrambled eggs and toast others pancakes and sausage.    Lunch: Around 1 PM  Dinner: 5:45 PM    She then has a small cookie to prevent low blood sugar overnight.  She does not take any insulin with this.      She is using Dexcom to track glucose levels.    Continuous glucose monitoring:  See details below.  Blood sugars currently 38% time in range with an average blood sugar of 210 which correlates to a GMI of 8.3% coefficient of variance is slightly improved at 32.4%.  Reading is officially 0% low but I do note she had a significant low blood sugar at 3 AM within the last 2 weeks.    DM complications:  Retinopathy:  moderate NPDR, glaucoma -- due to for exam this year  Nephropathy: positive macroalbuminuria (test 2021), CKD stage 3   Neuropathy: some tingling and numbness in her  feet -- saw podiatrist 3/3023.    Past Medical History  Past Medical History:   Diagnosis Date     Cerumen impacted      Development delay      Diabetic gastroparesis (H) 8/16/2013     Glaucoma (increased eye pressure)      Hyperlipaemia      Hypertension      Hypothyroid      Mental retardation      Nonsenile cataract      Obesity      Strabismus      Type 1 diabetes mellitus not at goal (H)        Allergies  Allergies   Allergen Reactions     Amoxicillin      Sulfa Antibiotics      Medications  Current Outpatient Medications   Medication Sig Dispense Refill     acetaminophen (TYLENOL) 500 MG tablet TAKE 2 TABLETS BY MOUTH EVERY 6 HOURS AS NEEDED (1000MG) FOR PAIN 248 tablet 11     AMLODIPINE BENZOATE PO Take 2.5 mg by mouth       carvedilol (COREG) 6.25 MG tablet Take 1 tablet (6.25 mg) by mouth 2 times daily (with meals)       cetirizine (ZYRTEC) 10 MG tablet Take 1 tablet (10 mg) by mouth daily.       Continuous Glucose Sensor (DEXCOM G6 SENSOR) MISC Change every 10 days. 9 each 3     Continuous Glucose Transmitter (DEXCOM G6 TRANSMITTER) MISC Change every 3 months. 1 each 3     dorzolamide-timolol (COSOPT) 2-0.5 % ophthalmic solution Place 1 drop into both eyes 2 times daily 20 mL 3     ferrous sulfate (SLO-FE) 142 (45 Fe) MG CR tablet Take 1 tablet (142 mg) by mouth daily       furosemide (LASIX) 20 MG tablet Take 10 mg by mouth daily.       Glucagon (BAQSIMI) 3 MG/DOSE nasal powder Spray 1 spray (3 mg) in nostril as needed. in the event of unconscious hypoglycemia or hypoglycemic seizure. May repeat dose if no response after 15 minutes. 2 each 1     glucagon 1 MG SOLR injection Inject 1 mg into the muscle once       hydrocortisone 1 % CREA cream Place rectally 2 times daily       insulin aspart (NOVOLOG PEN) 100 UNIT/ML pen Inject 4-10 Units subcutaneously 3 times daily (before meals). Inject 2 units before breakfast, 6 units before lunch and 11 units before dinner, 3 units before each snacks 2x daily. Add  correction scale before meals. -160 = 1 unit. -200 = 2 units. -240 = 3 units. -280 = 4 units. -320 = 5 unit(s). Max daily dose is 50 units. 6 mL 11     Insulin Aspart, w/Niacinamide, (FIASP PENFILL) 100 UNIT/ML SOCT Inject 4-10 Units subcutaneously 3 times daily (before meals). Inject 2 units before breakfast, 6 units before lunch and 11 units before dinner, 3 units before each snacks 2x daily. Add correction scale before meals. -160 = 1 unit. -200 = 2 units. -240 = 3 units. -280 = 4 units. -320 = 5 unit(s). Max daily dose is 50 units. 6 mL 11     Insulin Degludec FlexTouch 100 UNIT/ML SOPN Inject 22 Units subcutaneously daily. EVERY NIGHT AT BEDTIME FOR DM *DISCARD 56 DAYS AFTER OPENING* 6 mL PRN     insulin pen needle (NOVOFINE AUTOCOVER PEN NEEDLE) 30G X 8 MM miscellaneous 1 each 4 times daily. as directed. 100 each 11     insulin pen needle 30G X 5 MM 2 times daily. Use 2 pen needles daily or as directed.       KETOSTIX test strip Use as directed in case of high glucose, vomiting or illness or blood sugar >350. 50 strip 3     latanoprost (XALATAN) 0.005 % ophthalmic solution INSTILL 1 DROP IN BOTH EYES AT BEDTIME 10 mL 11     levothyroxine (SYNTHROID/LEVOTHROID) 50 MCG tablet Take 50 mcg by mouth daily.       lisinopril (ZESTRIL) 40 MG tablet Take 1 tablet (40 mg) by mouth daily       MULTIVITAMIN TABS   OR 1 TABLET DAILY       pantoprazole (PROTONIX) 40 MG EC tablet Take 40 mg by mouth daily       sennosides (SENOKOT) 8.6 MG tablet Take 1 tablet by mouth daily as needed       simvastatin (ZOCOR) 20 MG tablet Take 1 tablet (20 mg) by mouth At Bedtime 90 tablet 3     Family History  family history includes Diabetes in her sister; Glaucoma in her maternal grandfather; Hypertension in her father.     Social History  Social History     Tobacco Use     Smoking status: Never     Smokeless tobacco: Never     Tobacco comments:     lives in smoke free household.  "  Substance Use Topics     Alcohol use: Yes     Comment: occass social     Drug use: No       ROS  10 points ROS were negative otherwise mentioned in HPI    Physical Exam  LMP 03/28/2014   Limited due to video visit.  Pleasant articulate female with reasonable recall.  Dentition is poor.  Lips are dry.  Constitutional: no distress, comfortable, pleasant   Psychological: appropriate mood     Wt Readings from Last 10 Encounters:   02/20/25 109 kg (240 lb 4.8 oz)   02/06/25 107.2 kg (236 lb 6.4 oz)   12/04/24 105.8 kg (233 lb 3.2 oz)   11/12/24 103.4 kg (227 lb 14.4 oz)   10/31/24 101.2 kg (223 lb 3.2 oz)   10/02/24 99.4 kg (219 lb 1.6 oz)   09/20/24 98.9 kg (218 lb 1.6 oz)   09/06/24 101.8 kg (224 lb 6.4 oz)   08/13/24 100.5 kg (221 lb 8 oz)   07/31/24 91.4 kg (201 lb 6.4 oz)     Estimated body mass index is 41.25 kg/m  as calculated from the following:    Height as of 2/20/25: 1.626 m (5' 4\").    Weight as of 2/20/25: 109 kg (240 lb 4.8 oz).        RESULTS  I have personally reviewed labs and images. I also reviewed labs with patient and discussed the result and plan of care.    Lab Results   Component Value Date    A1C 9.7 06/19/2024    A1C 8.5 11/29/2023    A1C 8.1 03/29/2023    A1C 8.7 01/25/2023    A1C 10.7 10/13/2021    A1C 9.7 02/01/2021    A1C 9.2 09/30/2020    A1C 11.8 01/08/2020    A1C 11.8 05/19/2019    A1C 11.2 12/12/2018      Latest Ref Rng 1/25/2023  8:20 AM   ENDO DIABETES     Cholesterol <200 mg/dL 112    LDL Cholesterol Calculated <=100 mg/dL 58    HDL Cholesterol >=50 mg/dL 37 (L)    Non HDL Cholesterol <130 mg/dL 75    VLDL-Cholesterol 0 - 30 mg/dL    Triglycerides <150 mg/dL 84    TRIG (EXT) <150 mg/dL 84       Latest Ref Rng 8/8/2024  6:57 AM   ENDO DIABETES     Creatinine 0.51 - 0.95 mg/dL 1.01 (H)      Recent Labs   Lab Test 02/12/25  0834 11/06/24  0723 10/31/24  1539 09/16/24  0902 09/09/24  0723 03/29/23  0515 01/25/23  0820 10/13/21  1540 02/01/21  0909 09/30/20  0910 01/08/20  1030 " 10/02/19  1448   A1C 7.8*  --   --   --  7.8*   < > 8.7*   < > 9.7*   < > 11.8*  --    TSH 8.16*  --  5.24*  --  10.70*   < >  --    < > 7.07*  --  4.48*  --    T4  --   --   --   --   --   --   --   --  1.05  --  1.08  --    LDL  --   --   --   --   --   --  58  --  120*  --   --   --    HDL  --   --   --   --   --   --  37*  --  62  --   --   --    TRIG  --   --   --   --   --   --  84  --  89  --   --   --    CR 1.21* 1.13* 1.17*   < > 1.10*   < > 0.93   < > 0.73   < >  --   --    MICROL  --   --   --   --   --   --   --   --  306  --   --  117    < > = values in this interval not displayed.     GFR Estimate   Date Value Ref Range Status   02/12/2025 50 (L) >60 mL/min/1.73m2 Final   11/06/2024 54 (L) >60 mL/min/1.73m2 Final   10/31/2024 52 (L) >60 mL/min/1.73m2 Final   02/01/2021 90 >60 mL/min/[1.73_m2] Final     Comment:     Non  GFR Calc  Starting 12/18/2018, serum creatinine based estimated GFR (eGFR) will be   calculated using the Chronic Kidney Disease Epidemiology Collaboration   (CKD-EPI) equation.     09/30/2020 >90 >60 mL/min/[1.73_m2] Final     Comment:     Non  GFR Calc  Starting 12/18/2018, serum creatinine based estimated GFR (eGFR) will be   calculated using the Chronic Kidney Disease Epidemiology Collaboration   (CKD-EPI) equation.     07/12/2019 >90 >60 mL/min/[1.73_m2] Final     Comment:     Non  GFR Calc  Starting 12/18/2018, serum creatinine based estimated GFR (eGFR) will be   calculated using the Chronic Kidney Disease Epidemiology Collaboration   (CKD-EPI) equation.           ASSESSMENT:    Lexy is a 64 year old female history of type 1 diabetes with gastroparesis and chronic kidney disease stage 3, hypertension, hyperlipidemia developmental delay presents today for uncontrolled type 1 diabetes.    1) type 1 diabetes with CKD and gastroparesis: Her diabetes control is improved but I am concerned about midday hypoglycemia and overnight  hyperglycemia.    I do not have an MAR to verify medication doses, but understand from Peace TaylorBruna's note that she is now taking 22 units of Tresiba daily which we will continue.   Based on current needs and to avoid midday-full glycemia overnight hyperglycemia we reduced her breakfast dose of Humalog to just 3 units maintain the lunch dose of 8 units and increase the dinner dose from 9 to 10 units.  I am asking nursing to contact her home to verify current doses and update them as stated.    She should continue using Dexcom glucose sensor  She should continue to see diabetes education monthly until her blood sugars are in goal.    I am requesting her in her home I have an A1c drawn.    2) DM complications:  Retinopathy:  moderate NPDR, glaucoma --she has an exam scheduled inJanuary.  Nephropathy: positive macroalbuminuria (test 2021), CKD stage 3 , continue to follow with current provider.  Neuropathy: some tingling and numbness in her feet --continue to work with local podiatrist.     3.  Hypothyroidism: Most recent TSH continue to be elevated.  She reports that the dose has been adjusted and she does have a follow-up TSH scheduled that will be drawn at her home.      PLAN:   -Continue Tresiba 22 units daily.  -Continue Humalog at breakfast from 4 to 3 units with breakfast, continue 7 units with lunch, DECREASE Humalog dose with dinner to 10 units- continue sliding scale --  1 unit per 40 above 120.     - Please start Ozempic 0.25 mg weekly for weight loss.  If tolerated, discuss 0.5 mg weekly next month with Tiffanie,but your Humalog insulin dose will need to be decreased before your start the higher dose.  - continue using Dexcom sensor  - will fax insulin regiment to assisted living    I have evaluated this patient today and I am prescribing/continuing to prescribe a continuous glucose monitor for the following reasons:  1. The patient has a diagnosis of Type 1 Diabetes; and  2. I have concluded that the  patient (or patient's caregiver) has sufficient training using the continuous glucose monitor prescribed as evidenced by providing a prescription; and  3. The continuous glucose monitor is prescribed in accordance with its FDA indications for use; and  4. The patient for whom a continuous glucose monitor is being prescribed, meets at least ONE of the criteria below:   The patient is insulin-treated  Has recurrent hypoglycemia.   5. Within the last six (6) months I have had an in-person or Medicare-approved telehealth visit with the beneficiary to evaluate their diabetes control and determined that criteria 1-4 above are met.       See Tifafnie Mcfarland in 2-4 weeks  See myself or Dr. Clemons in 3 to 4 months.     It is my privilege to be involved in the care of the above patient.     Rika Llanos PA-C, [facility]  [facility]  Diabetes, Endocrinology, and Metabolism  [phone] Appointments/Nurse Line  [phone] Fax  On [facility] (inpatient)    To Page:  Dial [phone]  Enter the messaging ID when prompted ([phone])  That will give the option to leave a callback number.       Joined the call at 3/25/2025, 2:16:43 pm.  Left the call at 3/25/2025, 2:31:35 pm.  You were on the call for 14 minutes 51 seconds .   Patient at home in assisted living facility seen independently, provider off-site platform [employer] [facility] to [facility].    31 minutes spent on the date of the encounter doing chart review, history and exam, documentation, education and counseling, as well as communication and coordination of care, and further activities as noted above.   This time includes time spent reviewing CGM, reviewing and ordering lab tests, medications and supplies.      Outcome for 03/21/25 10:39 AM: Data obtained via Dexcom website  Ramón Beltre MA                      Again, thank you for allowing me to participate in the care of your patient.      Sincerely,    Rika Llanos PA-C

## 2025-03-25 NOTE — PATIENT INSTRUCTIONS
Dear Lexy and staff,    Please reduce Humalog dinner dose to 10 units each night.     Please start Ozempic 0.25 mg weekly for weight loss.    If tolerated, discuss 0.5 mg weekly next month with Tiffanie,but your Humalog insulin dose will need to be decreased before your start the higher dose.    My best wishes,    Rika Llanos PA-C, MPAS  [facility]  Diabetes, Endocrinology, and Metabolism  [phone] Appointments/Nurse  [phone] Fax

## 2025-03-27 ENCOUNTER — MYC MEDICAL ADVICE (OUTPATIENT)
Dept: ENDOCRINOLOGY | Facility: CLINIC | Age: 65
End: 2025-03-27
Payer: MEDICARE

## 2025-03-27 NOTE — TELEPHONE ENCOUNTER
PRIOR AUTHORIZATION DENIED    Medication: OZEMPIC (0.25 OR 0.5 MG/DOSE) 2 MG/3ML SC SOPN  Insurance Company: [employer] - Phone [phone] Fax [phone]  Denial Date: 3/27/2025  Denial Reason(s):   Appeal Information:   Patient Notified: clinic to discuss with pt what they would like to do

## 2025-04-13 ENCOUNTER — HEALTH MAINTENANCE LETTER (OUTPATIENT)
Age: 65
End: 2025-04-13

## 2025-04-15 ENCOUNTER — VIRTUAL VISIT (OUTPATIENT)
Dept: EDUCATION SERVICES | Facility: CLINIC | Age: 65
End: 2025-04-15
Payer: MEDICARE

## 2025-04-15 ENCOUNTER — ASSISTED LIVING VISIT (OUTPATIENT)
Dept: GERIATRICS | Facility: CLINIC | Age: 65
End: 2025-04-15
Payer: MEDICARE

## 2025-04-15 VITALS
RESPIRATION RATE: 18 BRPM | BODY MASS INDEX: 40.36 KG/M2 | SYSTOLIC BLOOD PRESSURE: 125 MMHG | HEIGHT: 64 IN | WEIGHT: 236.4 LBS | TEMPERATURE: 98 F | HEART RATE: 76 BPM | OXYGEN SATURATION: 94 % | DIASTOLIC BLOOD PRESSURE: 76 MMHG

## 2025-04-15 DIAGNOSIS — E03.9 ACQUIRED HYPOTHYROIDISM: ICD-10-CM

## 2025-04-15 DIAGNOSIS — E66.9 OBESITY (BMI 30-39.9): ICD-10-CM

## 2025-04-15 DIAGNOSIS — N18.31 TYPE 1 DIABETES MELLITUS WITH STAGE 3A CHRONIC KIDNEY DISEASE (H): ICD-10-CM

## 2025-04-15 DIAGNOSIS — N18.31 STAGE 3A CHRONIC KIDNEY DISEASE (H): Primary | ICD-10-CM

## 2025-04-15 DIAGNOSIS — E10.3393 TYPE 1 DIABETES MELLITUS WITH MODERATE NONPROLIFERATIVE RETINOPATHY OF BOTH EYES, MACULAR EDEMA PRESENCE UNSPECIFIED (H): ICD-10-CM

## 2025-04-15 DIAGNOSIS — F81.9 COGNITIVE DEVELOPMENTAL DELAY: ICD-10-CM

## 2025-04-15 DIAGNOSIS — I10 ESSENTIAL HYPERTENSION WITH GOAL BLOOD PRESSURE LESS THAN 140/90: ICD-10-CM

## 2025-04-15 DIAGNOSIS — E10.22 TYPE 1 DIABETES MELLITUS WITH STAGE 3A CHRONIC KIDNEY DISEASE (H): ICD-10-CM

## 2025-04-15 DIAGNOSIS — E66.01 CLASS 2 SEVERE OBESITY DUE TO EXCESS CALORIES WITH SERIOUS COMORBIDITY AND BODY MASS INDEX (BMI) OF 37.0 TO 37.9 IN ADULT (H): ICD-10-CM

## 2025-04-15 DIAGNOSIS — E66.812 CLASS 2 SEVERE OBESITY DUE TO EXCESS CALORIES WITH SERIOUS COMORBIDITY AND BODY MASS INDEX (BMI) OF 37.0 TO 37.9 IN ADULT (H): ICD-10-CM

## 2025-04-15 DIAGNOSIS — R60.0 LOWER EXTREMITY EDEMA: ICD-10-CM

## 2025-04-15 DIAGNOSIS — E10.37X3 TYPE 1 DIABETES MELLITUS WITH DIABETIC MACULAR EDEMA OF BOTH EYES RESOLVED AFTER TREATMENT (H): ICD-10-CM

## 2025-04-15 DIAGNOSIS — E16.2 HYPOGLYCEMIA: ICD-10-CM

## 2025-04-15 PROCEDURE — 99349 HOME/RES VST EST MOD MDM 40: CPT | Performed by: INTERNAL MEDICINE

## 2025-04-15 PROCEDURE — 98966 PH1 ASSMT&MGMT NQHP 5-10: CPT | Mod: 93 | Performed by: DIETITIAN, REGISTERED

## 2025-04-15 NOTE — NURSING NOTE
Current patient location:  [place]    Is the patient currently in the state of [place]? YES    Visit mode: TELEPHONE    If the visit is dropped, the patient can be reconnected by:TELEPHONE VISIT: Phone number: [phone]    Will anyone else be joining the visit? NO  (If patient encounters technical issues they should call [phone]    Are changes needed to the allergy or medication list? No    Are refills needed on medications prescribed by this physician? NO    Rooming Documentation:  Questionnaire(s) completed    Reason for visit: RECHECK    Arabella KRUEGER

## 2025-04-15 NOTE — PROGRESS NOTES
Virtual Visit Details    Type of service:  Telephone Visit   Phone call duration: 6 minutes   Originating Location (pt. Location): Home    Distant Location (provider location):  Off-site  Telephone visit completed due to the patient did not have access to video, while the distant provider did.    Diabetes Education Follow-up    Subjective/Objective:    Lexy Rockwell sent in blood glucose log for review. Last date of communication was: 2/25.    Diabetes is being managed with Diabetes Medications   Diabetes Medication(s)       Diabetic Other       Glucagon (BAQSIMI) 3 MG/DOSE nasal powder Spray 1 spray (3 mg) in nostril as needed. in the event of unconscious hypoglycemia or hypoglycemic seizure. May repeat dose if no response after 15 minutes.     Patient not taking: Reported on 4/15/2025     glucagon 1 MG SOLR injection Inject 1 mg into the muscle once       Insulin       insulin aspart (NOVOLOG PEN) 100 UNIT/ML pen Inject 4-10 Units subcutaneously 3 times daily (before meals). Inject 2 units before breakfast, 6 units before lunch and 10 units before dinner, 3 units before each snacks 2x daily. Add correction scale before meals. -160 = 1 unit. -200 = 2 units. -240 = 3 units. -280 = 4 units. -320 = 5 unit(s). Max daily dose is 45 units.     Insulin Aspart, w/Niacinamide, (FIASP PENFILL) 100 UNIT/ML SOCT Inject 4-10 Units subcutaneously 3 times daily (before meals). Inject 2 units before breakfast, 6 units before lunch and 11 units before dinner, 3 units before each snacks 2x daily. Add correction scale before meals. -160 = 1 unit. -200 = 2 units. -240 = 3 units. -280 = 4 units. -320 = 5 unit(s). Max daily dose is 50 units.     Insulin Degludec FlexTouch 100 UNIT/ML SOPN Inject 22 Units subcutaneously daily. EVERY NIGHT AT BEDTIME FOR DM *DISCARD 56 DAYS AFTER OPENING*       Incretin Mimetic Agents       semaglutide (OZEMPIC) 2 MG/3ML pen Inject 0.25  mg subcutaneously every 7 days. May consider increase to 5mg in 28 days,but will need insulin doses reduced.     Patient not taking: Reported on 4/15/2025            BG/Food Log:                         Assessment:    Lexy is happy to report that she has lost some weight. She reports that the nursing home has been giving her  smaller portions and this has worked well. She has continued to have some hypoglycemia after dinner    Plan/Response:  Continue with 22 units of Tresiba    Humalog:   Breakfast: 3 units  Lunch: 7 units  Dinner: 10 units --> 9 units    KARLOS [facility]  Diabetes Education Department  [facility], [place]  Phone: [phone]  [phone]      Any diabetes medication dose changes were made via the CDE Protocol and Collaborative Practice Agreement with the patient's referring provider. A copy of this encounter was shared with the provider.

## 2025-04-15 NOTE — LETTER
4/15/2025      Lexy Rockwell  [address]  [facility] [address]      Dear Colleague,    Thank you for referring your patient, Lexy Rockwell, to the [facility]. Please see a copy of my visit note below.    Virtual Visit Details    Type of service:  Telephone Visit   Phone call duration: 6 minutes   Originating Location (pt. Location): Home    Distant Location (provider location):  Off-site  Telephone visit completed due to the patient did not have access to video, while the distant provider did.    Diabetes Education Follow-up    Subjective/Objective:    Lexy Rockwell sent in blood glucose log for review. Last date of communication was: 2/25.    Diabetes is being managed with Diabetes Medications   Diabetes Medication(s)       Diabetic Other       Glucagon (BAQSIMI) 3 MG/DOSE nasal powder Spray 1 spray (3 mg) in nostril as needed. in the event of unconscious hypoglycemia or hypoglycemic seizure. May repeat dose if no response after 15 minutes.     Patient not taking: Reported on 4/15/2025     glucagon 1 MG SOLR injection Inject 1 mg into the muscle once       Insulin       insulin aspart (NOVOLOG PEN) 100 UNIT/ML pen Inject 4-10 Units subcutaneously 3 times daily (before meals). Inject 2 units before breakfast, 6 units before lunch and 10 units before dinner, 3 units before each snacks 2x daily. Add correction scale before meals. -160 = 1 unit. -200 = 2 units. -240 = 3 units. -280 = 4 units. -320 = 5 unit(s). Max daily dose is 45 units.     Insulin Aspart, w/Niacinamide, (FIASP PENFILL) 100 UNIT/ML SOCT Inject 4-10 Units subcutaneously 3 times daily (before meals). Inject 2 units before breakfast, 6 units before lunch and 11 units before dinner, 3 units before each snacks 2x daily. Add correction scale before meals. -160 = 1 unit. -200 = 2 units. -240 = 3 units. -280 = 4 units. -320 = 5 unit(s). Max  daily dose is 50 units.     Insulin Degludec FlexTouch 100 UNIT/ML SOPN Inject 22 Units subcutaneously daily. EVERY NIGHT AT BEDTIME FOR DM *DISCARD 56 DAYS AFTER OPENING*       Incretin Mimetic Agents       semaglutide (OZEMPIC) 2 MG/3ML pen Inject 0.25 mg subcutaneously every 7 days. May consider increase to 5mg in 28 days,but will need insulin doses reduced.     Patient not taking: Reported on 4/15/2025            BG/Food Log:                         Assessment:    Lexy is happy to report that she has lost some weight. She reports that the nursing home has been giving her  smaller portions and this has worked well. She has continued to have some hypoglycemia after dinner    Plan/Response:  Continue with 22 units of Tresiba    Humalog:   Breakfast: 3 units  Lunch: 7 units  Dinner: 10 units --> 9 units    Tiffanie Mcfarland RD [facility]  Diabetes Education Department  [facility], [place]  Phone: [phone]  [phone]      Any diabetes medication dose changes were made via the CDE Protocol and Collaborative Practice Agreement with the patient's referring provider. A copy of this encounter was shared with the provider.        Again, thank you for allowing me to participate in the care of your patient.        Sincerely,        Tiffanie Mcfarland RD    Electronically signed

## 2025-04-15 NOTE — LETTER
4/15/2025      Lexy Rockwell  [address]  [facility] [address]    Please change Lexy's Humalog dose with dinner from 10 units to 9 units. Thanks          Virtual Visit Details    Type of service:  Telephone Visit   Phone call duration: 6 minutes   Originating Location (pt. Location): Home    Distant Location (provider location):  Off-site  Telephone visit completed due to the patient did not have access to video, while the distant provider did.    Diabetes Education Follow-up    Subjective/Objective:    Lexy Rockwell sent in blood glucose log for review. Last date of communication was: 2/25.    Diabetes is being managed with Diabetes Medications   Diabetes Medication(s)       Diabetic Other       Glucagon (BAQSIMI) 3 MG/DOSE nasal powder Spray 1 spray (3 mg) in nostril as needed. in the event of unconscious hypoglycemia or hypoglycemic seizure. May repeat dose if no response after 15 minutes.     Patient not taking: Reported on 4/15/2025     glucagon 1 MG SOLR injection Inject 1 mg into the muscle once       Insulin       insulin aspart (NOVOLOG PEN) 100 UNIT/ML pen Inject 4-10 Units subcutaneously 3 times daily (before meals). Inject 2 units before breakfast, 6 units before lunch and 10 units before dinner, 3 units before each snacks 2x daily. Add correction scale before meals. -160 = 1 unit. -200 = 2 units. -240 = 3 units. -280 = 4 units. -320 = 5 unit(s). Max daily dose is 45 units.     Insulin Aspart, w/Niacinamide, (FIASP PENFILL) 100 UNIT/ML SOCT Inject 4-10 Units subcutaneously 3 times daily (before meals). Inject 2 units before breakfast, 6 units before lunch and 11 units before dinner, 3 units before each snacks 2x daily. Add correction scale before meals. -160 = 1 unit. -200 = 2 units. -240 = 3 units. -280 = 4 units. -320 = 5 unit(s). Max daily dose is 50 units.     Insulin Degludec FlexTouch 100 UNIT/ML SOPN Inject  22 Units subcutaneously daily. EVERY NIGHT AT BEDTIME FOR DM *DISCARD 56 DAYS AFTER OPENING*       Incretin Mimetic Agents       semaglutide (OZEMPIC) 2 MG/3ML pen Inject 0.25 mg subcutaneously every 7 days. May consider increase to 5mg in 28 days,but will need insulin doses reduced.     Patient not taking: Reported on 4/15/2025            BG/Food Log:                         Assessment:    Lexy is happy to report that she has lost some weight. She reports that the nursing home has been giving her  smaller portions and this has worked well. She has continued to have some hypoglycemia after dinner    Plan/Response:  Continue with 22 units of Tresiba    Humalog:   Breakfast: 3 units  Lunch: 7 units  Dinner: 10 units --> 9 units    Tiffanie Mcfarland RD [facility]  Diabetes Education Department  [facility], [place]  Phone: [phone]  [phone]      Any diabetes medication dose changes were made via the CDE Protocol and Collaborative Practice Agreement with the patient's referring provider. A copy of this encounter was shared with the provider.          Sincerely,        Tiffanie Mcfarland RD    Electronically signed

## 2025-04-21 ENCOUNTER — TELEPHONE (OUTPATIENT)
Dept: ENDOCRINOLOGY | Facility: CLINIC | Age: 65
End: 2025-04-21
Payer: MEDICARE

## 2025-04-21 DIAGNOSIS — E10.37X3 TYPE 1 DIABETES MELLITUS WITH DIABETIC MACULAR EDEMA OF BOTH EYES RESOLVED AFTER TREATMENT (H): ICD-10-CM

## 2025-04-21 NOTE — TELEPHONE ENCOUNTER
Gregory, Jimmie Yao [phone]  H     Aspart order to [facility] in [place] per NCM at living facility.   Gabi Shafer, RN on 4/21/2025 at 3:24 PM         [facility] Call Center    Phone Message    May a detailed message be left on voicemail: yes     Reason for Call: Other: Jimmie Yao calling requesting clarification on patients fast acting insulin,      Action Taken: Message routed to:  [facility] endo    Travel Screening: Not Applicable     Date of Service:

## 2025-04-22 ENCOUNTER — VIRTUAL VISIT (OUTPATIENT)
Dept: PHARMACY | Facility: CLINIC | Age: 65
End: 2025-04-22
Attending: PHYSICIAN ASSISTANT
Payer: MEDICARE

## 2025-04-22 DIAGNOSIS — E66.01 CLASS 2 SEVERE OBESITY DUE TO EXCESS CALORIES WITH SERIOUS COMORBIDITY IN ADULT (H): Primary | ICD-10-CM

## 2025-04-22 DIAGNOSIS — E10.3393 TYPE 1 DIABETES MELLITUS WITH MODERATE NONPROLIFERATIVE RETINOPATHY OF BOTH EYES, MACULAR EDEMA PRESENCE UNSPECIFIED (H): ICD-10-CM

## 2025-04-22 DIAGNOSIS — E66.812 CLASS 2 SEVERE OBESITY DUE TO EXCESS CALORIES WITH SERIOUS COMORBIDITY IN ADULT (H): Primary | ICD-10-CM

## 2025-04-22 NOTE — PROGRESS NOTES
Medication Therapy Management (MTM) Encounter    ASSESSMENT:                            Medication Adherence/Access: Patient has dual coverage (medicare + MA) - see below for considerations    Diabetes /Weight management: Patient is not meeting A1c goal of <7. She was referred to start Glp1 therapy. Ozempic/Mounjaro not approved due to Type 1 diabetes diagnosis. We are able to do PA for Wegovy/Zepbound with her MA plan. Discussed Wegovy in detail with patient today, including administration, side effects, and storage. We will continue to follow-up for titration and adjustment of insulins.    Due to time constraints, I was only able to assess the above with the patient today. Will follow-up on other disease states in future encounters when nursing staff is present  PLAN:                            START Wegovy 0.25 mg once weekly  Continue current Tresiba and Novolog doses  Let me know if you are having blood sugars <70     Follow-up: 5/27/2025 w/ Bhumi    SUBJECTIVE/OBJECTIVE:                          Lexy Rockwell is a 64 year old female seen via [employer] video for an initial visit. She was referred to me from Rika Llanos.      Reason for visit: Glp1 initiation.    Allergies/ADRs: Reviewed in chart  Past Medical History: Reviewed in chart  Tobacco: She reports that she has never smoked. She has never used smokeless tobacco.  Alcohol: not currently using - only socially     Medication Adherence/Access: patient in assisted living home and meds are administered by staff; coverage for GLP1 therapy has been difficult (Ozempic denied)    Diabetes /Weight management  Patient diagnosed with Type 1 diabetes   Current Medications:  Tresiba 22 units at bedtime   Novolog with meals (patient unsure exactly how much she gets as it is given by staff at her assisted living)    Patient is not experiencing side effects.  Current diabetes symptoms: none  Blood sugar monitoring: Continuous Glucose Monitor - unable to pull up  "reports for me today  Blood sugars at time of visit: 155  Diet: meals provided by home    Lab Results   Component Value Date    A1C 7.8 (H) 02/12/2025    A1C 7.8 (H) 09/09/2024    A1C 9.7 (H) 06/19/2024     Estimated body mass index is 40.58 kg/m  as calculated from the following:    Height as of 4/15/25: 5' 4\" (1.626 m).    Weight as of 4/15/25: 236 lb 6.4 oz (107.2 kg).    Wt Readings from Last 3 Encounters:   04/15/25 236 lb 6.4 oz (107.2 kg)   02/20/25 240 lb 4.8 oz (109 kg)   02/06/25 236 lb 6.4 oz (107.2 kg)     Lab Results   Component Value Date    GFRESTIMATED 50 (L) 02/12/2025    GFRESTIMATED 54 (L) 11/06/2024    GFRESTIMATED 52 (L) 10/31/2024          Today's Vitals: LMP 03/28/2014   ----------------    I spent 40 minutes with this patient today. All changes were made via collaborative practice agreement with Rika Llanos.     A summary of these recommendations was sent via [employer].    Juan Antonio QuinnD  Diabetes & Endocrine MTM Pharmacist    Contact information:   To schedule a MTM appointment: [phone]  For questions or concerns, please send a Alai message or call the clinic at [phone].    For more urgent concerns that do not require 460, please call [phone] after hours/weekends and ask to speak with the Endocrinologist on call.       Telemedicine Visit Details  The patient's medications can be safely assessed via a telemedicine encounter.  Type of service:  Video Conference via [employer]  Originating Location (pt. Location): Home    Distant Location (provider location):  On-site  Start Time:  2 PM  End Time:  2:40 PM     Medication Therapy Recommendations  Obesity, unspecified obesity severity, unspecified obesity type   1 Current Medication: semaglutide-weight management (WEGOVY) 0.25 MG/0.5ML pen   Current Medication Sig: Inject 0.5 mLs (0.25 mg) subcutaneously once a week.   Rationale: Untreated condition - Needs additional medication therapy - Indication   Recommendation: Start " Medication   Status: Accepted per CPA   Identified Date: 4/22/2025 Completed Date: 4/22/2025

## 2025-04-23 ENCOUNTER — TELEPHONE (OUTPATIENT)
Dept: ENDOCRINOLOGY | Facility: CLINIC | Age: 65
End: 2025-04-23
Payer: MEDICARE

## 2025-04-23 NOTE — TELEPHONE ENCOUNTER
PA Initiation    Medication: WEGOVY 0.25 MG/0.5ML SC SOAJ  Insurance Company: [employer]  Pharmacy Filling the Rx:    Filling Pharmacy Phone:  [phone]  Filling Pharmacy Fax:  [phone]   Start Date: 4/23/2025    Faxed hard copy prior authorization request and chart notes to patient's plan, [phone] Was unable to submit via CMM.

## 2025-04-24 NOTE — TELEPHONE ENCOUNTER
Received fax from ABRAHAM WARD that there is a managed care plan active:      Per review of profile, has [employer] PM[employer] plan active. E-PA with CMM rejects off of [employer] Part D plan. Called [facility] and was able to get PA approved. May need to use previous [place] address for future renewals to complete electronically:        Prior Authorization Approval    Medication: WEGOVY 0.25 MG/0.5ML SC SOAJ  Authorization Effective Date: 3/25/2025  Authorization Expiration Date: 11/20/2025  Approved Dose/Quantity: 4 pens per 28 days  Reference #: [id number]   Insurance Company: [employer] - Phone [phone] Fax [phone]  Expected CoPay: $ 0  CoPay Card Available: No    Financial Assistance Needed: N/A  Which Pharmacy is filling the prescription: [facility] - [place], [place] - [address]  Pharmacy Notified: Yes, released  Patient Notified: By pharmacy

## 2025-04-27 NOTE — PROGRESS NOTES
"Lexy Rockwell is a 64 year old female seen April 15, 2025 at [facility] where she has resided for almost 2 years (admit to LTC 1/2023) seen for regulatory visit and to follow up recent weight gain with labile DM1 with sequelae of retinopathy, nephropathy and neuropathy.  Pt is seen on the unit, resting on a bench near the dining room.   States \"I'm just resting before I go up that ramp.\"   (Very mild incline in the floor)   States she is working on smaller portions and has lost 6#    Has an appointment next week with PharmD, to review starting Ozempic.      She has had significant weight gain over the past year or so.   Saw Endocrinology and insulin adjusted.   Pt is unaware of how many calories she is taking in; she eats independently in the dining room and is fond of the soft serve ice cream.       By chart review, pt has Type 1 DM with multiple hospitalizations for DKA.  She was seen in the [facility] ED for hyperglycemia in November 2022, with accompanying symptoms of dizziness, BLE weakness and heartburn.   Noted that she sometimes forgot to administer her insulin.   Returned home, seeing diabetic educator but not an endocrinologist.         She was continuing to live independently in December 2022 when neighbors asked for a welfare check after she hadn't been seen in several days, and she was found down by police.   Hospitalized at [facility] 12/13-26 for DKA and rhabdomyolysis  Glucose >1000 and pH 6.8    She required pressor support, intubation and TF; treated with broad spectrum abx for SHIVAM pneumonia.  No sz on video EEG.  She had a +COVID19 test. Slowly cleared and discharged to [facility] TCU before transitioning to LTC.     Pt was seen in the [facility] ED in January 2023 for urinary frequency related to hyperglycemia.  Improved and returned to LTC with continued labile blood sugars, urinary incontinence, weakness and poor endurance  She had upper and lower endoscopy in July 2023, no " "significant findings    Pt had an October 2023 [facility] hospitalization for hyperglycemia, seen by Endocrinology and diabetic regimen adjusted    She was treated with ciprofloxacin for Enterobacter cloacae UTI    Returned to LTC, pt transitioned to Board and Care in January 2024      She was seen in the ED January 2024 for hyperglycemia, but no evidence of DKA or other abnormality.         Past Medical History:   Diagnosis Date    Cerumen impacted     Development delay     Diabetic gastroparesis (H) 8/16/2013    Glaucoma (increased eye pressure)     Hyperlipaemia     Hypertension     Hypothyroid     Mental retardation     Nonsenile cataract     Obesity     Strabismus     Type 1 diabetes mellitus not at goal (H)        Past Surgical History:   Procedure Laterality Date    COLONOSCOPY N/A 7/3/2023    Procedure: Colonoscopy;  Surgeon: Merlyn Acevedo MD;  Location:  GI    ESOPHAGOSCOPY, GASTROSCOPY, DUODENOSCOPY (EGD), COMBINED N/A 7/3/2023    Procedure: Esophagoscopy, gastroscopy, duodenoscopy (EGD), combined;  Surgeon: Merlyn Acevedo MD;  Location:  GI    STRABISMUS SURGERY      [facility] EYE SURG ANT SGMT PROC UNLISTED  remote    strabismus surgery     SH:  Single    Her mother Francisca Rockwell is first contact    She has 2 sisters that [facility]   Non smoker    Review Of Systems  Developmental cognitive delay   BIMS 15/15    PHQ9 2/27    SLUMS 22/30 in 2021  Ambulatory with 4WW  Weight at admission was 167 lbs>>>in April 2023 was 156 lbs>>> in Sept 2023 was 168 lbs>>>in Dec 2023 was 179 lbs >>> in July 2024 was 201 lbs  Wt Readings from Last 5 Encounters:   04/15/25 107.2 kg (236 lb 6.4 oz)   02/20/25 109 kg (240 lb 4.8 oz)   02/06/25 107.2 kg (236 lb 6.4 oz)   12/04/24 105.8 kg (233 lb 3.2 oz)   11/12/24 103.4 kg (227 lb 14.4 oz)      EXAM: NAD  /76   Pulse 76   Temp 98  F (36.7  C)   Resp 18   Ht 1.626 m (5' 4\")   Wt 107.2 kg (236 lb 6.4 oz)   LMP 03/28/2014   SpO2 94%   BMI 40.58 kg/m       Breathing " non labored, comfortable   Has just one front tooth on top  Lungs clear bilaterally   Heart RRR s1s2 diatant   Abd obese, soft, NT, no distention or guarding, +BS  Ext with 2-3+ edema, wearing velcro wraps      Neuro: valgus deformities, ambulatory with waddling gait using 4WW    Psych: affect okay, pleasant and chatty      Lab Results   Component Value Date     02/12/2025    POTASSIUM 4.3 02/12/2025    CHLORIDE 109 (H) 02/12/2025    CO2 20 (L) 02/12/2025    ANIONGAP 13 02/12/2025     (H) 02/12/2025    BUN 41.9 (H) 02/12/2025    CR 1.21 (H) 02/12/2025    GFRESTIMATED 50 (L) 02/12/2025    EFRAIN 9.4 02/12/2025     Lab Results   Component Value Date    AST 22 02/12/2025      ALBUMIN 3.5 02/12/2025      ALKPHOS 126 02/12/2025     Lab Results   Component Value Date    WBC 5.8 02/12/2025      HGB 11.6 02/12/2025      MCV 89 02/12/2025       02/12/2025         IMP/PLAN:    (R63.5) Weight gain and LE edema  Comment:  Very difficult to restrict diet in the Board and Care setting and weight gain has been persistent and rapid    AM cortisol 15.3    Plan: LE compression, elevation  Furosemide 10 mg/day   With very low activity tolerance, would consider an ECHO or Cardiology workup     (E03.9) Acquired hypothyroidism  Comment:   TSH   Date Value Ref Range Status   02/12/2025 8.16 (H) 0.30 - 4.20 uIU/mL Final   10/13/2021 3.41 0.40 - 4.00 mU/L Final   02/01/2021 7.07 (H) 0.40 - 4.00 mU/L Final      Plan: levothyroxine 50 mcg/day   Follow TSH      (E10.22,  N18.31) Type 1 diabetes mellitus with stage 3a chronic kidney disease (H)  Comment: long history of recurrent DKA and labile sugars   Sequelae includes retinopathy, gastroparesis and CKD3    Lab Results   Component Value Date    A1C 7.8 02/12/2025     Plan: insulin degludec 22 units /HS   Insulin aspart with meals on a sliding scale   Follows with Endocrinology and Diabetic educator.   She is on lisinopril, simvastatin 20 mg/day     Not on daily ASA secondary  to anemia  Ophthalmology and Podiatry follow up        (D64.9) Anemia, unspecified type    (K21.00) Gastroesophageal reflux disease with esophagitis without hemorrhage  Comment: Biopsies unremarkable at EGD    Fe deficiency   Upper and lower endoscopy in July 2023, unrevealing although colonoscopy limited by poor prep.    Hgb has improved     Plan: Continue pantoprazole 40 mg/day   Slow release Fe 45 mg/day  >>>would change to every other day     (F81.9) Cognitive developmental delay  Comment: unable to live independently and manage her many medical problems    Plan: Board and Care support for med admin, meals, activity      (I10) Essential hypertension with goal blood pressure less than 140/90  Comment:   BP Readings from Last 3 Encounters:   04/15/25 125/76   02/20/25 (!) 167/87   02/06/25 (!) 155/87      Plan: amlodipine 2.5 mg/day, carvedilol 6.25 mg bid, lisinopril 40 mg/day   >>>Would split lisinopril to 20 mg bid   Follow bps and BMP     Advance directive: DNR/DNI     Nai Martínez MD

## 2025-04-28 ENCOUNTER — TELEPHONE (OUTPATIENT)
Dept: ENDOCRINOLOGY | Facility: CLINIC | Age: 65
End: 2025-04-28
Payer: MEDICARE

## 2025-04-28 RX ORDER — PROCHLORPERAZINE 25 MG/1
SUPPOSITORY RECTAL
Qty: 1 EACH | Refills: 1 | Status: SHIPPED | OUTPATIENT
Start: 2025-04-28

## 2025-04-28 RX ORDER — PROCHLORPERAZINE 25 MG/1
SUPPOSITORY RECTAL
Qty: 3 EACH | Refills: 5 | Status: SHIPPED | OUTPATIENT
Start: 2025-04-28

## 2025-04-28 NOTE — TELEPHONE ENCOUNTER
This message is to inform you that we are in need of Medicare compliant prescriptions for Dexcom G6 sensors & transmitter.     Prescription must be written by: Rika Llanos.    Must include full supply name: Dexcom G6 Sensor  Quantity: 3  Refills: 5  SIG: Change every 10 days    Must include full supply name: Dexcom G6 Transmitter  Quantity: 1  Refills: 1  SIG: Change every 90 days    As a reminder, Medicare requires patients to be seen every 3 months for insulin supplies and every 6 months for CGMs. The provider who sees the patient must be the provider on the prescription, must be written on the day of or after office visit date and office visit must include notes about diabetes and insulin regimen. The Diabetes Care Services team at [facility] may request new prescriptions before renewal dates of the prescription is filled over the allowable amount. Writing the order to match what is above will help ensure we will only need to request every 3 or 6 months.    Thank you!    [facility]  Diabetes Care Services Team  [address] [place] [address]  Provider Phone: [phone]  Patient Line: [phone]  Fax: [phone]  E-mail: [email or web address]

## 2025-04-29 ENCOUNTER — LAB REQUISITION (OUTPATIENT)
Dept: LAB | Facility: CLINIC | Age: 65
End: 2025-04-29
Payer: MEDICARE

## 2025-04-29 ENCOUNTER — TELEPHONE (OUTPATIENT)
Dept: GERIATRICS | Facility: CLINIC | Age: 65
End: 2025-04-29
Payer: MEDICARE

## 2025-04-29 ENCOUNTER — TELEPHONE (OUTPATIENT)
Dept: ENDOCRINOLOGY | Facility: CLINIC | Age: 65
End: 2025-04-29
Payer: MEDICARE

## 2025-04-29 DIAGNOSIS — E10.22 TYPE 1 DIABETES MELLITUS WITH DIABETIC CHRONIC KIDNEY DISEASE (H): ICD-10-CM

## 2025-04-29 DIAGNOSIS — R63.5 ABNORMAL WEIGHT GAIN: ICD-10-CM

## 2025-04-29 NOTE — PATIENT INSTRUCTIONS
"Recommendations from today's MTM visit:                                                      START Wegovy 0.25 mg once weekly  Continue current Tresiba and Novolog doses  Let me know if you are having blood sugars <70     Follow-up: 5/27/2025 gricelda/ Bhumi    It was great speaking with you today.  I value your experience and would be very thankful for your time in providing feedback in our clinic survey. In the next few days, you may receive an email or text message from [employer] with a link to a survey related to your  clinical pharmacist.\"     To schedule another MTM appointment, please call the clinic directly or you may call the MTM scheduling line at [phone].    My Clinical Pharmacist's contact information:                                                      Please feel free to contact me with any questions or concerns you have.      Bhumi Guzman, PharmD  Diabetes & Endocrine MTM Pharmacist    Contact information:   To schedule a MTM appointment: [phone]  For questions or concerns, please send a [employer] message or call the clinic at [phone].    For more urgent concerns that do not require [phone], please call [phone] after hours/weekends and ask to speak with the Endocrinologist on call.      "

## 2025-04-29 NOTE — TELEPHONE ENCOUNTER
[facility]   2025     Name: Lexy Rockwell   : 1960       Orders:  CMP, hgb A1c, CBC, TSH, .   Dx VENEGAS  ECHO in the facility.   Dx VENEGAS.      Electronically signed by     TAYLA Sprague CNP on 2025 at 11:50 AM

## 2025-04-30 LAB
ALBUMIN SERPL BCG-MCNC: 3.3 G/DL (ref 3.5–5.2)
ALP SERPL-CCNC: 124 U/L (ref 40–150)
ALT SERPL W P-5'-P-CCNC: 11 U/L (ref 0–50)
ANION GAP SERPL CALCULATED.3IONS-SCNC: 10 MMOL/L (ref 7–15)
AST SERPL W P-5'-P-CCNC: 24 U/L (ref 0–45)
BILIRUB SERPL-MCNC: 0.2 MG/DL
BUN SERPL-MCNC: 35 MG/DL (ref 8–23)
CALCIUM SERPL-MCNC: 9.2 MG/DL (ref 8.8–10.4)
CHLORIDE SERPL-SCNC: 113 MMOL/L (ref 98–107)
CREAT SERPL-MCNC: 1.22 MG/DL (ref 0.51–0.95)
EGFRCR SERPLBLD CKD-EPI 2021: 50 ML/MIN/1.73M2
ERYTHROCYTE [DISTWIDTH] IN BLOOD BY AUTOMATED COUNT: 14.5 % (ref 10–15)
EST. AVERAGE GLUCOSE BLD GHB EST-MCNC: 177 MG/DL
GLUCOSE SERPL-MCNC: 125 MG/DL (ref 70–99)
HBA1C MFR BLD: 7.8 %
HCO3 SERPL-SCNC: 20 MMOL/L (ref 22–29)
HCT VFR BLD AUTO: 37.1 % (ref 35–47)
HGB BLD-MCNC: 11.4 G/DL (ref 11.7–15.7)
MCH RBC QN AUTO: 27.7 PG (ref 26.5–33)
MCHC RBC AUTO-ENTMCNC: 30.7 G/DL (ref 31.5–36.5)
MCV RBC AUTO: 90 FL (ref 78–100)
PLATELET # BLD AUTO: 203 10E3/UL (ref 150–450)
POTASSIUM SERPL-SCNC: 4.4 MMOL/L (ref 3.4–5.3)
PROT SERPL-MCNC: 6.4 G/DL (ref 6.4–8.3)
RBC # BLD AUTO: 4.11 10E6/UL (ref 3.8–5.2)
SODIUM SERPL-SCNC: 143 MMOL/L (ref 135–145)
TSH SERPL DL<=0.005 MIU/L-ACNC: 8.85 UIU/ML (ref 0.3–4.2)
WBC # BLD AUTO: 5.6 10E3/UL (ref 4–11)

## 2025-04-30 PROCEDURE — 85027 COMPLETE CBC AUTOMATED: CPT | Mod: ORL | Performed by: NURSE PRACTITIONER

## 2025-04-30 PROCEDURE — P9604 ONE-WAY ALLOW PRORATED TRIP: HCPCS | Mod: ORL | Performed by: NURSE PRACTITIONER

## 2025-04-30 PROCEDURE — 84443 ASSAY THYROID STIM HORMONE: CPT | Mod: ORL | Performed by: NURSE PRACTITIONER

## 2025-04-30 PROCEDURE — 83036 HEMOGLOBIN GLYCOSYLATED A1C: CPT | Mod: ORL | Performed by: NURSE PRACTITIONER

## 2025-04-30 PROCEDURE — 80053 COMPREHEN METABOLIC PANEL: CPT | Mod: ORL | Performed by: NURSE PRACTITIONER

## 2025-04-30 PROCEDURE — 36415 COLL VENOUS BLD VENIPUNCTURE: CPT | Mod: ORL | Performed by: NURSE PRACTITIONER

## 2025-05-07 ENCOUNTER — ASSISTED LIVING VISIT (OUTPATIENT)
Dept: GERIATRICS | Facility: CLINIC | Age: 65
End: 2025-05-07
Payer: MEDICARE

## 2025-05-07 VITALS
SYSTOLIC BLOOD PRESSURE: 161 MMHG | HEART RATE: 90 BPM | HEIGHT: 64 IN | OXYGEN SATURATION: 98 % | RESPIRATION RATE: 18 BRPM | TEMPERATURE: 97.4 F | BODY MASS INDEX: 40.29 KG/M2 | WEIGHT: 236 LBS | DIASTOLIC BLOOD PRESSURE: 76 MMHG

## 2025-05-07 DIAGNOSIS — E10.3393 TYPE 1 DIABETES MELLITUS WITH MODERATE NONPROLIFERATIVE RETINOPATHY OF BOTH EYES, MACULAR EDEMA PRESENCE UNSPECIFIED (H): ICD-10-CM

## 2025-05-07 DIAGNOSIS — E66.01 CLASS 2 SEVERE OBESITY DUE TO EXCESS CALORIES WITH SERIOUS COMORBIDITY IN ADULT, UNSPECIFIED BMI (H): ICD-10-CM

## 2025-05-07 DIAGNOSIS — I10 HYPERTENSION GOAL BP (BLOOD PRESSURE) < 140/90: ICD-10-CM

## 2025-05-07 DIAGNOSIS — J18.9 PNEUMONIA OF RIGHT LOWER LOBE DUE TO INFECTIOUS ORGANISM: Primary | ICD-10-CM

## 2025-05-07 DIAGNOSIS — E66.812 CLASS 2 SEVERE OBESITY DUE TO EXCESS CALORIES WITH SERIOUS COMORBIDITY IN ADULT, UNSPECIFIED BMI (H): ICD-10-CM

## 2025-05-07 DIAGNOSIS — I13.0 HYPERTENSIVE HEART AND CHRONIC KIDNEY DISEASE WITH HEART FAILURE AND STAGE 1 THROUGH STAGE 4 CHRONIC KIDNEY DISEASE, OR UNSPECIFIED CHRONIC KIDNEY DISEASE (H): ICD-10-CM

## 2025-05-07 PROBLEM — R26.9 ABNORMAL GAIT: Status: ACTIVE | Noted: 2019-06-24

## 2025-05-07 PROBLEM — Z79.4 LONG TERM CURRENT USE OF INSULIN (H): Status: ACTIVE | Noted: 2019-06-22

## 2025-05-07 PROBLEM — R26.81 UNSTEADINESS ON FEET: Status: ACTIVE | Noted: 2019-06-24

## 2025-05-07 PROBLEM — R53.1 ASTHENIA: Status: ACTIVE | Noted: 2019-06-23

## 2025-05-07 PROBLEM — Z74.1 NEED FOR ASSISTANCE WITH PERSONAL CARE: Status: ACTIVE | Noted: 2019-06-23

## 2025-05-07 PROCEDURE — 99349 HOME/RES VST EST MOD MDM 40: CPT | Performed by: NURSE PRACTITIONER

## 2025-05-07 RX ORDER — CARVEDILOL 6.25 MG/1
12.5 TABLET ORAL 2 TIMES DAILY WITH MEALS
Qty: 60 TABLET | Refills: 3 | Status: SHIPPED | OUTPATIENT
Start: 2025-05-07

## 2025-05-07 NOTE — LETTER
5/7/2025      Lexy Rockwell  [address]  [facility] [address]        [facility]      Code Status:  FULL CODE  Visit Type: RECHECK     Facility:   [facility] ([facility]) [[id number]]         History of Present Illness: Lexy Rockwell is a 64 year old female with a past medical history for GERD, obesity, HLD, DM1 uncontrolled, HTN.     Today, I follow up with patient after being seen in the ED on 5/5 for nausea and feeling her heart was racing and elevated BPs.  She had progressive weakness over the past 3 weeks.  She was found ot have pneumonia and discharge with Azithromycin and cefuroxime.      Today, Bps remain elevated for age group.  161/76.  She denies palpitations, dizziness.  She reports feeling increased fatigue but better than 2 days ago.      BS continue to vacillate 85 to 320 with most 140-180s.  She has been started on Wegov 5/1/2025  Weight is down 4lbs from last month.  Now 236lbs from 240lbs. .       Current Outpatient Medications   Medication Sig Dispense Refill     acetaminophen (TYLENOL) 500 MG tablet TAKE 2 TABLETS BY MOUTH EVERY 6 HOURS AS NEEDED (1000MG) FOR PAIN 248 tablet 11     AMLODIPINE BENZOATE PO Take 2.5 mg by mouth       carvedilol (COREG) 6.25 MG tablet Take 1 tablet (6.25 mg) by mouth 2 times daily (with meals)       cetirizine (ZYRTEC) 10 MG tablet Take 1 tablet (10 mg) by mouth daily.       Continuous Glucose Sensor (DEXCOM G6 SENSOR) MISC Change every 10 days. 3 each 5     Continuous Glucose Sensor (DEXCOM G6 SENSOR) MISC Change every 10 days. 3 each 5     Continuous Glucose Sensor (DEXCOM G6 SENSOR) MISC Change every 10 days. 3 each 5     Continuous Glucose Transmitter (DEXCOM G6 TRANSMITTER) MISC Change every 3 months. 1 each 1     dorzolamide-timolol (COSOPT) 2-0.5 % ophthalmic solution Place 1 drop into both eyes 2 times daily 20 mL 3     ferrous sulfate (SLO-FE) 142 (45 Fe) MG CR tablet Take 1 tablet (142 mg) by mouth daily       furosemide  (LASIX) 20 MG tablet Take 10 mg by mouth daily.       Glucagon (BAQSIMI) 3 MG/DOSE nasal powder Spray 1 spray (3 mg) in nostril as needed. in the event of unconscious hypoglycemia or hypoglycemic seizure. May repeat dose if no response after 15 minutes. (Patient not taking: Reported on 4/15/2025) 2 each 1     glucagon 1 MG SOLR injection Inject 1 mg into the muscle once       hydrocortisone 1 % CREA cream Place rectally 2 times daily       insulin aspart (NOVOLOG PEN) 100 UNIT/ML pen Inject 4-10 Units subcutaneously 3 times daily (before meals). Inject 3 units before breakfast, 7 units before lunch and 9 units before dinner, 3 units before each snacks 2x daily. Add correction scale before meals. -160 = 1 unit. -200 = 2 units. -240 = 3 units. -280 = 4 units. -320 = 5 unit(s). Max daily dose is 45 units. 45 mL 3     Insulin Aspart, w/Niacinamide, (FIASP PENFILL) 100 UNIT/ML SOCT Inject 4-10 Units subcutaneously 3 times daily (before meals). Inject 2 units before breakfast, 6 units before lunch and 11 units before dinner, 3 units before each snacks 2x daily. Add correction scale before meals. -160 = 1 unit. -200 = 2 units. -240 = 3 units. -280 = 4 units. -320 = 5 unit(s). Max daily dose is 50 units. 6 mL 11     Insulin Degludec FlexTouch 100 UNIT/ML SOPN Inject 22 Units subcutaneously daily. EVERY NIGHT AT BEDTIME FOR DM *DISCARD 56 DAYS AFTER OPENING* 6 mL PRN     insulin pen needle (NOVOFINE AUTOCOVER PEN NEEDLE) 30G X 8 MM miscellaneous 1 each 4 times daily. as directed. 100 each 11     insulin pen needle 30G X 5 MM 2 times daily. Use 2 pen needles daily or as directed.       latanoprost (XALATAN) 0.005 % ophthalmic solution INSTILL 1 DROP IN BOTH EYES AT BEDTIME 10 mL 11     levothyroxine (SYNTHROID/LEVOTHROID) 50 MCG tablet Take 50 mcg by mouth daily.       lisinopril (ZESTRIL) 40 MG tablet Take 1 tablet (40 mg) by mouth daily       MULTIVITAMIN TABS   OR  "1 TABLET DAILY       pantoprazole (PROTONIX) 40 MG EC tablet Take 40 mg by mouth daily       semaglutide-weight management (WEGOVY) 0.25 MG/0.5ML pen Inject 0.5 mLs (0.25 mg) subcutaneously once a week. 2 mL 0     sennosides (SENOKOT) 8.6 MG tablet Take 1 tablet by mouth daily as needed       simvastatin (ZOCOR) 20 MG tablet Take 1 tablet (20 mg) by mouth At Bedtime 90 tablet 3       Review of Systems   Patient denies fever, chills, headache, lightheadedness, dizziness, rhinorrhea, shortness of breath, chest pain, palpitations, abdominal pain, n/v, diarrhea, constipation, change in appetite, change in sleep pattern, dysuria, frequency, burning or pain with urination.  Other than stated in HPI all other review of systems is negative.       Physical Exam  Vital signs:BP (!) 161/76   Pulse 90   Temp 97.4  F (36.3  C)   Resp 18   Ht 1.626 m (5' 4\")   Wt 107 kg (236 lb)   LMP 03/28/2014   SpO2 98%   BMI 40.51 kg/m     GENERAL APPEARANCE: obese female, in no acute distress.  HEENT: normocephalic, atraumatic  sclerae anicteric, conjunctivae clear and moist, EOM intact  LUNGS: Lung sounds CTA, no adventitious sounds, respiratory effort normal.  CARD: RRR, S1, S2, without murmurs, gallops, rubs  ABD: Soft, nondistended and nontender with normal bowel sounds.   MSK: Muscle strength and tone were equal bilaterally. Moves all extremities easily and intentionally.   EXTREMITIES: No cyanosis, clubbing or edema.  NEURO: Alert and oriented x 3. Face is symmetric.  SKIN: Inspection of the skin reveals no rashes, ulcerations or petechiae.  PSYCH: euthymic        Labs:    Last Comprehensive Metabolic Panel:  Lab Results   Component Value Date     04/30/2025    POTASSIUM 4.4 04/30/2025    CHLORIDE 113 (H) 04/30/2025    CO2 20 (L) 04/30/2025    ANIONGAP 10 04/30/2025     (H) 04/30/2025    BUN 35.0 (H) 04/30/2025    CR 1.22 (H) 04/30/2025    GFRESTIMATED 50 (L) 04/30/2025    EFRAIN 9.2 04/30/2025       Lab Results "   Component Value Date    WBC 5.6 04/30/2025    WBC 4.3 09/30/2020     Lab Results   Component Value Date    RBC 4.11 04/30/2025    RBC 4.50 09/30/2020     Lab Results   Component Value Date    HGB 11.4 04/30/2025    HGB 11.9 09/30/2020     Lab Results   Component Value Date    HCT 37.1 04/30/2025    HCT 35.9 09/30/2020     Lab Results   Component Value Date    MCV 90 04/30/2025    MCV 80 09/30/2020     Lab Results   Component Value Date    MCH 27.7 04/30/2025    MCH 26.4 09/30/2020     Lab Results   Component Value Date    MCHC 30.7 04/30/2025    MCHC 33.1 09/30/2020     Lab Results   Component Value Date    RDW 14.5 04/30/2025    RDW 14.7 09/30/2020     Lab Results   Component Value Date     04/30/2025     09/30/2020     Lab Results   Component Value Date    A1C 7.8 04/30/2025    A1C 7.8 02/12/2025    A1C 7.8 09/09/2024    A1C 9.7 06/19/2024    A1C 8.5 11/29/2023    A1C 9.7 02/01/2021    A1C 9.2 09/30/2020    A1C 11.8 01/08/2020    A1C 11.8 05/19/2019    A1C 11.2 12/12/2018           Assessment/Plan:  Hypertension goal BP (blood pressure) < 140/90  Hypertensive heart and chronic kidney disease with heart failure and stage 1 through stage 4 chronic kidney disease, or unspecified chronic kidney disease (H  Bps running elevated, increase carvedilol 12.5mg BID, continue with lisinopril and amlodipine.  May need to increase amlodipine.     Pneumonia of right lower lobe due to infectious organism  Continue with abx Azithromycin through 5/9 and cefuroxime through 5/10.  Monitor symptoms.      Class 2 severe obesity due to excess calories with serious comorbidity in adult, unspecified BMI (H)  Now on Wegovy.  Monitor weights    Type 1 diabetes mellitus with moderate nonproliferative retinopathy of both eyes, macular edema presence unspecified (H)  Uncontrolled.  A1c improving. Continue with Degludec and Wegovy            Electronically signed by:Maribeth Mooney NP      Sincerely,        Maribeth Mooney  NP    Electronically signed

## 2025-05-07 NOTE — CONFIDENTIAL NOTE
[facility]      Code Status:  FULL CODE  Visit Type: RECHECK     Facility:   [facility] ([facility]) [[id number]]         History of Present Illness: Lexy Rockwell is a 64 year old female with a past medical history for GERD, obesity, HLD, DM1 uncontrolled, HTN.     Today, I follow up with patient after being seen in the ED on 5/5 for nausea and feeling her heart was racing and elevated BPs.  She had progressive weakness over the past 3 weeks.  She was found ot have pneumonia and discharge with Azithromycin and cefuroxime.      Today, Bps remain elevated for age group.  161/76.  She denies palpitations, dizziness.  She reports feeling increased fatigue but better than 2 days ago.      BS continue to vacillate 85 to 320 with most 140-180s.  She has been started on Wegov 5/1/2025  Weight is down 4lbs from last month.  Now 236lbs from 240lbs. .       Current Outpatient Medications   Medication Sig Dispense Refill    acetaminophen (TYLENOL) 500 MG tablet TAKE 2 TABLETS BY MOUTH EVERY 6 HOURS AS NEEDED (1000MG) FOR PAIN 248 tablet 11    AMLODIPINE BENZOATE PO Take 2.5 mg by mouth      carvedilol (COREG) 6.25 MG tablet Take 1 tablet (6.25 mg) by mouth 2 times daily (with meals)      cetirizine (ZYRTEC) 10 MG tablet Take 1 tablet (10 mg) by mouth daily.      Continuous Glucose Sensor (DEXCOM G6 SENSOR) MISC Change every 10 days. 3 each 5    Continuous Glucose Sensor (DEXCOM G6 SENSOR) MISC Change every 10 days. 3 each 5    Continuous Glucose Sensor (DEXCOM G6 SENSOR) MISC Change every 10 days. 3 each 5    Continuous Glucose Transmitter (DEXCOM G6 TRANSMITTER) MISC Change every 3 months. 1 each 1    dorzolamide-timolol (COSOPT) 2-0.5 % ophthalmic solution Place 1 drop into both eyes 2 times daily 20 mL 3    ferrous sulfate (SLO-FE) 142 (45 Fe) MG CR tablet Take 1 tablet (142 mg) by mouth daily      furosemide (LASIX) 20 MG  tablet Take 10 mg by mouth daily.      Glucagon (BAQSIMI) 3 MG/DOSE nasal powder Spray 1 spray (3 mg) in nostril as needed. in the event of unconscious hypoglycemia or hypoglycemic seizure. May repeat dose if no response after 15 minutes. (Patient not taking: Reported on 4/15/2025) 2 each 1    glucagon 1 MG SOLR injection Inject 1 mg into the muscle once      hydrocortisone 1 % CREA cream Place rectally 2 times daily      insulin aspart (NOVOLOG PEN) 100 UNIT/ML pen Inject 4-10 Units subcutaneously 3 times daily (before meals). Inject 3 units before breakfast, 7 units before lunch and 9 units before dinner, 3 units before each snacks 2x daily. Add correction scale before meals. -160 = 1 unit. -200 = 2 units. -240 = 3 units. -280 = 4 units. -320 = 5 unit(s). Max daily dose is 45 units. 45 mL 3    Insulin Aspart, w/Niacinamide, (FIASP PENFILL) 100 UNIT/ML SOCT Inject 4-10 Units subcutaneously 3 times daily (before meals). Inject 2 units before breakfast, 6 units before lunch and 11 units before dinner, 3 units before each snacks 2x daily. Add correction scale before meals. -160 = 1 unit. -200 = 2 units. -240 = 3 units. -280 = 4 units. -320 = 5 unit(s). Max daily dose is 50 units. 6 mL 11    Insulin Degludec FlexTouch 100 UNIT/ML SOPN Inject 22 Units subcutaneously daily. EVERY NIGHT AT BEDTIME FOR DM *DISCARD 56 DAYS AFTER OPENING* 6 mL PRN    insulin pen needle (NOVOFINE AUTOCOVER PEN NEEDLE) 30G X 8 MM miscellaneous 1 each 4 times daily. as directed. 100 each 11    insulin pen needle 30G X 5 MM 2 times daily. Use 2 pen needles daily or as directed.      latanoprost (XALATAN) 0.005 % ophthalmic solution INSTILL 1 DROP IN BOTH EYES AT BEDTIME 10 mL 11    levothyroxine (SYNTHROID/LEVOTHROID) 50 MCG tablet Take 50 mcg by mouth daily.      lisinopril (ZESTRIL) 40 MG tablet Take 1 tablet (40 mg) by mouth daily      MULTIVITAMIN TABS   OR 1 TABLET DAILY       pantoprazole (PROTONIX) 40 MG EC tablet Take 40 mg by mouth daily      semaglutide-weight management (WEGOVY) 0.25 MG/0.5ML pen Inject 0.5 mLs (0.25 mg) subcutaneously once a week. 2 mL 0    sennosides (SENOKOT) 8.6 MG tablet Take 1 tablet by mouth daily as needed      simvastatin (ZOCOR) 20 MG tablet Take 1 tablet (20 mg) by mouth At Bedtime 90 tablet 3       Review of Systems   ***    Physical Exam  ***    Labs:  {fgslabdischarge:920068}      Assessment/Plan:  {fgsdia}        *** minutes spent of which greater than ***% was face to face communication with the patient about above plan of care    Electronically signed by:Maribeth Mooney NP

## 2025-05-07 NOTE — PROGRESS NOTES
[facility]      Code Status:  FULL CODE  Visit Type: RECHECK     Facility:   [facility] ([facility]) [[id number]]         History of Present Illness: Lexy Rockwell is a 64 year old female with a past medical history for GERD, obesity, HLD, DM1 uncontrolled, HTN.     Today, I follow up with patient after being seen in the ED on 5/5 for nausea and feeling her heart was racing and elevated BPs.  She had progressive weakness over the past 3 weeks.  She was found ot have pneumonia and discharge with Azithromycin and cefuroxime.      Today, Bps remain elevated for age group.  161/76.  She denies palpitations, dizziness.  She reports feeling increased fatigue but better than 2 days ago.      BS continue to vacillate 85 to 320 with most 140-180s.  She has been started on Wegov 5/1/2025  Weight is down 4lbs from last month.  Now 236lbs from 240lbs. .       Current Outpatient Medications   Medication Sig Dispense Refill    acetaminophen (TYLENOL) 500 MG tablet TAKE 2 TABLETS BY MOUTH EVERY 6 HOURS AS NEEDED (1000MG) FOR PAIN 248 tablet 11    AMLODIPINE BENZOATE PO Take 2.5 mg by mouth      carvedilol (COREG) 6.25 MG tablet Take 1 tablet (6.25 mg) by mouth 2 times daily (with meals)      cetirizine (ZYRTEC) 10 MG tablet Take 1 tablet (10 mg) by mouth daily.      Continuous Glucose Sensor (DEXCOM G6 SENSOR) MISC Change every 10 days. 3 each 5    Continuous Glucose Sensor (DEXCOM G6 SENSOR) MISC Change every 10 days. 3 each 5    Continuous Glucose Sensor (DEXCOM G6 SENSOR) MISC Change every 10 days. 3 each 5    Continuous Glucose Transmitter (DEXCOM G6 TRANSMITTER) MISC Change every 3 months. 1 each 1    dorzolamide-timolol (COSOPT) 2-0.5 % ophthalmic solution Place 1 drop into both eyes 2 times daily 20 mL 3    ferrous sulfate (SLO-FE) 142 (45 Fe) MG CR tablet Take 1 tablet (142 mg) by mouth daily      furosemide (LASIX) 20 MG tablet Take 10 mg by mouth daily.      Glucagon (BAQSIMI) 3 MG/DOSE nasal powder Spray  1 spray (3 mg) in nostril as needed. in the event of unconscious hypoglycemia or hypoglycemic seizure. May repeat dose if no response after 15 minutes. (Patient not taking: Reported on 4/15/2025) 2 each 1    glucagon 1 MG SOLR injection Inject 1 mg into the muscle once      hydrocortisone 1 % CREA cream Place rectally 2 times daily      insulin aspart (NOVOLOG PEN) 100 UNIT/ML pen Inject 4-10 Units subcutaneously 3 times daily (before meals). Inject 3 units before breakfast, 7 units before lunch and 9 units before dinner, 3 units before each snacks 2x daily. Add correction scale before meals. -160 = 1 unit. -200 = 2 units. -240 = 3 units. -280 = 4 units. -320 = 5 unit(s). Max daily dose is 45 units. 45 mL 3    Insulin Aspart, w/Niacinamide, (FIASP PENFILL) 100 UNIT/ML SOCT Inject 4-10 Units subcutaneously 3 times daily (before meals). Inject 2 units before breakfast, 6 units before lunch and 11 units before dinner, 3 units before each snacks 2x daily. Add correction scale before meals. -160 = 1 unit. -200 = 2 units. -240 = 3 units. -280 = 4 units. -320 = 5 unit(s). Max daily dose is 50 units. 6 mL 11    Insulin Degludec FlexTouch 100 UNIT/ML SOPN Inject 22 Units subcutaneously daily. EVERY NIGHT AT BEDTIME FOR DM *DISCARD 56 DAYS AFTER OPENING* 6 mL PRN    insulin pen needle (NOVOFINE AUTOCOVER PEN NEEDLE) 30G X 8 MM miscellaneous 1 each 4 times daily. as directed. 100 each 11    insulin pen needle 30G X 5 MM 2 times daily. Use 2 pen needles daily or as directed.      latanoprost (XALATAN) 0.005 % ophthalmic solution INSTILL 1 DROP IN BOTH EYES AT BEDTIME 10 mL 11    levothyroxine (SYNTHROID/LEVOTHROID) 50 MCG tablet Take 50 mcg by mouth daily.      lisinopril (ZESTRIL) 40 MG tablet Take 1 tablet (40 mg) by mouth daily      MULTIVITAMIN TABS   OR 1 TABLET DAILY      pantoprazole (PROTONIX) 40 MG EC tablet Take 40 mg by mouth daily      semaglutide-weight  "management (WEGOVY) 0.25 MG/0.5ML pen Inject 0.5 mLs (0.25 mg) subcutaneously once a week. 2 mL 0    sennosides (SENOKOT) 8.6 MG tablet Take 1 tablet by mouth daily as needed      simvastatin (ZOCOR) 20 MG tablet Take 1 tablet (20 mg) by mouth At Bedtime 90 tablet 3       Review of Systems   Patient denies fever, chills, headache, lightheadedness, dizziness, rhinorrhea, shortness of breath, chest pain, palpitations, abdominal pain, n/v, diarrhea, constipation, change in appetite, change in sleep pattern, dysuria, frequency, burning or pain with urination.  Other than stated in HPI all other review of systems is negative.       Physical Exam  Vital signs:BP (!) 161/76   Pulse 90   Temp 97.4  F (36.3  C)   Resp 18   Ht 1.626 m (5' 4\")   Wt 107 kg (236 lb)   LMP 03/28/2014   SpO2 98%   BMI 40.51 kg/m     GENERAL APPEARANCE: obese female, in no acute distress.  HEENT: normocephalic, atraumatic  sclerae anicteric, conjunctivae clear and moist, EOM intact  LUNGS: Lung sounds CTA, no adventitious sounds, respiratory effort normal.  CARD: RRR, S1, S2, without murmurs, gallops, rubs  ABD: Soft, nondistended and nontender with normal bowel sounds.   MSK: Muscle strength and tone were equal bilaterally. Moves all extremities easily and intentionally.   EXTREMITIES: No cyanosis, clubbing or edema.  NEURO: Alert and oriented x 3. Face is symmetric.  SKIN: Inspection of the skin reveals no rashes, ulcerations or petechiae.  PSYCH: euthymic        Labs:    Last Comprehensive Metabolic Panel:  Lab Results   Component Value Date     04/30/2025    POTASSIUM 4.4 04/30/2025    CHLORIDE 113 (H) 04/30/2025    CO2 20 (L) 04/30/2025    ANIONGAP 10 04/30/2025     (H) 04/30/2025    BUN 35.0 (H) 04/30/2025    CR 1.22 (H) 04/30/2025    GFRESTIMATED 50 (L) 04/30/2025    EFRAIN 9.2 04/30/2025       Lab Results   Component Value Date    WBC 5.6 04/30/2025    WBC 4.3 09/30/2020     Lab Results   Component Value Date    RBC 4.11 " 04/30/2025    RBC 4.50 09/30/2020     Lab Results   Component Value Date    HGB 11.4 04/30/2025    HGB 11.9 09/30/2020     Lab Results   Component Value Date    HCT 37.1 04/30/2025    HCT 35.9 09/30/2020     Lab Results   Component Value Date    MCV 90 04/30/2025    MCV 80 09/30/2020     Lab Results   Component Value Date    MCH 27.7 04/30/2025    MCH 26.4 09/30/2020     Lab Results   Component Value Date    MCHC 30.7 04/30/2025    MCHC 33.1 09/30/2020     Lab Results   Component Value Date    RDW 14.5 04/30/2025    RDW 14.7 09/30/2020     Lab Results   Component Value Date     04/30/2025     09/30/2020     Lab Results   Component Value Date    A1C 7.8 04/30/2025    A1C 7.8 02/12/2025    A1C 7.8 09/09/2024    A1C 9.7 06/19/2024    A1C 8.5 11/29/2023    A1C 9.7 02/01/2021    A1C 9.2 09/30/2020    A1C 11.8 01/08/2020    A1C 11.8 05/19/2019    A1C 11.2 12/12/2018           Assessment/Plan:  Hypertension goal BP (blood pressure) < 140/90  Hypertensive heart and chronic kidney disease with heart failure and stage 1 through stage 4 chronic kidney disease, or unspecified chronic kidney disease (H  Bps running elevated, increase carvedilol 12.5mg BID, continue with lisinopril and amlodipine.  May need to increase amlodipine.     Pneumonia of right lower lobe due to infectious organism  Continue with abx Azithromycin through 5/9 and cefuroxime through 5/10.  Monitor symptoms.      Class 2 severe obesity due to excess calories with serious comorbidity in adult, unspecified BMI (H)  Now on Wegovy.  Monitor weights    Type 1 diabetes mellitus with moderate nonproliferative retinopathy of both eyes, macular edema presence unspecified (H)  Uncontrolled.  A1c improving. Continue with Degludec and Wegovy            Electronically signed by:Maribeth Mooney NP

## 2025-05-15 ENCOUNTER — VIRTUAL VISIT (OUTPATIENT)
Dept: EDUCATION SERVICES | Facility: CLINIC | Age: 65
End: 2025-05-15
Payer: MEDICARE

## 2025-05-15 DIAGNOSIS — E10.3393 TYPE 1 DIABETES MELLITUS WITH MODERATE NONPROLIFERATIVE RETINOPATHY OF BOTH EYES, MACULAR EDEMA PRESENCE UNSPECIFIED (H): Primary | ICD-10-CM

## 2025-05-15 NOTE — LETTER
5/15/2025      Lexy Rockwell  [address]  [facility] [address]      Dear Colleague,    Thank you for referring your patient, Lexy Rockwell, to the [facility]. Please see a copy of my visit note below.    Virtual Visit Details    Type of service:  Telephone Visit   Phone call duration: 6 minutes   Originating Location (pt. Location): Home    Distant Location (provider location):  On-site  Telephone visit completed due to the patient did not have access to video, while the distant provider did.    Diabetes Education Follow-up    Subjective/Objective:    Lexy Rockwell was called for BG review. Last date of communication was: 4/15.    Diabetes is being managed with Diabetes Medications   Diabetes Medication(s)       Diabetic Other       Glucagon (BAQSIMI) 3 MG/DOSE nasal powder Spray 1 spray (3 mg) in nostril as needed. in the event of unconscious hypoglycemia or hypoglycemic seizure. May repeat dose if no response after 15 minutes.     Patient not taking: Reported on 4/15/2025     glucagon 1 MG SOLR injection Inject 1 mg into the muscle once       Insulin       insulin aspart (NOVOLOG PEN) 100 UNIT/ML pen Inject 4-10 Units subcutaneously 3 times daily (before meals). Inject 3 units before breakfast, 7 units before lunch and 9 units before dinner, 3 units before each snacks 2x daily. Add correction scale before meals. -160 = 1 unit. -200 = 2 units. -240 = 3 units. -280 = 4 units. -320 = 5 unit(s). Max daily dose is 45 units.     Insulin Aspart, w/Niacinamide, (FIASP PENFILL) 100 UNIT/ML SOCT Inject 4-10 Units subcutaneously 3 times daily (before meals). Inject 2 units before breakfast, 6 units before lunch and 11 units before dinner, 3 units before each snacks 2x daily. Add correction scale before meals. -160 = 1 unit. -200 = 2 units. -240 = 3 units. -280 = 4 units. -320 = 5 unit(s). Max daily dose is  50 units.     Insulin Degludec FlexTouch 100 UNIT/ML SOPN Inject 22 Units subcutaneously daily. EVERY NIGHT AT BEDTIME FOR DM *DISCARD 56 DAYS AFTER OPENING*        Also taking Wegovy, 0.25 mg weekly    BG/Food Log:       Assessment:    BG has been higher the last 2 weeks, patient did have pneumonia in this time period. She had a few lows.  She reports that she started the Wegovy on Monday (). She had some nausea initially but this has improved. We decided on a plan to continue her current insulin doses until we see how the Wegovy impacts her blood sugar.     Plan/Response:  See Patient Instructions for co-developed, patient-stated behavior change goals.  No changes in the patient's current treatment plan    Tiffanie Mcfarland RD [facility]  Diabetes Education Department  [facility]  Phone: [phone]  [phone]      Any diabetes medication dose changes were made via the CDE Protocol and Collaborative Practice Agreement with the patient's referring provider. A copy of this encounter was shared with the provider.          Again, thank you for allowing me to participate in the care of your patient.        Sincerely,        Tiffanie Mcfarland RD    Electronically signed

## 2025-05-15 NOTE — NURSING NOTE
Current patient location: [address] [facility] [address]    Is the patient currently in the state of [place]? YES    Visit mode: TELEPHONE    If the visit is dropped, the patient can be reconnected by:TELEPHONE VISIT: Phone number:   Telephone Information:   Mobile [phone]       Will anyone else be joining the visit? NO  (If patient encounters technical issues they should call [phone]    Are changes needed to the allergy or medication list? No    Are refills needed on medications prescribed by this physician? NO    Rooming Documentation:  Questionnaire(s) not done per department protocol    Reason for visit: Diabetes Education    Dominga KRUEGER

## 2025-05-15 NOTE — PROGRESS NOTES
Virtual Visit Details    Type of service:  Telephone Visit   Phone call duration: 6 minutes   Originating Location (pt. Location): Home    Distant Location (provider location):  On-site  Telephone visit completed due to the patient did not have access to video, while the distant provider did.    Diabetes Education Follow-up    Subjective/Objective:    Lexy Rockwell was called for BG review. Last date of communication was: 4/15.    Diabetes is being managed with Diabetes Medications   Diabetes Medication(s)       Diabetic Other       Glucagon (BAQSIMI) 3 MG/DOSE nasal powder Spray 1 spray (3 mg) in nostril as needed. in the event of unconscious hypoglycemia or hypoglycemic seizure. May repeat dose if no response after 15 minutes.     Patient not taking: Reported on 4/15/2025     glucagon 1 MG SOLR injection Inject 1 mg into the muscle once       Insulin       insulin aspart (NOVOLOG PEN) 100 UNIT/ML pen Inject 4-10 Units subcutaneously 3 times daily (before meals). Inject 3 units before breakfast, 7 units before lunch and 9 units before dinner, 3 units before each snacks 2x daily. Add correction scale before meals. -160 = 1 unit. -200 = 2 units. -240 = 3 units. -280 = 4 units. -320 = 5 unit(s). Max daily dose is 45 units.     Insulin Aspart, w/Niacinamide, (FIASP PENFILL) 100 UNIT/ML SOCT Inject 4-10 Units subcutaneously 3 times daily (before meals). Inject 2 units before breakfast, 6 units before lunch and 11 units before dinner, 3 units before each snacks 2x daily. Add correction scale before meals. -160 = 1 unit. -200 = 2 units. -240 = 3 units. -280 = 4 units. -320 = 5 unit(s). Max daily dose is 50 units.     Insulin Degludec FlexTouch 100 UNIT/ML SOPN Inject 22 Units subcutaneously daily. EVERY NIGHT AT BEDTIME FOR DM *DISCARD 56 DAYS AFTER OPENING*        Also taking Wegovy, 0.25 mg weekly    BG/Food Log:       Assessment:    BG has been higher  the last 2 weeks, patient did have pneumonia in this time period. She had a few lows.  She reports that she started the Wegovy on Monday (). She had some nausea initially but this has improved. We decided on a plan to continue her current insulin doses until we see how the Wegovy impacts her blood sugar.     Plan/Response:  See Patient Instructions for co-developed, patient-stated behavior change goals.  No changes in the patient's current treatment plan    KARLOS Haas [facility]  Diabetes Education Department  [facility]  Phone: [phone]  [phone]      Any diabetes medication dose changes were made via the CDE Protocol and Collaborative Practice Agreement with the patient's referring provider. A copy of this encounter was shared with the provider.

## 2025-05-27 ENCOUNTER — VIRTUAL VISIT (OUTPATIENT)
Dept: PHARMACY | Facility: CLINIC | Age: 65
End: 2025-05-27
Attending: INTERNAL MEDICINE
Payer: MEDICARE

## 2025-05-27 DIAGNOSIS — E10.3393 TYPE 1 DIABETES MELLITUS WITH MODERATE NONPROLIFERATIVE RETINOPATHY OF BOTH EYES, MACULAR EDEMA PRESENCE UNSPECIFIED (H): ICD-10-CM

## 2025-05-27 DIAGNOSIS — E66.01 CLASS 2 SEVERE OBESITY DUE TO EXCESS CALORIES WITH SERIOUS COMORBIDITY IN ADULT (H): Primary | ICD-10-CM

## 2025-05-27 DIAGNOSIS — E66.812 CLASS 2 SEVERE OBESITY DUE TO EXCESS CALORIES WITH SERIOUS COMORBIDITY IN ADULT (H): Primary | ICD-10-CM

## 2025-05-27 NOTE — PROGRESS NOTES
Medication Therapy Management (MTM) Encounter    ASSESSMENT:                            Medication Adherence/Access: Patient has dual coverage (medicare + [place]) - see below for considerations    Diabetes /Weight management: Patient is not meeting A1c goal of <7. She has been doing fairly well with Wegovy. She would benefit from increasing her dose for better glycemic control and weight loss. Will continue current insulin dosing as patient does not have her blood sugar readings available today but last report showed blood sugars were still elevated. Will also continue follow-up with CDE for insulin adjustment.    PLAN:                            Increase to Wegovy 0.5 mg once weekly  Continue current Tresiba and Novolog doses  Follow-up with CDE on 6/12/2025 for further adjustments  Let me know if you are having blood sugars <70     Follow-up: 7/3/2025 w/ Bhumi    SUBJECTIVE/OBJECTIVE:                          Lexy Rockwell is a 64 year old female called for a follow-up visit. She was referred to me from Camila Feliz.      Reason for visit: Glp1 initiation.    Allergies/ADRs: Reviewed in chart  Past Medical History: Reviewed in chart  Tobacco: She reports that she has never smoked. She has never used smokeless tobacco.  Alcohol: not currently using - only socially     Medication Adherence/Access: patient in assisted living home and meds are administered by staff; coverage for GLP1 therapy has been difficult (Ozempic denied)    Diabetes /Weight management  Patient diagnosed with Type 1 diabetes   Current Medications:  Tresiba 22 units at bedtime   Novolog with meals (patient unsure exactly how much she gets as it is given by staff at her assisted living)  Wegovy 0.25 mg weekly (Mondays) - 3rd dose this week  --some nausea (during the week, will feel more side effects and starts to feel better the weekend), some diarrhea (not too bad)    Patient is not experiencing side effects.  Current diabetes symptoms:  "none  Blood sugar monitoring: Continuous Glucose Monitor - unable to pull up reports for me today - last available report from CDE visit 5/15    Some low blood sugars - not eating as much so having lows after meals   Blood sugars at time of visit: 155  Diet: meals provided by home    Lab Results   Component Value Date    A1C 7.8 (H) 04/30/2025    A1C 7.8 (H) 02/12/2025    A1C 7.8 (H) 09/09/2024     Estimated body mass index is 40.54 kg/m  as calculated from the following:    Height as of 5/20/25: 5' 4\" (1.626 m).    Weight as of 5/20/25: 236 lb 3.2 oz (107.1 kg).    Wt Readings from Last 3 Encounters:   05/20/25 236 lb 3.2 oz (107.1 kg)   05/07/25 236 lb (107 kg)   04/15/25 236 lb 6.4 oz (107.2 kg)     Lab Results   Component Value Date    GFRESTIMATED 50 (L) 04/30/2025    GFRESTIMATED 50 (L) 02/12/2025    GFRESTIMATED 54 (L) 11/06/2024          Today's Vitals: LMP 03/28/2014   ----------------    I spent 40 minutes with this patient today. All changes were made via collaborative practice agreement with Camila Feliz.     A summary of these recommendations was sent via [employer].    Bhumi Guzman PharmD  Diabetes & Endocrine MTM Pharmacist    Contact information:   To schedule a MTM appointment: [phone]  For questions or concerns, please send a [employer] message or call the clinic at [phone].    For more urgent concerns that do not require 911, please call [phone] after hours/weekends and ask to speak with the Endocrinologist on call.       Telemedicine Visit Details  The patient's medications can be safely assessed via a telemedicine encounter.  Type of service:  Video Conference via [employer]  Originating Location (pt. Location): Home    Distant Location (provider location):  On-site  Start Time: 2 PM  End Time: 2:40 PM     Medication Therapy Recommendations  No medication therapy recommendations to display     "

## 2025-05-30 ENCOUNTER — LAB REQUISITION (OUTPATIENT)
Dept: LAB | Facility: CLINIC | Age: 65
End: 2025-05-30
Payer: MEDICARE

## 2025-05-30 DIAGNOSIS — E78.5 HYPERLIPIDEMIA, UNSPECIFIED: ICD-10-CM

## 2025-06-02 ENCOUNTER — RESULTS FOLLOW-UP (OUTPATIENT)
Dept: GERIATRICS | Facility: CLINIC | Age: 65
End: 2025-06-02

## 2025-06-02 ENCOUNTER — TELEPHONE (OUTPATIENT)
Dept: ENDOCRINOLOGY | Facility: CLINIC | Age: 65
End: 2025-06-02
Payer: MEDICARE

## 2025-06-02 LAB
CHOLEST SERPL-MCNC: 119 MG/DL
FASTING STATUS PATIENT QL REPORTED: NORMAL
HDLC SERPL-MCNC: 52 MG/DL
LDLC SERPL CALC-MCNC: 55 MG/DL
NONHDLC SERPL-MCNC: 67 MG/DL
TRIGL SERPL-MCNC: 58 MG/DL

## 2025-06-02 PROCEDURE — 80061 LIPID PANEL: CPT | Mod: ORL | Performed by: NURSE PRACTITIONER

## 2025-06-02 PROCEDURE — 36415 COLL VENOUS BLD VENIPUNCTURE: CPT | Mod: ORL | Performed by: NURSE PRACTITIONER

## 2025-06-02 PROCEDURE — P9604 ONE-WAY ALLOW PRORATED TRIP: HCPCS | Mod: ORL | Performed by: NURSE PRACTITIONER

## 2025-06-02 NOTE — TELEPHONE ENCOUNTER
The care center needs a signed order for the Wegovy 0.5 mg  once weekly  in order to give the injection . She had been on  0.25 mg weekly . Provider prescribing needs to put a note in on the increased  dose and sign it. It should be faxed to  [phone] . Kathie Figueroa RN on 6/2/2025 at 1:31 PM

## 2025-06-02 NOTE — PATIENT INSTRUCTIONS
"Recommendations from today's MTM visit:                                                      Increase to Wegovy 0.5 mg once weekly  Continue current Tresiba and Novolog doses  Follow-up with CDE on 6/12/2025 for further adjustments  Let me know if you are having blood sugars <70     Follow-up: 7/3/2025 w/ Bhumi    It was great speaking with you today.  I value your experience and would be very thankful for your time in providing feedback in our clinic survey. In the next few days, you may receive an email or text message from [employer] with a link to a survey related to your  clinical pharmacist.\"     To schedule another MTM appointment, please call the clinic directly or you may call the MTM scheduling line at [phone].    My Clinical Pharmacist's contact information:                                                      Please feel free to contact me with any questions or concerns you have.      Bhumi Guzman, PharmD  Diabetes & Endocrine MTM Pharmacist    Contact information:   To schedule a MTM appointment: [phone]  For questions or concerns, please send a [employer] message or call the clinic at [phone].    For more urgent concerns that do not require [phone], please call [phone] after hours/weekends and ask to speak with the Endocrinologist on call.      "

## 2025-06-02 NOTE — TELEPHONE ENCOUNTER
Signed orders sent to [facility] on 5/27/2025.    Updated AVS faxed to [phone]  by MTM today (6/2/2025).    Bhumi Guzman, PharmD  Diabetes & Endocrine MTM Pharmacist

## 2025-06-02 NOTE — TELEPHONE ENCOUNTER
[facility] Call Center    Phone Message    May a detailed message be left on voicemail: yes     Reason for Call: Medication Question or concern regarding medication   Prescription Clarification  Name of Medication: Semaglutide-Weight Management (WEGOVY) 0.5 MG/0.5ML pen   Prescribing Provider: Rika Llanos   Pharmacy:   [facility] - [place], [place] - [address]      What on the order needs clarification? Pt's care center needing call back to verify dosing on this script

## 2025-06-03 ENCOUNTER — PATIENT OUTREACH (OUTPATIENT)
Dept: CARE COORDINATION | Facility: CLINIC | Age: 65
End: 2025-06-03
Payer: MEDICARE

## 2025-06-10 ENCOUNTER — TELEPHONE (OUTPATIENT)
Dept: ENDOCRINOLOGY | Facility: CLINIC | Age: 65
End: 2025-06-10
Payer: MEDICARE

## 2025-06-10 NOTE — TELEPHONE ENCOUNTER
Left VM for patient and alternate contact (unit coordiantor Natividad) to see what is going on with patients Wegovy. Had visit with patient a couple weeks ago and she reported she was doing well with Wegovy. Will clarify what has changed.    Bhumi Guzman, PharmD  Diabetes & Endocrine MTM Pharmacist

## 2025-06-10 NOTE — TELEPHONE ENCOUNTER
Provider notified.     Gabi Shafer RN on 6/10/2025 at 1:17 PM     RE    Patient wants to stop wagovy, need new order faxed to [phone]

## 2025-06-16 ENCOUNTER — ASSISTED LIVING VISIT (OUTPATIENT)
Dept: GERIATRICS | Facility: CLINIC | Age: 65
End: 2025-06-16
Payer: MEDICARE

## 2025-06-16 VITALS
RESPIRATION RATE: 20 BRPM | WEIGHT: 235 LBS | HEART RATE: 80 BPM | OXYGEN SATURATION: 96 % | BODY MASS INDEX: 40.34 KG/M2 | DIASTOLIC BLOOD PRESSURE: 68 MMHG | SYSTOLIC BLOOD PRESSURE: 127 MMHG | TEMPERATURE: 98.6 F

## 2025-06-16 DIAGNOSIS — K21.9 GASTROESOPHAGEAL REFLUX DISEASE, UNSPECIFIED WHETHER ESOPHAGITIS PRESENT: ICD-10-CM

## 2025-06-16 DIAGNOSIS — E10.22 TYPE 1 DIABETES MELLITUS WITH STAGE 3A CHRONIC KIDNEY DISEASE (H): Primary | ICD-10-CM

## 2025-06-16 DIAGNOSIS — N18.31 STAGE 3A CHRONIC KIDNEY DISEASE (H): ICD-10-CM

## 2025-06-16 DIAGNOSIS — I10 HYPERTENSION GOAL BP (BLOOD PRESSURE) < 140/90: ICD-10-CM

## 2025-06-16 DIAGNOSIS — F81.9 COGNITIVE DEVELOPMENTAL DELAY: ICD-10-CM

## 2025-06-16 DIAGNOSIS — N18.31 TYPE 1 DIABETES MELLITUS WITH STAGE 3A CHRONIC KIDNEY DISEASE (H): Primary | ICD-10-CM

## 2025-06-16 DIAGNOSIS — D64.9 ANEMIA, UNSPECIFIED TYPE: ICD-10-CM

## 2025-06-16 DIAGNOSIS — I13.0 HYPERTENSIVE HEART AND CHRONIC KIDNEY DISEASE WITH HEART FAILURE AND STAGE 1 THROUGH STAGE 4 CHRONIC KIDNEY DISEASE, OR UNSPECIFIED CHRONIC KIDNEY DISEASE (H): ICD-10-CM

## 2025-06-16 DIAGNOSIS — E78.5 HYPERLIPIDEMIA LDL GOAL <100: ICD-10-CM

## 2025-06-16 PROCEDURE — 99349 HOME/RES VST EST MOD MDM 40: CPT

## 2025-06-16 NOTE — PROGRESS NOTES
[facility] GERIATRICS  Chief Complaint   Patient presents with    correction Regulatory     [facility] Medical Record Number:  [id number]  Place of Service where encounter took place:  [facility] ([facility]) [[id number]]    HPI:    Lexy Rockwell  is 64 year old (1960), who is being seen today for a federally mandated E/M visit at [facility] where she resides since 1/2023.       PMH: GERD, obesity, HLD, DM1 uncontrolled, HTN.     History obtained from: facility chart records, facility staff, patient report, [facility] chart review, and [facility] chart review     Today,  is seen in her room while sitting bed. She has no concern when it comes to her medication.Wegovy increased to 0.5mg on 6/9/25 no nausea reported today.Utilizing dexcome for BS monitoring. Followed my endodontology to mange medications and update as needed.Weight stable at 235.8lb SBP controlled. She denies any other acute issues today.       ALLERGIES:Amoxicillin and Sulfa antibiotics  PAST MEDICAL HISTORY:   Past Medical History:   Diagnosis Date    Cerumen impacted     Development delay     Diabetic gastroparesis (H) 8/16/2013    Glaucoma (increased eye pressure)     Hyperlipaemia     Hypertension     Hypothyroid     Mental retardation     Nonsenile cataract     Obesity     Strabismus     Type 1 diabetes mellitus not at goal (H)      PAST SURGICAL HISTORY:   has a past surgical history that includes EYE SURG ANT SGMT PROC UNLISTED (remote); strabismus surgery; Esophagoscopy, gastroscopy, duodenoscopy (EGD), combined (N/A, 7/3/2023); and Colonoscopy (N/A, 7/3/2023).  FAMILY HISTORY: family history includes Diabetes in her sister; Glaucoma in her maternal grandfather; Hypertension in her father.  SOCIAL HISTORY:  reports that she has never smoked. She has never used smokeless tobacco. She reports current alcohol use. She reports that she does not use drugs.    MEDICATIONS:  MED REC REQUIREDPost Medication  Reconciliation Status: discharge medications reconciled and changed, per note/orders         Review of your medicines            Accurate as of June 16, 2025 10:36 AM. If you have any questions, ask your nurse or doctor.                CONTINUE these medicines which have NOT CHANGED        Dose / Directions   AMLODIPINE BENZOATE PO      Dose: 2.5 mg  Take 2.5 mg by mouth at bedtime.  Refills: 0     carvedilol 6.25 MG tablet  Commonly known as: COREG  Used for: Hypertension goal BP (blood pressure) < 140/90      Dose: 12.5 mg  Take 2 tablets (12.5 mg) by mouth 2 times daily (with meals).  Quantity: 60 tablet  Refills: 3     cetirizine 10 MG tablet  Commonly known as: zyrTEC  Used for: Rash      Dose: 10 mg  Take 1 tablet (10 mg) by mouth daily.  Refills: 0     * Dexcom G6 Sensor Misc  Used for: Type 1 diabetes mellitus with diabetic macular edema of both eyes resolved after treatment (H)      Change every 10 days.  Quantity: 3 each  Refills: 5     * Dexcom G6 Sensor Misc  Used for: Type 1 diabetes mellitus with moderate nonproliferative retinopathy of both eyes, macular edema presence unspecified (H)      Change every 10 days.  Quantity: 3 each  Refills: 5     * Dexcom G6 Sensor Misc  Used for: Type 1 diabetes mellitus with diabetic macular edema of both eyes resolved after treatment (H)      Change every 10 days.  Quantity: 3 each  Refills: 5     Dexcom G6 Transmitter Misc  Used for: Type 1 diabetes mellitus with diabetic macular edema of both eyes resolved after treatment (H)      Change every 3 months.  Quantity: 1 each  Refills: 1     dorzolamide-timolol 2-0.5 % ophthalmic solution  Commonly known as: COSOPT  Used for: Primary open angle glaucoma of both eyes, moderate stage      Dose: 1 drop  Place 1 drop into both eyes 2 times daily  Quantity: 20 mL  Refills: 3     ferrous sulfate 142 (45 Fe) MG CR tablet  Commonly known as: SLO-FE      Dose: 142 mg  Take 1 tablet (142 mg) by mouth daily  Refills: 0     Fiasp  PenFill 100 UNIT/ML Soct  Used for: Type 1 diabetes mellitus with moderate nonproliferative retinopathy of both eyes, macular edema presence unspecified (H)  Generic drug: Insulin Aspart (w/Niacinamide)      Dose: 4-10 Units  Inject 4-10 Units subcutaneously 3 times daily (before meals). Inject 2 units before breakfast, 6 units before lunch and 11 units before dinner, 3 units before each snacks 2x daily. Add correction scale before meals. -160 = 1 unit. -200 = 2 units. -240 = 3 units. -280 = 4 units. -320 = 5 unit(s). Max daily dose is 50 units.  Quantity: 6 mL  Refills: 11     furosemide 20 MG tablet  Commonly known as: LASIX  Indication: Cardiac Failure      Dose: 10 mg  Take 10 mg by mouth daily.  Refills: 0     Glucagon 3 MG/DOSE nasal powder  Commonly known as: BAQSIMI  Used for: Type 1 diabetes mellitus with diabetic macular edema of both eyes resolved after treatment (H)      Dose: 1 spray  Spray 1 spray (3 mg) in nostril as needed for low blood sugar. in the event of unconscious hypoglycemia or hypoglycemic seizure. May repeat dose if no response after 15 minutes.  Refills: 0     insulin aspart 100 UNIT/ML pen  Commonly known as: NovoLOG PEN  Used for: Type 1 diabetes mellitus with diabetic macular edema of both eyes resolved after treatment (H)      Inject 4-10 Units subcutaneously 3 times daily (before meals). Inject 3 units before breakfast, 7 units before lunch and 9 units before dinner, 3 units before each snacks 2x daily. Add correction scale before meals. -160 = 1 unit. -200 = 2 units. -240 = 3 units. -280 = 4 units. -320 = 5 unit(s). Max daily dose is 45 units.  Quantity: 45 mL  Refills: 3     Insulin Degludec FlexTouch 100 UNIT/ML Sopn  Used for: Type 1 diabetes mellitus with moderate nonproliferative retinopathy of both eyes, macular edema presence unspecified (H)      Dose: 22 Units  Inject 22 Units subcutaneously daily. EVERY NIGHT AT  BEDTIME FOR DM *DISCARD 56 DAYS AFTER OPENING*  Quantity: 6 mL  Refills: PRN     * NovoFine Autocover Pen Needle 30G X 8 MM miscellaneous  Used for: Hyperglycemia due to type 1 diabetes mellitus (H)  Generic drug: insulin pen needle      Dose: 1 each  1 each 4 times daily. as directed.  Quantity: 100 each  Refills: 11     * insulin pen needle 30G X 5 MM      2 times daily. Use 2 pen needles daily or as directed.  Refills: 0     latanoprost 0.005 % ophthalmic solution  Commonly known as: XALATAN  Used for: Primary open angle glaucoma of both eyes, moderate stage      INSTILL 1 DROP IN BOTH EYES AT BEDTIME  Quantity: 10 mL  Refills: 11     levothyroxine 50 MCG tablet  Commonly known as: SYNTHROID/LEVOTHROID      Dose: 50 mcg  Take 50 mcg by mouth daily.  Refills: 0     lisinopril 40 MG tablet  Commonly known as: ZESTRIL  Used for: Essential hypertension with goal blood pressure less than 140/90      Dose: 40 mg  Take 1 tablet (40 mg) by mouth daily  Refills: 0     pantoprazole 40 MG EC tablet  Commonly known as: PROTONIX      Dose: 40 mg  Take 40 mg by mouth daily  Refills: 0     Semaglutide-Weight Management 0.5 MG/0.5ML pen  Commonly known as: WEGOVY  Used for: Class 2 severe obesity due to excess calories with serious comorbidity in adult (H)      Dose: 0.5 mg  Inject 0.5 mg subcutaneously once a week.  Quantity: 2 mL  Refills: 1     simvastatin 20 MG tablet  Commonly known as: ZOCOR  Used for: Hyperlipidemia LDL goal <100      Dose: 20 mg  Take 1 tablet (20 mg) by mouth At Bedtime  Quantity: 90 tablet  Refills: 3           * This list has 5 medication(s) that are the same as other medications prescribed for you. Read the directions carefully, and ask your doctor or other care provider to review them with you.                   Case Management:  I have reviewed the care plan and MDS and do agree with the plan. Patient's desire to return to the community is present, but is not able due to care needs . Information  reviewed:  Medications, vital signs, orders, and nursing notes.    ROS:  4 point ROS including Respiratory, CV, GI and , other than that noted in the HPI,  is negative    Vitals:  /68   Pulse 80   Temp 98.6  F (37  C)   Resp 20   Wt 106.6 kg (235 lb)   LMP 03/28/2014   SpO2 96%   BMI 40.34 kg/m    Body mass index is 40.34 kg/m .  Exam:  GENERAL APPEARANCE:  Alert, in no distress  RESP:  respiratory effort and palpation of chest normal, lungs clear to auscultation , no respiratory distress  CV:  regular rate and rhythm, no murmur, rub, or gallop, +2edema, +2 pedal pulses  ABDOMEN:  normal bowel sounds, soft, nontender, no hepatosplenomegaly or other masses, no guarding or rebound, bowel sounds normal  :    No concern   M/S:   Gait and station normal  SKIN:  Inspection of skin and subcutaneous tissue baseline  NEURO:   Examination of sensation by touch normal  PSYCH:  oriented X 3    Lab/Diagnostic data:   Most Recent 3 CBC's:  Recent Labs   Lab Test 04/30/25  0730 02/12/25  0834 09/09/24  0723   WBC 5.6 5.8 5.6   HGB 11.4* 11.6* 10.8*   MCV 90 89 91    208 197     Most Recent 3 BMP's:  Recent Labs   Lab Test 04/30/25  0730 02/12/25  0834 11/06/24  0723    142 139   POTASSIUM 4.4 4.3 4.9   CHLORIDE 113* 109* 111*   CO2 20* 20* 21*   BUN 35.0* 41.9* 46.2*   CR 1.22* 1.21* 1.13*   ANIONGAP 10 13 7   EFRAIN 9.2 9.4 9.4   * 123* 201*       ASSESSMENT/PLAN  (E10.22,  N18.31) Type 1 diabetes mellitus with stage 3a chronic kidney disease (H)    Comment: Followed by endocrine and diabetic educator she is on simvastatin and not ASA secondary to anemia hemoglobin A1c 7.3% well-controlled patient uses Dexcom for blood sugar monitoring . She is tolerating Wegovy no more nausea reported today dose adjusted by [facility] pharmacy  to 0.5mg   Plan:   - Continue Wegovy.0.5mg weekly   - Continue blood sugar monitoring by Dexcom  - Hemoglobin A1c due in 3 to 4 months  - Continue insulin aspart sliding  scale/  - Continue Pre-meal asaprt 9 unit a.m. 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  Plan:  - Continue lisinopril 40 mg daily  - Continue carvedilol 12.5 mg twice daily  - Continue amlodipine 2.5 mg at at bedtime  - Continue furosemide Lasix 10 mg daily  - Periodic BMP       Order  No new order continue plan of care with no changes    Electronically signed by:TAYLA Henning CNP

## 2025-06-23 ENCOUNTER — TELEPHONE (OUTPATIENT)
Dept: GERIATRICS | Facility: CLINIC | Age: 65
End: 2025-06-23
Payer: MEDICARE

## 2025-06-23 NOTE — TELEPHONE ENCOUNTER
Patient requesting Semaglutide -Wegovey to be discontinued due to nausea and intolerance .  Discontinue Wegovey   Follow up with endodontology per schedule   TAYLA Henning CNP on 6/23/2025 at 12:29 PM

## 2025-07-03 ENCOUNTER — VIRTUAL VISIT (OUTPATIENT)
Dept: PHARMACY | Facility: CLINIC | Age: 65
End: 2025-07-03
Attending: INTERNAL MEDICINE
Payer: MEDICARE

## 2025-07-03 DIAGNOSIS — E66.01 CLASS 2 SEVERE OBESITY DUE TO EXCESS CALORIES WITH SERIOUS COMORBIDITY IN ADULT (H): ICD-10-CM

## 2025-07-03 DIAGNOSIS — E10.3393 TYPE 1 DIABETES MELLITUS WITH MODERATE NONPROLIFERATIVE RETINOPATHY OF BOTH EYES, MACULAR EDEMA PRESENCE UNSPECIFIED (H): Primary | ICD-10-CM

## 2025-07-03 DIAGNOSIS — E66.812 CLASS 2 SEVERE OBESITY DUE TO EXCESS CALORIES WITH SERIOUS COMORBIDITY IN ADULT (H): ICD-10-CM

## 2025-07-03 NOTE — PROGRESS NOTES
Medication Therapy Management (MTM) Encounter    ASSESSMENT:                            Medication Adherence/Access: Patient has dual coverage (medicare + [place]) - see below for considerations    Diabetes /Weight management: Patient is not meeting A1c goal of <7. She was not tolerating Wegovy and opted to stop medication. We discussed alternative options today, specifically Zepbound. Patient stats she is not interested in starting this right now. Provided education on medication in case she does want to try it in the future.    PLAN:                            Stop Wegovy  Continue current Tresiba and Novolog doses   Continue follow-up with CDE for further adjustments  Let me know if you are interested in trying Zepbound in the future    Endocrine Team & Next Follow-Up:  As needed with Bhumi (if starting Zepbound)  7/22/2025 with Rika Llanos PA-C     SUBJECTIVE/OBJECTIVE:                          Lexy Rockwell is a 64 year old female called for a follow-up visit.     Reason for visit: Glp1 initiation.    Allergies/ADRs: Reviewed in chart  Past Medical History: Reviewed in chart  Tobacco: She reports that she has never smoked. She has never used smokeless tobacco.  Alcohol: not currently using - only socially     Medication Adherence/Access: patient in assisted living home and meds are administered by staff; coverage for GLP1 therapy has been difficult (Ozempic denied)    Diabetes /Weight management  Patient diagnosed with Type 1 diabetes   Current Medications:  Tresiba 22 units at bedtime   Novolog with meals (patient unsure exactly how much she gets as it is given by staff at her assisted living)   - patient having too many side effects on medication that she cannot tolerate - she sent a msg about stopping this in June    Patient is not experiencing side effects.  Current diabetes symptoms: none  Blood sugar monitoring: Continuous Glucose Monitor - unable to pull up reports for me today and not connected to  "portal- last available report from E visit 5/15    Some low blood sugars - not eating as much so having lows after meals   Blood sugars at time of visit: 155  Diet: meals provided by home    Lab Results   Component Value Date    A1C 7.8 (H) 04/30/2025    A1C 7.8 (H) 02/12/2025    A1C 7.8 (H) 09/09/2024     Estimated body mass index is 40.34 kg/m  as calculated from the following:    Height as of 5/30/25: 5' 4\" (1.626 m).    Weight as of 6/16/25: 235 lb (106.6 kg).    Wt Readings from Last 3 Encounters:   06/16/25 235 lb (106.6 kg)   05/30/25 238 lb 3.2 oz (108 kg)   05/20/25 236 lb 3.2 oz (107.1 kg)     Lab Results   Component Value Date    GFRESTIMATED 50 (L) 04/30/2025    GFRESTIMATED 50 (L) 02/12/2025    GFRESTIMATED 54 (L) 11/06/2024          Today's Vitals: LMP 03/28/2014   ----------------    I spent 20 minutes with this patient today. All changes were made via collaborative practice agreement with Camila Feliz.     A summary of these recommendations was sent via [email or web address].    Bhumi Guzman PharmD  Diabetes & Endocrine MTM Pharmacist    Contact information:   To schedule a MTM appointment: [phone]  For questions or concerns, please send a [email or web address] message or call the clinic at [phone].    For more urgent concerns that do not require 911, please call [phone] after hours/weekends and ask to speak with the Endocrinologist on call.       Telemedicine Visit Details  The patient's medications can be safely assessed via a telemedicine encounter.  Type of service:  Video Conference via [employer]  Originating Location (pt. Location): Home    Distant Location (provider location):  Off-site  Start Time: 11 AM  End Time: 11:20 AM     Medication Therapy Recommendations  No medication therapy recommendations to display     "

## 2025-07-03 NOTE — PATIENT INSTRUCTIONS
"Recommendations from today's MTM visit:                                                      Stop Wegovy  Continue current Tresiba and Novolog doses   Continue follow-up with CDE for further adjustments  Let me know if you are interested in trying Zepbound in the future    Endocrine Team & Next Follow-Up:  As needed with Bhumi (if starting Zepbound)  7/22/2025 with Rika Llanos PA-C     It was great speaking with you today.  I value your experience and would be very thankful for your time in providing feedback in our clinic survey. In the next few days, you may receive an email or text message from [employer] with a link to a survey related to your  clinical pharmacist.\"     To schedule another MTM appointment, please call the clinic directly or you may call the MTM scheduling line at [phone].    My Clinical Pharmacist's contact information:                                                      Please feel free to contact me with any questions or concerns you have.      Bhumi Guzman, PharmD  Diabetes & Endocrine MTM Pharmacist    Contact information:   To schedule a MTM appointment: [phone]  For questions or concerns, please send a [employer] message or call the clinic at [phone].    For more urgent concerns that do not require 720, please call [phone] after hours/weekends and ask to speak with the Endocrinologist on call.      "

## 2025-07-03 NOTE — Clinical Note
Cristi Meléndez - I have been following with Lexy for her Wegovy, but she was not able to tolerate the medication and was not willing to continue on it. I recommended trying Zepbound but she was not interested in trying this right now. You have a follow-up with her on 7/22, let me know if she changes her mind at all at that appt!  Bhumi

## 2025-07-23 ENCOUNTER — TELEPHONE (OUTPATIENT)
Dept: ENDOCRINOLOGY | Facility: CLINIC | Age: 65
End: 2025-07-23
Payer: MEDICARE

## 2025-07-23 NOTE — TELEPHONE ENCOUNTER
Rika Llanos PA-C  [facility]; Loretta Pearson, APRN CNP  Please communicate to [facility] extended correction scale to address premeal BG >320    And ADDITION  of prn 2-3 am scale for BG >300 when Debbie calls for correction due to overnight Dexcom alarm for BG >300.      Thank you!  It is my privilege to be involved in the care of the above patient.    Rika Llanos PA-C, [facility]  Diabetes, Endocrinology, and Metabolism  [phone] Appointments/Nurse Line  [phone] Fax  [phone] office  [email or web address]

## 2025-07-23 NOTE — TELEPHONE ENCOUNTER
Rika Llanos PA-C  [facility]; Loretta Pearson, APRN CNP  Please communicate to [facility] home extended correction scale to address premeal BG >320    And ADDITION  of prn 2-3 am scale for BG >300 when Debbie calls for correction due to overnight Dexcom alarm for BG >300.      Thank you!  It is my privilege to be involved in the care of the above patient.    Rika Llanos PA-C, [facility]  Diabetes, Endocrinology, and Metabolism  [phone] Appointments/Nurse Line  [phone] Fax  [phone] office  [place]    Faxed to [facility]  # [phone] Kathie Figueroa RN on 7/23/2025 at 8:52 AM

## 2025-08-03 ENCOUNTER — TELEPHONE (OUTPATIENT)
Dept: ENDOCRINOLOGY | Facility: CLINIC | Age: 65
End: 2025-08-03
Payer: MEDICARE

## 2025-08-11 ENCOUNTER — PATIENT OUTREACH (OUTPATIENT)
Dept: CARE COORDINATION | Facility: CLINIC | Age: 65
End: 2025-08-11
Payer: MEDICARE

## 2025-08-13 ENCOUNTER — PATIENT OUTREACH (OUTPATIENT)
Dept: CARE COORDINATION | Facility: CLINIC | Age: 65
End: 2025-08-13
Payer: MEDICARE

## 2025-08-17 ENCOUNTER — HEALTH MAINTENANCE LETTER (OUTPATIENT)
Age: 65
End: 2025-08-17

## 2025-08-26 ENCOUNTER — LAB REQUISITION (OUTPATIENT)
Dept: LAB | Facility: CLINIC | Age: 65
End: 2025-08-26
Payer: MEDICARE

## 2025-08-26 DIAGNOSIS — E55.9 VITAMIN D DEFICIENCY, UNSPECIFIED: ICD-10-CM

## 2025-08-26 DIAGNOSIS — E11.9 TYPE 2 DIABETES MELLITUS WITHOUT COMPLICATIONS (H): ICD-10-CM

## 2025-08-26 DIAGNOSIS — I10 ESSENTIAL (PRIMARY) HYPERTENSION: ICD-10-CM

## 2025-08-26 DIAGNOSIS — E03.9 HYPOTHYROIDISM, UNSPECIFIED: ICD-10-CM

## 2025-08-26 DIAGNOSIS — E78.5 HYPERLIPIDEMIA, UNSPECIFIED: ICD-10-CM

## 2025-08-26 DIAGNOSIS — D64.9 ANEMIA, UNSPECIFIED: ICD-10-CM

## 2025-08-27 LAB
ALBUMIN SERPL BCG-MCNC: 3 G/DL (ref 3.5–5.2)
ALP SERPL-CCNC: 121 U/L (ref 40–150)
ALT SERPL W P-5'-P-CCNC: 17 U/L (ref 0–50)
ANION GAP SERPL CALCULATED.3IONS-SCNC: 10 MMOL/L (ref 7–15)
AST SERPL W P-5'-P-CCNC: 17 U/L (ref 0–45)
BILIRUB SERPL-MCNC: 0.3 MG/DL
BUN SERPL-MCNC: 36.2 MG/DL (ref 8–23)
CALCIUM SERPL-MCNC: 8.9 MG/DL (ref 8.8–10.4)
CHLORIDE SERPL-SCNC: 109 MMOL/L (ref 98–107)
CHOLEST SERPL-MCNC: 112 MG/DL
CREAT SERPL-MCNC: 1.4 MG/DL (ref 0.51–0.95)
EGFRCR SERPLBLD CKD-EPI 2021: 42 ML/MIN/1.73M2
ERYTHROCYTE [DISTWIDTH] IN BLOOD BY AUTOMATED COUNT: 13.5 % (ref 10–15)
EST. AVERAGE GLUCOSE BLD GHB EST-MCNC: 212 MG/DL
FASTING STATUS PATIENT QL REPORTED: NORMAL
GLUCOSE SERPL-MCNC: 242 MG/DL (ref 70–99)
HBA1C MFR BLD: 9 %
HCO3 SERPL-SCNC: 21 MMOL/L (ref 22–29)
HCT VFR BLD AUTO: 32.1 % (ref 35–47)
HDLC SERPL-MCNC: 50 MG/DL
HGB BLD-MCNC: 10.1 G/DL (ref 11.7–15.7)
LDLC SERPL CALC-MCNC: 40 MG/DL
MCH RBC QN AUTO: 27.7 PG (ref 26.5–33)
MCHC RBC AUTO-ENTMCNC: 31.5 G/DL (ref 31.5–36.5)
MCV RBC AUTO: 87.9 FL (ref 78–100)
NONHDLC SERPL-MCNC: 62 MG/DL
PLATELET # BLD AUTO: 199 10E3/UL (ref 150–450)
POTASSIUM SERPL-SCNC: 4.4 MMOL/L (ref 3.4–5.3)
PROT SERPL-MCNC: 5.6 G/DL (ref 6.4–8.3)
RBC # BLD AUTO: 3.65 10E6/UL (ref 3.8–5.2)
SODIUM SERPL-SCNC: 140 MMOL/L (ref 135–145)
TRIGL SERPL-MCNC: 109 MG/DL
TSH SERPL DL<=0.005 MIU/L-ACNC: 5.94 UIU/ML (ref 0.3–4.2)
VIT D+METAB SERPL-MCNC: 14 NG/ML (ref 20–50)
WBC # BLD AUTO: 5.5 10E3/UL (ref 4–11)

## 2025-08-28 ENCOUNTER — RESULTS FOLLOW-UP (OUTPATIENT)
Dept: GERIATRICS | Facility: CLINIC | Age: 65
End: 2025-08-28
Payer: MEDICARE

## 2025-08-28 RX ORDER — VITAMIN B COMPLEX
25 TABLET ORAL DAILY
COMMUNITY
Start: 2025-08-28